# Patient Record
Sex: MALE | Race: ASIAN | NOT HISPANIC OR LATINO | Employment: OTHER
[De-identification: names, ages, dates, MRNs, and addresses within clinical notes are randomized per-mention and may not be internally consistent; named-entity substitution may affect disease eponyms.]

---

## 2020-01-24 ENCOUNTER — RECORDS - HEALTHEAST (OUTPATIENT)
Dept: ADMINISTRATIVE | Facility: OTHER | Age: 70
End: 2020-01-24

## 2020-01-24 ENCOUNTER — AMBULATORY - HEALTHEAST (OUTPATIENT)
Dept: ADMINISTRATIVE | Facility: CLINIC | Age: 70
End: 2020-01-24

## 2020-01-24 ENCOUNTER — OFFICE VISIT - HEALTHEAST (OUTPATIENT)
Dept: FAMILY MEDICINE | Facility: CLINIC | Age: 70
End: 2020-01-24

## 2020-01-24 DIAGNOSIS — R22.1 MULTIPLE MASSES OF NECK: ICD-10-CM

## 2020-01-24 LAB
ANION GAP SERPL CALCULATED.3IONS-SCNC: 7 MMOL/L (ref 5–18)
BUN SERPL-MCNC: 22 MG/DL (ref 8–22)
CALCIUM SERPL-MCNC: 8.7 MG/DL (ref 8.5–10.5)
CHLORIDE BLD-SCNC: 109 MMOL/L (ref 98–107)
CO2 SERPL-SCNC: 26 MMOL/L (ref 22–31)
CREAT SERPL-MCNC: 1.16 MG/DL (ref 0.7–1.3)
GFR SERPL CREATININE-BSD FRML MDRD: >60 ML/MIN/1.73M2
GLUCOSE BLD-MCNC: 89 MG/DL (ref 70–125)
POTASSIUM BLD-SCNC: 4.1 MMOL/L (ref 3.5–5)
SODIUM SERPL-SCNC: 142 MMOL/L (ref 136–145)

## 2020-01-24 ASSESSMENT — MIFFLIN-ST. JEOR: SCORE: 1376.41

## 2020-01-29 ENCOUNTER — OFFICE VISIT - HEALTHEAST (OUTPATIENT)
Dept: OTOLARYNGOLOGY | Facility: CLINIC | Age: 70
End: 2020-01-29

## 2020-01-29 DIAGNOSIS — J39.2 NASOPHARYNGEAL MASS: ICD-10-CM

## 2020-01-29 DIAGNOSIS — R59.0 CERVICAL LYMPHADENOPATHY: ICD-10-CM

## 2020-02-03 ENCOUNTER — COMMUNICATION - HEALTHEAST (OUTPATIENT)
Dept: OTOLARYNGOLOGY | Facility: CLINIC | Age: 70
End: 2020-02-03

## 2020-02-05 ENCOUNTER — OFFICE VISIT - HEALTHEAST (OUTPATIENT)
Dept: OTOLARYNGOLOGY | Facility: CLINIC | Age: 70
End: 2020-02-05

## 2020-02-05 DIAGNOSIS — C11.9 SQUAMOUS CELL CARCINOMA OF NASOPHARYNX (H): ICD-10-CM

## 2020-02-07 ENCOUNTER — AMBULATORY - HEALTHEAST (OUTPATIENT)
Dept: ONCOLOGY | Facility: HOSPITAL | Age: 70
End: 2020-02-07

## 2020-02-07 ENCOUNTER — RECORDS - HEALTHEAST (OUTPATIENT)
Dept: RADIOLOGY | Facility: CLINIC | Age: 70
End: 2020-02-07

## 2020-02-07 ENCOUNTER — OFFICE VISIT - HEALTHEAST (OUTPATIENT)
Dept: RADIATION ONCOLOGY | Facility: HOSPITAL | Age: 70
End: 2020-02-07

## 2020-02-07 DIAGNOSIS — C11.3: ICD-10-CM

## 2020-02-07 DIAGNOSIS — C11.9 NASOPHARYNX CANCER (H): ICD-10-CM

## 2020-02-10 ENCOUNTER — HOSPITAL ENCOUNTER (OUTPATIENT)
Dept: RADIOLOGY | Facility: CLINIC | Age: 70
Discharge: HOME | End: 2020-02-10
Attending: RADIOLOGY

## 2020-02-10 ENCOUNTER — HOSPITAL ENCOUNTER (OUTPATIENT)
Dept: MRI IMAGING | Facility: CLINIC | Age: 70
Setting detail: RADIATION/ONCOLOGY SERIES
Discharge: STILL ADMITTED OR ADMINISTRATIVE | End: 2020-02-10
Attending: RADIOLOGY

## 2020-02-10 DIAGNOSIS — C11.9 NASOPHARYNX CANCER (H): ICD-10-CM

## 2020-02-12 ENCOUNTER — AMBULATORY - HEALTHEAST (OUTPATIENT)
Dept: RADIATION ONCOLOGY | Facility: CLINIC | Age: 70
End: 2020-02-12

## 2020-02-12 ENCOUNTER — COMMUNICATION - HEALTHEAST (OUTPATIENT)
Dept: RADIATION ONCOLOGY | Facility: CLINIC | Age: 70
End: 2020-02-12

## 2020-02-12 ENCOUNTER — HOSPITAL ENCOUNTER (OUTPATIENT)
Dept: PET IMAGING | Facility: HOSPITAL | Age: 70
Setting detail: RADIATION/ONCOLOGY SERIES
Discharge: STILL ADMITTED OR ADMINISTRATIVE | End: 2020-02-12
Attending: RADIOLOGY

## 2020-02-12 DIAGNOSIS — C11.9 NASOPHARYNX CANCER (H): ICD-10-CM

## 2020-02-12 DIAGNOSIS — C11.3: ICD-10-CM

## 2020-02-12 LAB — GLUCOSE BLDC GLUCOMTR-MCNC: 97 MG/DL (ref 70–139)

## 2020-02-13 ENCOUNTER — COMMUNICATION - HEALTHEAST (OUTPATIENT)
Dept: ONCOLOGY | Facility: HOSPITAL | Age: 70
End: 2020-02-13

## 2020-02-14 ENCOUNTER — AMBULATORY - HEALTHEAST (OUTPATIENT)
Dept: RADIATION ONCOLOGY | Facility: HOSPITAL | Age: 70
End: 2020-02-14

## 2020-02-14 ENCOUNTER — OFFICE VISIT - HEALTHEAST (OUTPATIENT)
Dept: RADIATION ONCOLOGY | Facility: HOSPITAL | Age: 70
End: 2020-02-14

## 2020-02-14 DIAGNOSIS — C11.9 NASOPHARYNX CANCER (H): ICD-10-CM

## 2020-02-14 DIAGNOSIS — C11.3: ICD-10-CM

## 2020-02-14 LAB
CREAT SERPL-MCNC: 0.9 MG/DL (ref 0.7–1.3)
GFR SERPL CREATININE-BSD FRML MDRD: >60 ML/MIN/1.73M2

## 2020-02-18 ENCOUNTER — OFFICE VISIT - HEALTHEAST (OUTPATIENT)
Dept: ONCOLOGY | Facility: HOSPITAL | Age: 70
End: 2020-02-18

## 2020-02-18 DIAGNOSIS — C11.9 NASOPHARYNGEAL CARCINOMA (H): ICD-10-CM

## 2020-02-18 DIAGNOSIS — C11.9 SQUAMOUS CELL CARCINOMA OF NASOPHARYNX (H): ICD-10-CM

## 2020-02-20 ENCOUNTER — COMMUNICATION - HEALTHEAST (OUTPATIENT)
Dept: ADMINISTRATIVE | Facility: HOSPITAL | Age: 70
End: 2020-02-20

## 2020-02-25 ENCOUNTER — AMBULATORY - HEALTHEAST (OUTPATIENT)
Dept: PULMONOLOGY | Facility: OTHER | Age: 70
End: 2020-02-25

## 2020-02-25 ENCOUNTER — OFFICE VISIT - HEALTHEAST (OUTPATIENT)
Dept: PULMONOLOGY | Facility: OTHER | Age: 70
End: 2020-02-25

## 2020-02-25 DIAGNOSIS — C11.9 NASOPHARYNGEAL CARCINOMA (H): ICD-10-CM

## 2020-02-25 DIAGNOSIS — R59.0 HILAR LYMPHADENOPATHY: ICD-10-CM

## 2020-02-25 ASSESSMENT — MIFFLIN-ST. JEOR: SCORE: 1342.79

## 2020-02-26 ENCOUNTER — AMBULATORY - HEALTHEAST (OUTPATIENT)
Dept: RADIATION ONCOLOGY | Facility: HOSPITAL | Age: 70
End: 2020-02-26

## 2020-02-27 ENCOUNTER — AMBULATORY - HEALTHEAST (OUTPATIENT)
Dept: INFUSION THERAPY | Facility: HOSPITAL | Age: 70
End: 2020-02-27

## 2020-02-27 ENCOUNTER — HOSPITAL ENCOUNTER (OUTPATIENT)
Dept: RESPIRATORY THERAPY | Facility: CLINIC | Age: 70
Discharge: HOME | End: 2020-02-27
Attending: INTERNAL MEDICINE | Admitting: INTERNAL MEDICINE

## 2020-02-27 ENCOUNTER — COMMUNICATION - HEALTHEAST (OUTPATIENT)
Dept: ONCOLOGY | Facility: HOSPITAL | Age: 70
End: 2020-02-27

## 2020-02-27 DIAGNOSIS — C11.9 NASOPHARYNGEAL CARCINOMA (H): ICD-10-CM

## 2020-02-27 DIAGNOSIS — C11.9 SQUAMOUS CELL CARCINOMA OF NASOPHARYNX (H): ICD-10-CM

## 2020-02-27 ASSESSMENT — MIFFLIN-ST. JEOR: SCORE: 1410.83

## 2020-02-28 LAB
CAP COMMENT: NORMAL
LAB AP CHARGES (HE HISTORICAL CONVERSION): NORMAL
LAB AP INITIAL CYTO EVAL (HE HISTORICAL CONVERSION): NORMAL
LAB MED GENERAL PATH INTERP (HE HISTORICAL CONVERSION): NORMAL
PATH REPORT.COMMENTS IMP SPEC: NORMAL
PATH REPORT.FINAL DX SPEC: NORMAL
PATH REPORT.MICROSCOPIC SPEC OTHER STN: NORMAL
PATH REPORT.RELEVANT HX SPEC: NORMAL
SPECIMEN DESCRIPTION: NORMAL

## 2020-03-02 ENCOUNTER — AMBULATORY - HEALTHEAST (OUTPATIENT)
Dept: RADIATION ONCOLOGY | Facility: HOSPITAL | Age: 70
End: 2020-03-02

## 2020-03-02 ENCOUNTER — HOSPITAL ENCOUNTER (OUTPATIENT)
Dept: INTERVENTIONAL RADIOLOGY/VASCULAR | Facility: HOSPITAL | Age: 70
Setting detail: RADIATION/ONCOLOGY SERIES
Discharge: STILL ADMITTED OR ADMINISTRATIVE | End: 2020-03-02
Attending: RADIOLOGY

## 2020-03-02 ENCOUNTER — OFFICE VISIT - HEALTHEAST (OUTPATIENT)
Dept: RADIATION ONCOLOGY | Facility: HOSPITAL | Age: 70
End: 2020-03-02

## 2020-03-02 DIAGNOSIS — C11.9 NASOPHARYNX CANCER (H): ICD-10-CM

## 2020-03-02 DIAGNOSIS — C11.3: ICD-10-CM

## 2020-03-02 DIAGNOSIS — C11.9 SQUAMOUS CELL CARCINOMA OF NASOPHARYNX (H): ICD-10-CM

## 2020-03-02 DIAGNOSIS — C11.9 NASOPHARYNGEAL CARCINOMA (H): ICD-10-CM

## 2020-03-03 ENCOUNTER — AMBULATORY - HEALTHEAST (OUTPATIENT)
Dept: RADIATION ONCOLOGY | Facility: HOSPITAL | Age: 70
End: 2020-03-03

## 2020-03-03 ENCOUNTER — INFUSION - HEALTHEAST (OUTPATIENT)
Dept: INFUSION THERAPY | Facility: HOSPITAL | Age: 70
End: 2020-03-03

## 2020-03-03 ENCOUNTER — OFFICE VISIT - HEALTHEAST (OUTPATIENT)
Dept: ONCOLOGY | Facility: HOSPITAL | Age: 70
End: 2020-03-03

## 2020-03-03 DIAGNOSIS — C11.9 SQUAMOUS CELL CARCINOMA OF NASOPHARYNX (H): ICD-10-CM

## 2020-03-03 LAB
ALBUMIN SERPL-MCNC: 3.2 G/DL (ref 3.5–5)
ALP SERPL-CCNC: 76 U/L (ref 45–120)
ALT SERPL W P-5'-P-CCNC: 26 U/L (ref 0–45)
ANION GAP SERPL CALCULATED.3IONS-SCNC: 10 MMOL/L (ref 5–18)
AST SERPL W P-5'-P-CCNC: 25 U/L (ref 0–40)
BASOPHILS # BLD AUTO: 0 THOU/UL (ref 0–0.2)
BASOPHILS NFR BLD AUTO: 1 % (ref 0–2)
BILIRUB SERPL-MCNC: 0.7 MG/DL (ref 0–1)
BUN SERPL-MCNC: 19 MG/DL (ref 8–22)
CALCIUM SERPL-MCNC: 8.7 MG/DL (ref 8.5–10.5)
CHLORIDE BLD-SCNC: 106 MMOL/L (ref 98–107)
CO2 SERPL-SCNC: 26 MMOL/L (ref 22–31)
CREAT SERPL-MCNC: 1.09 MG/DL (ref 0.7–1.3)
EOSINOPHIL # BLD AUTO: 0.1 THOU/UL (ref 0–0.4)
EOSINOPHIL NFR BLD AUTO: 2 % (ref 0–6)
ERYTHROCYTE [DISTWIDTH] IN BLOOD BY AUTOMATED COUNT: 13.9 % (ref 11–14.5)
GFR SERPL CREATININE-BSD FRML MDRD: >60 ML/MIN/1.73M2
GLUCOSE BLD-MCNC: 187 MG/DL (ref 70–125)
HCT VFR BLD AUTO: 35.3 % (ref 40–54)
HGB BLD-MCNC: 11 G/DL (ref 14–18)
LYMPHOCYTES # BLD AUTO: 1.2 THOU/UL (ref 0.8–4.4)
LYMPHOCYTES NFR BLD AUTO: 20 % (ref 20–40)
MAGNESIUM SERPL-MCNC: 2.1 MG/DL (ref 1.8–2.6)
MCH RBC QN AUTO: 25.5 PG (ref 27–34)
MCHC RBC AUTO-ENTMCNC: 31.2 G/DL (ref 32–36)
MCV RBC AUTO: 82 FL (ref 80–100)
MONOCYTES # BLD AUTO: 0.4 THOU/UL (ref 0–0.9)
MONOCYTES NFR BLD AUTO: 7 % (ref 2–10)
NEUTROPHILS # BLD AUTO: 4.1 THOU/UL (ref 2–7.7)
NEUTROPHILS NFR BLD AUTO: 70 % (ref 50–70)
PLATELET # BLD AUTO: 195 THOU/UL (ref 140–440)
PMV BLD AUTO: 11.2 FL (ref 8.5–12.5)
POTASSIUM BLD-SCNC: 3.2 MMOL/L (ref 3.5–5)
PROT SERPL-MCNC: 6.6 G/DL (ref 6–8)
RBC # BLD AUTO: 4.31 MILL/UL (ref 4.4–6.2)
SODIUM SERPL-SCNC: 142 MMOL/L (ref 136–145)
WBC: 5.8 THOU/UL (ref 4–11)

## 2020-03-04 ENCOUNTER — AMBULATORY - HEALTHEAST (OUTPATIENT)
Dept: RADIATION ONCOLOGY | Facility: HOSPITAL | Age: 70
End: 2020-03-04

## 2020-03-04 ENCOUNTER — COMMUNICATION - HEALTHEAST (OUTPATIENT)
Dept: PULMONOLOGY | Facility: OTHER | Age: 70
End: 2020-03-04

## 2020-03-05 ENCOUNTER — AMBULATORY - HEALTHEAST (OUTPATIENT)
Dept: RADIATION ONCOLOGY | Facility: HOSPITAL | Age: 70
End: 2020-03-05

## 2020-03-05 ENCOUNTER — AMBULATORY - HEALTHEAST (OUTPATIENT)
Dept: ONCOLOGY | Facility: HOSPITAL | Age: 70
End: 2020-03-05

## 2020-03-05 DIAGNOSIS — C11.9 NASOPHARYNGEAL CARCINOMA (H): ICD-10-CM

## 2020-03-05 RX ORDER — LIDOCAINE/PRILOCAINE 2.5 %-2.5%
CREAM (GRAM) TOPICAL
Qty: 30 G | Refills: 1 | Status: SHIPPED | OUTPATIENT
Start: 2020-03-05 | End: 2021-09-16

## 2020-03-06 ENCOUNTER — AMBULATORY - HEALTHEAST (OUTPATIENT)
Dept: RADIATION ONCOLOGY | Facility: HOSPITAL | Age: 70
End: 2020-03-06

## 2020-03-09 ENCOUNTER — AMBULATORY - HEALTHEAST (OUTPATIENT)
Dept: ONCOLOGY | Facility: HOSPITAL | Age: 70
End: 2020-03-09

## 2020-03-09 ENCOUNTER — OFFICE VISIT - HEALTHEAST (OUTPATIENT)
Dept: RADIATION ONCOLOGY | Facility: HOSPITAL | Age: 70
End: 2020-03-09

## 2020-03-09 ENCOUNTER — AMBULATORY - HEALTHEAST (OUTPATIENT)
Dept: INFUSION THERAPY | Facility: HOSPITAL | Age: 70
End: 2020-03-09

## 2020-03-09 ENCOUNTER — AMBULATORY - HEALTHEAST (OUTPATIENT)
Dept: RADIATION ONCOLOGY | Facility: HOSPITAL | Age: 70
End: 2020-03-09

## 2020-03-09 DIAGNOSIS — C11.9 SQUAMOUS CELL CARCINOMA OF NASOPHARYNX (H): ICD-10-CM

## 2020-03-09 DIAGNOSIS — R50.9 FEVER: ICD-10-CM

## 2020-03-09 DIAGNOSIS — C11.9 NASOPHARYNGEAL CARCINOMA (H): ICD-10-CM

## 2020-03-09 LAB
ALBUMIN SERPL-MCNC: 3.2 G/DL (ref 3.5–5)
ALP SERPL-CCNC: 71 U/L (ref 45–120)
ALT SERPL W P-5'-P-CCNC: 33 U/L (ref 0–45)
ANION GAP SERPL CALCULATED.3IONS-SCNC: 7 MMOL/L (ref 5–18)
AST SERPL W P-5'-P-CCNC: 30 U/L (ref 0–40)
BASOPHILS # BLD AUTO: 0 THOU/UL (ref 0–0.2)
BASOPHILS NFR BLD AUTO: 0 % (ref 0–2)
BILIRUB SERPL-MCNC: 1.4 MG/DL (ref 0–1)
BUN SERPL-MCNC: 16 MG/DL (ref 8–22)
CALCIUM SERPL-MCNC: 8.5 MG/DL (ref 8.5–10.5)
CHLORIDE BLD-SCNC: 101 MMOL/L (ref 98–107)
CO2 SERPL-SCNC: 26 MMOL/L (ref 22–31)
CREAT SERPL-MCNC: 0.9 MG/DL (ref 0.7–1.3)
EOSINOPHIL # BLD AUTO: 0 THOU/UL (ref 0–0.4)
EOSINOPHIL NFR BLD AUTO: 0 % (ref 0–6)
ERYTHROCYTE [DISTWIDTH] IN BLOOD BY AUTOMATED COUNT: 13.6 % (ref 11–14.5)
GFR SERPL CREATININE-BSD FRML MDRD: >60 ML/MIN/1.73M2
GLUCOSE BLD-MCNC: 105 MG/DL (ref 70–125)
HCT VFR BLD AUTO: 32.8 % (ref 40–54)
HGB BLD-MCNC: 10.7 G/DL (ref 14–18)
LYMPHOCYTES # BLD AUTO: 1 THOU/UL (ref 0.8–4.4)
LYMPHOCYTES NFR BLD AUTO: 6 % (ref 20–40)
MAGNESIUM SERPL-MCNC: 2 MG/DL (ref 1.8–2.6)
MCH RBC QN AUTO: 26.2 PG (ref 27–34)
MCHC RBC AUTO-ENTMCNC: 32.6 G/DL (ref 32–36)
MCV RBC AUTO: 80 FL (ref 80–100)
MONOCYTES # BLD AUTO: 1.4 THOU/UL (ref 0–0.9)
MONOCYTES NFR BLD AUTO: 8 % (ref 2–10)
NEUTROPHILS # BLD AUTO: 14.8 THOU/UL (ref 2–7.7)
NEUTROPHILS NFR BLD AUTO: 85 % (ref 50–70)
PLATELET # BLD AUTO: 162 THOU/UL (ref 140–440)
PMV BLD AUTO: 11.4 FL (ref 8.5–12.5)
POTASSIUM BLD-SCNC: 3.8 MMOL/L (ref 3.5–5)
PROT SERPL-MCNC: 6.7 G/DL (ref 6–8)
RBC # BLD AUTO: 4.09 MILL/UL (ref 4.4–6.2)
SODIUM SERPL-SCNC: 134 MMOL/L (ref 136–145)
WBC: 17.3 THOU/UL (ref 4–11)

## 2020-03-10 ENCOUNTER — INFUSION - HEALTHEAST (OUTPATIENT)
Dept: INFUSION THERAPY | Facility: HOSPITAL | Age: 70
End: 2020-03-10

## 2020-03-10 ENCOUNTER — OFFICE VISIT - HEALTHEAST (OUTPATIENT)
Dept: ONCOLOGY | Facility: HOSPITAL | Age: 70
End: 2020-03-10

## 2020-03-10 ENCOUNTER — AMBULATORY - HEALTHEAST (OUTPATIENT)
Dept: RADIATION ONCOLOGY | Facility: HOSPITAL | Age: 70
End: 2020-03-10

## 2020-03-10 DIAGNOSIS — C11.9 SQUAMOUS CELL CARCINOMA OF NASOPHARYNX (H): ICD-10-CM

## 2020-03-10 DIAGNOSIS — R50.9 FEVER, UNSPECIFIED FEVER CAUSE: ICD-10-CM

## 2020-03-10 ASSESSMENT — MIFFLIN-ST. JEOR: SCORE: 1342

## 2020-03-11 ENCOUNTER — AMBULATORY - HEALTHEAST (OUTPATIENT)
Dept: RADIATION ONCOLOGY | Facility: HOSPITAL | Age: 70
End: 2020-03-11

## 2020-03-12 ENCOUNTER — INFUSION - HEALTHEAST (OUTPATIENT)
Dept: INFUSION THERAPY | Facility: HOSPITAL | Age: 70
End: 2020-03-12

## 2020-03-12 ENCOUNTER — AMBULATORY - HEALTHEAST (OUTPATIENT)
Dept: RADIATION ONCOLOGY | Facility: HOSPITAL | Age: 70
End: 2020-03-12

## 2020-03-12 DIAGNOSIS — C11.9 SQUAMOUS CELL CARCINOMA OF NASOPHARYNX (H): ICD-10-CM

## 2020-03-12 LAB
ALBUMIN SERPL-MCNC: 2.9 G/DL (ref 3.5–5)
ALP SERPL-CCNC: 74 U/L (ref 45–120)
ALT SERPL W P-5'-P-CCNC: 48 U/L (ref 0–45)
ANION GAP SERPL CALCULATED.3IONS-SCNC: 5 MMOL/L (ref 5–18)
AST SERPL W P-5'-P-CCNC: 31 U/L (ref 0–40)
BASOPHILS # BLD AUTO: 0 THOU/UL (ref 0–0.2)
BASOPHILS NFR BLD AUTO: 0 % (ref 0–2)
BILIRUB SERPL-MCNC: 0.8 MG/DL (ref 0–1)
BUN SERPL-MCNC: 18 MG/DL (ref 8–22)
CALCIUM SERPL-MCNC: 8.1 MG/DL (ref 8.5–10.5)
CHLORIDE BLD-SCNC: 106 MMOL/L (ref 98–107)
CO2 SERPL-SCNC: 27 MMOL/L (ref 22–31)
CREAT SERPL-MCNC: 0.8 MG/DL (ref 0.7–1.3)
EOSINOPHIL # BLD AUTO: 0.1 THOU/UL (ref 0–0.4)
EOSINOPHIL NFR BLD AUTO: 1 % (ref 0–6)
ERYTHROCYTE [DISTWIDTH] IN BLOOD BY AUTOMATED COUNT: 13.6 % (ref 11–14.5)
GFR SERPL CREATININE-BSD FRML MDRD: >60 ML/MIN/1.73M2
GLUCOSE BLD-MCNC: 95 MG/DL (ref 70–125)
HCT VFR BLD AUTO: 28.6 % (ref 40–54)
HGB BLD-MCNC: 9.2 G/DL (ref 14–18)
LYMPHOCYTES # BLD AUTO: 0.6 THOU/UL (ref 0.8–4.4)
LYMPHOCYTES NFR BLD AUTO: 6 % (ref 20–40)
MAGNESIUM SERPL-MCNC: 1.9 MG/DL (ref 1.8–2.6)
MCH RBC QN AUTO: 26.1 PG (ref 27–34)
MCHC RBC AUTO-ENTMCNC: 32.2 G/DL (ref 32–36)
MCV RBC AUTO: 81 FL (ref 80–100)
MONOCYTES # BLD AUTO: 0.8 THOU/UL (ref 0–0.9)
MONOCYTES NFR BLD AUTO: 9 % (ref 2–10)
NEUTROPHILS # BLD AUTO: 7.2 THOU/UL (ref 2–7.7)
NEUTROPHILS NFR BLD AUTO: 83 % (ref 50–70)
PLATELET # BLD AUTO: 146 THOU/UL (ref 140–440)
PMV BLD AUTO: 10.7 FL (ref 8.5–12.5)
POTASSIUM BLD-SCNC: 3.6 MMOL/L (ref 3.5–5)
PROT SERPL-MCNC: 6.2 G/DL (ref 6–8)
RBC # BLD AUTO: 3.52 MILL/UL (ref 4.4–6.2)
SODIUM SERPL-SCNC: 138 MMOL/L (ref 136–145)
WBC: 8.7 THOU/UL (ref 4–11)

## 2020-03-13 ENCOUNTER — AMBULATORY - HEALTHEAST (OUTPATIENT)
Dept: RADIATION ONCOLOGY | Facility: HOSPITAL | Age: 70
End: 2020-03-13

## 2020-03-14 LAB
AEROBIC BLOOD CULTURE BOTTLE: NO GROWTH
AEROBIC BLOOD CULTURE BOTTLE: NO GROWTH
ANAEROBIC BLOOD CULTURE BOTTLE: NO GROWTH
ANAEROBIC BLOOD CULTURE BOTTLE: NO GROWTH

## 2020-03-16 ENCOUNTER — OFFICE VISIT - HEALTHEAST (OUTPATIENT)
Dept: RADIATION ONCOLOGY | Facility: HOSPITAL | Age: 70
End: 2020-03-16

## 2020-03-16 ENCOUNTER — AMBULATORY - HEALTHEAST (OUTPATIENT)
Dept: RADIATION ONCOLOGY | Facility: HOSPITAL | Age: 70
End: 2020-03-16

## 2020-03-16 DIAGNOSIS — C11.9 SQUAMOUS CELL CARCINOMA OF NASOPHARYNX (H): ICD-10-CM

## 2020-03-17 ENCOUNTER — AMBULATORY - HEALTHEAST (OUTPATIENT)
Dept: RADIATION ONCOLOGY | Facility: HOSPITAL | Age: 70
End: 2020-03-17

## 2020-03-17 ENCOUNTER — OFFICE VISIT - HEALTHEAST (OUTPATIENT)
Dept: RADIATION ONCOLOGY | Facility: HOSPITAL | Age: 70
End: 2020-03-17

## 2020-03-17 DIAGNOSIS — Z51.5 ENCOUNTER FOR PALLIATIVE CARE: ICD-10-CM

## 2020-03-17 DIAGNOSIS — C11.9 NASOPHARYNX CANCER (H): ICD-10-CM

## 2020-03-17 DIAGNOSIS — G89.3 CANCER RELATED PAIN: ICD-10-CM

## 2020-03-18 ENCOUNTER — INFUSION - HEALTHEAST (OUTPATIENT)
Dept: INFUSION THERAPY | Facility: HOSPITAL | Age: 70
End: 2020-03-18

## 2020-03-18 ENCOUNTER — AMBULATORY - HEALTHEAST (OUTPATIENT)
Dept: RADIATION ONCOLOGY | Facility: HOSPITAL | Age: 70
End: 2020-03-18

## 2020-03-18 DIAGNOSIS — C11.9 SQUAMOUS CELL CARCINOMA OF NASOPHARYNX (H): ICD-10-CM

## 2020-03-18 LAB
ALBUMIN SERPL-MCNC: 3 G/DL (ref 3.5–5)
ALP SERPL-CCNC: 69 U/L (ref 45–120)
ALT SERPL W P-5'-P-CCNC: 34 U/L (ref 0–45)
ANION GAP SERPL CALCULATED.3IONS-SCNC: 7 MMOL/L (ref 5–18)
AST SERPL W P-5'-P-CCNC: 26 U/L (ref 0–40)
BASOPHILS # BLD AUTO: 0 THOU/UL (ref 0–0.2)
BASOPHILS NFR BLD AUTO: 0 % (ref 0–2)
BILIRUB SERPL-MCNC: 0.9 MG/DL (ref 0–1)
BUN SERPL-MCNC: 15 MG/DL (ref 8–22)
CALCIUM SERPL-MCNC: 8.2 MG/DL (ref 8.5–10.5)
CHLORIDE BLD-SCNC: 102 MMOL/L (ref 98–107)
CO2 SERPL-SCNC: 27 MMOL/L (ref 22–31)
CREAT SERPL-MCNC: 0.92 MG/DL (ref 0.7–1.3)
EOSINOPHIL # BLD AUTO: 0.2 THOU/UL (ref 0–0.4)
EOSINOPHIL NFR BLD AUTO: 2 % (ref 0–6)
ERYTHROCYTE [DISTWIDTH] IN BLOOD BY AUTOMATED COUNT: 13.6 % (ref 11–14.5)
GFR SERPL CREATININE-BSD FRML MDRD: >60 ML/MIN/1.73M2
GLUCOSE BLD-MCNC: 97 MG/DL (ref 70–125)
HCT VFR BLD AUTO: 29.9 % (ref 40–54)
HGB BLD-MCNC: 9.3 G/DL (ref 14–18)
LYMPHOCYTES # BLD AUTO: 0.6 THOU/UL (ref 0.8–4.4)
LYMPHOCYTES NFR BLD AUTO: 9 % (ref 20–40)
MAGNESIUM SERPL-MCNC: 2 MG/DL (ref 1.8–2.6)
MCH RBC QN AUTO: 25.5 PG (ref 27–34)
MCHC RBC AUTO-ENTMCNC: 31.1 G/DL (ref 32–36)
MCV RBC AUTO: 82 FL (ref 80–100)
MONOCYTES # BLD AUTO: 0.7 THOU/UL (ref 0–0.9)
MONOCYTES NFR BLD AUTO: 10 % (ref 2–10)
NEUTROPHILS # BLD AUTO: 5.6 THOU/UL (ref 2–7.7)
NEUTROPHILS NFR BLD AUTO: 79 % (ref 50–70)
PLATELET # BLD AUTO: 166 THOU/UL (ref 140–440)
PMV BLD AUTO: 10.3 FL (ref 8.5–12.5)
POTASSIUM BLD-SCNC: 3.6 MMOL/L (ref 3.5–5)
PROT SERPL-MCNC: 6.2 G/DL (ref 6–8)
RBC # BLD AUTO: 3.64 MILL/UL (ref 4.4–6.2)
SODIUM SERPL-SCNC: 136 MMOL/L (ref 136–145)
WBC: 7.2 THOU/UL (ref 4–11)

## 2020-03-19 ENCOUNTER — AMBULATORY - HEALTHEAST (OUTPATIENT)
Dept: RADIATION ONCOLOGY | Facility: HOSPITAL | Age: 70
End: 2020-03-19

## 2020-03-20 ENCOUNTER — AMBULATORY - HEALTHEAST (OUTPATIENT)
Dept: RADIATION ONCOLOGY | Facility: HOSPITAL | Age: 70
End: 2020-03-20

## 2020-03-23 ENCOUNTER — OFFICE VISIT - HEALTHEAST (OUTPATIENT)
Dept: RADIATION ONCOLOGY | Facility: HOSPITAL | Age: 70
End: 2020-03-23

## 2020-03-23 ENCOUNTER — AMBULATORY - HEALTHEAST (OUTPATIENT)
Dept: RADIATION ONCOLOGY | Facility: HOSPITAL | Age: 70
End: 2020-03-23

## 2020-03-23 DIAGNOSIS — C11.9 NASOPHARYNGEAL CARCINOMA (H): ICD-10-CM

## 2020-03-24 ENCOUNTER — INFUSION - HEALTHEAST (OUTPATIENT)
Dept: INFUSION THERAPY | Facility: HOSPITAL | Age: 70
End: 2020-03-24

## 2020-03-24 ENCOUNTER — OFFICE VISIT - HEALTHEAST (OUTPATIENT)
Dept: ONCOLOGY | Facility: HOSPITAL | Age: 70
End: 2020-03-24

## 2020-03-24 ENCOUNTER — AMBULATORY - HEALTHEAST (OUTPATIENT)
Dept: RADIATION ONCOLOGY | Facility: HOSPITAL | Age: 70
End: 2020-03-24

## 2020-03-24 ENCOUNTER — COMMUNICATION - HEALTHEAST (OUTPATIENT)
Dept: NUTRITION | Facility: HOSPITAL | Age: 70
End: 2020-03-24

## 2020-03-24 ENCOUNTER — AMBULATORY - HEALTHEAST (OUTPATIENT)
Dept: INFUSION THERAPY | Facility: HOSPITAL | Age: 70
End: 2020-03-24

## 2020-03-24 DIAGNOSIS — C11.9 SQUAMOUS CELL CARCINOMA OF NASOPHARYNX (H): ICD-10-CM

## 2020-03-24 DIAGNOSIS — C11.9 NASOPHARYNGEAL CARCINOMA (H): ICD-10-CM

## 2020-03-24 LAB
ALBUMIN SERPL-MCNC: 3.1 G/DL (ref 3.5–5)
ALP SERPL-CCNC: 67 U/L (ref 45–120)
ALT SERPL W P-5'-P-CCNC: 28 U/L (ref 0–45)
ANION GAP SERPL CALCULATED.3IONS-SCNC: 10 MMOL/L (ref 5–18)
AST SERPL W P-5'-P-CCNC: 21 U/L (ref 0–40)
BASOPHILS # BLD AUTO: 0 THOU/UL (ref 0–0.2)
BASOPHILS NFR BLD AUTO: 0 % (ref 0–2)
BILIRUB SERPL-MCNC: 0.8 MG/DL (ref 0–1)
BUN SERPL-MCNC: 19 MG/DL (ref 8–22)
CALCIUM SERPL-MCNC: 8.7 MG/DL (ref 8.5–10.5)
CHLORIDE BLD-SCNC: 102 MMOL/L (ref 98–107)
CO2 SERPL-SCNC: 26 MMOL/L (ref 22–31)
CREAT SERPL-MCNC: 1.25 MG/DL (ref 0.7–1.3)
EOSINOPHIL # BLD AUTO: 0.2 THOU/UL (ref 0–0.4)
EOSINOPHIL NFR BLD AUTO: 2 % (ref 0–6)
ERYTHROCYTE [DISTWIDTH] IN BLOOD BY AUTOMATED COUNT: 14.2 % (ref 11–14.5)
GFR SERPL CREATININE-BSD FRML MDRD: 58 ML/MIN/1.73M2
GLUCOSE BLD-MCNC: 148 MG/DL (ref 70–125)
HCT VFR BLD AUTO: 30.3 % (ref 40–54)
HGB BLD-MCNC: 9.6 G/DL (ref 14–18)
LYMPHOCYTES # BLD AUTO: 0.6 THOU/UL (ref 0.8–4.4)
LYMPHOCYTES NFR BLD AUTO: 9 % (ref 20–40)
MAGNESIUM SERPL-MCNC: 1.9 MG/DL (ref 1.8–2.6)
MCH RBC QN AUTO: 25.8 PG (ref 27–34)
MCHC RBC AUTO-ENTMCNC: 31.7 G/DL (ref 32–36)
MCV RBC AUTO: 82 FL (ref 80–100)
MONOCYTES # BLD AUTO: 0.6 THOU/UL (ref 0–0.9)
MONOCYTES NFR BLD AUTO: 8 % (ref 2–10)
NEUTROPHILS # BLD AUTO: 5.6 THOU/UL (ref 2–7.7)
NEUTROPHILS NFR BLD AUTO: 80 % (ref 50–70)
PLATELET # BLD AUTO: 170 THOU/UL (ref 140–440)
PMV BLD AUTO: 10.9 FL (ref 8.5–12.5)
POTASSIUM BLD-SCNC: 3.4 MMOL/L (ref 3.5–5)
PROT SERPL-MCNC: 6.5 G/DL (ref 6–8)
RBC # BLD AUTO: 3.72 MILL/UL (ref 4.4–6.2)
SODIUM SERPL-SCNC: 138 MMOL/L (ref 136–145)
WBC: 7 THOU/UL (ref 4–11)

## 2020-03-25 ENCOUNTER — AMBULATORY - HEALTHEAST (OUTPATIENT)
Dept: RADIATION ONCOLOGY | Facility: HOSPITAL | Age: 70
End: 2020-03-25

## 2020-03-26 ENCOUNTER — AMBULATORY - HEALTHEAST (OUTPATIENT)
Dept: RADIATION ONCOLOGY | Facility: HOSPITAL | Age: 70
End: 2020-03-26

## 2020-03-26 DIAGNOSIS — C11.9 SQUAMOUS CELL CARCINOMA OF NASOPHARYNX (H): ICD-10-CM

## 2020-03-27 ENCOUNTER — AMBULATORY - HEALTHEAST (OUTPATIENT)
Dept: RADIATION ONCOLOGY | Facility: HOSPITAL | Age: 70
End: 2020-03-27

## 2020-03-30 ENCOUNTER — OFFICE VISIT - HEALTHEAST (OUTPATIENT)
Dept: RADIATION ONCOLOGY | Facility: HOSPITAL | Age: 70
End: 2020-03-30

## 2020-03-30 ENCOUNTER — AMBULATORY - HEALTHEAST (OUTPATIENT)
Dept: RADIATION ONCOLOGY | Facility: HOSPITAL | Age: 70
End: 2020-03-30

## 2020-03-30 DIAGNOSIS — B49 FUNGAL INFECTION: ICD-10-CM

## 2020-03-30 DIAGNOSIS — C11.9 SQUAMOUS CELL CARCINOMA OF NASOPHARYNX (H): ICD-10-CM

## 2020-03-31 ENCOUNTER — INFUSION - HEALTHEAST (OUTPATIENT)
Dept: INFUSION THERAPY | Facility: HOSPITAL | Age: 70
End: 2020-03-31

## 2020-03-31 ENCOUNTER — AMBULATORY - HEALTHEAST (OUTPATIENT)
Dept: RADIATION ONCOLOGY | Facility: HOSPITAL | Age: 70
End: 2020-03-31

## 2020-03-31 DIAGNOSIS — C11.9 SQUAMOUS CELL CARCINOMA OF NASOPHARYNX (H): ICD-10-CM

## 2020-03-31 LAB
ALBUMIN SERPL-MCNC: 3 G/DL (ref 3.5–5)
ALP SERPL-CCNC: 74 U/L (ref 45–120)
ALT SERPL W P-5'-P-CCNC: 41 U/L (ref 0–45)
ANION GAP SERPL CALCULATED.3IONS-SCNC: 7 MMOL/L (ref 5–18)
AST SERPL W P-5'-P-CCNC: 33 U/L (ref 0–40)
BASOPHILS # BLD AUTO: 0 THOU/UL (ref 0–0.2)
BASOPHILS NFR BLD AUTO: 0 % (ref 0–2)
BILIRUB SERPL-MCNC: 0.3 MG/DL (ref 0–1)
BUN SERPL-MCNC: 25 MG/DL (ref 8–22)
CALCIUM SERPL-MCNC: 8.8 MG/DL (ref 8.5–10.5)
CHLORIDE BLD-SCNC: 103 MMOL/L (ref 98–107)
CO2 SERPL-SCNC: 25 MMOL/L (ref 22–31)
CREAT SERPL-MCNC: 1.18 MG/DL (ref 0.7–1.3)
EOSINOPHIL # BLD AUTO: 0 THOU/UL (ref 0–0.4)
EOSINOPHIL NFR BLD AUTO: 0 % (ref 0–6)
ERYTHROCYTE [DISTWIDTH] IN BLOOD BY AUTOMATED COUNT: 14.6 % (ref 11–14.5)
GFR SERPL CREATININE-BSD FRML MDRD: >60 ML/MIN/1.73M2
GLUCOSE BLD-MCNC: 146 MG/DL (ref 70–125)
HCT VFR BLD AUTO: 27.5 % (ref 40–54)
HGB BLD-MCNC: 8.6 G/DL (ref 14–18)
LYMPHOCYTES # BLD AUTO: 0.2 THOU/UL (ref 0.8–4.4)
LYMPHOCYTES NFR BLD AUTO: 5 % (ref 20–40)
MAGNESIUM SERPL-MCNC: 2.3 MG/DL (ref 1.8–2.6)
MCH RBC QN AUTO: 25.7 PG (ref 27–34)
MCHC RBC AUTO-ENTMCNC: 31.3 G/DL (ref 32–36)
MCV RBC AUTO: 82 FL (ref 80–100)
MONOCYTES # BLD AUTO: 0.1 THOU/UL (ref 0–0.9)
MONOCYTES NFR BLD AUTO: 3 % (ref 2–10)
NEUTROPHILS # BLD AUTO: 3.1 THOU/UL (ref 2–7.7)
NEUTROPHILS NFR BLD AUTO: 93 % (ref 50–70)
PLATELET # BLD AUTO: 88 THOU/UL (ref 140–440)
PMV BLD AUTO: 11 FL (ref 8.5–12.5)
POTASSIUM BLD-SCNC: 4.6 MMOL/L (ref 3.5–5)
PROT SERPL-MCNC: 6.8 G/DL (ref 6–8)
RBC # BLD AUTO: 3.34 MILL/UL (ref 4.4–6.2)
SODIUM SERPL-SCNC: 135 MMOL/L (ref 136–145)
WBC: 3.4 THOU/UL (ref 4–11)

## 2020-04-01 ENCOUNTER — AMBULATORY - HEALTHEAST (OUTPATIENT)
Dept: RADIATION ONCOLOGY | Facility: HOSPITAL | Age: 70
End: 2020-04-01

## 2020-04-01 ENCOUNTER — COMMUNICATION - HEALTHEAST (OUTPATIENT)
Dept: ONCOLOGY | Facility: HOSPITAL | Age: 70
End: 2020-04-01

## 2020-04-02 ENCOUNTER — AMBULATORY - HEALTHEAST (OUTPATIENT)
Dept: RADIATION ONCOLOGY | Facility: HOSPITAL | Age: 70
End: 2020-04-02

## 2020-04-03 ENCOUNTER — AMBULATORY - HEALTHEAST (OUTPATIENT)
Dept: RADIATION ONCOLOGY | Facility: HOSPITAL | Age: 70
End: 2020-04-03

## 2020-04-06 ENCOUNTER — AMBULATORY - HEALTHEAST (OUTPATIENT)
Dept: RADIATION ONCOLOGY | Facility: HOSPITAL | Age: 70
End: 2020-04-06

## 2020-04-06 ENCOUNTER — OFFICE VISIT - HEALTHEAST (OUTPATIENT)
Dept: RADIATION ONCOLOGY | Facility: HOSPITAL | Age: 70
End: 2020-04-06

## 2020-04-06 ENCOUNTER — OFFICE VISIT - HEALTHEAST (OUTPATIENT)
Dept: ONCOLOGY | Facility: HOSPITAL | Age: 70
End: 2020-04-06

## 2020-04-06 ENCOUNTER — AMBULATORY - HEALTHEAST (OUTPATIENT)
Dept: ONCOLOGY | Facility: HOSPITAL | Age: 70
End: 2020-04-06

## 2020-04-06 DIAGNOSIS — C11.9 SQUAMOUS CELL CARCINOMA OF NASOPHARYNX (H): ICD-10-CM

## 2020-04-06 DIAGNOSIS — C11.9 NASOPHARYNX CANCER (H): ICD-10-CM

## 2020-04-07 ENCOUNTER — AMBULATORY - HEALTHEAST (OUTPATIENT)
Dept: RADIATION ONCOLOGY | Facility: HOSPITAL | Age: 70
End: 2020-04-07

## 2020-04-07 ENCOUNTER — INFUSION - HEALTHEAST (OUTPATIENT)
Dept: INFUSION THERAPY | Facility: HOSPITAL | Age: 70
End: 2020-04-07

## 2020-04-07 ENCOUNTER — COMMUNICATION - HEALTHEAST (OUTPATIENT)
Dept: ONCOLOGY | Facility: HOSPITAL | Age: 70
End: 2020-04-07

## 2020-04-07 ENCOUNTER — OFFICE VISIT - HEALTHEAST (OUTPATIENT)
Dept: ONCOLOGY | Facility: HOSPITAL | Age: 70
End: 2020-04-07

## 2020-04-07 ENCOUNTER — AMBULATORY - HEALTHEAST (OUTPATIENT)
Dept: INFUSION THERAPY | Facility: HOSPITAL | Age: 70
End: 2020-04-07

## 2020-04-07 DIAGNOSIS — C11.9 SQUAMOUS CELL CARCINOMA OF NASOPHARYNX (H): ICD-10-CM

## 2020-04-07 DIAGNOSIS — R79.89 ELEVATED SERUM CREATININE: ICD-10-CM

## 2020-04-07 DIAGNOSIS — D61.810 ANTINEOPLASTIC CHEMOTHERAPY INDUCED PANCYTOPENIA (H): ICD-10-CM

## 2020-04-07 DIAGNOSIS — C11.9 NASOPHARYNGEAL CARCINOMA (H): ICD-10-CM

## 2020-04-07 DIAGNOSIS — T45.1X5A ANTINEOPLASTIC CHEMOTHERAPY INDUCED PANCYTOPENIA (H): ICD-10-CM

## 2020-04-07 LAB
ALBUMIN SERPL-MCNC: 2.8 G/DL (ref 3.5–5)
ALP SERPL-CCNC: 82 U/L (ref 45–120)
ALT SERPL W P-5'-P-CCNC: 47 U/L (ref 0–45)
ANION GAP SERPL CALCULATED.3IONS-SCNC: 8 MMOL/L (ref 5–18)
AST SERPL W P-5'-P-CCNC: 19 U/L (ref 0–40)
BASOPHILS # BLD AUTO: 0 THOU/UL (ref 0–0.2)
BASOPHILS NFR BLD AUTO: 0 % (ref 0–2)
BILIRUB SERPL-MCNC: 0.8 MG/DL (ref 0–1)
BUN SERPL-MCNC: 39 MG/DL (ref 8–22)
CALCIUM SERPL-MCNC: 8.1 MG/DL (ref 8.5–10.5)
CHLORIDE BLD-SCNC: 98 MMOL/L (ref 98–107)
CO2 SERPL-SCNC: 25 MMOL/L (ref 22–31)
CREAT SERPL-MCNC: 1.43 MG/DL (ref 0.7–1.3)
EOSINOPHIL # BLD AUTO: 0 THOU/UL (ref 0–0.4)
EOSINOPHIL NFR BLD AUTO: 0 % (ref 0–6)
ERYTHROCYTE [DISTWIDTH] IN BLOOD BY AUTOMATED COUNT: 16.6 % (ref 11–14.5)
GFR SERPL CREATININE-BSD FRML MDRD: 49 ML/MIN/1.73M2
GLUCOSE BLD-MCNC: 197 MG/DL (ref 70–125)
HCT VFR BLD AUTO: 27.1 % (ref 40–54)
HGB BLD-MCNC: 8.8 G/DL (ref 14–18)
LYMPHOCYTES # BLD AUTO: 0.1 THOU/UL (ref 0.8–4.4)
LYMPHOCYTES NFR BLD AUTO: 3 % (ref 20–40)
MAGNESIUM SERPL-MCNC: 2 MG/DL (ref 1.8–2.6)
MCH RBC QN AUTO: 26 PG (ref 27–34)
MCHC RBC AUTO-ENTMCNC: 32.5 G/DL (ref 32–36)
MCV RBC AUTO: 80 FL (ref 80–100)
MONOCYTES # BLD AUTO: 0.1 THOU/UL (ref 0–0.9)
MONOCYTES NFR BLD AUTO: 2 % (ref 2–10)
NEUTROPHILS # BLD AUTO: 4.3 THOU/UL (ref 2–7.7)
NEUTROPHILS NFR BLD AUTO: 95 % (ref 50–70)
PLATELET # BLD AUTO: 32 THOU/UL (ref 140–440)
PMV BLD AUTO: ABNORMAL FL
POTASSIUM BLD-SCNC: 4.4 MMOL/L (ref 3.5–5)
PROT SERPL-MCNC: 5.8 G/DL (ref 6–8)
RBC # BLD AUTO: 3.38 MILL/UL (ref 4.4–6.2)
SODIUM SERPL-SCNC: 131 MMOL/L (ref 136–145)
WBC: 4.5 THOU/UL (ref 4–11)

## 2020-04-07 ASSESSMENT — MIFFLIN-ST. JEOR: SCORE: 1291.65

## 2020-04-08 ENCOUNTER — AMBULATORY - HEALTHEAST (OUTPATIENT)
Dept: RADIATION ONCOLOGY | Facility: HOSPITAL | Age: 70
End: 2020-04-08

## 2020-04-09 ENCOUNTER — AMBULATORY - HEALTHEAST (OUTPATIENT)
Dept: RADIATION ONCOLOGY | Facility: HOSPITAL | Age: 70
End: 2020-04-09

## 2020-04-10 ENCOUNTER — AMBULATORY - HEALTHEAST (OUTPATIENT)
Dept: RADIATION ONCOLOGY | Facility: HOSPITAL | Age: 70
End: 2020-04-10

## 2020-04-13 ENCOUNTER — COMMUNICATION - HEALTHEAST (OUTPATIENT)
Dept: ONCOLOGY | Facility: HOSPITAL | Age: 70
End: 2020-04-13

## 2020-04-13 ENCOUNTER — COMMUNICATION - HEALTHEAST (OUTPATIENT)
Dept: RADIATION ONCOLOGY | Facility: HOSPITAL | Age: 70
End: 2020-04-13

## 2020-04-13 ENCOUNTER — OFFICE VISIT - HEALTHEAST (OUTPATIENT)
Dept: RADIATION ONCOLOGY | Facility: HOSPITAL | Age: 70
End: 2020-04-13

## 2020-04-13 ENCOUNTER — AMBULATORY - HEALTHEAST (OUTPATIENT)
Dept: RADIATION ONCOLOGY | Facility: HOSPITAL | Age: 70
End: 2020-04-13

## 2020-04-13 DIAGNOSIS — C11.9 SQUAMOUS CELL CARCINOMA OF NASOPHARYNX (H): ICD-10-CM

## 2020-04-14 ENCOUNTER — INFUSION - HEALTHEAST (OUTPATIENT)
Dept: INFUSION THERAPY | Facility: HOSPITAL | Age: 70
End: 2020-04-14

## 2020-04-14 ENCOUNTER — AMBULATORY - HEALTHEAST (OUTPATIENT)
Dept: ONCOLOGY | Facility: HOSPITAL | Age: 70
End: 2020-04-14

## 2020-04-14 ENCOUNTER — AMBULATORY - HEALTHEAST (OUTPATIENT)
Dept: RADIATION ONCOLOGY | Facility: HOSPITAL | Age: 70
End: 2020-04-14

## 2020-04-14 DIAGNOSIS — D64.9 ANEMIA: ICD-10-CM

## 2020-04-14 DIAGNOSIS — C11.9 SQUAMOUS CELL CARCINOMA OF NASOPHARYNX (H): ICD-10-CM

## 2020-04-14 DIAGNOSIS — C11.9 NASOPHARYNGEAL CARCINOMA (H): ICD-10-CM

## 2020-04-14 DIAGNOSIS — R79.89 ELEVATED SERUM CREATININE: ICD-10-CM

## 2020-04-14 LAB
ABO/RH(D): NORMAL
ALBUMIN SERPL-MCNC: 2.4 G/DL (ref 3.5–5)
ALP SERPL-CCNC: 66 U/L (ref 45–120)
ALT SERPL W P-5'-P-CCNC: 34 U/L (ref 0–45)
ANION GAP SERPL CALCULATED.3IONS-SCNC: 10 MMOL/L (ref 5–18)
ANTIBODY SCREEN: NEGATIVE
AST SERPL W P-5'-P-CCNC: 29 U/L (ref 0–40)
BASO STIPL BLD QL SMEAR: ABNORMAL
BASOPHILS # BLD AUTO: 0 THOU/UL (ref 0–0.2)
BASOPHILS NFR BLD AUTO: 0 % (ref 0–2)
BILIRUB SERPL-MCNC: 1.3 MG/DL (ref 0–1)
BUN SERPL-MCNC: 22 MG/DL (ref 8–22)
CALCIUM SERPL-MCNC: 7.9 MG/DL (ref 8.5–10.5)
CHLORIDE BLD-SCNC: 100 MMOL/L (ref 98–107)
CO2 SERPL-SCNC: 23 MMOL/L (ref 22–31)
CREAT SERPL-MCNC: 1.42 MG/DL (ref 0.7–1.3)
EOSINOPHIL # BLD AUTO: 0 THOU/UL (ref 0–0.4)
EOSINOPHIL NFR BLD AUTO: 0 % (ref 0–6)
ERYTHROCYTE [DISTWIDTH] IN BLOOD BY AUTOMATED COUNT: 17.8 % (ref 11–14.5)
GFR SERPL CREATININE-BSD FRML MDRD: 50 ML/MIN/1.73M2
GLUCOSE BLD-MCNC: 141 MG/DL (ref 70–125)
HCT VFR BLD AUTO: 20.8 % (ref 40–54)
HGB BLD-MCNC: 6.7 G/DL (ref 14–18)
LYMPHOCYTES # BLD AUTO: 0.3 THOU/UL (ref 0.8–4.4)
LYMPHOCYTES NFR BLD AUTO: 20 % (ref 20–40)
MAGNESIUM SERPL-MCNC: 2 MG/DL (ref 1.8–2.6)
MCH RBC QN AUTO: 27.2 PG (ref 27–34)
MCHC RBC AUTO-ENTMCNC: 32.2 G/DL (ref 32–36)
MCV RBC AUTO: 85 FL (ref 80–100)
MONOCYTES # BLD AUTO: 0.2 THOU/UL (ref 0–0.9)
MONOCYTES NFR BLD AUTO: 14 % (ref 2–10)
NEUTROPHILS # BLD AUTO: 0.9 THOU/UL (ref 2–7.7)
NEUTROPHILS NFR BLD AUTO: 65 % (ref 50–70)
OVALOCYTES: ABNORMAL
PLAT MORPH BLD: ABNORMAL
PLATELET # BLD AUTO: 63 THOU/UL (ref 140–440)
PMV BLD AUTO: 12.1 FL (ref 8.5–12.5)
POTASSIUM BLD-SCNC: 3.5 MMOL/L (ref 3.5–5)
PROT SERPL-MCNC: 5.7 G/DL (ref 6–8)
RBC # BLD AUTO: 2.46 MILL/UL (ref 4.4–6.2)
SODIUM SERPL-SCNC: 133 MMOL/L (ref 136–145)
TEAR DROP: ABNORMAL
TOXIC GRANULATION: ABNORMAL
WBC: 1.4 THOU/UL (ref 4–11)

## 2020-04-15 ENCOUNTER — AMBULATORY - HEALTHEAST (OUTPATIENT)
Dept: RADIATION ONCOLOGY | Facility: HOSPITAL | Age: 70
End: 2020-04-15

## 2020-04-15 ENCOUNTER — AMBULATORY - HEALTHEAST (OUTPATIENT)
Dept: ONCOLOGY | Facility: HOSPITAL | Age: 70
End: 2020-04-15

## 2020-04-15 DIAGNOSIS — C11.9 NASOPHARYNGEAL CARCINOMA (H): ICD-10-CM

## 2020-04-15 LAB
BLD PROD TYP BPU: NORMAL
BLOOD TYPE: 7300
CODING SYSTEM: NORMAL
COMPONENT (HISTORICAL CONVERSION): NORMAL
CROSSMATCH: NORMAL
ISSUE DATE AND TIME: NORMAL
STATUS (HISTORICAL CONVERSION): NORMAL
UNIT ABO/RH (HISTORICAL CONVERSION): NORMAL
UNIT NUMBER: NORMAL

## 2020-04-16 ENCOUNTER — AMBULATORY - HEALTHEAST (OUTPATIENT)
Dept: RADIATION ONCOLOGY | Facility: HOSPITAL | Age: 70
End: 2020-04-16

## 2020-04-17 ENCOUNTER — INFUSION - HEALTHEAST (OUTPATIENT)
Dept: INFUSION THERAPY | Facility: HOSPITAL | Age: 70
End: 2020-04-17

## 2020-04-17 ENCOUNTER — AMBULATORY - HEALTHEAST (OUTPATIENT)
Dept: RADIATION ONCOLOGY | Facility: HOSPITAL | Age: 70
End: 2020-04-17

## 2020-04-17 DIAGNOSIS — C11.9 SQUAMOUS CELL CARCINOMA OF NASOPHARYNX (H): ICD-10-CM

## 2020-04-17 DIAGNOSIS — R79.89 ELEVATED SERUM CREATININE: ICD-10-CM

## 2020-04-19 ENCOUNTER — COMMUNICATION - HEALTHEAST (OUTPATIENT)
Dept: RADIATION ONCOLOGY | Facility: HOSPITAL | Age: 70
End: 2020-04-19

## 2020-04-20 ENCOUNTER — INFUSION - HEALTHEAST (OUTPATIENT)
Dept: INFUSION THERAPY | Facility: HOSPITAL | Age: 70
End: 2020-04-20

## 2020-04-20 ENCOUNTER — OFFICE VISIT - HEALTHEAST (OUTPATIENT)
Dept: RADIATION ONCOLOGY | Facility: HOSPITAL | Age: 70
End: 2020-04-20

## 2020-04-20 DIAGNOSIS — R79.89 ELEVATED SERUM CREATININE: ICD-10-CM

## 2020-04-20 DIAGNOSIS — C11.9 NASOPHARYNGEAL CARCINOMA (H): ICD-10-CM

## 2020-04-20 DIAGNOSIS — C11.9 SQUAMOUS CELL CARCINOMA OF NASOPHARYNX (H): ICD-10-CM

## 2020-04-20 DIAGNOSIS — E86.0 DEHYDRATION: ICD-10-CM

## 2020-04-20 LAB
ALBUMIN SERPL-MCNC: 2.4 G/DL (ref 3.5–5)
ALP SERPL-CCNC: 65 U/L (ref 45–120)
ALT SERPL W P-5'-P-CCNC: 25 U/L (ref 0–45)
ANION GAP SERPL CALCULATED.3IONS-SCNC: 12 MMOL/L (ref 5–18)
AST SERPL W P-5'-P-CCNC: 29 U/L (ref 0–40)
BASOPHILS # BLD AUTO: 0 THOU/UL (ref 0–0.2)
BASOPHILS NFR BLD AUTO: 0 % (ref 0–2)
BILIRUB SERPL-MCNC: 0.8 MG/DL (ref 0–1)
BUN SERPL-MCNC: 25 MG/DL (ref 8–22)
CALCIUM SERPL-MCNC: 8.6 MG/DL (ref 8.5–10.5)
CHLORIDE BLD-SCNC: 103 MMOL/L (ref 98–107)
CO2 SERPL-SCNC: 24 MMOL/L (ref 22–31)
CREAT SERPL-MCNC: 1.14 MG/DL (ref 0.7–1.3)
EOSINOPHIL COUNT (ABSOLUTE): 0 THOU/UL (ref 0–0.4)
EOSINOPHIL NFR BLD AUTO: 1 % (ref 0–6)
ERYTHROCYTE [DISTWIDTH] IN BLOOD BY AUTOMATED COUNT: 18.1 % (ref 11–14.5)
GFR SERPL CREATININE-BSD FRML MDRD: >60 ML/MIN/1.73M2
GLUCOSE BLD-MCNC: 110 MG/DL (ref 70–125)
HCT VFR BLD AUTO: 25.3 % (ref 40–54)
HGB BLD-MCNC: 8 G/DL (ref 14–18)
LYMPHOCYTES # BLD AUTO: 1.1 THOU/UL (ref 0.8–4.4)
LYMPHOCYTES NFR BLD AUTO: 23 % (ref 20–40)
MAGNESIUM SERPL-MCNC: 1.9 MG/DL (ref 1.8–2.6)
MCH RBC QN AUTO: 27 PG (ref 27–34)
MCHC RBC AUTO-ENTMCNC: 31.6 G/DL (ref 32–36)
MCV RBC AUTO: 86 FL (ref 80–100)
METAMYELOCYTES (ABSOLUTE): 0.2 THOU/UL
METAMYELOCYTES NFR BLD MANUAL: 4 %
MONOCYTES # BLD AUTO: 0.4 THOU/UL (ref 0–0.9)
MONOCYTES NFR BLD AUTO: 8 % (ref 2–10)
MYELOCYTES (ABSOLUTE): 0 THOU/UL
MYELOCYTES NFR BLD MANUAL: 1 %
OVALOCYTES: ABNORMAL
PLAT MORPH BLD: NORMAL
PLATELET # BLD AUTO: 162 THOU/UL (ref 140–440)
PMV BLD AUTO: 11.8 FL (ref 8.5–12.5)
POLYCHROMASIA BLD QL SMEAR: ABNORMAL
POTASSIUM BLD-SCNC: 3.5 MMOL/L (ref 3.5–5)
PROT SERPL-MCNC: 6.3 G/DL (ref 6–8)
RBC # BLD AUTO: 2.96 MILL/UL (ref 4.4–6.2)
SODIUM SERPL-SCNC: 139 MMOL/L (ref 136–145)
TOTAL NEUTROPHILS-ABS(DIFF): 3.2 THOU/UL (ref 2–7.7)
TOTAL NEUTROPHILS-REL(DIFF): 65 % (ref 50–70)
WBC: 4.9 THOU/UL (ref 4–11)

## 2020-04-21 ENCOUNTER — OFFICE VISIT - HEALTHEAST (OUTPATIENT)
Dept: ONCOLOGY | Facility: HOSPITAL | Age: 70
End: 2020-04-21

## 2020-04-21 ENCOUNTER — OFFICE VISIT - HEALTHEAST (OUTPATIENT)
Dept: RADIATION ONCOLOGY | Facility: HOSPITAL | Age: 70
End: 2020-04-21

## 2020-04-21 ENCOUNTER — INFUSION - HEALTHEAST (OUTPATIENT)
Dept: INFUSION THERAPY | Facility: HOSPITAL | Age: 70
End: 2020-04-21

## 2020-04-21 ENCOUNTER — COMMUNICATION - HEALTHEAST (OUTPATIENT)
Dept: ONCOLOGY | Facility: HOSPITAL | Age: 70
End: 2020-04-21

## 2020-04-21 DIAGNOSIS — T45.1X5A ANTINEOPLASTIC CHEMOTHERAPY INDUCED PANCYTOPENIA (H): ICD-10-CM

## 2020-04-21 DIAGNOSIS — R63.4 WEIGHT LOSS: ICD-10-CM

## 2020-04-21 DIAGNOSIS — C11.9 SQUAMOUS CELL CARCINOMA OF NASOPHARYNX (H): ICD-10-CM

## 2020-04-21 DIAGNOSIS — G89.3 CANCER RELATED PAIN: ICD-10-CM

## 2020-04-21 DIAGNOSIS — R13.10 ODYNOPHAGIA: ICD-10-CM

## 2020-04-21 DIAGNOSIS — E86.0 DEHYDRATION: ICD-10-CM

## 2020-04-21 DIAGNOSIS — D61.810 ANTINEOPLASTIC CHEMOTHERAPY INDUCED PANCYTOPENIA (H): ICD-10-CM

## 2020-04-21 DIAGNOSIS — C11.9 NASOPHARYNGEAL CARCINOMA (H): ICD-10-CM

## 2020-04-21 DIAGNOSIS — Z51.5 ENCOUNTER FOR PALLIATIVE CARE: ICD-10-CM

## 2020-04-21 DIAGNOSIS — R79.89 ELEVATED SERUM CREATININE: ICD-10-CM

## 2020-04-21 DIAGNOSIS — R53.0 NEOPLASTIC MALIGNANT RELATED FATIGUE: ICD-10-CM

## 2020-04-21 ASSESSMENT — MIFFLIN-ST. JEOR: SCORE: 1270.78

## 2020-04-22 ENCOUNTER — COMMUNICATION - HEALTHEAST (OUTPATIENT)
Dept: CARE COORDINATION | Facility: HOSPITAL | Age: 70
End: 2020-04-22

## 2020-04-24 ENCOUNTER — INFUSION - HEALTHEAST (OUTPATIENT)
Dept: INFUSION THERAPY | Facility: HOSPITAL | Age: 70
End: 2020-04-24

## 2020-04-24 DIAGNOSIS — R79.89 ELEVATED SERUM CREATININE: ICD-10-CM

## 2020-04-24 DIAGNOSIS — C11.9 SQUAMOUS CELL CARCINOMA OF NASOPHARYNX (H): ICD-10-CM

## 2020-04-28 ENCOUNTER — HOSPITAL (OUTPATIENT)
Dept: PHARMACY | Facility: CLINIC | Age: 70
End: 2020-04-28

## 2020-04-28 ENCOUNTER — INFUSION - HEALTHEAST (OUTPATIENT)
Dept: INFUSION THERAPY | Facility: HOSPITAL | Age: 70
End: 2020-04-28

## 2020-04-28 ENCOUNTER — HOME INFUSION (PRE-WILLOW HOME INFUSION) (OUTPATIENT)
Dept: PHARMACY | Facility: CLINIC | Age: 70
End: 2020-04-28

## 2020-04-29 ENCOUNTER — COMMUNICATION - HEALTHEAST (OUTPATIENT)
Dept: ONCOLOGY | Facility: HOSPITAL | Age: 70
End: 2020-04-29

## 2020-04-29 ENCOUNTER — OFFICE VISIT - HEALTHEAST (OUTPATIENT)
Dept: FAMILY MEDICINE | Facility: CLINIC | Age: 70
End: 2020-04-29

## 2020-04-29 DIAGNOSIS — Z93.1 STATUS POST INSERTION OF PERCUTANEOUS ENDOSCOPIC GASTROSTOMY (PEG) TUBE (H): ICD-10-CM

## 2020-04-29 DIAGNOSIS — C11.9 NASOPHARYNGEAL CARCINOMA (H): ICD-10-CM

## 2020-04-29 NOTE — PROGRESS NOTES
This is a recent snapshot of the patient's [facility] medical record.  For current drug dose and complete information and questions, call [phone] or In Basket pool, fv home infusion ([id number])  CSN Number:  [id number]

## 2020-04-30 ENCOUNTER — COMMUNICATION - HEALTHEAST (OUTPATIENT)
Dept: ONCOLOGY | Facility: HOSPITAL | Age: 70
End: 2020-04-30

## 2020-04-30 ENCOUNTER — OFFICE VISIT - HEALTHEAST (OUTPATIENT)
Dept: RADIATION ONCOLOGY | Facility: HOSPITAL | Age: 70
End: 2020-04-30

## 2020-04-30 DIAGNOSIS — C11.9 SQUAMOUS CELL CARCINOMA OF NASOPHARYNX (H): ICD-10-CM

## 2020-05-01 ENCOUNTER — INFUSION - HEALTHEAST (OUTPATIENT)
Dept: INFUSION THERAPY | Facility: HOSPITAL | Age: 70
End: 2020-05-01

## 2020-05-01 DIAGNOSIS — E86.0 DEHYDRATION: ICD-10-CM

## 2020-05-01 DIAGNOSIS — R79.89 ELEVATED SERUM CREATININE: ICD-10-CM

## 2020-05-01 DIAGNOSIS — C11.9 SQUAMOUS CELL CARCINOMA OF NASOPHARYNX (H): ICD-10-CM

## 2020-05-04 ENCOUNTER — COMMUNICATION - HEALTHEAST (OUTPATIENT)
Dept: ADMINISTRATIVE | Facility: HOSPITAL | Age: 70
End: 2020-05-04

## 2020-05-05 ENCOUNTER — INFUSION - HEALTHEAST (OUTPATIENT)
Dept: INFUSION THERAPY | Facility: HOSPITAL | Age: 70
End: 2020-05-05

## 2020-05-05 ENCOUNTER — AMBULATORY - HEALTHEAST (OUTPATIENT)
Dept: INFUSION THERAPY | Facility: HOSPITAL | Age: 70
End: 2020-05-05

## 2020-05-05 ENCOUNTER — OFFICE VISIT - HEALTHEAST (OUTPATIENT)
Dept: ONCOLOGY | Facility: HOSPITAL | Age: 70
End: 2020-05-05

## 2020-05-05 DIAGNOSIS — C11.9 NASOPHARYNGEAL CARCINOMA (H): ICD-10-CM

## 2020-05-05 DIAGNOSIS — R79.89 ELEVATED SERUM CREATININE: ICD-10-CM

## 2020-05-05 DIAGNOSIS — C11.9 NASOPHARYNX CANCER (H): ICD-10-CM

## 2020-05-05 DIAGNOSIS — C11.9 SQUAMOUS CELL CARCINOMA OF NASOPHARYNX (H): ICD-10-CM

## 2020-05-05 LAB
ALBUMIN SERPL-MCNC: 2.5 G/DL (ref 3.5–5)
ALP SERPL-CCNC: 112 U/L (ref 45–120)
ALT SERPL W P-5'-P-CCNC: 35 U/L (ref 0–45)
ANION GAP SERPL CALCULATED.3IONS-SCNC: 7 MMOL/L (ref 5–18)
AST SERPL W P-5'-P-CCNC: 48 U/L (ref 0–40)
BASOPHILS # BLD AUTO: 0.1 THOU/UL (ref 0–0.2)
BASOPHILS NFR BLD AUTO: 1 % (ref 0–2)
BILIRUB SERPL-MCNC: 0.3 MG/DL (ref 0–1)
BUN SERPL-MCNC: 26 MG/DL (ref 8–22)
CALCIUM SERPL-MCNC: 8.8 MG/DL (ref 8.5–10.5)
CHLORIDE BLD-SCNC: 98 MMOL/L (ref 98–107)
CO2 SERPL-SCNC: 31 MMOL/L (ref 22–31)
CREAT SERPL-MCNC: 0.88 MG/DL (ref 0.7–1.3)
EOSINOPHIL # BLD AUTO: 0.1 THOU/UL (ref 0–0.4)
EOSINOPHIL NFR BLD AUTO: 1 % (ref 0–6)
ERYTHROCYTE [DISTWIDTH] IN BLOOD BY AUTOMATED COUNT: 18 % (ref 11–14.5)
GFR SERPL CREATININE-BSD FRML MDRD: >60 ML/MIN/1.73M2
GLUCOSE BLD-MCNC: 143 MG/DL (ref 70–125)
HCT VFR BLD AUTO: 29.4 % (ref 40–54)
HGB BLD-MCNC: 9.1 G/DL (ref 14–18)
LYMPHOCYTES # BLD AUTO: 2.9 THOU/UL (ref 0.8–4.4)
LYMPHOCYTES NFR BLD AUTO: 32 % (ref 20–40)
MAGNESIUM SERPL-MCNC: 2 MG/DL (ref 1.8–2.6)
MCH RBC QN AUTO: 27.7 PG (ref 27–34)
MCHC RBC AUTO-ENTMCNC: 31 G/DL (ref 32–36)
MCV RBC AUTO: 89 FL (ref 80–100)
MONOCYTES # BLD AUTO: 0.9 THOU/UL (ref 0–0.9)
MONOCYTES NFR BLD AUTO: 10 % (ref 2–10)
NEUTROPHILS # BLD AUTO: 4.9 THOU/UL (ref 2–7.7)
NEUTROPHILS NFR BLD AUTO: 55 % (ref 50–70)
PLATELET # BLD AUTO: 172 THOU/UL (ref 140–440)
PMV BLD AUTO: 10.3 FL (ref 8.5–12.5)
POTASSIUM BLD-SCNC: 3.9 MMOL/L (ref 3.5–5)
PROT SERPL-MCNC: 6.3 G/DL (ref 6–8)
RBC # BLD AUTO: 3.29 MILL/UL (ref 4.4–6.2)
SODIUM SERPL-SCNC: 136 MMOL/L (ref 136–145)
WBC: 8.9 THOU/UL (ref 4–11)

## 2020-05-07 ENCOUNTER — OFFICE VISIT - HEALTHEAST (OUTPATIENT)
Dept: RADIATION ONCOLOGY | Facility: HOSPITAL | Age: 70
End: 2020-05-07

## 2020-05-07 ENCOUNTER — INFUSION - HEALTHEAST (OUTPATIENT)
Dept: INFUSION THERAPY | Facility: HOSPITAL | Age: 70
End: 2020-05-07

## 2020-05-07 ENCOUNTER — HOME INFUSION (PRE-WILLOW HOME INFUSION) (OUTPATIENT)
Dept: PHARMACY | Facility: CLINIC | Age: 70
End: 2020-05-07

## 2020-05-07 DIAGNOSIS — C11.9 SQUAMOUS CELL CARCINOMA OF NASOPHARYNX (H): ICD-10-CM

## 2020-05-07 DIAGNOSIS — R79.89 ELEVATED SERUM CREATININE: ICD-10-CM

## 2020-05-08 ENCOUNTER — COMMUNICATION - HEALTHEAST (OUTPATIENT)
Dept: ONCOLOGY | Facility: HOSPITAL | Age: 70
End: 2020-05-08

## 2020-05-08 ENCOUNTER — OFFICE VISIT - HEALTHEAST (OUTPATIENT)
Dept: RADIATION ONCOLOGY | Facility: HOSPITAL | Age: 70
End: 2020-05-08

## 2020-05-08 ENCOUNTER — RECORDS - HEALTHEAST (OUTPATIENT)
Dept: ADMINISTRATIVE | Facility: OTHER | Age: 70
End: 2020-05-08

## 2020-05-08 DIAGNOSIS — C11.9 NASOPHARYNGEAL CARCINOMA (H): ICD-10-CM

## 2020-05-08 NOTE — PROGRESS NOTES
This is a recent snapshot of the patient's [facility] medical record.  For current drug dose and complete information and questions, call [phone] or In Basket pool, fv home infusion ([id number])  CSN Number:  [id number]

## 2020-05-11 ENCOUNTER — AMBULATORY - HEALTHEAST (OUTPATIENT)
Dept: RADIATION ONCOLOGY | Facility: HOSPITAL | Age: 70
End: 2020-05-11

## 2020-05-12 ENCOUNTER — INFUSION - HEALTHEAST (OUTPATIENT)
Dept: INFUSION THERAPY | Facility: HOSPITAL | Age: 70
End: 2020-05-12

## 2020-05-12 DIAGNOSIS — C11.9 SQUAMOUS CELL CARCINOMA OF NASOPHARYNX (H): ICD-10-CM

## 2020-05-12 DIAGNOSIS — Z95.828 PORT-A-CATH IN PLACE: ICD-10-CM

## 2020-05-12 DIAGNOSIS — R79.89 ELEVATED SERUM CREATININE: ICD-10-CM

## 2020-05-14 ENCOUNTER — COMMUNICATION - HEALTHEAST (OUTPATIENT)
Dept: ONCOLOGY | Facility: HOSPITAL | Age: 70
End: 2020-05-14

## 2020-05-14 ENCOUNTER — AMBULATORY - HEALTHEAST (OUTPATIENT)
Dept: ONCOLOGY | Facility: HOSPITAL | Age: 70
End: 2020-05-14

## 2020-05-14 DIAGNOSIS — R22.0 SWELLING OF LEFT SIDE OF FACE: ICD-10-CM

## 2020-05-14 DIAGNOSIS — K11.7 XEROSTOMIA: ICD-10-CM

## 2020-05-14 DIAGNOSIS — H92.12 DRAINAGE FROM LEFT EAR: ICD-10-CM

## 2020-05-15 ENCOUNTER — COMMUNICATION - HEALTHEAST (OUTPATIENT)
Dept: ONCOLOGY | Facility: HOSPITAL | Age: 70
End: 2020-05-15

## 2020-05-18 ENCOUNTER — HOME CARE/HOSPICE - HEALTHEAST (OUTPATIENT)
Dept: HOME HEALTH SERVICES | Facility: HOME HEALTH | Age: 70
End: 2020-05-18

## 2020-05-18 ENCOUNTER — RECORDS - HEALTHEAST (OUTPATIENT)
Dept: ADMINISTRATIVE | Facility: OTHER | Age: 70
End: 2020-05-18

## 2020-05-18 ENCOUNTER — HOME INFUSION (PRE-WILLOW HOME INFUSION) (OUTPATIENT)
Dept: PHARMACY | Facility: CLINIC | Age: 70
End: 2020-05-18

## 2020-05-18 ENCOUNTER — HOSPITAL ENCOUNTER (OUTPATIENT)
Dept: CT IMAGING | Facility: HOSPITAL | Age: 70
Setting detail: RADIATION/ONCOLOGY SERIES
Discharge: STILL ADMITTED OR ADMINISTRATIVE | End: 2020-05-18
Attending: INTERNAL MEDICINE

## 2020-05-18 DIAGNOSIS — C11.9 NASOPHARYNGEAL CARCINOMA (H): ICD-10-CM

## 2020-05-18 DIAGNOSIS — R22.0 SWELLING OF LEFT SIDE OF FACE: ICD-10-CM

## 2020-05-18 DIAGNOSIS — H92.12 DRAINAGE FROM LEFT EAR: ICD-10-CM

## 2020-05-19 ENCOUNTER — AMBULATORY - HEALTHEAST (OUTPATIENT)
Dept: INFUSION THERAPY | Facility: HOSPITAL | Age: 70
End: 2020-05-19

## 2020-05-19 ENCOUNTER — AMBULATORY - HEALTHEAST (OUTPATIENT)
Dept: ONCOLOGY | Facility: HOSPITAL | Age: 70
End: 2020-05-19

## 2020-05-19 ENCOUNTER — AMBULATORY - HEALTHEAST (OUTPATIENT)
Dept: CARE COORDINATION | Facility: CLINIC | Age: 70
End: 2020-05-19

## 2020-05-19 DIAGNOSIS — D61.810 ANTINEOPLASTIC CHEMOTHERAPY INDUCED PANCYTOPENIA (H): ICD-10-CM

## 2020-05-19 DIAGNOSIS — K11.20 PAROTITIS: ICD-10-CM

## 2020-05-19 DIAGNOSIS — T45.1X5A ANTINEOPLASTIC CHEMOTHERAPY INDUCED PANCYTOPENIA (H): ICD-10-CM

## 2020-05-19 NOTE — PROGRESS NOTES
This is a recent snapshot of the patient's [facility] medical record.  For current drug dose and complete information and questions, call [phone] or In Basket pool, fv home infusion ([id number])  CSN Number:  [id number]

## 2020-05-20 ENCOUNTER — OFFICE VISIT - HEALTHEAST (OUTPATIENT)
Dept: OTOLARYNGOLOGY | Facility: CLINIC | Age: 70
End: 2020-05-20

## 2020-05-20 DIAGNOSIS — H66.92 CHRONIC OTITIS MEDIA WITH PERFORATED TYMPANIC MEMBRANE, LEFT: ICD-10-CM

## 2020-05-20 DIAGNOSIS — C11.9 NASOPHARYNGEAL CARCINOMA (H): ICD-10-CM

## 2020-05-20 DIAGNOSIS — H72.92 CHRONIC OTITIS MEDIA WITH PERFORATED TYMPANIC MEMBRANE, LEFT: ICD-10-CM

## 2020-05-20 DIAGNOSIS — H66.12 CHRONIC TUBOTYMPANIC SUPPURATIVE OTITIS MEDIA OF LEFT EAR: ICD-10-CM

## 2020-05-21 ENCOUNTER — COMMUNICATION - HEALTHEAST (OUTPATIENT)
Dept: ONCOLOGY | Facility: HOSPITAL | Age: 70
End: 2020-05-21

## 2020-05-21 ENCOUNTER — COMMUNICATION - HEALTHEAST (OUTPATIENT)
Dept: NURSING | Facility: CLINIC | Age: 70
End: 2020-05-21

## 2020-05-21 ENCOUNTER — OFFICE VISIT - HEALTHEAST (OUTPATIENT)
Dept: RADIATION ONCOLOGY | Facility: HOSPITAL | Age: 70
End: 2020-05-21

## 2020-05-21 ENCOUNTER — RECORDS - HEALTHEAST (OUTPATIENT)
Dept: ADMINISTRATIVE | Facility: OTHER | Age: 70
End: 2020-05-21

## 2020-05-21 ENCOUNTER — OFFICE VISIT - HEALTHEAST (OUTPATIENT)
Dept: ONCOLOGY | Facility: HOSPITAL | Age: 70
End: 2020-05-21

## 2020-05-21 DIAGNOSIS — C11.9 SQUAMOUS CELL CARCINOMA OF NASOPHARYNX (H): ICD-10-CM

## 2020-05-22 ENCOUNTER — COMMUNICATION - HEALTHEAST (OUTPATIENT)
Dept: ONCOLOGY | Facility: HOSPITAL | Age: 70
End: 2020-05-22

## 2020-05-29 ENCOUNTER — COMMUNICATION - HEALTHEAST (OUTPATIENT)
Dept: ONCOLOGY | Facility: HOSPITAL | Age: 70
End: 2020-05-29

## 2020-06-01 ENCOUNTER — INFUSION - HEALTHEAST (OUTPATIENT)
Dept: INFUSION THERAPY | Facility: HOSPITAL | Age: 70
End: 2020-06-01

## 2020-06-01 ENCOUNTER — OFFICE VISIT - HEALTHEAST (OUTPATIENT)
Dept: ONCOLOGY | Facility: HOSPITAL | Age: 70
End: 2020-06-01

## 2020-06-01 ENCOUNTER — AMBULATORY - HEALTHEAST (OUTPATIENT)
Dept: INFUSION THERAPY | Facility: HOSPITAL | Age: 70
End: 2020-06-01

## 2020-06-01 ENCOUNTER — AMBULATORY - HEALTHEAST (OUTPATIENT)
Dept: ONCOLOGY | Facility: HOSPITAL | Age: 70
End: 2020-06-01

## 2020-06-01 DIAGNOSIS — C11.9 SQUAMOUS CELL CARCINOMA OF NASOPHARYNX (H): ICD-10-CM

## 2020-06-01 LAB
ALBUMIN SERPL-MCNC: 3 G/DL (ref 3.5–5)
ALP SERPL-CCNC: 102 U/L (ref 45–120)
ALT SERPL W P-5'-P-CCNC: 27 U/L (ref 0–45)
ANION GAP SERPL CALCULATED.3IONS-SCNC: 4 MMOL/L (ref 5–18)
AST SERPL W P-5'-P-CCNC: 39 U/L (ref 0–40)
BASOPHILS # BLD AUTO: 0 THOU/UL (ref 0–0.2)
BASOPHILS NFR BLD AUTO: 1 % (ref 0–2)
BILIRUB SERPL-MCNC: 0.6 MG/DL (ref 0–1)
BUN SERPL-MCNC: 34 MG/DL (ref 8–22)
CALCIUM SERPL-MCNC: 9.6 MG/DL (ref 8.5–10.5)
CHLORIDE BLD-SCNC: 101 MMOL/L (ref 98–107)
CO2 SERPL-SCNC: 32 MMOL/L (ref 22–31)
CREAT SERPL-MCNC: 0.97 MG/DL (ref 0.7–1.3)
EOSINOPHIL # BLD AUTO: 0.2 THOU/UL (ref 0–0.4)
EOSINOPHIL NFR BLD AUTO: 4 % (ref 0–6)
ERYTHROCYTE [DISTWIDTH] IN BLOOD BY AUTOMATED COUNT: 16.6 % (ref 11–14.5)
GFR SERPL CREATININE-BSD FRML MDRD: >60 ML/MIN/1.73M2
GLUCOSE BLD-MCNC: 157 MG/DL (ref 70–125)
HCT VFR BLD AUTO: 30.4 % (ref 40–54)
HGB BLD-MCNC: 9.5 G/DL (ref 14–18)
LYMPHOCYTES # BLD AUTO: 1.7 THOU/UL (ref 0.8–4.4)
LYMPHOCYTES NFR BLD AUTO: 34 % (ref 20–40)
MAGNESIUM SERPL-MCNC: 2.2 MG/DL (ref 1.8–2.6)
MCH RBC QN AUTO: 28 PG (ref 27–34)
MCHC RBC AUTO-ENTMCNC: 31.3 G/DL (ref 32–36)
MCV RBC AUTO: 90 FL (ref 80–100)
MONOCYTES # BLD AUTO: 0.5 THOU/UL (ref 0–0.9)
MONOCYTES NFR BLD AUTO: 11 % (ref 2–10)
NEUTROPHILS # BLD AUTO: 2.5 THOU/UL (ref 2–7.7)
NEUTROPHILS NFR BLD AUTO: 51 % (ref 50–70)
PLATELET # BLD AUTO: 111 THOU/UL (ref 140–440)
PMV BLD AUTO: 10.8 FL (ref 8.5–12.5)
POTASSIUM BLD-SCNC: 3.9 MMOL/L (ref 3.5–5)
PROT SERPL-MCNC: 7.3 G/DL (ref 6–8)
RBC # BLD AUTO: 3.39 MILL/UL (ref 4.4–6.2)
SODIUM SERPL-SCNC: 137 MMOL/L (ref 136–145)
WBC: 5 THOU/UL (ref 4–11)

## 2020-06-01 ASSESSMENT — MIFFLIN-ST. JEOR: SCORE: 1339.61

## 2020-06-05 ENCOUNTER — HOSPITAL ENCOUNTER (OUTPATIENT)
Dept: INTERVENTIONAL RADIOLOGY/VASCULAR | Facility: HOSPITAL | Age: 70
Discharge: HOME | End: 2020-06-05

## 2020-06-05 ENCOUNTER — COMMUNICATION - HEALTHEAST (OUTPATIENT)
Dept: ONCOLOGY | Facility: HOSPITAL | Age: 70
End: 2020-06-05

## 2020-06-05 ENCOUNTER — INFUSION - HEALTHEAST (OUTPATIENT)
Dept: INFUSION THERAPY | Facility: HOSPITAL | Age: 70
End: 2020-06-05

## 2020-06-05 DIAGNOSIS — C11.9 SQUAMOUS CELL CARCINOMA OF NASOPHARYNX (H): ICD-10-CM

## 2020-06-08 ENCOUNTER — COMMUNICATION - HEALTHEAST (OUTPATIENT)
Dept: ONCOLOGY | Facility: HOSPITAL | Age: 70
End: 2020-06-08

## 2020-06-10 ENCOUNTER — AMBULATORY - HEALTHEAST (OUTPATIENT)
Dept: INFUSION THERAPY | Facility: HOSPITAL | Age: 70
End: 2020-06-10

## 2020-06-10 ENCOUNTER — OFFICE VISIT - HEALTHEAST (OUTPATIENT)
Dept: ONCOLOGY | Facility: HOSPITAL | Age: 70
End: 2020-06-10

## 2020-06-10 DIAGNOSIS — C11.9 SQUAMOUS CELL CARCINOMA OF NASOPHARYNX (H): ICD-10-CM

## 2020-06-10 DIAGNOSIS — C11.9 NASOPHARYNGEAL CARCINOMA (H): ICD-10-CM

## 2020-06-10 LAB
ALBUMIN SERPL-MCNC: 3.2 G/DL (ref 3.5–5)
ALP SERPL-CCNC: 74 U/L (ref 45–120)
ALT SERPL W P-5'-P-CCNC: 36 U/L (ref 0–45)
ANION GAP SERPL CALCULATED.3IONS-SCNC: 8 MMOL/L (ref 5–18)
AST SERPL W P-5'-P-CCNC: 36 U/L (ref 0–40)
BASOPHILS # BLD AUTO: 0 THOU/UL (ref 0–0.2)
BASOPHILS NFR BLD AUTO: 0 % (ref 0–2)
BILIRUB SERPL-MCNC: 0.4 MG/DL (ref 0–1)
BUN SERPL-MCNC: 30 MG/DL (ref 8–22)
CALCIUM SERPL-MCNC: 8.4 MG/DL (ref 8.5–10.5)
CHLORIDE BLD-SCNC: 103 MMOL/L (ref 98–107)
CO2 SERPL-SCNC: 25 MMOL/L (ref 22–31)
CREAT SERPL-MCNC: 0.98 MG/DL (ref 0.7–1.3)
EOSINOPHIL # BLD AUTO: 0.2 THOU/UL (ref 0–0.4)
EOSINOPHIL NFR BLD AUTO: 3 % (ref 0–6)
ERYTHROCYTE [DISTWIDTH] IN BLOOD BY AUTOMATED COUNT: 16.4 % (ref 11–14.5)
GFR SERPL CREATININE-BSD FRML MDRD: >60 ML/MIN/1.73M2
GLUCOSE BLD-MCNC: 131 MG/DL (ref 70–125)
HCT VFR BLD AUTO: 27.6 % (ref 40–54)
HGB BLD-MCNC: 8.7 G/DL (ref 14–18)
LYMPHOCYTES # BLD AUTO: 0.9 THOU/UL (ref 0.8–4.4)
LYMPHOCYTES NFR BLD AUTO: 20 % (ref 20–40)
MAGNESIUM SERPL-MCNC: 1.9 MG/DL (ref 1.8–2.6)
MCH RBC QN AUTO: 28.5 PG (ref 27–34)
MCHC RBC AUTO-ENTMCNC: 31.5 G/DL (ref 32–36)
MCV RBC AUTO: 91 FL (ref 80–100)
MONOCYTES # BLD AUTO: 0.3 THOU/UL (ref 0–0.9)
MONOCYTES NFR BLD AUTO: 8 % (ref 2–10)
NEUTROPHILS # BLD AUTO: 3.1 THOU/UL (ref 2–7.7)
NEUTROPHILS NFR BLD AUTO: 69 % (ref 50–70)
PLATELET # BLD AUTO: 86 THOU/UL (ref 140–440)
PMV BLD AUTO: 11.7 FL (ref 8.5–12.5)
POTASSIUM BLD-SCNC: 4.2 MMOL/L (ref 3.5–5)
PROT SERPL-MCNC: 6.3 G/DL (ref 6–8)
RBC # BLD AUTO: 3.05 MILL/UL (ref 4.4–6.2)
SODIUM SERPL-SCNC: 136 MMOL/L (ref 136–145)
WBC: 4.5 THOU/UL (ref 4–11)

## 2020-06-12 ENCOUNTER — COMMUNICATION - HEALTHEAST (OUTPATIENT)
Dept: ONCOLOGY | Facility: HOSPITAL | Age: 70
End: 2020-06-12

## 2020-06-17 ENCOUNTER — AMBULATORY - HEALTHEAST (OUTPATIENT)
Dept: ONCOLOGY | Facility: HOSPITAL | Age: 70
End: 2020-06-17

## 2020-06-17 ENCOUNTER — COMMUNICATION - HEALTHEAST (OUTPATIENT)
Dept: ONCOLOGY | Facility: HOSPITAL | Age: 70
End: 2020-06-17

## 2020-06-17 ENCOUNTER — AMBULATORY - HEALTHEAST (OUTPATIENT)
Dept: INFUSION THERAPY | Facility: HOSPITAL | Age: 70
End: 2020-06-17

## 2020-06-17 DIAGNOSIS — D64.9 ANEMIA: ICD-10-CM

## 2020-06-17 LAB
BASOPHILS # BLD AUTO: 0 THOU/UL (ref 0–0.2)
BASOPHILS NFR BLD AUTO: 0 % (ref 0–2)
EOSINOPHIL # BLD AUTO: 0.1 THOU/UL (ref 0–0.4)
EOSINOPHIL NFR BLD AUTO: 3 % (ref 0–6)
ERYTHROCYTE [DISTWIDTH] IN BLOOD BY AUTOMATED COUNT: 16 % (ref 11–14.5)
HCT VFR BLD AUTO: 28.4 % (ref 40–54)
HGB BLD-MCNC: 8.9 G/DL (ref 14–18)
LYMPHOCYTES # BLD AUTO: 0.6 THOU/UL (ref 0.8–4.4)
LYMPHOCYTES NFR BLD AUTO: 29 % (ref 20–40)
MCH RBC QN AUTO: 28.5 PG (ref 27–34)
MCHC RBC AUTO-ENTMCNC: 31.3 G/DL (ref 32–36)
MCV RBC AUTO: 91 FL (ref 80–100)
MONOCYTES # BLD AUTO: 0.1 THOU/UL (ref 0–0.9)
MONOCYTES NFR BLD AUTO: 6 % (ref 2–10)
NEUTROPHILS # BLD AUTO: 1.3 THOU/UL (ref 2–7.7)
NEUTROPHILS NFR BLD AUTO: 62 % (ref 50–70)
PLATELET # BLD AUTO: 67 THOU/UL (ref 140–440)
PMV BLD AUTO: 10.8 FL (ref 8.5–12.5)
RBC # BLD AUTO: 3.12 MILL/UL (ref 4.4–6.2)
WBC: 2.1 THOU/UL (ref 4–11)

## 2020-06-19 ENCOUNTER — HOME INFUSION (PRE-WILLOW HOME INFUSION) (OUTPATIENT)
Dept: PHARMACY | Facility: CLINIC | Age: 70
End: 2020-06-19

## 2020-06-22 NOTE — PROGRESS NOTES
This is a recent snapshot of the patient's [facility] medical record.  For current drug dose and complete information and questions, call [phone] or In Basket pool, fv home infusion ([id number])  CSN Number:  [id number]

## 2020-06-25 ENCOUNTER — OFFICE VISIT - HEALTHEAST (OUTPATIENT)
Dept: RADIATION ONCOLOGY | Facility: HOSPITAL | Age: 70
End: 2020-06-25

## 2020-06-25 DIAGNOSIS — C11.9 SQUAMOUS CELL CARCINOMA OF NASOPHARYNX (H): ICD-10-CM

## 2020-06-30 ENCOUNTER — AMBULATORY - HEALTHEAST (OUTPATIENT)
Dept: INFUSION THERAPY | Facility: HOSPITAL | Age: 70
End: 2020-06-30

## 2020-06-30 ENCOUNTER — INFUSION - HEALTHEAST (OUTPATIENT)
Dept: INFUSION THERAPY | Facility: HOSPITAL | Age: 70
End: 2020-06-30

## 2020-06-30 ENCOUNTER — OFFICE VISIT - HEALTHEAST (OUTPATIENT)
Dept: ONCOLOGY | Facility: HOSPITAL | Age: 70
End: 2020-06-30

## 2020-06-30 ENCOUNTER — COMMUNICATION - HEALTHEAST (OUTPATIENT)
Dept: ONCOLOGY | Facility: HOSPITAL | Age: 70
End: 2020-06-30

## 2020-06-30 DIAGNOSIS — D61.810 ANTINEOPLASTIC CHEMOTHERAPY INDUCED PANCYTOPENIA (H): ICD-10-CM

## 2020-06-30 DIAGNOSIS — R63.0 ANOREXIA: ICD-10-CM

## 2020-06-30 DIAGNOSIS — R53.83 FATIGUE, UNSPECIFIED TYPE: ICD-10-CM

## 2020-06-30 DIAGNOSIS — C11.9 SQUAMOUS CELL CARCINOMA OF NASOPHARYNX (H): ICD-10-CM

## 2020-06-30 DIAGNOSIS — R79.89 ELEVATED LFTS: ICD-10-CM

## 2020-06-30 DIAGNOSIS — T45.1X5A ANTINEOPLASTIC CHEMOTHERAPY INDUCED PANCYTOPENIA (H): ICD-10-CM

## 2020-06-30 LAB
ALBUMIN SERPL-MCNC: 3.2 G/DL (ref 3.5–5)
ALP SERPL-CCNC: 135 U/L (ref 45–120)
ALT SERPL W P-5'-P-CCNC: 411 U/L (ref 0–45)
ANION GAP SERPL CALCULATED.3IONS-SCNC: 6 MMOL/L (ref 5–18)
AST SERPL W P-5'-P-CCNC: 418 U/L (ref 0–40)
BASOPHILS # BLD AUTO: 0 THOU/UL (ref 0–0.2)
BASOPHILS NFR BLD AUTO: 1 % (ref 0–2)
BILIRUB SERPL-MCNC: 0.5 MG/DL (ref 0–1)
BUN SERPL-MCNC: 30 MG/DL (ref 8–22)
CALCIUM SERPL-MCNC: 8.8 MG/DL (ref 8.5–10.5)
CHLORIDE BLD-SCNC: 106 MMOL/L (ref 98–107)
CO2 SERPL-SCNC: 26 MMOL/L (ref 22–31)
CREAT SERPL-MCNC: 0.86 MG/DL (ref 0.7–1.3)
EOSINOPHIL # BLD AUTO: 0.1 THOU/UL (ref 0–0.4)
EOSINOPHIL NFR BLD AUTO: 3 % (ref 0–6)
ERYTHROCYTE [DISTWIDTH] IN BLOOD BY AUTOMATED COUNT: 15.9 % (ref 11–14.5)
GFR SERPL CREATININE-BSD FRML MDRD: >60 ML/MIN/1.73M2
GLUCOSE BLD-MCNC: 133 MG/DL (ref 70–125)
HCT VFR BLD AUTO: 30.1 % (ref 40–54)
HGB BLD-MCNC: 9.4 G/DL (ref 14–18)
LYMPHOCYTES # BLD AUTO: 0.7 THOU/UL (ref 0.8–4.4)
LYMPHOCYTES NFR BLD AUTO: 36 % (ref 20–40)
MAGNESIUM SERPL-MCNC: 1.9 MG/DL (ref 1.8–2.6)
MCH RBC QN AUTO: 28.1 PG (ref 27–34)
MCHC RBC AUTO-ENTMCNC: 31.2 G/DL (ref 32–36)
MCV RBC AUTO: 90 FL (ref 80–100)
MONOCYTES # BLD AUTO: 0.4 THOU/UL (ref 0–0.9)
MONOCYTES NFR BLD AUTO: 19 % (ref 2–10)
NEUTROPHILS # BLD AUTO: 0.8 THOU/UL (ref 2–7.7)
NEUTROPHILS NFR BLD AUTO: 42 % (ref 50–70)
PLATELET # BLD AUTO: 84 THOU/UL (ref 140–440)
PMV BLD AUTO: 12.8 FL (ref 8.5–12.5)
POTASSIUM BLD-SCNC: 3.9 MMOL/L (ref 3.5–5)
PROT SERPL-MCNC: 6.3 G/DL (ref 6–8)
RBC # BLD AUTO: 3.34 MILL/UL (ref 4.4–6.2)
SODIUM SERPL-SCNC: 138 MMOL/L (ref 136–145)
TSH SERPL DL<=0.005 MIU/L-ACNC: 25.18 UIU/ML (ref 0.3–5)
WBC: 1.8 THOU/UL (ref 4–11)

## 2020-07-02 ENCOUNTER — COMMUNICATION - HEALTHEAST (OUTPATIENT)
Dept: ONCOLOGY | Facility: HOSPITAL | Age: 70
End: 2020-07-02

## 2020-07-02 ENCOUNTER — AMBULATORY - HEALTHEAST (OUTPATIENT)
Dept: INFUSION THERAPY | Facility: HOSPITAL | Age: 70
End: 2020-07-02

## 2020-07-02 ENCOUNTER — HOSPITAL ENCOUNTER (OUTPATIENT)
Dept: ULTRASOUND IMAGING | Facility: HOSPITAL | Age: 70
Setting detail: RADIATION/ONCOLOGY SERIES
Discharge: STILL ADMITTED OR ADMINISTRATIVE | End: 2020-07-02

## 2020-07-02 DIAGNOSIS — R79.89 ELEVATED LFTS: ICD-10-CM

## 2020-07-02 DIAGNOSIS — B19.10 HEPATITIS B INFECTION: ICD-10-CM

## 2020-07-02 DIAGNOSIS — R63.0 ANOREXIA: ICD-10-CM

## 2020-07-02 DIAGNOSIS — R53.83 FATIGUE, UNSPECIFIED TYPE: ICD-10-CM

## 2020-07-02 LAB
ALBUMIN SERPL-MCNC: 3.2 G/DL (ref 3.5–5)
ALP SERPL-CCNC: 127 U/L (ref 45–120)
ALT SERPL W P-5'-P-CCNC: 429 U/L (ref 0–45)
AST SERPL W P-5'-P-CCNC: 450 U/L (ref 0–40)
BILIRUB DIRECT SERPL-MCNC: 0.3 MG/DL
BILIRUB SERPL-MCNC: 0.7 MG/DL (ref 0–1)
HAV IGM SERPL QL IA: NEGATIVE
HBV CORE IGM SERPL QL IA: NEGATIVE
HBV SURFACE AG SERPL QL IA: ABNORMAL
HCV AB SERPL QL IA: NEGATIVE
PROT SERPL-MCNC: 6.3 G/DL (ref 6–8)

## 2020-07-03 ENCOUNTER — COMMUNICATION - HEALTHEAST (OUTPATIENT)
Dept: ADMINISTRATIVE | Facility: CLINIC | Age: 70
End: 2020-07-03

## 2020-07-03 ENCOUNTER — COMMUNICATION - HEALTHEAST (OUTPATIENT)
Dept: ONCOLOGY | Facility: HOSPITAL | Age: 70
End: 2020-07-03

## 2020-07-06 ENCOUNTER — COMMUNICATION - HEALTHEAST (OUTPATIENT)
Dept: ONCOLOGY | Facility: HOSPITAL | Age: 70
End: 2020-07-06

## 2020-07-09 ENCOUNTER — COMMUNICATION - HEALTHEAST (OUTPATIENT)
Dept: SCHEDULING | Facility: CLINIC | Age: 70
End: 2020-07-09

## 2020-07-10 ENCOUNTER — COMMUNICATION - HEALTHEAST (OUTPATIENT)
Dept: ONCOLOGY | Facility: HOSPITAL | Age: 70
End: 2020-07-10

## 2020-07-13 ENCOUNTER — COMMUNICATION - HEALTHEAST (OUTPATIENT)
Dept: ONCOLOGY | Facility: HOSPITAL | Age: 70
End: 2020-07-13

## 2020-07-13 ENCOUNTER — OFFICE VISIT - HEALTHEAST (OUTPATIENT)
Dept: INFECTIOUS DISEASES | Facility: CLINIC | Age: 70
End: 2020-07-13

## 2020-07-13 ENCOUNTER — COMMUNICATION - HEALTHEAST (OUTPATIENT)
Dept: INFECTIOUS DISEASES | Facility: CLINIC | Age: 70
End: 2020-07-13

## 2020-07-13 DIAGNOSIS — B18.1 CHRONIC VIRAL HEPATITIS B WITHOUT DELTA AGENT AND WITHOUT COMA (H): ICD-10-CM

## 2020-07-14 ENCOUNTER — AMBULATORY - HEALTHEAST (OUTPATIENT)
Dept: INFUSION THERAPY | Facility: HOSPITAL | Age: 70
End: 2020-07-14

## 2020-07-14 ENCOUNTER — OFFICE VISIT - HEALTHEAST (OUTPATIENT)
Dept: ONCOLOGY | Facility: HOSPITAL | Age: 70
End: 2020-07-14

## 2020-07-14 ENCOUNTER — AMBULATORY - HEALTHEAST (OUTPATIENT)
Dept: ONCOLOGY | Facility: HOSPITAL | Age: 70
End: 2020-07-14

## 2020-07-14 DIAGNOSIS — C11.9 NASOPHARYNGEAL CARCINOMA (H): ICD-10-CM

## 2020-07-14 DIAGNOSIS — B18.1 CHRONIC VIRAL HEPATITIS B WITHOUT DELTA AGENT AND WITHOUT COMA (H): ICD-10-CM

## 2020-07-14 DIAGNOSIS — R79.89 ELEVATED LFTS: ICD-10-CM

## 2020-07-14 DIAGNOSIS — C11.9 SQUAMOUS CELL CARCINOMA OF NASOPHARYNX (H): ICD-10-CM

## 2020-07-14 DIAGNOSIS — D69.6 THROMBOCYTOPENIA (H): ICD-10-CM

## 2020-07-14 LAB
ALBUMIN SERPL-MCNC: 2.8 G/DL (ref 3.5–5)
ALP SERPL-CCNC: 199 U/L (ref 45–120)
ALT SERPL W P-5'-P-CCNC: 1197 U/L (ref 0–45)
ANION GAP SERPL CALCULATED.3IONS-SCNC: 6 MMOL/L (ref 5–18)
AST SERPL W P-5'-P-CCNC: 1403 U/L (ref 0–40)
BASOPHILS # BLD AUTO: 0 THOU/UL (ref 0–0.2)
BASOPHILS NFR BLD AUTO: 0 % (ref 0–2)
BILIRUB SERPL-MCNC: 1.4 MG/DL (ref 0–1)
BUN SERPL-MCNC: 25 MG/DL (ref 8–22)
CALCIUM SERPL-MCNC: 8.5 MG/DL (ref 8.5–10.5)
CHLORIDE BLD-SCNC: 106 MMOL/L (ref 98–107)
CO2 SERPL-SCNC: 25 MMOL/L (ref 22–31)
CREAT SERPL-MCNC: 0.79 MG/DL (ref 0.7–1.3)
EOSINOPHIL # BLD AUTO: 0.1 THOU/UL (ref 0–0.4)
EOSINOPHIL NFR BLD AUTO: 3 % (ref 0–6)
ERYTHROCYTE [DISTWIDTH] IN BLOOD BY AUTOMATED COUNT: 16.6 % (ref 11–14.5)
GFR SERPL CREATININE-BSD FRML MDRD: >60 ML/MIN/1.73M2
GLUCOSE BLD-MCNC: 90 MG/DL (ref 70–125)
HCT VFR BLD AUTO: 29.4 % (ref 40–54)
HGB BLD-MCNC: 9.3 G/DL (ref 14–18)
HIV 1+2 AB+HIV1 P24 AG SERPL QL IA: NEGATIVE
LYMPHOCYTES # BLD AUTO: 1.1 THOU/UL (ref 0.8–4.4)
LYMPHOCYTES NFR BLD AUTO: 27 % (ref 20–40)
MCH RBC QN AUTO: 28.3 PG (ref 27–34)
MCHC RBC AUTO-ENTMCNC: 31.6 G/DL (ref 32–36)
MCV RBC AUTO: 89 FL (ref 80–100)
MONOCYTES # BLD AUTO: 0.7 THOU/UL (ref 0–0.9)
MONOCYTES NFR BLD AUTO: 18 % (ref 2–10)
NEUTROPHILS # BLD AUTO: 2 THOU/UL (ref 2–7.7)
NEUTROPHILS NFR BLD AUTO: 51 % (ref 50–70)
PLATELET # BLD AUTO: 82 THOU/UL (ref 140–440)
PMV BLD AUTO: ABNORMAL FL
POTASSIUM BLD-SCNC: 4 MMOL/L (ref 3.5–5)
PROT SERPL-MCNC: 6.2 G/DL (ref 6–8)
RBC # BLD AUTO: 3.29 MILL/UL (ref 4.4–6.2)
SODIUM SERPL-SCNC: 137 MMOL/L (ref 136–145)
WBC: 3.9 THOU/UL (ref 4–11)

## 2020-07-14 ASSESSMENT — MIFFLIN-ST. JEOR: SCORE: 1360.48

## 2020-07-15 ENCOUNTER — COMMUNICATION - HEALTHEAST (OUTPATIENT)
Dept: INFECTIOUS DISEASES | Facility: CLINIC | Age: 70
End: 2020-07-15

## 2020-07-15 DIAGNOSIS — C11.9 SQUAMOUS CELL CARCINOMA OF NASOPHARYNX (H): ICD-10-CM

## 2020-07-16 ENCOUNTER — AMBULATORY - HEALTHEAST (OUTPATIENT)
Dept: INFUSION THERAPY | Facility: HOSPITAL | Age: 70
End: 2020-07-16

## 2020-07-16 ENCOUNTER — COMMUNICATION - HEALTHEAST (OUTPATIENT)
Dept: ADMINISTRATIVE | Facility: CLINIC | Age: 70
End: 2020-07-16

## 2020-07-16 ENCOUNTER — COMMUNICATION - HEALTHEAST (OUTPATIENT)
Dept: ONCOLOGY | Facility: CLINIC | Age: 70
End: 2020-07-16

## 2020-07-16 DIAGNOSIS — R79.89 ELEVATED LFTS: ICD-10-CM

## 2020-07-16 DIAGNOSIS — B18.1 CHRONIC VIRAL HEPATITIS B WITHOUT DELTA AGENT AND WITHOUT COMA (H): ICD-10-CM

## 2020-07-16 LAB
ALBUMIN SERPL-MCNC: 2.7 G/DL (ref 3.5–5)
ALP SERPL-CCNC: 210 U/L (ref 45–120)
ALT SERPL W P-5'-P-CCNC: 1406 U/L (ref 0–45)
AST SERPL W P-5'-P-CCNC: 1578 U/L (ref 0–40)
BILIRUB DIRECT SERPL-MCNC: 1 MG/DL
BILIRUB SERPL-MCNC: 1.9 MG/DL (ref 0–1)
PROT SERPL-MCNC: 5.9 G/DL (ref 6–8)

## 2020-07-16 RX ORDER — LAMIVUDINE 100 MG/1
100 TABLET, FILM COATED ORAL DAILY
Qty: 90 TABLET | Refills: 3 | Status: SHIPPED | OUTPATIENT
Start: 2020-07-16 | End: 2021-08-03

## 2020-07-17 ENCOUNTER — COMMUNICATION - HEALTHEAST (OUTPATIENT)
Dept: ONCOLOGY | Facility: HOSPITAL | Age: 70
End: 2020-07-17

## 2020-07-17 LAB
HBV DNA SERPL NAA+PROBE-ACNC: ABNORMAL [IU]/ML
HBV DNA SERPL NAA+PROBE-LOG IU: 6.6 {LOG_IU}/ML
HBV E AB SERPL QL IA: POSITIVE
HDV AB SER QL IA: NEGATIVE

## 2020-07-20 ENCOUNTER — AMBULATORY - HEALTHEAST (OUTPATIENT)
Dept: INFUSION THERAPY | Facility: HOSPITAL | Age: 70
End: 2020-07-20

## 2020-07-20 ENCOUNTER — COMMUNICATION - HEALTHEAST (OUTPATIENT)
Dept: FAMILY MEDICINE | Facility: CLINIC | Age: 70
End: 2020-07-20

## 2020-07-20 DIAGNOSIS — C11.9 SQUAMOUS CELL CARCINOMA OF NASOPHARYNX (H): ICD-10-CM

## 2020-07-20 DIAGNOSIS — R79.89 ELEVATED LFTS: ICD-10-CM

## 2020-07-20 LAB
ALBUMIN SERPL-MCNC: 2.6 G/DL (ref 3.5–5)
ALP SERPL-CCNC: 207 U/L (ref 45–120)
ALT SERPL W P-5'-P-CCNC: 1163 U/L (ref 0–45)
AST SERPL W P-5'-P-CCNC: 1095 U/L (ref 0–40)
BASOPHILS # BLD AUTO: 0 THOU/UL (ref 0–0.2)
BASOPHILS NFR BLD AUTO: 1 % (ref 0–2)
BILIRUB DIRECT SERPL-MCNC: 1.4 MG/DL
BILIRUB SERPL-MCNC: 2.6 MG/DL (ref 0–1)
EOSINOPHIL # BLD AUTO: 0.1 THOU/UL (ref 0–0.4)
EOSINOPHIL NFR BLD AUTO: 2 % (ref 0–6)
ERYTHROCYTE [DISTWIDTH] IN BLOOD BY AUTOMATED COUNT: 18.3 % (ref 11–14.5)
HCT VFR BLD AUTO: 29.1 % (ref 40–54)
HGB BLD-MCNC: 9.3 G/DL (ref 14–18)
LYMPHOCYTES # BLD AUTO: 0.7 THOU/UL (ref 0.8–4.4)
LYMPHOCYTES NFR BLD AUTO: 23 % (ref 20–40)
MCH RBC QN AUTO: 29 PG (ref 27–34)
MCHC RBC AUTO-ENTMCNC: 32 G/DL (ref 32–36)
MCV RBC AUTO: 91 FL (ref 80–100)
MONOCYTES # BLD AUTO: 0.6 THOU/UL (ref 0–0.9)
MONOCYTES NFR BLD AUTO: 19 % (ref 2–10)
NEUTROPHILS # BLD AUTO: 1.8 THOU/UL (ref 2–7.7)
NEUTROPHILS NFR BLD AUTO: 56 % (ref 50–70)
PLATELET # BLD AUTO: 70 THOU/UL (ref 140–440)
PMV BLD AUTO: ABNORMAL FL
PROT SERPL-MCNC: 6.4 G/DL (ref 6–8)
RBC # BLD AUTO: 3.21 MILL/UL (ref 4.4–6.2)
WBC: 3.3 THOU/UL (ref 4–11)

## 2020-07-22 ENCOUNTER — OFFICE VISIT - HEALTHEAST (OUTPATIENT)
Dept: ONCOLOGY | Facility: HOSPITAL | Age: 70
End: 2020-07-22

## 2020-07-22 DIAGNOSIS — C11.9 NASOPHARYNGEAL CARCINOMA (H): ICD-10-CM

## 2020-07-24 ENCOUNTER — AMBULATORY - HEALTHEAST (OUTPATIENT)
Dept: ONCOLOGY | Facility: HOSPITAL | Age: 70
End: 2020-07-24

## 2020-07-24 ENCOUNTER — COMMUNICATION - HEALTHEAST (OUTPATIENT)
Dept: ONCOLOGY | Facility: HOSPITAL | Age: 70
End: 2020-07-24

## 2020-07-24 ENCOUNTER — INFUSION - HEALTHEAST (OUTPATIENT)
Dept: INFUSION THERAPY | Facility: HOSPITAL | Age: 70
End: 2020-07-24

## 2020-07-24 DIAGNOSIS — C11.9 NASOPHARYNGEAL CARCINOMA (H): ICD-10-CM

## 2020-07-24 LAB
ALBUMIN SERPL-MCNC: 2.5 G/DL (ref 3.5–5)
ALP SERPL-CCNC: 189 U/L (ref 45–120)
ALT SERPL W P-5'-P-CCNC: 605 U/L (ref 0–45)
ANION GAP SERPL CALCULATED.3IONS-SCNC: 6 MMOL/L (ref 5–18)
AST SERPL W P-5'-P-CCNC: 435 U/L (ref 0–40)
BILIRUB SERPL-MCNC: 3.2 MG/DL (ref 0–1)
BUN SERPL-MCNC: 26 MG/DL (ref 8–22)
CALCIUM SERPL-MCNC: 8.2 MG/DL (ref 8.5–10.5)
CHLORIDE BLD-SCNC: 106 MMOL/L (ref 98–107)
CO2 SERPL-SCNC: 24 MMOL/L (ref 22–31)
CREAT SERPL-MCNC: 0.81 MG/DL (ref 0.7–1.3)
GFR SERPL CREATININE-BSD FRML MDRD: >60 ML/MIN/1.73M2
GLUCOSE BLD-MCNC: 165 MG/DL (ref 70–125)
POTASSIUM BLD-SCNC: 4.2 MMOL/L (ref 3.5–5)
PROT SERPL-MCNC: 6.6 G/DL (ref 6–8)
SODIUM SERPL-SCNC: 136 MMOL/L (ref 136–145)

## 2020-07-28 ENCOUNTER — COMMUNICATION - HEALTHEAST (OUTPATIENT)
Dept: ONCOLOGY | Facility: HOSPITAL | Age: 70
End: 2020-07-28

## 2020-07-28 DIAGNOSIS — G89.3 CANCER RELATED PAIN: ICD-10-CM

## 2020-07-28 DIAGNOSIS — C11.9 SQUAMOUS CELL CARCINOMA OF NASOPHARYNX (H): ICD-10-CM

## 2020-07-31 ENCOUNTER — AMBULATORY - HEALTHEAST (OUTPATIENT)
Dept: INFUSION THERAPY | Facility: HOSPITAL | Age: 70
End: 2020-07-31

## 2020-07-31 ENCOUNTER — INFUSION - HEALTHEAST (OUTPATIENT)
Dept: INFUSION THERAPY | Facility: HOSPITAL | Age: 70
End: 2020-07-31

## 2020-07-31 ENCOUNTER — OFFICE VISIT - HEALTHEAST (OUTPATIENT)
Dept: ONCOLOGY | Facility: HOSPITAL | Age: 70
End: 2020-07-31

## 2020-07-31 DIAGNOSIS — C11.9 SQUAMOUS CELL CARCINOMA OF NASOPHARYNX (H): ICD-10-CM

## 2020-07-31 LAB
ALBUMIN SERPL-MCNC: 2.4 G/DL (ref 3.5–5)
ALP SERPL-CCNC: 194 U/L (ref 45–120)
ALT SERPL W P-5'-P-CCNC: 261 U/L (ref 0–45)
ANION GAP SERPL CALCULATED.3IONS-SCNC: 6 MMOL/L (ref 5–18)
AST SERPL W P-5'-P-CCNC: 203 U/L (ref 0–40)
BASOPHILS # BLD AUTO: 0 THOU/UL (ref 0–0.2)
BASOPHILS NFR BLD AUTO: 1 % (ref 0–2)
BILIRUB SERPL-MCNC: 3 MG/DL (ref 0–1)
BUN SERPL-MCNC: 27 MG/DL (ref 8–22)
CALCIUM SERPL-MCNC: 8.5 MG/DL (ref 8.5–10.5)
CHLORIDE BLD-SCNC: 105 MMOL/L (ref 98–107)
CO2 SERPL-SCNC: 23 MMOL/L (ref 22–31)
CREAT SERPL-MCNC: 0.84 MG/DL (ref 0.7–1.3)
EOSINOPHIL # BLD AUTO: 0.1 THOU/UL (ref 0–0.4)
EOSINOPHIL NFR BLD AUTO: 2 % (ref 0–6)
ERYTHROCYTE [DISTWIDTH] IN BLOOD BY AUTOMATED COUNT: 20.8 % (ref 11–14.5)
GFR SERPL CREATININE-BSD FRML MDRD: >60 ML/MIN/1.73M2
GLUCOSE BLD-MCNC: 155 MG/DL (ref 70–125)
HCT VFR BLD AUTO: 28.5 % (ref 40–54)
HGB BLD-MCNC: 9.1 G/DL (ref 14–18)
LYMPHOCYTES # BLD AUTO: 0.9 THOU/UL (ref 0.8–4.4)
LYMPHOCYTES NFR BLD AUTO: 23 % (ref 20–40)
MAGNESIUM SERPL-MCNC: 1.8 MG/DL (ref 1.8–2.6)
MCH RBC QN AUTO: 29.8 PG (ref 27–34)
MCHC RBC AUTO-ENTMCNC: 31.9 G/DL (ref 32–36)
MCV RBC AUTO: 93 FL (ref 80–100)
MONOCYTES # BLD AUTO: 0.4 THOU/UL (ref 0–0.9)
MONOCYTES NFR BLD AUTO: 10 % (ref 2–10)
NEUTROPHILS # BLD AUTO: 2.5 THOU/UL (ref 2–7.7)
NEUTROPHILS NFR BLD AUTO: 65 % (ref 50–70)
OVALOCYTES: ABNORMAL
PLAT MORPH BLD: ABNORMAL
PLATELET # BLD AUTO: 54 THOU/UL (ref 140–440)
PMV BLD AUTO: ABNORMAL FL
POLYCHROMASIA BLD QL SMEAR: ABNORMAL
POTASSIUM BLD-SCNC: 3.9 MMOL/L (ref 3.5–5)
PROT SERPL-MCNC: 6.8 G/DL (ref 6–8)
RBC # BLD AUTO: 3.05 MILL/UL (ref 4.4–6.2)
SODIUM SERPL-SCNC: 134 MMOL/L (ref 136–145)
TEAR DROP: ABNORMAL
WBC: 3.8 THOU/UL (ref 4–11)

## 2020-07-31 ASSESSMENT — MIFFLIN-ST. JEOR: SCORE: 1365.01

## 2020-08-03 ENCOUNTER — COMMUNICATION - HEALTHEAST (OUTPATIENT)
Dept: ONCOLOGY | Facility: HOSPITAL | Age: 70
End: 2020-08-03

## 2020-08-10 ENCOUNTER — OFFICE VISIT - HEALTHEAST (OUTPATIENT)
Dept: INFECTIOUS DISEASES | Facility: CLINIC | Age: 70
End: 2020-08-10

## 2020-08-10 DIAGNOSIS — B18.1 CHRONIC VIRAL HEPATITIS B WITHOUT DELTA AGENT AND WITHOUT COMA (H): ICD-10-CM

## 2020-08-11 ENCOUNTER — AMBULATORY - HEALTHEAST (OUTPATIENT)
Dept: LAB | Facility: CLINIC | Age: 70
End: 2020-08-11

## 2020-08-11 DIAGNOSIS — B18.1 CHRONIC VIRAL HEPATITIS B WITHOUT DELTA AGENT AND WITHOUT COMA (H): ICD-10-CM

## 2020-08-11 LAB
ALBUMIN SERPL-MCNC: 2.4 G/DL (ref 3.5–5)
ALP SERPL-CCNC: 192 U/L (ref 45–120)
ALT SERPL W P-5'-P-CCNC: 155 U/L (ref 0–45)
AST SERPL W P-5'-P-CCNC: 158 U/L (ref 0–40)
BILIRUB DIRECT SERPL-MCNC: 1.4 MG/DL
BILIRUB SERPL-MCNC: 2.7 MG/DL (ref 0–1)
PROT SERPL-MCNC: 7.1 G/DL (ref 6–8)

## 2020-08-12 ENCOUNTER — COMMUNICATION - HEALTHEAST (OUTPATIENT)
Dept: ONCOLOGY | Facility: HOSPITAL | Age: 70
End: 2020-08-12

## 2020-08-14 ENCOUNTER — HOSPITAL ENCOUNTER (OUTPATIENT)
Dept: CT IMAGING | Facility: HOSPITAL | Age: 70
Setting detail: RADIATION/ONCOLOGY SERIES
Discharge: STILL ADMITTED OR ADMINISTRATIVE | End: 2020-08-14
Attending: INTERNAL MEDICINE

## 2020-08-14 DIAGNOSIS — C11.9 NASOPHARYNGEAL CARCINOMA (H): ICD-10-CM

## 2020-08-14 LAB
HBV DNA SERPL NAA+PROBE-ACNC: 124 [IU]/ML
HBV DNA SERPL NAA+PROBE-LOG IU: 2.1 {LOG_IU}/ML

## 2020-08-18 ENCOUNTER — OFFICE VISIT - HEALTHEAST (OUTPATIENT)
Dept: ONCOLOGY | Facility: HOSPITAL | Age: 70
End: 2020-08-18

## 2020-08-18 ENCOUNTER — AMBULATORY - HEALTHEAST (OUTPATIENT)
Dept: INFUSION THERAPY | Facility: HOSPITAL | Age: 70
End: 2020-08-18

## 2020-08-18 DIAGNOSIS — C11.9 SQUAMOUS CELL CARCINOMA OF NASOPHARYNX (H): ICD-10-CM

## 2020-08-18 DIAGNOSIS — C13.1 MALIGNANT NEOPLASM OF ARYEPIGLOTTIC FOLD, HYPOPHARYNGEAL ASPECT (H): ICD-10-CM

## 2020-08-18 DIAGNOSIS — C11.9 NASOPHARYNGEAL CARCINOMA (H): ICD-10-CM

## 2020-08-18 LAB
ALBUMIN SERPL-MCNC: 2.4 G/DL (ref 3.5–5)
ALP SERPL-CCNC: 185 U/L (ref 45–120)
ALT SERPL W P-5'-P-CCNC: 145 U/L (ref 0–45)
ANION GAP SERPL CALCULATED.3IONS-SCNC: 2 MMOL/L (ref 5–18)
AST SERPL W P-5'-P-CCNC: 159 U/L (ref 0–40)
BASOPHILS # BLD AUTO: 0 THOU/UL (ref 0–0.2)
BASOPHILS NFR BLD AUTO: 0 % (ref 0–2)
BILIRUB SERPL-MCNC: 2.1 MG/DL (ref 0–1)
BUN SERPL-MCNC: 25 MG/DL (ref 8–22)
CALCIUM SERPL-MCNC: 8.4 MG/DL (ref 8.5–10.5)
CHLORIDE BLD-SCNC: 105 MMOL/L (ref 98–107)
CO2 SERPL-SCNC: 28 MMOL/L (ref 22–31)
CREAT SERPL-MCNC: 0.84 MG/DL (ref 0.7–1.3)
EOSINOPHIL # BLD AUTO: 0 THOU/UL (ref 0–0.4)
EOSINOPHIL NFR BLD AUTO: 1 % (ref 0–6)
ERYTHROCYTE [DISTWIDTH] IN BLOOD BY AUTOMATED COUNT: 19.1 % (ref 11–14.5)
GFR SERPL CREATININE-BSD FRML MDRD: >60 ML/MIN/1.73M2
GLUCOSE BLD-MCNC: 148 MG/DL (ref 70–125)
HCT VFR BLD AUTO: 28 % (ref 40–54)
HGB BLD-MCNC: 9 G/DL (ref 14–18)
LYMPHOCYTES # BLD AUTO: 0.7 THOU/UL (ref 0.8–4.4)
LYMPHOCYTES NFR BLD AUTO: 30 % (ref 20–40)
MCH RBC QN AUTO: 32.1 PG (ref 27–34)
MCHC RBC AUTO-ENTMCNC: 32.1 G/DL (ref 32–36)
MCV RBC AUTO: 100 FL (ref 80–100)
MONOCYTES # BLD AUTO: 0.2 THOU/UL (ref 0–0.9)
MONOCYTES NFR BLD AUTO: 9 % (ref 2–10)
NEUTROPHILS # BLD AUTO: 1.5 THOU/UL (ref 2–7.7)
NEUTROPHILS NFR BLD AUTO: 59 % (ref 50–70)
PLATELET # BLD AUTO: 49 THOU/UL (ref 140–440)
PMV BLD AUTO: ABNORMAL FL
POTASSIUM BLD-SCNC: 3.7 MMOL/L (ref 3.5–5)
PROT SERPL-MCNC: 7 G/DL (ref 6–8)
RBC # BLD AUTO: 2.8 MILL/UL (ref 4.4–6.2)
SODIUM SERPL-SCNC: 135 MMOL/L (ref 136–145)
WBC: 2.5 THOU/UL (ref 4–11)

## 2020-08-19 ENCOUNTER — COMMUNICATION - HEALTHEAST (OUTPATIENT)
Dept: ONCOLOGY | Facility: HOSPITAL | Age: 70
End: 2020-08-19

## 2020-08-27 ENCOUNTER — OFFICE VISIT - HEALTHEAST (OUTPATIENT)
Dept: RADIATION ONCOLOGY | Facility: HOSPITAL | Age: 70
End: 2020-08-27

## 2020-08-27 DIAGNOSIS — C11.9 SQUAMOUS CELL CARCINOMA OF NASOPHARYNX (H): ICD-10-CM

## 2020-08-31 ENCOUNTER — COMMUNICATION - HEALTHEAST (OUTPATIENT)
Dept: ONCOLOGY | Facility: HOSPITAL | Age: 70
End: 2020-08-31

## 2020-09-02 ENCOUNTER — HOSPITAL ENCOUNTER (OUTPATIENT)
Dept: PET IMAGING | Facility: HOSPITAL | Age: 70
Discharge: HOME | End: 2020-09-02
Attending: INTERNAL MEDICINE

## 2020-09-02 ENCOUNTER — HOSPITAL ENCOUNTER (OUTPATIENT)
Dept: PET IMAGING | Facility: HOSPITAL | Age: 70
Setting detail: RADIATION/ONCOLOGY SERIES
Discharge: STILL ADMITTED OR ADMINISTRATIVE | End: 2020-09-02
Attending: INTERNAL MEDICINE

## 2020-09-02 ENCOUNTER — COMMUNICATION - HEALTHEAST (OUTPATIENT)
Dept: ONCOLOGY | Facility: HOSPITAL | Age: 70
End: 2020-09-02

## 2020-09-02 DIAGNOSIS — C13.1 MALIGNANT NEOPLASM OF ARYEPIGLOTTIC FOLD, HYPOPHARYNGEAL ASPECT (H): ICD-10-CM

## 2020-09-02 DIAGNOSIS — C11.9 NASOPHARYNGEAL CARCINOMA (H): ICD-10-CM

## 2020-09-02 LAB — GLUCOSE BLDC GLUCOMTR-MCNC: 94 MG/DL (ref 70–139)

## 2020-09-04 ENCOUNTER — HOSPITAL ENCOUNTER (OUTPATIENT)
Dept: RADIOLOGY | Facility: HOSPITAL | Age: 70
Discharge: HOME | End: 2020-09-04
Attending: RADIOLOGY

## 2020-09-04 DIAGNOSIS — C11.9 SQUAMOUS CELL CARCINOMA OF NASOPHARYNX (H): ICD-10-CM

## 2020-09-08 ENCOUNTER — HOME INFUSION (PRE-WILLOW HOME INFUSION) (OUTPATIENT)
Dept: PHARMACY | Facility: CLINIC | Age: 70
End: 2020-09-08

## 2020-09-09 ENCOUNTER — OFFICE VISIT - HEALTHEAST (OUTPATIENT)
Dept: OTOLARYNGOLOGY | Facility: CLINIC | Age: 70
End: 2020-09-09

## 2020-09-09 DIAGNOSIS — H93.13 TINNITUS, BILATERAL: ICD-10-CM

## 2020-09-09 DIAGNOSIS — Z85.819: ICD-10-CM

## 2020-09-09 DIAGNOSIS — H72.92 PERFORATION OF TYMPANIC MEMBRANE, LEFT: ICD-10-CM

## 2020-09-10 NOTE — PROGRESS NOTES
This is a recent snapshot of the patient's [facility] medical record.  For current drug dose and complete information and questions, call [phone] or In Basket pool, fv home infusion ([id number])  CSN Number:  [id number]

## 2020-09-14 ENCOUNTER — AMBULATORY - HEALTHEAST (OUTPATIENT)
Dept: LAB | Facility: CLINIC | Age: 70
End: 2020-09-14

## 2020-09-14 DIAGNOSIS — B18.1 CHRONIC VIRAL HEPATITIS B WITHOUT DELTA AGENT AND WITHOUT COMA (H): ICD-10-CM

## 2020-09-14 LAB
ALBUMIN SERPL-MCNC: 2.4 G/DL (ref 3.5–5)
ALP SERPL-CCNC: 193 U/L (ref 45–120)
ALT SERPL W P-5'-P-CCNC: 111 U/L (ref 0–45)
AST SERPL W P-5'-P-CCNC: 117 U/L (ref 0–40)
BILIRUB DIRECT SERPL-MCNC: 0.7 MG/DL
BILIRUB SERPL-MCNC: 1.3 MG/DL (ref 0–1)
PROT SERPL-MCNC: 6.7 G/DL (ref 6–8)

## 2020-09-18 ENCOUNTER — COMMUNICATION - HEALTHEAST (OUTPATIENT)
Dept: ONCOLOGY | Facility: HOSPITAL | Age: 70
End: 2020-09-18

## 2020-09-19 LAB
HBV DNA SERPL NAA+PROBE-ACNC: 22 [IU]/ML
HBV DNA SERPL NAA+PROBE-LOG IU: 1.3 {LOG_IU}/ML

## 2020-09-21 ENCOUNTER — COMMUNICATION - HEALTHEAST (OUTPATIENT)
Dept: INFECTIOUS DISEASES | Facility: CLINIC | Age: 70
End: 2020-09-21

## 2020-09-21 ENCOUNTER — COMMUNICATION - HEALTHEAST (OUTPATIENT)
Dept: ONCOLOGY | Facility: HOSPITAL | Age: 70
End: 2020-09-21

## 2020-09-24 ENCOUNTER — OFFICE VISIT - HEALTHEAST (OUTPATIENT)
Dept: RADIATION ONCOLOGY | Facility: HOSPITAL | Age: 70
End: 2020-09-24

## 2020-09-24 DIAGNOSIS — C11.9 SQUAMOUS CELL CARCINOMA OF NASOPHARYNX (H): ICD-10-CM

## 2020-09-25 ENCOUNTER — COMMUNICATION - HEALTHEAST (OUTPATIENT)
Dept: ADMINISTRATIVE | Facility: CLINIC | Age: 70
End: 2020-09-25

## 2020-09-25 ENCOUNTER — OFFICE VISIT - HEALTHEAST (OUTPATIENT)
Dept: AUDIOLOGY | Facility: CLINIC | Age: 70
End: 2020-09-25

## 2020-09-25 ENCOUNTER — OFFICE VISIT - HEALTHEAST (OUTPATIENT)
Dept: OTOLARYNGOLOGY | Facility: CLINIC | Age: 70
End: 2020-09-25

## 2020-09-25 DIAGNOSIS — H72.92 PERFORATION OF EAR DRUM, LEFT: ICD-10-CM

## 2020-09-25 DIAGNOSIS — H90.6 MIXED HEARING LOSS, BILATERAL: ICD-10-CM

## 2020-09-28 ENCOUNTER — OFFICE VISIT - HEALTHEAST (OUTPATIENT)
Dept: SPEECH THERAPY | Facility: REHABILITATION | Age: 70
End: 2020-09-28

## 2020-09-28 DIAGNOSIS — R13.12 DYSPHAGIA, OROPHARYNGEAL: ICD-10-CM

## 2020-09-29 ENCOUNTER — COMMUNICATION - HEALTHEAST (OUTPATIENT)
Dept: ONCOLOGY | Facility: HOSPITAL | Age: 70
End: 2020-09-29

## 2020-10-06 ENCOUNTER — COMMUNICATION - HEALTHEAST (OUTPATIENT)
Dept: ONCOLOGY | Facility: HOSPITAL | Age: 70
End: 2020-10-06

## 2020-10-06 ENCOUNTER — COMMUNICATION - HEALTHEAST (OUTPATIENT)
Dept: INFECTIOUS DISEASES | Facility: CLINIC | Age: 70
End: 2020-10-06

## 2020-10-06 DIAGNOSIS — G89.3 CANCER RELATED PAIN: ICD-10-CM

## 2020-10-06 DIAGNOSIS — C11.9 SQUAMOUS CELL CARCINOMA OF NASOPHARYNX (H): ICD-10-CM

## 2020-10-12 ENCOUNTER — AMBULATORY - HEALTHEAST (OUTPATIENT)
Dept: LAB | Facility: CLINIC | Age: 70
End: 2020-10-12

## 2020-10-12 DIAGNOSIS — B18.1 CHRONIC VIRAL HEPATITIS B WITHOUT DELTA AGENT AND WITHOUT COMA (H): ICD-10-CM

## 2020-10-12 LAB
ALBUMIN SERPL-MCNC: 2.7 G/DL (ref 3.5–5)
ALP SERPL-CCNC: 181 U/L (ref 45–120)
ALT SERPL W P-5'-P-CCNC: 77 U/L (ref 0–45)
AST SERPL W P-5'-P-CCNC: 95 U/L (ref 0–40)
BILIRUB DIRECT SERPL-MCNC: 0.7 MG/DL
BILIRUB SERPL-MCNC: 1.7 MG/DL (ref 0–1)
PROT SERPL-MCNC: 6.9 G/DL (ref 6–8)

## 2020-10-17 LAB
HBV DNA SERPL NAA+PROBE-ACNC: <20 [IU]/ML
HBV DNA SERPL NAA+PROBE-LOG IU: <1.3 {LOG_IU}/ML

## 2020-10-22 ENCOUNTER — OFFICE VISIT - HEALTHEAST (OUTPATIENT)
Dept: AUDIOLOGY | Facility: CLINIC | Age: 70
End: 2020-10-22

## 2020-10-22 DIAGNOSIS — H90.6 MIXED HEARING LOSS, BILATERAL: ICD-10-CM

## 2020-10-26 ENCOUNTER — COMMUNICATION - HEALTHEAST (OUTPATIENT)
Dept: ONCOLOGY | Facility: HOSPITAL | Age: 70
End: 2020-10-26

## 2020-11-16 ENCOUNTER — AMBULATORY - HEALTHEAST (OUTPATIENT)
Dept: LAB | Facility: CLINIC | Age: 70
End: 2020-11-16

## 2020-11-16 ENCOUNTER — AMBULATORY - HEALTHEAST (OUTPATIENT)
Dept: ONCOLOGY | Facility: HOSPITAL | Age: 70
End: 2020-11-16

## 2020-11-16 DIAGNOSIS — B18.1 CHRONIC VIRAL HEPATITIS B WITHOUT DELTA AGENT AND WITHOUT COMA (H): ICD-10-CM

## 2020-11-16 DIAGNOSIS — C11.9 NASOPHARYNGEAL CARCINOMA (H): ICD-10-CM

## 2020-11-16 LAB
ALBUMIN SERPL-MCNC: 3 G/DL (ref 3.5–5)
ALP SERPL-CCNC: 186 U/L (ref 45–120)
ALT SERPL W P-5'-P-CCNC: 70 U/L (ref 0–45)
ANION GAP SERPL CALCULATED.3IONS-SCNC: 7 MMOL/L (ref 5–18)
AST SERPL W P-5'-P-CCNC: 82 U/L (ref 0–40)
BASOPHILS # BLD AUTO: 0 THOU/UL (ref 0–0.2)
BASOPHILS NFR BLD AUTO: 0 % (ref 0–2)
BILIRUB SERPL-MCNC: 1.4 MG/DL (ref 0–1)
BUN SERPL-MCNC: 26 MG/DL (ref 8–22)
CALCIUM SERPL-MCNC: 8.6 MG/DL (ref 8.5–10.5)
CHLORIDE BLD-SCNC: 105 MMOL/L (ref 98–107)
CO2 SERPL-SCNC: 28 MMOL/L (ref 22–31)
CREAT SERPL-MCNC: 0.99 MG/DL (ref 0.7–1.3)
EOSINOPHIL # BLD AUTO: 0.1 THOU/UL (ref 0–0.4)
EOSINOPHIL NFR BLD AUTO: 2 % (ref 0–6)
ERYTHROCYTE [DISTWIDTH] IN BLOOD BY AUTOMATED COUNT: 13.3 % (ref 11–14.5)
GFR SERPL CREATININE-BSD FRML MDRD: >60 ML/MIN/1.73M2
GLUCOSE BLD-MCNC: 146 MG/DL (ref 70–125)
HCT VFR BLD AUTO: 31 % (ref 40–54)
HGB BLD-MCNC: 10.7 G/DL (ref 14–18)
IMM GRANULOCYTES # BLD: 0 THOU/UL
IMM GRANULOCYTES NFR BLD: 0 %
LYMPHOCYTES # BLD AUTO: 0.8 THOU/UL (ref 0.8–4.4)
LYMPHOCYTES NFR BLD AUTO: 25 % (ref 20–40)
MCH RBC QN AUTO: 36.6 PG (ref 27–34)
MCHC RBC AUTO-ENTMCNC: 34.5 G/DL (ref 32–36)
MCV RBC AUTO: 106 FL (ref 80–100)
MONOCYTES # BLD AUTO: 0.3 THOU/UL (ref 0–0.9)
MONOCYTES NFR BLD AUTO: 9 % (ref 2–10)
NEUTROPHILS # BLD AUTO: 2 THOU/UL (ref 2–7.7)
NEUTROPHILS NFR BLD AUTO: 62 % (ref 50–70)
PLATELET # BLD AUTO: 44 THOU/UL (ref 140–440)
PMV BLD AUTO: 14.1 FL (ref 8.5–12.5)
POTASSIUM BLD-SCNC: 4.3 MMOL/L (ref 3.5–5)
PROT SERPL-MCNC: 7.1 G/DL (ref 6–8)
RBC # BLD AUTO: 2.92 MILL/UL (ref 4.4–6.2)
SODIUM SERPL-SCNC: 140 MMOL/L (ref 136–145)
WBC: 3.2 THOU/UL (ref 4–11)

## 2020-11-17 ENCOUNTER — AMBULATORY - HEALTHEAST (OUTPATIENT)
Dept: INFUSION THERAPY | Facility: HOSPITAL | Age: 70
End: 2020-11-17

## 2020-11-17 ENCOUNTER — COMMUNICATION - HEALTHEAST (OUTPATIENT)
Dept: ONCOLOGY | Facility: HOSPITAL | Age: 70
End: 2020-11-17

## 2020-11-17 DIAGNOSIS — C11.9 NASOPHARYNGEAL CARCINOMA (H): ICD-10-CM

## 2020-11-17 LAB
ALBUMIN SERPL-MCNC: 2.8 G/DL (ref 3.5–5)
ALP SERPL-CCNC: 182 U/L (ref 45–120)
ALT SERPL W P-5'-P-CCNC: 68 U/L (ref 0–45)
ANION GAP SERPL CALCULATED.3IONS-SCNC: 6 MMOL/L (ref 5–18)
AST SERPL W P-5'-P-CCNC: 82 U/L (ref 0–40)
BASOPHILS # BLD AUTO: 0 THOU/UL (ref 0–0.2)
BASOPHILS NFR BLD AUTO: 1 % (ref 0–2)
BILIRUB SERPL-MCNC: 0.9 MG/DL (ref 0–1)
BUN SERPL-MCNC: 24 MG/DL (ref 8–22)
CALCIUM SERPL-MCNC: 8.2 MG/DL (ref 8.5–10.5)
CHLORIDE BLD-SCNC: 105 MMOL/L (ref 98–107)
CO2 SERPL-SCNC: 27 MMOL/L (ref 22–31)
CREAT SERPL-MCNC: 0.88 MG/DL (ref 0.7–1.3)
EOSINOPHIL # BLD AUTO: 0.1 THOU/UL (ref 0–0.4)
EOSINOPHIL NFR BLD AUTO: 3 % (ref 0–6)
ERYTHROCYTE [DISTWIDTH] IN BLOOD BY AUTOMATED COUNT: 13.2 % (ref 11–14.5)
GFR SERPL CREATININE-BSD FRML MDRD: >60 ML/MIN/1.73M2
GLUCOSE BLD-MCNC: 94 MG/DL (ref 70–125)
HBV E AG SERPL QL IA: NEGATIVE
HCT VFR BLD AUTO: 31.7 % (ref 40–54)
HGB BLD-MCNC: 10 G/DL (ref 14–18)
IMM GRANULOCYTES # BLD: 0 THOU/UL
IMM GRANULOCYTES NFR BLD: 0 %
LYMPHOCYTES # BLD AUTO: 0.8 THOU/UL (ref 0.8–4.4)
LYMPHOCYTES NFR BLD AUTO: 24 % (ref 20–40)
MCH RBC QN AUTO: 32.2 PG (ref 27–34)
MCHC RBC AUTO-ENTMCNC: 31.5 G/DL (ref 32–36)
MCV RBC AUTO: 102 FL (ref 80–100)
MONOCYTES # BLD AUTO: 0.4 THOU/UL (ref 0–0.9)
MONOCYTES NFR BLD AUTO: 13 % (ref 2–10)
NEUTROPHILS # BLD AUTO: 1.9 THOU/UL (ref 2–7.7)
NEUTROPHILS NFR BLD AUTO: 60 % (ref 50–70)
PLATELET # BLD AUTO: 47 THOU/UL (ref 140–440)
PMV BLD AUTO: 10.4 FL (ref 8.5–12.5)
POTASSIUM BLD-SCNC: 4 MMOL/L (ref 3.5–5)
PROT SERPL-MCNC: 6.9 G/DL (ref 6–8)
RBC # BLD AUTO: 3.11 MILL/UL (ref 4.4–6.2)
SODIUM SERPL-SCNC: 138 MMOL/L (ref 136–145)
WBC: 3.2 THOU/UL (ref 4–11)

## 2020-11-18 ENCOUNTER — OFFICE VISIT - HEALTHEAST (OUTPATIENT)
Dept: ONCOLOGY | Facility: HOSPITAL | Age: 70
End: 2020-11-18

## 2020-11-18 DIAGNOSIS — K11.7 XEROSTOMIA DUE TO RADIOTHERAPY: ICD-10-CM

## 2020-11-18 DIAGNOSIS — D69.6 THROMBOCYTOPENIA (H): ICD-10-CM
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
## 2020-11-18 DIAGNOSIS — C11.9 NASOPHARYNGEAL CARCINOMA (H): ICD-10-CM

## 2020-11-19 ENCOUNTER — OFFICE VISIT - HEALTHEAST (OUTPATIENT)
Dept: RADIATION ONCOLOGY | Facility: HOSPITAL | Age: 70
End: 2020-11-19

## 2020-11-19 DIAGNOSIS — C11.9 SQUAMOUS CELL CARCINOMA OF NASOPHARYNX (H): ICD-10-CM

## 2020-11-23 ENCOUNTER — COMMUNICATION - HEALTHEAST (OUTPATIENT)
Dept: ONCOLOGY | Facility: HOSPITAL | Age: 70
End: 2020-11-23

## 2020-12-02 ENCOUNTER — OFFICE VISIT - HEALTHEAST (OUTPATIENT)
Dept: OTOLARYNGOLOGY | Facility: CLINIC | Age: 70
End: 2020-12-02

## 2020-12-02 DIAGNOSIS — C11.9 NASOPHARYNGEAL CARCINOMA (H): ICD-10-CM

## 2020-12-06 ENCOUNTER — HOSPITAL ENCOUNTER (OUTPATIENT)
Dept: CT IMAGING | Facility: HOSPITAL | Age: 70
Setting detail: RADIATION/ONCOLOGY SERIES
Discharge: STILL ADMITTED OR ADMINISTRATIVE | End: 2020-12-06
Attending: RADIOLOGY

## 2020-12-06 DIAGNOSIS — C11.9 SQUAMOUS CELL CARCINOMA OF NASOPHARYNX (H): ICD-10-CM

## 2020-12-08 ENCOUNTER — COMMUNICATION - HEALTHEAST (OUTPATIENT)
Dept: RADIATION ONCOLOGY | Facility: HOSPITAL | Age: 70
End: 2020-12-08

## 2020-12-08 ENCOUNTER — COMMUNICATION - HEALTHEAST (OUTPATIENT)
Dept: ONCOLOGY | Facility: HOSPITAL | Age: 70
End: 2020-12-08

## 2020-12-14 ENCOUNTER — COMMUNICATION - HEALTHEAST (OUTPATIENT)
Dept: RADIATION ONCOLOGY | Facility: HOSPITAL | Age: 70
End: 2020-12-14

## 2020-12-14 ENCOUNTER — COMMUNICATION - HEALTHEAST (OUTPATIENT)
Dept: ONCOLOGY | Facility: HOSPITAL | Age: 70
End: 2020-12-14

## 2020-12-14 ENCOUNTER — AMBULATORY - HEALTHEAST (OUTPATIENT)
Dept: ONCOLOGY | Facility: HOSPITAL | Age: 70
End: 2020-12-14

## 2020-12-14 ENCOUNTER — COMMUNICATION - HEALTHEAST (OUTPATIENT)
Dept: LAB | Facility: CLINIC | Age: 70
End: 2020-12-14

## 2020-12-14 ENCOUNTER — AMBULATORY - HEALTHEAST (OUTPATIENT)
Dept: LAB | Facility: CLINIC | Age: 70
End: 2020-12-14

## 2020-12-14 DIAGNOSIS — C11.9 SQUAMOUS CELL CARCINOMA OF NASOPHARYNX (H): ICD-10-CM

## 2020-12-14 DIAGNOSIS — G89.3 CANCER RELATED PAIN: ICD-10-CM

## 2020-12-14 DIAGNOSIS — C11.9 NASOPHARYNGEAL CARCINOMA (H): ICD-10-CM

## 2020-12-14 DIAGNOSIS — B18.1 CHRONIC VIRAL HEPATITIS B WITHOUT DELTA AGENT AND WITHOUT COMA (H): ICD-10-CM

## 2020-12-14 LAB
ALBUMIN SERPL-MCNC: 3.2 G/DL (ref 3.5–5)
ALP SERPL-CCNC: 183 U/L (ref 45–120)
ALT SERPL W P-5'-P-CCNC: 67 U/L (ref 0–45)
ANION GAP SERPL CALCULATED.3IONS-SCNC: 10 MMOL/L (ref 5–18)
AST SERPL W P-5'-P-CCNC: 76 U/L (ref 0–40)
BASOPHILS # BLD AUTO: 0 THOU/UL (ref 0–0.2)
BASOPHILS NFR BLD AUTO: 1 % (ref 0–2)
BILIRUB SERPL-MCNC: 1.1 MG/DL (ref 0–1)
BUN SERPL-MCNC: 31 MG/DL (ref 8–22)
CALCIUM SERPL-MCNC: 8.5 MG/DL (ref 8.5–10.5)
CHLORIDE BLD-SCNC: 105 MMOL/L (ref 98–107)
CO2 SERPL-SCNC: 23 MMOL/L (ref 22–31)
CREAT SERPL-MCNC: 0.91 MG/DL (ref 0.7–1.3)
EOSINOPHIL # BLD AUTO: 0.1 THOU/UL (ref 0–0.4)
EOSINOPHIL NFR BLD AUTO: 2 % (ref 0–6)
ERYTHROCYTE [DISTWIDTH] IN BLOOD BY AUTOMATED COUNT: 13.1 % (ref 11–14.5)
GFR SERPL CREATININE-BSD FRML MDRD: >60 ML/MIN/1.73M2
GLUCOSE BLD-MCNC: 91 MG/DL (ref 70–125)
HCT VFR BLD AUTO: 28.6 % (ref 40–54)
HGB BLD-MCNC: 10.5 G/DL (ref 14–18)
IMM GRANULOCYTES # BLD: 0 THOU/UL
IMM GRANULOCYTES NFR BLD: 0 %
LYMPHOCYTES # BLD AUTO: 0.8 THOU/UL (ref 0.8–4.4)
LYMPHOCYTES NFR BLD AUTO: 26 % (ref 20–40)
MCH RBC QN AUTO: 37.6 PG (ref 27–34)
MCHC RBC AUTO-ENTMCNC: 36.7 G/DL (ref 32–36)
MCV RBC AUTO: 103 FL (ref 80–100)
MONOCYTES # BLD AUTO: 0.3 THOU/UL (ref 0–0.9)
MONOCYTES NFR BLD AUTO: 11 % (ref 2–10)
NEUTROPHILS # BLD AUTO: 1.8 THOU/UL (ref 2–7.7)
NEUTROPHILS NFR BLD AUTO: 60 % (ref 50–70)
PLATELET # BLD AUTO: 41 THOU/UL (ref 140–440)
PMV BLD AUTO: 13.5 FL (ref 8.5–12.5)
POTASSIUM BLD-SCNC: 4.1 MMOL/L (ref 3.5–5)
PROT SERPL-MCNC: 6.9 G/DL (ref 6–8)
RBC # BLD AUTO: 2.79 MILL/UL (ref 4.4–6.2)
SODIUM SERPL-SCNC: 138 MMOL/L (ref 136–145)
WBC: 3 THOU/UL (ref 4–11)

## 2020-12-16 ENCOUNTER — COMMUNICATION - HEALTHEAST (OUTPATIENT)
Dept: ONCOLOGY | Facility: HOSPITAL | Age: 70
End: 2020-12-16

## 2020-12-18 LAB
HBV DNA SERPL NAA+PROBE-ACNC: <20 [IU]/ML
HBV DNA SERPL NAA+PROBE-LOG IU: <1.3 {LOG_IU}/ML

## 2021-01-11 ENCOUNTER — AMBULATORY - HEALTHEAST (OUTPATIENT)
Dept: LAB | Facility: CLINIC | Age: 71
End: 2021-01-11

## 2021-01-11 DIAGNOSIS — R79.89 ELEVATED LFTS: ICD-10-CM

## 2021-01-11 DIAGNOSIS — B18.1 CHRONIC VIRAL HEPATITIS B WITHOUT DELTA AGENT AND WITHOUT COMA (H): ICD-10-CM

## 2021-01-11 LAB
ALBUMIN SERPL-MCNC: 3.2 G/DL (ref 3.5–5)
ALP SERPL-CCNC: 136 U/L (ref 45–120)
ALT SERPL W P-5'-P-CCNC: 52 U/L (ref 0–45)
AST SERPL W P-5'-P-CCNC: 63 U/L (ref 0–40)
BILIRUB DIRECT SERPL-MCNC: 0.4 MG/DL
BILIRUB SERPL-MCNC: 1.1 MG/DL (ref 0–1)
PROT SERPL-MCNC: 6.6 G/DL (ref 6–8)

## 2021-01-15 ENCOUNTER — COMMUNICATION - HEALTHEAST (OUTPATIENT)
Dept: ONCOLOGY | Facility: HOSPITAL | Age: 71
End: 2021-01-15

## 2021-01-15 ENCOUNTER — AMBULATORY - HEALTHEAST (OUTPATIENT)
Dept: ONCOLOGY | Facility: CLINIC | Age: 71
End: 2021-01-15
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
## 2021-01-15 DIAGNOSIS — C11.9 NASOPHARYNGEAL CARCINOMA (H): ICD-10-CM

## 2021-01-15 DIAGNOSIS — K11.7 XEROSTOMIA DUE TO RADIOTHERAPY: ICD-10-CM

## 2021-01-26 ENCOUNTER — COMMUNICATION - HEALTHEAST (OUTPATIENT)
Dept: ONCOLOGY | Facility: HOSPITAL | Age: 71
End: 2021-01-26

## 2021-02-08 ENCOUNTER — AMBULATORY - HEALTHEAST (OUTPATIENT)
Dept: LAB | Facility: CLINIC | Age: 71
End: 2021-02-08

## 2021-02-08 DIAGNOSIS — B18.1 CHRONIC VIRAL HEPATITIS B WITHOUT DELTA AGENT AND WITHOUT COMA (H): ICD-10-CM

## 2021-02-08 LAB
ALBUMIN SERPL-MCNC: 3.5 G/DL (ref 3.5–5)
ALP SERPL-CCNC: 131 U/L (ref 45–120)
ALT SERPL W P-5'-P-CCNC: 97 U/L (ref 0–45)
AST SERPL W P-5'-P-CCNC: 70 U/L (ref 0–40)
BILIRUB DIRECT SERPL-MCNC: 0.3 MG/DL
BILIRUB SERPL-MCNC: 0.8 MG/DL (ref 0–1)
PROT SERPL-MCNC: 6.9 G/DL (ref 6–8)

## 2021-02-12 ENCOUNTER — COMMUNICATION - HEALTHEAST (OUTPATIENT)
Dept: ONCOLOGY | Facility: HOSPITAL | Age: 71
End: 2021-02-12

## 2021-02-15 ENCOUNTER — OFFICE VISIT - HEALTHEAST (OUTPATIENT)
Dept: INFECTIOUS DISEASES | Facility: CLINIC | Age: 71
End: 2021-02-15

## 2021-02-15 ENCOUNTER — RECORDS - HEALTHEAST (OUTPATIENT)
Dept: ADMINISTRATIVE | Facility: OTHER | Age: 71
End: 2021-02-15

## 2021-02-15 DIAGNOSIS — B18.1 HEPATITIS B, CHRONIC (H): ICD-10-CM

## 2021-02-15 DIAGNOSIS — B37.0 THRUSH: ICD-10-CM

## 2021-02-15 RX ORDER — NYSTATIN 100000/ML
SUSPENSION, ORAL (FINAL DOSE FORM) ORAL
Qty: 200 ML | Refills: 5 | Status: SHIPPED | OUTPATIENT
Start: 2021-02-15 | End: 2021-09-20

## 2021-02-17 ENCOUNTER — HOME INFUSION (PRE-WILLOW HOME INFUSION) (OUTPATIENT)
Dept: PHARMACY | Facility: CLINIC | Age: 71
End: 2021-02-17

## 2021-02-18 NOTE — PROGRESS NOTES
This is a recent snapshot of the patient's [facility] medical record.  For current drug dose and complete information and questions, call [phone] or In Basket pool, fv home infusion ([id number])  CSN Number:  [id number]

## 2021-02-19 ENCOUNTER — HOSPITAL ENCOUNTER (OUTPATIENT)
Dept: CT IMAGING | Facility: HOSPITAL | Age: 71
Setting detail: RADIATION/ONCOLOGY SERIES
Discharge: STILL ADMITTED OR ADMINISTRATIVE | End: 2021-02-19
Attending: RADIOLOGY

## 2021-02-19 DIAGNOSIS — C11.9 SQUAMOUS CELL CARCINOMA OF NASOPHARYNX (H): ICD-10-CM

## 2021-02-22 ENCOUNTER — COMMUNICATION - HEALTHEAST (OUTPATIENT)
Dept: ONCOLOGY | Facility: HOSPITAL | Age: 71
End: 2021-02-22

## 2021-03-01 ENCOUNTER — OFFICE VISIT - HEALTHEAST (OUTPATIENT)
Dept: RADIATION ONCOLOGY | Facility: HOSPITAL | Age: 71
End: 2021-03-01
Payer: MEDICARE

## 2021-03-01 DIAGNOSIS — R94.6 ABNORMAL RESULTS OF THYROID FUNCTION STUDIES: ICD-10-CM

## 2021-03-01 DIAGNOSIS — C11.9 SQUAMOUS CELL CARCINOMA OF NASOPHARYNX (H): ICD-10-CM

## 2021-03-01 RX ORDER — ONDANSETRON 8 MG/1
8 TABLET, FILM COATED ORAL EVERY 6 HOURS PRN
Qty: 30 TABLET | Refills: 1 | Status: SHIPPED | OUTPATIENT
Start: 2021-03-01 | End: 2021-09-16

## 2021-03-01 RX ORDER — PROCHLORPERAZINE MALEATE 10 MG
10 TABLET ORAL EVERY 6 HOURS PRN
Qty: 30 TABLET | Refills: 3 | Status: SHIPPED | OUTPATIENT
Start: 2021-03-01 | End: 2021-09-16

## 2021-03-01 RX ORDER — ACETAMINOPHEN 325 MG/1
650 TABLET ORAL EVERY 6 HOURS PRN
Status: SHIPPED | COMMUNITY
Start: 2021-03-01

## 2021-03-03 ENCOUNTER — COMMUNICATION - HEALTHEAST (OUTPATIENT)
Dept: ONCOLOGY | Facility: HOSPITAL | Age: 71
End: 2021-03-03

## 2021-03-03 ENCOUNTER — OFFICE VISIT - HEALTHEAST (OUTPATIENT)
Dept: OTOLARYNGOLOGY | Facility: CLINIC | Age: 71
End: 2021-03-03

## 2021-03-03 DIAGNOSIS — C11.9 NASOPHARYNGEAL CARCINOMA (H): ICD-10-CM

## 2021-03-09 ENCOUNTER — HOSPITAL ENCOUNTER (OUTPATIENT)
Dept: MRI IMAGING | Facility: HOSPITAL | Age: 71
Setting detail: RADIATION/ONCOLOGY SERIES
Discharge: STILL ADMITTED OR ADMINISTRATIVE | End: 2021-03-09
Attending: RADIOLOGY

## 2021-03-09 DIAGNOSIS — C11.9 SQUAMOUS CELL CARCINOMA OF NASOPHARYNX (H): ICD-10-CM

## 2021-03-10 ENCOUNTER — AMBULATORY - HEALTHEAST (OUTPATIENT)
Dept: RADIATION ONCOLOGY | Facility: HOSPITAL | Age: 71
End: 2021-03-10

## 2021-03-10 DIAGNOSIS — R94.6 ABNORMAL RESULTS OF THYROID FUNCTION STUDIES: ICD-10-CM

## 2021-03-10 DIAGNOSIS — C11.9 SQUAMOUS CELL CARCINOMA OF NASOPHARYNX (H): ICD-10-CM

## 2021-03-11 ENCOUNTER — OFFICE VISIT - HEALTHEAST (OUTPATIENT)
Dept: ONCOLOGY | Facility: HOSPITAL | Age: 71
End: 2021-03-11

## 2021-03-11 ENCOUNTER — AMBULATORY - HEALTHEAST (OUTPATIENT)
Dept: INFUSION THERAPY | Facility: HOSPITAL | Age: 71
End: 2021-03-11

## 2021-03-11 DIAGNOSIS — C11.9 SQUAMOUS CELL CARCINOMA OF NASOPHARYNX (H): ICD-10-CM

## 2021-03-11 DIAGNOSIS — C11.9 NASOPHARYNGEAL CARCINOMA (H): ICD-10-CM

## 2021-03-11 DIAGNOSIS — R94.6 ABNORMAL RESULTS OF THYROID FUNCTION STUDIES: ICD-10-CM

## 2021-03-11 LAB — TSH SERPL DL<=0.005 MIU/L-ACNC: 233.49 UIU/ML (ref 0.3–5)

## 2021-03-11 ASSESSMENT — MIFFLIN-ST. JEOR: SCORE: 1387.69

## 2021-03-12 ENCOUNTER — OFFICE VISIT - HEALTHEAST (OUTPATIENT)
Dept: RADIATION ONCOLOGY | Facility: HOSPITAL | Age: 71
End: 2021-03-12

## 2021-03-12 ENCOUNTER — COMMUNICATION - HEALTHEAST (OUTPATIENT)
Dept: RADIATION ONCOLOGY | Facility: HOSPITAL | Age: 71
End: 2021-03-12

## 2021-03-12 DIAGNOSIS — C11.9 NASOPHARYNX CANCER (H): ICD-10-CM

## 2021-03-15 ENCOUNTER — COMMUNICATION - HEALTHEAST (OUTPATIENT)
Dept: RADIATION ONCOLOGY | Facility: HOSPITAL | Age: 71
End: 2021-03-15

## 2021-03-17 ENCOUNTER — COMMUNICATION - HEALTHEAST (OUTPATIENT)
Dept: ONCOLOGY | Facility: HOSPITAL | Age: 71
End: 2021-03-17

## 2021-03-19 ENCOUNTER — AMBULATORY - HEALTHEAST (OUTPATIENT)
Dept: ONCOLOGY | Facility: HOSPITAL | Age: 71
End: 2021-03-19

## 2021-03-19 ENCOUNTER — AMBULATORY - HEALTHEAST (OUTPATIENT)
Dept: LAB | Facility: CLINIC | Age: 71
End: 2021-03-19

## 2021-03-19 ENCOUNTER — COMMUNICATION - HEALTHEAST (OUTPATIENT)
Dept: LAB | Facility: CLINIC | Age: 71
End: 2021-03-19

## 2021-03-19 ENCOUNTER — OFFICE VISIT - HEALTHEAST (OUTPATIENT)
Dept: FAMILY MEDICINE | Facility: CLINIC | Age: 71
End: 2021-03-19

## 2021-03-19 DIAGNOSIS — B18.1 CHRONIC VIRAL HEPATITIS B WITHOUT DELTA AGENT AND WITHOUT COMA (H): ICD-10-CM

## 2021-03-19 DIAGNOSIS — R79.89 ELEVATED TSH: ICD-10-CM

## 2021-03-19 DIAGNOSIS — E03.9 HYPOTHYROIDISM, UNSPECIFIED TYPE: ICD-10-CM

## 2021-03-19 DIAGNOSIS — C11.9 NASOPHARYNGEAL CARCINOMA (H): ICD-10-CM

## 2021-03-19 LAB
ALBUMIN SERPL-MCNC: 3.2 G/DL (ref 3.5–5)
ALP SERPL-CCNC: 117 U/L (ref 45–120)
ALT SERPL W P-5'-P-CCNC: 57 U/L (ref 0–45)
AST SERPL W P-5'-P-CCNC: 55 U/L (ref 0–40)
BASOPHILS # BLD AUTO: 0 THOU/UL (ref 0–0.2)
BASOPHILS NFR BLD AUTO: 0 % (ref 0–2)
BILIRUB DIRECT SERPL-MCNC: 0.2 MG/DL
BILIRUB SERPL-MCNC: 0.6 MG/DL (ref 0–1)
EOSINOPHIL # BLD AUTO: 0.1 THOU/UL (ref 0–0.4)
EOSINOPHIL NFR BLD AUTO: 1 % (ref 0–6)
ERYTHROCYTE [DISTWIDTH] IN BLOOD BY AUTOMATED COUNT: 14.9 % (ref 11–14.5)
HCT VFR BLD AUTO: 32.1 % (ref 40–54)
HGB BLD-MCNC: 10.7 G/DL (ref 14–18)
IMM GRANULOCYTES # BLD: 0 THOU/UL
IMM GRANULOCYTES NFR BLD: 1 %
LYMPHOCYTES # BLD AUTO: 0.6 THOU/UL (ref 0.8–4.4)
LYMPHOCYTES NFR BLD AUTO: 12 % (ref 20–40)
MCH RBC QN AUTO: 33.2 PG (ref 27–34)
MCHC RBC AUTO-ENTMCNC: 33.3 G/DL (ref 32–36)
MCV RBC AUTO: 100 FL (ref 80–100)
MONOCYTES # BLD AUTO: 0.6 THOU/UL (ref 0–0.9)
MONOCYTES NFR BLD AUTO: 13 % (ref 2–10)
NEUTROPHILS # BLD AUTO: 3.4 THOU/UL (ref 2–7.7)
NEUTROPHILS NFR BLD AUTO: 73 % (ref 50–70)
PLATELET # BLD AUTO: 45 THOU/UL (ref 140–440)
PMV BLD AUTO: 13.2 FL (ref 8.5–12.5)
PROT SERPL-MCNC: 6.2 G/DL (ref 6–8)
RBC # BLD AUTO: 3.22 MILL/UL (ref 4.4–6.2)
T3FREE SERPL-MCNC: 2.4 PG/ML (ref 1.9–3.9)
T4 FREE SERPL-MCNC: 0.4 NG/DL (ref 0.7–1.8)
WBC: 4.7 THOU/UL (ref 4–11)

## 2021-03-19 ASSESSMENT — MIFFLIN-ST. JEOR: SCORE: 1400.39

## 2021-03-22 RX ORDER — LEVOTHYROXINE SODIUM 50 UG/1
50 TABLET ORAL DAILY
Qty: 60 TABLET | Refills: 0 | Status: SHIPPED | OUTPATIENT
Start: 2021-03-22 | End: 2021-08-03

## 2021-03-25 ENCOUNTER — COMMUNICATION - HEALTHEAST (OUTPATIENT)
Dept: FAMILY MEDICINE | Facility: CLINIC | Age: 71
End: 2021-03-25

## 2021-03-26 ENCOUNTER — OFFICE VISIT - HEALTHEAST (OUTPATIENT)
Dept: OTOLARYNGOLOGY | Facility: CLINIC | Age: 71
End: 2021-03-26

## 2021-03-26 DIAGNOSIS — H90.6 MIXED HEARING LOSS, BILATERAL: ICD-10-CM

## 2021-03-26 DIAGNOSIS — H72.92 PERFORATION OF EAR DRUM, LEFT: ICD-10-CM

## 2021-05-07 ENCOUNTER — OFFICE VISIT - HEALTHEAST (OUTPATIENT)
Dept: AUDIOLOGY | Facility: CLINIC | Age: 71
End: 2021-05-07

## 2021-05-07 ENCOUNTER — OFFICE VISIT - HEALTHEAST (OUTPATIENT)
Dept: OTOLARYNGOLOGY | Facility: CLINIC | Age: 71
End: 2021-05-07

## 2021-05-07 DIAGNOSIS — H93.13 TINNITUS, BILATERAL: ICD-10-CM

## 2021-05-07 DIAGNOSIS — H72.92 PERFORATION OF EAR DRUM, LEFT: ICD-10-CM

## 2021-05-07 DIAGNOSIS — H90.6 MIXED HEARING LOSS, BILATERAL: ICD-10-CM

## 2021-05-07 DIAGNOSIS — H90.3 SENSORINEURAL HEARING LOSS (SNHL) OF BOTH EARS: ICD-10-CM

## 2021-05-13 ENCOUNTER — HOME INFUSION (PRE-WILLOW HOME INFUSION) (OUTPATIENT)
Dept: PHARMACY | Facility: CLINIC | Age: 71
End: 2021-05-13

## 2021-05-26 VITALS
OXYGEN SATURATION: 96 % | RESPIRATION RATE: 18 BRPM | HEART RATE: 91 BPM | SYSTOLIC BLOOD PRESSURE: 109 MMHG | DIASTOLIC BLOOD PRESSURE: 67 MMHG | TEMPERATURE: 97.8 F

## 2021-05-26 VITALS
SYSTOLIC BLOOD PRESSURE: 89 MMHG | HEART RATE: 93 BPM | DIASTOLIC BLOOD PRESSURE: 51 MMHG | OXYGEN SATURATION: 98 % | TEMPERATURE: 98.5 F

## 2021-05-26 VITALS
SYSTOLIC BLOOD PRESSURE: 105 MMHG | OXYGEN SATURATION: 96 % | TEMPERATURE: 97.9 F | DIASTOLIC BLOOD PRESSURE: 74 MMHG | HEART RATE: 89 BPM

## 2021-05-26 VITALS — TEMPERATURE: 98.1 F

## 2021-05-26 VITALS
OXYGEN SATURATION: 99 % | SYSTOLIC BLOOD PRESSURE: 122 MMHG | RESPIRATION RATE: 18 BRPM | TEMPERATURE: 99.4 F | DIASTOLIC BLOOD PRESSURE: 73 MMHG | HEART RATE: 83 BPM

## 2021-05-26 VITALS
TEMPERATURE: 98.2 F | HEART RATE: 78 BPM | DIASTOLIC BLOOD PRESSURE: 74 MMHG | SYSTOLIC BLOOD PRESSURE: 138 MMHG | RESPIRATION RATE: 16 BRPM | OXYGEN SATURATION: 97 %

## 2021-05-26 VITALS
TEMPERATURE: 98.8 F | DIASTOLIC BLOOD PRESSURE: 66 MMHG | HEART RATE: 79 BPM | OXYGEN SATURATION: 99 % | SYSTOLIC BLOOD PRESSURE: 128 MMHG

## 2021-05-27 VITALS
OXYGEN SATURATION: 98 % | OXYGEN SATURATION: 96 % | DIASTOLIC BLOOD PRESSURE: 98 MMHG | SYSTOLIC BLOOD PRESSURE: 184 MMHG | SYSTOLIC BLOOD PRESSURE: 129 MMHG | HEART RATE: 73 BPM | DIASTOLIC BLOOD PRESSURE: 73 MMHG | TEMPERATURE: 99.3 F | TEMPERATURE: 98 F | HEART RATE: 70 BPM

## 2021-05-27 VITALS
RESPIRATION RATE: 18 BRPM | TEMPERATURE: 97.9 F | HEART RATE: 68 BPM | DIASTOLIC BLOOD PRESSURE: 60 MMHG | SYSTOLIC BLOOD PRESSURE: 106 MMHG | OXYGEN SATURATION: 98 %

## 2021-05-27 VITALS
DIASTOLIC BLOOD PRESSURE: 68 MMHG | RESPIRATION RATE: 18 BRPM | HEART RATE: 88 BPM | SYSTOLIC BLOOD PRESSURE: 97 MMHG | OXYGEN SATURATION: 97 % | TEMPERATURE: 98.2 F

## 2021-05-27 VITALS
OXYGEN SATURATION: 98 % | DIASTOLIC BLOOD PRESSURE: 75 MMHG | TEMPERATURE: 99.6 F | SYSTOLIC BLOOD PRESSURE: 108 MMHG | HEART RATE: 100 BPM

## 2021-05-27 VITALS — DIASTOLIC BLOOD PRESSURE: 72 MMHG | SYSTOLIC BLOOD PRESSURE: 128 MMHG | HEART RATE: 77 BPM

## 2021-05-27 VITALS — DIASTOLIC BLOOD PRESSURE: 77 MMHG | HEART RATE: 81 BPM | SYSTOLIC BLOOD PRESSURE: 116 MMHG

## 2021-05-27 VITALS
TEMPERATURE: 98.7 F | DIASTOLIC BLOOD PRESSURE: 74 MMHG | SYSTOLIC BLOOD PRESSURE: 141 MMHG | HEART RATE: 76 BPM | OXYGEN SATURATION: 97 %

## 2021-05-27 VITALS
SYSTOLIC BLOOD PRESSURE: 124 MMHG | TEMPERATURE: 98.5 F | RESPIRATION RATE: 16 BRPM | OXYGEN SATURATION: 98 % | DIASTOLIC BLOOD PRESSURE: 68 MMHG | HEART RATE: 65 BPM

## 2021-05-27 VITALS
TEMPERATURE: 97.7 F | HEART RATE: 71 BPM | SYSTOLIC BLOOD PRESSURE: 116 MMHG | OXYGEN SATURATION: 99 % | DIASTOLIC BLOOD PRESSURE: 71 MMHG

## 2021-05-27 VITALS
SYSTOLIC BLOOD PRESSURE: 109 MMHG | OXYGEN SATURATION: 97 % | HEART RATE: 74 BPM | DIASTOLIC BLOOD PRESSURE: 73 MMHG | TEMPERATURE: 98 F

## 2021-05-27 VITALS — HEART RATE: 78 BPM | DIASTOLIC BLOOD PRESSURE: 73 MMHG | SYSTOLIC BLOOD PRESSURE: 136 MMHG

## 2021-05-27 NOTE — PROGRESS NOTES
This is a recent snapshot of the patient's [facility] medical record.  For current drug dose and complete information and questions, call [phone] or In Basket pool, fv home infusion ([id number])  CSN Number:  [id number]

## 2021-05-31 ENCOUNTER — RECORDS - HEALTHEAST (OUTPATIENT)
Dept: ADMINISTRATIVE | Facility: CLINIC | Age: 71
End: 2021-05-31

## 2021-06-04 VITALS
HEART RATE: 96 BPM | BODY MASS INDEX: 27.34 KG/M2 | SYSTOLIC BLOOD PRESSURE: 150 MMHG | TEMPERATURE: 98.4 F | OXYGEN SATURATION: 98 % | DIASTOLIC BLOOD PRESSURE: 76 MMHG | WEIGHT: 140 LBS

## 2021-06-04 VITALS
HEART RATE: 99 BPM | SYSTOLIC BLOOD PRESSURE: 103 MMHG | WEIGHT: 137.8 LBS | OXYGEN SATURATION: 99 % | TEMPERATURE: 99.1 F | DIASTOLIC BLOOD PRESSURE: 64 MMHG | BODY MASS INDEX: 24.61 KG/M2

## 2021-06-04 VITALS
BODY MASS INDEX: 25.66 KG/M2 | OXYGEN SATURATION: 100 % | DIASTOLIC BLOOD PRESSURE: 74 MMHG | TEMPERATURE: 97.6 F | WEIGHT: 143.7 LBS | SYSTOLIC BLOOD PRESSURE: 131 MMHG | HEART RATE: 80 BPM

## 2021-06-04 VITALS
HEART RATE: 70 BPM | BODY MASS INDEX: 25.6 KG/M2 | SYSTOLIC BLOOD PRESSURE: 127 MMHG | WEIGHT: 143.4 LBS | OXYGEN SATURATION: 99 % | TEMPERATURE: 97.8 F | DIASTOLIC BLOOD PRESSURE: 80 MMHG

## 2021-06-04 VITALS
TEMPERATURE: 98.3 F | HEART RATE: 101 BPM | WEIGHT: 133 LBS | OXYGEN SATURATION: 94 % | DIASTOLIC BLOOD PRESSURE: 68 MMHG | SYSTOLIC BLOOD PRESSURE: 94 MMHG | BODY MASS INDEX: 25.97 KG/M2

## 2021-06-04 VITALS
SYSTOLIC BLOOD PRESSURE: 154 MMHG | RESPIRATION RATE: 16 BRPM | WEIGHT: 158.44 LBS | OXYGEN SATURATION: 97 % | DIASTOLIC BLOOD PRESSURE: 89 MMHG | BODY MASS INDEX: 28.07 KG/M2 | TEMPERATURE: 97.8 F | HEIGHT: 63 IN | HEART RATE: 61 BPM

## 2021-06-04 VITALS
SYSTOLIC BLOOD PRESSURE: 112 MMHG | TEMPERATURE: 98.4 F | OXYGEN SATURATION: 98 % | DIASTOLIC BLOOD PRESSURE: 75 MMHG | HEART RATE: 76 BPM | WEIGHT: 145 LBS | BODY MASS INDEX: 23.4 KG/M2

## 2021-06-04 VITALS
OXYGEN SATURATION: 97 % | BODY MASS INDEX: 26.68 KG/M2 | SYSTOLIC BLOOD PRESSURE: 116 MMHG | WEIGHT: 136.6 LBS | TEMPERATURE: 97.5 F | HEART RATE: 88 BPM | DIASTOLIC BLOOD PRESSURE: 78 MMHG

## 2021-06-04 VITALS
OXYGEN SATURATION: 98 % | WEIGHT: 143.5 LBS | DIASTOLIC BLOOD PRESSURE: 73 MMHG | HEART RATE: 85 BPM | TEMPERATURE: 98.1 F | BODY MASS INDEX: 25.62 KG/M2 | SYSTOLIC BLOOD PRESSURE: 115 MMHG

## 2021-06-04 VITALS
HEART RATE: 91 BPM | BODY MASS INDEX: 27.03 KG/M2 | TEMPERATURE: 97.4 F | DIASTOLIC BLOOD PRESSURE: 80 MMHG | SYSTOLIC BLOOD PRESSURE: 124 MMHG | OXYGEN SATURATION: 98 % | WEIGHT: 138.4 LBS

## 2021-06-04 VITALS
DIASTOLIC BLOOD PRESSURE: 86 MMHG | BODY MASS INDEX: 25.11 KG/M2 | TEMPERATURE: 97.8 F | HEART RATE: 68 BPM | OXYGEN SATURATION: 96 % | WEIGHT: 155.6 LBS | SYSTOLIC BLOOD PRESSURE: 157 MMHG

## 2021-06-04 VITALS
BODY MASS INDEX: 27.16 KG/M2 | WEIGHT: 152.1 LBS | DIASTOLIC BLOOD PRESSURE: 92 MMHG | SYSTOLIC BLOOD PRESSURE: 154 MMHG | OXYGEN SATURATION: 98 % | TEMPERATURE: 102.1 F | HEART RATE: 87 BPM

## 2021-06-04 VITALS
TEMPERATURE: 98.3 F | OXYGEN SATURATION: 98 % | BODY MASS INDEX: 26.66 KG/M2 | DIASTOLIC BLOOD PRESSURE: 79 MMHG | HEART RATE: 78 BPM | WEIGHT: 149.3 LBS | SYSTOLIC BLOOD PRESSURE: 139 MMHG

## 2021-06-04 VITALS
WEIGHT: 151.2 LBS | HEART RATE: 73 BPM | BODY MASS INDEX: 26.79 KG/M2 | TEMPERATURE: 97.9 F | HEIGHT: 63 IN | OXYGEN SATURATION: 97 % | SYSTOLIC BLOOD PRESSURE: 111 MMHG | DIASTOLIC BLOOD PRESSURE: 68 MMHG

## 2021-06-04 VITALS
HEART RATE: 78 BPM | TEMPERATURE: 98.3 F | DIASTOLIC BLOOD PRESSURE: 80 MMHG | WEIGHT: 147 LBS | BODY MASS INDEX: 26.25 KG/M2 | SYSTOLIC BLOOD PRESSURE: 138 MMHG | OXYGEN SATURATION: 97 %

## 2021-06-04 VITALS
BODY MASS INDEX: 23.13 KG/M2 | SYSTOLIC BLOOD PRESSURE: 120 MMHG | TEMPERATURE: 97.9 F | HEIGHT: 66 IN | HEART RATE: 85 BPM | WEIGHT: 143.9 LBS | OXYGEN SATURATION: 98 % | DIASTOLIC BLOOD PRESSURE: 70 MMHG

## 2021-06-04 VITALS
OXYGEN SATURATION: 99 % | DIASTOLIC BLOOD PRESSURE: 76 MMHG | WEIGHT: 141.6 LBS | TEMPERATURE: 98.1 F | HEART RATE: 100 BPM | BODY MASS INDEX: 22.85 KG/M2 | SYSTOLIC BLOOD PRESSURE: 119 MMHG

## 2021-06-04 VITALS
WEIGHT: 137.1 LBS | DIASTOLIC BLOOD PRESSURE: 84 MMHG | BODY MASS INDEX: 22.13 KG/M2 | SYSTOLIC BLOOD PRESSURE: 119 MMHG | TEMPERATURE: 98 F | HEART RATE: 97 BPM | OXYGEN SATURATION: 99 %

## 2021-06-04 VITALS — WEIGHT: 155 LBS | BODY MASS INDEX: 27.59 KG/M2

## 2021-06-04 VITALS
WEIGHT: 139.3 LBS | BODY MASS INDEX: 22.39 KG/M2 | HEIGHT: 66 IN | OXYGEN SATURATION: 96 % | DIASTOLIC BLOOD PRESSURE: 77 MMHG | SYSTOLIC BLOOD PRESSURE: 113 MMHG | TEMPERATURE: 98 F | HEART RATE: 92 BPM

## 2021-06-04 VITALS — HEIGHT: 66 IN | BODY MASS INDEX: 24.91 KG/M2 | WEIGHT: 155 LBS

## 2021-06-04 VITALS
HEIGHT: 62 IN | SYSTOLIC BLOOD PRESSURE: 128 MMHG | BODY MASS INDEX: 28.34 KG/M2 | DIASTOLIC BLOOD PRESSURE: 78 MMHG | RESPIRATION RATE: 18 BRPM | OXYGEN SATURATION: 97 % | HEART RATE: 70 BPM | WEIGHT: 154 LBS

## 2021-06-04 VITALS
DIASTOLIC BLOOD PRESSURE: 82 MMHG | WEIGHT: 139 LBS | OXYGEN SATURATION: 99 % | BODY MASS INDEX: 22.44 KG/M2 | TEMPERATURE: 98.2 F | HEART RATE: 73 BPM | SYSTOLIC BLOOD PRESSURE: 131 MMHG

## 2021-06-04 VITALS
OXYGEN SATURATION: 98 % | TEMPERATURE: 98 F | BODY MASS INDEX: 22.87 KG/M2 | SYSTOLIC BLOOD PRESSURE: 114 MMHG | DIASTOLIC BLOOD PRESSURE: 69 MMHG | WEIGHT: 141.7 LBS | HEART RATE: 87 BPM

## 2021-06-04 VITALS
WEIGHT: 141.4 LBS | BODY MASS INDEX: 22.82 KG/M2 | OXYGEN SATURATION: 98 % | DIASTOLIC BLOOD PRESSURE: 74 MMHG | HEART RATE: 72 BPM | TEMPERATURE: 98.3 F | SYSTOLIC BLOOD PRESSURE: 111 MMHG

## 2021-06-04 VITALS
SYSTOLIC BLOOD PRESSURE: 119 MMHG | TEMPERATURE: 99.7 F | BODY MASS INDEX: 24.01 KG/M2 | OXYGEN SATURATION: 99 % | HEART RATE: 110 BPM | HEIGHT: 63 IN | DIASTOLIC BLOOD PRESSURE: 69 MMHG | WEIGHT: 135.5 LBS

## 2021-06-04 VITALS
HEIGHT: 63 IN | HEART RATE: 83 BPM | BODY MASS INDEX: 24.82 KG/M2 | SYSTOLIC BLOOD PRESSURE: 132 MMHG | TEMPERATURE: 98 F | WEIGHT: 140.1 LBS | OXYGEN SATURATION: 97 % | DIASTOLIC BLOOD PRESSURE: 79 MMHG

## 2021-06-04 VITALS — HEIGHT: 63 IN | BODY MASS INDEX: 27.78 KG/M2

## 2021-06-04 VITALS
WEIGHT: 144.9 LBS | OXYGEN SATURATION: 99 % | TEMPERATURE: 98.2 F | HEIGHT: 66 IN | BODY MASS INDEX: 23.29 KG/M2 | HEART RATE: 84 BPM | SYSTOLIC BLOOD PRESSURE: 140 MMHG | DIASTOLIC BLOOD PRESSURE: 85 MMHG

## 2021-06-05 VITALS
DIASTOLIC BLOOD PRESSURE: 80 MMHG | OXYGEN SATURATION: 99 % | HEART RATE: 61 BPM | BODY MASS INDEX: 24.54 KG/M2 | SYSTOLIC BLOOD PRESSURE: 118 MMHG | WEIGHT: 152.7 LBS | HEIGHT: 66 IN

## 2021-06-05 VITALS
BODY MASS INDEX: 23.89 KG/M2 | SYSTOLIC BLOOD PRESSURE: 118 MMHG | DIASTOLIC BLOOD PRESSURE: 66 MMHG | WEIGHT: 148 LBS | HEART RATE: 60 BPM | TEMPERATURE: 98.3 F

## 2021-06-05 VITALS
HEART RATE: 75 BPM | SYSTOLIC BLOOD PRESSURE: 104 MMHG | BODY MASS INDEX: 22.56 KG/M2 | WEIGHT: 139.8 LBS | DIASTOLIC BLOOD PRESSURE: 64 MMHG | OXYGEN SATURATION: 99 %

## 2021-06-05 VITALS
BODY MASS INDEX: 24.09 KG/M2 | HEIGHT: 66 IN | HEART RATE: 60 BPM | OXYGEN SATURATION: 98 % | DIASTOLIC BLOOD PRESSURE: 77 MMHG | WEIGHT: 149.9 LBS | SYSTOLIC BLOOD PRESSURE: 132 MMHG

## 2021-06-05 VITALS
HEART RATE: 66 BPM | OXYGEN SATURATION: 98 % | SYSTOLIC BLOOD PRESSURE: 131 MMHG | WEIGHT: 150.4 LBS | DIASTOLIC BLOOD PRESSURE: 82 MMHG | BODY MASS INDEX: 24.28 KG/M2

## 2021-06-05 VITALS — BODY MASS INDEX: 22.44 KG/M2 | WEIGHT: 139 LBS

## 2021-06-05 NOTE — TELEPHONE ENCOUNTER
Patients' daughter called stating Kristen has complained of an irritation in his throat since the nasopharyngeal biopsy by Dr. Guerrero on 1/29. He also complains of trouble with swallowing food. He has no issues breathing and is able to eat. It is just uncomfortable. Message sent to Dr. Guerrero who said the throat irritation is not worrisome, but patient should come in clinic on Wednesday. Appointment scheduled and  I also let them know the biopsy results are still pending.     Calista Vicente RN  [facility] ENT  [phone]

## 2021-06-05 NOTE — PROGRESS NOTES
I met with Kristen and his daughter, Rea in clinic today.  He is here for a radiation consult.  He has several appointments he needs to complete prior to starting treatment.  I confirmed the date and time of his medical oncology appointment with Dr. Frias on 2- at 12:45 pm arrival.  I gave them both my contact information and a sheet with some other contact information.  I encouraged either of them to call me with any questions or concerns.

## 2021-06-05 NOTE — CONSULTS
Consults   [facility] Radiation Oncology Consult Note     Patient: Kristen Chapman  MRN: [id number]  Date of Service: 02/07/2020          Billy Guerrero MD  [address] [address]       Dear Dr. Guerrero:    Thank you very much for referring this patient for consideration of radiotherapy. As you know Mr. Chapman is a 69 y.o. 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  Patient is referred to radiation oncology for evaluation and discussion of possible treatment options including radiation therapy.    HISTORY OF PRESENT ILLNESS:   Mr. Chapman is a 69 y.o. male who has been in his usual state of good health until recently.  Patient was found to have swelling right neck mass by his lerma a month ago for which he was a seeking further evaluation.  The patient did not notice any significant changes over the past few weeks and he is also asymptomatic.  Initial ENT examination showed suspicious abnormalities in the right nasopharynx worrisome for malignancy.  CT of the neck on 1/24/2020 revealed asymmetric soft tissue thickening in the posterior right nasopharynx as well as abnormal enlarged cervical lymph nodes measuring up to 3.8 cm bilaterally.  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  Patient is referred to radiation oncology for evaluation and consideration of possible chemoradiation therapy.    CHEMOTHERAPY HISTORY: Concurrent Chemotherapy: Yes    RADIATION THERAPY HISTORY: Prior Radiation: No    IMPLANTED CARDIAC DEVICE: none     No current outpatient medications on file.     No current facility-administered medications for this visit.      Past Medical History:   Diagnosis Date     Nasopharyngeal cancer (H)      History reviewed. No pertinent surgical history.  Patient has no known allergies.  Family History   Family history  unknown: Yes     Social History     Socioeconomic History     Marital status:      Spouse name: Not on file     Number of children: Not on file     Years of education: Not on file     Highest education level: Not on file   Occupational History     Not on file   Social Needs     Financial resource strain: Not on file     Food insecurity:     Worry: Not on file     Inability: Not on file     Transportation needs:     Medical: Not on file     Non-medical: Not on file   Tobacco Use     Smoking status: Never Smoker     Smokeless tobacco: Never Used   Substance and Sexual Activity     Alcohol use: Not Currently     Drug use: Not Currently     Sexual activity: Not on file   Lifestyle     Physical activity:     Days per week: Not on file     Minutes per session: Not on file     Stress: Not on file   Relationships     Social connections:     Talks on phone: Not on file     Gets together: Not on file     Attends Tenriism service: Not on file     Active member of club or organization: Not on file     Attends meetings of clubs or organizations: Not on file     Relationship status: Not on file     Intimate partner violence:     Fear of current or ex partner: Not on file     Emotionally abused: Not on file     Physically abused: Not on file     Forced sexual activity: Not on file   Other Topics Concern     Not on file   Social History Narrative     Not on file        Review of Systems:      General  General (WDL): All general elements are within defined limits  EENT  ENT (WDL): Exceptions to WDL  Hearing loss: Yes - Chronic (Greater than 3 months)  Tinnitus: Yes - Recent (Less than 3 months)  Respiratory   Respiratory (WDL): All respiratory elements are within defined limits  Cardiovascular  Cardiovascular (WDL): All cardiovascular elements are within defined limits  Endocrine  Endocrine (WDL): All endocrine elements are within defined limits  Gastrointestinal  Gastrointestinal (WDL): All gastrointestinal elements are  within defined limits  Musculoskeletal  Musculoskeletal (WDL): All musculoskeletal elements are within defined limits  Integumentary                  Neurological  Neurological (WDL): All neurological elements are within defined limits  Dominant Hand: Right  Psychological/Emotional   Psychological/Emotional (WDL): All psychological/emotional elements are within defined limits  Hematological/Lymphatic  Hematological/Lymphatic (WDL): All hematological/lymphatic elements are within defined limits  Dermatologic  Dermatologic (WDL): All dermatological elements are within defined limits  Genitourinary/Reproductive  Genitourinary/Reproductive (WDL): All genitourinary/reproductive elements are within defined limits  Reproductive     Pain              Currently in Pain: No/denies  Accompanied by       Imaging: Reviewed    Pathology: Reviewed    ECOG Peformance Status  ECOG Performance Status: 0  Distress Assessment Score: 3    Objective:     PHYSICAL EXAMINATION:    BP (!) 184/98   Pulse 70   Temp 98  F (36.7  C) (Oral)   SpO2 98%     Gen: Alert, in NAD  Eyes: PERRL, EOMI, sclera anicteric  HENT     Head: NC/AT     Ears: No external auricular lesions     Nose/sinus: No rhinorrhea or epistaxis     Oropharynx: MMM, no visible oral lesions  Neck: Supple, full ROM, there is large palpable mass in the right upper neck measuring approximately 3 x 5 cm consistent with lymph node metastasis.  Pulm: No wheezing, stridor or respiratory distress  CV: Well-perfused, no cyanosis, no pedal edema  Abdominal: BS+, soft, nontender, nondistended, no hepatomegaly  Back: No step-offs or pain to palpation along the thoracolumbar spine  Rectal: Deferred  : Deferred  Musculoskeletal: Normal muscle bulk and tone  Skin: Normal color and turgor  Neurologic: A/Ox3, CN II-XII intact, normal gait and station  Psychiatric: Appropriate mood and affect    Intent of Therapy: Curative  Side effects that may occur during or within weeks after Radiation  Therapy      Fatigue and general weakness    Loss of hair on the face and neck    Pain in the irradiated limb    Darkening, irritation, itchiness, redness, dryness,and peeling, scabbing and ulceration of the skin on the neck and face    Mouth and throat dryness, irritation and swallowing difficulties and infection    Thickening of the saliva    Change in or loss of taste sensation    Decrease in appetite    Hoarseness and change in voice    Side effects that may occur months or years after Radiation Therapy      Development of another tumor or cancer    Thickening, telangiectasias (development of spider like blood vessels in the skin) and ulceration of the skin of the face and neck    Fibrosis (scar tissue), decreased flexibility and swelling of the neck    Brain inflammation or necrosis that may cause various neurologic symptoms    Decrease in memory and thinking abilities    Poor healing after a trauma or surgery in the irradiated area    Facial numbness, pain and weakness    Nerve damage resulting in loss of strength and sensation    Tooth and jaw decay and poor healing after dental procedures    The risks, benefits and alternatives to radiation therapy were outlined with the patient. All questions were answered and a consent was signed.     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     Assessment & Plan:     I have personally reviewed her upcoming medical record today.  I have also reviewed her recent radiology study including CT scan. This is a 69-year-old gentleman with a recent diagnosis of locally advanced moderately differentiated squamous cell carcinoma of the nasopharynx.  The patient otherwise has been healthy without any ongoing major health issues.  The possible treatment options for the head and neck cancer have been discussed with the patient including surgery, chemotherapy, and radiation therapy.  The possible risks and the side  effects of radiation therapy have also been explained to the patient in detail and at a great length.  The NCCN guideline for treatment recommendation has also been reviewed with patient.  I agree the patient is a good candidate and indicated for combined chemoradiation therapy. I think the patient can be potentially benefited by the combined chemoradiation therapy for local control and possibly a better survival.  I have explained to the patient that combined chemoradiation therapy is a quite intensive protocol for the head and neck cancer.  The patient often will require extensive support during and after the planned therapy.  The patient may also require short-term hospital stay toward the end of her therapy.  The possibility of using Mugard as a radiation-protecting agent during the therapy has also been discussed with the patient.  The reading material is given to the patient for home study.  The pros and the cons of different treatment options have been discussed with the patient in detail and at a great length.  Questions are answered to patient's satisfaction.  After a long discussion, the patient elected to proceed with the radiation therapy as his treatment of choice being aware of potential risks and the side effects involved.      The radiation simulation, treatment planning, image guidance, and treatment delivery processes were reviewed. The acute effects of radiation were reviewed including fatigue, skin burn, soreness of the throat that may be severe enough to require narcotics for pain control, hair loss that may be permanent, loss of taste, dry mouth that may be permanent, dehydration or weight loss that may require intravenous fluids, hospitalization, or feeding tube, plugging of the ear due to fluid buildup in the middle ear that may require tympanostomy tube placement. Possible late effects of radiation were discussed including long-term difficulty swallowing, swelling or scarring of the neck  tissues that may cause stiffness of the neck or jaw, long-term dry mouth that may accelerate tooth decay, osteoradionecrosis if he had a dental extraction in a radiated site, low thyroid function that may require thyroid replacement therapy. Rare complications of radiation were reviewed including damage to spinal cord, damage to nerves in the neck that could result in pain, weakness, or numbness of the right arm, increased risk of stroke, damage to the brain, decreased hearing permanently due to fluid buildup or nerve damage, and inducing other cancers from radiation several years down the road. We discussed options of a pre radiation audiology evaluation, swallowing study, and physical medicine and rehabilitation consultation. He was instructed on some gentle stretching exercises. He has a full denture and will not require any dental evaluation.  Patient also scheduled see Dr. Frias, medical oncology in 2 weeks for evaluation and discussion of possible chemotherapy.  I will schedule patient to have MRI brain and PET CT scan to complete his a staging work-up.  Patient is scheduled to return to radiation oncology after staging work-up for follow-up and simulation.  I plan to give him radiation therapy at 200 cGy each fraction to a total of 7000 cGy.  I will also consider to give approximately 7752-0469 cGy to the uninvolved regional lymph nodes.  I will consider to use IMRT/IGRT technique to help us to better locate the target and to protect normal tissues.      Again, thank you very much for the referral and allowing me to participate in the care of this patient.  If you have any questions or concerns about this consultation, please do not hesitate to call.  I spent approximately 60 minutes today with the patient and 80% time was used for counseling.      Sincerely,          Laurita Mendoza MD, PhD  Department of Radiation Oncology   [facility]  Tel: [phone]  Page: [phone]    [facility]  [facility]  [address] [place] [address]     [facility]   [address]    [place] [address]    CC:  Patient Care Team:  Provider, No Primary Care as PCP - Laurita Sen MD as Physician (Radiation Oncology)  Alan Frias MD as Physician (Hematology and Oncology)  Alisha Tierney, RN as Oncology Nurse Navigator

## 2021-06-05 NOTE — PROGRESS NOTES
used throughout encounter. Pt here ambulatory with his daughter for consult with Dr. Mendoza. I reviewed, in brief, the difference between radiation and chemotherapy. I gave them a new patient RT folder and explained the routine for RT including consult, simulation CT, mask, daily treatments, weekly RO visits, and common s/e of skin irritation, fatigue, loss of appetite, and difficulty swallowing. I stressed the importance of good caloric and fluid intake. Pt verbalized his understanding of all educ.     Pt will need PET and dental checkup prior to returning to see Dr. Mendoza, scheduled for MO consult 2/18/2020.

## 2021-06-05 NOTE — TELEPHONE ENCOUNTER
Pts Care Provider: Dr. Guerrero    Caller: Rea (pt's daughter)    Phone Number: [phone]  OK to leave message: Yes    Reason for Call: Pt's daughter Rea is calling to receive the results of the biospy that was performed on 1/29/20 with Dr. Guerrero.   She would also like to discuss some concerns that he has been having over the weekend since the last visit.  Please call Rea to discuss. Okay to leave message

## 2021-06-05 NOTE — PROGRESS NOTES
HISTORY OF PRESENT ILLNESS  Asked to see by LOY Parsons for evaluation of neck mass. History via an . Daughter explains that the lerma noticed swelling of the right neck a few months ago. No pain. No problems with swallowing or change in voice. He feels discomfort in the right side of the neck. No fever, sweats or chills. CT scan shows right lymphadenopathy and irregularity in the nasopharynx.    REVIEW OF SYSTEMS  Review of Systems: a 10-system review was performed. Pertinent positives are noted in the HPI and on a separate scanned document in the chart.    PMH, PSH, FH and SH has documented in the EHR.      EXAM    CONSTITUTIONAL  General Appearance:  Normal, well developed, well nourished, no obvious distress  Ability to Communicate:  communicates appropriately.    HEAD AND FACE  Appearance and Symmetry:  Normal, no scalp or facial scarring or suspicious lesions.    EARS  Clinical speech reception threshold:  Normal, able to hear normal speech.  Auricle:  Normal, Auricles without scars, lesions, masses.  External auditory canal:  Normal, External auditory canal normal.  Tympanic membrane:  Normal, Tympanic membranes normal without swelling or erythema.    NOSE (speculum or scope)  Architecture:  Normal, Grossly normal external nasal architecture with no masses or lesions.  Mucosa:  Normal mucosa, No polyps or masses.  Septum:  Normal, Septum non-obstructing.  Turbinates:  Normal, No turbinate abnormalities    ORAL CAVITY AND OROPHARYNX  Lips:  Normal.  Dental and gingiva:  Normal, No obvious dental or gingival disease.  Mucosa:  Normal, Moist mucous membranes.  Tongue:  Normal, Tongue mobile with no mucosal abnormalities  Hard and soft palate:  Normal, Hard and soft palate without cleft or mucosal lesions.  Oral pharynx:  Normal, Posterior pharynx without lesions or remarkable asymmetry.  Saliva:  Normal, Clear saliva.  Masses:  Normal, No palpable masses or pathologically enlarged lymph  nodes.    LARYNGOSCOPY  Risks and benefit of Flexible laryngeal endoscopy were discussed with the patient and they wished to proceed.  The nose was sprayed with 4% lidocaine / 1% phenylephrine.  After allowing adequate time for anesthesia the procedure was started.  Patient tolerated the procedure well.  See exam note for findings.    LARYNX AND HYPOPHARYNX (scope)  Nasopharynx shows several areas that appear suspicious for malignancy in the right nasopharynx  Voice Quality:  Normal voice quality.  Supra glottis:  Normal, No edema or erythema.  Epiglottis:  Normal, No epiglottic mass or mucosal lesions.  Pyriform sinuses:  Normal, Pyriform sinuses clear.  Vocal cords appearance:  Normal, Vocal cord motion symmetric.  Vocal cord motion:  Normal, Vocal cord motion symmetric  Endolarynx:  Normal, No Endolaryngeal mass or mucosal lesions.    NECK  Masses/lymph nodes:  Normal, No worrisome neck masses or lymph nodes.  Salivary glands:  Normal, Parotid and submandibular glands.  Trachea and larynx position:  Normal, Trachea and larynx midline.  Thyroid:  Normal, No thyroid abnormality.  Tenderness:  Normal, No cervical tenderness.  Suppleness:  Normal, Neck supple    NEUROLOGICAL  Speech pattern:  Normal, Proasaic    RESPIRATORY  Symmetry and Respiratory effort:  Normal, Symmetric chest movement and expansion with no increased intercostal retractions or use of accessory muscles.     IMPRESSION  Likely nasopharyngeal malignancy with mets to the right neck.    RECOMMENDATION  I discuss biopsy of the nasopharynx with the patient and he wanted to proceed. A biopsy was obtained under endoscopic guidance and placed in formalin. The specimen was sent to path. I explained that I would be in touch with the results and referral to Radiation Oncology if positive for carcinoma.    Billy Guerrero MD

## 2021-06-05 NOTE — PROGRESS NOTES
HISTORY OF PRESENT ILLNESS  Patient comes in for recheck. After his biopsy he experienced discomfort in the throat with drainage. In addition the biopsy report has come back.     REVIEW OF SYSTEMS  Review of Systems: a 10-system review was performed. Pertinent positives are noted in the HPI and on a separate scanned document in the chart.    PMH, PSH, FH and SH has documented in the EHR.      EXAM    CONSTITUTIONAL  General Appearance:  Normal, well developed, well nourished, no obvious distress  Ability to Communicate:  communicates appropriately.    HEAD AND FACE  Appearance and Symmetry:  Normal, no scalp or facial scarring or suspicious lesions.    NOSE (speculum or scope)  Architecture:  Normal, Grossly normal external nasal architecture with no masses or lesions.  Mucosa:  Normal mucosa, No polyps or masses.  Septum:  Normal, Septum non-obstructing.  Turbinates:  Normal, No turbinate abnormalities    ORAL CAVITY AND OROPHARYNX  Lips:  Normal.  Dental and gingiva:  Normal, No obvious dental or gingival disease.  Mucosa:  Normal, Moist mucous membranes.  Tongue:  Normal, Tongue mobile with no mucosal abnormalities  Hard and soft palate:  Normal, Hard and soft palate without cleft or mucosal lesions.  Oral pharynx:  Normal, Posterior pharynx without lesions or remarkable asymmetry.  Saliva:  Normal, Clear saliva.  Masses:  Normal, No palpable masses or pathologically enlarged lymph nodes.    NECK  Masses/lymph nodes:  Adenopathy right upper neck.  Salivary glands:  Normal, Parotid and submandibular glands.  Trachea and larynx position:  Normal, Trachea and larynx midline.  Thyroid:  Normal, No thyroid abnormality.  Tenderness:  Normal, No cervical tenderness.  Suppleness:  Normal, Neck supple    NEUROLOGICAL  Speech pattern:  Normal, Proasaic    RESPIRATORY  Symmetry and Respiratory effort:  Normal, Symmetric chest movement and expansion with no increased intercostal retractions or use of accessory muscles.      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 We will set this up in the near future.     Billy Guerrero MD

## 2021-06-06 NOTE — PROGRESS NOTES
Pt here for RT, checked his temp (98.1) as if he still had a fever his chemo may not be given today. I told pt/family that temp was normal and to arrive for chemo later today as scheduled.

## 2021-06-06 NOTE — PROGRESS NOTES
[facility] Radiation Oncology Follow Up Note    Patient: Kristen Chapman  MRN: [id number]  Date of Service: 02/14/2020      Mr. Chapman is a 69 y.o. male who has been in his usual state of good health until recently.  Patient was found to have swelling right neck mass by his lerma a month ago for which he was a seeking further evaluation.  The patient did not notice any significant changes over the past few weeks and he is also asymptomatic.  Initial ENT examination showed suspicious abnormalities in the right nasopharynx worrisome for malignancy.  CT of the neck on 1/24/2020 revealed asymmetric soft tissue thickening in the posterior right nasopharynx as well as abnormal enlarged cervical lymph nodes measuring up to 3.8 cm bilaterally.  Biopsy was not obtained on 1/29/2020 with pathology confirmed moderately differentiated squamous cell carcinoma, P 16 is negative.  Patient was seen and evaluated initially on 2/7/2020.  The concurrent chemoradiation therapy was recommended.  He had staging MRI and a PET CT scan since then.  The patient is here for further evaluation and discussion.     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      Assessment & Plan:      I have personally reviewed her upcoming medical record today.  I have also reviewed her recent radiology study including MRI and PET/CT scan.  This reviewed a large nasopharyngeal tumor extending to the base of skull and oral and hypopharynx with multiple bilateral enlarged lymph nodes metastasis with the largest one measure 5.3 cm.  There is also evidence of bilateral hilar lymph nodes uptake worrisome for metastatic disease.  I have reviewed his MRI and PET CT scan today with the patient.  The patient indeed has a locally advanced disease with possible bilateral hilar lymph node metastasis.  I'll refer patient to pulmonology for evaluation and the endoscopy/biopsy to rule out possible hilar lymph  node metastasis.  Hopefully this turns out to be negative.  Regardless if the patient has the systemic metastasis, I think the patient still being can be benefited and indicated for local radiation therapy for local control and symptom relief.  I therefore will schedule patient to return to radiation oncology later on today for simulation.  Patient is also scheduled to see Dr. Frias, medical oncology next week for evaluation and discussion of possible chemotherapy.  This has been explained to the patient and his family in detail and at the great lengths.  They understand well and wished to proceed.  I plan to give him radiation therapy at 200 cGy each fraction to a total of 7000 cGy.  I will also consider to give approximately 3529-2730 cGy to the uninvolved regional lymph nodes.  I will consider to use IMRT/IGRT technique to help us to better locate the target and to protect normal tissues.        I spent approximately 45 minutes today with the patient and 80% time was used for counseling.        Laurita Mendoza MD, PhD  Department of Radiation Oncology   [facility]  Tel: [phone]  Page: [phone]    [facility]  [address] [place] [address]     [address]   [place] [address]    CC:  Patient Care Team:  Provider, No Primary Care as PCP - General  Laurita Mendoza MD as Physician (Radiation Oncology)  Alan Frias MD as Physician (Hematology and Oncology)  Alisha Tierney, RN as Oncology Nurse Navigator  Billy Guerrero MD as Physician (Otolaryngology)

## 2021-06-06 NOTE — PROCEDURES
Procedure:  Intubation    Patient evaluated in the Endoscopy suite.  The decision was made to intubate the patient for the indication noted below.     Indication:  EBUS    Permit:  Verbal consent was obtained and written consent was obtained.  Maneuver:  The patient's vital signs were reviewed.  Pre-oxygenation was performed.  After administration of 50mg of Benadryl,25mcg of Fentanyl,1mg of Midazoman a glidoscope was used.  There was adequate visualization of the cords and 8.0 size ETT was passed through the cords without difficulty.  There was appropriate color change noted on the end expiratory CO2, condensation was noted in the ETT, symmetrical bilateral chest rise and breath sounds were confirmed.  The tube was secured at lip.  There was no significant blood loss.  Bronchoscopy performed to confirm placement of ETT.     Complications:  Shea Santamaria  Pulmonary and Critical Care  [id number]

## 2021-06-06 NOTE — TELEPHONE ENCOUNTER
Called patient daughter Rea and let her know that Rx was sent in for Percocet and that they could pick it up later today.  Confirmed follow up appointments with daughter for Friday 2/14/20.

## 2021-06-06 NOTE — PROCEDURES
Procedures  SPECIAL MANAGEMENT:     The patient is going to have a combined chemo and radiation therapy for his head and neck cancer.  I have explained to the patient about potentially increased risk of radiation-induced normal tissue damage.  The patient is also going to use an IMRT/IGRT technique for his radiation therapy treatment.  This is a very complicated process and does require daily verification of his radiation therapy field setup.  I have explained to the patient about the IMRT/IGRT technique.  I have also explained to the patient about potential side effects related to the radiation therapy.  He understand it well and wish to proceed with therapy. We will monitor the patient closely during the therapy and obtain a weekly CBC or per chemotherapy.        Laurita Mendoza MD, PhD  Department of Radiation Oncology   [facility]  Tel: [phone]  Page: [phone]    [facility]  [address] [place] [address]     [place] [id number] [place] [address]

## 2021-06-06 NOTE — PROGRESS NOTES
"Outpatient Palliative Care Consult      Kristen Chapman,  1950, MRN [id number]        PCP: Provider, No Primary Care, None   Code status:  Full code       Extended Emergency Contact Information  Primary Emergency Contact: KIM CHAPMAN  Home Phone: [phone]  Relation: Child  Secondary Emergency Contact: NEERAJ CHAPMAN  Home Phone: [phone]  Relation: Child          Impression and Recommendations:  1. Pain related to nasopharyngeal cancer and radiation treatments  Comment: has received  radiation treatments and is also receiving chemotherapy (to have cycle 3 tomorrow); mild symptoms, mild pain and fatigue; \"thinks the treatment is working\"; does not like to take a lot of medications  Plan:  Will take Percocet as ordered \"if really needs it\"; no trouble swallowing  Instructed that there is stronger pain medication available if needed and that there are alternative liquids that may be more easily swallowed    2. Palliative Care Encounter - Please see discussion below.  Comment:  Stoic; minimal symptoms fatigue; minimal pain or nausea; states \"has good treatment team and thinks he will do fine\"  Advance Directive not discussed today; reports \"no worries; children are grown and can take care of themselves\"; 3 children live with patient and they are very helpful; patient remains very independent and strong; eating and drinking well and does not want prophylactic feeding tube; does not want more interventions than \"what I need\"; taking everything a day at a time; does not need resources at home at this time  Plan:  Will follow up in Palliative clinic in 1 month to assess symptoms       Chief Complaint Cancer related pain       HPI    Kristen Chapman is a 69 y.o. year old male presents to the Outpatient Palliative Care Clinic today for ongoing symptom management and goals of care discussion. VIKTOR Brown was present as part of the interdisciplinary team.  Patient is accompanied today by his son and " ".    Referred by Dr. Mendoza    Summary:    Mr Chapman is a 69 year old male with nasopharyngeal squamous cell carcinoma diagnosed in January 2020 who is being treated with concurrent chemo and radiation treatments.  He has bilateral cervical adenopathy and asymmetric soft tissue thickening posterior right nasopharynx.He had episode of fever which resolved spontaneously.  He has tolerated treatment fairly well.  Weight is down about 10 pounds.  He decided not to have prophylactic PEG placement.  He is working with dietician to meet caloric and hydration need.  Mr Chapman remains independent.  He has support from his family.  He has minimal symptoms at this time.  He denies worries and is taking one day at a time.  He will follow up in 1 month when he may have more symptoms.  He believes that \"he will be fine with the treatment and thinks it is already helping\".    Diagnosis/Stage:   1/29/20: Right nasopharynx squamous cell cancer  -neck adenopathy  -CT neck: bilateral cervical adenopathy and asymmetric soft tissue thickening posterior right nasopharynx  -ENT: nasopharyngeal scope showed several areas that appear suspicious for malignancy in right nasopharynx. Biopsy: squamous cell cancer  -PET: right nasopharynx mass extending to hypopharynx and bilateral cervical adenopathy. Bilateral hilar adenopathy indeterminate for mets vs granulomatous disease.  -2/27/20: Bronch/EBUS biopsy left intrathoracic/hilar node negative      Treatment:  3/2/20: Concurrent chemoradiation (7000 cGy/35 planned), with weekly cisplatin 40 mg/m2 x 6 cycles  -plan for 3 cycles of consolidative platinum and infusional 5FU (4 days)     Previous hospitalizations:  No recent hospitalizations    Palliative Care Assessment:  Psychosocial/Emotional/Spiritual:  Living situation:  In own home    Baseline functional status/Current Palliative Performance Score:  (0=death; 100=fully functional):    80%- 1. Full; 2. Normal activity with effort, some " "evidence of disease; 3. Full; 4. Normal or reduced; 5. 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    Serious Illness Conversation:  Patient's current understanding of illness:  Believes he has a good team and will get better with treatment  Patient's/family's hopes/wishes/goals:  recovery  Patient's/family's fears/worries about future with your health:  Patient states he does not worry; takes a day at a time  What gives you strength/how do you cope:  Supportive family; [facility]  How much does your family know about your wishes:  3 adult children very involved in cares and present for visits    Advance Care Planning:  Code Status:  Full code  Health Care Directive: not filed and not discussed today  Documented Healthcare Agent or surrogate: Nhia, child is primary contact  Decision making capacity:  Patient demonstrates decision-making capacity; he does demonstrate understanding of relevant information about condition, the available test and treatment options, use reason to make a decision about these, and communicate choice about these. (CATRINA 306, 4, p. 420-4).    Patient does not meet criteria for Hospice Medicare benefit. Diagnosis nasopharyngeal cancer; getting chemotherapy and radiation treatments       Medical History  Active Ambulatory (Non-Hospital) Problems    Diagnosis     Abnormal PET scan of mediastinum     Hilar lymphadenopathy     Nasopharyngeal carcinoma (H)     Squamous cell carcinoma of nasopharynx (H)     Past Medical History:   Diagnosis Date     Nasopharyngeal cancer (H)     Surgical History  He  has a past surgical history that includes IR Port Placement 5+ Years (3/2/2020).   Social History  Reviewed, and he  reports that he has never " smoked. He has never used smokeless tobacco. He reports previous alcohol use. He reports previous drug use.   Allergies  No Known Allergies Family History  Reviewed, and Family history is unknown by patient.   Psychosocial Needs  Social History     Social History Narrative     Not on file     Additional psychosocial needs reviewed per nursing assessment.     Medications & Allergies   Medications:     Current Outpatient Medications:      dexAMETHasone (DECADRON) 4 MG tablet, Take 8mg daily in the morning for 2 days, starting the day after chemotherapy.., Disp: 24 tablet, Rfl: 0     HYDROcodone-acetaminophen 5-325 mg per tablet, Take 1 tablet by mouth every 6 (six) hours as needed for pain., Disp: 30 tablet, Rfl: 0     lidocaine-prilocaine (EMLA) cream, Place over port 30 min. before being accessed., Disp: 30 g, Rfl: 1     ondansetron (ZOFRAN) 4 MG tablet, Take 1-2 tablets (4-8 mg total) by mouth every 6 (six) hours as needed for nausea., Disp: 30 tablet, Rfl: 1    Allergies/intolerances:    No Known Allergies       Review of Systems:  Review of Systems   Pain: mild, throat and neck; no trouble swallowing pills or food/drink  Dyspnea: denies  Fatigue: mild fatigue; no trouble sleeping at night  Nausea: minimal  Anorexia: eating and drinking well  Drowsiness: minimal  Constipation: no  Agitation: no  Anxiety: denies  Depression: denies    Dementia: 0   Delirium: 0  Coma: 0    Physical Exam:  /80   Pulse 78   Temp 98.3  F (36.8  C) (Oral)   Wt 147 lb (66.7 kg)   SpO2 97%   BMI 26.25 kg/m    General appearance: alert, appears stated age, cooperative and no distress  Head: Normocephalic, without obvious abnormality, atraumatic  Lungs: not labored; no cough  Heart: regular rate  Abdomen: denies nausea  Extremities: extremities normal, atraumatic, no cyanosis or edema  Neurologic: Grossly normal       Pertinent Labs  Lab Results: personally reviewed.   Lab Results   Component Value Date     03/12/2020    NA  134 (L) 03/09/2020     03/03/2020    K 3.6 03/12/2020    K 3.8 03/09/2020    K 3.2 (L) 03/03/2020    CO2 27 03/12/2020    CO2 26 03/09/2020    CO2 26 03/03/2020    BUN 18 03/12/2020    BUN 16 03/09/2020    BUN 19 03/03/2020    CREATININE 0.80 03/12/2020    CREATININE 0.90 03/09/2020    CREATININE 1.09 03/03/2020    CALCIUM 8.1 (L) 03/12/2020    CALCIUM 8.5 03/09/2020    CALCIUM 8.7 03/03/2020     Lab Results   Component Value Date    WBC 8.7 03/12/2020    WBC 17.3 (H) 03/09/2020    WBC 5.8 03/03/2020    HGB 9.2 (L) 03/12/2020    HGB 10.7 (L) 03/09/2020    HGB 11.0 (L) 03/03/2020    HCT 28.6 (L) 03/12/2020    HCT 32.8 (L) 03/09/2020    HCT 35.3 (L) 03/03/2020    MCV 81 03/12/2020    MCV 80 03/09/2020    MCV 82 03/03/2020     03/12/2020     03/09/2020     03/03/2020     AST   Date Value Ref Range Status   03/12/2020 31 0 - 40 U/L Final     ALT   Date Value Ref Range Status   03/12/2020 48 (H) 0 - 45 U/L Final     Alkaline Phosphatase   Date Value Ref Range Status   03/12/2020 74 45 - 120 U/L Final     Albumin   Date Value Ref Range Status   03/12/2020 2.9 (L) 3.5 - 5.0 g/dL Final      Pertinent Radiology  Radiology Results: Personally reviewed impression/s  EKG Results: not reviewed.    E/M time/ total time: 30 minutes    >50% of time during encounter was spent face to face with patient and son and  on counseling and/or care coordination as documented above.   Discussed current symptoms which are minimal.  Described resources that are available to him.   present.  Discussed trajectory of disease/treatment.  (12:45-1:15 PM)    Destiny Fernandez, APRN/CNP  Palliative Care Services  [phone]

## 2021-06-06 NOTE — TELEPHONE ENCOUNTER
I have rec'd FMLA paperwork from 2 of Kristen's sons, Nayla & Cristiano Chapman.  I have completed bith, Dr Mendoza has signed both & and both forms sent to HIM for scanning.    Cristiano Chapman sent copy to his wife, Micaela Wilson  Faxed [employer] to:  [phone] per his request.

## 2021-06-06 NOTE — PROGRESS NOTES
RADIATION ONCOLOGY WEEKLY TREATMENT VISIT NOTE      Assessment / Impression       1. Squamous cell carcinoma of nasopharynx (H)        Tolerating radiation therapy well.  All questions and concerns addressed.    Plan:     Continue radiation treatment as prescribed.    Subjective:      HPI: Kristen Chapman is a 69 y.o. male with    1. Squamous cell carcinoma of nasopharynx (H)         The following portions of the patient's history were reviewed and updated as appropriate: allergies, current medications, past family history, past medical history, past social history, past surgical history and problem list.    Assessment                  Body Site: Head and Neck Site: nasopharynx  Stereotactic Radiosurgery: No  Concurrent Therapy: Yes  Today's Dose: 2200  Total Dose for Head and Neck: 7000  Today's Fraction/Total Fraction Head and Neck:   Mucositis due to radiation: 0: None  Thrush: 0: Absent  Pharynx and Esphogaus: 0: No change over baseline  Voice Chances/Stridor/Larynx: 0: Normal  Ocular/Visual - other : 0: Normal  Middle ear/hearin: Normal  Tinnitus: 1: Yes, recent (within the last 3 months)(has had but now with RT worse)  Cough: 0: Absent                             Was an  used for assessment: Yes  Requested  for: Assessment   Provided: Yes  Type of  Provided: Member Savings Program   Name with ID of  Provided: Bonifacio              Sexuality Alteration                 Emotional Alteration Copin: Effective  Comfort Alteration KPS: 90% Can perform normal activity, minor signs of disease  Fatigue (ONS scale) : 3: Mild Fatigue  Pain Location: denies  Pain Intervention: 3: Opiods  Effectiveness of pain intervention: 4: Pain relieved 100%   Nutrition Alteration Anorexia: 0: None  Nausea: 0: None  Vomitin: None  Pharynx and Esphogaus: 0: No change over baseline  Skin Alteration Skin Sensation: 0: No problem  Skin Reaction: 0: None  AUA Assessment                                   Accompanied by       Objective:     Exam: Examination reviewed no significant changes.    Vitals:    03/16/20 0820   BP: 139/79   Pulse: 78   Temp: 98.3  F (36.8  C)   TempSrc: Oral   SpO2: 98%   Weight: 149 lb 4.8 oz (67.7 kg)       Wt Readings from Last 8 Encounters:   03/16/20 149 lb 4.8 oz (67.7 kg)   03/10/20 151 lb 3.2 oz (68.6 kg)   03/09/20 152 lb 1.6 oz (69 kg)   03/02/20 155 lb 9.6 oz (70.6 kg)   02/27/20 155 lb (70.3 kg)   02/25/20 154 lb (69.9 kg)   02/12/20 155 lb (70.3 kg)   01/24/20 158 lb 7 oz (71.9 kg)       General: Alert and oriented, in no acute distress  Yia has no Erythema.  Aria chart and setup information reviewed    Laurita Mendoza MD

## 2021-06-06 NOTE — PROGRESS NOTES
RADIATION ONCOLOGY WEEKLY TREATMENT VISIT NOTE      Assessment / Impression       1. Squamous cell carcinoma of nasopharynx (H)       Tolerating radiation therapy well.  All questions and concerns addressed.    Plan:     Continue radiation treatment as prescribed.    Discussed with the patient about appropriate nutritional support during the therapy.  He is recommended to have PEG tube placement ASAP.  The patient however wish to withheld the procedure until future date.    Subjective:      HPI: Kristen Chapman is a 69 y.o. male with    1. Squamous cell carcinoma of nasopharynx (H)         The following portions of the patient's history were reviewed and updated as appropriate: allergies, current medications, past family history, past medical history, past social history, past surgical history and problem list.    Assessment                  Body Site: Head and Neck Site: nasopharynx  Stereotactic Radiosurgery: No  Concurrent Therapy: Yes  Protocol: Cisplatin  Today's Dose: 1200  Total Dose for Head and Neck: 7000  Today's Fraction/Total Fraction Head and Neck:   Mucositis due to radiation: 0: None  Thrush: 0: Absent  Pharynx and Esphogaus: 0: No change over baseline  Voice Chances/Stridor/Larynx: 0: Normal  Ocular/Visual - other : 0: Normal  Middle ear/hearin: Normal  Tinnitus: 0: No  Cough: 0: Absent                             Was an  used for assessment: Yes  Requested  for: Admission   Provided: Yes  Type of  Provided: Agency (document Name below)  Name of Agency: [employer]              Sexuality Alteration                 Emotional Alteration Copin: Effective  Comfort Alteration KPS: 90% Can perform normal activity, minor signs of disease  Fatigue (ONS scale) : 3: Mild Fatigue  Pain Location: denies - previously tumor site(taking Vicodin BID regularly)  Pain Intensity. Rate degree of pain ranging from 0 (no pain) to 10 (severe pain) : 0  Pain  Intervention: 3: Opiods  Effectiveness of pain intervention: 4: Pain relieved 100%   Nutrition Alteration Anorexia: 0: None  Nausea: 0: None  Vomitin: None  Pharynx and Esphogaus: 0: No change over baseline  Skin Alteration Skin Sensation: 0: No problem  Skin Reaction: 0: None  AUA Assessment                                  Accompanied by       Objective:     Exam: Examination reviewed no significant changes.    Vitals:    20 0835 20 0906 20 0916   BP: (!) 154/92     Pulse: 87     Temp: (!) 102.2  F (39  C) 100.3  F (37.9  C) (!) 102.1  F (38.9  C)   TempSrc:  Oral    SpO2: 98%     Weight: 152 lb 1.6 oz (69 kg)         Wt Readings from Last 8 Encounters:   20 152 lb 1.6 oz (69 kg)   20 155 lb 9.6 oz (70.6 kg)   20 155 lb (70.3 kg)   20 154 lb (69.9 kg)   20 155 lb (70.3 kg)   20 158 lb 7 oz (71.9 kg)       General: Alert and oriented, in no acute distress  Yia has no Erythema.  Aria chart and setup information reviewed    Laurita Mendoza MD

## 2021-06-06 NOTE — PROCEDURES
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     INDICATION: Definitive radiation therapy with concurrent chemotherapy is recommended for patient for his head and neck cancer.     CONSENT: The possible risks and side effects of radiation therapy have been discussed with the patient in detail and at great length. Questions are answered to patient's satisfaction. Written consent was obtained.     SIMULATION: The patient is in a supine position with a headrest, face mask and bite block to help keep the same position during the daily radiation therapy. Tentative isocenter is set up in the center of the head and neck region. We will acquire CT information to help us better locate target and design radiation therapy field. We are also going to fuse her diagnostic PET/CT scan with our planning CT to help us better outline target.     BLOCKS: Custom blocks will be drawn to minimize radiation to normal tissues and to protect normal organs including, but not limited to, eyes, cranial nerves, ears, brain, brainstem, spinal cord, parotid gland, thyroid gland, lungs, esophagus, bone and soft tissue.     DOSAGE: I plan to give him radiation therapy at 200 cGy each fraction to a total of 7000 cGy to the gross tumor. I will also consider to give approximately 5500 to 6500 cGy to the uninvolved lymph node region. I will consider to use IMRT/IGRT technique to help us to better locate target and to protect normal tissues.    Laurita Mendoza MD, PhD  Department of Radiation Oncology   [facility]  Tel: [phone]  Page: [phone]    [facility]  [address] [place] [address]     [place] [id number]  [place] [address]

## 2021-06-06 NOTE — PROGRESS NOTES
"CLINICAL NUTRITION SERVICES - ASSESSMENT NOTE    Kristen Chapman 69 y.o. 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 Mendoza  Pt accompanied by: son and     NUTRITION HISTORY  Factors affecting nutrition intake include: none at this time  Current diet: regular  Current appetite/intake: good/ baseline  PEG Tube: No, patient has not yet decided about feeding tube  Chemotherapy: started 2/28, weekly cisplatin x 6 weeks followed by 3 cycles of platinum and 5-FU   Radiation: started 2/27, 200 cGy each fraction to a total of 7000 cGy         Diet Recall  Breakfast skips   Lunch Meat or fish with veggies and rice   Dinner Meat or fish with veggies and rice   Snacks Crackers, fruit   Beverages Milk, hot water, juice, occasional coffee, son bought Boost for patient also   Alcohol Not discussed today   Dining out Not discussed today     ANTHROPOMETRICS  Height:   Ht Readings from Last 1 Encounters:   02/27/20 5' 6\" (1.676 m)     Weight:   Wt Readings from Last 1 Encounters:   03/02/20 155 lb 9.6 oz (70.6 kg)     BMI: 25.11 kg/m2   Weight Status:  Overweight  IBW: 142 lbs  % IBW: 110%  Weight History: No recent significant weight loss noted. Patient reports weight has been stable. UBW per pt of 150-155 lbs.  Wt Readings from Last 10 Encounters:   03/02/20 155 lb 9.6 oz (70.6 kg)   02/27/20 155 lb (70.3 kg)   02/25/20 154 lb (69.9 kg)   02/12/20 155 lb (70.3 kg)   01/24/20 158 lb 7 oz (71.9 kg)       Dosing Weight: 71 kg      Medications/vitamins/minerals/herbals:   Reviewed    Labs:   Labs reviewed    Physical Findings:  None    ASSESSED NUTRITION NEEDS:  Estimated Energy Needs: 3472-0527 kcals (25-30 kcal/kg)  Justification: Maintenance  Estimated Protein Needs:  grams protein (1.2-1.5 g/kg)  Justification: Increased needs  Estimated Fluid Needs: 8123-3668 mL (30-35 mL/kg)   Justification: Maintenance    MALNUTRITION:  % Weight Loss:  None " noted  % Intake:  No decreased intake noted  Subcutaneous Fat Loss:  None observed  Muscle Loss:  None observed  Fluid Retention:  None observed    Malnutrition Diagnosis: Patient does not meet criteria for malnutrition at this time.    NUTRITION DIAGNOSIS:  Predicted inadequate nutrient intake (energy/ protein) related to potential for decreased appetite and difficulty eating with upcoming chemoradiation as evidenced by anticipated intake less than estimated needs.    INTERVENTIONS  Recommendations / Nutrition Prescription  1. Recommend continue regular diet + addition of oral nutrition supplements to meet 100% of assessed nutritional needs for weight maintenance.  2. If patient has feeding tube placed, recommend administer up to 6 cartons of [place] standard (would provide 1950 calories and 96 g protein and meet 100% of assessed calorie/ protein needs). Other formula options also available, pending pt's preferences.  Nutrition Education  Provided written & verbal education:   - Discussed ways to maximize calories and protein intake. Encouraged small, frequent meals.   - Reviewed common barriers to eating.  As treatment progresses, eating may become more difficult.  Discussed ways to cope with this.   - Reviewed PO intake/PEG tube transition upon declining PO intake.  Discussed formula and administration methods (gravity and syringe feedings).  Discussed that this would be able to provide full nutrition prn.   - Reviewed oral nutrition supplements (Ensure Enlive, Ensure Plus/Boost Plus, etc). Suggested ways to incorporate these supplements to avoid flavor fatigue. Encouraged pt to start consuming these as eating becomes more difficult.    Provided pt with corresponding education materials/handouts on:  High Calorie High Protein Nutrition Therapy, High Calorie High Protein Recipe booklet, Oncology: Nutrition tips for well-being, Head and Neck Cancer Nutrition Therapy     Pt verbalize understanding of materials  provided during consult.   Patient Understanding: Excellent  Expected Compliance: Excellent     Goals  1.  Aim for 5-6 small frequent meals  2.  Aim for 1775 kcal and 85 g protein/day  3. Weight maintenance through cancer treatment      Follow-Up Plans: Pt to follow up with RD in 2-3 weeks at the time of treatment.     MONITORING AND EVALUATION:  -Food intake  -Fluid/beverage intake  -Liquid meal replacement or supplement  -Weight trends      Milka Sullivan, MS, RD, LD

## 2021-06-06 NOTE — PROGRESS NOTES
Port lab blood cultures drawn and labs for treatment tomorrow. Port flushed and deaccessed. Pt aware of appointment tomorrow.

## 2021-06-06 NOTE — PROCEDURES
[facility]    Procedure: Left chest port placement  Date/Time: 3/2/2020 11:25 AM  Performed by: Aneudy Easley MD  Authorized by: Aneudy Easley MD       Universal Protocol    Site marked: NA    Prior images obtained and reviewed: Yes    Required items: required blood products, implants, devices, and special equipment available    Patient identity confirmed: verbally with patient, arm band and provided demographic data    Reevaluation: Patient was reevaluated immediately before administering moderate or deep sedation or anesthesia    Confirmation checklist: patient's identity using two indicators, relevant allergies, procedure was appropriate and matched the consent or emergent situation and correct equipment/implants were available    Time out: Immediately prior to procedure a time out was called to verify the correct patient, procedure, equipment, support staff and site/side marked as required    Universal Protocol: Joint Commission Universal Protocol was followed    Preparation: Patient was prepped and draped in the usual sterile fashion    ESBL (mL): 5    Anesthesia    Local anesthesia used?: Yes    Anesthesia: local infiltration    Local anesthetic: lidocaine 1% without epinephrine and lidocaine 1% with epinephrine    Anesthetic total (mL): 15    Sedation    Patient sedation: Yes    Sedation type: moderate (conscious) sedation    Sedation: fentanyl, midazolam and see MAR for details    Vital signs: Vital signs monitored during sedation  Specimens: none  Complications: None  Condition: Stable  Plan: Please refer to separate IR procedure note for findings, details, and plan.    Post-procedure    Description of procedure: Please refer to separate IR procedure note for findings, details, and plan.    Patient tolerance: Patient tolerated the procedure well with no immediate complications   Length of time physician present for 1:1 monitoring during sedation: 30    Comments: Please refer  to separate IR procedure note for findings, details, and plan.

## 2021-06-06 NOTE — PROGRESS NOTES
The patient attended chemotherapy class today.  The patient was educated on:  -[facility] Educational Classes and Support Groups  -Chemotherapy: what it is and how it works  -Described a typical day at the treatment center and what the patient can expect  -Discussed port access and functions of the port   -Chemotherapy side effects and appropriate management of these side effects. SE discussed included and not limited to: Fatigue, nausea and vomiting, constipation, diarrhea, mucositis, alopecia, peripheral neuropathy, pain, anemia, thrombocytopenia, and neutropenia.    -Reasons to call the physician, including, fever>100.5, shaking chills, unusual bleeding or bruising, shortness of breath, vomiting>12hours, nausea >24 hours, unable to eat/drink >24 hours, painful urination, blood in urine or stool, soreness at the IV site, and painful mouth sores.  The  triage phone number was given to the patient and the patient was encouraged to call with any questions.  The patient was given a tour of the infusion center.  All questions were addressed to the best of my abilities and the patient was encouraged to call with any questions.  Time Spent:45 minutes    Mary Lou Hernandez, LindaD

## 2021-06-06 NOTE — PROGRESS NOTES
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 He had bilateral hilar lymphadenopathy which was noted to be FDG-avid on recent PET/CT raising concern for metastatic cancer; thus he was referred to pulmonary clinic to discuss bronchoscopy with EBUS for tissue sampling.    Recommendations:  We will proceed with EBUS/TBNA of hilar lymph nodes at the request of Dr. Frias. We will try to get him scheduled on Thursday, if it doesn't conflict with radiation treatments. I will defer to 's Altagracia and Reji whether the radiation treatment can be rescheduled or pushed back.   He can follow up with me as needed, and follow up with Dr. Frias to discuss the biopsy results.    All questions answered with a licensed [employer] . His daughter was also present and I answered all her questions.      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   He was referred by Dr. Frias. He will be starting systemic chemotherapy with weekly cisplatin followed by 5-FU. He will also be starting radiation treatments with Dr. Mendoza on Thursday, the day of the proposed procedure.  He denies any cough, fevers, chills, shortness of breath, hemoptysis or chest pain. Generally feels fine without any specific complaints.     ROS:  A 12-system review was obtained and was negative with the exception of the symptoms endorsed in the history of present illness.    PMH:  Past Medical History:   Diagnosis Date     Nasopharyngeal cancer (H)        PSH:  No past surgical history on  file.    Allergies:  No Known Allergies    Family HX:  Family History   Family history unknown: Yes       Social Hx:  Social History     Socioeconomic History     Marital status:      Spouse name: Not on file     Number of children: Not on file     Years of education: Not on file     Highest education level: Not on file   Occupational History     Not on file   Social Needs     Financial resource strain: Not on file     Food insecurity:     Worry: Not on file     Inability: Not on file     Transportation needs:     Medical: Not on file     Non-medical: Not on file   Tobacco Use     Smoking status: Never Smoker     Smokeless tobacco: Never Used   Substance and Sexual Activity     Alcohol use: Not Currently     Drug use: Not Currently     Sexual activity: Not Currently   Lifestyle     Physical activity:     Days per week: Not on file     Minutes per session: Not on file     Stress: Not on file   Relationships     Social connections:     Talks on phone: Not on file     Gets together: Not on file     Attends Muslim service: Not on file     Active member of club or organization: Not on file     Attends meetings of clubs or organizations: Not on file     Relationship status: Not on file     Intimate partner violence:     Fear of current or ex partner: Not on file     Emotionally abused: Not on file     Physically abused: Not on file     Forced sexual activity: Not on file   Other Topics Concern     Not on file   Social History Narrative     Not on file       Current Meds:  Current Outpatient Medications   Medication Sig Dispense Refill     dexAMETHasone (DECADRON) 4 MG tablet Take 8mg daily in the morning for 2 days, starting the day after chemotherapy.. 24 tablet 0     HYDROcodone-acetaminophen 5-325 mg per tablet Take 1 tablet by mouth every 6 (six) hours as needed for pain. 30 tablet 0     ondansetron (ZOFRAN) 4 MG tablet Take 1-2 tablets (4-8 mg total) by mouth every 6 (six) hours as needed for nausea. 30  tablet 1     No current facility-administered medications for this visit.        Physical Exam:  There were no vitals taken for this visit.  Gen: awake, alert, oriented, no distress  HEENT: nasal turbinates are unremarkable, no oropharyngeal lesions, no cervical or supraclavicular lymphadenopathy  CV: RRR, no M/G/R  Resp: CTAB, no focal crackles or wheezes  Abd: soft, nontender, no palpable organomegaly  Skin: no apparent rashes  Ext: no cyanosis, clubbing or edema  Neuro: alert, nonfocal    Labs:  reviewed    Imaging studies:  PET/CT  IMPRESSION:   1.  Findings consistent with nasopharyngeal carcinoma and bilateral cervical lymph node metastases  2.  FDG avid bilateral hilar lymph nodes are likely distant metastases, but technically indeterminate, as granulomatous inflammation frequently has this appearance at PET CT. Consider endobronchial ultrasound-guided biopsy for reliable staging    PFT's   n/a

## 2021-06-06 NOTE — PROGRESS NOTES
Exam: CT sim     Date: 2/14/2020  There were no vitals filed for this visit.    Please ask your patient the following questions before you give any injection of a contrast media. If someone other than the patient is responding to these questions, please indicate the respondent's name and relationship to the patient.    Respondent Name: Kristen                 Relationship to patient:self    Name: Kristen Balderas any allergies: No Known Allergies    Does the patient have renal disease?     No  Does the patient have diabetes?   No  If patient has diabetes, is metformin or metformin combination drug currently prescribed (i.e. Actoplus Met, Avandmet, Fortamet, Glucophage, Glucovance, Glumetza, Junamet, Prandimet, Metaglip, or Riomet)?       No  Is the patient pregnant? No  Is the patient on dialysis? No  Does the patient have cancer? Yes  Does the patient have a history of cancer? No  When was the patient diagnosed? 1/2020  Treatment: none so far  Date of last chemo treatment: NA    LAB RESULTS       CREATININE       Lab Results   Component Value Date    CREATININE 0.90 02/14/2020           (Normal Range: Male: 0.6-1.5 mg/dL  Female: 0.5-1.3mg/dL)   (A Creatinine result greater than 6.0 mg/dL is a Critical value-the ordering provider must be   notified)        LASTLAB(EGFR,GFRNONAA,GFRAA)@ ml/min/1.73M2   (Normal Range >60)         CT Only  If the eGFR is less than 30 the radiologist must be informed  If labs are abnormal, was the physician informed?  No, labs normal   Staff s Initials SH      This procedure involves an intravenous contrast media injection.  IV started in the L Antecubital. Good blood return. Blood drawn and sent to lab for creat.   Tolerated contrast well, IC DC'd with catheter intact and pt told to drink plenty of water today.        Karen De Los Santos

## 2021-06-06 NOTE — PROCEDURES
Procedures  Clinical Treatment Planning Note    The complex radiotherapy planning will be done for the patient for his nasopharyngeal cancer.  The patient had a planning CT earlier for planning purpose.  The treatment aids were used for planning, including head rest, face mask and bite block to help keep the same position during the daily radiation therapy.  The therapy planning is necessary to reduce radiotherapy dose to the normal critical organs which is not possible with simple treatment.  In addition, dose to the target and the critical structures requires three dimensional analysis of the isodose distributions.  This planning will be done to reduce dose to the spinal cord, eyes, cranial nerves, ears, brain, esophagus, lung, thyroid gland, parotid glands, bone, and soft tissue.     I will contour the clinical tumor volume  CTV , with expanded volume of planning treatment volume  PTV  on the treatment planning system.  The critical structures will be outlined, including  spinal cord, brain, eyes, esophagus, parotid glands, lungs, mandible, and soft tissue.     Treatment  planning will be done on the computer treatment planning system.  The multiple fields will be used to achieve optimal coverage of the target volume.  Dose distribution to the above critical structures will be reviewed.  Isodose distribution along with the X, Y, Z plan will also be reviewed.  Custom blocking will be used to shield normal structures.  The beam s eye views will be reviewed and the digital reconstructed image will be reviewed on the planning software.  The IMRT/IGRT technique will be used to deliver optimal dose to the tumor and to protect normal tissue.  The patient will receive 200 cGy each fraction to a total of 7000 cGy in 35treatments using 6-MV photons.  The 1st and 2nd regional LN will also receive 6500 and 5500 cGy.      Laurita Mendoza MD, PhD  Department of Radiation Oncology   [facility]  Tel: [phone]  Page:  [phone]    [facility]  [address]  [place] [address]     [place] [id number] [facility] [place] [address]

## 2021-06-06 NOTE — SEDATION DOCUMENTATION
Patient tolerated procedure well. Alert and oriented. VSS. Maintaining O2 on RA. Denying pain. Transferred to ONI via wheelchair. Bedside report given to JACQUES Lara. Notified RN of parameters set in place per Dr. Santamaria for SBP and HR.

## 2021-06-06 NOTE — SEDATION DOCUMENTATION
Patient intubated with 8 Romanian, uncuffed ET tube. CPAP circuit FiO2 100% with 5 sonomers of PEEP.

## 2021-06-06 NOTE — PATIENT INSTRUCTIONS - HE
Patient seen in clinic today, with [facility]  present, went over EBUS education. Informed patient that his procedure is scheduled for 2/27 at 8:00am at [facility].  Instructed to arrive at 6:30am.  An IV will be placed and IV sedation will be given.  Biopsies may be done.  Instructed to have nothing to eat or drink for 6 hours before procedure.  Medication instructions: Hold all medications the morning of the procedure  Stop Aspirin date:  Patient knows not to take  Stop Ibuprofen, NSAIDS, coxibs (ie:celebrex):  Patient knows not to take  Stop blood thinner date: n/a  Patient understands that they will need a ride home after the procedure and someone to stay with them at home after the procedure.  Patient had no further questions and is agreeable to procedure.  Phone number given for questions.

## 2021-06-06 NOTE — PROGRESS NOTES
Pt ambulatory to radiation clinic with son for initial palliative care consult, interpretor present. Further recommendations and orders per palliative care team.

## 2021-06-06 NOTE — PROGRESS NOTES
Patient added on to the peripheral lab schedule and the port draw lab scheduled today as he got his first dose of chemotherapy last Tuesday, 3/3 and is having fevers.  He was sent up here from the radiation oncology clinic.  Per Yoanna Andre CNP, WBC is slightly elevated so he is not neutropenic.  He is asymptomatic now but has been told that if he develops any symptoms of a fever that he needs to call our clinic or seek care in the emergency room.  He and his daughter verbalized understanding.  Patient will be back tomorrow for lab, nurse practitioner and possible treatments.    Isabella Raymond RN

## 2021-06-06 NOTE — PROGRESS NOTES
RADIATION ONCOLOGY WEEKLY TREATMENT VISIT NOTE      Assessment / Impression       1. 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  All questions and concerns addressed.    Plan:     Continue radiation treatment as prescribed.    Reviewed with the patient about the recent hilar lymph node evaluation and biopsy today.  There is no evidence of malignancy.  We will get a CT scan in approximately 2 months to reevaluate the status of hilar lymph nodes.    I have also discussed with patient and his family about current radiation therapy.  Patient did have significant nasopharyngeal cancer which is close to spinal cord, brainstem, and the cranial nerves.  There is a potential increased risk of radiation-induced brain and nerve damage given the closeness of his cancer to the above structure.  Patient understands well and wished to proceed.    Subjective:      HPI: Kristen Chapman is a 69 y.o. male with    1. 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    Assessment                  Body Site: Head and Neck Site: nasopharynx  Stereotactic Radiosurgery: No  Concurrent Therapy: Yes  Today's Dose: 200  Total Dose for Head and Neck: 7000  Today's Fraction/Total Fraction Head and Neck:   Mucositis due to radiation: 0: None  Thrush: 0: Absent  Pharynx and Esphogaus: 1: Tolerates regular diet  Voice Chances/Stridor/Larynx: 0: Normal  Ocular/Visual - other : 0: Normal  Middle ear/hearin: Normal  Tinnitus: 0: No  Cough: 0: Absent                             Was an  used for assessment: Yes  Requested  for: Assessment   Provided: Yes  Type of  Provided: Agency (document Name  below)  Name of Agency: [place]  Name with ID of  Provided: Rosmery              Sexuality Alteration                 Emotional Alteration Copin: Effective  Comfort Alteration KPS: 90% Can perform normal activity, minor signs of disease  Fatigue (ONS scale) : 0: No Fatigue  Pain Location: denies   Nutrition Alteration Anorexia: 0: None  Nausea: 0: None  Vomitin: None  Pharynx and Esphogaus: 1: Tolerates regular diet  Skin Alteration Skin Sensation: 0: No problem  Skin Reaction: 0: None  AUA Assessment                                  Accompanied by       Objective:     Exam: Examination reviewed no significant changes.    Vitals:    20 0844   BP: 157/86   Pulse: 68   Temp: 97.8  F (36.6  C)   TempSrc: Oral   SpO2: 96%   Weight: 155 lb 9.6 oz (70.6 kg)       Wt Readings from Last 8 Encounters:   20 155 lb 9.6 oz (70.6 kg)   20 155 lb (70.3 kg)   20 154 lb (69.9 kg)   20 155 lb (70.3 kg)   20 158 lb 7 oz (71.9 kg)       General: Alert and oriented, in no acute distress  Yia has no Erythema.  Aria chart and setup information reviewed    Laurita Mendoza MD

## 2021-06-06 NOTE — PROGRESS NOTES
"Kristen Chapman, 69 y.o., male arrived ambulatory to clinic with son and  at 1105 for Cycle #1 Day #8 of his chemotherapy regimen after being delayed a few days due to a fever of 102. Port was accessed using aseptic technique without difficulties with excellent blood return. Labs drawn and reviewed. Per Joanna Ventura CNP treatment note/consideration, okay to proceed with treatment today if patient has not had fevers and if blood cultures are negative. Per patient, he has not had fevers and temperature today 98.7. Blood cultures remain \"in process\"; however, RN called Microbiology at [phone] and they stated \"cultures remain negative at 72 hours; however, it will not be labeled final until day 5 (3/14/2020)\". Per Isabella Triage RN, as long as they are currently negative Joanna Ventura CNP is okay proceeding with treatment. Administered premedications, pre-fluids, chemotherapy, and post-fluids per MD order. He tolerated infusions well, no s/s of infusion reaction. Port was flushed with NS and Heparin then de-accessed using 2x2 and papertape. Kristen Chapman verbalized understanding of plan of care and return to clinic. Discharged to [facility] at 1515 alert and ambulatory with son.  "

## 2021-06-06 NOTE — TELEPHONE ENCOUNTER
Received call from nurse navigators that patient family is calling asking for pain medication.  Patient is in radiology this morning for PET/CT scan and would like a Rx for pain medication that they could  on their way home.  Patient seen in radiation for consult last Friday and has not yet started any treatment.  Patient is scheduled to RTC on Friday for follow up after scan with Dr. Mendoza and then will see Dr. Frias in medical oncology 2/18/20.  Request sent to Dr. Mendoza for direction.

## 2021-06-06 NOTE — PRE-PROCEDURE
Procedure Name: left chest port placement   Date/Time: 3/2/2020 10:00 AM  Written consent obtained?: Yes  Risks and benefits: Risks, benefits and alternatives were discussed  Consent given by: patient  Expected level of sedation: moderate  ASA Class: Class 3- Severe systemic disease, definite functional limitations  Mallampati: Grade 2- soft palate, base of uvula, tonsillar pillars, and portion of posterior pharyngeal wall visible  Patient states understanding of procedure being performed: Yes  Patient's understanding of procedure matches consent: Yes  Procedure consent matches procedure scheduled: Yes  Appropriately NPO: yes  Lungs: lungs clear with good breath sounds bilaterally  Heart: normal heart sounds and rate  History & Physical reviewed: Full history and physical done prior to deep sedation  Statement of review: I have reviewed the lab findings, diagnostic data, medications, and the plan for sedation

## 2021-06-06 NOTE — PROGRESS NOTES
Kristen Chapman, 69 y.o., male arrived ambulatory to clinic with  at 0805 for Cycle #1 Day #15 of his chemotherapy regimen. Port was accessed using aseptic technique without difficulties with excellent blood return. Labs drawn and reviewed. Administered premedications, pre-fluids, chemotherapy, and post-fluids per MD order. He tolerated infusions well, no s/s of infusion reaction. Port was flushed with NS and Heparin then de-accessed using 2x2 and papertape. Kristen Chapman verbalized understanding of plan of care and return to clinic. Discharged to [facility] at 1225 alert and ambulatory.

## 2021-06-06 NOTE — TELEPHONE ENCOUNTER
I tried to meet with Kristen and his daughter, Rea after chemo class but it ended early.  I called Rea to let her know I have completed her [facility] paperwork.  She asked that I fax it and I did.  I am leaving a copy in my mailbox for her for when she is here on Monday with her father.

## 2021-06-06 NOTE — PROCEDURES
Procedure: EBUS/bronchoscopy  Indication:Nasopharyngeal cancer, Bihilar PET positive LAD  Providers: Sherry Santamaria M.D.,   Time Out:  Performed    Medications:  27 ml 1% Lidocaine  10 4% Lidocaine  4 Versed  150 Fentanyl  50Benadryl    General Anesthesia: See anesthesia flowsheet for details    The patient's medical record has been reviewed.  The indication for the procedure was reviewed and is nasopharyngeal cancer with PET positive mediastinal LAD.  The necessary history and physical examination was performed.  The risks, benefits and alternatives of the procedure were discussed with the the patient in detail and he had the opportunity to ask questions.  All questions were answered and verbal and written informed consent was obtained.  The proposed procedure and the patient's identification were verified prior to the procedure by the physician and the nurse and respiratory therapist.      The patient was assessed for the adequacy for the procedure and to receive medications.   Mental Status:  Alert and Oriented *3  Airway examination: I (soft palate, uvula, fauces, and tonsillar pillars visible)  Pulmonary:  appears well, vitals normal, no respiratory distress, acyanotic, normal RR, chest clear, no wheezing, crepitations, rhonchi, normal symmetric air entry  CV:  CVS exam BP noted to be moderately elevated today in office, S1, S2 normal, no gallop, no murmur, chest clear, no JVD, no HSM, no edema  ASA Grade: ASA 2 - Patient with mild systemic disease with no functional limitations    After clinical evaluation and reviewing the indication, risks, alternatives and benefits of the procedure the patient was deemed to be in satisfactory condition to undergo the procedure.      Immediately before administration of medications the patient was re-assessed for adequacy to receive sedatives including the heart rate, respiratory rate, mental status, oxygen saturation, blood pressure and adequacy of pulmonary ventilation.  "These same parameters were continuously monitored throughout the procedure.     Maneuvers / Procedure:     The bronchoscope was inserted through the \"Mouth via ETT\", 8.0 cuffless.    The flexible bronchoscope (Olympus) was introduced through the ET tube and a complete airway examination was performed from the distal trachea to the subsegmental level in each lobe of both lungs. There was no endobronchial lesion, there were Minimal secretions, and the mucosa was Normal appearing.      Then, the EBUS scope inserted through the ETT.  The following stations were examined with pertinent findings noted below.     4R:   No LAD  4L:  No LAD  7:  No LAD  10R:  3 mm lymph node, not sampled  10L:  3mm lymph node  11R:  No LAD  11L:  No LAD    Samples were obtained from     Station 10L with 5 passes, 5 samples obtained with EMANUEL Present, a combination of suction and no suction was used to obtain the samples.    The samples were sent for Cytology.    The EBUS guided bx was deemed adequate once the adequate lymph node sampling/preliminary diagnosis was achieve or adequate sampling attempts were made.     Complications: None    Estimated Blood Loss: 5 ml    The patient tolerated the procedure well without undue discomfort, hypotension or arrhythmia, or hypoxia.    The bronchoscope(s) used were immediately inspected following the procedure.  The disposable equipment was visually inspected and deemed to be intact immediately post procedure.      The procedure was performed in Endoscopy.    The case is discussed with the patient after the procedure.     Recommendations:   --> Follow-up with oncology after results in.      Sherry Santamaria  Pulmonary and Critical Care  [id number]        "

## 2021-06-06 NOTE — PROGRESS NOTES
Pt Planning Simulation done today, tentative Start Date is set for 2/27 930am AT [facility] pending chemo apt on 2/18, will coordinate schedule accordingly.

## 2021-06-06 NOTE — PROGRESS NOTES
[facility] Hematology and Oncology Progress Note    Patient: Kristen Chapman  MRN: [id number]  Date of Service: 03/10/2020        Reason for Visit    1. Nasopharyngeal squamous cell carcinoma; concurrent chemoRT  2. Fever    ECOG Performance   ECOG Performance Status: 1    Distress Assessment  Distress Assessment Score: No distress    Pain  0      Assessment/Plan  1. Nasopharyngeal cancer  In his second week of chemoradiation, due for cycle 2 weekly cisplatin. Tolerating this generally well, outside of slight increase chronic left tinnitus. Labwork satisfactory.    Due to recent isolated high-grade fever yesterday, will defer next cycle a few days to monitor. If no recurrent fevers and blood cultures negative, will treat this Thursday.     Will see every other week on treatment, or more if needed.  2.   Fever  Fever 102 at radiation yesterday, 3/9. He had noted none prior to that and has had none since, when monitoring at home. No focal infectious symptoms and otherwise looks good today. CBC showed leukopenia which could indicate bacterial infection or effect of steroids the 3 days around chemo. No neutropenia.     Blood cultures were drawn yesterday and pending. Will give him another 24-48 hrs of monitoring and await blood cultures before treating with chemo this week. He will monitor his temperature at home and call back with a fever >100.5 so I can start an antibiotic. Otherwise, will have triage RN check in with him tomorrow PM and if no fevers and BC negative, will proceed with chemo on Thursday.    3.  Nutrition  Kristen has opted against prophylactic PEG placement. Working with dietician to meet caloric/hydration needs orally, so far doing well.  ______________________________________________________________________________    History of Present Illness/ Interval History    Mr. Kristen Chapman  is a 69 y.o. presenting ahead of cycle 2 weekly cisplatin/concurrent radiation.     Yesterday, he had a documented fever 102 at  radiation. He had not noted any fevers prior and feels well. Somewhat chilled on treatment, no rigors. No arthralgias. No respiratory, skin, GI,  symptoms. Drinking plenty of fluids, eating soft foods and supplementing with Ensure.    No neuropathy. No significant nausea after his chemo last week.   No mucositis. Increase baseline left tinnitus, no ear pain.    Review of Systems  Constitutional  Constitutional (WDL): Exceptions to WDL  Chills: Concerns(yesterday felt cold)  Neurosensory  Neurosensory (WDL): All neurosensory elements are within defined limits  Eye   Eye Disorder (WDL): All eye disorder elements are within defined limits  Ear  Ear Disorder (WDL): Exceptions to WDL  Tinnitus: Concerns(left ear since starting treatment)  Cardiovascular  Cardiovascular (WDL): All cardiovascular elements are within defined limits  Pulmonary  Respiratory (WDL): Within Defined Limits  Gastrointestinal  Gastrointestinal (WDL): Exceptions to WDL  Dry Mouth: Concerns(feels very thirsty)  Genitourinary  Genitourinary (WDL): All genitourinary elements are within defined limits  Lymphatic  Lymph (WDL): All lymph disorder elements are within defined limits  Musculoskeletal and Connective Tissue  Musculoskeletal and Connetive Tissue Disorders (WDL): All Musculoskeletal and Connetive Tissue Disorder elements are within defined limits  Integumentary  Integumentary (WDL): All integumentary elements are within defined limits  Patient Coping  Patient Coping: Accepting;Open/discussion  Accompanied by  Accompanied by: Family Member      Oncology History/Treatment  Diagnosis/Stage:   1/29/20: Right nasopharynx squamous cell cancer  -neck adenopathy  -CT neck: bilateral cervical adenopathy and asymmetric soft tissue thickening posterior right nasopharynx  -ENT: nasopharyngeal scope showed several areas that appear suspicious for malignancy in right nasopharynx. Biopsy: squamous cell cancer  -PET: right nasopharynx mass extending to  "hypopharynx and bilateral cervical adenopathy. Bilateral hilar adenopathy indeterminate for mets vs granulomatous disease.  -2/27/20: Bronch/EBUS biopsy left intrathoracic/hilar node negative     Treatment:  3/2/20: Concurrent chemoradiation (7000 cGy/35 planned), with weekly cisplatin 40 mg/m2 x 6 cycles  -plan for 3 cycles of consolidative platinum and infusional 5FU (4 days)      Past History  Past Medical History:   Diagnosis Date     Nasopharyngeal cancer (H)          Past Surgical History:   Procedure Laterality Date     IR PORT PLACEMENT >5 YEARS  3/2/2020       Physical Exam    Recent Vitals 3/10/2020   Height 5' 2.75\"   Weight 151 lbs 3 oz   BSA (m2) 1.74 m2   /68   Pulse 73   Temp 97.9   Temp src 1   SpO2 97   Some recent data might be hidden       GENERAL: Alert and oriented to time place and person. Seated comfortably. In no distress. Here with son and interpretor.  HEAD: Atraumatic and normocephalic. No alopecia.  EYES: YOSHI, EOMI. No erythema. No icterus.  EARS: Tympanic membranes WNL.  ORAL CAVITY: Moist. No mucosal lesion or tonsillar enlargement.  NECK: supple. No thyroid enlargement.  LYMPH NODES: Palpable bilateral cervical adenopathy, R>L.  CHEST: clear to auscultation bilaterally. Resonant to percussion throughout bilaterally. Symmetrical breath movements bilaterally.  CVS: S1 and S2 are heard. Regular rate and rhythm. No murmur or gallop or rub heard.  ABDOMEN: Soft. Not tender. Not distended. No palpable hepatomegaly or splenomegaly. No other mass palpable. Bowel sounds present.  EXTREMITIES: Warm. No peripheral edema.  SKIN: no rash, or bruising or purpura.   NEURO: No gross deficit noted. Non-antalgic gait.      Lab Results    Recent Results (from the past 168 hour(s))   HM1 (CBC with Diff)   Result Value Ref Range    WBC 17.3 (H) 4.0 - 11.0 thou/uL    RBC 4.09 (L) 4.40 - 6.20 mill/uL    Hemoglobin 10.7 (L) 14.0 - 18.0 g/dL    Hematocrit 32.8 (L) 40.0 - 54.0 %    MCV 80 80 - 100 fL    " MCH 26.2 (L) 27.0 - 34.0 pg    MCHC 32.6 32.0 - 36.0 g/dL    RDW 13.6 11.0 - 14.5 %    Platelets 162 140 - 440 thou/uL    MPV 11.4 8.5 - 12.5 fL    Neutrophils % 85 (H) 50 - 70 %    Lymphocytes % 6 (L) 20 - 40 %    Monocytes % 8 2 - 10 %    Eosinophils % 0 0 - 6 %    Basophils % 0 0 - 2 %    Neutrophils Absolute 14.8 (H) 2.0 - 7.7 thou/uL    Lymphocytes Absolute 1.0 0.8 - 4.4 thou/uL    Monocytes Absolute 1.4 (H) 0.0 - 0.9 thou/uL    Eosinophils Absolute 0.0 0.0 - 0.4 thou/uL    Basophils Absolute 0.0 0.0 - 0.2 thou/uL   Comprehensive metabolic panel   Result Value Ref Range    Sodium 134 (L) 136 - 145 mmol/L    Potassium 3.8 3.5 - 5.0 mmol/L    Chloride 101 98 - 107 mmol/L    CO2 26 22 - 31 mmol/L    Anion Gap, Calculation 7 5 - 18 mmol/L    Glucose 105 70 - 125 mg/dL    BUN 16 8 - 22 mg/dL    Creatinine 0.90 0.70 - 1.30 mg/dL    GFR MDRD Af Amer >60 >60 mL/min/1.73m2    GFR MDRD Non Af Amer >60 >60 mL/min/1.73m2    Bilirubin, Total 1.4 (H) 0.0 - 1.0 mg/dL    Calcium 8.5 8.5 - 10.5 mg/dL    Protein, Total 6.7 6.0 - 8.0 g/dL    Albumin 3.2 (L) 3.5 - 5.0 g/dL    Alkaline Phosphatase 71 45 - 120 U/L    AST 30 0 - 40 U/L    ALT 33 0 - 45 U/L   Magnesium   Result Value Ref Range    Magnesium 2.0 1.8 - 2.6 mg/dL       Imaging    Xr Skull 1 - 3 Vws    Result Date: 2/10/2020  EXAM: XR SKULL 1 - 3 VWS LOCATION: [facility] DATE/TIME: 2/10/2020 4:19 PM INDICATION: Indication Not Listed - See Reason for Exam COMPARISON: Soft tissue neck CT 01/24/2020     No radiodense foreign bodies within the bilateral orbits or soft tissues of the face. Extensive mucosal opacification of the right maxillary sinus.    Mr Soft Tissue Neck With Without Contrast    Result Date: 2/11/2020  EXAM: MR NECK SOFT TISSUE ONLY W WO CONTRAST LOCATION: [facility] DATE/TIME: 2/10/2020 5:04 PM INDICATION: Indication not listed - see reason for exam. Nasopharynx cancer, staging, ? base of skull invasion. Moderately differentiated  squamous cell carcinoma of nasopharynx, clinical stage T1/2 N2Mx, P 16-. ? COMPARISON: None. CONTRAST: Gadavist 7 mL TECHNIQUE: Multiplanar multisequence neck MRI performed without and with IV contrast. FINDINGS: VISUALIZED INTRACRANIAL/ORBITS/SINUSES: No abnormality of the visualized intracranial compartment or orbits. Subtotal chronic mucosal opacification of the right maxillary sinus. Moderate mucosal opacification of the right-sided ethmoid air cells. No abnormal signal within the mastoid air cells. SUPRAHYOID NECK: Approximately 1.8 x 2.2 x 2.9 cm enhancing mass in the right nasopharynx. There is associated small abnormal enhancement extending to the prevertebral soft tissues and into the right carotid space. This abnormal enhancement does extend into the right oropharynx and hypopharynx. There is some effacement and small amount of enhancement extending into the right parapharyngeal fat (axial T1-weighted image 16). There is no definite enhancement associated with the pterygoid musculature. The abnormal soft tissue enhancement does extend into the posterior to the right mandibular ramus as well as along the course of the right auriculotemporal nerve. In addition there is suggestion of abnormal enhancement at the level of the right foramen ovale. No definite intracranial enhancement, however. INFRAHYOID NECK: There is abnormal enhancement and soft tissue swelling extending to the right supraglottic soft tissues. Asymmetric soft tissue thickening and enhancement involving the right vocal cord. SALIVARY GLANDS: No discrete mass lesions involving the bilateral parotid glands and the bilateral submandibular glands. LYMPH NODES: Pathologic appearing centrally necrotic 3.9 x 5.3 cm right level IIa lymph node. There is an adjacent 2.2 x 3.6 cm right level IIb node. Both of these lymph nodes have irregular margins and the associated enhancement does appear to infiltrate the right sternocleidomastoid muscle. Slightly  abnormal appearing and enhancing 1.4 x 2.8 cm left level IIa node. VESSELS: The abnormal soft tissue enhancement does appear to abut the right internal carotid artery at least 180 degrees at the level of the hyoid bone. However, the flow voids of the internal carotid arteries appear patent. THYROID: Normal. BONES: Mild degenerative changes of the cervical spine with small broad-based disc bulge abutting the ventral cord at C5-C6. No abnormal marrow edema in the cervical spine.     1.  1.8 x 2.2 x 2.9 cm enhancing mass in the right nasopharynx. There is associated small abnormal enhancement extending to the prevertebral soft tissues and into the right carotid space. This abnormal enhancement does extend into the right oropharynx and hypopharynx. There is some effacement and small amount of enhancement extending into the right parapharyngeal fat. There is no definite enhancement associated with the pterygoid musculature. 2.  The abnormal soft tissue enhancement does extend into the posterior to the right mandibular ramus as well as along the course of the right auriculotemporal nerve. In addition there is suggestion of abnormal enhancement at the level of the right foramen ovale. No definite intracranial enhancement however. 3.  There is abnormal enhancement and soft tissue swelling extending to the right supraglottic soft tissues. Asymmetric soft tissue thickening and enhancement involving the right vocal cord. It is uncertain whether this enhancement involving the right laryngeal structures is related to mass or edema. Please correlate with direct visualization as well as exclude vocal cord paralysis. 4.  Pathologically appearing centrally necrotic 3.9 x 5.3 cm right level IIa lymph node. There is an adjacent 2.2 x 3.6 cm right level IIb node. Both of these lymph nodes have irregular margins and the associated enhancement does appear to infiltrate the  right sternocleidomastoid muscle. 5.  Borderline abnormal  appearing and enhancing 1.4 x 2.8 cm left level IIa node.    Ir Port Placement 5+ Years    Result Date: 3/2/2020  [place] [facility] LOCATION: [facility] DATE: 3/2/2020 PROCEDURE: IMPLANTABLE VENOUS CHEST PORT PLACEMENT INTERVENTIONAL RADIOLOGIST: Aneudy Easley MD. INDICATION: Nasopharyngeal carcinoma. Port placement requested for long-term venous access. CONSENT: The risks, benefits and alternatives of implantable venous chest port placement were discussed with the patient through an  in detail. All questions were answered. Informed consent was given to proceed with the procedure. MODERATE SEDATION: Versed 2 mg IV; Fentanyl 100 mcg IV.  Under physician supervision, Versed and fentanyl were administered for moderate sedation. Pulse oximetry, heart rate and blood pressure were continuously monitored by an independent trained observer. The physician spent 35 minutes of face-to-face sedation time with the patient. CONTRAST: None. ANTIBIOTICS: Ancef 2 g IV. ADDITIONAL MEDICATIONS: None. FLUOROSCOPIC TIME: 2.0 minutes. RADIATION DOSE: Air Kerma: 22 mGy. COMPLICATIONS: No immediate complications. STERILE BARRIER TECHNIQUE: Maximum sterile barrier technique was used. Cutaneous antisepsis was performed at the operative site with application of 2% chlorhexidine and large sterile drape. Prior to the procedure, the  and assistant performed hand hygiene and wore hat, mask, sterile gown, and sterile gloves during the entire procedure. PROCEDURE:  The Central Venous Catheter Insertion checklist was reviewed prior to placement and followed throughout the procedure.  Using real-time ultrasound guidance the right internal jugular vein was accessed. Access was secured with a microwire and transitional  sheath. After measuring the intravascular portion of the microwire, it was exchanged for a 0.035 inch guidewire which was placed in the IVC. A subcutaneous pocket was created and irrigated with sterile  normal saline. The port was placed within the subcutaneous pocket and secured using 2-0 Prolene retention sutures. The catheter was tunneled from the neck to the pocket. A peel-away sheath was placed over the guidewire and the catheter was advanced through the sheath. Sheath was peeled away. Subcutaneous catheter redundancy was removed by gentle manual manipulation. The port was connected and aspirated well. It was locked with heparin. The port pocket incision was closed with layered absorbable suture and surgical glue. The dermatotomy site was closed with surgical glue. FINDINGS: Ultrasound demonstrates an anechoic and compressible jugular vein. A permanent image was stored. At the completion of the study the port tip lies near the cavoatrial junction.     1.  Successful implantable venous chest port placement as detailed above. This port is power injectable. 2.  The implantable venous chest port was placed under fluoroscopic guidance and is ready for use immediately. ____________________________________________________________________ CPT codes for physician reference only: 11546 [id number]     Nm Pet Ct Skull To Mid Thigh    Result Date: 2/12/2020  EXAM: NM PET CT SKULL TO MID THIGH LOCATION: [facility] DATE/TIME: 2/12/2020 11:49 AM INDICATION: Initial treatment strategy for staging malignant neoplasm of anterior wall of nasopharynx COMPARISON: MRI from 02/10/2020 and CT from 01/24/2020 are reviewed. TECHNIQUE: Serum glucose level 97 mg/dL. One hour post intravenous administration of 8.9 mCi F-18 FDG, PET imaging was performed from the skull base to the mid thighs utilizing attenuation correction with concurrent axial CT and PET/CT image fusion. Dose  reduction techniques were used. FINDINGS: 3.9 x 1.6 cm markedly FDG avid (SUVmax 9.7) posterior nasopharyngeal soft tissue mass. FDG avid cervical lymphadenopathy at right levels II, III, IV, and V and left levels II and III. Largest is at  right level II, measuring 4.2 x 3.4 x 4.4 cm (SUVmax 11.0). Fairly uniform moderate to marked FDG activity in normal-sized, noncalcified bilateral hilar lymph nodes. A representative node at left interlobar station 11L measures 1.2 x 0.9 cm (SUVmax 5.2). Near complete opacification of the right maxillary sinus with high attenuation inspissated secretions or fungal material and associated inflammatory FDG activity. Physiologic calcifications in the basal ganglia. Calcified granuloma left lower lobe. Dilated ascending aorta measures 4.7 cm in AP diameter. Mild calcified atherosclerosis. Moderate degenerative change throughout the spine.     1.  Findings consistent with nasopharyngeal carcinoma and bilateral cervical lymph node metastases 2.  FDG avid bilateral hilar lymph nodes are likely distant metastases, but technically indeterminate, as granulomatous inflammation frequently has this appearance at PET CT. Consider endobronchial ultrasound-guided biopsy for reliable staging.      Billing  Total face to face time 20 minutes, with 15 minutes of that dedicated to counseling, education or coordination of care.     Signed by: Joanna Ventura

## 2021-06-06 NOTE — PROGRESS NOTES
Bronchoscope/EBUS procedure note       Pre procedure VS    HR 65  RR 18  EtCO2 34  SpO2 97% on R.A    Med given     Medications:    27 ml 1% Lidocaine  10 4% Lidocaine    Sample done     EBUS assisted Biopsy    Bronchoscope/EBUS procedure was done successfully without any complication. Biopsy was taken, passes made X5.   Patient came in to procedure room on RA and left the procedure room on R.A. Patient has no sign of SHORTNESS OF BREATH or respiratory distress post procedure.     Post-procedure VS    SpO2 96% on 2L NC  HR 67  RR 16  EtCO2 32      James Freedman, Isidoro Freedman, JENELLET

## 2021-06-06 NOTE — PATIENT INSTRUCTIONS - HE
Patient Education     Cisplatin Solution for injection  What is this medicine?  CISPLATIN (SIS dennis tin) is a chemotherapy drug. It targets fast dividing cells, like cancer cells, and causes these cells to die. This medicine is used to treat many types of cancer like bladder, ovarian, and testicular cancers.  This medicine may be used for other purposes; ask your health care provider or pharmacist if you have questions.  What should I tell my health care provider before I take this medicine?  They need to know if you have any of these conditions:    blood disorders    hearing problems    kidney disease    recent or ongoing radiation therapy    an unusual or allergic reaction to cisplatin, carboplatin, other chemotherapy, other medicines, foods, dyes, or preservatives    pregnant or trying to get pregnant    breast-feeding  How should I use this medicine?  This drug is given as an infusion into a vein. It is administered in a hospital or clinic by a specially trained health care professional.  Talk to your pediatrician regarding the use of this medicine in children. Special care may be needed.  Overdosage: If you think you have taken too much of this medicine contact a poison control center or emergency room at once.  NOTE: This medicine is only for you. Do not share this medicine with others.  What if I miss a dose?  It is important not to miss a dose. Call your doctor or health care professional if you are unable to keep an appointment.  What may interact with this medicine?    dofetilide    foscarnet    medicines for seizures    medicines to increase blood counts like filgrastim, pegfilgrastim, sargramostim    probenecid    pyridoxine used with altretamine    rituximab    some antibiotics like amikacin, gentamicin, neomycin, polymyxin B, streptomycin, tobramycin    sulfinpyrazone    vaccines    zalcitabine  Talk to your doctor or health care professional before taking any of these  medicines:    acetaminophen    aspirin    ibuprofen    ketoprofen    naproxen  This list may not describe all possible interactions. Give your health care provider a list of all the medicines, herbs, non-prescription drugs, or dietary supplements you use. Also tell them if you smoke, drink alcohol, or use illegal drugs. Some items may interact with your medicine.  What should I watch for while using this medicine?  Your condition will be monitored carefully while you are receiving this medicine. You will need important blood work done while you are taking this medicine.  This drug may make you feel generally unwell. This is not uncommon, as chemotherapy can affect healthy cells as well as cancer cells. Report any side effects. Continue your course of treatment even though you feel ill unless your doctor tells you to stop.  In some cases, you may be given additional medicines to help with side effects. Follow all directions for their use.  Call your doctor or health care professional for advice if you get a fever, chills or sore throat, or other symptoms of a cold or flu. Do not treat yourself. This drug decreases your body's ability to fight infections. Try to avoid being around people who are sick.  This medicine may increase your risk to bruise or bleed. Call your doctor or health care professional if you notice any unusual bleeding.  Be careful brushing and flossing your teeth or using a toothpick because you may get an infection or bleed more easily. If you have any dental work done, tell your dentist you are receiving this medicine.  Avoid taking products that contain aspirin, acetaminophen, ibuprofen, naproxen, or ketoprofen unless instructed by your doctor. These medicines may hide a fever.  Do not become pregnant while taking this medicine. Women should inform their doctor if they wish to become pregnant or think they might be pregnant. There is a potential for serious side effects to an unborn child. Talk  to your health care professional or pharmacist for more information. Do not breast-feed an infant while taking this medicine.  Drink fluids as directed while you are taking this medicine. This will help protect your kidneys.  Call your doctor or health care professional if you get diarrhea. Do not treat yourself.  What side effects may I notice from receiving this medicine?  Side effects that you should report to your doctor or health care professional as soon as possible:    allergic reactions like skin rash, itching or hives, swelling of the face, lips, or tongue    signs of infection - fever or chills, cough, sore throat, pain or difficulty passing urine    signs of decreased platelets or bleeding - bruising, pinpoint red spots on the skin, black, tarry stools, nosebleeds    signs of decreased red blood cells - unusually weak or tired, fainting spells, lightheadedness    breathing problems    changes in hearing    gout pain    low blood counts - This drug may decrease the number of white blood cells, red blood cells and platelets. You may be at increased risk for infections and bleeding.    nausea and vomiting    pain, swelling, redness or irritation at the injection site    pain, tingling, numbness in the hands or feet    problems with balance, movement    trouble passing urine or change in the amount of urine  Side effects that usually do not require medical attention (report to your doctor or health care professional if they continue or are bothersome):    changes in vision    loss of appetite    metallic taste in the mouth or changes in taste  This list may not describe all possible side effects. Call your doctor for medical advice about side effects. You may report side effects to FDA at 3-434-HEG-4052.  Where should I keep my medicine?  This drug is given in a hospital or clinic and will not be stored at home.  NOTE:This sheet is a summary. It may not cover all possible information. If you have questions  about this medicine, talk to your doctor, pharmacist, or health care provider. Copyright  2015 Gold Standard

## 2021-06-06 NOTE — SEDATION DOCUMENTATION
BP elevated. Patient asymptomatic. Denying pain. HR oskar. Provider notified, metoprolol 2.5mg ordered and administration.

## 2021-06-06 NOTE — CONSULTS
[facility] Hematology and Oncology Consult Note    Patient: Kristen Chapman  MRN: [id number]  Date of Service: 02/18/2020      Reason for Visit:    1.  Nasopharyngeal carcinoma    Assessment/Plan:    1.  Nasopharyngeal carcinoma: Biopsy from the right nasopharynx shows a moderately differentiated squamous cell carcinoma.  Imaging was reviewed.  He has evidence of bilateral cervical node metastases.  Tumor also seems to extend down to the hypopharynx.  Patient is generally asymptomatic at this point.  I reviewed with him the standard treatment of concurrent chemotherapy and radiation.  I would recommend weekly cisplatin at 40 mg/m  x 6 weeks in combination with radiation.  The schedule was reviewed with the patient and his son..  After that he will get 3 cycles of platinum and 5-FU.  The 5-FU will be infusional over a 4-day period.    We discussed some of the more common side effects of concurrent therapy including, but not limited to, fatigue, nausea, taste disturbance, cytopenias, and neuropathy.  We also reviewed the need for a Port-A-Cath which she is going to get.  We also discussed tube feed.  Is quite reluctant to get one at this point.  I spent some time reviewing the rationale for and likelihood of feeding tube placement at some point during the course of his treatment.  He understands but would prefer to try to delay for as long as possible.  I told him I think that would be okay.  Prescriptions for Zofran and dexamethasone were sent to his pharmacy.    We also spent some time reviewing his PET scan images.  He has bilateral hilar adenopathy.  Could be inflammatory or metastatic.  I recommended an endobronchial ultrasound with biopsy.  Reasoning for this was explained and he does agree.  Consultation to pulmonary was placed.  I do not think that the initial results of these biopsies would affect our initial plan for concurrent therapy.    I would like to start his chemotherapy on Friday, February 28.  I think he  starting radiation on the 27th.    The patient and his son had an opportunity to have their questions answered.    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  He initially presented in late January with a complaint of lumps on the right neck.  He had a CT scan of the neck and was referred to ENT.  The neck CT done on January 24 showed bilateral abnormal appearing lymphadenopathy in the neck.  There was also an asymmetric soft thickening in the posterior right nasopharynx.  He was seen by ENT on January 29.  When he was scoped the nasopharynx showed several areas that appear suspicious for malignancy in the right nasopharynx.  A biopsy was performed at that time.  Pathology showed squamous cell carcinoma.  He was subsequently referred to radiation oncology whom he saw on February 7.  Concurrent chemoradiation was discussed.  He is now here today to discuss the chemotherapy portion of the treatment.  Overall he is feeling okay.  He denies pain.  No coughing or hematemesis.  No epistaxis.    Past History:    Past Medical History:   Diagnosis Date     Nasopharyngeal cancer (H)     Family History   Family history unknown: Yes      [unfilled] Social History     Socioeconomic  History     Marital status:      Spouse name: Not on file     Number of children: Not on file     Years of education: Not on file     Highest education level: Not on file   Occupational History     Not on file   Social Needs     Financial resource strain: Not on file     Food insecurity:     Worry: Not on file     Inability: Not on file     Transportation needs:     Medical: Not on file     Non-medical: Not on file   Tobacco Use     Smoking status: Never Smoker     Smokeless tobacco: Never Used   Substance and Sexual Activity     Alcohol use: Not Currently     Drug use: Not Currently     Sexual activity: Not Currently   Lifestyle     Physical activity:     Days per week: Not on file     Minutes per session: Not on file     Stress: Not on file   Relationships     Social connections:     Talks on phone: Not on file     Gets together: Not on file     Attends Jew service: Not on file     Active member of club or organization: Not on file     Attends meetings of clubs or organizations: Not on file     Relationship status: Not on file     Intimate partner violence:     Fear of current or ex partner: Not on file     Emotionally abused: Not on file     Physically abused: Not on file     Forced sexual activity: Not on file   Other Topics Concern     Not on file   Social History Narrative     Not on file        Allergies:    No Known Allergies    Review of Systems:    General  General (WDL): All general elements are within defined limits  ENT  Tinnitus: Yes - Chronic (Greater than 3 months)  Dentures: Yes - Chronic (Greater than 3 months)  Respiratory  Respiratory (WDL): All respiratory elements are within defined limits  Cardiovascular  Cardiovascular (WDL): All cardiovascular elements are within defined limits  Endocrine  Endocrine (WDL): All endocrine elements are within defined limits  Gastrointestinal  Gastrointestinal (WDL): All gastrointestinal elements are within defined  limits  Musculoskeletal  Musculoskeletal (WDL): All musculoskeletal elements are within defined limits  Neurological  Neurological (WDL): All neurological elements are within defined limits  Psychological/Emotional  Psychological/Emotional (WDL): All psychological/emotional elements are within defined limits  Hematological/Lymphatic  Hematological/Lymphatic (WDL): All hematological/lymphatic elements are within defined limits  Dermatological  Dermatologic (WDL): All dermatological elements are within defined limits  Genitourinary/Reproductive  Genitourinary/Reproductive (WDL): All genitourinary/reproductive elements are within defined limits  Reproductive (Females only)     Pain  Currently in Pain: No/denies    Physical Exam:    Recent Vitals 2/12/2020   Height -   Weight 155 lbs   BSA (m2) 1.77 m2   BP -   Pulse -   Temp -   Temp src -   SpO2 -     General: patient appears stated age of 69 y.o.. Nontoxic and in no distress.   HEENT: Head: atraumatic, normocephalic. Sclerae anicteric.  Chest:  Normal respiratory effort  Cardiac:  No edema.   Abdomen: abdomen is non-distended  Extremities: normal tone and muscle bulk.  Skin: no lesions or rash. Warm and dry.   CNS: alert and oriented. Grossly non-focal.   Psychiatric: normal mood and affect.     Lab Results:    Recent Results (from the past 168 hour(s))   POCT Glucose   Result Value Ref Range    Glucose 97 70 - 139 mg/dL   Creatinine   Result Value Ref Range    Creatinine 0.90 0.70 - 1.30 mg/dL    GFR MDRD Af Amer >60 >60 mL/min/1.73m2    GFR MDRD Non Af Amer >60 >60 mL/min/1.73m2     Imaging Results:    PET CT scan images were personally reviewed.  Right nasopharynx cancer noted with bilateral cervical node involvement.    Ct Soft Tissue Neck With Contrast    Result Date: 1/24/2020  EXAM DATE:         01/24/2020 EXAM: CT NECK, SOFT TISSUE WITH CONTRAST LOCATION: [facility] DATE/TIME: 1/24/2020 10:00 AM INDICATION: Neck mass, multiple (Age ] 17y)  Mutliple RIGHT neck fixed, painless, nonmobile lymph nodes for 1 month with no known infectious illness. COMPARISON: None. CONTRAST: 75 mL [id number] was administered from a single use vial with 25 mL discarded.  SPR I-STAT Cr= 1.2 eGFR=  60. TECHNIQUE: Routine with IV contrast. Multiplanar reformats. Dose reduction techniques were used. FINDINGS: VISUALIZED INTRACRANIAL/ORBITS/SINUSES: No abnormality of the visualized intracranial compartment or orbits. Moderate opacification of the right maxillary sinus with surrounding hyperostosis. This probably relates to chronic sinusitis. SUPRAHYOID NECK: There is abnormal soft tissue thickening involving the right posterior nasopharynx (image 28, series 2). Normal parapharyngeal and  spaces. Normal oral cavity and floor of mouth structures. INFRAHYOID NECK: Normal supraglottic larynx, vocal cords, and hypopharynx. SALIVARY GLANDS: Normal parotid and submandibular glands. LYMPH NODES: There is a 1.5 x 2.2 cm right level 2A lymph node (image 54, series 2). There is a 3.8 x 2.8 cm presumed right level 2A lymph node conglomerate. There is a 0.9 x 0.9 cm right level 3 lymph node (image 55, series 2). There is a borderline enlarged right level 5 lymph node measuring 1.1 x 1.0 cm (image 61, series 2). There is a mildly enlarged left level 2B lymph node measuring 1.3 x 1.2 cm (image 47, series 2). VESSELS: Vascular structures of the neck are patent. THYROID: Subcentimeter nodules in the right thyroid lobe. OTHER: No destructive osseous lesion. The included lung apices are clear. IMPRESSION: 1.  There is a 3.8 x 2.8 cm mass in the right side of the neck favored to represent an abnormal right level 2A lymph node conglomerate. There is also an enlarged right level 2A lymph node measuring 1.5 x 2.2 cm and a borderline enlarged right level 3 and right level 5 lymph node. 4. There is asymmetric soft tissue thickening of the posterior right nasopharynx. 5. Overall, the above  findings are concerning for malignancy, possibly with the primary malignancy in the posterior right nasopharynx and metastatic lymph nodes. 6. Additionally, there is a mildly enlarged left level 2B lymph node which could either be reactive or represent an additional site of malignancy. 6. The lymph nodes could be further evaluated with ultrasound and would be amenable to ultrasound-guided FNA/biopsy. Findings discussed with Dr. Parsons on 1/24/2020 at 1108 am     Xr Skull 1 - 3 Vws    Result Date: 2/10/2020  EXAM: XR SKULL 1 - 3 VWS LOCATION: [facility] DATE/TIME: 2/10/2020 4:19 PM INDICATION: Indication Not Listed - See Reason for Exam COMPARISON: Soft tissue neck CT 01/24/2020     No radiodense foreign bodies within the bilateral orbits or soft tissues of the face. Extensive mucosal opacification of the right maxillary sinus.    Mr Soft Tissue Neck With Without Contrast    Result Date: 2/11/2020  EXAM: MR NECK SOFT TISSUE ONLY W WO CONTRAST LOCATION: [facility] DATE/TIME: 2/10/2020 5:04 PM INDICATION: Indication not listed - see reason for exam. Nasopharynx cancer, staging, ? base of skull invasion. Moderately differentiated squamous cell carcinoma of nasopharynx, clinical stage T1/2 N2Mx, P 16-. ? COMPARISON: None. CONTRAST: Gadavist 7 mL TECHNIQUE: Multiplanar multisequence neck MRI performed without and with IV contrast. FINDINGS: VISUALIZED INTRACRANIAL/ORBITS/SINUSES: No abnormality of the visualized intracranial compartment or orbits. Subtotal chronic mucosal opacification of the right maxillary sinus. Moderate mucosal opacification of the right-sided ethmoid air cells. No abnormal signal within the mastoid air cells. SUPRAHYOID NECK: Approximately 1.8 x 2.2 x 2.9 cm enhancing mass in the right nasopharynx. There is associated small abnormal enhancement extending to the prevertebral soft tissues and into the right carotid space. This abnormal enhancement does extend into the right  oropharynx and hypopharynx. There is some effacement and small amount of enhancement extending into the right parapharyngeal fat (axial T1-weighted image 16). There is no definite enhancement associated with the pterygoid musculature. The abnormal soft tissue enhancement does extend into the posterior to the right mandibular ramus as well as along the course of the right auriculotemporal nerve. In addition there is suggestion of abnormal enhancement at the level of the right foramen ovale. No definite intracranial enhancement, however. INFRAHYOID NECK: There is abnormal enhancement and soft tissue swelling extending to the right supraglottic soft tissues. Asymmetric soft tissue thickening and enhancement involving the right vocal cord. SALIVARY GLANDS: No discrete mass lesions involving the bilateral parotid glands and the bilateral submandibular glands. LYMPH NODES: Pathologic appearing centrally necrotic 3.9 x 5.3 cm right level IIa lymph node. There is an adjacent 2.2 x 3.6 cm right level IIb node. Both of these lymph nodes have irregular margins and the associated enhancement does appear to infiltrate the right sternocleidomastoid muscle. Slightly abnormal appearing and enhancing 1.4 x 2.8 cm left level IIa node. VESSELS: The abnormal soft tissue enhancement does appear to abut the right internal carotid artery at least 180 degrees at the level of the hyoid bone. However, the flow voids of the internal carotid arteries appear patent. THYROID: Normal. BONES: Mild degenerative changes of the cervical spine with small broad-based disc bulge abutting the ventral cord at C5-C6. No abnormal marrow edema in the cervical spine.     1.  1.8 x 2.2 x 2.9 cm enhancing mass in the right nasopharynx. There is associated small abnormal enhancement extending to the prevertebral soft tissues and into the right carotid space. This abnormal enhancement does extend into the right oropharynx and hypopharynx. There is some effacement  and small amount of enhancement extending into the right parapharyngeal fat. There is no definite enhancement associated with the pterygoid musculature. 2.  The abnormal soft tissue enhancement does extend into the posterior to the right mandibular ramus as well as along the course of the right auriculotemporal nerve. In addition there is suggestion of abnormal enhancement at the level of the right foramen ovale. No definite intracranial enhancement however. 3.  There is abnormal enhancement and soft tissue swelling extending to the right supraglottic soft tissues. Asymmetric soft tissue thickening and enhancement involving the right vocal cord. It is uncertain whether this enhancement involving the right laryngeal structures is related to mass or edema. Please correlate with direct visualization as well as exclude vocal cord paralysis. 4.  Pathologically appearing centrally necrotic 3.9 x 5.3 cm right level IIa lymph node. There is an adjacent 2.2 x 3.6 cm right level IIb node. Both of these lymph nodes have irregular margins and the associated enhancement does appear to infiltrate the  right sternocleidomastoid muscle. 5.  Borderline abnormal appearing and enhancing 1.4 x 2.8 cm left level IIa node.    Ct External Imaging Neck    Result Date: 2/7/2020  Please see PACS Hyperlink for images and scanned result text.    Nm Pet Ct Skull To Mid Thigh    Result Date: 2/12/2020  EXAM: NM PET CT SKULL TO MID THIGH LOCATION: [facility] DATE/TIME: 2/12/2020 11:49 AM INDICATION: Initial treatment strategy for staging malignant neoplasm of anterior wall of nasopharynx COMPARISON: MRI from 02/10/2020 and CT from 01/24/2020 are reviewed. TECHNIQUE: Serum glucose level 97 mg/dL. One hour post intravenous administration of 8.9 mCi F-18 FDG, PET imaging was performed from the skull base to the mid thighs utilizing attenuation correction with concurrent axial CT and PET/CT image fusion. Dose  reduction techniques were used.  FINDINGS: 3.9 x 1.6 cm markedly FDG avid (SUVmax 9.7) posterior nasopharyngeal soft tissue mass. FDG avid cervical lymphadenopathy at right levels II, III, IV, and V and left levels II and III. Largest is at right level II, measuring 4.2 x 3.4 x 4.4 cm (SUVmax 11.0). Fairly uniform moderate to marked FDG activity in normal-sized, noncalcified bilateral hilar lymph nodes. A representative node at left interlobar station 11L measures 1.2 x 0.9 cm (SUVmax 5.2). Near complete opacification of the right maxillary sinus with high attenuation inspissated secretions or fungal material and associated inflammatory FDG activity. Physiologic calcifications in the basal ganglia. Calcified granuloma left lower lobe. Dilated ascending aorta measures 4.7 cm in AP diameter. Mild calcified atherosclerosis. Moderate degenerative change throughout the spine.     1.  Findings consistent with nasopharyngeal carcinoma and bilateral cervical lymph node metastases 2.  FDG avid bilateral hilar lymph nodes are likely distant metastases, but technically indeterminate, as granulomatous inflammation frequently has this appearance at PET CT. Consider endobronchial ultrasound-guided biopsy for reliable staging.    Pathology:    Final Diagnosis   NASOPHARYNX, BIOPSY:     -  MODERATELY DIFFERENTIATED SQUAMOUS CELL CARCINOMA     -  SEE MICROSCOPIC DESCRIPTION     MCSS   Electronically signed by Randi Charles MD on 2/4/2020 at 0818   Comment  Dr. Talita Garcia has reviewed the case and concurs with the final diagnosis.    Microscopic Description     Sections demonstrate a malignant eosinophilic cell proliferation with prominent nucleol. Keratinization is not significantly present. Scattered mitotic figures are seen. Immunohistochemical stains (p63, p16, CD3, CD20) are performed with appropriate positive controls. The malignant cells are positive for p63 and negative for CD3, CD20. p16 is essentially negative in the invasive carcinoma and positive  in the overlying dysplastic mucosa with patchy foci showing a block type staining pattern.       Signed by: Alan Frias MD

## 2021-06-06 NOTE — TELEPHONE ENCOUNTER
Called to let Kristen know ( his daughter) that his biopsy results were negative for cancer.  Follow up with Dr. Frias and Dr. Mendoza (from cancer [facility]) as scheduled. No other questions at this time.

## 2021-06-06 NOTE — PRE-PROCEDURE
Procedure Name: EBUS  Date/Time: 2/27/2020 8:31 AM    Verbal consent obtained?: Yes  Written consent obtained?: Yes  Risks and benefits: Risks, benefits and alternatives were discussed  Discharge plan: Appropriate discharge home plan in place for patients who are going home after procedure   Consent given by: patient  Expected level of sedation: moderate  ASA Class: Class 1- healthy patient  Mallampati: Grade 1- soft palate, uvula, tonsillar pillars, and posterior pharyngeal wall visible  Patient states understanding of procedure being performed: Yes  Patient's understanding of procedure matches consent: Yes  Procedure consent matches procedure scheduled: Yes  Appropriately NPO: yes  Lungs: lungs clear with good breath sounds bilaterally  Heart: normal heart sounds and rate  History & Physical reviewed: History and physical reviewed and no updates needed  Statement of review: I have reviewed the lab findings, diagnostic data, medications, and the plan for sedation    Sherry Santamaria  Pulmonary and Critical Care  [id number]

## 2021-06-06 NOTE — PROGRESS NOTES
Pt arrived to infusion, port accessed with great blood return. Labs reviewed. Pt tolerated Cisplatin infusion well. Port flushed with heparin, de-accessed, band-aid applied. Pt ambulated out of infusion clinic independently.

## 2021-06-07 NOTE — PROGRESS NOTES
Pt here for treatment after radiation therapy. Port accessed easily and no problems with infusion. Pt up to bathroom independently. Upon completion port flushed and deaccessed. Pt d/c ambulatory to lobby to meet his son. Pt aware of treatment plan.

## 2021-06-07 NOTE — PROGRESS NOTES
Patient here ambulatory with wheeled walker for follow up s/p H&N cancer.  Patient recently discharge from the hospital.  Now has feeding tube and using pain medication.   on phone for visit.  Seen by Dr. Mendoza.  Plan RTC for follow up as directed by physician.

## 2021-06-07 NOTE — TELEPHONE ENCOUNTER
Patient daughter Rea called this morning and questioning why her dad was not given chemotherapy today as he was confused. Per LAINE Ventura note that his platelets were to low and also creatine level was low; likely due to dehydration. I advised to make sure he increases his oral hydration levels. Will recheck labs next week as planned. I also let her know we had an  video at the time of the visit, so everything was explained in detail to the patient as well. She was thankful for the clarification.    Mary Lou Laguna, CMA

## 2021-06-07 NOTE — PROGRESS NOTES
"Kristen Chapman is a 70 y.o. male who is being evaluated via a billable video visit.      The patient has been notified of following:     \"This video visit will be conducted via a call between you and your physician/provider. We have found that certain health care needs can be provided without the need for an in-person physical exam.  This service lets us provide the care you need with a video conversation.  If a prescription is necessary we can send it directly to your pharmacy.  If lab work is needed we can place an order for that and you can then stop by our lab to have the test done at a later time.    Video visits are billed at different rates depending on your insurance coverage. Please reach out to your insurance provider with any questions.    If during the course of the call the physician/provider feels a video visit is not appropriate, you will not be charged for this service.\"    Patient has given verbal consent to a Video visit? Yes    Patient would like to receive their AVS by AVS Preference: Mail a copy.    Patient would like the video invitation sent by: Text to cell phone: [phone]    Will anyone else be joining your video visit? 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  He received a feeding tube and all going well.  The family is having no additional questions or concerns patient denies having much pain.      1. Nasopharyngeal carcinoma (H)  Management per oncology    2. Status post insertion of percutaneous endoscopic gastrostomy (PEG) tube (H)  Doing well, patient and family having no additional questions or concerns      Video-Visit Details    Type of service:  Video Visit    Video End Time (time video stopped): 3:13 PM  Originating Location (pt. Location): Home    Distant Location (provider " location):  [facility]     Mode of Communication:  Video Conference via [facility]      Mike Cook MD

## 2021-06-07 NOTE — PROGRESS NOTES
Kristen arrived A&Ox4 ambulatory with his walker and stable, confirms he is here for IV hydration. Pt is not eating/drinking much, has G tube- unsure how much he is using it or how much it is helping him. He appears very tired/fatigued.  Port accessed with ease, good blood return , line easily flushed NS20mL  Kristen was infused with 1L NS over 2hours; VS slightly improved  Port flushed NS20mL, instilled with heparin 6mL 1:100 and gripper needle dc'd, site covered with bandaid. Pt stated he felt somewhat better after hydration.  1518 writer escorted Kristen to lot D where his son was waiting to bring him home, pt was ambulatory with his walker.    Writer added Kristen to infusion schedule next week Tuesday and thursday for possible IV fluids

## 2021-06-07 NOTE — PROGRESS NOTES
Received patient from 2nd floor Cancer Care at approximately 10:15, and was seated in chair 2. Reviewed plan of care. Accessed port a cath per sterile technique and obtained an excellent blood return. Infused 1 liter of NS over 2 hours. At completion the port a cath was flushed, heparinized, and deaccessed - covered the site with folded 2x2s and tape. VSS, afebrile. A copy of the appointment schedule was given to the patient, and explained to his son. At 12:53 this writer took the patient via w/c to awaiting vehicle, and verbalized understanding of returning on Friday (for IV fluids). Patient was instructed to call with any concerns, prior to the next appointment.    Ana Brink RN

## 2021-06-07 NOTE — TELEPHONE ENCOUNTER
Sent the following e-mail to sonNayla:  Felicitas again!    After his visit with Dr. Mendoza this morning, another appointment was added for next week:    Thursday, May 7th, 2020 arrival of 8 am  8 am:  check in on 1st floor!  Follow up with Dr. Mendoza.    Thanks,  Alisha

## 2021-06-07 NOTE — PROGRESS NOTES
[facility] Hematology and Oncology Progress Note    Patient: Kristen Chapman  MRN: [id number]  Date of Service: 04/06/2020        Reason for Visit    Chief Complaint   Patient presents with     HE Cancer     Squamous cell carcinoma of nasopharynx       Assessment and Plan  Cancer Staging  Nasopharyngeal carcinoma (H)  Staging form: Pharynx - Nasopharynx, AJCC 8th Edition  - Clinical stage from 2/18/2020: Stage SAMANTHA (cT4, cN2, cM0) - Signed by Alan Frias MD on 2/18/2020    1. Nasopharyngeal cancer, squamous cell:   Patient is in his 6th week of chemoradiation with weekly cisplatin, he has received 5 cycles to date. He completes his radiation today.    He is subjectively doing well but he is having cumulative thrombocytopenia (platelets 32K) and mild elevation in creatinine this week. We will omit his last cycle of chemo this week.    Will follow labs weekly and conduct a phone visit in two weeks to assess symptoms.     Will have him return in 4 weeks with restaging CT and visit back with  Dr. Frias. He is considering 3 cycles of consolidative cisplatin and 5FU (over 4 days) if he appears recovered from his chemoradiation.    2. Pancytopenias:   Chemo induced and usually cumulative.   Overall asymptomatic except fatigue. Reviewed platelets 32K and to call for reassessment with bleeding symptoms.   Will expect recovery over next 1-2 wks without further chemotherapy. Will check CBC weekly for at least next two weeks.    3. Elevated creatinine  Likley dehydration + cisplatin effect. Hold further chemotherapy. IVF today and next week. Encouraged increased oral hydration. Follow weekly.    4. Weight loss  12 lb weight loss since start of treatment. He is eating well and supplementing with 5 Boost/day. Nausea well-controlled. No painful swallowing. He will keep working on increased calories during his recovery period from  chemoradiation.      ______________________________________________________________________________    History of Present Illness    DIAGNOSIS: Nasopharyngeal carcinoma: Biopsy from the right nasopharynx shows a moderately differentiated squamous cell carcinoma.  Imaging was reviewed.  He has evidence of bilateral cervical node metastases.  Tumor also seems to extend down to the hypopharynx.     TREATMENT: has started concurrent chemo and radiation with weekly cisplatin x 5 cycles (last cycle omitted for trombocytopenia and elevated creatinine). Completes radiation on 4/7/2020    INTERIM HISTORY:   Nadiaa and interpretor on iPad present ahead of cycle 6 treatment planned today. He completes his radiation today as well.    He states that he is doing quite well and really has minimal complaints. Minimal nausea well controlled with antiemetics. Intermittent tinnitus on treatment. No neuropathy. No fevers. No pain. Dried blood when blowing his nose, no other bleeding. Eating a soft diet and supplementing with 5 boost/day; drinks about 50 oz fluid/day. Bowels regular.         Review of Systems  Constitutional  Constitutional (WDL): Exceptions to WDL  Fatigue: Concerns(mild)  Neurosensory  Neurosensory (WDL): All neurosensory elements are within defined limits  Eye   Eye Disorder (WDL): All eye disorder elements are within defined limits  Ear  Ear Disorder (WDL): Exceptions to WDL  Tinnitus: Concerns(right ear)  Cardiovascular  Cardiovascular (WDL): All cardiovascular elements are within defined limits  Pulmonary  Respiratory (WDL): Within Defined Limits  Gastrointestinal  Gastrointestinal (WDL): Exceptions to WDL  Anorexia: Concerns(diminished)  Nausea: Concerns(controlled with medication)  Dry Mouth: Concerns  Genitourinary  Genitourinary (WDL): All genitourinary elements are within defined limits  Lymphatic  Lymph (WDL): All lymph disorder elements are within defined limits  Musculoskeletal and Connective  Tissue  Musculoskeletal and Connetive Tissue Disorders (WDL): All Musculoskeletal and Connetive Tissue Disorder elements are within defined limits  Integumentary  Integumentary (WDL): All integumentary elements are within defined limits  Patient Coping  Patient Coping: Accepting;Open/discussion  Accompanied by  Accompanied by: Alone         Past History  Past Medical History:   Diagnosis Date     Nasopharyngeal cancer (H)        PHYSICAL EXAM:  There were no vitals taken for this visit.    GENERAL: no acute distress. Cooperative in conversation. Here alone due to Covid19 visitor restrictions. We do have  on video call  RESP: Regular respiratory rate. No expiratory wheezes  MUSCULOSKELETAL: No lower extremity swelling.   NEURO: non focal. Alert and oriented x3.   PSYCH: within normal limits. No depression or anxiety.  SKIN: dry intact on visible skin. Neck is not red       Lab Results    Hemoglobin 8.8. Platelets 32. Creatine 1.43. Sodium 131.    Imaging    No results found.   present and was involved in entire conversation      Signed by: Joanna Ventura CNP

## 2021-06-07 NOTE — PROGRESS NOTES
Received patient from the 2nd floor infusion Unit - seated in chair 2 of 1st floor OP Infusion. Explained nursing care, as patient rested quietly - answers questions with a soft whisper. Excellent blood return from the port a cath. Today's hgb is 6.7. Transfused with one unit of RBCs. At completion the IV line was flushed with NS. The port a cath was flushed, heparinized, and deaccessed - covered the site with folded 2x2 and paper tape. The post transfusion AVS and a copy of today's labs was given and explained to the patient; instructed to give to his family - writer spoke to pt's daughter (Rea) on the phone, and was given an update. At 14:10 this writer took the patient via w/c to awaiting vehicle, and patient plans to return tomorrow for radiation.    Ana Brink RN

## 2021-06-07 NOTE — PROGRESS NOTES
RADIATION ONCOLOGY WEEKLY TREATMENT VISIT NOTE      Assessment / Impression       1. Squamous cell carcinoma of nasopharynx (H)       Tolerating radiation therapy well.  All questions and concerns addressed.    Plan:     Continue radiation treatment as prescribed.    Discussed with the patient about appropriate nutritional support.       Reviewed CBCT today.  There is a good response.    His pain is under reasonable control and patient wished not to have more pain medication at this time.    Subjective:      HPI: Kristen Chapman is a 70 y.o. male with    1. Squamous cell carcinoma of nasopharynx (H)         The following portions of the patient's history were reviewed and updated as appropriate: allergies, current medications, past family history, past medical history, past social history, past surgical history and problem list.    Assessment                  Body Site: Head and Neck Site: nasopharynx  Stereotactic Radiosurgery: No  Concurrent Therapy: Yes  Today's Dose: 6200  Total Dose for Head and Neck: 7000  Today's Fraction/Total Fraction Head and Neck:   Mucositis due to radiation: 0: None  Thrush: 0: Absent  Pharynx and Esphogaus: 2: Tolerates soft diet  Voice Chances/Stridor/Larynx: 2: Persistent hoarseness, but able to vocalize, may have mild to moderate edema  Ocular/Visual - other : 0: Normal  Middle ear/hearin: Normal  Tinnitus: 1: Yes, recent (within the last 3 months)  Cough: 0: Absent                                            Sexuality Alteration                 Emotional Alteration Copin: Effective  Comfort Alteration KPS: 80% Can perform normal activity with effort, some signs of disease  Fatigue (ONS scale) : 5: Moderate Fatigue  Pain Location: sore throat  Pain Intensity. Rate degree of pain ranging from 0 (no pain) to 10 (severe pain) : 4  Pain Description: Ache - Muscular type ache  Pain Intervention: 3: Opiods  Effectiveness of pain intervention: 3: Pain relieved 75%    Nutrition Alteration Anorexia: 1: Loss of appetite  Nausea: 1: Able to eat  Vomitin: None  Pharynx and Esphogaus: 2: Tolerates soft diet  Skin Alteration Skin Sensation: 0: No problem  Skin Reaction: 0: None  AUA Assessment                                  Accompanied by       Objective:     Exam: moderate, severe Erythema with no signs of oral fungal infection.    Vitals:    20 0855   BP: 103/64   Pulse: 99   Temp: 99.1  F (37.3  C)   TempSrc: Oral   SpO2: 99%   Weight: 137 lb 12.8 oz (62.5 kg)       Wt Readings from Last 8 Encounters:   20 137 lb 12.8 oz (62.5 kg)   20 140 lb 1.6 oz (63.5 kg)   20 143 lb 11.2 oz (65.2 kg)   20 143 lb 6.4 oz (65 kg)   20 143 lb 8 oz (65.1 kg)   20 147 lb (66.7 kg)   20 149 lb 4.8 oz (67.7 kg)   03/10/20 151 lb 3.2 oz (68.6 kg)       General: Alert and oriented, in no acute distress  Kristen has moderate, severe Erythema.  Aria chart and setup information reviewed    Laurita Mendoza MD

## 2021-06-07 NOTE — PROGRESS NOTES
[facility] Hematology and Oncology Progress Note    Patient: Kristen Chapman  MRN: [id number]  Date of Service: 03/24/2020        Reason for Visit    Chief Complaint   Patient presents with     HE Cancer     Squamous cell carcinoma of nasopharynx        Assessment and Plan  Cancer Staging  Nasopharyngeal carcinoma (H)  Staging form: Pharynx - Nasopharynx, AJCC 8th Edition  - Clinical stage from 2/18/2020: Stage SAMANTHA (cT4, cN2, cM0) - Signed by Alan Frias MD on 2/18/2020    1. Nasopharyngeal cancer, squamous cell: pt has started concurrent chemo and radiation with weekly cisplatin. Today is week 4. He is doing well. We will continue full dose for now. Return weekly and see Dr. Frias in 2 weeks. The plan will be to do 7 weeks of this and then he will likely get more cisplatin and 5FU.     2. Anemia: chemo induced and usually cumulative. Overall asymptomatic except fatigue. Continue to monitor.     3. Mild hypokalemia: likely from treatment/dietary. Educated on foods high in potassium. Continue to monitor frequently. No need for supplement at this time.       ECOG Performance   ECOG Performance Status: 0     Distress Assessment  Distress Assessment Score: No distress    Pain  Currently in Pain: No/denies      Problem List    No diagnosis found.   ______________________________________________________________________________    History of Present Illness    DIAGNOSIS: Nasopharyngeal carcinoma: Biopsy from the right nasopharynx shows a moderately differentiated squamous cell carcinoma.  Imaging was reviewed.  He has evidence of bilateral cervical node metastases.  Tumor also seems to extend down to the hypopharynx.     TREATMENT: has started concurrent chemo and radiation with weekly cisplatin. Today is week 4.     INTERIM HISTORY: Patient is here today to continue on treatment.  He states that he is doing quite well and really has minimal complaints.    Pain Status  Currently in Pain: No/denies    Review of  Systems    Oncology Nurse Assessment/CMA Intake: Constitutional  Constitutional (WDL): All constitutional elements are within defined limits  Neurosensory  Neurosensory (WDL): All neurosensory elements are within defined limits  Eye   Eye Disorder (WDL): All eye disorder elements are within defined limits  Ear  Ear Disorder (WDL): Exceptions to WDL  Tinnitus: Concerns(bilateral)  Cardiovascular  Cardiovascular (WDL): All cardiovascular elements are within defined limits  Pulmonary  Respiratory (WDL): Within Defined Limits  Gastrointestinal  Gastrointestinal (WDL): All gastrointestinal elements are within defined limits  Genitourinary  Genitourinary (WDL): All genitourinary elements are within defined limits  Lymphatic  Lymph (WDL): All lymph disorder elements are within defined limits  Musculoskeletal and Connective Tissue  Musculoskeletal and Connetive Tissue Disorders (WDL): All Musculoskeletal and Connetive Tissue Disorder elements are within defined limits  Integumentary  Integumentary (WDL): All integumentary elements are within defined limits  Patient Coping  Patient Coping: Accepting;Open/discussion  Accompanied by  Accompanied by: Alone  Oral Chemo Adherence         Past History  Past Medical History:   Diagnosis Date     Nasopharyngeal cancer (H)        PHYSICAL EXAM:  There were no vitals taken for this visit.  GENERAL: no acute distress. Cooperative in conversation. Here alone due to Covid19 visitor restrictions. We do have  on video call  RESP: Regular respiratory rate. No expiratory wheezes  MUSCULOSKELETAL: No lower extremity swelling.   NEURO: non focal. Alert and oriented x3.   PSYCH: within normal limits. No depression or anxiety.  SKIN: dry intact on visible skin. Neck is not red         Lab Results    Recent Results (from the past 168 hour(s))   Magnesium   Result Value Ref Range    Magnesium 2.0 1.8 - 2.6 mg/dL   Comprehensive Metabolic Panel   Result Value Ref Range    Sodium 136 136  - 145 mmol/L    Potassium 3.6 3.5 - 5.0 mmol/L    Chloride 102 98 - 107 mmol/L    CO2 27 22 - 31 mmol/L    Anion Gap, Calculation 7 5 - 18 mmol/L    Glucose 97 70 - 125 mg/dL    BUN 15 8 - 22 mg/dL    Creatinine 0.92 0.70 - 1.30 mg/dL    GFR MDRD Af Amer >60 >60 mL/min/1.73m2    GFR MDRD Non Af Amer >60 >60 mL/min/1.73m2    Bilirubin, Total 0.9 0.0 - 1.0 mg/dL    Calcium 8.2 (L) 8.5 - 10.5 mg/dL    Protein, Total 6.2 6.0 - 8.0 g/dL    Albumin 3.0 (L) 3.5 - 5.0 g/dL    Alkaline Phosphatase 69 45 - 120 U/L    AST 26 0 - 40 U/L    ALT 34 0 - 45 U/L   HM1 (CBC with Diff)   Result Value Ref Range    WBC 7.2 4.0 - 11.0 thou/uL    RBC 3.64 (L) 4.40 - 6.20 mill/uL    Hemoglobin 9.3 (L) 14.0 - 18.0 g/dL    Hematocrit 29.9 (L) 40.0 - 54.0 %    MCV 82 80 - 100 fL    MCH 25.5 (L) 27.0 - 34.0 pg    MCHC 31.1 (L) 32.0 - 36.0 g/dL    RDW 13.6 11.0 - 14.5 %    Platelets 166 140 - 440 thou/uL    MPV 10.3 8.5 - 12.5 fL    Neutrophils % 79 (H) 50 - 70 %    Lymphocytes % 9 (L) 20 - 40 %    Monocytes % 10 2 - 10 %    Eosinophils % 2 0 - 6 %    Basophils % 0 0 - 2 %    Neutrophils Absolute 5.6 2.0 - 7.7 thou/uL    Lymphocytes Absolute 0.6 (L) 0.8 - 4.4 thou/uL    Monocytes Absolute 0.7 0.0 - 0.9 thou/uL    Eosinophils Absolute 0.2 0.0 - 0.4 thou/uL    Basophils Absolute 0.0 0.0 - 0.2 thou/uL   Magnesium   Result Value Ref Range    Magnesium 1.9 1.8 - 2.6 mg/dL   Comprehensive Metabolic Panel   Result Value Ref Range    Sodium 138 136 - 145 mmol/L    Potassium 3.4 (L) 3.5 - 5.0 mmol/L    Chloride 102 98 - 107 mmol/L    CO2 26 22 - 31 mmol/L    Anion Gap, Calculation 10 5 - 18 mmol/L    Glucose 148 (H) 70 - 125 mg/dL    BUN 19 8 - 22 mg/dL    Creatinine 1.25 0.70 - 1.30 mg/dL    GFR MDRD Af Amer >60 >60 mL/min/1.73m2    GFR MDRD Non Af Amer 58 (L) >60 mL/min/1.73m2    Bilirubin, Total 0.8 0.0 - 1.0 mg/dL    Calcium 8.7 8.5 - 10.5 mg/dL    Protein, Total 6.5 6.0 - 8.0 g/dL    Albumin 3.1 (L) 3.5 - 5.0 g/dL    Alkaline Phosphatase 67 45 -  120 U/L    AST 21 0 - 40 U/L    ALT 28 0 - 45 U/L   HM1 (CBC with Diff)   Result Value Ref Range    WBC 7.0 4.0 - 11.0 thou/uL    RBC 3.72 (L) 4.40 - 6.20 mill/uL    Hemoglobin 9.6 (L) 14.0 - 18.0 g/dL    Hematocrit 30.3 (L) 40.0 - 54.0 %    MCV 82 80 - 100 fL    MCH 25.8 (L) 27.0 - 34.0 pg    MCHC 31.7 (L) 32.0 - 36.0 g/dL    RDW 14.2 11.0 - 14.5 %    Platelets 170 140 - 440 thou/uL    MPV 10.9 8.5 - 12.5 fL    Neutrophils % 80 (H) 50 - 70 %    Lymphocytes % 9 (L) 20 - 40 %    Monocytes % 8 2 - 10 %    Eosinophils % 2 0 - 6 %    Basophils % 0 0 - 2 %    Neutrophils Absolute 5.6 2.0 - 7.7 thou/uL    Lymphocytes Absolute 0.6 (L) 0.8 - 4.4 thou/uL    Monocytes Absolute 0.6 0.0 - 0.9 thou/uL    Eosinophils Absolute 0.2 0.0 - 0.4 thou/uL    Basophils Absolute 0.0 0.0 - 0.2 thou/uL       Imaging    Ir Port Placement 5+ Years    Result Date: 3/2/2020  [place] RADIOLOGY LOCATION: [facility] DATE: 3/2/2020 PROCEDURE: IMPLANTABLE VENOUS CHEST PORT PLACEMENT INTERVENTIONAL RADIOLOGIST: Aneudy Easley MD. INDICATION: Nasopharyngeal carcinoma. Port placement requested for long-term venous access. CONSENT: The risks, benefits and alternatives of implantable venous chest port placement were discussed with the patient through an  in detail. All questions were answered. Informed consent was given to proceed with the procedure. MODERATE SEDATION: Versed 2 mg IV; Fentanyl 100 mcg IV.  Under physician supervision, Versed and fentanyl were administered for moderate sedation. Pulse oximetry, heart rate and blood pressure were continuously monitored by an independent trained observer. The physician spent 35 minutes of face-to-face sedation time with the patient. CONTRAST: None. ANTIBIOTICS: Ancef 2 g IV. ADDITIONAL MEDICATIONS: None. FLUOROSCOPIC TIME: 2.0 minutes. RADIATION DOSE: Air Kerma: 22 mGy. COMPLICATIONS: No immediate complications. STERILE BARRIER TECHNIQUE: Maximum sterile barrier technique was used.  Cutaneous antisepsis was performed at the operative site with application of 2% chlorhexidine and large sterile drape. Prior to the procedure, the  and assistant performed hand hygiene and wore hat, mask, sterile gown, and sterile gloves during the entire procedure. PROCEDURE:  The Central Venous Catheter Insertion checklist was reviewed prior to placement and followed throughout the procedure.  Using real-time ultrasound guidance the right internal jugular vein was accessed. Access was secured with a microwire and transitional  sheath. After measuring the intravascular portion of the microwire, it was exchanged for a 0.035 inch guidewire which was placed in the IVC. A subcutaneous pocket was created and irrigated with sterile normal saline. The port was placed within the subcutaneous pocket and secured using 2-0 Prolene retention sutures. The catheter was tunneled from the neck to the pocket. A peel-away sheath was placed over the guidewire and the catheter was advanced through the sheath. Sheath was peeled away. Subcutaneous catheter redundancy was removed by gentle manual manipulation. The port was connected and aspirated well. It was locked with heparin. The port pocket incision was closed with layered absorbable suture and surgical glue. The dermatotomy site was closed with surgical glue. FINDINGS: Ultrasound demonstrates an anechoic and compressible jugular vein. A permanent image was stored. At the completion of the study the port tip lies near the cavoatrial junction.     1.  Successful implantable venous chest port placement as detailed above. This port is power injectable. 2.  The implantable venous chest port was placed under fluoroscopic guidance and is ready for use immediately. ____________________________________________________________________ CPT codes for physician reference only: 37563 [id number]      present and was involved in entire conversation      Signed by:  Yoanna Andre, CNP

## 2021-06-07 NOTE — PROGRESS NOTES
Pt arrived to infusion clinic. Port accessed with great blood return. Pt tolerated 1L NS infusion well. Port flushed with Heparin, de-accessed, band-aid applied. Pt ambulated out of infusion clinic independently.

## 2021-06-07 NOTE — PROCEDURES
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     INDICATION AND SIMULATION: Upon evaluation of his CBCT earlier.  There is significant reduction of his primary head neck cancer and initial treatment planning showed significant changes of coverage.  Therefore the decision is to re-simulate the patient today and a set of radiation therapy according to the current status of cancer.  Tentative isocenter is set up according to the previous setup information. We will acquire CT information to help us better locate the target and design the radiation therapy field. We will outline the cancer and design the radiation therapy field according to the current status of the cancer. Hopefully, this will significantly reduce radiation induced normal tissue damage and continue to have a good coverage of targets.       Laurita Mendoza MD, PhD  Department of Radiation Oncology   [facility]  Tel: [phone]  Page: [phone]    [facility]  [address] [place] [address]     [facility]  [address] [place] [address]

## 2021-06-07 NOTE — PROGRESS NOTES
Patient arrived from 2nd floor Cancer Care, and was seated in chair 1. Infused 1 liter of NS. VSS. Napped in the recliner. At completion the port a cath was flushed, heparinized, and deaccessed. Scheduled to return on Thursday May 7th - patient has the appointment schedule. At 12:03 this writer took the patient via w/c to awaiting vehicle.    Ana Brink RN

## 2021-06-07 NOTE — TELEPHONE ENCOUNTER
Sent e-mail with update to son:  Hello!    Just a little update:    As you probably know Dad is requiring some extra fluids as he is a bit dehydrated, and we are doing this with  IV fluids  Also, concern about his weight loss.  We know though he continues to work hard.  4-5 boosts/day plus his other soft foods/fluids is great!    Upcoming Schedule:    Friday, April 24th, 2020 arrival of 11:30 am  Check in on 2nd floor  11:30 to 1:30 pm or so for bag of IV fluids.      Tuesday, April 28th, 2020 arrival of 11:30 am  Check in on 2nd floor  11:30 to 1:30 pm or so for bag of IV fluids.    Thursday, April 30th, 2020 arrival of 8:30 am  Check in on 1st floor  Follow up with Dr. Laurita Mendoza, Radiation Oncologist    Friday, May 1st,  2020 arrival of  1 pm  Check in on 2nd floor  1-3 pm or so for bag of IV fluids.      I hope to let you know if there are any changes!    Alisha

## 2021-06-07 NOTE — PROGRESS NOTES
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       TYPE OF RADIATION THERAPY ADMINISTERED:  Concurrent chemoradiation therapy for his head neck cancer with weekly cisplatin and had radiation therapy in our clinic with a total dose of 7000 cGy in 35 treatments given from 3/2/2020-4/17/2020.      INTERVAL SINCE COMPLETION OF RADIATION THERAPY: 2 weeks.      SUBJECTIVE:  Mr. Chapman is a 70 y.o. male who has been in his usual state of good health until recently.  Patient was found to have swelling right neck mass by his lerma a month ago for which he was a seeking further evaluation.  The patient did not notice any significant changes over the past few weeks and he is also asymptomatic.  Initial ENT examination showed suspicious abnormalities in the right nasopharynx worrisome for malignancy.  CT of the neck on 1/24/2020 revealed asymmetric soft tissue thickening in the posterior right nasopharynx as well as abnormal enlarged cervical lymph nodes measuring up to 3.8 cm bilaterally.  Biopsy was not obtained on 1/29/2020 with pathology confirmed moderately differentiated squamous cell carcinoma, P 16 is negative. He received concurrent chemoradiation therapy for his head neck cancer and had radiation therapy in our clinic with a total dose of 7000 cGy in 35 treatments given from 3/2/2020-4/17/2020.  He tolerated radiation therapy reasonably well with expected acute side effect.  The patient insisted not to have PEG tube placement during the therapy.  He however is able to maintain his weight reasonably stable during the treatment.     The patient experienced worsening pain of sore throat and dysphasia since completion of the radiation therapy.    He is not able to get adequate calorie and fluid intake and was admitted to hospital early  this week for supportive care and pain management.  A PEG tube was also placed during the hospital stay.  His condition has been stable and patient was discharged 2 days ago.  The patient felt much better and improved since hospital stay.  The patient is here for routine office follow-up.    Medications were reviewed and are up to date on EPIC.    The following portions of the patient's history were reviewed and updated as appropriate: allergies, current medications, past family history, past medical history, past social history, past surgical history and problem list.    Review of Systems:      General  Constitutional (WDL): Exceptions to WDL  Fatigue: Fatigue not relieved by rest - Limiting instrumental ADL  EENT  Eye Disorder (WDL): All eye disorder elements are within defined limits  Ear Disorder (WDL): All ear disorder elements are within defined limits  Respiratory   Respiratory (WDL): Within Defined Limits  Cardiovascular  Cardiovascular (WDL): All cardiovascular elements are within defined limits  Endocrine     Gastrointestinal  Gastrointestinal (WDL): Exceptions to WDL  Anorexia: Oral intake altered without significant weight loss or malnutrition, oral nutritional supplements indicated  Dysphagia: Symptomatic, able to eat regular diet  Esophagitis: Symptomatic, altered eating/swallowing, oral supplements indicated  Musculoskeletal  Musculoskeletal and Connetive Tissue Disorders (WDL): Exceptions to WDL  Muscle Weakness : Symptomatic, perceived by patient but not evident on physical exam  Myalgia: Mild pain  Integumentary               Integumentary (WDL): All integumentary elements are within defined limits  Neurological  Neurosensory (WDL): Exceptions to WDL  Ataxia: Moderate symptoms, limiting instrumental ADL(using wheeled walker)  Psychological/Emotional   Patient Coping: Accepting;Open/discussion  Hematological/Lymphatic  Lymph (WDL): All lymph disorder elements are within defined  limits  Dermatologic     Genitourinary/Reproductive  Genitourinary (WDL): All genitourinary elements are within defined limits  Reproductive     Pain              Currently in Pain: Yes  Pain Score (Initial OR Reassessment): 8  Pain Frequency: Constant/continuous  Location: throat  Pain Intervention(s): Home medication  Response to Interventions: some  Accompanied by  Accompanied by: Alone( on phone)    Objective:     PHYSICAL EXAMINATION:    BP 94/68 (Patient Site: Left Arm, Patient Position: Standing, Cuff Size: Adult Regular)   Pulse (!) 101   Temp 98.3  F (36.8  C) (Oral)   Wt 133 lb (60.3 kg)   SpO2 94%   BMI 25.97 kg/m      Gen: Alert, in NAD  Eyes: PERRL, EOMI, sclera anicteric  HENT     Head: NC/AT     Ears: No external auricular lesions     Nose/sinus: No rhinorrhea or epistaxis     Oropharynx: MMM, no visible oral lesions, no evidence of fungal infection.  Neck: Supple, full ROM, no LAD, skin within the radiation therapy field shows post therapy changes.  Pulm: No wheezing, stridor or respiratory distress  CV: Well-perfused, no cyanosis, no pedal edema  Abdominal: BS+, soft, nontender, nondistended, no hepatomegaly  Back: No step-offs or pain to palpation along the thoracolumbar spine  Rectal: Deferred  : Deferred  Musculoskeletal: Normal muscle bulk and tone  Skin: Normal color and turgor  Neurologic: A/Ox3, CN II-XII intact, normal gait and station  Psychiatric: Appropriate mood and affect      Impression     The patient is a 70-year-old gentleman with a diagnosis of nasopharyngeal cancer status post concurrent chemoradiation therapy 2 weeks ago.    Assessment & Plan:     1.  I have discussed with the patient about appropriate nutritional support.  He had PEG tube placement and the recommend patient to maintain his weight stable over the next few weeks.  Patient is also informed to contact our office to get IV fluid if he does not tolerate PEG tube feeding.  Recommend patient to take  pain medication regularly for better pain control.  2.  Return to radiation oncology in 1 week for another office follow-up.  3.  Continue follow-up with Dr. Frias, medical oncology as planned.      Face to face time  15 minutes with > 75% spent on consultation, education and coordination of care.    Laurita Mendoza MD, PhD  Department of Radiation Oncology   [facility]  Tel: [phone]  Page: [phone]    [address] [address]     [address]   [place] [address]    CC:  Patient Care Team:  Mike Cook MD as PCP - General (Family Medicine)  Laurita Mendoza MD as Physician (Radiation Oncology)  Alan Frias MD as Physician (Hematology and Oncology)  Alisha Tierney, RN as Oncology Nurse Navigator  Billy Guerrero MD as Physician (Otolaryngology)

## 2021-06-07 NOTE — PROGRESS NOTES
RADIATION ONCOLOGY WEEKLY TREATMENT VISIT NOTE      Assessment / Impression       1. Nasopharyngeal carcinoma (H)        Tolerating radiation therapy well.  All questions and concerns addressed.    Plan:     Continue radiation treatment as prescribed.    Discussed with the patient about appropriate nutritional support.    Reviewed CBCT today.  There is a good response.    Subjective:      HPI: Kristen Chapman is a 69 y.o. male with    1. Nasopharyngeal carcinoma (H)         The following portions of the patient's history were reviewed and updated as appropriate: allergies, current medications, past family history, past medical history, past social history, past surgical history and problem list.    Assessment                  Body Site: Head and Neck Site: nasopharynx  Stereotactic Radiosurgery: No  Concurrent Therapy: Yes  Today's Dose: 3200  Total Dose for Head and Neck: 7000  Today's Fraction/Total Fraction Head and Neck: 16/35  Mucositis due to radiation: 1: Erythema of the mucosa  Thrush: 0: Absent  Pharynx and Esphogaus: 2: Tolerates soft diet  Voice Chances/Stridor/Larynx: 0: Normal  Ocular/Visual - other : 0: Normal  Middle ear/hearin: Normal  Tinnitus: 1: Yes, recent (within the last 3 months)  Cough: 0: Absent                                            Sexuality Alteration                 Emotional Alteration Copin: Effective  Comfort Alteration KPS: 90% Can perform normal activity, minor signs of disease  Fatigue (ONS scale) : 3: Mild Fatigue  Pain Location: denies  Pain Intervention: 3: Opiods  Effectiveness of pain intervention: 4: Pain relieved 100%   Nutrition Alteration Anorexia: 1: Loss of appetite  Nausea: 0: None  Vomitin: None  Pharynx and Esphogaus: 2: Tolerates soft diet  Skin Alteration Skin Sensation: 0: No problem  Skin Reaction: 0: None  AUA Assessment                                  Accompanied by       Objective:     Exam:     Vitals:    20 0857   BP: 115/73    Pulse: 85   Temp: 98.1  F (36.7  C)   TempSrc: Oral   SpO2: 98%   Weight: 143 lb 8 oz (65.1 kg)       Wt Readings from Last 8 Encounters:   03/23/20 143 lb 8 oz (65.1 kg)   03/17/20 147 lb (66.7 kg)   03/16/20 149 lb 4.8 oz (67.7 kg)   03/10/20 151 lb 3.2 oz (68.6 kg)   03/09/20 152 lb 1.6 oz (69 kg)   03/02/20 155 lb 9.6 oz (70.6 kg)   02/27/20 155 lb (70.3 kg)   02/25/20 154 lb (69.9 kg)       General: Alert and oriented, in no acute distress  Yia has mild Erythema.  Aria chart and setup information reviewed    Laurita Mendoza MD

## 2021-06-07 NOTE — PROGRESS NOTES
Radiation Treatment Summary    Patient: Kristen Chapman   MRN: [id number]  : 1950  Care Provider: Laurita Mendoza    Date of Service: 2020        Billy Guerrero MD  [address] [address]             Dear Dr. Guerrero:     Your patient Mr. Kristen Chapman complete his radiation therapy 2020. As you know Mr. Chapman is a 69 y.o. 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  He received concurrent chemoradiation therapy for his head neck cancer and had radiation therapy in our clinic with a total dose of 7000 cGy in 35 treatments given from 3/2/2020-2020.  He tolerated radiation therapy reasonably well with expected acute side effect.  The patient insisted not to have PEG tube placement during the therapy.  He however is able to maintain his weight reasonably stable during the treatment.  He is scheduled to return to radiation oncology in 1 week for routine postradiation therapy office follow-up.  I will continue to monitor him closely over the next few weeks until he is fully recovered from his current treatment.    Again, thank you very much for the referral and allowing me 20 spent in the care of this patient.  If you have any questions about this patient, please do not hesitate to call.    Sincerely,      Laurita Mendoza MD, PhD  Department of Radiation Oncology   [facility]  Tel: [phone]  Page: [phone]    [facility]  [address] [place] [address]     [address]  [place] [address]    CC:  Patient Care Team:  Provider, No Primary Care as PCP - General  Laurita Mendoza MD as Physician (Radiation Oncology)  Alan Frias MD as Physician (Hematology and Oncology)  Alisha Tierney RN as Oncology Nurse Navigator  Billy Guerrero MD as Physician (Otolaryngology)

## 2021-06-07 NOTE — PROGRESS NOTES
RADIATION ONCOLOGY WEEKLY TREATMENT VISIT NOTE      Assessment / Impression       1. Squamous cell carcinoma of nasopharynx (H)  fluconazole (DIFLUCAN) 100 MG tablet   2. Fungal infection  fluconazole (DIFLUCAN) 100 MG tablet      Tolerating radiation therapy well.  All questions and concerns addressed.    Plan:     Continue radiation treatment as prescribed.    Discussed with the patient about appropriate nutritional support.     Reviewed CBCT today.  There is a good response.    Prescribed Diflucan for his ongoing fungal infection.  Patient still wished to wait for PEG tube placement.    Subjective:      HPI: Kristen Chapman is a 69 y.o. male with    1. Squamous cell carcinoma of nasopharynx (H)  fluconazole (DIFLUCAN) 100 MG tablet   2. Fungal infection  fluconazole (DIFLUCAN) 100 MG tablet       The following portions of the patient's history were reviewed and updated as appropriate: allergies, current medications, past family history, past medical history, past social history, past surgical history and problem list.    Assessment                  Body Site: Head and Neck Site: nasopharynx  Stereotactic Radiosurgery: No  Concurrent Therapy: Yes  Today's Dose: 4200  Total Dose for Head and Neck: 7000  Today's Fraction/Total Fraction Head and Neck: 21/35  Mucositis due to radiation: 1: Erythema of the mucosa  Thrush: 1: Present  Pharynx and Esphogaus: 1: Tolerates regular diet  Voice Chances/Stridor/Larynx: 1: Mild or intermittent hoarseness  Ocular/Visual - other : 0: Normal  Middle ear/hearin: Normal  Tinnitus: 1: Yes, recent (within the last 3 months)  Cough: 0: Absent                             Was an  used for assessment: Yes  Requested  for: Assessment   Provided: Yes  Type of  Provided: Video              Sexuality Alteration                 Emotional Alteration Copin: Effective  Comfort Alteration KPS: 90% Can perform normal activity, minor signs  of disease  Fatigue (ONS scale) : 5: Moderate Fatigue  Pain Location: sore throat  Pain Intensity. Rate degree of pain ranging from 0 (no pain) to 10 (severe pain) : 5  Pain Description: Ache - Muscular type ache  Pain Intervention: 3: Opiods  Effectiveness of pain intervention: 4: Pain relieved 100%   Nutrition Alteration Anorexia: 1: Loss of appetite  Nausea: 0: None  Vomitin: None  Pharynx and Esphogaus: 1: Tolerates regular diet  Skin Alteration Skin Sensation: 0: No problem  Skin Reaction: 0: None  AUA Assessment                                  Accompanied by       Objective:     Exam: moderate Erythema.  There are moderate white patches in the oral mucosa consistent with fungal infection.    Vitals:    20 0857   BP: 127/80   Pulse: 70   Temp: 97.8  F (36.6  C)   TempSrc: Oral   SpO2: 99%   Weight: 143 lb 6.4 oz (65 kg)       Wt Readings from Last 8 Encounters:   20 143 lb 6.4 oz (65 kg)   20 143 lb 8 oz (65.1 kg)   20 147 lb (66.7 kg)   20 149 lb 4.8 oz (67.7 kg)   03/10/20 151 lb 3.2 oz (68.6 kg)   20 152 lb 1.6 oz (69 kg)   20 155 lb 9.6 oz (70.6 kg)   20 155 lb (70.3 kg)       General: Alert and oriented, in no acute distress  Kristen has moderate Erythema.  Aria chart and setup information reviewed    Laurita Mendoza MD

## 2021-06-07 NOTE — PROGRESS NOTES
Pt here for their final radiation treatment. DC instructions given verbally and in writing, pt verbalized their understanding. Encouraged pt to make 1 week f/u apt on their way out today.

## 2021-06-07 NOTE — PATIENT INSTRUCTIONS - HE
To Kristen's family -- he did very well today. He had radiation; went to the 2nd floor Infusion and had labs and a liter of NS; then he came to 1st floor Infusion and was given a unit of blood. Today's labs are attached. He rested quite comfortably in the recliner.     [facility] -- [phone]    Please call with ANY questions or concerns.

## 2021-06-07 NOTE — PROGRESS NOTES
Pt here for txt after exam with NP. Lab results approve for txt. Txt reviewed and administered as ordered. PT tolerated txt without any problems. txt completed and port flushed/deaccessed with 2x2 to site. Follow up reviewed and pt dc'd steady gait

## 2021-06-07 NOTE — TELEPHONE ENCOUNTER
I rec'd an e-mail from son, Nayla asking about his dad's schedule and also below is my response:    Hello!    Yes, I saw that and am glad he has the feeding tube in.  I know he didn t want it and I get that but with more nutrition, it will help with the whole healing process, energy level, etc.!    So here is what I have for the rest of this week and next:    TOMORROW, Thursday, May 30th:  arrival of 8:30 am  8:30 am: Check in on 1st floor!  See the Radiation Nurse and Dr. Laurita Mendoza, Radiation Oncologist    Friday, May 1st:  arrival of 1 pm  1 pm:  check in on 2nd floor!  IV fluids, about a 90 minute appointment    Tuesday, May 5th arrival of 8 am   8 am:  check in on 2nd floor!  mayco, RN & Dr. Alan Frias, Medical Oncologist      This is it so far.  There may be more added and I will try and watch for any changes and send  you an updated schedule.  I never mind you reaching out and asking witherJ!    Have a nice evening and hope Dad continues to feel better!    Take CareAlisha    From: Nayla Chapman <[email or web address]>   Sent: Wednesday, April 29, 2020 3:55 PM  To: Alisha Tierney <[email or web address]>  Subject: Re: Dad's schedule    Felicitas Brito,    My father was released from the hospital yesterday and is doing much better than he was. i took him in as he was not feeling well.    They have since put in a feeding tube and gave us some food formula. In my opinion he is doing better than last week.    That being said, i was not able to take him to his appointment yesterday.     If you could, can I get an updated schedule for my father?    Thank you,      Nayla Chapman

## 2021-06-07 NOTE — PROGRESS NOTES
[facility] Hematology and Oncology Progress Note    Patient: Kristen Chapman  MRN: [id number]  Date of Service: 04/06/2020        Reason for Visit    Chief Complaint   Patient presents with     HE Cancer     Squamous cell carcinoma of nasopharynx       Assessment and Plan  Cancer Staging  Nasopharyngeal carcinoma (H)  Staging form: Pharynx - Nasopharynx, AJCC 8th Edition  - Clinical stage from 2/18/2020: Stage SAMANTHA (cT4, cN2, cM0) - Signed by Alan Frias MD on 2/18/2020    1. Nasopharyngeal cancer, squamous cell:   Patient completed radiation last Friday, 4/17 and received 5 total cycles of concurrent weekly cisplatin, through 3/31; the remainder of concurrent chemo was omitted for pancytopenias and renal insufficiency.     Will follow him closely over next few weeks as he recovers from treatment. He will return to see Dr. Frias next week.    Restaging CT four weeks post RT is scheduled for 5/18.  Dr. Frias is considering 3 cycles of consolidative cisplatin and 5FU (over 4 days) if he appears recovered from his chemoradiation.    I called daughter, Rea, on speaker phone to discuss overall plan as well.  2. Pancytopenias:   Likely chemo-induced, but quite remarkable cytopenias the last two weeks. His platelets nadired at 32K, but recovered. Last week, he was quite anemic at 6.7 g/dL and received a unit of RBCs with hgb 8.0 g/dL this week. His WBC and ANC are also recovering this week.     Monitor and recheck labwork next week.    3. Elevated creatinine  Likley dehydration + cisplatin effect. Has been receiving IVF a couple times/week and creatinine improved to his baseline 1.14. Will continue with IVF today and Friday this week and twice next week.    4. Weight loss  5. Odynophagia  20 lb weight loss since start of treatment. He is having more painful swallowing post-radiation with continued weight loss. Dr. Mendoza recommended PEG placement, but patient refused and plans to work on increasing his oral calorie intake.  He is working towards a goal of 4-5 Boost supplementation in addition to his regular diet/day.     I prescribed him Magic Mouthwash to swallow before meals. He will continue with Vicodin.    ______________________________________________________________________________    History of Present Illness    DIAGNOSIS: Nasopharyngeal carcinoma: Biopsy from the right nasopharynx shows a moderately differentiated squamous cell carcinoma.  Imaging was reviewed.  He has evidence of bilateral cervical node metastases.  Tumor also seems to extend down to the hypopharynx.     TREATMENT: has started concurrent chemo and radiation with weekly cisplatin x 5 cycles 3/31/20 (last two cycles omitted for trombocytopenia and elevated creatinine). Completed radiation on 4/17/2020    INTERIM HISTORY:   Kristen and interpretor on iPad for reassessment following chemoradiation. Also, called his daughter Rea on speaker phone. He has his last cycle of chemo just over two weeks ago and finished radiation last Friday.    Kristen has had more fatigue and odynophagia in the last week, since completing radiation. He takes Vicodin for the throat pain. Also noting hoarseness since early April. No nausea.  Intermittent tinnitus. No neuropathy. No fevers. No pain. Eating a soft diet and supplementing with Boost/day; drinks about 40-50 oz fluid/day. Bowels regular.    Review of Systems  Constitutional  Constitutional (WDL): Exceptions to WDL  Fatigue: Concerns(not relieved with rest)  Weight Loss: Concerns  Neurosensory  Neurosensory (WDL): Assessment not pertinent to visit  Eye   Eye Disorder (WDL): Assessment not pertinent to visit  Ear  Ear Disorder (WDL): Assessment not pertinent to visit  Cardiovascular  Cardiovascular (WDL): All cardiovascular elements are within defined limits  Pulmonary  Respiratory (WDL): Within Defined Limits  Gastrointestinal  Gastrointestinal (WDL): Exceptions to WDL  Anorexia: Concerns  Dehydration: Concerns  Dysphagia:  "Concerns  Esophagitis: Concerns  Genitourinary  Genitourinary (WDL): All genitourinary elements are within defined limits  Lymphatic  Lymph (WDL): Assessment not pertinent to visit  Musculoskeletal and Connective Tissue  Musculoskeletal and Connetive Tissue Disorders (WDL): Exceptions to WDL  Muscle Weakness : Concerns  Integumentary  Integumentary (WDL): All integumentary elements are within defined limits  Patient Coping  Patient Coping: Accepting;Open/discussion  Accompanied by  Accompanied by: Alone      Past History  Past Medical History:   Diagnosis Date     Nasopharyngeal cancer (H)        PHYSICAL EXAM:  /69   Pulse (!) 110   Temp 99.7  F (37.6  C) (Oral)   Ht 5' 2.75\" (1.594 m)   Wt 135 lb 8 oz (61.5 kg)   SpO2 99%   BMI 24.19 kg/m      GENERAL: no acute distress. Cooperative in conversation. Here alone due to Covid19 visitor restrictions.  on iPad. Called daughter, Rea, on speaker phone.  ENT: Thick secretions in mouth. No oral lesions. Erythematous pharynx.  LYMPH: No palpable cervical adenopathy.  RESP: Regular respiratory rate. No expiratory wheezes  MUSCULOSKELETAL: No lower extremity swelling.   NEURO: non focal. Alert and oriented x3.   PSYCH: within normal limits. No depression or anxiety.  SKIN: dry intact on visible skin. Neck is not red.     Lab Results    Hemoglobin 8.0. Platelets 162. WBC 4.9, ANC 2.8. Creatine 1.14. Lytes WNL.    Imaging    No results found.   present and was involved in entire conversation      Signed by: Joanna Ventura CNP    "

## 2021-06-07 NOTE — PROGRESS NOTES
Pt arrived ambulatory to clinic for labs, IVFs, and possible chemotherapy treatment.  Port was accessed using aseptic technique without difficulties with excellent blood return.  Administered IVFs while waiting for labs to result.  Yoanna reviewed labs, no chemotherapy today, but pt will get 1 unit of PRBCs.  T&S was drawn after fluids were done.  Pt declined wheelchair ride to infusion center.  Port was flushed with NS then capped for use downstairs.  Pt will need to follow up in clinic with provider as well as continue with IVFs.  Pt left infusion center ambulatory to go to 1st floor infusion for blood products.

## 2021-06-07 NOTE — PROGRESS NOTES
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       TYPE OF RADIATION THERAPY ADMINISTERED:  Concurrent chemoradiation therapy for his head neck cancer with weekly cisplatin and had radiation therapy in our clinic with a total dose of 7000 cGy in 35 treatments given from 3/2/2020-4/17/2020.      INTERVAL SINCE COMPLETION OF RADIATION THERAPY: 1 week.      SUBJECTIVE:  Mr. Chapman is a 70 y.o. male who has been in his usual state of good health until recently.  Patient was found to have swelling right neck mass by his lerma a month ago for which he was a seeking further evaluation.  The patient did not notice any significant changes over the past few weeks and he is also asymptomatic.  Initial ENT examination showed suspicious abnormalities in the right nasopharynx worrisome for malignancy.  CT of the neck on 1/24/2020 revealed asymmetric soft tissue thickening in the posterior right nasopharynx as well as abnormal enlarged cervical lymph nodes measuring up to 3.8 cm bilaterally.  Biopsy was not obtained on 1/29/2020 with pathology confirmed moderately differentiated squamous cell carcinoma, P 16 is negative. He received concurrent chemoradiation therapy for his head neck cancer and had radiation therapy in our clinic with a total dose of 7000 cGy in 35 treatments given from 3/2/2020-4/17/2020.  He tolerated radiation therapy reasonably well with expected acute side effect.  The patient insisted not to have PEG tube placement during the therapy.  He however is able to maintain his weight reasonably stable during the treatment.    The patient experienced worsening pain of sore throat and dysphasia since completion of the radiation therapy.  He only has moderate calorie and fluid intake over the weekend.  The patient is here for routine  office follow-up.    Medications were reviewed and are up to date on EPIC.    The following portions of the patient's history were reviewed and updated as appropriate: allergies, current medications, past family history, past medical history, past social history, past surgical history and problem list.    Review of Systems:      General  Constitutional (WDL): Exceptions to WDL  Fatigue: Fatigue not relieved by rest - Limiting instrumental ADL  EENT  Eye Disorder (WDL): Assessment not pertinent to visit  Ear Disorder (WDL): Assessment not pertinent to visit  Respiratory   Respiratory (WDL): Within Defined Limits  Cardiovascular  Cardiovascular (WDL): All cardiovascular elements are within defined limits  Endocrine     Gastrointestinal  Gastrointestinal (WDL): Exceptions to WDL  Anorexia: Oral intake altered without significant weight loss or malnutrition, oral nutritional supplements indicated  Dehydration: IV fluids indicated <24 hrs  Dysphagia: Symptomatic, able to eat regular diet  Esophagitis: Symptomatic, altered eating/swallowing, oral supplements indicated  Musculoskeletal  Musculoskeletal and Connetive Tissue Disorders (WDL): Exceptions to WDL  Muscle Weakness : Symptomatic, perceived by patient but not evident on physical exam  Integumentary               Integumentary (WDL): All integumentary elements are within defined limits  Neurological  Neurosensory (WDL): All neurosensory elements are within defined limits  Psychological/Emotional   Patient Coping: Accepting;Open/discussion  Hematological/Lymphatic  Lymph (WDL): All lymph disorder elements are within defined limits  Dermatologic     Genitourinary/Reproductive  Genitourinary (WDL): All genitourinary elements are within defined limits  Reproductive     Pain              Currently in Pain: Yes  Pain Score (Initial OR Reassessment): 3  Pain Frequency: Constant/continuous  Location: throat  Pain Intervention(s): Home medication  Response to Interventions:  pain controlled on medications  Accompanied by  Accompanied by: Family Member    Objective:     PHYSICAL EXAMINATION:    /75   Pulse 100   Temp 99.6  F (37.6  C) (Oral)   SpO2 98%     Gen: Alert, in NAD  Eyes: PERRL, EOMI, sclera anicteric  HENT     Head: NC/AT     Ears: No external auricular lesions     Nose/sinus: No rhinorrhea or epistaxis     Oropharynx: MMM, no visible oral lesions, no evidence of fungal infection.  Neck: Supple, full ROM, no LAD, skin within the radiation therapy field shows post therapy changes.  Pulm: No wheezing, stridor or respiratory distress  CV: Well-perfused, no cyanosis, no pedal edema  Abdominal: BS+, soft, nontender, nondistended, no hepatomegaly  Back: No step-offs or pain to palpation along the thoracolumbar spine  Rectal: Deferred  : Deferred  Musculoskeletal: Normal muscle bulk and tone  Skin: Normal color and turgor  Neurologic: A/Ox3, CN II-XII intact, normal gait and station  Psychiatric: Appropriate mood and affect      Impression     The patient is a 70-year-old gentleman with a diagnosis of nasopharyngeal cancer status post concurrent chemoradiation therapy 1 week ago.    Assessment & Plan:     1.  I had a long discussion with the patient and his family regarding appropriate nutritional support.  The patient is insistent not to have PEG tube placement at this time and promised to increase his daily calorie and the fluid intake to maintain his weight stable.  We therefore will arrange to give patient IV fluid today.  Patient is informed to contact radiation oncology or medical oncology to get daily IV fluid if he does not have adequate intake.  2.  Return to radiation oncology in 1 week for another office follow-up.  I will continue monitor patient closely over the next few weeks until he is fully recovered from his current treatment.  3.  Continue follow-up with Dr. Frias, medical oncology as planned.      Face to face time  25 minutes with > 75% spent on  consultation, education and coordination of care.      Laurita Mendoza MD, PhD  Department of Radiation Oncology   [facility]  Tel: [phone]  Page: [phone]    [facility]  [address] [place] [address]     [address]   [place] [address]    CC:  Patient Care Team:  Provider, No Primary Care as PCP - General  Laurita Mendoza MD as Physician (Radiation Oncology)  Alan Frias MD as Physician (Hematology and Oncology)  Alisha Tierney RN as Oncology Nurse Navigator  Billy Guerrero MD as Physician (Otolaryngology)

## 2021-06-07 NOTE — PROGRESS NOTES
Social Work Follow-Up Encounter Visit  Oncology Clinic    Data/Intervention: 20  Patient Name:  Kristen ELIZABETH/Age:  1950 (70 y.o.)    Intervention: Kristen is a 70 year old gentleman diagnosed with Nasopharyngeal carcinoma . Kristen currently lives with his family and is well supported by his children. JENNY contacted Kristen's daughter, Rea, to discuss the process of Kristen obtaining PCA services within his home. JENNY guided Rea to the [facility] Website and assisted her in in getting to the emaze Assessment link, where she will need to complete an application. JENNY also guided Rea to the Medical Assistance Link where Rea will need to complete application and submit. All applications can be completed online. JENNY discussed that this process may take a bit longer due to COVID-19 and most staff likely working remotely. Rea understood and will update JENNY if any questions or concerns arise. JENNY also discussed how Rea was holding up through her father's cancer journey. Rea reports she is doing ok but states it is really hard to see him like this. JENNY provided Rea a supportive space to process these feelings. JENNY provided [facility] SW contact information. Rea will reach out if any additional needs arise.     Collaborated With:    -Nurse Navigator, Alisha Tierney    Resources Provided:  -[facility] emaze/Medical Assistance info    Plan:  -Provided patient/family with writer's contact information and availability.   -JENNY will continue to follow for ongoing psychosocial needs/support  -JENNY will continue to collaborate with multidisciplinary care team    NOHELIA Montenegro, MARGIE  Phone: [phone]  Pager: [phone]

## 2021-06-07 NOTE — TELEPHONE ENCOUNTER
Son, Nayla called this morning.  He and his siblings are just wondering what their father's schedule;e going forward is going to look like.  His father, Kristen has his last radiation treatment this Friday.  I looked and have a couple questions that I want to get clarified.  I told Nayla I will connect with him hopefully later today with a schedule.  I also have his e-mail so I can send it that way also.  He appreciated the time.    I also asked about his Dad's nutritional status.  He said his dad told him he is drinking about 3 cartons/bottles of Boost/day, 4 water bottles and some rice with water.  I looked and told Nayla Dad has lost about 20#'s since January and about 10 #'s in the past month so he should try to increase to 4 Boost/.day.  He said he would pass that along to his dad.    Awaiting response from Dr. Frias & scheduling.

## 2021-06-07 NOTE — PROGRESS NOTES
PT here ambulatory for iv hydration. Pt saw NP and txt held. Reviewed hydration with pt. One liter ns infused over one hour then port flushed/deaccessed with 2x2 to site. Reviewed follow up and pt dc'd steady gait

## 2021-06-07 NOTE — PROGRESS NOTES
RADIATION ONCOLOGY WEEKLY TREATMENT VISIT NOTE      Assessment / Impression       1. Squamous cell carcinoma of nasopharynx (H)  ondansetron (ZOFRAN) 4 MG tablet   2. Nasopharynx cancer (H)  HYDROcodone-acetaminophen 5-325 mg per tablet      Tolerating radiation therapy well.  All questions and concerns addressed.    Plan:     Continue radiation treatment as prescribed.     Discussed with the patient about appropriate nutritional support.  His fungal infection has been completely resolved.     Reviewed CBCT today.  There is a good response.    Subjective:      HPI: Kristen Chapman is a 70 y.o. male with    1. Squamous cell carcinoma of nasopharynx (H)  ondansetron (ZOFRAN) 4 MG tablet   2. Nasopharynx cancer (H)  HYDROcodone-acetaminophen 5-325 mg per tablet       The following portions of the patient's history were reviewed and updated as appropriate: allergies, current medications, past family history, past medical history, past social history, past surgical history and problem list.    Assessment                  Body Site: Head and Neck Site: nasopharynx  Stereotactic Radiosurgery: No  Concurrent Therapy: Yes  Today's Dose: 5200  Total Dose for Head and Neck: 7000  Today's Fraction/Total Fraction Head and Neck: 26/35  Mucositis due to radiation: 0: None  Thrush: 0: Absent  Pharynx and Esphogaus: 1: Tolerates regular diet  Voice Chances/Stridor/Larynx: 1: Mild or intermittent hoarseness  Ocular/Visual - other : 0: Normal  Middle ear/hearin: Normal  Tinnitus: 1: Yes, recent (within the last 3 months)  Cough: 0: Absent                             Was an  used for assessment: Yes  Requested  for: Assessment   Provided: Yes  Type of  Provided: Telephone              Sexuality Alteration                 Emotional Alteration Copin: Effective  Comfort Alteration KPS: 90% Can perform normal activity, minor signs of disease  Fatigue (ONS scale) : 3: Mild  Fatigue  Pain Location: sore throat  Pain Intensity. Rate degree of pain ranging from 0 (no pain) to 10 (severe pain) : 2  Pain Description: Ache - Muscular type ache  Pain Intervention: 3: Opiods  Effectiveness of pain intervention: 3: Pain relieved 75%   Nutrition Alteration Anorexia: 1: Loss of appetite  Nausea: 1: Able to eat  Vomitin: None  Pharynx and Esphogaus: 1: Tolerates regular diet  Skin Alteration Skin Sensation: 0: No problem  Skin Reaction: 0: None  AUA Assessment                                  Accompanied by       Objective:     Exam: moderate Erythema.  No evidence of oral fungal infection.    Vitals:    20 0857   BP: 131/74   Pulse: 80   Temp: 97.6  F (36.4  C)   TempSrc: Oral   SpO2: 100%   Weight: 143 lb 11.2 oz (65.2 kg)       Wt Readings from Last 8 Encounters:   20 143 lb 11.2 oz (65.2 kg)   20 143 lb 6.4 oz (65 kg)   20 143 lb 8 oz (65.1 kg)   20 147 lb (66.7 kg)   20 149 lb 4.8 oz (67.7 kg)   03/10/20 151 lb 3.2 oz (68.6 kg)   20 152 lb 1.6 oz (69 kg)   20 155 lb 9.6 oz (70.6 kg)       General: Alert and oriented, in no acute distress  Yia has moderate Erythema.  Aria chart and setup information reviewed    Laurita Mendoza MD

## 2021-06-07 NOTE — PROGRESS NOTES
Exam: CT simulation     Date: 3/26/2020  There were no vitals filed for this visit.    Please ask your patient the following questions before you give any injection of a contrast media. If someone other than the patient is responding to these questions, please indicate the respondent's name and relationship to the patient.    Respondent Name: Kristen                 Relationship to patient:self    Name: Kristen Balderas any allergies: No Known Allergies    Does the patient have renal disease?     No  Does the patient have diabetes?   Yes  If patient has diabetes, is metformin or metformin combination drug currently prescribed (i.e. Actoplus Met, Avandmet, Fortamet, Glucophage, Glucovance, Glumetza, Junamet, Prandimet, Metaglip, or Riomet)?       No  Is the patient pregnant? No  Is the patient on dialysis? No  Does the patient have cancer? Yes  Does the patient have a history of cancer? No  When was the patient diagnosed? 1/29/2020  Treatment: chemo and radiation  Date of last chemo treatment: 3/24/2020    LAB RESULTS       CREATININE       Lab Results   Component Value Date    CREATININE 1.25 03/24/2020           (Normal Range: Male: 0.6-1.5 mg/dL  Female: 0.5-1.3mg/dL)   (A Creatinine result greater than 6.0 mg/dL is a Critical value-the ordering provider must be   notified)        LASTLAB(EGFR,GFRNONAA,GFRAA)@ ml/min/1.73M2   (Normal Range >60)         CT Only  If the eGFR is less than 30 the radiologist must be informed  If labs are abnormal, was the physician informed?  Yes, discussed case with Dr. Mendoza prior to pt's arrival. He had labs two days ago and GFR was abnormal and he also got chemo that day. Dr. Mendoza would like to try scan without contrast first, may do labs and contrast after (or tomorrow) if Dr. Mendoza can't see what he needs to with non-contrast CT simulation. Staff s Initials CATHLEEN De Los Santos

## 2021-06-07 NOTE — TELEPHONE ENCOUNTER
Pt's daughter called at this time with a question, she said her dad thought he heard that he may have to stay in the hospital for a few days near the end of his tx course. I told her there is nothing concrete about this in the chart, but yes, at times, patients do spend a few days in the hospital if they aren't doing well with eating/drinking or maintaining weight and VS. However, Kristen seems to be doing okay so I told her it was unlikely. She also questioned the f/u plan and I told her likely we would see him back in 1-2 weeks post-tx to make sure he's doing okay and that we would give him DC instructions in writing on his last day so she could see that as well. She was appreciative of the info.

## 2021-06-07 NOTE — PROGRESS NOTES
"Kristen Chapman is a 70 y.o. male who is being evaluated via a billable telephone visit.      The patient has been notified of following:     \"This telephone visit will be conducted via a call between you and your physician/provider. We have found that certain health care needs can be provided without the need for a physical exam.  This service lets us provide the care you need with a short phone conversation.  If a prescription is necessary we can send it directly to your pharmacy.  If lab work is needed we can place an order for that and you can then stop by our lab to have the test done at a later time.    Telephone visits are billed at different rates depending on your insurance coverage. During this emergency period, for some insurers they may be billed the same as an in-person visit.  Please reach out to your insurance provider with any questions.    If during the course of the call the physician/provider feels a telephone visit is not appropriate, you will not be charged for this service.\"    Patient has given verbal consent to a Telephone visit? Yes    Phone call duration: 20 minutes (2:50-3:10 PM)  This visit was with patient's daughter/care giver    Destiny Fernandez, [facility]    Palliative Care Progress Note      Consultation - Palliative Care  Kristen Chapman,  1950, MRN [id number]      PCP: Provider, No Primary Care, None   Code status:  Full code       Extended Emergency Contact Information  Primary Emergency Contact: KIM CHAPMAN  Home Phone: [phone]  Relation: Child  Secondary Emergency Contact: NEERAJ CHAPMAN  Home Phone: [phone]  Relation: Child          Impression and Recommendations:  1.  Pain related to nasopharyngeal cancer and radiation treatments  Comment:  Patient has throat pain; has been using Vicodin once or twice per day; is still able to swallow it; just got script for magic mouthwash so hoping it will help with discomfort; patient has not been able to eat or drink enough; only about " 3 cans of feeding per day; has been resistant to feeding tube but may consider if it is temporary and can not get feeding up to 5 cans per day; has been getting IV hydration at clinic twice per week  Plan:  Encouraged to increase the Vicodin to 4 times per day, before feedings; if increased Vicodin plus magic mouthwash does not provide enough comfort can switch to liquid morphine which would need to be measured in syringe but could also be given via feeding tube     2. Palliative Care Encounter - Please see discussion below.  Comment:  conversation today is with daughter; patient unable to attend; patient has completed radiation treatments and 5 chemotherapies; daughter reports patient is very fatigued and she thinks he has given up; multiple family members involved in his care giving; children rotate shifts; they are all trying to be very positive with him; daughter is very sad and tearful; states they are all taking one day at a time; he is getting IV hydration twice a week but having trouble getting enough feeding in; goal is to try to increase BOOST to 4-5 cans per day over the next week and if not able to do this will consider feeding tube; daughter states the patient thought he might need the feeding tube forever and this was re explained to him that it could be temporary; wife is also present but has her own health issues; interested in increased support, possibly in form of PCA help and medical assistance  Plan:  Will contact navigator georgie to follow up with daughter/family  Will try to discuss Advance Directive at future appointment         HPI    Kristen Chapman is a 70 y.o. year old male with nasopharyngeal cancer.  He was not able to participate in this telephone visit because of his fatigue.  This phone visit was held with his care giver daughter and [facility] and  was present but daughter did not require .  This was a visit with his daughter.    Last seen in the  "Palliative Care Clinic on 3/17/2020.    Summary:  Mr Chapman is a 69 year old male with nasopharyngeal squamous cell carcinoma diagnosed in January 2020 who is being treated with concurrent chemo and radiation treatments.  He has bilateral cervical adenopathy and asymmetric soft tissue thickening posterior right nasopharynx.He had episode of fever which resolved spontaneously.  He has tolerated treatment fairly well.  Weight is down about 10 pounds.  He decided not to have prophylactic PEG placement.  He is working with dietician to meet caloric and hydration need.  Mr Chapman remains independent.  He has support from his family.  He has minimal symptoms at this time.  He denies worries and is taking one day at a time.  He will follow up in 1 month when he may have more symptoms.  He believes that \"he will be fine with the treatment and thinks it is already helping\".    Update 4/21/2020:  Daughter reports that patient is very fatigued/exhausted and as a result is not available for visit today.  Daughter feels her dad has \"given up\".  He has completed radiation treatments and 5 cycles of chemotherapy.  He has been getting IV hydration twice a week.  He is having trouble getting enough nutrition.  He has resisted feeding tube but may be open to feeding tube if it is temporary and if he is unable to get in 5 cans of feeding per day over the next week.  He has been having more throat pain.  He takes Vicodin once or twice a day and has started magic mouthwash.  We talked about changing his pain medication to oral morphine/liquid if increased Vicodin does not help.  Family is interested in more help at home in the form of PCA and we will ask navigator to work with the family on this.  The family's approach is to be encouraging and to take one day at a time.     Diagnosis/Stage:   1/29/20: Right nasopharynx squamous cell cancer  -neck adenopathy  -CT neck: bilateral cervical adenopathy and asymmetric soft tissue thickening " posterior right nasopharynx  -ENT: nasopharyngeal scope showed several areas that appear suspicious for malignancy in right nasopharynx. Biopsy: squamous cell cancer  -PET: right nasopharynx mass extending to hypopharynx and bilateral cervical adenopathy. Bilateral hilar adenopathy indeterminate for mets vs granulomatous disease.  -2/27/20: Bronch/EBUS biopsy left intrathoracic/hilar node negative      Treatment:  3/2/20: Concurrent chemoradiation (7000 cGy/35 planned), with weekly cisplatin 40 mg/m2 x 6 cycles  -plan for 3 cycles of consolidative platinum and infusional 5FU (4 days)         Review of Systems:  Pain: Pain in throat and extreme fatigue are the patient's major symptoms.  He is not taking as much Vicodin as he could.  He sleeps a lot and is discouraged.  He is having trouble getting in enough nutrition.  He has been instructed to use BOOST as his primary nutrition/5 cans per day.  He may be open to feeding tube if unable to increase nutrition on his own.  He comes in to clinic twice a week for IV hydration.    Palliative Performance Score: (0=death; 100 = fully functioning)    50%- 1. Mainly sit/lie; 2. Unable to do any work, extensive disease; 3. Considerable assistance required; 4. Normal or reduced; 5. Full or confusion  Physical Exam:  Unable to do physical exam as this was telephone visit       Pertinent Labs  Lab Results: personally reviewed.   Lab Results   Component Value Date     04/20/2020     (L) 04/14/2020     (L) 04/07/2020    K 3.5 04/20/2020    K 3.5 04/14/2020    K 4.4 04/07/2020    CO2 24 04/20/2020    CO2 23 04/14/2020    CO2 25 04/07/2020    BUN 25 (H) 04/20/2020    BUN 22 04/14/2020    BUN 39 (H) 04/07/2020    CREATININE 1.14 04/20/2020    CREATININE 1.42 (H) 04/14/2020    CREATININE 1.43 (H) 04/07/2020    CALCIUM 8.6 04/20/2020    CALCIUM 7.9 (L) 04/14/2020    CALCIUM 8.1 (L) 04/07/2020     Lab Results   Component Value Date    WBC 4.9 04/20/2020    WBC 1.4 (LL)  04/14/2020    WBC 4.5 04/07/2020    HGB 8.0 (L) 04/20/2020    HGB 6.7 (LL) 04/14/2020    HGB 8.8 (L) 04/07/2020    HCT 25.3 (L) 04/20/2020    HCT 20.8 (L) 04/14/2020    HCT 27.1 (L) 04/07/2020    MCV 86 04/20/2020    MCV 85 04/14/2020    MCV 80 04/07/2020     04/20/2020    PLT 63 (L) 04/14/2020    PLT 32 (LL) 04/07/2020     AST   Date Value Ref Range Status   04/20/2020 29 0 - 40 U/L Final     ALT   Date Value Ref Range Status   04/20/2020 25 0 - 45 U/L Final     Alkaline Phosphatase   Date Value Ref Range Status   04/20/2020 65 45 - 120 U/L Final     Albumin   Date Value Ref Range Status   04/20/2020 2.4 (L) 3.5 - 5.0 g/dL Final      Pertinent Radiology  Radiology Results: Personally reviewed impression/s  EKG Results: not reviewed.      Destiny Fernandez, APRN/CNP  [facility]  [phone]    E/M time/ telephone total time: 20 minutes with patient's daughter (2:50-3:10 PM)

## 2021-06-07 NOTE — TELEPHONE ENCOUNTER
Daughter Rea called and was concerned that her dad had not had anything to eat or drink since Friday, however this evening had got some fluids diwn but barely any food.     Discussed feeding tube which the daughter and patient want to hear Dr Mendoza discuss  ASAP with a .      Aware that IV infusion replaces fluids not calories which are necessary for repair.     Daughter and patient agree with plan

## 2021-06-07 NOTE — PROGRESS NOTES
Kristen arrived A&Ox4 ambulatory and stable, confirms he is here for IV hydration. Pt completed his radiation today and is not eating/drinking much. He appears very tired/fatigued.  kristen has an appt with Joanna DUFF CNP tomorrow, with her permission the labs for that appt will be drawn today  Port accessed with ease, position blood return (pt had to be reclined-not fully supine) brisk blood return, labs drawn, line easily flushed NS20mL  kristen was infused with 1L NS over 2hours; VS slightly improved; lab results noted.  Port flushed NS20mL, instilled with heparin 6mL 1:100 and gripper needle dc'd, site covered with bandaid. Pt stated he felt somewhat better after hydration.  1400 writer escorted Kristen via w/c to lot D where his daughter was waiting to bring him home.

## 2021-06-07 NOTE — TELEPHONE ENCOUNTER
Spoke with patient's daughter over the phone this morning to check in how Kristen has been eating and drinking.    Kristen continues with chemoradiation for nasopharyngeal cancer. He opted to not get a feeding tube. Per his daughter, he is eating and drinking normal foods but in smaller amounts that his baseline. He is drinking 1-2 regular Boost/ day. Daughter states they have had a difficult time finding Boost at the store. His weight is down about 10 lbs.    Advised his daughter to have Kristen increase his supplement intake to 3/ day and/ or mix the supplement with ice cream. Advised to switch to Boost Plus versus regular Boost. Also spoke with katena rep and will have a case of katena standard shipped to patient so patient has a better supply at home.    RD will continue to monitor patient and check in throughout his treatment.    Milka Sullivan, MS, RD, LD

## 2021-06-08 NOTE — PROGRESS NOTES
Pt here by himself for f/u with Dr. Mendoza. VSS, weight improving. He's eating soft foods and using feeding tube. Pain is under control per pt. Getting IV fluids today as well.

## 2021-06-08 NOTE — PRE-PROCEDURE
Pre-Procedure Unconfirmed COVID Test     Kristen Chapman  Never had a COVID test completed.  Not flagged for needing a test per our COVID center.  COVID Screening  Due to the inability to confirm the patient's COVID status (positive or negative), the patient was screened for COVID symptoms     Patient reports the following:  Fever? No   Cough? No   Shortness of breath? No   Skin rash? No    If the patient is positive for new symptoms or worsening symptoms, and the procedure is deemed necessary by the ordering provider, notify your manager/supervisor     Patient Information  Patient informed of the no visitor policy  Patient instructed to continue to self-quarantine prior to procedure  Patient informed to contact the ordering provider if the following symptoms develop prior to procedure:   Fever  Cough  Shortness of Breath  Sore throat   Runny or stuffy nose  Muscle or body aches  Headaches  Fatigue  Vomiting or diarrhea   Rash    Bruno  Sybil

## 2021-06-08 NOTE — PROGRESS NOTES
Clinic Care Coordination Contact  Community Health Worker Initial Outreach    Patient accepts CC: No, no concerns or needs at this time.

## 2021-06-08 NOTE — PROGRESS NOTES
Clinic Care Coordination Contact  [facility]/Voicemail      Clinical Data: Care Coordinator Outreach  Outreach attempted x 2.  Left message on patient's voicemail with call back information and requested return call.  Plan: Care Coordinator will try to reach patient again in 1-2 business days.

## 2021-06-08 NOTE — PROGRESS NOTES
Patient arrived ambulatory with use of a walker - seated in chair 2. Assisted to a position of comfort, with legs elevated and head reclined slightly. No coughing or fever. Accessed port a cath per sterile technique and obtained an excellent blood return. Infused 1 liter of NS over 1 hour 33 minutes. The port a cath was flushed, heparinized, and deaccessed. VSS. Taken to the [facility] lobby, where the patient will call his son. Patient verbalized understanding to return on Tuesday for IV fluids and will call with any questions or concerns.    Ana Brink RN

## 2021-06-08 NOTE — PROGRESS NOTES
"Arrived ambulatory with use of a walker at 09:14, seated in chair 2. Accessed port a cath per sterile technique and obtained an excellent blood return. Patient's mouth is dry - \"I can drink warm liquids; the pain - it is hard to decide what number to give it.\" In addition, patient does have a feeding tube. Left facial cheek / ear area are more swollen than on last visit - \"it wakes me at night time.\" Discussed plan of care with Flores HANNA in OP Radiation, who will notify Dr. Mendoza. Infused 1 liter of NS. At completion this writer took the patient via w/c to awaiting vehicle at 11:30 - patient is scheduled to return on May 18th for IV fluids and a CT scan. Instructed pt's son to call with any questions or concerns prior to the next appointment.     Ana Brink RN  "

## 2021-06-08 NOTE — PROGRESS NOTES
[facility] Hematology and Oncology Progress Note    Patient: Kristen Chapman  MRN: [id number]  Date of Service: 06/01/2020        Reason for Visit    Chief Complaint   Patient presents with     HE Cancer     Nasopharyngeal carcinoma        Assessment and Plan  Cancer Staging  Nasopharyngeal carcinoma (H)  Staging form: Pharynx - Nasopharynx, AJCC 8th Edition  - Clinical stage from 2/18/2020: Stage SAMANTHA (cT4, cN2, cM0) - Signed by Alan Frias MD on 2/18/2020    1. Nasopharyngeal cancer, squamous cell:   Patient completed his concurrent chemo and radiation, with weekly cisplatin (some doses omitted for cytopenias and mild renal insufficiency).      He had a good clinical response per CT imaging a few weeks ago. He was still recovering on antibiotics for recent sinusitis/parotitis at that time, and is back for reassessment. He is feeling fully recovered and looks well.    We will start consolidative cisplatin + 4-day infusion 5FU every 28 days. Cycle 1 will be with 25% dose-reduction of both drugs. We will follow closely given prior tolerance to weekly chemo.    He will return on day 10 for labs and toxicity eval. Will recheck labs during week 3. Will have return in 4 weeks ahead of cycle 2.    We can titrate future cycle doses up/down pending tolerance.  Plan will be a total of 3 cycles, pending tolerance.    He picked up his dexamethasone and we reviewed it's use after cisplatin days 2-4. I sent Rx for Compazine and Zofran.    Education on the chemotherapy side effects reviewed.   Consent signed.    The plan will be to do a PET scan in mid July per Dr. Frias.       2.  Recent hospitalization for oral infection, sinusitis, parotitis: Completed Levaquin last week. Was due to complete Clindamycin last week too, but was having nausea with each dose so held since Friday (he has about 2-3 days left). He will try pre-medicating with Compazine 30 min before each dose to see if he can complete full course.  Feels recovered. No  fevers.    3.  Anemia:   This is mainly chemo related.  It slowly improving.  We will have to keep a close eye on this and has standing blood transfusion plan for hemoglobin < 7.0.    4.  PEG  Will refer to the G tube RNs to assess.      ECOG Performance   ECOG Performance Status: 1     Distress Assessment  Distress Assessment Score: No distress    Pain  Currently in Pain: No/denies      _____________________________________________________________________________    History of Present Illness    DIAGNOSIS: Nasopharyngeal carcinoma: Biopsy from the right nasopharynx shows a moderately differentiated squamous cell carcinoma.  Imaging was reviewed.  He has evidence of bilateral cervical node metastases.  Tumor also seems to extend down to the hypopharynx.     TREATMENT:  -3/3/20 - 4/17/20: concurrent chemo and radiation with weekly cisplatin.  His fifth and final dose was given March 31, 2020.  We then had to hold the rest of his treatment due to thrombocytopenia and renal insufficiency.    -6/1/2020: Initiated consolidative Cis + 5FU (cycle 1 dose-reduced 25% both drugs)      INTERIM HISTORY:     Kristen is here with his daughter, who also serves as interpretor. He is one month post sinusitis/parotitis. He completed Levaquin last week and has a few days of Clindamycin left. He stopped taking this Friday due to nausea he experienced after each dose.  He feels he is recovering well. No fevers. Left parotid swelling/cellulitis resolved.    Moderately fatigued.  He is using his feeding tube and getting about 6 cans a day. Drinking fluids and taking some calories in by mouth. He has some concerns with his PEG tube sutures. Has been functioning well.     No peripheral neuropathy. No hearing loss.    Review of Systems  Constitutional  Fatigue: Concerns(stable)  Neurosensory  Neurosensory (WDL): Exceptions to WDL  Ataxia: Concerns(using walker)  Eye   Eye Disorder (WDL): All eye disorder elements are within defined  limits  Ear  Ear Disorder (WDL): All ear disorder elements are within defined limits  Cardiovascular  Cardiovascular (WDL): Exceptions to WDL  Edema: Concerns(parotitis 05/28 on clindamycin)  Pulmonary  Respiratory (WDL): Within Defined Limits  Gastrointestinal  Gastrointestinal (WDL): Exceptions to WDL(G-tube feedings going well)  Nausea: Concerns(with clindamycin antibiotics)  Diarrhea: Concerns  Dry Mouth: Concerns  Genitourinary  Genitourinary (WDL): All genitourinary elements are within defined limits  Lymphatic  Lymph (WDL): All lymph disorder elements are within defined limits  Musculoskeletal and Connective Tissue  Musculoskeletal and Connetive Tissue Disorders (WDL): All Musculoskeletal and Connetive Tissue Disorder elements are within defined limits  Integumentary  Integumentary (WDL): All integumentary elements are within defined limits  Patient Coping  Patient Coping: Accepting;Open/discussion  Accompanied by  Accompanied by: Family Member(daughter Rea)  Oral Chemo Adherence           Past History  Past Medical History:   Diagnosis Date     Nasopharyngeal cancer (H)        PHYSICAL EXAM:  General: Alert and oriented, well-appearing. Here with daughter serving as interpretor.  HEENT: No facial edema. No stomatitis.  Lymph: No palpable cervical adenopathy.  Heart: Regular rate and rhythm  Lungs: Clear bilaterally  Abdomen: Soft, non-distended. PEG site intact, the sutured separate bumper is loose.    Lab Results    Lab Standing Order on 06/01/2020   Component Date Value Ref Range Status     Magnesium 06/01/2020 2.2  1.8 - 2.6 mg/dL Final     Sodium 06/01/2020 137  136 - 145 mmol/L Final     Potassium 06/01/2020 3.9  3.5 - 5.0 mmol/L Final     Chloride 06/01/2020 101  98 - 107 mmol/L Final     CO2 06/01/2020 32* 22 - 31 mmol/L Final     Anion Gap, Calculation 06/01/2020 4* 5 - 18 mmol/L Final     Glucose 06/01/2020 157* 70 - 125 mg/dL Final     BUN 06/01/2020 34* 8 - 22 mg/dL Final     Creatinine  06/01/2020 0.97  0.70 - 1.30 mg/dL Final     GFR MDRD Af Amer 06/01/2020 >60  >60 mL/min/1.73m2 Final     GFR MDRD Non Af Amer 06/01/2020 >60  >60 mL/min/1.73m2 Final     Bilirubin, Total 06/01/2020 0.6  0.0 - 1.0 mg/dL Final     Calcium 06/01/2020 9.6  8.5 - 10.5 mg/dL Final     Protein, Total 06/01/2020 7.3  6.0 - 8.0 g/dL Final     Albumin 06/01/2020 3.0* 3.5 - 5.0 g/dL Final     Alkaline Phosphatase 06/01/2020 102  45 - 120 U/L Final     AST 06/01/2020 39  0 - 40 U/L Final     ALT 06/01/2020 27  0 - 45 U/L Final     WBC 06/01/2020 5.0  4.0 - 11.0 thou/uL Final     RBC 06/01/2020 3.39* 4.40 - 6.20 mill/uL Final     Hemoglobin 06/01/2020 9.5* 14.0 - 18.0 g/dL Final     Hematocrit 06/01/2020 30.4* 40.0 - 54.0 % Final     MCV 06/01/2020 90  80 - 100 fL Final     MCH 06/01/2020 28.0  27.0 - 34.0 pg Final     MCHC 06/01/2020 31.3* 32.0 - 36.0 g/dL Final     RDW 06/01/2020 16.6* 11.0 - 14.5 % Final     Platelets 06/01/2020 111* 140 - 440 thou/uL Final    Comment: Slide reviewed, platelet estimate agrees with instrument results.                              This value was suppressed on a previous preliminary result.     MPV 06/01/2020 10.8  8.5 - 12.5 fL Final     Neutrophils % 06/01/2020 51  50 - 70 % Final     Lymphocytes % 06/01/2020 34  20 - 40 % Final     Monocytes % 06/01/2020 11* 2 - 10 % Final     Eosinophils % 06/01/2020 4  0 - 6 % Final     Basophils % 06/01/2020 1  0 - 2 % Final     Neutrophils Absolute 06/01/2020 2.5  2.0 - 7.7 thou/uL Final     Lymphocytes Absolute 06/01/2020 1.7  0.8 - 4.4 thou/uL Final     Monocytes Absolute 06/01/2020 0.5  0.0 - 0.9 thou/uL Final     Eosinophils Absolute 06/01/2020 0.2  0.0 - 0.4 thou/uL Final     Basophils Absolute 06/01/2020 0.0  0.0 - 0.2 thou/uL Final   ]      Imaging    Ct Soft Tissue Neck With Contrast    Result Date: 5/14/2020  EXAM: CT SOFT TISSUE NECK W CONTRAST LOCATION: [facility] DATE/TIME: 5/14/2020 6:28 PM INDICATION: Left ear pain. History of head  and neck cancer. COMPARISON: CT soft tissue neck 01/24/2020. CONTRAST: Iohexol (Omni) 100 mL TECHNIQUE: Routine with IV contrast. Multiplanar reformats. Dose reduction techniques were used. FINDINGS: VISUALIZED INTRACRANIAL/ORBITS/SINUSES: No abnormality of the visualized intracranial compartment or orbits. Right maxillary sinus completely opacified. Right anterior ethmoid air cells patchy mucosal thickening. Left anterior, mid, and posterior ethmoid air cells severely opacified. Left maxillary sinus mild to moderate mucosal thickening. Left sphenoid sinus completely opacified. Left mastoid air cells and middle ear cavity partially opacified may be congestive or infectious inflammatory. SUPRAHYOID NECK/INFRAHYOID NECK: Previously seen right nasopharyngeal mass decreased in size compared to prior examination. Epiglottis normal in caliber. No prevertebral soft tissue swelling. Asymmetric positioning of the vocal cords. Please correlate for vocal cord paralysis. SALIVARY GLANDS: Left parotid gland is enlarged and heterogeneous in attenuation concerning for parotitis. Left submandibular gland demonstrates sialadenitis. Overlying left facial cellulitis. No parotid or submandibular duct stone identified. No drainable soft tissue abscess identified. LYMPH NODES: Multiple small and prominent lymph nodes within the neck. Please defer to PET/CT 02/12/2020. Right level 2A prominent lymph node likely demonstrates internal necrosis. No pathologically enlarged lymph nodes based on size criteria. VESSELS: Vascular structures of the neck are patent. Ascending aorta fusiform aneurysm measuring 4.4 cm. Scattered vascular calcifications. THYROID: Right thyroid lobe small nodule measuring 7 mm. No specific follow-up based upon ACR White paper criteria. OTHER: Lung apices essentially clear. Degenerative changes noted within the spine. Left sphenoid bone small sclerotic lesion redemonstrated. Multiple scattered small osseous lucent  lesions. Left chest port. Left facial subcutaneous soft tissues demonstrate a dystrophic calcification.     1.  Left parotitis. Left submandibular sialadenitis. Overlying left facial cellulitis. No parotid or submandibular duct stone identified. 2.  No drainable abscess. 3.  History of head and neck cancer. Please defer to prior MRI soft tissue neck 02/10/2020 and PET/CT 02/12/2020. Sequelae of head and neck cancer changes described above.     Ct Chest With Contrast    Result Date: 5/18/2020  EXAM: CT CHEST W CONTRAST LOCATION: [facility] DATE/TIME: 5/18/2020 1:16 PM INDICATION: Indication Not Listed - See Reason for Exam Follow-up mediastinal adenopathy. Abnormal PET scan 02/12/2020. Nasopharyngeal cancer. COMPARISON: PET scan 02/12/2020 TECHNIQUE: CT chest with IV contrast. Multiplanar reformats were obtained. Dose reduction techniques were used. CONTRAST: Iohexol (Omni) 90 mL FINDINGS: LUNGS AND PLEURA: Lungs are clear. No pleural effusion. MEDIASTINUM/AXILLAE: No mediastinal or hilar lymphadenopathy. The previous mildly prominent lymph nodes in the hilum and mediastinum seen on PET scan have decreased in size. Largest left hilar 11 L node measures 9 x 4 mm decrease in size. Small fusiform ascending thoracic aortic aneurysm measuring 4.4 cm. UPPER ABDOMEN: Gastrostomy tube in good position. MUSCULOSKELETAL: Unremarkable.     1.  Resolution of the previous mildly enlarged hilar and mediastinal nodes since 02/12/2020. 2.  Incidental fusiform 4.4 cm ascending thoracic aortic aneurysm unchanged since 02/12/2020 but better seen..    Ct Sinus With Contrast    Result Date: 5/19/2020  EXAM: CT SINUS W CONTRAST LOCATION: [facility] DATE/TIME: 5/18/2020 1:36 PM INDICATION: Swollen left face. Purulent material draining from the left ear. COMPARISON: Soft tissue neck CT 05/14/2020. CONTRAST: Iohexol (Omni) 90 mL. TECHNIQUE: Routine with IV contrast. Multiplanar reformats. Dose reduction techniques were  used. FINDINGS: Enlargement and hyperenhancement of the left parotid gland again noted consistent with left parotitis. FRONTAL SINUSES: Normal. ETHMOID SINUSES: Near complete opacification of the ethmoid air cells on the left by fluid/mucosal thickening. SPHENOID SINUSES: Near complete opacification of the left locule of the sphenoid sinus by fluid and mucosal thickening. MAXILLARY SINUSES: Near complete opacification of the right maxillary sinus by fluid and mucosal thickening. Minimal peripheral mucosal thickening in the left maxillary sinus. The floors of the maxillary sinuses are intact. NASAL CAVITY/SKULL BASE: Intact nasal septum is deviated to the right. Unremarkable nasal turbinates. The cribriform plate is intact and symmetric. The anterior clinoid processes are not pneumatized. PARANASAL SINUS DRAINAGE PATHWAYS: The paranasal sinus drainage pathways are patent. NON-SINUS STRUCTURES: No abnormality of the visualized orbits or intracranial compartment. There is scattered opacification of the mastoid air cells and middle ear cavity on the left by fluid/inflammatory debris as well as partial opacification of the medial and of the left external auditory canal by fluid/inflammatory debris.     1.  Findings suggesting acute sinusitis of the ethmoid air cells on the left, left locule of the sphenoid sinus and right maxillary sinus. 2.  Rightward nasal septal deviation. 3.  Mastoid/middle ear cavity effusion versus otomastoiditis on the left with inflammatory changes extending into the left external auditory canal. 4.  No fluid collections or abscesses anywhere in the visualized portions of the face or upper neck. 5.  Findings consistent with left parotitis again noted.    Daughter served as interpretor here for entire conversation    Time:30 min , 20 of which was in counseling/coordination of care      Signed by: Joanna Ventura, LOY

## 2021-06-08 NOTE — TELEPHONE ENCOUNTER
Call placed to patient daughter to advise that Dr minaya sent in a script for NeutraSal for his dry mouth.  Pt daughter informs writer that the patient is currently admitted for infection.  If they still danielito the NeutraSal after discharge, they will pick it up then.

## 2021-06-08 NOTE — PROGRESS NOTES
"Kristen Chapman is a 70 y.o. male who is being evaluated via a billable telephone visit.      The patient has been notified of following:     \"This telephone visit will be conducted via a call between you and your physician/provider. We have found that certain health care needs can be provided without the need for a physical exam.  This service lets us provide the care you need with a short phone conversation.  If a prescription is necessary we can send it directly to your pharmacy.  If lab work is needed we can place an order for that and you can then stop by our lab to have the test done at a later time.    Telephone visits are billed at different rates depending on your insurance coverage. During this emergency period, for some insurers they may be billed the same as an in-person visit.  Please reach out to your insurance provider with any questions.    If during the course of the call the physician/provider feels a telephone visit is not appropriate, you will not be charged for this service.\"    Patient has given verbal consent to a Telephone visit? Yes    What phone number would you like to be contacted at? [phone]            Phone call duration:  10 minutes    Tayler Rogers RN      "

## 2021-06-08 NOTE — PROGRESS NOTES
Pt here for D1C1 cisplatin, 5fu continuous infuson after seeing NP. No problem with infusion as directed. CADD pump reviewed and pt aware to return Friday at 1 for pump d/c. Also having g-tube checked Friday at 1230. Pt instructed to call with any questions or concerns. Pt d/c ambulatory to lobby to meet his daughter.

## 2021-06-08 NOTE — PROGRESS NOTES
Pts port accessed and labs obtained. Port flushed and deaccessed. Pt is aware of his radiology appt on 6/25.

## 2021-06-08 NOTE — PROGRESS NOTES
Pt ambulatory to radiation for left ear pain and swelling around ear. Used interpretor via video conferencing. Further recommendations and orders per provider.

## 2021-06-08 NOTE — TELEPHONE ENCOUNTER
Email sent to sonNayla:    Next week:    Monday, June 1st, 2020 arrival of 8:45 am  8:45 am:  check in on 2nd floor!  Labs drawn  9am:  Meet with ESTRELLITA Marshall  9:30 till about 2:30 pm:  chemo      Friday, June 5th arrival time TBD  Check in on 2nd floor!  ~After Dad s treatment on Monday the nurses will let him know what time to come on Friday.  The pump runs for a specified time.  Once the pump is applied, the nurses will calculate and tell Dad when to come.    Let me know if you have any questions!    Stay safe!  Alisha

## 2021-06-08 NOTE — PROGRESS NOTES
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       TYPE OF RADIATION THERAPY ADMINISTERED:  Concurrent chemoradiation therapy for his head neck cancer with weekly cisplatin and had radiation therapy in our clinic with a total dose of 7000 cGy in 35 treatments given from 3/2/2020-4/17/2020.      INTERVAL SINCE COMPLETION OF RADIATION THERAPY: 3 weeks.      SUBJECTIVE:  Mr. Chapman is a 70 y.o. male who has been in his usual state of good health until recently.  Patient was found to have swelling right neck mass by his lrema a month ago for which he was a seeking further evaluation.  The patient did not notice any significant changes over the past few weeks and he is also asymptomatic.  Initial ENT examination showed suspicious abnormalities in the right nasopharynx worrisome for malignancy.  CT of the neck on 1/24/2020 revealed asymmetric soft tissue thickening in the posterior right nasopharynx as well as abnormal enlarged cervical lymph nodes measuring up to 3.8 cm bilaterally.  Biopsy was not obtained on 1/29/2020 with pathology confirmed moderately differentiated squamous cell carcinoma, P 16 is negative. He received concurrent chemoradiation therapy for his head neck cancer and had radiation therapy in our clinic with a total dose of 7000 cGy in 35 treatments given from 3/2/2020-4/17/2020.  He tolerated radiation therapy reasonably well with expected acute side effect.  The patient insisted not to have PEG tube placement during the therapy.  He however is able to maintain his weight reasonably stable during the treatment.     The patient experienced worsening pain of sore throat and dysphasia since completion of the radiation therapy.    He is not able to get adequate calorie and fluid intake and was admitted to hospital one week  after treatment for supportive care and pain management.  A PEG tube was also placed during the hospital stay. The patient felt much better and improved since hospital stay.  His pain is stable and is able to maintain his weight over the past 1 week.      The patient had a follow-up yesterday.  He noticed left parotid gland swelling overnight with mild tenderness.  Patient is here for evaluation.    Medications were reviewed and are up to date on EPIC.    The following portions of the patient's history were reviewed and updated as appropriate: allergies, current medications, past family history, past medical history, past social history, past surgical history and problem list.    Review of Systems:      General  Constitutional (WDL): Exceptions to WDL  Fatigue: Fatigue relieved by rest  EENT  Eye Disorder (WDL): All eye disorder elements are within defined limits  Ear Disorder (WDL): Exceptions to WDL  Ear Pain: Moderate pain, limiting instrumental ADL(left ear)  Respiratory   Respiratory (WDL): Within Defined Limits  Cardiovascular  Cardiovascular (WDL): All cardiovascular elements are within defined limits  Endocrine     Gastrointestinal  Gastrointestinal (WDL): Exceptions to WDL  Anorexia: Loss of appetite without alteration in eating habits  Dysphagia: Symptomatic, able to eat regular diet  Mucositis Oral: Moderate pain: not interfering with oral intake: modified diet  Esophagitis: Asymptomatic, clinical or diagnostic observations only, intervention not indicated  Musculoskeletal  Musculoskeletal and Connetive Tissue Disorders (WDL): Exceptions to WDL  Muscle Weakness : Symptomatic, perceived by patient but not evident on physical exam  Integumentary               Integumentary (WDL): All integumentary elements are within defined limits  Neurological  Neurosensory (WDL): Exceptions to WDL  Ataxia: Asymptomatic, clinical or diagnostic observations only, intervention not indicated(using wheeled  walker)  Psychological/Emotional   Patient Coping: Open/discussion  Hematological/Lymphatic  Lymph (WDL): All lymph disorder elements are within defined limits  Dermatologic     Genitourinary/Reproductive  Genitourinary (WDL): All genitourinary elements are within defined limits  Reproductive     Pain              Currently in Pain: Yes  Pain Score (Initial OR Reassessment): 8  Pain Frequency: Constant/continuous  Location: left ear  Pain Intervention(s): Medication (See MAR)  Response to Interventions: very little  Accompanied by  Accompanied by: Alone    Objective:      PHYSICAL EXAMINATION:    /76   Pulse 96   Temp 98.4  F (36.9  C) (Oral)   Wt 140 lb (63.5 kg)   SpO2 98%   BMI 27.34 kg/m      Gen: Alert, in NAD  Eyes: PERRL, EOMI, sclera anicteric  HENT     Head: NC/AT     Ears: No external auricular lesions     Nose/sinus: No rhinorrhea or epistaxis     Oropharynx: MMM, no visible oral lesions  Neck: Supple, full ROM, no LAD.  The left parotid gland mild to moderate enlarged with mild tenderness.  There is no redness.  The oral mucosa also appeared to be mild mucositis changes consistent with post radiation therapy.  There is no evidence of fungal infection.  Pulm: No wheezing, stridor or respiratory distress  CV: Well-perfused, no cyanosis, no pedal edema  Abdominal: BS+, soft, nontender, nondistended, no hepatomegaly  Back: No step-offs or pain to palpation along the thoracolumbar spine  Rectal: Deferred  : Deferred  Musculoskeletal: Normal muscle bulk and tone  Skin: Normal color and turgor  Neurologic: A/Ox3, CN II-XII intact, normal gait and station  Psychiatric: Appropriate mood and affect      Impression     The patient has acute onset of parotitis with no clear evidence of bacterial infection.    Assessment & Plan:     1.  Recommend patient frequent mouth rinse to keep mouth clean and induce the saliva production.  2.  Refill oxycodone for ongoing discomfort.  3.  If the patient has fever,  redness, any signs of bacterial infection or progression, the patient is recommended to see Dr. Guerrero, ENT for further evaluation and management recommendation.      Face to face time  15 minutes with > 75% spent on consultation, education and coordination of care.    Laurita Mendoza MD, PhD  Department of Radiation Oncology   [facility]  Tel: [phone]  Page: [phone]    [facility]  [address] [place], [place] [id number] [place] [address]   [place] [address]    CC:  Patient Care Team:  Mike Cook MD as PCP - General (Family Medicine)  Laurita Mendoza MD as Physician (Radiation Oncology)  Alan Frias MD as Physician (Hematology and Oncology)  Alisha Tierney, RN as Oncology Nurse Navigator  Billy Guerrero MD as Physician (Otolaryngology)

## 2021-06-08 NOTE — TELEPHONE ENCOUNTER
E-mail from Nhia:    Marcin Brito,     This is Rea Chapman. My father is Kristen Chapman, a cancer patient with [facility].   We misplaced the phone number to order his ISO formula for his feeding tube. Is this something that you can search and provide to us to order for my father as his supply is getting low.     Also, with his new chemo treatment, I would like to extend my FMLA in order to care for him, please let me know who I should send that too.     Thank you,    Rea     My response:    Felicitas Lucia!    I checked dad s chart and this looks like who he is getting his formula from:  [facility] at [phone]    I think since he is still getting chemo, I would go through his medical oncology tea for the FMLA paperwork.  Fax to:  [phone]  Attn:  Jeri [employer] Isabella  OR  Email to me and I can forward onto them.  OR  Dad can bring to clinic and give to nurse    Let me know if you need anything elseJ!    Take Care!  Alisha MATOS

## 2021-06-08 NOTE — TELEPHONE ENCOUNTER
Patient's daughter calls in today with some concerns on symptoms that her father is having.  She reports that they were seen in the clinic last week and the doctor commented on his swollen face.  He was told that if he develops any fevers or other issues that he needs to give us a call.  She is calling stating that he does not have a fever but as of yesterday he has yellow pus/drainage coming out of his left ear.  I discussed this with Dr. Frias who states he saw Dr. Mendoza's note which says he needs to see Dr. Billy Guerrero and ENT if other symptoms arise.  Urgent referral was placed for ENT and message sent to the care team.  By the time I called the daughter back she states that she Yared talked with Dr. Billy Guerrero's office and he is comfortable waiting until after the patient has his scans to see him.  Patient is currently scheduled for scans on Monday 5/18.  I did tell the daughter that if this continues to get worse and he develops a fever or other symptoms such as pain that they need to go to the ER.  She verbalized understanding.    Isabella Raymond RN

## 2021-06-08 NOTE — TELEPHONE ENCOUNTER
I sent sonNayla the following e-mail:    Hello Mitteran!    You may already know this?    Looks like NO appointments in cancer care next week!    Radiation Oncology, Dr. Laurita Mendoza would like to see Dad back in 4 weeks:  Scheduled for Thursday, 6- arrival of 8:30 am (1st floor)    Medical Oncology would like to see him in 2 weeks.  This has not yet been scheduled.  I will check next Thursday and send out a new e-mail!    Let me know if you have any questions.    Alisha Tierney, RN, BSN  Nurse Navigator

## 2021-06-08 NOTE — PROGRESS NOTES
[facility] Hematology and Oncology Progress Note    Patient: Kristen Chapman  MRN: [id number]  Date of Service: 06/10/2020      Assessment and Plan:    Cancer Staging  Nasopharyngeal carcinoma (H)  Staging form: Pharynx - Nasopharynx, AJCC 8th Edition  - Clinical stage from 2/18/2020: Stage SAMANTHA (cT4, cN2, cM0) - Signed by Alan Frias MD on 2/18/2020    1.  Nasopharyngeal carcinoma: He has now started the second part of his chemotherapy treatment consisting of cisplatin and infusional 5-FU.  Cycle 1 was started on June 1.  He is here for midcycle check.  He has had some fatigue with decreased appetite and taste disturbance after his first cycle.  Kids are helping him around the house with almost everything.  He is getting out of bed.  We will plan on cycle 2 on June 29.  We will continue with the 25% dose reduction.  Could further dose reduce if he is feeling poorly at that time.  Recent imaging and direct nasal endoscopy show an excellent response to treatment so far.  We will hold off on a PET scan until he is completed all of his chemotherapy.    2.  F/E/N: He recently had a G-tube placed.  Continues to get most of his nutrition through the tube.  He is eating some liquids and solids.      3.  Anemia: Has not required a transfusion in some time now.  We will continue to monitor and transfuse as needed.  It is noted that he had a near microcytic anemia prior to starting treatment.  If he does not have good recovery of his hemoglobin we will treat him with some iron.  We would also check for hemoglobinopathy in that case as well.    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  Diagnosed January 2020.  Imaging suggested bilateral abnormal lymphadenopathy in the neck.  Also asymmetric soft tissue thickening in the posterior right  nasopharynx.  On imaging, tumor also appeared to extend down to the hypopharynx.    Treatment:    Initially treated with concurrent chemotherapy and radiation.  He had weekly cisplatin starting March 3, 2020.  Fifth and final dose given March 31, 2020.  Subsequent chemotherapy was held due to prolonged thrombocytopenia and renal insufficiency.  Radiation dose was 7000 cGy in 35 fractions given from March 2 through April 17, 2020.    Started cisplatin with infusional 5-FU on June 1, 2020.    Interim History:    Kristen presents today for a follow-up visit.  He is here for midcycle check after his first round of chemotherapy after concurrent treatment.  Since that treatment he has had more fatigue, worsening appetite, and taste disturbance.  No nausea or vomiting.  No constipation or diarrhea.  He denies any numbness or tingling in the hands or feet.  Still using his tube feed for the majority of his intake.    Review of Systems:    Constitutional  Constitutional (WDL): Exceptions to WDL  Fatigue: Fatigue relieved by rest  Neurosensory  Neurosensory (WDL): All neurosensory elements are within defined limits  Cardiovascular  Cardiovascular (WDL): All cardiovascular elements are within defined limits  Pulmonary  Respiratory (WDL): Within Defined Limits  Gastrointestinal  Gastrointestinal (WDL): Exceptions to WDL  Anorexia: Loss of appetite without alteration in eating habits  Dysgeusia: Altered taste but no change in diet  Genitourinary  Genitourinary (WDL): All genitourinary elements are within defined limits  Integumentary  Integumentary (WDL): All integumentary elements are within defined limits  Patient Coping  Patient Coping: Accepting  Accompanied by  Accompanied by: Alone    Past History:    Past Medical History:   Diagnosis Date     Nasopharyngeal cancer (H)      Physical Exam:    Recent Vitals 6/10/2020   Height -   Weight 139 lbs   BSA (m2) 1.71 m2   /82   Pulse 73   Temp 98.2   Temp src 1   SpO2 99   Some  recent data might be hidden     General: patient appears stated age of 70 y.o.. Nontoxic and in no distress.   HEENT: Head: atraumatic, normocephalic. Sclerae anicteric.  Chest:  Normal respiratory effort  Cardiac:  No edema.   Abdomen: abdomen is soft, non-distended  Extremities: normal tone and muscle bulk.  Skin: no lesions or rash. Warm and dry.   CNS: alert and oriented. Grossly non-focal.   Psychiatric: normal mood and affect.     Lab Results:    Recent Results (from the past 240 hour(s))   Magnesium    Collection Time: 06/01/20  8:26 AM   Result Value Ref Range    Magnesium 2.2 1.8 - 2.6 mg/dL   Comprehensive Metabolic Panel    Collection Time: 06/01/20  8:26 AM   Result Value Ref Range    Sodium 137 136 - 145 mmol/L    Potassium 3.9 3.5 - 5.0 mmol/L    Chloride 101 98 - 107 mmol/L    CO2 32 (H) 22 - 31 mmol/L    Anion Gap, Calculation 4 (L) 5 - 18 mmol/L    Glucose 157 (H) 70 - 125 mg/dL    BUN 34 (H) 8 - 22 mg/dL    Creatinine 0.97 0.70 - 1.30 mg/dL    GFR MDRD Af Amer >60 >60 mL/min/1.73m2    GFR MDRD Non Af Amer >60 >60 mL/min/1.73m2    Bilirubin, Total 0.6 0.0 - 1.0 mg/dL    Calcium 9.6 8.5 - 10.5 mg/dL    Protein, Total 7.3 6.0 - 8.0 g/dL    Albumin 3.0 (L) 3.5 - 5.0 g/dL    Alkaline Phosphatase 102 45 - 120 U/L    AST 39 0 - 40 U/L    ALT 27 0 - 45 U/L   HM1 (CBC with Diff)    Collection Time: 06/01/20  8:26 AM   Result Value Ref Range    WBC 5.0 4.0 - 11.0 thou/uL    RBC 3.39 (L) 4.40 - 6.20 mill/uL    Hemoglobin 9.5 (L) 14.0 - 18.0 g/dL    Hematocrit 30.4 (L) 40.0 - 54.0 %    MCV 90 80 - 100 fL    MCH 28.0 27.0 - 34.0 pg    MCHC 31.3 (L) 32.0 - 36.0 g/dL    RDW 16.6 (H) 11.0 - 14.5 %    Platelets 111 (L) 140 - 440 thou/uL    MPV 10.8 8.5 - 12.5 fL    Neutrophils % 51 50 - 70 %    Lymphocytes % 34 20 - 40 %    Monocytes % 11 (H) 2 - 10 %    Eosinophils % 4 0 - 6 %    Basophils % 1 0 - 2 %    Neutrophils Absolute 2.5 2.0 - 7.7 thou/uL    Lymphocytes Absolute 1.7 0.8 - 4.4 thou/uL    Monocytes Absolute  0.5 0.0 - 0.9 thou/uL    Eosinophils Absolute 0.2 0.0 - 0.4 thou/uL    Basophils Absolute 0.0 0.0 - 0.2 thou/uL   Magnesium    Collection Time: 06/10/20  1:12 PM   Result Value Ref Range    Magnesium 1.9 1.8 - 2.6 mg/dL   Comprehensive Metabolic Panel    Collection Time: 06/10/20  1:12 PM   Result Value Ref Range    Sodium 136 136 - 145 mmol/L    Potassium 4.2 3.5 - 5.0 mmol/L    Chloride 103 98 - 107 mmol/L    CO2 25 22 - 31 mmol/L    Anion Gap, Calculation 8 5 - 18 mmol/L    Glucose 131 (H) 70 - 125 mg/dL    BUN 30 (H) 8 - 22 mg/dL    Creatinine 0.98 0.70 - 1.30 mg/dL    GFR MDRD Af Amer >60 >60 mL/min/1.73m2    GFR MDRD Non Af Amer >60 >60 mL/min/1.73m2    Bilirubin, Total 0.4 0.0 - 1.0 mg/dL    Calcium 8.4 (L) 8.5 - 10.5 mg/dL    Protein, Total 6.3 6.0 - 8.0 g/dL    Albumin 3.2 (L) 3.5 - 5.0 g/dL    Alkaline Phosphatase 74 45 - 120 U/L    AST 36 0 - 40 U/L    ALT 36 0 - 45 U/L   HM1 (CBC with Diff)    Collection Time: 06/10/20  1:12 PM   Result Value Ref Range    WBC 4.5 4.0 - 11.0 thou/uL    RBC 3.05 (L) 4.40 - 6.20 mill/uL    Hemoglobin 8.7 (L) 14.0 - 18.0 g/dL    Hematocrit 27.6 (L) 40.0 - 54.0 %    MCV 91 80 - 100 fL    MCH 28.5 27.0 - 34.0 pg    MCHC 31.5 (L) 32.0 - 36.0 g/dL    RDW 16.4 (H) 11.0 - 14.5 %    Platelets 86 (L) 140 - 440 thou/uL    MPV 11.7 8.5 - 12.5 fL    Neutrophils % 69 50 - 70 %    Lymphocytes % 20 20 - 40 %    Monocytes % 8 2 - 10 %    Eosinophils % 3 0 - 6 %    Basophils % 0 0 - 2 %    Neutrophils Absolute 3.1 2.0 - 7.7 thou/uL    Lymphocytes Absolute 0.9 0.8 - 4.4 thou/uL    Monocytes Absolute 0.3 0.0 - 0.9 thou/uL    Eosinophils Absolute 0.2 0.0 - 0.4 thou/uL    Basophils Absolute 0.0 0.0 - 0.2 thou/uL     Imaging:    Ct Soft Tissue Neck With Contrast    Result Date: 5/14/2020  EXAM: CT SOFT TISSUE NECK W CONTRAST LOCATION: [facility] DATE/TIME: 5/14/2020 6:28 PM INDICATION: Left ear pain. History of head and neck cancer. COMPARISON: CT soft tissue neck 01/24/2020. CONTRAST:  Iohexol (Omni) 100 mL TECHNIQUE: Routine with IV contrast. Multiplanar reformats. Dose reduction techniques were used. FINDINGS: VISUALIZED INTRACRANIAL/ORBITS/SINUSES: No abnormality of the visualized intracranial compartment or orbits. Right maxillary sinus completely opacified. Right anterior ethmoid air cells patchy mucosal thickening. Left anterior, mid, and posterior ethmoid air cells severely opacified. Left maxillary sinus mild to moderate mucosal thickening. Left sphenoid sinus completely opacified. Left mastoid air cells and middle ear cavity partially opacified may be congestive or infectious inflammatory. SUPRAHYOID NECK/INFRAHYOID NECK: Previously seen right nasopharyngeal mass decreased in size compared to prior examination. Epiglottis normal in caliber. No prevertebral soft tissue swelling. Asymmetric positioning of the vocal cords. Please correlate for vocal cord paralysis. SALIVARY GLANDS: Left parotid gland is enlarged and heterogeneous in attenuation concerning for parotitis. Left submandibular gland demonstrates sialadenitis. Overlying left facial cellulitis. No parotid or submandibular duct stone identified. No drainable soft tissue abscess identified. LYMPH NODES: Multiple small and prominent lymph nodes within the neck. Please defer to PET/CT 02/12/2020. Right level 2A prominent lymph node likely demonstrates internal necrosis. No pathologically enlarged lymph nodes based on size criteria. VESSELS: Vascular structures of the neck are patent. Ascending aorta fusiform aneurysm measuring 4.4 cm. Scattered vascular calcifications. THYROID: Right thyroid lobe small nodule measuring 7 mm. No specific follow-up based upon ACR White paper criteria. OTHER: Lung apices essentially clear. Degenerative changes noted within the spine. Left sphenoid bone small sclerotic lesion redemonstrated. Multiple scattered small osseous lucent lesions. Left chest port. Left facial subcutaneous soft tissues demonstrate  a dystrophic calcification.     1.  Left parotitis. Left submandibular sialadenitis. Overlying left facial cellulitis. No parotid or submandibular duct stone identified. 2.  No drainable abscess. 3.  History of head and neck cancer. Please defer to prior MRI soft tissue neck 02/10/2020 and PET/CT 02/12/2020. Sequelae of head and neck cancer changes described above.     Ct Chest With Contrast    Result Date: 5/18/2020  EXAM: CT CHEST W CONTRAST LOCATION: [facility] DATE/TIME: 5/18/2020 1:16 PM INDICATION: Indication Not Listed - See Reason for Exam Follow-up mediastinal adenopathy. Abnormal PET scan 02/12/2020. Nasopharyngeal cancer. COMPARISON: PET scan 02/12/2020 TECHNIQUE: CT chest with IV contrast. Multiplanar reformats were obtained. Dose reduction techniques were used. CONTRAST: Iohexol (Omni) 90 mL FINDINGS: LUNGS AND PLEURA: Lungs are clear. No pleural effusion. MEDIASTINUM/AXILLAE: No mediastinal or hilar lymphadenopathy. The previous mildly prominent lymph nodes in the hilum and mediastinum seen on PET scan have decreased in size. Largest left hilar 11 L node measures 9 x 4 mm decrease in size. Small fusiform ascending thoracic aortic aneurysm measuring 4.4 cm. UPPER ABDOMEN: Gastrostomy tube in good position. MUSCULOSKELETAL: Unremarkable.     1.  Resolution of the previous mildly enlarged hilar and mediastinal nodes since 02/12/2020. 2.  Incidental fusiform 4.4 cm ascending thoracic aortic aneurysm unchanged since 02/12/2020 but better seen..    Ct Sinus With Contrast    Result Date: 5/19/2020  EXAM: CT SINUS W CONTRAST LOCATION: [facility] DATE/TIME: 5/18/2020 1:36 PM INDICATION: Swollen left face. Purulent material draining from the left ear. COMPARISON: Soft tissue neck CT 05/14/2020. CONTRAST: Iohexol (Omni) 90 mL. TECHNIQUE: Routine with IV contrast. Multiplanar reformats. Dose reduction techniques were used. FINDINGS: Enlargement and hyperenhancement of the left parotid gland again  noted consistent with left parotitis. FRONTAL SINUSES: Normal. ETHMOID SINUSES: Near complete opacification of the ethmoid air cells on the left by fluid/mucosal thickening. SPHENOID SINUSES: Near complete opacification of the left locule of the sphenoid sinus by fluid and mucosal thickening. MAXILLARY SINUSES: Near complete opacification of the right maxillary sinus by fluid and mucosal thickening. Minimal peripheral mucosal thickening in the left maxillary sinus. The floors of the maxillary sinuses are intact. NASAL CAVITY/SKULL BASE: Intact nasal septum is deviated to the right. Unremarkable nasal turbinates. The cribriform plate is intact and symmetric. The anterior clinoid processes are not pneumatized. PARANASAL SINUS DRAINAGE PATHWAYS: The paranasal sinus drainage pathways are patent. NON-SINUS STRUCTURES: No abnormality of the visualized orbits or intracranial compartment. There is scattered opacification of the mastoid air cells and middle ear cavity on the left by fluid/inflammatory debris as well as partial opacification of the medial and of the left external auditory canal by fluid/inflammatory debris.     1.  Findings suggesting acute sinusitis of the ethmoid air cells on the left, left locule of the sphenoid sinus and right maxillary sinus. 2.  Rightward nasal septal deviation. 3.  Mastoid/middle ear cavity effusion versus otomastoiditis on the left with inflammatory changes extending into the left external auditory canal. 4.  No fluid collections or abscesses anywhere in the visualized portions of the face or upper neck. 5.  Findings consistent with left parotitis again noted.    Ir T-fastener Removal    Result Date: 6/5/2020  Please see procedure note for findings on this exam.      Signed by: Alan Frias MD

## 2021-06-08 NOTE — PROGRESS NOTES
HISTORY OF PRESENT ILLNESS  Patient comes in for recheck after recent hospitalization for acute left parotitis. He is doing fine. He also reports drainage from the left ear. Lastly he completed radiation recently and has not had his nasopharynx examined endoscopically.    REVIEW OF SYSTEMS  Review of Systems: a 10-system review was performed. Pertinent positives are noted in the HPI and on a separate scanned document in the chart.    PMH, PSH, FH and SH has documented in the EHR.      EXAM    CONSTITUTIONAL  General Appearance:  Normal, well developed, well nourished, no obvious distress  Ability to Communicate:  communicates appropriately.    HEAD AND FACE  Appearance and Symmetry:  Normal, no scalp or facial scarring or suspicious lesions.  Paranasal sinuses tenderness:  Normal, Paranasal sinuses non tender    EARS  Clinical speech reception threshold:  Normal, able to hear normal speech.  Auricle:  Normal, Auricles without scars, lesions, masses.  External auditory canal:  Normal, External auditory canal normal.  Tympanic membrane:  Microscopic exam reveals skin debris left ear which was debrided using microdissection technique. Once the debris was removed I encountered purulence and perforation.      NASAL ENDOSCOPY  The risks and benefits were reviewed with the patient.  The nose was sprayed with lidocaine and phenylephrine.  The following areas were examined:  floor, roof, middle and inferior turbinates, middle and inferior meatus, sphenoethmoidal recess, nasopharynx and septum.  The nasal scope passed without difficulty with the findings noted in the exam.    NOSE (speculum or scope)  Architecture:  Normal, Grossly normal external nasal architecture with no masses or lesions.  Mucosa:  Normal mucosa, No polyps or masses.  Septum:  Normal, Septum non-obstructing.  Turbinates:  Normal, No turbinate abnormalities    NASOPHARYNX  Excellent response. No evidence of persistent disease.    ORAL CAVITY AND  OROPHARYNX  Lips:  Normal.  Dental and gingiva:  Normal, No obvious dental or gingival disease.  Mucosa:  Normal, Moist mucous membranes.  Tongue:  Normal, Tongue mobile with no mucosal abnormalities  Hard and soft palate:  Normal, Hard and soft palate without cleft or mucosal lesions.  Oral pharynx:  Normal, Posterior pharynx without lesions or remarkable asymmetry.  Saliva:  Normal, Clear saliva.  Masses:  Normal, No palpable masses or pathologically enlarged lymph nodes.    NECK  Masses/lymph nodes:  Excellent response with no obvious adenopathy.  Salivary glands:  Normal, Parotid and submandibular glands.  Trachea and larynx position:  Normal, Trachea and larynx midline.  Thyroid:  Normal, No thyroid abnormality.  Tenderness:  Normal, No cervical tenderness.  Suppleness:  Normal, Neck supple    NEUROLOGICAL  Speech pattern:  Normal, Proasaic    RESPIRATORY  Symmetry and Respiratory effort:  Normal, Symmetric chest movement and expansion with no increased intercostal retractions or use of accessory muscles.     IMPRESSION  1. He has completed radiation. Nasopharynx clear.  2. Left parotid negative  3. Chronic otitis with perforation left.     RECOMMENDATION  1. Follow up in 3 months.  2. Ciprodex.    Billy Guerrero MD

## 2021-06-08 NOTE — TELEPHONE ENCOUNTER
I sent the following e-mail to Nayla doyle:  Felicitas Winslow!    I am sending out Dad s schedule (as of today) for the next 2 weeks as I will be out on [facility] next week.    Week of June 22nd, 2020:    Thursday, June 25th, 2020 arrival of 8:30 am   Check in on 1st floor  Follow up with Dr. Laurita Mendoza, Radiation Oncologist      Week of June 29th, 2020:    Tuesday, June 30th, 2020 arrival of 8:15 am  Check in on 2nd floor  8:15 am:  labs  8:30 am:  nurse, bria Ventura NP  9:15 am:  5 hour chemo    Thursday, July 2nd, 2020 arrival time TBD  Check in on 2nd floor  Pump disconnected--Dad will be given an arrival time when they hook the pump up on Tuesday.     Let me know if you have any questions!  Alisha

## 2021-06-08 NOTE — PROGRESS NOTES
[facility] Hematology and Oncology Progress Note    Patient: Kristen Chapman  MRN: [id number]  Date of Service: 05/05/2020      Assessment and Plan:    Cancer Staging  Nasopharyngeal carcinoma (H)  Staging form: Pharynx - Nasopharynx, AJCC 8th Edition  - Clinical stage from 2/18/2020: Stage SAMANTHA (cT4, cN2, cM0) - Signed by Alan Frias MD on 2/18/2020    1.  Nasopharyngeal carcinoma: He has now completed concurrent chemotherapy and radiation.  He received weekly cisplatin with radiation.  I reviewed with him that he appears to have had a good clinical response to treatment.  We will get some imaging before we see him on the 21st.  Will be a CT scan.  We can get a PET scan in mid July.  I reviewed with him that he still has 1 more part of treatment left which would consist of chemotherapy alone.  Planning on 3 cycles of cisplatin and 5-FU.  Cisplatin is given on day 1 and 5-FU via continuous infusion days 1 through 4.  His kidney function has returned to normal so I think he would be able to tolerate the cisplatin.  It is noted that carboplatin can also be used.  I will plan on seeing him back in May 19.  At that point we will finalize his treatment plan.  This will give him some more time to recover.  I wrote out the treatment plan for him to take home and review with his family.  Questions were answered.  An  was present during the visit.    2.  F/E/N: He recently had a G-tube placed.  Seems to be tolerating his tube feeding well.  But anticipate him continuing to need and use this for about 3 months.    3.  Cancer related pain: Really not having much pain at this point.  He has Roxicodone to take as needed.    4.  Anemia: He has had significant chemotherapy-induced anemia requiring transfusion.  We will continue to monitor and transfuse as needed.  It is noted that he had a near microcytic anemia prior to starting treatment.  If he does not have good recovery of his hemoglobin we will treat him with some  iron.  We would also check for hemoglobinopathy in that case as well.    ECOG Performance   ECOG Performance Status: 0    Distress Assessment  Distress Assessment Score: Unable to rate    Pain  Currently in Pain: Yes  Pain Score (Initial OR Reassessment): 2    Diagnosis:      Treatment:    Initially treated with concurrent chemotherapy and radiation.  He had weekly cisplatin starting March 3, 2020.  Fifth and final dose given March 31, 2020.  Subsequent chemotherapy was held due to prolonged thrombocytopenia and renal insufficiency.  Radiation dose was 7000 cGy in 35 fractions given from March 2 through April 17, 2020.    Interim History:    Kristen presents today for a follow-up visit.  He was admitted to the hospital on April 25 for failure to thrive and dehydration.  NG tube was placed.  He was discharged on April 28.  He finished radiation about 2-1/2 weeks ago.  Continues to recover from this.  Today he states his pain is well controlled.  Notes that his strength is getting better.  He is having some difficulty with dry mouth.    Review of Systems:    Constitutional  Constitutional (WDL): Exceptions to WDL  Fatigue: Fatigue relieved by rest  Neurosensory  Neurosensory (WDL): All neurosensory elements are within defined limits  Cardiovascular  Cardiovascular (WDL): All cardiovascular elements are within defined limits  Pulmonary  Respiratory (WDL): Within Defined Limits  Gastrointestinal  Gastrointestinal (WDL): All gastrointestinal elements are within defined limits  Genitourinary  Genitourinary (WDL): All genitourinary elements are within defined limits  Integumentary  Integumentary (WDL): All integumentary elements are within defined limits  Patient Coping  Patient Coping: Accepting  Accompanied by  Accompanied by: Alone    Past History:    Past Medical History:   Diagnosis Date     Nasopharyngeal cancer (H)      Physical Exam:    Recent Vitals 5/14/2020   Height -   Weight -   BSA (m2) -   /82   Pulse 71    Temp -   Temp src -   SpO2 100   Some recent data might be hidden     General: patient appears stated age of 70 y.o.. Nontoxic and in no distress.   HEENT: Head: atraumatic, normocephalic. Sclerae anicteric.  Chest:  Normal respiratory effort  Cardiac:  No edema.   Abdomen: abdomen is non-distended  Extremities: normal tone and muscle bulk.  Skin: no lesions or rash. Warm and dry.   CNS: alert and oriented x3. Grossly non-focal.   Psychiatric: normal mood and affect.     Lab Results:    Results for JEAN-CLAUDE HO (MRN [id number]) as of 5/14/2020 17:28   Ref. Range 5/5/2020 08:05   Sodium Latest Ref Range: 136 - 145 mmol/L 136   Potassium Latest Ref Range: 3.5 - 5.0 mmol/L 3.9   Chloride Latest Ref Range: 98 - 107 mmol/L 98   CO2 Latest Ref Range: 22 - 31 mmol/L 31   Anion Gap, Calculation Latest Ref Range: 5 - 18 mmol/L 7   BUN Latest Ref Range: 8 - 22 mg/dL 26 (H)   Creatinine Latest Ref Range: 0.70 - 1.30 mg/dL 0.88   GFR MDRD Af Amer Latest Ref Range: >60 mL/min/1.73m2 >60   GFR MDRD Non Af Amer Latest Ref Range: >60 mL/min/1.73m2 >60   Calcium Latest Ref Range: 8.5 - 10.5 mg/dL 8.8   AST Latest Ref Range: 0 - 40 U/L 48 (H)   ALT Latest Ref Range: 0 - 45 U/L 35   ALBUMIN Latest Ref Range: 3.5 - 5.0 g/dL 2.5 (L)   Protein, Total Latest Ref Range: 6.0 - 8.0 g/dL 6.3   Alkaline Phosphatase Latest Ref Range: 45 - 120 U/L 112   Bilirubin, Total Latest Ref Range: 0.0 - 1.0 mg/dL 0.3   Magnesium Latest Ref Range: 1.8 - 2.6 mg/dL 2.0   Glucose Latest Ref Range: 70 - 125 mg/dL 143 (H)   WBC Latest Ref Range: 4.0 - 11.0 thou/uL 8.9   RBC Latest Ref Range: 4.40 - 6.20 mill/uL 3.29 (L)   Hemoglobin Latest Ref Range: 14.0 - 18.0 g/dL 9.1 (L)   Hematocrit Latest Ref Range: 40.0 - 54.0 % 29.4 (L)   MCV Latest Ref Range: 80 - 100 fL 89   MCH Latest Ref Range: 27.0 - 34.0 pg 27.7   MCHC Latest Ref Range: 32.0 - 36.0 g/dL 31.0 (L)   RDW Latest Ref Range: 11.0 - 14.5 % 18.0 (H)   Platelets Latest Ref Range: 140 - 440 thou/uL 172   MPV  Latest Ref Range: 8.5 - 12.5 fL 10.3   Neutrophils % Latest Ref Range: 50 - 70 % 55   Lymphocytes % Latest Ref Range: 20 - 40 % 32   Monocytes % Latest Ref Range: 2 - 10 % 10   Eosinophils % Latest Ref Range: 0 - 6 % 1   Basophils % Latest Ref Range: 0 - 2 % 1   Neutrophils Absolute Latest Ref Range: 2.0 - 7.7 thou/uL 4.9   Lymphocytes Absolute Latest Ref Range: 0.8 - 4.4 thou/uL 2.9   Monocytes Absolute Latest Ref Range: 0.0 - 0.9 thou/uL 0.9   Eosinophils Absolute Latest Ref Range: 0.0 - 0.4 thou/uL 0.1   Basophils Absolute Latest Ref Range: 0.0 - 0.2 thou/uL 0.1     Imaging:    Xr Chest 1 View Portable    Result Date: 4/25/2020  EXAM: XR CHEST 1 VIEW PORTABLE LOCATION: [facility] DATE/TIME: 4/25/2020 4:47 PM INDICATION: Chest Pain COMPARISON: None.     Left-sided port with tip over atrial caval junction. Mildly enlarged heart. Tortuous atherosclerotic aorta. No pneumothorax or pleural effusion. No focal consolidation. No acute osseous abnormality.    Ir Gastrostomy Tube Insertion    Result Date: 4/27/2020  [facility] LOCATION: [facility] DATE: 4/27/2020 PROCEDURE: PERCUTANEOUS IMAGE GUIDED GASTROSTOMY TUBE PLACEMENT INTERVENTIONAL RADIOLOGIST: Zack Dominguez MD. INDICATION: Nasopharyngeal carcinoma with dysphagia in need of feeding tube for nutritional support. CONSENT: The risks, benefits and alternatives of gastrostomy tube placement were discussed with the patient  in detail. All questions were answered. Informed consent was given to proceed with the procedure. MODERATE SEDATION: Versed 2 mg IV; Fentanyl 100 mcg IV.  Under physician supervision, Versed and fentanyl were administered for moderate sedation. Pulse oximetry, heart rate and blood pressure were continuously monitored by an independent trained observer. The physician spent 30 minutes of face-to-face sedation time with the patient. CONTRAST: 15 cc of Omni 350 ANTIBIOTICS: 2 g of Ancef IV ADDITIONAL MEDICATIONS: None.  FLUOROSCOPIC TIME: 3.1 minutes. RADIATION DOSE: Air Kerma: 47 mGy. COMPLICATIONS: No immediate complications. STERILE BARRIER TECHNIQUE: Maximum sterile barrier technique was used. Cutaneous antisepsis was performed at the operative site with application of 2% chlorhexidine and large sterile drape. Prior to the procedure, the  and assistant performed hand hygiene and wore hat, mask, sterile gown, and sterile gloves during the entire procedure. PROCEDURE: A nasogastric tube was placed into the stomach, confirmed by fluoroscopy. The upper abdomen was prepped and draped in the usual sterile fashion followed by local anesthesia with 1% Xylocaine. The stomach was insufflated with air. Using fluoroscopic guidance for needle placement, two T-fasteners were deployed into the gastric lumen for gastropexy. An 18 gauge needle was advanced into the stomach. Under fluoroscopy, an Amplatz Superstiff wire was advanced into the gastric lumen followed by placement of the dilator peel-away sheath. An 18 Cameroonian gastrostomy tube was then placed into the gastric lumen. The balloon was inflated with 10 mL of saline. The peel-away sheath was removed. Contrast was injected through the G-tube, and a digital spot image was  obtained. FINDINGS: Successful placement of new 18 Cameroonian percutaneous gastrostomy tube.     Successful placement of new 18 Cameroonian percutaneous gastrostomy tube. CPT code for physician reference only: 38888      Signed by: Alan Frias MD

## 2021-06-08 NOTE — TELEPHONE ENCOUNTER
ANGELICA paperwork completed.  I have asked the schedulers to fax using the fax cover sheet provided.  They will also send a copy to HIM.  I have sent the copy to Cj as well in an e-mail.

## 2021-06-08 NOTE — PROGRESS NOTES
Clinic Care Coordination Contact  [facility]/Voicemail      Clinical Data: Care Coordinator Outreach  Outreach attempted x 1.  mentioned the number we called, was the daughter and provided us the mobile number.   asked if we can call patient another time due to lunch.   Plan: Care Coordinator will try to reach patient again in 1-2 business days.      Comments   [facility] referral. Patient admitted with primary svitlana parotis. History of cancer. LIves with 2 sons, one is PCA. Outreach for any resources for home or community or understanding of medications and treatment instructions.

## 2021-06-08 NOTE — PROGRESS NOTES
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       TYPE OF RADIATION THERAPY ADMINISTERED:  Concurrent chemoradiation therapy for his head neck cancer with weekly cisplatin and had radiation therapy in our clinic with a total dose of 7000 cGy in 35 treatments given from 3/2/2020-4/17/2020.      INTERVAL SINCE COMPLETION OF RADIATION THERAPY: 5 weeks.      SUBJECTIVE:  Mr. Chapman is a 70 y.o. male who has been in his usual state of good health until recently.  Patient was found to have swelling right neck mass by his lerma a month ago for which he was a seeking further evaluation.  The patient did not notice any significant changes over the past few weeks and he is also asymptomatic.  Initial ENT examination showed suspicious abnormalities in the right nasopharynx worrisome for malignancy.  CT of the neck on 1/24/2020 revealed asymmetric soft tissue thickening in the posterior right nasopharynx as well as abnormal enlarged cervical lymph nodes measuring up to 3.8 cm bilaterally.  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 He received concurrent chemoradiation therapy for his head neck cancer and had radiation therapy in our clinic with a total dose of 7000 cGy in 35 treatments given from 3/2/2020-4/17/2020.  He also received 3 cycles of cisplatin and 5-FU during the course of radiation therapy. He tolerated radiation therapy reasonably well with expected acute side effect.  The patient insisted not to have PEG tube placement during the therapy.  He however is able to maintain his weight reasonably stable during the treatment.     The patient experienced worsening pain of sore throat and dysphasia since completion of the radiation therapy.    He is not  able to get adequate calorie and fluid intake and was admitted to hospital one week after treatment for supportive care and pain management.  A PEG tube was also placed during the hospital stay. The patient felt much better and improved since hospital stay.  The patient also experienced acute onset of left parotitis 4 weeks post cancer therapy and was admitted to the hospital for ongoing treatment including antibiotics.  His condition has been significant improved since then.  The patient was then discharged to the hospital.  He is currently able to eat with liquid and soft without any significant difficulty.  He denies any significant pain at the time of evaluation.  He is here for routine post therapy office follow-up.    Patient has been followed with Dr. Guerrero, ENT and last examination yesterday showed no evidence of cancer recurrence.     Medications were reviewed and are up to date on EPIC.    The following portions of the patient's history were reviewed and updated as appropriate: allergies, current medications, past family history, past medical history, past social history, past surgical history and problem list.    Review of Systems:      General  Constitutional (WDL): Exceptions to WDL  Fatigue: Fatigue relieved by rest  EENT  Eye Disorder (WDL): All eye disorder elements are within defined limits  Ear Disorder (WDL): Exceptions to WDL  Ear Pain: Moderate pain, limiting instrumental ADL  Tinnitus: Mild symptoms, intervention not indicated  Respiratory   Respiratory (WDL): Within Defined Limits  Cardiovascular  Cardiovascular (WDL): All cardiovascular elements are within defined limits  Endocrine     Gastrointestinal  Gastrointestinal (WDL): Exceptions to WDL  Anorexia: Loss of appetite without alteration in eating habits(still using feeding tube, 6 cans/day)  Musculoskeletal  Musculoskeletal and Connetive Tissue Disorders (WDL): Exceptions to WDL  Muscle Weakness : Symptomatic, perceived by patient but  not evident on physical exam  Integumentary               Integumentary (WDL): All integumentary elements are within defined limits  Neurological  Neurosensory (WDL): Exceptions to WDL  Ataxia: Asymptomatic, clinical or diagnostic observations only, intervention not indicated  Psychological/Emotional   Patient Coping: Accepting;Open/discussion  Hematological/Lymphatic  Lymph (WDL): All lymph disorder elements are within defined limits  Dermatologic     Genitourinary/Reproductive  Genitourinary (WDL): All genitourinary elements are within defined limits  Reproductive     Pain              Currently in Pain: Yes  Pain Score (Initial OR Reassessment): 3  Pain Frequency: Intermittent  Location: left ear  Pain Intervention(s): Home medication  Response to Interventions: taking one oxycodone/day, helping. New ear drops per Dr. Guerrero yesterday  Accompanied by  Accompanied by: Alone    Objective:      PHYSICAL EXAMINATION:    /84   Pulse 97   Temp 98  F (36.7  C) (Oral)   Wt 137 lb 1.6 oz (62.2 kg)   SpO2 99%   BMI 22.13 kg/m      Gen: Alert, in NAD  Eyes: PERRL, EOMI, sclera anicteric  HENT     Head: NC/AT     Ears: No external auricular lesions     Nose/sinus: No rhinorrhea or epistaxis     Oropharynx: MMM, no visible oral lesions, no evidence of fungal infection.  Neck: Supple, full ROM.  The left parotid gland swelling has nearly complete gone.  There is no localized tenderness.  There is no palpable lymphadenopathy bilaterally in the head neck region.  Pulm: No wheezing, stridor or respiratory distress  CV: Well-perfused, no cyanosis, no pedal edema  Abdominal: BS+, soft, nontender, nondistended, no hepatomegaly  Back: No step-offs or pain to palpation along the thoracolumbar spine  Rectal: Deferred  : Deferred  Musculoskeletal: Normal muscle bulk and tone  Skin: Normal color and turgor  Neurologic: A/Ox3, CN II-XII intact, normal gait and station  Psychiatric: Appropriate mood and affect     Impression      The patient is a 70-year-old gentleman with a diagnosis of nasopharyngeal cancer status post concurrent chemoradiation therapy 5 weeks ago.  The patient has been recovering well since treatment with no clinical evidence of recurrence.    Assessment & Plan:     1.  I have again discussed with the patient about appropriate nutritional support.  Patient is also informed to contact our office to get IV fluid if he does not tolerate PEG tube feeding.  The patient only has minimal pain and discomfort at the time of evaluation.  He is taking pain medication as needed.    2.  He had a restaging CT chest 5/18/2020 which showed resolution of previously mildly enlarged hilar and mediastinal nodes with no evidence of metastatic disease.   3.  Return to radiation oncology in 4 weeks for another office follow-up.  4.  Continue follow-up with Dr. Frias, medical oncology as planned.     Face to face time  25 minutes with > 75% spent on consultation, education and coordination of care.        Laurita Mendoza MD, PhD  Department of Radiation Oncology   [facility]  Tel: [phone]  Page: [phone]    [facility]  [address] [place] [address]     [address] [place] [address]    CC:  Patient Care Team:  Mike Cook MD as PCP - General (Family Medicine)  Laurita Mendoza MD as Physician (Radiation Oncology)  Alan Frias MD as Physician (Hematology and Oncology)  Alisha Tierney RN as Oncology Nurse Navigator  Billy Guerrero MD as Physician (Otolaryngology)

## 2021-06-08 NOTE — TELEPHONE ENCOUNTER
"Patient daughter called to clarify instructions and reason for dexamethasone.  Discussed details which daughter verbalized understanding.  Pt daughter also mentioned that her brother who is caring for the patient feeding tube talked about a \"button that popped out \" of the patients skin near the feeding tube.  Pt daughter was unable to clarify what was meant by this as she has not seen the area.  Advised to speak to brother and verify if the feeding tube is still working and that formula is not leaking out or area showing any signs of infection, if none of those are occurring and there is no open area, the area can be observed until the patient is back in clinic and can be further evaluated on June 1st.  Pt daughter verbalized understanding and will obtain more information and call again if necessary.    "

## 2021-06-08 NOTE — PROGRESS NOTES
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       TYPE OF RADIATION THERAPY ADMINISTERED:  Concurrent chemoradiation therapy for his head neck cancer with weekly cisplatin and had radiation therapy in our clinic with a total dose of 7000 cGy in 35 treatments given from 3/2/2020-4/17/2020.      INTERVAL SINCE COMPLETION OF RADIATION THERAPY: 3 weeks.      SUBJECTIVE:  Mr. Chapman is a 70 y.o. male who has been in his usual state of good health until recently.  Patient was found to have swelling right neck mass by his lerma a month ago for which he was a seeking further evaluation.  The patient did not notice any significant changes over the past few weeks and he is also asymptomatic.  Initial ENT examination showed suspicious abnormalities in the right nasopharynx worrisome for malignancy.  CT of the neck on 1/24/2020 revealed asymmetric soft tissue thickening in the posterior right nasopharynx as well as abnormal enlarged cervical lymph nodes measuring up to 3.8 cm bilaterally.  Biopsy was not obtained on 1/29/2020 with pathology confirmed moderately differentiated squamous cell carcinoma, P 16 is negative. He received concurrent chemoradiation therapy for his head neck cancer and had radiation therapy in our clinic with a total dose of 7000 cGy in 35 treatments given from 3/2/2020-4/17/2020.  He tolerated radiation therapy reasonably well with expected acute side effect.  The patient insisted not to have PEG tube placement during the therapy.  He however is able to maintain his weight reasonably stable during the treatment.     The patient experienced worsening pain of sore throat and dysphasia since completion of the radiation therapy.    He is not able to get adequate calorie and fluid intake and was admitted to hospital one week  after treatment for supportive care and pain management.  A PEG tube was also placed during the hospital stay. The patient felt much better and improved since hospital stay.  His pain is stable and is able to maintain his weight over the past 1 week.  The patient denies any ongoing issues at the time of evaluation. The patient is here for routine office follow-up.     Medications were reviewed and are up to date on EPIC.    The following portions of the patient's history were reviewed and updated as appropriate: allergies, current medications, past family history, past medical history, past social history, past surgical history and problem list.    Review of Systems:      General  Constitutional (WDL): Exceptions to WDL  Fatigue: Fatigue relieved by rest  EENT  Eye Disorder (WDL): All eye disorder elements are within defined limits  Ear Disorder (WDL): All ear disorder elements are within defined limits  Respiratory   Respiratory (WDL): Within Defined Limits  Cardiovascular  Cardiovascular (WDL): All cardiovascular elements are within defined limits  Endocrine     Gastrointestinal  Gastrointestinal (WDL): Exceptions to WDL  Anorexia: Loss of appetite without alteration in eating habits(using feeding tube and eating soft foods)  Dehydration: Increased oral fluids indicated, dry mucous membranes, diminished skin turgor(dry mouth all the time, getting IVF today)  Dysphagia: Symptomatic, able to eat regular diet  Mucositis Oral: Moderate pain: not interfering with oral intake: modified diet  Esophagitis: Asymptomatic, clinical or diagnostic observations only, intervention not indicated  Musculoskeletal  Musculoskeletal and Connetive Tissue Disorders (WDL): Exceptions to WDL  Muscle Weakness : Symptomatic, perceived by patient but not evident on physical exam  Integumentary               Integumentary (WDL): All integumentary elements are within defined limits  Neurological  Neurosensory (WDL): Exceptions to WDL  Ataxia:  Asymptomatic, clinical or diagnostic observations only, intervention not indicated(using wheeled walker, more steady than last I saw him)  Psychological/Emotional   Patient Coping: Accepting;Open/discussion  Hematological/Lymphatic  Lymph (WDL): All lymph disorder elements are within defined limits  Dermatologic     Genitourinary/Reproductive  Genitourinary (WDL): All genitourinary elements are within defined limits  Reproductive     Pain              Currently in Pain: Yes  Pain Score (Initial OR Reassessment): 2  Pain Frequency: Intermittent  Location: throat, neck  Pain Intervention(s): Medication (See MAR)  Response to Interventions: tolerable  Accompanied by  Accompanied by: Alone    Objective:     PHYSICAL EXAMINATION:    /80   Pulse 91   Temp 97.4  F (36.3  C) (Oral)   Wt 138 lb 6.4 oz (62.8 kg)   SpO2 98%   BMI 27.03 kg/m      Gen: Alert, in NAD  Eyes: PERRL, EOMI, sclera anicteric  HENT     Head: NC/AT     Ears: No external auricular lesions     Nose/sinus: No rhinorrhea or epistaxis     Oropharynx: MMM, no visible oral lesions, no evidence of fungal infection.  Neck: Supple, full ROM, no LAD, skin within the radiation therapy field shows post therapy changes.  Pulm: No wheezing, stridor or respiratory distress  CV: Well-perfused, no cyanosis, no pedal edema  Abdominal: BS+, soft, nontender, nondistended, no hepatomegaly  Back: No step-offs or pain to palpation along the thoracolumbar spine  Rectal: Deferred  : Deferred  Musculoskeletal: Normal muscle bulk and tone  Skin: Normal color and turgor  Neurologic: A/Ox3, CN II-XII intact, normal gait and station  Psychiatric: Appropriate mood and affect        Impression     The patient is a 70-year-old gentleman with a diagnosis of nasopharyngeal cancer status post concurrent chemoradiation therapy 3 weeks ago.  The patient has been stable since last visit.    Assessment & Plan:     1.  I have again discussed with the patient about appropriate  nutritional support.  Patient is also informed to contact our office to get IV fluid if he does not tolerate PEG tube feeding.  Recommend patient to take pain medication regularly for better pain control.  He is scheduled to have CT chest on 5/18/2020 for restaging.  2.  Return to radiation oncology in 2 weeks for another office follow-up.  3.  Continue follow-up with Dr. Frias, medical oncology as planned.      Face to face time  15 minutes with > 100% spent on consultation, education and coordination of care.    Laurita Mendoza MD, PhD  Department of Radiation Oncology   [facility]  Tel: [phone]  Page: [phone]    [facility]  [address] [place] [address]     [address]   [place] [address]    CC:  Patient Care Team:  Mike Cook MD as PCP - General (Family Medicine)  Laurita Mendoza MD as Physician (Radiation Oncology)  Alan Frias MD as Physician (Hematology and Oncology)  Alisha Tierney RN as Oncology Nurse Navigator  Billy Guerrero MD as Physician (Otolaryngology)

## 2021-06-08 NOTE — TELEPHONE ENCOUNTER
Sent an e-mail per son, Nayla's request:    Hello!    As of now it looks like just one day for appointments:    Wednesday, June 10th, 2020 arrival of 1 pm  Check in on 1st floor!  1pm:  labs  1:15pm:  RN then Dr. Frias  2pm:  infusion for about 1 hour.    Hope everyone is wellJ    Take Care,  Alisha

## 2021-06-08 NOTE — PROGRESS NOTES
Kristen came to chemo infusion this afternoon upon completion of his home infusion of 5FU.  VSS.  Pt assessed with use of video  and is feeling well. Port had good blood return.  Port was flushed with ns, heparinized then de-accessed and site covered.  Kristen d/c to home ambulatory using his walker.  He is aware of his future appointment.

## 2021-06-08 NOTE — PROGRESS NOTES
[facility] Hematology and Oncology Progress Note    Patient: Kristen Chapman  MRN: [id number]  Date of Service: 05/21/2020        Reason for Visit    Chief Complaint   Patient presents with     HE Cancer     Nasopharyngeal carcinoma        Assessment and Plan  Cancer Staging  Nasopharyngeal carcinoma (H)  Staging form: Pharynx - Nasopharynx, AJCC 8th Edition  - Clinical stage from 2/18/2020: Stage SAMANTHA (cT4, cN2, cM0) - Signed by Alan Frias MD on 2/18/2020    1. Nasopharyngeal cancer, squamous cell: Patient has completed his concurrent chemo and radiation.  He received weekly cisplatin.  He had a good clinical response per imaging.  He had a CT scan done last week.  The plan will be to do a PET scan in mid July per Dr. Frias.  I did tell patient that he does need to start his second portion of the treatment plan which is cisplatin and then a 4-day pump of 5-FU.  This will be done for 3 cycles given every 28 days.  We will have to monitor him closely because he did really have a rough time at the end of his radiation and chemo with anemia, pancytopenia and fatigue.  I told patient he we will like to start this in about 2 weeks so he will get scheduled appropriately.  I did send over his dexamethasone and told him to bring that with him when he comes for his chemo.    2.  Recent hospitalization for oral infection, sinusitis, parotitis: Patient was seen by ENT yesterday and showing some improvement.  He is on antibiotics for 10 days which is why also wanted delay the chemotherapy until he is done with those.  He states that clinically he is feeling much better.  No fevers.    3.  Anemia: This is mainly chemo related.  It slowly improving.  We will have to keep a close eye on this and give blood if he is below 7.      ECOG Performance   ECOG Performance Status: 1     Distress Assessment  Distress Assessment Score: No distress    Pain  Currently in Pain: Yes  Pain Score (Initial OR Reassessment): 4  Location: left  ear      Problem List    1. Squamous cell carcinoma of nasopharynx (H)  dexAMETHasone (DECADRON) 4 MG tablet    Infusion Appointment      ______________________________________________________________________________    History of Present Illness    DIAGNOSIS: Nasopharyngeal carcinoma: Biopsy from the right nasopharynx shows a moderately differentiated squamous cell carcinoma.  Imaging was reviewed.  He has evidence of bilateral cervical node metastases.  Tumor also seems to extend down to the hypopharynx.     TREATMENT: has started concurrent chemo and radiation with weekly cisplatin.  His fifth and final dose was given March 31, 2020.  We then had to hold the rest of his treatment due to thrombocytopenia and renal insufficiency.  His last day of radiation was April 17.  His first day was March 2    INTERIM HISTORY: Patient was due for a follow-up visit today.  We are conducting this via telephone due to COVID.  Patient states that he was discharged from the hospital about 2 days ago.  He says he is actually feeling better.  He is slowly getting stronger.  He did see his ENT doctor yesterday and he said that he is doing okay.  He is on oral antibiotics for 10 days.  He does continue to feel weak.  He is using his feeding tube and getting about 6 cans a day.    Pain Status  Currently in Pain: Yes    Review of Systems    Constitutional  Constitutional (WDL): Exceptions to WDL  Fatigue: Fatigue relieved by rest  Neurosensory  Neurosensory (WDL): Exceptions to WDL  Ataxia: Asymptomatic, clinical or diagnostic observations only, intervention not indicated(walker)  Eye   Eye Disorder (WDL): All eye disorder elements are within defined limits  Ear  Ear Disorder (WDL): Exceptions to WDL  Ear Pain: Moderate pain, limiting instrumental ADL(drainage, using drops)  Tinnitus: Mild symptoms, intervention not indicated  Cardiovascular  Cardiovascular (WDL): All cardiovascular elements are within defined  limits  Pulmonary  Respiratory (WDL): Within Defined Limits  Gastrointestinal  Gastrointestinal (WDL): Exceptions to WDL  Anorexia: Loss of appetite without alteration in eating habits(feeding tube, 6 cans food daily/ with food)  Dry Mouth: Symptomatic (e.g., dry or thick saliva) without significant dietary alteration, unstimulated saliva flow >0.2 ml/min  Genitourinary  Genitourinary (WDL): All genitourinary elements are within defined limits  Lymphatic  Lymph (WDL): All lymph disorder elements are within defined limits  Musculoskeletal and Connective Tissue  Musculoskeletal and Connetive Tissue Disorders (WDL): Exceptions to WDL  Muscle Weakness : Symptomatic, perceived by patient but not evident on physical exam(legs, arms)  Integumentary  Integumentary (WDL): All integumentary elements are within defined limits  Patient Coping  Patient Coping: Accepting  Distress Assessment  Distress Assessment Score: No distress  Accompanied by     Oral Chemo Adherence         Past History  Past Medical History:   Diagnosis Date     Nasopharyngeal cancer (H)        PHYSICAL EXAM:  deferred      Lab Results    No visits with results within 3 Day(s) from this visit.   Latest known visit with results is:   Admission on 05/14/2020, Discharged on 05/18/2020   Component Date Value Ref Range Status     WBC 05/14/2020 8.6  4.0 - 11.0 thou/uL Final     RBC 05/14/2020 2.51* 4.40 - 6.20 mill/uL Final     Hemoglobin 05/14/2020 6.8* 14.0 - 18.0 g/dL Final     Hematocrit 05/14/2020 22.0* 40.0 - 54.0 % Final     MCV 05/14/2020 88  80 - 100 fL Final     MCH 05/14/2020 27.1  27.0 - 34.0 pg Final     MCHC 05/14/2020 30.9* 32.0 - 36.0 g/dL Final     RDW 05/14/2020 16.8* 11.0 - 14.5 % Final     Platelets 05/14/2020 170  140 - 440 thou/uL Final     MPV 05/14/2020 12.1  8.5 - 12.5 fL Final     Sodium 05/14/2020 132* 136 - 145 mmol/L Final     Potassium 05/14/2020 5.2* 3.5 - 5.0 mmol/L Final     Chloride 05/14/2020 98  98 - 107 mmol/L Final     CO2  05/14/2020 26  22 - 31 mmol/L Final     Anion Gap, Calculation 05/14/2020 8  5 - 18 mmol/L Final     Glucose 05/14/2020 86  70 - 125 mg/dL Final     Calcium 05/14/2020 8.0* 8.5 - 10.5 mg/dL Final     BUN 05/14/2020 26* 8 - 22 mg/dL Final     Creatinine 05/14/2020 0.78  0.70 - 1.30 mg/dL Final     GFR MDRD Af Amer 05/14/2020 >60  >60 mL/min/1.73m2 Final     GFR MDRD Non Af Amer 05/14/2020 >60  >60 mL/min/1.73m2 Final     ABORh 05/14/2020 B POS   Final     Antibody Screen 05/14/2020 Negative  Negative Final     Crossmatch 05/16/2020 Compatible   Final     Unit Type 05/16/2020 B Pos   Final     Unit Number 05/16/2020 [id number]   Final     Status 05/16/2020 Transfused   Final     Component 05/16/2020 Red Blood Cells   Final     PRODUCT CODE 05/16/2020 [id number]   Final     Issue Date and Time 05/16/2020 [id number]   Final     Blood Type 05/16/2020 7300   Final     CODING SYSTEM 05/16/2020 [id number]   Final     CRP 05/14/2020 10.6* 0.0 - 0.8 mg/dL Final     Sodium 05/15/2020 137  136 - 145 mmol/L Final     Potassium 05/15/2020 4.2  3.5 - 5.0 mmol/L Final     Chloride 05/15/2020 102  98 - 107 mmol/L Final     CO2 05/15/2020 28  22 - 31 mmol/L Final     Anion Gap, Calculation 05/15/2020 7  5 - 18 mmol/L Final     Glucose 05/15/2020 92  70 - 125 mg/dL Final     Calcium 05/15/2020 8.4* 8.5 - 10.5 mg/dL Final     BUN 05/15/2020 21  8 - 22 mg/dL Final     Creatinine 05/15/2020 0.82  0.70 - 1.30 mg/dL Final     GFR MDRD Af Amer 05/15/2020 >60  >60 mL/min/1.73m2 Final     GFR MDRD Non Af Amer 05/15/2020 >60  >60 mL/min/1.73m2 Final     WBC 05/15/2020 7.8  4.0 - 11.0 thou/uL Final     RBC 05/15/2020 2.87* 4.40 - 6.20 mill/uL Final     Hemoglobin 05/15/2020 8.0* 14.0 - 18.0 g/dL Final     Hematocrit 05/15/2020 25.0* 40.0 - 54.0 % Final     MCV 05/15/2020 87  80 - 100 fL Final     MCH 05/15/2020 27.9  27.0 - 34.0 pg Final     MCHC 05/15/2020 32.0  32.0 - 36.0 g/dL Final     RDW 05/15/2020 16.8* 11.0 - 14.5 % Final      Platelets 05/15/2020 167  140 - 440 thou/uL Final     MPV 05/15/2020 11.1  8.5 - 12.5 fL Final     Anaerobic Blood Culture Bottle 05/15/2020 No Growth  No Growth, No organisms seen, bottle returned to instrument, Specimen not received, No Growth at 24 hours, No Growth at 48 hours, No Growth at 72 hours, No Growth at 96 hours, No Growth at 120 hours Final     Aerobic Blood Culture Bottle 05/15/2020 No Growth  No Growth, No organisms seen, bottle returned to instrument, Specimen not received, No Growth at 24 hours, No Growth at 120 hours, No Growth at 48 hours, No Growth at 72 hours, No Growth at 96 hours Final     Anaerobic Blood Culture Bottle 05/15/2020 No Growth  No Growth, No organisms seen, bottle returned to instrument, Specimen not received, No Growth at 24 hours, No Growth at 48 hours, No Growth at 72 hours, No Growth at 96 hours, No Growth at 120 hours Final     Aerobic Blood Culture Bottle 05/15/2020 No Growth  No Growth, No organisms seen, bottle returned to instrument, Specimen not received, No Growth at 24 hours, No Growth at 120 hours, No Growth at 48 hours, No Growth at 72 hours, No Growth at 96 hours Final     Sodium 05/16/2020 139  136 - 145 mmol/L Final     Potassium 05/16/2020 3.8  3.5 - 5.0 mmol/L Final     Chloride 05/16/2020 103  98 - 107 mmol/L Final     CO2 05/16/2020 27  22 - 31 mmol/L Final     Anion Gap, Calculation 05/16/2020 9  5 - 18 mmol/L Final     Glucose 05/16/2020 98  70 - 125 mg/dL Final     Calcium 05/16/2020 7.8* 8.5 - 10.5 mg/dL Final     BUN 05/16/2020 19  8 - 22 mg/dL Final     Creatinine 05/16/2020 0.79  0.70 - 1.30 mg/dL Final     GFR MDRD Af Amer 05/16/2020 >60  >60 mL/min/1.73m2 Final     GFR MDRD Non Af Amer 05/16/2020 >60  >60 mL/min/1.73m2 Final     WBC 05/16/2020 6.2  4.0 - 11.0 thou/uL Final     RBC 05/16/2020 2.96* 4.40 - 6.20 mill/uL Final     Hemoglobin 05/16/2020 8.1* 14.0 - 18.0 g/dL Final     Hematocrit 05/16/2020 25.9* 40.0 - 54.0 % Final     MCV 05/16/2020 88   80 - 100 fL Final     MCH 05/16/2020 27.4  27.0 - 34.0 pg Final     MCHC 05/16/2020 31.3* 32.0 - 36.0 g/dL Final     RDW 05/16/2020 17.0* 11.0 - 14.5 % Final     Platelets 05/16/2020 167  140 - 440 thou/uL Final     MPV 05/16/2020 11.1  8.5 - 12.5 fL Final     Magnesium 05/16/2020 2.1  1.8 - 2.6 mg/dL Final     Phosphorus 05/16/2020 4.0  2.5 - 4.5 mg/dL Final     Sodium 05/16/2020 136  136 - 145 mmol/L Final     Potassium 05/16/2020 3.8  3.5 - 5.0 mmol/L Final     Chloride 05/16/2020 102  98 - 107 mmol/L Final     CO2 05/16/2020 26  22 - 31 mmol/L Final     Anion Gap, Calculation 05/16/2020 8  5 - 18 mmol/L Final     Glucose 05/16/2020 130* 70 - 125 mg/dL Final     Calcium 05/16/2020 7.9* 8.5 - 10.5 mg/dL Final     BUN 05/16/2020 18  8 - 22 mg/dL Final     Creatinine 05/16/2020 0.85  0.70 - 1.30 mg/dL Final     GFR MDRD Af Amer 05/16/2020 >60  >60 mL/min/1.73m2 Final     GFR MDRD Non Af Amer 05/16/2020 >60  >60 mL/min/1.73m2 Final     Vancomycin 05/16/2020 30.1* <=25.0 ug/mL Final     WBC 05/17/2020 5.7  4.0 - 11.0 thou/uL Final     RBC 05/17/2020 2.91* 4.40 - 6.20 mill/uL Final     Hemoglobin 05/17/2020 7.9* 14.0 - 18.0 g/dL Final     Hematocrit 05/17/2020 25.2* 40.0 - 54.0 % Final     MCV 05/17/2020 87  80 - 100 fL Final     MCH 05/17/2020 27.1  27.0 - 34.0 pg Final     MCHC 05/17/2020 31.3* 32.0 - 36.0 g/dL Final     RDW 05/17/2020 17.0* 11.0 - 14.5 % Final     Platelets 05/17/2020 159  140 - 440 thou/uL Final     MPV 05/17/2020 11.2  8.5 - 12.5 fL Final     Sodium 05/17/2020 136  136 - 145 mmol/L Final     Potassium 05/17/2020 4.1  3.5 - 5.0 mmol/L Final     Chloride 05/17/2020 102  98 - 107 mmol/L Final     CO2 05/17/2020 25  22 - 31 mmol/L Final     Anion Gap, Calculation 05/17/2020 9  5 - 18 mmol/L Final     Glucose 05/17/2020 89  70 - 125 mg/dL Final     Calcium 05/17/2020 7.7* 8.5 - 10.5 mg/dL Final     BUN 05/17/2020 19  8 - 22 mg/dL Final     Creatinine 05/17/2020 0.81  0.70 - 1.30 mg/dL Final     GFR  MDRD Af Amer 05/17/2020 >60  >60 mL/min/1.73m2 Final     GFR MDRD Non Af Amer 05/17/2020 >60  >60 mL/min/1.73m2 Final     Albumin 05/17/2020 2.1* 3.5 - 5.0 g/dL Final     Sodium 05/18/2020 136  136 - 145 mmol/L Final     Potassium 05/18/2020 4.0  3.5 - 5.0 mmol/L Final     Chloride 05/18/2020 102  98 - 107 mmol/L Final     CO2 05/18/2020 29  22 - 31 mmol/L Final     Anion Gap, Calculation 05/18/2020 5  5 - 18 mmol/L Final     Glucose 05/18/2020 85  70 - 125 mg/dL Final     Calcium 05/18/2020 8.4* 8.5 - 10.5 mg/dL Final     BUN 05/18/2020 18  8 - 22 mg/dL Final     Creatinine 05/18/2020 0.93  0.70 - 1.30 mg/dL Final     GFR MDRD Af Amer 05/18/2020 >60  >60 mL/min/1.73m2 Final     GFR MDRD Non Af Amer 05/18/2020 >60  >60 mL/min/1.73m2 Final     WBC 05/18/2020 8.9  4.0 - 11.0 thou/uL Final     RBC 05/18/2020 3.38* 4.40 - 6.20 mill/uL Final     Hemoglobin 05/18/2020 9.1* 14.0 - 18.0 g/dL Final     Hematocrit 05/18/2020 29.5* 40.0 - 54.0 % Final     MCV 05/18/2020 87  80 - 100 fL Final     MCH 05/18/2020 26.9* 27.0 - 34.0 pg Final     MCHC 05/18/2020 30.8* 32.0 - 36.0 g/dL Final     RDW 05/18/2020 16.9* 11.0 - 14.5 % Final     Platelets 05/18/2020 194  140 - 440 thou/uL Final     MPV 05/18/2020 11.0  8.5 - 12.5 fL Final     Magnesium 05/18/2020 2.2  1.8 - 2.6 mg/dL Final   ]      Imaging    Xr Chest 1 View Portable    Result Date: 4/25/2020  EXAM: XR CHEST 1 VIEW PORTABLE LOCATION: [facility] DATE/TIME: 4/25/2020 4:47 PM INDICATION: Chest Pain COMPARISON: None.     Left-sided port with tip over atrial caval junction. Mildly enlarged heart. Tortuous atherosclerotic aorta. No pneumothorax or pleural effusion. No focal consolidation. No acute osseous abnormality.    Ct Soft Tissue Neck With Contrast    Result Date: 5/14/2020  EXAM: CT SOFT TISSUE NECK W CONTRAST LOCATION: [facility] DATE/TIME: 5/14/2020 6:28 PM INDICATION: Left ear pain. History of head and neck cancer. COMPARISON: CT soft tissue neck  01/24/2020. CONTRAST: Iohexol (Omni) 100 mL TECHNIQUE: Routine with IV contrast. Multiplanar reformats. Dose reduction techniques were used. FINDINGS: VISUALIZED INTRACRANIAL/ORBITS/SINUSES: No abnormality of the visualized intracranial compartment or orbits. Right maxillary sinus completely opacified. Right anterior ethmoid air cells patchy mucosal thickening. Left anterior, mid, and posterior ethmoid air cells severely opacified. Left maxillary sinus mild to moderate mucosal thickening. Left sphenoid sinus completely opacified. Left mastoid air cells and middle ear cavity partially opacified may be congestive or infectious inflammatory. SUPRAHYOID NECK/INFRAHYOID NECK: Previously seen right nasopharyngeal mass decreased in size compared to prior examination. Epiglottis normal in caliber. No prevertebral soft tissue swelling. Asymmetric positioning of the vocal cords. Please correlate for vocal cord paralysis. SALIVARY GLANDS: Left parotid gland is enlarged and heterogeneous in attenuation concerning for parotitis. Left submandibular gland demonstrates sialadenitis. Overlying left facial cellulitis. No parotid or submandibular duct stone identified. No drainable soft tissue abscess identified. LYMPH NODES: Multiple small and prominent lymph nodes within the neck. Please defer to PET/CT 02/12/2020. Right level 2A prominent lymph node likely demonstrates internal necrosis. No pathologically enlarged lymph nodes based on size criteria. VESSELS: Vascular structures of the neck are patent. Ascending aorta fusiform aneurysm measuring 4.4 cm. Scattered vascular calcifications. THYROID: Right thyroid lobe small nodule measuring 7 mm. No specific follow-up based upon ACR White paper criteria. OTHER: Lung apices essentially clear. Degenerative changes noted within the spine. Left sphenoid bone small sclerotic lesion redemonstrated. Multiple scattered small osseous lucent lesions. Left chest port. Left facial subcutaneous  soft tissues demonstrate a dystrophic calcification.     1.  Left parotitis. Left submandibular sialadenitis. Overlying left facial cellulitis. No parotid or submandibular duct stone identified. 2.  No drainable abscess. 3.  History of head and neck cancer. Please defer to prior MRI soft tissue neck 02/10/2020 and PET/CT 02/12/2020. Sequelae of head and neck cancer changes described above.     Ct Chest With Contrast    Result Date: 5/18/2020  EXAM: CT CHEST W CONTRAST LOCATION: [facility] DATE/TIME: 5/18/2020 1:16 PM INDICATION: Indication Not Listed - See Reason for Exam Follow-up mediastinal adenopathy. Abnormal PET scan 02/12/2020. Nasopharyngeal cancer. COMPARISON: PET scan 02/12/2020 TECHNIQUE: CT chest with IV contrast. Multiplanar reformats were obtained. Dose reduction techniques were used. CONTRAST: Iohexol (Omni) 90 mL FINDINGS: LUNGS AND PLEURA: Lungs are clear. No pleural effusion. MEDIASTINUM/AXILLAE: No mediastinal or hilar lymphadenopathy. The previous mildly prominent lymph nodes in the hilum and mediastinum seen on PET scan have decreased in size. Largest left hilar 11 L node measures 9 x 4 mm decrease in size. Small fusiform ascending thoracic aortic aneurysm measuring 4.4 cm. UPPER ABDOMEN: Gastrostomy tube in good position. MUSCULOSKELETAL: Unremarkable.     1.  Resolution of the previous mildly enlarged hilar and mediastinal nodes since 02/12/2020. 2.  Incidental fusiform 4.4 cm ascending thoracic aortic aneurysm unchanged since 02/12/2020 but better seen..    Ir Gastrostomy Tube Insertion    Result Date: 4/27/2020  [facility] LOCATION: [facility] DATE: 4/27/2020 PROCEDURE: PERCUTANEOUS IMAGE GUIDED GASTROSTOMY TUBE PLACEMENT INTERVENTIONAL RADIOLOGIST: Zack Dominguez MD. INDICATION: Nasopharyngeal carcinoma with dysphagia in need of feeding tube for nutritional support. CONSENT: The risks, benefits and alternatives of gastrostomy tube placement were discussed with the  patient  in detail. All questions were answered. Informed consent was given to proceed with the procedure. MODERATE SEDATION: Versed 2 mg IV; Fentanyl 100 mcg IV.  Under physician supervision, Versed and fentanyl were administered for moderate sedation. Pulse oximetry, heart rate and blood pressure were continuously monitored by an independent trained observer. The physician spent 30 minutes of face-to-face sedation time with the patient. CONTRAST: 15 cc of Omni 350 ANTIBIOTICS: 2 g of Ancef IV ADDITIONAL MEDICATIONS: None. FLUOROSCOPIC TIME: 3.1 minutes. RADIATION DOSE: Air Kerma: 47 mGy. COMPLICATIONS: No immediate complications. STERILE BARRIER TECHNIQUE: Maximum sterile barrier technique was used. Cutaneous antisepsis was performed at the operative site with application of 2% chlorhexidine and large sterile drape. Prior to the procedure, the  and assistant performed hand hygiene and wore hat, mask, sterile gown, and sterile gloves during the entire procedure. PROCEDURE: A nasogastric tube was placed into the stomach, confirmed by fluoroscopy. The upper abdomen was prepped and draped in the usual sterile fashion followed by local anesthesia with 1% Xylocaine. The stomach was insufflated with air. Using fluoroscopic guidance for needle placement, two T-fasteners were deployed into the gastric lumen for gastropexy. An 18 gauge needle was advanced into the stomach. Under fluoroscopy, an Amplatz Superstiff wire was advanced into the gastric lumen followed by placement of the dilator peel-away sheath. An 18 Setswana gastrostomy tube was then placed into the gastric lumen. The balloon was inflated with 10 mL of saline. The peel-away sheath was removed. Contrast was injected through the G-tube, and a digital spot image was  obtained. FINDINGS: Successful placement of new 18 Setswana percutaneous gastrostomy tube.     Successful placement of new 18 Setswana percutaneous gastrostomy tube. CPT code for physician  reference only: 37391    Ct Sinus With Contrast    Result Date: 5/19/2020  EXAM: CT SINUS W CONTRAST LOCATION: [facility] DATE/TIME: 5/18/2020 1:36 PM INDICATION: Swollen left face. Purulent material draining from the left ear. COMPARISON: Soft tissue neck CT 05/14/2020. CONTRAST: Iohexol (Omni) 90 mL. TECHNIQUE: Routine with IV contrast. Multiplanar reformats. Dose reduction techniques were used. FINDINGS: Enlargement and hyperenhancement of the left parotid gland again noted consistent with left parotitis. FRONTAL SINUSES: Normal. ETHMOID SINUSES: Near complete opacification of the ethmoid air cells on the left by fluid/mucosal thickening. SPHENOID SINUSES: Near complete opacification of the left locule of the sphenoid sinus by fluid and mucosal thickening. MAXILLARY SINUSES: Near complete opacification of the right maxillary sinus by fluid and mucosal thickening. Minimal peripheral mucosal thickening in the left maxillary sinus. The floors of the maxillary sinuses are intact. NASAL CAVITY/SKULL BASE: Intact nasal septum is deviated to the right. Unremarkable nasal turbinates. The cribriform plate is intact and symmetric. The anterior clinoid processes are not pneumatized. PARANASAL SINUS DRAINAGE PATHWAYS: The paranasal sinus drainage pathways are patent. NON-SINUS STRUCTURES: No abnormality of the visualized orbits or intracranial compartment. There is scattered opacification of the mastoid air cells and middle ear cavity on the left by fluid/inflammatory debris as well as partial opacification of the medial and of the left external auditory canal by fluid/inflammatory debris.     1.  Findings suggesting acute sinusitis of the ethmoid air cells on the left, left locule of the sphenoid sinus and right maxillary sinus. 2.  Rightward nasal septal deviation. 3.  Mastoid/middle ear cavity effusion versus otomastoiditis on the left with inflammatory changes extending into the left external auditory canal. 4.   No fluid collections or abscesses anywhere in the visualized portions of the face or upper neck. 5.  Findings consistent with left parotitis again noted.     present and was involved in entire conversation  Total time spent with patient was 11 minutes.  All of that was in counseling and care coordination.      Signed by: Yoanna Andre CNP

## 2021-06-08 NOTE — TELEPHONE ENCOUNTER
Per Dr. Frias, patient cannot wait until next week to be evaluated for pus/drainage coming out of his ear.  He recommends he go to the ER.  I called back and talked with daughter who reports that someone will bring him in today.  She was concerned about an  and I let her know that we have a service where we have an  service on an iPad and we can get any language that is needed.  She verbalized understanding.    Isabella Raymond RN

## 2021-06-09 NOTE — TELEPHONE ENCOUNTER
Hello Mitteran!    I am out on furlough again so here is what I have for Dad for the next 2 weeks:    Friday, July 31st, 2020: arrival time 8 am.  Check in on 2nd floor  8 am: labs  8:15 am:  Meet with Nurse, then Dr. Frias  9 am:  5-hour chemo    FOLLOWING WEEK:    Tuesday, 8-4-2020:  arrival time  TBD:     Pump disconnected--Dad will be given an arrival time when they hook the pump up on Friday.    I am here till 6:30, then back on 8-4-2020!  Have a nice weekendJ!    Alisha Tierney, RN, BSN  Nurse Navigator

## 2021-06-09 NOTE — TELEPHONE ENCOUNTER
called a spoke with daughter  Rea, to ask if  someone from infectious disease called. Rea said someone called  on 7/2/20  they informed her they will be call her sometime this week to schedule, the week of 7/6/20

## 2021-06-09 NOTE — TELEPHONE ENCOUNTER
Patient's daughter was called with the results of the US which were normal per Dr. Lawrence.  The liver function labs are still elevated.  Per Dr. Lawrence, a referral was made to infectious disease for her Hep B reactivation. A message was sent to scheduling.

## 2021-06-09 NOTE — PROGRESS NOTES
Pt came to clinic this morning for cycle 2 chemotherapy with cisplatin and 5FU.  Labs done earlier this week were noted and reviewed with Dr Esqueda (as Dr Frias not in clinic today and unavailable).  Order obtained for CMP to be rechecked.  Port was accessed with good blood return and lab drawn.  Results noted and reviewed with Dr Esqueda.  Pharmacy consulted to see if pt is safe to receive treatment with these lab results. In the mean time Dr Frias responded and spoke to Dr Esqueda.  Treatment held for today.  Pt to return in 1 week.  Port was flushed, heparinized then de-accessed and site covered. Pt d/c from clinic ambulatory.

## 2021-06-09 NOTE — PROGRESS NOTES
Pt ambulatory with wheeled walker to radiation clinic for follow up. Use of video interpretor for visit with pt used by staff and provider. Pt continues with chemo, doing well, c/o pain to left shoulder area. Further recommendations and orders per provider.

## 2021-06-09 NOTE — TELEPHONE ENCOUNTER
Returned call to Donta- she was away from her desk but they will have am essage for her.    Only document in patients chart from [facility] is scanned in on 5/11/2020    Is this what she was referring to? Do we need to do something with it?

## 2021-06-09 NOTE — TELEPHONE ENCOUNTER
Who is calling:  Donta with [facility]  Reason for Call:  Please call Donta to confirm receipt of fax - Pt's Home Health Certification and Plan of Care has been faxed to clinic 4 times (on 5/4/20, 6/12/20 7/10/20, and today 7/20/20)  Date of last appointment with primary care: 4/29/2020  Okay to leave a detailed message: Yes

## 2021-06-09 NOTE — TELEPHONE ENCOUNTER
AST, ALT and bili continue rising.   (AST 1578, ALT 1406, Alk Phos 210, T bili 1.9, direct bili 1.0).     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 It appears ID resent the Rx to a different pharmacy today (first pharmacy did not have available). He recommends deferring any immunosuppressive chemo until his LFTs are returning to near normal and his viral load is improving.    Mary Lou Laguna CMA, contacted patient's daughter to review this and ensure he gets started on the new Rx promptly. They are picking it up at the pharmacy today to start.  If he has any new symptoms (fever, confusion/mental status change, abd pain, nausea, yellowing of the skin) he needs to call or go into ED.     He is returning next week for lab follow-up and visit with Dr. Frias. He has appt for his next cycle of chemo on hold to follow, but I suspect this will still need to be deferred.

## 2021-06-09 NOTE — PROGRESS NOTES
Pt is here for labs, visit with Joanna, and treatment. Pt states via interpretor that she is doing ok but is tired. Pt walked  In with walker. Pt also is having up to 4 diarrhea stools per day. Explained via interpretor that he can take Immodium if  He has diarrhea stools more than 3per day. Note  Written for daughter  At home as she takes care of pts meds at home. Treatment today was held due to labs being off. Pt had additional TSH and Hepatic labs sent today. Pt was scheduled for labs and an ultrasound on 7/2/ this was explained to pt,schedule was given and note written to daughter with instructions for this. Pt left ambulatory.

## 2021-06-09 NOTE — PROGRESS NOTES
Pt arrived to infusion clinic. Port accessed with great blood return. Labs drawn. Port flushed with Saline and Heparin, de-accessed, band-aid applied. Pt ambulated out of infusion clinic independently.

## 2021-06-09 NOTE — TELEPHONE ENCOUNTER
E-mail sent to sonNayla:    Hello Mitteran!    Looks like Dad s schedule changed this week!  So, here is what I see for next week:    Wednesday, July 8th, 2020:  arrival 9 am  ~check-in on 2nd floor!  9 am:  labs  9:15 am:  Nurse followed by Joanna Ventura NP  9:45 am:  5-hour chemo      Friday, July 10th, 2020 arrival time TBD  Check in on 2nd floor  Pump disconnected--Dad will be given an arrival time when they hook the pump up on Wednesday.    Hope you and your family have a nice weekend!  Stay coolJ!  Alisha

## 2021-06-09 NOTE — TELEPHONE ENCOUNTER
Language line  ID #[id number]  Rn received call from Josue RN stating patient calling.  When going to transfer patient was not on the line.     RN obtained  and called patient.  Pt states he did not call and states no one called on his behalf. Denied needing any help or questions.    RN apologized and ended call.    Aditi Kay, RN   [facility] RN Triage    Additional Information    [1] Follow-up call to recent contact AND [2] information only call, no triage required    Negative: Requesting regular office appointment    Negative: [1] Caller requesting NON-URGENT health information AND [2] PCP's office is the best resource    Negative: RN needs further essential information from caller in order to complete triage    Negative: [1] Caller is not with the adult (patient) AND [2] reporting urgent symptoms    Negative: Lab result questions    Negative: Medication questions    Negative: Caller can't be reached by phone    Negative: Caller has already spoken to PCP or another triager    Negative: [1] Caller is not with the adult (patient) AND [2] probable NON-URGENT symptoms    Negative: Question about upcoming scheduled test, no triage required and triager able to answer question    Negative: General information question, no triage required and triager able to answer question    Negative: Health Information question, no triage required and triager able to answer question    Protocols used: INFORMATION ONLY CALL-A-

## 2021-06-09 NOTE — TELEPHONE ENCOUNTER
Dr Grier    In regards of: lamiVUDine (EPIVIR) 100 MG tablet     [facility] does not carry this medication.     please send RX to: [id number] - [place], [place] - [address] [place] [facility] [place] [address]    please call @ [phone] to confirm RX was sent

## 2021-06-09 NOTE — PROGRESS NOTES
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       TYPE OF RADIATION THERAPY ADMINISTERED:  Concurrent chemoradiation therapy for his head neck cancer with weekly cisplatin and had radiation therapy in our clinic with a total dose of 7000 cGy in 35 treatments given from 3/2/2020-4/17/2020.      INTERVAL SINCE COMPLETION OF RADIATION THERAPY: 2 months.      SUBJECTIVE:  Mr. Chapman is a 70 y.o. male who has been in his usual state of good health until recently.  Patient was found to have swelling right neck mass by his lerma a month ago for which he was a seeking further evaluation.  The patient did not notice any significant changes over the past few weeks and he is also asymptomatic.  Initial ENT examination showed suspicious abnormalities in the right nasopharynx worrisome for malignancy.  CT of the neck on 1/24/2020 revealed asymmetric soft tissue thickening in the posterior right nasopharynx as well as abnormal enlarged cervical lymph nodes measuring up to 3.8 cm bilaterally.  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  The patient also had a staging work-up including PET CT scan and MRI brain which showed locally advanced disease with lymph node metastasis.  The PET CT scan also showed abnormal increased activity in the hilar region suspicious for metastasis.  Patient was then referred to pulmonology for evaluation and possible biopsy.  Patient then underwent bronchoscopy and EBUS on 2/27/2020 with biopsy showed negative for pregnancy. He received concurrent chemoradiation therapy for his head neck cancer and had radiation therapy in our clinic with a total dose of 7000 cGy in 35 treatments given from 3/2/2020-4/17/2020.  He also received 3 cycles  of cisplatin and 5-FU during the course of radiation therapy. He tolerated radiation therapy reasonably well with expected acute side effect.  The patient insisted not to have PEG tube placement during the therapy.  He however is able to maintain his weight reasonably stable during the treatment.     The patient experienced worsening pain of sore throat and dysphasia since completion of the radiation therapy.    He is not able to get adequate calorie and fluid intake and was admitted to hospital one week after treatment for supportive care and pain management.  A PEG tube was also placed during the hospital stay. The patient felt much better and improved since hospital stay.  The patient also experienced acute onset of left parotitis 4 weeks post cancer therapy and was admitted to the hospital for ongoing treatment including antibiotics.  His condition has been significant improved since then. He is currently able to eat with liquid and soft without any significant difficulty.  He denies any significant pain at the time of evaluation.  He is to have significant dry mouth at the time of evaluation.  He noticed 3 weeks history of left shoulder pain possibly due to rotator cuff. He is here for routine post therapy office follow-up.    He is also receiving adjuvant chemotherapy under supervision of Dr. Frias.     Patient has been followed with Dr. Guerrero, ENT and last examination yesterday showed no evidence of cancer recurrence.    Medications were reviewed and are up to date on EPIC.    The following portions of the patient's history were reviewed and updated as appropriate: allergies, current medications, past family history, past medical history, past social history, past surgical history and problem list.    Review of Systems:      General  Constitutional (WDL): Exceptions to WDL  Fatigue: Fatigue relieved by rest  EENT  Eye Disorder (WDL): All eye disorder elements are within defined limits  Ear Disorder (WDL):  Exceptions to WDL  Tinnitus: Mild symptoms, intervention not indicated  Respiratory   Respiratory (WDL): Within Defined Limits  Cardiovascular  Cardiovascular (WDL): All cardiovascular elements are within defined limits  Endocrine     Gastrointestinal  Gastrointestinal (WDL): Exceptions to WDL  Anorexia: Loss of appetite without alteration in eating habits  Dysphagia: Severely altered eating/swallowing, tube feeding or TPN or hospitalization indicated(has g-tube, not passing swallow test)  Musculoskeletal  Musculoskeletal and Connetive Tissue Disorders (WDL): All Musculoskeletal and Connetive Tissue Disorder elements are within defined limits  Integumentary               Integumentary (WDL): All integumentary elements are within defined limits  Neurological  Neurosensory (WDL): Exceptions to WDL  Ataxia: Asymptomatic, clinical or diagnostic observations only, intervention not indicated(using wheeled walker)  Psychological/Emotional   Patient Coping: Open/discussion  Hematological/Lymphatic  Lymph (WDL): All lymph disorder elements are within defined limits  Dermatologic     Genitourinary/Reproductive  Genitourinary (WDL): All genitourinary elements are within defined limits  Reproductive     Pain              Currently in Pain: Yes  Pain Score (Initial OR Reassessment): 7  Pain Frequency: Constant/continuous  Location: left shoulder  Pain Intervention(s): Medication (See MAR)  Response to Interventions: helps  Accompanied by  Accompanied by: Alone    Objective:     PHYSICAL EXAMINATION:    /76   Pulse 100   Temp 98.1  F (36.7  C) (Oral)   Wt 141 lb 9.6 oz (64.2 kg)   SpO2 99%   BMI 22.85 kg/m      Gen: Alert, in NAD  Eyes: PERRL, EOMI, sclera anicteric  HENT     Head: NC/AT     Ears: No external auricular lesions     Nose/sinus: No rhinorrhea or epistaxis     Oropharynx: MMM, no visible oral lesions, no evidence of fungal infection.  Neck: Supple, full ROM.  The left parotid gland swelling has complete  gone.    Pulm: No wheezing, stridor or respiratory distress  CV: Well-perfused, no cyanosis, no pedal edema  Abdominal: BS+, soft, nontender, nondistended, no hepatomegaly  Back: No step-offs or pain to palpation along the thoracolumbar spine  Rectal: Deferred  : Deferred  Musculoskeletal: Normal muscle bulk and tone, there is a mild to moderate tenderness on the left rotator cuff.  The pain is localized and not radiate.  The patient is not able to raise his left arm over 40 degrees.  Skin: Normal color and turgor  Neurologic: A/Ox3, CN II-XII intact, normal gait and station  Psychiatric: Appropriate mood and affect     Impression     The patient is a 70-year-old gentleman with a diagnosis of nasopharyngeal cancer status post concurrent chemoradiation therapy 9 weeks ago.  The patient has been recovering well since treatment with no clinical evidence of recurrence.    Assessment & Plan:     1.  I have again discussed with the patient about appropriate nutritional support.  Patient is also informed to contact our office to get IV fluid if he does not tolerate PEG tube feeding.  The patient only has minimal pain and discomfort at the time of evaluation.  He is taking pain medication as needed.    2.  He had a restaging CT chest 5/18/2020 which showed resolution of previously mildly enlarged hilar and mediastinal nodes with no evidence of metastatic disease.  We will check PET CT scan in the near future.  3.  Return to radiation oncology in 2-months for another office follow-up.  4.  Ffollow-up with Dr. Frias, medical oncology and Dr. Billy Guerrero as planned.  5.  Continue neck and jaw exercise.  6.  Continue close dental follow-up.     Face to face time  25 minutes with > 75% spent on consultation, education and coordination of care.      Laurita Mendoza MD, PhD  Department of Radiation Oncology   [facility]  Tel: [phone]  Page: [phone]    [address] [address]      [facility]   [address] [place] [address]    CC:  Patient Care Team:  Mike Cook MD as PCP - General (Family Medicine)  Laruita Mendoza MD as Physician (Radiation Oncology)  Alan Frias MD as Physician (Hematology and Oncology)  Alisha Tierney, RN as Oncology Nurse Navigator  Billy Guerrero MD as Physician (Otolaryngology)

## 2021-06-09 NOTE — PROGRESS NOTES
[facility] Hematology and Oncology Progress Note    Patient: Kristen Chapman  MRN: [id number]  Date of Service: 06/30/2020        Reason for Visit    Chief Complaint   Patient presents with     HE Cancer       Assessment and Plan  Cancer Staging  Nasopharyngeal carcinoma (H)  Staging form: Pharynx - Nasopharynx, AJCC 8th Edition  - Clinical stage from 2/18/2020: Stage SAMANTHA (cT4, cN2, cM0) - Signed by Alan Frias MD on 2/18/2020    1. Nasopharyngeal cancer, squamous cell:   Kristen is due for cycle 2 consolidative cisplatin + 4-day infusion 5FU every 28 days.   He has tolerated this fairly well, with increased fatigue and lesser appetite being his primary side effects.   Labwork demonstrates neutropenia (ANC 0.8) and mild thrombocytopenia (84). LFTs are also acutely elevated, possibly cisplatin-induced but indeterminate. Asymptomatic.    Will need to delay cycle 2 today and reassess readiness next week. If labwork improved, will need to consider further dose-reduction with cycle 2 (additional 10-15%, or total of 35-40%). Will want to continue following closely mid-cycle with each cycle.    Tentative plan is to complete 3 cycles, pending tolerance and reimage with PET scan after completion of the 3 cycles.    2.  Chemotherapy-induced neutropenia and thrombocytopenia  Asymptomatic. Neutropenic precautions reviewed. Delay chemo to next week. Will need further dose-reduction.    3.  Anemia:   Improved/stable today at 9.4. Since pre-treatment noted as near microcytic anemia and may need to consider treating with iron if he does not have a good recovery. May also need to check for hemoglobinopathy in that case as well.    Monitor closely on chemo. Standing blood transfusion plan for hemoglobin < 7.0.    4.  Elevated LFTs  Acute rise in AST (418) + ALT (411) today. Alk Phos minimally elevated (135) and bili normal. Asymptomatic, clinically stable. Takes hydrocodone/acetaminophen 1 tab/day on average. No new medications or OTC  supplements. No alcohol use. No history of liver disease.    This could be a side effect of the cisplatin.   Will check thyroid function, hepatitis panel and RUQ US to exclude other acute etiologies.     Recheck LFTs again later in the week to ensure stable/declining. I am away and spoke with the triage RN who will watch results. If LFTs worsening, will review with provider to ensure further evaluation is not required before the weekend.     Will, otherwise, see back next week to reassess.     5. Nutrition/hydration  Primarily through PEG. Weight stable.     6. Diarrhea  Start imodium as needed. Stay hydrated.      ECOG Performance   ECOG Performance Status: 1     Distress Assessment  Distress Assessment Score: No distress    Pain  Currently in Pain: Yes  Pain Score (Initial OR Reassessment): 7      _____________________________________________________________________________    History of Present Illness    DIAGNOSIS: Nasopharyngeal carcinoma: Biopsy from the right nasopharynx shows a moderately differentiated squamous cell carcinoma.  Imaging was reviewed.  He has evidence of bilateral cervical node metastases.  Tumor also seems to extend down to the hypopharynx.     TREATMENT:  -3/3/20 - 4/17/20: concurrent chemo and radiation with weekly cisplatin.  His fifth and final dose was given March 31, 2020.  We then had to hold the rest of his treatment due to thrombocytopenia and renal insufficiency.    -6/1/2020: Initiated consolidative Cis + 5FU (cycle 1 dose-reduced 25% both drugs)      INTERIM HISTORY:     Kristen is here for consideration of cycle 2 cisplatin + 5FU (consolidiation therapy post-RT).  Here alone. Interpretor on the speaker phone.    Has moderate fatigue on chemo. Lower appetite/taste, but maintaining stable weight with use of tube feedings for majority of intake. No neuropathy. 3 weeks of diarrhea - 3 stools/day on average. No abd pain. No fevers. No bleeding. No nausea. No stomatitis. No hearing  changes.      Review of Systems  Constitutional  Constitutional (WDL): Exceptions to WDL  Fatigue: Fatigue relieved by rest  Neurosensory  Neurosensory (WDL): Exceptions to WDL  Ataxia: Asymptomatic, clinical or diagnostic observations only, intervention not indicated  Eye   Eye Disorder (WDL): Assessment not pertinent to visit  Ear  Ear Disorder (WDL): Exceptions to WDL  Cardiovascular  Cardiovascular (WDL): All cardiovascular elements are within defined limits  Pulmonary  Respiratory (WDL): Within Defined Limits  Gastrointestinal  Gastrointestinal (WDL): Exceptions to WDL  Anorexia: Loss of appetite without alteration in eating habits  Diarrhea: Increase of <4 stools per day over baseline, mild increase in ostomy output compared to baseline(3-4 times/day)  Dysgeusia: Altered taste but no change in diet  Genitourinary  Genitourinary (WDL): All genitourinary elements are within defined limits  Lymphatic  Lymph (WDL): All lymph disorder elements are within defined limits  Musculoskeletal and Connective Tissue  Musculoskeletal and Connetive Tissue Disorders (WDL): Exceptions to WDL  Muscle Weakness : Symptomatic, perceived by patient but not evident on physical exam  Integumentary  Integumentary (WDL): All integumentary elements are within defined limits  Patient Coping  Patient Coping: Accepting  Distress Assessment  Distress Assessment Score: No distress  Accompanied by  Accompanied by: Alone      Past History  Past Medical History:   Diagnosis Date     Nasopharyngeal cancer (H)        PHYSICAL EXAM:  General: Alert and oriented, well-appearing. Interpretor on speaker phone. Masked.  HEENT: No facial edema. No stomatitis. No icterus.  Lymph: No palpable cervical adenopathy.  Heart: Regular rate and rhythm  Lungs: Clear bilaterally  Abdomen: Soft, non-distended. No hepatomegaly. No pain. PEG site intact, the sutured separate bumper is loose.  Skin: No jaundice.    Lab Results    Lab Standing Order on 06/30/2020    Component Date Value Ref Range Status     Magnesium 06/30/2020 1.9  1.8 - 2.6 mg/dL Final     Sodium 06/30/2020 138  136 - 145 mmol/L Final     Potassium 06/30/2020 3.9  3.5 - 5.0 mmol/L Final     Chloride 06/30/2020 106  98 - 107 mmol/L Final     CO2 06/30/2020 26  22 - 31 mmol/L Final     Anion Gap, Calculation 06/30/2020 6  5 - 18 mmol/L Final     Glucose 06/30/2020 133* 70 - 125 mg/dL Final     BUN 06/30/2020 30* 8 - 22 mg/dL Final     Creatinine 06/30/2020 0.86  0.70 - 1.30 mg/dL Final     GFR MDRD Af Amer 06/30/2020 >60  >60 mL/min/1.73m2 Final     GFR MDRD Non Af Amer 06/30/2020 >60  >60 mL/min/1.73m2 Final     Bilirubin, Total 06/30/2020 0.5  0.0 - 1.0 mg/dL Final     Calcium 06/30/2020 8.8  8.5 - 10.5 mg/dL Final     Protein, Total 06/30/2020 6.3  6.0 - 8.0 g/dL Final     Albumin 06/30/2020 3.2* 3.5 - 5.0 g/dL Final     Alkaline Phosphatase 06/30/2020 135* 45 - 120 U/L Final     AST 06/30/2020 418* 0 - 40 U/L Final     ALT 06/30/2020 411* 0 - 45 U/L Final     WBC 06/30/2020 1.8* 4.0 - 11.0 thou/uL Final     RBC 06/30/2020 3.34* 4.40 - 6.20 mill/uL Final     Hemoglobin 06/30/2020 9.4* 14.0 - 18.0 g/dL Final     Hematocrit 06/30/2020 30.1* 40.0 - 54.0 % Final     MCV 06/30/2020 90  80 - 100 fL Final     MCH 06/30/2020 28.1  27.0 - 34.0 pg Final     MCHC 06/30/2020 31.2* 32.0 - 36.0 g/dL Final     RDW 06/30/2020 15.9* 11.0 - 14.5 % Final     Platelets 06/30/2020 84* 140 - 440 thou/uL Final    Comment: Slide reviewed, platelet estimate agrees with instrument results                              This value was suppressed on a previous preliminary result.     MPV 06/30/2020 12.8* 8.5 - 12.5 fL Final    This value was suppressed on a previous preliminary result.     Neutrophils % 06/30/2020 42* 50 - 70 % Final     Lymphocytes % 06/30/2020 36  20 - 40 % Final     Monocytes % 06/30/2020 19* 2 - 10 % Final     Eosinophils % 06/30/2020 3  0 - 6 % Final     Basophils % 06/30/2020 1  0 - 2 % Final     Neutrophils  Absolute 06/30/2020 0.8* 2.0 - 7.7 thou/uL Final     Lymphocytes Absolute 06/30/2020 0.7* 0.8 - 4.4 thou/uL Final     Monocytes Absolute 06/30/2020 0.4  0.0 - 0.9 thou/uL Final     Eosinophils Absolute 06/30/2020 0.1  0.0 - 0.4 thou/uL Final     Basophils Absolute 06/30/2020 0.0  0.0 - 0.2 thou/uL Final     TSH 06/30/2020 25.18* 0.30 - 5.00 uIU/mL Final   ]      Imaging    Ir T-fastener Removal    Result Date: 6/5/2020  Please see procedure note for findings on this exam.    Time: 30 min , 20 of which was in counseling/coordination of care      Signed by: Joanna Ventura, CNP

## 2021-06-09 NOTE — PROGRESS NOTES
"Kristen Chapman is a 70 y.o. male who is being evaluated via a billable video visit.      The patient has been notified of following:     \"This video visit will be conducted via a call between you and your physician/provider. We have found that certain health care needs can be provided without the need for an in-person physical exam.  This service lets us provide the care you need with a video conversation.  If a prescription is necessary we can send it directly to your pharmacy.  If lab work is needed we can place an order for that and you can then stop by our lab to have the test done at a later time.    Video visits are billed at different rates depending on your insurance coverage. Please reach out to your insurance provider with any questions.    If during the course of the call the physician/provider feels a video visit is not appropriate, you will not be charged for this service.\"    Patient has given verbal consent to a Video visit? Yes  How would you like to obtain your AVS? AVS Preference: Mail a copy.  Patient would like the video invitation sent by: Send to e-mail at: [email or web address]  Will anyone else be joining your video visit? Yes, will Lucia        Video Start Time: 8:45 am    Additional provider notes: Patient is referred for infectious disease consultation regarding hepatitis B.    Multiple attempts were made to get a professional , but they are not available.  Patient's daughter speaks fluent English and provided interpretive services.      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  He has been through several rounds of antineoplastic chemotherapy as well as radiation therapy.    He most recently was begun on cisplatin and 5-FU.  After this regimen started, patient was noted to have elevated transaminases with ALT and AST going up to times the upper limit of normal.    Hepatitis serologies were obtained and patient was " found to have hepatitis B surface antigen positive.    Patient never before been diagnosed with hepatitis B.  He never been treated for hepatitis B.    This time, he denies any specific complaints other than some fatigue and some abdominal pain.  He also complains of some vomiting and diarrhea.  He denies any cough, shortness of breath.    GENERAL: Healthy, alert and no distress  EYES: Eyes grossly normal to inspection. No discharge or erythema, or obvious scleral/conjunctival abnormalities.  RESP: No audible wheeze, cough, or visible cyanosis.  No visible retractions or increased work of breathing.    SKIN: Visible skin clear. No significant rash, abnormal pigmentation or lesions.  NEURO: Cranial nerves grossly intact. Mentation and speech appropriate for age.  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  Previous LFTs were normal, so he did not have chronic active hepatitis B but more likely was hepatitis B carrier.    Recommendations: Certainly the patient would benefit from being on an EGD and whether or not his elevation of LFTs at this time is due to hepatitis B or his chemotherapy.    We will prescribe lamivudine 100 mg a day and check hepatitis B viral load, hepatitis B E antigen and E antibody and hepatitis delta antibody as well as HIV screen.    We will see the patient again via video visit in a month.  At that time he should also have repeat hepatitis B viral load.  If his hepatitis B viral load is quite low, his elevated LFTs may not be related to hepatitis B.          Video-Visit Details    Type of service:  Video Visit    Video End Time (time video stopped): 9:06 AM  Originating Location (pt. Location): Home    [facility]     Platform used for Video Visit: Chris Grier MD

## 2021-06-09 NOTE — TELEPHONE ENCOUNTER
Sent son, Nayla, this e-mail:    Hello [place]!    Let me know if you have any questions!    Monday, 7-:  1:30 pm arrival  Check-in on 2nd floor  1:30 pm:  labs ONLY      Wednesday, 7-: arrival time 12:45 pm.  Check in on 2nd floor  12:45 pm:  meet  in-person  with Dr. Frias        TENTATIVE:  depends on Dad s liver function tests!    Friday, July 24th, 2020: arrival time 9:30 am.  Check in on 2nd floor  9:30 am:  5-hour chemo    FOLLOWING WEEK:    Tuesday, 7-:  arrival time  TBD:     Pump disconnected--Dad will be given an arrival time when they hook the pump up on Friday.        Stay COOL this weekendJ!  Alisha

## 2021-06-09 NOTE — TELEPHONE ENCOUNTER
I rec'd an e-mail from [facility] regarding labs ordered by Dr. Grier today after his visual visit with Kristen regarding his Hepatitis B findings.  I called and spoke with JACQUES Cerda in chemo who made a note on Kristen that he has labs that need to be drawn on Wednesday with his chemo labs.  I do see the orders in the chart as well.    [facility] also had a questions regarding dad's appointments.  Wondering if his chemo was pushed back a week due to his LFT's>  I told her from what I could see yes this is the reason and also so Dad could have an ID consult.    She appreciated all of the information.

## 2021-06-09 NOTE — TELEPHONE ENCOUNTER
Patient's daughter Rea calls in today after his appointment with Joanna Ventura CNP.  Patient's chemo was held today due to low white blood cell counts.  Per Joanna, patient's liver functions are elevated.  She would like him to come back for additional blood work and an abdominal ultrasound on Thursday 7/2.  Joanna is out of the office on Thursday so this will need to be followed up on with another provider.  Rea is aware that we have her number down to call with this update.  She does speak good English and will be with her father.  She verbalized understanding and was very appreciative of the call back and explanation of the plan.    Isabella Raymond RN

## 2021-06-09 NOTE — TELEPHONE ENCOUNTER
Patient's daughter would like a call back to discuss the meds Dr. Grier prescribed.  She is concerned because when she went to the pharmacy to pick it up they told her it was for nausea.

## 2021-06-09 NOTE — TELEPHONE ENCOUNTER
Happy Friday!    So, here is what is on Dad s schedule as of right nowJ    Always changes!    Monday, 7-:  Looks like a video-visit with Infectious Disease, Dr. Grier  ~Looks like 9am, thinking the nurse or someone may call ahead about 8:45 am.  ~They have access to Dad s chart.      Wednesday, July 15th, 2020:  arrival 9 am  Check-in on 2nd floor  9 am:  labs  9:15 am:  Nurse followed by Joanna Ventura NP    Friday, July 10th, 2020: arrival time 9 am.  Check in on 2nd floor  9 am:  5-hour chemo    FOLLOWING WEEK:    Tuesday, 7-:  arrival time  TBD:     Pump disconnected--Dad will be given an arrival time when they hook the pump up on Friday.    Have a nice weekend!     Alisha

## 2021-06-09 NOTE — TELEPHONE ENCOUNTER
Please advise on when and how to schedule.    Order Summary [[id number]]   Procedure: Ambulatory referral to Infectious Disease Status: Needs Scheduling   Requested appt date:   Authorizing: Kathrin Lawrence MD in  MEDICAL ONCOLOGY   Referral: [id number] (Pending Review)       Diagnosis: Hepatitis B infection [B19.10]

## 2021-06-09 NOTE — TELEPHONE ENCOUNTER
I called and spoke with Rea daughter of patient to see if patient has started on his Epivir per internal medicine request. Per LAINE Ventura CNP patient LFT's are still continuing to rise and needs to get started on this as soon as possible. After speaking with Nhia she had mentioned that she has been in contact with Dr. Harrington office and they had accidentally sent in zofran rather than Epivir and that was all she had heard from their office. I let her know from the looks of the notes that the correct medication was now sent in earlier this morning. LAINE Ventura wanted Rea to be aware that if patient develops any fever, jaundance, abdominal pain, or nausea to either call our clinic or go into ER as we cannot start chemotherapy until his LFT's are decreased. Rea will let her family know and to watch for any symptoms prior to starting this Epivir. She thanked us for staying in touch and on top of things.    Mary Lou Laguna, [facility]

## 2021-06-09 NOTE — TELEPHONE ENCOUNTER
Hep B typically should be seen by GI, per the notes I have, however Dr Grier and Dr Wyman will see. Please schedule with one of these providers fist available video or have referring refer to GI.

## 2021-06-09 NOTE — PROGRESS NOTES
[facility]ology [facility] Progress Note    Patient: Kristen Chapman  MRN: [id number]  Date of Service: 07/14/2020        Reason for Visit    1.  Nasopharynx squamous cell cancer, on chemo  2.  Elevated LFTs, probable reactivation chronic hep B    Assessment and Plan  Cancer Staging  Nasopharyngeal carcinoma (H)  Staging form: Pharynx - Nasopharynx, AJCC 8th Edition  - Clinical stage from 2/18/2020: Stage SAMANTHA (cT4, cN2, cM0) - Signed by Alan Frias MD on 2/18/2020    1. Nasopharyngeal cancer, squamous cell:   Cycle 2 consolidative cisplatin + 4-day infusion 5FU has been delayed for work-up of elevated LFTs/questionable reactivated chronic Hep B. ID has ordered a hepatitis B viral load (penidng) and he will be starting  Lamivudine. He has not started this yet, he's waiting for the pharmacy to get it. ID will follow-up with him in one month.     Outside of the above, he had moderate delayed neutropenia (ANC 0.8) and mild thrombocytopenia (80K) with cycle 1 but, otherwise, subjectively tolerated cycle 1 generally well. His neutropenia is resolved, but platelets are still mildly low (82K) today,    His LFTs are continuing to rise today, so we will need to defer cycle 2 this week. We will follow closely and treat when this is more stable. With cycle 2, Dr. Frias would continue with 25% dose-reduction once LFTs are stabilized. Reassess in clinic next week.    Tentative plan is to complete 3 cycles, pending tolerance and reimage with PET scan after completion of the 3 cycles.    2.  Chemotherapy-induced neutropenia and thrombocytopenia  Asymptomatic. Cycle 2 has been delayed. Will recheck CBC next week.    3.  Anemia:   Improved/stable today at 9.3. Since pre-treatment noted as near microcytic anemia and may need to consider treating with iron if he does not have a good recovery. May also need to check for hemoglobinopathy in that case as well.    Monitor closely on chemo. Standing blood transfusion plan for  hemoglobin < 7.0.    4.  Elevated LFTs  5.  Possible chronic Hep B reactivation:  Acute rise in AST (400s) + ALT (400s) after cycle 1, two weeks ago. Today, these are further elevated (1400s) and bilirubin mildly elevated (1.4). He appears well and is asymptomatic.    Takes hydrocodone/acetaminophen 1 tab/day on average and no additional acetaminophen. No new medications or OTC supplements. No alcohol use. No history of liver disease. RUQ US negative. Thyroid function normal. Acute hepatitis panel revealed Hep B antigen. ID consult reviewed; they are obtaining a viral hepatitis B DNA load to try to verify and prescribed him lamivudine which he plans to get started on later in the week. They will follow up with him in one month.    Possibility of chemo-induced LFT elevation. Neither cisplatin or 5FU requires dose-reduction, but we will defer another cycle until these are improving.    We will trend the LFTs closely, repeat this Thursday and next Monday. Will review with one of the Hepatologists at [facility] for further direction. If patient starts to feel poorly, has new/increased abd pain, nausea/vomiting or yellowing of the skin he should go to ED in meantime.     6. Nutrition/hydration  Primarily through PEG. Sips by mouth. Weight stable/improved..     7. Diarrhea  3 loose BMs/day, ongoing. Imodium usually constipates him, so he's not using. Stay hydrated.      ECOG Performance   ECOG Performance Status: 1     Distress Assessment  Distress Assessment Score: No distress    Pain  Currently in Pain: No/denies      _____________________________________________________________________________    History of Present Illness    DIAGNOSIS: Nasopharyngeal carcinoma: Biopsy from the right nasopharynx shows a moderately differentiated squamous cell carcinoma.  Imaging was reviewed.  He has evidence of bilateral cervical node metastases.  Tumor also seems to extend down to the hypopharynx.     TREATMENT:  -3/3/20 - 4/17/20:  concurrent chemo and radiation with weekly cisplatin.  His fifth and final dose was given March 31, 2020.  We then had to hold the rest of his treatment due to thrombocytopenia and renal insufficiency.    -6/1/2020: Initiated consolidative Cis + 5FU (cycle 1 dose-reduced 25% both drugs)  -Cycle 1 delayed for neutropenia, mild thrombocytopenia and elevated transaminases felt related to hepatitis B reactivation    INTERIM HISTORY:     Kristen is here for reassessment and consideration of cycle 2 cisplatin + 5FU (consolidiation therapy post-RT).  Here alone. Interpretor on the speaker phone.    When here a few weeks ago, he had moderate neutropenia and acute transaminase elevation but was feeling generally well. US RUQ negative, thryoid function normal. Acute hepatitis panel was notable for positive Hep B antigen concerning for reactivation. He had a consult with ID yesterday and will be starting lamivudine once he receives from pharmacy this week.    Has moderate fatigue on chemo. Lower appetite/taste, but maintaining stable weight with use of tube feedings for majority of intake. No neuropathy.6 weeks of diarrhea - 3 stools/day on average. Tried imodium, but got more constipated so not using this routinely. No abd pain. No fevers. No bleeding. No nausea. No stomatitis. No hearing changes.    Review of Systems  Constitutional  Constitutional (WDL): Exceptions to WDL  Fatigue: Concerns(Not sleeping well at all.)  Neurosensory  Neurosensory (WDL): Exceptions to WDL  Ataxia: Concerns(using walker on examination)  Eye   Eye Disorder (WDL): All eye disorder elements are within defined limits  Ear  Ear Disorder (WDL): Exceptions to WDL  Tinnitus: Concerns(left side worse)  Cardiovascular  Cardiovascular (WDL): All cardiovascular elements are within defined limits  Pulmonary  Respiratory (WDL): Within Defined Limits  Gastrointestinal  Gastrointestinal (WDL): Exceptions to WDL  Anorexia: Concerns(continued diminished appetite due  to altered taste of food)  Dysgeusia: Concerns  Diarrhea: Concerns(3x a day)  Dry Mouth: Concerns(very)  Genitourinary  Genitourinary (WDL): All genitourinary elements are within defined limits  Lymphatic  Lymph (WDL): All lymph disorder elements are within defined limits  Musculoskeletal and Connective Tissue  Musculoskeletal and Connetive Tissue Disorders (WDL): All Musculoskeletal and Connetive Tissue Disorder elements are within defined limits  Integumentary  Integumentary (WDL): All integumentary elements are within defined limits  Patient Coping  Patient Coping: Accepting;Open/discussion  Accompanied by  Accompanied by: Alone       PHYSICAL EXAM:  General: Alert and oriented, well-appearing. Interpretor on speaker phone. Masked.  HEENT: No facial edema. No stomatitis. No icterus.  Lymph: No palpable cervical adenopathy.  Heart: Regular rate and rhythm  Lungs: Clear bilaterally  Abdomen: Soft, non-distended. No hepatomegaly. No pain. PEG site intact, the sutured separate bumper is loose.  Skin: No jaundice.    Lab Results    Lab Standing Order on 07/14/2020   Component Date Value Ref Range Status     Sodium 07/14/2020 137  136 - 145 mmol/L Final     Potassium 07/14/2020 4.0  3.5 - 5.0 mmol/L Final     Chloride 07/14/2020 106  98 - 107 mmol/L Final     CO2 07/14/2020 25  22 - 31 mmol/L Final     Anion Gap, Calculation 07/14/2020 6  5 - 18 mmol/L Final     Glucose 07/14/2020 90  70 - 125 mg/dL Final     BUN 07/14/2020 25* 8 - 22 mg/dL Final     Creatinine 07/14/2020 0.79  0.70 - 1.30 mg/dL Final     GFR MDRD Af Amer 07/14/2020 >60  >60 mL/min/1.73m2 Final     GFR MDRD Non Af Amer 07/14/2020 >60  >60 mL/min/1.73m2 Final     Bilirubin, Total 07/14/2020 1.4* 0.0 - 1.0 mg/dL Final     Calcium 07/14/2020 8.5  8.5 - 10.5 mg/dL Final     Protein, Total 07/14/2020 6.2  6.0 - 8.0 g/dL Final     Albumin 07/14/2020 2.8* 3.5 - 5.0 g/dL Final     Alkaline Phosphatase 07/14/2020 199* 45 - 120 U/L Final     AST 07/14/2020  1,403* 0 - 40 U/L Final     ALT 07/14/2020 1,197* 0 - 45 U/L Final     WBC 07/14/2020 3.9* 4.0 - 11.0 thou/uL Final     RBC 07/14/2020 3.29* 4.40 - 6.20 mill/uL Final     Hemoglobin 07/14/2020 9.3* 14.0 - 18.0 g/dL Final     Hematocrit 07/14/2020 29.4* 40.0 - 54.0 % Final     MCV 07/14/2020 89  80 - 100 fL Final     MCH 07/14/2020 28.3  27.0 - 34.0 pg Final     MCHC 07/14/2020 31.6* 32.0 - 36.0 g/dL Final     RDW 07/14/2020 16.6* 11.0 - 14.5 % Final     Platelets 07/14/2020 82* 140 - 440 thou/uL Final     Neutrophils % 07/14/2020 51  50 - 70 % Final     Lymphocytes % 07/14/2020 27  20 - 40 % Final     Monocytes % 07/14/2020 18* 2 - 10 % Final     Eosinophils % 07/14/2020 3  0 - 6 % Final     Basophils % 07/14/2020 0  0 - 2 % Final     Neutrophils Absolute 07/14/2020 2.0  2.0 - 7.7 thou/uL Final     Lymphocytes Absolute 07/14/2020 1.1  0.8 - 4.4 thou/uL Final     Monocytes Absolute 07/14/2020 0.7  0.0 - 0.9 thou/uL Final     Eosinophils Absolute 07/14/2020 0.1  0.0 - 0.4 thou/uL Final     Basophils Absolute 07/14/2020 0.0  0.0 - 0.2 thou/uL Final       Imaging    Us Abdomen Limited    Result Date: 7/2/2020  EXAM: US ABDOMEN LIMITED LOCATION: [facility] DATE/TIME: 7/2/2020 11:44 AM INDICATION: Acute LFT elevation, nasopharyngeal carcinoma COMPARISON: Chest CT 05/18/2020 TECHNIQUE: Limited abdominal ultrasound. FINDINGS: GALLBLADDER: Normal. No gallstones, wall thickening, or pericholecystic fluid. Negative sonographic Foy's sign. BILE DUCTS: No biliary dilatation. The common duct measures 5.3 mm. LIVER: Normal parenchyma with smooth contour. No focal mass. RIGHT KIDNEY: No hydronephrosis. PANCREAS: The visualized portions are normal. No ascites.     1.  Normal limited abdominal ultrasound.    Time: 45 min , 35 of which was in counseling/coordination of care      Signed by: Joanna Ventura CNP

## 2021-06-10 ENCOUNTER — OFFICE VISIT - HEALTHEAST (OUTPATIENT)
Dept: AUDIOLOGY | Facility: CLINIC | Age: 71
End: 2021-06-10

## 2021-06-10 ENCOUNTER — RECORDS - HEALTHEAST (OUTPATIENT)
Dept: ADMINISTRATIVE | Facility: OTHER | Age: 71
End: 2021-06-10

## 2021-06-10 DIAGNOSIS — H90.6 MIXED HEARING LOSS, BILATERAL: ICD-10-CM

## 2021-06-10 NOTE — PROGRESS NOTES
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       TYPE OF RADIATION THERAPY ADMINISTERED:  Concurrent chemoradiation therapy for his head neck cancer with weekly cisplatin and had radiation therapy in our clinic with a total dose of 7000 cGy in 35 treatments given from 3/2/2020-4/17/2020.      INTERVAL SINCE COMPLETION OF RADIATION THERAPY: 4 months.      SUBJECTIVE:  Mr. Chapman is a 70 y.o. male who has been in his usual state of good health until recently.  Patient was found to have swelling right neck mass by his lerma a month ago for which he was a seeking further evaluation.  The patient did not notice any significant changes over the past few weeks and he is also asymptomatic.  Initial ENT examination showed suspicious abnormalities in the right nasopharynx worrisome for malignancy.  CT of the neck on 1/24/2020 revealed asymmetric soft tissue thickening in the posterior right nasopharynx as well as abnormal enlarged cervical lymph nodes measuring up to 3.8 cm bilaterally.  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  The patient also had a staging work-up including PET CT scan and MRI brain which showed locally advanced disease with lymph node metastasis.  The PET CT scan also showed abnormal increased activity in the hilar region suspicious for metastasis.  Patient was then referred to pulmonology for evaluation and possible biopsy.  Patient then underwent bronchoscopy and EBUS on 2/27/2020 with biopsy showed negative for pregnancy. He received concurrent chemoradiation therapy for his head neck cancer and had radiation therapy in our clinic with a total dose of 7000 cGy in 35 treatments given from 3/2/2020-4/17/2020.  He also received 3 cycles  of cisplatin and 5-FU during the course of radiation therapy. He tolerated radiation therapy reasonably well with expected acute side effect.  The patient insisted not to have PEG tube placement during the therapy.  He however is able to maintain his weight reasonably stable during the treatment.     The patient experienced worsening pain of sore throat and dysphasia since completion of the radiation therapy.    He is not able to get adequate calorie and fluid intake and was admitted to hospital one week after treatment for supportive care and pain management.  A PEG tube was also placed during the hospital stay. The patient felt much better and improved since hospital stay.  The patient also experienced acute onset of left parotitis 4 weeks post cancer therapy and was admitted to the hospital for ongoing treatment including antibiotics.  His condition has been significant improved since then. He is currently able to eat with liquid and soft without any significant difficulty.  He denies any significant pain at the time of evaluation.  He still has a significant dry mouth at the time of evaluation.  He is here for routine post therapy office follow-up.     He is also receiving adjuvant chemotherapy under supervision of Dr. Frias.     Patient has been followed with Dr. Guerrero, ENT and last examination yesterday showed no evidence of cancer recurrence.    Medications were reviewed and are up to date on EPIC.    The following portions of the patient's history were reviewed and updated as appropriate: allergies, current medications, past family history, past medical history, past social history, past surgical history and problem list.    Review of Systems:      General  Constitutional (WDL): Exceptions to WDL  Fatigue: Fatigue relieved by rest  EENT  Eye Disorder (WDL): All eye disorder elements are within defined limits  Ear Disorder (WDL): All ear disorder elements are within defined limits  Respiratory   Respiratory  (WDL): Within Defined Limits  Cardiovascular  Cardiovascular (WDL): All cardiovascular elements are within defined limits  Endocrine     Gastrointestinal  Gastrointestinal (WDL): Exceptions to WDL  Anorexia: Loss of appetite without alteration in eating habits  Dysgeusia: Altered taste but no change in diet  Dysphagia: Symptomatic and altered eating/swallowing(using GT 4 cans/day, some PO but very dry mouth)  Musculoskeletal  Musculoskeletal and Connetive Tissue Disorders (WDL): Exceptions to WDL  Muscle Weakness : Symptomatic, perceived by patient but not evident on physical exam  Myalgia: Mild pain  Integumentary               Integumentary (WDL): All integumentary elements are within defined limits  Neurological  Neurosensory (WDL): Exceptions to WDL  Ataxia: Asymptomatic, clinical or diagnostic observations only, intervention not indicated(using wheeled walker)  Psychological/Emotional   Patient Coping: Accepting;Open/discussion  Hematological/Lymphatic  Lymph (WDL): All lymph disorder elements are within defined limits  Dermatologic     Genitourinary/Reproductive  Genitourinary (WDL): All genitourinary elements are within defined limits  Reproductive     Pain              Currently in Pain: Yes  Pain Score (Initial OR Reassessment): 2  Pain Frequency: Intermittent  Location: body aches, sometimes throat/mouth  Pain Intervention(s): Home medication  Response to Interventions: takes Vicodin occasionally, pain slowly improving over time per pt.  Accompanied by  Accompanied by: Family Member    Objective:     PHYSICAL EXAMINATION:    There were no vitals taken for this visit.    Gen: Alert, in NAD  Eyes: PERRL, EOMI, sclera anicteric  HENT     Head: NC/AT     Ears: No external auricular lesions     Nose/sinus: No rhinorrhea or epistaxis     Oropharynx: MMM, no visible oral lesions  Neck: Supple, full ROM, no LAD  Pulm: No wheezing, stridor or respiratory distress  CV: Well-perfused, no cyanosis, no pedal  edema  Abdominal: BS+, soft, nontender, nondistended, no hepatomegaly  Back: No step-offs or pain to palpation along the thoracolumbar spine  Rectal: Deferred  : Deferred  Musculoskeletal: Normal muscle bulk and tone  Skin: Normal color and turgor  Neurologic: A/Ox3, CN II-XII intact, normal gait and station  Psychiatric: Appropriate mood and affect     Imaging: Imaging results 6 weeks:Ct Soft Tissue Neck With Contrast    Result Date: 8/14/2020  EXAM: CT SOFT TISSUE NECK W CONTRAST LOCATION: [facility] DATE/TIME: 8/14/2020 9:24 AM INDICATION: Neck mass, multiple (Age > 15y) nasopharynx cancer f/u COMPARISON: Soft tissue neck CT 03/14/2020 and MRI soft tissue neck 02/10/2020. Soft tissue neck CT 01/24/2020 CONTRAST: Iohexol (Omni) 100 mL TECHNIQUE: Routine with IV contrast. Multiplanar reformats. Dose reduction techniques were used. FINDINGS: Interval decrease in size of mass/soft tissue thickening within the right nasopharynx. Interval decrease in size of conglomerate lymph nodes within the right neck. Residual rounded right level II lymph node measuring 8 x 8 mm on axial image #38, series 2. Interval resolution of left-sided level II lymph nodes. No evidence of new cervical lymphadenopathy by size or morphologic criteria. The overall constellation of findings are consistent with a favorable treatment response. Interval resolution of left paraspinous and submandibular sialoadenitis. Diffuse infiltration of the subcutaneous fat within the neck bilaterally as well as edema and = craniotomy and within the nasopharynx, oropharynx, and hypopharynx, which is consistent with posttreatment related change. Posttreatment related changes within the parotid and submandibular glands. Indeterminate right-sided thyroid nodules. The partially imaged intracranial contents are unremarkable. The partially imaged globes are unremarkable. Right maxillary sinus disease with slightly improved aeration compared to prior.  Interval resolution of the callosal disease within the left maxillary sinus. Improved mucosal disease within the ethmoid sinuses. Resolution of mucosal disease within the sphenoid sinuses. The mastoid air cells are unremarkable. No suspicious osseous lesions. Normal contrast opacification of the vessels within the neck. The partially imaged lung apices are unremarkable. Left chest wall Aqidoz-n-Dxlo.     1.  Interval decrease in size of mass/soft tissue thickening within the right nasopharynx. 2.  Interval decrease in size of conglomerate lymph nodes within the right neck. 3.  Residual rounded right level II lymph node measuring 8 x 8 mm on axial image #38, series 2. Interval resolution of left-sided level II lymph nodes. 4.  No evidence of new cervical lymphadenopathy by size or morphologic criteria. 5.  The overall constellation of findings are consistent with a favorable treatment response. 6.  Interval resolution of previously demonstrated left-sided acute peritonitis and submandibular sialadenitis. 7.  Indeterminate right thyroid nodules. 8.  Posttreatment related changes are demonstrated within the neck.    Ct Chest With Contrast    Result Date: 8/14/2020  EXAM: CT CHEST W CONTRAST LOCATION: [facility] DATE/TIME: 8/14/2020 9:29 AM INDICATION: Nasopharyngeal cancer follow-up. COMPARISON: Chest CT, 05/18/2020. TECHNIQUE: CT chest with IV contrast. Multiplanar reformats were obtained. Dose reduction techniques were used. CONTRAST: Iohexol (Omni) 100 mL FINDINGS: LUNGS AND PLEURA: There is a 6 mm spiculated pulmonary nodule involving the right upper lobe (series 3, image 38). Previously, this nodular focus was less dense and measured approximately 4 mm in size. Minimal amount of subpleural groundglass opacity involves the left lung apex (series 12, image 3). This is indeterminate, though may reflect an area of inflammation. The lungs and pleural spaces are otherwise clear. MEDIASTINUM/AXILLAE: Heart size  is normal. There is an ascending thoracic aortic aneurysm measuring 4.4 cm in caliber. This is unchanged. 9 mm low dense lesion involves the right lobe of the thyroid on image 3 of series 5. This compares with 5 mm previously. UPPER ABDOMEN: Cirrhotic configuration of the liver. There is a percutaneous gastrostomy tube in place, in appropriate position. A 1.5 cm low dense lesion is seen within the posterior aspect of the spleen, nonspecific. MUSCULOSKELETAL: No suspicious osseous lesions.     1.  6 mm spiculated right upper lobe pulmonary nodule. This finding is suspicious for malignancy and may represent a primary bronchogenic carcinoma. Metastatic lesion is a consideration, though this is thought less likely. A follow-up CT scan in 6 months  is recommended. 2.  Stable 4.4 cm ascending thoracic aortic aneurysm. 3.  Hepatic cirrhosis. 4.  9 mm low dense lesion within the right lobe of the thyroid. This is more prominent in size on current study, though is not hypermetabolic on comparison PET scan. REFERENCE: Guidelines for Management of Incidental Pulmonary Nodules Detected on CT Images: From the Fleischner Society 2017. Guidelines apply to incidental nodules in patients who are 35 years or older. Guidelines do not apply to lung cancer screening, patients with immunosuppression, or patients with known primary cancer. SINGLE NODULE Nodule size 6-8 mm Low-risk patients: Follow-up CT at 6-12 months, then consider CT at 18-24 months. High-risk patients: Follow-up CT at 6-12 months, then at 18-24 months if no change.        Impression     The patient is a 70-year-old gentleman with a diagnosis of nasopharyngeal cancer status post concurrent chemoradiation therapy 4 months ago.  The patient has been recovering well since treatment with no clinical evidence of recurrence.    Assessment & Plan:     1.  I have again discussed with the patient about appropriate nutritional support. The patient only has minimal pain and  discomfort at the time of evaluation.  He is taking pain medication as needed.  I will see refer patient to have XR swallowing test again to see if patient can be safely advance his oral intake.  2.  He had a restaging CT chest 8/14/2020 which showed resolution of previously mildly enlarged hilar and mediastinal nodes in previous PET CT scan with no evidence of metastatic disease. The right upper lobe small mass has been increased slightly to 6 mm and more dense worrisome for malignancy.  We will check PET CT scan in the near future.  3.  Return to radiation oncology in 1-month for another office follow-up.  4.  Ffollow-up with Dr. Frias, medical oncology and Dr. Billy Guerrero as planned (Patient has not seen Dr. Guerrero since chemoradiation therapy.  We will help patient to set up time to see Dr. Guerrero, ENT for follow-up).  5.  Continue neck and jaw exercise.  6.  Continue close dental follow-up.     Face to face time  45 minutes with > 75% spent on consultation, education and coordination of care.      Laurita Mendoza MD, PhD  Department of Radiation Oncology   [facility]  Tel: [phone]  Page: [phone]    [facility]  [address] [place] [address]     [address] [place] [address]    CC:  Patient Care Team:  Mike Cook MD as PCP - General (Family Medicine)  Laurita Mendoza MD as Physician (Radiation Oncology)  Alan Frias MD as Physician (Hematology and Oncology)  Alisha Tierney RN as Oncology Nurse Navigator  Billy Guerrero MD as Physician (Otolaryngology)

## 2021-06-10 NOTE — TELEPHONE ENCOUNTER
Hello!    Sorry trying to get caught up this am!    Chemo:  Dr. Frias does NOT plan on any more treatments at this time rather  observation.   He will continue to monitor Dad and make decisions down the road.    Schedule:  Here is the  Updated schedule .    Friday, August 14th, 2020: arrival time 8:30 am.  Check in on Ground Level--Radiology department  8:30 am:  check in  8:40 am:  CT chest  CT CHEST W PREP    Arrive 15 minutes prior to your appointment.    Nothing to eat or drink TWO hours prior to exam.    However, please drink plenty of clear fluids the night before and prior to your exam time.    Medications may be taken with a small amount of water.    Have the patient bring a list of current medications, including over the counter medications.    9 am:  CT facial neck--SAME prep as above!    Tuesday, August 18th, 2020 arrival of 8:30 am.  Check in on 2nd floor!  8:30 am:  labs  8:45 am:  Nurse and Dr Frias      FUTURE:  Thursday, August 27th, 2020 arrival of 8:15 am  Check in on 1st Floor!  8:15:  meet with Radiation Nurse  8:30 am: / Laurita Mendoza, Radiation Oncologist      FMLA:  So generally the collab nurses (Dr. Frias nurse, Jeri) does these.  I know with furloughs, vacations, etc. things have been kind of crazy.  So for now, just send to my e-mail (you can let the other family members know too)  I can probably get them done sooner and have someone I can ask to get Dr. Frias signature, if needed.  I think it is still appropriate to extend the FMLA s as Dad is still recovering and dealing with Hep C, I believe.    Oh, and I noticed on Dad s schedule he has a  video conference appointment  next Monday at 9: 20 am with Dr. Grier from infectious disease.  Usually they ask to be ready 20 minutes prior as the nurse or other staff call to get patient  checked in ,    Let me know if you have any other questions or I missed anythingJ!    Alisha  From: Nayla Chapman [email or web address]>   Sent: Friday,  July 31, 2020 9:48 AM  To: Alisha Tierney [email or web address]>  Subject: Re: Dad's 2 week schedule    Good morning Alisha. I hope you had a great weekend. Thank you for the schedule.    I just took my father in this morning for his chemo session. It was a no go as his liver was still not good for it.    I am wondering if it is necessary to continue the chemo sessions. If possible would we be able to juat stop chemo session all together and continue monitoring his health?    When he left they gave him a schedule but it seems it was not updated and still showed the appointment for Tuesday for 8/4. Would we be able to get an updated schedule?    Another concern I have is my siblings and I, our ability to continue FMLA with work. Would we be able to extend it if need be?    Have a great day amd thank you for all your help.

## 2021-06-10 NOTE — PROGRESS NOTES
[facility] Hematology and Oncology Progress Note    Patient: Kristen Chapman  MRN: [id number]  Date of Service: 08/18/2020      Assessment and Plan:    Cancer Staging  Nasopharyngeal carcinoma (H)  Staging form: Pharynx - Nasopharynx, AJCC 8th Edition  - Clinical stage from 2/18/2020: Stage SAMANTHA (cT4, cN2, cM0) - Signed by Alan Frias MD on 2/18/2020    1.  Nasopharyngeal carcinoma: We reviewed his posttreatment imaging.  He has had a significant response on a CT scan.  Enough to consider him in remission.  No further adenopathy in the neck.  We will proceed with observation at this point.  I will have him return to clinic in 3 months with a PET scan.  He is asymptomatic with no symptoms of persistent or progressive disease.    2.  F/E/N: Still has a G-tube in place.  This will need to be removed soon.    3.  Anemia: He continues to have anemia but he is no longer microcytic.  Continue to follow.  He may have a hemoglobinopathy.  Can reevaluate on his follow-up in 3 months.  At this point the anemia may be complicated by his lamivudine and hepatitis B infection.  His RDW is elevated which suggests a good reticulocytosis.    4.  Thrombocytopenia: He has had a worsening of his platelet counts.  This could be due from his antiviral and his acute hepatitis.  We will continue to follow.    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  Diagnosed January 2020.  Imaging suggested bilateral abnormal lymphadenopathy in the neck.  Also asymmetric soft tissue thickening in the posterior right nasopharynx.  On imaging, tumor also appeared to extend down to the hypopharynx.    Treatment:    Initially treated with concurrent chemotherapy and radiation.  He had weekly cisplatin starting March 3, 2020.  Fifth and final dose given March 31, 2020.   Subsequent chemotherapy was held due to prolonged thrombocytopenia and renal insufficiency.  Radiation dose was 7000 cGy in 35 fractions given from March 2 through April 17, 2020.    Started cisplatin with infusional 5-FU on June 1, 2020.  Cycle 1 given at a 25% dose reduction.  He still had significant thrombocytopenia and neutropenia on day 28.  Cycle 2 was held.  At the same time his liver function tests elevated secondary to hepatitis B reactivation.    Interim History:    Kristen presents today for a follow-up visit.  He is here to review the results of posttreatment CAT scans.  He is feeling okay.  Energy and appetite are slowly improving.  Still not leaving the house much.  No acute complaints.  No vomiting or diarrhea.  No pain.    Review of Systems:    Constitutional  Constitutional (WDL): Exceptions to WDL  Fatigue: Fatigue relieved by rest  Neurosensory  Neurosensory (WDL): Exceptions to WDL  Ataxia: Asymptomatic, clinical or diagnostic observations only, intervention not indicated(uses walker)  Cardiovascular  Cardiovascular (WDL): All cardiovascular elements are within defined limits  Pulmonary  Respiratory (WDL): Within Defined Limits  Gastrointestinal  Gastrointestinal (WDL): Exceptions to WDL  Anorexia: Loss of appetite without alteration in eating habits  Dysgeusia: Altered taste but no change in diet  Dry Mouth: Symptomatic (e.g., dry or thick saliva) without significant dietary alteration, unstimulated saliva flow >0.2 ml/min  Genitourinary  Genitourinary (WDL): All genitourinary elements are within defined limits  Integumentary  Integumentary (WDL): All integumentary elements are within defined limits  Patient Coping  Patient Coping: Accepting  Accompanied by  Accompanied by: Alone    Past History:    Past Medical History:   Diagnosis Date     Nasopharyngeal cancer (H)      Physical Exam:    Recent Vitals 8/18/2020   Height -   Weight 141 lbs 6 oz   BSA (m2) 1.73 m2   /74   Pulse 72   Temp 98.3    Temp src 1   SpO2 98   Some recent data might be hidden     General: patient appears stated age of 70 y.o.. Nontoxic and in no distress.   HEENT: Head: atraumatic, normocephalic. Sclerae anicteric.  Chest:  Normal respiratory effort  Cardiac:  No edema.   Abdomen: abdomen is soft, non-distended  Extremities: normal tone and muscle bulk.  Skin: no lesions or rash. Warm and dry.   CNS: alert and oriented. Grossly non-focal.   Psychiatric: normal mood and affect.     Lab Results:    Recent Results (from the past 240 hour(s))   Hepatitis B DNA Quantitative Real-Time PCR(HBQNT)    Collection Time: 08/11/20  8:07 AM   Result Value Ref Range    Hep B Virus DNA Quant IU/mL 124 (!) HBVND [IU]/mL    Hep B Virus DNA Quant Log IU/mL 2.1 (H) <1.3 [Log_IU]/mL   Hepatic function panel    Collection Time: 08/11/20  8:07 AM   Result Value Ref Range    Bilirubin, Total 2.7 (H) 0.0 - 1.0 mg/dL    Bilirubin, Direct 1.4 (H) <=0.5 mg/dL    Protein, Total 7.1 6.0 - 8.0 g/dL    Albumin 2.4 (L) 3.5 - 5.0 g/dL    Alkaline Phosphatase 192 (H) 45 - 120 U/L     (H) 0 - 40 U/L     (H) 0 - 45 U/L   Comprehensive Metabolic Panel    Collection Time: 08/18/20  8:26 AM   Result Value Ref Range    Sodium 135 (L) 136 - 145 mmol/L    Potassium 3.7 3.5 - 5.0 mmol/L    Chloride 105 98 - 107 mmol/L    CO2 28 22 - 31 mmol/L    Anion Gap, Calculation 2 (L) 5 - 18 mmol/L    Glucose 148 (H) 70 - 125 mg/dL    BUN 25 (H) 8 - 22 mg/dL    Creatinine 0.84 0.70 - 1.30 mg/dL    GFR MDRD Af Amer >60 >60 mL/min/1.73m2    GFR MDRD Non Af Amer >60 >60 mL/min/1.73m2    Bilirubin, Total 2.1 (H) 0.0 - 1.0 mg/dL    Calcium 8.4 (L) 8.5 - 10.5 mg/dL    Protein, Total 7.0 6.0 - 8.0 g/dL    Albumin 2.4 (L) 3.5 - 5.0 g/dL    Alkaline Phosphatase 185 (H) 45 - 120 U/L     (H) 0 - 40 U/L     (H) 0 - 45 U/L   HM1 (CBC with Diff)    Collection Time: 08/18/20  8:26 AM   Result Value Ref Range    WBC 2.5 (L) 4.0 - 11.0 thou/uL    RBC 2.80 (L) 4.40 - 6.20  mill/uL    Hemoglobin 9.0 (L) 14.0 - 18.0 g/dL    Hematocrit 28.0 (L) 40.0 - 54.0 %     80 - 100 fL    MCH 32.1 27.0 - 34.0 pg    MCHC 32.1 32.0 - 36.0 g/dL    RDW 19.1 (H) 11.0 - 14.5 %    Platelets 49 (LL) 140 - 440 thou/uL    MPV      Neutrophils % 59 50 - 70 %    Lymphocytes % 30 20 - 40 %    Monocytes % 9 2 - 10 %    Eosinophils % 1 0 - 6 %    Basophils % 0 0 - 2 %    Neutrophils Absolute 1.5 (L) 2.0 - 7.7 thou/uL    Lymphocytes Absolute 0.7 (L) 0.8 - 4.4 thou/uL    Monocytes Absolute 0.2 0.0 - 0.9 thou/uL    Eosinophils Absolute 0.0 0.0 - 0.4 thou/uL    Basophils Absolute 0.0 0.0 - 0.2 thou/uL     Imaging:    Ct Soft Tissue Neck With Contrast    Result Date: 8/14/2020  EXAM: CT SOFT TISSUE NECK W CONTRAST LOCATION: [facility] DATE/TIME: 8/14/2020 9:24 AM INDICATION: Neck mass, multiple (Age > 15y) nasopharynx cancer f/u COMPARISON: Soft tissue neck CT 03/14/2020 and MRI soft tissue neck 02/10/2020. Soft tissue neck CT 01/24/2020 CONTRAST: Iohexol (Omni) 100 mL TECHNIQUE: Routine with IV contrast. Multiplanar reformats. Dose reduction techniques were used. FINDINGS: Interval decrease in size of mass/soft tissue thickening within the right nasopharynx. Interval decrease in size of conglomerate lymph nodes within the right neck. Residual rounded right level II lymph node measuring 8 x 8 mm on axial image #38, series 2. Interval resolution of left-sided level II lymph nodes. No evidence of new cervical lymphadenopathy by size or morphologic criteria. The overall constellation of findings are consistent with a favorable treatment response. Interval resolution of left paraspinous and submandibular sialoadenitis. Diffuse infiltration of the subcutaneous fat within the neck bilaterally as well as edema and = craniotomy and within the nasopharynx, oropharynx, and hypopharynx, which is consistent with posttreatment related change. Posttreatment related changes within the parotid and submandibular glands.  Indeterminate right-sided thyroid nodules. The partially imaged intracranial contents are unremarkable. The partially imaged globes are unremarkable. Right maxillary sinus disease with slightly improved aeration compared to prior. Interval resolution of the callosal disease within the left maxillary sinus. Improved mucosal disease within the ethmoid sinuses. Resolution of mucosal disease within the sphenoid sinuses. The mastoid air cells are unremarkable. No suspicious osseous lesions. Normal contrast opacification of the vessels within the neck. The partially imaged lung apices are unremarkable. Left chest wall [id number].     1.  Interval decrease in size of mass/soft tissue thickening within the right nasopharynx. 2.  Interval decrease in size of conglomerate lymph nodes within the right neck. 3.  Residual rounded right level II lymph node measuring 8 x 8 mm on axial image #38, series 2. Interval resolution of left-sided level II lymph nodes. 4.  No evidence of new cervical lymphadenopathy by size or morphologic criteria. 5.  The overall constellation of findings are consistent with a favorable treatment response. 6.  Interval resolution of previously demonstrated left-sided acute peritonitis and submandibular sialadenitis. 7.  Indeterminate right thyroid nodules. 8.  Posttreatment related changes are demonstrated within the neck.    Ct Chest With Contrast    Result Date: 8/14/2020  EXAM: CT CHEST W CONTRAST LOCATION: [facility] DATE/TIME: 8/14/2020 9:29 AM INDICATION: Nasopharyngeal cancer follow-up. COMPARISON: Chest CT, 05/18/2020. TECHNIQUE: CT chest with IV contrast. Multiplanar reformats were obtained. Dose reduction techniques were used. CONTRAST: Iohexol (Omni) 100 mL FINDINGS: LUNGS AND PLEURA: There is a 6 mm spiculated pulmonary nodule involving the right upper lobe (series 3, image 38). Previously, this nodular focus was less dense and measured approximately 4 mm in size. Minimal amount of  subpleural groundglass opacity involves the left lung apex (series 12, image 3). This is indeterminate, though may reflect an area of inflammation. The lungs and pleural spaces are otherwise clear. MEDIASTINUM/AXILLAE: Heart size is normal. There is an ascending thoracic aortic aneurysm measuring 4.4 cm in caliber. This is unchanged. 9 mm low dense lesion involves the right lobe of the thyroid on image 3 of series 5. This compares with 5 mm previously. UPPER ABDOMEN: Cirrhotic configuration of the liver. There is a percutaneous gastrostomy tube in place, in appropriate position. A 1.5 cm low dense lesion is seen within the posterior aspect of the spleen, nonspecific. MUSCULOSKELETAL: No suspicious osseous lesions.     1.  6 mm spiculated right upper lobe pulmonary nodule. This finding is suspicious for malignancy and may represent a primary bronchogenic carcinoma. Metastatic lesion is a consideration, though this is thought less likely. A follow-up CT scan in 6 months  is recommended. 2.  Stable 4.4 cm ascending thoracic aortic aneurysm. 3.  Hepatic cirrhosis. 4.  9 mm low dense lesion within the right lobe of the thyroid. This is more prominent in size on current study, though is not hypermetabolic on comparison PET scan. REFERENCE: Guidelines for Management of Incidental Pulmonary Nodules Detected on CT Images: From the Fleischner Society 2017. Guidelines apply to incidental nodules in patients who are 35 years or older. Guidelines do not apply to lung cancer screening, patients with immunosuppression, or patients with known primary cancer. SINGLE NODULE Nodule size 6-8 mm Low-risk patients: Follow-up CT at 6-12 months, then consider CT at 18-24 months. High-risk patients: Follow-up CT at 6-12 months, then at 18-24 months if no change.       Signed by: Alan Frias MD

## 2021-06-10 NOTE — PROGRESS NOTES
[facility] Hematology and Oncology Progress Note    Patient: Kristen Chapman  MRN: [id number]  Date of Service: 07/31/2020      Assessment and Plan:    Cancer Staging  Nasopharyngeal carcinoma (H)  Staging form: Pharynx - Nasopharynx, AJCC 8th Edition  - Clinical stage from 2/18/2020: Stage SAMANTHA (cT4, cN2, cM0) - Signed by Alan Frias MD on 2/18/2020    1.  Nasopharyngeal carcinoma: He had 1 cycle of chemotherapy after concurrent therapy.  That was about 2 months ago.  We are not going to give him any more treatment as he would require further dose reduction due to his previous prolonged neutropenia and thrombocytopenia, which would bring him to about a 50% dose level, and he remains too thrombocytopenic.  Continues to have poor appetite and decreased performance status.  We will see him back in a few weeks with imaging and proceed with observation.  Plan was explained to the patient.  He was given a schedule.    2.  F/E/N: Still has a G-tube in place.  This will need to be removed soon.    3.  Anemia: He continues to have anemia but he is no longer microcytic.  Continue to follow.  He may have a hemoglobinopathy.    4.  Thrombocytopenia: He has had a worsening of his platelet counts.  This could be due from his antiviral and his acute hepatitis.  We will continue to follow.    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  Diagnosed January 2020.  Imaging suggested bilateral abnormal lymphadenopathy in the neck.  Also asymmetric soft tissue thickening in the posterior right nasopharynx.  On imaging, tumor also appeared to extend down to the hypopharynx.    Treatment:    Initially treated with concurrent chemotherapy and radiation.  He had weekly cisplatin starting March 3, 2020.  Fifth and final dose given March 31,  2020.  Subsequent chemotherapy was held due to prolonged thrombocytopenia and renal insufficiency.  Radiation dose was 7000 cGy in 35 fractions given from March 2 through April 17, 2020.    Started cisplatin with infusional 5-FU on June 1, 2020.  Cycle 1 given at a 25% dose reduction.  He still had significant thrombocytopenia and neutropenia on day 28.  Cycle 2 was held.  At the same time his liver function tests elevated secondary to hepatitis B reactivation.    Interim History:    Kristen presents today for a follow-up visit.  He was here a few weeks ago.  He is scheduled to get treatment today.  He still has some mid anterior abdominal discomfort.  Spends most of his time at home.  Continues to have a poor appetite.  No nausea or vomiting.    Review of Systems:    Constitutional  Constitutional (WDL): Exceptions to WDL  Fatigue: Concerns(not relieved with rest)  Neurosensory  Neurosensory (WDL): Exceptions to WDL  Ataxia: Concerns(using walker)  Eye   Eye Disorder (WDL): All eye disorder elements are within defined limits  Ear  Ear Disorder (WDL): All ear disorder elements are within defined limits  Cardiovascular  Cardiovascular (WDL): All cardiovascular elements are within defined limits  Pulmonary  Respiratory (WDL): Within Defined Limits  Gastrointestinal  Gastrointestinal (WDL): Exceptions to WDL  Anorexia: Concerns  Dysgeusia: Concerns  Dry Mouth: Concerns  Genitourinary  Genitourinary (WDL): All genitourinary elements are within defined limits  Lymphatic  Lymph (WDL): All lymph disorder elements are within defined limits  Musculoskeletal and Connective Tissue  Musculoskeletal and Connetive Tissue Disorders (WDL): All Musculoskeletal and Connetive Tissue Disorder elements are within defined limits  Integumentary  Integumentary (WDL): All integumentary elements are within defined limits  Patient Coping  Patient Coping: Accepting;Open/discussion  Accompanied by  Accompanied by: Alone  Oral Chemo Adherence    "      Constitutional     Neurosensory     Cardiovascular     Pulmonary     Gastrointestinal     Genitourinary     Integumentary     Patient Coping  Patient Coping: Accepting;Open/discussion  Accompanied by  Accompanied by: Alone    Past History:    Past Medical History:   Diagnosis Date     Nasopharyngeal cancer (H)      Physical Exam:    Recent Vitals 7/31/2020   Height 5' 6\"   Weight 144 lbs 14 oz   BSA (m2) 1.75 m2   /85   Pulse 84   Temp 98.2   Temp src 1   SpO2 99   Some recent data might be hidden     General: patient appears stated age of 70 y.o.. Nontoxic and in no distress.   HEENT: Head: atraumatic, normocephalic. Sclerae anicteric.  Chest:  Normal respiratory effort  Cardiac:  No edema.   Abdomen: abdomen is non-distended  Extremities: normal tone and muscle bulk.  Skin: no lesions or rash. Warm and dry.   CNS: alert and oriented. Grossly non-focal.   Psychiatric: normal mood and affect.     Lab Results:    Recent Results (from the past 240 hour(s))   Comprehensive Metabolic Panel    Collection Time: 07/24/20  9:22 AM   Result Value Ref Range    Sodium 136 136 - 145 mmol/L    Potassium 4.2 3.5 - 5.0 mmol/L    Chloride 106 98 - 107 mmol/L    CO2 24 22 - 31 mmol/L    Anion Gap, Calculation 6 5 - 18 mmol/L    Glucose 165 (H) 70 - 125 mg/dL    BUN 26 (H) 8 - 22 mg/dL    Creatinine 0.81 0.70 - 1.30 mg/dL    GFR MDRD Af Amer >60 >60 mL/min/1.73m2    GFR MDRD Non Af Amer >60 >60 mL/min/1.73m2    Bilirubin, Total 3.2 (H) 0.0 - 1.0 mg/dL    Calcium 8.2 (L) 8.5 - 10.5 mg/dL    Protein, Total 6.6 6.0 - 8.0 g/dL    Albumin 2.5 (L) 3.5 - 5.0 g/dL    Alkaline Phosphatase 189 (H) 45 - 120 U/L     (H) 0 - 40 U/L     (H) 0 - 45 U/L   Magnesium    Collection Time: 07/31/20  7:59 AM   Result Value Ref Range    Magnesium 1.8 1.8 - 2.6 mg/dL   Comprehensive Metabolic Panel    Collection Time: 07/31/20  7:59 AM   Result Value Ref Range    Sodium 134 (L) 136 - 145 mmol/L    Potassium 3.9 3.5 - 5.0 mmol/L    " Chloride 105 98 - 107 mmol/L    CO2 23 22 - 31 mmol/L    Anion Gap, Calculation 6 5 - 18 mmol/L    Glucose 155 (H) 70 - 125 mg/dL    BUN 27 (H) 8 - 22 mg/dL    Creatinine 0.84 0.70 - 1.30 mg/dL    GFR MDRD Af Amer >60 >60 mL/min/1.73m2    GFR MDRD Non Af Amer >60 >60 mL/min/1.73m2    Bilirubin, Total 3.0 (H) 0.0 - 1.0 mg/dL    Calcium 8.5 8.5 - 10.5 mg/dL    Protein, Total 6.8 6.0 - 8.0 g/dL    Albumin 2.4 (L) 3.5 - 5.0 g/dL    Alkaline Phosphatase 194 (H) 45 - 120 U/L     (H) 0 - 40 U/L     (H) 0 - 45 U/L   HM1 (CBC with Diff)    Collection Time: 07/31/20  7:59 AM   Result Value Ref Range    WBC 3.8 (L) 4.0 - 11.0 thou/uL    RBC 3.05 (L) 4.40 - 6.20 mill/uL    Hemoglobin 9.1 (L) 14.0 - 18.0 g/dL    Hematocrit 28.5 (L) 40.0 - 54.0 %    MCV 93 80 - 100 fL    MCH 29.8 27.0 - 34.0 pg    MCHC 31.9 (L) 32.0 - 36.0 g/dL    RDW 20.8 (H) 11.0 - 14.5 %    Platelets 54 (L) 140 - 440 thou/uL    MPV       Imaging:    Us Abdomen Limited    Result Date: 7/2/2020  EXAM: US ABDOMEN LIMITED LOCATION: [facility] DATE/TIME: 7/2/2020 11:44 AM INDICATION: Acute LFT elevation, nasopharyngeal carcinoma COMPARISON: Chest CT 05/18/2020 TECHNIQUE: Limited abdominal ultrasound. FINDINGS: GALLBLADDER: Normal. No gallstones, wall thickening, or pericholecystic fluid. Negative sonographic Foy's sign. BILE DUCTS: No biliary dilatation. The common duct measures 5.3 mm. LIVER: Normal parenchyma with smooth contour. No focal mass. RIGHT KIDNEY: No hydronephrosis. PANCREAS: The visualized portions are normal. No ascites.     1.  Normal limited abdominal ultrasound.      Signed by: Alan Frias MD

## 2021-06-10 NOTE — PROGRESS NOTES
[facility] Hematology and Oncology Progress Note    Patient: Kristen Chapman  MRN: [id number]  Date of Service: 07/22/2020      Assessment and Plan:    Cancer Staging  Nasopharyngeal carcinoma (H)  Staging form: Pharynx - Nasopharynx, AJCC 8th Edition  - Clinical stage from 2/18/2020: Stage SAMANTHA (cT4, cN2, cM0) - Signed by Alan Frias MD on 2/18/2020    1.  Nasopharyngeal carcinoma: His liver function tests remain significantly elevated secondary to his hepatitis B reactivation.  As such, would like to continue to hold his chemotherapy.  I told him that it is unlikely that we will restart at this point as he is approaching two months since his most recent treatment.  Additionally we will have to do a further dose reduction.  Not sure benefits outweigh risks.  We will have him come back in about a month with imaging.  CT and then a PET scan 3 or 4 months after that.    2.  F/E/N: G-tube continues to be in.  Ultimately plan to remove this when he is taking most of his food orally.    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  Diagnosed January 2020.  Imaging suggested bilateral abnormal lymphadenopathy in the neck.  Also asymmetric soft tissue thickening in the posterior right nasopharynx.  On imaging, tumor also appeared to extend down to the hypopharynx.    Treatment:    Initially treated with concurrent chemotherapy and radiation.  He had weekly cisplatin starting March 3, 2020.  Fifth and final dose given March 31, 2020.  Subsequent chemotherapy was held due to prolonged thrombocytopenia and renal insufficiency.  Radiation dose was 7000 cGy in 35 fractions given from March 2 through April 17, 2020.    Started cisplatin with infusional 5-FU on June 1, 2020.    Interim History:    Kristen presents today for a follow-up  "visit.  Recently has had reactivation of hepatitis B.  Continues to have some midline abdominal pain.  No fevers or chills.  No visual changes, nasal congestion or facial pain.    Review of Systems:    Constitutional  Constitutional (WDL): Exceptions to WDL  Fatigue: Fatigue relieved by rest  Neurosensory  Neurosensory (WDL): Exceptions to WDL  Ataxia: Asymptomatic, clinical or diagnostic observations only, intervention not indicated(uses walker)  Cardiovascular  Cardiovascular (WDL): All cardiovascular elements are within defined limits  Pulmonary  Respiratory (WDL): Within Defined Limits  Gastrointestinal  Gastrointestinal (WDL): Exceptions to WDL  Anorexia: Loss of appetite without alteration in eating habits  Genitourinary  Genitourinary (WDL): All genitourinary elements are within defined limits  Integumentary  Integumentary (WDL): All integumentary elements are within defined limits  Patient Coping  Patient Coping: Accepting  Accompanied by  Accompanied by: Alone    Past History:    Past Medical History:   Diagnosis Date     Nasopharyngeal cancer (H)      Physical Exam:    Recent Vitals 7/31/2020   Height 5' 6\"   Weight 144 lbs 14 oz   BSA (m2) 1.75 m2   /85   Pulse 84   Temp 98.2   Temp src 1   SpO2 99   Some recent data might be hidden     General: patient appears stated age of 70 y.o.. Nontoxic and in no distress.   HEENT: Head: atraumatic, normocephalic. Sclerae anicteric.  Chest:  Normal respiratory effort  Cardiac:  No edema.   Abdomen: abdomen is non-distended  Extremities: normal tone and muscle bulk.  Skin: no lesions or rash. Warm and dry.   CNS: alert and oriented. Grossly non-focal.   Psychiatric: normal mood and affect.     Lab Results:    Recent Results (from the past 240 hour(s))   Comprehensive Metabolic Panel    Collection Time: 07/24/20  9:22 AM   Result Value Ref Range    Sodium 136 136 - 145 mmol/L    Potassium 4.2 3.5 - 5.0 mmol/L    Chloride 106 98 - 107 mmol/L    CO2 24 22 - 31 " mmol/L    Anion Gap, Calculation 6 5 - 18 mmol/L    Glucose 165 (H) 70 - 125 mg/dL    BUN 26 (H) 8 - 22 mg/dL    Creatinine 0.81 0.70 - 1.30 mg/dL    GFR MDRD Af Amer >60 >60 mL/min/1.73m2    GFR MDRD Non Af Amer >60 >60 mL/min/1.73m2    Bilirubin, Total 3.2 (H) 0.0 - 1.0 mg/dL    Calcium 8.2 (L) 8.5 - 10.5 mg/dL    Protein, Total 6.6 6.0 - 8.0 g/dL    Albumin 2.5 (L) 3.5 - 5.0 g/dL    Alkaline Phosphatase 189 (H) 45 - 120 U/L     (H) 0 - 40 U/L     (H) 0 - 45 U/L   Magnesium    Collection Time: 07/31/20  7:59 AM   Result Value Ref Range    Magnesium 1.8 1.8 - 2.6 mg/dL   Comprehensive Metabolic Panel    Collection Time: 07/31/20  7:59 AM   Result Value Ref Range    Sodium 134 (L) 136 - 145 mmol/L    Potassium 3.9 3.5 - 5.0 mmol/L    Chloride 105 98 - 107 mmol/L    CO2 23 22 - 31 mmol/L    Anion Gap, Calculation 6 5 - 18 mmol/L    Glucose 155 (H) 70 - 125 mg/dL    BUN 27 (H) 8 - 22 mg/dL    Creatinine 0.84 0.70 - 1.30 mg/dL    GFR MDRD Af Amer >60 >60 mL/min/1.73m2    GFR MDRD Non Af Amer >60 >60 mL/min/1.73m2    Bilirubin, Total 3.0 (H) 0.0 - 1.0 mg/dL    Calcium 8.5 8.5 - 10.5 mg/dL    Protein, Total 6.8 6.0 - 8.0 g/dL    Albumin 2.4 (L) 3.5 - 5.0 g/dL    Alkaline Phosphatase 194 (H) 45 - 120 U/L     (H) 0 - 40 U/L     (H) 0 - 45 U/L   HM1 (CBC with Diff)    Collection Time: 07/31/20  7:59 AM   Result Value Ref Range    WBC 3.8 (L) 4.0 - 11.0 thou/uL    RBC 3.05 (L) 4.40 - 6.20 mill/uL    Hemoglobin 9.1 (L) 14.0 - 18.0 g/dL    Hematocrit 28.5 (L) 40.0 - 54.0 %    MCV 93 80 - 100 fL    MCH 29.8 27.0 - 34.0 pg    MCHC 31.9 (L) 32.0 - 36.0 g/dL    RDW 20.8 (H) 11.0 - 14.5 %    Platelets 54 (L) 140 - 440 thou/uL    MPV      Neutrophils % 65 50 - 70 %    Lymphocytes % 23 20 - 40 %    Monocytes % 10 2 - 10 %    Eosinophils % 2 0 - 6 %    Basophils % 1 0 - 2 %    Neutrophils Absolute 2.5 2.0 - 7.7 thou/uL    Lymphocytes Absolute 0.9 0.8 - 4.4 thou/uL    Monocytes Absolute 0.4 0.0 - 0.9 thou/uL     Eosinophils Absolute 0.1 0.0 - 0.4 thou/uL    Basophils Absolute 0.0 0.0 - 0.2 thou/uL   Manual Differential    Collection Time: 07/31/20  7:59 AM   Result Value Ref Range    Platelet Estimate Decreased (!) Normal    Ovalocytes 1+ (!) Negative    Polychromasia 1+ (!) Negative    Tear Drop Cells 1+ (!) Negative     Imaging:    No results found.      Signed by: Alan Frias MD

## 2021-06-10 NOTE — TELEPHONE ENCOUNTER
[facility] paperwork for son, Cj rec'lourdes, completed, signed by Dr. Frias and e-mailed back to sister, Rea.

## 2021-06-10 NOTE — PROGRESS NOTES
Pt here with his son, declined an . Feeling okay, swallowing some but needs very liquidy foods d/t dry mouth. Still using MMW and saliva substitute, and occasional Vicodin. Using GT 4 cartons/day plus water.

## 2021-06-10 NOTE — TELEPHONE ENCOUNTER
[facility]:    Morning!    Sounds like Dad s appointment went well yesterdayJ  Just thought I d send his schedule as of this am.  I am including his appointments for labs/Dr. Grier s (infectious disease) schedules.  I imagine it will change from time-to-time.  I will see what Dr. Mendoza s recommendations are next week!  I am on furugh Friday, 8-21, Thursday & Friday, 8-27 & 28th fyi  Let me know if you need anything!  Alisha        Thursday, 8-:  arrival of 8:15 am  8:15am:  check in 1st Floor Radiation Therapy  8:30 am:  follow up with Dr. Mendoza      Monday, 9-:  check in at 8:50 (maybe 8:45?)  8:50 am:  lab draw for Dr. Grier  [facility]   [address] [place] [address], [address]      Monday, 10-:  check in at 8:50 (maybe 8:45?)  8:50 am:  lab draw for Dr. Grier  [facility] [address]      Monday, 11-:  check in at 8:50 (maybe 8:45?)  8:50 am:  lab draw for Dr. Grier  [facility]   [address] [place] [address], [address]    NEXT  9:30 am:  check in at [facility] Radiology department for PET CT scan  Prep:  Nothing to eat or drink (with the exception of water) FOUR hours prior to the exam.  Avoid vigorous exercise for TWENTY FOUR hours prior to the study.        Wednesday, 11-:  arrival of 9:30 am  Check in on 2nd floor Cancer Care  9:30 am:  labs  9:45 am:  bria HANNA Dr.      Monday, 12-:  check in at 8:50 (maybe 8:45?)  8:50 am:  lab draw for Dr. Grier  [facility]   [address] [place] [address], [address]      Monday, 1-  check in at 8:50 (maybe 8:45?)  8:50 am:  lab draw for Dr. [facility]   [address], [address], [place], [place]      Monday, 2-8-2021:  check in at 8:50 (maybe  8:45?)  8:50 am:  lab draw for Dr. Grier  [facility]   [address] [place] [address], [address], [place], [place]      Monday, 2-:  check in at 8:45am  9 am:  Follow up with Dr. Grier  [facility]   [address] [place] [address], [address], [place], [place]    Alisha Tierney, RN, BSN  Nurse Navigator

## 2021-06-10 NOTE — PROGRESS NOTES
"Kristen Chapman is a 70 y.o. male who is being evaluated via a billable video visit.      The patient has been notified of following:     \"This video visit will be conducted via a call between you and your physician/provider. We have found that certain health care needs can be provided without the need for an in-person physical exam.  This service lets us provide the care you need with a video conversation.  If a prescription is necessary we can send it directly to your pharmacy.  If lab work is needed we can place an order for that and you can then stop by our lab to have the test done at a later time.    Video visits are billed at different rates depending on your insurance coverage. Please reach out to your insurance provider with any questions.    If during the course of the call the physician/provider feels a video visit is not appropriate, you will not be charged for this service.\"    Patient has given verbal consent to a Video visit? Yes  How would you like to obtain your AVS? AVS Preference: MyChart.  If dropped by the video visit, the video invitation should be sent to: Text to cell phone: [phone]  Will anyone else be joining your video visit? Yes dtr Rea        Video Start Time: 9:20 am    Additional provider notes: Follow-up visit for reactivation hepatitis B.  Patient has been started on lamivudine 100 mg a day.  He is tolerating it well.  Follow-up LFTs are dramatically improved.  Note that his hepatitis B viral load was over 4 million prior to starting treatment.    Says he is feeling generally better.    Dr. Frias has put his chemotherapy on hold.      GENERAL: Healthy, alert and no distress  EYES: Eyes grossly normal to inspection. No discharge or erythema, or obvious scleral/conjunctival abnormalities.  RESP: No audible wheeze, cough, or visible cyanosis.  No visible retractions or increased work of breathing.    NEURO: Cranial nerves grossly intact. Mentation and speech appropriate for " age.  PSYCH: Mentation appears normal, affect normal/bright, judgement and insight intact, normal speech and appearance well-groomed      Video-Visit Details    Type of service:  Video Visit    Video End Time (time video stopped): 9:30  Originating Location (pt. Location): Home    Distant Location (provider location):   Drive INFECTIOUS DISEASE     Platform used for Video Visit: Jordy Grier MD

## 2021-06-11 NOTE — TELEPHONE ENCOUNTER
E-mail sent to daughterRea and sonNayla:    Angeloarianna,    I thought I d just send Dad s schedule for the next week or so.  I am out the remainder of the week on furlough.  Let me know if you have any questions!  Alisha    Thursday, 9-:  arrival of 8:30 am  8:30am:  check in 1st Floor Radiation Therapy  8:45 am:  follow up with Dr. Mendoza       Friday, 9-:  arrival of 8 am  8 am:  check in [facility] (I believe for a hearing test)  ~appointment with Jennifer Soto Audiologist    8:40 am:  Meet with Dr. Marcial Cheng to discuss ENT s findings:  IMPRESSION  1. Left tympanic membrane perforation  2. Bilateral tinnitus likely related to hearing loss.           Department directions for [facility] AUDIOLOGY:  We are located in the [facility] at [address] [place] [address], [address], [place], [place].  Our building is located across the street from [facility] and offers free parking in our on-site lot.  Please take the stairs or elevator to the second floor of the [facility] and check in at the  located in the Specialty Clinic, [address].         Monday, 9-:  arrival of 8:45 am  8:45 am:  check in for speech therapy     Directions for [facility]:  Our address is [facility] in the [facility] in [place].  Directly across the street from [facility] (South side of [facility]), there is a cancer center; we are directly behind them in their back parking lot.  If coming from Highway , head east on [address] to [address].  Turn right on [address], take your first left into the [facility].  Follow the road around to the left and we are in the center of the complex in [address].    If coming from [facility], turn west on [address] to [address].  Turn left on [address], go down approximately 1 block and turn left into the [facility]  [facility].  Follow the road around to the left and we are in the center of the complex in [address].    Landmarks: we are behind [place] [facility]               Alisha Tierney, RN, BSN  Nurse Navigator

## 2021-06-11 NOTE — PROGRESS NOTES
HISTORY OF PRESENT ILLNESS  History via daughter who acts as his . Patient has complained of noises in both ears. He describes it as like a chirping or insect. He has never had a hearing test. No history of noise exposure. No family history of hearing loss. He has had surgery on the left ear but doesn't remember what was done. Has a history of ear infections.     REVIEW OF SYSTEMS  Review of Systems: a 10-system review was performed. Pertinent positives are noted in the HPI and on a separate scanned document in the chart.    PMH, PSH, FH and SH has documented in the EHR.      EXAM    CONSTITUTIONAL  General Appearance:  Normal, well developed, well nourished, no obvious distress  Ability to Communicate:  communicates appropriately with daughter    HEAD AND FACE  Appearance and Symmetry:  Normal, no scalp or facial scarring or suspicious lesions.      EARS  Clinical speech reception threshold:  Normal, able to hear normal speech.  Auricle:  Normal, Auricles without scars, lesions, masses.  External auditory canal:  Normal, External auditory canal normal.  Tympanic membrane:  Left tympanic membrane perforation.    NOSE (speculum or scope)  Architecture:  Normal, Grossly normal external nasal architecture with no masses or lesions.  Mucosa:  Normal mucosa, No polyps or masses.  Septum:  Normal, Septum non-obstructing.  Turbinates:  Normal, No turbinate abnormalities    ORAL CAVITY AND OROPHARYNX  Lips:  Normal.  Dental and gingiva:  Normal, No obvious dental or gingival disease.  Mucosa:  Normal, Moist mucous membranes.  Tongue:  Normal, Tongue mobile with no mucosal abnormalities  Hard and soft palate:  Normal, Hard and soft palate without cleft or mucosal lesions.  Oral pharynx:  Normal, Posterior pharynx without lesions or remarkable asymmetry.  Saliva:  Normal, Clear saliva.  Masses:  Normal, No palpable masses or pathologically enlarged lymph nodes.    NECK  Masses/lymph nodes:  Normal, No worrisome  neck masses or lymph nodes.  Salivary glands:  Normal, Parotid and submandibular glands.  Trachea and larynx position:  Normal, Trachea and larynx midline.  Thyroid:  Normal, No thyroid abnormality.  Tenderness:  Normal, No cervical tenderness.  Suppleness:  Normal, Neck supple    NEUROLOGICAL  Speech pattern:  Normal, Proasaic    RESPIRATORY  Symmetry and Respiratory effort:  Normal, Symmetric chest movement and expansion with no increased intercostal retractions or use of accessory muscles.     IMPRESSION  1. Left tympanic membrane perforation  2. Bilateral tinnitus likely related to hearing loss.     RECOMMENDATION  Hearing test. Will have him follow up with Dr. Cheng for opinion regarding perforation. I'll see him back in 3 months for recheck of his cancer.     Billy Guerrero MD

## 2021-06-11 NOTE — PROGRESS NOTES
[facility] Speech Therapy  Certification Request    September 28, 2020        Patient: Kristen Chapman   MR Number: [id number]   YOB: 1950   Date of Visit: 9/28/2020       Dear Dr. Mendoza,     Thank you for this referral.   We are seeing Kristen Chapman for Speech Language Therapy.    Medicare and/or Medicaid requires physician review and approval of the treatment plan. Please review the plan of care and verify that you agree with the therapy plan of care by co-signing this note.     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 tolerate safest, least restrictive diet without clinical signs and symptoms of aspriation by: : 12 weeks     Short Term Goals  1. Patient will consume snack portion of regular solids/thin liquids with independent use of safe swallowing strategies (i.e., small single sips/bites, alternate liquids/solids) and no s/s aspiration.  2. Patient will complete x3 oropharyngeal swallowing exercises independently to reduce onset of radiation fibrosis.  3. Patient will complete x3 oral motor exercises independently to improve lingual protrusion, lateralization, and jaw opening.  4. Patient will verbalize understanding of education provided regarding s/s aspiration and strategies to cope with dry mouth, taste changes, and saliva changes post chemo/radiation.      If you have any questions or concerns, please don't hesitate to call.    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 Dysphagia, oropharyngeal  R13.12        Assessment:      Patient presents with concerns of dysphagia post chemo/radiation therapy for head/neck cancer.  Per clinical evaluation, patient presents with no s/s aspiration with all textures trialed and mild oral dysphagia  VFSS dated 9/4/20 noted mild oral and mild-moderate pharyngeal dysphagia.  Patient is appropriate for direct 1:1 speech therapy to monitor diet tolerance, further train on safe swallowing strategies, and introduce further oropharyngeal swallowing exercises reduce late onset of radiation fibrosis and improve lingual ROM and jaw opening.  Long Term Goals  Pt. tolerate safest, least restrictive diet without clinical signs and symptoms of aspriation by: : 12 weeks     Short Term Goals  1. Patient will consume snack portion of regular solids/thin liquids with independent use of safe swallowing strategies (i.e., small single sips/bites, alternate liquids/solids) and no s/s aspiration.  2. Patient will complete x3 oropharyngeal swallowing exercises independently to reduce onset of radiation fibrosis.  3. Patient will complete x3 oral motor exercises independently to improve lingual protrusion, lateralization, and jaw opening.  4. Patient will verbalize understanding of education provided regarding s/s aspiration and strategies to cope with dry mouth, taste changes, and saliva changes post chemo/radiation.  Patient goal:  To resume an oral diet.  Patient's expectations/goals are realistic.    Rehab potential is good based on prior level of function, evaluation results, recent progress, motivation and cooperation and time post onset.     Plan / Patient Instructions:        Plan of Care:   Authorization / Certification Start Date: 09/28/20  Authorization / Certification End  "Date: 12/28/20  Authorization / Certification Number of Visits: 13  Times per Week: 1  Number of Weeks: 12  Number of Visits: 13      Plan:  Speech therapy 1 times a week for 12 weeks.    Diet/Swallow Plan: Regular and thin liquids    Strategies: Alternate 1-2 bites, then 1-2 sips, Take small 1/2 - 1 tsp. bites and sips, Sit fully upright for all meals and intake (including meals), eat slowly, avoid talking while eating/drinking    Education:   Patient, Family participated in education regarding evaluation results, treatment plan/rationale, home program and expected functional outcome  Patient, Family demonstrate understanding    Referrals: NA       Subjective:      Social information:   Living Situation:single family home   Occupation:retired    History of Present Illness:    Kristen is a 70 y.o. male who presents to therapy today with complaints of dysphagia s/p squamous cell carcinoma of the nasopharynx. 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  Patient received concurrent chemoradiation weekly from 3/2/20-4/17/20.  During this time, patient refused PEG tube and continued with oral nutrition; however, did eventually have PEG tube placed after completion of therapy due to sore throat and dysphagia.    Patient reports that he is \"doing fine\" but is most bothered by his mouth/throat being dry.  He noted he currently recieves most of his nutrition via tube feeding, but will have small amounts of liquids or foods such as cucumbers, beans, vegetables with rice, or chicken.  Per patient, his intake is also limited by taste changes post therapy where \"everything tasks like dirt\".    Patient had an outpatient video swallow study completed on 9/4/20 with mild oral and mild-moderate pharyngeal dysphagia identified.  He was recommended to continue regular/thin " "liquids with safe swallowing strategies due to mild aspiration risk with thin liquids.  VFSS notable for piecemeal deglutition, nasopharyngeal reflux, transient penetration with thin liquids that spontaneously cleared, posterior-inferior epiglottic inversion, moderate pharyngeal stasis in the valleculae, and reduced/weak hyolaryngeal elevation/excursion.    Patient presents as cooperative and engaged during the session.  Daughter present during evaluation, and interpretation provided over telephone via language line.    Patient reports no pain     Objective:        Examination:    Patient was given puree, thin and regular solid.    Oral Phase  Dentition: Patient wears upper and lower dentures.    Oral hygiene: Adequate.  Patient reported xerostomia; however, denied any mouth sores.    Oral motor function was mildly-moderately impaired.  Patient demonstrated reduced lingual protrusion and lateralization.  Tongue protruded to the level of dentition and was unable to be lateralized to the inside of either buccal cavity.  Patient demonstrated reduced oral jaw opening, ~2.5 of his fingers width.    Bolus prep and oral control was mildly impaired. Mastication was slow but functional and the patient used mostly rotary chewing.  Mastication appeared reduced d/t oral motor function as well as patient report of \"food taste likes dirt\".    Anterior-Posterior transit was mildly impaired, slow and effortful.    Oral stasis did not occur with all textures trialed.    Pharyngeal Phase:    Puree: Patient trialed 2 bites and presented with no s/s of aspiration. Initiation of swallow appeared timely. Hyolaryngeal movement appeared intact .    Thin:Patient trialed 4 ounces by cup and water bottle and presented with no s/s of aspiration. Initiation of swallow appeared timely. Hyolaryngeal movement appeared intact .    Regular solid: Patient trialed 1 bites of cracker and presented with no s/s of aspiration. Initiation of swallow " appeared timely. Hyolaryngeal movement appeared intact         Further food trials declined due to taste changes post treatment.    Discussed recent video swallow study and reviewed results/recommendations.    Dysphagia Intervention:   -Education provided regarding structure/function of typical swallow, s/s aspiration and concern of related illness, and dysphagia as it relates to current medical status post head and neck cancer.  Discussed swallow safety and efficiency as they relate to current swallow function.  -Reviewed results of VFSS from 9/4/20 to aid in education of recommended safe swallowing strategies (I.e., small single sips/bites, alternate liquids/solids every 1-2 bites).  -Education provided regarding radiation fibrosis and how this can impact swallowing during and post treatment.  -Provided education regarding saliva production and dry mouth post radiation therapy.  Provided written education and strategies to try; however, advised patient to check with his doctor prior to introducing novel artificial saliva options.  -Patient/daughter asked appropriate questions and SLP answered as able/appropriate.  -Patient verbalized understanding of information provided.    Interventions Today  Interventions MINUTES   Speech - Language    Cognition    Dysphagia Evaluation 30   Assistive technology    Voice    Dysphagia Intervention 30       Total 60          Jacey Acuña MS, CCC-SLP  9/28/2020

## 2021-06-11 NOTE — PROGRESS NOTES
CHIEF COMPLAINT:   Chief Complaint   Patient presents with     Hearing Loss     BL hearing loss gradually over time. No ear pain.         HISTORY OF PRESENT ILLNESS    Kristen was seen in follow up for left eardrum perforation.  He has difficulty understand others in general.  Reports buzzing in his left ear, not bothersome or preventing him from sleep.   No dizziness or balance issues.  No ear pain or discharge.     Chief Complaint   Patient presents with     Hearing Loss     BL hearing loss gradually over time. No ear pain.            REVIEW OF SYSTEMS    Review of Systems: a 10-system review is reviewed at this encounter.  See scanned document.     Oxycodone     PHYSICAL EXAM:        HEAD: Normal appearance and symmetry:  No cutaneous lesions.      EARS:   Right TM: intact      Left TM:  Posterior/superior perforation (15%), clean, dry; TM is sclerotic    Ibanez:  256hz is midline           NOSE:    Dorsum:   straight       ORAL CAVITY/OROPHARYNX:    Lips:  Normal.     NECK:  Trachea:  midline       NEURO:   Alert and Oriented    GAIT AND STATION:  normal     RESPIRATORY:   Symmetry and Respiratory effort         IMPRESSION:    Encounter Diagnosis   Name Primary?     Mixed hearing loss, bilateral Yes          RECOMMENDATIONS:    We discussed options for the patient.  His son is serving as an  as he is mom language.  Certainly the eardrum is amenable for repair.  However there is a very mild conductive component to the hearing and his left ear shows a mixed type hearing loss with excellent word discrimination.  Given the sclerotic nature of the tympanic membrane and the chronic nature of the perforation and the minimal conductive component I would recommend hearing aid evaluation.  I will be happy to see the patient in 6 months to see how he was doing and recheck the status of the perforation.  All questions were answered he is agreeable to this plan of care.

## 2021-06-11 NOTE — PROGRESS NOTES
Pt here with his son for f/u with Dr. Mendoza, still using feeding tube 4 cans/day plus water. He is swallowing soft foods, but very small portions, taste is maybe at 50%. Minimal pain, takes occasional Vicodin.

## 2021-06-11 NOTE — PROGRESS NOTES
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       TYPE OF RADIATION THERAPY ADMINISTERED:  Concurrent chemoradiation therapy for his head neck cancer with weekly cisplatin and had radiation therapy in our clinic with a total dose of 7000 cGy in 35 treatments given from 3/2/2020-4/17/2020.      INTERVAL SINCE COMPLETION OF RADIATION THERAPY: 5 months.      SUBJECTIVE:  Mr. Chapman is a 70 y.o. male who has been in his usual state of good health until recently.  Patient was found to have swelling right neck mass by his lerma a month ago for which he was a seeking further evaluation.  The patient did not notice any significant changes over the past few weeks and he is also asymptomatic.  Initial ENT examination showed suspicious abnormalities in the right nasopharynx worrisome for malignancy.  CT of the neck on 1/24/2020 revealed asymmetric soft tissue thickening in the posterior right nasopharynx as well as abnormal enlarged cervical lymph nodes measuring up to 3.8 cm bilaterally.  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  The patient also had a staging work-up including PET CT scan and MRI brain which showed locally advanced disease with lymph node metastasis.  The PET CT scan also showed abnormal increased activity in the hilar region suspicious for metastasis.  Patient was then referred to pulmonology for evaluation and possible biopsy.  Patient then underwent bronchoscopy and EBUS on 2/27/2020 with biopsy showed negative for pregnancy. He received concurrent chemoradiation therapy for his head neck cancer and had radiation therapy in our clinic with a total dose of 7000 cGy in 35 treatments given from 3/2/2020-4/17/2020.  He also received 3 cycles  of cisplatin and 5-FU during the course of radiation therapy. He tolerated radiation therapy reasonably well with expected acute side effect.  The patient insisted not to have PEG tube placement during the therapy.  He however is able to maintain his weight reasonably stable during the treatment.     The patient experienced worsening pain of sore throat and dysphasia since completion of the radiation therapy.  He is not able to get adequate calorie and fluid intake and was admitted to hospital one week after treatment for supportive care and pain management.  A PEG tube was also placed during the hospital stay. The patient felt much better and improved since hospital stay.  The patient also experienced acute onset of left parotitis 4 weeks post cancer therapy and was admitted to the hospital for ongoing treatment including antibiotics.  His condition has been significant improved since then. He is currently able to eat with liquid and soft without any significant difficulty.  He denies any significant pain at the time of evaluation.  He still has a significant dry mouth at the time of evaluation.  He is here for routine post therapy office follow-up.     He is also receiving adjuvant chemotherapy under supervision of Dr. Frias.     Patient has been followed with Dr. Guerrero, ENT and last examination on 9/9/2020 showed no evidence of cancer recurrence.    Medications were reviewed and are up to date on EPIC.    The following portions of the patient's history were reviewed and updated as appropriate: allergies, current medications, past family history, past medical history, past social history, past surgical history and problem list.    Review of Systems:      General  Constitutional (WDL): Exceptions to WDL  Fatigue: Fatigue relieved by rest  EENT  Eye Disorder (WDL): All eye disorder elements are within defined limits  Ear Disorder (WDL): All ear disorder elements are within defined limits  Respiratory   Respiratory  (WDL): Within Defined Limits  Cardiovascular  Cardiovascular (WDL): All cardiovascular elements are within defined limits  Endocrine     Gastrointestinal  Gastrointestinal (WDL): Exceptions to WDL  Anorexia: Loss of appetite without alteration in eating habits  Dysgeusia: Altered taste but no change in diet  Dysphagia: Symptomatic and altered eating/swallowing(using GT 4 cans/day plus water, swallowing small portions)  Musculoskeletal  Musculoskeletal and Connetive Tissue Disorders (WDL): Exceptions to WDL  Muscle Weakness : Symptomatic, perceived by patient but not evident on physical exam  Integumentary               Integumentary (WDL): All integumentary elements are within defined limits  Neurological  Neurosensory (WDL): Exceptions to WDL  Ataxia: Asymptomatic, clinical or diagnostic observations only, intervention not indicated(using wheeled walker)  Psychological/Emotional   Patient Coping: Accepting;Open/discussion  Hematological/Lymphatic  Lymph (WDL): All lymph disorder elements are within defined limits  Dermatologic     Genitourinary/Reproductive  Genitourinary (WDL): All genitourinary elements are within defined limits  Reproductive     Pain              Currently in Pain: No/denies  Response to Interventions: takes Vicodin occasionally still, but pain better overall.  Accompanied by  Accompanied by: Family Member    Objective:     PHYSICAL EXAMINATION:    /64   Pulse 75   Wt 139 lb 12.8 oz (63.4 kg)   SpO2 99%   BMI 22.56 kg/m      Gen: Alert, in NAD  Eyes: PERRL, EOMI, sclera anicteric  HENT     Head: NC/AT     Ears: No external auricular lesions     Nose/sinus: No rhinorrhea or epistaxis     Oropharynx: MMM, no visible oral lesions  Neck: Supple, full ROM, no LAD  Pulm: No wheezing, stridor or respiratory distress  CV: Well-perfused, no cyanosis, no pedal edema  Abdominal: BS+, soft, nontender, nondistended, no hepatomegaly  Back: No step-offs or pain to palpation along the thoracolumbar  spine  Rectal: Deferred  : Deferred  Musculoskeletal: Normal muscle bulk and tone  Skin: Normal color and turgor  Neurologic: A/Ox3, CN II-XII intact, normal gait and station  Psychiatric: Appropriate mood and affect     Imaging: Imaging results 30 days: Xr Swallow Study W Speech    Result Date: 9/4/2020  EXAM: XR SWALLOW STUDY W SPEECH OR OT LOCATION: [facility] DATE/TIME: 9/4/2020 10:31 AM INDICATION: Difficulty swallowing. COMPARISON: None. TECHNIQUE: Routine. FINDINGS: FLUOROSCOPIC TIME: 1.9 minutes NUMBER OF IMAGES: 9 image series Swallow study with Speech Pathology using multiple barium thicknesses. There was no laryngeal penetration or aspiration with nectar consistency barium. With thin barium there was transient mild to moderate penetration with spoon, cup and straw sips. No aspiration. With pudding consistency, there was moderate stasis at the base of the tongue and within the vallecula. Gagging was also noted. No laryngeal penetration or aspiration. Prominent cricopharyngeal impression.     1.  Transient mild to moderate amount of penetration with thin barium. No aspiration. 2.   Pronounced stasis with pudding consistency substance. 3.  Incidental prominent cricopharyngeal impression.     Nm Pet Ct Skull To Mid Thigh    Result Date: 9/2/2020  EXAM: NM PET CT SKULL TO MID THIGH LOCATION: [facility] DATE/TIME: 9/2/2020 2:42 PM INDICATION: Head and neck cancer, restaging. Malignant neoplasm of overlapping sites of nasopharynx. Malignant neoplasm of aryepiglottic fold, hypopharyngeal aspect. EBUS hilar biopsy negative. Interval radiation therapy and chemotherapy. Subsequent treatment strategy. COMPARISON: PET/CT 02/12/2020. CT neck chest 08/14/2020 18 05/18/2020 reviewed. TECHNIQUE: Serum glucose level 94 mg/dL. One hour post intravenous administration of 8.6 mCi F-18 FDG, PET imaging was performed from the vertex to the mid thighs utilizing attenuation correction with concurrent axial CT  and PET/CT image fusion. Dose reduction techniques were used. FINDINGS: Since 02/12/2020, prior FDG avid mass in the posterior right nasopharynx has resolved as has prior scattered FDG avid cervical adenopathy suggesting a complete favorable treatment response. No new cervical adenopathy. Irregularly-shaped poorly marginated confluent groundglass opacity has developed in the left upper lobe apex associated with moderate heterogeneous FDG uptake, likely inflammatory. Few scattered prominent middle mediastinal and bilateral hilar nodes persist although demonstrate decrease in degree of now moderate FDG activity, typical of improving granulomatous inflammation. Tiny irregular pulmonary nodule in the posterolateral right upper lobe, which demonstrate no FDG activity although is just below the size threshold for reliable PET characterization and continued chest CT imaging surveillance is recommended. Moderate linear uptake has developed in the esophagus suggesting esophagitis. Near complete opacification of the right maxillary sinus although to a lesser degree than 02/12/2020 associated with moderate FDG uptake suggesting underlying improved sinusitis. Mild senescent intracranial changes. Physiologic benign basal ganglia calcifications. Moderate mucosal thickening of a few ethmoid air cells. Left IJ Port-A-Cath with tip near the SVC/RA junction. Aneurysmal dilatation ascending thoracic aorta up to 4.7 cm, unchanged. Minimal curvilinear scarring or atelectasis in the lung  bases. Percutaneous gastrostomy tube with low-level inflammatory uptake along its tract. Moderate scattered degenerative changes in the spine.     1. No good evidence of FDG avid malignancy. Complete favorable treatment response of prior right posterior nasopharyngeal malignancy and cervical capri metastases. 2. New small area of confluent opacification left upper lobe apex, likely inflammatory. 3. Decrease in activity of mildly prominent mediastinal  and bilateral hilar nodes, most likely improving granulomatous inflammation. 4. Irregular subcentimeter indeterminate pulmonary nodule in the right upper lobe demonstrates no FDG activity although is just below the threshold for reliable PET characterization. Chest CT imaging surveillance recommended.       Impression     The patient is a 70-year-old gentleman with a diagnosis of nasopharyngeal cancer status post concurrent chemoradiation therapy 5 months ago.  The patient has been recovering well since treatment with no clinical evidence of recurrence.     Assessment & Plan:     1.  I have again discussed with the patient about appropriate nutritional support. The patient only has minimal pain and discomfort at the time of evaluation.  He is taking pain medication as needed.  The patient had a swallowing test on 9/4/2020 and recommended oral diet with regular texture and thin liquids.  2.  He had a restaging CT chest 8/14/2020 which showed resolution of previously mildly enlarged hilar and mediastinal nodes in previous PET CT scan with no evidence of metastatic disease. The right upper lobe small mass has been increased slightly to 6 mm and more dense worrisome for malignancy.    PET CT scan on 9/2/2020 showed complete response of primary tumor. New small area of confluent opacification left upper lobe apex, likely inflammatory. Irregular subcentimeter indeterminate pulmonary nodule in the right upper lobe demonstrates no FDG activity although is just below the threshold for reliable PET characterization. Chest CT imaging surveillance recommended.  I would recommend patient to have repeat CT chest in 2 months.  3.  Return to radiation oncology in 2-month for another office follow-up and possibly repeat CT chest.  4.  Follow-up with Dr. Frias, medical oncology and Dr. Billy Guerrero as planned.   5.  Continue neck and jaw exercise.  6.  Continue close dental follow-up.      Face to face time  25 minutes with > 75%  spent on consultation, education and coordination of care.    Laurita Mendoza MD, PhD  Department of Radiation Oncology   [facility]  Tel: [phone]  Page: [phone]    [facility]  [address] [place] [address]     [address]   [place] [address]    CC:  Patient Care Team:  Mike Cook MD as PCP - General (Family Medicine)  Laurita Mendoza MD as Physician (Radiation Oncology)  Alan Frias MD as Physician (Hematology and Oncology)  Alisha Tierney, RN as Oncology Nurse Navigator  Billy Guerrero MD as Physician (Otolaryngology)

## 2021-06-11 NOTE — TELEPHONE ENCOUNTER
[facility] paperwork completed, signed by Dr. Alan Frias and sent via e-mail to son Nayla Chapman.

## 2021-06-11 NOTE — PROGRESS NOTES
Speech Language/Pathology  Videofluoroscopic Swallow Study     Problem:  Patient Active Problem List   Diagnosis     Nasopharyngeal carcinoma (H)     Squamous cell carcinoma of nasopharynx (H)     Hilar lymphadenopathy     Abnormal PET scan of mediastinum     Elevated serum creatinine     Antineoplastic chemotherapy induced pancytopenia (H)     Anemia     Weight loss     Dehydration     Hypokalemia     Dysphagia     Hypomagnesemia     Parotitis     Severe protein-calorie malnutrition (H)     Elevated LFTs       Onset Date: 8/27/2020 (order date)  Reason for Evaluation: Assess swallow physiology & function following treatment for 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 Concurrent chemoradiation (x35 treatments from 3/2/20 to 4/17/20).  Current Diet: Primarily TF via PEG tube. Patient reports that he has been taking only small amounts of rice and water at home.  Baseline Diet: Regular textures and thin liquids (prior to treatment for head & neck cancer)    Assessment:    Patient demonstrate mild oral dysphagia and mild-moderate pharyngeal dysphagia.    Patient is at mild aspiration risk with thin liquids due to lack of epiglottic inversion.    Rehab potential is good based on prior level of function, evaluation results, and patient motivation & cooperation.    Recommendations:    Plan: Recommend oral diet of Regular textures and thin liquids. Patient may wish to avoid hard, dry, tough, and excessively chewy foods and, instead, choose/prepare soft, moist foods that are easier to chew and swallow.    Strategies: Patient to sit fully upright for all intake, eat slowly, and take one small bite or sip at a time. Patient to alternate each bite of food with 1-2 sips of liquid to aide in clearance of pharyngeal stasis. To minimize aspiration risk, patient to avoid talking while eating and drinking.    Referrals: Recommend outpatient Speech Therapy for dysphagia     Reviewed  history of swallow problem with patient and verbally explained roles of SLP and radiologist. Verbally explained process of VFSS prior to administration of barium. Verbally explained results and recommendations to patient. SLP answered questions and stated that patient's referring provider, Dr. Mendoza, will be notified of results and have access to this report in the EMR. Patient verbalized understanding.    Subjective:    Patient presents as pleasant and cooperative during this evaluation. He arrived to appointment unaccompanied.  A professional Payoff  was present via Language Line. Patient also speaks and understands English quite well.    Patient was given nectar-thick, thin, and pureed consistencies of barium.    Objective:    Dentition/Oral hygiene: Patient is edentulous. He wears upper and lower dentures. The lower plate is loose-fitting. Oral hygiene was good, but patient has severe xerostomia.    Oral motor function was not impaired.      Bolus prep and oral control were mildly impaired. Piecemeal deglutition was observed with all consistencies. Patient swallowed 2-3 times per single bolus. He had several gagging episodes with the second bolus of puree. Mastication was not assessed as patient was not given a solid during this evaluation.    Anterior-Posterior transit was mildly impaired and effortful with puree. This appeared partially related to the sticky nature of the barium.    Premature spill beyond the base of the tongue into the valleculae did not occur with any consistency.    Tongue base retraction was mildly to moderately impaired.    Towards end of study, nasopharyngeal reflux was observed with puree and thin liquid. This was very shallow and appeared related to the presence of residual puree in the valleculae.     Mild oral stasis occurred along the tongue base with puree. This cleared with a liquid wash.    Aspiration did not occur with any consistency.     Transient laryngeal  penetration occurred consistently with thin liquid. This was shallow to moderately deep, but cleared spontaneously. Penetration appeared related to mildly delayed epiglottic movement and absence of true epiglottic inversion.    Swallow response was timely with all consistencies.     The epiglottis has an ill-defined and thickened appearance. Epiglottic movement was slow and in a posterior-inferior pattern consistently across texture trials.    Moderate pharyngeal stasis was observed in the valleculae after presentations of puree. The majority of this cleared with a thin liquid wash. There was also a trace amount of stasis in the valleculae and pyriform sinuses following boluses of thin and nectar-thick liquid.    Pharyngeal constriction was not impaired.    Hyolaryngeal elevation was weak and reduced. Hyolaryngeal excursion was weak and reduced.    Cricopharyngeal function was not impaired. Patient has a prominent cricopharyngeus. A very small amount of stasis was noted just superior to this after each swallow. At times, this almost gives the appearance of a tiny diverticulum in this area. Cervical esophageal function was not impaired.    30 dysphagia minutes    Mayra ERIN Stout, CCC-SLP

## 2021-06-11 NOTE — TELEPHONE ENCOUNTER
I have faxed the attached document to Roge Mercedes!  Alisha    From: Cj Chapman [email or web address]>   Sent: Tuesday, September 01, 2020 12:07 PM  To: Alisha Tierney [email or web address]>  Subject: FW: [EXTERNAL] Fwd: FW: Family Medical Leave    Good afternoon Alisha,  My sister forward me a copy of my FMLA along with adjustments that were made. I do see on my dad s schedule that he has appointments out until the end of the year. On my FMLA my end date is still 10/15/2020. Could you please have that updated and faxed to Roge Mercedes at [phone]. I attached the most recent FMLA. Please advise if there are any questions/concerns.   Thank you,  Cj Chapman   I

## 2021-06-12 NOTE — PROGRESS NOTES
AUDIOLOGY REPORT    SUBJECTIVE: Kristen Chapman, 70 y.o.  year old male, was seen on 10/22/20 for a hearing aid evaluation. He was accompanied by his son. His hearing was assessed on 9/25/2020 and results revealed normal sloping to profound mixed hearing loss right and mild rising to normal sloping to severe largely conductive hearing loss left. Medical clearance was provided by Dr. Cheng.     OBJECTIVE: Kristen has hearing aid benefits through [employer]. His family elected to cancel today's consultation to check with [employer] first.    ASSESSMENT:  Provided with hearing aid benefits, pricing information, and audiogram. Dr. Cheng's team was contacted to mail the patient medical clearance letter.    PLAN: Kristen will return should he decide to obtain hearing aids through our clinic.  Please contact our clinic at [phone] with questions or concerns.    Louisa Portillo, [facility]  Clinical Audiologist  [place] #[id number]

## 2021-06-12 NOTE — TELEPHONE ENCOUNTER
Rea- daughter called.    Patient will be out of the lamiVUDine (EPIVIR) 100 MG tablet and would like to know if he should continue. If yes, please authorize more refills.    Rea @ [phone]

## 2021-06-12 NOTE — TELEPHONE ENCOUNTER
I called and informed that the pt should have refills at the pharmacy. The pt daughter will call the pharmacy to process a refill.

## 2021-06-12 NOTE — TELEPHONE ENCOUNTER
Only 2 appointments remaining this month:    Monday, 10-:  check in at 8:50 (maybe 8:45?)  8:50 am:  lab draw for Dr. Grier  [facility]   [address], [address], [place], [place]      Thursday, 10-:  arrival of 10 am  10 am:  check in [facility] (I believe for a hearing test)  ~appointment with Jennifer Soto, Audiologist          Department directions for [facility] AUDIOLOGY:  We are located in the [facility] at [address], [address], [place], [place].  Our building is located across the street from [facility] and offers free parking in our on-site lot.  Please take the stairs or elevator to the second floor of the [facility] and check in at the  located in the Specialty Clinic, [address].    Have a great week & hope you can enjoy the beautiful Fall weatherJ!     Alisha Tierney, RN, BSN  Nurse Navigator

## 2021-06-13 NOTE — TELEPHONE ENCOUNTER
Called patient today and informed the patient about his new CT chest result 12/6/2020.  The CT chest showed no evidence of cancer recurrence.  The patient will be followed as planned.

## 2021-06-13 NOTE — TELEPHONE ENCOUNTER
I sent the following email to [facility] at this time:    I know you asked Alisha Tierney about Kristen s CT results. Dr. Mendoza is on vacation this week but I will have him review the scan as soon as he returns.     The CT scan report looks good to me, nothing new showing up. Unless Dr. Mendoza says otherwise, we will plan for another CT scan February 19th 2021 at [facility] at 8:40 in the morning, and then a follow up visit with Dr. Mendoza at [facility] on Thursday February 25th at 8am.     Thanks,      Karen De Los Santos RN, [place], OCN  Radiation Oncology Nurse  [address] [address]  [email or web address]   [email or web address]   Office: [phone]      ----- Message from Alisha Tierney RN sent at 12/8/2020  2:32 PM CST -----  Regarding: CT results from 12-6  Kristen had his CT ordered by Dr. Mendoza done on Sunday, 12-6.  I know Dr Mendoza is on PTO this week.  Any chance someone can call the son with the results?  They look good.  Please advise.  Kristen does not have f/u until February.    Thanks, Alisha

## 2021-06-13 NOTE — PROGRESS NOTES
Discharge Summary  Patient Name: Kristen Chapman  Date: 12/5/2020  Referral Diagnosis: Squamous Cell Carcinoma of the Nasopharynx C11.9  Referring provider: Laurita Mendoza MD  Visit Diagnosis:   1. Dysphagia, oropharyngeal         Goal status: Unable to assess as patient did not return.    Patient was seen for 1 visit. The patient discontinued therapy, did not return.    The patient will need a new referral to resume.    Thank you for your referral.  Brianna Grove  12/5/2020  8:08 PM

## 2021-06-13 NOTE — TELEPHONE ENCOUNTER
E-mail sent to Reynaldo children, Rea & Mitteran:  Hello!     Dad s DECEMBER schedule:    Wednesday, 12-2-2020:  check in at 8:30 am  8:30 am:  check in for ENT follow up appointment with Dr. Billy Guerrero  Directions for [facility] ENT:  We are located in the [facility] at [address], [address], [place], [place].  Our building is located across the street from [facility] and offers free parking in our on-site lot.  Please take the stairs or elevator to the second floor of the [facility] and check in at the  located in the Specialty Clinic, [address].       Sunday, 12-6-2020:  arrival of 12:15 pm  12:15 pm check in at [facility] Radiology department   CT CHEST W PREP  How do I prepare for my exam? (Food and drink instructions)  **You will have contrast for this exam.**  No Food and Drink Restrictions     The day before your exam, drink extra fluids--at least six 8-ounce glasses (unless your doctor tells you to restrict your fluids).     How do I prepare for my exam? (Other instructions)  Patients over 70 or patients with diabetes or kidney problems: If you haven t had a blood test (creatinine test) within the last 30 days, the Cardiologist/Radiologist may require you to get this test prior to your exam.     What should I wear: Please wear loose clothing, such as a sweat suit or jogging clothes.  Avoid snaps, zippers and other metal. We may ask you to undress and put on a hospital gown.     How long does the exam take: Most scans take less than 20 minutes.     What should I bring: Please bring any scans or X-rays taken at other hospitals, if similar tests were done. Also bring a list of your medicines, including vitamins, minerals and over-the-counter drugs. It is safest to leave personal items at home.     Do I need a :  No  is needed.     What do I need to tell my doctor?  Be sure to tell your doctor:  * If you have any allergies.  *  If there s any chance you are pregnant.  * If you are breastfeeding.  * If you have diabetes as your medication may need to be adjusted for this exam.     What should I do after the exam: No restrictions, You may resume normal activities.     What is this test: A CT (computed tomography) scan is a series of pictures that allows us to look inside your body. The scanner creates images of the body in cross sections, much like slices of bread. This helps us see any problems more clearly. You may receive contrast (X-ray dye) before or during your scan. Contrast is given through an IV (small needle in your arm).             Monday, 12-:  check in at 8:50 (maybe 8:45?)  8:50 am:  lab draw for Dr. Grier  [facility]   [facility], [address], [place], [place]       I hope you and your families are doing well12  Have a blessed Thanksgiving!   Let me know if you have any questions??    Alisha REESE  I now have my office phone connected at home:  [phone]        Alisha Tierney, RN, BSN

## 2021-06-13 NOTE — TELEPHONE ENCOUNTER
Email from sonNayla:  Sent: Tuesday, December 8, 2020 2:25 PM  To: Alisha Tierney <[email or web address]>  Subject: Re: Dad's December schedule    Felicitas Brito,    I am wondering who I would reachout to, to get the results of my fathers appointment on Sunday.    please let me know and thank you,    Nayla Chapman    My response:  Marcin Winslow,  I know Dr. Mendoza is out this week and not sure if he is checking his messages.  I sent an in-basket message (through Dad s chart) to both he and the radiation nurses asking if someone can call you with the results.    If you do not hear anything, feel free to let me know or you can call:  Radiation Oncology:  o Triage:  [phone]   - choose option #4 for radiation oncology & then #2 for triage  - Call triage for radiation-related symptoms & medication needs                           Hope this helps!  Alisha    From: Nayla Chapman [email or web address]>

## 2021-06-13 NOTE — PROGRESS NOTES
HISTORY OF PRESENT ILLNESS  Patient comes for recheck. 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    History via a telephone .     Patient reports no significant change in symptoms since last visit in September.     REVIEW OF SYSTEMS  Review of Systems: a 10-system review was performed. Pertinent positives are noted in the HPI and on a separate scanned document in the chart.    PMH, PSH, FH and SH has documented in the EHR.      EXAM    CONSTITUTIONAL  General Appearance:  Normal, well developed, well nourished, no obvious distress  Ability to Communicate:  communicates appropriately.    HEAD AND FACE  Appearance and Symmetry:  Normal, no scalp or facial scarring or suspicious lesions.     EYE  Normal external eye, conjunctiva, lids, cornea.     EARS  Clinical speech reception threshold:  Normal, able to hear normal speech.  Auricle:  Normal, Auricles without scars, lesions, masses.    NASAL ENDOSCOPY  The risks and benefits were reviewed with the patient. The following areas were examined:  floor, roof, middle and inferior turbinates, middle and inferior meatus, sphenoethmoidal recess, nasopharynx and septum.  The nasal scope passed without difficulty with the findings noted in the exam. Patient tolerated the procedure well.    NOSE (speculum or scope)  Architecture:  Normal, Grossly normal external nasal architecture with no masses or lesions.  Mucosa:  Normal mucosa, No polyps or masses.  Septum:  Normal, Septum non-obstructing.  Turbinates:  Normal, No turbinate abnormalities    ORAL CAVITY AND OROPHARYNX  Lips:  Normal.  Dental and gingiva:  Normal, No obvious dental or gingival disease.  Mucosa:  Normal, Moist mucous membranes.  Tongue:  Normal, Tongue mobile with no mucosal abnormalities  Hard and soft palate:  Normal, Hard and soft palate without cleft or mucosal lesions.  Oral pharynx:  Normal,  Posterior pharynx without lesions or remarkable asymmetry.  Saliva:  Normal, Clear saliva.  Masses:  Normal, No palpable masses or pathologically enlarged lymph nodes.    NECK  Masses/lymph nodes:  Normal, No worrisome neck masses or lymph nodes.  Salivary glands:  Normal, Parotid and submandibular glands.  Trachea and larynx position:  Normal, Trachea and larynx midline.  Thyroid:  Normal, No thyroid abnormality.  Tenderness:  Normal, No cervical tenderness.  Suppleness:  Normal, Neck supple    CARDIOVASCULAR  Regular rate and rhythm.  No cyanosis, clubbing or edema.    PULSES  Carotid pulses normal    NEUROLOGICAL  Speech pattern:  Normal, Proasaic    RESPIRATORY  Symmetry and Respiratory effort:  Normal, Symmetric chest movement and expansion with no increased intercostal retractions or use of accessory muscles.     IMPRESSION  No evidence of disease. He does have dryness and crusting of the mucosa bilateral nose and nasopharynx.     RECOMMENDATION  1. Follow up in 3 months for recheck of nasopharynx carcinoma.  2. I discussed the use of nasal saline and nasal irrigation using online images to help moisten and clear the nose. He expressed understanding. His daughter also expressed understanding as she uses nasal saline herself.    Billy Guerrero MD

## 2021-06-13 NOTE — PROGRESS NOTES
"Kristen Chapman is a 70 y.o. male who is being evaluated via a billable video visit.      The patient has been notified of following:     \"This video visit will be conducted via a call between you and your physician/provider. We have found that certain health care needs can be provided without the need for an in-person physical exam.  This service lets us provide the care you need with a video conversation.  If a prescription is necessary we can send it directly to your pharmacy.  If lab work is needed we can place an order for that and you can then stop by our lab to have the test done at a later time.    Video visits are billed at different rates depending on your insurance coverage. Please reach out to your insurance provider with any questions.    If during the course of the call the physician/provider feels a video visit is not appropriate, you will not be charged for this service.\"    Patient has given verbal consent to a Video visit? Yes  How would you like to obtain your AVS? AVS Preference: MyChart.  If dropped by the video visit, the video invitation should be sent to: Text to cell phone: [phone]  Will anyone else be joining your video visit? Yes: daughterSteve. How would they like to receive their invitation? Text to cell phone: [phone]            Shannon Christian CMA  "

## 2021-06-13 NOTE — PROGRESS NOTES
"Kristen Chapman is a 70 y.o. male who is being evaluated via a billable telephone visit.      The patient has been notified of following:     \"This telephone visit will be conducted via a call between you and your physician/provider. We have found that certain health care needs can be provided without the need for a physical exam.  This service lets us provide the care you need with a short phone conversation.  If a prescription is necessary we can send it directly to your pharmacy.  If lab work is needed we can place an order for that and you can then stop by our lab to have the test done at a later time.    Telephone visits are billed at different rates depending on your insurance coverage. During this emergency period, for some insurers they may be billed the same as an in-person visit.  Please reach out to your insurance provider with any questions.    If during the course of the call the physician/provider feels a telephone visit is not appropriate, you will not be charged for this service.\"    Patient has given verbal consent to a Telephone visit? Yes    What phone number would you like to be contacted at? Son [phone]    Patient would like to receive their AVS by AVS Preference: Mail a copy.    Additional provider notes:    Phone call duration: 10 minutes    Son called and with pt, son is interpreting. Further recommendations and orders per provider.    Flores Mercedes RN    "

## 2021-06-13 NOTE — PROGRESS NOTES
[facility] Hematology and Oncology Progress Note    Patient: Kristen Chapman  MRN: [id number]  Date of Service: 11/18/2020      Reason for visit:    Nasopharyngeal carcinoma    Video Visit    Assessment and Plan:    Cancer Staging  Nasopharyngeal carcinoma (H)  Staging form: Pharynx - Nasopharynx, AJCC 8th Edition  - Clinical stage from 2/18/2020: Stage SAMANTHA (cT4, cN2, cM0) - Signed by Alan Frias MD on 2/18/2020    1.  Nasopharyngeal carcinoma:   Post-treatment CT and PET 2 months ago showed CR, considered in remission.   Clinically, he is stable. No concerns for recurrent disease.    ENT assessment in September showed no evident recurrence.    He has follow-up with Dr. Mendoza in Rad Onc tomorrow and continues ENT follow-up every 3 months. Dr. Mendoza is planning CT imaging about every 3 months.    2.  F/E/N:  3.  Xerostomia:   Swallow study and assessment safe for oral diet. He had been working with Speech Therapy on this, but I'm not sure what follow-up he has. He's still taking minimal in by mouth, and remains dependent on his tube feedings for most of his calorie/fluids. He reports his biggest barrier is xerostomia. No significant dysphagia/odynophagia.    Increase water/fluid intake and sips by mouth routinely . Biotene products recommended.    Recommend working towards more calories and fluids by mouth and gradually cut back on his feeding tube use over the next1-2 months. Discussed supplementing with Ensure/Boost by mouth and cutting out a feeding/day.    I will ask the Speech pathologist to follow-up as well.    4.  Lung nodule  On PET in Sept, there was an indeterminate non-avid RUL nodule. Follow with his CT surveillance.    5.  Anemia:   He continues to have anemia but he is no longer microcytic. Hemoglobin slowly improving.  Continue to follow.  He may have a hemoglobinopathy. At this point the anemia may be complicated by his lamivudine and hepatitis B infection.  RDW is elevated which suggests a good  reticulocytosis.    6.  Thrombocytopenia:  He has had a worsening of his platelet counts during chemo, which persists but is stable in the 40K range.  This could be due from his antiviral and his acute hepatitis.  We will continue to follow at 3 month return. If he has any new bleeding symptoms, recheck sooner.    5.  Hepatitis B  Followed by ID, on antiviral.    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 Diagnosed January 2020.  Imaging suggested bilateral abnormal lymphadenopathy in the neck.  Also asymmetric soft tissue thickening in the posterior right nasopharynx.  On imaging, tumor also appeared to extend down to the hypopharynx.    Treatment:    Initially treated with concurrent chemotherapy and radiation.  He had weekly cisplatin starting March 3, 2020.  Fifth and final dose given March 31, 2020.  Subsequent chemotherapy was held due to prolonged thrombocytopenia and renal insufficiency.  Radiation dose was 7000 cGy in 35 fractions given from March 2 through April 17, 2020.    Started cisplatin with infusional 5-FU on June 1, 2020.  Cycle 1 given at a 25% dose reduction.  He still had significant thrombocytopenia and neutropenia on day 28.  Cycle 2 was held.  At the same time his liver function tests elevated secondary to hepatitis B reactivation.    Interim History:    Kristen's 3 month follow-up visit is conducted via video today.  Daughter, Brandy, was also on visit and served as interpretor per her and patient request.    He is now 5 months post-completion of his treatment.   He is feeling okay. No acute complaints.  Energy and appetite are slowly improving. Dry mouth and throat makes it harder to swallow. Eating/drinking by mouth minimally, still supplementing with feedings four times a day. Stable weight.   No bleeding. No fevers. No nodes/masses. No pain.    Review of Systems:  Constitutional  Constitutional (WDL): All constitutional elements are within defined limits  Neurosensory  Neurosensory (WDL): Exceptions to WDL  Ataxia: Concerns(uses walker, feels steadier)  Eye   Eye Disorder (WDL): All eye disorder elements are within defined limits  Ear  Ear Disorder (WDL): Exceptions to WDL  Tinnitus: Concerns(bilateral)  Cardiovascular  Cardiovascular (WDL): All cardiovascular elements are within defined limits  Pulmonary  Respiratory (WDL): Within Defined Limits  Gastrointestinal  Gastrointestinal (WDL): Exceptions to WDL  Anorexia: Concerns  Genitourinary  Genitourinary (WDL): All genitourinary elements are within defined limits  Lymphatic  Lymph (WDL): All lymph disorder elements are within defined limits  Musculoskeletal and Connective Tissue  Musculoskeletal and Connetive Tissue Disorders (WDL): Exceptions to WDL  Arthralgia: Concerns(shoulders with raising up)  Integumentary  Integumentary (WDL): All integumentary elements are within defined limits  Patient Coping  Patient Coping: Accepting;Open/discussion  Accompanied by  Accompanied by: Family Member(daughter Steve)      Past History:    Past Medical History:   Diagnosis Date     Nasopharyngeal cancer (H)      Physical Exam:    Recent Vitals 9/24/2020   Height -   Weight 139 lbs 13 oz   BSA (m2) 1.72 m2   /64   Pulse 75   Temp -   Temp src -   SpO2 99   Some recent data might be hidden     General: patient appears stated age of 70 y.o.. Nontoxic and in no distress. Daughter, Brandy, also present and served as interpretor.  HEENT: Head: atraumatic, normocephalic. Sclerae anicteric.  Chest:  Normal respiratory effort  CNS: alert and oriented. Grossly non-focal.   Psychiatric: normal mood and affect.     Lab Results:    Recent Results (from the past 240 hour(s))   Comprehensive Metabolic Panel    Collection Time: 11/16/20  9:01 AM   Result Value Ref Range     Sodium 140 136 - 145 mmol/L    Potassium 4.3 3.5 - 5.0 mmol/L    Chloride 105 98 - 107 mmol/L    CO2 28 22 - 31 mmol/L    Anion Gap, Calculation 7 5 - 18 mmol/L    Glucose 146 (H) 70 - 125 mg/dL    BUN 26 (H) 8 - 22 mg/dL    Creatinine 0.99 0.70 - 1.30 mg/dL    GFR MDRD Af Amer >60 >60 mL/min/1.73m2    GFR MDRD Non Af Amer >60 >60 mL/min/1.73m2    Bilirubin, Total 1.4 (H) 0.0 - 1.0 mg/dL    Calcium 8.6 8.5 - 10.5 mg/dL    Protein, Total 7.1 6.0 - 8.0 g/dL    Albumin 3.0 (L) 3.5 - 5.0 g/dL    Alkaline Phosphatase 186 (H) 45 - 120 U/L    AST 82 (H) 0 - 40 U/L    ALT 70 (H) 0 - 45 U/L   HM1 (CBC with Diff)    Collection Time: 11/16/20  9:01 AM   Result Value Ref Range    WBC 3.2 (L) 4.0 - 11.0 thou/uL    RBC 2.92 (L) 4.40 - 6.20 mill/uL    Hemoglobin 10.7 (L) 14.0 - 18.0 g/dL    Hematocrit 31.0 (L) 40.0 - 54.0 %     (H) 80 - 100 fL    MCH 36.6 (H) 27.0 - 34.0 pg    MCHC 34.5 32.0 - 36.0 g/dL    RDW 13.3 11.0 - 14.5 %    Platelets 44 (LL) 140 - 440 thou/uL    MPV 14.1 (H) 8.5 - 12.5 fL    Neutrophils % 62 50 - 70 %    Lymphocytes % 25 20 - 40 %    Monocytes % 9 2 - 10 %    Eosinophils % 2 0 - 6 %    Basophils % 0 0 - 2 %    Immature Granulocyte % 0 <=0 %    Neutrophils Absolute 2.0 2.0 - 7.7 thou/uL    Lymphocytes Absolute 0.8 0.8 - 4.4 thou/uL    Monocytes Absolute 0.3 0.0 - 0.9 thou/uL    Eosinophils Absolute 0.1 0.0 - 0.4 thou/uL    Basophils Absolute 0.0 0.0 - 0.2 thou/uL    Immature Granulocyte Absolute 0.0 <=0.0 thou/uL   Comprehensive Metabolic Panel    Collection Time: 11/17/20  1:57 PM   Result Value Ref Range    Sodium 138 136 - 145 mmol/L    Potassium 4.0 3.5 - 5.0 mmol/L    Chloride 105 98 - 107 mmol/L    CO2 27 22 - 31 mmol/L    Anion Gap, Calculation 6 5 - 18 mmol/L    Glucose 94 70 - 125 mg/dL    BUN 24 (H) 8 - 22 mg/dL    Creatinine 0.88 0.70 - 1.30 mg/dL    GFR MDRD Af Amer >60 >60 mL/min/1.73m2    GFR MDRD Non Af Amer >60 >60 mL/min/1.73m2    Bilirubin, Total 0.9 0.0 - 1.0 mg/dL    Calcium 8.2  (L) 8.5 - 10.5 mg/dL    Protein, Total 6.9 6.0 - 8.0 g/dL    Albumin 2.8 (L) 3.5 - 5.0 g/dL    Alkaline Phosphatase 182 (H) 45 - 120 U/L    AST 82 (H) 0 - 40 U/L    ALT 68 (H) 0 - 45 U/L   HM1 (CBC with Diff)    Collection Time: 11/17/20  1:57 PM   Result Value Ref Range    WBC 3.2 (L) 4.0 - 11.0 thou/uL    RBC 3.11 (L) 4.40 - 6.20 mill/uL    Hemoglobin 10.0 (L) 14.0 - 18.0 g/dL    Hematocrit 31.7 (L) 40.0 - 54.0 %     (H) 80 - 100 fL    MCH 32.2 27.0 - 34.0 pg    MCHC 31.5 (L) 32.0 - 36.0 g/dL    RDW 13.2 11.0 - 14.5 %    Platelets 47 (LL) 140 - 440 thou/uL    MPV 10.4 8.5 - 12.5 fL    Neutrophils % 60 50 - 70 %    Lymphocytes % 24 20 - 40 %    Monocytes % 13 (H) 2 - 10 %    Eosinophils % 3 0 - 6 %    Basophils % 1 0 - 2 %    Immature Granulocyte % 0 <=0 %    Neutrophils Absolute 1.9 (L) 2.0 - 7.7 thou/uL    Lymphocytes Absolute 0.8 0.8 - 4.4 thou/uL    Monocytes Absolute 0.4 0.0 - 0.9 thou/uL    Eosinophils Absolute 0.1 0.0 - 0.4 thou/uL    Basophils Absolute 0.0 0.0 - 0.2 thou/uL    Immature Granulocyte Absolute 0.0 <=0.0 thou/uL     Imaging:    No results found.     Video start time: 0824, end time 0844; total time: 20 min    Signed by: Joanna Ventura, LOY

## 2021-06-13 NOTE — TELEPHONE ENCOUNTER
Happy [id number]    It looks like dad doesn t see anyone in cancer care until February.  I only see one appointment in January in our system.    Monday, 1-:  check in at 8:50 (maybe 8:45?)  8:50 am:  lab draw for Dr. Grier  [facility]   [facility], [address], [place], [place]      Hope you are all well and can enjoy each other over the holidays!  Take Care  Alisha Tierney, RN, BSN

## 2021-06-13 NOTE — PROGRESS NOTES
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       TYPE OF RADIATION THERAPY ADMINISTERED:  Concurrent chemoradiation therapy for his head neck cancer with weekly cisplatin and had radiation therapy in our clinic with a total dose of 7000 cGy in 35 treatments given from 3/2/2020-4/17/2020.      INTERVAL SINCE COMPLETION OF RADIATION THERAPY: 7 months.      SUBJECTIVE:  Mr. Chapman is a 70 y.o. male who has been in his usual state of good health until recently.  Patient was found to have swelling right neck mass by his lerma a month ago for which he was a seeking further evaluation.  The patient did not notice any significant changes over the past few weeks and he is also asymptomatic.  Initial ENT examination showed suspicious abnormalities in the right nasopharynx worrisome for malignancy.  CT of the neck on 1/24/2020 revealed asymmetric soft tissue thickening in the posterior right nasopharynx as well as abnormal enlarged cervical lymph nodes measuring up to 3.8 cm bilaterally.  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  The patient also had a staging work-up including PET CT scan and MRI brain which showed locally advanced disease with lymph node metastasis.  The PET CT scan also showed abnormal increased activity in the hilar region suspicious for metastasis.  Patient was then referred to pulmonology for evaluation and possible biopsy.  Patient then underwent bronchoscopy and EBUS on 2/27/2020 with biopsy showed negative for malignancy. He received concurrent chemoradiation therapy for his head neck cancer and had radiation therapy in our clinic with a total dose of 7000 cGy in 35 treatments given from 3/2/2020-4/17/2020.  He also received 3  cycles of cisplatin and 5-FU during the course of radiation therapy. He tolerated radiation therapy reasonably well with expected acute side effect.  The patient insisted not to have PEG tube placement during the therapy.  He however is able to maintain his weight reasonably stable during the treatment.     The patient experienced worsening pain of sore throat and dysphasia since completion of the radiation therapy.  He is not able to get adequate calorie and fluid intake and was admitted to hospital one week after treatment for supportive care and pain management.  A PEG tube was also placed during the hospital stay. The patient felt much better and improved since hospital stay.  The patient also experienced acute onset of left parotitis 4 weeks post cancer therapy and was admitted to the hospital for ongoing treatment including antibiotics.  His condition has been significant improved since then. He is currently able to eat with liquid and soft without any significant difficulty.  He denies any significant pain at the time of evaluation.  He still has a significant dry mouth at the time of evaluation.  He is here for routine post therapy follow-up office follow-up.     He is also receiving adjuvant chemotherapy under supervision of Dr. Frias.     Patient has been followed with Dr. Guerrero, ENT and last examination on 9/9/2020 showed no evidence of cancer recurrence.  He is scheduled to see Dr. Guerrero again on 12/5/2020.    Medications were reviewed and are up to date on EPIC.    The following portions of the patient's history were reviewed and updated as appropriate: allergies, current medications, past family history, past medical history, past social history, past surgical history and problem list.    Review of Systems:      General  Constitutional (WDL): All constitutional elements are within defined limits  EENT  Eye Disorder (WDL): All eye disorder elements are within defined limits  Ear Disorder (WDL):  Exceptions to WDL  Tinnitus: Mild symptoms, intervention not indicated  Respiratory   Respiratory (WDL): Within Defined Limits  Cardiovascular  Cardiovascular (WDL): All cardiovascular elements are within defined limits  Endocrine     Gastrointestinal  Gastrointestinal (WDL): Exceptions to WDL  Anorexia: Loss of appetite without alteration in eating habits  Dysgeusia: Altered taste but no change in diet  Dysphagia: Symptomatic and altered eating/swallowing  Musculoskeletal  Musculoskeletal and Connetive Tissue Disorders (WDL): Exceptions to WDL  Arthralgia: Mild pain(james shoulders)  Integumentary               Integumentary (WDL): All integumentary elements are within defined limits  Neurological  Neurosensory (WDL): Exceptions to WDL  Ataxia: Asymptomatic, clinical or diagnostic observations only, intervention not indicated(using wheeled walker)  Psychological/Emotional   Patient Coping: Open/discussion  Hematological/Lymphatic  Lymph (WDL): Assessment not pertinent to visit  Dermatologic     Genitourinary/Reproductive  Genitourinary (WDL): All genitourinary elements are within defined limits  Reproductive     Pain              Currently in Pain: No/denies  Accompanied by  Accompanied by: Family Member     Impression     The patient is a 70-year-old gentleman with a diagnosis of nasopharyngeal cancer status post concurrent chemoradiation therapy 5 months ago.  The patient has been recovering well since treatment with no clinical evidence of recurrence.     Assessment & Plan:     1.  I have again discussed with the patient about appropriate nutritional support. The patient only has minimal pain and discomfort at the time of evaluation.  He is taking pain medication as needed.  The patient had a swallowing test on 9/4/2020 and recommended oral diet with regular texture and thin liquids.  Patient is encouraged to increase his oral intake as tolerated.  2.  He had a restaging CT chest 8/14/2020 which showed resolution  of previously mildly enlarged hilar and mediastinal nodes in previous PET CT scan with no evidence of metastatic disease. The right upper lobe small mass has been increased slightly to 6 mm and more dense worrisome for malignancy.    PET CT scan on 9/2/2020 showed complete response of primary tumor. New small area of confluent opacification left upper lobe apex, likely inflammatory. Irregular subcentimeter indeterminate pulmonary nodule in the right upper lobe demonstrates no FDG activity although is just below the threshold for reliable PET characterization. Chest CT imaging surveillance recommended.  We will get a repeat CT chest now.  With the CT scan is stable will have repeat scan again in 3 months.  3.  Return to radiation oncology in 3-month for another office follow-up and possibly repeat CT chest.  4.  Follow-up with Dr. Frias, medical oncology and Dr. Billy uGerrero as planned.   5.  Continue neck and jaw exercise.  6.  Continue close dental follow-up.         Face to face time  25 minutes with > 75% spent on consultation, education and coordination of care.        Laurita Mendoza MD, PhD  Department of Radiation Oncology   [facility]  Tel: [phone]  Page: [phone]    [facility]  [address] [place] [address]     [place] [id number] [facility]   [place] [address]    CC:  Patient Care Team:  Mike Cook MD as PCP - General (Family Medicine)  Laurita Mendoza MD as Physician (Radiation Oncology)  Alan Frias MD as Physician (Hematology and Oncology)  Alisha Tierney, RN as Oncology Nurse Navigator  Billy Guerrero MD as Physician (Otolaryngology)  Billy Guerrero MD as Assigned Surgical Provider  Destiny Fernandez CNP as Assigned Pulmonology Provider  Laurita Mendoza MD as Assigned Cancer Care Provider

## 2021-06-14 NOTE — TELEPHONE ENCOUNTER
E-mail to children, Nhia & Mitteran:  Hello!  I hope everyone is well12!    Here is what I see on Dad s schedule for next month:    Monday, 2-8-2021:  check in at 8:50 (maybe 8:45?)  8:50 am:  lab draw for Dr. Grier  [facility]   [address] [place] [address], [address], [place], [place]    Monday, 2-:  check in at 8:45am  9 am:  Follow up with Dr. Grier  [facility]   [address] [place] [address], [address], [place], [place]    Friday, 2-:  check in at 8:30 am please!  8:30 am: check in at [facility] Radiology department          CT CHEST PELVIS NO PREP.          Thursday, 2-:  arrival of 8 am please!  8 am:  check in 1st Floor Radiation Therapy  8:15 am:  follow up with Dr. Mendoza      Let me know if you have any questions!  Take Care12    Alisha Tierney, RN, BSN

## 2021-06-14 NOTE — TELEPHONE ENCOUNTER
"Call placed to patient  (daughter) per Joanna Ventura:     \"please check back in with pt since my last visit with him to see his progress on oral intake. Our goal would be to get him moving towards 100% oral nutrition/hydration and off feeding tube now that he's completed therapy and passed his swallow study.     If he's still having trouble with that, I put in orders to return to Speech Therapy and see a Dietician to assist in moving to that goal. Please let patient and schedulers know of this order if needed\"     LM for pt daughter to call our office and provide and update and find out if they would like the services suggested above.    "

## 2021-06-14 NOTE — TELEPHONE ENCOUNTER
Patient's daughter calls in and leaves message on nurse triage line.  It was hard to understand whether or not they were looking for updates or if they plan to give us an update regarding speech therapy referral.  When I called her back, she did not answer so I left a brief message letting her know I was returning her call.  Will await a return call.    Isabella Raymond RN

## 2021-06-15 NOTE — PROGRESS NOTES
[facility] Hematology and Oncology Progress Note    Patient: Kristen Chapman  MRN: [id number]  Date of Service: 03/11/2021        Reason for Visit    Chief Complaint   Patient presents with     HE Cancer     Nasopharyngeal carcinoma       Assessment and Plan  Cancer Staging  Nasopharyngeal carcinoma (H)  Staging form: Pharynx - Nasopharynx, AJCC 8th Edition  - Clinical stage from 2/18/2020: Stage SAMANTHA (cT4, cN2, cM0) - Signed by Alan Frias MD on 2/18/2020    1. Nasopharyngeal cancer, squamous cell, stage SAMANTHA: completed concurrent chemo and radiation and then consolidation chemo. Last dose 6/1/20. Doing well since then. MRI shows no recurrence disease. He will continue to follow with us. Will return in 4 months with MRI. If that looks fine, we can switch to every 6 month follow up. He will let us know if he wants port out.       ECOG Performance   ECOG Performance Status: 0     Distress Assessment  Distress Assessment Score: No distress    Pain  Currently in Pain: No/denies      Problem List    1. Nasopharyngeal carcinoma (H)  MR Brain With Without Contrast    HM1(CBC and Differential)    Comprehensive Metabolic Panel      ______________________________________________________________________________    History of Present Illness    DIAGNOSIS: Nasopharyngeal carcinoma: Biopsy from the right nasopharynx shows a moderately differentiated squamous cell carcinoma.  Imaging was reviewed.  He has evidence of bilateral cervical node metastases.  Tumor also seems to extend down to the hypopharynx.     TREATMENT: completed concurrent chemo and radiation with weekly cisplatin.  His fifth and final dose was given March 31, 2020.  We then had to hold the rest of his treatment due to thrombocytopenia and renal insufficiency.  His last day of radiation was April 17, 2020.  Then had one dose of cisplatin, 5FU on 6/1/20.        INTERIM HISTORY: Patient is here today for follow-up.  He had a recent MRI done.  He has been following up  "with Dr. Mendoza and ENT quite regularly.  He states he is feeling good.  No swallowing issues.  No signs of infection.  No fevers.  No pain in his head or neck.    Pain Status  Currently in Pain: No/denies    Review of Systems    Constitutional  Constitutional (WDL): All constitutional elements are within defined limits  Neurosensory  Neurosensory (WDL): Exceptions to WDL  Ataxia: Concerns(feels steady with walker)  Eye   Eye Disorder (WDL): Exceptions to WDL  Blurred Vision: Concerns  Ear  Ear Disorder (WDL): Exceptions to WDL  Tinnitus: Concerns(bilateral)  Cardiovascular  Cardiovascular (WDL): All cardiovascular elements are within defined limits  Pulmonary  Respiratory (WDL): Within Defined Limits  Gastrointestinal  Gastrointestinal (WDL): Exceptions to WDL  Dysphagia: Concerns  Dry Mouth: Concerns  Genitourinary  Genitourinary (WDL): All genitourinary elements are within defined limits  Lymphatic  Lymph (WDL): All lymph disorder elements are within defined limits  Musculoskeletal and Connective Tissue  Musculoskeletal and Connetive Tissue Disorders (WDL): All Musculoskeletal and Connetive Tissue Disorder elements are within defined limits  Integumentary  Integumentary (WDL): All integumentary elements are within defined limits  Patient Coping  Patient Coping: Accepting;Open/discussion  Accompanied by  Accompanied by: Family Member  Oral Chemo Adherence           Past History  Past Medical History:   Diagnosis Date     Nasopharyngeal cancer (H)        PHYSICAL EXAM  /77   Pulse 60   Ht 5' 6\" (1.676 m)   Wt 149 lb 14.4 oz (68 kg)   SpO2 98%   BMI 24.19 kg/m      GENERAL: no acute distress. Cooperative in conversation. Son here with him. Masks on. Using walker  RESP: Regular respiratory rate. No expiratory wheezes   MUSCULOSKELETAL: no bilateral leg swelling  NEURO: non focal. Alert and oriented x3.   PSYCH: within normal limits. No depression or anxiety.  SKIN: exposed skin is dry intact.         Lab " Results    No visits with results within 3 Day(s) from this visit.   Latest known visit with results is:   Lab on 02/08/2021   Component Date Value Ref Range Status     Bilirubin, Total 02/08/2021 0.8  0.0 - 1.0 mg/dL Final     Bilirubin, Direct 02/08/2021 0.3  <=0.5 mg/dL Final     Protein, Total 02/08/2021 6.9  6.0 - 8.0 g/dL Final     Albumin 02/08/2021 3.5  3.5 - 5.0 g/dL Final     Alkaline Phosphatase 02/08/2021 131* 45 - 120 U/L Final     AST 02/08/2021 70* 0 - 40 U/L Final     ALT 02/08/2021 97* 0 - 45 U/L Final   ]      Imaging    Ct Chest Without Contrast    Result Date: 2/19/2021  EXAM: CT CHEST WO CONTRAST LOCATION: [facility] DATE/TIME: 2/19/2021 8:34 AM INDICATION: Squamous cell carcinoma of the nasopharynx COMPARISON: CT of the chest with contrast 12/06/2020 TECHNIQUE: CT chest without IV contrast. Multiplanar reformats were obtained. Dose reduction techniques were used. CONTRAST: None. FINDINGS: LUNGS AND PLEURA: Stable 4 to 5 mm nodule adjacent to a peripheral pulmonary vein in the posterolateral right upper lobe (series 4, image 80) with adjacent linear opacity and retraction of the pleura indicative of parenchymal scarring. Additional 3 mm nodule in the right apex (series 4, image 44) is stable. Unchanged limited scarring in the medial basal lower lobes. Small calcified nodule in the posterior medial left lower lobe, definitively benign. No new nodules or airspace opacities. MEDIASTINUM: Cardiac chambers are normal in size. No pericardial effusion. Fusiform dilation of the mid ascending aorta which measures up to 4.6 cm diameter. Conventional arch anatomy. Tortuous descending thoracic aorta. Left jugular approach chest port catheter terminates at the SVC right atrial junction. No enlarged mediastinal or hilar lymph nodes. Thyroid gland is small and heterogeneous attenuation. CORONARY ARTERY CALCIFICATION: None. UPPER ABDOMEN: Small volume liver with diffuse nodular border  consistent with cirrhosis. Mild perihepatic ascites and pericholecystic edema. No calcified gallstones.. No splenomegaly. Appropriately positioned percutaneous gastrostomy. Moderate inflammatory stranding is present in the upper abdomen with epicenter around the pancreas suggesting acute pancreatitis. MUSCULOSKELETAL: Small thoracic spine degenerative osteophytes are present. Benign bone island in the left anterior ninth rib. Old fracture deformity of the left transverse process of L2. There are no new lytic or sclerotic bone lesions.     1.  No findings to suggest metastatic disease to the chest. 2.  Fusiform dilation of the mid ascending aorta which measures 4.6 cm diameter. 3.  Cirrhosis, mild upper abdominal ascites and gallbladder wall edema. 4.  Inflammatory stranding centered around the pancreas consistent with pancreatitis.     Mr Brain With Without Contrast    Result Date: 3/10/2021  EXAM: MR BRAIN W WO CONTRAST LOCATION: [facility] DATE/TIME: 3/9/2021 1:52 PM INDICATION: Nasopharyngeal cancer. COMPARISON: MRI soft tissue neck 2/10/2020. PET/CT 9/2/2020. CONTRAST: 7 mL Gadavist. TECHNIQUE: Multiplanar multisequence head MRI without and with intravenous contrast including dedicated imaging of the skull base and facial nerves. FINDINGS: There is no evidence of asymmetric enhancing soft tissue thickening or mass within the nasopharynx. No abnormal enhancement involving the osseous structures of the skull base. No cervical lymphadenopathy given limited evaluation of the soft tissues of the neck. Circumferential mucosal thickening and small air-fluid level in the right maxillary sinus. The right frontal sinus is completely opacified and there is opacification of several right anterior ethmoid air cells. CRANIAL NERVES: Dedicated imaging of the cranial nerves was performed. No cerebellopontine angle or prepontine mass. No pathologic contrast enhancement along the course of either facial  nerve. No vascular impingement in the region of the facial nerve root exit zones. INTRACRANIAL CONTENTS: No acute or subacute infarct. No mass, acute hemorrhage, or extra-axial fluid collections. Scattered nonspecific T2/FLAIR hyperintensities within the cerebral white matter most consistent with minimal chronic microvascular ischemic  change. Mild generalized cerebral atrophy. No hydrocephalus. Mild cerebellar atrophy. No pathologic intracranial contrast enhancement. OTHER: Accounting for technique no additional abnormalities identified.     1.  No imaging findings to suggest residual or recurrent nasopharyngeal malignancy. 2.  No pathologic intracranial contrast enhancement suggesting intracranial metastatic disease. 3.  Paranasal sinus disease involving the right maxillary sinus, anterior right ethmoid air cells, and right frontal sinus.    .      Signed by: Yoanna Andre, CNP

## 2021-06-15 NOTE — TELEPHONE ENCOUNTER
I had sent a [facility] msg on Friday, Dr. Mendoza had also called one of Kristen's children and left a voicemail. I called Rea at this time and had to leave a VM but encouraged.    She called me back while I was writing this note. I gave her the name and number of PCP listed and she'll call right away to make an appointment.

## 2021-06-15 NOTE — TELEPHONE ENCOUNTER
Hello,  Looks like Cody has a few appointments.  I assume you are aware of the one today at the ENT office?  Here is the rest of the month as it is scheduled today:    Hello,    Dad s MARCH schedule:     Thursday, March 9th, 2021 arrival of 12:15 pm please!  12-15 pm:  check in at [facility] Radiology department.  Patient Instructions: MR HEAD ORBITS W CONTRAST  Arrive FIFTEEN minutes prior to your appointment.     If you are bringing your own sedation you must arrive THIRTY minutes prior to the start of your appointment and you MUST HAVE A  to bring you home.     Please remove any jewelry or accessories prior to arriving for your MRI. You will also be asked to change into scrubs prior to the start of the exam.          Thursday March 11th, 2021:  check in at 11 am  11 am: check in 2nd Floor Cancer Care for labs  11:15:  follow up with ESTRELLITA Lenz      Friday, March 12th, 2021:  arrival of 2:15 pm  2:15 pm:  check in 1st Floor Radiation Therapy  2:30 pm:  follow up with Dr. Mendoza      Friday, March 26th, 2021 arrival of 9:45 am   9:45 am:  check in  10 am:  Dr Marcial Cheng    Dept directions for [facility] ENT:  We are located in the [facility] at [address] [place] [address], [address], [place], [place].  Our building is located across the street from [facility] and offers free parking in our on-site lot.  Please take the stairs or elevator to the second floor of the [facility] and check in at the  located in the Specialty Clinic, [address].      Let me know if you have any questions??  Alisha Tierney, RN, BSN

## 2021-06-15 NOTE — PROGRESS NOTES
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       TYPE OF RADIATION THERAPY ADMINISTERED:  Concurrent chemoradiation therapy for his head neck cancer with weekly cisplatin and had radiation therapy in our clinic with a total dose of 7000 cGy in 35 treatments given from 3/2/2020-4/17/2020.      INTERVAL SINCE COMPLETION OF RADIATION THERAPY: 11 months.      SUBJECTIVE:  Mr. Chapman is a 70 y.o. male who has been in his usual state of good health until recently.  Patient was found to have swelling right neck mass by his lerma a month ago for which he was a seeking further evaluation.  The patient did not notice any significant changes over the past few weeks and he is also asymptomatic.  Initial ENT examination showed suspicious abnormalities in the right nasopharynx worrisome for malignancy.  CT of the neck on 1/24/2020 revealed asymmetric soft tissue thickening in the posterior right nasopharynx as well as abnormal enlarged cervical lymph nodes measuring up to 3.8 cm bilaterally.  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  The patient also had a staging work-up including PET CT scan and MRI brain which showed locally advanced disease with lymph node metastasis.  The PET CT scan also showed abnormal increased activity in the hilar region suspicious for metastasis.  Patient was then referred to pulmonology for evaluation and possible biopsy.  Patient then underwent bronchoscopy and EBUS on 2/27/2020 with biopsy showed negative for malignancy. He received concurrent chemoradiation therapy for his head neck cancer and had radiation therapy in our clinic with a total dose of 7000 cGy in 35 treatments given from 3/2/2020-4/17/2020.  He also received 3  cycles of cisplatin and 5-FU during the course of radiation therapy. He tolerated radiation therapy reasonably well with expected acute side effect.  The patient insisted not to have PEG tube placement during the therapy.  He however is able to maintain his weight reasonably stable during the treatment.     The patient experienced worsening pain of sore throat and dysphasia since completion of the radiation therapy.  He is not able to get adequate calorie and fluid intake and was admitted to hospital one week after treatment for supportive care and pain management.  A PEG tube was also placed during the hospital stay. The patient felt much better and improved since hospital stay.  The patient also experienced acute onset of left parotitis 4 weeks post cancer therapy and was admitted to the hospital for ongoing treatment including antibiotics.  His condition has been significant improved since then.     He is also receiving adjuvant chemotherapy under supervision of Dr. Frias.     Patient has been followed with Dr. Guerrero, ENT and last examination on 12/2/2020 showed no evidence of cancer recurrence.  He is scheduled to see Dr. Guerrero again next week.    He is currently able to eat without any significant difficulty.  He denies any significant pain at the time of evaluation.  He still has a significant dry mouth at the time of evaluation.  He noticed dizziness and nausea for few weeks without any significant vision changes and headache.  He is here for routine post therapy follow-up office follow-up.     Medications were reviewed and are up to date on EPIC.    The following portions of the patient's history were reviewed and updated as appropriate: allergies, current medications, past family history, past medical history, past social history, past surgical history and problem list.    Review of Systems:      General  Constitutional (WDL): Exceptions to WDL  Fatigue: Fatigue relieved by rest  Weight Gain: 5 - <10%  from baseline(10 pounds in 5 months)  EENT  Eye Disorder (WDL): All eye disorder elements are within defined limits  Ear Disorder (WDL): All ear disorder elements are within defined limits  Respiratory   Respiratory (WDL): Within Defined Limits  Cardiovascular  Cardiovascular (WDL): All cardiovascular elements are within defined limits  Endocrine     Gastrointestinal  Gastrointestinal (WDL): Exceptions to WDL  Nausea: Loss of appetite without alteration in eating habits  Vomiting: None  Dysgeusia: Altered taste but no change in diet  Musculoskeletal  Musculoskeletal and Connetive Tissue Disorders (WDL): Exceptions to WDL  Arthralgia: Mild pain  Integumentary               Integumentary (WDL): All integumentary elements are within defined limits  Neurological  Neurosensory (WDL): Exceptions to WDL  Ataxia: Asymptomatic, clinical or diagnostic observations only, intervention not indicated(uses wheeled walker)  Dizziness: Mild unsteadiness or sensation of movement  Psychological/Emotional   Patient Coping: Accepting;Open/discussion  Hematological/Lymphatic  Lymph (WDL): All lymph disorder elements are within defined limits  Dermatologic     Genitourinary/Reproductive  Genitourinary (WDL): All genitourinary elements are within defined limits  Reproductive     Pain              Currently in Pain: Yes  Pain Frequency: Intermittent  Location: body aches, nasopharyngeal area/headaches  Pain Intervention(s): Home medication(on Tylenol now, not opioids anymore)  Accompanied by  Accompanied by: Family Member    Objective:     PHYSICAL EXAMINATION:    /82   Pulse 66   Wt 150 lb 6.4 oz (68.2 kg)   SpO2 98%   BMI 24.28 kg/m      Gen: Alert, in NAD  Eyes: PERRL, EOMI, sclera anicteric  HENT     Head: NC/AT     Ears: No external auricular lesions     Nose/sinus: No rhinorrhea or epistaxis     Oropharynx: MMM, no visible oral lesions  Neck: Supple, full ROM, no LAD  Pulm: No wheezing, stridor or respiratory distress  CV:  Well-perfused, no cyanosis, no pedal edema  Abdominal: BS+, soft, nontender, nondistended, no hepatomegaly  Back: No step-offs or pain to palpation along the thoracolumbar spine  Rectal: Deferred  : Deferred  Musculoskeletal: Normal muscle bulk and tone  Skin: Normal color and turgor  Neurologic: A/Ox3, CN II-XII intact, normal gait and station  Psychiatric: Appropriate mood and affect     Imaging: Imaging results 6 weeks:Ct Chest Without Contrast    Result Date: 2/19/2021  EXAM: CT CHEST WO CONTRAST LOCATION: [facility] DATE/TIME: 2/19/2021 8:34 AM INDICATION: Squamous cell carcinoma of the nasopharynx COMPARISON: CT of the chest with contrast 12/06/2020 TECHNIQUE: CT chest without IV contrast. Multiplanar reformats were obtained. Dose reduction techniques were used. CONTRAST: None. FINDINGS: LUNGS AND PLEURA: Stable 4 to 5 mm nodule adjacent to a peripheral pulmonary vein in the posterolateral right upper lobe (series 4, image 80) with adjacent linear opacity and retraction of the pleura indicative of parenchymal scarring. Additional 3 mm nodule in the right apex (series 4, image 44) is stable. Unchanged limited scarring in the medial basal lower lobes. Small calcified nodule in the posterior medial left lower lobe, definitively benign. No new nodules or airspace opacities. MEDIASTINUM: Cardiac chambers are normal in size. No pericardial effusion. Fusiform dilation of the mid ascending aorta which measures up to 4.6 cm diameter. Conventional arch anatomy. Tortuous descending thoracic aorta. Left jugular approach chest port catheter terminates at the SVC right atrial junction. No enlarged mediastinal or hilar lymph nodes. Thyroid gland is small and heterogeneous attenuation. CORONARY ARTERY CALCIFICATION: None. UPPER ABDOMEN: Small volume liver with diffuse nodular border consistent with cirrhosis. Mild perihepatic ascites and pericholecystic edema. No calcified gallstones.. No  splenomegaly. Appropriately positioned percutaneous gastrostomy. Moderate inflammatory stranding is present in the upper abdomen with epicenter around the pancreas suggesting acute pancreatitis. MUSCULOSKELETAL: Small thoracic spine degenerative osteophytes are present. Benign bone island in the left anterior ninth rib. Old fracture deformity of the left transverse process of L2. There are no new lytic or sclerotic bone lesions.     1.  No findings to suggest metastatic disease to the chest. 2.  Fusiform dilation of the mid ascending aorta which measures 4.6 cm diameter. 3.  Cirrhosis, mild upper abdominal ascites and gallbladder wall edema. 4.  Inflammatory stranding centered around the pancreas consistent with pancreatitis.        Impression     The patient is a 70-year-old gentleman with a diagnosis of nasopharyngeal cancer status post concurrent chemoradiation therapy 11 months ago.  The patient has been recovering well since treatment with no clinical evidence of recurrence.     Assessment & Plan:     1.  I have again discussed with the patient about appropriate nutritional support. The patient only has minimal pain and discomfort at the time of evaluation.  He is still lack of taste and having dry mouth at the time of evaluation.  2.  He had a restaging CT chest 2/19/2021 which showed resolution of previously mildly enlarged hilar and mediastinal nodes in previous PET CT scan with no evidence of metastatic disease. PET CT scan on 9/2/2020 showed complete response of primary tumor. New small area of confluent opacification left upper lobe apex, likely inflammatory. Irregular subcentimeter indeterminate pulmonary nodule in the right upper lobe demonstrates no FDG activity although is just below the threshold for reliable PET characterization. The right upper lobe small mass from chest CT has been stable and possibly due to benign process.  3.  In the last CT neck was in 8/2020.  We will schedule patient to have  restaging MRI brain in the next few weeks and return to radiation oncology 1 week after to discuss the result.   4.  Follow-up with Dr. Frias, medical oncology and Dr. Billy Guerrero as planned.   5.  Continue neck and jaw exercise.  6.  Continue close dental follow-up.     Face to face time  25 minutes with > 75% spent on consultation, education and coordination of care.        Laurita Mendoza MD, PhD  Department of Radiation Oncology   [facility]  Tel: [phone]  Page: [phone]    [facility]  [address] [place] [address]     [place] [id number] [facility] [address]    CC:  Patient Care Team:  Mike Cook MD as PCP - General (Family Medicine)  Laurita Mendoza MD as Physician (Radiation Oncology)  Alan Frias MD as Physician (Hematology and Oncology)  Alisha Tierney RN as Oncology Nurse Navigator  Billy Guerrero MD as Physician (Otolaryngology)  Billy Guerrero MD as Assigned Surgical Provider  Laurita Mendoza MD as Assigned Cancer Care Provider  Fidencio Robles MD as Assigned Pulmonology Provider  Eduardo Grier MD as Assigned Infectious Disease Provider

## 2021-06-15 NOTE — PROGRESS NOTES
Pt here with his daughter (declined ), for routine f/u with Dr. Mendoza. See flowsheet for full assmt. Pt happy to report he's no longer taking opioids, but Tylenol occasionally.

## 2021-06-15 NOTE — PROGRESS NOTES
Assessment:      Impression: Chronic hepatitis B infection, responding to ranitidine.    Oral thrush.    Nasopharyngeal carcinoma.  Felt to be in remission, but patient complains of bleeding.       Plan:     No orders of the defined types were placed in this encounter.       Continue ranitidine indefinitely.  Nystatin ordered for thrush and possible esophagitis.  Follow-up in 6 months.     Subjective:      This is an follow-up infectious Disease visit for Kristen Chapman, who is a 70 y.o.  referred for evaluation of hepatitis B.     Is a 70-year-old gentleman of seen only in video visits over the last year for chronic hepatitis B.  He had been on treatment for nasopharyngeal carcinoma when he was noted to have elevated LFTs and a very high hepatitis B viral load.    Patient was started on lamivudine 100 mg a day and he is doing quite well.  He denies any complaints other than some bleeding in his nose and upon further questioning some pain and dryness in his mouth and sticking when he swallows.    He has been taking Magic mouthwash which does not really seem to help his symptoms significantly.    He denies abdominal pain.      The following portions of the patient's history were reviewed and updated as appropriate: allergies, current medications, past family history, past medical history, past social history, past surgical history and problem list.      Review of Systems  Performed and all negative except as mentioned above.      Objective:      /66   Pulse 60   Temp 98.3  F (36.8  C)   Wt 148 lb (67.1 kg)   BMI 23.89 kg/m    General:   alert, appears stated age and cooperative   Oropharynx:  He is wearing upper dentures.  He has white plaques noted on his posterior pharynx adjacent to the dentures as well as on his tongue.    Eyes:   Extraocular muscles intact, no icterus.    Ears:   Deferred   Neck:  no adenopathy and supple, symmetrical, trachea midline   Thyroid:   Deferred   Lung:  clear to auscultation  bilaterally   Heart:   regular rate and rhythm, S1, S2 normal, no murmur, click, rub or gallop   Abdomen:  soft, non-tender; bowel sounds normal; no masses,  no organomegaly   Extremities:  extremities normal, atraumatic, no cyanosis or edema   Skin:  warm and dry, no hyperpigmentation, vitiligo, or suspicious lesions   CVA:   absent   Genitourinary:  defer exam   Neurological:   Grossly normal   Psychiatric:   normal mood, behavior, speech, dress, and thought processes         Eduardo Grier MD

## 2021-06-15 NOTE — PROGRESS NOTES
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       TYPE OF RADIATION THERAPY ADMINISTERED:  Concurrent chemoradiation therapy for his head neck cancer with weekly cisplatin and had radiation therapy in our clinic with a total dose of 7000 cGy in 35 treatments given from 3/2/2020-4/17/2020.      INTERVAL SINCE COMPLETION OF RADIATION THERAPY: 11 months.      SUBJECTIVE:  Mr. Chapman is a 70 y.o. male who has been in his usual state of good health until recently.  Patient was found to have swelling right neck mass by his lerma a month ago for which he was a seeking further evaluation.  The patient did not notice any significant changes over the past few weeks and he is also asymptomatic.  Initial ENT examination showed suspicious abnormalities in the right nasopharynx worrisome for malignancy.  CT of the neck on 1/24/2020 revealed asymmetric soft tissue thickening in the posterior right nasopharynx as well as abnormal enlarged cervical lymph nodes measuring up to 3.8 cm bilaterally.  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  The patient also had a staging work-up including PET CT scan and MRI brain which showed locally advanced disease with lymph node metastasis.  The PET CT scan also showed abnormal increased activity in the hilar region suspicious for metastasis.  Patient was then referred to pulmonology for evaluation and possible biopsy.  Patient then underwent bronchoscopy and EBUS on 2/27/2020 with biopsy showed negative for malignancy. He received concurrent chemoradiation therapy for his head neck cancer and had radiation therapy in our clinic with a total dose of 7000 cGy in 35 treatments given from 3/2/2020-4/17/2020.  He also received 3  cycles of cisplatin and 5-FU during the course of radiation therapy. He tolerated radiation therapy reasonably well with expected acute side effect.  The patient insisted not to have PEG tube placement during the therapy.  He however is able to maintain his weight reasonably stable during the treatment.     The patient experienced worsening pain of sore throat and dysphasia since completion of the radiation therapy.  He is not able to get adequate calorie and fluid intake and was admitted to hospital one week after treatment for supportive care and pain management.  A PEG tube was also placed during the hospital stay. The patient felt much better and improved since hospital stay.  The patient also experienced acute onset of left parotitis 4 weeks post cancer therapy and was admitted to the hospital for ongoing treatment including antibiotics.  His condition has been significant improved since then.      He is also receiving adjuvant chemotherapy under supervision of Dr. Frias.     Patient has been followed with Dr. Guerrero, ENT and last examination on 3/3/2021 showed no evidence of cancer recurrence.      He is currently able to eat without any significant difficulty.  He denies any significant pain at the time of evaluation.  He still has a significant dry mouth at the time of evaluation.  He is here for routine post therapy follow-up office follow-up.    Medications were reviewed and are up to date on EPIC.    The following portions of the patient's history were reviewed and updated as appropriate: allergies, current medications, past family history, past medical history, past social history, past surgical history and problem list.    Review of Systems:      General  Constitutional (WDL): Exceptions to WDL  Fatigue: Fatigue relieved by rest  EENT  Eye Disorder (WDL): All eye disorder elements are within defined limits  Ear Disorder (WDL): All ear disorder elements are within defined limits  Respiratory    Respiratory (WDL): Within Defined Limits  Cardiovascular  Cardiovascular (WDL): All cardiovascular elements are within defined limits  Endocrine     Gastrointestinal  Gastrointestinal (WDL): Exceptions to WDL  Dysgeusia: Altered taste but no change in diet  Musculoskeletal  Musculoskeletal and Connetive Tissue Disorders (WDL): All Musculoskeletal and Connetive Tissue Disorder elements are within defined limits  Integumentary               Integumentary (WDL): All integumentary elements are within defined limits  Neurological  Neurosensory (WDL): Exceptions to WDL  Ataxia: Asymptomatic, clinical or diagnostic observations only, intervention not indicated(uses wheeled walker)  Psychological/Emotional   Patient Coping: Accepting;Open/discussion  Hematological/Lymphatic  Lymph (WDL): All lymph disorder elements are within defined limits  Dermatologic     Genitourinary/Reproductive  Genitourinary (WDL): All genitourinary elements are within defined limits  Reproductive     Pain              Currently in Pain: No/denies  Accompanied by  Accompanied by: Alone    Objective:     PHYSICAL EXAMINATION:    There were no vitals taken for this visit.    Gen: Alert, in NAD  Eyes: PERRL, EOMI, sclera anicteric  HENT     Head: NC/AT     Ears: No external auricular lesions     Nose/sinus: No rhinorrhea or epistaxis     Oropharynx: MMM, no visible oral lesions  Neck: Supple, full ROM, no LAD  Pulm: No wheezing, stridor or respiratory distress  CV: Well-perfused, no cyanosis, no pedal edema  Abdominal: BS+, soft, nontender, nondistended, no hepatomegaly  Back: No step-offs or pain to palpation along the thoracolumbar spine  Rectal: Deferred  : Deferred  Musculoskeletal: Normal muscle bulk and tone  Skin: Normal color and turgor  Neurologic: A/Ox3, CN II-XII intact, normal gait and station  Psychiatric: Appropriate mood and affect     Imaging: Imaging results 30 days: Ct Chest Without Contrast    Result Date: 2/19/2021  EXAM: CT CHEST  WO CONTRAST LOCATION: [facility] DATE/TIME: 2/19/2021 8:34 AM INDICATION: Squamous cell carcinoma of the nasopharynx COMPARISON: CT of the chest with contrast 12/06/2020 TECHNIQUE: CT chest without IV contrast. Multiplanar reformats were obtained. Dose reduction techniques were used. CONTRAST: None. FINDINGS: LUNGS AND PLEURA: Stable 4 to 5 mm nodule adjacent to a peripheral pulmonary vein in the posterolateral right upper lobe (series 4, image 80) with adjacent linear opacity and retraction of the pleura indicative of parenchymal scarring. Additional 3 mm nodule in the right apex (series 4, image 44) is stable. Unchanged limited scarring in the medial basal lower lobes. Small calcified nodule in the posterior medial left lower lobe, definitively benign. No new nodules or airspace opacities. MEDIASTINUM: Cardiac chambers are normal in size. No pericardial effusion. Fusiform dilation of the mid ascending aorta which measures up to 4.6 cm diameter. Conventional arch anatomy. Tortuous descending thoracic aorta. Left jugular approach chest port catheter terminates at the SVC right atrial junction. No enlarged mediastinal or hilar lymph nodes. Thyroid gland is small and heterogeneous attenuation. CORONARY ARTERY CALCIFICATION: None. UPPER ABDOMEN: Small volume liver with diffuse nodular border consistent with cirrhosis. Mild perihepatic ascites and pericholecystic edema. No calcified gallstones.. No splenomegaly. Appropriately positioned percutaneous gastrostomy. Moderate inflammatory stranding is present in the upper abdomen with epicenter around the pancreas suggesting acute pancreatitis. MUSCULOSKELETAL: Small thoracic spine degenerative osteophytes are present. Benign bone island in the left anterior ninth rib. Old fracture deformity of the left transverse process of L2. There are no new lytic or sclerotic bone lesions.     1.  No findings to suggest metastatic disease to the chest. 2.   Fusiform dilation of the mid ascending aorta which measures 4.6 cm diameter. 3.  Cirrhosis, mild upper abdominal ascites and gallbladder wall edema. 4.  Inflammatory stranding centered around the pancreas consistent with pancreatitis.     Mr Brain With Without Contrast    Result Date: 3/10/2021  EXAM: MR BRAIN W WO CONTRAST LOCATION: [facility] DATE/TIME: 3/9/2021 1:52 PM INDICATION: Nasopharyngeal cancer. COMPARISON: MRI soft tissue neck 2/10/2020. PET/CT 9/2/2020. CONTRAST: 7 mL Gadavist. TECHNIQUE: Multiplanar multisequence head MRI without and with intravenous contrast including dedicated imaging of the skull base and facial nerves. FINDINGS: There is no evidence of asymmetric enhancing soft tissue thickening or mass within the nasopharynx. No abnormal enhancement involving the osseous structures of the skull base. No cervical lymphadenopathy given limited evaluation of the soft tissues of the neck. Circumferential mucosal thickening and small air-fluid level in the right maxillary sinus. The right frontal sinus is completely opacified and there is opacification of several right anterior ethmoid air cells. CRANIAL NERVES: Dedicated imaging of the cranial nerves was performed. No cerebellopontine angle or prepontine mass. No pathologic contrast enhancement along the course of either facial nerve. No vascular impingement in the region of the facial nerve root exit zones. INTRACRANIAL CONTENTS: No acute or subacute infarct. No mass, acute hemorrhage, or extra-axial fluid collections. Scattered nonspecific T2/FLAIR hyperintensities within the cerebral white matter most consistent with minimal chronic microvascular ischemic  change. Mild generalized cerebral atrophy. No hydrocephalus. Mild cerebellar atrophy. No pathologic intracranial contrast enhancement. OTHER: Accounting for technique no additional abnormalities identified.     1.  No imaging findings to suggest residual or recurrent  nasopharyngeal malignancy. 2.  No pathologic intracranial contrast enhancement suggesting intracranial metastatic disease. 3.  Paranasal sinus disease involving the right maxillary sinus, anterior right ethmoid air cells, and right frontal sinus.       Impression     The patient is a 70-year-old gentleman with a diagnosis of nasopharyngeal cancer status post concurrent chemoradiation therapy 11 months ago.  The patient has been recovering well since treatment with no clinical evidence of recurrence.      Assessment & Plan:     1.  I have again discussed with the patient about appropriate nutritional support. The patient only has minimal pain and discomfort at the time of evaluation.  He is still lack of taste and having dry mouth at the time of evaluation.  2.  He had a restaging CT chest 2/19/2021 which showed resolution of previously mildly enlarged hilar and mediastinal nodes in previous PET CT scan with no evidence of metastatic disease. PET CT scan on 9/2/2020 showed complete response of primary tumor. New small area of confluent opacification left upper lobe apex, likely inflammatory. Irregular subcentimeter indeterminate pulmonary nodule in the right upper lobe demonstrates no FDG activity although is just below the threshold for reliable PET characterization. The right upper lobe small mass from chest CT has been stable and possibly due to benign process.  The MRI on 3/9/2021 showed no evidence of recurrence.  3.  Return to radiation oncology in 6 months for office follow-up.  4.  Continue follow-up with Dr. Frias, medical oncology and Dr. Billy Guerrero as planned.   5.  Continue neck and jaw exercise.  6.  His TSH is high at 233 on 3/11/2021.  Recommend patient to contact his primary physician for treatment recommendation.     Face to face time  20 minutes with > 75% spent on consultation, education and coordination of care.      Laurita Mendoza MD, PhD  Department of Radiation Oncology   [facility]  Care  Tel: [phone]  Page: [phone]    [facility]  [address]  [place] [address]     [facility]   [address] [place] [address]    CC:  Patient Care Team:  Mike Cook MD as PCP - General (Family Medicine)  Laurita Mendoza MD as Physician (Radiation Oncology)  Alan Frias MD as Physician (Hematology and Oncology)  Alisha Tierney RN as Oncology Nurse Navigator  Billy Guerrero MD as Physician (Otolaryngology)  Billy Guerrero MD as Assigned Surgical Provider  Laurita Mendoza MD as Assigned Cancer Care Provider  Fidencio Robles MD as Assigned Pulmonology Provider  Eduardo Grier MD as Assigned Infectious Disease Provider

## 2021-06-16 PROBLEM — R79.89 ELEVATED SERUM CREATININE: Status: ACTIVE | Noted: 2020-04-07

## 2021-06-16 PROBLEM — D69.6 THROMBOCYTOPENIA (H): Status: ACTIVE | Noted: 2020-11-18

## 2021-06-16 PROBLEM — R13.10 DYSPHAGIA: Status: ACTIVE | Noted: 2020-04-25

## 2021-06-16 PROBLEM — E86.0 DEHYDRATION: Status: ACTIVE | Noted: 2020-04-25

## 2021-06-16 PROBLEM — B18.1 HEPATITIS B, CHRONIC (H): Status: ACTIVE | Noted: 2021-02-15
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
## 2021-06-16 PROBLEM — D61.810 ANTINEOPLASTIC CHEMOTHERAPY INDUCED PANCYTOPENIA (H): Status: ACTIVE | Noted: 2020-04-07

## 2021-06-16 PROBLEM — E87.6 HYPOKALEMIA: Status: ACTIVE | Noted: 2020-04-25

## 2021-06-16 PROBLEM — C11.9: Status: ACTIVE | Noted: 2020-02-18

## 2021-06-16 PROBLEM — E43 SEVERE PROTEIN-CALORIE MALNUTRITION (H): Status: ACTIVE | Noted: 2020-05-18

## 2021-06-16 PROBLEM — C11.9 NASOPHARYNGEAL CARCINOMA (H): Status: ACTIVE | Noted: 2020-02-18

## 2021-06-16 PROBLEM — T45.1X5A ANTINEOPLASTIC CHEMOTHERAPY INDUCED PANCYTOPENIA (H): Status: ACTIVE | Noted: 2020-04-07

## 2021-06-16 PROBLEM — K11.20 PAROTITIS: Status: ACTIVE | Noted: 2020-05-14

## 2021-06-16 PROBLEM — K11.7 XEROSTOMIA DUE TO RADIOTHERAPY: Status: ACTIVE | Noted: 2020-11-18

## 2021-06-16 PROBLEM — R63.4 WEIGHT LOSS: Status: ACTIVE | Noted: 2020-04-21

## 2021-06-16 PROBLEM — R79.89 ELEVATED LFTS: Status: ACTIVE | Noted: 2020-06-30

## 2021-06-16 PROBLEM — R59.0 HILAR LYMPHADENOPATHY: Status: ACTIVE | Noted: 2020-02-25

## 2021-06-16 PROBLEM — E83.42 HYPOMAGNESEMIA: Status: ACTIVE | Noted: 2020-04-25

## 2021-06-16 NOTE — TELEPHONE ENCOUNTER
[facility] paperwork completed, signed by Dr. Frias and faxed to:  [phone]  Attn:  Roge Mercedes  Copy sent to son, Cj Chapman for his records.

## 2021-06-16 NOTE — PROGRESS NOTES
"    Assessment & Plan     Elevated TSH  Hypothyroidism, unspecified type  Check   - T4, Free  - T3 (Triiodothyronine), Free  Low T4  We'll go ahead and start him of levothyroxine 50 mcg with a recheck level in 4 to 6 weeks    Review of the result(s) of each unique test - as noted above   30 minutes spent on the date of the encounter doing chart review, patient visit and documentation        Return in about 1 week (around 3/26/2021) for Will follow up with the results.    Mike Cook MD  [facility]   Kristen Chapman is 70 y.o. male presenting today with his son for the follow-up of abnormal thyroid lab results, with the advisement to follow-up in primary care for further evaluation and management.  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   He otherwise has no other concerns        Objective    /80   Pulse 61   Ht 5' 6\" (1.676 m)   Wt 152 lb 11.2 oz (69.3 kg)   SpO2 99%   BMI 24.65 kg/m    Body mass index is 24.65 kg/m .  Physical Exam  General Appearance:    Alert, well hydrated, no distress   Neck:    thyroid:  no enlargement/tenderness/nodules;       Lungs:     clear to auscultation, no wheezes, rales or rhonchi, symmetric air entry     Heart:    Regular rate and rhythm, S1 and S2 normal, no murmur, rub   or gallop, no edema    Skin:   no rashes or lesions                  "

## 2021-06-16 NOTE — TELEPHONE ENCOUNTER
Patient has critical platelet count. Critical repeated and confirmed at [facility]. Preliminary result called to [facility] oncology Isabella Raymond @ [phone]

## 2021-06-16 NOTE — PROGRESS NOTES
Critical lab result called at 1132 to VIKI Andre CNP for platelet count of 55.  This is stable for the patient and this note will be sent to VIKI Andre CNP.    Isabella Raymond RN

## 2021-06-16 NOTE — TELEPHONE ENCOUNTER
Left  for pt via interp line. Advised to call clinic for lab results. Please read results below and assist to schedule apt for follow up in person   Letter sent via [employer]

## 2021-06-16 NOTE — TELEPHONE ENCOUNTER
----- Message from Mike Cook MD sent at 3/22/2021  9:00 AM CDT -----  Please call patient :   Low thyroid.   We'll go ahead and start him of levothyroxine 50 mcg with a recheck level in 4 to 6 weeks

## 2021-06-16 NOTE — PROGRESS NOTES
REASON FOR VISIT: Recheck      HISTORY OF PRESENT ILLNESS    Kristen was seen in follow up for recheck of left TM peforation.  No pain or drainage.  No vertigo or dizziness.  No audiogram today         REVIEW OF SYSTEMS    Review of Systems: a 10-system review is reviewed at this encounter.  See scanned document.     Oxycodone     PHYSICAL EXAM:        HEAD: Normal appearance and symmetry:  No cutaneous lesions.      EARS:   Auricles normal    EAR MICRO EXAM    Examination of the right ear under the binocular microscope shows some dried blood in the external auditory canal the tympanic membrane is intact on the left-hand side does have significant mount of crusting over the tympanic membrane is debrided with alligator forceps.  There is a perforation in the posterior superior aspect of the tympanic membrane with this with rough edges with keratin with rough edges that are debrided using a right angle pick and #3 suction today.  Middle ear cleft is clear     NOSE:    Dorsum:   straight       ORAL CAVITY/OROPHARYNX:    Lips:  Normal.     NECK:  Trachea:  midline       NEURO:   Alert and Oriented    GAIT AND STATION:  normal     RESPIRATORY:   Symmetry and Respiratory effort    PSYCH:   normal mood and affect    SKIN:  warm and dry         IMPRESSION:    Encounter Diagnoses   Name Primary?     Perforation of ear drum, left Yes     Mixed hearing loss, bilateral           RECOMMENDATIONS:    Chronic left-sided perforation with significant mount of crusting and debris status post debridement on the bike binocular microscope.  Patient be placed on Ciprodex otic drops for 1 week.  Return in 1 month with a audiogram.  Keep the ear dry.    No orders of the defined types were placed in this encounter.     No medications were ordered this encounter

## 2021-06-17 NOTE — TELEPHONE ENCOUNTER
Telephone Encounter by Alisha Tierney RN at 6/17/2020  1:36 PM     Author: Alisha Tierney RN Service: -- Author Type: Registered Nurse    Filed: 6/17/2020  1:37 PM Encounter Date: 6/17/2020 Status: Signed    : Alisha Tierney RN (Registered Nurse)       I sent the following e-mail to Rea owens:        Felicitas Lucia!  I just wanted you to know I applied on-line for another milan.  The [employer] is also sponsored by the [employer].  It is not income-based.  It is a set $200 for everyone.  The funds are used the same as the [employer].    You should hear from them (letter and payment form)  in the next 7-10 days.  Let me know if you have any questions!    Take Care,  Alisha

## 2021-06-17 NOTE — PROGRESS NOTES
REASON FOR VISIT: Recheck      HISTORY OF PRESENT ILLNESS    Kristen was seen in follow up for audiogram review and recheck of left ear.     Patient was able to use the drops as directed as directed.  He is denies any pain or drainage.  He does have bilateral tinnitus which she describes as a chirping or crickets type sound.  He is open to the idea of hearing aids.  No dizziness or balance issues    Encounter Diagnoses   Name Primary?     Perforation of ear drum, left Yes     Mixed hearing loss, bilateral              RECOMMENDATIONS:     Chronic left-sided perforation with significant mount of crusting and debris status post debridement on the bike binocular microscope.  Patient be placed on Ciprodex otic drops for 1 week.  Return in 1 month with a audiogram.  Keep the ear dry.       REVIEW OF SYSTEMS    Review of Systems: a 10-system review is reviewed at this encounter.  See scanned document.     Oxycodone     PHYSICAL EXAM:        HEAD: Normal appearance and symmetry:  No cutaneous lesions.      EARS:   Auricles normal    Examination of the right ear shows an intact tympanic membrane with some patchy myringosclerosis    Examination left ear shows a 20% perforation that is just posterior to the manubrium.  Is clean and dry middle ear mucosa is normal     NOSE:    Dorsum:   straight       ORAL CAVITY/OROPHARYNX:    Lips:  Normal.     NECK:  Trachea:  midline       NEURO:   Alert and Oriented    GAIT AND STATION:  normal     RESPIRATORY:   Symmetry and Respiratory effort    PSYCH:   normal mood and affect    SKIN:  warm and dry     Audiometry today shows a slight shows a bilateral sensory hearing loss downsloping in both ears with slight conductive component in the left ear    IMPRESSION:    Encounter Diagnosis   Name Primary?     Perforation of ear drum, left Yes          RECOMMENDATIONS:      Resolution of left-sided otorrhea on Ciprodex otic drops.  Recommend water precautions.  I would recommend a hearing aid  consultation.  He is medically cleared for hearing aids.  I like to see him back in 6 months for recheck of the perforation  All questions were answered he is agreeable this plan of care

## 2021-06-17 NOTE — TELEPHONE ENCOUNTER
Telephone Encounter by Alisha Tierney RN at 4/1/2020 12:07 PM     Author: Alisha Tierney RN Service: -- Author Type: Registered Nurse    Filed: 4/1/2020 12:13 PM Encounter Date: 4/1/2020 Status: Signed    : Alisha Tierney RN (Registered Nurse)       I called daughterRea to check in and see how her father is doing.  I explained that I am now working remotely but can still be reached by phone and/or e-mail.  I offered the [employer] milan.  I have applied on-line for [employer]:       FINANCIAL ASSISTANCE  [employer] assists adults in active cancer treatment who live (or receive treatment) in the [facility]. We provide emergency financial assistance for non-medical expenses, so you can focus on your treatment, self-care and healing.  Applicants will be notified of their milan status within 7-10 business days of [employer] receiving the application.  If you have any questions about our program, or would like a paper application mailed to you, please contact our office at [email or web address] or [phone].    FREQUENTLY ASKED QUESTIONS  Emergency Financial Assistance Program  Who is eligible to receive financial assistance from [employer]?    Must be over 18 years of age.    Must have a cancer diagnosis.    Must be in active treatment (chemotherapy, radiation, bone marrow transplant, hospice, palliative care or surgery with a recovery time in excess of four weeks.) Hormone therapy is not considered active treatment.    Must be living in or treated in the [facility] ([facility], [facility], [facility] or [facility]).    Must meet the financial guidelines set by [employer].    Patient may only receive a general financial assistance milan once in their lifetime.  How can a patient use the financial assistance he/she receives?    Rent or mortgage payment    Home phone or cell phone bill    Gas,  electric, or water bill    Garbage bill    [employer] gift cards    [employer] gift cards    Gasoline purchases using [employer] cards  What cannot be paid for using the financial assistance a patient receives?    Medical expenses    Dental expenses    Childcare fees    Credit card payments    Cable bills    Internet bills    Car payments/insurance    Insurance fees  How does someone apply for assistance?   Your Nurse Navigator can complete the on-line application for you.   Can a patient apply again after the initial financial assistance is received?  Patients are able to receive one general milan from [employer].  I have completed the on-line application and given the patient written information about the program.  I have encouraged them to call me if they have not heard anything in the next 7 days.  They were very appreciative of learning about this program.       Rea appreciated the assistance.  I encouraged her to call if they have any questions or concerns.

## 2021-06-17 NOTE — PATIENT INSTRUCTIONS - HE
Patient Instructions by Prudence Parsons CNP at 1/24/2020  8:40 AM     Author: Prudence Parsons CNP Service: -- Author Type: Nurse Practitioner    Filed: 1/24/2020  9:42 AM Encounter Date: 1/24/2020 Status: Signed    : Prudence Parsons CNP (Nurse Practitioner)         Patient Education     Mouth and Throat Tumors  Finding out you have a tumor is scary. You may wonder what effect it will have on your life. As you and your doctors decide on your treatment, some of your concerns will be resolved. And moving forward, your healthcare team can help you learn ways to help yourself.  What is a tumor?  A tumor is a mass of abnormal cells. It is either benign (slow growing, not cancerous) or malignant (fast growing, cancerous). Some tumors, especially cancerous ones, can be life-threatening. But most tumors can be treated.  Risk factors for a cancerous tumor  You are more likely to get a tumor of the mouth or throat if you:    Smoke cigarettes, pipes, or cigars    Use chewing tobacco or snuff    Drink alcohol    Take poor care of your teeth    Are exposed to certain industrial chemicals    Had a mouth or throat tumor in the past    Have a human papillomavirus (HPV) infection  Signs and symptoms of a tumor in the mouth  If you have a mouth tumor, you or your doctor may have noticed one or more of the following:    White or red patches on tissues or gums    Pain that doesnt go away    A sore that doesnt heal in a week or two    Bleeding that doesnt stop after a few days    A swelling or lump that doesnt go away    Problems with your teeth, dentures, or chewing  Signs and symptoms of a tumor in the throat  If you have a throat tumor, you or your doctor may have noticed one or more of the following:    Hoarseness that doesnt go away    Trouble swallowing    A lump in your neck    Pain that doesnt go away    Aching, pain, or pressure in your ear    Persistent coughing with or without bloody sputum  Date Last Reviewed:  11/1/2016 2000-2019 [employer]. [address] [place] [address], [place] [address]. All rights reserved. This information is not intended as a substitute for professional medical care. Always follow your healthcare professional's instructions.

## 2021-06-17 NOTE — TELEPHONE ENCOUNTER
Telephone Encounter by Stefani Braden at 7/13/2020 10:33 AM     Author: Stefani Braden Service: -- Author Type: Financial Resource Guide    Filed: 7/13/2020 10:47 AM Encounter Date: 7/13/2020 Status: Signed    : Stefani Braden (Financial Resource Guide)       Medication: (epivir)  Lamivudine 100mg  Qty: 30 tabs for 30 days  Insurance: [employer]  Test Claim: $47.49  Pharmacy: [facility] in [place]  Additional Information: patient does not speak english and is also battling cancer, so to keep things simple we are sticking to the same pharmacy he has already been utilizing.

## 2021-06-18 NOTE — PATIENT INSTRUCTIONS - HE
Patient Instructions by Karen De Los Santos RN at 2/7/2020  8:15 AM     Author: Karen De Los Santos RN Service: -- Author Type: Registered Nurse    Filed: 2/7/2020  8:56 AM Encounter Date: 2/7/2020 Status: Signed    : Karen De Los Santos RN (Registered Nurse)       Patient Education     Radiation Therapy Treatment  Radiation therapy uses high-energy X-rays to kill cancer cells. Radiation therapy can help you in your fight against cancer. It begins with a session to discuss treatment with your healthcare provider. If you and your provider decide on radiation, you will return for a treatment planning visit called a simulation. Then you will start treatment.  The simulation is a planning session that helps your healthcare provider target your cancer. He or she will design a radiation plan to protect your healthy tissues from radiation. Your radiation therapy team uses a special machine called a simulator to map out your treatment. The simulator is usually an X-ray machine (fluoroscopy), CT scanner, MRI scanner, or PET-CT scanner machine. Laser lights act as guides to help position your body accurately. During this visit:    The team figures out the best position for your body. They make notes in your chart so youll be placed the same way each time.    They may use special devices to keep your body correctly positioned and still during treatment. These may include molds, masks, rests, and blocks.    The team makes ink marks on your skin. These will help you get in the same position for each treatment. Tiny permanent tattoos may also be used. These tattoos may be removed later with laser treatments.    Markers such as metal balls or wires may be put on or in your body. Sometime these are taped to the skin to help with the imaging process. These work with the X-rays to position your body. The markers are removed when the visit is over.  After the team has the imaging and data, the information is sent into the computer  planning system. Your doctor and the team of physicists and dosimetrists design a treatment field. The field will best target your cancer and how it might spread. It will also help limit radiation to nearby normal tissues.  Your treatments  When the simulation and plan are completed, you will begin your daily treatments. Treatment is usually once daily, Monday through Friday, for 5 to 7 weeks. It takes less than 30 minutes. Sometimes you may need radiation twice a day, with about 6 hours between treatments.  You may need to change into a hospital gown. The radiation therapist puts you in the correct position on the treatment table, then leaves the room. Sometimes you may need more imaging before each treatment. The machine may take digital X-rays or a CT scan to help make sure you are lined up correctly. During treatment, lie as still as you can and breathe normally. You will hear noises coming from the machine. You can talk to the radiation therapist, who watches you from the control room on a TV monitor. After treatment, the therapist will help you off the table. You can then get dressed and go back to your normal activities.  After treatment  After your radiation treatments are done, you will have follow-up appointments. These are to make sure the cancer is under control. Tell your healthcare team about any side effects from the treatment. The team will help you manage them.  Date Last Reviewed: 6/1/2018 2000-2019 [employer]. [address] [place] [address], [place] [address]. All rights reserved. This information is not intended as a substitute for professional medical care. Always follow your healthcare professional's instructions.

## 2021-06-18 NOTE — PATIENT INSTRUCTIONS - HE
Patient Instructions by Marcial Cheng MD at 5/7/2021 10:20 AM     Author: Marcial Cheng MD Service: -- Author Type: Physician    Filed: 5/7/2021 10:16 AM Encounter Date: 5/7/2021 Status: Signed    : Marcial Cheng MD (Physician)       Water precautions for left ear  Hearing aid consultation  Return visit 6 months for recheck    Patient Education     Styles of Hearing Aids  The most common hearing aid styles and features are described below. Keep in mind that some styles and features cost more than others. If you find the best hearing aids for you, though, the benefits may outweigh the cost.             In-the-ear (ITE)    For mild to moderate hearing loss.     Electrical parts are contained in a plastic case that fits into the outer ear and extends into the ear canal.    Hearing aid fits inside the ear, but can be seen from outside the ear.    Sound is sent into the ear by a  in the ear canal.   In-the-canal (ITC)    For mild to moderately severe hearing loss.    Electrical parts are contained in a plastic case that fits into the ear opening and extends into the ear canal.    Hearing aid fits inside the ear; harder to see than ITE models.    Sound is sent into the ear by a  in the ear lisa  Open ear    For mild to severe hearing loss. This type is better for high-frequency hearing loss.     Electrical parts are contained in a plastic case hidden behind the ear; speaker pieces fit deeply into the ear canal.    Hearing aid is very hard to see; clear pieces blend in with skin color.    Sound is sent into the ear through a plastic tube or wire to a  in the ear canal.  Completely-in-canal (CIC)    For mild to moderately severe hearing loss.    Electrical parts are contained in a plastic case that fits inside the ear canal.    Hearing aid can hardly be seen.    Sound is sent into the ear by a  in the ear canal.  Behind-the-ear (BTE)    For mild to profound hearing  loss.    Electrical parts are contained in a plastic case hidden behind the ear.    Casing can be seen behind the ear; different colors and sizes are available.    Sound is sent into the ear through a plastic tube in the ear canal.  Optional features  Extra features can tailor hearing aids to your needs. A few common ones are listed below.    Telecoils let you switch from the hearing aids microphone to a special setting that works better with telephones and in certain auditoriums, theaters, and museums that have telecoil-compatible sound systems.    Direct audio input feeds sound from a sound system, computer, radio, or microphone directly into your hearing aids.    Directional microphones  head-on sounds and reduce background noise.    Customized listening programs store programs to adapt to changes in noise level. For example, you can have one program for your home and another for crowds.  Noncustom hearing aids  The hearing aids shown here are customized based on the shape of your ear and the nature of your hearing loss. Noncustom (ready to wear) hearing aids may also be an option. These are often purchased through mail order or over the counter. Noncustom hearing aids may cost less than custom hearing aids. But they may not work as well because they have not been programmed for your specific hearing needs. And since theyre not made to fit the shape of your ear, noncustom hearing aids may not be as comfortable. Your hearing professional can tell you more.   Date Last Reviewed: 7/1/2016 2000-2019 [employer]. [address] [place] [address], [place] [address]. All rights reserved. This information is not intended as a substitute for professional medical care. Always follow your healthcare professional's instructions.

## 2021-06-18 NOTE — PATIENT INSTRUCTIONS - HE
Patient Instructions by Joanna Ventura CNP at 6/1/2020  9:00 AM     Author: Joanna Ventura CNP Service: -- Author Type: Nurse Practitioner    Filed: 6/1/2020  9:29 AM Encounter Date: 6/1/2020 Status: Signed    : Joanna Ventura CNP (Nurse Practitioner)       Patient Education     Cisplatin Solution for injection  What is this medicine?  CISPLATIN (SIS dennis tin) is a chemotherapy drug. It targets fast dividing cells, like cancer cells, and causes these cells to die. This medicine is used to treat many types of cancer like bladder, ovarian, and testicular cancers.  This medicine may be used for other purposes; ask your health care provider or pharmacist if you have questions.  What should I tell my health care provider before I take this medicine?  They need to know if you have any of these conditions:    blood disorders    hearing problems    kidney disease    recent or ongoing radiation therapy    an unusual or allergic reaction to cisplatin, carboplatin, other chemotherapy, other medicines, foods, dyes, or preservatives    pregnant or trying to get pregnant    breast-feeding  How should I use this medicine?  This drug is given as an infusion into a vein. It is administered in a hospital or clinic by a specially trained health care professional.  Talk to your pediatrician regarding the use of this medicine in children. Special care may be needed.  Overdosage: If you think you have taken too much of this medicine contact a poison control center or emergency room at once.  NOTE: This medicine is only for you. Do not share this medicine with others.  What if I miss a dose?  It is important not to miss a dose. Call your doctor or health care professional if you are unable to keep an appointment.  What may interact with this medicine?    dofetilide    foscarnet    medicines for seizures    medicines to increase blood counts like filgrastim, pegfilgrastim,  sargramostim    probenecid    pyridoxine used with altretamine    rituximab    some antibiotics like amikacin, gentamicin, neomycin, polymyxin B, streptomycin, tobramycin    sulfinpyrazone    vaccines    zalcitabine  Talk to your doctor or health care professional before taking any of these medicines:    acetaminophen    aspirin    ibuprofen    ketoprofen    naproxen  This list may not describe all possible interactions. Give your health care provider a list of all the medicines, herbs, non-prescription drugs, or dietary supplements you use. Also tell them if you smoke, drink alcohol, or use illegal drugs. Some items may interact with your medicine.  What should I watch for while using this medicine?  Your condition will be monitored carefully while you are receiving this medicine. You will need important blood work done while you are taking this medicine.  This drug may make you feel generally unwell. This is not uncommon, as chemotherapy can affect healthy cells as well as cancer cells. Report any side effects. Continue your course of treatment even though you feel ill unless your doctor tells you to stop.  In some cases, you may be given additional medicines to help with side effects. Follow all directions for their use.  Call your doctor or health care professional for advice if you get a fever, chills or sore throat, or other symptoms of a cold or flu. Do not treat yourself. This drug decreases your body's ability to fight infections. Try to avoid being around people who are sick.  This medicine may increase your risk to bruise or bleed. Call your doctor or health care professional if you notice any unusual bleeding.  Be careful brushing and flossing your teeth or using a toothpick because you may get an infection or bleed more easily. If you have any dental work done, tell your dentist you are receiving this medicine.  Avoid taking products that contain aspirin, acetaminophen, ibuprofen, naproxen, or  ketoprofen unless instructed by your doctor. These medicines may hide a fever.  Do not become pregnant while taking this medicine. Women should inform their doctor if they wish to become pregnant or think they might be pregnant. There is a potential for serious side effects to an unborn child. Talk to your health care professional or pharmacist for more information. Do not breast-feed an infant while taking this medicine.  Drink fluids as directed while you are taking this medicine. This will help protect your kidneys.  Call your doctor or health care professional if you get diarrhea. Do not treat yourself.  What side effects may I notice from receiving this medicine?  Side effects that you should report to your doctor or health care professional as soon as possible:    allergic reactions like skin rash, itching or hives, swelling of the face, lips, or tongue    signs of infection - fever or chills, cough, sore throat, pain or difficulty passing urine    signs of decreased platelets or bleeding - bruising, pinpoint red spots on the skin, black, tarry stools, nosebleeds    signs of decreased red blood cells - unusually weak or tired, fainting spells, lightheadedness    breathing problems    changes in hearing    gout pain    low blood counts - This drug may decrease the number of white blood cells, red blood cells and platelets. You may be at increased risk for infections and bleeding.    nausea and vomiting    pain, swelling, redness or irritation at the injection site    pain, tingling, numbness in the hands or feet    problems with balance, movement    trouble passing urine or change in the amount of urine  Side effects that usually do not require medical attention (report to your doctor or health care professional if they continue or are bothersome):    changes in vision    loss of appetite    metallic taste in the mouth or changes in taste  This list may not describe all possible side effects. Call your doctor  for medical advice about side effects. You may report side effects to FDA at 7-122-FDA-7556.  Where should I keep my medicine?  This drug is given in a hospital or clinic and will not be stored at home.  NOTE:This sheet is a summary. It may not cover all possible information. If you have questions about this medicine, talk to your doctor, pharmacist, or health care provider. Copyright  2015 [employer]         Patient Education     Fluorouracil, 5-FU injection  Brand Name: Adrucil  What is this medicine?  FLUOROURACIL, 5-FU (flure oh YOOR a kevin) is a chemotherapy drug. It slows the growth of cancer cells. This medicine is used to treat many types of cancer like breast cancer, colon or rectal cancer, pancreatic cancer, and stomach cancer.  How should I use this medicine?  This drug is given as an infusion or injection into a vein. It is administered in a hospital or clinic by a specially trained health care professional.  Talk to your pediatrician regarding the use of this medicine in children. Special care may be needed.  What side effects may I notice from receiving this medicine?  Side effects that you should report to your doctor or health care professional as soon as possible:    allergic reactions like skin rash, itching or hives, swelling of the face, lips, or tongue    low blood counts - this medicine may decrease the number of white blood cells, red blood cells and platelets. You may be at increased risk for infections and bleeding.    signs of infection - fever or chills, cough, sore throat, pain or difficulty passing urine    signs of decreased platelets or bleeding - bruising, pinpoint red spots on the skin, black, tarry stools, blood in the urine    signs of decreased red blood cells - unusually weak or tired, fainting spells, lightheadedness    breathing problems    changes in vision    chest pain    mouth sores    nausea and vomiting    pain, swelling, redness at site where injected    pain,  tingling, numbness in the hands or feet    redness, swelling, or sores on hands or feet    stomach pain    unusual bleeding  Side effects that usually do not require medical attention (report to your doctor or health care professional if they continue or are bothersome):    changes in finger or toe nails    diarrhea    dry or itchy skin    hair loss    headache    loss of appetite    sensitivity of eyes to the light    stomach upset    unusually teary eyes  What may interact with this medicine?    allopurinol    cimetidine    dapsone    digoxin    hydroxyurea    leucovorin    levamisole    medicines for seizures like ethotoin, fosphenytoin, phenytoin    medicines to increase blood counts like filgrastim, pegfilgrastim, sargramostim    medicines that treat or prevent blood clots like warfarin, enoxaparin, and dalteparin    methotrexate    metronidazole    pyrimethamine    some other chemotherapy drugs like busulfan, cisplatin, estramustine, vinblastine    trimethoprim    trimetrexate    vaccines  Talk to your doctor or health care professional before taking any of these medicines:    acetaminophen    aspirin    ibuprofen    ketoprofen    naproxen  What if I miss a dose?  It is important not to miss your dose. Call your doctor or health care professional if you are unable to keep an appointment.  Where should I keep my medicine?  This drug is given in a hospital or clinic and will not be stored at home.  What should I tell my health care provider before I take this medicine?  They need to know if you have any of these conditions:    blood disorders    dihydropyrimidine dehydrogenase (DPD) deficiency    infection (especially a virus infection such as chickenpox, cold sores, or herpes)    kidney disease    liver disease    malnourished, poor nutrition    recent or ongoing radiation therapy    an unusual or allergic reaction to fluorouracil, other chemotherapy, other medicines, foods, dyes, or preservatives    pregnant  or trying to get pregnant    breast-feeding  What should I watch for while using this medicine?  Visit your doctor for checks on your progress. This drug may make you feel generally unwell. This is not uncommon, as chemotherapy can affect healthy cells as well as cancer cells. Report any side effects. Continue your course of treatment even though you feel ill unless your doctor tells you to stop.  In some cases, you may be given additional medicines to help with side effects. Follow all directions for their use.  Call your doctor or health care professional for advice if you get a fever, chills or sore throat, or other symptoms of a cold or flu. Do not treat yourself. This drug decreases your body's ability to fight infections. Try to avoid being around people who are sick.  This medicine may increase your risk to bruise or bleed. Call your doctor or health care professional if you notice any unusual bleeding.  Be careful brushing and flossing your teeth or using a toothpick because you may get an infection or bleed more easily. If you have any dental work done, tell your dentist you are receiving this medicine.  Avoid taking products that contain aspirin, acetaminophen, ibuprofen, naproxen, or ketoprofen unless instructed by your doctor. These medicines may hide a fever.  Do not become pregnant while taking this medicine. Women should inform their doctor if they wish to become pregnant or think they might be pregnant. There is a potential for serious side effects to an unborn child. Talk to your health care professional or pharmacist for more information. Do not breast-feed an infant while taking this medicine.  Men should inform their doctor if they wish to father a child. This medicine may lower sperm counts.  Do not treat diarrhea with over the counter products. Contact your doctor if you have diarrhea that lasts more than 2 days or if it is severe and watery.  This medicine can make you more sensitive to the  sun. Keep out of the sun. If you cannot avoid being in the sun, wear protective clothing and use sunscreen. Do not use sun lamps or tanning beds/booths.  NOTE:This sheet is a summary. It may not cover all possible information. If you have questions about this medicine, talk to your doctor, pharmacist, or health care provider. Copyright  2018 [employer]

## 2021-06-18 NOTE — PATIENT INSTRUCTIONS - HE
Patient Instructions by Marcial Cheng MD at 3/26/2021 10:00 AM     Author: Marcial Cheng MD Service: -- Author Type: Physician    Filed: 3/26/2021 10:42 AM Encounter Date: 3/26/2021 Status: Signed    : Marcial Cheng MD (Physician)       Hearing aid consultation  Ear drops as directed for 1 week  Return visit 3 months with hearing test    Patient Education     Styles of Hearing Aids  The most common hearing aid styles and features are described below. Keep in mind that some styles and features cost more than others. If you find the best hearing aids for you, though, the benefits may outweigh the cost.             In-the-ear (ITE)    For mild to moderate hearing loss.     Electrical parts are contained in a plastic case that fits into the outer ear and extends into the ear canal.    Hearing aid fits inside the ear, but can be seen from outside the ear.    Sound is sent into the ear by a  in the ear canal.   In-the-canal (ITC)    For mild to moderately severe hearing loss.    Electrical parts are contained in a plastic case that fits into the ear opening and extends into the ear canal.    Hearing aid fits inside the ear; harder to see than ITE models.    Sound is sent into the ear by a  in the ear lisa  Open ear    For mild to severe hearing loss. This type is better for high-frequency hearing loss.     Electrical parts are contained in a plastic case hidden behind the ear; speaker pieces fit deeply into the ear canal.    Hearing aid is very hard to see; clear pieces blend in with skin color.    Sound is sent into the ear through a plastic tube or wire to a  in the ear canal.  Completely-in-canal (CIC)    For mild to moderately severe hearing loss.    Electrical parts are contained in a plastic case that fits inside the ear canal.    Hearing aid can hardly be seen.    Sound is sent into the ear by a  in the ear canal.  Behind-the-ear (BTE)    For mild to profound hearing  loss.    Electrical parts are contained in a plastic case hidden behind the ear.    Casing can be seen behind the ear; different colors and sizes are available.    Sound is sent into the ear through a plastic tube in the ear canal.  Optional features  Extra features can tailor hearing aids to your needs. A few common ones are listed below.    Telecoils let you switch from the hearing aids microphone to a special setting that works better with telephones and in certain auditoriums, theaters, and museums that have telecoil-compatible sound systems.    Direct audio input feeds sound from a sound system, computer, radio, or microphone directly into your hearing aids.    Directional microphones  head-on sounds and reduce background noise.    Customized listening programs store programs to adapt to changes in noise level. For example, you can have one program for your home and another for crowds.  Noncustom hearing aids  The hearing aids shown here are customized based on the shape of your ear and the nature of your hearing loss. Noncustom (ready to wear) hearing aids may also be an option. These are often purchased through mail order or over the counter. Noncustom hearing aids may cost less than custom hearing aids. But they may not work as well because they have not been programmed for your specific hearing needs. And since theyre not made to fit the shape of your ear, noncustom hearing aids may not be as comfortable. Your hearing professional can tell you more.   Date Last Reviewed: 7/1/2016 2000-2019 [employer]. [address] [place] [address], [place] [address]. All rights reserved. This information is not intended as a substitute for professional medical care. Always follow your healthcare professional's instructions.

## 2021-06-20 NOTE — LETTER
Letter by Jennifer Soto AuD at      Author: Jennifer Soto AuD Service: -- Author Type: --    Filed:  Encounter Date: 9/25/2020 Status: (Other)       [facility]- Audiology Benefit Letter    JEAN-CLAUDE HO  1950  [address] [place] [address]    Insurance Company: Payor: [facility] / Plan: [facility] MEDICARE / Product Type: MEDICARE ADVANTAGE /      [place] Product: No    ID # :  [id number]    Group#:  N/A    Estimate of Benefits  Consult Visit Benefits:  ARE MEDICARE ADVANTAGE 10/2020 RENEA     HAE, HAF, HAC Benefits:    NOT COVERED, NEED TO GO THROUGH [employer] [phone]    Batteries/Accessories Coverage:   NOT COVERED, NEED TO GO THROUGH [employer] [phone]    Representative name: SOLO Ibarra reference:   Date of contact: 9/25/2020    Insurance verified today by ODESSA ANN    Additional Information:   [email or web address]    The information provided is an estimate of benefits. This does not guarantee coverage; the insurance company will make the final determination of coverage to include patient responsibility of payment by the patient.   [facility] is not responsible for the decisions made by the insurance company regarding coverage.  Any changes to coverage (new plan or new policy) or procedures may void the contents provided in this letter.     Term Definitions  Patient responsibility: Can include but not limited to: co-pays, co-insurance, deductibles, out-of-pocket and non-covered and/or policy exclusions.

## 2021-06-20 NOTE — LETTER
Letter by Fidencio Robles MD at      Author: Fidencio Robles MD Service: -- Author Type: --    Filed:  Encounter Date: 2/25/2020 Status: (Other)         Laurita Mendoza MD  [address] [facility] [address]                                  February 25, 2020    Patient: Kristen Chapman   MR Number: [id number]   YOB: 1950   Date of Visit: 2/25/2020     Dear Dr. Reji MD:    Thank you for referring Kristen Chapman to me for evaluation. Below are the relevant portions of my assessment and plan of care.    If you have questions, please do not hesitate to call me. I look forward to following Kristen along with you.    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 He had bilateral hilar lymphadenopathy which was noted to be FDG-avid on recent PET/CT thus he was referred to pulmonary clinic to discuss bronchoscopy with EBUS for tissue sampling.    Recommendations:  We will proceed with EBUS/TBNA of hilar lymph nodes at the request of Dr. Frias. We will try to get him scheduled on Thursday, if it doesn't conflict with radiation treatments. I will defer to Dr.'s Frias and Reji whether the radiation treatment can be rescheduled or pushed back.   He can follow up with me as needed, and follow up with Dr. Frias to discuss the biopsy results.    All questions answered with a licensed [employer] . His daughter was also present and I answered all her questions.      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   He was referred by Dr. Frias. He will be starting systemic chemotherapy with weekly cisplatin followed by 5-FU. He will also be starting radiation treatments with Dr. Mendoza on Thursday, the day of the proposed procedure.  He denies any cough, fevers, chills, shortness of breath, hemoptysis or chest pain. Generally feels fine without any specific complaints.     ROS:  A 12-system review was obtained and was negative with the exception of the symptoms endorsed in the history of present illness.    PMH:  Past Medical History:   Diagnosis Date   ? Nasopharyngeal cancer (H)        PSH:  No past surgical history on file.    Allergies:  No Known Allergies    Family HX:  Family History   Family history unknown: Yes       Social Hx:  Social History     Socioeconomic History   ? Marital status:      Spouse name: Not on file   ? Number of children: Not on file   ? Years of education: Not on file   ? Highest education level: Not on file   Occupational History   ? Not on file   Social Needs   ? Financial resource strain: Not on file   ? Food insecurity:     Worry: Not on file     Inability: Not on file   ? Transportation needs:     Medical: Not on file     Non-medical: Not on file   Tobacco Use   ? Smoking status: Never Smoker   ? Smokeless tobacco: Never Used   Substance and Sexual Activity   ? Alcohol use: Not Currently   ? Drug use: Not Currently   ? Sexual activity: Not Currently   Lifestyle   ? Physical activity:     Days per week: Not on file     Minutes per session: Not on file   ? Stress: Not on file   Relationships   ? Social connections:     Talks on phone: Not on file     Gets together: Not on file     Attends Zoroastrianism service: Not on file     Active member of club or organization: Not on file     Attends meetings of clubs or organizations: Not on file     Relationship status: Not on file   ? Intimate partner violence:      Fear of current or ex partner: Not on file     Emotionally abused: Not on file     Physically abused: Not on file     Forced sexual activity: Not on file   Other Topics Concern   ? Not on file   Social History Narrative   ? Not on file       Current Meds:  Current Outpatient Medications   Medication Sig Dispense Refill   ? dexAMETHasone (DECADRON) 4 MG tablet Take 8mg daily in the morning for 2 days, starting the day after chemotherapy.. 24 tablet 0   ? HYDROcodone-acetaminophen 5-325 mg per tablet Take 1 tablet by mouth every 6 (six) hours as needed for pain. 30 tablet 0   ? ondansetron (ZOFRAN) 4 MG tablet Take 1-2 tablets (4-8 mg total) by mouth every 6 (six) hours as needed for nausea. 30 tablet 1     No current facility-administered medications for this visit.        Physical Exam:  There were no vitals taken for this visit.  Gen: awake, alert, oriented, no distress  HEENT: nasal turbinates are unremarkable, no oropharyngeal lesions, no cervical or supraclavicular lymphadenopathy  CV: RRR, no M/G/R  Resp: CTAB, no focal crackles or wheezes  Abd: soft, nontender, no palpable organomegaly  Skin: no apparent rashes  Ext: no cyanosis, clubbing or edema  Neuro: alert, nonfocal    Labs:  reviewed    Imaging studies:  PET/CT  IMPRESSION:   1.  Findings consistent with nasopharyngeal carcinoma and bilateral cervical lymph node metastases  2.  FDG avid bilateral hilar lymph nodes are likely distant metastases, but technically indeterminate, as granulomatous inflammation frequently has this appearance at PET CT. Consider endobronchial ultrasound-guided biopsy for reliable staging    PFT's   n/a

## 2021-06-21 NOTE — LETTER
Letter by Karen De Los Santos RN at      Author: Karen De Los Santos RN Service: -- Author Type: --    Filed:  Encounter Date: 3/12/2021 Status: (Other)         Kristen Chapman  [address] [place] [address]             March 12, 2021         Dear Mr. Chapman,    Below are the results from your recent visit:    The results of your TSH (thyroid stimulating hormone) came back high today. Dr. Mendoza would like you to follow up with your Primary Care Doctor as soon as possible as they may want to put you on a daily pill to adjust that level.     Sincerely,        Electronically signed by Karen De Los Santos RN

## 2021-06-21 NOTE — LETTER
Letter by Alan Frias MD at      Author: Alan Frias MD Service: -- Author Type: --    Filed:  Encounter Date: 2/22/2021 Status: (Other)       February 22, 2021            Kristen Ari  [address] [place] [address]        Dear Kristen,    This letter is a reminder that you are due for a follow up appointment with  .  Please call [phone] and we can help schedule an appointment.     Thank you .          [facility] Care Team

## 2021-06-21 NOTE — LETTER
Letter by Mike Cook MD at      Author: Mike Cook MD Service: -- Author Type: --    Filed:  Encounter Date: 3/25/2021 Status: (Other)         Kristen Chapamn  [address] [place] [address]             March 25, 2021         Dear Mr. Chapman,    Below are the results from your recent visit:  Low thyroid.   We'll go ahead and start him of levothyroxine 50 mcg with a recheck level in 4 to 6 weeks, please schedule a follow up appointment in 4-6 weeks.     Resulted Orders   T4, Free   Result Value Ref Range    Free T4 0.4 (L) 0.7 - 1.8 ng/dL   T3 (Triiodothyronine), Free   Result Value Ref Range    T3, Free 2.4 1.9 - 3.9 pg/mL         Please call with questions or contact us using [employer].    Sincerely,        Electronically signed by Mike Cook MD

## 2021-06-25 ENCOUNTER — RECORDS - HEALTHEAST (OUTPATIENT)
Dept: ADMINISTRATIVE | Facility: OTHER | Age: 71
End: 2021-06-25

## 2021-06-26 NOTE — PROGRESS NOTES
AUDIOLOGY REPORT    SUBJECTIVE: Kristen Chapman, 71 y.o.  year old male, was seen on 06/09/21 for a hearing aid evaluation. He was accompanied by son. His hearing was assessed on 5/7/21 and results revealed a mild sensorineural hearing loss to normal hearing with conductive overlay sloping to profound primarily conductive hearing loss in the right ear and a mild sensorineural hearing loss to normal hearing with a conductive overlay sloping to severe primarily conductive hearing loss in the left ear. 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 Medical clearance was provided by Dr. Cheng.     OBJECTIVE: Reviewed patient's audiogram and the relationship between hearing loss and communication. Kristen is a bilateral hearing aid candidate. Discussed with patient that he has insurance benefits for hearing aids through [employer]. At this time, the clinic is not a [employer] provider. Informed patient that he is more than welcome to pursue hearing aid services at our clinic, but would be as a self pay option. Kristen would like to look into his benefits with [employer] prior to proceeding with hearing aid services.  Therefore the hearing aid evaluation was not completed today.  Kristen was provided with a copy of his audiogram and the office visit note from Dr. Cheng indicating he was medically cleared to wear hearing aids. He was also given contact information for [employer].       ASSESSMENT: Hearing aid evaluation was not completed today as patient would like to look into benefits with [employer] prior to proceeding. Today's appointment is at no cost as no billable services were rendered.       PLAN: Kristen will return for a hearing aid evaluation if he would like to continue with hearing aid services at our clinic as a self pay patient. Please contact our clinic at [phone] with questions or concerns.    Louisa Ramos, [facility]  Clinical Audiologist  [place]  #[id number]

## 2021-06-28 NOTE — PROGRESS NOTES
"Progress Notes by Prudence Parsons CNP at 1/24/2020  8:40 AM     Author: Prudence Parsons CNP Service: -- Author Type: Nurse Practitioner    Filed: 1/24/2020 11:09 AM Encounter Date: 1/24/2020 Status: Signed    : Prudence Parsons CNP (Nurse Practitioner)       Chief Complaint   Patient presents with   ? Mass     [id number], on R side of neck, denies any pain but is bothersome       HPI:  Kristen Chapman is a 69 y.o. male who presents today complaining of sudden onset about 1 month of right sided lumps, that have been painless, swollen and persistently present. Denies any smoking history. Denies any cancer hisotry. Denies any throat surgeries. Denies any unintended weight loss, fever or chills. Reports no changes in appetite or increased fatigue.     History obtained from the patient.    Problem List:  There are no relevant problems documented for this patient.      No past medical history on file.    Social History     Tobacco Use   ? Smoking status: Never Smoker   ? Smokeless tobacco: Never Used   Substance Use Topics   ? Alcohol use: Not on file       Review of Systems   Constitutional: Negative for activity change, appetite change, chills, diaphoresis, fatigue, fever and unexpected weight change.   HENT: Negative for congestion, ear pain, sore throat, trouble swallowing and voice change.    Respiratory: Negative.    Genitourinary: Negative.    Neurological: Negative for weakness and headaches.   All other systems reviewed and are negative.      Vitals:    01/24/20 0853 01/24/20 0923   BP: 163/90 154/89   Patient Site: Right Arm    Patient Position: Sitting    Cuff Size: Adult Regular    Pulse: 61    Resp: 16    Temp: 97.8  F (36.6  C)    TempSrc: Oral    SpO2: 97%    Weight: 158 lb 7 oz (71.9 kg)    Height: 5' 2.85\" (1.596 m)        Physical Exam  Constitutional:       General: He is not in acute distress.     Appearance: Normal appearance. He is not ill-appearing, toxic-appearing or diaphoretic.   HENT:      " Head: Normocephalic.      Right Ear: Tympanic membrane, ear canal and external ear normal. There is no impacted cerumen.      Left Ear: Tympanic membrane, ear canal and external ear normal. There is no impacted cerumen.      Nose: No congestion or rhinorrhea.      Mouth/Throat:      Mouth: Mucous membranes are moist.      Pharynx: No oropharyngeal exudate or posterior oropharyngeal erythema.   Neck:      Musculoskeletal: Neck supple.      Comments: RIGHT neck noted with three fixed, non-mobile, non-tender along anterior cervical chain. With sizes range from 1-3cm.   Cardiovascular:      Rate and Rhythm: Normal rate and regular rhythm.      Pulses: Normal pulses.      Heart sounds: Normal heart sounds. No murmur. No friction rub. No gallop.    Pulmonary:      Effort: Pulmonary effort is normal. No respiratory distress.      Breath sounds: Normal breath sounds. No stridor. No wheezing, rhonchi or rales.   Chest:      Chest wall: No tenderness.   Lymphadenopathy:      Cervical: Cervical adenopathy present.   Skin:     General: Skin is warm.      Capillary Refill: Capillary refill takes less than 2 seconds.      Coloration: Skin is not pale.      Findings: No erythema or rash.   Neurological:      General: No focal deficit present.      Mental Status: He is alert and oriented to person, place, and time. Mental status is at baseline.   Psychiatric:         Mood and Affect: Mood normal.         Behavior: Behavior normal.         No notes on file    Labs:  Recent Results (from the past 72 hour(s))   Basic Metabolic Panel   Result Value Ref Range    Sodium 142 136 - 145 mmol/L    Potassium 4.1 3.5 - 5.0 mmol/L    Chloride 109 (H) 98 - 107 mmol/L    CO2 26 22 - 31 mmol/L    Anion Gap, Calculation 7 5 - 18 mmol/L    Glucose 89 70 - 125 mg/dL    Calcium 8.7 8.5 - 10.5 mg/dL    BUN 22 8 - 22 mg/dL    Creatinine 1.16 0.70 - 1.30 mg/dL    GFR MDRD Af Amer >60 >60 mL/min/1.73m2    GFR MDRD Non Af Amer >60 >60 mL/min/1.73m2        Radiology: CT NECK Soft tissue ordered for ENT evaluation   No results found.    Clinical Decision Making: At the end of the encounter, discussed with patient and daughter concerning symptoms given then clinical presentation. Reached out ENT team and they will evaluate early next week following further imaging. Advised would obtain BMP and CT NECK soft tissue today and will follow up as scheduled. Patient and daughter understood and agreed to plan.     SPARKLE Torres, CNP     1. Multiple masses of neck  Basic Metabolic Panel    CT Soft Tissue Neck With Contrast         Patient Instructions     Patient Education     Mouth and Throat Tumors  Finding out you have a tumor is scary. You may wonder what effect it will have on your life. As you and your doctors decide on your treatment, some of your concerns will be resolved. And moving forward, your healthcare team can help you learn ways to help yourself.  What is a tumor?  A tumor is a mass of abnormal cells. It is either benign (slow growing, not cancerous) or malignant (fast growing, cancerous). Some tumors, especially cancerous ones, can be life-threatening. But most tumors can be treated.  Risk factors for a cancerous tumor  You are more likely to get a tumor of the mouth or throat if you:    Smoke cigarettes, pipes, or cigars    Use chewing tobacco or snuff    Drink alcohol    Take poor care of your teeth    Are exposed to certain industrial chemicals    Had a mouth or throat tumor in the past    Have a human papillomavirus (HPV) infection  Signs and symptoms of a tumor in the mouth  If you have a mouth tumor, you or your doctor may have noticed one or more of the following:    White or red patches on tissues or gums    Pain that doesnt go away    A sore that doesnt heal in a week or two    Bleeding that doesnt stop after a few days    A swelling or lump that doesnt go away    Problems with your teeth, dentures, or chewing  Signs and symptoms of a tumor  in the throat  If you have a throat tumor, you or your doctor may have noticed one or more of the following:    Hoarseness that doesnt go away    Trouble swallowing    A lump in your neck    Pain that doesnt go away    Aching, pain, or pressure in your ear    Persistent coughing with or without bloody sputum  Date Last Reviewed: 11/1/2016 2000-2019 [email or web address]. [address] [place] [address]. All rights reserved. This information is not intended as a substitute for professional medical care. Always follow your healthcare professional's instructions.

## 2021-06-29 NOTE — PROGRESS NOTES
Progress Notes by Karen De Los Santos RN at 5/11/2020  8:36 AM     Author: Karen De Los Santos RN Service: -- Author Type: Registered Nurse    Filed: 5/11/2020  8:38 AM Encounter Date: 5/11/2020 Status: Signed    : Karen De Los Santos RN (Registered Nurse)     Summary: EMAIL COMMUNICATION WITH PT'S DAUGHTER      Mracin Corona,    I spoke with Dr. Mendoza this morning about extending your FMLA. He doesnt feel comfortable doing that at this time. It already is set to go through June 15th and he thinks your dad will be doing much better at that time (still over a month away). We can re-address the FMLA as that time approaches if your dad is still doing poorly.     Thanks,    Karen De Los Santos RN, MAN, OCN  Radiation Oncology Nurse  [address] [address]  [email or web address]   [email or web address]   Office: [phone]            From: Alisha Tierney <[email or web address]>   Sent: Friday, May 08, 2020 3:02 PM  To: Sarika Carl <[email or web address]>; Karen De Los Santos <[email or web address]>; Flores Mercedes <[email or web address]>  Subject: FW: Family Medical Leave    Another message:    Brenden baires I forgot to add if Dr. Mendoza is updating on the previous document, please add initial and date to where new information is written.    Thank you,    Cj      From: Alisha Tierney   Sent: Friday, May 08, 2020 2:51 PM  To: Sarika Carl <[email or web address]>; Karen De Los Santos <[email or web address]>; Flores Mercedes <[email or web address]>  Subject: FW: Family Medical Leave    Hello!    I am hoping one of you can assist?  I think one of you may have done Samtaina before.  I did dtr, Nhia and son, Nayla!    I just dont have access to [employer] for signature and a fax machine.    I appreciate the help!    Can you e0mail Cj when you have it completed?    ThanksAlisha  Ps:  I am off next week on furlough.    From: Cj Chapman [email or web address]>   Sent: Friday, May  08, 2020 2:38 PM  To: Alisha Tierney <[email or web address]>  Subject: Family Medical Leave    Good afternoon Alisha,    My name is Cj Chapman and I am the daughter of Kristen Chapman. Previously, you assisted in submitting documents to my workplace in order to process my family medical leave act (FMLA). The FMLA was approved but only until June 15th. Given that we had a few set back with chemo for my father and there are still additional upcoming appointments, I will need to have the FMLA extended further than June 15th. I spoke to my assigned FMLA specialist who explained that the provider could either fill in a new document or update information on the previously submitted document (both are attached) and fax them in order to extend my FMLA. The fax number is provided in the document, please send attention to Roge Mercedes.    Please advise if you have any questions. I would like a response to ensure this e-mail was received.    Thank you,    Cj Chapman   I  [employer]  [address] [place] [address] [address]  [place] [address]  Phone: [phone]  E-mail: [email or web address]

## 2021-06-29 NOTE — PROGRESS NOTES
"Progress Notes by Jennifer Soto AuD at 9/25/2020  8:00 AM     Author: Jennifer Soto AuD Service: -- Author Type: Audiologist    Filed: 9/25/2020 10:34 AM Encounter Date: 9/25/2020 Status: Signed    : Jennifer Soto AuD (Audiologist)       Audiology Report    Summary: Audiology visit completed. Please see audiogram below or in \"media\" tab for case history and results.     Plan:  The patient is returned to ENT for follow up. Return for retesting with ENT recommendation.     Louisa Portillo, [facility]  Clinical Audiologist  [place] #[id number]           "

## 2021-06-30 NOTE — PROGRESS NOTES
"Progress Notes by Marla Garcia AuD at 5/7/2021  9:20 AM     Author: Marla Garcia AuD Service: -- Author Type: Audiologist    Filed: 5/7/2021 10:07 AM Encounter Date: 5/7/2021 Status: Signed    : Marla Garcia AuD (Audiologist)       Audiology Report    Summary: Audiology visit completed. Please see audiogram below or in \"media\" tab for case history and results.     Plan:  The patient is returned to ENT for follow up. Return for retesting with ENT recommendation.     Louisa Ramos, [facility]  Clinical Audiologist  [place] #[id number]           "

## 2021-07-02 NOTE — H&P
H&P by Calista Jeffrey NP at 3/2/2020 11:00 AM     Author: Calista Jeffrey NP Service: Interventional Radiology Author Type: Nurse Practitioner    Filed: 3/2/2020 10:00 AM Date of Service: 3/2/2020 11:00 AM Status: Signed    : Calista Jeffrey NP (Nurse Practitioner)         Interventional Radiology - History and Physical  3/2/2020    Procedure Requested: Port placement   Requesting Provider: Dr. Mendoza     HPI: Kristen Chapman is a 69 y.o. 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  This is to start tomorrow.      Patient seen with an  as well as his daughter at the bedside.        NPO Status: Midnight   Anticoagulation/Antiplatelets/Bleeding tendencies: None   Antibiotics: Ancef dose ordered pre procedure     Review of Systems: A comprehensive 10-point review of systems was performed. All systems were reviewed and negative with exception to those reported in the HPI.    PMH:  Past Medical History:   Diagnosis Date   ? Nasopharyngeal cancer (H)        PSH:  History reviewed. No pertinent surgical history.    ALLERGIES  Patient has no known allergies.    MEDICATIONS:  Current Outpatient Medications on File Prior to Encounter   Medication Sig Dispense Refill   ? HYDROcodone-acetaminophen 5-325 mg per tablet Take 1 tablet by mouth every 6 (six) hours as needed for pain. 30 tablet 0   ? dexAMETHasone (DECADRON) 4 MG tablet Take 8mg daily in the morning for 2 days, starting the day after chemotherapy.. 24 tablet 0   ? ondansetron (ZOFRAN) 4 MG tablet Take 1-2 tablets (4-8 mg total) by mouth every 6 (six) hours as needed for nausea. 30 tablet 1     No current facility-administered medications on file prior to encounter.        EXAM:  BP (!) 179/93   Pulse 64   Temp 98  F (36.7  C) (Oral)   Resp 16   SpO2 98%   General: Stable.  In no acute distress  Neuro: A&O x3.  Moves all extremities equally.  Neck:  Visible and palpable moderate sized right neck mass.  Resp: Lungs CTA bilaterally.  Cardio: S1S2 and reg, without murmur, clicks or rubs.  Skin:  Upper chest clean and clear.    MSK:  No gross motor weakness.  Sensation intact.        Pre-Sedation Assessment:  Mallampati Airway Classification: Class 2: upper half of tonsil fossa visible  Previous reaction to anesthesia/sedation: no  Sedation plan based on assessment: Moderate  Sleep Apnea: no  Dentures: no  COPD: no  ASA Classification: ASA 3 - Patient with moderate systemic disease with functional limitations  Comments: no hx of asthma       ASSESSMENT:  69 y.o. 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        PLAN:    Left chest port placement with sedation.      The procedure, risks and moderate sedation were discussed with patient, all questions answered and patient agrees to proceed with the procedure.  Written consent obtained.      Calista Jeffrey [facility]  Interventional Radiology  [phone]

## 2021-07-03 ENCOUNTER — HEALTH MAINTENANCE LETTER (OUTPATIENT)
Age: 71
End: 2021-07-03

## 2021-07-03 NOTE — ADDENDUM NOTE
Addendum Note by Eduardo Grier MD at 7/13/2020  9:00 AM     Author: Eduardo Grier MD Service: -- Author Type: Physician    Filed: 7/13/2020 10:01 AM Encounter Date: 7/13/2020 Status: Signed    : Eduardo Grier MD (Physician)    Addended by: EDUARDO GRIER on: 7/13/2020 10:01 AM        Modules accepted: Orders

## 2021-07-03 NOTE — ADDENDUM NOTE
Addendum Note by Eduardo Grier MD at 7/13/2020  9:00 AM     Author: Eduardo Grier MD Service: -- Author Type: Physician    Filed: 7/13/2020 10:15 AM Encounter Date: 7/13/2020 Status: Signed    : Eduardo Grier MD (Physician)    Addended by: EDUARDO GRIER on: 7/13/2020 10:15 AM        Modules accepted: Orders

## 2021-07-03 NOTE — ADDENDUM NOTE
Addendum Note by Isabella Torres RN at 4/21/2020  9:30 AM     Author: Isabella Torres RN Service: -- Author Type: Registered Nurse    Filed: 4/21/2020 11:30 AM Encounter Date: 4/21/2020 Status: Signed    : Isabella Torres RN (Registered Nurse)    Addended by: ISABELLA TORRES on: 4/21/2020 11:30 AM        Modules accepted: Orders

## 2021-07-03 NOTE — ADDENDUM NOTE
Addendum Note by Alka Gonzalez, RN at 6/30/2020  8:15 AM     Author: Alka Gonzalez RN Service: -- Author Type: Registered Nurse    Filed: 6/30/2020  9:51 AM Encounter Date: 6/30/2020 Status: Signed    : Alka Gonzalez RN (Registered Nurse)    Addended by: ALKA GONZALEZ on: 6/30/2020 09:51 AM        Modules accepted: Orders, SmartSet

## 2021-07-03 NOTE — ADDENDUM NOTE
Addendum Note by Eduardo Grier MD at 9/14/2020  8:50 AM     Author: Eduardo Grier MD Service: -- Author Type: Physician    Filed: 9/21/2020  8:22 AM Encounter Date: 9/14/2020 Status: Signed    : Eduardo Grier MD (Physician)    Addended by: EDUARDO GRIER on: 9/21/2020 08:22 AM        Modules accepted: Orders

## 2021-07-03 NOTE — ADDENDUM NOTE
Addendum Note by Inga Fitzpatrick CNP at 4/21/2020  9:30 AM     Author: Inga Fitzpatrick CNP Service: -- Author Type: Nurse Practitioner    Filed: 4/21/2020 11:09 AM Encounter Date: 4/21/2020 Status: Signed    : Inga Fitzpatrick CNP (Nurse Practitioner)    Addended by: INGA FITZPATRICK on: 4/21/2020 11:09 AM        Modules accepted: Orders

## 2021-07-30 ENCOUNTER — HOSPITAL ENCOUNTER (OUTPATIENT)
Dept: MRI IMAGING | Facility: HOSPITAL | Age: 71
Discharge: HOME | End: 2021-07-30
Attending: NURSE PRACTITIONER | Admitting: NURSE PRACTITIONER
Payer: COMMERCIAL

## 2021-07-30 DIAGNOSIS — C11.9 NASOPHARYNGEAL CARCINOMA (H): ICD-10-CM

## 2021-07-30 PROCEDURE — A9585 GADOBUTROL INJECTION: HCPCS | Performed by: NURSE PRACTITIONER

## 2021-07-30 PROCEDURE — 255N000002 HC RX 255 OP 636: Performed by: NURSE PRACTITIONER

## 2021-07-30 PROCEDURE — 70553 MRI BRAIN STEM W/O & W/DYE: CPT

## 2021-07-30 RX ORDER — GADOBUTROL 604.72 MG/ML
7 INJECTION INTRAVENOUS ONCE
Status: COMPLETED | OUTPATIENT
Start: 2021-07-30 | End: 2021-07-30

## 2021-07-30 RX ADMIN — GADOBUTROL 7 ML: 604.72 INJECTION INTRAVENOUS at 09:12

## 2021-08-03 ENCOUNTER — MEDICAL CORRESPONDENCE (OUTPATIENT)
Dept: HEALTH INFORMATION MANAGEMENT | Facility: CLINIC | Age: 71
End: 2021-08-03

## 2021-08-03 ENCOUNTER — ONCOLOGY VISIT (OUTPATIENT)
Dept: ONCOLOGY | Facility: HOSPITAL | Age: 71
End: 2021-08-03
Attending: INTERNAL MEDICINE
Payer: COMMERCIAL

## 2021-08-03 ENCOUNTER — LAB (OUTPATIENT)
Dept: INFUSION THERAPY | Facility: HOSPITAL | Age: 71
End: 2021-08-03
Attending: INTERNAL MEDICINE
Payer: COMMERCIAL

## 2021-08-03 VITALS
RESPIRATION RATE: 16 BRPM | HEART RATE: 60 BPM | SYSTOLIC BLOOD PRESSURE: 142 MMHG | WEIGHT: 149 LBS | TEMPERATURE: 98 F | HEIGHT: 63 IN | BODY MASS INDEX: 26.4 KG/M2 | DIASTOLIC BLOOD PRESSURE: 78 MMHG | OXYGEN SATURATION: 98 %

## 2021-08-03 DIAGNOSIS — Z11.59 ENCOUNTER FOR SCREENING FOR OTHER VIRAL DISEASES: ICD-10-CM

## 2021-08-03 DIAGNOSIS — C11.9 NASOPHARYNGEAL CARCINOMA (H): Primary | ICD-10-CM

## 2021-08-03 DIAGNOSIS — C11.9 NASOPHARYNGEAL CARCINOMA (H): ICD-10-CM

## 2021-08-03 DIAGNOSIS — E03.9 ACQUIRED HYPOTHYROIDISM: ICD-10-CM

## 2021-08-03 LAB
ALBUMIN SERPL-MCNC: 3.8 G/DL (ref 3.5–5)
ALP SERPL-CCNC: 95 U/L (ref 45–120)
ALT SERPL W P-5'-P-CCNC: 98 U/L (ref 0–45)
ANION GAP SERPL CALCULATED.3IONS-SCNC: 5 MMOL/L (ref 5–18)
AST SERPL W P-5'-P-CCNC: 93 U/L (ref 0–40)
BASOPHILS # BLD AUTO: 0 10E3/UL (ref 0–0.2)
BASOPHILS NFR BLD AUTO: 1 %
BILIRUB SERPL-MCNC: 1.2 MG/DL (ref 0–1)
BUN SERPL-MCNC: 24 MG/DL (ref 8–22)
CALCIUM SERPL-MCNC: 9.1 MG/DL (ref 8.5–10.5)
CHLORIDE BLD-SCNC: 106 MMOL/L (ref 98–107)
CO2 SERPL-SCNC: 29 MMOL/L (ref 22–31)
CREAT SERPL-MCNC: 1.39 MG/DL (ref 0.7–1.3)
EOSINOPHIL # BLD AUTO: 0.1 10E3/UL (ref 0–0.7)
EOSINOPHIL NFR BLD AUTO: 4 %
ERYTHROCYTE [DISTWIDTH] IN BLOOD BY AUTOMATED COUNT: 15.4 % (ref 10–15)
GFR SERPL CREATININE-BSD FRML MDRD: 51 ML/MIN/1.73M2
GLUCOSE BLD-MCNC: 122 MG/DL (ref 70–125)
HCT VFR BLD AUTO: 32.8 % (ref 40–53)
HGB BLD-MCNC: 10.1 G/DL (ref 13.3–17.7)
IMM GRANULOCYTES # BLD: 0 10E3/UL
IMM GRANULOCYTES NFR BLD: 0 %
LYMPHOCYTES # BLD AUTO: 0.6 10E3/UL (ref 0.8–5.3)
LYMPHOCYTES NFR BLD AUTO: 20 %
MCH RBC QN AUTO: 26.9 PG (ref 26.5–33)
MCHC RBC AUTO-ENTMCNC: 30.8 G/DL (ref 31.5–36.5)
MCV RBC AUTO: 87 FL (ref 78–100)
MONOCYTES # BLD AUTO: 0.3 10E3/UL (ref 0–1.3)
MONOCYTES NFR BLD AUTO: 9 %
NEUTROPHILS # BLD AUTO: 2 10E3/UL (ref 1.6–8.3)
NEUTROPHILS NFR BLD AUTO: 66 %
NRBC # BLD AUTO: 0 10E3/UL
NRBC BLD AUTO-RTO: 0 /100
PLATELET # BLD AUTO: 46 10E3/UL (ref 150–450)
POTASSIUM BLD-SCNC: 4.4 MMOL/L (ref 3.5–5)
PROT SERPL-MCNC: 7.3 G/DL (ref 6–8)
RBC # BLD AUTO: 3.76 10E6/UL (ref 4.4–5.9)
SODIUM SERPL-SCNC: 140 MMOL/L (ref 136–145)
WBC # BLD AUTO: 3.1 10E3/UL (ref 4–11)

## 2021-08-03 PROCEDURE — G0463 HOSPITAL OUTPT CLINIC VISIT: HCPCS

## 2021-08-03 PROCEDURE — 82040 ASSAY OF SERUM ALBUMIN: CPT

## 2021-08-03 PROCEDURE — 36415 COLL VENOUS BLD VENIPUNCTURE: CPT

## 2021-08-03 PROCEDURE — 99215 OFFICE O/P EST HI 40 MIN: CPT | Performed by: INTERNAL MEDICINE

## 2021-08-03 PROCEDURE — 85025 COMPLETE CBC W/AUTO DIFF WBC: CPT

## 2021-08-03 RX ORDER — LEVOTHYROXINE SODIUM 88 UG/1
88 TABLET ORAL DAILY
Qty: 60 TABLET | Refills: 3 | Status: SHIPPED | OUTPATIENT
Start: 2021-08-03 | End: 2021-10-22

## 2021-08-03 ASSESSMENT — MIFFLIN-ST. JEOR: SCORE: 1335.99

## 2021-08-03 ASSESSMENT — PAIN SCALES - GENERAL: PAINLEVEL: NO PAIN (0)

## 2021-08-03 NOTE — PROGRESS NOTES
DATE:  8/3/2021   TIME OF RECEIPT FROM LAB:  0903  LAB TEST:  Platelet count  LAB VALUE:  46  RESULTS GIVEN WITH READ-BACK TO (PROVIDER):     NADEEM FRIAS  VIDEO,   TIME LAB VALUE REPORTED TO PROVIDER:   Patient having a visit today with Dr Frias to go over these results.    Isabella Raymond RN

## 2021-08-03 NOTE — LETTER
"    8/3/2021         RE: Kristen Chapman  [address] [place] [address]        Dear Colleague,    Thank you for referring your patient, Kristen Chapman, to the [facility]. Please see a copy of my visit note below.    DATE:  8/3/2021   TIME OF RECEIPT FROM LAB:  0903  LAB TEST:  Platelet count  LAB VALUE:  46  RESULTS GIVEN WITH READ-BACK TO (PROVIDER):     NADEEM FRIAS  VIDEO,   TIME LAB VALUE REPORTED TO PROVIDER:   Patient having a visit today with Dr Frias to go over these results.    Isabella Raymond RN       Oncology Rooming Note    August 3, 2021 9:21 AM   Kristen Chapman is a 71 year old male who presents for:    Chief Complaint   Patient presents with     Oncology Clinic Visit     Nasopharyngeal carcinoma     Initial Vitals: BP (!) 142/78 (BP Location: Left arm, Patient Position: Sitting)   Pulse 60   Temp 98  F (36.7  C) (Oral)   Resp 16   Ht 1.6 m (5' 3\")   Wt 67.6 kg (149 lb)   SpO2 98%   BMI 26.39 kg/m   Estimated body mass index is 26.39 kg/m  as calculated from the following:    Height as of this encounter: 1.6 m (5' 3\").    Weight as of this encounter: 67.6 kg (149 lb). Body surface area is 1.73 meters squared.  No Pain (0) Comment: Data Unavailable   No LMP for male patient.  Allergies reviewed: Yes  Medications reviewed: Yes    Medications: Medication refills not needed today.  Pharmacy name entered into [employer]:    [facility] [address] [id number] - [facility] [address] [facility] [address]    Clinical concerns: can he have his feeding tube removed.  It was used this morning.      Tayler Rogers RN                [facility] Hematology and Oncology Progress Note    Patient: Kristen Chapman  MRN: [id number]  Date of Service: Aug 3, 2021        Assessment and Plan:    Cancer Staging  Nasopharyngeal carcinoma (H)  Staging form: Pharynx - Nasopharynx, AJCC 8th Edition  - Clinical stage from 2/18/2020: Stage SAMANTHA " (cT4, cN2, cM0) - Signed by Alan Frias MD on 2/18/2020     1.  Nasopharyngeal carcinoma: He recently had an MRI.  I personally reviewed the images.  There is some opacification of the right ethmoid air cells which is concerning for recurrent disease.  Nothing focally in the nasopharynx is noted.  I reviewed the images with the patient and his son.  Were going to refer him back to Dr. Cheng for a biopsy.  I will see the patient back in a week or so after to discuss steps going forward.  Their questions were answered.      2.  F/E/N: We are going to remove his G-tube as he is not using it much anymore.       3.  Anemia: He is profoundly hypothyroid as he stopped taking his medications.  This could be contributing to his anemia at this point.  Would like to reevaluate his anemia in the near future including checking for hemoglobinopathy and other nutritional deficiencies.     4.  Thrombocytopenia: His platelet count was normal in May 2020 prior to starting cisplatin/5-FU.  It has never fully recovered after that dose of chemotherapy.  It has, however, been stable for at least a year.  No complications.  If he needs to start chemo in the future we may need to consider a bone marrow biopsy to look for myelodysplasia and/or trial of steroids.     5.  Hypothyroidism: His labs were checked in March.  TSH was markedly elevated.  The patient is not taking Synthroid as he ran out and was not able to get a refill.  We will restart him on this medication now.    Total time spent on the visit today including document review, time with patient, and documentation was 50 minutes.    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  Diagnosed January 2020. Imaging suggested bilateral abnormal lymphadenopathy in the neck.  Also asymmetric soft tissue thickening in the posterior right nasopharynx.  On imaging, tumor also appeared to extend down to the  hypopharynx.    Treatment:    Initially treated with concurrent chemotherapy and radiation.  He had weekly cisplatin starting March 3, 2020.  Fifth and final dose given March 31, 2020.  Subsequent chemotherapy was held due to prolonged thrombocytopenia and renal insufficiency.  Radiation dose was 7000 cGy in 35 fractions given from March 2 through April 17, 2020.     Started cisplatin with infusional 5-FU on June 1, 2020.  Cycle 1 given at a 25% dose reduction.  He still had significant thrombocytopenia and neutropenia on day 28.  Cycle 2 was held.  At the same time his liver function tests elevated secondary to hepatitis B.     Interim History:    Kristen returns today for a follow-up visit.  He was last in our clinic about 3 months ago.  In the interim he has been doing okay.  He states that he gets intermittent nosebleeds bilaterally.  No headaches or vision changes.  He is not having any pain.    Review of Systems:    As above in the history.     Review of Systems otherwise Negative for:  General: chills, fever or night sweats  Psychological: anxiety or depression  Ophthalmic: blurry vision, double vision or loss of vision, vision change  ENT: epistaxis, oral lesions, hearing changes  Hematological and Lymphatic: bruising, jaundice, swollen lymph nodes  Endocrine: hot flashes, unexpected weight changes  Respiratory: cough, hemoptysis, orthopnea or shortness of breath/AYALA  Cardiovascular: chest pain, edema, palpitations or PND  Gastrointestinal: abdominal pain, blood in stools, change in bowel habits, constipation, diarrhea or nausea/vomiting  Genito-Urinary: change in urinary stream, incontinence, frequency/urgency  Musculoskeletal: joint pain, stiffness, swelling, muscle pain  Neurological: dizziness, headaches, numbness/tingling  Dermatological: lumps and rash    Past History:    Past Medical History:   Diagnosis Date     Nasopharyngeal cancer (H)      Physical Exam:    BP (!) 142/78 (BP Location: Left arm,  "Patient Position: Sitting)   Pulse 60   Temp 98  F (36.7  C) (Oral)   Resp 16   Ht 1.6 m (5' 3\")   Wt 67.6 kg (149 lb)   SpO2 98%   BMI 26.39 kg/m      General: patient appears stated age of 71 year old. Nontoxic and in no distress.   HEENT: Head: atraumatic, normocephalic. Sclerae anicteric.  Chest:  Normal respiratory effort  Cardiac:  No edema.   Abdomen: abdomen is non-distended  Extremities: normal tone and muscle bulk.  Skin: no lesions or rash on visible skin. Warm and dry.   CNS: alert and oriented. Grossly non-focal.   Psychiatric: normal mood and affect.     Lab Results:    Recent Results (from the past 168 hour(s))   Comprehensive metabolic panel   Result Value Ref Range    Sodium 140 136 - 145 mmol/L    Potassium 4.4 3.5 - 5.0 mmol/L    Chloride 106 98 - 107 mmol/L    Carbon Dioxide (CO2) 29 22 - 31 mmol/L    Anion Gap 5 5 - 18 mmol/L    Urea Nitrogen 24 (H) 8 - 22 mg/dL    Creatinine 1.39 (H) 0.70 - 1.30 mg/dL    Calcium 9.1 8.5 - 10.5 mg/dL    Glucose 122 70 - 125 mg/dL    Alkaline Phosphatase 95 45 - 120 U/L    AST 93 (H) 0 - 40 U/L    ALT 98 (H) 0 - 45 U/L    Protein Total 7.3 6.0 - 8.0 g/dL    Albumin 3.8 3.5 - 5.0 g/dL    Bilirubin Total 1.2 (H) 0.0 - 1.0 mg/dL    GFR Estimate 51 (L) >60 mL/min/1.73m2   CBC with platelets and differential   Result Value Ref Range    WBC Count 3.1 (L) 4.0 - 11.0 10e3/uL    RBC Count 3.76 (L) 4.40 - 5.90 10e6/uL    Hemoglobin 10.1 (L) 13.3 - 17.7 g/dL    Hematocrit 32.8 (L) 40.0 - 53.0 %    MCV 87 78 - 100 fL    MCH 26.9 26.5 - 33.0 pg    MCHC 30.8 (L) 31.5 - 36.5 g/dL    RDW 15.4 (H) 10.0 - 15.0 %    Platelet Count 46 (LL) 150 - 450 10e3/uL    % Neutrophils 66 %    % Lymphocytes 20 %    % Monocytes 9 %    % Eosinophils 4 %    % Basophils 1 %    % Immature Granulocytes 0 %    NRBCs per 100 WBC 0 <1 /100    Absolute Neutrophils 2.0 1.6 - 8.3 10e3/uL    Absolute Lymphocytes 0.6 (L) 0.8 - 5.3 10e3/uL    Absolute Monocytes 0.3 0.0 - 1.3 10e3/uL    Absolute " Eosinophils 0.1 0.0 - 0.7 10e3/uL    Absolute Basophils 0.0 0.0 - 0.2 10e3/uL    Absolute Immature Granulocytes 0.0 <=0.0 10e3/uL    Absolute NRBCs 0.0 10e3/uL     Imaging:    MR Brain w/o & w Contrast    Addendum Date: 7/30/2021    Addendum/ impression: Dr. Sánchez discussed the results with Dr. Frias on 7/30/2021 2:59 PM by telephone.    Result Date: 7/30/2021  EXAM: MR BRAIN W/O and W CONTRAST LOCATION: [facility] DATE/TIME: 7/30/2021 8:06 AM INDICATION: nasopharyngeal cancer COMPARISON: MRI brain March 19, 2021, sinus CT May 18, 2020, PET/CT September 2, 2020 CONTRAST: 7ml Gadavist TECHNIQUE: Routine multiplanar multisequence head MRI without and with intravenous contrast. FINDINGS: INTRACRANIAL CONTENTS: No abnormal restricted diffusion to suggest acute infarct. Few scattered foci of signal abnormality within the cerebral hemispheric white matter which are nonspecific, though most commonly ascribed to chronic small vessel ischemic disease. The ventricles and sulci are are prominent consistent with mild brain parenchymal volume loss. No evidence of acute intracranial hemorrhage, mass effect, or extra-axial collection. No cerebellar tonsillar ectopia. No evidence of abnormal enhancement or mass within the nasopharynx. No abnormality involving the osseous structures of the skull base. No cervical lymphadenopathy within the partially imaged upper neck. SELLA: No abnormality accounting for technique. OSSEOUS STRUCTURES/SOFT TISSUES: The visualized skull base and calvarium are unremarkable. Expected signal voids within the distal vertebral, basilar, and bilateral internal carotid arteries. ORBITS: The globes are unremarkable. SINUSES/MASTOIDS: Increased opacification/mass within the right anterior ethmoid air cells which measures 2.4 x 1.6 x 2 cm with new invasion of the right medial orbital rim and mild lateral displacement of the right medial rectus muscle. Complete opacification of the  right maxillary sinus and circumferential mucosal thickening within the right maxillary sinus. Direct visualization is suggested. The mastoid air cells are unremarkable. The visualized skull base and calvarium are unremarkable.     IMPRESSION: 1.  No evidence of abnormal enhancement or mass within the nasopharynx. No abnormality involving the osseous structures of the skull base. No cervical lymphadenopathy within the partially imaged upper neck. 2.  Increased opacification/mass within the right anterior ethmoid air cells which measures 2.4 x 1.6 x 2 cm with new invasion of the right medial orbital rim and mild lateral displacement of the right medial rectus muscle. Complete opacification of the right maxillary sinus and circumferential mucosal thickening within the right maxillary sinus. Direct visualization is suggested. 3.  No evidence of acute intracranial hemorrhage, mass effect, or infarction. 4.  Mild nonspecific white matter changes.      Signed by: Alan Frias MD        Again, thank you for allowing me to participate in the care of your patient.        Sincerely,        Alan Frias MD

## 2021-08-03 NOTE — PROGRESS NOTES
"Oncology Rooming Note    August 3, 2021 9:21 AM   Kristen Chapman is a 71 year old male who presents for:    Chief Complaint   Patient presents with     Oncology Clinic Visit     Nasopharyngeal carcinoma     Initial Vitals: BP (!) 142/78 (BP Location: Left arm, Patient Position: Sitting)   Pulse 60   Temp 98  F (36.7  C) (Oral)   Resp 16   Ht 1.6 m (5' 3\")   Wt 67.6 kg (149 lb)   SpO2 98%   BMI 26.39 kg/m   Estimated body mass index is 26.39 kg/m  as calculated from the following:    Height as of this encounter: 1.6 m (5' 3\").    Weight as of this encounter: 67.6 kg (149 lb). Body surface area is 1.73 meters squared.  No Pain (0) Comment: Data Unavailable   No LMP for male patient.  Allergies reviewed: Yes  Medications reviewed: Yes    Medications: Medication refills not needed today.  Pharmacy name entered into [employer]:    [facility] - [facility] [address] [id number] - [place], [place] - [address] AT [facility] [address]    Clinical concerns: can he have his feeding tube removed.  It was used this morning.      Tayler Rogers RN              "

## 2021-08-03 NOTE — PROGRESS NOTES
[facility] Hematology and Oncology Progress Note    Patient: Kristen Chapman  MRN: [id number]  Date of Service: Aug 3, 2021        Assessment and Plan:    Cancer Staging  Nasopharyngeal carcinoma (H)  Staging form: Pharynx - Nasopharynx, AJCC 8th Edition  - Clinical stage from 2/18/2020: Stage SAMANTHA (cT4, cN2, cM0) - Signed by Alan Frias MD on 2/18/2020     1.  Nasopharyngeal carcinoma: He recently had an MRI.  I personally reviewed the images.  There is some opacification of the right ethmoid air cells which is concerning for recurrent disease.  Nothing focally in the nasopharynx is noted.  I reviewed the images with the patient and his son.  Were going to refer him back to Dr. Cheng for a biopsy.  I will see the patient back in a week or so after to discuss steps going forward.  Their questions were answered.      2.  F/E/N: We are going to remove his G-tube as he is not using it much anymore.       3.  Anemia: He is profoundly hypothyroid as he stopped taking his medications.  This could be contributing to his anemia at this point.  Would like to reevaluate his anemia in the near future including checking for hemoglobinopathy and other nutritional deficiencies.     4.  Thrombocytopenia: His platelet count was normal in May 2020 prior to starting cisplatin/5-FU.  It has never fully recovered after that dose of chemotherapy.  It has, however, been stable for at least a year.  No complications.  If he needs to start chemo in the future we may need to consider a bone marrow biopsy to look for myelodysplasia and/or trial of steroids.     5.  Hypothyroidism: His labs were checked in March.  TSH was markedly elevated.  The patient is not taking Synthroid as he ran out and was not able to get a refill.  We will restart him on this medication now.    Total time spent on the visit today including document review, time with patient, and documentation was 50 minutes.    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  Diagnosed January 2020. Imaging suggested bilateral abnormal lymphadenopathy in the neck.  Also asymmetric soft tissue thickening in the posterior right nasopharynx.  On imaging, tumor also appeared to extend down to the hypopharynx.    Treatment:    Initially treated with concurrent chemotherapy and radiation.  He had weekly cisplatin starting March 3, 2020.  Fifth and final dose given March 31, 2020.  Subsequent chemotherapy was held due to prolonged thrombocytopenia and renal insufficiency.  Radiation dose was 7000 cGy in 35 fractions given from March 2 through April 17, 2020.     Started cisplatin with infusional 5-FU on June 1, 2020.  Cycle 1 given at a 25% dose reduction.  He still had significant thrombocytopenia and neutropenia on day 28.  Cycle 2 was held.  At the same time his liver function tests elevated secondary to hepatitis B.     Interim History:    Kristen returns today for a follow-up visit.  He was last in our clinic about 3 months ago.  In the interim he has been doing okay.  He states that he gets intermittent nosebleeds bilaterally.  No headaches or vision changes.  He is not having any pain.    Review of Systems:    As above in the history.     Review of Systems otherwise Negative for:  General: chills, fever or night sweats  Psychological: anxiety or depression  Ophthalmic: blurry vision, double vision or loss of vision, vision change  ENT: epistaxis, oral lesions, hearing changes  Hematological and Lymphatic: bruising, jaundice, swollen lymph nodes  Endocrine: hot flashes, unexpected weight changes  Respiratory: cough, hemoptysis, orthopnea or shortness of breath/AYALA  Cardiovascular: chest pain, edema, palpitations or PND  Gastrointestinal: abdominal pain, blood in stools, change in bowel habits, constipation, diarrhea or nausea/vomiting  Genito-Urinary: change in urinary stream,  "incontinence, frequency/urgency  Musculoskeletal: joint pain, stiffness, swelling, muscle pain  Neurological: dizziness, headaches, numbness/tingling  Dermatological: lumps and rash    Past History:    Past Medical History:   Diagnosis Date     Nasopharyngeal cancer (H)      Physical Exam:    BP (!) 142/78 (BP Location: Left arm, Patient Position: Sitting)   Pulse 60   Temp 98  F (36.7  C) (Oral)   Resp 16   Ht 1.6 m (5' 3\")   Wt 67.6 kg (149 lb)   SpO2 98%   BMI 26.39 kg/m      General: patient appears stated age of 71 year old. Nontoxic and in no distress.   HEENT: Head: atraumatic, normocephalic. Sclerae anicteric.  Chest:  Normal respiratory effort  Cardiac:  No edema.   Abdomen: abdomen is non-distended  Extremities: normal tone and muscle bulk.  Skin: no lesions or rash on visible skin. Warm and dry.   CNS: alert and oriented. Grossly non-focal.   Psychiatric: normal mood and affect.     Lab Results:    Recent Results (from the past 168 hour(s))   Comprehensive metabolic panel   Result Value Ref Range    Sodium 140 136 - 145 mmol/L    Potassium 4.4 3.5 - 5.0 mmol/L    Chloride 106 98 - 107 mmol/L    Carbon Dioxide (CO2) 29 22 - 31 mmol/L    Anion Gap 5 5 - 18 mmol/L    Urea Nitrogen 24 (H) 8 - 22 mg/dL    Creatinine 1.39 (H) 0.70 - 1.30 mg/dL    Calcium 9.1 8.5 - 10.5 mg/dL    Glucose 122 70 - 125 mg/dL    Alkaline Phosphatase 95 45 - 120 U/L    AST 93 (H) 0 - 40 U/L    ALT 98 (H) 0 - 45 U/L    Protein Total 7.3 6.0 - 8.0 g/dL    Albumin 3.8 3.5 - 5.0 g/dL    Bilirubin Total 1.2 (H) 0.0 - 1.0 mg/dL    GFR Estimate 51 (L) >60 mL/min/1.73m2   CBC with platelets and differential   Result Value Ref Range    WBC Count 3.1 (L) 4.0 - 11.0 10e3/uL    RBC Count 3.76 (L) 4.40 - 5.90 10e6/uL    Hemoglobin 10.1 (L) 13.3 - 17.7 g/dL    Hematocrit 32.8 (L) 40.0 - 53.0 %    MCV 87 78 - 100 fL    MCH 26.9 26.5 - 33.0 pg    MCHC 30.8 (L) 31.5 - 36.5 g/dL    RDW 15.4 (H) 10.0 - 15.0 %    Platelet Count 46 (LL) 150 - 450 " 10e3/uL    % Neutrophils 66 %    % Lymphocytes 20 %    % Monocytes 9 %    % Eosinophils 4 %    % Basophils 1 %    % Immature Granulocytes 0 %    NRBCs per 100 WBC 0 <1 /100    Absolute Neutrophils 2.0 1.6 - 8.3 10e3/uL    Absolute Lymphocytes 0.6 (L) 0.8 - 5.3 10e3/uL    Absolute Monocytes 0.3 0.0 - 1.3 10e3/uL    Absolute Eosinophils 0.1 0.0 - 0.7 10e3/uL    Absolute Basophils 0.0 0.0 - 0.2 10e3/uL    Absolute Immature Granulocytes 0.0 <=0.0 10e3/uL    Absolute NRBCs 0.0 10e3/uL     Imaging:    MR Brain w/o & w Contrast    Addendum Date: 7/30/2021    Addendum/ impression: Dr. Sánchez discussed the results with Dr. Frias on 7/30/2021 2:59 PM by telephone.    Result Date: 7/30/2021  EXAM: MR BRAIN W/O and W CONTRAST LOCATION: [facility] DATE/TIME: 7/30/2021 8:06 AM INDICATION: nasopharyngeal cancer COMPARISON: MRI brain March 19, 2021, sinus CT May 18, 2020, PET/CT September 2, 2020 CONTRAST: 7ml Gadavist TECHNIQUE: Routine multiplanar multisequence head MRI without and with intravenous contrast. FINDINGS: INTRACRANIAL CONTENTS: No abnormal restricted diffusion to suggest acute infarct. Few scattered foci of signal abnormality within the cerebral hemispheric white matter which are nonspecific, though most commonly ascribed to chronic small vessel ischemic disease. The ventricles and sulci are are prominent consistent with mild brain parenchymal volume loss. No evidence of acute intracranial hemorrhage, mass effect, or extra-axial collection. No cerebellar tonsillar ectopia. No evidence of abnormal enhancement or mass within the nasopharynx. No abnormality involving the osseous structures of the skull base. No cervical lymphadenopathy within the partially imaged upper neck. SELLA: No abnormality accounting for technique. OSSEOUS STRUCTURES/SOFT TISSUES: The visualized skull base and calvarium are unremarkable. Expected signal voids within the distal vertebral, basilar, and bilateral internal  carotid arteries. ORBITS: The globes are unremarkable. SINUSES/MASTOIDS: Increased opacification/mass within the right anterior ethmoid air cells which measures 2.4 x 1.6 x 2 cm with new invasion of the right medial orbital rim and mild lateral displacement of the right medial rectus muscle. Complete opacification of the right maxillary sinus and circumferential mucosal thickening within the right maxillary sinus. Direct visualization is suggested. The mastoid air cells are unremarkable. The visualized skull base and calvarium are unremarkable.     IMPRESSION: 1.  No evidence of abnormal enhancement or mass within the nasopharynx. No abnormality involving the osseous structures of the skull base. No cervical lymphadenopathy within the partially imaged upper neck. 2.  Increased opacification/mass within the right anterior ethmoid air cells which measures 2.4 x 1.6 x 2 cm with new invasion of the right medial orbital rim and mild lateral displacement of the right medial rectus muscle. Complete opacification of the right maxillary sinus and circumferential mucosal thickening within the right maxillary sinus. Direct visualization is suggested. 3.  No evidence of acute intracranial hemorrhage, mass effect, or infarction. 4.  Mild nonspecific white matter changes.      Signed by: Alan Frias MD

## 2021-08-04 DIAGNOSIS — J34.89 LESION OR MASS OF PARANASAL SINUSES: Primary | ICD-10-CM

## 2021-08-04 DIAGNOSIS — C11.9 NASOPHARYNGEAL CARCINOMA (H): Primary | ICD-10-CM

## 2021-08-06 ENCOUNTER — LAB (OUTPATIENT)
Dept: FAMILY MEDICINE | Facility: CLINIC | Age: 71
End: 2021-08-06
Attending: INTERNAL MEDICINE
Payer: COMMERCIAL

## 2021-08-06 ENCOUNTER — TELEPHONE (OUTPATIENT)
Dept: OTOLARYNGOLOGY | Facility: CLINIC | Age: 71
End: 2021-08-06

## 2021-08-06 DIAGNOSIS — Z11.59 ENCOUNTER FOR SCREENING FOR OTHER VIRAL DISEASES: ICD-10-CM

## 2021-08-06 LAB — SARS-COV-2 RNA RESP QL NAA+PROBE: NEGATIVE

## 2021-08-06 PROCEDURE — U0003 INFECTIOUS AGENT DETECTION BY NUCLEIC ACID (DNA OR RNA); SEVERE ACUTE RESPIRATORY SYNDROME CORONAVIRUS 2 (SARS-COV-2) (CORONAVIRUS DISEASE [COVID-19]), AMPLIFIED PROBE TECHNIQUE, MAKING USE OF HIGH THROUGHPUT TECHNOLOGIES AS DESCRIBED BY CMS-2020-01-R: HCPCS

## 2021-08-06 PROCEDURE — U0005 INFEC AGEN DETEC AMPLI PROBE: HCPCS

## 2021-08-06 NOTE — TELEPHONE ENCOUNTER
FUTURE VISIT INFORMATION      FUTURE VISIT INFORMATION:    Date: 2021    Time: 8:30AM    [place] [facility]  [place] INFORMATION:    Referring provider:  Marcial Cheng MD    Referring providers clinic:  [facility]     Reason for visit/diagnosis  Lesion or mass of paranasal sinuses [J34.89] referred by Marcial Cheng MD in [facility]    RECORDS REQUESTED FROM:       Clinic name Comments Records Status Imaging Status    [facility] 2021 note from Marcial Cheng MD Psychiatric    Imaging 2021 MR Brain   2020 CT Sinus Epic [facility] [address] [place] [address] 2020 NASOPHARYNX, BIOPSY ([id number])    *trackin   Report in Epic    Path req 21, 21 - received 21 1PM sent a fax to [facility] lab for path send out - Amay   2021 10AM called [facility], left a message to check status on path req from . Sent a 2nd fax for path - Amay   2021 11AM path received, sent for consult - Amay

## 2021-08-06 NOTE — TELEPHONE ENCOUNTER
----- Message from Marcial Cheng MD sent at 8/4/2021 12:04 PM CDT -----  Calista,     Can you make sure this patient is seen at the [facility] for his ethmoid sinus growth.   Referral placed today.   ----- Message -----  From: Alan Frias MD  Sent: 8/4/2021   9:18 AM CDT  To: Marcial Cheng MD    [facility].  I saw this patient in clinic today.  He has an MRI.  He has an enlarging mass in the ethmoid sinus.  I am referring him back to you for a biopsy.  Even though you saw him a few months ago the clinic requested a new referral.  Just wanted to give you the heads up.  Thanks.    Darrian

## 2021-08-06 NOTE — TELEPHONE ENCOUNTER
I called the [facility] to see if they have received the referral for Kristen. I explained that he needs to be seen at the [facility] for a nasopharyngeal carcinoma per Dr. Cheng. She received the referral and will be calling the patient today to get him scheduled.    Calista Vicente RN  [facility]  [phone]

## 2021-08-10 ENCOUNTER — HOSPITAL ENCOUNTER (OUTPATIENT)
Dept: INTERVENTIONAL RADIOLOGY/VASCULAR | Facility: HOSPITAL | Age: 71
End: 2021-08-10
Attending: INTERNAL MEDICINE
Payer: COMMERCIAL

## 2021-08-10 VITALS
DIASTOLIC BLOOD PRESSURE: 79 MMHG | TEMPERATURE: 97.9 F | HEART RATE: 58 BPM | RESPIRATION RATE: 16 BRPM | OXYGEN SATURATION: 97 % | SYSTOLIC BLOOD PRESSURE: 135 MMHG

## 2021-08-10 DIAGNOSIS — C11.9 NASOPHARYNGEAL CARCINOMA (H): ICD-10-CM

## 2021-08-10 NOTE — IP AVS SNAPSHOT
[facility]  [address] [place] [id number]  Phone: [phone]  Fax: [phone]                                    After Visit Summary   8/10/2021    Kristen Chapman    MRN: [id number]           After Visit Summary Signature Page    I have received my discharge instructions, and my questions have been answered. I have discussed any challenges I see with this plan with the nurse or doctor.    ..........................................................................................................................................  Patient/Patient Representative Signature      ..........................................................................................................................................  Patient Representative Print Name and Relationship to Patient    ..................................................               ................................................  Date                                   Time    ..........................................................................................................................................  Reviewed by Signature/Title    ...................................................              ..............................................  Date                                               Time          22EPIC Rev 08/18

## 2021-08-10 NOTE — PROVIDER NOTIFICATION
Tube removed without complications. Site covered with gauze and tape. Patient and son verbalized discharge instructions. Ambulatory at discharge.

## 2021-08-10 NOTE — DISCHARGE INSTRUCTIONS
Gastrostomy (G) / Gastrojejunostomy (GJ) Tube Removal Discharge Instructions:  Please follow the below instructions following the removal of your G/GJ tube    Care Instructions after tube removal:  - OK to shower after removal of your G/GJ tube.  - Avoid taking a tub bath, going in a Jacuzzi and/or pool for at least 3 days.  - Keep previous tube exit site covered with gauze and tape dressing if you have drainage from site. Change dressings as needed to keep site dry and clean.  - Expect drainage will stop in approximately 3 days time.  - Do not eat or drink anything for 2 hours following the removal of your tube.    Please call [facility] ([phone]) if:  - Persistent drainage lasting longer than three days.  - Fevers (greater than 101 F (38.3C)), chills, severe pain at site, or redness at exit site.

## 2021-08-11 ENCOUNTER — TELEPHONE (OUTPATIENT)
Dept: INTERVENTIONAL RADIOLOGY/VASCULAR | Facility: HOSPITAL | Age: 71
End: 2021-08-11

## 2021-08-23 ENCOUNTER — LAB (OUTPATIENT)
Dept: LAB | Facility: CLINIC | Age: 71
End: 2021-08-23

## 2021-08-23 ENCOUNTER — OFFICE VISIT (OUTPATIENT)
Dept: INFECTIOUS DISEASES | Facility: CLINIC | Age: 71
End: 2021-08-23
Payer: COMMERCIAL

## 2021-08-23 VITALS — TEMPERATURE: 98.7 F | SYSTOLIC BLOOD PRESSURE: 104 MMHG | HEART RATE: 64 BPM | DIASTOLIC BLOOD PRESSURE: 70 MMHG

## 2021-08-23 DIAGNOSIS — B18.1 HEPATITIS B, CHRONIC (H): Primary | ICD-10-CM

## 2021-08-23 DIAGNOSIS — B18.1 HEPATITIS B, CHRONIC (H): ICD-10-CM

## 2021-08-23 DIAGNOSIS — B18.1 CHRONIC VIRAL HEPATITIS B WITHOUT DELTA AGENT AND WITHOUT COMA (H): ICD-10-CM

## 2021-08-23 LAB
ALBUMIN SERPL-MCNC: 3.5 G/DL (ref 3.5–5)
ALP SERPL-CCNC: 97 U/L (ref 45–120)
ALT SERPL W P-5'-P-CCNC: 176 U/L (ref 0–45)
AST SERPL W P-5'-P-CCNC: 175 U/L (ref 0–40)
BILIRUB DIRECT SERPL-MCNC: 0.3 MG/DL
BILIRUB SERPL-MCNC: 0.8 MG/DL (ref 0–1)
PROT SERPL-MCNC: 6.6 G/DL (ref 6–8)

## 2021-08-23 PROCEDURE — 36415 COLL VENOUS BLD VENIPUNCTURE: CPT

## 2021-08-23 PROCEDURE — 80076 HEPATIC FUNCTION PANEL: CPT

## 2021-08-23 PROCEDURE — 87517 HEPATITIS B DNA QUANT: CPT

## 2021-08-23 PROCEDURE — 99213 OFFICE O/P EST LOW 20 MIN: CPT

## 2021-08-23 RX ORDER — CIPROFLOXACIN AND DEXAMETHASONE 3; 1 MG/ML; MG/ML
SUSPENSION/ DROPS AURICULAR (OTIC)
COMMUNITY
Start: 2021-03-26 | End: 2021-09-16

## 2021-08-23 NOTE — PROGRESS NOTES
Assessment:      Impression: Chronic hepatitis B infection, responding to lamivudine.  However, for unclear reasons, this was discontinued a few months ago.     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  Follow-up bx planned.      Plan:     Check hepatitis B levels today.  So LFTs.    If hep B levels are elevated, will need to restart on lamivudine.    Follow-up in 3 months    Subjective:      This is an follow-up infectious Disease visit for Kristen Chapman, who is a 70 y.o.  referred for evaluation of hepatitis B.     Is a 70-year-old gentleman of seen only in video visits over the last year for chronic hepatitis B.  He had been on treatment for nasopharyngeal carcinoma when he was noted to have elevated LFTs and a very high hepatitis B viral load.    Patient was started on lamivudine 100 mg a day and he is doing quite well.  He denies any complaints other than some bleeding in his nose and upon further questioning some pain and dryness in his mouth and sticking when he swallows.    He has been taking Magic mouthwash which does not really seem to help his symptoms significantly.    He denies abdominal pain.      The following portions of the patient's history were reviewed and updated as appropriate: allergies, current medications, past family history, past medical history, past social history, past surgical history and problem list.      Follow-up visit on August 23, 2021:    For reasons that are unclear, the lamivudine was discontinued several months ago.  He is generally feeling well though complains of some nasopharyngeal bleeding.    His thrush is resolved.    Review of Systems  Performed and all negative except as mentioned above.      Objective:     /70   Pulse 64   Temp 98.7  F (37.1  C)      General:   alert, appears stated age and cooperative   Oropharynx:  He is wearing upper dentures.  Some erythematous  plaques in his posterior pharynx, but no white plaques.    Eyes:   Extraocular muscles intact, no icterus.    Ears:   Deferred   Neck:  no adenopathy and supple, symmetrical, trachea midline   Thyroid:   Deferred   Lung:  clear to auscultation bilaterally   Heart:   regular rate and rhythm, S1, S2 normal, no murmur, click, rub or gallop   Abdomen:  soft, non-tender; bowel sounds normal; no masses,  no organomegaly   Extremities:  extremities normal, atraumatic, no cyanosis or edema   Skin:  warm and dry, no hyperpigmentation, vitiligo, or suspicious lesions   CVA:   absent   Genitourinary:  defer exam   Neurological:   Grossly normal   Psychiatric:   normal mood, behavior, speech, dress, and thought processes         Eduardo Grier MD

## 2021-08-24 LAB
HBV DNA SERPL NAA+PROBE-ACNC: ABNORMAL IU/ML
HBV DNA SERPL NAA+PROBE-LOG IU: 7.2 {LOG_IU}/ML

## 2021-08-25 RX ORDER — LAMIVUDINE 100 MG/1
100 TABLET, FILM COATED ORAL DAILY
Qty: 90 TABLET | Refills: 3 | Status: SHIPPED | OUTPATIENT
Start: 2021-08-25 | End: 2022-04-04

## 2021-08-26 ENCOUNTER — APPOINTMENT (OUTPATIENT)
Dept: INTERPRETER SERVICES | Facility: CLINIC | Age: 71
End: 2021-08-26
Payer: COMMERCIAL

## 2021-08-26 NOTE — PROGRESS NOTES
[facility]                   New Patient Visit      Encounter date: August 27, 2021    Referring Provider: Referred Self, MD  No address on file    Chief Complaint: Nasopharyngeal carcinoma    History of Present Illness:  This visit was facilitated by a virtual juan josé .  Kristen Chapman 71 year old male with a history of nasopharyngeal carcinoma who presents with his son for consultation. The patient was diagnosed with nasopharyngeal carcinoma last year. He has been following with Dr. Frias of oncology. He also has been following Dr. Cheng at [facility] in [place].    Today, he reports right sided eye watering and rhinorrhea from the right nare.    Review of systems: A 14-point review of systems has been conducted and was negative for any notable symptoms, except as dictated in the history of present illness.     Past Medical History:   Diagnosis Date     Nasopharyngeal cancer (H)       Past Surgical History:   Procedure Laterality Date     IR CHEST PORT PLACEMENT > 5 YRS OF AGE  3/2/2020     IR CHEST PORT PLACEMENT > 5 YRS OF AGE  3/2/2020     IR GASTROSTOMY TUBE INSERTION  4/27/2020     IR GASTROSTOMY TUBE PERCUTANEOUS PLCMNT  4/27/2020     IR PORT PLACEMENT >5 YEARS  3/2/2020     IR PORT PLACEMENT >5 YEARS  3/2/2020     IR T-FASTENER REMOVAL  6/5/2020     IR T-FASTENER REMOVAL  6/5/2020      No family history on file.     Social History     Socioeconomic History     Marital status:      Spouse name: None     Number of children: None     Years of education: None     Highest education level: None   Occupational History     None   Tobacco Use     Smoking status: Never Smoker     Smokeless tobacco: Never Used   Substance and Sexual Activity     Alcohol use: Not Currently     Drug use: Not Currently     Sexual activity: Not Currently   Other Topics Concern     None   Social History Narrative     None     Social Determinants of Health     Financial Resource  "Strain:      Difficulty of Paying Living Expenses:    Food Insecurity:      Worried About Running Out of Food in the Last Year:      Ran Out of Food in the Last Year:    Transportation Needs:      Lack of Transportation (Medical):      Lack of Transportation (Non-Medical):    Physical Activity:      Days of Exercise per Week:      Minutes of Exercise per Session:    Stress:      Feeling of Stress :    Social Connections:      Frequency of Communication with Friends and Family:      Frequency of Social Gatherings with Friends and Family:      Attends Church Services:      Active Member of Clubs or Organizations:      Attends Club or Organization Meetings:      Marital Status:    Intimate Partner Violence:      Fear of Current or Ex-Partner:      Emotionally Abused:      Physically Abused:      Sexually Abused:       Physical Exam:  Vital signs: Ht 1.6 m (5' 3\")   Wt 67.6 kg (149 lb)   BMI 26.39 kg/m     General Appearance: No acute distress, appropriate demeanor, conversant  Eyes: moist conjunctivae; EOMI; pupils symmetric; visual acuity grossly intact; no proptosis  Head: normocephalic; overall symmetric appearance without deformity  Face: overall symmetric without deformity; HB I/VI  Ears: Normal appearance of external ear; external meatus normal in appearance;  Post surgical changes in left TM. No signs of infection. Right EAC clear. TM is slightly dry, but no evidence of effusion behind the TM. No evidence of infection.   Nose: No external deformity; septum deviated to right causing greater than 70% obstruction; Swelling in anterior portion of sinuses; Right nasopharynx has post radiation changes.  Oral Cavity/oropharynx: Dentures in place.Normal appearance of mucosa; tongue midline; Mucositis of the soft palate.   Neck: Post radiation changes in the neck, but no palpable lymphadenopathy; thyroid without palpable nodules  Lungs: symmetric chest rise; no wheezing  CV: Good distal perfusion; normal heart " rate  Extremities: No deformity  Neurologic Exam: Cranial nerves II-XII are grossly intact; no focal deficit    Procedure Note  Procedure performed: Rigid nasal endoscopy  Indication: To evaluate for sinonasal pathology not visualized on routine anterior rhinoscopy  Anesthesia: 4% topical lidocaine with 0.05% oxymetazoline  Description of procedure: A 30 degree, 3 mm rigid endoscope was inserted into bilateral nasal cavities and the nasal valve, nasal cavity, middle meatus, sphenoethmoid recess, and nasopharynx were thoroughly evaluated for evidence of obstruction, edema, purulence, polyps and/or mass/lesion.     Dryden-William Endoscopic Scoring System  Endoscopic observation Right Left   Polyps in middle meatus (0 = absent, 1 = restricted to middle meatus, 2 = Beyond middle meatus) 0 0   Discharge (0 = absent, 1 = thin and clear, 2 = thick, purulent) 0 0   Edema (0 = absent, 1 = mild-moderate, 2 = moderate-severe) 0 0   Crusting (0 = absent, 1 = mild-moderate, 2 = moderate-severe) 0 0   Scarring (0= absent, 1 = mild-moderate, 2 = moderate-severe) 1 0   Total 1 0      Findings  RT: Post radiation changes of MM. Not able to visualize tumor  Limited evaluation of the SER.  LT: MM and SER clear.    The patient tolerated the procedure well without complication.     Laboratory Review:  The patient tested negative for COVID-19 earlier this month (08/06/2021).    Imaging Review:  MR Brain w/o & w Contrast:  (07/30/2021)  IMPRESSION:   1.  No evidence of abnormal enhancement or mass within the nasopharynx. No abnormality involving the osseous structures of the skull base. No cervical lymphadenopathy within the partially imaged upper neck.   2.  Increased opacification/mass within the right anterior ethmoid air cells which measures 2.4 x 1.6 x 2 cm with new invasion of the right medial orbital rim and mild lateral displacement of the right medial rectus muscle. Complete opacification of the   right maxillary sinus and  circumferential mucosal thickening within the right maxillary sinus. Direct visualization is suggested.   3.  No evidence of acute intracranial hemorrhage, mass effect, or infarction.   4.  Mild nonspecific white matter changes.    Pathology Review:  Surgical Pathology Report  (01/29/2020)  Sections demonstrate a malignant eosinophilic cell proliferation with prominent nucleol. Keratinization is not significantly present. Scattered mitotic figures are seen. Immunohistochemical stains (p63, p16, CD3, CD20) are performed with appropriate positive controls. The malignant cells are positive for p63 and negative for CD3, CD20. p16 is essentially negative in the invasive carcinoma and positive in the overlying dysplastic mucosa with patchy foci showing a block type staining pattern.    Assessment/Medical Decision Making:  Nasopharyngeal carcinoma s/p definitive CRT    New ethmoid mass concerning for malignancy - I cannot visualize endoscopically readily today and thus I do not think it is reasonable to sample in clinic    Plan:  The patient did not have an area available for me to biopsy in clinic, and we discussed surgical intervention to obtain biopsy. I explained to Kristen and his son that I am worried about malignancy. We briefly discussed potential treatment avenues but that treatment would depend on results of biopsy.     Given history of thrombocytopenia, will plan for platelet transfusion pre-op.    Ky Centeno MD    [facility]  [facility] for [facility] Pituitary Surgery  [facility]  Department of Otolaryngology - Head & Neck Surgery

## 2021-08-27 ENCOUNTER — PRE VISIT (OUTPATIENT)
Dept: OTOLARYNGOLOGY | Facility: CLINIC | Age: 71
End: 2021-08-27

## 2021-08-27 ENCOUNTER — OFFICE VISIT (OUTPATIENT)
Dept: OTOLARYNGOLOGY | Facility: CLINIC | Age: 71
End: 2021-08-27
Attending: OTOLARYNGOLOGY
Payer: COMMERCIAL

## 2021-08-27 VITALS — HEIGHT: 63 IN | WEIGHT: 149 LBS | BODY MASS INDEX: 26.4 KG/M2

## 2021-08-27 DIAGNOSIS — E43 SEVERE PROTEIN-CALORIE MALNUTRITION (H): ICD-10-CM

## 2021-08-27 DIAGNOSIS — D69.6 THROMBOCYTOPENIA (H): ICD-10-CM

## 2021-08-27 DIAGNOSIS — T45.1X5A ANTINEOPLASTIC CHEMOTHERAPY INDUCED PANCYTOPENIA (H): ICD-10-CM

## 2021-08-27 DIAGNOSIS — D61.810 ANTINEOPLASTIC CHEMOTHERAPY INDUCED PANCYTOPENIA (H): ICD-10-CM

## 2021-08-27 DIAGNOSIS — J34.89 LESION OR MASS OF PARANASAL SINUSES: ICD-10-CM

## 2021-08-27 DIAGNOSIS — Z93.1 STATUS POST INSERTION OF PERCUTANEOUS ENDOSCOPIC GASTROSTOMY (PEG) TUBE (H): Primary | ICD-10-CM

## 2021-08-27 PROCEDURE — 31231 NASAL ENDOSCOPY DX: CPT | Performed by: OTOLARYNGOLOGY

## 2021-08-27 PROCEDURE — 99215 OFFICE O/P EST HI 40 MIN: CPT | Mod: 25 | Performed by: OTOLARYNGOLOGY

## 2021-08-27 ASSESSMENT — MIFFLIN-ST. JEOR: SCORE: 1335.99

## 2021-08-27 ASSESSMENT — PAIN SCALES - GENERAL: PAINLEVEL: NO PAIN (0)

## 2021-08-27 NOTE — PATIENT INSTRUCTIONS
1. You were seen in the clinic today by Dr. Centeno.    2.   The following has been recommended for you:   -Proceed with surgery under general anesthesia so that we may obtain a biopsy.    3.   Plan to return the clinic after surgery.    If you have any questions or concerns after your appointment, please call the clinic.    -Clinic phone [phone]. Press option #1 for scheduling related needs. Press option #3 for nurse advice.     Erin Love, LINO  [phone]    Bev Maxwell, RNCAMRON  [phone]    [facility]  Department of Otolaryngology      Surgery Teaching    1. Someone from our scheduling department will call you within the next few days to get you scheduled with your provider for surgery. If no one has called you in one week, please notify us.    2. You must have a physical exam (called  history and physical ) within 30 days of surgery. You may complete this with your primary care provider.   A. If your provider is outside of the [facility] please have them complete the preoperative forms provided to you in the surgery packet you will be mailed and be sure to have your provider fax them to the appropriate location prior to surgery. For surgery at the [facility] the fax number is:[phone]. For surgery at the [place] the fax number is [phone].  B. In some cases we may have you see our Preoperative Assessment Center. If we have expressed this to you, our  will set up your appointment with them when they call to set up your surgery.    3. Complete a COVID test 4 days prior to surgery. You will need to have this done regardless of whether you have had the COVID vaccine. If you have the test performed at a clinic outside of the network, you will need to have the test results faxed to us.    4. For same-day surgery, you must arrange for an adult to take you home from the Center. An adult must stay with you for the first 24 hours after surgery. You cannot drive for 24 hours.     5. Ask your  doctor what medicines are safe before surgery. For over the counter medications and supplements it is advised that you do NOT TAKE MOTRIN, IBUPROFEN, ASPIRIN, ALEVE, GARLIC SUPPLEMENTS or FISH OIL x 7 days prior to surgery (to prevent excess bleeding and bruising at time of surgery). If your provider advises you to take any medication the morning of surgery you should take this with a sip of water.    6. A few days prior to surgery a nurse will call you to review your health history and instructions for before and after surgery. They will give you your final arrival time based upon your scheduled arrival time for surgery.    7. Call the surgical team if there's any change in your health prior to surgery. Things you should call for include but are not limited to signs of a cold or the flu (sore throat, runny nose, cough, rash, fever). Other things to notify them for is for any open wounds (cuts, scrapes, scratches) near to the surgery site.    8. If you drink alcohol, stop drinking alcohol at least 24 hours before surgery.    9. If you smoke, stop or at least cut down on smoking 24 hours before surgery.    10.Take a bath or shower the night before and the morning of surgery (as told by your surgeon). Use an antiseptic soap. If your doctor does not give you special soap, buy Hibiclens or Dhara-Stat at the drug store or ask the pharmacist to suggest a brand. You will wash with this from the neck down, washing your hair and face as you would normally.   A. When you are done with your shower please be sure to use clean towels to dry with, have clean linens on your bed, and put on clean clothes each time.   B. DO NOT put on lotion, powder, perfume, deodorant or make-up after bathing.    11. You can eat a normal meal the night before surgery. Do not eat any solid foods or drink any milk products for 8 hours before surgery.     12. You may drink clear liquids until 2 hours before surgery. Clear liquids include water,  Gatorade, apple juice and liquids you can see through.    13. No eating or drinking 2 hours prior to surgery until after surgery. Your post op team will review any diet limitations you might have and when you can start eating and drinking again after surgery.

## 2021-08-27 NOTE — LETTER
8/27/2021       RE: Kristen Chapman  [address] [place] [address]     Dear Colleague,    Thank you for referring your patient, Kristen Chapman, to the [facility] EAR NOSE AND TH[facility]INIC [place] at [facility]. Please see a copy of my visit note below.                  [facility]                     New Patient Visit      Encounter date: August 27, 2021    Referring Provider: Referred Self, MD  No address on file    Chief Complaint: Nasopharyngeal carcinoma    History of Present Illness:  This visit was facilitated by a virtual Vysr .  Kristen Chapman 71 year old male with a history of nasopharyngeal carcinoma who presents with his son for consultation. The patient was diagnosed with nasopharyngeal carcinoma last year. He has been following with Dr. Frias of oncology. He also has been following Dr. Cheng at [facility] in [place].    Today, he reports right sided eye watering and rhinorrhea from the right nare.    Review of systems: A 14-point review of systems has been conducted and was negative for any notable symptoms, except as dictated in the history of present illness.     Past Medical History:   Diagnosis Date     Nasopharyngeal cancer (H)       Past Surgical History:   Procedure Laterality Date     IR CHEST PORT PLACEMENT > 5 YRS OF AGE  3/2/2020     IR CHEST PORT PLACEMENT > 5 YRS OF AGE  3/2/2020     IR GASTROSTOMY TUBE INSERTION  4/27/2020     IR GASTROSTOMY TUBE PERCUTANEOUS PLCMNT  4/27/2020     IR PORT PLACEMENT >5 YEARS  3/2/2020     IR PORT PLACEMENT >5 YEARS  3/2/2020     IR T-FASTENER REMOVAL  6/5/2020     IR T-FASTENER REMOVAL  6/5/2020      No family history on file.     Social History     Socioeconomic History     Marital status:      Spouse name: None     Number of children: None     Years of education: None     Highest education level: None   Occupational History     None   Tobacco Use     Smoking  "status: Never Smoker     Smokeless tobacco: Never Used   Substance and Sexual Activity     Alcohol use: Not Currently     Drug use: Not Currently     Sexual activity: Not Currently   Other Topics Concern     None   Social History Narrative     None     Social Determinants of Health     Financial Resource Strain:      Difficulty of Paying Living Expenses:    Food Insecurity:      Worried About Running Out of Food in the Last Year:      Ran Out of Food in the Last Year:    Transportation Needs:      Lack of Transportation (Medical):      Lack of Transportation (Non-Medical):    Physical Activity:      Days of Exercise per Week:      Minutes of Exercise per Session:    Stress:      Feeling of Stress :    Social Connections:      Frequency of Communication with Friends and Family:      Frequency of Social Gatherings with Friends and Family:      Attends [employer] Services:      Active Member of Clubs or Organizations:      Attends Club or Organization Meetings:      Marital Status:    Intimate Partner Violence:      Fear of Current or Ex-Partner:      Emotionally Abused:      Physically Abused:      Sexually Abused:       Physical Exam:  Vital signs: Ht 1.6 m (5' 3\")   Wt 67.6 kg (149 lb)   BMI 26.39 kg/m     General Appearance: No acute distress, appropriate demeanor, conversant  Eyes: moist conjunctivae; EOMI; pupils symmetric; visual acuity grossly intact; no proptosis  Head: normocephalic; overall symmetric appearance without deformity  Face: overall symmetric without deformity; HB I/VI  Ears: Normal appearance of external ear; external meatus normal in appearance;  Post surgical changes in left TM. No signs of infection. Right EAC clear. TM is slightly dry, but no evidence of effusion behind the TM. No evidence of infection.   Nose: No external deformity; septum deviated to right causing greater than 70% obstruction; Swelling in anterior portion of sinuses; Right nasopharynx has post radiation changes.  Oral " Cavity/oropharynx: Dentures in place.Normal appearance of mucosa; tongue midline; Mucositis of the soft palate.   Neck: Post radiation changes in the neck, but no palpable lymphadenopathy; thyroid without palpable nodules  Lungs: symmetric chest rise; no wheezing  CV: Good distal perfusion; normal heart rate  Extremities: No deformity  Neurologic Exam: Cranial nerves II-XII are grossly intact; no focal deficit    Procedure Note  Procedure performed: Rigid nasal endoscopy  Indication: To evaluate for sinonasal pathology not visualized on routine anterior rhinoscopy  Anesthesia: 4% topical lidocaine with 0.05% oxymetazoline  Description of procedure: A 30 degree, 3 mm rigid endoscope was inserted into bilateral nasal cavities and the nasal valve, nasal cavity, middle meatus, sphenoethmoid recess, and nasopharynx were thoroughly evaluated for evidence of obstruction, edema, purulence, polyps and/or mass/lesion.     [place] Endoscopic Scoring System  Endoscopic observation Right Left   Polyps in middle meatus (0 = absent, 1 = restricted to middle meatus, 2 = Beyond middle meatus) 0 0   Discharge (0 = absent, 1 = thin and clear, 2 = thick, purulent) 0 0   Edema (0 = absent, 1 = mild-moderate, 2 = moderate-severe) 0 0   Crusting (0 = absent, 1 = mild-moderate, 2 = moderate-severe) 0 0   Scarring (0= absent, 1 = mild-moderate, 2 = moderate-severe) 1 0   Total 1 0      Findings  RT: Post radiation changes of MM. Not able to visualize tumor  Limited evaluation of the SER.  LT: MM and SER clear.    The patient tolerated the procedure well without complication.     Laboratory Review:  The patient tested negative for COVID-19 earlier this month (08/06/2021).    Imaging Review:  MR Brain w/o & w Contrast:  (07/30/2021)  IMPRESSION:   1.  No evidence of abnormal enhancement or mass within the nasopharynx. No abnormality involving the osseous structures of the skull base. No cervical lymphadenopathy within the partially  imaged upper neck.   2.  Increased opacification/mass within the right anterior ethmoid air cells which measures 2.4 x 1.6 x 2 cm with new invasion of the right medial orbital rim and mild lateral displacement of the right medial rectus muscle. Complete opacification of the   right maxillary sinus and circumferential mucosal thickening within the right maxillary sinus. Direct visualization is suggested.   3.  No evidence of acute intracranial hemorrhage, mass effect, or infarction.   4.  Mild nonspecific white matter changes.    Pathology Review:  Surgical Pathology Report  (01/29/2020)  Sections demonstrate a malignant eosinophilic cell proliferation with prominent nucleol. Keratinization is not significantly present. Scattered mitotic figures are seen. Immunohistochemical stains (p63, p16, CD3, CD20) are performed with appropriate positive controls. The malignant cells are positive for p63 and negative for CD3, CD20. p16 is essentially negative in the invasive carcinoma and positive in the overlying dysplastic mucosa with patchy foci showing a block type staining pattern.    Assessment/Medical Decision Making:  Nasopharyngeal carcinoma s/p definitive CRT    New ethmoid mass concerning for malignancy - I cannot visualize endoscopically readily today and thus I do not think it is reasonable to sample in clinic    Plan:  The patient did not have an area available for me to biopsy in clinic, and we discussed surgical intervention to obtain biopsy. I explained to Kristen and his son that I am worried about malignancy. We briefly discussed potential treatment avenues but that treatment would depend on results of biopsy.     Given history of thrombocytopenia, will plan for platelet transfusion pre-op.    Ky Centeno MD    [facility]  [facility] for Sk[facility] Base and Pituitary Surgery  [facility]  Department of Otolaryngology - Head & Neck Surgery

## 2021-08-30 ENCOUNTER — LAB (OUTPATIENT)
Dept: LAB | Facility: CLINIC | Age: 71
End: 2021-08-30
Payer: COMMERCIAL

## 2021-08-30 ENCOUNTER — TELEPHONE (OUTPATIENT)
Dept: OTOLARYNGOLOGY | Facility: CLINIC | Age: 71
End: 2021-08-30

## 2021-08-30 DIAGNOSIS — C11.9 NASOPHARYNGEAL CARCINOMA (H): Primary | ICD-10-CM

## 2021-08-30 PROCEDURE — 88321 CONSLTJ&REPRT SLD PREP ELSWR: CPT | Performed by: PATHOLOGY

## 2021-08-30 PROCEDURE — 88365 INSITU HYBRIDIZATION (FISH): CPT | Mod: TC

## 2021-08-30 PROCEDURE — 88365 INSITU HYBRIDIZATION (FISH): CPT | Mod: 26 | Performed by: PATHOLOGY

## 2021-08-30 NOTE — TELEPHONE ENCOUNTER
Left message with [facility]  regarding scheduling surgery/procedure with Dr. Centeno. Writer left call back number on the patients voicemail.    Chasity Oneal on 8/30/2021 at 3:18 PM   P: [phone]

## 2021-08-31 ENCOUNTER — PATIENT OUTREACH (OUTPATIENT)
Dept: OTOLARYNGOLOGY | Facility: CLINIC | Age: 71
End: 2021-08-31

## 2021-08-31 ENCOUNTER — TELEPHONE (OUTPATIENT)
Dept: INFECTIOUS DISEASES | Facility: CLINIC | Age: 71
End: 2021-08-31

## 2021-08-31 DIAGNOSIS — Z11.59 ENCOUNTER FOR SCREENING FOR OTHER VIRAL DISEASES: ICD-10-CM

## 2021-08-31 PROBLEM — J34.89 LESION OR MASS OF PARANASAL SINUSES: Status: ACTIVE | Noted: 2021-08-31

## 2021-08-31 NOTE — TELEPHONE ENCOUNTER
Called and left voicemail to call back.     Calling to relay msg below to pt and schedule follow up appt.

## 2021-08-31 NOTE — TELEPHONE ENCOUNTER
Called patient to schedule surgery with Dr. Centeno  on the line, but talked to daughter. No  needed. Discussed with daughter to schedule nasal endoscopy with Dr. Centeno. She was unaware that this was needed. Discussed that this is a procedure under general anesthesia, in the surgery center, and patient will need a pre-op with our PAC team and a covid-19 test within 4 days. Discussed that Dr. Centeno would like to do this on Thursday. Daughter declined.   Daughter agreeable to next available of Sept 7th, 2021 at the [facility]. Discussed that patient needs platelet transfusion before surgery. This will need to be done down in infusion prior to surgery. Likely in the AM. But will depend on the schedule for this. Explained that patient needs to arrival 1.5 hrs early for surgery/procedure. 30 minute procedure. 1-2 hrs in the recovery room. Daughter and patient should expect to take the entire day off.   Daughter had questions regarding pathology results from earlier this year. Explained that writer will have someone call back to discuss pathology and plan for procedure. She was okay with this plan.   No further questions. Covid-19 test scheduled for 9/3 at [facility]. Appointment confirmed.    Chasity Oneal on 8/31/2021 at 12:29 PM

## 2021-08-31 NOTE — TELEPHONE ENCOUNTER
----- Message from Su Cornejo RN sent at 8/31/2021  4:04 PM CDT -----  Can you call this pt please? Its for Hepatitis B. Pt will need a f/up appt too, in person or virtual  ----- Message -----  From: Eduardo Grier MD  Sent: 8/25/2021   9:29 AM CDT  To: [facility] Infectious Disease Support Pool    Hep b viral load has increased.    He should restart lamivudine 100 mg daily. This will be lifelong if it works.    Follow-up with me in a month.

## 2021-08-31 NOTE — TELEPHONE ENCOUNTER
FUTURE VISIT INFORMATION      SURGERY INFORMATION:    Date: 21    Location: [facility] or    Surgeon:  Ky Centeno MD    Anesthesia Type:  General    Procedure: nasal endoscopy with biopsy    Consult: OV     RECORDS REQUESTED FROM:       Primary Care Provider: Issa Cook MD- Karyna    Most recent EKG+ Tracin20

## 2021-09-01 ENCOUNTER — MEDICAL CORRESPONDENCE (OUTPATIENT)
Dept: HEALTH INFORMATION MANAGEMENT | Facility: CLINIC | Age: 71
End: 2021-09-01

## 2021-09-01 NOTE — TELEPHONE ENCOUNTER
Called and spoke with patient's daughter Rea. Consent to communicate under media.     MD msg below relayed to her. Daughter understood. No further question. 1 month follow up appointment scheduled.

## 2021-09-01 NOTE — PROGRESS NOTES
Writer called patient's daughter, LVM with direct number to call back for clarification answers regarding upcoming procedure.     Bev Maxwell RN on 9/1/2021 at 10:38 AM

## 2021-09-02 ENCOUNTER — VIRTUAL VISIT (OUTPATIENT)
Dept: SURGERY | Facility: CLINIC | Age: 71
End: 2021-09-02
Payer: COMMERCIAL

## 2021-09-02 ENCOUNTER — PRE VISIT (OUTPATIENT)
Dept: SURGERY | Facility: CLINIC | Age: 71
End: 2021-09-02

## 2021-09-02 ENCOUNTER — ANESTHESIA EVENT (OUTPATIENT)
Dept: SURGERY | Facility: AMBULATORY SURGERY CENTER | Age: 71
End: 2021-09-02
Payer: COMMERCIAL

## 2021-09-02 DIAGNOSIS — C11.9 NASOPHARYNGEAL CANCER (H): ICD-10-CM

## 2021-09-02 DIAGNOSIS — Z01.818 PREOP EXAMINATION: Primary | ICD-10-CM

## 2021-09-02 PROCEDURE — 99204 OFFICE O/P NEW MOD 45 MIN: CPT | Mod: 95 | Performed by: CLINICAL NURSE SPECIALIST

## 2021-09-02 RX ORDER — HEPARIN SODIUM (PORCINE) LOCK FLUSH IV SOLN 100 UNIT/ML 100 UNIT/ML
5 SOLUTION INTRAVENOUS
Status: CANCELLED | OUTPATIENT
Start: 2021-09-07

## 2021-09-02 RX ORDER — HEPARIN SODIUM,PORCINE 10 UNIT/ML
5 VIAL (ML) INTRAVENOUS
Status: CANCELLED | OUTPATIENT
Start: 2021-09-07

## 2021-09-02 ASSESSMENT — PAIN SCALES - GENERAL: PAINLEVEL: NO PAIN (0)

## 2021-09-02 ASSESSMENT — LIFESTYLE VARIABLES: TOBACCO_USE: 0

## 2021-09-02 NOTE — ANESTHESIA PREPROCEDURE EVALUATION
Anesthesia Pre-Procedure Evaluation    Patient: Kristen Chapman   MRN: [id number] : 1950        Preoperative Diagnosis: Lesion or mass of paranasal sinuses [J34.89]   Procedure : Procedure(s):  nasal endoscopy with biopsy     Past Medical History:   Diagnosis Date     Anemia      Dysphagia      Hepatitis B chronic      Hypothyroidism      Nasopharyngeal cancer (H)      Severe protein-calorie malnutrition (H)      Thrombocytopenia (H)       Past Surgical History:   Procedure Laterality Date     IR CHEST PORT PLACEMENT > 5 YRS OF AGE  3/2/2020     IR CHEST PORT PLACEMENT > 5 YRS OF AGE  3/2/2020     IR GASTROSTOMY TUBE INSERTION  2020     IR GASTROSTOMY TUBE PERCUTANEOUS PLCMNT  2020     IR PORT PLACEMENT >5 YEARS  3/2/2020     IR PORT PLACEMENT >5 YEARS  3/2/2020     IR T-FASTENER REMOVAL  2020     IR T-FASTENER REMOVAL  2020      Allergies   Allergen Reactions     Oxycodone Itching      Social History     Tobacco Use     Smoking status: Never Smoker     Smokeless tobacco: Never Used   Substance Use Topics     Alcohol use: Not Currently      Wt Readings from Last 1 Encounters:   21 67.6 kg (149 lb)        Anesthesia Evaluation   Pt has had prior anesthetic. Type: MAC and General.    No history of anesthetic complications       ROS/MED HX  ENT/Pulmonary:    (-) tobacco use   Neurologic:  - neg neurologic ROS     Cardiovascular:     (+) -----Previous cardiac testing   Echo: Date: Results:    Stress Test: Date: Results:    ECG Reviewed: Date:  Results:  NSR  Cath: Date: Results:   (-) taking anticoagulants/antiplatelets   METS/Exercise Tolerance: 1 - Eating, dressing    Hematologic: Comments: Thrombocytopenia    (+) anemia,     Musculoskeletal:  - neg musculoskeletal ROS     GI/Hepatic:     (+) hepatitis type B,     Renal/Genitourinary:  - neg Renal ROS     Endo:     (+) thyroid problem, hypothyroidism,     Psychiatric/Substance Use:  - neg psychiatric ROS     Infectious Disease:  - neg  infectious disease ROS     Malignancy:   (+) Malignancy, History of Other.Other CA Poss recurrence Active status post Chemo and Radiation.    Other:  - neg other ROS          Physical Exam    Airway   unable to assess          Respiratory Devices and Support         Dental       (+) upper dentures and lower dentures      Cardiovascular    unable to assess         Pulmonary    Unable to assess           Other findings: Virtual visit    OUTSIDE LABS:  CBC:   Lab Results   Component Value Date    WBC 3.1 (L) 08/03/2021    WBC 4.7 03/19/2021    HGB 10.1 (L) 08/03/2021    HGB 10.7 (L) 03/19/2021    HCT 32.8 (L) 08/03/2021    HCT 32.1 (L) 03/19/2021    PLT 46 (LL) 08/03/2021    PLT 45 (LL) 03/19/2021     BMP:   Lab Results   Component Value Date     08/03/2021     12/14/2020    POTASSIUM 4.4 08/03/2021    POTASSIUM 4.1 12/14/2020    CHLORIDE 106 08/03/2021    CHLORIDE 105 12/14/2020    CO2 29 08/03/2021    CO2 23 12/14/2020    BUN 24 (H) 08/03/2021    BUN 31 (H) 12/14/2020    CR 1.39 (H) 08/03/2021    CR 0.91 12/14/2020     08/03/2021    GLC 91 12/14/2020     COAGS:   Lab Results   Component Value Date    INR 1.21 (H) 04/26/2020     POC: No results found for: BGM, HCG, HCGS  HEPATIC:   Lab Results   Component Value Date    ALBUMIN 3.5 08/23/2021    PROTTOTAL 6.6 08/23/2021     (H) 08/23/2021     (H) 08/23/2021    ALKPHOS 97 08/23/2021    BILITOTAL 0.8 08/23/2021     OTHER:   Lab Results   Component Value Date    GUANAKITO 9.1 08/03/2021    PHOS 4.0 05/16/2020    MAG 1.8 07/31/2020    .49 (H) 03/11/2021    CRP 10.6 (H) 05/14/2020       Anesthesia Plan    ASA Status:  3   NPO Status:  NPO Appropriate    Anesthesia Type: General.     - Airway: ETT   Induction: Intravenous.   Maintenance: Balanced.   Techniques and Equipment:     - Airway: Oral LENARD         Consents    Anesthesia Plan(s) and associated risks, benefits, and realistic alternatives discussed. Questions answered and  patient/representative(s) expressed understanding.     - Discussed with:  Patient,       - Extended Intubation/Ventilatory Support Discussed: No.      - Patient is DNR/DNI Status: No    Use of blood products discussed: No .     Postoperative Care    Pain management: IV analgesics, Multi-modal analgesia.   PONV prophylaxis: Ondansetron (or other 5HT-3), Dexamethasone or Solumedrol     Comments:              PAC Discussion and Assessment    ASA Classification: 3  Case is suitable for: ASC and [facility]  Anesthetic techniques and relevant risks discussed: GA  Invasive monitoring and risk discussed: No    Possibility and Risk of blood transfusion discussed: Yes            PAC Resident/NP Anesthesia Assessment: Kristen Chapman is a 71 year old male who presents for pre-operative H & P in preparation for nasal endoscopy with biopsy with Dr. Centeno on 9/7/21 at [facility].  RCRI: 0.4% risk of serious cardiac event  VTE:1.8-3%  MIHAELA: 2/8=Low risk  PONV: 2    --Nasopharyngeal carcinoma s/p chemoradiation, now with imaging findings concerning for recurrence. Above procedure now planned.   --No history of problems with anesthesia.  --No cardiac history, symptoms or meds. EKG above. Able to walk 2-3 blocks without difficulty.   --Nonsmoker. Denies pulmonary symptoms. Low risk for MIHAELA.  --Some swallowing difficulties. Recent weight loss ~8 pounds.  --Hypothyroidism Synthroid at HS.  --Hepatitis B followed by ID.  Lamivudine at HS but patient needs to  refill.   --Pancytopenia since therapies. Last Hgb 10.1, and platelet range in the 40s. Dr. Centeno is requesting platelet infusion prior to procedure. Therapy plan provided for 1 unit platelets in infusion center morning before surgery. Discussed with infusion center. ASC team is calling patient for updated arrival time and instructions.   --Past history of blood transfusion.  --CRI Last Cr 1.39.  --Left chest port.  --Will require Hmong  .    Arrival time, NPO, shower and medication instructions provided by nursing staff today.     Discussed patient with multiple team members today. Will proceed.  Reviewed and Signed by PAC Mid-Level Provider/Resident  Mid-Level Provider/Resident: SPARKLE Whitlock, CNS  Date: 9/2/21  Time: 8:03am      Reviewed and Signed by PAC Anesthesiologist  Anesthesiologist: Michael  Date: 9/2/21                     SPARKLE Bernard CNS       Wilber Jones MD  Staff Anesthesiologist  [phone]

## 2021-09-02 NOTE — H&P (VIEW-ONLY)
Pre-Operative H & P     CC:  Preoperative exam to assess for increased cardiopulmonary risk while undergoing surgery and anesthesia.    Date of Encounter: 9/2/2021  Primary Care Physician:  Issa Cook     Reason for visit:   Encounter Diagnoses   Name Primary?     Preop examination Yes     Nasopharyngeal cancer (H)        KIAN Chapman is a 71 year old male who presents for pre-operative H & P in preparation for nasal endoscopy with biopsy with Dr. Centeno on 9/7/21 at [facility]. History is obtained from the patient, daughter and chart review.    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 He was treated with concurrent chemotherapy and radiation. Subsequent chemotherapy was held due to prolonged thrombocytopenia and renal insufficiency. On recent surveillance imaging opacification of the right ethmoid air cells was seen and is concerning for recurrent disease. He was then referred to Dr. Centeno in consideration for biopsy attempt. The patient was reporting right sided eye watering and rhinorrhea from the right nare. Dr. Centeno was unable to perform a biopsy in the clinic setting, and counseled patient for above procedures.     Patient history is otherwise significant for anemia, thrombocytopenia, hypothyroidism and Hepatitis B. For his Hep B he is followed by ID and is being treated with lamivudine.    Today patient denies fever, cough, shortness of breath, chest pain, irregular HR, or ankle edema. He is having some difficulty swallowing and has lost some weight. He feels like food is getting stuck and he has to wash it down with water.     Hx of abnormal bleeding or anti-platelet use: Patient has anemia and thrombocytopenia after therapies. History of blood transfusion.       Prior to Admission Medications  Current Outpatient Medications   Medication Sig Dispense Refill     acetaminophen (TYLENOL) 325 MG tablet Take 650 mg by mouth  every 6 hours as needed        lamiVUDine (EPIVIR) 100 MG tablet Take 1 tablet (100 mg) by mouth daily (Patient taking differently: Take 100 mg by mouth every evening ) 90 tablet 3     levothyroxine (SYNTHROID/LEVOTHROID) 88 MCG tablet Take 1 tablet (88 mcg) by mouth daily (Patient taking differently: Take 88 mcg by mouth every evening ) 60 tablet 3     ondansetron (ZOFRAN) 8 MG tablet [ONDANSETRON (ZOFRAN) 8 MG TABLET] Take 1 tablet (8 mg total) by mouth every 6 (six) hours as needed for nausea. (Patient taking differently: Take 8 mg by mouth every 6 hours as needed for nausea ) 30 tablet 1     prochlorperazine (COMPAZINE) 10 MG tablet [PROCHLORPERAZINE (COMPAZINE) 10 MG TABLET] Take 1 tablet (10 mg total) by mouth every 6 (six) hours as needed for nausea. (Patient taking differently: Take 10 mg by mouth every 6 hours as needed for nausea ) 30 tablet 3     alum/mag hydrox-simethicone-diphenhydramine-lidocaine (MAGIC MOUTHWASH) suspension [ALUM/MAG HYDROX-SIMETHICONE-DIPHENHYDRAMINE-LIDOCAINE (MAGIC MOUTHWASH) SUSPENSION] Swish and spit 10 mL 4 (four) times a day as needed. Mix equal parts Maalox, diphenhydramine, viscous lidocaine. 240 mL 1     ciprofloxacin-dexamethasone (CIPRODEX) 0.3-0.1 % otic suspension  (Patient not taking: Reported on 9/2/2021)       lidocaine-prilocaine (EMLA) cream [LIDOCAINE-PRILOCAINE (EMLA) CREAM] Place over port 30 min. before being accessed. (Patient not taking: Reported on 9/2/2021) 30 g 1     nystatin (NYSTATIN) 100,000 unit/mL suspension [NYSTATIN (NYSTATIN) 100,000 UNIT/ML SUSPENSION] 5 mL swish and swallow 4 times. (Patient not taking: Reported on 9/2/2021) 200 mL 5       Family History  Family History   Family history unknown: Yes       Past Medical History:   Diagnosis Date     Anemia      Dysphagia      Hepatitis B chronic      Hypothyroidism      Nasopharyngeal cancer (H)      Severe protein-calorie malnutrition (H)      Thrombocytopenia (H)       Past Surgical History:    Procedure Laterality Date     IR CHEST PORT PLACEMENT > 5 YRS OF AGE  3/2/2020     IR CHEST PORT PLACEMENT > 5 YRS OF AGE  3/2/2020     IR GASTROSTOMY TUBE INSERTION  4/27/2020     IR GASTROSTOMY TUBE PERCUTANEOUS PLCMNT  4/27/2020     IR PORT PLACEMENT >5 YEARS  3/2/2020     IR PORT PLACEMENT >5 YEARS  3/2/2020     IR T-FASTENER REMOVAL  6/5/2020     IR T-FASTENER REMOVAL  6/5/2020      Allergies   Allergen Reactions     Oxycodone Itching      Social History     Tobacco Use     Smoking status: Never Smoker     Smokeless tobacco: Never Used   Substance Use Topics     Alcohol use: Not Currently      Wt Readings from Last 1 Encounters:   08/27/21 67.6 kg (149 lb)          ROS/MED HISTORY  The complete review of systems is negative other than noted in the HPI or here.    ENT/Pulmonary:    (-) tobacco use   Neurologic:  - neg neurologic ROS     Cardiovascular:     (+) -----Previous cardiac testing   Echo: Date: Results:    Stress Test: Date: Results:    ECG Reviewed: Date: 2020 Results:  NSR  Cath: Date: Results:   (-) taking anticoagulants/antiplatelets   METS/Exercise Tolerance: 1 - Eating, dressing    Hematologic: Comments: Thrombocytopenia    (+) anemia,     Musculoskeletal:  - neg musculoskeletal ROS     GI/Hepatic:     (+) hepatitis type B,     Renal/Genitourinary:  - neg Renal ROS     Endo:     (+) thyroid problem, hypothyroidism,     Psychiatric/Substance Use:  - neg psychiatric ROS     Infectious Disease:  - neg infectious disease ROS     Malignancy:   (+) Malignancy, History of Other.Other CA Poss recurrence Active status post Chemo and Radiation.    Other:  - neg other ROS        LABS: Personally reviewed  CBC:   Lab Results   Component Value Date    WBC 3.1 (L) 08/03/2021    WBC 4.7 03/19/2021    HGB 10.1 (L) 08/03/2021    HGB 10.7 (L) 03/19/2021    HCT 32.8 (L) 08/03/2021    HCT 32.1 (L) 03/19/2021    PLT 46 (LL) 08/03/2021    PLT 45 (LL) 03/19/2021     BMP:   Lab Results   Component Value Date      08/03/2021     12/14/2020    POTASSIUM 4.4 08/03/2021    POTASSIUM 4.1 12/14/2020    CHLORIDE 106 08/03/2021    CHLORIDE 105 12/14/2020    CO2 29 08/03/2021    CO2 23 12/14/2020    BUN 24 (H) 08/03/2021    BUN 31 (H) 12/14/2020    CR 1.39 (H) 08/03/2021    CR 0.91 12/14/2020     08/03/2021    GLC 91 12/14/2020     COAGS:   Lab Results   Component Value Date    INR 1.21 (H) 04/26/2020     POC: No results found for: BGM, HCG, HCGS  HEPATIC:   Lab Results   Component Value Date    ALBUMIN 3.5 08/23/2021    PROTTOTAL 6.6 08/23/2021     (H) 08/23/2021     (H) 08/23/2021    ALKPHOS 97 08/23/2021    BILITOTAL 0.8 08/23/2021     OTHER:   Lab Results   Component Value Date    GUANAKITO 9.1 08/03/2021    PHOS 4.0 05/16/2020    MAG 1.8 07/31/2020    .49 (H) 03/11/2021    CRP 10.6 (H) 05/14/2020       Virtual visit -  No vitals were obtained Weight today 141 down from 149#       Physical Exam  Constitutional: Awake, alert, no apparent distress, and appears stated age. Daughter accompanies and is interpreting.   HENT: Normocephalic  Respiratory: non labored breathing   Neurologic: Awake, alert, oriented to name, place and time.   Neuropsychiatric: Calm, cooperative. Normal affect.     Please refer to the physical examination documented by the anesthesiologist in the anesthesia record on the day of surgery    PRIOR LABS/DIAGNOSTIC STUDIES:   All labs and imaging personally reviewed       PET 9/2/20  IMPRESSION:  1. No good evidence of FDG avid malignancy. Complete favorable treatment response of prior right posterior nasopharyngeal malignancy and cervical capri metastases.  2. New small area of confluent opacification left upper lobe apex, likely inflammatory.  3. Decrease in activity of mildly prominent mediastinal and bilateral hilar nodes, most likely improving granulomatous inflammation.  4. Irregular subcentimeter indeterminate pulmonary nodule in the right upper lobe demonstrates no FDG activity  although is just below the threshold for reliable PET characterization. Chest CT imaging surveillance recommended    21 MR Brain  IMPRESSION:  1. Â No evidence of abnormal enhancement or mass within the nasopharynx. No abnormality involving the osseous structures of the skull base. No cervical lymphadenopathy within the partially imaged upper neck.  2. Â Increased opacification/mass within the right anterior ethmoid air cells which measures 2.4 x 1.6 x 2 cm with new invasion of the right medial orbital rim and mild lateral displacement of the right medial rectus muscle. Complete opacification of the   right maxillary sinus and circumferential mucosal thickening within the right maxillary sinus. Direct visualization is suggested.   3. Â No evidence of acute intracranial hemorrhage, mass effect, or infarction.  4. Â Mild nonspecific white matter changes.    EK Normal sinus rhythm    The patient's records and results personally reviewed by this provider.     Outside records reviewed from: care everywhere    ASSESSMENT and PLAN  Kristen Chapman is a 71 year old male who presents for pre-operative H & P in preparation for nasal endoscopy with biopsy with Dr. Centeno on 21 at [facility] [place] [facility].  RCRI: 0.4% risk of serious cardiac event  VTE:1.8-3%  MIHAELA: 2/8=Low risk  PONV: 2    --Nasopharyngeal carcinoma s/p chemoradiation, now with imaging findings concerning for recurrence. Above procedure now planned.   --No history of problems with anesthesia.  --No cardiac history, symptoms or meds. EKG above. Able to walk 2-3 blocks without difficulty.   --Nonsmoker. Denies pulmonary symptoms. Low risk for MIHAELA.  --Some swallowing difficulties. Recent weight loss ~8 pounds.  --Hypothyroidism Synthroid at .  --Hepatitis B followed by ID.  Lamivudine at  but patient needs to  refill.   --Pancytopenia since therapies. Last Hgb 10.1, and platelet range in the 40s. Dr. Centeno is requesting platelet  infusion prior to procedure. Therapy plan provided for 1 unit platelets in infusion center morning before surgery. Discussed with infusion center. ASC team is calling patient for updated arrival time and instructions.   --Past history of blood transfusion.  --CRI Last Cr 1.39.  --Left chest port.  --Will require [place] .    Arrival time, NPO, shower and medication instructions provided by nursing staff today.       Video-Visit Details    Type of service:  Video Visit    Patient verbally consented to video service today: YES      Video Start Time: 8:03am  Video End Time (time video stopped): 8:14am    Originating Location (pt. Location): Home     Distant Location (provider location):  [facility] PREOPERATIVE ASSESSMENT CENTER     Mode of Communication:  Video Conference via Doximity-patient preference    SPARKLE Bernard CNS  Preoperative Assessment Center  [facility]  Clinic and Surgery Center  Phone: [phone]  Fax: [phone]

## 2021-09-02 NOTE — PROGRESS NOTES
Kristen is a 71 year old who is being evaluated via a billable video visit.      How would you like to obtain your AVS? MyChart  If the video visit is dropped, the invitation should be resent by: Text to cell phone: [phone]      HPI         Review of Systems         Physical Exam

## 2021-09-02 NOTE — H&P
Pre-Operative H & P     CC:  Preoperative exam to assess for increased cardiopulmonary risk while undergoing surgery and anesthesia.    Date of Encounter: 9/2/2021  Primary Care Physician:  Issa oCok     Reason for visit:   Encounter Diagnoses   Name Primary?     Preop examination Yes     Nasopharyngeal cancer (H)        KIAN Chapman is a 71 year old male who presents for pre-operative H & P in preparation for nasal endoscopy with biopsy with Dr. Centeno on 9/7/21 at [facility]. History is obtained from the patient, daughter and chart review.    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 He was treated with concurrent chemotherapy and radiation. Subsequent chemotherapy was held due to prolonged thrombocytopenia and renal insufficiency. On recent surveillance imaging opacification of the right ethmoid air cells was seen and is concerning for recurrent disease. He was then referred to Dr. Centeno in consideration for biopsy attempt. The patient was reporting right sided eye watering and rhinorrhea from the right nare. Dr. Centeno was unable to perform a biopsy in the clinic setting, and counseled patient for above procedures.     Patient history is otherwise significant for anemia, thrombocytopenia, hypothyroidism and Hepatitis B. For his Hep B he is followed by ID and is being treated with lamivudine.    Today patient denies fever, cough, shortness of breath, chest pain, irregular HR, or ankle edema. He is having some difficulty swallowing and has lost some weight. He feels like food is getting stuck and he has to wash it down with water.     Hx of abnormal bleeding or anti-platelet use: Patient has anemia and thrombocytopenia after therapies. History of blood transfusion.       Prior to Admission Medications  Current Outpatient Medications   Medication Sig Dispense Refill     acetaminophen (TYLENOL) 325 MG tablet Take 650 mg by mouth  every 6 hours as needed        lamiVUDine (EPIVIR) 100 MG tablet Take 1 tablet (100 mg) by mouth daily (Patient taking differently: Take 100 mg by mouth every evening ) 90 tablet 3     levothyroxine (SYNTHROID/LEVOTHROID) 88 MCG tablet Take 1 tablet (88 mcg) by mouth daily (Patient taking differently: Take 88 mcg by mouth every evening ) 60 tablet 3     ondansetron (ZOFRAN) 8 MG tablet [ONDANSETRON (ZOFRAN) 8 MG TABLET] Take 1 tablet (8 mg total) by mouth every 6 (six) hours as needed for nausea. (Patient taking differently: Take 8 mg by mouth every 6 hours as needed for nausea ) 30 tablet 1     prochlorperazine (COMPAZINE) 10 MG tablet [PROCHLORPERAZINE (COMPAZINE) 10 MG TABLET] Take 1 tablet (10 mg total) by mouth every 6 (six) hours as needed for nausea. (Patient taking differently: Take 10 mg by mouth every 6 hours as needed for nausea ) 30 tablet 3     alum/mag hydrox-simethicone-diphenhydramine-lidocaine (MAGIC MOUTHWASH) suspension [ALUM/MAG HYDROX-SIMETHICONE-DIPHENHYDRAMINE-LIDOCAINE (MAGIC MOUTHWASH) SUSPENSION] Swish and spit 10 mL 4 (four) times a day as needed. Mix equal parts Maalox, diphenhydramine, viscous lidocaine. 240 mL 1     ciprofloxacin-dexamethasone (CIPRODEX) 0.3-0.1 % otic suspension  (Patient not taking: Reported on 9/2/2021)       lidocaine-prilocaine (EMLA) cream [LIDOCAINE-PRILOCAINE (EMLA) CREAM] Place over port 30 min. before being accessed. (Patient not taking: Reported on 9/2/2021) 30 g 1     nystatin (NYSTATIN) 100,000 unit/mL suspension [NYSTATIN (NYSTATIN) 100,000 UNIT/ML SUSPENSION] 5 mL swish and swallow 4 times. (Patient not taking: Reported on 9/2/2021) 200 mL 5       Family History  Family History   Family history unknown: Yes       Past Medical History:   Diagnosis Date     Anemia      Dysphagia      Hepatitis B chronic      Hypothyroidism      Nasopharyngeal cancer (H)      Severe protein-calorie malnutrition (H)      Thrombocytopenia (H)       Past Surgical History:    Procedure Laterality Date     IR CHEST PORT PLACEMENT > 5 YRS OF AGE  3/2/2020     IR CHEST PORT PLACEMENT > 5 YRS OF AGE  3/2/2020     IR GASTROSTOMY TUBE INSERTION  4/27/2020     IR GASTROSTOMY TUBE PERCUTANEOUS PLCMNT  4/27/2020     IR PORT PLACEMENT >5 YEARS  3/2/2020     IR PORT PLACEMENT >5 YEARS  3/2/2020     IR T-FASTENER REMOVAL  6/5/2020     IR T-FASTENER REMOVAL  6/5/2020      Allergies   Allergen Reactions     Oxycodone Itching      Social History     Tobacco Use     Smoking status: Never Smoker     Smokeless tobacco: Never Used   Substance Use Topics     Alcohol use: Not Currently      Wt Readings from Last 1 Encounters:   08/27/21 67.6 kg (149 lb)          ROS/MED HISTORY  The complete review of systems is negative other than noted in the HPI or here.    ENT/Pulmonary:    (-) tobacco use   Neurologic:  - neg neurologic ROS     Cardiovascular:     (+) -----Previous cardiac testing   Echo: Date: Results:    Stress Test: Date: Results:    ECG Reviewed: Date: 2020 Results:  NSR  Cath: Date: Results:   (-) taking anticoagulants/antiplatelets   METS/Exercise Tolerance: 1 - Eating, dressing    Hematologic: Comments: Thrombocytopenia    (+) anemia,     Musculoskeletal:  - neg musculoskeletal ROS     GI/Hepatic:     (+) hepatitis type B,     Renal/Genitourinary:  - neg Renal ROS     Endo:     (+) thyroid problem, hypothyroidism,     Psychiatric/Substance Use:  - neg psychiatric ROS     Infectious Disease:  - neg infectious disease ROS     Malignancy:   (+) Malignancy, History of Other.Other CA Poss recurrence Active status post Chemo and Radiation.    Other:  - neg other ROS        LABS: Personally reviewed  CBC:   Lab Results   Component Value Date    WBC 3.1 (L) 08/03/2021    WBC 4.7 03/19/2021    HGB 10.1 (L) 08/03/2021    HGB 10.7 (L) 03/19/2021    HCT 32.8 (L) 08/03/2021    HCT 32.1 (L) 03/19/2021    PLT 46 (LL) 08/03/2021    PLT 45 (LL) 03/19/2021     BMP:   Lab Results   Component Value Date      08/03/2021     12/14/2020    POTASSIUM 4.4 08/03/2021    POTASSIUM 4.1 12/14/2020    CHLORIDE 106 08/03/2021    CHLORIDE 105 12/14/2020    CO2 29 08/03/2021    CO2 23 12/14/2020    BUN 24 (H) 08/03/2021    BUN 31 (H) 12/14/2020    CR 1.39 (H) 08/03/2021    CR 0.91 12/14/2020     08/03/2021    GLC 91 12/14/2020     COAGS:   Lab Results   Component Value Date    INR 1.21 (H) 04/26/2020     POC: No results found for: BGM, HCG, HCGS  HEPATIC:   Lab Results   Component Value Date    ALBUMIN 3.5 08/23/2021    PROTTOTAL 6.6 08/23/2021     (H) 08/23/2021     (H) 08/23/2021    ALKPHOS 97 08/23/2021    BILITOTAL 0.8 08/23/2021     OTHER:   Lab Results   Component Value Date    GUANAKITO 9.1 08/03/2021    PHOS 4.0 05/16/2020    MAG 1.8 07/31/2020    .49 (H) 03/11/2021    CRP 10.6 (H) 05/14/2020       Virtual visit -  No vitals were obtained Weight today 141 down from 149#       Physical Exam  Constitutional: Awake, alert, no apparent distress, and appears stated age. Daughter accompanies and is interpreting.   HENT: Normocephalic  Respiratory: non labored breathing   Neurologic: Awake, alert, oriented to name, place and time.   Neuropsychiatric: Calm, cooperative. Normal affect.     Please refer to the physical examination documented by the anesthesiologist in the anesthesia record on the day of surgery    PRIOR LABS/DIAGNOSTIC STUDIES:   All labs and imaging personally reviewed       PET 9/2/20  IMPRESSION:  1. No good evidence of FDG avid malignancy. Complete favorable treatment response of prior right posterior nasopharyngeal malignancy and cervical capri metastases.  2. New small area of confluent opacification left upper lobe apex, likely inflammatory.  3. Decrease in activity of mildly prominent mediastinal and bilateral hilar nodes, most likely improving granulomatous inflammation.  4. Irregular subcentimeter indeterminate pulmonary nodule in the right upper lobe demonstrates no FDG activity  although is just below the threshold for reliable PET characterization. Chest CT imaging surveillance recommended    21 MR Brain  IMPRESSION:  1. Â No evidence of abnormal enhancement or mass within the nasopharynx. No abnormality involving the osseous structures of the skull base. No cervical lymphadenopathy within the partially imaged upper neck.  2. Â Increased opacification/mass within the right anterior ethmoid air cells which measures 2.4 x 1.6 x 2 cm with new invasion of the right medial orbital rim and mild lateral displacement of the right medial rectus muscle. Complete opacification of the   right maxillary sinus and circumferential mucosal thickening within the right maxillary sinus. Direct visualization is suggested.   3. Â No evidence of acute intracranial hemorrhage, mass effect, or infarction.  4. Â Mild nonspecific white matter changes.    EK Normal sinus rhythm    The patient's records and results personally reviewed by this provider.     Outside records reviewed from: care everywhere    ASSESSMENT and PLAN  Kristen Chapman is a 71 year old male who presents for pre-operative H & P in preparation for nasal endoscopy with biopsy with Dr. Centeno on 21 at [facility] [place].  RCRI: 0.4% risk of serious cardiac event  VTE:1.8-3%  MIHAELA: 2/8=Low risk  PONV: 2    --Nasopharyngeal carcinoma s/p chemoradiation, now with imaging findings concerning for recurrence. Above procedure now planned.   --No history of problems with anesthesia.  --No cardiac history, symptoms or meds. EKG above. Able to walk 2-3 blocks without difficulty.   --Nonsmoker. Denies pulmonary symptoms. Low risk for MIHAELA.  --Some swallowing difficulties. Recent weight loss ~8 pounds.  --Hypothyroidism Synthroid at .  --Hepatitis B followed by ID.  Lamivudine at  but patient needs to  refill.   --Pancytopenia since therapies. Last Hgb 10.1, and platelet range in the 40s. Dr. Centeno is requesting platelet  infusion prior to procedure. Therapy plan provided for 1 unit platelets in infusion center morning before surgery. Discussed with infusion center. ASC team is calling patient for updated arrival time and instructions.   --Past history of blood transfusion.  --CRI Last Cr 1.39.  --Left chest port.  --Will require [facility] .    Arrival time, NPO, shower and medication instructions provided by nursing staff today.       Video-Visit Details    Type of service:  Video Visit    Patient verbally consented to video service today: YES      Video Start Time: 8:03am  Video End Time (time video stopped): 8:14am    Originating Location (pt. Location): Home     Distant Location (provider location):  [facility] PREOPERATIVE ASSESSMENT CENTER     Mode of Communication:  Video Conference via Doximity-patient preference    SPARKLE Bernard CNS  Preoperative Assessment Center  [facility]  Clinic and Surgery Center  Phone: [phone]  Fax: [phone]

## 2021-09-02 NOTE — PROGRESS NOTES
Writer called daughter and explained the plan and reasoning for the biopsy procedure with Dr. Centeno. Writer explained that the patient will be getting platelets in our infusion center prior to his biopsy. Patient is now scheduled for a 0600 arrival time for platelets and will go upstairs once transfusion is complete.     Daughter verbalized understanding. She stated she was given all the pre surgical information and prep instructions.     Bev Maxwell RN on 9/2/2021 at 3:21 PM

## 2021-09-02 NOTE — PATIENT INSTRUCTIONS
Preparing for Your Surgery      Name:  Kristen Chapman   MRN:  [id number]   :  1950   Today's Date:  2021       Arriving for surgery:  Surgery date:  21  Arrival time:  06:45 am    Restrictions due to COVID 19:       One visitor is allowed in the Pre Op area. When you go into surgery, one visitor is allowed to wait in the Surgery Waiting Room       (provided there is enough space to social distance).   After surgery- Two visitors are allowed at a time if you have a private room and one visitor is allowed for those in a semi-private room.   Every 4 days the visitor(s) can rotate. During the 4 day period, the visitor(s) must be consistent. No visitors under the age of 18 years old.   Visiting Hours: 8 am - 8:30 pm   No ill visitors.   All visitors must wear face mask.    [facility] parking is available for anyone with mobility limitations or disabilities.  ([facility]  24 hours/ 7 days a week; [facility]  7 am- 3:30 pm, Mon- Fri)    Please come to:     [facility] [address] [address]  -  Proceed to the 5th floor to check into the Ambulatory Surgery Center.              >> There will be patient concierges on the 1st and 5th floor, for assistance or an escort, if you would like.              >> Please call [phone] with any questions.    What can I eat or drink?  -  You may eat and drink normally for up to 8 hours before your surgery.   -  You may have clear liquids until 4 hours before surgery.   Examples of clear liquids:  Water  Clear broth  Juices (apple, white grape, white cranberry  and cider) without pulp  Noncarbonated, powder based beverages  (lemonade and Karthik-Aid)  Sodas (Sprite, 7-Up, ginger ale and seltzer)  Coffee or tea (without milk or cream)  Gatorade    -  No Alcohol for at least 24 hours before surgery     Which medicines can I take?    Hold Aspirin for 7 days before surgery.   Hold Multivitamins for 7 days before  surgery.  Hold Supplements for 7 days before surgery.  Hold Ibuprofen (Advil, Motrin) for 1 day before surgery--unless otherwise directed by surgeon.  Hold Naproxen (Aleve) for 4 days before surgery.  -  PLEASE TAKE these medications the day of surgery:  Tylenol if needed; take morning medications.    How do I prepare myself?  - Please take 2 showers before surgery using Scrubcare or Hibiclens soap.    Use this soap only from the neck to your toes.     Leave the soap on your skin for one minute--then rinse thoroughly.      You may use your own shampoo and conditioner; no other hair products.   - Please remove all jewelry and body piercings.  - No lotions, deodorants or fragrance.  - No makeup or fingernail polish.   - Bring your ID and insurance card.    -If you have a Deep Brain Stimulator, Spinal Cord Stimulator or any neuro stimulator device---you must bring the remote control to the hospital     - All patients are required to have a Covid-19 test within 4 days of surgery/procedure.      -Patients will be contacted by the [facility] scheduling team within 1 week of surgery to make an appointment.      - Patients may call the Scheduling team at [phone] if they have not been scheduled within 4 days of  surgery.      ALL PATIENTS GOING HOME THE SAME DAY OF SURGERY ARE REQUIRED TO HAVE A RESPONSIBLE ADULT TO DRIVE AND BE IN ATTENDANCE WITH THEM FOR 24 HOURS FOLLOWING SURGERY.      Questions or Concerns:    - For any questions regarding the day of surgery or your hospital stay, please contact the Pre Admission Nursing Office at [phone].       - If you have health changes between today and your surgery please call your surgeon.       For questions after surgery please call your surgeons office.

## 2021-09-03 ENCOUNTER — LAB (OUTPATIENT)
Dept: LAB | Facility: CLINIC | Age: 71
End: 2021-09-03
Attending: OTOLARYNGOLOGY
Payer: COMMERCIAL

## 2021-09-03 DIAGNOSIS — Z11.59 ENCOUNTER FOR SCREENING FOR OTHER VIRAL DISEASES: ICD-10-CM

## 2021-09-03 LAB
PATH REPORT.ADDENDUM SPEC: NORMAL
PATH REPORT.COMMENTS IMP SPEC: NORMAL
PATH REPORT.FINAL DX SPEC: NORMAL
PATH REPORT.GROSS SPEC: NORMAL
PATH REPORT.MICROSCOPIC SPEC OTHER STN: NORMAL
PATH REPORT.RELEVANT HX SPEC: NORMAL
PATH REPORT.RELEVANT HX SPEC: NORMAL
PATH REPORT.SITE OF ORIGIN SPEC: NORMAL

## 2021-09-03 PROCEDURE — U0003 INFECTIOUS AGENT DETECTION BY NUCLEIC ACID (DNA OR RNA); SEVERE ACUTE RESPIRATORY SYNDROME CORONAVIRUS 2 (SARS-COV-2) (CORONAVIRUS DISEASE [COVID-19]), AMPLIFIED PROBE TECHNIQUE, MAKING USE OF HIGH THROUGHPUT TECHNOLOGIES AS DESCRIBED BY CMS-2020-01-R: HCPCS

## 2021-09-03 PROCEDURE — U0005 INFEC AGEN DETEC AMPLI PROBE: HCPCS

## 2021-09-04 LAB — SARS-COV-2 RNA RESP QL NAA+PROBE: NEGATIVE

## 2021-09-05 PROBLEM — Z93.1 STATUS POST INSERTION OF PERCUTANEOUS ENDOSCOPIC GASTROSTOMY (PEG) TUBE (H): Status: ACTIVE | Noted: 2021-09-05

## 2021-09-06 LAB
BLD PROD TYP BPU: NORMAL
BLOOD COMPONENT TYPE: NORMAL
CODING SYSTEM: NORMAL
ISSUE DATE AND TIME: NORMAL
UNIT ABO/RH: NORMAL
UNIT NUMBER: NORMAL
UNIT STATUS: NORMAL
UNIT TYPE ISBT: 8400

## 2021-09-06 RX ORDER — HEPARIN SODIUM (PORCINE) LOCK FLUSH IV SOLN 100 UNIT/ML 100 UNIT/ML
5 SOLUTION INTRAVENOUS
Status: CANCELLED | OUTPATIENT
Start: 2021-09-06

## 2021-09-06 RX ORDER — HEPARIN SODIUM,PORCINE 10 UNIT/ML
5 VIAL (ML) INTRAVENOUS
Status: CANCELLED | OUTPATIENT
Start: 2021-09-06

## 2021-09-07 ENCOUNTER — INFUSION THERAPY VISIT (OUTPATIENT)
Dept: INFUSION THERAPY | Facility: CLINIC | Age: 71
End: 2021-09-07
Attending: CLINICAL NURSE SPECIALIST
Payer: COMMERCIAL

## 2021-09-07 ENCOUNTER — HOSPITAL ENCOUNTER (OUTPATIENT)
Facility: AMBULATORY SURGERY CENTER | Age: 71
End: 2021-09-07
Attending: OTOLARYNGOLOGY
Payer: COMMERCIAL

## 2021-09-07 ENCOUNTER — ANESTHESIA (OUTPATIENT)
Dept: SURGERY | Facility: AMBULATORY SURGERY CENTER | Age: 71
End: 2021-09-07
Payer: COMMERCIAL

## 2021-09-07 VITALS
RESPIRATION RATE: 16 BRPM | DIASTOLIC BLOOD PRESSURE: 76 MMHG | TEMPERATURE: 97.4 F | SYSTOLIC BLOOD PRESSURE: 149 MMHG | OXYGEN SATURATION: 98 % | HEART RATE: 49 BPM

## 2021-09-07 VITALS
WEIGHT: 147 LBS | RESPIRATION RATE: 16 BRPM | DIASTOLIC BLOOD PRESSURE: 79 MMHG | BODY MASS INDEX: 26.05 KG/M2 | HEIGHT: 63 IN | TEMPERATURE: 97.6 F | SYSTOLIC BLOOD PRESSURE: 141 MMHG | HEART RATE: 56 BPM | OXYGEN SATURATION: 96 %

## 2021-09-07 DIAGNOSIS — J34.89 LESION OR MASS OF PARANASAL SINUSES: ICD-10-CM

## 2021-09-07 DIAGNOSIS — D69.6 THROMBOCYTOPENIA (H): Primary | ICD-10-CM

## 2021-09-07 LAB
ANION GAP SERPL CALCULATED.3IONS-SCNC: 5 MMOL/L (ref 3–14)
BUN SERPL-MCNC: 27 MG/DL (ref 7–30)
CALCIUM SERPL-MCNC: 8.3 MG/DL (ref 8.5–10.1)
CHLORIDE BLD-SCNC: 110 MMOL/L (ref 94–109)
CO2 SERPL-SCNC: 26 MMOL/L (ref 20–32)
CREAT SERPL-MCNC: 1.36 MG/DL (ref 0.66–1.25)
ERYTHROCYTE [DISTWIDTH] IN BLOOD BY AUTOMATED COUNT: 16.7 % (ref 10–15)
GFR SERPL CREATININE-BSD FRML MDRD: 53 ML/MIN/1.73M2
GLUCOSE BLD-MCNC: 92 MG/DL (ref 70–99)
HCT VFR BLD AUTO: 27.5 % (ref 40–53)
HGB BLD-MCNC: 8.7 G/DL (ref 13.3–17.7)
LAB AP CHARGES (HE HISTORICAL CONVERSION): ABNORMAL
MCH RBC QN AUTO: 27.3 PG (ref 26.5–33)
MCHC RBC AUTO-ENTMCNC: 31.6 G/DL (ref 31.5–36.5)
MCV RBC AUTO: 86 FL (ref 78–100)
PATH REPORT.ADDENDUM SPEC: ABNORMAL
PATH REPORT.COMMENTS IMP SPEC: ABNORMAL
PATH REPORT.COMMENTS IMP SPEC: ABNORMAL
PATH REPORT.FINAL DX SPEC: ABNORMAL
PATH REPORT.GROSS SPEC: ABNORMAL
PATH REPORT.MICROSCOPIC SPEC OTHER STN: ABNORMAL
PATH REPORT.RELEVANT HX SPEC: ABNORMAL
PLATELET # BLD AUTO: 54 10E3/UL (ref 150–450)
POTASSIUM BLD-SCNC: 4.2 MMOL/L (ref 3.4–5.3)
RBC # BLD AUTO: 3.19 10E6/UL (ref 4.4–5.9)
RESULT FLAG (HE HISTORICAL CONVERSION): ABNORMAL
SODIUM SERPL-SCNC: 141 MMOL/L (ref 133–144)
WBC # BLD AUTO: 3.3 10E3/UL (ref 4–11)

## 2021-09-07 PROCEDURE — 88331 PATH CONSLTJ SURG 1 BLK 1SPC: CPT | Mod: 26 | Performed by: PATHOLOGY

## 2021-09-07 PROCEDURE — 88331 PATH CONSLTJ SURG 1 BLK 1SPC: CPT | Mod: TC | Performed by: OTOLARYNGOLOGY

## 2021-09-07 PROCEDURE — P9037 PLATE PHERES LEUKOREDU IRRAD: HCPCS | Performed by: CLINICAL NURSE SPECIALIST

## 2021-09-07 PROCEDURE — 36430 TRANSFUSION BLD/BLD COMPNT: CPT

## 2021-09-07 PROCEDURE — 31237 NSL/SINS NDSC SURG BX POLYPC: CPT | Mod: RT

## 2021-09-07 PROCEDURE — 88342 IMHCHEM/IMCYTCHM 1ST ANTB: CPT | Mod: TC | Performed by: OTOLARYNGOLOGY

## 2021-09-07 PROCEDURE — 88305 TISSUE EXAM BY PATHOLOGIST: CPT | Mod: 26 | Performed by: PATHOLOGY

## 2021-09-07 PROCEDURE — 88342 IMHCHEM/IMCYTCHM 1ST ANTB: CPT | Mod: 26 | Performed by: PATHOLOGY

## 2021-09-07 PROCEDURE — 88365 INSITU HYBRIDIZATION (FISH): CPT | Mod: 26 | Performed by: PATHOLOGY

## 2021-09-07 PROCEDURE — 80048 BASIC METABOLIC PNL TOTAL CA: CPT

## 2021-09-07 PROCEDURE — 85027 COMPLETE CBC AUTOMATED: CPT

## 2021-09-07 PROCEDURE — 36591 DRAW BLOOD OFF VENOUS DEVICE: CPT

## 2021-09-07 RX ORDER — SODIUM CHLORIDE, SODIUM LACTATE, POTASSIUM CHLORIDE, CALCIUM CHLORIDE 600; 310; 30; 20 MG/100ML; MG/100ML; MG/100ML; MG/100ML
INJECTION, SOLUTION INTRAVENOUS CONTINUOUS PRN
Status: DISCONTINUED | OUTPATIENT
Start: 2021-09-07 | End: 2021-09-07

## 2021-09-07 RX ORDER — PROPOFOL 10 MG/ML
INJECTION, EMULSION INTRAVENOUS PRN
Status: DISCONTINUED | OUTPATIENT
Start: 2021-09-07 | End: 2021-09-07

## 2021-09-07 RX ORDER — LIDOCAINE 40 MG/G
CREAM TOPICAL
Status: DISCONTINUED | OUTPATIENT
Start: 2021-09-07 | End: 2021-09-08 | Stop reason: HOSPADM

## 2021-09-07 RX ORDER — EPINEPHRINE 1 MG/ML
INJECTION, SOLUTION INTRAMUSCULAR; SUBCUTANEOUS PRN
Status: DISCONTINUED | OUTPATIENT
Start: 2021-09-07 | End: 2021-09-07 | Stop reason: HOSPADM

## 2021-09-07 RX ORDER — SODIUM CHLORIDE, SODIUM LACTATE, POTASSIUM CHLORIDE, CALCIUM CHLORIDE 600; 310; 30; 20 MG/100ML; MG/100ML; MG/100ML; MG/100ML
INJECTION, SOLUTION INTRAVENOUS CONTINUOUS
Status: DISCONTINUED | OUTPATIENT
Start: 2021-09-07 | End: 2021-09-08 | Stop reason: HOSPADM

## 2021-09-07 RX ORDER — GLYCOPYRROLATE 0.2 MG/ML
INJECTION, SOLUTION INTRAMUSCULAR; INTRAVENOUS PRN
Status: DISCONTINUED | OUTPATIENT
Start: 2021-09-07 | End: 2021-09-07

## 2021-09-07 RX ORDER — OXYMETAZOLINE HYDROCHLORIDE 0.05 G/100ML
SPRAY NASAL PRN
Status: DISCONTINUED | OUTPATIENT
Start: 2021-09-07 | End: 2021-09-07 | Stop reason: HOSPADM

## 2021-09-07 RX ORDER — DEXAMETHASONE SODIUM PHOSPHATE 4 MG/ML
INJECTION, SOLUTION INTRA-ARTICULAR; INTRALESIONAL; INTRAMUSCULAR; INTRAVENOUS; SOFT TISSUE PRN
Status: DISCONTINUED | OUTPATIENT
Start: 2021-09-07 | End: 2021-09-07

## 2021-09-07 RX ORDER — CEFAZOLIN SODIUM 2 G/50ML
2 SOLUTION INTRAVENOUS ONCE
Status: DISCONTINUED | OUTPATIENT
Start: 2021-09-07 | End: 2021-09-08 | Stop reason: HOSPADM

## 2021-09-07 RX ORDER — OXYCODONE HYDROCHLORIDE 5 MG/1
5 TABLET ORAL EVERY 6 HOURS PRN
Qty: 12 TABLET | Refills: 0 | Status: SHIPPED | OUTPATIENT
Start: 2021-09-07 | End: 2021-09-10

## 2021-09-07 RX ORDER — LIDOCAINE HYDROCHLORIDE AND EPINEPHRINE 10; 10 MG/ML; UG/ML
INJECTION, SOLUTION INFILTRATION; PERINEURAL PRN
Status: DISCONTINUED | OUTPATIENT
Start: 2021-09-07 | End: 2021-09-07 | Stop reason: HOSPADM

## 2021-09-07 RX ORDER — OXYCODONE HYDROCHLORIDE 5 MG/1
5 TABLET ORAL
Status: CANCELLED | OUTPATIENT
Start: 2021-09-07

## 2021-09-07 RX ORDER — FENTANYL CITRATE 50 UG/ML
50 INJECTION, SOLUTION INTRAMUSCULAR; INTRAVENOUS EVERY 5 MIN PRN
Status: DISCONTINUED | OUTPATIENT
Start: 2021-09-07 | End: 2021-09-08 | Stop reason: HOSPADM

## 2021-09-07 RX ORDER — PROPOFOL 10 MG/ML
INJECTION, EMULSION INTRAVENOUS CONTINUOUS PRN
Status: DISCONTINUED | OUTPATIENT
Start: 2021-09-07 | End: 2021-09-07

## 2021-09-07 RX ORDER — ACETAMINOPHEN 325 MG/1
975 TABLET ORAL ONCE
Status: COMPLETED | OUTPATIENT
Start: 2021-09-07 | End: 2021-09-07

## 2021-09-07 RX ORDER — ONDANSETRON 2 MG/ML
INJECTION INTRAMUSCULAR; INTRAVENOUS PRN
Status: DISCONTINUED | OUTPATIENT
Start: 2021-09-07 | End: 2021-09-07

## 2021-09-07 RX ORDER — ONDANSETRON 4 MG/1
4 TABLET, ORALLY DISINTEGRATING ORAL EVERY 30 MIN PRN
Status: DISCONTINUED | OUTPATIENT
Start: 2021-09-07 | End: 2021-09-08 | Stop reason: HOSPADM

## 2021-09-07 RX ORDER — LIDOCAINE HYDROCHLORIDE 20 MG/ML
INJECTION, SOLUTION INFILTRATION; PERINEURAL PRN
Status: DISCONTINUED | OUTPATIENT
Start: 2021-09-07 | End: 2021-09-07

## 2021-09-07 RX ORDER — MEPERIDINE HYDROCHLORIDE 25 MG/ML
12.5 INJECTION INTRAMUSCULAR; INTRAVENOUS; SUBCUTANEOUS
Status: DISCONTINUED | OUTPATIENT
Start: 2021-09-07 | End: 2021-09-08 | Stop reason: HOSPADM

## 2021-09-07 RX ORDER — ONDANSETRON 4 MG/1
4 TABLET, ORALLY DISINTEGRATING ORAL EVERY 6 HOURS PRN
Qty: 12 TABLET | Refills: 0 | Status: SHIPPED | OUTPATIENT
Start: 2021-09-07 | End: 2021-09-16

## 2021-09-07 RX ORDER — FENTANYL CITRATE 50 UG/ML
INJECTION, SOLUTION INTRAMUSCULAR; INTRAVENOUS PRN
Status: DISCONTINUED | OUTPATIENT
Start: 2021-09-07 | End: 2021-09-07

## 2021-09-07 RX ORDER — ONDANSETRON 2 MG/ML
4 INJECTION INTRAMUSCULAR; INTRAVENOUS EVERY 30 MIN PRN
Status: DISCONTINUED | OUTPATIENT
Start: 2021-09-07 | End: 2021-09-08 | Stop reason: HOSPADM

## 2021-09-07 RX ORDER — ACETAMINOPHEN 325 MG/1
650 TABLET ORAL
Status: CANCELLED | OUTPATIENT
Start: 2021-09-07

## 2021-09-07 RX ORDER — OXYCODONE HYDROCHLORIDE 5 MG/1
5 TABLET ORAL EVERY 4 HOURS PRN
Status: DISCONTINUED | OUTPATIENT
Start: 2021-09-07 | End: 2021-09-08 | Stop reason: HOSPADM

## 2021-09-07 RX ORDER — EPHEDRINE SULFATE 50 MG/ML
INJECTION, SOLUTION INTRAMUSCULAR; INTRAVENOUS; SUBCUTANEOUS PRN
Status: DISCONTINUED | OUTPATIENT
Start: 2021-09-07 | End: 2021-09-07

## 2021-09-07 RX ADMIN — Medication 35 MG: at 08:59

## 2021-09-07 RX ADMIN — PROPOFOL 100 MCG/KG/MIN: 10 INJECTION, EMULSION INTRAVENOUS at 10:07

## 2021-09-07 RX ADMIN — DEXAMETHASONE SODIUM PHOSPHATE 4 MG: 4 INJECTION, SOLUTION INTRA-ARTICULAR; INTRALESIONAL; INTRAMUSCULAR; INTRAVENOUS; SOFT TISSUE at 09:08

## 2021-09-07 RX ADMIN — FENTANYL CITRATE 50 MCG: 50 INJECTION, SOLUTION INTRAMUSCULAR; INTRAVENOUS at 09:22

## 2021-09-07 RX ADMIN — LIDOCAINE HYDROCHLORIDE 60 MG: 20 INJECTION, SOLUTION INFILTRATION; PERINEURAL at 08:59

## 2021-09-07 RX ADMIN — ONDANSETRON 4 MG: 2 INJECTION INTRAMUSCULAR; INTRAVENOUS at 10:54

## 2021-09-07 RX ADMIN — ONDANSETRON 4 MG: 2 INJECTION INTRAMUSCULAR; INTRAVENOUS at 09:45

## 2021-09-07 RX ADMIN — FENTANYL CITRATE 25 MCG: 50 INJECTION, SOLUTION INTRAMUSCULAR; INTRAVENOUS at 10:55

## 2021-09-07 RX ADMIN — GLYCOPYRROLATE 0.2 MG: 0.2 INJECTION, SOLUTION INTRAMUSCULAR; INTRAVENOUS at 09:12

## 2021-09-07 RX ADMIN — OXYCODONE HYDROCHLORIDE 5 MG: 5 TABLET ORAL at 10:48

## 2021-09-07 RX ADMIN — FENTANYL CITRATE 50 MCG: 50 INJECTION, SOLUTION INTRAMUSCULAR; INTRAVENOUS at 08:59

## 2021-09-07 RX ADMIN — ACETAMINOPHEN 975 MG: 325 TABLET ORAL at 06:34

## 2021-09-07 RX ADMIN — EPHEDRINE SULFATE 10 MG: 50 INJECTION, SOLUTION INTRAMUSCULAR; INTRAVENOUS; SUBCUTANEOUS at 09:14

## 2021-09-07 RX ADMIN — PROPOFOL 120 MG: 10 INJECTION, EMULSION INTRAVENOUS at 08:59

## 2021-09-07 RX ADMIN — PROPOFOL 150 MCG/KG/MIN: 10 INJECTION, EMULSION INTRAVENOUS at 09:26

## 2021-09-07 RX ADMIN — FENTANYL CITRATE 25 MCG: 50 INJECTION, SOLUTION INTRAMUSCULAR; INTRAVENOUS at 10:49

## 2021-09-07 RX ADMIN — SODIUM CHLORIDE, SODIUM LACTATE, POTASSIUM CHLORIDE, CALCIUM CHLORIDE: 600; 310; 30; 20 INJECTION, SOLUTION INTRAVENOUS at 08:42

## 2021-09-07 RX ADMIN — PROPOFOL 200 MCG/KG/MIN: 10 INJECTION, EMULSION INTRAVENOUS at 08:59

## 2021-09-07 ASSESSMENT — MIFFLIN-ST. JEOR: SCORE: 1326.92

## 2021-09-07 NOTE — LETTER
"    2021         RE: Kristen Chapman  [address] [place] [address]        Dear Colleague,    Thank you for referring your patient, Kristen Chapman, to the [facility]. Please see a copy of my visit note below.    Chief Complaint   Patient presents with     1 unit platelet transfusion     Blood Product Transfusion Nursing Note:    Kristen Chapman presents today to [facility] for a 1 unit platelet transfusion.  During today's [facility] appointment orders from SPARKLE Whitlock CNS were completed.    Progress note:  ID verified by name and .  Assessment completed.  Vitals were stable throughout time in [facility].    verbal and printed education given to patient/representative regarding transfusion and possible side effects.  Patient/representative verbalized understanding.     present during visit today: Yes, Language: [employer]ge[employer] services used.     All pertinent labs reviewed prior to infusion: YES. Labs drawn prior. Plt 54. Per provider, give platelets regardless of level.    -Pt arrived from preop with PIV. Saline locked for procedure.     Date of consent or authorization: 21 with provider present: SPARKLE Whitlock    Patient tolerated the procedure well.    Transfusion given over approximately 50 minutes.    Discharge Plan:  Patient discharged in stable condition via wheelchair to surgery. Son present.  AVS given to patient.   Discharge instructions were reviewed with patient Yes  Patient/representative verbalized understanding of discharge instructions and all questions answered Yes.      Vital signs:    Temp src: Oral BP: (!) 152/79 Pulse: 78   Resp: 18 SpO2: 98 % O2 Device: None (Room air)        Estimated body mass index is 26.04 kg/m  as calculated from the following:    Height as of an earlier encounter on 21: 1.6 m (5' 3\").    Weight as of an earlier encounter on 21: 66.7 kg (147 lb).                Again, thank you for allowing me to participate in the care " of your patient.        Sincerely,        VIDEO,

## 2021-09-07 NOTE — BRIEF OP NOTE
[facility]    Brief Operative Note    Pre-operative diagnosis: Lesion or mass of paranasal sinuses [J34.89]  Post-operative diagnosis Same as pre-operative diagnosis    Procedure: Procedure(s):  Nasal Endoscopy with Biopsy  Surgeon: Surgeon(s) and Role:     * Ky Centeno MD - Primary  Anesthesia: General   Estimated blood loss: 20mL  Drains: None  Specimens:   ID Type Source Tests Collected by Time Destination   1 : Right ethmoid mass Tissue Sinus, Ethmoid, Right SURGICAL PATHOLOGY EXAM Ky Centeno MD 9/7/2021  9:35 AM    2 : Additional Right Ethmoid Mass Tissue Sinus, Ethmoid, Right SURGICAL PATHOLOGY EXAM Ky Centeno MD 9/7/2021 10:11 AM      Findings:   tumor in the nasal cavity at the middle meatus. Medial free edge, pedicled laterally. Intraoperative frozen sections positive for malignancy..  Complications: None.  Implants: * No implants in log *    Paola Moore MD   Otolaryngology-Head & Neck Surgery PGY1  Please contact ENT with questions by dialing * [phone] and entering job code [phone] when prompted.

## 2021-09-07 NOTE — PROGRESS NOTES
"Chief Complaint   Patient presents with     1 unit platelet transfusion     Blood Product Transfusion Nursing Note:    Kristen Chapman presents today to [facility] for a 1 unit platelet transfusion.  During today's [facility] appointment orders from SPARKLE Whitlock CNS were completed.    Progress note:  ID verified by name and .  Assessment completed.  Vitals were stable throughout time in [facility].    verbal and printed education given to patient/representative regarding transfusion and possible side effects.  Patient/representative verbalized understanding.     present during visit today: Yes, Language: ong services used.     All pertinent labs reviewed prior to infusion: YES. Labs drawn prior. Plt 54. Per provider, give platelets regardless of level.    -Pt arrived from preop with PIV. Saline locked for procedure.     Date of consent or authorization: 21 with provider present: SPARKLE Whitlock    Patient tolerated the procedure well.    Transfusion given over approximately 50 minutes.    Discharge Plan:  Patient discharged in stable condition via wheelchair to surgery. Son present.  AVS given to patient.   Discharge instructions were reviewed with patient Yes  Patient/representative verbalized understanding of discharge instructions and all questions answered Yes.      Vital signs:    Temp src: Oral BP: (!) 152/79 Pulse: 78   Resp: 18 SpO2: 98 % O2 Device: None (Room air)        Estimated body mass index is 26.04 kg/m  as calculated from the following:    Height as of an earlier encounter on 21: 1.6 m (5' 3\").    Weight as of an earlier encounter on 21: 66.7 kg (147 lb).            "

## 2021-09-07 NOTE — PATIENT INSTRUCTIONS
Dear Kristen Chapman    Thank you for choosing [facility] ([facility]) for your platelet transfusion.  The following information is a summary of our appointment as well as important reminders.      Patient Education     After Your Platelet Transfusion  Discharge Instructions  After you leave  After a blood transfusion (received red blood cells, platelets, plasma, cryo or granulocytes), you will need to watch for signs of a reaction for the next 48 hours.  Call your clinic or 911 (or go to the Emergency room) if you have any signs of a reaction:    Shaking or chills    Fever (temperature above 100.0 F)    Headache    Nausea    Hives    Itching    Swelling of the face or feeling flushed    Ongoing dry cough (nothing is coughed up)    Trouble breathing or wheezing  Some signs of a reaction won't show up for a few [id number].  These may include:    Fatigue    Dizziness    Pink or red urine    For informational purposes only. Not to replace the advice of your health care provider.   Copyright   2015 [employer]. All rights reserved. Matchbox 486352 - Rev 07/16.             We look forward in seeing you on your next appointment here at [facility] ([facility]).  Please don t hesitate to call us at [phone] to reschedule any of your appointments or to speak with one of the [facility] registered nurses.  It was a pleasure taking care of you today.    Sincerely,    [facility]  [facility]  [address] [place] [address]  [id number]  Phone:  [phone]

## 2021-09-07 NOTE — DISCHARGE INSTRUCTIONS
"[facility]  Home Care Following Anesthesia  For 24 hours after surgery:  1. Get plenty of rest.  A responsible adult must stay with you for at least 24 hours after you leave the surgery center.  2. Do not drive or use heavy equipment.  If you have weakness or tingling, don't drive or use heavy equipment until this feeling goes away.   3. Do not drink alcohol.   4. Avoid strenuous or risky activities.  Ask for help when climbing stairs.  5. You may feel lightheaded.  IF so, sit for a few minutes before standing.  Have someone help you get up.   6. If you have nausea (feel sick to your stomach): Drink only clear liquids such as apple juice, ginger ale, broth or 7-Up.  Rest may also help.  Be sure to drink enough fluids.  Move to a regular diet as you feel able.   7. You may have a slight fever.  Call the doctor if your fever is over 100 F (37.7 C) (taken under the tongue) or lasts longer than 24 hours.  8. You may have a dry mouth, a sore throat, muscle aches or trouble sleeping. These should go away after 24 hours.  9. Do not make important or legal decisions.   10. It is recommended to avoid smoking.        Today you received a Marcaine or bupivacaine block to numb the nerves near your surgery site.  This is a block using local anesthetic or \"numbing\" medication injected around the nerves to anesthetize or \"numb\" the area supplied by those nerves.  This block is injected into the muscle layer near your surgical site.  The medication may numb the location where you had surgery for 6-18 hours, but may last up to 24 hours.  If your surgical site is an arm or leg you should be careful with your affected limb, since it is possible to injure your limb without being aware of it due to the numbing.  Until full feeling returns, you should guard against bumping or hitting your limb, and avoid extreme hot or cold temperatures on the skin.  As the block wears off, the feeling will return as " a tingling or prickly sensation near your surgical site.  You will experience more discomfort from your incision as the feeling returns.  You may want to take a pain pill (a narcotic or Tylenol if this was prescribed by your surgeon) when you start to experience mild pain before the pain beccomes more severe.  If your pain medications do not control your pain you should notifiy your surgeon.    Tips for taking pain medications  To get the best pain relief possible, remember these points:    Take pain medications as directed, before pain becomes severe.    Pain medication can upset your stomach: taking it with food may help.    Constipation is a common side effect of pain medication. Drink plenty of  fluids.    Eat foods high in fiber. Take a stool softener if recommended by your doctor or pharmacist.    Do not drink alcohol, drive or operate machinery while taking pain medications.    Ask about other ways to control pain, such as with heat, ice or relaxation.    Tylenol/Acetaminophen Consumption  To help encourage the safe use of acetaminophen, the makers of TYLENOL  have lowered the maximum daily dose for single-ingredient Extra Strength TYLENOL  (acetaminophen) products sold in the [place] from 8 pills per day (4,000 mg) to 6 pills per day (3,000 mg). The dosing interval has also changed from 2 pills every 4-6 hours to 2 pills every 6 hours.    If you feel your pain relief is insufficient, you may take Tylenol/Acetaminophen in addition to your narcotic pain medication.     Be careful not to exceed 3,000 mg of Tylenol/Acetaminophen in a 24 hour period from all sources.    If you are taking extra strength Tylenol/acetaminophen (500 mg), the maximum dose is 6 tablets in 24 hours.    If you are taking regular strength acetaminophen (325 mg), the maximum dose is 9 tablets in 24 hours.    Call a doctor for any of the followin. Signs of infection (fever, growing tenderness at the surgery site, a large amount of  drainage or bleeding, severe pain, foul-smelling drainage, redness, swelling).  2. It has been over 8 to 10 hours since surgery and you are still not able to urinate (pass water).  3. Headache for over 24 hours.  4. Signs of Covid-19 infection (temperature over 100 degrees, shortness of breath, cough, loss of taste/smell, generalized body aches, persistent headache, chills, sore throat, nausea/vomiting/diarrhea)  Your doctor is:       Dr. Ky Centeno, ENT Otolaryngology: [phone]               Or dial [phone] and ask for the resident on call for:  ENT Otolaryngology  For emergency care, call the:  [facility]:  [phone] (TTY for hearing impaired: [phone])

## 2021-09-07 NOTE — ANESTHESIA POSTPROCEDURE EVALUATION
Patient: Kristen Chapman    Procedure(s):  Nasal Endoscopy with Biopsy    Diagnosis:Lesion or mass of paranasal sinuses [J34.89]  Diagnosis Additional Information: No value filed.    Anesthesia Type:  General    Note:  Disposition: Outpatient   Postop Pain Control: Uneventful            Sign Out: Well controlled pain   PONV: No   Neuro/Psych: Uneventful            Sign Out: Acceptable/Baseline neuro status   Airway/Respiratory: Uneventful            Sign Out: Acceptable/Baseline resp. status   CV/Hemodynamics: Uneventful            Sign Out: Acceptable CV status; No obvious hypovolemia; No obvious fluid overload   Other NRE: NONE   DID A NON-ROUTINE EVENT OCCUR? No           Last vitals:  Vitals Value Taken Time   /69 09/07/21 1100   Temp 36.4  C (97.6  F) 09/07/21 1100   Pulse 61 09/07/21 1100   Resp 16 09/07/21 1100   SpO2 95 % 09/07/21 1101   Vitals shown include unvalidated device data.    Electronically Signed By: Wilber Jones MD  September 7, 2021  11:49 AM

## 2021-09-07 NOTE — BRIEF OP NOTE
[facility] Brief Operative Note    Pre-operative diagnosis: Lesion or mass of paranasal sinuses [J34.89]   Post-operative diagnosis Same     Procedure: Procedure(s):  Nasal Endoscopy with Biopsy   Surgeon(s): Surgeon(s) and Role:     * Ky Centeno MD - Primary   Estimated blood loss: 20 mL    Specimens: ID Type Source Tests Collected by Time Destination   1 : Right ethmoid mass Tissue Sinus, Ethmoid, Right SURGICAL PATHOLOGY EXAM Ky Centeno MD 9/7/2021  9:35 AM    2 : Additional Right Ethmoid Mass Tissue Sinus, Ethmoid, Right SURGICAL PATHOLOGY EXAM Ky Centeno MD 9/7/2021 10:11 AM       Findings: Right ant ethmoid lesion, frozen compatible with malignancy  No immediate complications  Family updated    Ky Centeno MD    [facility]  [facility]  [facility]  Department of Otolaryngology - Head & Neck Surgery

## 2021-09-08 ENCOUNTER — TELEPHONE (OUTPATIENT)
Dept: OTOLARYNGOLOGY | Facility: CLINIC | Age: 71
End: 2021-09-08

## 2021-09-08 DIAGNOSIS — C11.9 SQUAMOUS CELL CARCINOMA OF NASOPHARYNX (H): Primary | ICD-10-CM

## 2021-09-08 DIAGNOSIS — R94.02 ABNORMAL BRAIN SCAN: ICD-10-CM

## 2021-09-08 NOTE — OP NOTE
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  This was unable to be biopsied in clinic because of the scar tissue in the area as well as the patient's deviated septum. Of note, the patient also required a transfusion of 1 unit of platelets prior to the procedure, because of his underlying thrombocytopenia.    Preoperative diagnosis: Sinonasal mass    Postoperative diagnosis: Same    Anesthesia: General    Implant devices: None    Specimens:Specimens:       ID Type Source Tests Collected by Time Destination   1 : Right ethmoid mass Tissue Sinus, Ethmoid, Right SURGICAL PATHOLOGY EXAM Ky Centeno MD 9/7/2021  9:35 AM     2 : Additional Right Ethmoid Mass Tissue Sinus, Ethmoid, Right SURGICAL PATHOLOGY EXAM Ky Centeno MD 9/7/2021 10:11 AM           Complications: None    Blood loss: 20 mL    Surgeon's narrative:    Findings: There is a mass at the right middle meatus with a free edge on the medial side, pedicled laterally.  Intraoperative frozen pathology was positive for cancer, pathologically appearing similar to his previously diagnosed nasopharyngeal carcinoma.  Permanent pathology tissue was also sent.    Description of procedure:  The patient was brought back to the operating room by the anesthesiology staff.  They were laid supine on the operating room table and induced under general anesthesia with endotracheal tube secured to the left.  A timeout procedure was performed and all members of the operating room staff are in agreement of the site of surgery the patient identification and surgery to be  performed.    The patient was prepped and draped in the usual fashion for endoscopic sinus surgery. A 0 degree endoscope was used to visualize the nasal cavity while injecting the middle turbinate and lateral nasal sidewall with 1% lidocaine with 1:100,000 epinephrine. Afrin soaked pledgets were used to decongest the right nasal passage.    The vertical portion of the uncinate process was removed using the pediatric backbiter. This allowed for better visualization of the lateral portion of the tumor. A ball-tipped seeker was used to find the free edge of the uncinate process and palpate the lateral pedicled edge of the tumor. A straight Blakesley was used to obtain tissue samples of the medial portion of the tumor, taking care to avoid the lateral portion of the tumor which is in close proximity to the right orbit. Tissue samples were sent as frozen and permanent specimens.    Adrenaline soaked pledgets were inserted into the nasal cavity in the area of the tumor, until hemostasis was achieved. The anterior portion of the tumor was dressed with fibrillar. Photodocumentation was obtained at the beginning and end of the procedure.     This marked the end of the procedure. The patient tolerated the procedure well, there were no complications, and the patient was extubated and taken to recovery following commands and breathing spontaneously.    Dr. Centeno was scrubbed in and participating during all portions of the case.    Paola Moore MD   Otolaryngology-Head & Neck Surgery PGY1  Please contact ENT with questions by dialing * [phone] and entering job code [phone] when prompted.

## 2021-09-08 NOTE — TELEPHONE ENCOUNTER
----- Message from Ky Centeno MD sent at 9/7/2021 11:06 AM CDT -----  Regarding: can we order PET/CT for this patient  Marcin Pablo,     Can we order PET/CT for this patient for staging.   I did his biopsy this morning, and as expected, this is cancer, so he needs a new staging scan.     Can we try and get this done ASAP?    The PET/CT would be similar to others more recently ordered.     Thanks and let me know if you have any questions.     Arnold

## 2021-09-09 LAB
PATH REPORT.COMMENTS IMP SPEC: NORMAL
PATH REPORT.FINAL DX SPEC: NORMAL
PATH REPORT.GROSS SPEC: NORMAL
PATH REPORT.INTRAOP OBS SPEC DOC: NORMAL
PATH REPORT.MICROSCOPIC SPEC OTHER STN: NORMAL
PATH REPORT.RELEVANT HX SPEC: NORMAL
PHOTO IMAGE: NORMAL

## 2021-09-10 ENCOUNTER — TUMOR CONFERENCE (OUTPATIENT)
Dept: ONCOLOGY | Facility: CLINIC | Age: 71
End: 2021-09-10
Payer: COMMERCIAL

## 2021-09-10 NOTE — TUMOR CONFERENCE
Head & Neck Tumor Conference Note     Status: New  Staff: Dr. 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  This was unable to be biopsied in clinic because of the scar tissue in the area as well as the patient's deviated septum. Of note, the patient also required a transfusion of 1 unit of platelets prior to the procedure, because of his underlying thrombocytopenia.    Pertinent PMH:   Past Medical History:   Diagnosis Date     Anemia      Dysphagia      Hepatitis B chronic      Hypothyroidism      Nasopharyngeal cancer (H)      Severe protein-calorie malnutrition (H)      Thrombocytopenia (H)         Smoking Hx:   Social History     Tobacco Use     Smoking status: Never Smoker     Smokeless tobacco: Never Used   Substance Use Topics     Alcohol use: Not Currently     Drug use: Not Currently       Imaging:   MR Brain 7/30/21:   IMPRESSION:  1.  No evidence of abnormal enhancement or mass within the nasopharynx. No abnormality involving the osseous structures of the skull base. No cervical lymphadenopathy within the partially imaged upper neck.  2.  Increased opacification/mass within the right anterior ethmoid air cells which measures 2.4 x 1.6 x 2 cm with new invasion of the right medial orbital rim and mild lateral displacement of the right medial rectus muscle. Complete opacification of the   right maxillary sinus and circumferential mucosal thickening within the right maxillary sinus. Direct visualization is suggested.   3.  No evidence of acute intracranial hemorrhage, mass effect, or  infarction.  4.  Mild nonspecific white matter changes.    MR Brain 3/9/21:      IMPRESSION:  1.  No imaging findings to suggest residual or recurrent nasopharyngeal malignancy.  2.  No pathologic intracranial contrast enhancement suggesting intracranial metastatic disease.  3.  Paranasal sinus disease involving the right maxillary sinus, anterior right ethmoid air cells, and right frontal sinus.    Pathology:   9/7/21:   Right ethmoid, mass, biopsy:  -EBV-mediated nasopharyngeal carcinoma    Tumor Board Recommendation:   Discussion: This is a 72 yo male with a history of nasopharyngeal carcinoma, previously treated with chemoradiation completed June 2020. His chemotherapy was interrupted due to his history of Hep B, as well as renal dysfunction. Most recent surveillance MRI concerning for recurrence, with a lobulated mass extending from the right anterior ethmoid into the orbital cavity. On MRI obtained in March 2021, this area in the ethmoid sinus was read as sinusitis, but on review in hindsight is consistent with his present mass.     He will likely ultimately need a surgical resection, but possible medical and radiation options were discussed. This could be complicated by the fact he has already had chemoradiation, and had difficulty tolerating previous chemo. Proton therapy was discussed. It does not appear he has induction chemotherapy in the past.     Plan:   - Possible referral for proton therapy, possible radiosensitization chemo  - Planning PET next week for further workup    Marla Beck MD PGY-3  Otolaryngology - Head and Neck Surgery    Documentation / Disclaimer Cancer Tumor Board Note  Cancer tumor board recommendations do not override what is determined to be reasonable care and treatment, which is dependent on the circumstances of a patient's case; the patient's medical, social, and personal concerns; and the clinical judgment of the oncologist [physician].

## 2021-09-10 NOTE — PROGRESS NOTES
[facility]  Return Visit  Encounter date:   September 10, 2021    Chief Complaint:   Nasopharyngeal Carcinoma    ID:    Interval History:   This visit was facilitated by a virtual*** juan josé .  Kristen Chapman is a 71 year old male who presents for follow up  S/p nasal endoscopy with biopsy (09/07/2021). The patient has been following with Dr. Frias of oncology and Dr. Cheng at [facility] in [place]. At his consultation visit 3 weeks ago (08/37/2021), he reported that his right eye cormier a lot and rhinorrhea from the right nare. Since his surgery 3 days ago (09/07/2021), he has not voiced any concerns or complaints.    Today, he reports ***    Sino-Nasal Outcome Test (SNOT - 22)  DNC***    [place] [facility]  (09/07/2021)  Pre-operative diagnosis:     Lesion or mass of paranasal sinuses [J34.89]   Post-operative diagnosis:   Same      Procedure: Nasal Endoscopy with Biopsy     Surgeon(s): Ky Centeno MD - Primary     Estimated blood loss:   20 mL         Specimens: ID Type Source Tests Collected by Time Destination   1 : Right ethmoid mass Tissue Sinus, Ethmoid, Right SURGICAL PATHOLOGY EXAM Ky Centeno MD 9/7/2021  9:35 AM     2 : Additional Right Ethmoid Mass Tissue Sinus, Ethmoid, Right SURGICAL PATHOLOGY EXAM Ky Centeno MD 9/7/2021 10:11 AM        Findings: Right ant ethmoid lesion, frozen compatible with malignancy  No immediate complications  Family updated     Review of systems: A 14-point review of systems has been conducted and is negative for any notable symptoms, except as dictated in the history of present illness.     Physical Exam:  Vital signs: There were no vitals taken for this visit.   General Appearance: No acute distress, appropriate demeanor, conversant  Eyes: moist conjunctivae; EOMI; pupils symmetric; visual acuity grossly intact; no proptosis  Head: normocephalic; overall symmetric appearance without deformity  Face: overall  symmetric without deformity; ***HB I/VI  Ears: Normal appearance of external ear; external meatus normal in appearance (***); TMs intact without perforation bilaterally (***);   Nose: No external deformity (***); septum deviated to (***right/left) causing greater than 70% obstruction; inferior turbinates (***) without significant hypertrophy  Oral Cavity/oropharynx: Normal appearance of mucosa; tongue midline; no mass or lesions; tonsils (***); oropharynx without obvious mucosal abnormality  Neck: no palpable lymphadenopathy; thyroid without palpable nodules  Lungs: symmetric chest rise; no wheezing  CV: Good distal perfusion; normal hear rate  Extremities: No deformity  Neurologic Exam: Cranial nerves (***) II-XII are grossly intact; no focal deficit      Procedure Note  Procedure performed: Rigid nasal endoscopy  Indication: To evaluate for sinonasal pathology not visualized on routine anterior rhinoscopy  Anesthesia: 4% topical lidocaine with 0.05 % oxymetazoline  Description of procedure: A 30 degree, 3 mm rigid endoscope was inserted into bilateral nasal cavities and the nasal valves, nasal cavity, middle meatus, sphenoethmoid recess, nasopharynx were evaluated for evidence of obstruction, edema, purulence, polyps and/or mass/lesion.     [place] Endoscopic Scoring System  Endoscopic observation Right Left   Polyps in middle meatus (0 = absent, 1 = restricted to middle meatus, 2 = Beyond middle meatus) *** ***   Discharge (0 = absent, 1 = thin and clear, 2 = thick, purulent) *** ***   Edema (0 = absent, 1 = mild-moderate, 2 = moderate-severe) *** ***   Crusting (0 = absent, 1 = mild-moderate, 2 = moderate-severe) *** ***   Scarring (0= absent, 1 = mild-moderate, 2 = moderate-severe) *** ***   Total *** ***     Findings  RT: ***  LT: ***    The patient tolerated the procedure well without complication.     Laboratory Review:  ***    Imaging Review:  ***    Pathology Review:  ***    Assessment/Medical  Decision Making:  ***      Plan:  ***    Ky Centeno MD    [facility]  Rhinology, Endoscopic Skull Base Surgery  [facility]  Department of Otolaryngology - Head & Neck Surgery    Scribe Disclosure:Scribe Disclosure:  Erin HELMS, am serving as a scribe to document services personally performed by Ky Centeno MD at this visit, based upon the provider's statements to me. All documentation has been reviewed by the aforementioned provider prior to being entered into the official medical record. ***    Erin HELMS, a scribe, prepared the chart for today's encounter.      Additional portions of the patient's history have been reviewed below.   ~~~~~~~~~~~~~~~~~~~~~~~~~~~~~~~~~~~~~~~~~~~~~~~~~~~~~~~~~~~~~~~~~~~~~~~~~~~~~~~~~~~~~~~~~~~~~~~~~~~~~~~~~~~~~~~~~~~~~~~~~~~~~~~~~~~~~~~    Past Medical History:   Diagnosis Date     Anemia      Dysphagia      Hepatitis B chronic      Hypothyroidism      Nasopharyngeal cancer (H)      Severe protein-calorie malnutrition (H)      Thrombocytopenia (H)         Past Surgical History:   Procedure Laterality Date     ENDOSCOPIC ENDONASAL SURGERY Bilateral 9/7/2021    Procedure: Nasal Endoscopy with Biopsy;  Surgeon: Ky Centeno MD;  Location: [facility] OR     IR CHEST PORT PLACEMENT > 5 YRS OF AGE  3/2/2020     IR CHEST PORT PLACEMENT > 5 YRS OF AGE  3/2/2020     IR GASTROSTOMY TUBE INSERTION  4/27/2020     IR GASTROSTOMY TUBE PERCUTANEOUS PLCMNT  4/27/2020     IR PORT PLACEMENT >5 YEARS  3/2/2020     IR PORT PLACEMENT >5 YEARS  3/2/2020     IR T-FASTENER REMOVAL  6/5/2020     IR T-FASTENER REMOVAL  6/5/2020        Family History   Family history unknown: Yes        Social History     Socioeconomic History     Marital status:      Spouse name: Not on file     Number of children: Not on file     Years of education: Not on file     Highest education level: Not on file   Occupational History     Not on file   Tobacco  Use     Smoking status: Never Smoker     Smokeless tobacco: Never Used   Substance and Sexual Activity     Alcohol use: Not Currently     Drug use: Not Currently     Sexual activity: Not Currently   Other Topics Concern     Not on file   Social History Narrative     Not on file     Social Determinants of Health     Financial Resource Strain:      Difficulty of Paying Living Expenses:    Food Insecurity:      Worried About Running Out of Food in the Last Year:      Ran Out of Food in the Last Year:    Transportation Needs:      Lack of Transportation (Medical):      Lack of Transportation (Non-Medical):    Physical Activity:      Days of Exercise per Week:      Minutes of Exercise per Session:    Stress:      Feeling of Stress :    Social Connections:      Frequency of Communication with Friends and Family:      Frequency of Social Gatherings with Friends and Family:      Attends [employer] Services:      Active Member of Clubs or Organizations:      Attends Club or Organization Meetings:      Marital Status:    Intimate Partner Violence:      Fear of Current or Ex-Partner:      Emotionally Abused:      Physically Abused:      Sexually Abused:

## 2021-09-14 ENCOUNTER — TELEPHONE (OUTPATIENT)
Dept: OTOLARYNGOLOGY | Facility: CLINIC | Age: 71
End: 2021-09-14

## 2021-09-14 NOTE — TELEPHONE ENCOUNTER
Called patient to reschedule appointment with provider from tomorrow 09/15/21 at 8:15 am to Friday 09/17/21 at either 8 am or 8:15 am. Dr. Centeno is requesting patient to have PET scan before appointment.    Message left at home number through [facility]  Elizabeth with  ID # [id number].    Message included the following information: reschedule of appointment with provider from Wednesday to Friday, reason for reschedule as completion of PET scan Thursday prior to appointment, appointment options for Friday of 800 am or 815 am as well as call back number to clinic of [phone] to acknowledge receipt of message.    Will tentatively reschedule patient for 815 am Friday 09/17/21.    GEREMIAS ELLISON LPN on 9/14/2021 at 10:25 AM

## 2021-09-15 ENCOUNTER — ONCOLOGY VISIT (OUTPATIENT)
Dept: ONCOLOGY | Facility: HOSPITAL | Age: 71
End: 2021-09-15
Attending: INTERNAL MEDICINE
Payer: COMMERCIAL

## 2021-09-15 VITALS
SYSTOLIC BLOOD PRESSURE: 154 MMHG | WEIGHT: 150 LBS | HEART RATE: 53 BPM | DIASTOLIC BLOOD PRESSURE: 69 MMHG | BODY MASS INDEX: 26.57 KG/M2 | TEMPERATURE: 97.8 F | RESPIRATION RATE: 17 BRPM | OXYGEN SATURATION: 99 %

## 2021-09-15 DIAGNOSIS — C11.9 NASOPHARYNGEAL CARCINOMA (H): Primary | ICD-10-CM

## 2021-09-15 PROCEDURE — G0463 HOSPITAL OUTPT CLINIC VISIT: HCPCS

## 2021-09-15 PROCEDURE — 99215 OFFICE O/P EST HI 40 MIN: CPT | Performed by: INTERNAL MEDICINE

## 2021-09-15 NOTE — LETTER
"    9/15/2021         RE: Kristen Chapman  [address] [place] [address]        Dear Colleague,    Thank you for referring your patient, Kristen Chapman, to the [facility]. Please see a copy of my visit note below.    Oncology Rooming Note    September 15, 2021 2:10 PM   Kristen Chapman is a 71 year old male who presents for:    Chief Complaint   Patient presents with     Oncology Clinic Visit     Squamous cell carcinoma of nasopharynx (H)     Initial Vitals: BP (!) 154/69   Pulse 53   Temp 97.8  F (36.6  C)   Resp 17   Wt 68 kg (150 lb)   SpO2 99%   BMI 26.57 kg/m   Estimated body mass index is 26.57 kg/m  as calculated from the following:    Height as of 9/7/21: 1.6 m (5' 3\").    Weight as of this encounter: 68 kg (150 lb). Body surface area is 1.74 meters squared.  Data Unavailable Comment: Data Unavailable   No LMP for male patient.  Allergies reviewed: Yes  Medications reviewed: Yes    Medications: Medication refills not needed today.  Pharmacy name entered into [employer]:    [facility] - [place], [place] - [address] [id number] - [facility] [address] [facility] [address]    Clinical concerns: Keven Cortes              [facility] Hematology and Oncology Progress Note    Patient: Kristen Chapman  MRN: [id number]  Date of Service: Sep 15, 2021        Assessment and Plan:    Cancer Staging  Nasopharyngeal carcinoma (H)  Staging form: Pharynx - Nasopharynx, AJCC 8th Edition  - Clinical stage from 2/18/2020: Stage SAMANTHA (cT4, cN2, cM0) - Signed by Alan Frias MD on 2/18/2020     1.  Nasopharyngeal carcinoma: He now has biopsy-proven recurrence of the right anterior ethmoid air cells.  I reviewed the pathology report with the patient and his daughter.  There is evidence of effusion on imaging into the right medial orbit rim.  He has minimal symptoms.  He is getting an MRI this week and will meet with ENT at the .  He is seeing radiation " oncology on the 20th.  After those visits we will make a definitive treatment plan going forward.  I spent some time with the patient and his daughter today discussing that we will likely consider concurrent chemotherapy and radiation followed by resection.  Kristen said he is concerned about more treatment, specifically toxicity. I told him it would be unlikely that he would need a G-tube which is something he was worried about.  Questions were answered.  We will be in touch with him after his meeting with radiation oncology to make a definitive plan.     2.  Anemia: He is profoundly hypothyroid as he stopped taking his medications.  This could be contributing to his anemia at this point.  Would like to reevaluate his anemia in the near future including checking for hemoglobinopathy and other nutritional deficiencies.     3.  Thrombocytopenia/pancytopenia: His platelet count was normal in May 2020 prior to starting cisplatin/5-FU.  It has never fully recovered after that dose of chemotherapy.  It has, however, been stable for at least a year.  It may be reasonable to consider a bone marrow prior to treatment to make sure there is no other causes of the cytopenias.   Neutrophil count is adequate.  We could consider trying Retacrit for anemia of chronic kidney disease    4.  Hypothyroidism: He has not had his indices checked since March.  We will redraw at his next visit.  He continues on Synthroid.    I spent 40 minutes in the care of this patient today, which included time necessary for preparation for the visit, face to face time with the patient, communication of recommendations to the care team, and documentation time.    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  Diagnosed January 2020. Imaging suggested bilateral abnormal lymphadenopathy in the neck.  Also asymmetric soft tissue thickening in the posterior right nasopharynx.  On imaging,  tumor also appeared to extend down to the hypopharynx.    Treatment:    Initially treated with concurrent chemotherapy and radiation.  He had weekly cisplatin starting March 3, 2020.  Fifth and final dose given March 31, 2020.  Subsequent chemotherapy was held due to prolonged thrombocytopenia and renal insufficiency.  Radiation dose was 7000 cGy in 35 fractions given from March 2 through April 17, 2020.     Started cisplatin with infusional 5-FU on June 1, 2020.  Cycle 1 given at a 25% dose reduction.  He still had significant thrombocytopenia and neutropenia on day 28.  Cycle 2 was held.  At the same time his liver function tests elevated secondary to hepatitis B.     Interim History:    Kristen returns today for a follow-up visit.  He recently had a biopsy of soft tissue in the ethmoid air cells with [facility].  He is here to discuss results and discuss treatment planning.  Overall feeling okay.  He has minimal headaches.  He does have occasional nosebleeds.  No vision changes.  No other acute complaints today.    Review of Systems:    As above in the history.     Review of Systems otherwise Negative for:  General: chills, fever or night sweats  Psychological: anxiety or depression  Ophthalmic: blurry vision, double vision or loss of vision, vision change  ENT: oral lesions, hearing changes  Hematological and Lymphatic: bruising, jaundice, swollen lymph nodes  Endocrine: hot flashes, unexpected weight changes  Respiratory: cough, hemoptysis, orthopnea or shortness of breath/AYALA  Cardiovascular: chest pain, edema, palpitations or PND  Gastrointestinal: abdominal pain, blood in stools, change in bowel habits, constipation, diarrhea or nausea/vomiting  Genito-Urinary: change in urinary stream, incontinence, frequency/urgency  Musculoskeletal: joint pain, stiffness, swelling, muscle pain  Neurological: dizziness, headaches, numbness/tingling  Dermatological: lumps and rash    Past History:    Past Medical History:    Diagnosis Date     Anemia      Dysphagia      Hepatitis B chronic      Hypothyroidism      Nasopharyngeal cancer (H)      Severe protein-calorie malnutrition (H)      Thrombocytopenia (H)      Physical Exam:    BP (!) 154/69   Pulse 53   Temp 97.8  F (36.6  C)   Resp 17   Wt 68 kg (150 lb)   SpO2 99%   BMI 26.57 kg/m      General: patient appears stated age of 71 year old. Nontoxic and in no distress.   HEENT: Head: atraumatic, normocephalic. Sclerae anicteric.  Chest:  Normal respiratory effort  Cardiac:  No edema.   Abdomen: abdomen is non-distended  Extremities: normal tone and muscle bulk.  Skin: no lesions or rash on visible skin. Warm and dry.   CNS: alert and oriented. Grossly non-focal.   Psychiatric: normal mood and affect.     Lab Results:    No results found for this or any previous visit (from the past 168 hour(s)).     Imaging:    No results found.      Signed by: Alan Frias MD        Again, thank you for allowing me to participate in the care of your patient.        Sincerely,        Alan Frias MD

## 2021-09-15 NOTE — PROGRESS NOTES
"Oncology Rooming Note    September 15, 2021 2:10 PM   Kristen Chapman is a 71 year old male who presents for:    Chief Complaint   Patient presents with     Oncology Clinic Visit     Squamous cell carcinoma of nasopharynx (H)     Initial Vitals: BP (!) 154/69   Pulse 53   Temp 97.8  F (36.6  C)   Resp 17   Wt 68 kg (150 lb)   SpO2 99%   BMI 26.57 kg/m   Estimated body mass index is 26.57 kg/m  as calculated from the following:    Height as of 9/7/21: 1.6 m (5' 3\").    Weight as of this encounter: 68 kg (150 lb). Body surface area is 1.74 meters squared.  Data Unavailable Comment: Data Unavailable   No LMP for male patient.  Allergies reviewed: Yes  Medications reviewed: Yes    Medications: Medication refills not needed today.  Pharmacy name entered into [employer]:    [facility] - [place], [place] - [address] [id number] - [place], [place] - [address] AT [facility] [address]    Clinical concerns: Keven Cortes            "

## 2021-09-16 ENCOUNTER — HOSPITAL ENCOUNTER (OUTPATIENT)
Dept: PET IMAGING | Facility: CLINIC | Age: 71
Setting detail: NUCLEAR MEDICINE
Discharge: HOME | End: 2021-09-16
Attending: OTOLARYNGOLOGY | Admitting: OTOLARYNGOLOGY
Payer: COMMERCIAL

## 2021-09-16 ENCOUNTER — HOSPITAL ENCOUNTER (OUTPATIENT)
Dept: PET IMAGING | Facility: CLINIC | Age: 71
Discharge: HOME | End: 2021-09-16
Attending: OTOLARYNGOLOGY | Admitting: OTOLARYNGOLOGY
Payer: COMMERCIAL

## 2021-09-16 DIAGNOSIS — C11.9 SQUAMOUS CELL CARCINOMA OF NASOPHARYNX (H): ICD-10-CM

## 2021-09-16 DIAGNOSIS — R94.02 ABNORMAL BRAIN SCAN: ICD-10-CM

## 2021-09-16 PROCEDURE — 70491 CT SOFT TISSUE NECK W/DYE: CPT | Mod: 26 | Performed by: RADIOLOGY

## 2021-09-16 PROCEDURE — A9552 F18 FDG: HCPCS | Performed by: OTOLARYNGOLOGY

## 2021-09-16 PROCEDURE — 343N000001 HC RX 343: Performed by: OTOLARYNGOLOGY

## 2021-09-16 PROCEDURE — 78816 PET IMAGE W/CT FULL BODY: CPT | Mod: PS

## 2021-09-16 PROCEDURE — 71260 CT THORAX DX C+: CPT | Mod: 26 | Performed by: STUDENT IN AN ORGANIZED HEALTH CARE EDUCATION/TRAINING PROGRAM

## 2021-09-16 PROCEDURE — 250N000011 HC RX IP 250 OP 636: Performed by: OTOLARYNGOLOGY

## 2021-09-16 PROCEDURE — 78816 PET IMAGE W/CT FULL BODY: CPT | Mod: 26 | Performed by: STUDENT IN AN ORGANIZED HEALTH CARE EDUCATION/TRAINING PROGRAM

## 2021-09-16 PROCEDURE — 74177 CT ABD & PELVIS W/CONTRAST: CPT | Mod: 26 | Performed by: STUDENT IN AN ORGANIZED HEALTH CARE EDUCATION/TRAINING PROGRAM

## 2021-09-16 PROCEDURE — 70491 CT SOFT TISSUE NECK W/DYE: CPT

## 2021-09-16 RX ORDER — IOPAMIDOL 755 MG/ML
10-135 INJECTION, SOLUTION INTRAVASCULAR ONCE
Status: COMPLETED | OUTPATIENT
Start: 2021-09-16 | End: 2021-09-16

## 2021-09-16 RX ADMIN — FLUDEOXYGLUCOSE F-18 10.12 MCI.: 500 INJECTION, SOLUTION INTRAVENOUS at 07:54

## 2021-09-16 RX ADMIN — IOPAMIDOL 92 ML: 755 INJECTION, SOLUTION INTRAVENOUS at 07:55

## 2021-09-16 NOTE — PROGRESS NOTES
[facility] Hematology and Oncology Progress Note    Patient: Kristen Chapman  MRN: [id number]  Date of Service: Sep 15, 2021        Assessment and Plan:    Cancer Staging  Nasopharyngeal carcinoma (H)  Staging form: Pharynx - Nasopharynx, AJCC 8th Edition  - Clinical stage from 2/18/2020: Stage SAMANTHA (cT4, cN2, cM0) - Signed by Alan Frias MD on 2/18/2020     1.  Nasopharyngeal carcinoma: He now has biopsy-proven recurrence of the right anterior ethmoid air cells.  I reviewed the pathology report with the patient and his daughter.  There is evidence of effusion on imaging into the right medial orbit rim.  He has minimal symptoms.  He is getting an MRI this week and will meet with ENT at the .  He is seeing radiation oncology on the 20th.  After those visits we will make a definitive treatment plan going forward.  I spent some time with the patient and his daughter today discussing that we will likely consider concurrent chemotherapy and radiation followed by resection.  Kristen said he is concerned about more treatment, specifically toxicity. I told him it would be unlikely that he would need a G-tube which is something he was worried about.  Questions were answered.  We will be in touch with him after his meeting with radiation oncology to make a definitive plan.     2.  Anemia: He is profoundly hypothyroid as he stopped taking his medications.  This could be contributing to his anemia at this point.  Would like to reevaluate his anemia in the near future including checking for hemoglobinopathy and other nutritional deficiencies.     3.  Thrombocytopenia/pancytopenia: His platelet count was normal in May 2020 prior to starting cisplatin/5-FU.  It has never fully recovered after that dose of chemotherapy.  It has, however, been stable for at least a year.  It may be reasonable to consider a bone marrow prior to treatment to make sure there is no other causes of the cytopenias.   Neutrophil count is adequate.  We  could consider trying Retacrit for anemia of chronic kidney disease    4.  Hypothyroidism: He has not had his indices checked since March.  We will redraw at his next visit.  He continues on Synthroid.    I spent 40 minutes in the care of this patient today, which included time necessary for preparation for the visit, face to face time with the patient, communication of recommendations to the care team, and documentation time.    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  Diagnosed January 2020. Imaging suggested bilateral abnormal lymphadenopathy in the neck.  Also asymmetric soft tissue thickening in the posterior right nasopharynx.  On imaging, tumor also appeared to extend down to the hypopharynx.    Treatment:    Initially treated with concurrent chemotherapy and radiation.  He had weekly cisplatin starting March 3, 2020.  Fifth and final dose given March 31, 2020.  Subsequent chemotherapy was held due to prolonged thrombocytopenia and renal insufficiency.  Radiation dose was 7000 cGy in 35 fractions given from March 2 through April 17, 2020.     Started cisplatin with infusional 5-FU on June 1, 2020.  Cycle 1 given at a 25% dose reduction.  He still had significant thrombocytopenia and neutropenia on day 28.  Cycle 2 was held.  At the same time his liver function tests elevated secondary to hepatitis B.     Interim History:    Kristen returns today for a follow-up visit.  He recently had a biopsy of soft tissue in the ethmoid air cells with [facility].  He is here to discuss results and discuss treatment planning.  Overall feeling okay.  He has minimal headaches.  He does have occasional nosebleeds.  No vision changes.  No other acute complaints today.    Review of Systems:    As above in the history.     Review of Systems otherwise Negative for:  General: chills, fever or night sweats  Psychological: anxiety or depression  Ophthalmic:  blurry vision, double vision or loss of vision, vision change  ENT: oral lesions, hearing changes  Hematological and Lymphatic: bruising, jaundice, swollen lymph nodes  Endocrine: hot flashes, unexpected weight changes  Respiratory: cough, hemoptysis, orthopnea or shortness of breath/AYALA  Cardiovascular: chest pain, edema, palpitations or PND  Gastrointestinal: abdominal pain, blood in stools, change in bowel habits, constipation, diarrhea or nausea/vomiting  Genito-Urinary: change in urinary stream, incontinence, frequency/urgency  Musculoskeletal: joint pain, stiffness, swelling, muscle pain  Neurological: dizziness, headaches, numbness/tingling  Dermatological: lumps and rash    Past History:    Past Medical History:   Diagnosis Date     Anemia      Dysphagia      Hepatitis B chronic      Hypothyroidism      Nasopharyngeal cancer (H)      Severe protein-calorie malnutrition (H)      Thrombocytopenia (H)      Physical Exam:    BP (!) 154/69   Pulse 53   Temp 97.8  F (36.6  C)   Resp 17   Wt 68 kg (150 lb)   SpO2 99%   BMI 26.57 kg/m      General: patient appears stated age of 71 year old. Nontoxic and in no distress.   HEENT: Head: atraumatic, normocephalic. Sclerae anicteric.  Chest:  Normal respiratory effort  Cardiac:  No edema.   Abdomen: abdomen is non-distended  Extremities: normal tone and muscle bulk.  Skin: no lesions or rash on visible skin. Warm and dry.   CNS: alert and oriented. Grossly non-focal.   Psychiatric: normal mood and affect.     Lab Results:    No results found for this or any previous visit (from the past 168 hour(s)).     Imaging:    No results found.      Signed by: Alan Frias MD

## 2021-09-16 NOTE — PATIENT INSTRUCTIONS
1. You were seen in the clinic today by Dr. Centeno.    2.   The following has been recommended for you:   -Nasal saline spray twice daily. This prescription has been sent to [facility] in [place] per your preferred pharmacy on file.    3.   Dr. Centeno will review things with the oncologists on your case and then we will follow up with you when the recommendations from that conversation are made.    If you have any questions or concerns after your appointment, please call the clinic.    -Clinic phone [phone]. Press option #1 for scheduling related needs. Press option #3 for nurse advice.     Erin Love, ALEKSANDRN  [phone]    Bev Maxwell, RNCC  [phone]    [facility]  Department of Otolaryngology

## 2021-09-17 ENCOUNTER — OFFICE VISIT (OUTPATIENT)
Dept: OTOLARYNGOLOGY | Facility: CLINIC | Age: 71
End: 2021-09-17
Payer: COMMERCIAL

## 2021-09-17 VITALS
BODY MASS INDEX: 26.05 KG/M2 | HEART RATE: 50 BPM | HEIGHT: 63 IN | OXYGEN SATURATION: 99 % | WEIGHT: 147 LBS | TEMPERATURE: 96.8 F

## 2021-09-17 DIAGNOSIS — N18.31 STAGE 3A CHRONIC KIDNEY DISEASE (H): ICD-10-CM

## 2021-09-17 DIAGNOSIS — R04.0 FREQUENT EPISTAXIS: ICD-10-CM

## 2021-09-17 DIAGNOSIS — J34.89 LESION OR MASS OF PARANASAL SINUSES: ICD-10-CM

## 2021-09-17 DIAGNOSIS — D69.6 THROMBOCYTOPENIA (H): ICD-10-CM

## 2021-09-17 DIAGNOSIS — C11.9 SQUAMOUS CELL CARCINOMA OF NASOPHARYNX (H): Primary | ICD-10-CM

## 2021-09-17 PROBLEM — N18.30 CHRONIC KIDNEY DISEASE, STAGE 3 (H): Status: ACTIVE | Noted: 2021-09-17

## 2021-09-17 PROCEDURE — 31237 NSL/SINS NDSC SURG BX POLYPC: CPT | Mod: 50 | Performed by: OTOLARYNGOLOGY

## 2021-09-17 PROCEDURE — 99214 OFFICE O/P EST MOD 30 MIN: CPT | Mod: 25 | Performed by: OTOLARYNGOLOGY

## 2021-09-17 RX ORDER — ECHINACEA PURPUREA EXTRACT 125 MG
2 TABLET ORAL 2 TIMES DAILY
Qty: 30 ML | Refills: 11 | Status: SHIPPED | OUTPATIENT
Start: 2021-09-17 | End: 2023-12-07

## 2021-09-17 ASSESSMENT — PAIN SCALES - GENERAL: PAINLEVEL: NO PAIN (0)

## 2021-09-17 ASSESSMENT — MIFFLIN-ST. JEOR: SCORE: 1326.92

## 2021-09-17 NOTE — PROGRESS NOTES
[facility]  Return Visit  Encounter date:   September 17, 2021    Chief Complaint:   Follow-up     ID:   Nasopharyngeal ca s/p definitive CXRT  Now with new sinonasal EBV positive nasopharyngeal type cancer    Interval History:   History was provided by the patient with the use of a [facility] [facility] .   Kristen Chapman is a 71 year old male with a history of nasopharyngeal carcinoma who presents for follow up. The patient was last seen in clinic on 08/27/2021 . He had been following with Dr. Frias of medical oncology and Dr. Laurita Mendoza of radiation oncology. He endorsed right sided eye watering and rhinorrhea from the right nare. We discussed biopsy of a new ethmoid mass which I performed on 09/07/2021.    Today, the patient endorses a moderate amount of bloody drainage from his right nare constantly. He also endorses nasal dryness and states he is constantly blowing out crusts.     Of note, the patient has an appointment with his radiation oncologist Dr. Mendoza on 09/20/2021.    [place] [facility]  Date of service: 9/7/2021     Surgeon: Ky Centeno MD     Resident Surgeon: Paola Moore MD     Title of operation: Nasal endoscopy with biopsy     Indications: Kristen Chapman is a 71-year-old male with history of nasopharyngeal carcinoma status post definitive chemoradiation who presented with right-sided eye watering and rhinorrhea from the right nare with MRI imaging concerning for a mass within the right anterior ethmoid air cells.  This was unable to be biopsied in clinic because of the scar tissue in the area as well as the patient's deviated septum. Of note, the patient also required a transfusion of 1 unit of platelets prior to the procedure, because of his underlying thrombocytopenia.     Preoperative diagnosis: Sinonasal mass     Postoperative diagnosis: Same    Review of systems: A 14-point review of systems has been conducted and is negative for any notable symptoms, except as dictated in  "the history of present illness.     Physical Exam:  Vital signs: Pulse 50   Temp 96.8  F (36  C) (Temporal)   Ht 1.6 m (5' 3\")   Wt 66.7 kg (147 lb)   SpO2 99%   BMI 26.04 kg/m     General Appearance: No acute distress, appropriate demeanor, conversant  Eyes: moist conjunctivae; EOMI; pupils symmetric; visual acuity grossly intact; no proptosis  Head: normocephalic; overall symmetric appearance without deformity  Face: overall symmetric without deformity; HB I/VI  Nose: No external deformity; see endoscopy note  Lungs: symmetric chest rise; no wheezing  CV: Good distal perfusion; normal hear rate  Extremities: No deformity  Neurologic Exam: Cranial nerves II-XII are grossly intact; no focal deficit      Procedure Note  Procedure performed: Rigid nasal endoscopy with bilateral debridement  Indication: To evaluate for sinonasal pathology not visualized on routine anterior rhinoscopy  Anesthesia: 4% topical lidocaine with 0.05 % oxymetazoline  Description of procedure: A 30 degree, 3 mm rigid endoscope was inserted into bilateral nasal cavities and the nasal valves, nasal cavity, middle meatus, sphenoethmoid recess, nasopharynx were evaluated for evidence of obstruction, edema, purulence, polyps and/or mass/lesion.     I used straight suction to clear the left nasopharynx and right anterior ethmoid of crusting      Findings  RT: Right anterior ethmoid tumor with some crusting that is debrided.  LT: Crusting is debrided from left nasopharynx with no evidence of tumor in the nasopharynx.    The patient tolerated the procedure well without complication.     Laboratory Review:  N/A    Imaging Review:  PET CT Fusion 09/16/2021  1. Hypermetabolic mass situated about the right ethmoid sinus with  extension into the right orbital extraconal fat, compatible with  biopsy-proven nasopharyngeal carcinoma deposit.  2. Focal FDG uptake and associated mucosal segmental enhancement in  the left posterolateral nasopharynx is " worrisome for residual or  recurrent tumor. Direct visualization and biopsy is recommended.  3. Hypermetabolic right level Ib and 2A lymphadenopathy suggestive of  metastases nodes.   4. Please refer to the whole body PET CT performed as a separate  report, for the findings of the remainder of the body.  JERRICA PEREZ MD       PET/CT whole body 9/16/2021  IMPRESSION:   In this patient with a history of nasopharyngeal squamous cell  carcinoma, status post chemoradiation:     1. New hypermetabolic mediastinal lymphadenopathy, most suspicious for  metastatic disease. Reactive nodes are on the differential diagnosis  given their small size but are felt less likely given findings in the  neck.     2. Unchanged indeterminant 6 mm nodule in the posterior right upper  lobe.     3. Cirrhotic liver morphology with sequelae of portal hypertension  including splenomegaly and small amount of abdominopelvic ascites.     4. Stable dilation of the ascending aorta measuring 4.5 cm in  diameter.     5. Please see separate dictation for the high resolution PET-CT of the  head and neck performed at the same time for discussion of findings.      I have personally reviewed the examination and initial interpretation  and I agree with the findings.     ALEXY WALDRON MD     Pathology Review:  09/07/2021  Right ethmoid, mass, biopsy:  -EBV-mediated nasopharyngeal carcinoma    Assessment/Medical Decision Making/Plan:  Sinonasal nasopharynx-type carcinoma  S/p definitive CXRT for treatment of NP ca, EBV positive  CKD  Thrombocytopenia      We discussed his PET CT and biopsy results. We discussed that his biopsy shows nasopharyngeal-type EBV positive carcinoma, but now located in ethmoid sius, which is fairly atypical.  I talked with the patient and his daughter about how his PET scan also demonstrated activity in the left nasopharynx, neck, and chest.      We discussed treatment options such as chemotherapy and radiation with proton  therapy. I talked with them about associated risks such as failure to treat the cancer, bone marrow suppression, lowering platelets, kidney injury, etc. I did discuss with him and his daughter that I would like to hear Dr. Frias opinion on additional chemotherapy. We discussed surgery as a last resort as it would be a long surgery and may require removal of his eye. We also discussed that surgery will not be an option if the cancer has spread to his chest as it would no longer be curative. I noted that I have started discussion of his case with his oncology team as well as [facility], but that we have not yet created a treatment plan.     I recommended he start using a saline spray for his nasal dryness. I will follow-up with his oncologist Dr. Frias as well as Dr. Mendoza of radiology. I will follow-up with them to discuss next steps hopefully by early next week.    Ky Centeno MD    [facility]  Rhinology, Endoscopic Skull Base Surgery  [facility]  Department of Otolaryngology - Head & Neck Surgery    Scribe Disclosure:  I, Tessa Zacarias, am serving as a scribe to document services personally performed by Ky Centeno MD at this visit, based upon the provider's statements to me. All documentation has been reviewed by the aforementioned provider prior to being entered into the official medical record.    Additional portions of the patient's history have been reviewed below.   ~~~~~~~~~~~~~~~~~~~~~~~~~~~~~~~~~~~~~~~~~~~~~~~~~~~~~~~~~~~~~~~~~~~~~~~~~~~~~~~~~~~~~~~~~~~~~~~~~~~~~~~~~~~~~~~~~~~~~~~~~~~~~~~~~~~~~~~    Past Medical History:   Diagnosis Date     Anemia      Dysphagia      Hepatitis B chronic      Hypothyroidism      Nasopharyngeal cancer (H)      Severe protein-calorie malnutrition (H)      Thrombocytopenia (H)         Past Surgical History:   Procedure Laterality Date     ENDOSCOPIC ENDONASAL SURGERY Bilateral  9/7/2021    Procedure: Nasal Endoscopy with Biopsy;  Surgeon: Ky Centeno MD;  Location: [facility] OR     IR CHEST PORT PLACEMENT > 5 YRS OF AGE  3/2/2020     IR CHEST PORT PLACEMENT > 5 YRS OF AGE  3/2/2020     IR GASTROSTOMY TUBE INSERTION  4/27/2020     IR GASTROSTOMY TUBE PERCUTANEOUS PLCMNT  4/27/2020     IR PORT PLACEMENT >5 YEARS  3/2/2020     IR PORT PLACEMENT >5 YEARS  3/2/2020     IR T-FASTENER REMOVAL  6/5/2020     IR T-FASTENER REMOVAL  6/5/2020        Family History   Family history unknown: Yes        Social History     Socioeconomic History     Marital status:      Spouse name: Not on file     Number of children: Not on file     Years of education: Not on file     Highest education level: Not on file   Occupational History     Not on file   Tobacco Use     Smoking status: Never Smoker     Smokeless tobacco: Never Used   Substance and Sexual Activity     Alcohol use: Not Currently     Drug use: Not Currently     Sexual activity: Not Currently   Other Topics Concern     Not on file   Social History Narrative     Not on file     Social Determinants of Health     Financial Resource Strain:      Difficulty of Paying Living Expenses:    Food Insecurity:      Worried About Running Out of Food in the Last Year:      Ran Out of Food in the Last Year:    Transportation Needs:      Lack of Transportation (Medical):      Lack of Transportation (Non-Medical):    Physical Activity:      Days of Exercise per Week:      Minutes of Exercise per Session:    Stress:      Feeling of Stress :    Social Connections:      Frequency of Communication with Friends and Family:      Frequency of Social Gatherings with Friends and Family:      Attends [employer] Services:      Active Member of Clubs or Organizations:      Attends Club or Organization Meetings:      Marital Status:    Intimate Partner Violence:      Fear of Current or Ex-Partner:      Emotionally Abused:      Physically Abused:      Sexually Abused:

## 2021-09-17 NOTE — LETTER
9/17/2021       RE: Kristen Chapman  [address] [place] [address]     Dear Colleague,    Thank you for referring your patient, Kristen Chapman, to the [facility] [place] [facility]. Please see a copy of my visit note below.      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   Kristen Chapman is a 71 year old male with a history of nasopharyngeal carcinoma who presents for follow up. The patient was last seen in clinic on 08/27/2021 . He had been following with Dr. Frias of medical oncology and Dr. Laurita Mendoza of radiation oncology. He endorsed right sided eye watering and rhinorrhea from the right nare. We discussed biopsy of a new ethmoid mass which I performed on 09/07/2021.    Today, the patient endorses a moderate amount of bloody drainage from his right nare constantly. He also endorses nasal dryness and states he is constantly blowing out crusts.     Of note, the patient has an appointment with his radiation oncologist Dr. Mendoza on 09/20/2021.    [place] [facility]  Date of service: 9/7/2021     Surgeon: Ky Centeno MD     Resident Surgeon: Paola Moore MD     Title of operation: Nasal endoscopy with biopsy     Indications: Kristen Chapman is a 71-year-old male with history of nasopharyngeal carcinoma status post definitive chemoradiation who presented with right-sided eye watering and rhinorrhea from the right nare with MRI imaging concerning for a mass within the right anterior ethmoid air cells.  This was unable to be biopsied in clinic because of the scar tissue in the area as well as the patient's deviated septum. Of note, the patient also required a  "transfusion of 1 unit of platelets prior to the procedure, because of his underlying thrombocytopenia.     Preoperative diagnosis: Sinonasal mass     Postoperative diagnosis: Same    Review of systems: A 14-point review of systems has been conducted and is negative for any notable symptoms, except as dictated in the history of present illness.     Physical Exam:  Vital signs: Pulse 50   Temp 96.8  F (36  C) (Temporal)   Ht 1.6 m (5' 3\")   Wt 66.7 kg (147 lb)   SpO2 99%   BMI 26.04 kg/m     General Appearance: No acute distress, appropriate demeanor, conversant  Eyes: moist conjunctivae; EOMI; pupils symmetric; visual acuity grossly intact; no proptosis  Head: normocephalic; overall symmetric appearance without deformity  Face: overall symmetric without deformity; HB I/VI  Nose: No external deformity; see endoscopy note  Lungs: symmetric chest rise; no wheezing  CV: Good distal perfusion; normal hear rate  Extremities: No deformity  Neurologic Exam: Cranial nerves II-XII are grossly intact; no focal deficit      Procedure Note  Procedure performed: Rigid nasal endoscopy with bilateral debridement  Indication: To evaluate for sinonasal pathology not visualized on routine anterior rhinoscopy  Anesthesia: 4% topical lidocaine with 0.05 % oxymetazoline  Description of procedure: A 30 degree, 3 mm rigid endoscope was inserted into bilateral nasal cavities and the nasal valves, nasal cavity, middle meatus, sphenoethmoid recess, nasopharynx were evaluated for evidence of obstruction, edema, purulence, polyps and/or mass/lesion.     I used straight suction to clear the left nasopharynx and right anterior ethmoid of crusting      Findings  RT: Right anterior ethmoid tumor with some crusting that is debrided.  LT: Crusting is debrided from left nasopharynx with no evidence of tumor in the nasopharynx.    The patient tolerated the procedure well without complication.     Laboratory Review:  N/A    Imaging Review:  PET CT " Fusion 09/16/2021  1. Hypermetabolic mass situated about the right ethmoid sinus with  extension into the right orbital extraconal fat, compatible with  biopsy-proven nasopharyngeal carcinoma deposit.  2. Focal FDG uptake and associated mucosal segmental enhancement in  the left posterolateral nasopharynx is worrisome for residual or  recurrent tumor. Direct visualization and biopsy is recommended.  3. Hypermetabolic right level Ib and 2A lymphadenopathy suggestive of  metastases nodes.   4. Please refer to the whole body PET CT performed as a separate  report, for the findings of the remainder of the body.  JERRICA PEREZ MD       PET/CT whole body 9/16/2021  IMPRESSION:   In this patient with a history of nasopharyngeal squamous cell  carcinoma, status post chemoradiation:     1. New hypermetabolic mediastinal lymphadenopathy, most suspicious for  metastatic disease. Reactive nodes are on the differential diagnosis  given their small size but are felt less likely given findings in the  neck.     2. Unchanged indeterminant 6 mm nodule in the posterior right upper  lobe.     3. Cirrhotic liver morphology with sequelae of portal hypertension  including splenomegaly and small amount of abdominopelvic ascites.     4. Stable dilation of the ascending aorta measuring 4.5 cm in  diameter.     5. Please see separate dictation for the high resolution PET-CT of the  head and neck performed at the same time for discussion of findings.      I have personally reviewed the examination and initial interpretation  and I agree with the findings.     ALEXY WALDRON MD     Pathology Review:  09/07/2021  Right ethmoid, mass, biopsy:  -EBV-mediated nasopharyngeal carcinoma    Assessment/Medical Decision Making/Plan:  Sinonasal nasopharynx-type carcinoma  S/p definitive CXRT for treatment of NP ca, EBV positive  CKD  Thrombocytopenia      We discussed his PET CT and biopsy results. We discussed that his biopsy shows nasopharyngeal-type  EBV positive carcinoma, but now located in ethmoid sius, which is fairly atypical.  I talked with the patient and his daughter about how his PET scan also demonstrated activity in the left nasopharynx, neck, and chest.      We discussed treatment options such as chemotherapy and radiation with proton therapy. I talked with them about associated risks such as failure to treat the cancer, bone marrow suppression, lowering platelets, kidney injury, etc. I did discuss with him and his daughter that I would like to hear Dr. Frias opinion on additional chemotherapy. We discussed surgery as a last resort as it would be a long surgery and may require removal of his eye. We also discussed that surgery will not be an option if the cancer has spread to his chest as it would no longer be curative. I noted that I have started discussion of his case with his oncology team as well as with [facility], but that we have not yet created a treatment plan.     I recommended he start using a saline spray for his nasal dryness. I will follow-up with his oncologist Dr. Frias as well as Dr. Mendoza of radiology. I will follow-up with them to discuss next steps hopefully by early next week.    Ky Centeno MD    [facility]  Rhinology, Endoscopic Skull Base Surgery  [facility]  Department of Otolaryngology - Head & Neck Surgery    Scribe Disclosure:  I, Tessa Zacarias, am serving as a scribe to document services personally performed by Ky Centeno MD at this visit, based upon the provider's statements to me. All documentation has been reviewed by the aforementioned provider prior to being entered into the official medical record.    Additional portions of the patient's history have been reviewed below.   ~~~~~~~~~~~~~~~~~~~~~~~~~~~~~~~~~~~~~~~~~~~~~~~~~~~~~~~~~~~~~~~~~~~~~~~~~~~~~~~~~~~~~~~~~~~~~~~~~~~~~~~~~~~~~~~~~~~~~~~~~~~~~~~~~~~~~~~    Past Medical  History:   Diagnosis Date     Anemia      Dysphagia      Hepatitis B chronic      Hypothyroidism      Nasopharyngeal cancer (H)      Severe protein-calorie malnutrition (H)      Thrombocytopenia (H)         Past Surgical History:   Procedure Laterality Date     ENDOSCOPIC ENDONASAL SURGERY Bilateral 9/7/2021    Procedure: Nasal Endoscopy with Biopsy;  Surgeon: Ky Centeno MD;  Location: [facility] OR     IR CHEST PORT PLACEMENT > 5 YRS OF AGE  3/2/2020     IR CHEST PORT PLACEMENT > 5 YRS OF AGE  3/2/2020     IR GASTROSTOMY TUBE INSERTION  4/27/2020     IR GASTROSTOMY TUBE PERCUTANEOUS PLCMNT  4/27/2020     IR PORT PLACEMENT >5 YEARS  3/2/2020     IR PORT PLACEMENT >5 YEARS  3/2/2020     IR T-FASTENER REMOVAL  6/5/2020     IR T-FASTENER REMOVAL  6/5/2020        Family History   Family history unknown: Yes        Social History     Socioeconomic History     Marital status:      Spouse name: Not on file     Number of children: Not on file     Years of education: Not on file     Highest education level: Not on file   Occupational History     Not on file   Tobacco Use     Smoking status: Never Smoker     Smokeless tobacco: Never Used   Substance and Sexual Activity     Alcohol use: Not Currently     Drug use: Not Currently     Sexual activity: Not Currently   Other Topics Concern     Not on file   Social History Narrative     Not on file     Social Determinants of Health     Financial Resource Strain:      Difficulty of Paying Living Expenses:    Food Insecurity:      Worried About Running Out of Food in the Last Year:      Ran Out of Food in the Last Year:    Transportation Needs:      Lack of Transportation (Medical):      Lack of Transportation (Non-Medical):    Physical Activity:      Days of Exercise per Week:      Minutes of Exercise per Session:    Stress:      Feeling of Stress :    Social Connections:      Frequency of Communication with Friends and Family:      Frequency of Social Gatherings with Friends  and Family:      Attends Taoism Services:      Active Member of Clubs or Organizations:      Attends Club or Organization Meetings:      Marital Status:    Intimate Partner Violence:      Fear of Current or Ex-Partner:      Emotionally Abused:      Physically Abused:      Sexually Abused:             Again, thank you for allowing me to participate in the care of your patient.      Sincerely,    Ky Centeno MD

## 2021-09-20 ENCOUNTER — OFFICE VISIT (OUTPATIENT)
Dept: RADIATION ONCOLOGY | Facility: HOSPITAL | Age: 71
End: 2021-09-20
Attending: RADIOLOGY
Payer: COMMERCIAL

## 2021-09-20 DIAGNOSIS — C11.9 SQUAMOUS CELL CARCINOMA OF NASOPHARYNX (H): Primary | ICD-10-CM

## 2021-09-20 PROCEDURE — G0463 HOSPITAL OUTPT CLINIC VISIT: HCPCS

## 2021-09-20 PROCEDURE — 99215 OFFICE O/P EST HI 40 MIN: CPT | Performed by: RADIOLOGY

## 2021-09-20 RX ORDER — ONDANSETRON 4 MG/1
TABLET, FILM COATED ORAL EVERY 8 HOURS PRN
COMMUNITY
End: 2022-10-13

## 2021-09-20 NOTE — LETTER
9/20/2021         RE: Kristen Chapman  [address] [place] [address]        Dear Colleague,    Thank you for referring your patient, Kristen Chapman, to the [facility]IATION [facility] [place]. Please see a copy of my visit note below.    Pt here with one family member for f/u with Dr. Mendoza, saw ENT Friday for possible recurrence. Denies pain, states some dysphasia. No other concerns.     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       TYPE OF RADIATION THERAPY ADMINISTERED:  Concurrent chemoradiation therapy for his head neck cancer with weekly cisplatin and had radiation therapy in our clinic with a total dose of 7000 cGy in 35 treatments given from 3/2/2020-4/17/2020.      INTERVAL SINCE COMPLETION OF RADIATION THERAPY: 1 year and 5 months.      SUBJECTIVE:  Mr. Chapman is a 71 y.o. male who has been in his usual state of good health until recently.  Patient was found to have swelling right neck mass by his lerma a month ago for which he was a seeking further evaluation.  The patient did not notice any significant changes over the past few weeks and he is also asymptomatic.  Initial ENT examination showed suspicious abnormalities in the right nasopharynx worrisome for malignancy.  CT of the neck on 1/24/2020 revealed asymmetric soft tissue thickening in the posterior right nasopharynx as well as abnormal enlarged cervical lymph nodes measuring up to 3.8 cm bilaterally.  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  The patient also had a staging work-up including PET CT scan and MRI brain which showed locally advanced disease with lymph node metastasis.  The PET CT scan also showed abnormal increased activity in the hilar region  suspicious for metastasis.  Patient was then referred to pulmonology for evaluation and possible biopsy.  Patient then underwent bronchoscopy and EBUS on 2/27/2020 with biopsy showed negative for malignancy. He received concurrent chemoradiation therapy for his head neck cancer and had radiation therapy in our clinic with a total dose of 7000 cGy in 35 treatments given from 3/2/2020-4/17/2020.  He also received 3 cycles of cisplatin and 5-FU during the course of radiation therapy. He tolerated radiation therapy reasonably well with expected acute side effect.  The patient insisted not to have PEG tube placement during the therapy.  He however is able to maintain his weight reasonably stable during the treatment.     The patient experienced worsening pain of sore throat and dysphasia since completion of the radiation therapy.  He is not able to get adequate calorie and fluid intake and was admitted to hospital one week after treatment for supportive care and pain management.  A PEG tube was also placed during the hospital stay. The patient felt much better and improved since hospital stay.  The patient also experienced acute onset of left parotitis 4 weeks post cancer therapy and was admitted to the hospital for ongoing treatment including antibiotics.  His condition has been significant improved since then.      He is also receiving adjuvant chemotherapy under supervision of Dr. Frias.     Patient has been followed with Dr. Guerrero, ENT and last examination on 3/3/2021 showed no evidence of cancer recurrence.      He is currently able to eat without any significant difficulty.  He denies any significant pain at the time of evaluation.  He still has a significant dry mouth at the time of evaluation.      He presented with right-sided eye watering and rhinorrhea from the right nare with MRI imaging on 7/30/2021 concerning for a mass within the right anterior ethmoid air cells.  Patient had a endoscopy and biopsy of  right ethmoid sinus on 9/7/2021 with pathology confirmed EBV mediated nasopharyngeal carcinoma. The patient had restaging PET CT scan on 9/16/2021 which showed hypermetabolic mass situated about the right ethmoid sinus with extension into the right orbital extraconal fat, compatible with biopsy-proven nasopharyngeal carcinoma deposit.  There are also new hypermetabolic mediastinal lymphadenopathy, most suspicious for metastatic disease. Reactive nodes are on the differential diagnosis.  His case has been discussed at tumor conference on 9/17/2021 at [facility] and the consensus recommendation is to consider possible chemoradiation therapy.  The patient is here today for evaluation and discussion.    Medications were reviewed and are up to date on EPIC.    The following portions of the patient's history were reviewed and updated as appropriate: allergies, current medications, past family history, past medical history, past social history, past surgical history and problem list.    Review of Systems:      General  Constitutional  Constitutional (WDL): All constitutional elements are within defined limits  EENT  Eye Disorders  Eye Disorder (WDL): All eye disorder elements are within defined limits  Ear Disorders  Ear Disorder (WDL): Exceptions to WDL (hard of hearing)  Respiratory  Respiratory  Respiratory (WDL): All respiratory elements are within defined limits  Cardiovascular  Cardiovascular  Cardiovascular (WDL): All cardiovascular elements are within defined limits  Gastrointestinal  Gastrointestinal  Gastrointestinal (WDL): Exceptions to WDL  Nausea: Loss of appetite without alteration in eating habits  Dysphagia: Symptomatic, able to eat regular diet  Musculoskeletal  Musculoskeletal and Connective Tissue Disorders  Musculoskeletal & Connective (WDL): All musculoskeletal & connective elements are within defined limits  Integumentary  Integumentary  Integumentary (WDL): All integumentary elements are  within defined limits  Neurological  Neurosensory  Neurosensory (WDL): All neurosensory elements are within defined limits  Genitourinary/Reproductive  Genitourinary  Genitourinary (WDL): All genitourinary elements are within defined limits  Lymphatic  Lymph System Disorders  Lymph (WDL): All lymph elements are within defined limits  Pain  Pain Score: No Pain (0)  AUA Assessment                                                              Accompanied by  Accompanied By: family    Objective:     PHYSICAL EXAMINATION:    There were no vitals taken for this visit.    Gen: Alert, in NAD  Eyes: PERRL, EOMI, sclera anicteric  HENT     Head: NC/AT     Ears: No external auricular lesions     Nose/sinus: No rhinorrhea or epistaxis     Oropharynx: MMM, no visible oral lesions  Neck: Supple, full ROM, no LAD  Pulm: No wheezing, stridor or respiratory distress  CV: Well-perfused, no cyanosis, no pedal edema  Abdominal: BS+, soft, nontender, nondistended, no hepatomegaly  Back: No step-offs or pain to palpation along the thoracolumbar spine  Rectal: Deferred  : Deferred  Musculoskeletal: Normal muscle bulk and tone  Skin: Normal color and turgor  Neurologic: A/Ox3, CN II-XII intact, normal gait and station  Psychiatric: Appropriate mood and affect     Imaging: Reviewed     Impression     Patient is a 71-year-old gentleman with a diagnosis of nasopharyngeal cancer status post concurrent chemoradiation therapy 1 year and 5 months ago.  The most recent restaging work-up indicated right ethmoid sinus biopsy-proven recurrence with extension into the right orbital extraconal fat.  There is also concern of possible mediastinal lymph node recurrence from recent PET CT scan.    Assessment & Plan:     I have personally reviewed his upcoming medical record today.  I have also reviewed his most recent radiology study including PET CT scan and compared to the previous scan and the radiation therapy record.  There is a radiographic evidence  and biopsy-proven local/regional recurrence in the right ethmoid sinus region with no evidence of lymph node metastasis.  The PET CT scan also showed increased SUV activity involving the mediastinal lymph nodes concerning for recurrence.  The possible treatment options including surgery, systemic therapy, and radiation therapy has been discussed with the patient in detail and at the great lengths.  The possible risks and side effects of radiation therapy has also been explained to the patient.  Patient is informed a potential increased risk of radiation-induced normal tissue damage given the prior history of radiation therapy to the same area.  He is not interested in the surgical resection given the potential side effect/deformity involved.  Patient also wished not to travel any other location for radiation therapy i.e. proton therapy due to the family reason.  I will discuss and review his case at next general tumor conference to get a consensus recommendation.  I think it is reasonable to consider stereotactic radiosurgery as a salvage treatment for his right ethmoid sinus recurrence.  We may also consider to have mediastinal lymph node biopsy to document thoracic status.    Patient is to be rescheduled to return to radiation oncology 2 weeks later for another discussion and treatment recommendation.      Face to face time  40 minutes with > 80% spent on consultation, education and coordination of care.      Laurita Mendoza MD, PhD  Department of Radiation Oncology   [facility]  Tel: [phone]  Page: [phone]    [facility]  [address] [place] [address]     [address] [place] [address]    CC:  Patient Care Team:  Issa Cook MD as PCP - General  Alan Frias MD as MD (Hematology & Oncology)  Laurita Mendoza MD as MD (Hematology & Oncology)  Issa Cook MD as Assigned PCP  Jeri Sorto, RN as Specialty Care Coordinator (Hematology &  Oncology)  Marcial Cheng MD as Assigned Surgical Provider  Laurita Mendoza MD as Assigned Cancer Care Provider  Eduardo Grier MD as Assigned Infectious Disease Provider        Again, thank you for allowing me to participate in the care of your patient.        Sincerely,        Laurita Mendoza MD

## 2021-09-20 NOTE — PROGRESS NOTES
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       TYPE OF RADIATION THERAPY ADMINISTERED:  Concurrent chemoradiation therapy for his head neck cancer with weekly cisplatin and had radiation therapy in our clinic with a total dose of 7000 cGy in 35 treatments given from 3/2/2020-4/17/2020.      INTERVAL SINCE COMPLETION OF RADIATION THERAPY: 1 year and 5 months.      SUBJECTIVE:  Mr. Chapman is a 71 y.o. male who has been in his usual state of good health until recently.  Patient was found to have swelling right neck mass by his lerma a month ago for which he was a seeking further evaluation.  The patient did not notice any significant changes over the past few weeks and he is also asymptomatic.  Initial ENT examination showed suspicious abnormalities in the right nasopharynx worrisome for malignancy.  CT of the neck on 1/24/2020 revealed asymmetric soft tissue thickening in the posterior right nasopharynx as well as abnormal enlarged cervical lymph nodes measuring up to 3.8 cm bilaterally.  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  The patient also had a staging work-up including PET CT scan and MRI brain which showed locally advanced disease with lymph node metastasis.  The PET CT scan also showed abnormal increased activity in the hilar region suspicious for metastasis.  Patient was then referred to pulmonology for evaluation and possible biopsy.  Patient then underwent bronchoscopy and EBUS on 2/27/2020 with biopsy showed negative for malignancy. He received concurrent chemoradiation therapy for his head neck cancer and had radiation therapy in our clinic with a total dose of 7000 cGy in 35 treatments given from 3/2/2020-4/17/2020.  He  also received 3 cycles of cisplatin and 5-FU during the course of radiation therapy. He tolerated radiation therapy reasonably well with expected acute side effect.  The patient insisted not to have PEG tube placement during the therapy.  He however is able to maintain his weight reasonably stable during the treatment.     The patient experienced worsening pain of sore throat and dysphasia since completion of the radiation therapy.  He is not able to get adequate calorie and fluid intake and was admitted to hospital one week after treatment for supportive care and pain management.  A PEG tube was also placed during the hospital stay. The patient felt much better and improved since hospital stay.  The patient also experienced acute onset of left parotitis 4 weeks post cancer therapy and was admitted to the hospital for ongoing treatment including antibiotics.  His condition has been significant improved since then.      He is also receiving adjuvant chemotherapy under supervision of Dr. Frias.     Patient has been followed with Dr. Guerrero, ENT and last examination on 3/3/2021 showed no evidence of cancer recurrence.      He is currently able to eat without any significant difficulty.  He denies any significant pain at the time of evaluation.  He still has a significant dry mouth at the time of evaluation.      He presented with right-sided eye watering and rhinorrhea from the right nare with MRI imaging on 7/30/2021 concerning for a mass within the right anterior ethmoid air cells.  Patient had a endoscopy and biopsy of right ethmoid sinus on 9/7/2021 with pathology confirmed EBV mediated nasopharyngeal carcinoma. The patient had restaging PET CT scan on 9/16/2021 which showed hypermetabolic mass situated about the right ethmoid sinus with extension into the right orbital extraconal fat, compatible with biopsy-proven nasopharyngeal carcinoma deposit. There are hypermetabolic right level Ib and 2A lymphadenopathy  suggestive of metastases nodes.   There are also new hypermetabolic mediastinal lymphadenopathy, most suspicious for metastatic disease. Reactive nodes are on the differential diagnosis.  His case has been discussed at tumor conference on 9/17/2021 at [facility] and the consensus recommendation is to consider possible chemoradiation therapy.  The patient is here today for evaluation and discussion.    Medications were reviewed and are up to date on EPIC.    The following portions of the patient's history were reviewed and updated as appropriate: allergies, current medications, past family history, past medical history, past social history, past surgical history and problem list.    Review of Systems:      General  Constitutional  Constitutional (WDL): All constitutional elements are within defined limits  EENT  Eye Disorders  Eye Disorder (WDL): All eye disorder elements are within defined limits  Ear Disorders  Ear Disorder (WDL): Exceptions to WDL (hard of hearing)  Respiratory  Respiratory  Respiratory (WDL): All respiratory elements are within defined limits  Cardiovascular  Cardiovascular  Cardiovascular (WDL): All cardiovascular elements are within defined limits  Gastrointestinal  Gastrointestinal  Gastrointestinal (WDL): Exceptions to WDL  Nausea: Loss of appetite without alteration in eating habits  Dysphagia: Symptomatic, able to eat regular diet  Musculoskeletal  Musculoskeletal and Connective Tissue Disorders  Musculoskeletal & Connective (WDL): All musculoskeletal & connective elements are within defined limits  Integumentary  Integumentary  Integumentary (WDL): All integumentary elements are within defined limits  Neurological  Neurosensory  Neurosensory (WDL): All neurosensory elements are within defined limits  Genitourinary/Reproductive  Genitourinary  Genitourinary (WDL): All genitourinary elements are within defined limits  Lymphatic  Lymph System Disorders  Lymph (WDL): All lymph  elements are within defined limits  Pain  Pain Score: No Pain (0)  AUA Assessment                                                              Accompanied by  Accompanied By: family    Objective:     PHYSICAL EXAMINATION:    There were no vitals taken for this visit.    Gen: Alert, in NAD  Eyes: PERRL, EOMI, sclera anicteric  HENT     Head: NC/AT     Ears: No external auricular lesions     Nose/sinus: No rhinorrhea or epistaxis     Oropharynx: MMM, no visible oral lesions  Neck: Supple, full ROM, no LAD  Pulm: No wheezing, stridor or respiratory distress  CV: Well-perfused, no cyanosis, no pedal edema  Abdominal: BS+, soft, nontender, nondistended, no hepatomegaly  Back: No step-offs or pain to palpation along the thoracolumbar spine  Rectal: Deferred  : Deferred  Musculoskeletal: Normal muscle bulk and tone  Skin: Normal color and turgor  Neurologic: A/Ox3, CN II-XII intact, normal gait and station  Psychiatric: Appropriate mood and affect     Imaging: Reviewed     Impression     Patient is a 71-year-old gentleman with a diagnosis of nasopharyngeal cancer status post concurrent chemoradiation therapy 1 year and 5 months ago.  The most recent restaging work-up indicated right ethmoid sinus biopsy-proven recurrence with extension into the right orbital extraconal fat with possible right level 1B and 2A lymph node metastasis.  There is also concern of possible mediastinal lymph node recurrence from recent PET CT scan.    Assessment & Plan:     I have personally reviewed his upcoming medical record today.  I have also reviewed his most recent radiology study including PET CT scan and compared to the previous scan and the radiation therapy record.  There is a radiographic evidence and biopsy-proven local/regional recurrence in the right ethmoid sinus region with evidence of lymph node metastasis.  The PET CT scan also showed increased SUV activity involving the mediastinal lymph nodes concerning for recurrence.  The  possible treatment options including surgery, systemic therapy, and radiation therapy has been discussed with the patient in detail and at the great lengths.  The possible risks and side effects of radiation therapy has also been explained to the patient.  Patient is informed a potential increased risk of radiation-induced normal tissue damage given the prior history of radiation therapy to the same area.  He is not interested in the surgical resection given the potential side effect/deformity involved.  Patient also wished not to travel any other location for radiation therapy i.e. proton therapy due to the family reason.  I will discuss and review his case at next general tumor conference to get a consensus recommendation.  I think it is reasonable to consider stereotactic radiosurgery as a salvage treatment for his right ethmoid sinus and right neck lymph nodes recurrence.  We may also consider to have mediastinal lymph node and right neck lymph node biopsy to document recurrent status.    Patient is to be rescheduled to return to radiation oncology 2 weeks later for another discussion and treatment recommendation.      Face to face time  40 minutes with > 80% spent on consultation, education and coordination of care.      Laurita Mendoza MD, PhD  Department of Radiation Oncology   [facility]  Tel: [phone]  Page: [phone]    [facility]  [address] [place] [address]     [address] [place] [address]    CC:  Patient Care Team:  Issa Cook MD as PCP - General  Alan Frias MD as MD (Hematology & Oncology)  Laurita Mendoza MD as MD (Hematology & Oncology)  Issa Cook MD as Assigned PCP  Jeri Sorto RN as Specialty Care Coordinator (Hematology & Oncology)  Marcial Cheng MD as Assigned Surgical Provider  Laurita Mendoza MD as Assigned Cancer Care Provider  Eduardo Grier MD as Assigned Infectious Disease Provider

## 2021-09-20 NOTE — PROGRESS NOTES
Pt here with one family member for f/u with Dr. Mendoza, saw ENT Friday for possible recurrence. Denies pain, states some dysphasia. No other concerns.

## 2021-09-23 DIAGNOSIS — C11.9 NASOPHARYNGEAL CARCINOMA (H): Primary | ICD-10-CM

## 2021-09-28 ENCOUNTER — TELEPHONE (OUTPATIENT)
Dept: ONCOLOGY | Facility: HOSPITAL | Age: 71
End: 2021-09-28

## 2021-09-28 NOTE — TELEPHONE ENCOUNTER
"LM for patient daughter on her cell number to call us back regarding Dr Frias request: \"see if the patient would be willing to have one of the lymph nodes in the right neck biopsy.  These were positive on PET scan.  It would just be a quick needle under local anesthesia\".      "

## 2021-09-29 ENCOUNTER — TELEPHONE (OUTPATIENT)
Dept: RADIATION ONCOLOGY | Facility: HOSPITAL | Age: 71
End: 2021-09-29

## 2021-09-29 ENCOUNTER — TELEPHONE (OUTPATIENT)
Dept: ONCOLOGY | Facility: HOSPITAL | Age: 71
End: 2021-09-29

## 2021-09-29 NOTE — TELEPHONE ENCOUNTER
"2nd message left for pt daughter to call our office regarding biopsy of lymph node right side of neck.  Dr Frias note: \"see if the patient would be willing to have one of the lymph nodes in the right neck biopsy.  These were positive on PET scan.  It would just be a quick needle under local anesthesia.\"  "

## 2021-10-01 ENCOUNTER — TELEPHONE (OUTPATIENT)
Dept: ONCOLOGY | Facility: HOSPITAL | Age: 71
End: 2021-10-01

## 2021-10-01 NOTE — TELEPHONE ENCOUNTER
Call placed to patient daughter to discuss lymph node biopsy recommended for this patient by Dr Frias.  Per [facility], they would like to schedule this.  Message to Dr Frias to place orders.

## 2021-10-04 ENCOUNTER — OFFICE VISIT (OUTPATIENT)
Dept: INFECTIOUS DISEASES | Facility: CLINIC | Age: 71
End: 2021-10-04
Payer: COMMERCIAL

## 2021-10-04 ENCOUNTER — LAB (OUTPATIENT)
Dept: LAB | Facility: CLINIC | Age: 71
End: 2021-10-04
Payer: COMMERCIAL

## 2021-10-04 VITALS
SYSTOLIC BLOOD PRESSURE: 134 MMHG | BODY MASS INDEX: 25.51 KG/M2 | TEMPERATURE: 98.7 F | DIASTOLIC BLOOD PRESSURE: 72 MMHG | WEIGHT: 144 LBS | HEART RATE: 60 BPM

## 2021-10-04 DIAGNOSIS — B18.1 HEPATITIS B, CHRONIC (H): Primary | ICD-10-CM

## 2021-10-04 DIAGNOSIS — D64.9 ANEMIA, UNSPECIFIED TYPE: ICD-10-CM

## 2021-10-04 DIAGNOSIS — D61.818 ACQUIRED PANCYTOPENIA (H): ICD-10-CM

## 2021-10-04 DIAGNOSIS — B18.1 HEPATITIS B, CHRONIC (H): ICD-10-CM

## 2021-10-04 LAB
ALBUMIN SERPL-MCNC: 3.5 G/DL (ref 3.5–5)
ALP SERPL-CCNC: 125 U/L (ref 45–120)
ALT SERPL W P-5'-P-CCNC: 221 U/L (ref 0–45)
AST SERPL W P-5'-P-CCNC: 272 U/L (ref 0–40)
BILIRUB DIRECT SERPL-MCNC: 0.3 MG/DL
BILIRUB SERPL-MCNC: 0.8 MG/DL (ref 0–1)
CREAT SERPL-MCNC: 1.11 MG/DL (ref 0.7–1.3)
GFR SERPL CREATININE-BSD FRML MDRD: 67 ML/MIN/1.73M2
PROT SERPL-MCNC: 6.8 G/DL (ref 6–8)

## 2021-10-04 PROCEDURE — 80076 HEPATIC FUNCTION PANEL: CPT

## 2021-10-04 PROCEDURE — 99213 OFFICE O/P EST LOW 20 MIN: CPT

## 2021-10-04 PROCEDURE — 87517 HEPATITIS B DNA QUANT: CPT

## 2021-10-04 PROCEDURE — 36415 COLL VENOUS BLD VENIPUNCTURE: CPT

## 2021-10-04 PROCEDURE — 82565 ASSAY OF CREATININE: CPT

## 2021-10-04 PROCEDURE — 84443 ASSAY THYROID STIM HORMONE: CPT

## 2021-10-04 PROCEDURE — 84439 ASSAY OF FREE THYROXINE: CPT

## 2021-10-04 NOTE — PROGRESS NOTES
Assessment:      Impression: Chronic hepatitis B infection, responding to lamivudine.  However, for unclear reasons, this had been continued and then patient had rebound of hepatitis B viral load and LFTs.    Recurrence of nasopharyngeal carcinoma.    Nasopharyngeal carcinoma.  Felt to be in remission, but patient complains of bleeding.  Follow-up bx planned.      Plan:     Check hepatitis B levels today.  Also LFTs.    Continue lamivudine.    If hepatitis B levels are dropping, may need to consider alternative treatment    Follow-up in 3 months    Subjective:      This is an follow-up infectious Disease visit for Kristen Chapman, who is a 70 y.o.  referred for evaluation of hepatitis B.     Is a 70-year-old gentleman of seen only in video visits over the last year for chronic hepatitis B.  He had been on treatment for nasopharyngeal carcinoma when he was noted to have elevated LFTs and a very high hepatitis B viral load.    Patient was started on lamivudine 100 mg a day and he is doing quite well.  He denies any complaints other than some bleeding in his nose and upon further questioning some pain and dryness in his mouth and sticking when he swallows.    He has been taking Magic mouthwash which does not really seem to help his symptoms significantly.    He denies abdominal pain.      The following portions of the patient's history were reviewed and updated as appropriate: allergies, current medications, past family history, past medical history, past social history, past surgical history and problem list.      Follow-up visit on August 23, 2021:    For reasons that are unclear, the lamivudine was discontinued several months ago.  He is generally feeling well though complains of some nasopharyngeal bleeding.    His thrush is resolved.    Follow-up visit on October 4, 2021:    Patient is back on the meeting.  He denies any specific complaints.  Unfortunately, since her last visit, he did receive a diagnosis that his  nasopharyngeal carcinoma has returned.  Chemotherapy and radiation therapy is in the works.    He denies any abdominal pain.    Review of Systems  Performed and all negative except as mentioned above.      Objective:     /72   Pulse 60   Temp 98.7  F (37.1  C)   Wt 65.3 kg (144 lb)   BMI 25.51 kg/m       General:   alert, appears stated age and cooperative   Oropharynx:  Wearing a mask    Eyes:   Extraocular muscles intact, no icterus.    Ears:   Deferred   Neck:  no adenopathy and supple, symmetrical, trachea midline   Thyroid:   Deferred   Lung:  clear to auscultation bilaterally   Heart:   regular rate and rhythm, S1, S2 normal, no murmur, click, rub or gallop   Abdomen:  soft, non-tender; bowel sounds normal; no masses,  no organomegaly   Extremities:  extremities normal, atraumatic, no cyanosis or edema   Skin:  warm and dry, no hyperpigmentation, vitiligo, or suspicious lesions   CVA:   absent   Genitourinary:  defer exam   Neurological:   Grossly normal   Psychiatric:   normal mood, behavior, speech, dress, and thought processes       Results for JEAN-CLAUDE HO (MRN [id number]) as of 10/4/2021 10:19   Ref. Range 8/23/2021 10:27   Hepatitis B DNA IU/mL, Instrument Latest Ref Range: <1 IU/mL 17,758,112 (H)   Results for JEAN-CLAUDE HO (MRN [id number]) as of 10/4/2021 10:19   Ref. Range 8/3/2021 08:29 8/23/2021 10:27 9/7/2021 07:15   Creatinine Latest Ref Range: 0.66 - 1.25 mg/dL 1.39 (H)  1.36 (H)   GFR Estimate Latest Ref Range: >60 mL/min/1.73m2 51 (L)  53 (L)   Calcium Latest Ref Range: 8.5 - 10.1 mg/dL 9.1  8.3 (L)   Anion Gap Latest Ref Range: 3 - 14 mmol/L 5  5   Albumin Latest Ref Range: 3.5 - 5.0 g/dL 3.8 3.5    Protein Total Latest Ref Range: 6.0 - 8.0 g/dL 7.3 6.6    Bilirubin Total Latest Ref Range: 0.0 - 1.0 mg/dL 1.2 (H) 0.8    Alkaline Phosphatase Latest Ref Range: 45 - 120 U/L 95 97    ALT Latest Ref Range: 0 - 45 U/L 98 (H) 176 (H)    AST Latest Ref Range: 0 - 40 U/L 93 (H) 175 (H)     Bilirubin Direct Latest Ref Range: <=0.5 mg/dL  0.3      Eduardo Grier MD

## 2021-10-05 DIAGNOSIS — C11.9 NASOPHARYNGEAL CARCINOMA (H): Primary | ICD-10-CM

## 2021-10-05 LAB
HBV DNA SERPL NAA+PROBE-ACNC: ABNORMAL IU/ML
HBV DNA SERPL NAA+PROBE-LOG IU: 4.6 {LOG_IU}/ML

## 2021-10-08 ENCOUNTER — ONCOLOGY VISIT (OUTPATIENT)
Dept: ONCOLOGY | Facility: HOSPITAL | Age: 71
End: 2021-10-08
Attending: INTERNAL MEDICINE
Payer: COMMERCIAL

## 2021-10-08 VITALS
BODY MASS INDEX: 25.69 KG/M2 | RESPIRATION RATE: 16 BRPM | WEIGHT: 145 LBS | HEART RATE: 66 BPM | SYSTOLIC BLOOD PRESSURE: 115 MMHG | TEMPERATURE: 98.4 F | OXYGEN SATURATION: 95 % | DIASTOLIC BLOOD PRESSURE: 71 MMHG

## 2021-10-08 DIAGNOSIS — T45.1X5A ANTINEOPLASTIC CHEMOTHERAPY INDUCED PANCYTOPENIA (H): ICD-10-CM

## 2021-10-08 DIAGNOSIS — C11.9 SQUAMOUS CELL CARCINOMA OF NASOPHARYNX (H): Primary | ICD-10-CM

## 2021-10-08 DIAGNOSIS — D64.9 ANEMIA, UNSPECIFIED TYPE: ICD-10-CM

## 2021-10-08 DIAGNOSIS — D61.810 ANTINEOPLASTIC CHEMOTHERAPY INDUCED PANCYTOPENIA (H): ICD-10-CM

## 2021-10-08 DIAGNOSIS — R63.4 WEIGHT LOSS: ICD-10-CM

## 2021-10-08 DIAGNOSIS — D61.818 ACQUIRED PANCYTOPENIA (H): ICD-10-CM

## 2021-10-08 DIAGNOSIS — C11.9 NASOPHARYNGEAL CARCINOMA (H): ICD-10-CM

## 2021-10-08 LAB
T4 FREE SERPL-MCNC: 0.76 NG/DL (ref 0.7–1.8)
TSH SERPL DL<=0.005 MIU/L-ACNC: 76.98 UIU/ML (ref 0.3–5)

## 2021-10-08 PROCEDURE — G0463 HOSPITAL OUTPT CLINIC VISIT: HCPCS

## 2021-10-08 PROCEDURE — 99214 OFFICE O/P EST MOD 30 MIN: CPT | Performed by: INTERNAL MEDICINE

## 2021-10-08 RX ORDER — HEPARIN SODIUM (PORCINE) LOCK FLUSH IV SOLN 100 UNIT/ML 100 UNIT/ML
5 SOLUTION INTRAVENOUS
Status: CANCELLED | OUTPATIENT
Start: 2021-10-13

## 2021-10-08 RX ORDER — LORAZEPAM 2 MG/ML
0.5 INJECTION INTRAMUSCULAR EVERY 4 HOURS PRN
Status: CANCELLED | OUTPATIENT
Start: 2021-10-20

## 2021-10-08 RX ORDER — ALBUTEROL SULFATE 0.83 MG/ML
2.5 SOLUTION RESPIRATORY (INHALATION)
Status: CANCELLED | OUTPATIENT
Start: 2021-11-03

## 2021-10-08 RX ORDER — DIPHENHYDRAMINE HYDROCHLORIDE 50 MG/ML
50 INJECTION INTRAMUSCULAR; INTRAVENOUS
Status: CANCELLED
Start: 2021-11-03

## 2021-10-08 RX ORDER — HEPARIN SODIUM (PORCINE) LOCK FLUSH IV SOLN 100 UNIT/ML 100 UNIT/ML
5 SOLUTION INTRAVENOUS
Status: CANCELLED | OUTPATIENT
Start: 2021-11-03

## 2021-10-08 RX ORDER — EPINEPHRINE 1 MG/ML
0.3 INJECTION, SOLUTION INTRAMUSCULAR; SUBCUTANEOUS EVERY 5 MIN PRN
Status: CANCELLED | OUTPATIENT
Start: 2021-10-20

## 2021-10-08 RX ORDER — LORAZEPAM 2 MG/ML
0.5 INJECTION INTRAMUSCULAR EVERY 4 HOURS PRN
Status: CANCELLED | OUTPATIENT
Start: 2021-11-03

## 2021-10-08 RX ORDER — METHYLPREDNISOLONE SODIUM SUCCINATE 125 MG/2ML
125 INJECTION, POWDER, LYOPHILIZED, FOR SOLUTION INTRAMUSCULAR; INTRAVENOUS
Status: CANCELLED
Start: 2021-11-03

## 2021-10-08 RX ORDER — ALBUTEROL SULFATE 0.83 MG/ML
2.5 SOLUTION RESPIRATORY (INHALATION)
Status: CANCELLED | OUTPATIENT
Start: 2021-10-13

## 2021-10-08 RX ORDER — HEPARIN SODIUM (PORCINE) LOCK FLUSH IV SOLN 100 UNIT/ML 100 UNIT/ML
5 SOLUTION INTRAVENOUS
Status: CANCELLED | OUTPATIENT
Start: 2021-11-10

## 2021-10-08 RX ORDER — METHYLPREDNISOLONE SODIUM SUCCINATE 125 MG/2ML
125 INJECTION, POWDER, LYOPHILIZED, FOR SOLUTION INTRAMUSCULAR; INTRAVENOUS
Status: CANCELLED
Start: 2021-10-13

## 2021-10-08 RX ORDER — NALOXONE HYDROCHLORIDE 0.4 MG/ML
0.2 INJECTION, SOLUTION INTRAMUSCULAR; INTRAVENOUS; SUBCUTANEOUS
Status: CANCELLED | OUTPATIENT
Start: 2021-11-10

## 2021-10-08 RX ORDER — MEPERIDINE HYDROCHLORIDE 25 MG/ML
25 INJECTION INTRAMUSCULAR; INTRAVENOUS; SUBCUTANEOUS EVERY 30 MIN PRN
Status: CANCELLED | OUTPATIENT
Start: 2021-10-20

## 2021-10-08 RX ORDER — LORAZEPAM 2 MG/ML
0.5 INJECTION INTRAMUSCULAR EVERY 4 HOURS PRN
Status: CANCELLED | OUTPATIENT
Start: 2021-11-10

## 2021-10-08 RX ORDER — DIPHENHYDRAMINE HYDROCHLORIDE 50 MG/ML
50 INJECTION INTRAMUSCULAR; INTRAVENOUS
Status: CANCELLED
Start: 2021-10-13

## 2021-10-08 RX ORDER — DIPHENHYDRAMINE HYDROCHLORIDE 50 MG/ML
50 INJECTION INTRAMUSCULAR; INTRAVENOUS
Status: CANCELLED
Start: 2021-10-20

## 2021-10-08 RX ORDER — ALBUTEROL SULFATE 0.83 MG/ML
2.5 SOLUTION RESPIRATORY (INHALATION)
Status: CANCELLED | OUTPATIENT
Start: 2021-10-20

## 2021-10-08 RX ORDER — ALBUTEROL SULFATE 0.83 MG/ML
2.5 SOLUTION RESPIRATORY (INHALATION)
Status: CANCELLED | OUTPATIENT
Start: 2021-11-10

## 2021-10-08 RX ORDER — ALBUTEROL SULFATE 90 UG/1
1-2 AEROSOL, METERED RESPIRATORY (INHALATION)
Status: CANCELLED
Start: 2021-10-13

## 2021-10-08 RX ORDER — LORAZEPAM 2 MG/ML
0.5 INJECTION INTRAMUSCULAR EVERY 4 HOURS PRN
Status: CANCELLED | OUTPATIENT
Start: 2021-10-13

## 2021-10-08 RX ORDER — MEPERIDINE HYDROCHLORIDE 25 MG/ML
25 INJECTION INTRAMUSCULAR; INTRAVENOUS; SUBCUTANEOUS EVERY 30 MIN PRN
Status: CANCELLED | OUTPATIENT
Start: 2021-11-10

## 2021-10-08 RX ORDER — EPINEPHRINE 1 MG/ML
0.3 INJECTION, SOLUTION INTRAMUSCULAR; SUBCUTANEOUS EVERY 5 MIN PRN
Status: CANCELLED | OUTPATIENT
Start: 2021-11-10

## 2021-10-08 RX ORDER — EPINEPHRINE 1 MG/ML
0.3 INJECTION, SOLUTION INTRAMUSCULAR; SUBCUTANEOUS EVERY 5 MIN PRN
Status: CANCELLED | OUTPATIENT
Start: 2021-10-13

## 2021-10-08 RX ORDER — LORAZEPAM 1 MG/1
1 TABLET ORAL ONCE
Status: CANCELLED | OUTPATIENT
Start: 2021-10-08 | End: 2021-10-08

## 2021-10-08 RX ORDER — NALOXONE HYDROCHLORIDE 0.4 MG/ML
0.2 INJECTION, SOLUTION INTRAMUSCULAR; INTRAVENOUS; SUBCUTANEOUS
Status: CANCELLED | OUTPATIENT
Start: 2021-10-13

## 2021-10-08 RX ORDER — HEPARIN SODIUM,PORCINE 10 UNIT/ML
5 VIAL (ML) INTRAVENOUS
Status: CANCELLED | OUTPATIENT
Start: 2021-11-10

## 2021-10-08 RX ORDER — ALBUTEROL SULFATE 90 UG/1
1-2 AEROSOL, METERED RESPIRATORY (INHALATION)
Status: CANCELLED
Start: 2021-10-20

## 2021-10-08 RX ORDER — ALBUTEROL SULFATE 90 UG/1
1-2 AEROSOL, METERED RESPIRATORY (INHALATION)
Status: CANCELLED
Start: 2021-11-03

## 2021-10-08 RX ORDER — NALOXONE HYDROCHLORIDE 0.4 MG/ML
0.2 INJECTION, SOLUTION INTRAMUSCULAR; INTRAVENOUS; SUBCUTANEOUS
Status: CANCELLED | OUTPATIENT
Start: 2021-11-03

## 2021-10-08 RX ORDER — HEPARIN SODIUM,PORCINE 10 UNIT/ML
5 VIAL (ML) INTRAVENOUS
Status: CANCELLED | OUTPATIENT
Start: 2021-10-20

## 2021-10-08 RX ORDER — OXYCODONE AND ACETAMINOPHEN 5; 325 MG/1; MG/1
1 TABLET ORAL ONCE
Status: CANCELLED | OUTPATIENT
Start: 2021-10-08 | End: 2021-10-08

## 2021-10-08 RX ORDER — LIDOCAINE HYDROCHLORIDE 10 MG/ML
10 INJECTION, SOLUTION EPIDURAL; INFILTRATION; INTRACAUDAL; PERINEURAL ONCE
Status: CANCELLED | OUTPATIENT
Start: 2021-10-08 | End: 2021-10-08

## 2021-10-08 RX ORDER — NALOXONE HYDROCHLORIDE 0.4 MG/ML
0.2 INJECTION, SOLUTION INTRAMUSCULAR; INTRAVENOUS; SUBCUTANEOUS
Status: CANCELLED | OUTPATIENT
Start: 2021-10-20

## 2021-10-08 RX ORDER — MEPERIDINE HYDROCHLORIDE 25 MG/ML
25 INJECTION INTRAMUSCULAR; INTRAVENOUS; SUBCUTANEOUS EVERY 30 MIN PRN
Status: CANCELLED | OUTPATIENT
Start: 2021-11-03

## 2021-10-08 RX ORDER — EPINEPHRINE 1 MG/ML
0.3 INJECTION, SOLUTION INTRAMUSCULAR; SUBCUTANEOUS EVERY 5 MIN PRN
Status: CANCELLED | OUTPATIENT
Start: 2021-11-03

## 2021-10-08 RX ORDER — ALBUTEROL SULFATE 90 UG/1
1-2 AEROSOL, METERED RESPIRATORY (INHALATION)
Status: CANCELLED
Start: 2021-11-10

## 2021-10-08 RX ORDER — METHYLPREDNISOLONE SODIUM SUCCINATE 125 MG/2ML
125 INJECTION, POWDER, LYOPHILIZED, FOR SOLUTION INTRAMUSCULAR; INTRAVENOUS
Status: CANCELLED
Start: 2021-11-10

## 2021-10-08 RX ORDER — METHYLPREDNISOLONE SODIUM SUCCINATE 125 MG/2ML
125 INJECTION, POWDER, LYOPHILIZED, FOR SOLUTION INTRAMUSCULAR; INTRAVENOUS
Status: CANCELLED
Start: 2021-10-20

## 2021-10-08 RX ORDER — DIPHENHYDRAMINE HYDROCHLORIDE 50 MG/ML
50 INJECTION INTRAMUSCULAR; INTRAVENOUS
Status: CANCELLED
Start: 2021-11-10

## 2021-10-08 RX ORDER — MEPERIDINE HYDROCHLORIDE 25 MG/ML
25 INJECTION INTRAMUSCULAR; INTRAVENOUS; SUBCUTANEOUS EVERY 30 MIN PRN
Status: CANCELLED | OUTPATIENT
Start: 2021-10-13

## 2021-10-08 RX ORDER — HEPARIN SODIUM (PORCINE) LOCK FLUSH IV SOLN 100 UNIT/ML 100 UNIT/ML
5 SOLUTION INTRAVENOUS
Status: CANCELLED | OUTPATIENT
Start: 2021-10-20

## 2021-10-08 RX ORDER — HEPARIN SODIUM,PORCINE 10 UNIT/ML
5 VIAL (ML) INTRAVENOUS
Status: CANCELLED | OUTPATIENT
Start: 2021-11-03

## 2021-10-08 RX ORDER — HEPARIN SODIUM,PORCINE 10 UNIT/ML
5 VIAL (ML) INTRAVENOUS
Status: CANCELLED | OUTPATIENT
Start: 2021-10-13

## 2021-10-08 ASSESSMENT — PAIN SCALES - GENERAL: PAINLEVEL: NO PAIN (0)

## 2021-10-08 NOTE — PROGRESS NOTES
[facility] Hematology and Oncology Progress Note    Patient: Kristen Chapman  MRN: [id number]  Date of Service: Oct 8, 2021        Assessment and Plan:    Cancer Staging  Nasopharyngeal carcinoma (H)  Staging form: Pharynx - Nasopharynx, AJCC 8th Edition  - Clinical stage from 2/18/2020: Stage SAMANTHA (cT4, cN2, cM0) - Signed by Alan Frias MD on 2/18/2020     1.  Nasopharyngeal carcinoma: His case was recently discussed at tumor conference.  He was set up to have a lymph node biopsy in October 19.  We will see if we can move that up.  Are going to start him on chemotherapy next week to get some treatment on board and potentially downsize his tumor.  I am going to use carboplatin and gemcitabine, 2 weeks on 1 week off.  Treatment schedule was reviewed with the patient and his daughter.  We reviewed some of the common side effects of this regimen which include, but are not limited to, cytopenias and need for transfusion, fatigue, decreased appetite, diarrhea and nausea.  He agreed to proceed with treatment.  We will give 3 cycles and then reimage and likely moved to radiation.  If he is having a good response and we could consider 6 cycles prior to radiation.  He has decided that he does not want any surgical procedures.  Radiosurgery would be given to the ethmoid tumor with curative intent.  If he has cervical capri metastases we will have to discuss the role of lymph node dissection with ENT as he has previously been radiated.     2.  Anemia: Recheck TSH now.  We will check B12, folate, copper, and reticulocytes when he starts chemotherapy.     3.  Thrombocytopenia/pancytopenia: Going to proceed with a bone marrow biopsy to make sure he does not have myelodysplasia or some other intrinsic marrow disorder.  He does have hepatitis B which certainly could be contributing to his cytopenias.      We will also likely start Retacrit for anemia of chronic kidney disease.    4.  Hypothyroidism: TSH added onto labs drawn a  few days ago.    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  Diagnosed January 2020. Imaging suggested bilateral abnormal lymphadenopathy in the neck.  Also asymmetric soft tissue thickening in the posterior right nasopharynx.  On imaging, tumor also appeared to extend down to the hypopharynx.    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    Treatment:    Initially treated with concurrent chemotherapy and radiation.  He had weekly cisplatin starting March 3, 2020.  Fifth and final dose given March 31, 2020.  Subsequent chemotherapy was held due to prolonged thrombocytopenia and renal insufficiency.  Radiation dose was 7000 cGy in 35 fractions given from March 2 through April 17, 2020.     Started cisplatin with infusional 5-FU on June 1, 2020.  Cycle 1 given at a 25% dose reduction.  He still had significant thrombocytopenia and neutropenia on day 28.  Cycle 2 was held.  At the same time his liver function tests elevated secondary to hepatitis B.     Interim History:    Kristen returns today for a follow-up visit.  Overall he is doing okay.  No complaints.  Denies headaches, eye pain, or vision changes.  No nausea or vomiting.    Review of Systems:    As above in the history.     Review of Systems otherwise Negative for:  General: chills, fever or night sweats  Psychological: anxiety or depression  Ophthalmic: blurry vision, double vision or loss of vision, vision change  ENT: oral lesions, hearing changes  Hematological and Lymphatic: bruising, jaundice, swollen lymph nodes  Endocrine: hot flashes, unexpected weight changes  Respiratory: cough, hemoptysis, orthopnea or shortness of breath/AYALA  Cardiovascular: chest pain, edema, palpitations or PND  Gastrointestinal: abdominal pain, blood in stools, change in bowel habits, constipation, diarrhea or nausea/vomiting  Genito-Urinary: change in urinary stream,  incontinence, frequency/urgency  Musculoskeletal: joint pain, stiffness, swelling, muscle pain  Neurological: dizziness, headaches, numbness/tingling  Dermatological: lumps and rash    Past History:    Past Medical History:   Diagnosis Date     Anemia      Dysphagia      Hepatitis B chronic      Hypothyroidism      Nasopharyngeal cancer (H)      Severe protein-calorie malnutrition (H)      Thrombocytopenia (H)      Physical Exam:    /71   Pulse 66   Temp 98.4  F (36.9  C)   Resp 16   Wt 65.8 kg (145 lb)   SpO2 95%   BMI 25.69 kg/m      General: patient appears stated age of 71 year old. Nontoxic and in no distress.   HEENT: Head: atraumatic, normocephalic. Sclerae anicteric.  Chest:  Normal respiratory effort  Cardiac:  No edema.   Abdomen: abdomen is non-distended  Extremities: normal tone and muscle bulk.  Skin: no lesions or rash on visible skin. Warm and dry.   CNS: alert and oriented. Grossly non-focal.   Psychiatric: normal mood and affect.     Lab Results:    Recent Results (from the past 168 hour(s))   Hep B Virus DNA Quant Real Time PCR   Result Value Ref Range    Hepatitis B DNA IU/mL, Instrument 41,205 (H) <1 IU/mL    Hepatitis B log 4.6    Hepatic panel (Albumin, ALT, AST, Bili, Alk Phos, TP)   Result Value Ref Range    Bilirubin Total 0.8 0.0 - 1.0 mg/dL    Bilirubin Direct 0.3 <=0.5 mg/dL    Protein Total 6.8 6.0 - 8.0 g/dL    Albumin 3.5 3.5 - 5.0 g/dL    Alkaline Phosphatase 125 (H) 45 - 120 U/L     (H) 0 - 40 U/L     (H) 0 - 45 U/L   Creatinine   Result Value Ref Range    Creatinine 1.11 0.70 - 1.30 mg/dL    GFR Estimate 67 >60 mL/min/1.73m2      Imaging:    PET Oncology Whole Body    Result Date: 9/16/2021  Combined Report of:    PET and CT on  9/16/2021 9:30 AM : 1. PET of the neck, chest, abdomen, and pelvis. 2. PET CT Fusion for Attenuation Correction and Anatomical Localization:  3. Diagnostic CT scan of the chest, abdomen, and pelvis with intravenous contrast for  interpretation. 3. CT of the chest, abdomen and pelvis obtained for diagnostic interpretation. 4. 3D MIP and PET-CT fused images were processed on an independent workstation and archived to PACS and reviewed by a radiologist. Technique: 1. PET: The patient received 10.12 mCi of F-18-FDG; the serum glucose was 91 prior to administration, body weight was 68 kg. Images were evaluated in the axial, sagittal, and coronal planes as well as the rotational whole body MIP. Images were acquired from the Vertex to the Feet. UPTAKE WAS MEASURED AT 64 MINUTES. BACKGROUND:  Liver SUV max= 3.87,   Aorta Blood SUV Max: 3.62. 2. CT: Volumetric acquisition for clinical interpretation of the chest, abdomen, and pelvis acquired at 3 mm sections  after the uneventful administration of intravenous contrast. The chest, abdomen, and pelvis were evaluated at 5 mm sections in bone, soft tissue, and lung windows.  The patient received 92 cc of Isovue 370 intravenously for the examination.  High resolution images of the neck were obtained with multiple oblique projection reformats. 3. 3D MIP and PET-CT fused images were processed on an independent workstation and archived to PACS and reviewed by a radiologist. INDICATION: Squamous cell carcinoma of nasopharynx (H); Abnormal brain scan ADDITIONAL INFORMATION OBTAINED FROM EMR: 70 yo male with a history of nasopharyngeal carcinoma, previously treated with chemoradiation completed June 2020. His chemotherapy was interrupted due to his history of?Hep B, as well as?renal dysfunction. Most recent surveillance MRI concerning for recurrence, with a lobulated mass extending from the right anterior ethmoid into the orbital cavity. On MRI obtained in March 2021, this area in the ethmoid sinus was read as sinusitis, but on review in hindsight is consistent with his present mass. COMPARISON: PET/CT 9/2/2020. CT chest 2/19/2021. FINDINGS: HEAD/NECK: Please see separate dictation for the high resolution  PET-CT of the head and neck performed at the same time for discussion of findings. CHEST: New hypermetabolic mediastinal lymphadenopathy, for example a 10 mm short axis left hilar lymph node with max SUV of 5.5 (series 5, image 185), and a 11 mm short axis right hilar lymph node with max SUV of 6.97 (series 5, image 190). Cardiac size is enlarged. No pericardial effusion. Stable ectasia of the ascending aorta measuring 4.5 cm in diameter. Normal caliber of the main pulmonary artery. Left chest wall port catheter tip terminates in the high right atrium. Mild diffuse FDG uptake throughout the esophagus with max SUV of 5.99 without associated CT correlate. The airway is patent. No pleural effusion or pneumothorax. No airspace consolidation. Stable 6 mm nonavid solid pulmonary nodule in the posterior right upper lobe (series 5, image 179). No new suspicious pulmonary nodules or masses. ABDOMEN AND PELVIS: No suspicious FDG uptake in the abdomen or pelvis. Cirrhotic liver morphology. Distended gallbladder without cholelithiasis or pericholecystic inflammatory changes. No intrahepatic or extrahepatic biliary dilatation. Nonavid 17 mm hypodensity in the inferior margin of the spleen. Spleen is enlarged measuring 17 cm in length. The pancreas, adrenal glands, and kidneys are within normal limits. No obstructing stones or hydronephrosis. Urinary bladder is decompressed. No abnormally dilated loops of small or large bowel. Mild nonspecific stranding centered about the duodenum and upper retroperitoneum with mild thickening of the right anterior pararenal fascia. There is small amount of free fluid surrounding the liver, spleen, and in the bilateral pericolic gutters. Small amount of free fluid in the pelvis. No free air. No portal venous gas or pneumatosis. Contrast filling defect in the superior mesenteric vein extending into the origin of the main portal vein, likely admixture of enhanced and nonenhanced blood pool. Normal  caliber of the abdominal aorta. No suspicious abdominal or pelvic lymphadenopathy. LOWER EXTREMITIES: No abnormal masses or hypermetabolic lesions. BONES: There are no suspicious lytic or blastic osseous lesions.  There is no abnormal FDG uptake in the skeleton. Mild degenerative changes of the spine.     IMPRESSION: In this patient with a history of nasopharyngeal squamous cell carcinoma, status post chemoradiation: 1. New hypermetabolic mediastinal lymphadenopathy, most suspicious for metastatic disease. Reactive nodes are on the differential diagnosis given their small size but are felt less likely given findings in the neck. 2. Unchanged indeterminant 6 mm nodule in the posterior right upper lobe. 3. Cirrhotic liver morphology with sequelae of portal hypertension including splenomegaly and small amount of abdominopelvic ascites. 4. Stable dilation of the ascending aorta measuring 4.5 cm in diameter. 5. Please see separate dictation for the high resolution PET-CT of the head and neck performed at the same time for discussion of findings. I have personally reviewed the examination and initial interpretation and I agree with the findings. ALEXY WALDRON MD   SYSTEM ID:  [id number]    CT Soft Tissue Neck w Contrast    Result Date: 9/16/2021  PET CT fusion examination 9/16/2021 9:31 AM 1. Neck CT with contrast 2. PET study of the neck 3. PET CT fusion study of the neck History:  History of nasopharyngeal squamous cell carcinoma. Additional history from the EMR: Patient underwent biopsy of the right ethmoid sinus mass on 9/7/2021 which demonstrated Jackson-Barr virus mediated nasopharyngeal carcinoma. Comparison: PET/CT 9/2/2020. CT neck 8/14/2020. Technique: Please refer to the accompanying whole body PET-CT for report of the dose and whole body PET-CT findings. Regarding the neck, axial images were obtained after nonionic intravenous contrast administration, with sagittal and coronal reconstructions performed. Neck  CT images were reviewed in bone, soft tissue, and lung windows, with review of the fused PET-CT images as well in multiple planes. Findings: Heterogeneous soft tissue mass centered about the right ethmoid air cells measuring 2.8 x 2.0 cm max SUV of 14.36. There is extension of the mass into the right orbit extraconal fat. No evidence of extension into the intraconal fat. There is mild mass effect on the adjacent right medial rectus muscle destructive changes of the ethmoid air cells and medial right orbital wall. No evidence of intracranial extension. Mucosal thickening of the right maxillary sinus with mild FDG uptake of 4.98. Left maxillary sinus is clear. The left ethmoid air cells, sphenoid locules, and left frontal sinuses are clear. Hypermetabolic 1.5 cm right submandibular lymph node with max SUV of 10.05 (series 5, image 123). Hypermetabolic 8 mm right level 2 lymph node with max SUV of 7.80 (series 5, image 116). No other hypermetabolic lymph nodes in the head and neck. Small focus of FDG uptake in the left posterolateral nasopharynx with associated thin mucosal enhancement. Max SUV is 8.1. This is suspicious for residual or recurrent tumor. Direct visualization is recommended. The tongue base appears normal. Mild mucosal thickening of the left oropharynx minimally increased FDG uptake with maximum SUV of 4.23. This is probably inflammatory. Heterogeneous micronodular thyroid gland with diffusely mildly increased FDG uptake, with max SUV of 5.09. Limited evaluation of the cervical vertebral column demonstrates no evident spinal canal stenosis. The visualized paranasal sinuses and mastoid air cells are clear. The major vascular structures in the neck are patent. Chronic narrowing of the left jugular vein throughout the neck. The visualized lung apices appear clear.     Impression: 1. Hypermetabolic mass situated about the right ethmoid sinus with extension into the right orbital extraconal fat, compatible  with biopsy-proven nasopharyngeal carcinoma deposit. 2. Focal FDG uptake and associated mucosal segmental enhancement in the left posterolateral nasopharynx is worrisome for residual or recurrent tumor. Direct visualization and biopsy is recommended. 3. Hypermetabolic right level Ib and 2A lymphadenopathy suggestive of metastases nodes. 4. Please refer to the whole body PET CT performed as a separate report, for the findings of the remainder of the body. I have personally reviewed the examination and initial interpretation and I agree with the findings. JERRICA PEREZ MD   SYSTEM ID:  [id number]        Signed by: Alan Frias MD

## 2021-10-08 NOTE — PROGRESS NOTES
"Oncology Rooming Note    October 8, 2021 9:03 AM   Kristen Chapman is a 71 year old male who presents for:    Chief Complaint   Patient presents with     Oncology Clinic Visit     Nasopharyngeal carcinoma (H)     Initial Vitals: /71   Pulse 66   Temp 98.4  F (36.9  C)   Resp 16   Wt 65.8 kg (145 lb)   SpO2 95%   BMI 25.69 kg/m   Estimated body mass index is 25.69 kg/m  as calculated from the following:    Height as of 9/17/21: 1.6 m (5' 3\").    Weight as of this encounter: 65.8 kg (145 lb). Body surface area is 1.71 meters squared.  No Pain (0) Comment: Data Unavailable   No LMP for male patient.  Allergies reviewed: Yes  Medications reviewed: Yes    Medications: Medication refills not needed today.  Pharmacy name entered into [employer]:    [facility] - [place], [place] - [address] [id number] - [place], [place] - [address] AT [facility] [address]    Clinical concerns: None       Maricarmen Cortes            "

## 2021-10-08 NOTE — PATIENT INSTRUCTIONS
Patient Education     Patient Education     Gemcitabine Hydrochloride Solution for injection  What is this medicine?  GEMCITABINE (connor SIT a been) is a chemotherapy drug. This medicine is used to treat many types of cancer like breast cancer, lung cancer, pancreatic cancer, and ovarian cancer.  This medicine may be used for other purposes; ask your health care provider or pharmacist if you have questions.  What should I tell my health care provider before I take this medicine?  They need to know if you have any of these conditions:    blood disorders    infection    kidney disease    liver disease    recent or ongoing radiation therapy    an unusual or allergic reaction to gemcitabine, other chemotherapy, other medicines, foods, dyes, or preservatives    pregnant or trying to get pregnant    breast-feeding  How should I use this medicine?  This drug is given as an infusion into a vein. It is administered in a hospital or clinic by a specially trained health care professional.  Talk to your pediatrician regarding the use of this medicine in children. Special care may be needed.  Overdosage: If you think you have taken too much of this medicine contact a poison control center or emergency room at once.  NOTE: This medicine is only for you. Do not share this medicine with others.  What if I miss a dose?  It is important not to miss your dose. Call your doctor or health care professional if you are unable to keep an appointment.  What may interact with this medicine?    medicines to increase blood counts like filgrastim, pegfilgrastim, sargramostim    some other chemotherapy drugs like cisplatin    vaccines  Talk to your doctor or health care professional before taking any of these medicines:    acetaminophen    aspirin    ibuprofen    ketoprofen    naproxen  This list may not describe all possible interactions. Give your health care provider a list of all the medicines, herbs, non-prescription drugs, or dietary  supplements you use. Also tell them if you smoke, drink alcohol, or use illegal drugs. Some items may interact with your medicine.  What should I watch for while using this medicine?  Visit your doctor for checks on your progress. This drug may make you feel generally unwell. This is not uncommon, as chemotherapy can affect healthy cells as well as cancer cells. Report any side effects. Continue your course of treatment even though you feel ill unless your doctor tells you to stop.  In some cases, you may be given additional medicines to help with side effects. Follow all directions for their use.  Call your doctor or health care professional for advice if you get a fever, chills or sore throat, or other symptoms of a cold or flu. Do not treat yourself. This drug decreases your body's ability to fight infections. Try to avoid being around people who are sick.  This medicine may increase your risk to bruise or bleed. Call your doctor or health care professional if you notice any unusual bleeding.  Be careful brushing and flossing your teeth or using a toothpick because you may get an infection or bleed more easily. If you have any dental work done, tell your dentist you are receiving this medicine.  Avoid taking products that contain aspirin, acetaminophen, ibuprofen, naproxen, or ketoprofen unless instructed by your doctor. These medicines may hide a fever.  Women should inform their doctor if they wish to become pregnant or think they might be pregnant. There is a potential for serious side effects to an unborn child. Talk to your health care professional or pharmacist for more information. Do not breast-feed an infant while taking this medicine.  What side effects may I notice from receiving this medicine?  Side effects that you should report to your doctor or health care professional as soon as possible:    allergic reactions like skin rash, itching or hives, swelling of the face, lips, or tongue    low blood  counts - this medicine may decrease the number of white blood cells, red blood cells and platelets. You may be at increased risk for infections and bleeding.    signs of infection - fever or chills, cough, sore throat, pain or difficulty passing urine    signs of decreased platelets or bleeding - bruising, pinpoint red spots on the skin, black, tarry stools, blood in the urine    signs of decreased red blood cells - unusually weak or tired, fainting spells, lightheadedness    breathing problems    chest pain    mouth sores    nausea and vomiting    pain, swelling, redness at site where injected    pain, tingling, numbness in the hands or feet    stomach pain    swelling of ankles, feet, hands    unusual bleeding  Side effects that usually do not require medical attention (report to your doctor or health care professional if they continue or are bothersome):    constipation    diarrhea    hair loss    loss of appetite    stomach upset  This list may not describe all possible side effects. Call your doctor for medical advice about side effects. You may report side effects to FDA at 4-733-FDA-0384.  Where should I keep my medicine?  This drug is given in a hospital or clinic and will not be stored at home.  NOTE:This sheet is a summary. It may not cover all possible information. If you have questions about this medicine, talk to your doctor, pharmacist, or health care provider. Copyright  2016 [employer]             Carboplatin Solution for injection  What is this medicine?  CARBOPLATIN (RUDDY kofi dennis tin) is a chemotherapy drug. It targets fast dividing cells, like cancer cells, and causes these cells to die. This medicine is used to treat ovarian cancer and many other cancers.  This medicine may be used for other purposes; ask your health care provider or pharmacist if you have questions.  What should I tell my health care provider before I take this medicine?  They need to know if you have any of these  conditions:    blood disorders    hearing problems    kidney disease    recent or ongoing radiation therapy    an unusual or allergic reaction to carboplatin, cisplatin, other chemotherapy, other medicines, foods, dyes, or preservatives    pregnant or trying to get pregnant    breast-feeding  How should I use this medicine?  This drug is usually given as an infusion into a vein. It is administered in a hospital or clinic by a specially trained health care professional.  Talk to your pediatrician regarding the use of this medicine in children. Special care may be needed.  Overdosage: If you think you have taken too much of this medicine contact a poison control center or emergency room at once.  NOTE: This medicine is only for you. Do not share this medicine with others.  What if I miss a dose?  It is important not to miss a dose. Call your doctor or health care professional if you are unable to keep an appointment.  What may interact with this medicine?    medicines for seizures    medicines to increase blood counts like filgrastim, pegfilgrastim, sargramostim    some antibiotics like amikacin, gentamicin, neomycin, streptomycin, tobramycin    vaccines  Talk to your doctor or health care professional before taking any of these medicines:    acetaminophen    aspirin    ibuprofen    ketoprofen    naproxen  This list may not describe all possible interactions. Give your health care provider a list of all the medicines, herbs, non-prescription drugs, or dietary supplements you use. Also tell them if you smoke, drink alcohol, or use illegal drugs. Some items may interact with your medicine.  What should I watch for while using this medicine?  Your condition will be monitored carefully while you are receiving this medicine. You will need important blood work done while you are taking this medicine.  This drug may make you feel generally unwell. This is not uncommon, as chemotherapy can affect healthy cells as well as  cancer cells. Report any side effects. Continue your course of treatment even though you feel ill unless your doctor tells you to stop.  In some cases, you may be given additional medicines to help with side effects. Follow all directions for their use.  Call your doctor or health care professional for advice if you get a fever, chills or sore throat, or other symptoms of a cold or flu. Do not treat yourself. This drug decreases your body's ability to fight infections. Try to avoid being around people who are sick.  This medicine may increase your risk to bruise or bleed. Call your doctor or health care professional if you notice any unusual bleeding.  Be careful brushing and flossing your teeth or using a toothpick because you may get an infection or bleed more easily. If you have any dental work done, tell your dentist you are receiving this medicine.  Avoid taking products that contain aspirin, acetaminophen, ibuprofen, naproxen, or ketoprofen unless instructed by your doctor. These medicines may hide a fever.  Do not become pregnant while taking this medicine. Women should inform their doctor if they wish to become pregnant or think they might be pregnant. There is a potential for serious side effects to an unborn child. Talk to your health care professional or pharmacist for more information. Do not breast-feed an infant while taking this medicine.  What side effects may I notice from receiving this medicine?  Side effects that you should report to your doctor or health care professional as soon as possible:    allergic reactions like skin rash, itching or hives, swelling of the face, lips, or tongue    signs of infection - fever or chills, cough, sore throat, pain or difficulty passing urine    signs of decreased platelets or bleeding - bruising, pinpoint red spots on the skin, black, tarry stools, nosebleeds    signs of decreased red blood cells - unusually weak or tired, fainting spells,  lightheadedness    breathing problems    changes in hearing    changes in vision    chest pain    high blood pressure    low blood counts - This drug may decrease the number of white blood cells, red blood cells and platelets. You may be at increased risk for infections and bleeding.    nausea and vomiting    pain, swelling, redness or irritation at the injection site    pain, tingling, numbness in the hands or feet    problems with balance, talking, walking    trouble passing urine or change in the amount of urine  Side effects that usually do not require medical attention (report to your doctor or health care professional if they continue or are bothersome):    hair loss    loss of appetite    metallic taste in the mouth or changes in taste  This list may not describe all possible side effects. Call your doctor for medical advice about side effects. You may report side effects to FDA at 3-234-FDA-6207.  Where should I keep my medicine?  This drug is given in a hospital or clinic and will not be stored at home.  NOTE:This sheet is a summary. It may not cover all possible information. If you have questions about this medicine, talk to your doctor, pharmacist, or health care provider. Copyright  2016 [employer]           Place patient instructions, either created by your organization or obtained from a 3rd party, here.

## 2021-10-08 NOTE — LETTER
"    10/8/2021         RE: Kristen Chapman  [address] [place] [address]        Dear Colleague,    Thank you for referring your patient, Kristen Chapman, to the [facility]. Please see a copy of my visit note below.    Oncology Rooming Note    October 8, 2021 9:03 AM   Kristen Chapman is a 71 year old male who presents for:    Chief Complaint   Patient presents with     Oncology Clinic Visit     Nasopharyngeal carcinoma (H)     Initial Vitals: /71   Pulse 66   Temp 98.4  F (36.9  C)   Resp 16   Wt 65.8 kg (145 lb)   SpO2 95%   BMI 25.69 kg/m   Estimated body mass index is 25.69 kg/m  as calculated from the following:    Height as of 9/17/21: 1.6 m (5' 3\").    Weight as of this encounter: 65.8 kg (145 lb). Body surface area is 1.71 meters squared.  No Pain (0) Comment: Data Unavailable   No LMP for male patient.  Allergies reviewed: Yes  Medications reviewed: Yes    Medications: Medication refills not needed today.  Pharmacy name entered into [employer]:    [facility] [address] [id number] - [facility] [address] AT [facility] [address]    Clinical concerns: None       Maricarmen Cortes              [facility] Hematology and Oncology Progress Note    Patient: Kristen Chapman  MRN: [id number]  Date of Service: Oct 8, 2021        Assessment and Plan:    Cancer Staging  Nasopharyngeal carcinoma (H)  Staging form: Pharynx - Nasopharynx, AJCC 8th Edition  - Clinical stage from 2/18/2020: Stage SAMANTHA (cT4, cN2, cM0) - Signed by Alan Frias MD on 2/18/2020     1.  Nasopharyngeal carcinoma: His case was recently discussed at tumor conference.  He was set up to have a lymph node biopsy in October 19.  We will see if we can move that up.  Are going to start him on chemotherapy next week to get some treatment on board and potentially downsize his tumor.  I am going to use carboplatin and gemcitabine, 2 weeks on 1 week off.  Treatment schedule was " reviewed with the patient and his daughter.  We reviewed some of the common side effects of this regimen which include, but are not limited to, cytopenias and need for transfusion, fatigue, decreased appetite, diarrhea and nausea.  He agreed to proceed with treatment.  We will give 3 cycles and then reimage and likely moved to radiation.  If he is having a good response and we could consider 6 cycles prior to radiation.  He has decided that he does not want any surgical procedures.  Radiosurgery would be given to the ethmoid tumor with curative intent.  If he has cervical capri metastases we will have to discuss the role of lymph node dissection with ENT as he has previously been radiated.     2.  Anemia: Recheck TSH now.  We will check B12, folate, copper, and reticulocytes when he starts chemotherapy.     3.  Thrombocytopenia/pancytopenia: Going to proceed with a bone marrow biopsy to make sure he does not have myelodysplasia or some other intrinsic marrow disorder.  He does have hepatitis B which certainly could be contributing to his cytopenias.      We will also likely start Retacrit for anemia of chronic kidney disease.    4.  Hypothyroidism: TSH added onto labs drawn a few days ago.    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  Diagnosed January 2020. Imaging suggested bilateral abnormal lymphadenopathy in the neck.  Also asymmetric soft tissue thickening in the posterior right nasopharynx.  On imaging, tumor also appeared to extend down to the hypopharynx.    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    Treatment:    Initially treated with concurrent chemotherapy and radiation.  He had weekly cisplatin starting March 3, 2020.  Fifth and final dose given March 31, 2020.  Subsequent chemotherapy was held due to prolonged thrombocytopenia and renal insufficiency.  Radiation dose was 7000 cGy in 35  fractions given from March 2 through April 17, 2020.     Started cisplatin with infusional 5-FU on June 1, 2020.  Cycle 1 given at a 25% dose reduction.  He still had significant thrombocytopenia and neutropenia on day 28.  Cycle 2 was held.  At the same time his liver function tests elevated secondary to hepatitis B.     Interim History:    Kristen returns today for a follow-up visit.  Overall he is doing okay.  No complaints.  Denies headaches, eye pain, or vision changes.  No nausea or vomiting.    Review of Systems:    As above in the history.     Review of Systems otherwise Negative for:  General: chills, fever or night sweats  Psychological: anxiety or depression  Ophthalmic: blurry vision, double vision or loss of vision, vision change  ENT: oral lesions, hearing changes  Hematological and Lymphatic: bruising, jaundice, swollen lymph nodes  Endocrine: hot flashes, unexpected weight changes  Respiratory: cough, hemoptysis, orthopnea or shortness of breath/AYALA  Cardiovascular: chest pain, edema, palpitations or PND  Gastrointestinal: abdominal pain, blood in stools, change in bowel habits, constipation, diarrhea or nausea/vomiting  Genito-Urinary: change in urinary stream, incontinence, frequency/urgency  Musculoskeletal: joint pain, stiffness, swelling, muscle pain  Neurological: dizziness, headaches, numbness/tingling  Dermatological: lumps and rash    Past History:    Past Medical History:   Diagnosis Date     Anemia      Dysphagia      Hepatitis B chronic      Hypothyroidism      Nasopharyngeal cancer (H)      Severe protein-calorie malnutrition (H)      Thrombocytopenia (H)      Physical Exam:    /71   Pulse 66   Temp 98.4  F (36.9  C)   Resp 16   Wt 65.8 kg (145 lb)   SpO2 95%   BMI 25.69 kg/m      General: patient appears stated age of 71 year old. Nontoxic and in no distress.   HEENT: Head: atraumatic, normocephalic. Sclerae anicteric.  Chest:  Normal respiratory effort  Cardiac:  No edema.    Abdomen: abdomen is non-distended  Extremities: normal tone and muscle bulk.  Skin: no lesions or rash on visible skin. Warm and dry.   CNS: alert and oriented. Grossly non-focal.   Psychiatric: normal mood and affect.     Lab Results:    Recent Results (from the past 168 hour(s))   Hep B Virus DNA Quant Real Time PCR   Result Value Ref Range    Hepatitis B DNA IU/mL, Instrument 41,205 (H) <1 IU/mL    Hepatitis B log 4.6    Hepatic panel (Albumin, ALT, AST, Bili, Alk Phos, TP)   Result Value Ref Range    Bilirubin Total 0.8 0.0 - 1.0 mg/dL    Bilirubin Direct 0.3 <=0.5 mg/dL    Protein Total 6.8 6.0 - 8.0 g/dL    Albumin 3.5 3.5 - 5.0 g/dL    Alkaline Phosphatase 125 (H) 45 - 120 U/L     (H) 0 - 40 U/L     (H) 0 - 45 U/L   Creatinine   Result Value Ref Range    Creatinine 1.11 0.70 - 1.30 mg/dL    GFR Estimate 67 >60 mL/min/1.73m2      Imaging:    PET Oncology Whole Body    Result Date: 9/16/2021  Combined Report of:    PET and CT on  9/16/2021 9:30 AM : 1. PET of the neck, chest, abdomen, and pelvis. 2. PET CT Fusion for Attenuation Correction and Anatomical Localization:  3. Diagnostic CT scan of the chest, abdomen, and pelvis with intravenous contrast for interpretation. 3. CT of the chest, abdomen and pelvis obtained for diagnostic interpretation. 4. 3D MIP and PET-CT fused images were processed on an independent workstation and archived to PACS and reviewed by a radiologist. Technique: 1. PET: The patient received 10.12 mCi of F-18-FDG; the serum glucose was 91 prior to administration, body weight was 68 kg. Images were evaluated in the axial, sagittal, and coronal planes as well as the rotational whole body MIP. Images were acquired from the Vertex to the Feet. UPTAKE WAS MEASURED AT 64 MINUTES. BACKGROUND:  Liver SUV max= 3.87,   Aorta Blood SUV Max: 3.62. 2. CT: Volumetric acquisition for clinical interpretation of the chest, abdomen, and pelvis acquired at 3 mm sections  after the uneventful  administration of intravenous contrast. The chest, abdomen, and pelvis were evaluated at 5 mm sections in bone, soft tissue, and lung windows.  The patient received 92 cc of Isovue 370 intravenously for the examination.  High resolution images of the neck were obtained with multiple oblique projection reformats. 3. 3D MIP and PET-CT fused images were processed on an independent workstation and archived to PACS and reviewed by a radiologist. INDICATION: Squamous cell carcinoma of nasopharynx (H); Abnormal brain scan ADDITIONAL INFORMATION OBTAINED FROM EMR: 70 yo male with a history of nasopharyngeal carcinoma, previously treated with chemoradiation completed June 2020. His chemotherapy was interrupted due to his history of?Hep B, as well as?renal dysfunction. Most recent surveillance MRI concerning for recurrence, with a lobulated mass extending from the right anterior ethmoid into the orbital cavity. On MRI obtained in March 2021, this area in the ethmoid sinus was read as sinusitis, but on review in hindsight is consistent with his present mass. COMPARISON: PET/CT 9/2/2020. CT chest 2/19/2021. FINDINGS: HEAD/NECK: Please see separate dictation for the high resolution PET-CT of the head and neck performed at the same time for discussion of findings. CHEST: New hypermetabolic mediastinal lymphadenopathy, for example a 10 mm short axis left hilar lymph node with max SUV of 5.5 (series 5, image 185), and a 11 mm short axis right hilar lymph node with max SUV of 6.97 (series 5, image 190). Cardiac size is enlarged. No pericardial effusion. Stable ectasia of the ascending aorta measuring 4.5 cm in diameter. Normal caliber of the main pulmonary artery. Left chest wall port catheter tip terminates in the high right atrium. Mild diffuse FDG uptake throughout the esophagus with max SUV of 5.99 without associated CT correlate. The airway is patent. No pleural effusion or pneumothorax. No airspace consolidation. Stable 6 mm  nonavid solid pulmonary nodule in the posterior right upper lobe (series 5, image 179). No new suspicious pulmonary nodules or masses. ABDOMEN AND PELVIS: No suspicious FDG uptake in the abdomen or pelvis. Cirrhotic liver morphology. Distended gallbladder without cholelithiasis or pericholecystic inflammatory changes. No intrahepatic or extrahepatic biliary dilatation. Nonavid 17 mm hypodensity in the inferior margin of the spleen. Spleen is enlarged measuring 17 cm in length. The pancreas, adrenal glands, and kidneys are within normal limits. No obstructing stones or hydronephrosis. Urinary bladder is decompressed. No abnormally dilated loops of small or large bowel. Mild nonspecific stranding centered about the duodenum and upper retroperitoneum with mild thickening of the right anterior pararenal fascia. There is small amount of free fluid surrounding the liver, spleen, and in the bilateral pericolic gutters. Small amount of free fluid in the pelvis. No free air. No portal venous gas or pneumatosis. Contrast filling defect in the superior mesenteric vein extending into the origin of the main portal vein, likely admixture of enhanced and nonenhanced blood pool. Normal caliber of the abdominal aorta. No suspicious abdominal or pelvic lymphadenopathy. LOWER EXTREMITIES: No abnormal masses or hypermetabolic lesions. BONES: There are no suspicious lytic or blastic osseous lesions.  There is no abnormal FDG uptake in the skeleton. Mild degenerative changes of the spine.     IMPRESSION: In this patient with a history of nasopharyngeal squamous cell carcinoma, status post chemoradiation: 1. New hypermetabolic mediastinal lymphadenopathy, most suspicious for metastatic disease. Reactive nodes are on the differential diagnosis given their small size but are felt less likely given findings in the neck. 2. Unchanged indeterminant 6 mm nodule in the posterior right upper lobe. 3. Cirrhotic liver morphology with sequelae of  portal hypertension including splenomegaly and small amount of abdominopelvic ascites. 4. Stable dilation of the ascending aorta measuring 4.5 cm in diameter. 5. Please see separate dictation for the high resolution PET-CT of the head and neck performed at the same time for discussion of findings. I have personally reviewed the examination and initial interpretation and I agree with the findings. ALEXY WALDRON MD   SYSTEM ID:  [id number]    CT Soft Tissue Neck w Contrast    Result Date: 9/16/2021  PET CT fusion examination 9/16/2021 9:31 AM 1. Neck CT with contrast 2. PET study of the neck 3. PET CT fusion study of the neck History:  History of nasopharyngeal squamous cell carcinoma. Additional history from the EMR: Patient underwent biopsy of the right ethmoid sinus mass on 9/7/2021 which demonstrated Jackson-Barr virus mediated nasopharyngeal carcinoma. Comparison: PET/CT 9/2/2020. CT neck 8/14/2020. Technique: Please refer to the accompanying whole body PET-CT for report of the dose and whole body PET-CT findings. Regarding the neck, axial images were obtained after nonionic intravenous contrast administration, with sagittal and coronal reconstructions performed. Neck CT images were reviewed in bone, soft tissue, and lung windows, with review of the fused PET-CT images as well in multiple planes. Findings: Heterogeneous soft tissue mass centered about the right ethmoid air cells measuring 2.8 x 2.0 cm max SUV of 14.36. There is extension of the mass into the right orbit extraconal fat. No evidence of extension into the intraconal fat. There is mild mass effect on the adjacent right medial rectus muscle destructive changes of the ethmoid air cells and medial right orbital wall. No evidence of intracranial extension. Mucosal thickening of the right maxillary sinus with mild FDG uptake of 4.98. Left maxillary sinus is clear. The left ethmoid air cells, sphenoid locules, and left frontal sinuses are clear.  Hypermetabolic 1.5 cm right submandibular lymph node with max SUV of 10.05 (series 5, image 123). Hypermetabolic 8 mm right level 2 lymph node with max SUV of 7.80 (series 5, image 116). No other hypermetabolic lymph nodes in the head and neck. Small focus of FDG uptake in the left posterolateral nasopharynx with associated thin mucosal enhancement. Max SUV is 8.1. This is suspicious for residual or recurrent tumor. Direct visualization is recommended. The tongue base appears normal. Mild mucosal thickening of the left oropharynx minimally increased FDG uptake with maximum SUV of 4.23. This is probably inflammatory. Heterogeneous micronodular thyroid gland with diffusely mildly increased FDG uptake, with max SUV of 5.09. Limited evaluation of the cervical vertebral column demonstrates no evident spinal canal stenosis. The visualized paranasal sinuses and mastoid air cells are clear. The major vascular structures in the neck are patent. Chronic narrowing of the left jugular vein throughout the neck. The visualized lung apices appear clear.     Impression: 1. Hypermetabolic mass situated about the right ethmoid sinus with extension into the right orbital extraconal fat, compatible with biopsy-proven nasopharyngeal carcinoma deposit. 2. Focal FDG uptake and associated mucosal segmental enhancement in the left posterolateral nasopharynx is worrisome for residual or recurrent tumor. Direct visualization and biopsy is recommended. 3. Hypermetabolic right level Ib and 2A lymphadenopathy suggestive of metastases nodes. 4. Please refer to the whole body PET CT performed as a separate report, for the findings of the remainder of the body. I have personally reviewed the examination and initial interpretation and I agree with the findings. JERRICA PEREZ MD   SYSTEM ID:  [id number]        Signed by: Alan Frias MD        Again, thank you for allowing me to participate in the care of your patient.        Sincerely,        Alan  MD Altagracia

## 2021-10-13 ENCOUNTER — LAB (OUTPATIENT)
Dept: INFUSION THERAPY | Facility: HOSPITAL | Age: 71
End: 2021-10-13
Attending: INTERNAL MEDICINE
Payer: COMMERCIAL

## 2021-10-13 ENCOUNTER — PROCEDURE ONLY VISIT (OUTPATIENT)
Dept: ONCOLOGY | Facility: HOSPITAL | Age: 71
End: 2021-10-13
Attending: NURSE PRACTITIONER
Payer: COMMERCIAL

## 2021-10-13 VITALS
DIASTOLIC BLOOD PRESSURE: 82 MMHG | RESPIRATION RATE: 12 BRPM | HEART RATE: 55 BPM | BODY MASS INDEX: 25.69 KG/M2 | SYSTOLIC BLOOD PRESSURE: 132 MMHG | OXYGEN SATURATION: 97 % | WEIGHT: 145 LBS | TEMPERATURE: 97.8 F

## 2021-10-13 DIAGNOSIS — C11.9 SQUAMOUS CELL CARCINOMA OF NASOPHARYNX (H): Primary | ICD-10-CM

## 2021-10-13 DIAGNOSIS — D61.818 ACQUIRED PANCYTOPENIA (H): ICD-10-CM

## 2021-10-13 LAB
BASOPHILS # BLD AUTO: 0 10E3/UL (ref 0–0.2)
BASOPHILS NFR BLD AUTO: 1 %
CREAT SERPL-MCNC: 1.08 MG/DL (ref 0.7–1.3)
EOSINOPHIL # BLD AUTO: 0 10E3/UL (ref 0–0.7)
EOSINOPHIL NFR BLD AUTO: 1 %
ERYTHROCYTE [DISTWIDTH] IN BLOOD BY AUTOMATED COUNT: 15.9 % (ref 10–15)
FOLATE SERPL-MCNC: 16.3 NG/ML
GFR SERPL CREATININE-BSD FRML MDRD: 70 ML/MIN/1.73M2
HCT VFR BLD AUTO: 28.9 % (ref 40–53)
HGB BLD-MCNC: 8.9 G/DL (ref 13.3–17.7)
IMM GRANULOCYTES # BLD: 0 10E3/UL
IMM GRANULOCYTES NFR BLD: 1 %
INTERPRETATION: NORMAL
LAB DIRECTOR COMMENTS: NORMAL
LAB DIRECTOR DISCLAIMER: NORMAL
LAB DIRECTOR INTERPRETATION: NORMAL
LAB DIRECTOR METHODOLOGY: NORMAL
LAB DIRECTOR RESULTS: NORMAL
LYMPHOCYTES # BLD AUTO: 0.5 10E3/UL (ref 0.8–5.3)
LYMPHOCYTES NFR BLD AUTO: 24 %
MCH RBC QN AUTO: 26.6 PG (ref 26.5–33)
MCHC RBC AUTO-ENTMCNC: 30.8 G/DL (ref 31.5–36.5)
MCV RBC AUTO: 86 FL (ref 78–100)
MONOCYTES # BLD AUTO: 0.2 10E3/UL (ref 0–1.3)
MONOCYTES NFR BLD AUTO: 12 %
NEUTROPHILS # BLD AUTO: 1.3 10E3/UL (ref 1.6–8.3)
NEUTROPHILS NFR BLD AUTO: 61 %
NRBC # BLD AUTO: 0 10E3/UL
NRBC BLD AUTO-RTO: 0 /100
PLATELET # BLD AUTO: 35 10E3/UL (ref 150–450)
RBC # BLD AUTO: 3.35 10E6/UL (ref 4.4–5.9)
RETICS # AUTO: 0.07 10E6/UL (ref 0.01–0.11)
RETICS/RBC NFR AUTO: 2.1 % (ref 0.8–2.7)
SIGNIFICANT RESULTS: NORMAL
SPECIMEN DESCRIPTION: NORMAL
SPECIMEN DESCRIPTION: NORMAL
TEST DETAILS, MDL: NORMAL
VIT B12 SERPL-MCNC: 1266 PG/ML (ref 213–816)
WBC # BLD AUTO: 2.1 10E3/UL (ref 4–11)

## 2021-10-13 PROCEDURE — 88275 CYTOGENETICS 100-300: CPT | Performed by: NURSE PRACTITIONER

## 2021-10-13 PROCEDURE — 88280 CHROMOSOME KARYOTYPE STUDY: CPT | Performed by: NURSE PRACTITIONER

## 2021-10-13 PROCEDURE — 88311 DECALCIFY TISSUE: CPT | Mod: TC | Performed by: NURSE PRACTITIONER

## 2021-10-13 PROCEDURE — G0452 MOLECULAR PATHOLOGY INTERPR: HCPCS | Mod: 26 | Performed by: STUDENT IN AN ORGANIZED HEALTH CARE EDUCATION/TRAINING PROGRAM

## 2021-10-13 PROCEDURE — G0452 MOLECULAR PATHOLOGY INTERPR: HCPCS | Mod: 26 | Performed by: PATHOLOGY

## 2021-10-13 PROCEDURE — 88185 FLOWCYTOMETRY/TC ADD-ON: CPT | Performed by: NURSE PRACTITIONER

## 2021-10-13 PROCEDURE — 38222 DX BONE MARROW BX & ASPIR: CPT | Performed by: NURSE PRACTITIONER

## 2021-10-13 PROCEDURE — 88189 FLOWCYTOMETRY/READ 16 & >: CPT | Performed by: PATHOLOGY

## 2021-10-13 PROCEDURE — 88271 CYTOGENETICS DNA PROBE: CPT | Mod: XU | Performed by: NURSE PRACTITIONER

## 2021-10-13 PROCEDURE — 250N000013 HC RX MED GY IP 250 OP 250 PS 637: Performed by: INTERNAL MEDICINE

## 2021-10-13 PROCEDURE — G0463 HOSPITAL OUTPT CLINIC VISIT: HCPCS | Mod: 25

## 2021-10-13 PROCEDURE — 36415 COLL VENOUS BLD VENIPUNCTURE: CPT | Performed by: NURSE PRACTITIONER

## 2021-10-13 PROCEDURE — 82746 ASSAY OF FOLIC ACID SERUM: CPT | Performed by: NURSE PRACTITIONER

## 2021-10-13 PROCEDURE — 85045 AUTOMATED RETICULOCYTE COUNT: CPT | Performed by: NURSE PRACTITIONER

## 2021-10-13 PROCEDURE — 88342 IMHCHEM/IMCYTCHM 1ST ANTB: CPT | Mod: TC | Performed by: NURSE PRACTITIONER

## 2021-10-13 PROCEDURE — 85025 COMPLETE CBC W/AUTO DIFF WBC: CPT | Performed by: INTERNAL MEDICINE

## 2021-10-13 PROCEDURE — 250N000011 HC RX IP 250 OP 636: Performed by: INTERNAL MEDICINE

## 2021-10-13 PROCEDURE — 82565 ASSAY OF CREATININE: CPT | Performed by: INTERNAL MEDICINE

## 2021-10-13 PROCEDURE — 88237 TISSUE CULTURE BONE MARROW: CPT | Performed by: NURSE PRACTITIONER

## 2021-10-13 PROCEDURE — 88184 FLOWCYTOMETRY/ TC 1 MARKER: CPT | Performed by: PATHOLOGY

## 2021-10-13 PROCEDURE — 82607 VITAMIN B-12: CPT | Performed by: NURSE PRACTITIONER

## 2021-10-13 PROCEDURE — 81450 HL NEO GSAP 5-50DNA/DNA&RNA: CPT | Performed by: NURSE PRACTITIONER

## 2021-10-13 PROCEDURE — 88291 CYTO/MOLECULAR REPORT: CPT | Performed by: MEDICAL GENETICS

## 2021-10-13 RX ORDER — HEPARIN SODIUM (PORCINE) LOCK FLUSH IV SOLN 100 UNIT/ML 100 UNIT/ML
5 SOLUTION INTRAVENOUS
Status: DISCONTINUED | OUTPATIENT
Start: 2021-10-13 | End: 2021-10-13 | Stop reason: HOSPADM

## 2021-10-13 RX ORDER — LIDOCAINE HYDROCHLORIDE 10 MG/ML
10 INJECTION, SOLUTION EPIDURAL; INFILTRATION; INTRACAUDAL; PERINEURAL ONCE
Status: DISCONTINUED | OUTPATIENT
Start: 2021-10-13 | End: 2024-02-25

## 2021-10-13 RX ORDER — HEPARIN SODIUM (PORCINE) LOCK FLUSH IV SOLN 100 UNIT/ML 100 UNIT/ML
5 SOLUTION INTRAVENOUS
Status: CANCELLED | OUTPATIENT
Start: 2021-10-13

## 2021-10-13 RX ORDER — OXYCODONE AND ACETAMINOPHEN 5; 325 MG/1; MG/1
1 TABLET ORAL ONCE
Status: COMPLETED | OUTPATIENT
Start: 2021-10-13 | End: 2021-10-13

## 2021-10-13 RX ORDER — LORAZEPAM 1 MG/1
1 TABLET ORAL ONCE
Status: COMPLETED | OUTPATIENT
Start: 2021-10-13 | End: 2021-10-13

## 2021-10-13 RX ADMIN — HEPARIN 5 ML: 100 SYRINGE at 10:34

## 2021-10-13 RX ADMIN — OXYCODONE HYDROCHLORIDE AND ACETAMINOPHEN 1 TABLET: 5; 325 TABLET ORAL at 09:25

## 2021-10-13 RX ADMIN — LORAZEPAM 1 MG: 1 TABLET ORAL at 09:25

## 2021-10-13 ASSESSMENT — PAIN SCALES - GENERAL: PAINLEVEL: NO PAIN (0)

## 2021-10-13 NOTE — PROGRESS NOTES
DATE:  10/13/2021   TIME OF RECEIPT FROM LAB:  1125  LAB TEST:  Platelet   LAB VALUE:  35  RESULTS GIVEN WITH READ-BACK TO (PROVIDER):  NA  TIME LAB VALUE REPORTED TO PROVIDER:   Patient was in for a bone marrow biopsy to determine why he has such low platelets. He will see provider in the near future to go over results.    Isabella Raymond RN

## 2021-10-13 NOTE — PROGRESS NOTES
PROCEDURE: Bone marrow Biopsy and Aspiration    INDICATIONS FOR PROCEDURE: hx of nasopharyngeal cancer and treatment. Now with pancytopenia. eval for MDS    DESCRIPTION OF PROCEDURE: Pt was given written information about the procedure.  They provided their written informed consent.  I then went on to premedicate the patient with lorazepam and oxycodone.  The patient got into the prone position and I sterilely prepped and draped their right posterior iliac crest.  I used 1% local lidocaine for anesthesia.  I then used an 11-gauge Jamshidi needle.  I was able to get about 10 mL of particulate aspirate.  I then went on to get a core of trabecular bone that was about 10 mm in size.  Patient tolerated the procedure with minimal pain and bleeding.  The specimens were sent for routine histology, flow cytometry, and cytogenetics, and NGS.  I placed a sterile pressure dressing with instructions for it to remain clean dry and intact for 24 hours.  Patient will return to the clinic for follow-up of these results.

## 2021-10-14 ENCOUNTER — LAB (OUTPATIENT)
Dept: INFUSION THERAPY | Facility: HOSPITAL | Age: 71
End: 2021-10-14
Attending: INTERNAL MEDICINE
Payer: COMMERCIAL

## 2021-10-14 LAB
PATH REPORT.COMMENTS IMP SPEC: NORMAL
PATH REPORT.FINAL DX SPEC: NORMAL
PATH REPORT.MICROSCOPIC SPEC OTHER STN: NORMAL
PATH REPORT.RELEVANT HX SPEC: NORMAL

## 2021-10-15 ENCOUNTER — APPOINTMENT (OUTPATIENT)
Dept: INTERPRETER SERVICES | Facility: CLINIC | Age: 71
End: 2021-10-15
Payer: COMMERCIAL

## 2021-10-15 PROCEDURE — 85060 BLOOD SMEAR INTERPRETATION: CPT | Performed by: PATHOLOGY

## 2021-10-15 PROCEDURE — 85097 BONE MARROW INTERPRETATION: CPT | Performed by: PATHOLOGY

## 2021-10-15 PROCEDURE — 88360 TUMOR IMMUNOHISTOCHEM/MANUAL: CPT | Mod: 26 | Performed by: PATHOLOGY

## 2021-10-15 PROCEDURE — 88313 SPECIAL STAINS GROUP 2: CPT | Mod: 26 | Performed by: PATHOLOGY

## 2021-10-15 PROCEDURE — 88365 INSITU HYBRIDIZATION (FISH): CPT | Mod: 26 | Performed by: PATHOLOGY

## 2021-10-15 PROCEDURE — 88364 INSITU HYBRIDIZATION (FISH): CPT | Mod: 26 | Performed by: PATHOLOGY

## 2021-10-15 PROCEDURE — 88341 IMHCHEM/IMCYTCHM EA ADD ANTB: CPT | Mod: 26 | Performed by: PATHOLOGY

## 2021-10-15 PROCEDURE — 88305 TISSUE EXAM BY PATHOLOGIST: CPT | Mod: 26 | Performed by: PATHOLOGY

## 2021-10-15 PROCEDURE — 88342 IMHCHEM/IMCYTCHM 1ST ANTB: CPT | Mod: 26 | Performed by: PATHOLOGY

## 2021-10-16 ENCOUNTER — LAB (OUTPATIENT)
Dept: FAMILY MEDICINE | Facility: CLINIC | Age: 71
End: 2021-10-16
Attending: INTERNAL MEDICINE
Payer: COMMERCIAL

## 2021-10-16 DIAGNOSIS — C11.9 NASOPHARYNGEAL CARCINOMA (H): ICD-10-CM

## 2021-10-16 PROCEDURE — U0003 INFECTIOUS AGENT DETECTION BY NUCLEIC ACID (DNA OR RNA); SEVERE ACUTE RESPIRATORY SYNDROME CORONAVIRUS 2 (SARS-COV-2) (CORONAVIRUS DISEASE [COVID-19]), AMPLIFIED PROBE TECHNIQUE, MAKING USE OF HIGH THROUGHPUT TECHNOLOGIES AS DESCRIBED BY CMS-2020-01-R: HCPCS

## 2021-10-16 PROCEDURE — U0005 INFEC AGEN DETEC AMPLI PROBE: HCPCS

## 2021-10-17 LAB — SARS-COV-2 RNA RESP QL NAA+PROBE: NEGATIVE

## 2021-10-18 ENCOUNTER — TELEPHONE (OUTPATIENT)
Dept: RADIATION ONCOLOGY | Facility: HOSPITAL | Age: 71
End: 2021-10-18

## 2021-10-18 NOTE — TELEPHONE ENCOUNTER
[facility] [phone] to confirm that the appointment change from 10/22 to 10/21 will be ok.

## 2021-10-19 ENCOUNTER — HOSPITAL ENCOUNTER (OUTPATIENT)
Dept: ULTRASOUND IMAGING | Facility: HOSPITAL | Age: 71
Discharge: HOME | End: 2021-10-19
Attending: INTERNAL MEDICINE | Admitting: RADIOLOGY
Payer: COMMERCIAL

## 2021-10-19 DIAGNOSIS — C11.9 NASOPHARYNGEAL CARCINOMA (H): ICD-10-CM

## 2021-10-19 PROCEDURE — 88305 TISSUE EXAM BY PATHOLOGIST: CPT | Mod: TC | Performed by: INTERNAL MEDICINE

## 2021-10-19 PROCEDURE — 88342 IMHCHEM/IMCYTCHM 1ST ANTB: CPT | Mod: TC | Performed by: INTERNAL MEDICINE

## 2021-10-19 PROCEDURE — 38505 NEEDLE BIOPSY LYMPH NODES: CPT

## 2021-10-21 ENCOUNTER — OFFICE VISIT (OUTPATIENT)
Dept: RADIATION ONCOLOGY | Facility: HOSPITAL | Age: 71
End: 2021-10-21
Attending: RADIOLOGY
Payer: COMMERCIAL

## 2021-10-21 VITALS — BODY MASS INDEX: 26.22 KG/M2 | WEIGHT: 148 LBS

## 2021-10-21 DIAGNOSIS — C11.9 SQUAMOUS CELL CARCINOMA OF NASOPHARYNX (H): Primary | ICD-10-CM

## 2021-10-21 LAB
PATH REPORT.COMMENTS IMP SPEC: ABNORMAL
PATH REPORT.COMMENTS IMP SPEC: YES
PATH REPORT.COMMENTS IMP SPEC: YES
PATH REPORT.FINAL DX SPEC: ABNORMAL
PATH REPORT.GROSS SPEC: ABNORMAL
PATH REPORT.MICROSCOPIC SPEC OTHER STN: ABNORMAL
PATH REPORT.RELEVANT HX SPEC: ABNORMAL

## 2021-10-21 PROCEDURE — 99215 OFFICE O/P EST HI 40 MIN: CPT | Mod: 25 | Performed by: RADIOLOGY

## 2021-10-21 PROCEDURE — 77263 THER RADIOLOGY TX PLNG CPLX: CPT | Performed by: RADIOLOGY

## 2021-10-21 PROCEDURE — 77334 RADIATION TREATMENT AID(S): CPT | Performed by: RADIOLOGY

## 2021-10-21 PROCEDURE — 77334 RADIATION TREATMENT AID(S): CPT | Mod: 26 | Performed by: RADIOLOGY

## 2021-10-21 PROCEDURE — 255N000002 HC RX 255 OP 636: Performed by: RADIOLOGY

## 2021-10-21 PROCEDURE — 77470 SPECIAL RADIATION TREATMENT: CPT | Mod: 26 | Performed by: RADIOLOGY

## 2021-10-21 PROCEDURE — 88305 TISSUE EXAM BY PATHOLOGIST: CPT | Mod: 26 | Performed by: PATHOLOGY

## 2021-10-21 PROCEDURE — 77470 SPECIAL RADIATION TREATMENT: CPT | Performed by: RADIOLOGY

## 2021-10-21 PROCEDURE — G0463 HOSPITAL OUTPT CLINIC VISIT: HCPCS | Mod: 25

## 2021-10-21 PROCEDURE — 88333 PATH CONSLTJ SURG CYTO XM 1: CPT | Mod: 26 | Performed by: PATHOLOGY

## 2021-10-21 PROCEDURE — 88342 IMHCHEM/IMCYTCHM 1ST ANTB: CPT | Mod: 26 | Performed by: PATHOLOGY

## 2021-10-21 PROCEDURE — 250N000011 HC RX IP 250 OP 636: Performed by: RADIOLOGY

## 2021-10-21 PROCEDURE — 88341 IMHCHEM/IMCYTCHM EA ADD ANTB: CPT | Mod: 26 | Performed by: PATHOLOGY

## 2021-10-21 RX ORDER — HEPARIN SODIUM (PORCINE) LOCK FLUSH IV SOLN 100 UNIT/ML 100 UNIT/ML
5 SOLUTION INTRAVENOUS ONCE
Status: COMPLETED | OUTPATIENT
Start: 2021-10-21 | End: 2021-10-21

## 2021-10-21 RX ADMIN — IOHEXOL 96 ML: 300 INJECTION, SOLUTION INTRAVENOUS at 16:10

## 2021-10-21 RX ADMIN — HEPARIN 5 ML: 100 SYRINGE at 16:11

## 2021-10-21 NOTE — PROGRESS NOTES
Pt here with one family member for f/u with Dr. Mendoza and simulation for radiation planning.  used throughout encounter. He states he's fatigued, anorexic, and has lost some smell and taste, able to eat soft/liquid foods. Low energy level, but denies pain.

## 2021-10-21 NOTE — PROGRESS NOTES
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       TYPE OF RADIATION THERAPY ADMINISTERED:  Concurrent chemoradiation therapy for his head neck cancer with weekly cisplatin and had radiation therapy in our clinic with a total dose of 7000 cGy in 35 treatments given from 3/2/2020-4/17/2020.      INTERVAL SINCE COMPLETION OF RADIATION THERAPY: 1 year and 6 months.      SUBJECTIVE:  Mr. Chapman is a 71 y.o. male who has been in his usual state of good health until recently.  Patient was found to have swelling right neck mass by his lerma a month ago for which he was a seeking further evaluation.  The patient did not notice any significant changes over the past few weeks and he is also asymptomatic.  Initial ENT examination showed suspicious abnormalities in the right nasopharynx worrisome for malignancy.  CT of the neck on 1/24/2020 revealed asymmetric soft tissue thickening in the posterior right nasopharynx as well as abnormal enlarged cervical lymph nodes measuring up to 3.8 cm bilaterally.  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  The patient also had a staging work-up including PET CT scan and MRI brain which showed locally advanced disease with lymph node metastasis.  The PET CT scan also showed abnormal increased activity in the hilar region suspicious for metastasis.  Patient was then referred to pulmonology for evaluation and possible biopsy.  Patient then underwent bronchoscopy and EBUS on 2/27/2020 with biopsy showed negative for malignancy. He received concurrent chemoradiation therapy for his head neck cancer and had radiation therapy in our clinic with a total dose of 7000 cGy in 35 treatments given from 3/2/2020-4/17/2020.  He  also received 3 cycles of cisplatin and 5-FU during the course of radiation therapy. He tolerated radiation therapy reasonably well with expected acute side effect.  The patient insisted not to have PEG tube placement during the therapy.  He however is able to maintain his weight reasonably stable during the treatment.     The patient experienced worsening pain of sore throat and dysphasia since completion of the radiation therapy.  He is not able to get adequate calorie and fluid intake and was admitted to hospital one week after treatment for supportive care and pain management.  A PEG tube was also placed during the hospital stay. The patient felt much better and improved since hospital stay.  The patient also experienced acute onset of left parotitis 4 weeks post cancer therapy and was admitted to the hospital for ongoing treatment including antibiotics.  His condition has been significant improved since then.      He is also receiving adjuvant chemotherapy under supervision of Dr. Frias.     Patient has been followed with Dr. Guerrero, ENT and last examination on 3/3/2021 showed no evidence of cancer recurrence.      He is currently able to eat without any significant difficulty.  He denies any significant pain at the time of evaluation.  He still has a significant dry mouth at the time of evaluation.       He presented with right-sided eye watering and rhinorrhea from the right nare with MRI imaging on 7/30/2021 concerning for a mass within the right anterior ethmoid air cells.  Patient had a endoscopy and biopsy of right ethmoid sinus on 9/7/2021 with pathology confirmed EBV mediated nasopharyngeal carcinoma. The patient had restaging PET CT scan on 9/16/2021 which showed hypermetabolic mass situated about the right ethmoid sinus with extension into the right orbital extraconal fat, compatible with biopsy-proven nasopharyngeal carcinoma deposit. There are hypermetabolic right level Ib and 2A lymphadenopathy  suggestive of metastases nodes.   There are also new hypermetabolic mediastinal lymphadenopathy, most suspicious for metastatic disease. Reactive nodes are on the differential diagnosis.  His case has been discussed at tumor conference on 9/17/2021 at [facility] and the consensus recommendation is to consider possible chemoradiation therapy.  The patient also had ultrasound-guided needle biopsy for his cervical lymph node 10/19/2021 and pathology is pending at the time of evaluation.  He saw Dr. Frias, medical oncology earlier and patient was determined not a good candidate for chemotherapy given his current medical condition.  The patient is here for evaluation and discussion of possible radiation therapy.     Medications were reviewed and are up to date on EPIC.    The following portions of the patient's history were reviewed and updated as appropriate: allergies, current medications, past family history, past medical history, past social history, past surgical history and problem list.    Review of Systems:      General  Constitutional  Constitutional (WDL): Exceptions to WDL  Fatigue: Fatigue relieved by rest  EENT  Eye Disorders  Eye Disorder (WDL): All eye disorder elements are within defined limits  Ear Disorders  Ear Disorder (WDL): Exceptions to WDL (hard of hearing)  Respiratory  Respiratory  Respiratory (WDL): All respiratory elements are within defined limits  Cardiovascular  Cardiovascular  Cardiovascular (WDL): All cardiovascular elements are within defined limits  Gastrointestinal  Gastrointestinal  Gastrointestinal (WDL): Exceptions to WDL (loss of some taste and smell)  Anorexia: Loss of appetite without alteration in eating habits  Dysphagia: Symptomatic, able to eat regular diet  Musculoskeletal  Musculoskeletal and Connective Tissue Disorders  Musculoskeletal & Connective (WDL): All musculoskeletal & connective elements are within defined  limits  Integumentary  Integumentary  Integumentary (WDL): All integumentary elements are within defined limits  Neurological  Neurosensory  Neurosensory (WDL): All neurosensory elements are within defined limits  Genitourinary/Reproductive  Genitourinary  Genitourinary (WDL): All genitourinary elements are within defined limits  Lymphatic  Lymph System Disorders  Lymph (WDL): All lymph elements are within defined limits  Pain  Pain Score: No Pain (0)  AUA Assessment                                                              Accompanied by  Accompanied By: family    Objective:     PHYSICAL EXAMINATION:    Wt 67.1 kg (148 lb)   BMI 26.22 kg/m      Gen: Alert, in NAD  Eyes: PERRL, EOMI, sclera anicteric  HENT     Head: NC/AT     Ears: No external auricular lesions     Nose/sinus: No rhinorrhea or epistaxis     Oropharynx: MMM, no visible oral lesions  Neck: Supple, full ROM, no LAD  Pulm: No wheezing, stridor or respiratory distress  CV: Well-perfused, no cyanosis, no pedal edema  Back: No step-offs or pain to palpation along the thoracolumbar spine  Rectal: Deferred  : Deferred  Musculoskeletal: Normal muscle bulk and tone  Skin: Normal color and turgor  Neurologic: A/Ox3, CN II-XII intact, normal gait and station  Psychiatric: Appropriate mood and affect     Imaging: Reviewed     Impression     Patient is a 71-year-old gentleman with a diagnosis of nasopharyngeal cancer status post concurrent chemoradiation therapy 1 year and 6 months ago.  The most recent restaging work-up indicated right ethmoid sinus biopsy-proven recurrence with extension into the right orbital extraconal fat with possible right level 1B and 2A lymph node metastasis.  There is also concern of possible mediastinal lymph node recurrence from recent PET CT scan.    Assessment & Plan:     I have personally reviewed his upcoming medical record today.  I have also reviewed his most recent radiology study including PET CT scan and compared to the  previous scan and the radiation therapy record.  There is a radiographic evidence and biopsy-proven local/regional recurrence in the right ethmoid sinus region with evidence of lymph node metastasis. The PET CT scan also showed increased SUV activity involving the mediastinal lymph nodes concerning for recurrence. The patient also had ultrasound-guided needle biopsy for his cervical lymph node 10/19/2021 and pathology is pending at the time of evaluation.  He saw Dr. Frias, medical oncology earlier and patient was determined not a good candidate for chemotherapy given his current medical condition.      The possible treatment options including surgery, systemic therapy, and radiation therapy has been discussed with the patient in detail and at the great lengths.  The possible risks and side effects of radiation therapy has also been explained to the patient.  Patient is informed a potential increased risk of radiation-induced normal tissue damage given the prior history of radiation therapy to the same area.  He is not interested in the surgical resection given the potential side effect/deformity involved.  Patient also wished not to travel any other location for radiation therapy i.e. proton therapy due to the family reason.  The patient wished to consider stereotactic radiosurgery for his local recurrence.  Therefore radiation therapy is offered to the patient and he is willing to proceed being aware of potential risks and side effect involved.  He is scheduled to return to radiation oncology later on today for simulation.  I plan to give him radiation therapy to a total dose of 6119-0151 Gy in 5 treatments using SBRT targeted to the right ethmoid sinus recurrence with margin.    We will await for the cervical lymph node pathology report.  Patient is recommended to consider neck dissection if pathology showed recurrence.  This has also been discussed with the patient.  The patient is considering possible neck  dissection at this time.  We will discuss this further in the near future.    We will continue to monitor mediastinal lymph node with repeat CT chest in 2 months.      Face to face time  40 minutes with > 80% spent on consultation, education and coordination of care.    Laurita Mendoza MD, PhD  Department of Radiation Oncology   [facility]  Tel: [phone]  Page: [phone]    [facility]  [address] [place] [address]     [facility]   [address] [address]    CC:  Patient Care Team:  Issa Cook MD as PCP - General  Alan Frias MD as MD (Hematology & Oncology)  Laurita Mendoza MD as MD (Hematology & Oncology)  Issa Cook MD as Assigned PCP  Jeri Sorto, RN as Specialty Care Coordinator (Hematology & Oncology)  Marcial Cheng MD as Assigned Surgical Provider  Laurita Mendoza MD as Assigned Cancer Care Provider  Eduardo Grier MD as Assigned Infectious Disease Provider  Ky Centeno MD as MD (Otolaryngology)

## 2021-10-21 NOTE — PROCEDURES
Brain SRS/SBRT Special Treatment Procedure Note  Date of Service: 10/21/2021     Diagnosis: Nasopharyngeal cancer status post concurrent chemoradiation therapy 1 year and 6 months ago.  The most recent restaging work-up indicated right ethmoid sinus biopsy-proven recurrence.     Medical Indication: To escalate the radiotherapy dose to a curative dose (4588-6563 cGy) using stereotactic body radiotherapy technique and to spare surrounding eyes, cranial nerves, brain and brainstem, spinal cord, oral and nasal mucosa, bone and soft tissue from excessive toxicity. SRS/SBRT planning was completed today to prepare for the treatment. SBRT planning is chosen to avoid the critical structures, which cannot be done adequately with conventional planning due to the dose constraints of the normal surrounding critical organs. In addition, the treatment will be high dose per fraction with complete course to be done in 1 session each tumor. The patient previously had CT scan acquisition for planning and special treatment aids used include. The patient was simulated in a supine position. After the contouring of the GTV, a clinical tumor volume (CTV), expanded volume of planned treatment volume (PTV) was carried out on the treatment planning system. Critical structures outlined included eyes, cranial nerves, brain and brainstem, spinal cord, oral and nasal mucosa. Extra efforts during the planning process included contouring of critical structures, GTV, CTV, PTV and evaluating the DVH and coverage of the PTV.     The patient is going to have SRS/SBRT technique for her brain mets. I have explained to the patient this is a very complicated process which will require an extensive amount of time for planning, delivering and monitoring the patient. The patient understands well and wished to proceed.       Laurita Mendoza MD, PhD  Department of Radiation Oncology   [facility]  Tel: [phone]  Page: [phone]      [facility]  [address] [place] [address]     [facility]  [address] [place] [address]

## 2021-10-21 NOTE — LETTER
10/21/2021         RE: Kristen Chapman  [address] [place] [address]        Dear Colleague,    Thank you for referring your patient, Kristen Chapman, to the [facility] RADIATION ONCOLOGY [place]. Please see a copy of my visit note below.    Pt here with one family member for f/u with Dr. Mendoza and simulation for radiation planning.  used throughout encounter. He states he's fatigued, anorexic, and has lost some smell and taste, able to eat soft/liquid foods. Low energy level, but denies pain.     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       TYPE OF RADIATION THERAPY ADMINISTERED:  Concurrent chemoradiation therapy for his head neck cancer with weekly cisplatin and had radiation therapy in our clinic with a total dose of 7000 cGy in 35 treatments given from 3/2/2020-4/17/2020.      INTERVAL SINCE COMPLETION OF RADIATION THERAPY: 1 year and 6 months.      SUBJECTIVE:  Mr. Chapman is a 71 y.o. male who has been in his usual state of good health until recently.  Patient was found to have swelling right neck mass by his lerma a month ago for which he was a seeking further evaluation.  The patient did not notice any significant changes over the past few weeks and he is also asymptomatic.  Initial ENT examination showed suspicious abnormalities in the right nasopharynx worrisome for malignancy.  CT of the neck on 1/24/2020 revealed asymmetric soft tissue thickening in the posterior right nasopharynx as well as abnormal enlarged cervical lymph nodes measuring up to 3.8 cm bilaterally.  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  The patient also had a staging work-up including PET CT scan and MRI brain which showed locally  advanced disease with lymph node metastasis.  The PET CT scan also showed abnormal increased activity in the hilar region suspicious for metastasis.  Patient was then referred to pulmonology for evaluation and possible biopsy.  Patient then underwent bronchoscopy and EBUS on 2/27/2020 with biopsy showed negative for malignancy. He received concurrent chemoradiation therapy for his head neck cancer and had radiation therapy in our clinic with a total dose of 7000 cGy in 35 treatments given from 3/2/2020-4/17/2020.  He also received 3 cycles of cisplatin and 5-FU during the course of radiation therapy. He tolerated radiation therapy reasonably well with expected acute side effect.  The patient insisted not to have PEG tube placement during the therapy.  He however is able to maintain his weight reasonably stable during the treatment.     The patient experienced worsening pain of sore throat and dysphasia since completion of the radiation therapy.  He is not able to get adequate calorie and fluid intake and was admitted to hospital one week after treatment for supportive care and pain management.  A PEG tube was also placed during the hospital stay. The patient felt much better and improved since hospital stay.  The patient also experienced acute onset of left parotitis 4 weeks post cancer therapy and was admitted to the hospital for ongoing treatment including antibiotics.  His condition has been significant improved since then.      He is also receiving adjuvant chemotherapy under supervision of Dr. Frias.     Patient has been followed with Dr. Guerrero, ENT and last examination on 3/3/2021 showed no evidence of cancer recurrence.      He is currently able to eat without any significant difficulty.  He denies any significant pain at the time of evaluation.  He still has a significant dry mouth at the time of evaluation.       He presented with right-sided eye watering and rhinorrhea from the right nare with MRI  imaging on 7/30/2021 concerning for a mass within the right anterior ethmoid air cells.  Patient had a endoscopy and biopsy of right ethmoid sinus on 9/7/2021 with pathology confirmed EBV mediated nasopharyngeal carcinoma. The patient had restaging PET CT scan on 9/16/2021 which showed hypermetabolic mass situated about the right ethmoid sinus with extension into the right orbital extraconal fat, compatible with biopsy-proven nasopharyngeal carcinoma deposit. There are hypermetabolic right level Ib and 2A lymphadenopathy suggestive of metastases nodes.   There are also new hypermetabolic mediastinal lymphadenopathy, most suspicious for metastatic disease. Reactive nodes are on the differential diagnosis.  His case has been discussed at tumor conference on 9/17/2021 at [facility] and the consensus recommendation is to consider possible chemoradiation therapy.  The patient also had ultrasound-guided needle biopsy for his cervical lymph node 10/19/2021 and pathology is pending at the time of evaluation.  He saw Dr. Frias, medical oncology earlier and patient was determined not a good candidate for chemotherapy given his current medical condition.  The patient is here for evaluation and discussion of possible radiation therapy.     Medications were reviewed and are up to date on EPIC.    The following portions of the patient's history were reviewed and updated as appropriate: allergies, current medications, past family history, past medical history, past social history, past surgical history and problem list.    Review of Systems:      General  Constitutional  Constitutional (WDL): Exceptions to WDL  Fatigue: Fatigue relieved by rest  EENT  Eye Disorders  Eye Disorder (WDL): All eye disorder elements are within defined limits  Ear Disorders  Ear Disorder (WDL): Exceptions to WDL (hard of hearing)  Respiratory  Respiratory  Respiratory (WDL): All respiratory elements are within defined  limits  Cardiovascular  Cardiovascular  Cardiovascular (WDL): All cardiovascular elements are within defined limits  Gastrointestinal  Gastrointestinal  Gastrointestinal (WDL): Exceptions to WDL (loss of some taste and smell)  Anorexia: Loss of appetite without alteration in eating habits  Dysphagia: Symptomatic, able to eat regular diet  Musculoskeletal  Musculoskeletal and Connective Tissue Disorders  Musculoskeletal & Connective (WDL): All musculoskeletal & connective elements are within defined limits  Integumentary  Integumentary  Integumentary (WDL): All integumentary elements are within defined limits  Neurological  Neurosensory  Neurosensory (WDL): All neurosensory elements are within defined limits  Genitourinary/Reproductive  Genitourinary  Genitourinary (WDL): All genitourinary elements are within defined limits  Lymphatic  Lymph System Disorders  Lymph (WDL): All lymph elements are within defined limits  Pain  Pain Score: No Pain (0)  AUA Assessment                                                              Accompanied by  Accompanied By: family    Objective:     PHYSICAL EXAMINATION:    Wt 67.1 kg (148 lb)   BMI 26.22 kg/m      Gen: Alert, in NAD  Eyes: PERRL, EOMI, sclera anicteric  HENT     Head: NC/AT     Ears: No external auricular lesions     Nose/sinus: No rhinorrhea or epistaxis     Oropharynx: MMM, no visible oral lesions  Neck: Supple, full ROM, no LAD  Pulm: No wheezing, stridor or respiratory distress  CV: Well-perfused, no cyanosis, no pedal edema  Back: No step-offs or pain to palpation along the thoracolumbar spine  Rectal: Deferred  : Deferred  Musculoskeletal: Normal muscle bulk and tone  Skin: Normal color and turgor  Neurologic: A/Ox3, CN II-XII intact, normal gait and station  Psychiatric: Appropriate mood and affect     Imaging: Reviewed     Impression     Patient is a 71-year-old gentleman with a diagnosis of nasopharyngeal cancer status post concurrent chemoradiation therapy 1  year and 6 months ago.  The most recent restaging work-up indicated right ethmoid sinus biopsy-proven recurrence with extension into the right orbital extraconal fat with possible right level 1B and 2A lymph node metastasis.  There is also concern of possible mediastinal lymph node recurrence from recent PET CT scan.    Assessment & Plan:     I have personally reviewed his upcoming medical record today.  I have also reviewed his most recent radiology study including PET CT scan and compared to the previous scan and the radiation therapy record.  There is a radiographic evidence and biopsy-proven local/regional recurrence in the right ethmoid sinus region with evidence of lymph node metastasis. The PET CT scan also showed increased SUV activity involving the mediastinal lymph nodes concerning for recurrence. The patient also had ultrasound-guided needle biopsy for his cervical lymph node 10/19/2021 and pathology is pending at the time of evaluation.  He saw Dr. Frias, medical oncology earlier and patient was determined not a good candidate for chemotherapy given his current medical condition.      The possible treatment options including surgery, systemic therapy, and radiation therapy has been discussed with the patient in detail and at the great lengths.  The possible risks and side effects of radiation therapy has also been explained to the patient.  Patient is informed a potential increased risk of radiation-induced normal tissue damage given the prior history of radiation therapy to the same area.  He is not interested in the surgical resection given the potential side effect/deformity involved.  Patient also wished not to travel any other location for radiation therapy i.e. proton therapy due to the family reason.  The patient wished to consider stereotactic radiosurgery for his local recurrence.  Therefore radiation therapy is offered to the patient and he is willing to proceed being aware of potential risks  and side effect involved.  He is scheduled to return to radiation oncology later on today for simulation.  I plan to give him radiation therapy to a total dose of 6115-6905 Gy in 5 treatments using SBRT targeted to the right ethmoid sinus recurrence with margin.    We will await for the cervical lymph node pathology report.  Patient is recommended to consider neck dissection if pathology showed recurrence.  This has also been discussed with the patient.  The patient is considering possible neck dissection at this time.  We will discuss this further in the near future.    We will continue to monitor mediastinal lymph node with repeat CT chest in 2 months.      Face to face time  40 minutes with > 80% spent on consultation, education and coordination of care.    Laurita Mendoza MD, PhD  Department of Radiation Oncology   [facility]  Tel: [phone]  Page: [phone]    [facility]  [address] [place] [address]     [id number] [facility] [place] [address]    CC:  Patient Care Team:  Issa Cook MD as PCP - General  Alan Frias MD as MD (Hematology & Oncology)  Laurita Mendoza MD as MD (Hematology & Oncology)  Issa Cook MD as Assigned PCP  Jeri Sorto, RN as Specialty Care Coordinator (Hematology & Oncology)  Marcial Cheng MD as Assigned Surgical Provider  Laurita Mendoza MD as Assigned Cancer Care Provider  Eduardo Grier MD as Assigned Infectious Disease Provider  Ky Centeno MD as MD (Otolaryngology)        Again, thank you for allowing me to participate in the care of your patient.        Sincerely,        Laurita Mendoza MD

## 2021-10-21 NOTE — PROCEDURES
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  The most recent restaging work-up indicated right ethmoid sinus biopsy-proven recurrence.      INDICATION:  After discussion with patient about various treatment options, the patient elected to proceed with definitive radiation therapy using SBRT technique for his recurrence.  The patient also declined surgery and is not a candidate for chemotherapy due to his current medical condition.  The patient  is here for simulation.        CONSENT:  The possible risks and side effects of radiation therapy have been discussed with the patient in detail and at great length. Patient is informed a potential increased risk of radiation-induced normal tissue damage given the prior history of radiation therapy to the same area.  Questions were answered to patient's satisfaction.  Written consent was obtained.       SIMULATION:   The patient is in the supine position with a head rest and face mask to help keep the same position during daily radiation therapy.  Tentative isocenter is set in the center of the lower head region.  We will acquire CT information to help us better locate target and design the radiation therapy field.  We are also going to fuse his diagnostic CT scan with our planning CT to help us to better outline the target.       BLOCKS:  Custom blocks will be drawn to minimize radiation to normal tissues and to protect normal organs including, but not to, spinal cord, brainstem, eyes, cranial nerves, brain, bone and soft tissue.       DOSAGE:  I plan to give him radiation therapy to a total dose of 5658-5673 cGy in 5 treatments. I will consider to use SBRT/IGRT technique to help us to better locate target and to protect normal tissue.        Laurita Mendoza MD, PhD  Department of Radiation Oncology   [facility] Radiation Oncology  Tel: [phone]  Page: [phone]    [facility]  [facility]  [address] [place] [address]     [facility]  [address] [place] [address]

## 2021-10-21 NOTE — PROGRESS NOTES
Exam: CT simulation     Date: 10/21/2021  There were no vitals filed for this visit.    Please ask your patient the following questions before you give any injection of a contrast media. If someone other than the patient is responding to these questions, please indicate the respondent's name and relationship to the patient.    Respondent Name: Kristen                 Relationship to patient:self    Name: Kristen Chapman        List any allergies:   Allergies   Allergen Reactions     Oxycodone Itching       Does the patient have renal disease?     Yes, but creat/GFR okay, has gotten contrast in the past.   Does the patient have diabetes?   No  If patient has diabetes, is metformin or metformin combination drug currently prescribed (i.e. Actoplus Met, Avandmet, Fortamet, Glucophage, Glucovance, Glumetza, Junamet, Prandimet, Metaglip, or Riomet)?       No  Is the patient pregnant? No  Is the patient on dialysis? No  Does the patient have cancer? Yes  Does the patient have a history of cancer? No  When was the patient diagnosed? 2/2020  Treatment: Radiation, chemo  Date of last chemo treatment: 6/2021    LAB RESULTS       CREATININE:  Creatinine   Date Value Ref Range Status   10/13/2021 1.08 0.70 - 1.30 mg/dL Final              (Normal Range: Male: 0.6-1.5 mg/dL  Female: 0.5-1.3mg/dL)   (A Creatinine result greater than 6.0 mg/dL is a Critical value-the ordering provider must be   notified)        GFR Estimate   Date Value Ref Range Status   10/13/2021 70 >60 mL/min/1.73m2 Final     Comment:     As of July 11, 2021, eGFR is calculated by the CKD-EPI creatinine equation, without race adjustment. eGFR can be influenced by muscle mass, exercise, and diet. The reported eGFR is an estimation only and is only applicable if the renal function is stable.   12/14/2020 >60 >60 mL/min/1.73m2 Final     GFR Estimate If Black   Date Value Ref Range Status   12/14/2020 >60 >60 mL/min/1.73m2 Final       (Normal Range >60)         CT Only  If  "the eGFR is less than 30 the radiologist must be informed  If labs are abnormal, was the physician informed?  No, but Dr. Mendoza aware of labs     Staff s Initials SH      This procedure involves an intravenous contrast media injection.    Port accessed using aseptic technique and [facility] policy, 20g 3/4\" needle. Good blood return, flushes easily.     Tolerated scan and contrast fine, port flushed with 20cc NS and 5cc heparin lock, de-accessed with needle intact, Band-Aid applied.      Karen De Los Santos RN                "

## 2021-10-21 NOTE — PROCEDURES
Brain SRS/SBRT: Clinical Treatment Planning Note     10/21/2021     The complex radiotherapy planning will be completed for his local recurrence of nasopharyngeal cancer. The patient had a planning CT earlier today for planning. The treatment aids were used for planning, including head rest and face mask to help keep the same position during the daily radiation therapy. The therapy planning is necessary to reduce radiotherapy dose to the normal critical organs which are not possible with simple treatment. In addition, dose to the target and the critical structures requires three-dimensional analysis of the isodose distribution. The planning will be done to reduce dose to the eyes, cranial nerves, brain and brainstem, spinal cord, oral and nasal mucosa, bone and soft tissue.     I will contour the clinical tumor volume  CTV , with expanded volume of planning treatment volume  PTV  on the treatment planning system. The critical structures will be outlined, including spinal cord, brainstem, eyes, cranial nerves, brain, bone and soft tissue.     Treatment planning will be done on the computer treatment planning system. The SRS/SBRT technique will be used to achieve optimal coverage of the target volume. Dose distribution to the above critical structures will be reviewed. Isodose distribution along with the X, Y, Z plan will also be reviewed. The beam s eye views will be reviewed and the digital reconstructed image will be reviewed on the planning software. The patient will receive 8046-1234 cGy in 5 treatments targeted to the recurrence tumor with margin using 6 MV photons by SBRT.      Laurita Mendoza MD, PhD  Department of Radiation Oncology   [facility] Radiation Oncology  Tel: [phone]  Page: [phone]    [facility]  [address] [place] [address]     [address] [place] [address]

## 2021-10-22 ENCOUNTER — TELEPHONE (OUTPATIENT)
Dept: ONCOLOGY | Facility: HOSPITAL | Age: 71
End: 2021-10-22

## 2021-10-22 ENCOUNTER — TELEPHONE (OUTPATIENT)
Dept: RADIATION ONCOLOGY | Facility: HOSPITAL | Age: 71
End: 2021-10-22

## 2021-10-22 DIAGNOSIS — E03.9 ACQUIRED HYPOTHYROIDISM: ICD-10-CM

## 2021-10-22 RX ORDER — LEVOTHYROXINE SODIUM 112 UG/1
112 TABLET ORAL DAILY
Qty: 60 TABLET | Refills: 3 | Status: SHIPPED | OUTPATIENT
Start: 2021-10-22 | End: 2021-12-16

## 2021-10-22 NOTE — TELEPHONE ENCOUNTER
Call placed to patient daughter Rea.  LM per Dr Frias that he increased the dose of levothyroxine the patient should be taking.  Advised prescription for 112mcg has been sent to their pharmacy and the patient soul start the 112mcg and stop the 88mcg.  Asked that Daughter Rea call back to confirm she received these instructions.

## 2021-10-22 NOTE — TELEPHONE ENCOUNTER
Patient was called today and informed biopsy report form cervical lymph nodes which showed positive for cancer recurrence.  We will proceed with SBRT to the local recurrence followed by neck dissection.

## 2021-10-23 ENCOUNTER — HEALTH MAINTENANCE LETTER (OUTPATIENT)
Age: 71
End: 2021-10-23

## 2021-11-03 ENCOUNTER — APPOINTMENT (OUTPATIENT)
Dept: RADIATION ONCOLOGY | Facility: HOSPITAL | Age: 71
End: 2021-11-03
Attending: RADIOLOGY
Payer: COMMERCIAL

## 2021-11-03 DIAGNOSIS — C11.9 NASOPHARYNGEAL CARCINOMA (H): Primary | ICD-10-CM

## 2021-11-03 PROCEDURE — 77301 RADIOTHERAPY DOSE PLAN IMRT: CPT | Performed by: RADIOLOGY

## 2021-11-03 PROCEDURE — 77300 RADIATION THERAPY DOSE PLAN: CPT | Performed by: RADIOLOGY

## 2021-11-03 PROCEDURE — 77338 DESIGN MLC DEVICE FOR IMRT: CPT | Mod: 26 | Performed by: RADIOLOGY

## 2021-11-03 PROCEDURE — 77300 RADIATION THERAPY DOSE PLAN: CPT | Mod: 26 | Performed by: RADIOLOGY

## 2021-11-03 PROCEDURE — 77338 DESIGN MLC DEVICE FOR IMRT: CPT | Performed by: RADIOLOGY

## 2021-11-03 PROCEDURE — 77301 RADIOTHERAPY DOSE PLAN IMRT: CPT | Mod: 26 | Performed by: RADIOLOGY

## 2021-11-05 ENCOUNTER — LAB (OUTPATIENT)
Dept: LAB | Facility: CLINIC | Age: 71
End: 2021-11-05
Attending: RADIOLOGY

## 2021-11-05 ENCOUNTER — APPOINTMENT (OUTPATIENT)
Dept: RADIATION ONCOLOGY | Facility: HOSPITAL | Age: 71
End: 2021-11-05
Attending: RADIOLOGY
Payer: COMMERCIAL

## 2021-11-05 ENCOUNTER — LAB (OUTPATIENT)
Dept: INFUSION THERAPY | Facility: HOSPITAL | Age: 71
End: 2021-11-05
Attending: INTERNAL MEDICINE
Payer: COMMERCIAL

## 2021-11-05 ENCOUNTER — TELEPHONE (OUTPATIENT)
Dept: ONCOLOGY | Facility: HOSPITAL | Age: 71
End: 2021-11-05

## 2021-11-05 ENCOUNTER — ONCOLOGY VISIT (OUTPATIENT)
Dept: ONCOLOGY | Facility: HOSPITAL | Age: 71
End: 2021-11-05
Attending: INTERNAL MEDICINE
Payer: COMMERCIAL

## 2021-11-05 VITALS
BODY MASS INDEX: 26.05 KG/M2 | DIASTOLIC BLOOD PRESSURE: 72 MMHG | HEIGHT: 63 IN | HEART RATE: 59 BPM | SYSTOLIC BLOOD PRESSURE: 149 MMHG | OXYGEN SATURATION: 98 % | RESPIRATION RATE: 20 BRPM | TEMPERATURE: 98.4 F | WEIGHT: 147 LBS

## 2021-11-05 DIAGNOSIS — T45.1X5A ANTINEOPLASTIC CHEMOTHERAPY INDUCED PANCYTOPENIA (H): Primary | ICD-10-CM

## 2021-11-05 DIAGNOSIS — C11.9 SQUAMOUS CELL CARCINOMA OF NASOPHARYNX (H): ICD-10-CM

## 2021-11-05 DIAGNOSIS — D61.810 ANTINEOPLASTIC CHEMOTHERAPY INDUCED PANCYTOPENIA (H): Primary | ICD-10-CM

## 2021-11-05 DIAGNOSIS — C11.9 NASOPHARYNGEAL CARCINOMA (H): Primary | ICD-10-CM

## 2021-11-05 LAB
ALBUMIN SERPL-MCNC: 3.4 G/DL (ref 3.5–5)
ALP SERPL-CCNC: 113 U/L (ref 45–120)
ALT SERPL W P-5'-P-CCNC: 108 U/L (ref 0–45)
ANION GAP SERPL CALCULATED.3IONS-SCNC: 3 MMOL/L (ref 5–18)
AST SERPL W P-5'-P-CCNC: 121 U/L (ref 0–40)
BASOPHILS # BLD AUTO: 0 10E3/UL (ref 0–0.2)
BASOPHILS NFR BLD AUTO: 0 %
BILIRUB SERPL-MCNC: 0.9 MG/DL (ref 0–1)
BUN SERPL-MCNC: 30 MG/DL (ref 8–22)
CALCIUM SERPL-MCNC: 9 MG/DL (ref 8.5–10.5)
CHLORIDE BLD-SCNC: 110 MMOL/L (ref 98–107)
CO2 SERPL-SCNC: 26 MMOL/L (ref 22–31)
CREAT SERPL-MCNC: 1.08 MG/DL (ref 0.7–1.3)
EOSINOPHIL # BLD AUTO: 0.1 10E3/UL (ref 0–0.7)
EOSINOPHIL NFR BLD AUTO: 4 %
ERYTHROCYTE [DISTWIDTH] IN BLOOD BY AUTOMATED COUNT: 17 % (ref 10–15)
GFR SERPL CREATININE-BSD FRML MDRD: 70 ML/MIN/1.73M2
GLUCOSE BLD-MCNC: 144 MG/DL (ref 70–125)
HCT VFR BLD AUTO: 30.3 % (ref 40–53)
HGB BLD-MCNC: 9.3 G/DL (ref 13.3–17.7)
IMM GRANULOCYTES # BLD: 0 10E3/UL
IMM GRANULOCYTES NFR BLD: 0 %
LYMPHOCYTES # BLD AUTO: 0.6 10E3/UL (ref 0.8–5.3)
LYMPHOCYTES NFR BLD AUTO: 21 %
MCH RBC QN AUTO: 26.6 PG (ref 26.5–33)
MCHC RBC AUTO-ENTMCNC: 30.7 G/DL (ref 31.5–36.5)
MCV RBC AUTO: 87 FL (ref 78–100)
MONOCYTES # BLD AUTO: 0.3 10E3/UL (ref 0–1.3)
MONOCYTES NFR BLD AUTO: 9 %
NEUTROPHILS # BLD AUTO: 1.8 10E3/UL (ref 1.6–8.3)
NEUTROPHILS NFR BLD AUTO: 66 %
NRBC # BLD AUTO: 0 10E3/UL
NRBC BLD AUTO-RTO: 0 /100
PLATELET # BLD AUTO: 58 10E3/UL (ref 150–450)
POTASSIUM BLD-SCNC: 3.7 MMOL/L (ref 3.5–5)
PROT SERPL-MCNC: 7.3 G/DL (ref 6–8)
RBC # BLD AUTO: 3.49 10E6/UL (ref 4.4–5.9)
SARS-COV-2 RNA RESP QL NAA+PROBE: NEGATIVE
SODIUM SERPL-SCNC: 139 MMOL/L (ref 136–145)
WBC # BLD AUTO: 2.8 10E3/UL (ref 4–11)

## 2021-11-05 PROCEDURE — 36591 DRAW BLOOD OFF VENOUS DEVICE: CPT

## 2021-11-05 PROCEDURE — 80053 COMPREHEN METABOLIC PANEL: CPT

## 2021-11-05 PROCEDURE — 85025 COMPLETE CBC W/AUTO DIFF WBC: CPT

## 2021-11-05 PROCEDURE — G0463 HOSPITAL OUTPT CLINIC VISIT: HCPCS | Mod: 25

## 2021-11-05 PROCEDURE — U0003 INFECTIOUS AGENT DETECTION BY NUCLEIC ACID (DNA OR RNA); SEVERE ACUTE RESPIRATORY SYNDROME CORONAVIRUS 2 (SARS-COV-2) (CORONAVIRUS DISEASE [COVID-19]), AMPLIFIED PROBE TECHNIQUE, MAKING USE OF HIGH THROUGHPUT TECHNOLOGIES AS DESCRIBED BY CMS-2020-01-R: HCPCS

## 2021-11-05 PROCEDURE — 77370 RADIATION PHYSICS CONSULT: CPT | Performed by: RADIOLOGY

## 2021-11-05 PROCEDURE — 99214 OFFICE O/P EST MOD 30 MIN: CPT | Performed by: INTERNAL MEDICINE

## 2021-11-05 PROCEDURE — 250N000011 HC RX IP 250 OP 636: Performed by: INTERNAL MEDICINE

## 2021-11-05 PROCEDURE — U0005 INFEC AGEN DETEC AMPLI PROBE: HCPCS

## 2021-11-05 RX ORDER — ONDANSETRON 4 MG/1
TABLET, FILM COATED ORAL EVERY 8 HOURS PRN
Status: CANCELLED | OUTPATIENT
Start: 2021-11-05

## 2021-11-05 RX ORDER — HEPARIN SODIUM (PORCINE) LOCK FLUSH IV SOLN 100 UNIT/ML 100 UNIT/ML
5 SOLUTION INTRAVENOUS
Status: DISPENSED | OUTPATIENT
Start: 2021-11-05

## 2021-11-05 RX ORDER — HEPARIN SODIUM (PORCINE) LOCK FLUSH IV SOLN 100 UNIT/ML 100 UNIT/ML
5 SOLUTION INTRAVENOUS
Status: CANCELLED | OUTPATIENT
Start: 2021-11-05

## 2021-11-05 RX ADMIN — HEPARIN 5 ML: 100 SYRINGE at 10:55

## 2021-11-05 ASSESSMENT — PAIN SCALES - GENERAL: PAINLEVEL: NO PAIN (0)

## 2021-11-05 ASSESSMENT — MIFFLIN-ST. JEOR: SCORE: 1326.79

## 2021-11-05 NOTE — PROGRESS NOTES
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    Weight as of this encounter: 66.7 kg (147 lb). Body surface area is 1.72 meters squared.  No Pain (0) Comment: Data Unavailable   No LMP for male patient.  Allergies reviewed: Yes  Medications reviewed: Yes    Medications: Medication refills not needed today.  Pharmacy name entered into H?REL: [facility] - [place], [place] - [address] [place] [address]    Clinical concerns: 4 week return Squamous cell carcinoma of nasopharynx, review BMBX result. Requesting refill of Zofran today. Appetite is good, denies diarrhea or constipation, fever or chills. No concerns yet today.       Abby Duncan, [facility]              "

## 2021-11-05 NOTE — TELEPHONE ENCOUNTER
Call returned to patient daughter regarding her fathers appointment today.  Patient did not restart chemo today as his blood counts were too low.  Per Dr Frias this patients counts will likely never support chemotherapy.  Patient will proceed with radiosurgery to his frontal lesion over the next two weeks.  In the meantime Dr Frias plans to consult with ENT regarding lymph node dissection. After all of that he will consider Immunotherapy.  All of this information explained to patient daughter Rea who verbalized understanding.

## 2021-11-05 NOTE — LETTER
"    11/5/2021         RE: Kristen Chapman  [address] [place] [address]        Dear Colleague,    Thank you for referring your patient, Kristen Chapman, to the [facility] CANCER [facility]. Please see a copy of my visit note below.    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    Weight as of this encounter: 66.7 kg (147 lb). Body surface area is 1.72 meters squared.  No Pain (0) Comment: Data Unavailable   No LMP for male patient.  Allergies reviewed: Yes  Medications reviewed: Yes    Medications: Medication refills not needed today.  Pharmacy name entered into [employer]: [facility] - [address]    Clinical concerns: 4 week return Squamous cell carcinoma of nasopharynx, review BMBX result. Requesting refill of Zofran today. Appetite is good, denies diarrhea or constipation, fever or chills. No concerns yet today.       Abby Duncan [facility] Hematology and Oncology Progress Note    Patient: Kristen Chapman  MRN: [id number]  Date of Service: Nov 5, 2021        Assessment and Plan:    Cancer Staging  Nasopharyngeal carcinoma (H)  Staging form: Pharynx - Nasopharynx, AJCC 8th Edition  - Clinical stage from 2/18/2020: Stage SAMANTHA (cT4, cN2, cM0) - Signed by Alan Frias MD on 2/18/2020     1.  Nasopharyngeal carcinoma:  He is soon to start stereotactic radiosurgery to recurrence inn the ethmoid " sinus on the right. Seems to be tolerating this well.  He is here today to review the results of a right-sided cervical node biopsy. This showed recurrent NP carcinoma.  I reviewed with the patient and his daughter options going forward.  After radiation is complete we could consider surgery to remove the notes.  We can also consider systemic options including cetuximab or immunotherapy. We would like to try to do local therapy as he is limited disease and no evidence of disease outside of the head and neck at this point.  We will try to review with ENT.  We can see him back in clinic after he finishes radiation to make a plan going forward.  Questions were answered.      We talked about his hilar adenopathy on the PET scan as well.  This has been chronic.  Previously biopsied negative.  Thinks unlikely to represent malignancy as the bilateral hilar distribution would be unusual for metastatic cancer and the SUV max is also low.    Prior to radiation were going to try some chemotherapy but his counts were too low.    2.  Pancytopenia: He recently had a bone marrow is here to review the results. The  bone marrow does not show any evidence of myelodysplasia.  There was some mild dysplastic changes noted but this could be related to recent treatment. A reactive process was favored.  The flow cytometry in the marrow was negative as well as the cytogenetics.  NGS profile was negative. His imaging shows a cirrhotic appearance of the liver as well as splenomegaly.  Both of these can cause pancytopenia and the most likely explanation for his low counts.  B12, folate, thyroid were all normal.  He also has hepatitis B which would be contributing to the cytopenias.  PCR was positive in October 2021.    3.  Hypothyroidism: TSH recently stable.    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  Diagnosed January 2020. Imaging suggested bilateral abnormal  lymphadenopathy in the neck.  Also asymmetric soft tissue thickening in the posterior right nasopharynx.  On imaging, tumor also appeared to extend down to the hypopharynx.    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    Treatment:    Initially treated with concurrent chemotherapy and radiation.  He had weekly cisplatin starting March 3, 2020.  Fifth and final dose given March 31, 2020.  Subsequent chemotherapy was held due to prolonged thrombocytopenia and renal insufficiency.  Radiation dose was 7000 cGy in 35 fractions given from March 2 through April 17, 2020.     Started cisplatin with infusional 5-FU on June 1, 2020.  Cycle 1 given at a 25% dose reduction.  He still had significant thrombocytopenia and neutropenia on day 28.  Cycle 2 was held.  At the same time his liver function tests elevated secondary to hepatitis B.     Interim History:    Kristen returns today for a follow-up visit.  He seems to be tolerating radiation well.  Denies any vision change or headache.  No acute complaints today.    Review of Systems:    As above in the history.     Review of Systems otherwise Negative for:  General: chills, fever or night sweats  Psychological: anxiety or depression  Ophthalmic: blurry vision, double vision or loss of vision, vision change  ENT: oral lesions, hearing changes  Hematological and Lymphatic: bruising, jaundice, swollen lymph nodes  Endocrine: hot flashes, unexpected weight changes  Respiratory: cough, hemoptysis, orthopnea or shortness of breath/AYALA  Cardiovascular: chest pain, edema, palpitations or PND  Gastrointestinal: abdominal pain, blood in stools, change in bowel habits, constipation, diarrhea or nausea/vomiting  Genito-Urinary: change in urinary stream, incontinence, frequency/urgency  Musculoskeletal: joint pain, stiffness, swelling, muscle pain  Neurological: dizziness, headaches, numbness/tingling  Dermatological: lumps and rash    Past History:    Past  "Medical History:   Diagnosis Date     Anemia      Dysphagia      Hepatitis B chronic      Hypothyroidism      Nasopharyngeal cancer (H)      Severe protein-calorie malnutrition (H)      Thrombocytopenia (H)      Physical Exam:    BP (!) 149/72 (BP Location: Right arm, Patient Position: Sitting, Cuff Size: Adult Regular)   Pulse 59   Temp 98.4  F (36.9  C) (Oral)   Resp 20   Ht 1.6 m (5' 2.99\")   Wt 66.7 kg (147 lb)   SpO2 98%   BMI 26.05 kg/m      General: patient appears stated age of 71 year old. Nontoxic and in no distress.   HEENT: Head: atraumatic, normocephalic. Sclerae anicteric.  Chest:  Normal respiratory effort  Cardiac:  No edema.   Abdomen: abdomen is non-distended  Extremities: normal tone and muscle bulk.  Skin: no lesions or rash on visible skin. Warm and dry.   CNS: alert and oriented. Grossly non-focal.   Psychiatric: normal mood and affect.     Lab Results:    No results found for this or any previous visit (from the past 168 hour(s)).     Imaging:    No results found.      Signed by: Alan Frias MD        Again, thank you for allowing me to participate in the care of your patient.        Sincerely,        Alan Frias MD    "

## 2021-11-08 ENCOUNTER — APPOINTMENT (OUTPATIENT)
Dept: RADIATION ONCOLOGY | Facility: HOSPITAL | Age: 71
End: 2021-11-08
Attending: RADIOLOGY
Payer: COMMERCIAL

## 2021-11-08 DIAGNOSIS — C11.9 NASOPHARYNGEAL CARCINOMA (H): Primary | ICD-10-CM

## 2021-11-08 PROCEDURE — 77280 THER RAD SIMULAJ FIELD SMPL: CPT | Mod: 26 | Performed by: RADIOLOGY

## 2021-11-08 PROCEDURE — 77373 STRTCTC BDY RAD THER TX DLVR: CPT | Performed by: RADIOLOGY

## 2021-11-08 PROCEDURE — 77370 RADIATION PHYSICS CONSULT: CPT | Performed by: RADIOLOGY

## 2021-11-09 RX ORDER — ONDANSETRON 8 MG/1
TABLET, FILM COATED ORAL
Qty: 30 TABLET | Refills: 1 | Status: SHIPPED | OUTPATIENT
Start: 2021-11-09 | End: 2022-10-10

## 2021-11-10 ENCOUNTER — APPOINTMENT (OUTPATIENT)
Dept: RADIATION ONCOLOGY | Facility: HOSPITAL | Age: 71
End: 2021-11-10
Attending: RADIOLOGY
Payer: COMMERCIAL

## 2021-11-10 PROCEDURE — 77373 STRTCTC BDY RAD THER TX DLVR: CPT | Performed by: RADIOLOGY

## 2021-11-12 ENCOUNTER — APPOINTMENT (OUTPATIENT)
Dept: RADIATION ONCOLOGY | Facility: HOSPITAL | Age: 71
End: 2021-11-12
Attending: RADIOLOGY
Payer: COMMERCIAL

## 2021-11-12 PROCEDURE — 77373 STRTCTC BDY RAD THER TX DLVR: CPT | Performed by: RADIOLOGY

## 2021-11-15 ENCOUNTER — OFFICE VISIT (OUTPATIENT)
Dept: RADIATION ONCOLOGY | Facility: HOSPITAL | Age: 71
End: 2021-11-15
Attending: RADIOLOGY
Payer: COMMERCIAL

## 2021-11-15 VITALS
HEART RATE: 57 BPM | BODY MASS INDEX: 26.05 KG/M2 | WEIGHT: 147 LBS | OXYGEN SATURATION: 98 % | RESPIRATION RATE: 16 BRPM | DIASTOLIC BLOOD PRESSURE: 77 MMHG | SYSTOLIC BLOOD PRESSURE: 139 MMHG

## 2021-11-15 DIAGNOSIS — C11.9 NASOPHARYNX CANCER (H): Primary | ICD-10-CM

## 2021-11-15 PROCEDURE — 77373 STRTCTC BDY RAD THER TX DLVR: CPT | Performed by: STUDENT IN AN ORGANIZED HEALTH CARE EDUCATION/TRAINING PROGRAM

## 2021-11-15 RX ORDER — DIPHENHYDRAMINE HCL 25 MG
1 CAPSULE ORAL DAILY PRN
Qty: 30 ML | Refills: 3 | Status: SHIPPED | OUTPATIENT
Start: 2021-11-15 | End: 2023-12-07

## 2021-11-15 NOTE — PROGRESS NOTES
RADIATION ONCOLOGY WEEKLY TREATMENT VISIT NOTE      Assessment / Impression     Nasopharynx cancer (H) [C11.9]    Cancer Staging  No matching staging information was found for the patient.     Tolerating radiation therapy well.  All questions and concerns addressed.    Increase in eye watering due to irritation and dryness    Plan:     Continue radiation treatment as prescribed.  Radiation:   Site: nasopharyngeal  Stereotactic Radiosurgery: Yes  Stereotactic Radiosurgery date: 11/15/21  Concurrent Therapy: No  Today's Dose: 2800  Total Dose for Head and Neck: 3500  Today's Fraction/Total Fraction Head and Neck: 4/5    Artificial tears for use during the day and  Ophthalmic ointment for use at night prescribed    Reviewed skin care should he develop skin irritation following completion of radiation.    Subjective:      HPI: Kristen Chapman is a 71 year old male with  Nasopharynx cancer (H) [C11.9]    He is tolerating reirradiation well.  He has no significant pain.  He was having some epistaxis but this has resolved.  He notes eye watering.  No significant eye pain or pruritus.  No skin irritation.    He does not have ophthalmic drops or ointment.    The following portions of the patient's history were reviewed and updated as appropriate: allergies, current medications, past family history, past medical history, past social history, past surgical history and problem list.    Assessment                  Body Site:  Head and Neck Site: nasopharyngeal  Stereotactic Radiosurgery: Yes  Stereotactic Radiosurgery date: 11/15/21  Concurrent Therapy: No  Today's Dose: 2800  Total Dose for Head and Neck: 3500  Today's Fraction/Total Fraction Head and Neck: 4/5  Mucositis due to radiation: 0: None  Thrush: 0: Absent  Pharynx and Esphogaus: 1: Tolerates regular diet  Voice Chances/Stridor/Larynx: 1: Mild or intermittent hoarseness  Ocular/Visual - other : 0: Normal  Middle ear/hearin: Normal  Tinnitus: 2: Yes, chronic (greater  than 3 months)  Cough: 0: Absent                                     Sexuality Alteration                    Emotional Alteration    Copin: Effective  Comfort Alteration   KPS: 90% Can perform normal activity, minor signs of disease  Fatigue (ONS scale): 4: Moderate Fatigue  Pain Location: denies   Nutrition Alteration   Anorexia: 0: None  Nausea: 0: None  Vomitin: None  Weight: 66.7 kg (147 lb)  Pharynx and Esphogaus: 1: Tolerates regular diet  Skin Alteration   Skin Sensation: 0: No problem  Skin Reaction: 0: None  AUA Assessment                                           Accompanied by       Objective:     Exam:     Vitals:    11/15/21 0937   BP: 139/77   Pulse: 57   Resp: 16   SpO2: 98%   Weight: 66.7 kg (147 lb)       Wt Readings from Last 8 Encounters:   11/15/21 66.7 kg (147 lb)   21 66.7 kg (147 lb)   10/21/21 67.1 kg (148 lb)   10/13/21 65.8 kg (145 lb)   10/08/21 65.8 kg (145 lb)   10/04/21 65.3 kg (144 lb)   21 66.7 kg (147 lb)   09/15/21 68 kg (150 lb)       General: Alert and oriented, in no acute distress  Yia has no Erythema.  Right eye watering without significant conjunctival injection.    Treatment Summary to Date    Aria chart and setup information reviewed    Yumiko Cristobal MD

## 2021-11-15 NOTE — LETTER
11/15/2021         RE: Kristen Chapman  [address] [place] [address]        Dear Colleague,    Thank you for referring your patient, Kristen Chapman, to the [facility] RADIATION ONCOLOGY [place]. Please see a copy of my visit note below.      RADIATION ONCOLOGY WEEKLY TREATMENT VISIT NOTE      Assessment / Impression     Nasopharynx cancer (H) [C11.9]    Cancer Staging  No matching staging information was found for the patient.     Tolerating radiation therapy well.  All questions and concerns addressed.    Increase in eye watering due to irritation and dryness    Plan:     Continue radiation treatment as prescribed.  Radiation:   Site: nasopharyngeal  Stereotactic Radiosurgery: Yes  Stereotactic Radiosurgery date: 11/15/21  Concurrent Therapy: No  Today's Dose: 2800  Total Dose for Head and Neck: 3500  Today's Fraction/Total Fraction Head and Neck: 4/5    Artificial tears for use during the day and  Ophthalmic ointment for use at night prescribed    Reviewed skin care should he develop skin irritation following completion of radiation.    Subjective:      HPI: Kristen Chapman is a 71 year old male with  Nasopharynx cancer (H) [C11.9]    He is tolerating reirradiation well.  He has no significant pain.  He was having some epistaxis but this has resolved.  He notes eye watering.  No significant eye pain or pruritus.  No skin irritation.    He does not have ophthalmic drops or ointment.    The following portions of the patient's history were reviewed and updated as appropriate: allergies, current medications, past family history, past medical history, past social history, past surgical history and problem list.    Assessment                  Body Site:  Head and Neck Site: nasopharyngeal  Stereotactic Radiosurgery: Yes  Stereotactic Radiosurgery date: 11/15/21  Concurrent Therapy: No  Today's Dose: 2800  Total Dose for Head and Neck: 3500  Today's Fraction/Total Fraction Head and Neck: 4/5  Mucositis due to  radiation: 0: None  Thrush: 0: Absent  Pharynx and Esphogaus: 1: Tolerates regular diet  Voice Chances/Stridor/Larynx: 1: Mild or intermittent hoarseness  Ocular/Visual - other : 0: Normal  Middle ear/hearin: Normal  Tinnitus: 2: Yes, chronic (greater than 3 months)  Cough: 0: Absent                                     Sexuality Alteration                    Emotional Alteration    Copin: Effective  Comfort Alteration   KPS: 90% Can perform normal activity, minor signs of disease  Fatigue (ONS scale): 4: Moderate Fatigue  Pain Location: denies   Nutrition Alteration   Anorexia: 0: None  Nausea: 0: None  Vomitin: None  Weight: 66.7 kg (147 lb)  Pharynx and Esphogaus: 1: Tolerates regular diet  Skin Alteration   Skin Sensation: 0: No problem  Skin Reaction: 0: None  AUA Assessment                                           Accompanied by       Objective:     Exam:     Vitals:    11/15/21 0937   BP: 139/77   Pulse: 57   Resp: 16   SpO2: 98%   Weight: 66.7 kg (147 lb)       Wt Readings from Last 8 Encounters:   11/15/21 66.7 kg (147 lb)   21 66.7 kg (147 lb)   10/21/21 67.1 kg (148 lb)   10/13/21 65.8 kg (145 lb)   10/08/21 65.8 kg (145 lb)   10/04/21 65.3 kg (144 lb)   21 66.7 kg (147 lb)   09/15/21 68 kg (150 lb)       General: Alert and oriented, in no acute distress  Yia has no Erythema.  Right eye watering without significant conjunctival injection.    Treatment Summary to Date    Aria chart and setup information reviewed    Yumiko Cristobal MD      Again, thank you for allowing me to participate in the care of your patient.        Sincerely,        Yumiko Cristobal MD

## 2021-11-17 ENCOUNTER — APPOINTMENT (OUTPATIENT)
Dept: RADIATION ONCOLOGY | Facility: HOSPITAL | Age: 71
End: 2021-11-17
Attending: RADIOLOGY
Payer: COMMERCIAL

## 2021-11-17 PROCEDURE — 77435 SBRT MANAGEMENT: CPT | Performed by: RADIOLOGY

## 2021-11-17 PROCEDURE — 77336 RADIATION PHYSICS CONSULT: CPT | Performed by: RADIOLOGY

## 2021-11-17 PROCEDURE — 77373 STRTCTC BDY RAD THER TX DLVR: CPT | Performed by: RADIOLOGY

## 2021-11-17 NOTE — PROGRESS NOTES
Pt here for their final radiation treatment. DC instructions given verbally and in writing, pt verbalized their understanding. Not sure what Dr. Mendoza wants for follow up so will wait for orders. IC put pt on recall list.

## 2021-11-18 LAB
CULTURE HARVEST COMPLETE DATE: NORMAL
CULTURE HARVEST COMPLETE DATE: NORMAL

## 2021-11-18 NOTE — PROGRESS NOTES
SBRT/SRS Treatment Summary    Patient: Kristen Chapman   MRN: [id number]  : 1950  Care Provider: Laurita Mendoza MD    Date of Service: 2021        Alan Frias MD   Medical oncology  [facility]    Dear Dr. Frias:    Your patient Mr. Kristen Chapman completed stereotactic radiosurgery for his local regional recurrence of head neck cancer 2021.  As you know, the patient is a 71-year-old gentleman with a diagnosis of nasopharyngeal cancer status post concurrent chemoradiation therapy 1 year and 7 months ago.  The most recent restaging work-up indicated right ethmoid sinus biopsy-proven recurrence with extension into the right orbital extraconal fat with possible right level 1B and 2A lymph node metastasis.  There is also concern of possible mediastinal lymph node recurrence from recent PET CT scan.  The patient declined options of surgery given the potential side effect/deformity involved.  He therefore received stereotactic radiosurgery targeted to the recurrence lesion involving right sigmoidal sinus with a total dose of 3500 cGy in 5 treatments given from 2021-2021.  He tolerated radiation therapy very well with minimal side effect.  The patient is scheduled return to radiation oncology in 2 weeks for routine post therapy office follow-up.  We will discuss additional treatment options to the right cervical lymph node recurrence.    Again, thank you very much for the referral and allowing me to participate in the care of this patient.  If you have any questions or concerns about this patient, please do not hesitate to call.          Sincerely,      Laurita Mendoza MD, PhD  Department of Radiation Oncology   [facility] Radiation Oncology  Tel: [phone]  Page: [phone]    [facility]  [address]  [place] [address]     [facility]  [address] [place] [address]      CC:  Patient Care Team:  Issa Cook MD as PCP -  General  Alan Frias MD as MD (Hematology & Oncology)  Laurita Mendoza MD as MD (Hematology & Oncology)  Issa Cook MD as Assigned PCP  Marcial Cheng MD as Assigned Surgical Provider  Laurita Mendoza MD as Assigned Cancer Care Provider  Eduardo Grier MD as Assigned Infectious Disease Provider  Ky Centeno MD as MD (Otolaryngology)  Brianna Fuller RN as Specialty Care Coordinator (Hematology & Oncology)

## 2021-11-19 LAB
ADDITIONAL COMMENTS: NORMAL
INTERPRETATION: NORMAL
ISCN: NORMAL
METHODS: NORMAL

## 2021-11-30 ENCOUNTER — TELEPHONE (OUTPATIENT)
Dept: RADIATION ONCOLOGY | Facility: HOSPITAL | Age: 71
End: 2021-11-30
Payer: COMMERCIAL

## 2021-11-30 NOTE — TELEPHONE ENCOUNTER
Called Nhia to see how her dad is doing post-radiation. She said he's doing okay, not voicing any concerns to her. She will ask him more specifically how he's doing and she'll call us back if he has questions/concerns.

## 2021-12-03 NOTE — PROGRESS NOTES
[facility] Hematology and Oncology Progress Note    Patient: Kristen Chapman  MRN: [id number]  Date of Service: Nov 5, 2021        Assessment and Plan:    Cancer Staging  Nasopharyngeal carcinoma (H)  Staging form: Pharynx - Nasopharynx, AJCC 8th Edition  - Clinical stage from 2/18/2020: Stage SAMANTHA (cT4, cN2, cM0) - Signed by Alan rFias MD on 2/18/2020     1.  Nasopharyngeal carcinoma:  He is soon to start stereotactic radiosurgery to recurrence inn the ethmoid sinus on the right. Seems to be tolerating this well.  He is here today to review the results of a right-sided cervical node biopsy. This showed recurrent NP carcinoma.  I reviewed with the patient and his daughter options going forward.  After radiation is complete we could consider surgery to remove the notes.  We can also consider systemic options including cetuximab or immunotherapy. We would like to try to do local therapy as he is limited disease and no evidence of disease outside of the head and neck at this point.  We will try to review with ENT.  We can see him back in clinic after he finishes radiation to make a plan going forward.  Questions were answered.      We talked about his hilar adenopathy on the PET scan as well.  This has been chronic.  Previously biopsied negative.  Thinks unlikely to represent malignancy as the bilateral hilar distribution would be unusual for metastatic cancer and the SUV max is also low.    Prior to radiation were going to try some chemotherapy but his counts were too low.    2.  Pancytopenia: He recently had a bone marrow is here to review the results. The  bone marrow does not show any evidence of myelodysplasia.  There was some mild dysplastic changes noted but this could be related to recent treatment. A reactive process was favored.  The flow cytometry in the marrow was negative as well as the cytogenetics.  NGS profile was negative. His imaging shows a cirrhotic appearance of the liver as well as  splenomegaly.  Both of these can cause pancytopenia and the most likely explanation for his low counts.  B12, folate, thyroid were all normal.  He also has hepatitis B which would be contributing to the cytopenias.  PCR was positive in October 2021.    3.  Hypothyroidism: TSH recently stable.    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  Diagnosed January 2020. Imaging suggested bilateral abnormal lymphadenopathy in the neck.  Also asymmetric soft tissue thickening in the posterior right nasopharynx.  On imaging, tumor also appeared to extend down to the hypopharynx.    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    Treatment:    Initially treated with concurrent chemotherapy and radiation.  He had weekly cisplatin starting March 3, 2020.  Fifth and final dose given March 31, 2020.  Subsequent chemotherapy was held due to prolonged thrombocytopenia and renal insufficiency.  Radiation dose was 7000 cGy in 35 fractions given from March 2 through April 17, 2020.     Started cisplatin with infusional 5-FU on June 1, 2020.  Cycle 1 given at a 25% dose reduction.  He still had significant thrombocytopenia and neutropenia on day 28.  Cycle 2 was held.  At the same time his liver function tests elevated secondary to hepatitis B.     Interim History:    Kristen returns today for a follow-up visit.  He seems to be tolerating radiation well.  Denies any vision change or headache.  No acute complaints today.    Review of Systems:    As above in the history.     Review of Systems otherwise Negative for:  General: chills, fever or night sweats  Psychological: anxiety or depression  Ophthalmic: blurry vision, double vision or loss of vision, vision change  ENT: oral lesions, hearing changes  Hematological and Lymphatic: bruising, jaundice, swollen lymph nodes  Endocrine: hot flashes, unexpected weight changes  Respiratory: cough,  "hemoptysis, orthopnea or shortness of breath/AYALA  Cardiovascular: chest pain, edema, palpitations or PND  Gastrointestinal: abdominal pain, blood in stools, change in bowel habits, constipation, diarrhea or nausea/vomiting  Genito-Urinary: change in urinary stream, incontinence, frequency/urgency  Musculoskeletal: joint pain, stiffness, swelling, muscle pain  Neurological: dizziness, headaches, numbness/tingling  Dermatological: lumps and rash    Past History:    Past Medical History:   Diagnosis Date     Anemia      Dysphagia      Hepatitis B chronic      Hypothyroidism      Nasopharyngeal cancer (H)      Severe protein-calorie malnutrition (H)      Thrombocytopenia (H)      Physical Exam:    BP (!) 149/72 (BP Location: Right arm, Patient Position: Sitting, Cuff Size: Adult Regular)   Pulse 59   Temp 98.4  F (36.9  C) (Oral)   Resp 20   Ht 1.6 m (5' 2.99\")   Wt 66.7 kg (147 lb)   SpO2 98%   BMI 26.05 kg/m      General: patient appears stated age of 71 year old. Nontoxic and in no distress.   HEENT: Head: atraumatic, normocephalic. Sclerae anicteric.  Chest:  Normal respiratory effort  Cardiac:  No edema.   Abdomen: abdomen is non-distended  Extremities: normal tone and muscle bulk.  Skin: no lesions or rash on visible skin. Warm and dry.   CNS: alert and oriented. Grossly non-focal.   Psychiatric: normal mood and affect.     Lab Results:    No results found for this or any previous visit (from the past 168 hour(s)).     Imaging:    No results found.      Signed by: Alan Frias MD    "

## 2021-12-09 ENCOUNTER — LAB (OUTPATIENT)
Dept: INFUSION THERAPY | Facility: HOSPITAL | Age: 71
End: 2021-12-09
Attending: INTERNAL MEDICINE
Payer: COMMERCIAL

## 2021-12-09 ENCOUNTER — OFFICE VISIT (OUTPATIENT)
Dept: RADIATION ONCOLOGY | Facility: HOSPITAL | Age: 71
End: 2021-12-09
Attending: RADIOLOGY
Payer: COMMERCIAL

## 2021-12-09 VITALS
BODY MASS INDEX: 25.66 KG/M2 | HEART RATE: 60 BPM | OXYGEN SATURATION: 98 % | DIASTOLIC BLOOD PRESSURE: 78 MMHG | RESPIRATION RATE: 16 BRPM | SYSTOLIC BLOOD PRESSURE: 133 MMHG | WEIGHT: 144.8 LBS

## 2021-12-09 DIAGNOSIS — C11.9 SQUAMOUS CELL CARCINOMA OF NASOPHARYNX (H): Primary | ICD-10-CM

## 2021-12-09 DIAGNOSIS — C11.9 SQUAMOUS CELL CARCINOMA OF NASOPHARYNX (H): ICD-10-CM

## 2021-12-09 LAB
CREAT SERPL-MCNC: 1.3 MG/DL (ref 0.7–1.3)
GFR SERPL CREATININE-BSD FRML MDRD: 56 ML/MIN/1.73M2
TSH SERPL DL<=0.005 MIU/L-ACNC: 339.98 UIU/ML (ref 0.3–5)

## 2021-12-09 PROCEDURE — 36415 COLL VENOUS BLD VENIPUNCTURE: CPT

## 2021-12-09 PROCEDURE — 84443 ASSAY THYROID STIM HORMONE: CPT | Performed by: RADIOLOGY

## 2021-12-09 PROCEDURE — G0463 HOSPITAL OUTPT CLINIC VISIT: HCPCS

## 2021-12-09 PROCEDURE — 82565 ASSAY OF CREATININE: CPT

## 2021-12-09 PROCEDURE — 99214 OFFICE O/P EST MOD 30 MIN: CPT | Performed by: RADIOLOGY

## 2021-12-09 RX ORDER — POLYVINYL ALCOHOL 14 MG/ML
1-2 SOLUTION/ DROPS OPHTHALMIC PRN
COMMUNITY
Start: 2021-11-15 | End: 2023-11-06

## 2021-12-09 ASSESSMENT — PAIN SCALES - GENERAL: PAINLEVEL: NO PAIN (0)

## 2021-12-09 NOTE — LETTER
12/9/2021         RE: Kristen Chapman  [address] [place] [address]        Dear Colleague,    Thank you for referring your patient, Kristen Chapman, to the [facility] RADIATION ONCOLOGY [place]. Please see a copy of my visit note below.    Pt here with his son for f/u with Dr. Mendoza, no new concerns.     [facility] Radiation Oncology Follow Up     Patient: Kristen Chapman  MRN: [id number]  Date of Service: 12/09/2021       DISEASE TREATED:  Nasopharyngeal cancer status post concurrent chemoradiation therapy in 4/2020.  The most recent restaging work-up indicated right ethmoid sinus biopsy-proven recurrence.      TYPE OF RADIATION THERAPY ADMINISTERED:   1. Concurrent chemoradiation therapy for his head neck cancer with weekly cisplatin and had radiation therapy in our clinic with a total dose of 7000 cGy in 35 treatments given from 3/2/2020-4/17/2020.     2. Stereotactic radiosurgery targeted to the recurrence lesion involving right ethmoid sinus with a total dose of 3500 cGy in 5 treatments given from 11/8/2021-11/17/2021.      INTERVAL SINCE COMPLETION OF RADIATION THERAPY:   1. 1 year and 8 months for primary nasal pharyngeal cancer.    2.  1 months for right ethmoid sinus recurrence      SUBJECTIVE:  Mr. Chapman is a 71 year old male who has been in his usual state of good health until recently.  Patient was found to have swelling right neck mass by his lerma a month ago for which he was a seeking further evaluation.  The patient did not notice any significant changes over the past few weeks and he is also asymptomatic.  Initial ENT examination showed suspicious abnormalities in the right nasopharynx worrisome for malignancy.  CT of the neck on 1/24/2020 revealed asymmetric soft tissue thickening in the posterior right nasopharynx as well as abnormal enlarged cervical lymph nodes measuring up to 3.8 cm bilaterally.  Biopsy was obtained on 1/29/2020 with pathology confirmed moderately differentiated  squamous cell carcinoma, P 16 is negative.  The patient also had a staging work-up including PET CT scan and MRI brain which showed locally advanced disease with lymph node metastasis.  The PET CT scan also showed abnormal increased activity in the hilar region suspicious for metastasis.  Patient was then referred to pulmonology for evaluation and possible biopsy.  Patient then underwent bronchoscopy and EBUS on 2/27/2020 with biopsy showed negative for malignancy. He received concurrent chemoradiation therapy for his head neck cancer and had radiation therapy in our clinic with a total dose of 7000 cGy in 35 treatments given from 3/2/2020-4/17/2020.  He also received 3 cycles of cisplatin and 5-FU during the course of radiation therapy. He tolerated radiation therapy reasonably well with expected acute side effect.  The patient insisted not to have PEG tube placement during the therapy.  He however is able to maintain his weight reasonably stable during the treatment.     The patient experienced worsening pain of sore throat and dysphasia since completion of the radiation therapy.  He is not able to get adequate calorie and fluid intake and was admitted to hospital one week after treatment for supportive care and pain management.  A PEG tube was also placed during the hospital stay. The patient felt much better and improved since hospital stay.  The patient also experienced acute onset of left parotitis 4 weeks post cancer therapy and was admitted to the hospital for ongoing treatment including antibiotics.  His condition has been significant improved since then.      He is also receiving adjuvant chemotherapy under supervision of Dr. Frias.       He presented with right-sided eye watering and rhinorrhea from the right nare with MRI imaging on 7/30/2021 concerning for a mass within the right anterior ethmoid air cells.  Patient had a endoscopy and biopsy of right ethmoid sinus on 9/7/2021 with pathology confirmed  EBV mediated nasopharyngeal carcinoma. The patient had restaging PET CT scan on 9/16/2021 which showed hypermetabolic mass situated about the right ethmoid sinus with extension into the right orbital extraconal fat, compatible with biopsy-proven nasopharyngeal carcinoma deposit. There are hypermetabolic right level Ib and 2A lymphadenopathy suggestive of metastases nodes.   There are also new hypermetabolic mediastinal lymphadenopathy, most suspicious for metastatic disease. Reactive nodes are on the differential diagnosis.  His case has been discussed at tumor conference on 9/17/2021 at [facility] and the consensus recommendation is to consider possible chemoradiation therapy.  The patient also had ultrasound-guided needle biopsy for his cervical lymph node 10/19/2021 and pathology confirmed lymph node metastasis. He saw Dr. Frias, medical oncology and patient was determined not a good candidate for chemotherapy given his current medical condition.      I have discussed with the patient extensively about treatment options including surgery, systemic therapy, and salvage radiation therapy. He is not interested in the surgical resection given the potential side effect/deformity involved.  Patient also wished not to travel any other location for radiation therapy i.e. proton therapy due to the family reason.  The patient wished to consider stereotactic radiosurgery for his local recurrence. He therefore received stereotactic radiosurgery targeted to the recurrence lesion involving right sigmoidal sinus with a total dose of 3500 cGy in 5 treatments given from 11/8/2021-11/17/2021.  He tolerated radiation therapy very well with minimal side effect.      The patient has been recovering well since completion of the radiation therapy.  He denies any pain discomfort at the time of evaluation.  He is currently able to eat and swallowing without any significant difficulty.  He denies any vision changes or  headache at time evaluation.  He is here for routine post therapy office follow-up.    Medications were reviewed and are up to date on EPIC.    The following portions of the patient's history were reviewed and updated as appropriate: allergies, current medications, past family history, past medical history, past social history, past surgical history and problem list.    Review of Systems:      General  Constitutional  Constitutional (WDL): Exceptions to WDL  Fatigue: Fatigue relieved by rest  Weight Loss: 5 to less than 10% from baseline OR intervention not indicated  EENT  Eye Disorders  Eye Disorder (WDL): All eye disorder elements are within defined limits  Ear Disorders  Ear Disorder (WDL): Exceptions to WDL (hard of hearing)  Respiratory  Respiratory  Respiratory (WDL): All respiratory elements are within defined limits  Cardiovascular  Cardiovascular  Cardiovascular (WDL): All cardiovascular elements are within defined limits  Gastrointestinal  Gastrointestinal  Gastrointestinal (WDL): Exceptions to WDL (loss of some taste and smell)  Anorexia: Loss of appetite without alteration in eating habits  Dysgeusia: Altered taste with change in diet (e.g., oral supplements) OR noxious or unpleasant taste OR loss of taste  Dysphagia: Symptomatic, able to eat regular diet (tolerates soft foods)  Musculoskeletal  Musculoskeletal and Connective Tissue Disorders  Musculoskeletal & Connective (WDL): All musculoskeletal & connective elements are within defined limits  Integumentary  Integumentary  Integumentary (WDL): All integumentary elements are within defined limits  Neurological  Neurosensory  Neurosensory (WDL): All neurosensory elements are within defined limits  Genitourinary/Reproductive  Genitourinary  Genitourinary (WDL): Assessment not pertinent to visit  Lymphatic  Lymph System Disorders  Lymph (WDL): All lymph elements are within defined limits  Pain  Pain Score: No Pain (0)  AUA Assessment                                                               Accompanied by  Accompanied By: family    Objective:      PHYSICAL EXAMINATION:    /78   Pulse 60   Resp 16   Wt 65.7 kg (144 lb 12.8 oz)   SpO2 98%   BMI 25.66 kg/m      Gen: Alert, in NAD  Eyes: PERRL, EOMI, sclera anicteric  HENT     Head: NC/AT     Ears: No external auricular lesions     Nose/sinus: No rhinorrhea or epistaxis     Oropharynx: MMM, no visible oral lesions  Neck: Supple, full ROM, no LAD  Pulm: No wheezing, stridor or respiratory distress  CV: Well-perfused, no cyanosis, no pedal edema  Back: No step-offs or pain to palpation along the thoracolumbar spine  Rectal: Deferred  : Deferred  Musculoskeletal: Normal muscle bulk and tone  Skin: Normal color and turgor  Neurologic: A/Ox3, CN II-XII intact, normal gait and station  Psychiatric: Appropriate mood and affect      Impression     Patient is a 71-year-old gentleman with a diagnosis of nasopharyngeal cancer status post concurrent chemoradiation therapy 1 year and 8 months ago.  The most recent restaging work-up indicated right ethmoid sinus biopsy-proven recurrence with extension into the right orbital extraconal fat with possible right level 1B and 2A lymph node metastasis.  Patient received salvage SBRT for his right ethmoid sinus recurrence 1 month ago.  The patient has been stable since that therapy.      Assessment & Plan:     1.  The patient has been recovering well since salvage radiation therapy.  The possible treatment options for cervical lymph node recurrence has been again discussed with the patient including surgery, systemic therapy, and salvage radiation therapy.  Patient is adamant against surgical procedure at this time.  Therefore I will schedule patient to have restaging CT neck and chest.  Patient is scheduled to follow-up with radiation oncology in 2 weeks after restaging scan.    2.  He is scheduled to see Dr. Frias, med oncology in 2 weeks for evaluation and consideration  of possible systemic therapy i.e. immunotherapy.    3.  We will discussed with patient again in the future for possible surgical resection or salvage radiation therapy for his neck recurrence after systemic therapy.      Face to face time  30 minutes with > 80% spent on consultation, education and coordination of care.        Laurita Mendoza MD, PhD  Department of Radiation Oncology   [facility]  Tel: [phone]  Page: [phone]    [facility]  [address] [place] [address]     [facility]   [address]   [place] [address]    CC:  Patient Care Team:  Issa Cook MD as PCP - General  Alan Frias MD as MD (Hematology & Oncology)  Laurita Mendoza MD as MD (Hematology & Oncology)  Issa Cook MD as Assigned PCP  Marcial Cheng MD as Assigned Surgical Provider  Laurita Mendoza MD as Assigned Cancer Care Provider  Eduardo Grier MD as Assigned Infectious Disease Provider  Ky Centeno MD as MD (Otolaryngology)  Brianna Fuller RN as Specialty Care Coordinator (Hematology & Oncology)        Again, thank you for allowing me to participate in the care of your patient.        Sincerely,        Laurita Mendoza MD

## 2021-12-09 NOTE — PROGRESS NOTES
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  The most recent restaging work-up indicated right ethmoid sinus biopsy-proven recurrence.      TYPE OF RADIATION THERAPY ADMINISTERED:   1. Concurrent chemoradiation therapy for his head neck cancer with weekly cisplatin and had radiation therapy in our clinic with a total dose of 7000 cGy in 35 treatments given from 3/2/2020-4/17/2020.     2. Stereotactic radiosurgery targeted to the recurrence lesion involving right ethmoid sinus with a total dose of 3500 cGy in 5 treatments given from 11/8/2021-11/17/2021.      INTERVAL SINCE COMPLETION OF RADIATION THERAPY:   1. 1 year and 8 months for primary nasal pharyngeal cancer.    2.  1 months for right ethmoid sinus recurrence      SUBJECTIVE:  Mr. Chapman is a 71 year old male who has been in his usual state of good health until recently.  Patient was found to have swelling right neck mass by his lerma a month ago for which he was a seeking further evaluation.  The patient did not notice any significant changes over the past few weeks and he is also asymptomatic.  Initial ENT examination showed suspicious abnormalities in the right nasopharynx worrisome for malignancy.  CT of the neck on 1/24/2020 revealed asymmetric soft tissue thickening in the posterior right nasopharynx as well as abnormal enlarged cervical lymph nodes measuring up to 3.8 cm bilaterally.  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  The patient also had a staging work-up including PET CT scan and MRI brain which showed locally advanced disease with lymph node metastasis.  The PET CT scan also showed abnormal increased activity in the hilar region suspicious for metastasis.  Patient was then referred to  pulmonology for evaluation and possible biopsy.  Patient then underwent bronchoscopy and EBUS on 2/27/2020 with biopsy showed negative for malignancy. He received concurrent chemoradiation therapy for his head neck cancer and had radiation therapy in our clinic with a total dose of 7000 cGy in 35 treatments given from 3/2/2020-4/17/2020.  He also received 3 cycles of cisplatin and 5-FU during the course of radiation therapy. He tolerated radiation therapy reasonably well with expected acute side effect.  The patient insisted not to have PEG tube placement during the therapy.  He however is able to maintain his weight reasonably stable during the treatment.     The patient experienced worsening pain of sore throat and dysphasia since completion of the radiation therapy.  He is not able to get adequate calorie and fluid intake and was admitted to hospital one week after treatment for supportive care and pain management.  A PEG tube was also placed during the hospital stay. The patient felt much better and improved since hospital stay.  The patient also experienced acute onset of left parotitis 4 weeks post cancer therapy and was admitted to the hospital for ongoing treatment including antibiotics.  His condition has been significant improved since then.      He is also receiving adjuvant chemotherapy under supervision of Dr. Frias.       He presented with right-sided eye watering and rhinorrhea from the right nare with MRI imaging on 7/30/2021 concerning for a mass within the right anterior ethmoid air cells.  Patient had a endoscopy and biopsy of right ethmoid sinus on 9/7/2021 with pathology confirmed EBV mediated nasopharyngeal carcinoma. The patient had restaging PET CT scan on 9/16/2021 which showed hypermetabolic mass situated about the right ethmoid sinus with extension into the right orbital extraconal fat, compatible with biopsy-proven nasopharyngeal carcinoma deposit. There are hypermetabolic right level Ib  and 2A lymphadenopathy suggestive of metastases nodes.   There are also new hypermetabolic mediastinal lymphadenopathy, most suspicious for metastatic disease. Reactive nodes are on the differential diagnosis.  His case has been discussed at tumor conference on 9/17/2021 at [facility] and the consensus recommendation is to consider possible chemoradiation therapy.  The patient also had ultrasound-guided needle biopsy for his cervical lymph node 10/19/2021 and pathology confirmed lymph node metastasis. He saw Dr. Frias, medical oncology and patient was determined not a good candidate for chemotherapy given his current medical condition.      I have discussed with the patient extensively about treatment options including surgery, systemic therapy, and salvage radiation therapy. He is not interested in the surgical resection given the potential side effect/deformity involved.  Patient also wished not to travel any other location for radiation therapy i.e. proton therapy due to the family reason.  The patient wished to consider stereotactic radiosurgery for his local recurrence. He therefore received stereotactic radiosurgery targeted to the recurrence lesion involving right sigmoidal sinus with a total dose of 3500 cGy in 5 treatments given from 11/8/2021-11/17/2021.  He tolerated radiation therapy very well with minimal side effect.      The patient has been recovering well since completion of the radiation therapy.  He denies any pain discomfort at the time of evaluation.  He is currently able to eat and swallowing without any significant difficulty.  He denies any vision changes or headache at time evaluation.  He is here for routine post therapy office follow-up.    Medications were reviewed and are up to date on EPIC.    The following portions of the patient's history were reviewed and updated as appropriate: allergies, current medications, past family history, past medical history, past social  history, past surgical history and problem list.    Review of Systems:      General  Constitutional  Constitutional (WDL): Exceptions to WDL  Fatigue: Fatigue relieved by rest  Weight Loss: 5 to less than 10% from baseline OR intervention not indicated  EENT  Eye Disorders  Eye Disorder (WDL): All eye disorder elements are within defined limits  Ear Disorders  Ear Disorder (WDL): Exceptions to WDL (hard of hearing)  Respiratory  Respiratory  Respiratory (WDL): All respiratory elements are within defined limits  Cardiovascular  Cardiovascular  Cardiovascular (WDL): All cardiovascular elements are within defined limits  Gastrointestinal  Gastrointestinal  Gastrointestinal (WDL): Exceptions to WDL (loss of some taste and smell)  Anorexia: Loss of appetite without alteration in eating habits  Dysgeusia: Altered taste with change in diet (e.g., oral supplements) OR noxious or unpleasant taste OR loss of taste  Dysphagia: Symptomatic, able to eat regular diet (tolerates soft foods)  Musculoskeletal  Musculoskeletal and Connective Tissue Disorders  Musculoskeletal & Connective (WDL): All musculoskeletal & connective elements are within defined limits  Integumentary  Integumentary  Integumentary (WDL): All integumentary elements are within defined limits  Neurological  Neurosensory  Neurosensory (WDL): All neurosensory elements are within defined limits  Genitourinary/Reproductive  Genitourinary  Genitourinary (WDL): Assessment not pertinent to visit  Lymphatic  Lymph System Disorders  Lymph (WDL): All lymph elements are within defined limits  Pain  Pain Score: No Pain (0)  AUA Assessment                                                              Accompanied by  Accompanied By: family    Objective:      PHYSICAL EXAMINATION:    /78   Pulse 60   Resp 16   Wt 65.7 kg (144 lb 12.8 oz)   SpO2 98%   BMI 25.66 kg/m      Gen: Alert, in NAD  Eyes: PERRL, EOMI, sclera anicteric  HENT     Head: NC/AT     Ears: No  external auricular lesions     Nose/sinus: No rhinorrhea or epistaxis     Oropharynx: MMM, no visible oral lesions  Neck: Supple, full ROM, no LAD  Pulm: No wheezing, stridor or respiratory distress  CV: Well-perfused, no cyanosis, no pedal edema  Back: No step-offs or pain to palpation along the thoracolumbar spine  Rectal: Deferred  : Deferred  Musculoskeletal: Normal muscle bulk and tone  Skin: Normal color and turgor  Neurologic: A/Ox3, CN II-XII intact, normal gait and station  Psychiatric: Appropriate mood and affect      Impression     Patient is a 71-year-old gentleman with a diagnosis of nasopharyngeal cancer status post concurrent chemoradiation therapy 1 year and 8 months ago.  The most recent restaging work-up indicated right ethmoid sinus biopsy-proven recurrence with extension into the right orbital extraconal fat with possible right level 1B and 2A lymph node metastasis.  Patient received salvage SBRT for his right ethmoid sinus recurrence 1 month ago.  The patient has been stable since that therapy.      Assessment & Plan:     1.  The patient has been recovering well since salvage radiation therapy.  The possible treatment options for cervical lymph node recurrence has been again discussed with the patient including surgery, systemic therapy, and salvage radiation therapy.  Patient is adamant against surgical procedure at this time.  Therefore I will schedule patient to have restaging CT neck and chest.  Patient is scheduled to follow-up with radiation oncology in 2 weeks after restaging scan.    2.  He is scheduled to see Dr. Frias, med oncology in 2 weeks for evaluation and consideration of possible systemic therapy i.e. immunotherapy.    3.  We will discussed with patient again in the future for possible surgical resection or salvage radiation therapy for his neck recurrence after systemic therapy.      Face to face time  30 minutes with > 80% spent on consultation, education and coordination  of care.        Laurita Mendoza MD, PhD  Department of Radiation Oncology   [facility]  Tel: [phone]  Page: [phone]    [facility]  [address] [place] [address]     [facility]   [address] [place] [address]    CC:  Patient Care Team:  Issa Cook MD as PCP - General  Alan Frias MD as MD (Hematology & Oncology)  Laurita Mendoza MD as MD (Hematology & Oncology)  Issa Cook MD as Assigned PCP  Marcial Cheng MD as Assigned Surgical Provider  Laurita Mendoza MD as Assigned Cancer Care Provider  Eduardo Grier MD as Assigned Infectious Disease Provider  Ky Centeno MD as MD (Otolaryngology)  Brianna Fuller, RN as Specialty Care Coordinator (Hematology & Oncology)

## 2021-12-10 ENCOUNTER — TELEPHONE (OUTPATIENT)
Dept: RADIATION ONCOLOGY | Facility: HOSPITAL | Age: 71
End: 2021-12-10
Payer: COMMERCIAL

## 2021-12-10 NOTE — TELEPHONE ENCOUNTER
Called the patient today regarding his most recent TSH which has been significantly elevated.  The patient is recommended to contact his primary physician for treatment recommendation.

## 2021-12-13 ENCOUNTER — HOSPITAL ENCOUNTER (OUTPATIENT)
Dept: CT IMAGING | Facility: HOSPITAL | Age: 71
End: 2021-12-13
Attending: RADIOLOGY
Payer: COMMERCIAL

## 2021-12-13 DIAGNOSIS — C11.9 SQUAMOUS CELL CARCINOMA OF NASOPHARYNX (H): ICD-10-CM

## 2021-12-13 PROCEDURE — 250N000011 HC RX IP 250 OP 636: Performed by: RADIOLOGY

## 2021-12-13 PROCEDURE — 71260 CT THORAX DX C+: CPT

## 2021-12-13 PROCEDURE — 70491 CT SOFT TISSUE NECK W/DYE: CPT

## 2021-12-13 RX ORDER — IOPAMIDOL 755 MG/ML
75 INJECTION, SOLUTION INTRAVASCULAR ONCE
Status: COMPLETED | OUTPATIENT
Start: 2021-12-13 | End: 2021-12-13

## 2021-12-13 RX ADMIN — IOPAMIDOL 75 ML: 755 INJECTION, SOLUTION INTRAVENOUS at 20:22

## 2021-12-15 ENCOUNTER — ONCOLOGY VISIT (OUTPATIENT)
Dept: ONCOLOGY | Facility: HOSPITAL | Age: 71
End: 2021-12-15
Attending: INTERNAL MEDICINE
Payer: COMMERCIAL

## 2021-12-15 VITALS
SYSTOLIC BLOOD PRESSURE: 124 MMHG | RESPIRATION RATE: 16 BRPM | DIASTOLIC BLOOD PRESSURE: 78 MMHG | HEART RATE: 60 BPM | OXYGEN SATURATION: 98 % | HEIGHT: 63 IN | WEIGHT: 147 LBS | TEMPERATURE: 99.2 F | BODY MASS INDEX: 26.05 KG/M2

## 2021-12-15 DIAGNOSIS — E03.9 ACQUIRED HYPOTHYROIDISM: ICD-10-CM

## 2021-12-15 DIAGNOSIS — C11.9 SQUAMOUS CELL CARCINOMA OF NASOPHARYNX (H): Primary | ICD-10-CM

## 2021-12-15 LAB — CULTURE HARVEST COMPLETE DATE: NORMAL

## 2021-12-15 PROCEDURE — G0463 HOSPITAL OUTPT CLINIC VISIT: HCPCS

## 2021-12-15 PROCEDURE — 99215 OFFICE O/P EST HI 40 MIN: CPT | Performed by: INTERNAL MEDICINE

## 2021-12-15 RX ORDER — HEPARIN SODIUM,PORCINE 10 UNIT/ML
5 VIAL (ML) INTRAVENOUS
Status: CANCELLED | OUTPATIENT
Start: 2021-12-23

## 2021-12-15 RX ORDER — DIPHENHYDRAMINE HYDROCHLORIDE 50 MG/ML
50 INJECTION INTRAMUSCULAR; INTRAVENOUS
Status: CANCELLED
Start: 2021-12-23

## 2021-12-15 RX ORDER — LORAZEPAM 2 MG/ML
0.5 INJECTION INTRAMUSCULAR EVERY 4 HOURS PRN
Status: CANCELLED | OUTPATIENT
Start: 2021-12-23

## 2021-12-15 RX ORDER — HEPARIN SODIUM (PORCINE) LOCK FLUSH IV SOLN 100 UNIT/ML 100 UNIT/ML
5 SOLUTION INTRAVENOUS
Status: CANCELLED | OUTPATIENT
Start: 2021-12-23

## 2021-12-15 RX ORDER — ALBUTEROL SULFATE 0.83 MG/ML
2.5 SOLUTION RESPIRATORY (INHALATION)
Status: CANCELLED | OUTPATIENT
Start: 2021-12-23

## 2021-12-15 RX ORDER — METHYLPREDNISOLONE SODIUM SUCCINATE 125 MG/2ML
125 INJECTION, POWDER, LYOPHILIZED, FOR SOLUTION INTRAMUSCULAR; INTRAVENOUS
Status: CANCELLED
Start: 2021-12-23

## 2021-12-15 RX ORDER — EPINEPHRINE 1 MG/ML
0.3 INJECTION, SOLUTION INTRAMUSCULAR; SUBCUTANEOUS EVERY 5 MIN PRN
Status: CANCELLED | OUTPATIENT
Start: 2021-12-23

## 2021-12-15 RX ORDER — ALBUTEROL SULFATE 90 UG/1
1-2 AEROSOL, METERED RESPIRATORY (INHALATION)
Status: CANCELLED
Start: 2021-12-23

## 2021-12-15 RX ORDER — NALOXONE HYDROCHLORIDE 0.4 MG/ML
0.2 INJECTION, SOLUTION INTRAMUSCULAR; INTRAVENOUS; SUBCUTANEOUS
Status: CANCELLED | OUTPATIENT
Start: 2021-12-23

## 2021-12-15 RX ORDER — MEPERIDINE HYDROCHLORIDE 25 MG/ML
25 INJECTION INTRAMUSCULAR; INTRAVENOUS; SUBCUTANEOUS EVERY 30 MIN PRN
Status: CANCELLED | OUTPATIENT
Start: 2021-12-23

## 2021-12-15 ASSESSMENT — PAIN SCALES - GENERAL: PAINLEVEL: NO PAIN (0)

## 2021-12-15 ASSESSMENT — MIFFLIN-ST. JEOR: SCORE: 1326.79

## 2021-12-15 NOTE — PATIENT INSTRUCTIONS
Patient Education     Pembrolizumab Solution for injection  What is this medicine?  PEMBROLIZUMAB (pem broe dorian ue mab) is used to treat certain types of melanoma, a skin cancer, and non-small cell lung cancer. It targets specific cancer cells and stops the cancer cells from growing.  This medicine may be used for other purposes; ask your health care provider or pharmacist if you have questions.  What should I tell my health care provider before I take this medicine?  They need to know if you have any of these conditions:    diabetes    immune system problems    inflammatory bowel disease    liver disease    lung or breathing disease    lupus    an unusual or allergic reaction to pembrolizumab, other medicines, foods, dyes, or preservatives    pregnant or trying to get pregnant    breast-feeding  How should I use this medicine?  This medicine is for infusion into a vein. It is given by a health care professional in a hospital or clinic setting.  A special MedGuide will be given to you before each treatment. Be sure to read this information carefully each time.  Talk to your pediatrician regarding the use of this medicine in children. Special care may be needed.  Overdosage: If you think you've taken too much of this medicine contact a poison control center or emergency room at once.  NOTE: This medicine is only for you. Do not share this medicine with others.  What if I miss a dose?  It is important not to miss your dose. Call your doctor or health care professional if you are unable to keep an appointment.  What may interact with this medicine?  Interactions have not been studied.  Give your health care provider a list of all the medicines, herbs, non-prescription drugs, or dietary supplements you use. Also tell them if you smoke, drink alcohol, or use illegal drugs. Some items may interact with your medicine.  What should I watch for while using this medicine?  Your condition will be monitored carefully while you  are receiving this medicine.  You may need blood work done while you are taking this medicine.  Do not become pregnant while taking this medicine or for 4 months after stopping it. Women should inform their doctor if they wish to become pregnant or think they might be pregnant. There is a potential for serious side effects to an unborn child. Talk to your health care professional or pharmacist for more information. Do not breast-feed an infant while taking this medicine or for 4 months after the last dose.  What side effects may I notice from receiving this medicine?  Side effects that you should report to your doctor or health care professional as soon as possible:    allergic reactions like skin rash, itching or hives, swelling of the face, lips, or tongue    bloody or black, tarry stools    breathing problems    change in the amount of urine    changes in vision    chest pain    chills    dark urine    dizziness or feeling faint or lightheaded    fast or irregular heartbeat    fever    flushing    hair loss    muscle pain    muscle weakness    persistent headache    signs and symptoms of high blood sugar such as dizziness; dry mouth; dry skin; fruity breath; nausea; stomach pain; increased hunger or thirst; increased urination    signs and symptoms of liver injury like dark urine, light-colored stools, loss of appetite, nausea, right upper belly pain, yellowing of the eyes or skin    stomach pain    weight loss  Side effects that usually do not require medical attention (Report these to your doctor or health care professional if they continue or are bothersome.):constipation    cough    diarrhea    joint pain    tiredness  This list may not describe all possible side effects. Call your doctor for medical advice about side effects. You may report side effects to FDA at 9-499-CQJ-9827.  Where should I keep my medicine?  This drug is given in a hospital or clinic and will not be stored at home.  NOTE: This sheet is  a summary. It may not cover all possible information. If you have questions about this medicine, talk to your doctor, pharmacist, or health care provider.  NOTE:This sheet is a summary. It may not cover all possible information. If you have questions about this medicine, talk to your doctor, pharmacist, or health care provider. Copyright  2016 Gold Standard

## 2021-12-15 NOTE — LETTER
12/15/2021         RE: Kristen Chapman  [address] [place] [address]        Dear Colleague,    Thank you for referring your patient, Kristen Chapman, to the [facility]CER [facility]. Please see a copy of my visit note below.    [facility] Hematology and Oncology Progress Note    Patient: Kristen Chapman  MRN: [id number]  Date of Service: Dec 15, 2021        Assessment and Plan:    Cancer Staging  Nasopharyngeal carcinoma (H)  Staging form: Pharynx - Nasopharynx, AJCC 8th Edition  - Clinical stage from 2/18/2020: Stage SAMANTHA (cT4, cN2, cM0) - Signed by Alan Frias MD on 2/18/2020     1.  Nasopharyngeal carcinoma: He is decided against neck dissection for the time being.  We are going to proceed with systemic therapy with pembrolizumab.  I reviewed the schedule dosing with him.  We also discussed some of the more common side effects with immunotherapy including, but not limited to fever, diarrhea, rash, fatigue, and myalgias.  He agrees to proceed with chemotherapy.  We will get started within the next week or so.  We will reimage after 4 doses of the PET scan.  Questions were answered.    2.  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    3.  Hypothyroidism: His TSH is markedly elevated.  The patient and his son confirm that he is taking his Synthroid.  As such, are going to increase the dose.  We will follow closely with the immunotherapy.    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  Diagnosed January 2020. Imaging suggested bilateral abnormal lymphadenopathy in the neck.  Also asymmetric soft tissue thickening in the posterior right nasopharynx.  On imaging, tumor also appeared to extend down to the hypopharynx.    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    Treatment:    Initially treated with concurrent  chemotherapy and radiation.  He had weekly cisplatin starting March 3, 2020.  Fifth and final dose given March 31, 2020.  Subsequent chemotherapy was held due to prolonged thrombocytopenia and renal insufficiency.  Radiation dose was 7000 cGy in 35 fractions given from March 2 through April 17, 2020.     Started cisplatin with infusional 5-FU on June 1, 2020.  Cycle 1 given at a 25% dose reduction.  He still had significant thrombocytopenia and neutropenia on day 28.  Cycle 2 was held.  At the same time his liver function tests elevated secondary to hepatitis B.     Radiosurgery delivered to the right ethmoid tumor delivered November 8 through November 17, 2021.  Received 3500 cGy in 5 fractions.    Interim History:    Kristen returns today for a follow-up visit.  He completed radiosurgery about a month ago.  He saw Dr. Mendoza about a week ago.  Surgery for his cervical nodes was discussed at that time.  The patient prefers not to have any more procedures or surgeries.  Denies headaches or vision changes today.    Review of Systems:    As above in the history.     Review of Systems otherwise Negative for:  General: chills, fever or night sweats  Psychological: anxiety or depression  Ophthalmic: blurry vision, double vision or loss of vision, vision change  ENT: oral lesions, hearing changes  Hematological and Lymphatic: bruising, jaundice, swollen lymph nodes  Endocrine: hot flashes, unexpected weight changes  Respiratory: cough, hemoptysis, orthopnea or shortness of breath/AYALA  Cardiovascular: chest pain, edema, palpitations or PND  Gastrointestinal: abdominal pain, blood in stools, change in bowel habits, constipation, diarrhea or nausea/vomiting  Genito-Urinary: change in urinary stream, incontinence, frequency/urgency  Musculoskeletal: joint pain, stiffness, swelling, muscle pain  Neurological: dizziness, headaches, numbness/tingling  Dermatological: lumps and rash    Past History:    Past Medical History:   Diagnosis  Date     Anemia      Dysphagia      Hepatitis B chronic      Hypothyroidism      Nasopharyngeal cancer (H)      Severe protein-calorie malnutrition (H)      Thrombocytopenia (H)      Physical Exam:    There were no vitals taken for this visit.    General: patient appears stated age of 71 year old. Nontoxic and in no distress.   HEENT: Head: atraumatic, normocephalic. Sclerae anicteric.  Chest:  Normal respiratory effort  Cardiac:  No edema.   Abdomen: abdomen is non-distended  Extremities: normal tone and muscle bulk.  Skin: no lesions or rash on visible skin. Warm and dry.   CNS: alert and oriented. Grossly non-focal.   Psychiatric: normal mood and affect.     Lab Results:    Recent Results (from the past 168 hour(s))   TSH   Result Value Ref Range    .98 (H) 0.30 - 5.00 uIU/mL   Creatinine   Result Value Ref Range    Creatinine 1.30 0.70 - 1.30 mg/dL    GFR Estimate 56 (L) >60 mL/min/1.73m2        Imaging:    CT Chest w Contrast    Result Date: 12/14/2021  EXAM: CT CHEST W CONTRAST LOCATION: [facility] DATE/TIME: 12/13/2021 7:54 PM INDICATION: Non-small cell lung cancer, monitor COMPARISON: PET scan 06/19/2021 TECHNIQUE: CT chest with IV contrast. Multiplanar reformats were obtained. Dose reduction techniques were used. CONTRAST: isovue 370 75ml FINDINGS: LUNGS AND PLEURA: Stable 6 mm irregular nodule right upper lobe. No new lung nodules. MEDIASTINUM/AXILLAE: The previously noted hilar nodes and mediastinal nodes on PET scan appears slightly smaller. Largest left hilar node measures 8 mm on image 112. Stable 8 mm precarinal node. No chandler lymphadenopathy. Stable 4.5 cm ascending thoracic aortic aneurysm. CORONARY ARTERY CALCIFICATION: None. UPPER ABDOMEN: Cirrhosis. Some generalized mild soft tissue stranding in the mesentery. Stable hypodensity in the spleen most likely benign. MUSCULOSKELETAL: No concerning bone lesions.     IMPRESSION: 1.  Slight decrease in size of the hilar  lymph nodes since PET scan 09/16/2021. Stable mildly prominent mediastinal nodes still within normal size limits. 2.  Stable 6 mm nodule right upper lobe.    CT Soft Tissue Neck w Contrast    Result Date: 12/14/2021  EXAM: CT SOFT TISSUE NECK W CONTRAST LOCATION: [facility] DATE/TIME: 12/13/2021 7:51 PM INDICATION: Nasopharyngeal cancer, monitor nasopharyngeal cancer, recurrence suspected or known. COMPARISON: PET CT 9/16/2021, soft tissue neck CT 9/16/2021 and 1/24/2020. CONTRAST: 75 mL Isovue 370. TECHNIQUE: Routine CT soft tissue neck with IV contrast. Multiplanar reformats. Dose reduction techniques were used. FINDINGS: MUCOSAL SPACES/SOFT TISSUES: Interval significant decrease in the previously noted lobulated soft tissue mass involving the right ethmoid air cells and extending into the medial right orbit. There is associated loss of the medial right orbital wall and the medial wall of the right ethmoid air cells. Post radiation related changes. Redemonstration of mild asymmetric thickening along the left posterior lateral nasopharynx without discrete mass lesion. No suspicious lesions involving the oropharynx. Normal-appearing epiglottis. No suspicious lesions within the laryngeal structures. LYMPH NODES: 8.3 mm right level II-A node, previously measured 6.8 mm. 14.5 mm right level I-B node, previously measured 13.6 mm. SALIVARY GLANDS: Normal parotid and submandibular glands. THYROID: Multinodular thyroid gland. VESSELS: Vascular structures of the neck are patent. VISUALIZED INTRACRANIAL/ORBITS/SINUSES: No abnormality of the visualized intracranial compartment or orbits. Moderate mucosal thickening of the ethmoid air cells. Moderate chronic mucosal thickening of the right maxillary sinus. The mastoid air cells are  clear. OTHER: Moderate degenerative changes of the cervical spine. The included lung apices are clear.     IMPRESSION: 1.  Interval significant decrease in the previously  "noted lobulated soft tissue mass involving the right ethmoid air cells and extending into the medial right orbit compared to PET CT 9/16/2021. There is associated loss of the medial right orbital wall and the medial wall of the right ethmoid air cells. 2.  Post radiation related changes. 3.  Redemonstration of mild asymmetric thickening along the left posterior lateral nasopharynx without discrete mass lesion. Correlate with direct visualization. 4.  8.3 mm right level II-A node, previously measured 6.8 mm. 14.5 mm right level I-B node, previously measured 13.6 mm. Both of these nodes were hypermetabolic on the prior PET CT.         Signed by: Alan Frias MD      Oncology Rooming Note    December 15, 2021 10:11 AM   Kristen Chapman is a 71 year old male who presents for:    Chief Complaint   Patient presents with     Oncology Clinic Visit     Return Antineoplastic chemotherapy induced pancytopenia, Nasopharyngeal carcinoma ,      Initial Vitals: /78 (BP Location: Left arm, Patient Position: Sitting, Cuff Size: Adult Regular)   Pulse 60   Temp 99.2  F (37.3  C) (Oral)   Resp 16   Ht 1.6 m (5' 2.99\")   Wt 66.7 kg (147 lb)   SpO2 98%   BMI 26.05 kg/m   Estimated body mass index is 26.05 kg/m  as calculated from the following:    Height as of this encounter: 1.6 m (5' 2.99\").    Weight as of this encounter: 66.7 kg (147 lb). Body surface area is 1.72 meters squared.  No Pain (0) Comment: Data Unavailable   No LMP for male patient.  Allergies reviewed: Yes  Medications reviewed: Yes    Medications: Medication refills not needed today.  Pharmacy name entered into [employer]: [facility] - [place], [place] - [address] [place] [address]    Clinical concerns: Return Antineoplastic chemotherapy induced pancytopenia, Nasopharyngeal carcinoma.       Abby Duncan [facility]                  Again, thank you for allowing me to participate in the care of your patient.        Sincerely,        Alan Frias MD    "

## 2021-12-15 NOTE — PROGRESS NOTES
[facility] Hematology and Oncology Progress Note    Patient: Kristen Chapman  MRN: [id number]  Date of Service: Dec 15, 2021        Assessment and Plan:    Cancer Staging  Nasopharyngeal carcinoma (H)  Staging form: Pharynx - Nasopharynx, AJCC 8th Edition  - Clinical stage from 2/18/2020: Stage SAMANTHA (cT4, cN2, cM0) - Signed by Alan Frias MD on 2/18/2020     1.  Nasopharyngeal carcinoma: He is decided against neck dissection for the time being.  We are going to proceed with systemic therapy with pembrolizumab.  I reviewed the schedule dosing with him.  We also discussed some of the more common side effects with immunotherapy including, but not limited to fever, diarrhea, rash, fatigue, and myalgias.  He agrees to proceed with chemotherapy.  We will get started within the next week or so.  We will reimage after 4 doses of the PET scan.  Questions were answered.    2.  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    3.  Hypothyroidism: His TSH is markedly elevated.  The patient and his son confirm that he is taking his Synthroid.  As such, are going to increase the dose.  We will follow closely with the immunotherapy.    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  Diagnosed January 2020. Imaging suggested bilateral abnormal lymphadenopathy in the neck.  Also asymmetric soft tissue thickening in the posterior right nasopharynx.  On imaging, tumor also appeared to extend down to the hypopharynx.    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    Treatment:    Initially treated with concurrent chemotherapy and radiation.  He had weekly cisplatin starting March 3, 2020.  Fifth and final dose given March 31, 2020.  Subsequent chemotherapy was held due to prolonged thrombocytopenia and renal insufficiency.  Radiation dose was 7000 cGy in 35  fractions given from March 2 through April 17, 2020.     Started cisplatin with infusional 5-FU on June 1, 2020.  Cycle 1 given at a 25% dose reduction.  He still had significant thrombocytopenia and neutropenia on day 28.  Cycle 2 was held.  At the same time his liver function tests elevated secondary to hepatitis B.     Radiosurgery delivered to the right ethmoid tumor delivered November 8 through November 17, 2021.  Received 3500 cGy in 5 fractions.    Interim History:    Kristen returns today for a follow-up visit.  He completed radiosurgery about a month ago.  He saw Dr. Mendoza about a week ago.  Surgery for his cervical nodes was discussed at that time.  The patient prefers not to have any more procedures or surgeries.  Denies headaches or vision changes today.    Review of Systems:    As above in the history.     Review of Systems otherwise Negative for:  General: chills, fever or night sweats  Psychological: anxiety or depression  Ophthalmic: blurry vision, double vision or loss of vision, vision change  ENT: oral lesions, hearing changes  Hematological and Lymphatic: bruising, jaundice, swollen lymph nodes  Endocrine: hot flashes, unexpected weight changes  Respiratory: cough, hemoptysis, orthopnea or shortness of breath/AYALA  Cardiovascular: chest pain, edema, palpitations or PND  Gastrointestinal: abdominal pain, blood in stools, change in bowel habits, constipation, diarrhea or nausea/vomiting  Genito-Urinary: change in urinary stream, incontinence, frequency/urgency  Musculoskeletal: joint pain, stiffness, swelling, muscle pain  Neurological: dizziness, headaches, numbness/tingling  Dermatological: lumps and rash    Past History:    Past Medical History:   Diagnosis Date     Anemia      Dysphagia      Hepatitis B chronic      Hypothyroidism      Nasopharyngeal cancer (H)      Severe protein-calorie malnutrition (H)      Thrombocytopenia (H)      Physical Exam:    There were no vitals taken for this  visit.    General: patient appears stated age of 71 year old. Nontoxic and in no distress.   HEENT: Head: atraumatic, normocephalic. Sclerae anicteric.  Chest:  Normal respiratory effort  Cardiac:  No edema.   Abdomen: abdomen is non-distended  Extremities: normal tone and muscle bulk.  Skin: no lesions or rash on visible skin. Warm and dry.   CNS: alert and oriented. Grossly non-focal.   Psychiatric: normal mood and affect.     Lab Results:    Recent Results (from the past 168 hour(s))   TSH   Result Value Ref Range    .98 (H) 0.30 - 5.00 uIU/mL   Creatinine   Result Value Ref Range    Creatinine 1.30 0.70 - 1.30 mg/dL    GFR Estimate 56 (L) >60 mL/min/1.73m2        Imaging:    CT Chest w Contrast    Result Date: 12/14/2021  EXAM: CT CHEST W CONTRAST LOCATION: [facility] DATE/TIME: 12/13/2021 7:54 PM INDICATION: Non-small cell lung cancer, monitor COMPARISON: PET scan 06/19/2021 TECHNIQUE: CT chest with IV contrast. Multiplanar reformats were obtained. Dose reduction techniques were used. CONTRAST: isovue 370 75ml FINDINGS: LUNGS AND PLEURA: Stable 6 mm irregular nodule right upper lobe. No new lung nodules. MEDIASTINUM/AXILLAE: The previously noted hilar nodes and mediastinal nodes on PET scan appears slightly smaller. Largest left hilar node measures 8 mm on image 112. Stable 8 mm precarinal node. No chandler lymphadenopathy. Stable 4.5 cm ascending thoracic aortic aneurysm. CORONARY ARTERY CALCIFICATION: None. UPPER ABDOMEN: Cirrhosis. Some generalized mild soft tissue stranding in the mesentery. Stable hypodensity in the spleen most likely benign. MUSCULOSKELETAL: No concerning bone lesions.     IMPRESSION: 1.  Slight decrease in size of the hilar lymph nodes since PET scan 09/16/2021. Stable mildly prominent mediastinal nodes still within normal size limits. 2.  Stable 6 mm nodule right upper lobe.    CT Soft Tissue Neck w Contrast    Result Date: 12/14/2021  EXAM: CT SOFT TISSUE  NECK W CONTRAST LOCATION: [facility] DATE/TIME: 12/13/2021 7:51 PM INDICATION: Nasopharyngeal cancer, monitor nasopharyngeal cancer, recurrence suspected or known. COMPARISON: PET CT 9/16/2021, soft tissue neck CT 9/16/2021 and 1/24/2020. CONTRAST: 75 mL Isovue 370. TECHNIQUE: Routine CT soft tissue neck with IV contrast. Multiplanar reformats. Dose reduction techniques were used. FINDINGS: MUCOSAL SPACES/SOFT TISSUES: Interval significant decrease in the previously noted lobulated soft tissue mass involving the right ethmoid air cells and extending into the medial right orbit. There is associated loss of the medial right orbital wall and the medial wall of the right ethmoid air cells. Post radiation related changes. Redemonstration of mild asymmetric thickening along the left posterior lateral nasopharynx without discrete mass lesion. No suspicious lesions involving the oropharynx. Normal-appearing epiglottis. No suspicious lesions within the laryngeal structures. LYMPH NODES: 8.3 mm right level II-A node, previously measured 6.8 mm. 14.5 mm right level I-B node, previously measured 13.6 mm. SALIVARY GLANDS: Normal parotid and submandibular glands. THYROID: Multinodular thyroid gland. VESSELS: Vascular structures of the neck are patent. VISUALIZED INTRACRANIAL/ORBITS/SINUSES: No abnormality of the visualized intracranial compartment or orbits. Moderate mucosal thickening of the ethmoid air cells. Moderate chronic mucosal thickening of the right maxillary sinus. The mastoid air cells are  clear. OTHER: Moderate degenerative changes of the cervical spine. The included lung apices are clear.     IMPRESSION: 1.  Interval significant decrease in the previously noted lobulated soft tissue mass involving the right ethmoid air cells and extending into the medial right orbit compared to PET CT 9/16/2021. There is associated loss of the medial right orbital wall and the medial wall of the right  ethmoid air cells. 2.  Post radiation related changes. 3.  Redemonstration of mild asymmetric thickening along the left posterior lateral nasopharynx without discrete mass lesion. Correlate with direct visualization. 4.  8.3 mm right level II-A node, previously measured 6.8 mm. 14.5 mm right level I-B node, previously measured 13.6 mm. Both of these nodes were hypermetabolic on the prior PET CT.         Signed by: Alan Frias MD

## 2021-12-15 NOTE — PROGRESS NOTES
"Oncology Rooming Note    December 15, 2021 10:11 AM   Kristen Chapman is a 71 year old male who presents for:    Chief Complaint   Patient presents with     Oncology Clinic Visit     Return Antineoplastic chemotherapy induced pancytopenia, Nasopharyngeal carcinoma ,      Initial Vitals: /78 (BP Location: Left arm, Patient Position: Sitting, Cuff Size: Adult Regular)   Pulse 60   Temp 99.2  F (37.3  C) (Oral)   Resp 16   Ht 1.6 m (5' 2.99\")   Wt 66.7 kg (147 lb)   SpO2 98%   BMI 26.05 kg/m   Estimated body mass index is 26.05 kg/m  as calculated from the following:    Height as of this encounter: 1.6 m (5' 2.99\").    Weight as of this encounter: 66.7 kg (147 lb). Body surface area is 1.72 meters squared.  No Pain (0) Comment: Data Unavailable   No LMP for male patient.  Allergies reviewed: Yes  Medications reviewed: Yes    Medications: Medication refills not needed today.  Pharmacy name entered into [employer]: [facility] - [place], [place] - [address] [place] [address]    Clinical concerns: Return Antineoplastic chemotherapy induced pancytopenia, Nasopharyngeal carcinoma.       Abby Duncan, [facility]              "

## 2021-12-16 RX ORDER — DIPHENHYDRAMINE HYDROCHLORIDE 50 MG/ML
50 INJECTION INTRAMUSCULAR; INTRAVENOUS
Status: CANCELLED
Start: 2022-01-13

## 2021-12-16 RX ORDER — ALBUTEROL SULFATE 0.83 MG/ML
2.5 SOLUTION RESPIRATORY (INHALATION)
Status: CANCELLED | OUTPATIENT
Start: 2022-01-13

## 2021-12-16 RX ORDER — MEPERIDINE HYDROCHLORIDE 25 MG/ML
25 INJECTION INTRAMUSCULAR; INTRAVENOUS; SUBCUTANEOUS EVERY 30 MIN PRN
Status: CANCELLED | OUTPATIENT
Start: 2022-01-13

## 2021-12-16 RX ORDER — HEPARIN SODIUM,PORCINE 10 UNIT/ML
5 VIAL (ML) INTRAVENOUS
Status: CANCELLED | OUTPATIENT
Start: 2022-01-13

## 2021-12-16 RX ORDER — LEVOTHYROXINE SODIUM 137 UG/1
137 TABLET ORAL DAILY
Qty: 30 TABLET | Refills: 3 | Status: SHIPPED | OUTPATIENT
Start: 2021-12-16 | End: 2022-07-01

## 2021-12-16 RX ORDER — EPINEPHRINE 1 MG/ML
0.3 INJECTION, SOLUTION INTRAMUSCULAR; SUBCUTANEOUS EVERY 5 MIN PRN
Status: CANCELLED | OUTPATIENT
Start: 2022-01-13

## 2021-12-16 RX ORDER — METHYLPREDNISOLONE SODIUM SUCCINATE 125 MG/2ML
125 INJECTION, POWDER, LYOPHILIZED, FOR SOLUTION INTRAMUSCULAR; INTRAVENOUS
Status: CANCELLED
Start: 2022-01-13

## 2021-12-16 RX ORDER — NALOXONE HYDROCHLORIDE 0.4 MG/ML
0.2 INJECTION, SOLUTION INTRAMUSCULAR; INTRAVENOUS; SUBCUTANEOUS
Status: CANCELLED | OUTPATIENT
Start: 2022-01-13

## 2021-12-16 RX ORDER — LORAZEPAM 2 MG/ML
0.5 INJECTION INTRAMUSCULAR EVERY 4 HOURS PRN
Status: CANCELLED | OUTPATIENT
Start: 2022-01-13

## 2021-12-16 RX ORDER — HEPARIN SODIUM (PORCINE) LOCK FLUSH IV SOLN 100 UNIT/ML 100 UNIT/ML
5 SOLUTION INTRAVENOUS
Status: CANCELLED | OUTPATIENT
Start: 2022-01-13

## 2021-12-16 RX ORDER — ALBUTEROL SULFATE 90 UG/1
1-2 AEROSOL, METERED RESPIRATORY (INHALATION)
Status: CANCELLED
Start: 2022-01-13

## 2021-12-17 ENCOUNTER — TELEPHONE (OUTPATIENT)
Dept: ONCOLOGY | Facility: HOSPITAL | Age: 71
End: 2021-12-17
Payer: COMMERCIAL

## 2021-12-17 NOTE — TELEPHONE ENCOUNTER
RN Cancer Care Coordinator called patient's daughter at request of Dr. Alan Frias to let them know that he had send the thyroid prescription to the pharmacy. Rea wasn't sure if her brother knew this yet, but will check in with him to get this for the patient.

## 2021-12-23 ENCOUNTER — OFFICE VISIT (OUTPATIENT)
Dept: RADIATION ONCOLOGY | Facility: HOSPITAL | Age: 71
End: 2021-12-23
Attending: RADIOLOGY
Payer: COMMERCIAL

## 2021-12-23 ENCOUNTER — INFUSION THERAPY VISIT (OUTPATIENT)
Dept: INFUSION THERAPY | Facility: HOSPITAL | Age: 71
End: 2021-12-23
Attending: INTERNAL MEDICINE
Payer: COMMERCIAL

## 2021-12-23 DIAGNOSIS — C11.9 SQUAMOUS CELL CARCINOMA OF NASOPHARYNX (H): Primary | ICD-10-CM

## 2021-12-23 LAB
ALBUMIN SERPL-MCNC: 3.4 G/DL (ref 3.5–5)
ALP SERPL-CCNC: 73 U/L (ref 45–120)
ALT SERPL W P-5'-P-CCNC: 63 U/L (ref 0–45)
ANION GAP SERPL CALCULATED.3IONS-SCNC: 9 MMOL/L (ref 5–18)
AST SERPL W P-5'-P-CCNC: 74 U/L (ref 0–40)
BILIRUB SERPL-MCNC: 0.7 MG/DL (ref 0–1)
BUN SERPL-MCNC: 27 MG/DL (ref 8–22)
CALCIUM SERPL-MCNC: 8.3 MG/DL (ref 8.5–10.5)
CHLORIDE BLD-SCNC: 108 MMOL/L (ref 98–107)
CO2 SERPL-SCNC: 23 MMOL/L (ref 22–31)
CREAT SERPL-MCNC: 1.25 MG/DL (ref 0.7–1.3)
GFR SERPL CREATININE-BSD FRML MDRD: 62 ML/MIN/1.73M2
GLUCOSE BLD-MCNC: 133 MG/DL (ref 70–125)
POTASSIUM BLD-SCNC: 3.7 MMOL/L (ref 3.5–5)
PROT SERPL-MCNC: 6.9 G/DL (ref 6–8)
SODIUM SERPL-SCNC: 140 MMOL/L (ref 136–145)
T4 FREE SERPL-MCNC: 0.78 NG/DL (ref 0.7–1.8)
TSH SERPL DL<=0.005 MIU/L-ACNC: 85.95 UIU/ML (ref 0.3–5)

## 2021-12-23 PROCEDURE — 258N000003 HC RX IP 258 OP 636: Performed by: INTERNAL MEDICINE

## 2021-12-23 PROCEDURE — 96413 CHEMO IV INFUSION 1 HR: CPT

## 2021-12-23 PROCEDURE — 84439 ASSAY OF FREE THYROXINE: CPT | Performed by: INTERNAL MEDICINE

## 2021-12-23 PROCEDURE — 250N000011 HC RX IP 250 OP 636: Performed by: INTERNAL MEDICINE

## 2021-12-23 PROCEDURE — 84443 ASSAY THYROID STIM HORMONE: CPT | Performed by: INTERNAL MEDICINE

## 2021-12-23 PROCEDURE — 82040 ASSAY OF SERUM ALBUMIN: CPT | Performed by: INTERNAL MEDICINE

## 2021-12-23 PROCEDURE — G0463 HOSPITAL OUTPT CLINIC VISIT: HCPCS | Mod: 25

## 2021-12-23 PROCEDURE — 99214 OFFICE O/P EST MOD 30 MIN: CPT | Performed by: RADIOLOGY

## 2021-12-23 RX ORDER — HEPARIN SODIUM,PORCINE 10 UNIT/ML
5 VIAL (ML) INTRAVENOUS
Status: DISCONTINUED | OUTPATIENT
Start: 2021-12-23 | End: 2021-12-23 | Stop reason: HOSPADM

## 2021-12-23 RX ORDER — HEPARIN SODIUM (PORCINE) LOCK FLUSH IV SOLN 100 UNIT/ML 100 UNIT/ML
5 SOLUTION INTRAVENOUS
Status: DISCONTINUED | OUTPATIENT
Start: 2021-12-23 | End: 2021-12-23 | Stop reason: HOSPADM

## 2021-12-23 RX ADMIN — HEPARIN 5 ML: 100 SYRINGE at 14:39

## 2021-12-23 RX ADMIN — SODIUM CHLORIDE 200 MG: 9 INJECTION, SOLUTION INTRAVENOUS at 14:09

## 2021-12-23 NOTE — PROGRESS NOTES
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  The most recent restaging work-up indicated right ethmoid sinus biopsy-proven recurrence.      TYPE OF RADIATION THERAPY ADMINISTERED:   1. Concurrent chemoradiation therapy for his head neck cancer with weekly cisplatin and had radiation therapy in our clinic with a total dose of 7000 cGy in 35 treatments given from 3/2/2020-4/17/2020.      2. Stereotactic radiosurgery targeted to the recurrence lesion involving right ethmoid sinus with a total dose of 3500 cGy in 5 treatments given from 11/8/2021-11/17/2021.      INTERVAL SINCE COMPLETION OF RADIATION THERAPY:   1. 1 year and 8 months for primary nasal pharyngeal cancer.     2.  1 months for right ethmoid sinus recurrence      SUBJECTIVE:  Mr. Chapman is a 71 year old male who has been in his usual state of good health until recently.  Patient was found to have swelling right neck mass by his lerma a month ago for which he was a seeking further evaluation.  The patient did not notice any significant changes over the past few weeks and he is also asymptomatic.  Initial ENT examination showed suspicious abnormalities in the right nasopharynx worrisome for malignancy.  CT of the neck on 1/24/2020 revealed asymmetric soft tissue thickening in the posterior right nasopharynx as well as abnormal enlarged cervical lymph nodes measuring up to 3.8 cm bilaterally.  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  The patient also had a staging work-up including PET CT scan and MRI brain which showed locally advanced disease with lymph node metastasis.  The PET CT scan also showed abnormal increased activity in the hilar region suspicious for metastasis.  Patient was then referred  to pulmonology for evaluation and possible biopsy.  Patient then underwent bronchoscopy and EBUS on 2/27/2020 with biopsy showed negative for malignancy. He received concurrent chemoradiation therapy for his head neck cancer and had radiation therapy in our clinic with a total dose of 7000 cGy in 35 treatments given from 3/2/2020-4/17/2020.  He also received 3 cycles of cisplatin and 5-FU during the course of radiation therapy. He tolerated radiation therapy reasonably well with expected acute side effect.  The patient insisted not to have PEG tube placement during the therapy.  He however is able to maintain his weight reasonably stable during the treatment.     The patient experienced worsening pain of sore throat and dysphasia since completion of the radiation therapy.  He is not able to get adequate calorie and fluid intake and was admitted to hospital one week after treatment for supportive care and pain management.  A PEG tube was also placed during the hospital stay. The patient felt much better and improved since hospital stay.  The patient also experienced acute onset of left parotitis 4 weeks post cancer therapy and was admitted to the hospital for ongoing treatment including antibiotics.  His condition has been significant improved since then.      He is also receiving adjuvant chemotherapy under supervision of Dr. Frias.       He presented with right-sided eye watering and rhinorrhea from the right nare with MRI imaging on 7/30/2021 concerning for a mass within the right anterior ethmoid air cells.  Patient had a endoscopy and biopsy of right ethmoid sinus on 9/7/2021 with pathology confirmed EBV mediated nasopharyngeal carcinoma. The patient had restaging PET CT scan on 9/16/2021 which showed hypermetabolic mass situated about the right ethmoid sinus with extension into the right orbital extraconal fat, compatible with biopsy-proven nasopharyngeal carcinoma deposit. There are hypermetabolic right level  Ib and 2A lymphadenopathy suggestive of metastases nodes.   There are also new hypermetabolic mediastinal lymphadenopathy, most suspicious for metastatic disease. Reactive nodes are on the differential diagnosis.  His case has been discussed at tumor conference on 9/17/2021 at [facility] and the consensus recommendation is to consider possible chemoradiation therapy.  The patient also had ultrasound-guided needle biopsy for his cervical lymph node 10/19/2021 and pathology confirmed lymph node metastasis. He saw Dr. Frias, medical oncology and patient was determined not a good candidate for chemotherapy given his current medical condition.       I have discussed with the patient extensively about treatment options including surgery, systemic therapy, and salvage radiation therapy. He is not interested in the surgical resection given the potential side effect/deformity involved.  Patient also wished not to travel any other location for radiation therapy i.e. proton therapy due to the family reason.  The patient wished to consider stereotactic radiosurgery for his local recurrence. He therefore received stereotactic radiosurgery targeted to the recurrence lesion involving right sigmoidal sinus with a total dose of 3500 cGy in 5 treatments given from 11/8/2021-11/17/2021.  He tolerated radiation therapy very well with minimal side effect.       The patient has been recovering well since completion of the radiation therapy.  He denies any pain discomfort at the time of evaluation.  He is currently able to eat and swallowing without any significant difficulty.  He denies any vision changes or headache at time evaluation.  The patient had restaging CT neck and chest on 12/13/2021. He is here for routine post therapy office follow-up.    Medications were reviewed and are up to date on EPIC.    The following portions of the patient's history were reviewed and updated as appropriate: allergies, current  medications, past family history, past medical history, past social history, past surgical history and problem list.    Review of Systems:      General  Constitutional  Constitutional (WDL): Exceptions to WDL  Fatigue: Fatigue relieved by rest  EENT  Eye Disorders  Eye Disorder (WDL): All eye disorder elements are within defined limits  Ear Disorders  Ear Disorder (WDL): Exceptions to WDL (hard of hearing)  Respiratory  Respiratory  Respiratory (WDL): All respiratory elements are within defined limits  Cardiovascular  Cardiovascular  Cardiovascular (WDL): All cardiovascular elements are within defined limits  Gastrointestinal  Gastrointestinal  Gastrointestinal (WDL): Exceptions to WDL (loss of some taste and smell)  Anorexia: Loss of appetite without alteration in eating habits  Dysgeusia: Altered taste with change in diet (e.g., oral supplements) OR noxious or unpleasant taste OR loss of taste  Dysphagia: Symptomatic, able to eat regular diet (tolerates soft foods)  Musculoskeletal  Musculoskeletal and Connective Tissue Disorders  Musculoskeletal & Connective (WDL): All musculoskeletal & connective elements are within defined limits  Integumentary  Integumentary  Integumentary (WDL): All integumentary elements are within defined limits  Neurological  Neurosensory  Neurosensory (WDL): All neurosensory elements are within defined limits  Genitourinary/Reproductive  Genitourinary  Genitourinary (WDL): Assessment not pertinent to visit  Lymphatic  Lymph System Disorders  Lymph (WDL): All lymph elements are within defined limits  Pain     AUA Assessment                                                              Accompanied by  Accompanied By: family    Objective:      PHYSICAL EXAMINATION:    There were no vitals taken for this visit.    Gen: Alert, in NAD  Eyes: PERRL, EOMI, sclera anicteric  Pulm: No wheezing, stridor or respiratory distress  CV: Well-perfused, no cyanosis, no pedal edema  Back: No step-offs or  pain to palpation along the thoracolumbar spine  Rectal: Deferred  : Deferred  Musculoskeletal: Normal muscle bulk and tone  Skin: Normal color and turgor  Neurologic: A/Ox3, CN II-XII intact, normal gait and station  Psychiatric: Appropriate mood and affect     Imaging: Imaging results 30 days: CT Chest w Contrast    Result Date: 12/14/2021  EXAM: CT CHEST W CONTRAST LOCATION: [facility] DATE/TIME: 12/13/2021 7:54 PM INDICATION: Non-small cell lung cancer, monitor COMPARISON: PET scan 06/19/2021 TECHNIQUE: CT chest with IV contrast. Multiplanar reformats were obtained. Dose reduction techniques were used. CONTRAST: isovue 370 75ml FINDINGS: LUNGS AND PLEURA: Stable 6 mm irregular nodule right upper lobe. No new lung nodules. MEDIASTINUM/AXILLAE: The previously noted hilar nodes and mediastinal nodes on PET scan appears slightly smaller. Largest left hilar node measures 8 mm on image 112. Stable 8 mm precarinal node. No chandler lymphadenopathy. Stable 4.5 cm ascending thoracic aortic aneurysm. CORONARY ARTERY CALCIFICATION: None. UPPER ABDOMEN: Cirrhosis. Some generalized mild soft tissue stranding in the mesentery. Stable hypodensity in the spleen most likely benign. MUSCULOSKELETAL: No concerning bone lesions.     IMPRESSION: 1.  Slight decrease in size of the hilar lymph nodes since PET scan 09/16/2021. Stable mildly prominent mediastinal nodes still within normal size limits. 2.  Stable 6 mm nodule right upper lobe.    CT Soft Tissue Neck w Contrast    Result Date: 12/14/2021  EXAM: CT SOFT TISSUE NECK W CONTRAST LOCATION: [facility] DATE/TIME: 12/13/2021 7:51 PM INDICATION: Nasopharyngeal cancer, monitor nasopharyngeal cancer, recurrence suspected or known. COMPARISON: PET CT 9/16/2021, soft tissue neck CT 9/16/2021 and 1/24/2020. CONTRAST: 75 mL Isovue 370. TECHNIQUE: Routine CT soft tissue neck with IV contrast. Multiplanar reformats. Dose reduction techniques  were used. FINDINGS: MUCOSAL SPACES/SOFT TISSUES: Interval significant decrease in the previously noted lobulated soft tissue mass involving the right ethmoid air cells and extending into the medial right orbit. There is associated loss of the medial right orbital wall and the medial wall of the right ethmoid air cells. Post radiation related changes. Redemonstration of mild asymmetric thickening along the left posterior lateral nasopharynx without discrete mass lesion. No suspicious lesions involving the oropharynx. Normal-appearing epiglottis. No suspicious lesions within the laryngeal structures. LYMPH NODES: 8.3 mm right level II-A node, previously measured 6.8 mm. 14.5 mm right level I-B node, previously measured 13.6 mm. SALIVARY GLANDS: Normal parotid and submandibular glands. THYROID: Multinodular thyroid gland. VESSELS: Vascular structures of the neck are patent. VISUALIZED INTRACRANIAL/ORBITS/SINUSES: No abnormality of the visualized intracranial compartment or orbits. Moderate mucosal thickening of the ethmoid air cells. Moderate chronic mucosal thickening of the right maxillary sinus. The mastoid air cells are  clear. OTHER: Moderate degenerative changes of the cervical spine. The included lung apices are clear.     IMPRESSION: 1.  Interval significant decrease in the previously noted lobulated soft tissue mass involving the right ethmoid air cells and extending into the medial right orbit compared to PET CT 9/16/2021. There is associated loss of the medial right orbital wall and the medial wall of the right ethmoid air cells. 2.  Post radiation related changes. 3.  Redemonstration of mild asymmetric thickening along the left posterior lateral nasopharynx without discrete mass lesion. Correlate with direct visualization. 4.  8.3 mm right level II-A node, previously measured 6.8 mm. 14.5 mm right level I-B node, previously measured 13.6 mm. Both of these nodes were hypermetabolic on the prior PET CT.         Impression     Patient is a 71-year-old gentleman with a diagnosis of nasopharyngeal cancer status post concurrent chemoradiation therapy 1 year and 8 months ago.  The most recent restaging work-up indicated right ethmoid sinus biopsy-proven recurrence with extension into the right orbital extraconal fat with possible right level 1B and 2A lymph node metastasis.  Patient received salvage SBRT for his right ethmoid sinus recurrence 1 month ago.    Restaging CT scan showed good response.     Assessment & Plan:     1.  The patient has been recovering well since salvage radiation therapy.  I have personally reviewed his restaging CT neck and chest scan today and compared to the previous PET CT scan.  There is a good response to the right ethmoid sinus recurrence area.  The area of concern in the mediastinum and hilar region has also been stable possibly due to benign process.  The right cervical lymph node continue to increase in size consistent with progression of disease. The possible treatment options for cervical lymph node recurrence has been again discussed with the patient including surgery, systemic therapy, and salvage radiation therapy.  Patient is adamant against surgical procedure at this time.  He is scheduled to start systemic therapy later on today with Dr. Frias, medicl oncology.      2.  He is scheduled to see Dr. Frias, med oncology later on today for evaluation and consideration of possible systemic therapy i.e. immunotherapy.     3.  We will discussed with patient again in the future for possible surgical resection or salvage radiation therapy for his neck recurrence after systemic therapy.  Patient is informed to contact radiation oncology later on to set up time for follow-up visit after completion of systemic therapy.        Face to face time  30 minutes with > 80% spent on consultation, education and coordination of care.        Laurita Mendoza MD, PhD  Department of Radiation Oncology    [facility]  Tel: [phone]  Page: [phone]    [facility]  [address]  [place] [address]     [facility]   [address] [place] [address]    CC:  Patient Care Team:  Issa Cook MD as PCP - General  Alan Frias MD as MD (Hematology & Oncology)  Laurita Mendoza MD as MD (Hematology & Oncology)  Issa Cook MD as Assigned PCP  Marcial Cheng MD as Assigned Surgical Provider  Laurita Mendoza MD as Assigned Cancer Care Provider  Eduardo Grier MD as Assigned Infectious Disease Provider  Ky Centeno MD as MD (Otolaryngology)  Brianna Fuller, RN as Specialty Care Coordinator (Hematology & Oncology)

## 2021-12-23 NOTE — PROGRESS NOTES
PT here ambulatory for keytruda infusion.Port accessed and labs drawn. Lab results approved for txt. Keytruda infused and pt tolerated without any problems. txt completed/port flushed and deaccessed with 2x2 to site. Follow up reviewed and pt dc'd steady gait

## 2021-12-23 NOTE — LETTER
12/23/2021         RE: Kristen Chapman  [address] [place] [address]        Dear Colleague,    Thank you for referring your patient, Kristen Chapman, to the [facility] RADIATION ONCOLOGY [place]. Please see a copy of my visit note below.    Pt here with his son for f/u with Dr. Mendoza, will go upstairs for systemic therapy after visiting with Dr. Mendoza. No new concerns today.     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  The most recent restaging work-up indicated right ethmoid sinus biopsy-proven recurrence.      TYPE OF RADIATION THERAPY ADMINISTERED:   1. Concurrent chemoradiation therapy for his head neck cancer with weekly cisplatin and had radiation therapy in our clinic with a total dose of 7000 cGy in 35 treatments given from 3/2/2020-4/17/2020.      2. Stereotactic radiosurgery targeted to the recurrence lesion involving right ethmoid sinus with a total dose of 3500 cGy in 5 treatments given from 11/8/2021-11/17/2021.      INTERVAL SINCE COMPLETION OF RADIATION THERAPY:   1. 1 year and 8 months for primary nasal pharyngeal cancer.     2.  1 months for right ethmoid sinus recurrence      SUBJECTIVE:  Mr. Chapman is a 71 year old male who has been in his usual state of good health until recently.  Patient was found to have swelling right neck mass by his lerma a month ago for which he was a seeking further evaluation.  The patient did not notice any significant changes over the past few weeks and he is also asymptomatic.  Initial ENT examination showed suspicious abnormalities in the right nasopharynx worrisome for malignancy.  CT of the neck on 1/24/2020 revealed asymmetric soft tissue thickening in the posterior right nasopharynx as well as abnormal enlarged cervical lymph nodes measuring up to 3.8 cm bilaterally.  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  The patient also had a staging work-up including PET CT scan and MRI brain which showed locally advanced disease with lymph node metastasis.  The PET CT scan also showed abnormal increased activity in the hilar region suspicious for metastasis.  Patient was then referred to pulmonology for evaluation and possible biopsy.  Patient then underwent bronchoscopy and EBUS on 2/27/2020 with biopsy showed negative for malignancy. He received concurrent chemoradiation therapy for his head neck cancer and had radiation therapy in our clinic with a total dose of 7000 cGy in 35 treatments given from 3/2/2020-4/17/2020.  He also received 3 cycles of cisplatin and 5-FU during the course of radiation therapy. He tolerated radiation therapy reasonably well with expected acute side effect.  The patient insisted not to have PEG tube placement during the therapy.  He however is able to maintain his weight reasonably stable during the treatment.     The patient experienced worsening pain of sore throat and dysphasia since completion of the radiation therapy.  He is not able to get adequate calorie and fluid intake and was admitted to hospital one week after treatment for supportive care and pain management.  A PEG tube was also placed during the hospital stay. The patient felt much better and improved since hospital stay.  The patient also experienced acute onset of left parotitis 4 weeks post cancer therapy and was admitted to the hospital for ongoing treatment including antibiotics.  His condition has been significant improved since then.      He is also receiving adjuvant chemotherapy under supervision of Dr. Frias.       He presented with right-sided eye watering and rhinorrhea from the right nare with MRI imaging on 7/30/2021 concerning for a mass within the right anterior ethmoid air cells.  Patient had a  endoscopy and biopsy of right ethmoid sinus on 9/7/2021 with pathology confirmed EBV mediated nasopharyngeal carcinoma. The patient had restaging PET CT scan on 9/16/2021 which showed hypermetabolic mass situated about the right ethmoid sinus with extension into the right orbital extraconal fat, compatible with biopsy-proven nasopharyngeal carcinoma deposit. There are hypermetabolic right level Ib and 2A lymphadenopathy suggestive of metastases nodes.   There are also new hypermetabolic mediastinal lymphadenopathy, most suspicious for metastatic disease. Reactive nodes are on the differential diagnosis.  His case has been discussed at tumor conference on 9/17/2021 at [facility] and the consensus recommendation is to consider possible chemoradiation therapy.  The patient also had ultrasound-guided needle biopsy for his cervical lymph node 10/19/2021 and pathology confirmed lymph node metastasis. He saw Dr. Frias, medical oncology and patient was determined not a good candidate for chemotherapy given his current medical condition.       I have discussed with the patient extensively about treatment options including surgery, systemic therapy, and salvage radiation therapy. He is not interested in the surgical resection given the potential side effect/deformity involved.  Patient also wished not to travel any other location for radiation therapy i.e. proton therapy due to the family reason.  The patient wished to consider stereotactic radiosurgery for his local recurrence. He therefore received stereotactic radiosurgery targeted to the recurrence lesion involving right sigmoidal sinus with a total dose of 3500 cGy in 5 treatments given from 11/8/2021-11/17/2021.  He tolerated radiation therapy very well with minimal side effect.       The patient has been recovering well since completion of the radiation therapy.  He denies any pain discomfort at the time of evaluation.  He is currently able to eat and  swallowing without any significant difficulty.  He denies any vision changes or headache at time evaluation.  The patient had restaging CT neck and chest on 12/13/2021. He is here for routine post therapy office follow-up.    Medications were reviewed and are up to date on EPIC.    The following portions of the patient's history were reviewed and updated as appropriate: allergies, current medications, past family history, past medical history, past social history, past surgical history and problem list.    Review of Systems:      General  Constitutional  Constitutional (WDL): Exceptions to WDL  Fatigue: Fatigue relieved by rest  EENT  Eye Disorders  Eye Disorder (WDL): All eye disorder elements are within defined limits  Ear Disorders  Ear Disorder (WDL): Exceptions to WDL (hard of hearing)  Respiratory  Respiratory  Respiratory (WDL): All respiratory elements are within defined limits  Cardiovascular  Cardiovascular  Cardiovascular (WDL): All cardiovascular elements are within defined limits  Gastrointestinal  Gastrointestinal  Gastrointestinal (WDL): Exceptions to WDL (loss of some taste and smell)  Anorexia: Loss of appetite without alteration in eating habits  Dysgeusia: Altered taste with change in diet (e.g., oral supplements) OR noxious or unpleasant taste OR loss of taste  Dysphagia: Symptomatic, able to eat regular diet (tolerates soft foods)  Musculoskeletal  Musculoskeletal and Connective Tissue Disorders  Musculoskeletal & Connective (WDL): All musculoskeletal & connective elements are within defined limits  Integumentary  Integumentary  Integumentary (WDL): All integumentary elements are within defined limits  Neurological  Neurosensory  Neurosensory (WDL): All neurosensory elements are within defined limits  Genitourinary/Reproductive  Genitourinary  Genitourinary (WDL): Assessment not pertinent to visit  Lymphatic  Lymph System Disorders  Lymph (WDL): All lymph elements are within defined  limits  Pain     AUA Assessment                                                              Accompanied by  Accompanied By: family    Objective:      PHYSICAL EXAMINATION:    There were no vitals taken for this visit.    Gen: Alert, in NAD  Eyes: PERRL, EOMI, sclera anicteric  Pulm: No wheezing, stridor or respiratory distress  CV: Well-perfused, no cyanosis, no pedal edema  Back: No step-offs or pain to palpation along the thoracolumbar spine  Rectal: Deferred  : Deferred  Musculoskeletal: Normal muscle bulk and tone  Skin: Normal color and turgor  Neurologic: A/Ox3, CN II-XII intact, normal gait and station  Psychiatric: Appropriate mood and affect     Imaging: Imaging results 30 days: CT Chest w Contrast    Result Date: 12/14/2021  EXAM: CT CHEST W CONTRAST LOCATION: [facility] DATE/TIME: 12/13/2021 7:54 PM INDICATION: Non-small cell lung cancer, monitor COMPARISON: PET scan 06/19/2021 TECHNIQUE: CT chest with IV contrast. Multiplanar reformats were obtained. Dose reduction techniques were used. CONTRAST: isovue 370 75ml FINDINGS: LUNGS AND PLEURA: Stable 6 mm irregular nodule right upper lobe. No new lung nodules. MEDIASTINUM/AXILLAE: The previously noted hilar nodes and mediastinal nodes on PET scan appears slightly smaller. Largest left hilar node measures 8 mm on image 112. Stable 8 mm precarinal node. No chandler lymphadenopathy. Stable 4.5 cm ascending thoracic aortic aneurysm. CORONARY ARTERY CALCIFICATION: None. UPPER ABDOMEN: Cirrhosis. Some generalized mild soft tissue stranding in the mesentery. Stable hypodensity in the spleen most likely benign. MUSCULOSKELETAL: No concerning bone lesions.     IMPRESSION: 1.  Slight decrease in size of the hilar lymph nodes since PET scan 09/16/2021. Stable mildly prominent mediastinal nodes still within normal size limits. 2.  Stable 6 mm nodule right upper lobe.    CT Soft Tissue Neck w Contrast    Result Date: 12/14/2021  EXAM: CT SOFT  TISSUE NECK W CONTRAST LOCATION: [facility] DATE/TIME: 12/13/2021 7:51 PM INDICATION: Nasopharyngeal cancer, monitor nasopharyngeal cancer, recurrence suspected or known. COMPARISON: PET CT 9/16/2021, soft tissue neck CT 9/16/2021 and 1/24/2020. CONTRAST: 75 mL Isovue 370. TECHNIQUE: Routine CT soft tissue neck with IV contrast. Multiplanar reformats. Dose reduction techniques were used. FINDINGS: MUCOSAL SPACES/SOFT TISSUES: Interval significant decrease in the previously noted lobulated soft tissue mass involving the right ethmoid air cells and extending into the medial right orbit. There is associated loss of the medial right orbital wall and the medial wall of the right ethmoid air cells. Post radiation related changes. Redemonstration of mild asymmetric thickening along the left posterior lateral nasopharynx without discrete mass lesion. No suspicious lesions involving the oropharynx. Normal-appearing epiglottis. No suspicious lesions within the laryngeal structures. LYMPH NODES: 8.3 mm right level II-A node, previously measured 6.8 mm. 14.5 mm right level I-B node, previously measured 13.6 mm. SALIVARY GLANDS: Normal parotid and submandibular glands. THYROID: Multinodular thyroid gland. VESSELS: Vascular structures of the neck are patent. VISUALIZED INTRACRANIAL/ORBITS/SINUSES: No abnormality of the visualized intracranial compartment or orbits. Moderate mucosal thickening of the ethmoid air cells. Moderate chronic mucosal thickening of the right maxillary sinus. The mastoid air cells are  clear. OTHER: Moderate degenerative changes of the cervical spine. The included lung apices are clear.     IMPRESSION: 1.  Interval significant decrease in the previously noted lobulated soft tissue mass involving the right ethmoid air cells and extending into the medial right orbit compared to PET CT 9/16/2021. There is associated loss of the medial right orbital wall and the medial wall of the  right ethmoid air cells. 2.  Post radiation related changes. 3.  Redemonstration of mild asymmetric thickening along the left posterior lateral nasopharynx without discrete mass lesion. Correlate with direct visualization. 4.  8.3 mm right level II-A node, previously measured 6.8 mm. 14.5 mm right level I-B node, previously measured 13.6 mm. Both of these nodes were hypermetabolic on the prior PET CT.        Impression     Patient is a 71-year-old gentleman with a diagnosis of nasopharyngeal cancer status post concurrent chemoradiation therapy 1 year and 8 months ago.  The most recent restaging work-up indicated right ethmoid sinus biopsy-proven recurrence with extension into the right orbital extraconal fat with possible right level 1B and 2A lymph node metastasis.  Patient received salvage SBRT for his right ethmoid sinus recurrence 1 month ago.    Restaging CT scan showed good response.     Assessment & Plan:     1.  The patient has been recovering well since salvage radiation therapy.  I have personally reviewed his restaging CT neck and chest scan today and compared to the previous PET CT scan.  There is a good response to the right ethmoid sinus recurrence area.  The area of concern in the mediastinum and hilar region has also been stable possibly due to benign process.  The right cervical lymph node continue to increase in size consistent with progression of disease. The possible treatment options for cervical lymph node recurrence has been again discussed with the patient including surgery, systemic therapy, and salvage radiation therapy.  Patient is adamant against surgical procedure at this time.  He is scheduled to start systemic therapy later on today with Dr. Frias, medicl oncology.      2.  He is scheduled to see Dr. Frias, med oncology later on today for evaluation and consideration of possible systemic therapy i.e. immunotherapy.     3.  We will discussed with patient again in the future for  possible surgical resection or salvage radiation therapy for his neck recurrence after systemic therapy.  Patient is informed to contact radiation oncology later on to set up time for follow-up visit after completion of systemic therapy.        Face to face time  30 minutes with > 80% spent on consultation, education and coordination of care.        Laurita Mendoza MD, PhD  Department of Radiation Oncology   [facility]  Tel: [phone]  Page: [phone]    [facility]  [address] [place] [address]     [place] [id number] [facility]   [place] [address]    CC:  Patient Care Team:  Issa Cook MD as PCP - General  Alan Frias MD as MD (Hematology & Oncology)  Laurita Mendoza MD as MD (Hematology & Oncology)  Issa Cook MD as Assigned PCP  Marcial Cheng MD as Assigned Surgical Provider  Laurita Mendoza MD as Assigned Cancer Care Provider  Eduardo Grier MD as Assigned Infectious Disease Provider  Ky Centeno MD as MD (Otolaryngology)  Brianna Fuller RN as Specialty Care Coordinator (Hematology & Oncology)        Again, thank you for allowing me to participate in the care of your patient.        Sincerely,        Laurita Mendoza MD

## 2021-12-23 NOTE — PROGRESS NOTES
Pt here with his son for f/u with Dr. Mendoza, will go upstairs for systemic therapy after visiting with Dr. Mendoza. No new concerns today.

## 2021-12-29 ENCOUNTER — APPOINTMENT (OUTPATIENT)
Dept: INTERPRETER SERVICES | Facility: CLINIC | Age: 71
End: 2021-12-29
Payer: COMMERCIAL

## 2022-01-03 ENCOUNTER — LAB (OUTPATIENT)
Dept: LAB | Facility: CLINIC | Age: 72
End: 2022-01-03
Payer: COMMERCIAL

## 2022-01-03 DIAGNOSIS — B18.1 HEPATITIS B, CHRONIC (H): ICD-10-CM

## 2022-01-03 LAB
ALBUMIN SERPL-MCNC: 3.3 G/DL (ref 3.5–5)
ALP SERPL-CCNC: 90 U/L (ref 45–120)
ALT SERPL W P-5'-P-CCNC: 110 U/L (ref 0–45)
AST SERPL W P-5'-P-CCNC: 127 U/L (ref 0–40)
BILIRUB DIRECT SERPL-MCNC: 0.3 MG/DL
BILIRUB SERPL-MCNC: 0.6 MG/DL (ref 0–1)
PROT SERPL-MCNC: 6.9 G/DL (ref 6–8)

## 2022-01-03 PROCEDURE — 87517 HEPATITIS B DNA QUANT: CPT

## 2022-01-03 PROCEDURE — 36415 COLL VENOUS BLD VENIPUNCTURE: CPT

## 2022-01-03 PROCEDURE — 80076 HEPATIC FUNCTION PANEL: CPT

## 2022-01-04 LAB
HBV DNA SERPL NAA+PROBE-ACNC: ABNORMAL IU/ML
HBV DNA SERPL NAA+PROBE-LOG IU: 4.2 {LOG_IU}/ML

## 2022-01-10 ENCOUNTER — OFFICE VISIT (OUTPATIENT)
Dept: INFECTIOUS DISEASES | Facility: CLINIC | Age: 72
End: 2022-01-10
Payer: COMMERCIAL

## 2022-01-10 VITALS
BODY MASS INDEX: 26.05 KG/M2 | WEIGHT: 147 LBS | DIASTOLIC BLOOD PRESSURE: 88 MMHG | TEMPERATURE: 98.5 F | HEART RATE: 64 BPM | SYSTOLIC BLOOD PRESSURE: 130 MMHG

## 2022-01-10 DIAGNOSIS — B18.1 HEPATITIS B, CHRONIC (H): Primary | ICD-10-CM

## 2022-01-10 PROCEDURE — 99214 OFFICE O/P EST MOD 30 MIN: CPT

## 2022-01-10 NOTE — PROGRESS NOTES
Assessment:      Impression: Chronic hepatitis B infection, with compensated cirrhosis responding to lamivudine.  However, for unclear reasons, this had been discontinued in 2021 and then patient had rebound of hepatitis B viral load and LFTs.    Recurrence of nasopharyngeal carcinoma.  Declined recommended surgery, now taking pembrolizumab.  This may exacerbate his hepatitis.    Given his recurrent cancer, would not entertain escalating his hepatitis B treatment to interferon.           Plan:     Continue lamivudine daily.    Follow-up in 3 months    Subjective:      This is an follow-up infectious Disease visit for Kristen Chapman, who is a 70 y.o.  referred for evaluation of hepatitis B.     Is a 70-year-old gentleman of seen only in video visits over the last year for chronic hepatitis B.  He had been on treatment for nasopharyngeal carcinoma when he was noted to have elevated LFTs and a very high hepatitis B viral load.    Patient was started on lamivudine 100 mg a day and he is doing quite well.  He denies any complaints other than some bleeding in his nose and upon further questioning some pain and dryness in his mouth and sticking when he swallows.    He has been taking Magic mouthwash which does not really seem to help his symptoms significantly.    He denies abdominal pain.      The following portions of the patient's history were reviewed and updated as appropriate: allergies, current medications, past family history, past medical history, past social history, past surgical history and problem list.      Follow-up visit on August 23, 2021:    For reasons that are unclear, the lamivudine was discontinued several months ago.  He is generally feeling well though complains of some nasopharyngeal bleeding.    His thrush is resolved.    Follow-up visit on October 4, 2021:    Patient is back on the meeting.  He denies any specific complaints.  Unfortunately, since her last visit, he did receive a diagnosis that his  nasopharyngeal carcinoma has returned.  Chemotherapy and radiation therapy is in the works.    He denies any abdominal pain.    Follow-up visit on January 10, 2022:    Briefly, patient is doing well as far as symptoms go.  He did have recurrence of his nasopharyngeal cancer.  Surgery was recommended and declined.    Patient has been started on pembrolizumab by Dr. Frias.  This may exacerbate his hepatitis.    Patient did have his quarterly hepatitis B virus levels checked last week and they are a little bit lower.  His LFTs did take up, but suspect this may be related to the pembrolizumab.    He denies abdominal pain or discomfort.  He says he is eating well.  Denies vomiting or diarrhea.    Review of Systems  Performed and all negative except as mentioned above.      Objective:     /88   Pulse 64   Temp 98.5  F (36.9  C)   Wt 66.7 kg (147 lb)   BMI 26.05 kg/m       General:   alert, appears stated age and cooperative   Oropharynx:  Wearing a mask    Eyes:   Extraocular muscles intact, no icterus.    Ears:   Deferred   Neck:  no adenopathy and supple, symmetrical, trachea midline   Thyroid:   Deferred   Lung:  clear to auscultation bilaterally   Heart:   regular rate and rhythm, S1, S2 normal, no murmur, click, rub or gallop   Abdomen:  soft, non-tender; bowel sounds normal; no masses,  no organomegaly   Extremities:  extremities normal, atraumatic, no cyanosis or edema   Skin:  warm and dry, no hyperpigmentation, vitiligo, or suspicious lesions   CVA:   absent   Genitourinary:  defer exam   Neurological:   Grossly normal   Psychiatric:   normal mood, behavior, speech, dress, and thought processes     Results for JEAN-CLAUDE HO (MRN [id number]) as of 1/10/2022 08:42   Ref. Range 10/12/2020 08:37 12/14/2020 08:41 8/23/2021 10:27 10/4/2021 10:41 1/3/2022 15:18   HEP B Virus DNA Quant Log Unknown <1.3 <1.3 7.2 4.6 4.2   Results for JEAN-CLAUDE HO (MRN [id number]) as of 1/10/2022 08:42   Ref. Range 11/5/2021 09:36  12/9/2021 10:37 12/23/2021 13:05 1/3/2022 15:18   Sodium Latest Ref Range: 136 - 145 mmol/L 139  140    Potassium Latest Ref Range: 3.5 - 5.0 mmol/L 3.7  3.7    Chloride Latest Ref Range: 98 - 107 mmol/L 110 (H)  108 (H)    Carbon Dioxide Latest Ref Range: 22 - 31 mmol/L 26  23    Urea Nitrogen Latest Ref Range: 8 - 22 mg/dL 30 (H)  27 (H)    Creatinine Latest Ref Range: 0.70 - 1.30 mg/dL 1.08 1.30 1.25    GFR Estimate Latest Ref Range: >60 mL/min/1.73m2 70 56 (L) 62    Calcium Latest Ref Range: 8.5 - 10.5 mg/dL 9.0  8.3 (L)    Anion Gap Latest Ref Range: 5 - 18 mmol/L 3 (L)  9    Albumin Latest Ref Range: 3.5 - 5.0 g/dL 3.4 (L)  3.4 (L) 3.3 (L)   Protein Total Latest Ref Range: 6.0 - 8.0 g/dL 7.3  6.9 6.9   Bilirubin Total Latest Ref Range: 0.0 - 1.0 mg/dL 0.9  0.7 0.6   Alkaline Phosphatase Latest Ref Range: 45 - 120 U/L 113  73 90   ALT Latest Ref Range: 0 - 45 U/L 108 (H)  63 (H) 110 (H)   AST Latest Ref Range: 0 - 40 U/L 121 (H)  74 (H) 127 (H)   Bilirubin Direct Latest Ref Range: <=0.5 mg/dL    0.3   T4 Free Latest Ref Range: 0.70 - 1.80 ng/dL   0.78    TSH Latest Ref Range: 0.30 - 5.00 uIU/mL  339.98 (H) 85.95 (H)        Eduardo Grier MD

## 2022-01-13 ENCOUNTER — INFUSION THERAPY VISIT (OUTPATIENT)
Dept: INFUSION THERAPY | Facility: HOSPITAL | Age: 72
End: 2022-01-13
Attending: INTERNAL MEDICINE
Payer: COMMERCIAL

## 2022-01-13 ENCOUNTER — ONCOLOGY VISIT (OUTPATIENT)
Dept: ONCOLOGY | Facility: HOSPITAL | Age: 72
End: 2022-01-13
Attending: INTERNAL MEDICINE
Payer: COMMERCIAL

## 2022-01-13 VITALS
BODY MASS INDEX: 26.29 KG/M2 | TEMPERATURE: 99.2 F | WEIGHT: 148.4 LBS | HEART RATE: 66 BPM | OXYGEN SATURATION: 98 % | HEIGHT: 63 IN | RESPIRATION RATE: 18 BRPM | SYSTOLIC BLOOD PRESSURE: 131 MMHG | DIASTOLIC BLOOD PRESSURE: 80 MMHG

## 2022-01-13 DIAGNOSIS — C11.9 SQUAMOUS CELL CARCINOMA OF NASOPHARYNX (H): Primary | ICD-10-CM

## 2022-01-13 LAB
ALBUMIN SERPL-MCNC: 3.1 G/DL (ref 3.5–5)
ALP SERPL-CCNC: 91 U/L (ref 45–120)
ALT SERPL W P-5'-P-CCNC: 167 U/L (ref 0–45)
ANION GAP SERPL CALCULATED.3IONS-SCNC: 6 MMOL/L (ref 5–18)
AST SERPL W P-5'-P-CCNC: 171 U/L (ref 0–40)
BILIRUB SERPL-MCNC: 0.8 MG/DL (ref 0–1)
BUN SERPL-MCNC: 20 MG/DL (ref 8–22)
CALCIUM SERPL-MCNC: 8.3 MG/DL (ref 8.5–10.5)
CHLORIDE BLD-SCNC: 107 MMOL/L (ref 98–107)
CO2 SERPL-SCNC: 26 MMOL/L (ref 22–31)
CREAT SERPL-MCNC: 1.19 MG/DL (ref 0.7–1.3)
GFR SERPL CREATININE-BSD FRML MDRD: 66 ML/MIN/1.73M2
GLUCOSE BLD-MCNC: 154 MG/DL (ref 70–125)
POTASSIUM BLD-SCNC: 3.8 MMOL/L (ref 3.5–5)
PROT SERPL-MCNC: 6.5 G/DL (ref 6–8)
SODIUM SERPL-SCNC: 139 MMOL/L (ref 136–145)
TSH SERPL DL<=0.005 MIU/L-ACNC: 1.57 UIU/ML (ref 0.3–5)

## 2022-01-13 PROCEDURE — 99214 OFFICE O/P EST MOD 30 MIN: CPT | Performed by: NURSE PRACTITIONER

## 2022-01-13 PROCEDURE — 84443 ASSAY THYROID STIM HORMONE: CPT | Performed by: INTERNAL MEDICINE

## 2022-01-13 PROCEDURE — 250N000011 HC RX IP 250 OP 636: Performed by: INTERNAL MEDICINE

## 2022-01-13 PROCEDURE — 258N000003 HC RX IP 258 OP 636: Performed by: INTERNAL MEDICINE

## 2022-01-13 PROCEDURE — 96413 CHEMO IV INFUSION 1 HR: CPT

## 2022-01-13 PROCEDURE — G0463 HOSPITAL OUTPT CLINIC VISIT: HCPCS | Mod: 25

## 2022-01-13 PROCEDURE — 82040 ASSAY OF SERUM ALBUMIN: CPT | Performed by: INTERNAL MEDICINE

## 2022-01-13 PROCEDURE — 80053 COMPREHEN METABOLIC PANEL: CPT | Performed by: INTERNAL MEDICINE

## 2022-01-13 RX ORDER — DIPHENHYDRAMINE HYDROCHLORIDE 50 MG/ML
50 INJECTION INTRAMUSCULAR; INTRAVENOUS
Status: DISCONTINUED | OUTPATIENT
Start: 2022-01-13 | End: 2022-01-13 | Stop reason: HOSPADM

## 2022-01-13 RX ORDER — MEPERIDINE HYDROCHLORIDE 25 MG/ML
25 INJECTION INTRAMUSCULAR; INTRAVENOUS; SUBCUTANEOUS EVERY 30 MIN PRN
Status: DISCONTINUED | OUTPATIENT
Start: 2022-01-13 | End: 2022-01-13 | Stop reason: HOSPADM

## 2022-01-13 RX ORDER — METHYLPREDNISOLONE SODIUM SUCCINATE 125 MG/2ML
125 INJECTION, POWDER, LYOPHILIZED, FOR SOLUTION INTRAMUSCULAR; INTRAVENOUS
Status: DISCONTINUED | OUTPATIENT
Start: 2022-01-13 | End: 2022-01-13 | Stop reason: HOSPADM

## 2022-01-13 RX ORDER — EPINEPHRINE 1 MG/ML
0.3 INJECTION, SOLUTION INTRAMUSCULAR; SUBCUTANEOUS EVERY 5 MIN PRN
Status: DISCONTINUED | OUTPATIENT
Start: 2022-01-13 | End: 2022-01-13 | Stop reason: HOSPADM

## 2022-01-13 RX ORDER — ALBUTEROL SULFATE 0.83 MG/ML
2.5 SOLUTION RESPIRATORY (INHALATION)
Status: DISCONTINUED | OUTPATIENT
Start: 2022-01-13 | End: 2022-01-13 | Stop reason: HOSPADM

## 2022-01-13 RX ORDER — NALOXONE HYDROCHLORIDE 0.4 MG/ML
0.2 INJECTION, SOLUTION INTRAMUSCULAR; INTRAVENOUS; SUBCUTANEOUS
Status: DISCONTINUED | OUTPATIENT
Start: 2022-01-13 | End: 2022-01-13 | Stop reason: HOSPADM

## 2022-01-13 RX ORDER — HEPARIN SODIUM (PORCINE) LOCK FLUSH IV SOLN 100 UNIT/ML 100 UNIT/ML
5 SOLUTION INTRAVENOUS
Status: DISCONTINUED | OUTPATIENT
Start: 2022-01-13 | End: 2022-01-13 | Stop reason: HOSPADM

## 2022-01-13 RX ORDER — ALBUTEROL SULFATE 90 UG/1
1-2 AEROSOL, METERED RESPIRATORY (INHALATION)
Status: DISCONTINUED | OUTPATIENT
Start: 2022-01-13 | End: 2022-01-13 | Stop reason: HOSPADM

## 2022-01-13 RX ADMIN — SODIUM CHLORIDE 250 ML: 9 INJECTION, SOLUTION INTRAVENOUS at 11:19

## 2022-01-13 RX ADMIN — HEPARIN 5 ML: 100 SYRINGE at 11:56

## 2022-01-13 RX ADMIN — SODIUM CHLORIDE 200 MG: 9 INJECTION, SOLUTION INTRAVENOUS at 11:14

## 2022-01-13 ASSESSMENT — PAIN SCALES - GENERAL: PAINLEVEL: NO PAIN (0)

## 2022-01-13 ASSESSMENT — MIFFLIN-ST. JEOR: SCORE: 1333.14

## 2022-01-13 NOTE — PROGRESS NOTES
[facility] Hematology and Oncology Progress Note    Patient: Kristen Chapman  MRN: [id number]  Date of Service: Jan 13, 2022          Reason for Visit    Chief Complaint   Patient presents with     Oncology Clinic Visit     Squamous cell carcinoma of nasopharynx       Assessment and Plan    Cancer Staging  No matching staging information was found for the patient.    1.  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 He has had one cycle and that was fine. We will give him cycle 2 today. Return in 3 weeks for cycle 3.  We will reimage with a PET scan after 4 doses.    2.  Elevated liver function: These were elevated before he even started.  Likely from other medications.  They are more elevated after starting immunotherapy.  I Ivis go ahead and give him another dose today and if his liver function goes even higher we may have to think about holding.    3. history of elevated TSH: Patient was increased on his Synthroid recently.  His TSH is normal today.  We will recheck in 3 weeks.  Continue encourage him to take his medications as prescribed.      ECOG Performance    0 - Independent    Distress Screening (within last 30 days)    No data recorded     Pain  Pain Score: No Pain (0)    Problem List    Patient Active Problem List   Diagnosis     Nasopharyngeal carcinoma (H)     Squamous cell carcinoma of nasopharynx (H)     Hilar lymphadenopathy     Abnormal PET scan of mediastinum     Elevated serum creatinine     Antineoplastic chemotherapy induced pancytopenia (H)     Anemia     Weight loss     Dehydration     Hypokalemia     Dysphagia     Hypomagnesemia     Parotitis     Severe protein-calorie malnutrition (H)     Elevated LFTs     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  Diagnosed January 2020. Imaging suggested bilateral abnormal lymphadenopathy in the neck.  Also asymmetric soft tissue thickening in the posterior right nasopharynx.  On imaging, tumor also appeared to extend down to the hypopharynx.     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     Treatment:     Initially treated with concurrent chemotherapy and radiation.  He had weekly cisplatin starting March 3, 2020.  Fifth and final dose given March 31, 2020.  Subsequent chemotherapy was held due to prolonged thrombocytopenia and renal insufficiency.  Radiation dose was 7000 cGy in 35 fractions given from March 2 through April 17, 2020.     Started cisplatin with infusional 5-FU on June 1, 2020.  Cycle 1 given at a 25% dose reduction.  He still had significant thrombocytopenia and neutropenia on day 28.  Cycle 2 was held.  At the same time his liver function tests elevated secondary to hepatitis B.      Radiosurgery delivered to the right ethmoid tumor delivered November 8 through November 17, 2021.  Received 3500 cGy in 5 fractions.    -Has now started on Keytruda.      Interim History:     Kristen returns today for a follow-up visit. He states he started his new treatment and did fine. Denies any new bone or back pain. Denies any new infectious complaints. Appetite is stable. Feeling pretty good. States he is taking his medications.     Review of Systems    Pertinent items are noted in HPI.    Past History    Past Medical History:   Diagnosis Date     Anemia      Dysphagia      Hepatitis B chronic      Hypothyroidism      Nasopharyngeal cancer (H)      Severe protein-calorie malnutrition (H)      Thrombocytopenia (H)        PHYSICAL EXAM  /80 (BP Location: Left arm, Patient  "Position: Sitting, Cuff Size: Adult Regular)   Pulse 66   Temp 99.2  F (37.3  C) (Oral)   Resp 18   Ht 1.6 m (5' 2.99\")   Wt 67.3 kg (148 lb 6.4 oz)   SpO2 98%   BMI 26.29 kg/m      GENERAL: no acute distress. Cooperative in conversation. Here with son. Mask on  RESP: Regular respiratory rate. No expiratory wheezes   MUSCULOSKELETAL: no bilateral leg swelling  NEURO: non focal. Alert and oriented x3.   PSYCH: within normal limits. No depression or anxiety.  SKIN: exposed skin is dry intact.     Lab Results    Recent Results (from the past 168 hour(s))   Comprehensive metabolic panel   Result Value Ref Range    Sodium 139 136 - 145 mmol/L    Potassium 3.8 3.5 - 5.0 mmol/L    Chloride 107 98 - 107 mmol/L    Carbon Dioxide (CO2) 26 22 - 31 mmol/L    Anion Gap 6 5 - 18 mmol/L    Urea Nitrogen 20 8 - 22 mg/dL    Creatinine 1.19 0.70 - 1.30 mg/dL    Calcium 8.3 (L) 8.5 - 10.5 mg/dL    Glucose 154 (H) 70 - 125 mg/dL    Alkaline Phosphatase 91 45 - 120 U/L     (H) 0 - 40 U/L     (H) 0 - 45 U/L    Protein Total 6.5 6.0 - 8.0 g/dL    Albumin 3.1 (L) 3.5 - 5.0 g/dL    Bilirubin Total 0.8 0.0 - 1.0 mg/dL    GFR Estimate 66 >60 mL/min/1.73m2   TSH with free T4 reflex   Result Value Ref Range    TSH 1.57 0.30 - 5.00 uIU/mL       Imaging    No results found.      Signed by: SPARKLE Guzmán CNP  "

## 2022-01-13 NOTE — PROGRESS NOTES
Infusion Nursing Note:  Kristen Chapman presents today for C#2 D#1 of treatment regimen.    Patient seen by provider today: Yes: Yoanna Andre CNP.   present during visit today: Not Applicable.    Note: Pembrolizumab administered IV per provider order.      Intravenous Access:  Implanted Port.    Treatment Conditions:  Results reviewed, labs MET treatment parameters, ok to proceed with treatment.      Post Infusion Assessment:  Patient tolerated infusion without incident.       Discharge Plan:   Patient discharged in stable condition accompanied by: self.      GRANT ZULETA RN

## 2022-01-13 NOTE — LETTER
"    1/13/2022         RE: Kristen Chapman  [address] [place] [address]        Dear Colleague,    Thank you for referring your patient, Kristen Chapman, to the [facility] [place]. Please see a copy of my visit note below.    Oncology Rooming Note    January 13, 2022 9:48 AM   Kristen Chapman is a 71 year old male who presents for:    Chief Complaint   Patient presents with     Oncology Clinic Visit     Squamous cell carcinoma of nasopharynx     Initial Vitals: /80 (BP Location: Left arm, Patient Position: Sitting, Cuff Size: Adult Regular)   Pulse 66   Temp 99.2  F (37.3  C) (Oral)   Resp 18   Ht 1.6 m (5' 2.99\")   Wt 67.3 kg (148 lb 6.4 oz)   SpO2 98%   BMI 26.29 kg/m   Estimated body mass index is 26.29 kg/m  as calculated from the following:    Height as of this encounter: 1.6 m (5' 2.99\").    Weight as of this encounter: 67.3 kg (148 lb 6.4 oz). Body surface area is 1.73 meters squared.  No Pain (0) Comment: Data Unavailable   No LMP for male patient.  Allergies reviewed: Yes  Medications reviewed: Yes    Medications: Medication refills not needed today.  Pharmacy name entered into [employer]: [facility] - [address]    Clinical concerns Squamous cell carcinoma of nasopharynx.     Abby Duncan, [facility] Hematology and Oncology Progress Note    Patient: Kristen Chapman  MRN: [id number]  Date of Service: Jan 13, 2022          Reason for Visit    Chief Complaint   Patient presents with     Oncology Clinic Visit     Squamous cell carcinoma of nasopharynx       Assessment and Plan    Cancer Staging  No matching staging information was found for the patient.    1.  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 He has had one cycle and that was fine. We will give him cycle 2 today. Return in 3 weeks for cycle 3.  We will reimage with a PET scan after 4 doses.    2.  Elevated liver function: These were elevated before he even started.  Likely " from other medications.  They are more elevated after starting immunotherapy.  I Ivis go ahead and give him another dose today and if his liver function goes even higher we may have to think about holding.    3. history of elevated TSH: Patient was increased on his Synthroid recently.  His TSH is normal today.  We will recheck in 3 weeks.  Continue encourage him to take his medications as prescribed.      ECOG Performance    0 - Independent    Distress Screening (within last 30 days)    No data recorded     Pain  Pain Score: No Pain (0)    Problem List    Patient Active Problem List   Diagnosis     Nasopharyngeal carcinoma (H)     Squamous cell carcinoma of nasopharynx (H)     Hilar lymphadenopathy     Abnormal PET scan of mediastinum     Elevated serum creatinine     Antineoplastic chemotherapy induced pancytopenia (H)     Anemia     Weight loss     Dehydration     Hypokalemia     Dysphagia     Hypomagnesemia     Parotitis     Severe protein-calorie malnutrition (H)     Elevated LFTs     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  Diagnosed January 2020. Imaging suggested bilateral abnormal lymphadenopathy in the neck.  Also asymmetric soft tissue thickening in the posterior right nasopharynx.  On imaging, tumor also appeared to extend down to the hypopharynx.     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     Treatment:     Initially treated with concurrent chemotherapy and radiation.  He had weekly cisplatin starting March 3, 2020.  Fifth and final dose given March 31,  "2020.  Subsequent chemotherapy was held due to prolonged thrombocytopenia and renal insufficiency.  Radiation dose was 7000 cGy in 35 fractions given from March 2 through April 17, 2020.     Started cisplatin with infusional 5-FU on June 1, 2020.  Cycle 1 given at a 25% dose reduction.  He still had significant thrombocytopenia and neutropenia on day 28.  Cycle 2 was held.  At the same time his liver function tests elevated secondary to hepatitis B.      Radiosurgery delivered to the right ethmoid tumor delivered November 8 through November 17, 2021.  Received 3500 cGy in 5 fractions.    -Has now started on Keytruda.      Interim History:     Kristen returns today for a follow-up visit. He states he started his new treatment and did fine. Denies any new bone or back pain. Denies any new infectious complaints. Appetite is stable. Feeling pretty good. States he is taking his medications.     Review of Systems    Pertinent items are noted in HPI.    Past History    Past Medical History:   Diagnosis Date     Anemia      Dysphagia      Hepatitis B chronic      Hypothyroidism      Nasopharyngeal cancer (H)      Severe protein-calorie malnutrition (H)      Thrombocytopenia (H)        PHYSICAL EXAM  /80 (BP Location: Left arm, Patient Position: Sitting, Cuff Size: Adult Regular)   Pulse 66   Temp 99.2  F (37.3  C) (Oral)   Resp 18   Ht 1.6 m (5' 2.99\")   Wt 67.3 kg (148 lb 6.4 oz)   SpO2 98%   BMI 26.29 kg/m      GENERAL: no acute distress. Cooperative in conversation. Here with son. Mask on  RESP: Regular respiratory rate. No expiratory wheezes   MUSCULOSKELETAL: no bilateral leg swelling  NEURO: non focal. Alert and oriented x3.   PSYCH: within normal limits. No depression or anxiety.  SKIN: exposed skin is dry intact.     Lab Results    Recent Results (from the past 168 hour(s))   Comprehensive metabolic panel   Result Value Ref Range    Sodium 139 136 - 145 mmol/L    Potassium 3.8 3.5 - 5.0 mmol/L    Chloride " 107 98 - 107 mmol/L    Carbon Dioxide (CO2) 26 22 - 31 mmol/L    Anion Gap 6 5 - 18 mmol/L    Urea Nitrogen 20 8 - 22 mg/dL    Creatinine 1.19 0.70 - 1.30 mg/dL    Calcium 8.3 (L) 8.5 - 10.5 mg/dL    Glucose 154 (H) 70 - 125 mg/dL    Alkaline Phosphatase 91 45 - 120 U/L     (H) 0 - 40 U/L     (H) 0 - 45 U/L    Protein Total 6.5 6.0 - 8.0 g/dL    Albumin 3.1 (L) 3.5 - 5.0 g/dL    Bilirubin Total 0.8 0.0 - 1.0 mg/dL    GFR Estimate 66 >60 mL/min/1.73m2   TSH with free T4 reflex   Result Value Ref Range    TSH 1.57 0.30 - 5.00 uIU/mL       Imaging    No results found.      Signed by: SPARKLE Guzmán CNP      Again, thank you for allowing me to participate in the care of your patient.        Sincerely,        SPARKLE Guzmán CNP

## 2022-01-13 NOTE — PROGRESS NOTES
"Oncology Rooming Note    January 13, 2022 9:48 AM   Kristen Chapman is a 71 year old male who presents for:    Chief Complaint   Patient presents with     Oncology Clinic Visit     Squamous cell carcinoma of nasopharynx     Initial Vitals: /80 (BP Location: Left arm, Patient Position: Sitting, Cuff Size: Adult Regular)   Pulse 66   Temp 99.2  F (37.3  C) (Oral)   Resp 18   Ht 1.6 m (5' 2.99\")   Wt 67.3 kg (148 lb 6.4 oz)   SpO2 98%   BMI 26.29 kg/m   Estimated body mass index is 26.29 kg/m  as calculated from the following:    Height as of this encounter: 1.6 m (5' 2.99\").    Weight as of this encounter: 67.3 kg (148 lb 6.4 oz). Body surface area is 1.73 meters squared.  No Pain (0) Comment: Data Unavailable   No LMP for male patient.  Allergies reviewed: Yes  Medications reviewed: Yes    Medications: Medication refills not needed today.  Pharmacy name entered into [employer]: [facility] - [place], [place] - [address] [place] [address]    Clinical concerns Squamous cell carcinoma of nasopharynx.     Abby Duncan, [facility]              "

## 2022-02-03 ENCOUNTER — LAB (OUTPATIENT)
Dept: INFUSION THERAPY | Facility: HOSPITAL | Age: 72
End: 2022-02-03
Attending: INTERNAL MEDICINE
Payer: COMMERCIAL

## 2022-02-03 ENCOUNTER — ONCOLOGY VISIT (OUTPATIENT)
Dept: ONCOLOGY | Facility: HOSPITAL | Age: 72
End: 2022-02-03
Attending: NURSE PRACTITIONER
Payer: COMMERCIAL

## 2022-02-03 VITALS
OXYGEN SATURATION: 99 % | WEIGHT: 145.3 LBS | RESPIRATION RATE: 18 BRPM | SYSTOLIC BLOOD PRESSURE: 140 MMHG | HEART RATE: 64 BPM | BODY MASS INDEX: 25.75 KG/M2 | DIASTOLIC BLOOD PRESSURE: 71 MMHG | TEMPERATURE: 98.5 F

## 2022-02-03 DIAGNOSIS — C11.8 MALIGNANT NEOPLASM OF OVERLAPPING SITES OF NASOPHARYNX (H): ICD-10-CM

## 2022-02-03 DIAGNOSIS — C11.9 SQUAMOUS CELL CARCINOMA OF NASOPHARYNX (H): Primary | ICD-10-CM

## 2022-02-03 LAB
ALBUMIN SERPL-MCNC: 3.3 G/DL (ref 3.5–5)
ALP SERPL-CCNC: 106 U/L (ref 45–120)
ALT SERPL W P-5'-P-CCNC: 103 U/L (ref 0–45)
ANION GAP SERPL CALCULATED.3IONS-SCNC: 7 MMOL/L (ref 5–18)
AST SERPL W P-5'-P-CCNC: 91 U/L (ref 0–40)
BASOPHILS # BLD AUTO: 0 10E3/UL (ref 0–0.2)
BASOPHILS NFR BLD AUTO: 0 %
BILIRUB SERPL-MCNC: 0.8 MG/DL (ref 0–1)
BUN SERPL-MCNC: 23 MG/DL (ref 8–22)
CALCIUM SERPL-MCNC: 8.7 MG/DL (ref 8.5–10.5)
CHLORIDE BLD-SCNC: 107 MMOL/L (ref 98–107)
CO2 SERPL-SCNC: 25 MMOL/L (ref 22–31)
CREAT SERPL-MCNC: 1.24 MG/DL (ref 0.7–1.3)
EOSINOPHIL # BLD AUTO: 0.1 10E3/UL (ref 0–0.7)
EOSINOPHIL NFR BLD AUTO: 3 %
ERYTHROCYTE [DISTWIDTH] IN BLOOD BY AUTOMATED COUNT: 17.7 % (ref 10–15)
GFR SERPL CREATININE-BSD FRML MDRD: 63 ML/MIN/1.73M2
GLUCOSE BLD-MCNC: 142 MG/DL (ref 70–125)
HCT VFR BLD AUTO: 31.5 % (ref 40–53)
HGB BLD-MCNC: 9.6 G/DL (ref 13.3–17.7)
IMM GRANULOCYTES # BLD: 0 10E3/UL
IMM GRANULOCYTES NFR BLD: 0 %
LYMPHOCYTES # BLD AUTO: 0.7 10E3/UL (ref 0.8–5.3)
LYMPHOCYTES NFR BLD AUTO: 22 %
MCH RBC QN AUTO: 26.2 PG (ref 26.5–33)
MCHC RBC AUTO-ENTMCNC: 30.5 G/DL (ref 31.5–36.5)
MCV RBC AUTO: 86 FL (ref 78–100)
MONOCYTES # BLD AUTO: 0.4 10E3/UL (ref 0–1.3)
MONOCYTES NFR BLD AUTO: 12 %
NEUTROPHILS # BLD AUTO: 2 10E3/UL (ref 1.6–8.3)
NEUTROPHILS NFR BLD AUTO: 63 %
NRBC # BLD AUTO: 0 10E3/UL
NRBC BLD AUTO-RTO: 0 /100
PLATELET # BLD AUTO: 55 10E3/UL (ref 150–450)
POTASSIUM BLD-SCNC: 4 MMOL/L (ref 3.5–5)
PROT SERPL-MCNC: 7 G/DL (ref 6–8)
RBC # BLD AUTO: 3.67 10E6/UL (ref 4.4–5.9)
SODIUM SERPL-SCNC: 139 MMOL/L (ref 136–145)
T4 FREE SERPL-MCNC: 1.33 NG/DL (ref 0.7–1.8)
TSH SERPL DL<=0.005 MIU/L-ACNC: 0.25 UIU/ML (ref 0.3–5)
WBC # BLD AUTO: 3.2 10E3/UL (ref 4–11)

## 2022-02-03 PROCEDURE — G0463 HOSPITAL OUTPT CLINIC VISIT: HCPCS | Mod: 25

## 2022-02-03 PROCEDURE — 250N000011 HC RX IP 250 OP 636: Performed by: NURSE PRACTITIONER

## 2022-02-03 PROCEDURE — 85025 COMPLETE CBC W/AUTO DIFF WBC: CPT

## 2022-02-03 PROCEDURE — 258N000003 HC RX IP 258 OP 636: Performed by: NURSE PRACTITIONER

## 2022-02-03 PROCEDURE — 99214 OFFICE O/P EST MOD 30 MIN: CPT | Performed by: NURSE PRACTITIONER

## 2022-02-03 PROCEDURE — 80053 COMPREHEN METABOLIC PANEL: CPT | Performed by: NURSE PRACTITIONER

## 2022-02-03 PROCEDURE — 84439 ASSAY OF FREE THYROXINE: CPT | Performed by: NURSE PRACTITIONER

## 2022-02-03 PROCEDURE — 84443 ASSAY THYROID STIM HORMONE: CPT | Performed by: NURSE PRACTITIONER

## 2022-02-03 PROCEDURE — 96413 CHEMO IV INFUSION 1 HR: CPT

## 2022-02-03 PROCEDURE — 36591 DRAW BLOOD OFF VENOUS DEVICE: CPT

## 2022-02-03 RX ORDER — HEPARIN SODIUM (PORCINE) LOCK FLUSH IV SOLN 100 UNIT/ML 100 UNIT/ML
5 SOLUTION INTRAVENOUS
Status: DISCONTINUED | OUTPATIENT
Start: 2022-02-03 | End: 2022-02-03 | Stop reason: HOSPADM

## 2022-02-03 RX ORDER — ALBUTEROL SULFATE 90 UG/1
1-2 AEROSOL, METERED RESPIRATORY (INHALATION)
Status: CANCELLED
Start: 2022-02-03

## 2022-02-03 RX ORDER — NALOXONE HYDROCHLORIDE 0.4 MG/ML
0.2 INJECTION, SOLUTION INTRAMUSCULAR; INTRAVENOUS; SUBCUTANEOUS
Status: CANCELLED | OUTPATIENT
Start: 2022-02-03

## 2022-02-03 RX ORDER — METHYLPREDNISOLONE SODIUM SUCCINATE 125 MG/2ML
125 INJECTION, POWDER, LYOPHILIZED, FOR SOLUTION INTRAMUSCULAR; INTRAVENOUS
Status: CANCELLED
Start: 2022-02-03

## 2022-02-03 RX ORDER — LORAZEPAM 2 MG/ML
0.5 INJECTION INTRAMUSCULAR EVERY 4 HOURS PRN
Status: CANCELLED | OUTPATIENT
Start: 2022-02-03

## 2022-02-03 RX ORDER — DIPHENHYDRAMINE HYDROCHLORIDE 50 MG/ML
50 INJECTION INTRAMUSCULAR; INTRAVENOUS
Status: CANCELLED
Start: 2022-02-03

## 2022-02-03 RX ORDER — HEPARIN SODIUM (PORCINE) LOCK FLUSH IV SOLN 100 UNIT/ML 100 UNIT/ML
5 SOLUTION INTRAVENOUS
Status: CANCELLED | OUTPATIENT
Start: 2022-02-03

## 2022-02-03 RX ORDER — ALBUTEROL SULFATE 0.83 MG/ML
2.5 SOLUTION RESPIRATORY (INHALATION)
Status: CANCELLED | OUTPATIENT
Start: 2022-02-03

## 2022-02-03 RX ORDER — MEPERIDINE HYDROCHLORIDE 25 MG/ML
25 INJECTION INTRAMUSCULAR; INTRAVENOUS; SUBCUTANEOUS EVERY 30 MIN PRN
Status: CANCELLED | OUTPATIENT
Start: 2022-02-03

## 2022-02-03 RX ORDER — EPINEPHRINE 1 MG/ML
0.3 INJECTION, SOLUTION INTRAMUSCULAR; SUBCUTANEOUS EVERY 5 MIN PRN
Status: CANCELLED | OUTPATIENT
Start: 2022-02-03

## 2022-02-03 RX ORDER — HEPARIN SODIUM,PORCINE 10 UNIT/ML
5 VIAL (ML) INTRAVENOUS
Status: CANCELLED | OUTPATIENT
Start: 2022-02-03

## 2022-02-03 RX ADMIN — SODIUM CHLORIDE 200 MG: 9 INJECTION, SOLUTION INTRAVENOUS at 10:09

## 2022-02-03 RX ADMIN — HEPARIN 5 ML: 100 SYRINGE at 10:43

## 2022-02-03 ASSESSMENT — PAIN SCALES - GENERAL: PAINLEVEL: NO PAIN (0)

## 2022-02-03 NOTE — PROGRESS NOTES
[facility] Hematology and Oncology Progress Note    Patient: Kristen Chapman  MRN: [id number]  Date of Service: Feb 3, 2022          Reason for Visit    Chief Complaint   Patient presents with     Oncology Clinic Visit       Assessment and Plan    Cancer Staging  No matching staging information was found for the patient.    1.  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 He has had one cycle and that was fine. We will give him cycle 2 today. Return in 3 weeks for cycle 3.  We will reimage with a PET scan after 4 doses.    2.  Elevated liver function: These were elevated before he even started.  Likely from other medications.  They are more elevated after starting immunotherapy but today they have improved a little. Continue to monitor.     3. history of elevated TSH: Patient was increased on his Synthroid recently.  His TSH is normal today.  We will recheck in 3 weeks.  Continue encourage him to take his medications as prescribed.    4. Hepatitis B: seeing Dr. Grier. On oral epivir. Levels have significantly improved since August. Will continue to follow with ID.     ECOG Performance    0 - Independent    Distress Screening (within last 30 days)    No data recorded     Pain  Pain Score: No Pain (0)    Problem List    Patient Active Problem List   Diagnosis     Nasopharyngeal carcinoma (H)     Squamous cell carcinoma of nasopharynx (H)     Hilar lymphadenopathy     Abnormal PET scan of mediastinum     Elevated serum creatinine     Antineoplastic chemotherapy induced pancytopenia (H)     Anemia     Weight loss     Dehydration     Hypokalemia     Dysphagia     Hypomagnesemia     Parotitis     Severe protein-calorie malnutrition (H)     Elevated LFTs     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  Diagnosed January 2020. Imaging suggested bilateral abnormal lymphadenopathy in the neck.  Also asymmetric soft tissue thickening in the posterior right nasopharynx.  On imaging, tumor also appeared to extend down to the hypopharynx.     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     Treatment:     Initially treated with concurrent chemotherapy and radiation.  He had weekly cisplatin starting March 3, 2020.  Fifth and final dose given March 31, 2020.  Subsequent chemotherapy was held due to prolonged thrombocytopenia and renal insufficiency.  Radiation dose was 7000 cGy in 35 fractions given from March 2 through April 17, 2020.     Started cisplatin with infusional 5-FU on June 1, 2020.  Cycle 1 given at a 25% dose reduction.  He still had significant thrombocytopenia and neutropenia on day 28.  Cycle 2 was held.  At the same time his liver function tests elevated secondary to hepatitis B.      Radiosurgery delivered to the right ethmoid tumor delivered November 8 through November 17, 2021.  Received 3500 cGy in 5 fractions.    -Has now started on Keytruda. Today is cycle 3.      Interim History:     Kristen returns today for a follow-up visit. He is doing quite well. Has no complaints. Feels fine on it. No new pain. No weight loss. Appetite is good. No skin issues.     Review of Systems    Pertinent items are noted in HPI.    Past History    Past Medical History:   Diagnosis Date     Anemia      Dysphagia      Hepatitis B chronic      Hypothyroidism      Nasopharyngeal cancer (H)      Severe protein-calorie malnutrition (H)      Thrombocytopenia (H)        PHYSICAL EXAM  BP (!) 140/71   Pulse 64   Temp 98.5  F (36.9  C)   Resp 18   Wt 65.9 kg (145 lb 4.8 oz)   SpO2 99%   BMI  25.75 kg/m      GENERAL: no acute distress. Cooperative in conversation. Here with son. Mask on  RESP: Regular respiratory rate. No expiratory wheezes   MUSCULOSKELETAL: no bilateral leg swelling  NEURO: non focal. Alert and oriented x3.   PSYCH: within normal limits. No depression or anxiety.  SKIN: exposed skin is dry intact.     Lab Results    Recent Results (from the past 168 hour(s))   Comprehensive metabolic panel   Result Value Ref Range    Sodium 139 136 - 145 mmol/L    Potassium 4.0 3.5 - 5.0 mmol/L    Chloride 107 98 - 107 mmol/L    Carbon Dioxide (CO2) 25 22 - 31 mmol/L    Anion Gap 7 5 - 18 mmol/L    Urea Nitrogen 23 (H) 8 - 22 mg/dL    Creatinine 1.24 0.70 - 1.30 mg/dL    Calcium 8.7 8.5 - 10.5 mg/dL    Glucose 142 (H) 70 - 125 mg/dL    Alkaline Phosphatase 106 45 - 120 U/L    AST 91 (H) 0 - 40 U/L     (H) 0 - 45 U/L    Protein Total 7.0 6.0 - 8.0 g/dL    Albumin 3.3 (L) 3.5 - 5.0 g/dL    Bilirubin Total 0.8 0.0 - 1.0 mg/dL    GFR Estimate 63 >60 mL/min/1.73m2   CBC with platelets and differential   Result Value Ref Range    WBC Count 3.2 (L) 4.0 - 11.0 10e3/uL    RBC Count 3.67 (L) 4.40 - 5.90 10e6/uL    Hemoglobin 9.6 (L) 13.3 - 17.7 g/dL    Hematocrit 31.5 (L) 40.0 - 53.0 %    MCV 86 78 - 100 fL    MCH 26.2 (L) 26.5 - 33.0 pg    MCHC 30.5 (L) 31.5 - 36.5 g/dL    RDW 17.7 (H) 10.0 - 15.0 %    Platelet Count 55 (L) 150 - 450 10e3/uL    % Neutrophils 63 %    % Lymphocytes 22 %    % Monocytes 12 %    % Eosinophils 3 %    % Basophils 0 %    % Immature Granulocytes 0 %    NRBCs per 100 WBC 0 <1 /100    Absolute Neutrophils 2.0 1.6 - 8.3 10e3/uL    Absolute Lymphocytes 0.7 (L) 0.8 - 5.3 10e3/uL    Absolute Monocytes 0.4 0.0 - 1.3 10e3/uL    Absolute Eosinophils 0.1 0.0 - 0.7 10e3/uL    Absolute Basophils 0.0 0.0 - 0.2 10e3/uL    Absolute Immature Granulocytes 0.0 <=0.4 10e3/uL    Absolute NRBCs 0.0 10e3/uL       Imaging    No results found.      Signed by: SPARKLE Guzmán CNP

## 2022-02-03 NOTE — PROGRESS NOTES
Infusion Nursing Note:  Kristen Chapman presents today for cycle 3 treatment with Nivolumab.    Patient seen by provider today: Yes: Yoanna BLISS   present during visit today: Not Applicable.    Note: Kristen received treatment as ordered.      Intravenous Access:  Implanted Port.    Treatment Conditions:  Results reviewed, labs MET treatment parameters, ok to proceed with treatment.      Post Infusion Assessment:  Patient tolerated infusion without incident.  Blood return noted pre and post infusion.  Site patent and intact, free from redness, edema or discomfort.  No evidence of extravasations.  Access discontinued per protocol.       Discharge Plan:   Patient discharged in stable condition accompanied by: son.      Frances Killian RN

## 2022-02-03 NOTE — LETTER
2/3/2022         RE: Kristen Chapman  [address] [place] [address]        Dear Colleague,    Thank you for referring your patient, Kristen Chapman, to the [facility] [place]. Please see a copy of my visit note below.    [facility] Hematology and Oncology Progress Note    Patient: Kristen Chapman  MRN: [id number]  Date of Service: Feb 3, 2022          Reason for Visit    Chief Complaint   Patient presents with     Oncology Clinic Visit       Assessment and Plan    Cancer Staging  No matching staging information was found for the patient.    1.  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 He has had one cycle and that was fine. We will give him cycle 2 today. Return in 3 weeks for cycle 3.  We will reimage with a PET scan after 4 doses.    2.  Elevated liver function: These were elevated before he even started.  Likely from other medications.  They are more elevated after starting immunotherapy but today they have improved a little. Continue to monitor.     3. history of elevated TSH: Patient was increased on his Synthroid recently.  His TSH is normal today.  We will recheck in 3 weeks.  Continue encourage him to take his medications as prescribed.    4. Hepatitis B: seeing Dr. Grier. On oral epivir. Levels have significantly improved since August. Will continue to follow with ID.     ECOG Performance    0 - Independent    Distress Screening (within last 30 days)    No data recorded     Pain  Pain Score: No Pain (0)    Problem List    Patient Active Problem List   Diagnosis     Nasopharyngeal carcinoma (H)     Squamous cell carcinoma of nasopharynx (H)     Hilar lymphadenopathy     Abnormal PET scan of mediastinum     Elevated serum creatinine     Antineoplastic chemotherapy induced pancytopenia (H)     Anemia     Weight loss     Dehydration     Hypokalemia     Dysphagia     Hypomagnesemia     Parotitis     Severe protein-calorie malnutrition (H)     Elevated LFTs     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  Diagnosed January 2020. Imaging suggested bilateral abnormal lymphadenopathy in the neck.  Also asymmetric soft tissue thickening in the posterior right nasopharynx.  On imaging, tumor also appeared to extend down to the hypopharynx.     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     Treatment:     Initially treated with concurrent chemotherapy and radiation.  He had weekly cisplatin starting March 3, 2020.  Fifth and final dose given March 31, 2020.  Subsequent chemotherapy was held due to prolonged thrombocytopenia and renal insufficiency.  Radiation dose was 7000 cGy in 35 fractions given from March 2 through April 17, 2020.     Started cisplatin with infusional 5-FU on June 1, 2020.  Cycle 1 given at a 25% dose reduction.  He still had significant thrombocytopenia and neutropenia on day 28.  Cycle 2 was held.  At the same time his liver function tests elevated secondary to hepatitis B.      Radiosurgery delivered to the right ethmoid tumor delivered November 8 through November 17, 2021.  Received 3500 cGy in 5 fractions.    -Has now started on Keytruda. Today is cycle 3.      Interim History:     Kristen returns today for a follow-up visit. He is doing quite well. Has no complaints. Feels fine on it. No new pain. No weight loss. Appetite is good. No skin issues.     Review of Systems    Pertinent items are noted in HPI.    Past History    Past Medical History:   Diagnosis Date     Anemia      Dysphagia      Hepatitis B chronic      Hypothyroidism       Nasopharyngeal cancer (H)      Severe protein-calorie malnutrition (H)      Thrombocytopenia (H)        PHYSICAL EXAM  BP (!) 140/71   Pulse 64   Temp 98.5  F (36.9  C)   Resp 18   Wt 65.9 kg (145 lb 4.8 oz)   SpO2 99%   BMI 25.75 kg/m      GENERAL: no acute distress. Cooperative in conversation. Here with son. Mask on  RESP: Regular respiratory rate. No expiratory wheezes   MUSCULOSKELETAL: no bilateral leg swelling  NEURO: non focal. Alert and oriented x3.   PSYCH: within normal limits. No depression or anxiety.  SKIN: exposed skin is dry intact.     Lab Results    Recent Results (from the past 168 hour(s))   Comprehensive metabolic panel   Result Value Ref Range    Sodium 139 136 - 145 mmol/L    Potassium 4.0 3.5 - 5.0 mmol/L    Chloride 107 98 - 107 mmol/L    Carbon Dioxide (CO2) 25 22 - 31 mmol/L    Anion Gap 7 5 - 18 mmol/L    Urea Nitrogen 23 (H) 8 - 22 mg/dL    Creatinine 1.24 0.70 - 1.30 mg/dL    Calcium 8.7 8.5 - 10.5 mg/dL    Glucose 142 (H) 70 - 125 mg/dL    Alkaline Phosphatase 106 45 - 120 U/L    AST 91 (H) 0 - 40 U/L     (H) 0 - 45 U/L    Protein Total 7.0 6.0 - 8.0 g/dL    Albumin 3.3 (L) 3.5 - 5.0 g/dL    Bilirubin Total 0.8 0.0 - 1.0 mg/dL    GFR Estimate 63 >60 mL/min/1.73m2   CBC with platelets and differential   Result Value Ref Range    WBC Count 3.2 (L) 4.0 - 11.0 10e3/uL    RBC Count 3.67 (L) 4.40 - 5.90 10e6/uL    Hemoglobin 9.6 (L) 13.3 - 17.7 g/dL    Hematocrit 31.5 (L) 40.0 - 53.0 %    MCV 86 78 - 100 fL    MCH 26.2 (L) 26.5 - 33.0 pg    MCHC 30.5 (L) 31.5 - 36.5 g/dL    RDW 17.7 (H) 10.0 - 15.0 %    Platelet Count 55 (L) 150 - 450 10e3/uL    % Neutrophils 63 %    % Lymphocytes 22 %    % Monocytes 12 %    % Eosinophils 3 %    % Basophils 0 %    % Immature Granulocytes 0 %    NRBCs per 100 WBC 0 <1 /100    Absolute Neutrophils 2.0 1.6 - 8.3 10e3/uL    Absolute Lymphocytes 0.7 (L) 0.8 - 5.3 10e3/uL    Absolute Monocytes 0.4 0.0 - 1.3 10e3/uL    Absolute Eosinophils 0.1 0.0  "- 0.7 10e3/uL    Absolute Basophils 0.0 0.0 - 0.2 10e3/uL    Absolute Immature Granulocytes 0.0 <=0.4 10e3/uL    Absolute NRBCs 0.0 10e3/uL       Imaging    No results found.      Signed by: SPARKLE Guzmán CNP    Oncology Rooming Note    February 3, 2022 9:12 AM   Kristen Chapman is a 71 year old male who presents for:    Chief Complaint   Patient presents with     Oncology Clinic Visit     Initial Vitals: BP (!) 140/71   Pulse 64   Temp 98.5  F (36.9  C)   Resp 18   Wt 65.9 kg (145 lb 4.8 oz)   SpO2 99%   BMI 25.75 kg/m   Estimated body mass index is 25.75 kg/m  as calculated from the following:    Height as of 1/13/22: 1.6 m (5' 2.99\").    Weight as of this encounter: 65.9 kg (145 lb 4.8 oz). Body surface area is 1.71 meters squared.  No Pain (0) Comment: Data Unavailable   No LMP for male patient.  Allergies reviewed: Yes  Medications reviewed: Yes    Medications: Medication refills not needed today.  Pharmacy name entered into [employer]: [facility] - [place], [place] - [address] [place] [address]    Clinical concerns: None       Shannon Sanchez LPN                Again, thank you for allowing me to participate in the care of your patient.        Sincerely,        SPARKLE Guzmán CNP    "

## 2022-02-03 NOTE — PROGRESS NOTES
"Oncology Rooming Note    February 3, 2022 9:12 AM   Kristen Chapman is a 71 year old male who presents for:    Chief Complaint   Patient presents with     Oncology Clinic Visit     Initial Vitals: BP (!) 140/71   Pulse 64   Temp 98.5  F (36.9  C)   Resp 18   Wt 65.9 kg (145 lb 4.8 oz)   SpO2 99%   BMI 25.75 kg/m   Estimated body mass index is 25.75 kg/m  as calculated from the following:    Height as of 1/13/22: 1.6 m (5' 2.99\").    Weight as of this encounter: 65.9 kg (145 lb 4.8 oz). Body surface area is 1.71 meters squared.  No Pain (0) Comment: Data Unavailable   No LMP for male patient.  Allergies reviewed: Yes  Medications reviewed: Yes    Medications: Medication refills not needed today.  Pharmacy name entered into [employer]: [facility] - [place], [place] - [address] [place] [address]    Clinical concerns: None       Shannon Sanchez LPN            "

## 2022-02-24 ENCOUNTER — LAB (OUTPATIENT)
Dept: INFUSION THERAPY | Facility: HOSPITAL | Age: 72
End: 2022-02-24
Attending: INTERNAL MEDICINE
Payer: COMMERCIAL

## 2022-02-24 ENCOUNTER — ONCOLOGY VISIT (OUTPATIENT)
Dept: ONCOLOGY | Facility: HOSPITAL | Age: 72
End: 2022-02-24
Attending: INTERNAL MEDICINE
Payer: COMMERCIAL

## 2022-02-24 VITALS
OXYGEN SATURATION: 97 % | DIASTOLIC BLOOD PRESSURE: 76 MMHG | SYSTOLIC BLOOD PRESSURE: 132 MMHG | TEMPERATURE: 97.6 F | BODY MASS INDEX: 26.1 KG/M2 | WEIGHT: 147.3 LBS | RESPIRATION RATE: 16 BRPM | HEART RATE: 67 BPM

## 2022-02-24 DIAGNOSIS — C11.9 SQUAMOUS CELL CARCINOMA OF NASOPHARYNX (H): Primary | ICD-10-CM

## 2022-02-24 LAB
ALBUMIN SERPL-MCNC: 3.3 G/DL (ref 3.5–5)
ALP SERPL-CCNC: 104 U/L (ref 45–120)
ALT SERPL W P-5'-P-CCNC: 30 U/L (ref 0–45)
ANION GAP SERPL CALCULATED.3IONS-SCNC: 6 MMOL/L (ref 5–18)
AST SERPL W P-5'-P-CCNC: 31 U/L (ref 0–40)
BILIRUB SERPL-MCNC: 0.7 MG/DL (ref 0–1)
BUN SERPL-MCNC: 26 MG/DL (ref 8–22)
CALCIUM SERPL-MCNC: 8.5 MG/DL (ref 8.5–10.5)
CHLORIDE BLD-SCNC: 108 MMOL/L (ref 98–107)
CO2 SERPL-SCNC: 25 MMOL/L (ref 22–31)
CREAT SERPL-MCNC: 1.07 MG/DL (ref 0.7–1.3)
GFR SERPL CREATININE-BSD FRML MDRD: 75 ML/MIN/1.73M2
GLUCOSE BLD-MCNC: 131 MG/DL (ref 70–125)
POTASSIUM BLD-SCNC: 4.1 MMOL/L (ref 3.5–5)
PROT SERPL-MCNC: 6.7 G/DL (ref 6–8)
SODIUM SERPL-SCNC: 139 MMOL/L (ref 136–145)
T4 FREE SERPL-MCNC: 1.29 NG/DL (ref 0.7–1.8)
TSH SERPL DL<=0.005 MIU/L-ACNC: 0.06 UIU/ML (ref 0.3–5)

## 2022-02-24 PROCEDURE — 99214 OFFICE O/P EST MOD 30 MIN: CPT | Performed by: NURSE PRACTITIONER

## 2022-02-24 PROCEDURE — 258N000003 HC RX IP 258 OP 636: Performed by: NURSE PRACTITIONER

## 2022-02-24 PROCEDURE — 96413 CHEMO IV INFUSION 1 HR: CPT

## 2022-02-24 PROCEDURE — 84439 ASSAY OF FREE THYROXINE: CPT | Performed by: INTERNAL MEDICINE

## 2022-02-24 PROCEDURE — 84443 ASSAY THYROID STIM HORMONE: CPT | Performed by: INTERNAL MEDICINE

## 2022-02-24 PROCEDURE — 250N000011 HC RX IP 250 OP 636: Performed by: NURSE PRACTITIONER

## 2022-02-24 PROCEDURE — G0463 HOSPITAL OUTPT CLINIC VISIT: HCPCS | Mod: 25

## 2022-02-24 PROCEDURE — 80053 COMPREHEN METABOLIC PANEL: CPT | Performed by: INTERNAL MEDICINE

## 2022-02-24 RX ORDER — ALBUTEROL SULFATE 0.83 MG/ML
2.5 SOLUTION RESPIRATORY (INHALATION)
Status: CANCELLED | OUTPATIENT
Start: 2022-02-24

## 2022-02-24 RX ORDER — EPINEPHRINE 1 MG/ML
0.3 INJECTION, SOLUTION INTRAMUSCULAR; SUBCUTANEOUS EVERY 5 MIN PRN
Status: CANCELLED | OUTPATIENT
Start: 2022-03-17

## 2022-02-24 RX ORDER — MEPERIDINE HYDROCHLORIDE 25 MG/ML
25 INJECTION INTRAMUSCULAR; INTRAVENOUS; SUBCUTANEOUS EVERY 30 MIN PRN
Status: CANCELLED | OUTPATIENT
Start: 2022-02-24

## 2022-02-24 RX ORDER — MEPERIDINE HYDROCHLORIDE 25 MG/ML
25 INJECTION INTRAMUSCULAR; INTRAVENOUS; SUBCUTANEOUS EVERY 30 MIN PRN
Status: DISCONTINUED | OUTPATIENT
Start: 2022-02-24 | End: 2022-02-24 | Stop reason: HOSPADM

## 2022-02-24 RX ORDER — ALBUTEROL SULFATE 90 UG/1
1-2 AEROSOL, METERED RESPIRATORY (INHALATION)
Status: CANCELLED | OUTPATIENT
Start: 2022-02-24

## 2022-02-24 RX ORDER — MEPERIDINE HYDROCHLORIDE 25 MG/ML
25 INJECTION INTRAMUSCULAR; INTRAVENOUS; SUBCUTANEOUS EVERY 30 MIN PRN
Status: CANCELLED | OUTPATIENT
Start: 2022-03-17

## 2022-02-24 RX ORDER — METHYLPREDNISOLONE SODIUM SUCCINATE 125 MG/2ML
125 INJECTION, POWDER, LYOPHILIZED, FOR SOLUTION INTRAMUSCULAR; INTRAVENOUS
Status: CANCELLED
Start: 2022-03-17

## 2022-02-24 RX ORDER — EPINEPHRINE 1 MG/ML
0.3 INJECTION, SOLUTION INTRAMUSCULAR; SUBCUTANEOUS EVERY 5 MIN PRN
Status: DISCONTINUED | OUTPATIENT
Start: 2022-02-24 | End: 2022-02-24 | Stop reason: HOSPADM

## 2022-02-24 RX ORDER — DIPHENHYDRAMINE HYDROCHLORIDE 50 MG/ML
50 INJECTION INTRAMUSCULAR; INTRAVENOUS
Status: CANCELLED
Start: 2022-03-17

## 2022-02-24 RX ORDER — LORAZEPAM 2 MG/ML
0.5 INJECTION INTRAMUSCULAR EVERY 4 HOURS PRN
Status: CANCELLED | OUTPATIENT
Start: 2022-02-24

## 2022-02-24 RX ORDER — NALOXONE HYDROCHLORIDE 0.4 MG/ML
0.2 INJECTION, SOLUTION INTRAMUSCULAR; INTRAVENOUS; SUBCUTANEOUS
Status: DISCONTINUED | OUTPATIENT
Start: 2022-02-24 | End: 2022-02-24 | Stop reason: HOSPADM

## 2022-02-24 RX ORDER — DIPHENHYDRAMINE HYDROCHLORIDE 50 MG/ML
50 INJECTION INTRAMUSCULAR; INTRAVENOUS
Status: CANCELLED | OUTPATIENT
Start: 2022-02-24

## 2022-02-24 RX ORDER — HEPARIN SODIUM (PORCINE) LOCK FLUSH IV SOLN 100 UNIT/ML 100 UNIT/ML
5 SOLUTION INTRAVENOUS
Status: CANCELLED | OUTPATIENT
Start: 2022-02-24

## 2022-02-24 RX ORDER — ALBUTEROL SULFATE 90 UG/1
1-2 AEROSOL, METERED RESPIRATORY (INHALATION)
Status: DISCONTINUED | OUTPATIENT
Start: 2022-02-24 | End: 2022-02-24 | Stop reason: HOSPADM

## 2022-02-24 RX ORDER — DIPHENHYDRAMINE HYDROCHLORIDE 50 MG/ML
50 INJECTION INTRAMUSCULAR; INTRAVENOUS
Status: DISCONTINUED | OUTPATIENT
Start: 2022-02-24 | End: 2022-02-24 | Stop reason: HOSPADM

## 2022-02-24 RX ORDER — NALOXONE HYDROCHLORIDE 0.4 MG/ML
0.2 INJECTION, SOLUTION INTRAMUSCULAR; INTRAVENOUS; SUBCUTANEOUS
Status: CANCELLED | OUTPATIENT
Start: 2022-02-24

## 2022-02-24 RX ORDER — HEPARIN SODIUM,PORCINE 10 UNIT/ML
5 VIAL (ML) INTRAVENOUS
Status: CANCELLED | OUTPATIENT
Start: 2022-02-24

## 2022-02-24 RX ORDER — ALBUTEROL SULFATE 0.83 MG/ML
2.5 SOLUTION RESPIRATORY (INHALATION)
Status: CANCELLED | OUTPATIENT
Start: 2022-03-17

## 2022-02-24 RX ORDER — LORAZEPAM 2 MG/ML
0.5 INJECTION INTRAMUSCULAR EVERY 4 HOURS PRN
Status: CANCELLED | OUTPATIENT
Start: 2022-03-17

## 2022-02-24 RX ORDER — NALOXONE HYDROCHLORIDE 0.4 MG/ML
0.2 INJECTION, SOLUTION INTRAMUSCULAR; INTRAVENOUS; SUBCUTANEOUS
Status: CANCELLED | OUTPATIENT
Start: 2022-03-17

## 2022-02-24 RX ORDER — METHYLPREDNISOLONE SODIUM SUCCINATE 125 MG/2ML
125 INJECTION, POWDER, LYOPHILIZED, FOR SOLUTION INTRAMUSCULAR; INTRAVENOUS
Status: CANCELLED | OUTPATIENT
Start: 2022-02-24

## 2022-02-24 RX ORDER — HEPARIN SODIUM,PORCINE 10 UNIT/ML
5 VIAL (ML) INTRAVENOUS
Status: CANCELLED | OUTPATIENT
Start: 2022-03-17

## 2022-02-24 RX ORDER — EPINEPHRINE 1 MG/ML
0.3 INJECTION, SOLUTION INTRAMUSCULAR; SUBCUTANEOUS EVERY 5 MIN PRN
Status: CANCELLED | OUTPATIENT
Start: 2022-02-24

## 2022-02-24 RX ORDER — ALBUTEROL SULFATE 90 UG/1
1-2 AEROSOL, METERED RESPIRATORY (INHALATION)
Status: CANCELLED
Start: 2022-03-17

## 2022-02-24 RX ORDER — ALBUTEROL SULFATE 0.83 MG/ML
2.5 SOLUTION RESPIRATORY (INHALATION)
Status: DISCONTINUED | OUTPATIENT
Start: 2022-02-24 | End: 2022-02-24 | Stop reason: HOSPADM

## 2022-02-24 RX ORDER — HEPARIN SODIUM (PORCINE) LOCK FLUSH IV SOLN 100 UNIT/ML 100 UNIT/ML
5 SOLUTION INTRAVENOUS
Status: CANCELLED | OUTPATIENT
Start: 2022-03-17

## 2022-02-24 RX ORDER — METHYLPREDNISOLONE SODIUM SUCCINATE 125 MG/2ML
125 INJECTION, POWDER, LYOPHILIZED, FOR SOLUTION INTRAMUSCULAR; INTRAVENOUS
Status: DISCONTINUED | OUTPATIENT
Start: 2022-02-24 | End: 2022-02-24 | Stop reason: HOSPADM

## 2022-02-24 RX ORDER — HEPARIN SODIUM (PORCINE) LOCK FLUSH IV SOLN 100 UNIT/ML 100 UNIT/ML
5 SOLUTION INTRAVENOUS
Status: DISCONTINUED | OUTPATIENT
Start: 2022-02-24 | End: 2022-02-24 | Stop reason: HOSPADM

## 2022-02-24 RX ADMIN — SODIUM CHLORIDE 250 ML: 9 INJECTION, SOLUTION INTRAVENOUS at 10:46

## 2022-02-24 RX ADMIN — HEPARIN SODIUM (PORCINE) LOCK FLUSH IV SOLN 100 UNIT/ML 5 ML: 100 SOLUTION at 11:22

## 2022-02-24 RX ADMIN — SODIUM CHLORIDE 200 MG: 9 INJECTION, SOLUTION INTRAVENOUS at 10:46

## 2022-02-24 ASSESSMENT — PAIN SCALES - GENERAL: PAINLEVEL: NO PAIN (0)

## 2022-02-24 NOTE — PROGRESS NOTES
Infusion Nursing Note:  Kristen Chapman presents today for C#4 D#1 of treatment regimen.    Patient seen by provider today: Yes: Yoanna Andre CNP   present during visit today: Not Applicable.    Note: N/A.      Intravenous Access:  Labs drawn without difficulty.  Implanted Port.    Treatment Conditions:   Lab Results   Component Value Date     02/24/2022    POTASSIUM 4.1 02/24/2022    MAG 1.8 07/31/2020    CR 1.07 02/24/2022    GUANAKITO 8.5 02/24/2022    BILITOTAL 0.7 02/24/2022    ALBUMIN 3.3 (L) 02/24/2022    ALT 30 02/24/2022    AST 31 02/24/2022     Results reviewed, labs MET treatment parameters, ok to proceed with treatment.      Post Infusion Assessment:  Patient tolerated infusion without incident.  Blood return noted pre and post infusion.  Access discontinued per protocol.       Discharge Plan:   Patient discharged in stable condition accompanied by: self.  Departure Mode: Ambulatory.      GRANT ZULETA RN

## 2022-02-24 NOTE — LETTER
2/24/2022         RE: Kristen Chapman  [address] [place] [address]        Dear Colleague,    Thank you for referring your patient, Kristen Chapman, to the [facility] [place]. Please see a copy of my visit note below.    [facility] Hematology and Oncology Progress Note    Patient: Kristen Chapman  MRN: [id number]  Date of Service: Feb 24, 2022          Reason for Visit    Chief Complaint   Patient presents with     Oncology Clinic Visit       Assessment and Plan    Cancer Staging  No matching staging information was found for the patient.    1.  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 He has been tolerating this well.  He will get cycle 4 today at full dose.  He will return in 3 weeks for cycle 5 and get a PET scan.    2.  Elevated liver function: These were elevated before he even started.  Likely from other medications.  He initially went up a little bit with starting the immunotherapy but now have been coming down and are normal.  Continue to monitor.     3. history of elevated TSH: Patient was increased on his Synthroid recently.  His TSH is a little low today.  We will check a T4 level.  If that is abnormal we may decrease his Synthroid.    4. Hepatitis B: seeing Dr. Grier. On oral epivir. Levels have significantly improved since August. Will continue to follow with ID.     5. Nose bleeds: more bloody drainage, inflammation.  Likely combination of immunotherapy, dry winter air. Discussed that I would prefer him to just increase vitamin K in his diet.  He is requesting a vitamin K supplement.  I gave him information on the top 10 foods with vitamin K in it and encouraged him to eat more of that stuff. We also talked about getting a humidifier and using Vaseline in his nose, nasal spray    ECOG Performance    0 - Independent    Distress Screening (within last 30 days)    No data recorded     Pain  Pain Score: No Pain (0)    Problem List    Patient Active Problem List    Diagnosis     Nasopharyngeal carcinoma (H)     Squamous cell carcinoma of nasopharynx (H)     Hilar lymphadenopathy     Abnormal PET scan of mediastinum     Elevated serum creatinine     Antineoplastic chemotherapy induced pancytopenia (H)     Anemia     Weight loss     Dehydration     Hypokalemia     Dysphagia     Hypomagnesemia     Parotitis     Severe protein-calorie malnutrition (H)     Elevated LFTs     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  Diagnosed January 2020. Imaging suggested bilateral abnormal lymphadenopathy in the neck.  Also asymmetric soft tissue thickening in the posterior right nasopharynx.  On imaging, tumor also appeared to extend down to the hypopharynx.     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     Treatment:     Initially treated with concurrent chemotherapy and radiation.  He had weekly cisplatin starting March 3, 2020.  Fifth and final dose given March 31, 2020.  Subsequent chemotherapy was held due to prolonged thrombocytopenia and renal insufficiency.  Radiation dose was 7000 cGy in 35 fractions given from March 2 through April 17, 2020.     Started cisplatin with infusional 5-FU on June 1, 2020.  Cycle 1 given at a 25% dose reduction.  He still had significant thrombocytopenia and neutropenia on day 28.  Cycle 2 was held.  At the same time his liver function tests elevated secondary to hepatitis B.      Radiosurgery delivered to the right ethmoid tumor delivered November 8 through November 17, 2021.  Received 3500 cGy in 5  fractions.    -Has now started on Keytruda. Today is cycle 4     Interim History:     Kristen returns today for a follow-up visit. He is doing quite well.  His only complaint is that he feels that in the morning he has some bloody drainage from his nose.  He is used some Vaseline and nasal spray and it does not seem to make a huge difference.  He does not have actual bloody noses where it does not stop bleeding.  He is wondering if he could have some vitamin K because of this.    Review of Systems    Pertinent items are noted in HPI.    Past History    Past Medical History:   Diagnosis Date     Anemia      Dysphagia      Hepatitis B chronic      Hypothyroidism      Nasopharyngeal cancer (H)      Severe protein-calorie malnutrition (H)      Thrombocytopenia (H)        PHYSICAL EXAM  /76   Pulse 67   Temp 97.6  F (36.4  C)   Resp 16   Wt 66.8 kg (147 lb 4.8 oz)   SpO2 97%   BMI 26.10 kg/m      GENERAL: no acute distress. Cooperative in conversation. Here with son. Mask on  RESP: Regular respiratory rate. No expiratory wheezes   MUSCULOSKELETAL: no bilateral leg swelling  NEURO: non focal. Alert and oriented x3.   PSYCH: within normal limits. No depression or anxiety.  SKIN: exposed skin is dry intact.     Lab Results    Recent Results (from the past 168 hour(s))   Comprehensive metabolic panel   Result Value Ref Range    Sodium 139 136 - 145 mmol/L    Potassium 4.1 3.5 - 5.0 mmol/L    Chloride 108 (H) 98 - 107 mmol/L    Carbon Dioxide (CO2) 25 22 - 31 mmol/L    Anion Gap 6 5 - 18 mmol/L    Urea Nitrogen 26 (H) 8 - 22 mg/dL    Creatinine 1.07 0.70 - 1.30 mg/dL    Calcium 8.5 8.5 - 10.5 mg/dL    Glucose 131 (H) 70 - 125 mg/dL    Alkaline Phosphatase 104 45 - 120 U/L    AST 31 0 - 40 U/L    ALT 30 0 - 45 U/L    Protein Total 6.7 6.0 - 8.0 g/dL    Albumin 3.3 (L) 3.5 - 5.0 g/dL    Bilirubin Total 0.7 0.0 - 1.0 mg/dL    GFR Estimate 75 >60 mL/min/1.73m2   TSH with free T4 reflex   Result Value Ref Range    TSH 0.06  "(L) 0.30 - 5.00 uIU/mL       Imaging    No results found.      Signed by: SPARKLE Guzmán CNP    Oncology Rooming Note    February 24, 2022 9:21 AM   Kristen Chapman is a 71 year old male who presents for:    Chief Complaint   Patient presents with     Oncology Clinic Visit     Initial Vitals: /76   Pulse 67   Temp 97.6  F (36.4  C)   Resp 16   Wt 66.8 kg (147 lb 4.8 oz)   SpO2 97%   BMI 26.10 kg/m   Estimated body mass index is 26.1 kg/m  as calculated from the following:    Height as of 1/13/22: 1.6 m (5' 2.99\").    Weight as of this encounter: 66.8 kg (147 lb 4.8 oz). Body surface area is 1.72 meters squared.  No Pain (0) Comment: Data Unavailable   No LMP for male patient.  Allergies reviewed: Yes  Medications reviewed: Yes    Medications: Medication refills not needed today.  Pharmacy name entered into [employer]: [facility] - [place], [place] - [address] [place] [address]    Clinical concerns: None       Shannon Sanchez LPN                Again, thank you for allowing me to participate in the care of your patient.        Sincerely,        SPARKLE Guzmán CNP    "

## 2022-02-24 NOTE — PROGRESS NOTES
[facility] Hematology and Oncology Progress Note    Patient: Kristen Chapman  MRN: [id number]  Date of Service: Feb 24, 2022          Reason for Visit    Chief Complaint   Patient presents with     Oncology Clinic Visit       Assessment and Plan    Cancer Staging  No matching staging information was found for the patient.    1.  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 He has been tolerating this well.  He will get cycle 4 today at full dose.  He will return in 3 weeks for cycle 5 and get a PET scan.    2.  Elevated liver function: These were elevated before he even started.  Likely from other medications.  He initially went up a little bit with starting the immunotherapy but now have been coming down and are normal.  Continue to monitor.     3. history of elevated TSH: Patient was increased on his Synthroid recently.  His TSH is a little low today.  We will check a T4 level.  If that is abnormal we may decrease his Synthroid.    4. Hepatitis B: seeing Dr. Grier. On oral epivir. Levels have significantly improved since August. Will continue to follow with ID.     5. Nose bleeds: more bloody drainage, inflammation.  Likely combination of immunotherapy, dry winter air. Discussed that I would prefer him to just increase vitamin K in his diet.  He is requesting a vitamin K supplement.  I gave him information on the top 10 foods with vitamin K in it and encouraged him to eat more of that stuff. We also talked about getting a humidifier and using Vaseline in his nose, nasal spray    ECOG Performance    0 - Independent    Distress Screening (within last 30 days)    No data recorded     Pain  Pain Score: No Pain (0)    Problem List    Patient Active Problem List   Diagnosis     Nasopharyngeal carcinoma (H)     Squamous cell carcinoma of nasopharynx (H)     Hilar lymphadenopathy     Abnormal PET scan of mediastinum     Elevated serum creatinine     Antineoplastic chemotherapy induced pancytopenia (H)      Anemia     Weight loss     Dehydration     Hypokalemia     Dysphagia     Hypomagnesemia     Parotitis     Severe protein-calorie malnutrition (H)     Elevated LFTs     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  Diagnosed January 2020. Imaging suggested bilateral abnormal lymphadenopathy in the neck.  Also asymmetric soft tissue thickening in the posterior right nasopharynx.  On imaging, tumor also appeared to extend down to the hypopharynx.     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     Treatment:     Initially treated with concurrent chemotherapy and radiation.  He had weekly cisplatin starting March 3, 2020.  Fifth and final dose given March 31, 2020.  Subsequent chemotherapy was held due to prolonged thrombocytopenia and renal insufficiency.  Radiation dose was 7000 cGy in 35 fractions given from March 2 through April 17, 2020.     Started cisplatin with infusional 5-FU on June 1, 2020.  Cycle 1 given at a 25% dose reduction.  He still had significant thrombocytopenia and neutropenia on day 28.  Cycle 2 was held.  At the same time his liver function tests elevated secondary to hepatitis B.      Radiosurgery delivered to the right ethmoid tumor delivered November 8 through November 17, 2021.  Received 3500 cGy in 5 fractions.    -Has now started on Keytruda. Today is cycle 4     Interim History:     Kristen returns today for a follow-up visit. He is doing quite well.  His only complaint is that he feels that in the morning he has some bloody drainage from his nose.  He is  used some Vaseline and nasal spray and it does not seem to make a huge difference.  He does not have actual bloody noses where it does not stop bleeding.  He is wondering if he could have some vitamin K because of this.    Review of Systems    Pertinent items are noted in HPI.    Past History    Past Medical History:   Diagnosis Date     Anemia      Dysphagia      Hepatitis B chronic      Hypothyroidism      Nasopharyngeal cancer (H)      Severe protein-calorie malnutrition (H)      Thrombocytopenia (H)        PHYSICAL EXAM  /76   Pulse 67   Temp 97.6  F (36.4  C)   Resp 16   Wt 66.8 kg (147 lb 4.8 oz)   SpO2 97%   BMI 26.10 kg/m      GENERAL: no acute distress. Cooperative in conversation. Here with son. Mask on  RESP: Regular respiratory rate. No expiratory wheezes   MUSCULOSKELETAL: no bilateral leg swelling  NEURO: non focal. Alert and oriented x3.   PSYCH: within normal limits. No depression or anxiety.  SKIN: exposed skin is dry intact.     Lab Results    Recent Results (from the past 168 hour(s))   Comprehensive metabolic panel   Result Value Ref Range    Sodium 139 136 - 145 mmol/L    Potassium 4.1 3.5 - 5.0 mmol/L    Chloride 108 (H) 98 - 107 mmol/L    Carbon Dioxide (CO2) 25 22 - 31 mmol/L    Anion Gap 6 5 - 18 mmol/L    Urea Nitrogen 26 (H) 8 - 22 mg/dL    Creatinine 1.07 0.70 - 1.30 mg/dL    Calcium 8.5 8.5 - 10.5 mg/dL    Glucose 131 (H) 70 - 125 mg/dL    Alkaline Phosphatase 104 45 - 120 U/L    AST 31 0 - 40 U/L    ALT 30 0 - 45 U/L    Protein Total 6.7 6.0 - 8.0 g/dL    Albumin 3.3 (L) 3.5 - 5.0 g/dL    Bilirubin Total 0.7 0.0 - 1.0 mg/dL    GFR Estimate 75 >60 mL/min/1.73m2   TSH with free T4 reflex   Result Value Ref Range    TSH 0.06 (L) 0.30 - 5.00 uIU/mL       Imaging    No results found.      Signed by: SPARKLE Guzmán CNP

## 2022-02-24 NOTE — PROGRESS NOTES
"Oncology Rooming Note    February 24, 2022 9:21 AM   Kristen Chapman is a 71 year old male who presents for:    Chief Complaint   Patient presents with     Oncology Clinic Visit     Initial Vitals: /76   Pulse 67   Temp 97.6  F (36.4  C)   Resp 16   Wt 66.8 kg (147 lb 4.8 oz)   SpO2 97%   BMI 26.10 kg/m   Estimated body mass index is 26.1 kg/m  as calculated from the following:    Height as of 1/13/22: 1.6 m (5' 2.99\").    Weight as of this encounter: 66.8 kg (147 lb 4.8 oz). Body surface area is 1.72 meters squared.  No Pain (0) Comment: Data Unavailable   No LMP for male patient.  Allergies reviewed: Yes  Medications reviewed: Yes    Medications: Medication refills not needed today.  Pharmacy name entered into [employer]: [facility] - [place], [place] - [address] [facility]    Clinical concerns: None       Shannon Sanchez LPN            "

## 2022-03-17 ENCOUNTER — HOSPITAL ENCOUNTER (OUTPATIENT)
Dept: PET IMAGING | Facility: HOSPITAL | Age: 72
Discharge: HOME | End: 2022-03-17
Attending: NURSE PRACTITIONER | Admitting: NURSE PRACTITIONER
Payer: COMMERCIAL

## 2022-03-17 DIAGNOSIS — C11.9 SQUAMOUS CELL CARCINOMA OF NASOPHARYNX (H): ICD-10-CM

## 2022-03-17 DIAGNOSIS — C11.8 MALIGNANT NEOPLASM OF OVERLAPPING SITES OF NASOPHARYNX (H): ICD-10-CM

## 2022-03-17 LAB — GLUCOSE BLDC GLUCOMTR-MCNC: 104 MG/DL (ref 70–99)

## 2022-03-17 PROCEDURE — 78816 PET IMAGE W/CT FULL BODY: CPT | Mod: 26 | Performed by: RADIOLOGY

## 2022-03-17 PROCEDURE — 82962 GLUCOSE BLOOD TEST: CPT | Mod: GZ

## 2022-03-17 PROCEDURE — 343N000001 HC RX 343: Performed by: NURSE PRACTITIONER

## 2022-03-17 PROCEDURE — 78816 PET IMAGE W/CT FULL BODY: CPT | Mod: PS

## 2022-03-17 PROCEDURE — A9552 F18 FDG: HCPCS | Performed by: NURSE PRACTITIONER

## 2022-03-17 RX ADMIN — FLUDEOXYGLUCOSE F-18 10.03 MCI.: 500 INJECTION, SOLUTION INTRAVENOUS at 09:06

## 2022-03-18 ENCOUNTER — LAB (OUTPATIENT)
Dept: INFUSION THERAPY | Facility: HOSPITAL | Age: 72
End: 2022-03-18
Attending: INTERNAL MEDICINE
Payer: COMMERCIAL

## 2022-03-18 ENCOUNTER — ONCOLOGY VISIT (OUTPATIENT)
Dept: ONCOLOGY | Facility: HOSPITAL | Age: 72
End: 2022-03-18
Attending: INTERNAL MEDICINE
Payer: COMMERCIAL

## 2022-03-18 VITALS
DIASTOLIC BLOOD PRESSURE: 79 MMHG | WEIGHT: 145.8 LBS | SYSTOLIC BLOOD PRESSURE: 137 MMHG | HEART RATE: 66 BPM | OXYGEN SATURATION: 99 % | RESPIRATION RATE: 20 BRPM | HEIGHT: 63 IN | BODY MASS INDEX: 25.83 KG/M2 | TEMPERATURE: 98.6 F

## 2022-03-18 DIAGNOSIS — D69.6 THROMBOCYTOPENIA (H): ICD-10-CM

## 2022-03-18 DIAGNOSIS — C11.9 SQUAMOUS CELL CARCINOMA OF NASOPHARYNX (H): Primary | ICD-10-CM

## 2022-03-18 DIAGNOSIS — D61.818 ACQUIRED PANCYTOPENIA (H): ICD-10-CM

## 2022-03-18 DIAGNOSIS — C11.9 NASOPHARYNGEAL CARCINOMA (H): Primary | ICD-10-CM

## 2022-03-18 DIAGNOSIS — C11.9 SQUAMOUS CELL CARCINOMA OF NASOPHARYNX (H): ICD-10-CM

## 2022-03-18 LAB
ALBUMIN SERPL-MCNC: 3.4 G/DL (ref 3.5–5)
ALP SERPL-CCNC: 95 U/L (ref 45–120)
ALT SERPL W P-5'-P-CCNC: 24 U/L (ref 0–45)
ANION GAP SERPL CALCULATED.3IONS-SCNC: 8 MMOL/L (ref 5–18)
AST SERPL W P-5'-P-CCNC: 29 U/L (ref 0–40)
BASOPHILS # BLD AUTO: 0 10E3/UL (ref 0–0.2)
BASOPHILS NFR BLD AUTO: 1 %
BILIRUB SERPL-MCNC: 0.7 MG/DL (ref 0–1)
BUN SERPL-MCNC: 37 MG/DL (ref 8–22)
CALCIUM SERPL-MCNC: 8.7 MG/DL (ref 8.5–10.5)
CHLORIDE BLD-SCNC: 109 MMOL/L (ref 98–107)
CO2 SERPL-SCNC: 23 MMOL/L (ref 22–31)
CREAT SERPL-MCNC: 1.03 MG/DL (ref 0.7–1.3)
EOSINOPHIL # BLD AUTO: 0.1 10E3/UL (ref 0–0.7)
EOSINOPHIL NFR BLD AUTO: 3 %
ERYTHROCYTE [DISTWIDTH] IN BLOOD BY AUTOMATED COUNT: 15.6 % (ref 10–15)
GFR SERPL CREATININE-BSD FRML MDRD: 79 ML/MIN/1.73M2
GLUCOSE BLD-MCNC: 97 MG/DL (ref 70–125)
HCT VFR BLD AUTO: 31.5 % (ref 40–53)
HGB BLD-MCNC: 9.9 G/DL (ref 13.3–17.7)
IMM GRANULOCYTES # BLD: 0 10E3/UL
IMM GRANULOCYTES NFR BLD: 0 %
LYMPHOCYTES # BLD AUTO: 0.7 10E3/UL (ref 0.8–5.3)
LYMPHOCYTES NFR BLD AUTO: 17 %
MCH RBC QN AUTO: 27.2 PG (ref 26.5–33)
MCHC RBC AUTO-ENTMCNC: 31.4 G/DL (ref 31.5–36.5)
MCV RBC AUTO: 87 FL (ref 78–100)
MONOCYTES # BLD AUTO: 0.4 10E3/UL (ref 0–1.3)
MONOCYTES NFR BLD AUTO: 11 %
NEUTROPHILS # BLD AUTO: 2.6 10E3/UL (ref 1.6–8.3)
NEUTROPHILS NFR BLD AUTO: 68 %
NRBC # BLD AUTO: 0 10E3/UL
NRBC BLD AUTO-RTO: 0 /100
PLATELET # BLD AUTO: 54 10E3/UL (ref 150–450)
POTASSIUM BLD-SCNC: 3.8 MMOL/L (ref 3.5–5)
PROT SERPL-MCNC: 7.2 G/DL (ref 6–8)
RBC # BLD AUTO: 3.64 10E6/UL (ref 4.4–5.9)
SODIUM SERPL-SCNC: 140 MMOL/L (ref 136–145)
T4 FREE SERPL-MCNC: 0.93 NG/DL (ref 0.7–1.8)
TSH SERPL DL<=0.005 MIU/L-ACNC: 0.11 UIU/ML (ref 0.3–5)
WBC # BLD AUTO: 3.8 10E3/UL (ref 4–11)

## 2022-03-18 PROCEDURE — 36591 DRAW BLOOD OFF VENOUS DEVICE: CPT

## 2022-03-18 PROCEDURE — 99214 OFFICE O/P EST MOD 30 MIN: CPT | Performed by: INTERNAL MEDICINE

## 2022-03-18 PROCEDURE — 84443 ASSAY THYROID STIM HORMONE: CPT | Performed by: NURSE PRACTITIONER

## 2022-03-18 PROCEDURE — 250N000011 HC RX IP 250 OP 636: Performed by: NURSE PRACTITIONER

## 2022-03-18 PROCEDURE — 96413 CHEMO IV INFUSION 1 HR: CPT

## 2022-03-18 PROCEDURE — 84439 ASSAY OF FREE THYROXINE: CPT | Performed by: NURSE PRACTITIONER

## 2022-03-18 PROCEDURE — 80053 COMPREHEN METABOLIC PANEL: CPT | Performed by: NURSE PRACTITIONER

## 2022-03-18 PROCEDURE — G0463 HOSPITAL OUTPT CLINIC VISIT: HCPCS | Mod: 25

## 2022-03-18 PROCEDURE — 85004 AUTOMATED DIFF WBC COUNT: CPT

## 2022-03-18 PROCEDURE — 82040 ASSAY OF SERUM ALBUMIN: CPT | Performed by: NURSE PRACTITIONER

## 2022-03-18 PROCEDURE — 258N000003 HC RX IP 258 OP 636: Performed by: NURSE PRACTITIONER

## 2022-03-18 RX ORDER — LORAZEPAM 2 MG/ML
0.5 INJECTION INTRAMUSCULAR EVERY 4 HOURS PRN
Status: CANCELLED | OUTPATIENT
Start: 2022-04-07

## 2022-03-18 RX ORDER — MEPERIDINE HYDROCHLORIDE 25 MG/ML
25 INJECTION INTRAMUSCULAR; INTRAVENOUS; SUBCUTANEOUS EVERY 30 MIN PRN
Status: DISCONTINUED | OUTPATIENT
Start: 2022-03-18 | End: 2022-03-18 | Stop reason: HOSPADM

## 2022-03-18 RX ORDER — DIPHENHYDRAMINE HYDROCHLORIDE 50 MG/ML
50 INJECTION INTRAMUSCULAR; INTRAVENOUS
Status: CANCELLED
Start: 2022-04-28

## 2022-03-18 RX ORDER — METHYLPREDNISOLONE SODIUM SUCCINATE 125 MG/2ML
125 INJECTION, POWDER, LYOPHILIZED, FOR SOLUTION INTRAMUSCULAR; INTRAVENOUS
Status: CANCELLED
Start: 2022-04-07

## 2022-03-18 RX ORDER — METHYLPREDNISOLONE SODIUM SUCCINATE 125 MG/2ML
125 INJECTION, POWDER, LYOPHILIZED, FOR SOLUTION INTRAMUSCULAR; INTRAVENOUS
Status: CANCELLED
Start: 2022-04-28

## 2022-03-18 RX ORDER — DIPHENHYDRAMINE HYDROCHLORIDE 50 MG/ML
50 INJECTION INTRAMUSCULAR; INTRAVENOUS
Status: CANCELLED
Start: 2022-04-07

## 2022-03-18 RX ORDER — MEPERIDINE HYDROCHLORIDE 25 MG/ML
25 INJECTION INTRAMUSCULAR; INTRAVENOUS; SUBCUTANEOUS EVERY 30 MIN PRN
Status: CANCELLED | OUTPATIENT
Start: 2022-04-28

## 2022-03-18 RX ORDER — ALBUTEROL SULFATE 90 UG/1
1-2 AEROSOL, METERED RESPIRATORY (INHALATION)
Status: CANCELLED
Start: 2022-04-28

## 2022-03-18 RX ORDER — HEPARIN SODIUM,PORCINE 10 UNIT/ML
5 VIAL (ML) INTRAVENOUS
Status: CANCELLED | OUTPATIENT
Start: 2022-04-07

## 2022-03-18 RX ORDER — ALBUTEROL SULFATE 90 UG/1
1-2 AEROSOL, METERED RESPIRATORY (INHALATION)
Status: DISCONTINUED | OUTPATIENT
Start: 2022-03-18 | End: 2022-03-18 | Stop reason: HOSPADM

## 2022-03-18 RX ORDER — HEPARIN SODIUM,PORCINE 10 UNIT/ML
5 VIAL (ML) INTRAVENOUS
Status: CANCELLED | OUTPATIENT
Start: 2022-04-28

## 2022-03-18 RX ORDER — HEPARIN SODIUM (PORCINE) LOCK FLUSH IV SOLN 100 UNIT/ML 100 UNIT/ML
5 SOLUTION INTRAVENOUS
Status: CANCELLED | OUTPATIENT
Start: 2022-04-07

## 2022-03-18 RX ORDER — NALOXONE HYDROCHLORIDE 0.4 MG/ML
0.2 INJECTION, SOLUTION INTRAMUSCULAR; INTRAVENOUS; SUBCUTANEOUS
Status: CANCELLED | OUTPATIENT
Start: 2022-04-07

## 2022-03-18 RX ORDER — HEPARIN SODIUM (PORCINE) LOCK FLUSH IV SOLN 100 UNIT/ML 100 UNIT/ML
5 SOLUTION INTRAVENOUS
Status: CANCELLED | OUTPATIENT
Start: 2022-04-28

## 2022-03-18 RX ORDER — NALOXONE HYDROCHLORIDE 0.4 MG/ML
0.2 INJECTION, SOLUTION INTRAMUSCULAR; INTRAVENOUS; SUBCUTANEOUS
Status: CANCELLED | OUTPATIENT
Start: 2022-04-28

## 2022-03-18 RX ORDER — ALBUTEROL SULFATE 0.83 MG/ML
2.5 SOLUTION RESPIRATORY (INHALATION)
Status: CANCELLED | OUTPATIENT
Start: 2022-04-07

## 2022-03-18 RX ORDER — NALOXONE HYDROCHLORIDE 0.4 MG/ML
0.2 INJECTION, SOLUTION INTRAMUSCULAR; INTRAVENOUS; SUBCUTANEOUS
Status: DISCONTINUED | OUTPATIENT
Start: 2022-03-18 | End: 2022-03-18 | Stop reason: HOSPADM

## 2022-03-18 RX ORDER — EPINEPHRINE 1 MG/ML
0.3 INJECTION, SOLUTION INTRAMUSCULAR; SUBCUTANEOUS EVERY 5 MIN PRN
Status: CANCELLED | OUTPATIENT
Start: 2022-04-07

## 2022-03-18 RX ORDER — EPINEPHRINE 1 MG/ML
0.3 INJECTION, SOLUTION INTRAMUSCULAR; SUBCUTANEOUS EVERY 5 MIN PRN
Status: DISCONTINUED | OUTPATIENT
Start: 2022-03-18 | End: 2022-03-18 | Stop reason: HOSPADM

## 2022-03-18 RX ORDER — LORAZEPAM 2 MG/ML
0.5 INJECTION INTRAMUSCULAR EVERY 4 HOURS PRN
Status: CANCELLED | OUTPATIENT
Start: 2022-04-28

## 2022-03-18 RX ORDER — DIPHENHYDRAMINE HYDROCHLORIDE 50 MG/ML
50 INJECTION INTRAMUSCULAR; INTRAVENOUS
Status: DISCONTINUED | OUTPATIENT
Start: 2022-03-18 | End: 2022-03-18 | Stop reason: HOSPADM

## 2022-03-18 RX ORDER — ALBUTEROL SULFATE 0.83 MG/ML
2.5 SOLUTION RESPIRATORY (INHALATION)
Status: CANCELLED | OUTPATIENT
Start: 2022-04-28

## 2022-03-18 RX ORDER — HEPARIN SODIUM (PORCINE) LOCK FLUSH IV SOLN 100 UNIT/ML 100 UNIT/ML
5 SOLUTION INTRAVENOUS
Status: DISCONTINUED | OUTPATIENT
Start: 2022-03-18 | End: 2022-03-18 | Stop reason: HOSPADM

## 2022-03-18 RX ORDER — METHYLPREDNISOLONE SODIUM SUCCINATE 125 MG/2ML
125 INJECTION, POWDER, LYOPHILIZED, FOR SOLUTION INTRAMUSCULAR; INTRAVENOUS
Status: DISCONTINUED | OUTPATIENT
Start: 2022-03-18 | End: 2022-03-18 | Stop reason: HOSPADM

## 2022-03-18 RX ORDER — ALBUTEROL SULFATE 90 UG/1
1-2 AEROSOL, METERED RESPIRATORY (INHALATION)
Status: CANCELLED
Start: 2022-04-07

## 2022-03-18 RX ORDER — ALBUTEROL SULFATE 0.83 MG/ML
2.5 SOLUTION RESPIRATORY (INHALATION)
Status: DISCONTINUED | OUTPATIENT
Start: 2022-03-18 | End: 2022-03-18 | Stop reason: HOSPADM

## 2022-03-18 RX ORDER — EPINEPHRINE 1 MG/ML
0.3 INJECTION, SOLUTION INTRAMUSCULAR; SUBCUTANEOUS EVERY 5 MIN PRN
Status: CANCELLED | OUTPATIENT
Start: 2022-04-28

## 2022-03-18 RX ORDER — MEPERIDINE HYDROCHLORIDE 25 MG/ML
25 INJECTION INTRAMUSCULAR; INTRAVENOUS; SUBCUTANEOUS EVERY 30 MIN PRN
Status: CANCELLED | OUTPATIENT
Start: 2022-04-07

## 2022-03-18 RX ADMIN — SODIUM CHLORIDE 200 MG: 9 INJECTION, SOLUTION INTRAVENOUS at 10:41

## 2022-03-18 RX ADMIN — HEPARIN SODIUM (PORCINE) LOCK FLUSH IV SOLN 100 UNIT/ML 5 ML: 100 SOLUTION at 11:10

## 2022-03-18 ASSESSMENT — PAIN SCALES - GENERAL: PAINLEVEL: NO PAIN (0)

## 2022-03-18 NOTE — PROGRESS NOTES
"Oncology Rooming Note    March 18, 2022 9:30 AM   Kristen Chapman is a 71 year old male who presents for:    Chief Complaint   Patient presents with     Oncology Clinic Visit     3 week return Squamous cell carcinoma of nasopharynx, review labs , PET and Infusion     Initial Vitals: /79 (BP Location: Left arm, Patient Position: Sitting, Cuff Size: Adult Regular)   Pulse 66   Temp 98.6  F (37  C) (Oral)   Resp 20   Ht 1.594 m (5' 2.75\")   Wt 66.1 kg (145 lb 12.8 oz)   SpO2 99%   BMI 26.03 kg/m   Estimated body mass index is 26.03 kg/m  as calculated from the following:    Height as of this encounter: 1.594 m (5' 2.75\").    Weight as of this encounter: 66.1 kg (145 lb 12.8 oz). Body surface area is 1.71 meters squared.  No Pain (0) Comment: Data Unavailable   No LMP for male patient.  Allergies reviewed: Yes  Medications reviewed: Yes    Medications: Medication refills not needed today.  Pharmacy name entered into [employer]: [facility] - [place], [place] - [address] [place] [address]    Clinical concerns: 3 week return Squamous cell carcinoma of nasopharynx, review labs , PET and Infusion.       Abby Duncan [facility]              "

## 2022-03-18 NOTE — PROGRESS NOTES
Pt ambulates to infusion center for lab draw prior to MD visit.  IVAD accessed, labs drawn et sent.  Pt was seen by Dr. Frias today and orders were approved.  Treatment administered as ordered without difficulty.  IVAD flushed w/NS et heparin and needle deaccessed.  Pt left clinic stable to [facility].  Plan RTC as scheduled.

## 2022-03-18 NOTE — LETTER
"    3/18/2022         RE: Kristen Chapman  [address] [place] [address]        Dear Colleague,    Thank you for referring your patient, Kristen Chapman, to the [facility]. Please see a copy of my visit note below.    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    Weight as of this encounter: 66.1 kg (145 lb 12.8 oz). Body surface area is 1.71 meters squared.  No Pain (0) Comment: Data Unavailable   No LMP for male patient.  Allergies reviewed: Yes  Medications reviewed: Yes    Medications: Medication refills not needed today.  Pharmacy name entered into P[facility]: [facility] - [place], [place] - [address] [place] [address]    Clinical concerns: 3 week return Squamous cell carcinoma of nasopharynx, review labs , PET and Infusion.       Abby Duncan [facility] Progress Note    Patient: Kristen Chapman  MRN: [id number]  Date of Service: Mar 18, 2022        Assessment and Plan:    Cancer Staging  Nasopharyngeal carcinoma (H)  Staging form: Pharynx - Nasopharynx, AJCC 8th Edition  - Clinical stage from 2/18/2020: Stage SAMANTHA (cT4, cN2, cM0) - Signed by Alan Frias MD on 2/18/2020     1.  Nasopharyngeal carcinoma: He has been on single agent pembrolizumab for about 2-1/2 months.  He just had a PET/CT for repeat staging.  I personally reviewed the images and shared them with Kristen.  He " had a very good response.  Almost a complete response in the tumor and nodes.  Clinically he is tolerating his therapy quite well.  His CBC was reviewed today and it is stable.  We will continue Keytruda every 3 weeks.  We will see him in clinic every 6 weeks.  We can repeat imaging in about 4 months.    2.  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  He had a bone marrow biopsy in October 2021.  Next generation sequencing showed no abnormalities.    3.  Hypothyroidism: TSH is actually been trending too low.  We will see what it is today.  We may have to decrease his Synthroid dose.     4.  Epistaxis: Mostly involving the left nostril.  He is requesting vitamin K.  We will send him to ENT to see if there is something that can be cauterized.    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  Diagnosed January 2020. Imaging suggested bilateral abnormal lymphadenopathy in the neck.  Also asymmetric soft tissue thickening in the posterior right nasopharynx.  On imaging, tumor also appeared to extend down to the hypopharynx.    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    Treatment:    Initially treated with concurrent chemotherapy and radiation.  He had weekly cisplatin starting March 3, 2020.  Fifth and final dose given March 31, 2020.  Subsequent chemotherapy was held due to prolonged thrombocytopenia and renal insufficiency.  Radiation dose was 7000 cGy in 35 fractions given from March 2 through April 17, 2020.     Started cisplatin with infusional 5-FU on June 1, 2020.  Cycle 1 given at a 25% dose reduction.  He still had significant thrombocytopenia and neutropenia on day 28.  Cycle 2 was held.  At the same time his liver function tests elevated secondary to hepatitis B.     Radiosurgery delivered to the right ethmoid tumor delivered November  "8 through November 17, 2021.  Received 3500 cGy in 5 fractions.    Pembrolizumab started 12/23/21    Interim History:    Kristen returns today for a follow-up visit.  He is now on single agent pembrolizumab.  He is doing well.  Appetite is okay.  Denies any shortness of breath, rash, diarrhea, myalgias.  He would like to have a vitamin K shot for his nosebleeds.  These happen mostly on the left side.    Review of Systems:    As above in the history.     Review of Systems otherwise Negative for:  General: chills, fever or night sweats  Psychological: anxiety or depression  Ophthalmic: blurry vision, double vision or loss of vision, vision change  ENT: oral lesions, hearing changes  Hematological and Lymphatic: bruising, jaundice, swollen lymph nodes  Endocrine: hot flashes, unexpected weight changes  Respiratory: cough, hemoptysis, orthopnea or shortness of breath/AYALA  Cardiovascular: chest pain, edema, palpitations or PND  Gastrointestinal: abdominal pain, blood in stools, change in bowel habits, constipation, diarrhea or nausea/vomiting  Genito-Urinary: change in urinary stream, incontinence, frequency/urgency  Musculoskeletal: joint pain, stiffness, swelling, muscle pain  Neurological: dizziness, headaches, numbness/tingling  Dermatological: lumps and rash    Past History:    Past Medical History:   Diagnosis Date     Anemia      Dysphagia      Hepatitis B chronic      Hypothyroidism      Nasopharyngeal cancer (H)      Severe protein-calorie malnutrition (H)      Thrombocytopenia (H)      Physical Exam:    /79 (BP Location: Left arm, Patient Position: Sitting, Cuff Size: Adult Regular)   Pulse 66   Temp 98.6  F (37  C) (Oral)   Resp 20   Ht 1.594 m (5' 2.75\")   Wt 66.1 kg (145 lb 12.8 oz)   SpO2 99%   BMI 26.03 kg/m      General: patient appears stated age of 71 year old. Nontoxic and in no distress.   HEENT: Head: atraumatic, normocephalic. Sclerae anicteric.  Chest:  Normal respiratory effort  Cardiac:  " No edema. RRR, no murmur.   Abdomen: abdomen is non-distended  Extremities: normal tone and muscle bulk.  Skin: no lesions or rash on visible skin. Warm and dry.   CNS: alert and oriented. Grossly non-focal.   Psychiatric: normal mood and affect.     Lab Results:    Recent Results (from the past 168 hour(s))   Glucose by meter   Result Value Ref Range    GLUCOSE BY METER POCT 104 (H) 70 - 99 mg/dL   Comprehensive metabolic panel   Result Value Ref Range    Sodium 140 136 - 145 mmol/L    Potassium 3.8 3.5 - 5.0 mmol/L    Chloride 109 (H) 98 - 107 mmol/L    Carbon Dioxide (CO2) 23 22 - 31 mmol/L    Anion Gap 8 5 - 18 mmol/L    Urea Nitrogen 37 (H) 8 - 22 mg/dL    Creatinine 1.03 0.70 - 1.30 mg/dL    Calcium 8.7 8.5 - 10.5 mg/dL    Glucose 97 70 - 125 mg/dL    Alkaline Phosphatase 95 45 - 120 U/L    AST 29 0 - 40 U/L    ALT 24 0 - 45 U/L    Protein Total 7.2 6.0 - 8.0 g/dL    Albumin 3.4 (L) 3.5 - 5.0 g/dL    Bilirubin Total 0.7 0.0 - 1.0 mg/dL    GFR Estimate 79 >60 mL/min/1.73m2   CBC with platelets and differential   Result Value Ref Range    WBC Count 3.8 (L) 4.0 - 11.0 10e3/uL    RBC Count 3.64 (L) 4.40 - 5.90 10e6/uL    Hemoglobin 9.9 (L) 13.3 - 17.7 g/dL    Hematocrit 31.5 (L) 40.0 - 53.0 %    MCV 87 78 - 100 fL    MCH 27.2 26.5 - 33.0 pg    MCHC 31.4 (L) 31.5 - 36.5 g/dL    RDW 15.6 (H) 10.0 - 15.0 %    Platelet Count 54 (L) 150 - 450 10e3/uL    % Neutrophils 68 %    % Lymphocytes 17 %    % Monocytes 11 %    % Eosinophils 3 %    % Basophils 1 %    % Immature Granulocytes 0 %    NRBCs per 100 WBC 0 <1 /100    Absolute Neutrophils 2.6 1.6 - 8.3 10e3/uL    Absolute Lymphocytes 0.7 (L) 0.8 - 5.3 10e3/uL    Absolute Monocytes 0.4 0.0 - 1.3 10e3/uL    Absolute Eosinophils 0.1 0.0 - 0.7 10e3/uL    Absolute Basophils 0.0 0.0 - 0.2 10e3/uL    Absolute Immature Granulocytes 0.0 <=0.4 10e3/uL    Absolute NRBCs 0.0 10e3/uL        Imaging:    PET Oncology Whole Body    Result Date: 3/17/2022  Combined Report of:    PET and  CT on  3/17/2022 10:58 AM : 1. PET of the neck, chest, abdomen, and pelvis. 2. PET CT Fusion for Attenuation Correction and Anatomical Localization:  3. 3D MIP and PET-CT fused images were processed on an independent workstation and archived to PACS and reviewed by a radiologist. Technique: 1. PET: The patient received   10.03  mCi of F-18-FDG; the serum glucose was 104 prior to administration, body weight was 147 pounds. Images were evaluated in the axial, sagittal, and coronal planes as well as the rotational whole body MIP. Images were acquired from the Vertex to the Feet. UPTAKE WAS MEASURED AT 60 MINUTES. BACKGROUND:  Liver SUV max= 3.3,   Aorta Blood SUV Max: 2.6. 2. CT: CT only obtained for attenuation correction and not diagnostic purposes. INDICATION: Squamous cell carcinoma of nasopharynx (H); Malignant neoplasm of overlapping sites of nasopharynx (H) ADDITIONAL INFORMATION OBTAINED FROM EMR: History of nasopharyngeal squamous cell carcinoma treated with chemoradiation completed June 2020. Chemotherapy inturrupted due to underlying hepatitis B and renal function. Current treatment Keytruda. 9/7/2021 path of biopsy of the right ethmoid mass is consistent with EBV mediated nasopharyngeal carcinoma. COMPARISON: PET 9/16/2021 FINDINGS: HEAD/NECK: Significant decreased FDG uptake and size of soft tissue mass involving the right ethmoid sinus now with residual mild max SUV of 5.2, previously 16.3 (series 4, image 86). There is persistent associated loss of the medial orbital wall. Mucosal thickening involving the right maxillary sinus with mild associated FDG uptake with maximum SUV of 4.0). There is new focal uptake in the region of the left temporal bone possibly in the tympanic membrane or within the mastoid air cells and possibly representing uptake along the facial nerve. This could be further evaluated with an MRI. Redemonstration of focal FDG uptake in the left Rosenmuller fossa. The max SUV today's  examination is 6.4, on last examination is 8.1. Mild FDG uptake by the right Rosenmuller fossa. There is effacement of the Rosenmuller fossa bilaterally, a chronic finding. Decreased FDG uptake associated with a right level Ib lymph node measuring 0.6 mm with maximum SUV of 4.9, previously measured 1.2 with maximum SUV of 11.7. Previously seen right level 2 node is no longer FDG avid and is smaller, now subcentimeter. CHEST: Stable hypermetabolic right hilar adenopathy with maximum SUV of 7.9. Slightly decreased FDG uptake in the left hilar nodes with max SUV of 4.8, previously 6.0 (series 3, image 185). Cardiomegaly. Ectatic ascending aorta measuring 4.7 cm. Dilated main pulmonary artery measuring 3.1 cm. Stable 6 mm spiculated solid nodule within the right upper lobe (series 4, image 174), nodule is non-FDG avid, however it is below size threshold for PET. Gynecomastia. Small volume free fluid within the pelvis, decreased compared to prior. Prostatomegaly. ABDOMEN AND PELVIS: There is no suspicious FDG uptake in the abdomen or pelvis. Focal FDG uptake at the left ischial tuberosity region, associated with a vessel . Mild atrophy kidneys bilaterally. LOWER EXTREMITIES: No abnormal masses or hypermetabolic lesions. BONES: There are no suspicious lytic or blastic osseous lesions.  There is no abnormal FDG uptake in the skeleton.     IMPRESSION: In this patient with history of nasopharyngeal squamous cell carcinoma: 1. Significant decrease in FDG uptake associated with soft tissue mass involving the right ethmoid sinus, mild residual uptake in the right ethmoid air cells with corresponding nonspecific/ nonmass like soft tissue density is indeterminate. These could represent post treatment changes. Continued follow-up with MRI is recommended. 2. New focal FDG metabolism in the left temporal bone. Further evaluation with brain MRI with contrast is recommended as this might represent tumor along the facial nerve. 3.  Resolution of one of the right cervical lymph nodes and significant decrease in hypermetabolism of the second right cervical node suggestive of postoperative treatment response. 4. Hypermetabolic hilar adenopathy, stable on the right and slightly decreased on the left. 5. Ectatic 4.7 cm descending aorta. Dilated main pulmonary artery which can be seen with pulmonary hypertension. 6. Stable 6 mm spiculated solid nodule in the right upper lobe. Nodule is below size threshold for evaluation by PET. Close attention on follow-up. I have personally reviewed the examination and initial interpretation and I agree with the findings. JERRICA PEREZ MD   SYSTEM ID:  [id number]      Signed by: Alan Frias MD        Again, thank you for allowing me to participate in the care of your patient.        Sincerely,        Alan Frias MD

## 2022-03-18 NOTE — PROGRESS NOTES
[facility] Progress Note    Patient: Kristen Chapman  MRN: [id number]  Date of Service: Mar 18, 2022        Assessment and Plan:    Cancer Staging  Nasopharyngeal carcinoma (H)  Staging form: Pharynx - Nasopharynx, AJCC 8th Edition  - Clinical stage from 2/18/2020: Stage SAMANTHA (cT4, cN2, cM0) - Signed by Alan Frias MD on 2/18/2020     1.  Nasopharyngeal carcinoma: He has been on single agent pembrolizumab for about 2-1/2 months.  He just had a PET/CT for repeat staging.  I personally reviewed the images and shared them with Kristen.  He had a very good response.  Almost a complete response in the tumor and nodes.  Clinically he is tolerating his therapy quite well.  His CBC was reviewed today and it is stable.  We will continue Keytruda every 3 weeks.  We will see him in clinic every 6 weeks.  We can repeat imaging in about 4 months.    2.  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  He had a bone marrow biopsy in October 2021.  Next generation sequencing showed no abnormalities.    3.  Hypothyroidism: TSH is actually been trending too low.  We will see what it is today.  We may have to decrease his Synthroid dose.     4.  Epistaxis: Mostly involving the left nostril.  He is requesting vitamin K.  We will send him to ENT to see if there is something that can be cauterized.    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  Diagnosed January 2020. Imaging suggested bilateral abnormal lymphadenopathy in the neck.  Also asymmetric soft tissue thickening in the posterior right nasopharynx.  On imaging, tumor also appeared to extend down to the hypopharynx.    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    Treatment:    Initially treated with concurrent chemotherapy and radiation.  He had weekly cisplatin starting March 3,  2020.  Fifth and final dose given March 31, 2020.  Subsequent chemotherapy was held due to prolonged thrombocytopenia and renal insufficiency.  Radiation dose was 7000 cGy in 35 fractions given from March 2 through April 17, 2020.     Started cisplatin with infusional 5-FU on June 1, 2020.  Cycle 1 given at a 25% dose reduction.  He still had significant thrombocytopenia and neutropenia on day 28.  Cycle 2 was held.  At the same time his liver function tests elevated secondary to hepatitis B.     Radiosurgery delivered to the right ethmoid tumor delivered November 8 through November 17, 2021.  Received 3500 cGy in 5 fractions.    Pembrolizumab started 12/23/21    Interim History:    Kristen returns today for a follow-up visit.  He is now on single agent pembrolizumab.  He is doing well.  Appetite is okay.  Denies any shortness of breath, rash, diarrhea, myalgias.  He would like to have a vitamin K shot for his nosebleeds.  These happen mostly on the left side.    Review of Systems:    As above in the history.     Review of Systems otherwise Negative for:  General: chills, fever or night sweats  Psychological: anxiety or depression  Ophthalmic: blurry vision, double vision or loss of vision, vision change  ENT: oral lesions, hearing changes  Hematological and Lymphatic: bruising, jaundice, swollen lymph nodes  Endocrine: hot flashes, unexpected weight changes  Respiratory: cough, hemoptysis, orthopnea or shortness of breath/AYALA  Cardiovascular: chest pain, edema, palpitations or PND  Gastrointestinal: abdominal pain, blood in stools, change in bowel habits, constipation, diarrhea or nausea/vomiting  Genito-Urinary: change in urinary stream, incontinence, frequency/urgency  Musculoskeletal: joint pain, stiffness, swelling, muscle pain  Neurological: dizziness, headaches, numbness/tingling  Dermatological: lumps and rash    Past History:    Past Medical History:   Diagnosis Date     Anemia      Dysphagia      Hepatitis B  "chronic      Hypothyroidism      Nasopharyngeal cancer (H)      Severe protein-calorie malnutrition (H)      Thrombocytopenia (H)      Physical Exam:    /79 (BP Location: Left arm, Patient Position: Sitting, Cuff Size: Adult Regular)   Pulse 66   Temp 98.6  F (37  C) (Oral)   Resp 20   Ht 1.594 m (5' 2.75\")   Wt 66.1 kg (145 lb 12.8 oz)   SpO2 99%   BMI 26.03 kg/m      General: patient appears stated age of 71 year old. Nontoxic and in no distress.   HEENT: Head: atraumatic, normocephalic. Sclerae anicteric.  Chest:  Normal respiratory effort  Cardiac:  No edema. RRR, no murmur.   Abdomen: abdomen is non-distended  Extremities: normal tone and muscle bulk.  Skin: no lesions or rash on visible skin. Warm and dry.   CNS: alert and oriented. Grossly non-focal.   Psychiatric: normal mood and affect.     Lab Results:    Recent Results (from the past 168 hour(s))   Glucose by meter   Result Value Ref Range    GLUCOSE BY METER POCT 104 (H) 70 - 99 mg/dL   Comprehensive metabolic panel   Result Value Ref Range    Sodium 140 136 - 145 mmol/L    Potassium 3.8 3.5 - 5.0 mmol/L    Chloride 109 (H) 98 - 107 mmol/L    Carbon Dioxide (CO2) 23 22 - 31 mmol/L    Anion Gap 8 5 - 18 mmol/L    Urea Nitrogen 37 (H) 8 - 22 mg/dL    Creatinine 1.03 0.70 - 1.30 mg/dL    Calcium 8.7 8.5 - 10.5 mg/dL    Glucose 97 70 - 125 mg/dL    Alkaline Phosphatase 95 45 - 120 U/L    AST 29 0 - 40 U/L    ALT 24 0 - 45 U/L    Protein Total 7.2 6.0 - 8.0 g/dL    Albumin 3.4 (L) 3.5 - 5.0 g/dL    Bilirubin Total 0.7 0.0 - 1.0 mg/dL    GFR Estimate 79 >60 mL/min/1.73m2   CBC with platelets and differential   Result Value Ref Range    WBC Count 3.8 (L) 4.0 - 11.0 10e3/uL    RBC Count 3.64 (L) 4.40 - 5.90 10e6/uL    Hemoglobin 9.9 (L) 13.3 - 17.7 g/dL    Hematocrit 31.5 (L) 40.0 - 53.0 %    MCV 87 78 - 100 fL    MCH 27.2 26.5 - 33.0 pg    MCHC 31.4 (L) 31.5 - 36.5 g/dL    RDW 15.6 (H) 10.0 - 15.0 %    Platelet Count 54 (L) 150 - 450 10e3/uL    % " Neutrophils 68 %    % Lymphocytes 17 %    % Monocytes 11 %    % Eosinophils 3 %    % Basophils 1 %    % Immature Granulocytes 0 %    NRBCs per 100 WBC 0 <1 /100    Absolute Neutrophils 2.6 1.6 - 8.3 10e3/uL    Absolute Lymphocytes 0.7 (L) 0.8 - 5.3 10e3/uL    Absolute Monocytes 0.4 0.0 - 1.3 10e3/uL    Absolute Eosinophils 0.1 0.0 - 0.7 10e3/uL    Absolute Basophils 0.0 0.0 - 0.2 10e3/uL    Absolute Immature Granulocytes 0.0 <=0.4 10e3/uL    Absolute NRBCs 0.0 10e3/uL        Imaging:    PET Oncology Whole Body    Result Date: 3/17/2022  Combined Report of:    PET and CT on  3/17/2022 10:58 AM : 1. PET of the neck, chest, abdomen, and pelvis. 2. PET CT Fusion for Attenuation Correction and Anatomical Localization:  3. 3D MIP and PET-CT fused images were processed on an independent workstation and archived to PACS and reviewed by a radiologist. Technique: 1. PET: The patient received   10.03  mCi of F-18-FDG; the serum glucose was 104 prior to administration, body weight was 147 pounds. Images were evaluated in the axial, sagittal, and coronal planes as well as the rotational whole body MIP. Images were acquired from the Vertex to the Feet. UPTAKE WAS MEASURED AT 60 MINUTES. BACKGROUND:  Liver SUV max= 3.3,   Aorta Blood SUV Max: 2.6. 2. CT: CT only obtained for attenuation correction and not diagnostic purposes. INDICATION: Squamous cell carcinoma of nasopharynx (H); Malignant neoplasm of overlapping sites of nasopharynx (H) ADDITIONAL INFORMATION OBTAINED FROM EMR: History of nasopharyngeal squamous cell carcinoma treated with chemoradiation completed June 2020. Chemotherapy inturrupted due to underlying hepatitis B and renal function. Current treatment Keytruda. 9/7/2021 path of biopsy of the right ethmoid mass is consistent with EBV mediated nasopharyngeal carcinoma. COMPARISON: PET 9/16/2021 FINDINGS: HEAD/NECK: Significant decreased FDG uptake and size of soft tissue mass involving the right ethmoid sinus now  with residual mild max SUV of 5.2, previously 16.3 (series 4, image 86). There is persistent associated loss of the medial orbital wall. Mucosal thickening involving the right maxillary sinus with mild associated FDG uptake with maximum SUV of 4.0). There is new focal uptake in the region of the left temporal bone possibly in the tympanic membrane or within the mastoid air cells and possibly representing uptake along the facial nerve. This could be further evaluated with an MRI. Redemonstration of focal FDG uptake in the left Rosenmuller fossa. The max SUV today's examination is 6.4, on last examination is 8.1. Mild FDG uptake by the right Rosenmuller fossa. There is effacement of the Rosenmuller fossa bilaterally, a chronic finding. Decreased FDG uptake associated with a right level Ib lymph node measuring 0.6 mm with maximum SUV of 4.9, previously measured 1.2 with maximum SUV of 11.7. Previously seen right level 2 node is no longer FDG avid and is smaller, now subcentimeter. CHEST: Stable hypermetabolic right hilar adenopathy with maximum SUV of 7.9. Slightly decreased FDG uptake in the left hilar nodes with max SUV of 4.8, previously 6.0 (series 3, image 185). Cardiomegaly. Ectatic ascending aorta measuring 4.7 cm. Dilated main pulmonary artery measuring 3.1 cm. Stable 6 mm spiculated solid nodule within the right upper lobe (series 4, image 174), nodule is non-FDG avid, however it is below size threshold for PET. Gynecomastia. Small volume free fluid within the pelvis, decreased compared to prior. Prostatomegaly. ABDOMEN AND PELVIS: There is no suspicious FDG uptake in the abdomen or pelvis. Focal FDG uptake at the left ischial tuberosity region, associated with a vessel . Mild atrophy kidneys bilaterally. LOWER EXTREMITIES: No abnormal masses or hypermetabolic lesions. BONES: There are no suspicious lytic or blastic osseous lesions.  There is no abnormal FDG uptake in the skeleton.     IMPRESSION: In this  patient with history of nasopharyngeal squamous cell carcinoma: 1. Significant decrease in FDG uptake associated with soft tissue mass involving the right ethmoid sinus, mild residual uptake in the right ethmoid air cells with corresponding nonspecific/ nonmass like soft tissue density is indeterminate. These could represent post treatment changes. Continued follow-up with MRI is recommended. 2. New focal FDG metabolism in the left temporal bone. Further evaluation with brain MRI with contrast is recommended as this might represent tumor along the facial nerve. 3. Resolution of one of the right cervical lymph nodes and significant decrease in hypermetabolism of the second right cervical node suggestive of postoperative treatment response. 4. Hypermetabolic hilar adenopathy, stable on the right and slightly decreased on the left. 5. Ectatic 4.7 cm descending aorta. Dilated main pulmonary artery which can be seen with pulmonary hypertension. 6. Stable 6 mm spiculated solid nodule in the right upper lobe. Nodule is below size threshold for evaluation by PET. Close attention on follow-up. I have personally reviewed the examination and initial interpretation and I agree with the findings. JERRICA PEREZ MD   SYSTEM ID:  [id number]      Signed by: Alan Frias MD

## 2022-03-30 ENCOUNTER — LAB (OUTPATIENT)
Dept: LAB | Facility: CLINIC | Age: 72
End: 2022-03-30
Payer: COMMERCIAL

## 2022-03-30 DIAGNOSIS — B18.1 HEPATITIS B, CHRONIC (H): ICD-10-CM

## 2022-03-30 LAB
ALBUMIN SERPL-MCNC: 3.5 G/DL (ref 3.5–5)
ALP SERPL-CCNC: 102 U/L (ref 45–120)
ALT SERPL W P-5'-P-CCNC: 28 U/L (ref 0–45)
AST SERPL W P-5'-P-CCNC: 35 U/L (ref 0–40)
BILIRUB DIRECT SERPL-MCNC: 0.2 MG/DL
BILIRUB SERPL-MCNC: 0.6 MG/DL (ref 0–1)
PROT SERPL-MCNC: 7.2 G/DL (ref 6–8)

## 2022-03-30 PROCEDURE — 87517 HEPATITIS B DNA QUANT: CPT

## 2022-03-30 PROCEDURE — 36415 COLL VENOUS BLD VENIPUNCTURE: CPT

## 2022-03-30 PROCEDURE — 80076 HEPATIC FUNCTION PANEL: CPT

## 2022-03-31 ENCOUNTER — HOSPITAL ENCOUNTER (OUTPATIENT)
Dept: MRI IMAGING | Facility: HOSPITAL | Age: 72
Discharge: HOME | End: 2022-03-31
Attending: INTERNAL MEDICINE | Admitting: INTERNAL MEDICINE
Payer: COMMERCIAL

## 2022-03-31 DIAGNOSIS — C11.9 NASOPHARYNGEAL CARCINOMA (H): ICD-10-CM

## 2022-03-31 PROCEDURE — A9585 GADOBUTROL INJECTION: HCPCS | Performed by: INTERNAL MEDICINE

## 2022-03-31 PROCEDURE — 255N000002 HC RX 255 OP 636: Performed by: INTERNAL MEDICINE

## 2022-03-31 PROCEDURE — 70553 MRI BRAIN STEM W/O & W/DYE: CPT

## 2022-03-31 RX ORDER — GADOBUTROL 604.72 MG/ML
7 INJECTION INTRAVENOUS ONCE
Status: COMPLETED | OUTPATIENT
Start: 2022-03-31 | End: 2022-03-31

## 2022-03-31 RX ADMIN — GADOBUTROL 7 ML: 604.72 INJECTION INTRAVENOUS at 20:47

## 2022-04-01 LAB
HBV DNA SERPL NAA+PROBE-ACNC: <20 IU/ML
HBV DNA SERPL NAA+PROBE-LOG IU: <1.3 {LOG_IU}/ML

## 2022-04-04 ENCOUNTER — OFFICE VISIT (OUTPATIENT)
Dept: INFECTIOUS DISEASES | Facility: CLINIC | Age: 72
End: 2022-04-04
Payer: COMMERCIAL

## 2022-04-04 VITALS
WEIGHT: 143 LBS | BODY MASS INDEX: 25.53 KG/M2 | HEART RATE: 72 BPM | TEMPERATURE: 97.7 F | SYSTOLIC BLOOD PRESSURE: 138 MMHG | DIASTOLIC BLOOD PRESSURE: 72 MMHG

## 2022-04-04 DIAGNOSIS — B18.1 HEPATITIS B, CHRONIC (H): ICD-10-CM

## 2022-04-04 DIAGNOSIS — B18.1 CHRONIC VIRAL HEPATITIS B WITHOUT DELTA AGENT AND WITHOUT COMA (H): ICD-10-CM

## 2022-04-04 PROCEDURE — 99213 OFFICE O/P EST LOW 20 MIN: CPT

## 2022-04-04 RX ORDER — LAMIVUDINE 100 MG/1
100 TABLET, FILM COATED ORAL DAILY
Qty: 90 TABLET | Refills: 3 | Status: SHIPPED | OUTPATIENT
Start: 2022-04-04 | End: 2023-04-05

## 2022-04-04 NOTE — PROGRESS NOTES
Assessment:      Impression: Chronic hepatitis B infection, with compensated cirrhosis responding to lamivudine.  However, for unclear reasons, this had been discontinued in 2021 and then patient had rebound of hepatitis B viral load and LFTs.    LFTs are now back to normal and hepatitis B viral load is back to where it was prior to discontinuation of limited view Moises    Recurrence of nasopharyngeal carcinoma.  Declined recommended surgery, now taking pembrolizumab.  This may exacerbate his hepatitis.         Plan:     Continue lamivudine daily indefinitely    Follow-up in 6 months    Subjective:      This is an follow-up infectious Disease visit for Kristen Chapman, who is a 70 y.o.  referred for evaluation of hepatitis B.     Is a 70-year-old gentleman of seen only in video visits over the last year for chronic hepatitis B.  He had been on treatment for nasopharyngeal carcinoma when he was noted to have elevated LFTs and a very high hepatitis B viral load.    Patient was started on lamivudine 100 mg a day and he is doing quite well.  He denies any complaints other than some bleeding in his nose and upon further questioning some pain and dryness in his mouth and sticking when he swallows.    He has been taking Magic mouthwash which does not really seem to help his symptoms significantly.    He denies abdominal pain.      The following portions of the patient's history were reviewed and updated as appropriate: allergies, current medications, past family history, past medical history, past social history, past surgical history and problem list.      Follow-up visit on August 23, 2021:    For reasons that are unclear, the lamivudine was discontinued several months ago.  He is generally feeling well though complains of some nasopharyngeal bleeding.    His thrush is resolved.    Follow-up visit on October 4, 2021:    Patient is back on the meeting.  He denies any specific complaints.  Unfortunately, since her last visit, he  did receive a diagnosis that his nasopharyngeal carcinoma has returned.  Chemotherapy and radiation therapy is in the works.    He denies any abdominal pain.    Follow-up visit on January 10, 2022:    Briefly, patient is doing well as far as symptoms go.  He did have recurrence of his nasopharyngeal cancer.  Surgery was recommended and declined.    Patient has been started on pembrolizumab by Dr. Frias.  This may exacerbate his hepatitis.    Patient did have his quarterly hepatitis B virus levels checked last week and they are a little bit lower.  His LFTs did take up, but suspect this may be related to the pembrolizumab.    He denies abdominal pain or discomfort.  He says he is eating well.  Denies vomiting or diarrhea.    Follow-up visit on April 4, 2022:    Patient states he feels about the same.  He is waiting for results of a follow-up MRI regarding his nasopharyngeal cancer.    He did have blood test done last week which showed his LFTs are back to normal and his viral load is less than 20, which is where it was before is limited and had been discontinued.    Review of Systems  Performed and all negative except as mentioned above.      Objective:     /72   Pulse 72   Temp 97.7  F (36.5  C)   Wt 64.9 kg (143 lb)   BMI 25.53 kg/m       General:   alert, appears stated age and cooperative   Oropharynx:  Wearing a mask    Eyes:   Extraocular muscles intact, no icterus.    Ears:   Deferred   Neck:  no adenopathy and supple, symmetrical, trachea midline   Thyroid:   Deferred   Lung:  clear to auscultation bilaterally   Heart:   regular rate and rhythm, S1, S2 normal, no murmur, click, rub or gallop   Abdomen:  soft, non-tender; bowel sounds normal; no masses,  no organomegaly   Extremities:  extremities normal, atraumatic, no cyanosis or edema   Skin:  warm and dry, no hyperpigmentation, vitiligo, or suspicious lesions   CVA:   absent   Genitourinary:  defer exam   Neurological:   Grossly normal    Psychiatric:   normal mood, behavior, speech, dress, and thought processes     Liver Function Studies - Recent Labs   Lab Test 03/30/22  1457   PROTTOTAL 7.2   ALBUMIN 3.5   BILITOTAL 0.6   ALKPHOS 102   AST 35   ALT 28     Results for JEAN-CLAUDE HO (MRN [id number]) as of 4/4/2022 08:36   Ref. Range 1/3/2022 15:18 3/30/2022 14:57   HEP B Virus DNA Quant Latest Ref Range: Not Detected IU/mL  <20 (A)   HEP B Virus DNA Quant Log Unknown 4.2 <1.3   HEP B VIRUS DNA QUANT REAL TIME PCR Unknown Rpt (A) Rpt (A)       Eduardo Grier MD

## 2022-04-07 ENCOUNTER — INFUSION THERAPY VISIT (OUTPATIENT)
Dept: INFUSION THERAPY | Facility: HOSPITAL | Age: 72
End: 2022-04-07
Attending: INTERNAL MEDICINE
Payer: COMMERCIAL

## 2022-04-07 VITALS
HEART RATE: 62 BPM | TEMPERATURE: 98.6 F | OXYGEN SATURATION: 98 % | RESPIRATION RATE: 16 BRPM | SYSTOLIC BLOOD PRESSURE: 115 MMHG | DIASTOLIC BLOOD PRESSURE: 68 MMHG

## 2022-04-07 DIAGNOSIS — C11.9 SQUAMOUS CELL CARCINOMA OF NASOPHARYNX (H): Primary | ICD-10-CM

## 2022-04-07 LAB
ALBUMIN SERPL-MCNC: 3.4 G/DL (ref 3.5–5)
ALP SERPL-CCNC: 105 U/L (ref 45–120)
ALT SERPL W P-5'-P-CCNC: 29 U/L (ref 0–45)
ANION GAP SERPL CALCULATED.3IONS-SCNC: 9 MMOL/L (ref 5–18)
AST SERPL W P-5'-P-CCNC: 33 U/L (ref 0–40)
BASOPHILS # BLD AUTO: 0 10E3/UL (ref 0–0.2)
BASOPHILS NFR BLD AUTO: 0 %
BILIRUB SERPL-MCNC: 0.6 MG/DL (ref 0–1)
BUN SERPL-MCNC: 23 MG/DL (ref 8–28)
CALCIUM SERPL-MCNC: 8.8 MG/DL (ref 8.5–10.5)
CHLORIDE BLD-SCNC: 107 MMOL/L (ref 98–107)
CO2 SERPL-SCNC: 23 MMOL/L (ref 22–31)
CREAT SERPL-MCNC: 1.3 MG/DL (ref 0.7–1.3)
EOSINOPHIL # BLD AUTO: 0.1 10E3/UL (ref 0–0.7)
EOSINOPHIL NFR BLD AUTO: 3 %
ERYTHROCYTE [DISTWIDTH] IN BLOOD BY AUTOMATED COUNT: 14.8 % (ref 10–15)
GFR SERPL CREATININE-BSD FRML MDRD: 59 ML/MIN/1.73M2
GLUCOSE BLD-MCNC: 144 MG/DL (ref 70–125)
HCT VFR BLD AUTO: 32.7 % (ref 40–53)
HGB BLD-MCNC: 10.4 G/DL (ref 13.3–17.7)
IMM GRANULOCYTES # BLD: 0 10E3/UL
IMM GRANULOCYTES NFR BLD: 0 %
LYMPHOCYTES # BLD AUTO: 0.5 10E3/UL (ref 0.8–5.3)
LYMPHOCYTES NFR BLD AUTO: 18 %
MCH RBC QN AUTO: 27 PG (ref 26.5–33)
MCHC RBC AUTO-ENTMCNC: 31.8 G/DL (ref 31.5–36.5)
MCV RBC AUTO: 85 FL (ref 78–100)
MONOCYTES # BLD AUTO: 0.3 10E3/UL (ref 0–1.3)
MONOCYTES NFR BLD AUTO: 10 %
NEUTROPHILS # BLD AUTO: 2.1 10E3/UL (ref 1.6–8.3)
NEUTROPHILS NFR BLD AUTO: 69 %
NRBC # BLD AUTO: 0 10E3/UL
NRBC BLD AUTO-RTO: 0 /100
PLATELET # BLD AUTO: 57 10E3/UL (ref 150–450)
POTASSIUM BLD-SCNC: 3.7 MMOL/L (ref 3.5–5)
PROT SERPL-MCNC: 7.2 G/DL (ref 6–8)
RBC # BLD AUTO: 3.85 10E6/UL (ref 4.4–5.9)
SODIUM SERPL-SCNC: 139 MMOL/L (ref 136–145)
T4 FREE SERPL-MCNC: 0.79 NG/DL (ref 0.7–1.8)
TSH SERPL DL<=0.005 MIU/L-ACNC: 8.56 UIU/ML (ref 0.3–5)
WBC # BLD AUTO: 3 10E3/UL (ref 4–11)

## 2022-04-07 PROCEDURE — 96413 CHEMO IV INFUSION 1 HR: CPT

## 2022-04-07 PROCEDURE — 36591 DRAW BLOOD OFF VENOUS DEVICE: CPT

## 2022-04-07 PROCEDURE — 85025 COMPLETE CBC W/AUTO DIFF WBC: CPT

## 2022-04-07 PROCEDURE — 84443 ASSAY THYROID STIM HORMONE: CPT | Performed by: INTERNAL MEDICINE

## 2022-04-07 PROCEDURE — 80053 COMPREHEN METABOLIC PANEL: CPT | Performed by: INTERNAL MEDICINE

## 2022-04-07 PROCEDURE — 84439 ASSAY OF FREE THYROXINE: CPT | Performed by: INTERNAL MEDICINE

## 2022-04-07 PROCEDURE — 258N000003 HC RX IP 258 OP 636: Performed by: INTERNAL MEDICINE

## 2022-04-07 PROCEDURE — 250N000011 HC RX IP 250 OP 636: Performed by: INTERNAL MEDICINE

## 2022-04-07 RX ORDER — DIPHENHYDRAMINE HYDROCHLORIDE 50 MG/ML
50 INJECTION INTRAMUSCULAR; INTRAVENOUS
Status: DISCONTINUED | OUTPATIENT
Start: 2022-04-07 | End: 2022-04-07 | Stop reason: HOSPADM

## 2022-04-07 RX ORDER — METHYLPREDNISOLONE SODIUM SUCCINATE 125 MG/2ML
125 INJECTION, POWDER, LYOPHILIZED, FOR SOLUTION INTRAMUSCULAR; INTRAVENOUS
Status: DISCONTINUED | OUTPATIENT
Start: 2022-04-07 | End: 2022-04-07 | Stop reason: HOSPADM

## 2022-04-07 RX ORDER — NALOXONE HYDROCHLORIDE 0.4 MG/ML
0.2 INJECTION, SOLUTION INTRAMUSCULAR; INTRAVENOUS; SUBCUTANEOUS
Status: DISCONTINUED | OUTPATIENT
Start: 2022-04-07 | End: 2022-04-07 | Stop reason: HOSPADM

## 2022-04-07 RX ORDER — HEPARIN SODIUM (PORCINE) LOCK FLUSH IV SOLN 100 UNIT/ML 100 UNIT/ML
5 SOLUTION INTRAVENOUS
Status: DISCONTINUED | OUTPATIENT
Start: 2022-04-07 | End: 2022-04-07 | Stop reason: HOSPADM

## 2022-04-07 RX ORDER — MEPERIDINE HYDROCHLORIDE 25 MG/ML
25 INJECTION INTRAMUSCULAR; INTRAVENOUS; SUBCUTANEOUS EVERY 30 MIN PRN
Status: DISCONTINUED | OUTPATIENT
Start: 2022-04-07 | End: 2022-04-07 | Stop reason: HOSPADM

## 2022-04-07 RX ORDER — ALBUTEROL SULFATE 0.83 MG/ML
2.5 SOLUTION RESPIRATORY (INHALATION)
Status: DISCONTINUED | OUTPATIENT
Start: 2022-04-07 | End: 2022-04-07 | Stop reason: HOSPADM

## 2022-04-07 RX ORDER — EPINEPHRINE 1 MG/ML
0.3 INJECTION, SOLUTION INTRAMUSCULAR; SUBCUTANEOUS EVERY 5 MIN PRN
Status: DISCONTINUED | OUTPATIENT
Start: 2022-04-07 | End: 2022-04-07 | Stop reason: HOSPADM

## 2022-04-07 RX ORDER — ALBUTEROL SULFATE 90 UG/1
1-2 AEROSOL, METERED RESPIRATORY (INHALATION)
Status: DISCONTINUED | OUTPATIENT
Start: 2022-04-07 | End: 2022-04-07 | Stop reason: HOSPADM

## 2022-04-07 RX ADMIN — SODIUM CHLORIDE 200 MG: 9 INJECTION, SOLUTION INTRAVENOUS at 10:22

## 2022-04-07 RX ADMIN — HEPARIN 5 ML: 100 SYRINGE at 10:55

## 2022-04-07 ASSESSMENT — PAIN SCALES - GENERAL: PAINLEVEL: NO PAIN (0)

## 2022-04-07 NOTE — PROGRESS NOTES
Infusion Nursing Note:  Kristen Chapman presents today for cycle 6 day 1 treatment with Pemvrolizumab.    Patient seen by provider today: No   present during visit today: Patient refused  services and a waiver form has been signed.     Note: VSS.  Pt assessed and is feeling well today.  He received treatment as ordered.       Intravenous Access:  Implanted Port.    Treatment Conditions:  Results reviewed, labs MET treatment parameters, ok to proceed with treatment.      Post Infusion Assessment:  Patient tolerated infusion without incident.  Blood return noted pre and post infusion.  Site patent and intact, free from redness, edema or discomfort.  No evidence of extravasations.  Access discontinued per protocol.       Discharge Plan:   Patient discharged in stable condition accompanied by: son.      Frances Killian RN

## 2022-04-29 ENCOUNTER — ONCOLOGY VISIT (OUTPATIENT)
Dept: ONCOLOGY | Facility: HOSPITAL | Age: 72
End: 2022-04-29
Attending: INTERNAL MEDICINE
Payer: COMMERCIAL

## 2022-04-29 ENCOUNTER — LAB (OUTPATIENT)
Dept: INFUSION THERAPY | Facility: HOSPITAL | Age: 72
End: 2022-04-29
Attending: INTERNAL MEDICINE
Payer: COMMERCIAL

## 2022-04-29 VITALS
RESPIRATION RATE: 20 BRPM | SYSTOLIC BLOOD PRESSURE: 132 MMHG | DIASTOLIC BLOOD PRESSURE: 80 MMHG | OXYGEN SATURATION: 99 % | HEIGHT: 63 IN | TEMPERATURE: 98.4 F | BODY MASS INDEX: 26.54 KG/M2 | HEART RATE: 62 BPM | WEIGHT: 149.8 LBS

## 2022-04-29 DIAGNOSIS — C11.9 NASOPHARYNGEAL CARCINOMA (H): ICD-10-CM

## 2022-04-29 DIAGNOSIS — C11.9 SQUAMOUS CELL CARCINOMA OF NASOPHARYNX (H): Primary | ICD-10-CM

## 2022-04-29 DIAGNOSIS — D61.818 ACQUIRED PANCYTOPENIA (H): ICD-10-CM

## 2022-04-29 LAB
ALBUMIN SERPL-MCNC: 3.5 G/DL (ref 3.5–5)
ALP SERPL-CCNC: 82 U/L (ref 45–120)
ALT SERPL W P-5'-P-CCNC: 25 U/L (ref 0–45)
ANION GAP SERPL CALCULATED.3IONS-SCNC: 9 MMOL/L (ref 5–18)
AST SERPL W P-5'-P-CCNC: 28 U/L (ref 0–40)
BASOPHILS # BLD AUTO: 0 10E3/UL (ref 0–0.2)
BASOPHILS NFR BLD AUTO: 1 %
BILIRUB SERPL-MCNC: 0.7 MG/DL (ref 0–1)
BUN SERPL-MCNC: 21 MG/DL (ref 8–28)
CALCIUM SERPL-MCNC: 8.9 MG/DL (ref 8.5–10.5)
CHLORIDE BLD-SCNC: 105 MMOL/L (ref 98–107)
CO2 SERPL-SCNC: 26 MMOL/L (ref 22–31)
CREAT SERPL-MCNC: 1.2 MG/DL (ref 0.7–1.3)
EOSINOPHIL # BLD AUTO: 0.1 10E3/UL (ref 0–0.7)
EOSINOPHIL NFR BLD AUTO: 4 %
ERYTHROCYTE [DISTWIDTH] IN BLOOD BY AUTOMATED COUNT: 15.6 % (ref 10–15)
GFR SERPL CREATININE-BSD FRML MDRD: 65 ML/MIN/1.73M2
GLUCOSE BLD-MCNC: 171 MG/DL (ref 70–125)
HCT VFR BLD AUTO: 33.6 % (ref 40–53)
HGB BLD-MCNC: 10.4 G/DL (ref 13.3–17.7)
IMM GRANULOCYTES # BLD: 0 10E3/UL
IMM GRANULOCYTES NFR BLD: 0 %
LYMPHOCYTES # BLD AUTO: 0.6 10E3/UL (ref 0.8–5.3)
LYMPHOCYTES NFR BLD AUTO: 23 %
MCH RBC QN AUTO: 26.7 PG (ref 26.5–33)
MCHC RBC AUTO-ENTMCNC: 31 G/DL (ref 31.5–36.5)
MCV RBC AUTO: 86 FL (ref 78–100)
MONOCYTES # BLD AUTO: 0.3 10E3/UL (ref 0–1.3)
MONOCYTES NFR BLD AUTO: 9 %
NEUTROPHILS # BLD AUTO: 1.8 10E3/UL (ref 1.6–8.3)
NEUTROPHILS NFR BLD AUTO: 63 %
NRBC # BLD AUTO: 0 10E3/UL
NRBC BLD AUTO-RTO: 0 /100
PLATELET # BLD AUTO: 59 10E3/UL (ref 150–450)
POTASSIUM BLD-SCNC: 3.8 MMOL/L (ref 3.5–5)
PROT SERPL-MCNC: 7.2 G/DL (ref 6–8)
RBC # BLD AUTO: 3.9 10E6/UL (ref 4.4–5.9)
SODIUM SERPL-SCNC: 140 MMOL/L (ref 136–145)
T4 FREE SERPL-MCNC: 0.77 NG/DL (ref 0.7–1.8)
TSH SERPL DL<=0.005 MIU/L-ACNC: 10.41 UIU/ML (ref 0.3–5)
WBC # BLD AUTO: 2.8 10E3/UL (ref 4–11)

## 2022-04-29 PROCEDURE — 96413 CHEMO IV INFUSION 1 HR: CPT

## 2022-04-29 PROCEDURE — 99214 OFFICE O/P EST MOD 30 MIN: CPT | Performed by: INTERNAL MEDICINE

## 2022-04-29 PROCEDURE — 258N000003 HC RX IP 258 OP 636: Performed by: INTERNAL MEDICINE

## 2022-04-29 PROCEDURE — 80053 COMPREHEN METABOLIC PANEL: CPT | Performed by: INTERNAL MEDICINE

## 2022-04-29 PROCEDURE — G0463 HOSPITAL OUTPT CLINIC VISIT: HCPCS | Mod: 25

## 2022-04-29 PROCEDURE — 250N000011 HC RX IP 250 OP 636: Performed by: INTERNAL MEDICINE

## 2022-04-29 PROCEDURE — 84443 ASSAY THYROID STIM HORMONE: CPT | Performed by: INTERNAL MEDICINE

## 2022-04-29 PROCEDURE — 36591 DRAW BLOOD OFF VENOUS DEVICE: CPT

## 2022-04-29 PROCEDURE — 82040 ASSAY OF SERUM ALBUMIN: CPT | Performed by: INTERNAL MEDICINE

## 2022-04-29 PROCEDURE — 84439 ASSAY OF FREE THYROXINE: CPT | Performed by: INTERNAL MEDICINE

## 2022-04-29 PROCEDURE — 85004 AUTOMATED DIFF WBC COUNT: CPT

## 2022-04-29 RX ORDER — MEPERIDINE HYDROCHLORIDE 25 MG/ML
25 INJECTION INTRAMUSCULAR; INTRAVENOUS; SUBCUTANEOUS EVERY 30 MIN PRN
Status: CANCELLED | OUTPATIENT
Start: 2022-06-10

## 2022-04-29 RX ORDER — EPINEPHRINE 1 MG/ML
0.3 INJECTION, SOLUTION INTRAMUSCULAR; SUBCUTANEOUS EVERY 5 MIN PRN
Status: CANCELLED | OUTPATIENT
Start: 2022-07-01

## 2022-04-29 RX ORDER — NALOXONE HYDROCHLORIDE 0.4 MG/ML
0.2 INJECTION, SOLUTION INTRAMUSCULAR; INTRAVENOUS; SUBCUTANEOUS
Status: CANCELLED | OUTPATIENT
Start: 2022-05-20

## 2022-04-29 RX ORDER — EPINEPHRINE 1 MG/ML
0.3 INJECTION, SOLUTION INTRAMUSCULAR; SUBCUTANEOUS EVERY 5 MIN PRN
Status: CANCELLED | OUTPATIENT
Start: 2022-05-20

## 2022-04-29 RX ORDER — EPINEPHRINE 1 MG/ML
0.3 INJECTION, SOLUTION INTRAMUSCULAR; SUBCUTANEOUS EVERY 5 MIN PRN
Status: CANCELLED | OUTPATIENT
Start: 2022-06-10

## 2022-04-29 RX ORDER — METHYLPREDNISOLONE SODIUM SUCCINATE 125 MG/2ML
125 INJECTION, POWDER, LYOPHILIZED, FOR SOLUTION INTRAMUSCULAR; INTRAVENOUS
Status: CANCELLED
Start: 2022-07-01

## 2022-04-29 RX ORDER — ALBUTEROL SULFATE 0.83 MG/ML
2.5 SOLUTION RESPIRATORY (INHALATION)
Status: CANCELLED | OUTPATIENT
Start: 2022-06-10

## 2022-04-29 RX ORDER — HEPARIN SODIUM (PORCINE) LOCK FLUSH IV SOLN 100 UNIT/ML 100 UNIT/ML
5 SOLUTION INTRAVENOUS
Status: CANCELLED | OUTPATIENT
Start: 2022-07-01

## 2022-04-29 RX ORDER — DIPHENHYDRAMINE HYDROCHLORIDE 50 MG/ML
50 INJECTION INTRAMUSCULAR; INTRAVENOUS
Status: CANCELLED
Start: 2022-06-10

## 2022-04-29 RX ORDER — ALBUTEROL SULFATE 90 UG/1
1-2 AEROSOL, METERED RESPIRATORY (INHALATION)
Status: CANCELLED
Start: 2022-06-10

## 2022-04-29 RX ORDER — ALBUTEROL SULFATE 0.83 MG/ML
2.5 SOLUTION RESPIRATORY (INHALATION)
Status: CANCELLED | OUTPATIENT
Start: 2022-05-20

## 2022-04-29 RX ORDER — METHYLPREDNISOLONE SODIUM SUCCINATE 125 MG/2ML
125 INJECTION, POWDER, LYOPHILIZED, FOR SOLUTION INTRAMUSCULAR; INTRAVENOUS
Status: CANCELLED
Start: 2022-05-20

## 2022-04-29 RX ORDER — HEPARIN SODIUM (PORCINE) LOCK FLUSH IV SOLN 100 UNIT/ML 100 UNIT/ML
5 SOLUTION INTRAVENOUS
Status: DISCONTINUED | OUTPATIENT
Start: 2022-04-29 | End: 2022-04-29 | Stop reason: HOSPADM

## 2022-04-29 RX ORDER — NALOXONE HYDROCHLORIDE 0.4 MG/ML
0.2 INJECTION, SOLUTION INTRAMUSCULAR; INTRAVENOUS; SUBCUTANEOUS
Status: CANCELLED | OUTPATIENT
Start: 2022-07-01

## 2022-04-29 RX ORDER — HEPARIN SODIUM,PORCINE 10 UNIT/ML
5 VIAL (ML) INTRAVENOUS
Status: CANCELLED | OUTPATIENT
Start: 2022-06-10

## 2022-04-29 RX ORDER — LORAZEPAM 2 MG/ML
0.5 INJECTION INTRAMUSCULAR EVERY 4 HOURS PRN
Status: CANCELLED | OUTPATIENT
Start: 2022-06-10

## 2022-04-29 RX ORDER — DIPHENHYDRAMINE HYDROCHLORIDE 50 MG/ML
50 INJECTION INTRAMUSCULAR; INTRAVENOUS
Status: CANCELLED
Start: 2022-05-20

## 2022-04-29 RX ORDER — DIPHENHYDRAMINE HYDROCHLORIDE 50 MG/ML
50 INJECTION INTRAMUSCULAR; INTRAVENOUS
Status: CANCELLED
Start: 2022-07-01

## 2022-04-29 RX ORDER — HEPARIN SODIUM,PORCINE 10 UNIT/ML
5 VIAL (ML) INTRAVENOUS
Status: CANCELLED | OUTPATIENT
Start: 2022-05-20

## 2022-04-29 RX ORDER — METHYLPREDNISOLONE SODIUM SUCCINATE 125 MG/2ML
125 INJECTION, POWDER, LYOPHILIZED, FOR SOLUTION INTRAMUSCULAR; INTRAVENOUS
Status: CANCELLED
Start: 2022-06-10

## 2022-04-29 RX ORDER — MEPERIDINE HYDROCHLORIDE 25 MG/ML
25 INJECTION INTRAMUSCULAR; INTRAVENOUS; SUBCUTANEOUS EVERY 30 MIN PRN
Status: CANCELLED | OUTPATIENT
Start: 2022-05-20

## 2022-04-29 RX ORDER — HEPARIN SODIUM,PORCINE 10 UNIT/ML
5 VIAL (ML) INTRAVENOUS
Status: CANCELLED | OUTPATIENT
Start: 2022-07-01

## 2022-04-29 RX ORDER — HEPARIN SODIUM (PORCINE) LOCK FLUSH IV SOLN 100 UNIT/ML 100 UNIT/ML
5 SOLUTION INTRAVENOUS
Status: CANCELLED | OUTPATIENT
Start: 2022-06-10

## 2022-04-29 RX ORDER — ALBUTEROL SULFATE 90 UG/1
1-2 AEROSOL, METERED RESPIRATORY (INHALATION)
Status: CANCELLED
Start: 2022-07-01

## 2022-04-29 RX ORDER — MEPERIDINE HYDROCHLORIDE 25 MG/ML
25 INJECTION INTRAMUSCULAR; INTRAVENOUS; SUBCUTANEOUS EVERY 30 MIN PRN
Status: CANCELLED | OUTPATIENT
Start: 2022-07-01

## 2022-04-29 RX ORDER — ALBUTEROL SULFATE 90 UG/1
1-2 AEROSOL, METERED RESPIRATORY (INHALATION)
Status: CANCELLED
Start: 2022-05-20

## 2022-04-29 RX ORDER — LORAZEPAM 2 MG/ML
0.5 INJECTION INTRAMUSCULAR EVERY 4 HOURS PRN
Status: CANCELLED | OUTPATIENT
Start: 2022-05-20

## 2022-04-29 RX ORDER — NALOXONE HYDROCHLORIDE 0.4 MG/ML
0.2 INJECTION, SOLUTION INTRAMUSCULAR; INTRAVENOUS; SUBCUTANEOUS
Status: CANCELLED | OUTPATIENT
Start: 2022-06-10

## 2022-04-29 RX ORDER — ALBUTEROL SULFATE 0.83 MG/ML
2.5 SOLUTION RESPIRATORY (INHALATION)
Status: CANCELLED | OUTPATIENT
Start: 2022-07-01

## 2022-04-29 RX ORDER — LORAZEPAM 2 MG/ML
0.5 INJECTION INTRAMUSCULAR EVERY 4 HOURS PRN
Status: CANCELLED | OUTPATIENT
Start: 2022-07-01

## 2022-04-29 RX ORDER — HEPARIN SODIUM (PORCINE) LOCK FLUSH IV SOLN 100 UNIT/ML 100 UNIT/ML
5 SOLUTION INTRAVENOUS
Status: CANCELLED | OUTPATIENT
Start: 2022-05-20

## 2022-04-29 RX ADMIN — SODIUM CHLORIDE 200 MG: 9 INJECTION, SOLUTION INTRAVENOUS at 10:55

## 2022-04-29 RX ADMIN — HEPARIN 5 ML: 100 SYRINGE at 11:26

## 2022-04-29 ASSESSMENT — PAIN SCALES - GENERAL: PAINLEVEL: NO PAIN (0)

## 2022-04-29 NOTE — LETTER
"    4/29/2022         RE: Kristen Chapman  [address] [place] [address]        Dear Colleague,    Thank you for referring your patient, Kristen Chapman, to the [facility]. Please see a copy of my visit note below.    Oncology Rooming Note    April 29, 2022 9:33 AM   Kristen Chapman is a 72 year old male who presents for:    Chief Complaint   Patient presents with     Oncology Clinic Visit     3 week return Squamous cell carcinoma of nasopharynx, review labs & Infusion     Initial Vitals: /80 (BP Location: Left arm, Patient Position: Sitting, Cuff Size: Adult Regular)   Pulse 62   Temp 98.4  F (36.9  C) (Oral)   Resp 20   Ht 1.594 m (5' 2.75\")   Wt 67.9 kg (149 lb 12.8 oz)   SpO2 99%   BMI 26.75 kg/m   Estimated body mass index is 26.75 kg/m  as calculated from the following:    Height as of this encounter: 1.594 m (5' 2.75\").    Weight as of this encounter: 67.9 kg (149 lb 12.8 oz). Body surface area is 1.73 meters squared.  No Pain (0) Comment: Data Unavailable   No LMP for male patient.  Allergies reviewed: Yes  Medications reviewed: Yes    Medications: Medication refills not needed today.  Pharmacy name entered into "[employer]: [facility] - [place], [place] - [address] [place] [address]    Clinical concerns: 3 week return Squamous cell carcinoma of nasopharynx, review labs & Infusion.       Abby Duncan [facility] Hematology and Oncology Progress Note    Patient: Kristen Chapman  MRN: [id number]  Date of Service: Apr 29, 2022        Assessment and Plan:    Cancer Staging  Nasopharyngeal carcinoma (H)  Staging form: Pharynx - Nasopharynx, AJCC 8th Edition  - Clinical stage from 2/18/2020: Stage SAMANTHA (cT4, cN2, cM0) - Signed by Alan Frias MD on 2/18/2020     1.  Nasopharyngeal carcinoma: He continues on pembrolizumab.  Generally tolerating it well with no obvious side effects.  Imaging in March showed a good response.  We will follow-up going forward with CT scans.  " Next imaging will be in the end of June.    2.  Pancytopenia: His counts are generally stable.  They are not causing any problems.  We will continue to follow clinically.  Most likely secondary to cirrhosis, splenic sequestration, hypothyroid and active hepatitis infection.  He had a bone marrow biopsy in October 2021.  Next generation sequencing showed no abnormalities.    3.  Hypothyroidism: TSH has been a little high lately.  We will continue to follow.  If it continues to trend up on his next draw then we will increase his Synthroid.    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  Diagnosed January 2020. Imaging suggested bilateral abnormal lymphadenopathy in the neck.  Also asymmetric soft tissue thickening in the posterior right nasopharynx.  On imaging, tumor also appeared to extend down to the hypopharynx.    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    Treatment:    Initially treated with concurrent chemotherapy and radiation.  He had weekly cisplatin starting March 3, 2020.  Fifth and final dose given March 31, 2020.  Subsequent chemotherapy was held due to prolonged thrombocytopenia and renal insufficiency.  Radiation dose was 7000 cGy in 35 fractions given from March 2 through April 17, 2020.     Started cisplatin with infusional 5-FU on June 1, 2020.  Cycle 1 given at a 25% dose reduction.  He still had significant thrombocytopenia and neutropenia on day 28.  Cycle 2 was held.  At the same time his liver function tests elevated secondary to hepatitis B.     Recurrence:  Radiosurgery delivered to the right ethmoid tumor delivered November 8 through November 17, 2021.  Received 3500 cGy in 5 fractions.  Pembrolizumab started 12/23/21    Interim History:    Kristen returns today for a follow-up visit.  He is now on single agent pembrolizumab.  Tolerating it generally well.  No fevers or chills, diarrhea,  "shortness of breath or musculoskeletal symptoms.    Review of Systems:    As above in the history.     Review of Systems otherwise Negative for:  General: chills, fever or night sweats  Psychological: anxiety or depression  Ophthalmic: blurry vision, double vision or loss of vision, vision change  ENT: oral lesions, hearing changes  Hematological and Lymphatic: bruising, jaundice, swollen lymph nodes  Endocrine: hot flashes, unexpected weight changes  Respiratory: cough, hemoptysis, orthopnea  Cardiovascular: chest pain, edema, palpitations or PND  Gastrointestinal: abdominal pain, blood in stools, change in bowel habits, constipation, diarrhea or nausea/vomiting  Genito-Urinary: change in urinary stream, incontinence, frequency/urgency  Musculoskeletal: joint swelling, muscle pain  Neurological: dizziness, headaches, numbness/tingling  Dermatological: lumps and rash    Past History:    Past Medical History:   Diagnosis Date     Anemia      Dysphagia      Hepatitis B chronic      Hypothyroidism      Nasopharyngeal cancer (H)      Severe protein-calorie malnutrition (H)      Thrombocytopenia (H)      Physical Exam:    /80 (BP Location: Left arm, Patient Position: Sitting, Cuff Size: Adult Regular)   Pulse 62   Temp 98.4  F (36.9  C) (Oral)   Resp 20   Ht 1.594 m (5' 2.75\")   Wt 67.9 kg (149 lb 12.8 oz)   SpO2 99%   BMI 26.75 kg/m      General: patient appears stated age of 71 year old. Nontoxic and in no distress.   HEENT: Head: atraumatic, normocephalic. Sclerae anicteric.  Chest:  Normal respiratory effort  Cardiac:  No edema. rrr, no murmur.   Abdomen: abdomen is non-distended  Extremities: normal tone and muscle bulk.  Skin: no lesions or rash on visible skin. Warm and dry.   CNS: alert and oriented. Grossly non-focal.   Psychiatric: normal mood and affect.     Lab Results:    Recent Results (from the past 168 hour(s))   CBC with platelets and differential   Result Value Ref Range    WBC Count 2.8 " (L) 4.0 - 11.0 10e3/uL    RBC Count 3.90 (L) 4.40 - 5.90 10e6/uL    Hemoglobin 10.4 (L) 13.3 - 17.7 g/dL    Hematocrit 33.6 (L) 40.0 - 53.0 %    MCV 86 78 - 100 fL    MCH 26.7 26.5 - 33.0 pg    MCHC 31.0 (L) 31.5 - 36.5 g/dL    RDW 15.6 (H) 10.0 - 15.0 %    Platelet Count 59 (L) 150 - 450 10e3/uL    % Neutrophils 63 %    % Lymphocytes 23 %    % Monocytes 9 %    % Eosinophils 4 %    % Basophils 1 %    % Immature Granulocytes 0 %    NRBCs per 100 WBC 0 <1 /100    Absolute Neutrophils 1.8 1.6 - 8.3 10e3/uL    Absolute Lymphocytes 0.6 (L) 0.8 - 5.3 10e3/uL    Absolute Monocytes 0.3 0.0 - 1.3 10e3/uL    Absolute Eosinophils 0.1 0.0 - 0.7 10e3/uL    Absolute Basophils 0.0 0.0 - 0.2 10e3/uL    Absolute Immature Granulocytes 0.0 <=0.4 10e3/uL    Absolute NRBCs 0.0 10e3/uL   Comprehensive metabolic panel   Result Value Ref Range    Sodium 140 136 - 145 mmol/L    Potassium 3.8 3.5 - 5.0 mmol/L    Chloride 105 98 - 107 mmol/L    Carbon Dioxide (CO2) 26 22 - 31 mmol/L    Anion Gap 9 5 - 18 mmol/L    Urea Nitrogen 21 8 - 28 mg/dL    Creatinine 1.20 0.70 - 1.30 mg/dL    Calcium 8.9 8.5 - 10.5 mg/dL    Glucose 171 (H) 70 - 125 mg/dL    Alkaline Phosphatase 82 45 - 120 U/L    AST 28 0 - 40 U/L    ALT 25 0 - 45 U/L    Protein Total 7.2 6.0 - 8.0 g/dL    Albumin 3.5 3.5 - 5.0 g/dL    Bilirubin Total 0.7 0.0 - 1.0 mg/dL    GFR Estimate 65 >60 mL/min/1.73m2      Imaging:    MR Brain w/o & w Contrast    Result Date: 4/1/2022  EXAM: MR BRAIN W/O and W CONTRAST LOCATION: [facility] DATE/TIME: 3/31/2022 8:13 PM INDICATION:  Nasopharyngeal carcinoma (H) COMPARISON: MRI brain 07/30/2021, CT neck 12/13/2021. CONTRAST: 7ml gadavist TECHNIQUE: Routine multiplanar multisequence head MRI without and with intravenous contrast. FINDINGS: INTRACRANIAL CONTENTS: No evidence of metastatic disease. No acute or subacute infarct. No mass, acute hemorrhage, or extra-axial fluid collections. Normal brain parenchymal signal. Normal  ventricles and sulci. Normal position of the cerebellar tonsils. No pathologic contrast enhancement. SELLA: No abnormality accounting for technique. OSSEOUS STRUCTURES/SOFT TISSUES: Normal marrow signal. The major intracranial vascular flow voids are maintained. ORBITS: No abnormality accounting for technique. There is no orbital invasion on the current MRI. SINUSES/MASTOIDS: There continues to be extensive enhancement along the previously seen soft tissue mass in the right middle ethmoidal air cells. There is associated enhancement along the right maxillary sinus and in the nasopharynx secondary to prior radiation. There is mild enhancement in the right frontal sinus and right sphenoid sinus. There is a small effusion in the left mastoid air cells.     IMPRESSION: 1.  There is is significant hyper enhancement in the region of the known right ethmoidal mass likely related to prior radiation. Similar enhancement is seen in the remainder of the nasal passages and nasopharynx. Given such extensive post radiation enhancement, the ethmoidal mass could potentially be obscured. Correlation with PET scan is indicated. 2.  No intracranial metastases.      Signed by: Alan Frias MD        Again, thank you for allowing me to participate in the care of your patient.        Sincerely,        Alan Frias MD

## 2022-04-29 NOTE — PROGRESS NOTES
"Oncology Rooming Note    April 29, 2022 9:33 AM   Kristen Chapman is a 72 year old male who presents for:    Chief Complaint   Patient presents with     Oncology Clinic Visit     3 week return Squamous cell carcinoma of nasopharynx, review labs & Infusion     Initial Vitals: /80 (BP Location: Left arm, Patient Position: Sitting, Cuff Size: Adult Regular)   Pulse 62   Temp 98.4  F (36.9  C) (Oral)   Resp 20   Ht 1.594 m (5' 2.75\")   Wt 67.9 kg (149 lb 12.8 oz)   SpO2 99%   BMI 26.75 kg/m   Estimated body mass index is 26.75 kg/m  as calculated from the following:    Height as of this encounter: 1.594 m (5' 2.75\").    Weight as of this encounter: 67.9 kg (149 lb 12.8 oz). Body surface area is 1.73 meters squared.  No Pain (0) Comment: Data Unavailable   No LMP for male patient.  Allergies reviewed: Yes  Medications reviewed: Yes    Medications: Medication refills not needed today.  Pharmacy name entered into [employer]: [facility] - [place], [place] - [address] [place] [address]    Clinical concerns: 3 week return Squamous cell carcinoma of nasopharynx, review labs & Infusion.       Abby Duncan CMA              "

## 2022-04-29 NOTE — PROGRESS NOTES
[facility] Hematology and Oncology Progress Note    Patient: Kristen Chapman  MRN: [id number]  Date of Service: Apr 29, 2022        Assessment and Plan:    Cancer Staging  Nasopharyngeal carcinoma (H)  Staging form: Pharynx - Nasopharynx, AJCC 8th Edition  - Clinical stage from 2/18/2020: Stage SAMANTHA (cT4, cN2, cM0) - Signed by Alan Frias MD on 2/18/2020     1.  Nasopharyngeal carcinoma: He continues on pembrolizumab.  Generally tolerating it well with no obvious side effects.  Imaging in March showed a good response.  We will follow-up going forward with CT scans.  Next imaging will be in the end of June.    2.  Pancytopenia: His counts are generally stable.  They are not causing any problems.  We will continue to follow clinically.  Most likely secondary to cirrhosis, splenic sequestration, hypothyroid and active hepatitis infection.  He had a bone marrow biopsy in October 2021.  Next generation sequencing showed no abnormalities.    3.  Hypothyroidism: TSH has been a little high lately.  We will continue to follow.  If it continues to trend up on his next draw then we will increase his Synthroid.    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  Diagnosed January 2020. Imaging suggested bilateral abnormal lymphadenopathy in the neck.  Also asymmetric soft tissue thickening in the posterior right nasopharynx.  On imaging, tumor also appeared to extend down to the hypopharynx.    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    Treatment:    Initially treated with concurrent chemotherapy and radiation.  He had weekly cisplatin starting March 3, 2020.  Fifth and final dose given March 31, 2020.  Subsequent chemotherapy was held due to prolonged thrombocytopenia and renal insufficiency.  Radiation dose was 7000 cGy in 35 fractions given from March 2 through April 17, 2020.     Started cisplatin with infusional 5-FU  "on June 1, 2020.  Cycle 1 given at a 25% dose reduction.  He still had significant thrombocytopenia and neutropenia on day 28.  Cycle 2 was held.  At the same time his liver function tests elevated secondary to hepatitis B.     Recurrence:  Radiosurgery delivered to the right ethmoid tumor delivered November 8 through November 17, 2021.  Received 3500 cGy in 5 fractions.  Pembrolizumab started 12/23/21    Interim History:    Kristen returns today for a follow-up visit.  He is now on single agent pembrolizumab.  Tolerating it generally well.  No fevers or chills, diarrhea, shortness of breath or musculoskeletal symptoms.    Review of Systems:    As above in the history.     Review of Systems otherwise Negative for:  General: chills, fever or night sweats  Psychological: anxiety or depression  Ophthalmic: blurry vision, double vision or loss of vision, vision change  ENT: oral lesions, hearing changes  Hematological and Lymphatic: bruising, jaundice, swollen lymph nodes  Endocrine: hot flashes, unexpected weight changes  Respiratory: cough, hemoptysis, orthopnea  Cardiovascular: chest pain, edema, palpitations or PND  Gastrointestinal: abdominal pain, blood in stools, change in bowel habits, constipation, diarrhea or nausea/vomiting  Genito-Urinary: change in urinary stream, incontinence, frequency/urgency  Musculoskeletal: joint swelling, muscle pain  Neurological: dizziness, headaches, numbness/tingling  Dermatological: lumps and rash    Past History:    Past Medical History:   Diagnosis Date     Anemia      Dysphagia      Hepatitis B chronic      Hypothyroidism      Nasopharyngeal cancer (H)      Severe protein-calorie malnutrition (H)      Thrombocytopenia (H)      Physical Exam:    /80 (BP Location: Left arm, Patient Position: Sitting, Cuff Size: Adult Regular)   Pulse 62   Temp 98.4  F (36.9  C) (Oral)   Resp 20   Ht 1.594 m (5' 2.75\")   Wt 67.9 kg (149 lb 12.8 oz)   SpO2 99%   BMI 26.75 kg/m  "     General: patient appears stated age of 71 year old. Nontoxic and in no distress.   HEENT: Head: atraumatic, normocephalic. Sclerae anicteric.  Chest:  Normal respiratory effort  Cardiac:  No edema. rrr, no murmur.   Abdomen: abdomen is non-distended  Extremities: normal tone and muscle bulk.  Skin: no lesions or rash on visible skin. Warm and dry.   CNS: alert and oriented. Grossly non-focal.   Psychiatric: normal mood and affect.     Lab Results:    Recent Results (from the past 168 hour(s))   CBC with platelets and differential   Result Value Ref Range    WBC Count 2.8 (L) 4.0 - 11.0 10e3/uL    RBC Count 3.90 (L) 4.40 - 5.90 10e6/uL    Hemoglobin 10.4 (L) 13.3 - 17.7 g/dL    Hematocrit 33.6 (L) 40.0 - 53.0 %    MCV 86 78 - 100 fL    MCH 26.7 26.5 - 33.0 pg    MCHC 31.0 (L) 31.5 - 36.5 g/dL    RDW 15.6 (H) 10.0 - 15.0 %    Platelet Count 59 (L) 150 - 450 10e3/uL    % Neutrophils 63 %    % Lymphocytes 23 %    % Monocytes 9 %    % Eosinophils 4 %    % Basophils 1 %    % Immature Granulocytes 0 %    NRBCs per 100 WBC 0 <1 /100    Absolute Neutrophils 1.8 1.6 - 8.3 10e3/uL    Absolute Lymphocytes 0.6 (L) 0.8 - 5.3 10e3/uL    Absolute Monocytes 0.3 0.0 - 1.3 10e3/uL    Absolute Eosinophils 0.1 0.0 - 0.7 10e3/uL    Absolute Basophils 0.0 0.0 - 0.2 10e3/uL    Absolute Immature Granulocytes 0.0 <=0.4 10e3/uL    Absolute NRBCs 0.0 10e3/uL   Comprehensive metabolic panel   Result Value Ref Range    Sodium 140 136 - 145 mmol/L    Potassium 3.8 3.5 - 5.0 mmol/L    Chloride 105 98 - 107 mmol/L    Carbon Dioxide (CO2) 26 22 - 31 mmol/L    Anion Gap 9 5 - 18 mmol/L    Urea Nitrogen 21 8 - 28 mg/dL    Creatinine 1.20 0.70 - 1.30 mg/dL    Calcium 8.9 8.5 - 10.5 mg/dL    Glucose 171 (H) 70 - 125 mg/dL    Alkaline Phosphatase 82 45 - 120 U/L    AST 28 0 - 40 U/L    ALT 25 0 - 45 U/L    Protein Total 7.2 6.0 - 8.0 g/dL    Albumin 3.5 3.5 - 5.0 g/dL    Bilirubin Total 0.7 0.0 - 1.0 mg/dL    GFR Estimate 65 >60 mL/min/1.73m2       Imaging:    MR Brain w/o & w Contrast    Result Date: 4/1/2022  EXAM: MR BRAIN W/O and W CONTRAST LOCATION: [facility] DATE/TIME: 3/31/2022 8:13 PM INDICATION:  Nasopharyngeal carcinoma (H) COMPARISON: MRI brain 07/30/2021, CT neck 12/13/2021. CONTRAST: 7ml gadavist TECHNIQUE: Routine multiplanar multisequence head MRI without and with intravenous contrast. FINDINGS: INTRACRANIAL CONTENTS: No evidence of metastatic disease. No acute or subacute infarct. No mass, acute hemorrhage, or extra-axial fluid collections. Normal brain parenchymal signal. Normal ventricles and sulci. Normal position of the cerebellar tonsils. No pathologic contrast enhancement. SELLA: No abnormality accounting for technique. OSSEOUS STRUCTURES/SOFT TISSUES: Normal marrow signal. The major intracranial vascular flow voids are maintained. ORBITS: No abnormality accounting for technique. There is no orbital invasion on the current MRI. SINUSES/MASTOIDS: There continues to be extensive enhancement along the previously seen soft tissue mass in the right middle ethmoidal air cells. There is associated enhancement along the right maxillary sinus and in the nasopharynx secondary to prior radiation. There is mild enhancement in the right frontal sinus and right sphenoid sinus. There is a small effusion in the left mastoid air cells.     IMPRESSION: 1.  There is is significant hyper enhancement in the region of the known right ethmoidal mass likely related to prior radiation. Similar enhancement is seen in the remainder of the nasal passages and nasopharynx. Given such extensive post radiation enhancement, the ethmoidal mass could potentially be obscured. Correlation with PET scan is indicated. 2.  No intracranial metastases.      Signed by: Alan Frias MD

## 2022-04-29 NOTE — PROGRESS NOTES
PT here ambulatory for keytruda infusion. PT was seen by MD who approved txt. Lab results reviewed. Keytruda infused via port access. PT tolerated infusion. Txt completed and port flushed/deaccessed with 2x2 to site. Follow up reviewed and pt dc'd steady gait.

## 2022-05-02 ENCOUNTER — PATIENT OUTREACH (OUTPATIENT)
Dept: CARE COORDINATION | Facility: CLINIC | Age: 72
End: 2022-05-02

## 2022-05-02 NOTE — PROGRESS NOTES
"Oncology Distress Screening Follow-up  Clinical Social Work  [facility]    Identified Concern and Score From Distress Screenin. How concerned are you about your ability to eat?  7 Abnormal   appetite is low         2. How concerned are you about unintended weight loss or your current weight?  0         3. How concerned are you about feeling depressed or very sad?  7 Abnormal   depressed about not being able to eat and swallowing         4. How concerned are you about feeling anxious or very scared?  0         5. Do you struggle with the loss of meaning and terese in your life?  A great deal Abnormal          6. How concerned are you about work and home life issues that may be affected by your cancer?  6 Abnormal   Family has to help him more bc im sick         7. How concerned are you about knowing what resources are available to help you?  9 Abnormal   Worried about more medical bills. I have very low income.         8. Do you currently have what you would describe as Latter-day or spiritual struggles?             Not at all                   You can also ask to be contacted by one of our Oncology Supportive Care professionals.      9. If you want to be contacted by one of our professionals, I can send a message to them right now.  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 Kristen is followed by Dr. Frias at the [facility] in [place]. Kristen had a provider visit with Dr. Frias on  where he expressed concerns re: nutrition, feeling sad/depressed, struggling with a loss of meaning and terese, how his cancer will impact his work/home life, and resource support.     Intervention: JENNY reached out to Kristen via phone (Monika  present) to check in and address his concerns further. Kristen shares that he is doing \"okay\" today. He is feeling anxious and sad about " his medical bills that he has received and feels this is causing him a great amount of stress. JENNY discussed the [employer] [facility] [employer] Application. Kristen states that his son has already completed the [employer] vikram and is awaiting a decision. Jenny also discussed applying for the [employer] applications to help with providing some financial support to help alleviate some of the stress from the medical bills. Kristen is agreeable to have SW complete both the General milan and [employer] vikram via "[employer] Portal. Apps complete. Kristen denies a referral for a Psychologist Referral as he had requested in distress screen.     Education Provided: Oncology clinic SW role/contact info    Follow-up Required: SW will remain available for ongoing resource/support needs.       NOHELIA Montenegro, LGSW  [facility]  Adult Oncology Clinic  Phone: [phone]

## 2022-05-05 ENCOUNTER — TELEPHONE (OUTPATIENT)
Dept: ONCOLOGY | Facility: HOSPITAL | Age: 72
End: 2022-05-05
Payer: COMMERCIAL

## 2022-05-05 ENCOUNTER — APPOINTMENT (OUTPATIENT)
Dept: INTERPRETER SERVICES | Facility: CLINIC | Age: 72
End: 2022-05-05
Payer: COMMERCIAL

## 2022-05-05 NOTE — TELEPHONE ENCOUNTER
Oncology Distress Screen    Called Kristen in regards to his positive oncology nutrition distress screen:    1. How concerned are you about your ability to eat? :  7 (low appetite)  And  9. If you want to be contacted by one of our professionals, I can send a message to them right now. : ONCOLOGY DIETITIAN    Kristen reports he has no taste which makes eating difficult. He is able to eat soft foods okay, but tougher foods are difficult. He has a dry throat and drinks Boost and milk to help. He currently drinks 4 Boost original/ day.    Encouraged Kristen to continue make an effort to eat and drink well. Encouraged trying different seasonings to see if it helps with taste. Also contacted [employer] to see if insurance will pay for supplements.    Milka Sullivan, MS, RD, LD

## 2022-05-20 ENCOUNTER — INFUSION THERAPY VISIT (OUTPATIENT)
Dept: INFUSION THERAPY | Facility: HOSPITAL | Age: 72
End: 2022-05-20
Attending: INTERNAL MEDICINE
Payer: COMMERCIAL

## 2022-05-20 VITALS
HEART RATE: 64 BPM | RESPIRATION RATE: 16 BRPM | DIASTOLIC BLOOD PRESSURE: 67 MMHG | OXYGEN SATURATION: 98 % | SYSTOLIC BLOOD PRESSURE: 106 MMHG | TEMPERATURE: 98.2 F

## 2022-05-20 DIAGNOSIS — C11.9 SQUAMOUS CELL CARCINOMA OF NASOPHARYNX (H): Primary | ICD-10-CM

## 2022-05-20 LAB
ALBUMIN SERPL-MCNC: 3.5 G/DL (ref 3.5–5)
ALP SERPL-CCNC: 75 U/L (ref 45–120)
ALT SERPL W P-5'-P-CCNC: 27 U/L (ref 0–45)
ANION GAP SERPL CALCULATED.3IONS-SCNC: 8 MMOL/L (ref 5–18)
AST SERPL W P-5'-P-CCNC: 30 U/L (ref 0–40)
BASOPHILS # BLD AUTO: 0 10E3/UL (ref 0–0.2)
BASOPHILS NFR BLD AUTO: 1 %
BILIRUB SERPL-MCNC: 0.7 MG/DL (ref 0–1)
BUN SERPL-MCNC: 14 MG/DL (ref 8–28)
CALCIUM SERPL-MCNC: 8.7 MG/DL (ref 8.5–10.5)
CHLORIDE BLD-SCNC: 103 MMOL/L (ref 98–107)
CO2 SERPL-SCNC: 26 MMOL/L (ref 22–31)
CREAT SERPL-MCNC: 1.34 MG/DL (ref 0.7–1.3)
EOSINOPHIL # BLD AUTO: 0.1 10E3/UL (ref 0–0.7)
EOSINOPHIL NFR BLD AUTO: 4 %
ERYTHROCYTE [DISTWIDTH] IN BLOOD BY AUTOMATED COUNT: 15.6 % (ref 10–15)
GFR SERPL CREATININE-BSD FRML MDRD: 57 ML/MIN/1.73M2
GLUCOSE BLD-MCNC: 154 MG/DL (ref 70–125)
HCT VFR BLD AUTO: 32.6 % (ref 40–53)
HGB BLD-MCNC: 10.3 G/DL (ref 13.3–17.7)
IMM GRANULOCYTES # BLD: 0 10E3/UL
IMM GRANULOCYTES NFR BLD: 0 %
LYMPHOCYTES # BLD AUTO: 0.6 10E3/UL (ref 0.8–5.3)
LYMPHOCYTES NFR BLD AUTO: 17 %
MCH RBC QN AUTO: 26.8 PG (ref 26.5–33)
MCHC RBC AUTO-ENTMCNC: 31.6 G/DL (ref 31.5–36.5)
MCV RBC AUTO: 85 FL (ref 78–100)
MONOCYTES # BLD AUTO: 0.3 10E3/UL (ref 0–1.3)
MONOCYTES NFR BLD AUTO: 8 %
NEUTROPHILS # BLD AUTO: 2.6 10E3/UL (ref 1.6–8.3)
NEUTROPHILS NFR BLD AUTO: 70 %
NRBC # BLD AUTO: 0 10E3/UL
NRBC BLD AUTO-RTO: 0 /100
PLATELET # BLD AUTO: 74 10E3/UL (ref 150–450)
POTASSIUM BLD-SCNC: 3.8 MMOL/L (ref 3.5–5)
PROT SERPL-MCNC: 7.4 G/DL (ref 6–8)
RBC # BLD AUTO: 3.85 10E6/UL (ref 4.4–5.9)
SODIUM SERPL-SCNC: 137 MMOL/L (ref 136–145)
T4 FREE SERPL-MCNC: 0.79 NG/DL (ref 0.7–1.8)
TSH SERPL DL<=0.005 MIU/L-ACNC: 92.89 UIU/ML (ref 0.3–5)
WBC # BLD AUTO: 3.7 10E3/UL (ref 4–11)

## 2022-05-20 PROCEDURE — 84439 ASSAY OF FREE THYROXINE: CPT | Performed by: INTERNAL MEDICINE

## 2022-05-20 PROCEDURE — 96413 CHEMO IV INFUSION 1 HR: CPT

## 2022-05-20 PROCEDURE — 85025 COMPLETE CBC W/AUTO DIFF WBC: CPT

## 2022-05-20 PROCEDURE — 80053 COMPREHEN METABOLIC PANEL: CPT | Performed by: INTERNAL MEDICINE

## 2022-05-20 PROCEDURE — 258N000003 HC RX IP 258 OP 636: Performed by: INTERNAL MEDICINE

## 2022-05-20 PROCEDURE — 84443 ASSAY THYROID STIM HORMONE: CPT | Performed by: INTERNAL MEDICINE

## 2022-05-20 PROCEDURE — 36591 DRAW BLOOD OFF VENOUS DEVICE: CPT

## 2022-05-20 PROCEDURE — 250N000011 HC RX IP 250 OP 636: Performed by: INTERNAL MEDICINE

## 2022-05-20 RX ORDER — HEPARIN SODIUM (PORCINE) LOCK FLUSH IV SOLN 100 UNIT/ML 100 UNIT/ML
5 SOLUTION INTRAVENOUS
Status: DISCONTINUED | OUTPATIENT
Start: 2022-05-20 | End: 2022-05-20 | Stop reason: HOSPADM

## 2022-05-20 RX ADMIN — HEPARIN 5 ML: 100 SYRINGE at 11:07

## 2022-05-20 RX ADMIN — SODIUM CHLORIDE 200 MG: 9 INJECTION, SOLUTION INTRAVENOUS at 10:37

## 2022-05-20 ASSESSMENT — PAIN SCALES - GENERAL: PAINLEVEL: NO PAIN (0)

## 2022-05-20 NOTE — PROGRESS NOTES
Infusion Nursing Note:  Kristen Chapman presents today for C8 D1 Keytruda.    Patient seen by provider today: No   present during visit today: Pt declined  at this time and will let me know if he would like one.    Note: Pt arrives ambulatory to [facility]. Pt reports no gross changes from current baseline; pt reports ongoing challenges with swallowing while eating, with need to drink extra water during eating.    Intravenous Access:  Implanted Port.    Treatment Conditions:  Lab Results   Component Value Date     05/20/2022    POTASSIUM 3.8 05/20/2022    MAG 1.8 07/31/2020    CR 1.34 (H) 05/20/2022    GUANAKITO 8.7 05/20/2022    BILITOTAL 0.7 05/20/2022    ALBUMIN 3.5 05/20/2022    ALT 27 05/20/2022    AST 30 05/20/2022     Results reviewed, labs MET treatment parameters, ok to proceed with treatment.    Post Infusion Assessment:  Patient tolerated infusion without incident.    Site patent and intact, free from redness, edema or discomfort.  No evidence of extravasations.  Access discontinued per protocol.     Discharge Plan:   Patient discharged in stable condition accompanied by: son in [place].  Departure Mode: Ambulatory.      Radha Winston RN

## 2022-06-10 ENCOUNTER — INFUSION THERAPY VISIT (OUTPATIENT)
Dept: INFUSION THERAPY | Facility: HOSPITAL | Age: 72
End: 2022-06-10
Attending: INTERNAL MEDICINE
Payer: COMMERCIAL

## 2022-06-10 ENCOUNTER — ONCOLOGY VISIT (OUTPATIENT)
Dept: ONCOLOGY | Facility: HOSPITAL | Age: 72
End: 2022-06-10
Attending: INTERNAL MEDICINE
Payer: COMMERCIAL

## 2022-06-10 VITALS
OXYGEN SATURATION: 99 % | BODY MASS INDEX: 26.4 KG/M2 | SYSTOLIC BLOOD PRESSURE: 151 MMHG | WEIGHT: 149 LBS | HEART RATE: 57 BPM | DIASTOLIC BLOOD PRESSURE: 80 MMHG | TEMPERATURE: 97.9 F | HEIGHT: 63 IN

## 2022-06-10 VITALS
SYSTOLIC BLOOD PRESSURE: 142 MMHG | DIASTOLIC BLOOD PRESSURE: 75 MMHG | TEMPERATURE: 97.9 F | RESPIRATION RATE: 18 BRPM | HEART RATE: 59 BPM | OXYGEN SATURATION: 99 %

## 2022-06-10 DIAGNOSIS — C11.9 SQUAMOUS CELL CARCINOMA OF NASOPHARYNX (H): Primary | ICD-10-CM

## 2022-06-10 DIAGNOSIS — D69.6 THROMBOCYTOPENIA (H): ICD-10-CM

## 2022-06-10 DIAGNOSIS — R43.0 LOSS OF SMELL: ICD-10-CM

## 2022-06-10 DIAGNOSIS — C11.9 NASOPHARYNGEAL CARCINOMA (H): ICD-10-CM

## 2022-06-10 PROBLEM — E87.6 HYPOKALEMIA: Status: RESOLVED | Noted: 2020-04-25 | Resolved: 2022-06-10

## 2022-06-10 PROBLEM — D61.810 ANTINEOPLASTIC CHEMOTHERAPY INDUCED PANCYTOPENIA (H): Status: RESOLVED | Noted: 2020-04-07 | Resolved: 2022-06-10

## 2022-06-10 PROBLEM — E86.0 DEHYDRATION: Status: RESOLVED | Noted: 2020-04-25 | Resolved: 2022-06-10

## 2022-06-10 PROBLEM — R63.4 WEIGHT LOSS: Status: RESOLVED | Noted: 2020-04-21 | Resolved: 2022-06-10

## 2022-06-10 PROBLEM — K11.20 PAROTITIS: Status: RESOLVED | Noted: 2020-05-14 | Resolved: 2022-06-10

## 2022-06-10 PROBLEM — T45.1X5A ANTINEOPLASTIC CHEMOTHERAPY INDUCED PANCYTOPENIA (H): Status: RESOLVED | Noted: 2020-04-07 | Resolved: 2022-06-10

## 2022-06-10 PROBLEM — E43 SEVERE PROTEIN-CALORIE MALNUTRITION (H): Status: RESOLVED | Noted: 2020-05-18 | Resolved: 2022-06-10

## 2022-06-10 LAB
ALBUMIN SERPL-MCNC: 3.9 G/DL (ref 3.5–5)
ALP SERPL-CCNC: 78 U/L (ref 45–120)
ALT SERPL W P-5'-P-CCNC: 32 U/L (ref 0–45)
ANION GAP SERPL CALCULATED.3IONS-SCNC: 6 MMOL/L (ref 5–18)
AST SERPL W P-5'-P-CCNC: 28 U/L (ref 0–40)
BASOPHILS # BLD AUTO: 0 10E3/UL (ref 0–0.2)
BASOPHILS NFR BLD AUTO: 0 %
BILIRUB SERPL-MCNC: 0.6 MG/DL (ref 0–1)
BUN SERPL-MCNC: 27 MG/DL (ref 8–28)
CALCIUM SERPL-MCNC: 9.4 MG/DL (ref 8.5–10.5)
CHLORIDE BLD-SCNC: 104 MMOL/L (ref 98–107)
CO2 SERPL-SCNC: 26 MMOL/L (ref 22–31)
CREAT SERPL-MCNC: 1.22 MG/DL (ref 0.7–1.3)
EOSINOPHIL # BLD AUTO: 0 10E3/UL (ref 0–0.7)
EOSINOPHIL NFR BLD AUTO: 0 %
ERYTHROCYTE [DISTWIDTH] IN BLOOD BY AUTOMATED COUNT: 16.4 % (ref 10–15)
GFR SERPL CREATININE-BSD FRML MDRD: 64 ML/MIN/1.73M2
GLUCOSE BLD-MCNC: 156 MG/DL (ref 70–125)
HCT VFR BLD AUTO: 35.6 % (ref 40–53)
HGB BLD-MCNC: 11.3 G/DL (ref 13.3–17.7)
IMM GRANULOCYTES # BLD: 0 10E3/UL
IMM GRANULOCYTES NFR BLD: 1 %
LYMPHOCYTES # BLD AUTO: 0.6 10E3/UL (ref 0.8–5.3)
LYMPHOCYTES NFR BLD AUTO: 12 %
MCH RBC QN AUTO: 27.6 PG (ref 26.5–33)
MCHC RBC AUTO-ENTMCNC: 31.7 G/DL (ref 31.5–36.5)
MCV RBC AUTO: 87 FL (ref 78–100)
MONOCYTES # BLD AUTO: 0.4 10E3/UL (ref 0–1.3)
MONOCYTES NFR BLD AUTO: 7 %
NEUTROPHILS # BLD AUTO: 4.4 10E3/UL (ref 1.6–8.3)
NEUTROPHILS NFR BLD AUTO: 80 %
NRBC # BLD AUTO: 0 10E3/UL
NRBC BLD AUTO-RTO: 0 /100
PLATELET # BLD AUTO: 82 10E3/UL (ref 150–450)
POTASSIUM BLD-SCNC: 4 MMOL/L (ref 3.5–5)
PROT SERPL-MCNC: 7.3 G/DL (ref 6–8)
RBC # BLD AUTO: 4.09 10E6/UL (ref 4.4–5.9)
SODIUM SERPL-SCNC: 136 MMOL/L (ref 136–145)
T4 FREE SERPL-MCNC: 1.18 NG/DL (ref 0.7–1.8)
TSH SERPL DL<=0.005 MIU/L-ACNC: 9.31 UIU/ML (ref 0.3–5)
WBC # BLD AUTO: 5.4 10E3/UL (ref 4–11)

## 2022-06-10 PROCEDURE — 250N000011 HC RX IP 250 OP 636: Performed by: INTERNAL MEDICINE

## 2022-06-10 PROCEDURE — G0463 HOSPITAL OUTPT CLINIC VISIT: HCPCS | Mod: 25

## 2022-06-10 PROCEDURE — 96413 CHEMO IV INFUSION 1 HR: CPT

## 2022-06-10 PROCEDURE — G0463 HOSPITAL OUTPT CLINIC VISIT: HCPCS

## 2022-06-10 PROCEDURE — 258N000003 HC RX IP 258 OP 636: Performed by: INTERNAL MEDICINE

## 2022-06-10 PROCEDURE — 36591 DRAW BLOOD OFF VENOUS DEVICE: CPT

## 2022-06-10 PROCEDURE — 99214 OFFICE O/P EST MOD 30 MIN: CPT | Performed by: INTERNAL MEDICINE

## 2022-06-10 PROCEDURE — 84443 ASSAY THYROID STIM HORMONE: CPT | Performed by: INTERNAL MEDICINE

## 2022-06-10 PROCEDURE — 84439 ASSAY OF FREE THYROXINE: CPT | Performed by: INTERNAL MEDICINE

## 2022-06-10 PROCEDURE — 85025 COMPLETE CBC W/AUTO DIFF WBC: CPT

## 2022-06-10 PROCEDURE — 80053 COMPREHEN METABOLIC PANEL: CPT | Performed by: INTERNAL MEDICINE

## 2022-06-10 RX ORDER — HEPARIN SODIUM (PORCINE) LOCK FLUSH IV SOLN 100 UNIT/ML 100 UNIT/ML
5 SOLUTION INTRAVENOUS
Status: DISCONTINUED | OUTPATIENT
Start: 2022-06-10 | End: 2022-06-10 | Stop reason: HOSPADM

## 2022-06-10 RX ADMIN — Medication 5 ML: at 10:53

## 2022-06-10 RX ADMIN — SODIUM CHLORIDE 200 MG: 9 INJECTION, SOLUTION INTRAVENOUS at 10:25

## 2022-06-10 ASSESSMENT — PAIN SCALES - GENERAL: PAINLEVEL: NO PAIN (0)

## 2022-06-10 NOTE — PROGRESS NOTES
"Oncology Rooming Note    Sophia 10, 2022 11:07 AM   Kristen Chapman is a 72 year old male who presents for:    Chief Complaint   Patient presents with     Oncology Clinic Visit     Nasopharyngeal carcinoma,  Squamous cell carcinoma of nasopharynx     Initial Vitals: BP (!) 151/80   Pulse 57   Temp 97.9  F (36.6  C)   Ht 1.594 m (5' 2.75\")   Wt 67.6 kg (149 lb)   SpO2 99%   BMI 26.60 kg/m   Estimated body mass index is 26.6 kg/m  as calculated from the following:    Height as of this encounter: 1.594 m (5' 2.75\").    Weight as of this encounter: 67.6 kg (149 lb). Body surface area is 1.73 meters squared.  No Pain (0) Comment: Data Unavailable   No LMP for male patient.  Allergies reviewed: Yes  Medications reviewed: Yes    Medications: Medication refills not needed today.  Pharmacy name entered into [employer]: [facility] - [place], [place] - [address] [place] [address]    Clinical concerns: follow up after infusion per Altagracia Johnson MA            "

## 2022-06-10 NOTE — PROGRESS NOTES
[facility] Hematology and Oncology Progress Note    Patient: Kristen Chapman  MRN: [id number]  Date of Service: Colten 10, 2022        Assessment and Plan:    Cancer Staging  Nasopharyngeal carcinoma (H)  Staging form: Pharynx - Nasopharynx, AJCC 8th Edition  - Clinical stage from 2/18/2020: Stage SAMANTHA (cT4, cN2, cM0) - Signed by Alan Frias MD on 2/18/2020     1.  Nasopharyngeal carcinoma: He continues on pembrolizumab.  He is not having any immune mediated side effects.  There is no clinical evidence of recurrent disease.  No headaches.  Were going to reimage him in 3 weeks.  We will see him at that time to review results.  Would anticipate continuing on Keytruda.     2.  Pancytopenia: CBC was reviewed today.  His white count is 5.4, hemoglobin 11.3 and platelets 82,000.  His numbers are improving the further out he gets from chemotherapy.  We will continue to follow clinically.  He had a bone marrow biopsy in October 2021.  Next generation sequencing showed no abnormalities.    3.  Hypothyroidism: His TSH is markedly improved now compared to May 20.  I think he probably is not taking his medications at some point.  No changes will be made in his dose.  He is on 130 mcg daily.  Continue to check with his treatments.    4.  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  Explained to him that this could be from treatment over the location of the cancer itself.  Unfortunately this probably will not improve much if at all.  I told him there are no other medications or interventions which will improve this.    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  Diagnosed January 2020. Imaging suggested bilateral abnormal lymphadenopathy in the neck.  Also asymmetric soft tissue thickening in the posterior right nasopharynx.  On imaging, tumor also appeared to extend down to the  hypopharynx.    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    Treatment:    Initially treated with concurrent chemotherapy and radiation.  He had weekly cisplatin starting March 3, 2020.  Fifth and final dose given March 31, 2020.  Subsequent chemotherapy was held due to prolonged thrombocytopenia and renal insufficiency.  Radiation dose was 7000 cGy in 35 fractions given from March 2 through April 17, 2020.     Started cisplatin with infusional 5-FU on June 1, 2020.  Cycle 1 given at a 25% dose reduction.  He still had significant thrombocytopenia and neutropenia on day 28.  Cycle 2 was held.  At the same time his liver function tests elevated secondary to hepatitis B.     Recurrence:  Radiosurgery delivered to the right ethmoid tumor delivered November 8 through November 17, 2021.  Received 3500 cGy in 5 fractions.  Pembrolizumab started 12/23/21    Interim History:    Kristen returns today for a follow-up visit.  He is now on single agent pembrolizumab.  Continues to tolerate it well.  No fevers or chills or arthralgias.  No GI side effects.  He complains of loss of taste and smell.  This is quite disturbing for him.    Review of Systems:    As above in the history.     Review of Systems otherwise Negative for:  General: chills, fever or night sweats  Psychological: anxiety or depression  Ophthalmic: blurry vision, double vision or loss of vision, vision change  ENT: oral lesions, hearing changes  Hematological and Lymphatic: bruising, jaundice, swollen lymph nodes  Endocrine: hot flashes, unexpected weight changes  Respiratory: cough, hemoptysis, orthopnea  Cardiovascular: chest pain, edema, palpitations or PND  Gastrointestinal: abdominal pain, blood in stools, change in bowel habits, constipation, diarrhea or nausea/vomiting  Genito-Urinary: change in urinary stream, incontinence, frequency/urgency  Musculoskeletal: joint swelling, muscle pain  Neurological: dizziness, headaches,  "numbness/tingling  Dermatological: lumps and rash    Past History:    Past Medical History:   Diagnosis Date     Anemia      Dysphagia      Hepatitis B chronic      Hypothyroidism      Nasopharyngeal cancer (H)      Severe protein-calorie malnutrition (H)      Thrombocytopenia (H)      Physical Exam:    BP (!) 151/80   Pulse 57   Temp 97.9  F (36.6  C)   Ht 1.594 m (5' 2.75\")   Wt 67.6 kg (149 lb)   SpO2 99%   BMI 26.60 kg/m      General: patient appears stated age of 71 year old. Nontoxic and in no distress.   HEENT: Head: atraumatic, normocephalic. Sclerae anicteric.  Chest:  Normal respiratory effort  Cardiac:  No edema.  Abdomen: abdomen is non-distended  Extremities: normal tone and muscle bulk.  Skin: no lesions or rash on visible skin. Warm and dry.   CNS: alert and oriented. Grossly non-focal.   Psychiatric: normal mood and affect.     Lab Results:    Recent Results (from the past 168 hour(s))   Comprehensive metabolic panel   Result Value Ref Range    Sodium 136 136 - 145 mmol/L    Potassium 4.0 3.5 - 5.0 mmol/L    Chloride 104 98 - 107 mmol/L    Carbon Dioxide (CO2) 26 22 - 31 mmol/L    Anion Gap 6 5 - 18 mmol/L    Urea Nitrogen 27 8 - 28 mg/dL    Creatinine 1.22 0.70 - 1.30 mg/dL    Calcium 9.4 8.5 - 10.5 mg/dL    Glucose 156 (H) 70 - 125 mg/dL    Alkaline Phosphatase 78 45 - 120 U/L    AST 28 0 - 40 U/L    ALT 32 0 - 45 U/L    Protein Total 7.3 6.0 - 8.0 g/dL    Albumin 3.9 3.5 - 5.0 g/dL    Bilirubin Total 0.6 0.0 - 1.0 mg/dL    GFR Estimate 64 >60 mL/min/1.73m2   TSH with free T4 reflex   Result Value Ref Range    TSH 9.31 (H) 0.30 - 5.00 uIU/mL   CBC with platelets and differential   Result Value Ref Range    WBC Count 5.4 4.0 - 11.0 10e3/uL    RBC Count 4.09 (L) 4.40 - 5.90 10e6/uL    Hemoglobin 11.3 (L) 13.3 - 17.7 g/dL    Hematocrit 35.6 (L) 40.0 - 53.0 %    MCV 87 78 - 100 fL    MCH 27.6 26.5 - 33.0 pg    MCHC 31.7 31.5 - 36.5 g/dL    RDW 16.4 (H) 10.0 - 15.0 %    Platelet Count 82 (L) 150 - " 450 10e3/uL    % Neutrophils 80 %    % Lymphocytes 12 %    % Monocytes 7 %    % Eosinophils 0 %    % Basophils 0 %    % Immature Granulocytes 1 %    NRBCs per 100 WBC 0 <1 /100    Absolute Neutrophils 4.4 1.6 - 8.3 10e3/uL    Absolute Lymphocytes 0.6 (L) 0.8 - 5.3 10e3/uL    Absolute Monocytes 0.4 0.0 - 1.3 10e3/uL    Absolute Eosinophils 0.0 0.0 - 0.7 10e3/uL    Absolute Basophils 0.0 0.0 - 0.2 10e3/uL    Absolute Immature Granulocytes 0.0 <=0.4 10e3/uL    Absolute NRBCs 0.0 10e3/uL      Imaging:    No results found.      Signed by: Alan Frias MD

## 2022-06-10 NOTE — LETTER
"    6/10/2022         RE: Kristen Chapman  [address] [place] [address]        Dear Colleague,    Thank you for referring your patient, Kristen Chapman, to the [facility]. Please see a copy of my visit note below.    Oncology Rooming Note    Sophia 10, 2022 11:07 AM   Kristen Chapman is a 72 year old male who presents for:    Chief Complaint   Patient presents with     Oncology Clinic Visit     Nasopharyngeal carcinoma,  Squamous cell carcinoma of nasopharynx     Initial Vitals: BP (!) 151/80   Pulse 57   Temp 97.9  F (36.6  C)   Ht 1.594 m (5' 2.75\")   Wt 67.6 kg (149 lb)   SpO2 99%   BMI 26.60 kg/m   Estimated body mass index is 26.6 kg/m  as calculated from the following:    Height as of this encounter: 1.594 m (5' 2.75\").    Weight as of this encounter: 67.6 kg (149 lb). Body surface area is 1.73 meters squared.  No Pain (0) Comment: Data Unavailable   No LMP for male patient.  Allergies reviewed: Yes  Medications reviewed: Yes    Medications: Medication refills not needed today.  Pharmacy name entered into [employer]: [facility] - [place], [place] - [address] [place] [address]    Clinical concerns: follow up after infusion per Altagracia Johnosn MA              [facility] Hematology and Oncology Progress Note    Patient: Kristen Chapman  MRN: [id number]  Date of Service: Colten 10, 2022        Assessment and Plan:    Cancer Staging  Nasopharyngeal carcinoma (H)  Staging form: Pharynx - Nasopharynx, AJCC 8th Edition  - Clinical stage from 2/18/2020: Stage SAMANTHA (cT4, cN2, cM0) - Signed by Alan Frias MD on 2/18/2020     1.  Nasopharyngeal carcinoma: He continues on pembrolizumab.  He is not having any immune mediated side effects.  There is no clinical evidence of recurrent disease.  No headaches.  Were going to reimage him in 3 weeks.  We will see him at that time to review results.  Would anticipate continuing on Keytruda.     2.  Pancytopenia: CBC was reviewed today.  His white count is 5.4, " hemoglobin 11.3 and platelets 82,000.  His numbers are improving the further out he gets from chemotherapy.  We will continue to follow clinically.  He had a bone marrow biopsy in October 2021.  Next generation sequencing showed no abnormalities.    3.  Hypothyroidism: His TSH is markedly improved now compared to May 20.  I think he probably is not taking his medications at some point.  No changes will be made in his dose.  He is on 130 mcg daily.  Continue to check with his treatments.    4.  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  Explained to him that this could be from treatment over the location of the cancer itself.  Unfortunately this probably will not improve much if at all.  I told him there are no other medications or interventions which will improve this.    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  Diagnosed January 2020. Imaging suggested bilateral abnormal lymphadenopathy in the neck.  Also asymmetric soft tissue thickening in the posterior right nasopharynx.  On imaging, tumor also appeared to extend down to the hypopharynx.    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    Treatment:    Initially treated with concurrent chemotherapy and radiation.  He had weekly cisplatin starting March 3, 2020.  Fifth and final dose given March 31, 2020.  Subsequent chemotherapy was held due to prolonged thrombocytopenia and renal insufficiency.  Radiation dose was 7000 cGy in 35 fractions given from March 2 through April 17, 2020.     Started cisplatin with infusional 5-FU on June 1, 2020.  Cycle 1 given at a 25% dose reduction.  He still had significant thrombocytopenia and neutropenia on day 28.  Cycle 2 was held.  At the same time his liver function tests elevated secondary to hepatitis B.     Recurrence:  Radiosurgery delivered  "to the right ethmoid tumor delivered November 8 through November 17, 2021.  Received 3500 cGy in 5 fractions.  Pembrolizumab started 12/23/21    Interim History:    Kristen returns today for a follow-up visit.  He is now on single agent pembrolizumab.  Continues to tolerate it well.  No fevers or chills or arthralgias.  No GI side effects.  He complains of loss of taste and smell.  This is quite disturbing for him.    Review of Systems:    As above in the history.     Review of Systems otherwise Negative for:  General: chills, fever or night sweats  Psychological: anxiety or depression  Ophthalmic: blurry vision, double vision or loss of vision, vision change  ENT: oral lesions, hearing changes  Hematological and Lymphatic: bruising, jaundice, swollen lymph nodes  Endocrine: hot flashes, unexpected weight changes  Respiratory: cough, hemoptysis, orthopnea  Cardiovascular: chest pain, edema, palpitations or PND  Gastrointestinal: abdominal pain, blood in stools, change in bowel habits, constipation, diarrhea or nausea/vomiting  Genito-Urinary: change in urinary stream, incontinence, frequency/urgency  Musculoskeletal: joint swelling, muscle pain  Neurological: dizziness, headaches, numbness/tingling  Dermatological: lumps and rash    Past History:    Past Medical History:   Diagnosis Date     Anemia      Dysphagia      Hepatitis B chronic      Hypothyroidism      Nasopharyngeal cancer (H)      Severe protein-calorie malnutrition (H)      Thrombocytopenia (H)      Physical Exam:    BP (!) 151/80   Pulse 57   Temp 97.9  F (36.6  C)   Ht 1.594 m (5' 2.75\")   Wt 67.6 kg (149 lb)   SpO2 99%   BMI 26.60 kg/m      General: patient appears stated age of 71 year old. Nontoxic and in no distress.   HEENT: Head: atraumatic, normocephalic. Sclerae anicteric.  Chest:  Normal respiratory effort  Cardiac:  No edema.  Abdomen: abdomen is non-distended  Extremities: normal tone and muscle bulk.  Skin: no lesions or rash on visible " skin. Warm and dry.   CNS: alert and oriented. Grossly non-focal.   Psychiatric: normal mood and affect.     Lab Results:    Recent Results (from the past 168 hour(s))   Comprehensive metabolic panel   Result Value Ref Range    Sodium 136 136 - 145 mmol/L    Potassium 4.0 3.5 - 5.0 mmol/L    Chloride 104 98 - 107 mmol/L    Carbon Dioxide (CO2) 26 22 - 31 mmol/L    Anion Gap 6 5 - 18 mmol/L    Urea Nitrogen 27 8 - 28 mg/dL    Creatinine 1.22 0.70 - 1.30 mg/dL    Calcium 9.4 8.5 - 10.5 mg/dL    Glucose 156 (H) 70 - 125 mg/dL    Alkaline Phosphatase 78 45 - 120 U/L    AST 28 0 - 40 U/L    ALT 32 0 - 45 U/L    Protein Total 7.3 6.0 - 8.0 g/dL    Albumin 3.9 3.5 - 5.0 g/dL    Bilirubin Total 0.6 0.0 - 1.0 mg/dL    GFR Estimate 64 >60 mL/min/1.73m2   TSH with free T4 reflex   Result Value Ref Range    TSH 9.31 (H) 0.30 - 5.00 uIU/mL   CBC with platelets and differential   Result Value Ref Range    WBC Count 5.4 4.0 - 11.0 10e3/uL    RBC Count 4.09 (L) 4.40 - 5.90 10e6/uL    Hemoglobin 11.3 (L) 13.3 - 17.7 g/dL    Hematocrit 35.6 (L) 40.0 - 53.0 %    MCV 87 78 - 100 fL    MCH 27.6 26.5 - 33.0 pg    MCHC 31.7 31.5 - 36.5 g/dL    RDW 16.4 (H) 10.0 - 15.0 %    Platelet Count 82 (L) 150 - 450 10e3/uL    % Neutrophils 80 %    % Lymphocytes 12 %    % Monocytes 7 %    % Eosinophils 0 %    % Basophils 0 %    % Immature Granulocytes 1 %    NRBCs per 100 WBC 0 <1 /100    Absolute Neutrophils 4.4 1.6 - 8.3 10e3/uL    Absolute Lymphocytes 0.6 (L) 0.8 - 5.3 10e3/uL    Absolute Monocytes 0.4 0.0 - 1.3 10e3/uL    Absolute Eosinophils 0.0 0.0 - 0.7 10e3/uL    Absolute Basophils 0.0 0.0 - 0.2 10e3/uL    Absolute Immature Granulocytes 0.0 <=0.4 10e3/uL    Absolute NRBCs 0.0 10e3/uL      Imaging:    No results found.      Signed by: Alan Frias MD        Again, thank you for allowing me to participate in the care of your patient.        Sincerely,        Alan Frias MD

## 2022-06-10 NOTE — PROGRESS NOTES
PT here ambulatory for txt. PT states doing good and only issue is cannot taste food very well. Port accessed and labs drawn/approved for txt. keytruda infused and pt tolerated without any problems. txt completed/ port flushed/deaccessed with 2x2 to site. Follow up reviewed and pt dc'd steady gait.

## 2022-06-14 ENCOUNTER — APPOINTMENT (OUTPATIENT)
Dept: INTERPRETER SERVICES | Facility: CLINIC | Age: 72
End: 2022-06-14
Payer: COMMERCIAL

## 2022-06-27 ENCOUNTER — HOSPITAL ENCOUNTER (OUTPATIENT)
Dept: CT IMAGING | Facility: HOSPITAL | Age: 72
Discharge: HOME | End: 2022-06-27
Attending: INTERNAL MEDICINE
Payer: COMMERCIAL

## 2022-06-27 DIAGNOSIS — C11.9 NASOPHARYNGEAL CARCINOMA (H): ICD-10-CM

## 2022-06-27 DIAGNOSIS — C11.9 SQUAMOUS CELL CARCINOMA OF NASOPHARYNX (H): ICD-10-CM

## 2022-06-27 LAB
CREAT BLD-MCNC: 1 MG/DL (ref 0.7–1.3)
GFR SERPL CREATININE-BSD FRML MDRD: >60 ML/MIN/1.73M2

## 2022-06-27 PROCEDURE — 82565 ASSAY OF CREATININE: CPT

## 2022-06-27 PROCEDURE — 70491 CT SOFT TISSUE NECK W/DYE: CPT

## 2022-06-27 PROCEDURE — 250N000011 HC RX IP 250 OP 636: Performed by: INTERNAL MEDICINE

## 2022-06-27 PROCEDURE — 71260 CT THORAX DX C+: CPT

## 2022-06-27 RX ORDER — IOPAMIDOL 755 MG/ML
80 INJECTION, SOLUTION INTRAVASCULAR ONCE
Status: DISCONTINUED | OUTPATIENT
Start: 2022-06-27 | End: 2022-06-27

## 2022-06-27 RX ORDER — IOPAMIDOL 755 MG/ML
75 INJECTION, SOLUTION INTRAVASCULAR ONCE
Status: COMPLETED | OUTPATIENT
Start: 2022-06-27 | End: 2022-06-27

## 2022-06-27 RX ORDER — HEPARIN SODIUM (PORCINE) LOCK FLUSH IV SOLN 100 UNIT/ML 100 UNIT/ML
500 SOLUTION INTRAVENOUS ONCE
Status: COMPLETED | OUTPATIENT
Start: 2022-06-27 | End: 2022-06-27

## 2022-06-27 RX ADMIN — IOPAMIDOL 75 ML: 755 INJECTION, SOLUTION INTRAVENOUS at 08:42

## 2022-06-27 RX ADMIN — HEPARIN 500 UNITS: 100 SYRINGE at 08:59

## 2022-07-01 ENCOUNTER — INFUSION THERAPY VISIT (OUTPATIENT)
Dept: INFUSION THERAPY | Facility: HOSPITAL | Age: 72
End: 2022-07-01
Attending: INTERNAL MEDICINE
Payer: COMMERCIAL

## 2022-07-01 ENCOUNTER — ONCOLOGY VISIT (OUTPATIENT)
Dept: ONCOLOGY | Facility: HOSPITAL | Age: 72
End: 2022-07-01
Attending: INTERNAL MEDICINE
Payer: COMMERCIAL

## 2022-07-01 VITALS
HEART RATE: 61 BPM | TEMPERATURE: 98.7 F | BODY MASS INDEX: 25.21 KG/M2 | DIASTOLIC BLOOD PRESSURE: 75 MMHG | WEIGHT: 142.3 LBS | HEIGHT: 63 IN | SYSTOLIC BLOOD PRESSURE: 121 MMHG | OXYGEN SATURATION: 99 % | RESPIRATION RATE: 18 BRPM

## 2022-07-01 DIAGNOSIS — C11.9 SQUAMOUS CELL CARCINOMA OF NASOPHARYNX (H): Primary | ICD-10-CM

## 2022-07-01 DIAGNOSIS — B18.1 HEPATITIS B, CHRONIC (H): ICD-10-CM

## 2022-07-01 DIAGNOSIS — D61.818 ACQUIRED PANCYTOPENIA (H): ICD-10-CM

## 2022-07-01 DIAGNOSIS — E03.9 ACQUIRED HYPOTHYROIDISM: ICD-10-CM

## 2022-07-01 DIAGNOSIS — C11.9 SQUAMOUS CELL CARCINOMA OF NASOPHARYNX (H): ICD-10-CM

## 2022-07-01 DIAGNOSIS — C11.9 NASOPHARYNGEAL CARCINOMA (H): Primary | ICD-10-CM

## 2022-07-01 LAB
ALBUMIN SERPL-MCNC: 3.2 G/DL (ref 3.5–5)
ALP SERPL-CCNC: 71 U/L (ref 45–120)
ALT SERPL W P-5'-P-CCNC: 22 U/L (ref 0–45)
ANION GAP SERPL CALCULATED.3IONS-SCNC: 3 MMOL/L (ref 5–18)
AST SERPL W P-5'-P-CCNC: 28 U/L (ref 0–40)
BASOPHILS # BLD AUTO: 0 10E3/UL (ref 0–0.2)
BASOPHILS NFR BLD AUTO: 0 %
BILIRUB DIRECT SERPL-MCNC: 0.4 MG/DL
BILIRUB SERPL-MCNC: 0.9 MG/DL (ref 0–1)
BUN SERPL-MCNC: 20 MG/DL (ref 8–28)
CALCIUM SERPL-MCNC: 8.7 MG/DL (ref 8.5–10.5)
CHLORIDE BLD-SCNC: 106 MMOL/L (ref 98–107)
CO2 SERPL-SCNC: 28 MMOL/L (ref 22–31)
CREAT SERPL-MCNC: 1.02 MG/DL (ref 0.7–1.3)
EOSINOPHIL # BLD AUTO: 0.1 10E3/UL (ref 0–0.7)
EOSINOPHIL NFR BLD AUTO: 5 %
ERYTHROCYTE [DISTWIDTH] IN BLOOD BY AUTOMATED COUNT: 15.9 % (ref 10–15)
GFR SERPL CREATININE-BSD FRML MDRD: 79 ML/MIN/1.73M2
GLUCOSE BLD-MCNC: 116 MG/DL (ref 70–125)
HCT VFR BLD AUTO: 31.4 % (ref 40–53)
HGB BLD-MCNC: 10.2 G/DL (ref 13.3–17.7)
IMM GRANULOCYTES # BLD: 0 10E3/UL
IMM GRANULOCYTES NFR BLD: 1 %
LYMPHOCYTES # BLD AUTO: 0.6 10E3/UL (ref 0.8–5.3)
LYMPHOCYTES NFR BLD AUTO: 20 %
MCH RBC QN AUTO: 28.4 PG (ref 26.5–33)
MCHC RBC AUTO-ENTMCNC: 32.5 G/DL (ref 31.5–36.5)
MCV RBC AUTO: 88 FL (ref 78–100)
MONOCYTES # BLD AUTO: 0.4 10E3/UL (ref 0–1.3)
MONOCYTES NFR BLD AUTO: 14 %
NEUTROPHILS # BLD AUTO: 1.8 10E3/UL (ref 1.6–8.3)
NEUTROPHILS NFR BLD AUTO: 60 %
NRBC # BLD AUTO: 0 10E3/UL
NRBC BLD AUTO-RTO: 0 /100
PLATELET # BLD AUTO: 53 10E3/UL (ref 150–450)
POTASSIUM BLD-SCNC: 3.8 MMOL/L (ref 3.5–5)
PROT SERPL-MCNC: 6.4 G/DL (ref 6–8)
RBC # BLD AUTO: 3.59 10E6/UL (ref 4.4–5.9)
SODIUM SERPL-SCNC: 137 MMOL/L (ref 136–145)
T4 FREE SERPL-MCNC: 1.28 NG/DL (ref 0.9–1.7)
TSH SERPL DL<=0.005 MIU/L-ACNC: 7.25 UIU/ML (ref 0.3–5)
WBC # BLD AUTO: 2.9 10E3/UL (ref 4–11)

## 2022-07-01 PROCEDURE — 85025 COMPLETE CBC W/AUTO DIFF WBC: CPT

## 2022-07-01 PROCEDURE — G0463 HOSPITAL OUTPT CLINIC VISIT: HCPCS | Mod: 25

## 2022-07-01 PROCEDURE — 82248 BILIRUBIN DIRECT: CPT

## 2022-07-01 PROCEDURE — 87517 HEPATITIS B DNA QUANT: CPT

## 2022-07-01 PROCEDURE — 84439 ASSAY OF FREE THYROXINE: CPT | Performed by: INTERNAL MEDICINE

## 2022-07-01 PROCEDURE — 80053 COMPREHEN METABOLIC PANEL: CPT | Performed by: INTERNAL MEDICINE

## 2022-07-01 PROCEDURE — 99214 OFFICE O/P EST MOD 30 MIN: CPT | Performed by: INTERNAL MEDICINE

## 2022-07-01 PROCEDURE — 96413 CHEMO IV INFUSION 1 HR: CPT

## 2022-07-01 PROCEDURE — 36591 DRAW BLOOD OFF VENOUS DEVICE: CPT

## 2022-07-01 PROCEDURE — 84443 ASSAY THYROID STIM HORMONE: CPT | Performed by: INTERNAL MEDICINE

## 2022-07-01 PROCEDURE — 250N000011 HC RX IP 250 OP 636: Performed by: INTERNAL MEDICINE

## 2022-07-01 PROCEDURE — 258N000003 HC RX IP 258 OP 636: Performed by: INTERNAL MEDICINE

## 2022-07-01 RX ORDER — MEPERIDINE HYDROCHLORIDE 25 MG/ML
25 INJECTION INTRAMUSCULAR; INTRAVENOUS; SUBCUTANEOUS EVERY 30 MIN PRN
Status: CANCELLED | OUTPATIENT
Start: 2022-07-22

## 2022-07-01 RX ORDER — LEVOTHYROXINE SODIUM 137 UG/1
137 TABLET ORAL DAILY
Qty: 90 TABLET | Refills: 1 | Status: SHIPPED | OUTPATIENT
Start: 2022-07-01 | End: 2022-12-09

## 2022-07-01 RX ORDER — ALBUTEROL SULFATE 0.83 MG/ML
2.5 SOLUTION RESPIRATORY (INHALATION)
Status: CANCELLED | OUTPATIENT
Start: 2022-09-23

## 2022-07-01 RX ORDER — ALBUTEROL SULFATE 0.83 MG/ML
2.5 SOLUTION RESPIRATORY (INHALATION)
Status: CANCELLED | OUTPATIENT
Start: 2022-08-12

## 2022-07-01 RX ORDER — MEPERIDINE HYDROCHLORIDE 25 MG/ML
25 INJECTION INTRAMUSCULAR; INTRAVENOUS; SUBCUTANEOUS EVERY 30 MIN PRN
Status: CANCELLED | OUTPATIENT
Start: 2022-09-02

## 2022-07-01 RX ORDER — ALBUTEROL SULFATE 90 UG/1
1-2 AEROSOL, METERED RESPIRATORY (INHALATION)
Status: CANCELLED
Start: 2022-09-02

## 2022-07-01 RX ORDER — HEPARIN SODIUM,PORCINE 10 UNIT/ML
5 VIAL (ML) INTRAVENOUS
Status: CANCELLED | OUTPATIENT
Start: 2022-10-14

## 2022-07-01 RX ORDER — ALBUTEROL SULFATE 90 UG/1
1-2 AEROSOL, METERED RESPIRATORY (INHALATION)
Status: CANCELLED
Start: 2022-07-22

## 2022-07-01 RX ORDER — HEPARIN SODIUM,PORCINE 10 UNIT/ML
5 VIAL (ML) INTRAVENOUS
Status: CANCELLED | OUTPATIENT
Start: 2022-08-12

## 2022-07-01 RX ORDER — EPINEPHRINE 1 MG/ML
0.3 INJECTION, SOLUTION INTRAMUSCULAR; SUBCUTANEOUS EVERY 5 MIN PRN
Status: CANCELLED | OUTPATIENT
Start: 2022-10-14

## 2022-07-01 RX ORDER — METHYLPREDNISOLONE SODIUM SUCCINATE 125 MG/2ML
125 INJECTION, POWDER, LYOPHILIZED, FOR SOLUTION INTRAMUSCULAR; INTRAVENOUS
Status: CANCELLED
Start: 2022-09-23

## 2022-07-01 RX ORDER — HEPARIN SODIUM (PORCINE) LOCK FLUSH IV SOLN 100 UNIT/ML 100 UNIT/ML
5 SOLUTION INTRAVENOUS
Status: CANCELLED | OUTPATIENT
Start: 2022-07-22

## 2022-07-01 RX ORDER — HEPARIN SODIUM,PORCINE 10 UNIT/ML
5 VIAL (ML) INTRAVENOUS
Status: CANCELLED | OUTPATIENT
Start: 2022-07-22

## 2022-07-01 RX ORDER — DIPHENHYDRAMINE HYDROCHLORIDE 50 MG/ML
50 INJECTION INTRAMUSCULAR; INTRAVENOUS
Status: CANCELLED
Start: 2022-07-22

## 2022-07-01 RX ORDER — NALOXONE HYDROCHLORIDE 0.4 MG/ML
0.2 INJECTION, SOLUTION INTRAMUSCULAR; INTRAVENOUS; SUBCUTANEOUS
Status: CANCELLED | OUTPATIENT
Start: 2022-09-02

## 2022-07-01 RX ORDER — NALOXONE HYDROCHLORIDE 0.4 MG/ML
0.2 INJECTION, SOLUTION INTRAMUSCULAR; INTRAVENOUS; SUBCUTANEOUS
Status: CANCELLED | OUTPATIENT
Start: 2022-07-22

## 2022-07-01 RX ORDER — HEPARIN SODIUM (PORCINE) LOCK FLUSH IV SOLN 100 UNIT/ML 100 UNIT/ML
5 SOLUTION INTRAVENOUS
Status: CANCELLED | OUTPATIENT
Start: 2022-08-12

## 2022-07-01 RX ORDER — DIPHENHYDRAMINE HYDROCHLORIDE 50 MG/ML
50 INJECTION INTRAMUSCULAR; INTRAVENOUS
Status: CANCELLED
Start: 2022-08-12

## 2022-07-01 RX ORDER — EPINEPHRINE 1 MG/ML
0.3 INJECTION, SOLUTION INTRAMUSCULAR; SUBCUTANEOUS EVERY 5 MIN PRN
Status: CANCELLED | OUTPATIENT
Start: 2022-09-02

## 2022-07-01 RX ORDER — METHYLPREDNISOLONE SODIUM SUCCINATE 125 MG/2ML
125 INJECTION, POWDER, LYOPHILIZED, FOR SOLUTION INTRAMUSCULAR; INTRAVENOUS
Status: CANCELLED
Start: 2022-07-22

## 2022-07-01 RX ORDER — MEPERIDINE HYDROCHLORIDE 25 MG/ML
25 INJECTION INTRAMUSCULAR; INTRAVENOUS; SUBCUTANEOUS EVERY 30 MIN PRN
Status: CANCELLED | OUTPATIENT
Start: 2022-09-23

## 2022-07-01 RX ORDER — LORAZEPAM 2 MG/ML
0.5 INJECTION INTRAMUSCULAR EVERY 4 HOURS PRN
Status: CANCELLED | OUTPATIENT
Start: 2022-07-22

## 2022-07-01 RX ORDER — ALBUTEROL SULFATE 0.83 MG/ML
2.5 SOLUTION RESPIRATORY (INHALATION)
Status: CANCELLED | OUTPATIENT
Start: 2022-09-02

## 2022-07-01 RX ORDER — NALOXONE HYDROCHLORIDE 0.4 MG/ML
0.2 INJECTION, SOLUTION INTRAMUSCULAR; INTRAVENOUS; SUBCUTANEOUS
Status: CANCELLED | OUTPATIENT
Start: 2022-10-14

## 2022-07-01 RX ORDER — METHYLPREDNISOLONE SODIUM SUCCINATE 125 MG/2ML
125 INJECTION, POWDER, LYOPHILIZED, FOR SOLUTION INTRAMUSCULAR; INTRAVENOUS
Status: CANCELLED
Start: 2022-08-12

## 2022-07-01 RX ORDER — DIPHENHYDRAMINE HYDROCHLORIDE 50 MG/ML
50 INJECTION INTRAMUSCULAR; INTRAVENOUS
Status: CANCELLED
Start: 2022-10-14

## 2022-07-01 RX ORDER — MEPERIDINE HYDROCHLORIDE 25 MG/ML
25 INJECTION INTRAMUSCULAR; INTRAVENOUS; SUBCUTANEOUS EVERY 30 MIN PRN
Status: CANCELLED | OUTPATIENT
Start: 2022-10-14

## 2022-07-01 RX ORDER — LORAZEPAM 2 MG/ML
0.5 INJECTION INTRAMUSCULAR EVERY 4 HOURS PRN
Status: CANCELLED | OUTPATIENT
Start: 2022-10-14

## 2022-07-01 RX ORDER — EPINEPHRINE 1 MG/ML
0.3 INJECTION, SOLUTION INTRAMUSCULAR; SUBCUTANEOUS EVERY 5 MIN PRN
Status: CANCELLED | OUTPATIENT
Start: 2022-09-23

## 2022-07-01 RX ORDER — EPINEPHRINE 1 MG/ML
0.3 INJECTION, SOLUTION INTRAMUSCULAR; SUBCUTANEOUS EVERY 5 MIN PRN
Status: CANCELLED | OUTPATIENT
Start: 2022-07-22

## 2022-07-01 RX ORDER — LORAZEPAM 2 MG/ML
0.5 INJECTION INTRAMUSCULAR EVERY 4 HOURS PRN
Status: CANCELLED | OUTPATIENT
Start: 2022-08-12

## 2022-07-01 RX ORDER — LORAZEPAM 2 MG/ML
0.5 INJECTION INTRAMUSCULAR EVERY 4 HOURS PRN
Status: CANCELLED | OUTPATIENT
Start: 2022-09-02

## 2022-07-01 RX ORDER — LORAZEPAM 2 MG/ML
0.5 INJECTION INTRAMUSCULAR EVERY 4 HOURS PRN
Status: CANCELLED | OUTPATIENT
Start: 2022-09-23

## 2022-07-01 RX ORDER — EPINEPHRINE 1 MG/ML
0.3 INJECTION, SOLUTION INTRAMUSCULAR; SUBCUTANEOUS EVERY 5 MIN PRN
Status: CANCELLED | OUTPATIENT
Start: 2022-08-12

## 2022-07-01 RX ORDER — ALBUTEROL SULFATE 0.83 MG/ML
2.5 SOLUTION RESPIRATORY (INHALATION)
Status: CANCELLED | OUTPATIENT
Start: 2022-07-22

## 2022-07-01 RX ORDER — METHYLPREDNISOLONE SODIUM SUCCINATE 125 MG/2ML
125 INJECTION, POWDER, LYOPHILIZED, FOR SOLUTION INTRAMUSCULAR; INTRAVENOUS
Status: CANCELLED
Start: 2022-10-14

## 2022-07-01 RX ORDER — DIPHENHYDRAMINE HYDROCHLORIDE 50 MG/ML
50 INJECTION INTRAMUSCULAR; INTRAVENOUS
Status: CANCELLED
Start: 2022-09-23

## 2022-07-01 RX ORDER — ALBUTEROL SULFATE 0.83 MG/ML
2.5 SOLUTION RESPIRATORY (INHALATION)
Status: CANCELLED | OUTPATIENT
Start: 2022-10-14

## 2022-07-01 RX ORDER — HEPARIN SODIUM (PORCINE) LOCK FLUSH IV SOLN 100 UNIT/ML 100 UNIT/ML
5 SOLUTION INTRAVENOUS
Status: CANCELLED | OUTPATIENT
Start: 2022-10-14

## 2022-07-01 RX ORDER — ALBUTEROL SULFATE 90 UG/1
1-2 AEROSOL, METERED RESPIRATORY (INHALATION)
Status: CANCELLED
Start: 2022-10-14

## 2022-07-01 RX ORDER — METHYLPREDNISOLONE SODIUM SUCCINATE 125 MG/2ML
125 INJECTION, POWDER, LYOPHILIZED, FOR SOLUTION INTRAMUSCULAR; INTRAVENOUS
Status: CANCELLED
Start: 2022-09-02

## 2022-07-01 RX ORDER — HEPARIN SODIUM (PORCINE) LOCK FLUSH IV SOLN 100 UNIT/ML 100 UNIT/ML
5 SOLUTION INTRAVENOUS
Status: CANCELLED | OUTPATIENT
Start: 2022-09-02

## 2022-07-01 RX ORDER — HEPARIN SODIUM (PORCINE) LOCK FLUSH IV SOLN 100 UNIT/ML 100 UNIT/ML
5 SOLUTION INTRAVENOUS
Status: DISCONTINUED | OUTPATIENT
Start: 2022-07-01 | End: 2022-07-01 | Stop reason: HOSPADM

## 2022-07-01 RX ORDER — HEPARIN SODIUM,PORCINE 10 UNIT/ML
5 VIAL (ML) INTRAVENOUS
Status: CANCELLED | OUTPATIENT
Start: 2022-09-23

## 2022-07-01 RX ORDER — ALBUTEROL SULFATE 90 UG/1
1-2 AEROSOL, METERED RESPIRATORY (INHALATION)
Status: CANCELLED
Start: 2022-09-23

## 2022-07-01 RX ORDER — MEPERIDINE HYDROCHLORIDE 25 MG/ML
25 INJECTION INTRAMUSCULAR; INTRAVENOUS; SUBCUTANEOUS EVERY 30 MIN PRN
Status: CANCELLED | OUTPATIENT
Start: 2022-08-12

## 2022-07-01 RX ORDER — HEPARIN SODIUM (PORCINE) LOCK FLUSH IV SOLN 100 UNIT/ML 100 UNIT/ML
5 SOLUTION INTRAVENOUS
Status: CANCELLED | OUTPATIENT
Start: 2022-09-23

## 2022-07-01 RX ORDER — HEPARIN SODIUM,PORCINE 10 UNIT/ML
5 VIAL (ML) INTRAVENOUS
Status: CANCELLED | OUTPATIENT
Start: 2022-09-02

## 2022-07-01 RX ORDER — DIPHENHYDRAMINE HYDROCHLORIDE 50 MG/ML
50 INJECTION INTRAMUSCULAR; INTRAVENOUS
Status: CANCELLED
Start: 2022-09-02

## 2022-07-01 RX ORDER — NALOXONE HYDROCHLORIDE 0.4 MG/ML
0.2 INJECTION, SOLUTION INTRAMUSCULAR; INTRAVENOUS; SUBCUTANEOUS
Status: CANCELLED | OUTPATIENT
Start: 2022-09-23

## 2022-07-01 RX ORDER — ALBUTEROL SULFATE 90 UG/1
1-2 AEROSOL, METERED RESPIRATORY (INHALATION)
Status: CANCELLED
Start: 2022-08-12

## 2022-07-01 RX ORDER — NALOXONE HYDROCHLORIDE 0.4 MG/ML
0.2 INJECTION, SOLUTION INTRAMUSCULAR; INTRAVENOUS; SUBCUTANEOUS
Status: CANCELLED | OUTPATIENT
Start: 2022-08-12

## 2022-07-01 RX ADMIN — Medication 5 ML: at 12:06

## 2022-07-01 RX ADMIN — SODIUM CHLORIDE 200 MG: 9 INJECTION, SOLUTION INTRAVENOUS at 11:32

## 2022-07-01 NOTE — PROGRESS NOTES
Infusion Nursing Note:  Kristen Chapman presents today for C5 D1 Keytruda.      Patient seen by provider today: Yes:      present during visit today: No, pt reports understanding and answering questions appropriately.    Note: Pt arrives ambulatory to [facility] infusion after visit with Provider.    Intravenous Access:  Implanted Port.    Treatment Conditions:  Lab Results   Component Value Date    HGB 10.2 (L) 07/01/2022    WBC 2.9 (L) 07/01/2022    ANEUTAUTO 1.8 07/01/2022    PLT 53 (L) 07/01/2022      Lab Results   Component Value Date     07/01/2022    POTASSIUM 3.8 07/01/2022    MAG 1.8 07/31/2020    CR 1.02 07/01/2022    GUANAKITO 8.7 07/01/2022    BILITOTAL 0.9 07/01/2022    ALBUMIN 3.2 (L) 07/01/2022    ALT 22 07/01/2022    AST 28 07/01/2022     Results reviewed, labs MET treatment parameters, ok to proceed with treatment.    Post Infusion Assessment:  Patient tolerated infusion without incident.    Blood return noted pre and post infusion.  Site patent and intact, free from redness, edema or discomfort.  No evidence of extravasations.  Access discontinued per protocol.     Discharge Plan:   Patient discharged in stable condition accompanied by: self.  Departure Mode: Ambulatory.      Radha Winston RN

## 2022-07-01 NOTE — PROGRESS NOTES
"Oncology Rooming Note    July 1, 2022 10:02 AM   Kristen Chapman is a 72 year old male who presents for:    Chief Complaint   Patient presents with     Oncology Clinic Visit     3 week return Squamous cell carcinoma of nasopharynx, review labs, CT and infusion     Initial Vitals: /75 (BP Location: Right arm, Patient Position: Sitting, Cuff Size: Adult Regular)   Pulse 61   Temp 98.7  F (37.1  C) (Oral)   Resp 18   Ht 1.6 m (5' 3\")   Wt 64.5 kg (142 lb 4.8 oz)   SpO2 99%   BMI 25.21 kg/m   Estimated body mass index is 25.21 kg/m  as calculated from the following:    Height as of this encounter: 1.6 m (5' 3\").    Weight as of this encounter: 64.5 kg (142 lb 4.8 oz). Body surface area is 1.69 meters squared.  Data Unavailable Comment: Data Unavailable   No LMP for male patient.  Allergies reviewed: Yes  Medications reviewed: Yes    Medications: MEDICATION REFILLS NEEDED TODAY. Provider was notified.  Pharmacy name entered into [employer]: [facility] - [place], [place] - [address] [place] [address]    Clinical concerns: 3 week return Squamous cell carcinoma of nasopharynx, review labs, CT and infusion      Abby Duncan CMA              "

## 2022-07-01 NOTE — LETTER
"    7/1/2022         RE: Kristen Chapman  [address] [place] [address]        Dear Colleague,    Thank you for referring your patient, Kristen Chapman, to the [facility]. Please see a copy of my visit note below.    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    Weight as of this encounter: 64.5 kg (142 lb 4.8 oz). Body surface area is 1.69 meters squared.  Data Unavailable Comment: Data Unavailable   No LMP for male patient.  Allergies reviewed: Yes  Medications reviewed: Yes    Medications: MEDICATION REFILLS NEEDED TODAY. Provider was notified.  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  Nasopharyngeal carcinoma: Continues on pembrolizumab.  He just had some imaging.  I personally reviewed the CT scan of the sinuses as well as his chest.  Tumor in the right ethmoid air cells " look stable.  There does not appear to be any evidence of progression.  He has a small, 1 cm, soft solid nodule in the left upper lobe.  It does not have the typical appearance of metastasis.  Could be mucus or inflammatory from his Keytruda.  We are going to repeat imaging in 3 months.  If it is larger then we will get a PET scan and biopsy if needed.  He will continue on his Keytruda with no dose changes.    2.  Pancytopenia: CBC was reviewed today.  His counts are generally stable with a white count of 2.9, hemoglobin 10.2, and platelets 52,000.  Continue to monitor clinically.  If counts worsen in the future we will probably have to repeat a bone marrow.  He had a bone marrow biopsy in October 2021.  Next generation sequencing showed no abnormalities.  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    3.  Hypothyroidism: TSH today is at 7 while free T4 is 1.28.  For now we will continue with current dose of Synthroid.    4.  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 I have previously explained to him that this is most likely going to be a chronic condition as result of his previous treatment.    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  Diagnosed January 2020. Imaging suggested bilateral abnormal lymphadenopathy in the neck.  Also asymmetric soft tissue thickening in the posterior right nasopharynx.  On imaging, tumor also appeared to extend down to the hypopharynx.    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    Treatment:    Initially treated with concurrent chemotherapy and radiation.  He had weekly cisplatin starting March 3, 2020.  Fifth and final dose given March 31, 2020.  Subsequent chemotherapy was held due to prolonged thrombocytopenia and renal insufficiency.  Radiation  dose was 7000 cGy in 35 fractions given from March 2 through April 17, 2020.     Started cisplatin with infusional 5-FU on June 1, 2020.  Cycle 1 given at a 25% dose reduction.  He still had significant thrombocytopenia and neutropenia on day 28.  Cycle 2 was held.  At the same time his liver function tests elevated secondary to hepatitis B.     Recurrence:  Radiosurgery delivered to the right ethmoid tumor delivered November 8 through November 17, 2021.  Received 3500 cGy in 5 fractions.  Pembrolizumab started 12/23/21    Interim History:    Kristen returns today for a follow-up visit.  He is now on single agent pembrolizumab.  Continues to do well.  He has no complaints today.  Denies headaches.  No vision changes.  No cough or shortness of breath.    Review of Systems:    As above in the history.     Review of Systems otherwise Negative for:  General: chills, fever or night sweats  Psychological: anxiety or depression  Ophthalmic: blurry vision, double vision or loss of vision, vision change  ENT: oral lesions, hearing changes  Hematological and Lymphatic: bruising, jaundice, swollen lymph nodes  Endocrine: hot flashes, unexpected weight changes  Respiratory: cough, hemoptysis, orthopnea  Cardiovascular: chest pain, edema, palpitations or PND  Gastrointestinal: abdominal pain, blood in stools, change in bowel habits, constipation, diarrhea or nausea/vomiting  Genito-Urinary: change in urinary stream, incontinence, frequency/urgency  Musculoskeletal: joint swelling, muscle pain  Neurological: dizziness, headaches, numbness/tingling  Dermatological: lumps and rash    Past History:    Past Medical History:   Diagnosis Date     Anemia      Dysphagia      Hepatitis B chronic      Hypothyroidism      Nasopharyngeal cancer (H)      Severe protein-calorie malnutrition (H)      Thrombocytopenia (H)      Physical Exam:    /75 (BP Location: Right arm, Patient Position: Sitting, Cuff Size: Adult Regular)   Pulse 61    "Temp 98.7  F (37.1  C) (Oral)   Resp 18   Ht 1.6 m (5' 3\")   Wt 64.5 kg (142 lb 4.8 oz)   SpO2 99%   BMI 25.21 kg/m      General: patient appears stated age of 71 year old. Nontoxic and in no distress.   HEENT: Head: atraumatic, normocephalic. Sclerae anicteric.  Chest:  Normal respiratory effort  Cardiac:  No edema.  Abdomen: abdomen is non-distended  Extremities: normal tone and muscle bulk.  Skin: no lesions or rash on visible skin. Warm and dry.   CNS: alert and oriented. Grossly non-focal.   Psychiatric: normal mood and affect.     Lab Results:    Recent Results (from the past 168 hour(s))   Creatinine POCT   Result Value Ref Range    Creatinine POCT 1.0 0.7 - 1.3 mg/dL    GFR, ESTIMATED POCT >60 >60 mL/min/1.73m2   Comprehensive metabolic panel   Result Value Ref Range    Sodium 137 136 - 145 mmol/L    Potassium 3.8 3.5 - 5.0 mmol/L    Chloride 106 98 - 107 mmol/L    Carbon Dioxide (CO2) 28 22 - 31 mmol/L    Anion Gap 3 (L) 5 - 18 mmol/L    Urea Nitrogen 20 8 - 28 mg/dL    Creatinine 1.02 0.70 - 1.30 mg/dL    Calcium 8.7 8.5 - 10.5 mg/dL    Glucose 116 70 - 125 mg/dL    Alkaline Phosphatase 71 45 - 120 U/L    AST 28 0 - 40 U/L    ALT 22 0 - 45 U/L    Protein Total 6.4 6.0 - 8.0 g/dL    Albumin 3.2 (L) 3.5 - 5.0 g/dL    Bilirubin Total 0.9 0.0 - 1.0 mg/dL    GFR Estimate 79 >60 mL/min/1.73m2   TSH with free T4 reflex   Result Value Ref Range    TSH 7.25 (H) 0.30 - 5.00 uIU/mL   CBC with platelets and differential   Result Value Ref Range    WBC Count 2.9 (L) 4.0 - 11.0 10e3/uL    RBC Count 3.59 (L) 4.40 - 5.90 10e6/uL    Hemoglobin 10.2 (L) 13.3 - 17.7 g/dL    Hematocrit 31.4 (L) 40.0 - 53.0 %    MCV 88 78 - 100 fL    MCH 28.4 26.5 - 33.0 pg    MCHC 32.5 31.5 - 36.5 g/dL    RDW 15.9 (H) 10.0 - 15.0 %    Platelet Count 53 (L) 150 - 450 10e3/uL    % Neutrophils 60 %    % Lymphocytes 20 %    % Monocytes 14 %    % Eosinophils 5 %    % Basophils 0 %    % Immature Granulocytes 1 %    NRBCs per 100 WBC 0 <1 /100 "    Absolute Neutrophils 1.8 1.6 - 8.3 10e3/uL    Absolute Lymphocytes 0.6 (L) 0.8 - 5.3 10e3/uL    Absolute Monocytes 0.4 0.0 - 1.3 10e3/uL    Absolute Eosinophils 0.1 0.0 - 0.7 10e3/uL    Absolute Basophils 0.0 0.0 - 0.2 10e3/uL    Absolute Immature Granulocytes 0.0 <=0.4 10e3/uL    Absolute NRBCs 0.0 10e3/uL   Bilirubin direct   Result Value Ref Range    Bilirubin Direct 0.4 <=0.5 mg/dL   T4 free   Result Value Ref Range    Free T4 1.28 0.90 - 1.70 ng/dL      Imaging:    CT Chest w Contrast    Result Date: 6/28/2022  EXAM: CT CHEST W CONTRAST LOCATION: [facility] DATE/TIME: 6/27/2022 8:03 AM INDICATION: Squamous cell carcinoma of nasopharynx (H) COMPARISON: PET/CT dated 03/17/2022 chest CT dated 12/31/2021. TECHNIQUE: CT chest with IV contrast. Multiplanar reformats were obtained. Dose reduction techniques were used. CONTRAST: IsoVue 370 75 mL FINDINGS: LUNGS AND PLEURA: 6 mm mildly spiculated nodule in the right upper lobe on image 68 of series 5 is unchanged. There is a new subsolid nodule in the posterior left upper lobe on image 122 of series 5 measuring 1 cm in diameter. No pneumothorax or pleural effusion. No other nodules or masses. MEDIASTINUM/AXILLAE: No lymphadenopathy by size criteria. Normal-sized right paratracheal node on image 81 of series 5. No pericardial effusion. Stable ascending thoracic aortic aneurysm at 4.6 cm. Infusion port catheter from left internal jugular approach with tip in the superior vena cava at its junction with the right atrium. Tortuous descending thoracic aorta. Small hiatal hernia. No pericardial effusion. CORONARY ARTERY CALCIFICATION: None. UPPER ABDOMEN: Stable rounded hypodensity in the spleen. Trace ascites around the liver. MUSCULOSKELETAL: Mild degenerative change in the spine.     IMPRESSION: 1.  New 1 cm subsolid nodule in the left upper lobe. 2.  No change 6 mm right upper lobe nodule. 3.  Stable ascending aortic aneurysm. 4.  Stable  hypodense lesion in the spleen. 5.  No lymphadenopathy by size criteria.    CT Soft Tissue Neck w Contrast    Result Date: 6/28/2022  EXAM: CT SOFT TISSUE NECK W CONTRAST LOCATION: [facility] DATE/TIME: 6/27/2022 8:03 AM INDICATION: Nasopharyngeal carcinoma. COMPARISON: Head MRI 3/31/2022, PET/CT 3/17/2022 and soft tissue neck CT 12/13/2021. CONTRAST: Isovue 370 75mL. TECHNIQUE: Routine CT Soft Tissue Neck with IV contrast. Multiplanar reformats. Dose reduction techniques were used. FINDINGS: MUCOSAL SPACES/SOFT TISSUES: Stable mild asymmetric prominence of the right nasopharyngeal soft tissues consistent with treated tumor. Stable superimposed posttreatment changes in the nasopharynx, oropharynx and hypopharynx. Normal vocal cords and infraglottic trachea. Normal parapharyngeal space and subcutaneous soft tissues. Normal oral cavity,  spaces, and floor of mouth structures. LYMPH NODES: No pathologic lymph nodes by size or morphology criteria. SALIVARY GLANDS: Post treatment change in the submandibular glands. They are mildly atrophic. Normal parotid glands. THYROID: Normal. VESSELS: Vascular structures of the neck are patent. VISUALIZED INTRACRANIAL/ORBITS/SINUSES: No abnormality of the visualized intracranial compartment or orbits. Erosion of the right lamina papyracea. Associated 19.0 x 11.8 mm hypodense tissue partially opacifying the right ethmoid air cells. This is more pronounced than on 3/31/2022 and 12/31/2021. Given the lack of enhancement, post treatment change is favored. Continued close follow-up is indicated. OTHER: Increased opacification of the mastoid air cells. No definite bony lesion. The included lung apices are clear.     IMPRESSION: 1.  Progressed hypodense tissue in the right ethmoid air cells favored to represent posttreatment change. Continued close follow-up is indicated. 2.  Increased inflammation in the left mastoids. No definite bony lesion to correlate  with the increased activity noted on the recent PET scan. 3.  Unchanged mild asymmetric prominence of the right nasopharynx consistent with treated tumor. 4.  Stable posttreatment changes in the nasopharynx, oropharynx and hypopharynx.             Signed by: Alan Frias MD        Again, thank you for allowing me to participate in the care of your patient.        Sincerely,        Alan Frias MD

## 2022-07-02 NOTE — PROGRESS NOTES
[facility] Hematology and Oncology Progress Note    Patient: Kristen Chapman  MRN: [id number]  Date of Service: Jul 1, 2022        Assessment and Plan:    Cancer Staging  Nasopharyngeal carcinoma (H)  Staging form: Pharynx - Nasopharynx, AJCC 8th Edition  - Clinical stage from 2/18/2020: Stage SAMANTHA (cT4, cN2, cM0) - Signed by Alan Frias MD on 2/18/2020     1.  Nasopharyngeal carcinoma: Continues on pembrolizumab.  He just had some imaging.  I personally reviewed the CT scan of the sinuses as well as his chest.  Tumor in the right ethmoid air cells look stable.  There does not appear to be any evidence of progression.  He has a small, 1 cm, soft solid nodule in the left upper lobe.  It does not have the typical appearance of metastasis.  Could be mucus or inflammatory from his Keytruda.  We are going to repeat imaging in 3 months.  If it is larger then we will get a PET scan and biopsy if needed.  He will continue on his Keytruda with no dose changes.    2.  Pancytopenia: CBC was reviewed today.  His counts are generally stable with a white count of 2.9, hemoglobin 10.2, and platelets 52,000.  Continue to monitor clinically.  If counts worsen in the future we will probably have to repeat a bone marrow.  He had a bone marrow biopsy in October 2021.  Next generation sequencing showed no abnormalities.  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    3.  Hypothyroidism: TSH today is at 7 while free T4 is 1.28.  For now we will continue with current dose of Synthroid.    4.  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 I have previously explained to him that this is most likely going to be a chronic condition as result of his previous treatment.    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  Diagnosed January 2020.  Imaging suggested bilateral abnormal lymphadenopathy in the neck.  Also asymmetric soft tissue thickening in the posterior right nasopharynx.  On imaging, tumor also appeared to extend down to the hypopharynx.    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    Treatment:    Initially treated with concurrent chemotherapy and radiation.  He had weekly cisplatin starting March 3, 2020.  Fifth and final dose given March 31, 2020.  Subsequent chemotherapy was held due to prolonged thrombocytopenia and renal insufficiency.  Radiation dose was 7000 cGy in 35 fractions given from March 2 through April 17, 2020.     Started cisplatin with infusional 5-FU on June 1, 2020.  Cycle 1 given at a 25% dose reduction.  He still had significant thrombocytopenia and neutropenia on day 28.  Cycle 2 was held.  At the same time his liver function tests elevated secondary to hepatitis B.     Recurrence:  Radiosurgery delivered to the right ethmoid tumor delivered November 8 through November 17, 2021.  Received 3500 cGy in 5 fractions.  Pembrolizumab started 12/23/21    Interim History:    Kristen returns today for a follow-up visit.  He is now on single agent pembrolizumab.  Continues to do well.  He has no complaints today.  Denies headaches.  No vision changes.  No cough or shortness of breath.    Review of Systems:    As above in the history.     Review of Systems otherwise Negative for:  General: chills, fever or night sweats  Psychological: anxiety or depression  Ophthalmic: blurry vision, double vision or loss of vision, vision change  ENT: oral lesions, hearing changes  Hematological and Lymphatic: bruising, jaundice, swollen lymph nodes  Endocrine: hot flashes, unexpected weight changes  Respiratory: cough, hemoptysis, orthopnea  Cardiovascular: chest pain, edema, palpitations or PND  Gastrointestinal: abdominal pain, blood in stools, change in bowel habits, constipation, diarrhea or  "nausea/vomiting  Genito-Urinary: change in urinary stream, incontinence, frequency/urgency  Musculoskeletal: joint swelling, muscle pain  Neurological: dizziness, headaches, numbness/tingling  Dermatological: lumps and rash    Past History:    Past Medical History:   Diagnosis Date     Anemia      Dysphagia      Hepatitis B chronic      Hypothyroidism      Nasopharyngeal cancer (H)      Severe protein-calorie malnutrition (H)      Thrombocytopenia (H)      Physical Exam:    /75 (BP Location: Right arm, Patient Position: Sitting, Cuff Size: Adult Regular)   Pulse 61   Temp 98.7  F (37.1  C) (Oral)   Resp 18   Ht 1.6 m (5' 3\")   Wt 64.5 kg (142 lb 4.8 oz)   SpO2 99%   BMI 25.21 kg/m      General: patient appears stated age of 71 year old. Nontoxic and in no distress.   HEENT: Head: atraumatic, normocephalic. Sclerae anicteric.  Chest:  Normal respiratory effort  Cardiac:  No edema.  Abdomen: abdomen is non-distended  Extremities: normal tone and muscle bulk.  Skin: no lesions or rash on visible skin. Warm and dry.   CNS: alert and oriented. Grossly non-focal.   Psychiatric: normal mood and affect.     Lab Results:    Recent Results (from the past 168 hour(s))   Creatinine POCT   Result Value Ref Range    Creatinine POCT 1.0 0.7 - 1.3 mg/dL    GFR, ESTIMATED POCT >60 >60 mL/min/1.73m2   Comprehensive metabolic panel   Result Value Ref Range    Sodium 137 136 - 145 mmol/L    Potassium 3.8 3.5 - 5.0 mmol/L    Chloride 106 98 - 107 mmol/L    Carbon Dioxide (CO2) 28 22 - 31 mmol/L    Anion Gap 3 (L) 5 - 18 mmol/L    Urea Nitrogen 20 8 - 28 mg/dL    Creatinine 1.02 0.70 - 1.30 mg/dL    Calcium 8.7 8.5 - 10.5 mg/dL    Glucose 116 70 - 125 mg/dL    Alkaline Phosphatase 71 45 - 120 U/L    AST 28 0 - 40 U/L    ALT 22 0 - 45 U/L    Protein Total 6.4 6.0 - 8.0 g/dL    Albumin 3.2 (L) 3.5 - 5.0 g/dL    Bilirubin Total 0.9 0.0 - 1.0 mg/dL    GFR Estimate 79 >60 mL/min/1.73m2   TSH with free T4 reflex   Result Value Ref " Range    TSH 7.25 (H) 0.30 - 5.00 uIU/mL   CBC with platelets and differential   Result Value Ref Range    WBC Count 2.9 (L) 4.0 - 11.0 10e3/uL    RBC Count 3.59 (L) 4.40 - 5.90 10e6/uL    Hemoglobin 10.2 (L) 13.3 - 17.7 g/dL    Hematocrit 31.4 (L) 40.0 - 53.0 %    MCV 88 78 - 100 fL    MCH 28.4 26.5 - 33.0 pg    MCHC 32.5 31.5 - 36.5 g/dL    RDW 15.9 (H) 10.0 - 15.0 %    Platelet Count 53 (L) 150 - 450 10e3/uL    % Neutrophils 60 %    % Lymphocytes 20 %    % Monocytes 14 %    % Eosinophils 5 %    % Basophils 0 %    % Immature Granulocytes 1 %    NRBCs per 100 WBC 0 <1 /100    Absolute Neutrophils 1.8 1.6 - 8.3 10e3/uL    Absolute Lymphocytes 0.6 (L) 0.8 - 5.3 10e3/uL    Absolute Monocytes 0.4 0.0 - 1.3 10e3/uL    Absolute Eosinophils 0.1 0.0 - 0.7 10e3/uL    Absolute Basophils 0.0 0.0 - 0.2 10e3/uL    Absolute Immature Granulocytes 0.0 <=0.4 10e3/uL    Absolute NRBCs 0.0 10e3/uL   Bilirubin direct   Result Value Ref Range    Bilirubin Direct 0.4 <=0.5 mg/dL   T4 free   Result Value Ref Range    Free T4 1.28 0.90 - 1.70 ng/dL      Imaging:    CT Chest w Contrast    Result Date: 6/28/2022  EXAM: CT CHEST W CONTRAST LOCATION: [facility] DATE/TIME: 6/27/2022 8:03 AM INDICATION: Squamous cell carcinoma of nasopharynx (H) COMPARISON: PET/CT dated 03/17/2022 chest CT dated 12/31/2021. TECHNIQUE: CT chest with IV contrast. Multiplanar reformats were obtained. Dose reduction techniques were used. CONTRAST: IsoVue 370 75 mL FINDINGS: LUNGS AND PLEURA: 6 mm mildly spiculated nodule in the right upper lobe on image 68 of series 5 is unchanged. There is a new subsolid nodule in the posterior left upper lobe on image 122 of series 5 measuring 1 cm in diameter. No pneumothorax or pleural effusion. No other nodules or masses. MEDIASTINUM/AXILLAE: No lymphadenopathy by size criteria. Normal-sized right paratracheal node on image 81 of series 5. No pericardial effusion. Stable ascending thoracic aortic  aneurysm at 4.6 cm. Infusion port catheter from left internal jugular approach with tip in the superior vena cava at its junction with the right atrium. Tortuous descending thoracic aorta. Small hiatal hernia. No pericardial effusion. CORONARY ARTERY CALCIFICATION: None. UPPER ABDOMEN: Stable rounded hypodensity in the spleen. Trace ascites around the liver. MUSCULOSKELETAL: Mild degenerative change in the spine.     IMPRESSION: 1.  New 1 cm subsolid nodule in the left upper lobe. 2.  No change 6 mm right upper lobe nodule. 3.  Stable ascending aortic aneurysm. 4.  Stable hypodense lesion in the spleen. 5.  No lymphadenopathy by size criteria.    CT Soft Tissue Neck w Contrast    Result Date: 6/28/2022  EXAM: CT SOFT TISSUE NECK W CONTRAST LOCATION: [facility] DATE/TIME: 6/27/2022 8:03 AM INDICATION: Nasopharyngeal carcinoma. COMPARISON: Head MRI 3/31/2022, PET/CT 3/17/2022 and soft tissue neck CT 12/13/2021. CONTRAST: Isovue 370 75mL. TECHNIQUE: Routine CT Soft Tissue Neck with IV contrast. Multiplanar reformats. Dose reduction techniques were used. FINDINGS: MUCOSAL SPACES/SOFT TISSUES: Stable mild asymmetric prominence of the right nasopharyngeal soft tissues consistent with treated tumor. Stable superimposed posttreatment changes in the nasopharynx, oropharynx and hypopharynx. Normal vocal cords and infraglottic trachea. Normal parapharyngeal space and subcutaneous soft tissues. Normal oral cavity,  spaces, and floor of mouth structures. LYMPH NODES: No pathologic lymph nodes by size or morphology criteria. SALIVARY GLANDS: Post treatment change in the submandibular glands. They are mildly atrophic. Normal parotid glands. THYROID: Normal. VESSELS: Vascular structures of the neck are patent. VISUALIZED INTRACRANIAL/ORBITS/SINUSES: No abnormality of the visualized intracranial compartment or orbits. Erosion of the right lamina papyracea. Associated 19.0 x 11.8 mm hypodense tissue  partially opacifying the right ethmoid air cells. This is more pronounced than on 3/31/2022 and 12/31/2021. Given the lack of enhancement, post treatment change is favored. Continued close follow-up is indicated. OTHER: Increased opacification of the mastoid air cells. No definite bony lesion. The included lung apices are clear.     IMPRESSION: 1.  Progressed hypodense tissue in the right ethmoid air cells favored to represent posttreatment change. Continued close follow-up is indicated. 2.  Increased inflammation in the left mastoids. No definite bony lesion to correlate with the increased activity noted on the recent PET scan. 3.  Unchanged mild asymmetric prominence of the right nasopharynx consistent with treated tumor. 4.  Stable posttreatment changes in the nasopharynx, oropharynx and hypopharynx.             Signed by: Alan Frias MD

## 2022-07-05 ENCOUNTER — TELEPHONE (OUTPATIENT)
Dept: ONCOLOGY | Facility: CLINIC | Age: 72
End: 2022-07-05

## 2022-07-05 LAB
HBV DNA SERPL NAA+PROBE-ACNC: <20 IU/ML
HBV DNA SERPL NAA+PROBE-LOG IU: <1.3 {LOG_IU}/ML

## 2022-07-06 NOTE — TELEPHONE ENCOUNTER
Telephone Encounter by Alisha Tierney RN at 5/8/2020  2:17 PM     Author: Alisha Tierney RN Service: -- Author Type: Registered Nurse    Filed: 5/8/2020  2:17 PM Encounter Date: 5/8/2020 Status: Signed    : Alisha Tierney RN (Registered Nurse)       Per sonNayla, I have sent the following e-mail:    Hello [place]!    I am enclosing Dads schedule for the next 2 weeks as I am off next weel--furloughed.  Also, here is a list of who to call for what.  Not sure if I have shared this with you.      Week of 5-:    Tuesday, May 12th, 2020 arrival of 9 am  9 am:  check in on 1st floor!    IV fluids      Week of 5-:    Monday, May 18th, 2020 arrival of 3:45 pm  3:45 pm:  check in Radiology department, ground level of [facility]    4 pm:  CT chest    4:20 pm:  CT soft tissue neck    Preps for both:       o Nothing to eat or drink TWO hours prior to exam. (No tube feeding after 2 pm, until he gets home)  o However, please drink plenty of clear fluids the night before and prior to your exam.  o Medications may be taken with a small amount of water.      Tuesday, May 19th, 2020 arrival of 2 pm  2 pm check in on 2nd floor Cancer Care  o Labs followed by visit with Dr. Frias, medical oncologist    Wednesday, May 20th, 2020 arrival of 10:15 am  10:15 an check in for ENT visit.       Directions for [facility] ENT:  We are located in the [facility] and Specialty [place] at [address], [address], [place], [place].  Our building is located across the street from [facility] and offers free parking in our on-site lot.  Please take the stairs or elevator to the second floor of the [facility] and [facility] and check in at the  located in the Specialty Clinic, [address].      Thursday, May 21st, 2020 arrival of 8 am  8 am check in on 1st floor of Cancer Care  o Meet with nurse and Dr. Mendoza, Radiation Oncologist          Let me know if you have any  questions!  Have a nice weekend!    Alisha        verbal/jerking

## 2022-07-30 ENCOUNTER — HEALTH MAINTENANCE LETTER (OUTPATIENT)
Age: 72
End: 2022-07-30

## 2022-08-04 ENCOUNTER — PATIENT OUTREACH (OUTPATIENT)
Dept: CARE COORDINATION | Facility: CLINIC | Age: 72
End: 2022-08-04

## 2022-08-04 NOTE — PROGRESS NOTES
Social Work Note: Telephone Call  Oncology Clinic    Data/Intervention:22  Patient Name:  Kristen Chapman  /Age:  1950 (72 year old)    Reason for Call:  SW attempted to connect with Kristen Chapman today via phone (ong  present) to check in and inform him that he had been award $700 in [place] that will be expiring son. SW received no answer and a message was left encouraging him to reach out if interested in obtaining those funds.     Plan:  SW awaits a call back. SW will remain available for ongoing resource/support needs.     NOHELIA Montenegro, [facility]  Adult Oncology Clinic  Phone: [phone]

## 2022-10-03 ENCOUNTER — LAB (OUTPATIENT)
Dept: INFUSION THERAPY | Facility: HOSPITAL | Age: 72
End: 2022-10-03
Attending: INTERNAL MEDICINE
Payer: COMMERCIAL

## 2022-10-03 ENCOUNTER — HOSPITAL ENCOUNTER (OUTPATIENT)
Dept: CT IMAGING | Facility: HOSPITAL | Age: 72
Discharge: HOME | End: 2022-10-03
Attending: INTERNAL MEDICINE
Payer: COMMERCIAL

## 2022-10-03 ENCOUNTER — HOSPITAL ENCOUNTER (OUTPATIENT)
Dept: MRI IMAGING | Facility: HOSPITAL | Age: 72
Discharge: HOME | End: 2022-10-03
Attending: INTERNAL MEDICINE
Payer: COMMERCIAL

## 2022-10-03 DIAGNOSIS — C11.9 NASOPHARYNGEAL CARCINOMA (H): ICD-10-CM

## 2022-10-03 DIAGNOSIS — C11.9 SQUAMOUS CELL CARCINOMA OF NASOPHARYNX (H): Primary | ICD-10-CM

## 2022-10-03 LAB
BASOPHILS # BLD AUTO: 0 10E3/UL (ref 0–0.2)
BASOPHILS NFR BLD AUTO: 1 %
CREAT BLD-MCNC: 1.1 MG/DL (ref 0.7–1.3)
EOSINOPHIL # BLD AUTO: 0.1 10E3/UL (ref 0–0.7)
EOSINOPHIL NFR BLD AUTO: 2 %
ERYTHROCYTE [DISTWIDTH] IN BLOOD BY AUTOMATED COUNT: 17 % (ref 10–15)
GFR SERPL CREATININE-BSD FRML MDRD: >60 ML/MIN/1.73M2
HCT VFR BLD AUTO: 32.6 % (ref 40–53)
HGB BLD-MCNC: 10.2 G/DL (ref 13.3–17.7)
IMM GRANULOCYTES # BLD: 0 10E3/UL
IMM GRANULOCYTES NFR BLD: 0 %
LYMPHOCYTES # BLD AUTO: 0.6 10E3/UL (ref 0.8–5.3)
LYMPHOCYTES NFR BLD AUTO: 11 %
MCH RBC QN AUTO: 28.5 PG (ref 26.5–33)
MCHC RBC AUTO-ENTMCNC: 31.3 G/DL (ref 31.5–36.5)
MCV RBC AUTO: 91 FL (ref 78–100)
MONOCYTES # BLD AUTO: 0.5 10E3/UL (ref 0–1.3)
MONOCYTES NFR BLD AUTO: 9 %
NEUTROPHILS # BLD AUTO: 3.9 10E3/UL (ref 1.6–8.3)
NEUTROPHILS NFR BLD AUTO: 77 %
NRBC # BLD AUTO: 0 10E3/UL
NRBC BLD AUTO-RTO: 0 /100
PLATELET # BLD AUTO: 53 10E3/UL (ref 150–450)
RBC # BLD AUTO: 3.58 10E6/UL (ref 4.4–5.9)
WBC # BLD AUTO: 5.1 10E3/UL (ref 4–11)

## 2022-10-03 PROCEDURE — 36591 DRAW BLOOD OFF VENOUS DEVICE: CPT

## 2022-10-03 PROCEDURE — 255N000002 HC RX 255 OP 636: Performed by: INTERNAL MEDICINE

## 2022-10-03 PROCEDURE — 71260 CT THORAX DX C+: CPT

## 2022-10-03 PROCEDURE — 250N000011 HC RX IP 250 OP 636: Performed by: INTERNAL MEDICINE

## 2022-10-03 PROCEDURE — A9585 GADOBUTROL INJECTION: HCPCS | Performed by: INTERNAL MEDICINE

## 2022-10-03 PROCEDURE — 85025 COMPLETE CBC W/AUTO DIFF WBC: CPT

## 2022-10-03 PROCEDURE — 82565 ASSAY OF CREATININE: CPT

## 2022-10-03 PROCEDURE — 70553 MRI BRAIN STEM W/O & W/DYE: CPT

## 2022-10-03 RX ORDER — HEPARIN SODIUM (PORCINE) LOCK FLUSH IV SOLN 100 UNIT/ML 100 UNIT/ML
5 SOLUTION INTRAVENOUS
Status: DISCONTINUED | OUTPATIENT
Start: 2022-10-03 | End: 2022-10-03 | Stop reason: HOSPADM

## 2022-10-03 RX ORDER — HEPARIN SODIUM (PORCINE) LOCK FLUSH IV SOLN 100 UNIT/ML 100 UNIT/ML
5 SOLUTION INTRAVENOUS
Status: CANCELLED | OUTPATIENT
Start: 2022-10-03

## 2022-10-03 RX ORDER — GADOBUTROL 604.72 MG/ML
6 INJECTION INTRAVENOUS ONCE
Status: COMPLETED | OUTPATIENT
Start: 2022-10-03 | End: 2022-10-03

## 2022-10-03 RX ORDER — IOPAMIDOL 755 MG/ML
100 INJECTION, SOLUTION INTRAVASCULAR ONCE
Status: COMPLETED | OUTPATIENT
Start: 2022-10-03 | End: 2022-10-03

## 2022-10-03 RX ADMIN — IOPAMIDOL 100 ML: 755 INJECTION, SOLUTION INTRAVENOUS at 16:10

## 2022-10-03 RX ADMIN — GADOBUTROL 6 ML: 604.72 INJECTION INTRAVENOUS at 16:59

## 2022-10-03 NOTE — PROGRESS NOTES
Pt arrived for  cbcp draw. Pt also has a chest CT with contrast and MRI with and without contrast.  Left power port access in after talking with CT so they can use it for the contrast. They will flush and remove it after they are done.

## 2022-10-07 ENCOUNTER — LAB (OUTPATIENT)
Dept: INFUSION THERAPY | Facility: HOSPITAL | Age: 72
End: 2022-10-07
Attending: INTERNAL MEDICINE
Payer: COMMERCIAL

## 2022-10-07 ENCOUNTER — ONCOLOGY VISIT (OUTPATIENT)
Dept: ONCOLOGY | Facility: HOSPITAL | Age: 72
End: 2022-10-07
Attending: INTERNAL MEDICINE
Payer: COMMERCIAL

## 2022-10-07 VITALS
OXYGEN SATURATION: 98 % | RESPIRATION RATE: 20 BRPM | DIASTOLIC BLOOD PRESSURE: 75 MMHG | BODY MASS INDEX: 23.77 KG/M2 | HEART RATE: 63 BPM | TEMPERATURE: 97.5 F | WEIGHT: 134.2 LBS | SYSTOLIC BLOOD PRESSURE: 157 MMHG

## 2022-10-07 DIAGNOSIS — C11.9 NASOPHARYNGEAL CARCINOMA (H): Primary | ICD-10-CM

## 2022-10-07 DIAGNOSIS — C11.9 SQUAMOUS CELL CARCINOMA OF NASOPHARYNX (H): Primary | ICD-10-CM

## 2022-10-07 DIAGNOSIS — D61.818 ACQUIRED PANCYTOPENIA (H): ICD-10-CM

## 2022-10-07 LAB
BASOPHILS # BLD AUTO: 0 10E3/UL (ref 0–0.2)
BASOPHILS NFR BLD AUTO: 0 %
EOSINOPHIL # BLD AUTO: 0 10E3/UL (ref 0–0.7)
EOSINOPHIL NFR BLD AUTO: 0 %
ERYTHROCYTE [DISTWIDTH] IN BLOOD BY AUTOMATED COUNT: 16.7 % (ref 10–15)
HCT VFR BLD AUTO: 32.5 % (ref 40–53)
HGB BLD-MCNC: 10.3 G/DL (ref 13.3–17.7)
IMM GRANULOCYTES # BLD: 0 10E3/UL
IMM GRANULOCYTES NFR BLD: 0 %
LYMPHOCYTES # BLD AUTO: 0.4 10E3/UL (ref 0.8–5.3)
LYMPHOCYTES NFR BLD AUTO: 7 %
MCH RBC QN AUTO: 28.9 PG (ref 26.5–33)
MCHC RBC AUTO-ENTMCNC: 31.7 G/DL (ref 31.5–36.5)
MCV RBC AUTO: 91 FL (ref 78–100)
MONOCYTES # BLD AUTO: 0.2 10E3/UL (ref 0–1.3)
MONOCYTES NFR BLD AUTO: 4 %
NEUTROPHILS # BLD AUTO: 5 10E3/UL (ref 1.6–8.3)
NEUTROPHILS NFR BLD AUTO: 89 %
NRBC # BLD AUTO: 0 10E3/UL
NRBC BLD AUTO-RTO: 0 /100
PLATELET # BLD AUTO: 48 10E3/UL (ref 150–450)
RBC # BLD AUTO: 3.56 10E6/UL (ref 4.4–5.9)
WBC # BLD AUTO: 5.6 10E3/UL (ref 4–11)

## 2022-10-07 PROCEDURE — 99214 OFFICE O/P EST MOD 30 MIN: CPT | Performed by: INTERNAL MEDICINE

## 2022-10-07 PROCEDURE — 36591 DRAW BLOOD OFF VENOUS DEVICE: CPT

## 2022-10-07 PROCEDURE — 85025 COMPLETE CBC W/AUTO DIFF WBC: CPT

## 2022-10-07 PROCEDURE — G0463 HOSPITAL OUTPT CLINIC VISIT: HCPCS | Performed by: INTERNAL MEDICINE

## 2022-10-07 PROCEDURE — 250N000011 HC RX IP 250 OP 636: Performed by: INTERNAL MEDICINE

## 2022-10-07 RX ORDER — HEPARIN SODIUM (PORCINE) LOCK FLUSH IV SOLN 100 UNIT/ML 100 UNIT/ML
5 SOLUTION INTRAVENOUS
Status: CANCELLED | OUTPATIENT
Start: 2022-10-07

## 2022-10-07 RX ORDER — HEPARIN SODIUM (PORCINE) LOCK FLUSH IV SOLN 100 UNIT/ML 100 UNIT/ML
5 SOLUTION INTRAVENOUS
Status: DISCONTINUED | OUTPATIENT
Start: 2022-10-07 | End: 2022-10-07 | Stop reason: HOSPADM

## 2022-10-07 RX ADMIN — HEPARIN 5 ML: 100 SYRINGE at 11:01

## 2022-10-07 ASSESSMENT — PAIN SCALES - GENERAL: PAINLEVEL: NO PAIN (0)

## 2022-10-07 NOTE — Clinical Note
10/7/2022         RE: Kristen Chapman  [address] [place] [address]        Dear Colleague,    Thank you for referring your patient, Kristen Chapman, to the [facility] CANCER [facility]. Please see a copy of my visit note below.    [facility] Hematology and Oncology Progress Note    Patient: Kristen Chapman  MRN: [id number]  Date of Service: Oct 7, 2022        Assessment and Plan:    Cancer Staging  Nasopharyngeal carcinoma (H)  Staging form: Pharynx - Nasopharynx, AJCC 8th Edition  - Clinical stage from 2/18/2020: Stage SAMANTHA (cT4, cN2, cM0) - Signed by Alan Frias MD on 2/18/2020     1.  Nasopharyngeal carcinoma: He had an MRI of the brain performed on October 3. This shows no definite evidence of recurrent residual tumor.  Clinically he is doing well.  We will see him back in 4 months with repeat imaging.  He will let us know in the interim if he develops any new symptoms.    2.  Pancytopenia: CBC from today was reviewed and shows a platelet count of 48,000, hemoglobin 10.3 and white count 5.6.  We will recheck his counts in 2 months.  Neutrophil count today is normal at 5000.  He had a bone marrow biopsy in October 2021.  Next generation sequencing showed no abnormalities.  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    3.  Hypothyroidism: Continues on Synthroid.    4.  Hepatitis B infection: He is following in the outpatient infectious disease clinic.  DNA levels have been undetectable in the serum.    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  Diagnosed January 2020. Imaging suggested bilateral abnormal lymphadenopathy in the neck.  Also asymmetric soft tissue thickening in the posterior right nasopharynx.  On imaging, tumor also appeared to extend down to the hypopharynx.    Recurrent disease involving the ethmoid sinus diagnosed September  2021.    Treatment:    Initially treated with concurrent chemotherapy and radiation.  He had weekly cisplatin starting March 3, 2020.  Fifth and final dose given March 31, 2020.  Subsequent chemotherapy was held due to prolonged thrombocytopenia and renal insufficiency.  Radiation dose was 7000 cGy in 35 fractions given from March 2 through April 17, 2020.     Started cisplatin with infusional 5-FU on June 1, 2020.  Cycle 1 given at a 25% dose reduction.  He still had significant thrombocytopenia and neutropenia on day 28.  Cycle 2 was held.  At the same time his liver function tests elevated secondary to hepatitis B.     Recurrence:  Radiosurgery delivered to the right ethmoid tumor delivered November 8 through November 17, 2021.  Received 3500 cGy in 5 fractions.  Pembrolizumab started 12/23/21.  Held after his July 1 dose.    Interim History:    Kristen Chapman returns today for follow-up visit.  He was last seen in clinic about 3 months ago.  In the interim he has been doing okay.  No headaches.  Still has poor taste and smell.    Review of Systems:    As above in the history.     Review of Systems otherwise Negative for:  General: chills, fever or night sweats  Psychological: anxiety or depression  Ophthalmic: blurry vision, double vision or loss of vision, vision change  ENT: oral lesions, hearing changes  Hematological and Lymphatic: bruising, jaundice, swollen lymph nodes  Endocrine: hot flashes, unexpected weight changes  Respiratory: cough, hemoptysis, orthopnea  Cardiovascular: chest pain, edema, palpitations or PND  Gastrointestinal: abdominal pain, blood in stools, change in bowel habits, constipation, diarrhea or nausea/vomiting  Genito-Urinary: change in urinary stream, incontinence, frequency/urgency  Musculoskeletal: joint swelling, muscle pain  Neurological: dizziness, headaches, numbness/tingling  Dermatological: lumps and rash    Past History:    Past Medical History:   Diagnosis Date     Anemia       Dysphagia      Hepatitis B chronic      Hypothyroidism      Nasopharyngeal cancer (H)      Severe protein-calorie malnutrition (H)      Thrombocytopenia (H)      Physical Exam:    BP (!) 157/75   Pulse 63   Temp 97.5  F (36.4  C) (Oral)   Resp 20   Wt 60.9 kg (134 lb 3.2 oz)   SpO2 98%   BMI 23.77 kg/m      General: patient appears stated age of 71 year old. Nontoxic and in no distress.   HEENT: Head: atraumatic, normocephalic. Sclerae anicteric.  Chest:  Normal respiratory effort  Cardiac:  No edema.  Abdomen: abdomen is non-distended  Extremities: normal tone and muscle bulk.  Skin: no lesions or rash on visible skin. Warm and dry.   CNS: alert and oriented. Grossly non-focal.   Psychiatric: normal mood and affect.     Lab Results:    Recent Results (from the past 168 hour(s))   CBC with platelets and differential   Result Value Ref Range    WBC Count 5.1 4.0 - 11.0 10e3/uL    RBC Count 3.58 (L) 4.40 - 5.90 10e6/uL    Hemoglobin 10.2 (L) 13.3 - 17.7 g/dL    Hematocrit 32.6 (L) 40.0 - 53.0 %    MCV 91 78 - 100 fL    MCH 28.5 26.5 - 33.0 pg    MCHC 31.3 (L) 31.5 - 36.5 g/dL    RDW 17.0 (H) 10.0 - 15.0 %    Platelet Count 53 (L) 150 - 450 10e3/uL    % Neutrophils 77 %    % Lymphocytes 11 %    % Monocytes 9 %    % Eosinophils 2 %    % Basophils 1 %    % Immature Granulocytes 0 %    NRBCs per 100 WBC 0 <1 /100    Absolute Neutrophils 3.9 1.6 - 8.3 10e3/uL    Absolute Lymphocytes 0.6 (L) 0.8 - 5.3 10e3/uL    Absolute Monocytes 0.5 0.0 - 1.3 10e3/uL    Absolute Eosinophils 0.1 0.0 - 0.7 10e3/uL    Absolute Basophils 0.0 0.0 - 0.2 10e3/uL    Absolute Immature Granulocytes 0.0 <=0.4 10e3/uL    Absolute NRBCs 0.0 10e3/uL   Creatinine POCT   Result Value Ref Range    Creatinine POCT 1.1 0.7 - 1.3 mg/dL    GFR, ESTIMATED POCT >60 >60 mL/min/1.73m2   CBC with platelets and differential   Result Value Ref Range    WBC Count 5.6 4.0 - 11.0 10e3/uL    RBC Count 3.56 (L) 4.40 - 5.90 10e6/uL    Hemoglobin 10.3 (L) 13.3 - 17.7  g/dL    Hematocrit 32.5 (L) 40.0 - 53.0 %    MCV 91 78 - 100 fL    MCH 28.9 26.5 - 33.0 pg    MCHC 31.7 31.5 - 36.5 g/dL    RDW 16.7 (H) 10.0 - 15.0 %    Platelet Count 48 (LL) 150 - 450 10e3/uL    % Neutrophils 89 %    % Lymphocytes 7 %    % Monocytes 4 %    % Eosinophils 0 %    % Basophils 0 %    % Immature Granulocytes 0 %    NRBCs per 100 WBC 0 <1 /100    Absolute Neutrophils 5.0 1.6 - 8.3 10e3/uL    Absolute Lymphocytes 0.4 (L) 0.8 - 5.3 10e3/uL    Absolute Monocytes 0.2 0.0 - 1.3 10e3/uL    Absolute Eosinophils 0.0 0.0 - 0.7 10e3/uL    Absolute Basophils 0.0 0.0 - 0.2 10e3/uL    Absolute Immature Granulocytes 0.0 <=0.4 10e3/uL    Absolute NRBCs 0.0 10e3/uL      Imaging:    MR Brain w/o & w Contrast    Result Date: 10/4/2022  EXAM: MR BRAIN W/O and W CONTRAST LOCATION: [facility] DATE/TIME: 10/3/2022 5:22 PM INDICATION: Follow-up nasopharyngeal tumor with extension into the ethmoid sinus tumor COMPARISON: 06/27/2022. 03/31/2022. 07/30/2021.. CONTRAST: 6ML GADAVIST TECHNIQUE: Multiplanar multisequence face MRI performed without and with IV contrast. FINDINGS: VISUALIZED INTRACRANIAL/SKULL BASE: No abnormality of the visualized intracranial compartment. No finding to suggest perineural spread left. ORBITS: Normal. NASAL CAVITY AND SINUSES: Mild to moderate mucosal thickening throughout the maxillary sinuses. Complete opacified right frontal sinus. No restricted diffusion. No definite mass lesion. SUPRAHYOID NECK: No mass or restricted effusion in the nasopharynx. Included oropharynx is normal. Parapharyngeal and  spaces are normal. Oral cavity and floor of mouth so normal. SALIVARY GLANDS: Normal parotid and submandibular glands. LYMPH NODES: No pathologic lymph nodes by size or morphology criteria. BONES: Normal marrow signal.     IMPRESSION: 1.  No definite finding to suggest residual recurrent tumor. 2.  No lymphadenopathy within the field-of-view. 3.  Mild to moderate  "mucosal thickening of the paranasal sinuses.     CT Chest w Contrast    Result Date: 10/4/2022  EXAM: CT CHEST W CONTRAST LOCATION: [facility] DATE/TIME: 10/3/2022 4:09 PM INDICATION: f u left lung nodule COMPARISON: None. TECHNIQUE: CT chest with IV contrast. Multiplanar reformats were obtained. Dose reduction techniques were used. CONTRAST: 100ml isovue 370 FINDINGS: LUNGS AND PLEURA: Stable right upper lobe pulmonary nodule on axial 45. Previously seen density in the superior segment of the left lower lobe has resolved. No pulmonary mass, consolidation, or enlarging pulmonary nodule. No pleural effusion or pneumothorax. MEDIASTINUM/AXILLAE: Mildly dilated ascending aorta measuring 4.3 cm, stable. Pulmonary arteries normal in caliber. Left IJ Port-A-Cath terminates in the SVC at the cavoatrial junction, stable. No pericardial effusion. Cardiac chambers unremarkable. Small hiatal hernia with small esophageal varices, better seen on prior exam. CORONARY ARTERY CALCIFICATION: None. UPPER ABDOMEN: Trace ascites. Stable lesion in the spleen likely a hemangioma. MUSCULOSKELETAL: No suspicious bone lesion.     IMPRESSION: 1.  Resolved left lung density.        Signed by: Aaln Frias MD      Oncology Rooming Note    October 7, 2022 10:27 AM   Kristen Chapman is a 72 year old male who presents for:    Chief Complaint   Patient presents with     Oncology Clinic Visit     Initial Vitals: BP (!) 157/75   Pulse 63   Temp 97.5  F (36.4  C) (Oral)   Resp 20   Wt 60.9 kg (134 lb 3.2 oz)   SpO2 98%   BMI 23.77 kg/m   Estimated body mass index is 23.77 kg/m  as calculated from the following:    Height as of 7/1/22: 1.6 m (5' 3\").    Weight as of this encounter: 60.9 kg (134 lb 3.2 oz). Body surface area is 1.65 meters squared.  No Pain (0) Comment: Data Unavailable   No LMP for male patient.  Allergies reviewed: Yes  Medications reviewed: Yes    Medications: MEDICATION REFILLS NEEDED TODAY. {PROVIDER " NOTIFIED?:908387}  Pharmacy name entered into EPIC: [facility] - [place], [place] - [address]    Clinical concerns: Kristen reports feeling well. Only issue is a lack of taste. He is here with his son.   The assessment was performed using a [facility] [place]or [employer], #[id number]    Jodie Rosario RN                  Again, thank you for allowing me to participate in the care of your patient.        Sincerely,        Alan Frias MD

## 2022-10-07 NOTE — PROGRESS NOTES
"Oncology Rooming Note    October 7, 2022 10:27 AM   Kristen Chapman is a 72 year old male who presents for:    Chief Complaint   Patient presents with     Oncology Clinic Visit     Initial Vitals: BP (!) 157/75   Pulse 63   Temp 97.5  F (36.4  C) (Oral)   Resp 20   Wt 60.9 kg (134 lb 3.2 oz)   SpO2 98%   BMI 23.77 kg/m   Estimated body mass index is 23.77 kg/m  as calculated from the following:    Height as of 7/1/22: 1.6 m (5' 3\").    Weight as of this encounter: 60.9 kg (134 lb 3.2 oz). Body surface area is 1.65 meters squared.  No Pain (0) Comment: Data Unavailable   No LMP for male patient.  Allergies reviewed: Yes  Medications reviewed: Yes    Medications: MEDICATION REFILLS NEEDED TODAY. Provider was notified.  Pharmacy name entered into [employer]: [facility] - [place], [place] - [address] [place] [address]    Clinical concerns: Kristen reports feeling well. Only issue is a lack of taste. He is here with his son.   The assessment was performed using a [facility] interpretor [employer], #[id number]    Jodie Rosario RN              "

## 2022-10-07 NOTE — PROGRESS NOTES
[facility] Hematology and Oncology Progress Note    Patient: Kristen Chapman  MRN: [id number]  Date of Service: Oct 7, 2022        Assessment and Plan:    Cancer Staging  Nasopharyngeal carcinoma (H)  Staging form: Pharynx - Nasopharynx, AJCC 8th Edition  - Clinical stage from 2/18/2020: Stage SAMANTHA (cT4, cN2, cM0) - Signed by Alan Frias MD on 2/18/2020     1.  Nasopharyngeal carcinoma: He had an MRI of the brain performed on October 3. This shows no definite evidence of recurrent residual tumor.  Clinically he is doing well.  We will see him back in 4 months with repeat imaging.  He will let us know in the interim if he develops any new symptoms.    2.  Pancytopenia: CBC from today was reviewed and shows a platelet count of 48,000, hemoglobin 10.3 and white count 5.6.  We will recheck his counts in 2 months.  Neutrophil count today is normal at 5000.  He had a bone marrow biopsy in October 2021.  Next generation sequencing showed no abnormalities.  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    3.  Hypothyroidism: Continues on Synthroid.    4.  Hepatitis B infection: He is following in the outpatient infectious disease clinic.  DNA levels have been undetectable in the serum.    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  Diagnosed January 2020. Imaging suggested bilateral abnormal lymphadenopathy in the neck.  Also asymmetric soft tissue thickening in the posterior right nasopharynx.  On imaging, tumor also appeared to extend down to the hypopharynx.    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    Treatment:    Initially treated with concurrent chemotherapy and radiation.  He had weekly cisplatin starting March 3, 2020.  Fifth and final dose given March 31, 2020.  Subsequent chemotherapy was held due to prolonged thrombocytopenia and renal  insufficiency.  Radiation dose was 7000 cGy in 35 fractions given from March 2 through April 17, 2020.     Started cisplatin with infusional 5-FU on June 1, 2020.  Cycle 1 given at a 25% dose reduction.  He still had significant thrombocytopenia and neutropenia on day 28.  Cycle 2 was held.  At the same time his liver function tests elevated secondary to hepatitis B.     Recurrence:  Radiosurgery delivered to the right ethmoid tumor delivered November 8 through November 17, 2021.  Received 3500 cGy in 5 fractions.  Pembrolizumab started 12/23/21.  Held after his July 1 dose.    Interim History:    Kristen Chapman returns today for follow-up visit.  He was last seen in clinic about 3 months ago.  In the interim he has been doing okay.  No headaches.  Still has poor taste and smell.    Review of Systems:    As above in the history.     Review of Systems otherwise Negative for:  General: chills, fever or night sweats  Psychological: anxiety or depression  Ophthalmic: blurry vision, double vision or loss of vision, vision change  ENT: oral lesions, hearing changes  Hematological and Lymphatic: bruising, jaundice, swollen lymph nodes  Endocrine: hot flashes, unexpected weight changes  Respiratory: cough, hemoptysis, orthopnea  Cardiovascular: chest pain, edema, palpitations or PND  Gastrointestinal: abdominal pain, blood in stools, change in bowel habits, constipation, diarrhea or nausea/vomiting  Genito-Urinary: change in urinary stream, incontinence, frequency/urgency  Musculoskeletal: joint swelling, muscle pain  Neurological: dizziness, headaches, numbness/tingling  Dermatological: lumps and rash    Past History:    Past Medical History:   Diagnosis Date     Anemia      Dysphagia      Hepatitis B chronic      Hypothyroidism      Nasopharyngeal cancer (H)      Severe protein-calorie malnutrition (H)      Thrombocytopenia (H)      Physical Exam:    BP (!) 157/75   Pulse 63   Temp 97.5  F (36.4  C) (Oral)   Resp 20   Wt  60.9 kg (134 lb 3.2 oz)   SpO2 98%   BMI 23.77 kg/m      General: patient appears stated age of 71 year old. Nontoxic and in no distress.   HEENT: Head: atraumatic, normocephalic. Sclerae anicteric.  Chest:  Normal respiratory effort  Cardiac:  No edema.  Abdomen: abdomen is non-distended  Extremities: normal tone and muscle bulk.  Skin: no lesions or rash on visible skin. Warm and dry.   CNS: alert and oriented. Grossly non-focal.   Psychiatric: normal mood and affect.     Lab Results:    Recent Results (from the past 168 hour(s))   CBC with platelets and differential   Result Value Ref Range    WBC Count 5.1 4.0 - 11.0 10e3/uL    RBC Count 3.58 (L) 4.40 - 5.90 10e6/uL    Hemoglobin 10.2 (L) 13.3 - 17.7 g/dL    Hematocrit 32.6 (L) 40.0 - 53.0 %    MCV 91 78 - 100 fL    MCH 28.5 26.5 - 33.0 pg    MCHC 31.3 (L) 31.5 - 36.5 g/dL    RDW 17.0 (H) 10.0 - 15.0 %    Platelet Count 53 (L) 150 - 450 10e3/uL    % Neutrophils 77 %    % Lymphocytes 11 %    % Monocytes 9 %    % Eosinophils 2 %    % Basophils 1 %    % Immature Granulocytes 0 %    NRBCs per 100 WBC 0 <1 /100    Absolute Neutrophils 3.9 1.6 - 8.3 10e3/uL    Absolute Lymphocytes 0.6 (L) 0.8 - 5.3 10e3/uL    Absolute Monocytes 0.5 0.0 - 1.3 10e3/uL    Absolute Eosinophils 0.1 0.0 - 0.7 10e3/uL    Absolute Basophils 0.0 0.0 - 0.2 10e3/uL    Absolute Immature Granulocytes 0.0 <=0.4 10e3/uL    Absolute NRBCs 0.0 10e3/uL   Creatinine POCT   Result Value Ref Range    Creatinine POCT 1.1 0.7 - 1.3 mg/dL    GFR, ESTIMATED POCT >60 >60 mL/min/1.73m2   CBC with platelets and differential   Result Value Ref Range    WBC Count 5.6 4.0 - 11.0 10e3/uL    RBC Count 3.56 (L) 4.40 - 5.90 10e6/uL    Hemoglobin 10.3 (L) 13.3 - 17.7 g/dL    Hematocrit 32.5 (L) 40.0 - 53.0 %    MCV 91 78 - 100 fL    MCH 28.9 26.5 - 33.0 pg    MCHC 31.7 31.5 - 36.5 g/dL    RDW 16.7 (H) 10.0 - 15.0 %    Platelet Count 48 (LL) 150 - 450 10e3/uL    % Neutrophils 89 %    % Lymphocytes 7 %    % Monocytes 4  %    % Eosinophils 0 %    % Basophils 0 %    % Immature Granulocytes 0 %    NRBCs per 100 WBC 0 <1 /100    Absolute Neutrophils 5.0 1.6 - 8.3 10e3/uL    Absolute Lymphocytes 0.4 (L) 0.8 - 5.3 10e3/uL    Absolute Monocytes 0.2 0.0 - 1.3 10e3/uL    Absolute Eosinophils 0.0 0.0 - 0.7 10e3/uL    Absolute Basophils 0.0 0.0 - 0.2 10e3/uL    Absolute Immature Granulocytes 0.0 <=0.4 10e3/uL    Absolute NRBCs 0.0 10e3/uL      Imaging:    MR Brain w/o & w Contrast    Result Date: 10/4/2022  EXAM: MR BRAIN W/O and W CONTRAST LOCATION: [facility] DATE/TIME: 10/3/2022 5:22 PM INDICATION: Follow-up nasopharyngeal tumor with extension into the ethmoid sinus tumor COMPARISON: 06/27/2022. 03/31/2022. 07/30/2021.. CONTRAST: 6ML GADAVIST TECHNIQUE: Multiplanar multisequence face MRI performed without and with IV contrast. FINDINGS: VISUALIZED INTRACRANIAL/SKULL BASE: No abnormality of the visualized intracranial compartment. No finding to suggest perineural spread left. ORBITS: Normal. NASAL CAVITY AND SINUSES: Mild to moderate mucosal thickening throughout the maxillary sinuses. Complete opacified right frontal sinus. No restricted diffusion. No definite mass lesion. SUPRAHYOID NECK: No mass or restricted effusion in the nasopharynx. Included oropharynx is normal. Parapharyngeal and  spaces are normal. Oral cavity and floor of mouth so normal. SALIVARY GLANDS: Normal parotid and submandibular glands. LYMPH NODES: No pathologic lymph nodes by size or morphology criteria. BONES: Normal marrow signal.     IMPRESSION: 1.  No definite finding to suggest residual recurrent tumor. 2.  No lymphadenopathy within the field-of-view. 3.  Mild to moderate mucosal thickening of the paranasal sinuses.     CT Chest w Contrast    Result Date: 10/4/2022  EXAM: CT CHEST W CONTRAST LOCATION: [facility] DATE/TIME: 10/3/2022 4:09 PM INDICATION: f u left lung nodule COMPARISON: None. TECHNIQUE: CT  chest with IV contrast. Multiplanar reformats were obtained. Dose reduction techniques were used. CONTRAST: 100ml isovue 370 FINDINGS: LUNGS AND PLEURA: Stable right upper lobe pulmonary nodule on axial 45. Previously seen density in the superior segment of the left lower lobe has resolved. No pulmonary mass, consolidation, or enlarging pulmonary nodule. No pleural effusion or pneumothorax. MEDIASTINUM/AXILLAE: Mildly dilated ascending aorta measuring 4.3 cm, stable. Pulmonary arteries normal in caliber. Left IJ Port-A-Cath terminates in the SVC at the cavoatrial junction, stable. No pericardial effusion. Cardiac chambers unremarkable. Small hiatal hernia with small esophageal varices, better seen on prior exam. CORONARY ARTERY CALCIFICATION: None. UPPER ABDOMEN: Trace ascites. Stable lesion in the spleen likely a hemangioma. MUSCULOSKELETAL: No suspicious bone lesion.     IMPRESSION: 1.  Resolved left lung density.        Signed by: Alan Frias MD

## 2022-10-10 ENCOUNTER — LAB (OUTPATIENT)
Dept: LAB | Facility: CLINIC | Age: 72
End: 2022-10-10
Payer: COMMERCIAL

## 2022-10-10 ENCOUNTER — HEALTH MAINTENANCE LETTER (OUTPATIENT)
Age: 72
End: 2022-10-10

## 2022-10-10 ENCOUNTER — OFFICE VISIT (OUTPATIENT)
Dept: INFECTIOUS DISEASES | Facility: CLINIC | Age: 72
End: 2022-10-10
Payer: COMMERCIAL

## 2022-10-10 VITALS
TEMPERATURE: 97.9 F | SYSTOLIC BLOOD PRESSURE: 122 MMHG | HEART RATE: 64 BPM | DIASTOLIC BLOOD PRESSURE: 80 MMHG | WEIGHT: 133.6 LBS | OXYGEN SATURATION: 97 % | BODY MASS INDEX: 23.67 KG/M2

## 2022-10-10 DIAGNOSIS — C11.9 SQUAMOUS CELL CARCINOMA OF NASOPHARYNX (H): ICD-10-CM

## 2022-10-10 DIAGNOSIS — B18.1 HEPATITIS B, CHRONIC (H): Primary | ICD-10-CM

## 2022-10-10 DIAGNOSIS — B18.1 HEPATITIS B, CHRONIC (H): ICD-10-CM

## 2022-10-10 DIAGNOSIS — C11.9 NASOPHARYNGEAL CARCINOMA (H): ICD-10-CM

## 2022-10-10 LAB
ALBUMIN SERPL BCG-MCNC: 3.7 G/DL (ref 3.5–5.2)
ALP SERPL-CCNC: 82 U/L (ref 40–129)
ALT SERPL W P-5'-P-CCNC: 24 U/L (ref 10–50)
ANION GAP SERPL CALCULATED.3IONS-SCNC: 10 MMOL/L (ref 7–15)
AST SERPL W P-5'-P-CCNC: 28 U/L (ref 10–50)
BASOPHILS # BLD AUTO: 0 10E3/UL (ref 0–0.2)
BASOPHILS NFR BLD AUTO: 0 %
BILIRUB DIRECT SERPL-MCNC: <0.2 MG/DL (ref 0–0.3)
BILIRUB SERPL-MCNC: 0.6 MG/DL
BUN SERPL-MCNC: 22.5 MG/DL (ref 8–23)
CALCIUM SERPL-MCNC: 9.1 MG/DL (ref 8.8–10.2)
CHLORIDE SERPL-SCNC: 104 MMOL/L (ref 98–107)
CREAT SERPL-MCNC: 1.17 MG/DL (ref 0.67–1.17)
DEPRECATED HCO3 PLAS-SCNC: 26 MMOL/L (ref 22–29)
EOSINOPHIL # BLD AUTO: 0 10E3/UL (ref 0–0.7)
EOSINOPHIL NFR BLD AUTO: 1 %
ERYTHROCYTE [DISTWIDTH] IN BLOOD BY AUTOMATED COUNT: 16.4 % (ref 10–15)
GFR SERPL CREATININE-BSD FRML MDRD: 67 ML/MIN/1.73M2
GLUCOSE SERPL-MCNC: 125 MG/DL (ref 70–99)
HCT VFR BLD AUTO: 33.1 % (ref 40–53)
HGB BLD-MCNC: 10.6 G/DL (ref 13.3–17.7)
IMM GRANULOCYTES # BLD: 0 10E3/UL
IMM GRANULOCYTES NFR BLD: 0 %
LYMPHOCYTES # BLD AUTO: 0.5 10E3/UL (ref 0.8–5.3)
LYMPHOCYTES NFR BLD AUTO: 12 %
MCH RBC QN AUTO: 28.8 PG (ref 26.5–33)
MCHC RBC AUTO-ENTMCNC: 32 G/DL (ref 31.5–36.5)
MCV RBC AUTO: 90 FL (ref 78–100)
MONOCYTES # BLD AUTO: 0.3 10E3/UL (ref 0–1.3)
MONOCYTES NFR BLD AUTO: 7 %
NEUTROPHILS # BLD AUTO: 3 10E3/UL (ref 1.6–8.3)
NEUTROPHILS NFR BLD AUTO: 79 %
PLATELET # BLD AUTO: 58 10E3/UL (ref 150–450)
POTASSIUM SERPL-SCNC: 4.4 MMOL/L (ref 3.4–5.3)
PROT SERPL-MCNC: 6.7 G/DL (ref 6.4–8.3)
RBC # BLD AUTO: 3.68 10E6/UL (ref 4.4–5.9)
SODIUM SERPL-SCNC: 140 MMOL/L (ref 136–145)
WBC # BLD AUTO: 3.8 10E3/UL (ref 4–11)

## 2022-10-10 PROCEDURE — 80053 COMPREHEN METABOLIC PANEL: CPT

## 2022-10-10 PROCEDURE — 85025 COMPLETE CBC W/AUTO DIFF WBC: CPT

## 2022-10-10 PROCEDURE — 82248 BILIRUBIN DIRECT: CPT

## 2022-10-10 PROCEDURE — 36415 COLL VENOUS BLD VENIPUNCTURE: CPT

## 2022-10-10 PROCEDURE — 99213 OFFICE O/P EST LOW 20 MIN: CPT

## 2022-10-10 PROCEDURE — 87517 HEPATITIS B DNA QUANT: CPT

## 2022-10-10 NOTE — PROGRESS NOTES
Assessment:      Impression: Chronic hepatitis B infection, with compensated cirrhosis responding to lamivudine.  However, for unclear reasons, this had been discontinued in 2021 and then patient had rebound of hepatitis B viral load and LFTs.    LFTs are now back to normal and hepatitis B viral load is back to where it was prior to discontinuation of lamivudine    Recurrence of nasopharyngeal carcinoma.  Declined recommended surgery, now taking pembrolizumab.  This may exacerbate his hepatitis.  So far appears to be stable         Plan:     Continue lamivudine daily indefinitely    Orders Placed This Encounter   Procedures     Hep B Virus DNA Quant Real Time PCR     Standing Status:   Future     Standing Expiration Date:   10/10/2023     Hepatic panel (Albumin, ALT, AST, Bili, Alk Phos, TP)     Standing Status:   Future     Standing Expiration Date:   10/10/2023         Follow-up in 6 months    Subjective:      This is an follow-up infectious Disease visit for Kristen Chapman, who is a 70 y.o.  referred for evaluation of hepatitis B.     Is a 70-year-old gentleman of seen only in video visits over the last year for chronic hepatitis B.  He had been on treatment for nasopharyngeal carcinoma when he was noted to have elevated LFTs and a very high hepatitis B viral load.    Patient was started on lamivudine 100 mg a day and he is doing quite well.  He denies any complaints other than some bleeding in his nose and upon further questioning some pain and dryness in his mouth and sticking when he swallows.    He has been taking Magic mouthwash which does not really seem to help his symptoms significantly.    He denies abdominal pain.      The following portions of the patient's history were reviewed and updated as appropriate: allergies, current medications, past family history, past medical history, past social history, past surgical history and problem list.      Follow-up visit on August 23, 2021:    For reasons that are  unclear, the lamivudine was discontinued several months ago.  He is generally feeling well though complains of some nasopharyngeal bleeding.    His thrush is resolved.    Follow-up visit on October 4, 2021:    Patient is back on the meeting.  He denies any specific complaints.  Unfortunately, since her last visit, he did receive a diagnosis that his nasopharyngeal carcinoma has returned.  Chemotherapy and radiation therapy is in the works.    He denies any abdominal pain.    Follow-up visit on January 10, 2022:    Briefly, patient is doing well as far as symptoms go.  He did have recurrence of his nasopharyngeal cancer.  Surgery was recommended and declined.    Patient has been started on pembrolizumab by Dr. Frias.  This may exacerbate his hepatitis.    Patient did have his quarterly hepatitis B virus levels checked last week and they are a little bit lower.  His LFTs did take up, but suspect this may be related to the pembrolizumab.    He denies abdominal pain or discomfort.  He says he is eating well.  Denies vomiting or diarrhea.    Follow-up visit on April 4, 2022:    Patient states he feels about the same.  He is waiting for results of a follow-up MRI regarding his nasopharyngeal cancer.    He did have blood test done last week which showed his LFTs are back to normal and his viral load is less than 20, which is where it was before is limited and had been discontinued.    Follow-up visit on October 10, 2022:    Patient is generally doing well.  He saw Dr. Frias last week and his tumor appears to be stable on the pembrolizumab.  We will be getting another MRI scan in a few months.    He did have his LFTs and viral load checked in July and they were good.  We will check them again today.    Review of Systems  Performed and all negative except as mentioned above.      Objective:     /80   Pulse 64   Temp 97.9  F (36.6  C) (Oral)   Wt 60.6 kg (133 lb 9.6 oz)   SpO2 97%   BMI 23.67 kg/m       General:    alert, appears stated age and cooperative   Oropharynx:  Wearing a mask    Eyes:   Extraocular muscles intact, no icterus.    Ears:   Deferred   Neck:  no adenopathy and supple, symmetrical, trachea midline   Thyroid:   Deferred   Lung:  clear to auscultation bilaterally   Heart:   regular rate and rhythm, S1, S2 normal, no murmur, click, rub or gallop   Abdomen:  Nondistended   Extremities:  extremities normal, atraumatic, no cyanosis or edema   Skin:  warm and dry, no hyperpigmentation, vitiligo, or suspicious lesions   CVA:   absent   Genitourinary:  defer exam   Neurological:   Grossly normal   Psychiatric:   normal mood, behavior, speech, dress, and thought processes     Liver Function Studies - Recent Labs   Lab Test 07/01/22  0907   PROTTOTAL 6.4   ALBUMIN 3.2*   BILITOTAL 0.9   ALKPHOS 71   AST 28   ALT 22      Latest Reference Range & Units 01/03/22 15:18 03/30/22 14:57 07/01/22 09:07   HEP B Virus DNA Quant Not Detected IU/mL  <20 ! <20 !   HEP B Virus DNA Quant Log  4.2 <1.3 <1.3   HEP B VIRUS DNA QUANT REAL TIME PCR  Rpt ! Rpt ! Rpt !   !: Data is abnormal  Rpt: View report in Results Review for more information    Eduardo Grier MD

## 2022-10-11 LAB
HBV DNA SERPL NAA+PROBE-ACNC: <20 IU/ML
HBV DNA SERPL NAA+PROBE-LOG IU: <1.3 {LOG_IU}/ML

## 2022-10-13 ENCOUNTER — TELEPHONE (OUTPATIENT)
Dept: ONCOLOGY | Facility: HOSPITAL | Age: 72
End: 2022-10-13

## 2022-10-13 RX ORDER — ONDANSETRON 4 MG/1
4-8 TABLET, FILM COATED ORAL EVERY 6 HOURS PRN
Qty: 30 TABLET | Refills: 3 | Status: SHIPPED | OUTPATIENT
Start: 2022-10-13 | End: 2023-12-07

## 2022-10-13 NOTE — TELEPHONE ENCOUNTER
Oncology Distress Screen    Called Kristen in regards to his positive oncology nutrition distress screen:    9. If you want to be contacted by one of our professionals, I can send a message to them right now. : ONCOLOGY DIETITIAN    Kristen reports he does not have any concerns today and does not feel he needs to talk to the dietitian. He reports that he knows what he needs to do.      Milka Sullivan, MS, RD, LD

## 2022-12-08 DIAGNOSIS — C11.9 SQUAMOUS CELL CARCINOMA OF NASOPHARYNX (H): Primary | ICD-10-CM

## 2022-12-09 ENCOUNTER — LAB (OUTPATIENT)
Dept: INFUSION THERAPY | Facility: HOSPITAL | Age: 72
End: 2022-12-09
Attending: INTERNAL MEDICINE
Payer: COMMERCIAL

## 2022-12-09 DIAGNOSIS — E03.9 ACQUIRED HYPOTHYROIDISM: ICD-10-CM

## 2022-12-09 DIAGNOSIS — C11.9 SQUAMOUS CELL CARCINOMA OF NASOPHARYNX (H): Primary | ICD-10-CM

## 2022-12-09 LAB
ALBUMIN SERPL BCG-MCNC: 3.6 G/DL (ref 3.5–5.2)
ALP SERPL-CCNC: 90 U/L (ref 40–129)
ALT SERPL W P-5'-P-CCNC: 35 U/L (ref 10–50)
ANION GAP SERPL CALCULATED.3IONS-SCNC: 8 MMOL/L (ref 7–15)
AST SERPL W P-5'-P-CCNC: 43 U/L (ref 10–50)
BASOPHILS # BLD AUTO: 0 10E3/UL (ref 0–0.2)
BASOPHILS NFR BLD AUTO: 1 %
BILIRUB SERPL-MCNC: 0.6 MG/DL
BUN SERPL-MCNC: 15 MG/DL (ref 8–23)
CALCIUM SERPL-MCNC: 8.4 MG/DL (ref 8.8–10.2)
CHLORIDE SERPL-SCNC: 104 MMOL/L (ref 98–107)
CREAT SERPL-MCNC: 1.19 MG/DL (ref 0.67–1.17)
DEPRECATED HCO3 PLAS-SCNC: 26 MMOL/L (ref 22–29)
EOSINOPHIL # BLD AUTO: 0.1 10E3/UL (ref 0–0.7)
EOSINOPHIL NFR BLD AUTO: 4 %
ERYTHROCYTE [DISTWIDTH] IN BLOOD BY AUTOMATED COUNT: 13.7 % (ref 10–15)
GFR SERPL CREATININE-BSD FRML MDRD: 66 ML/MIN/1.73M2
GLUCOSE SERPL-MCNC: 116 MG/DL (ref 70–99)
HCT VFR BLD AUTO: 33 % (ref 40–53)
HGB BLD-MCNC: 10.6 G/DL (ref 13.3–17.7)
IMM GRANULOCYTES # BLD: 0 10E3/UL
IMM GRANULOCYTES NFR BLD: 0 %
LYMPHOCYTES # BLD AUTO: 0.6 10E3/UL (ref 0.8–5.3)
LYMPHOCYTES NFR BLD AUTO: 18 %
MCH RBC QN AUTO: 27.5 PG (ref 26.5–33)
MCHC RBC AUTO-ENTMCNC: 32.1 G/DL (ref 31.5–36.5)
MCV RBC AUTO: 86 FL (ref 78–100)
MONOCYTES # BLD AUTO: 0.3 10E3/UL (ref 0–1.3)
MONOCYTES NFR BLD AUTO: 9 %
NEUTROPHILS # BLD AUTO: 2.1 10E3/UL (ref 1.6–8.3)
NEUTROPHILS NFR BLD AUTO: 68 %
NRBC # BLD AUTO: 0 10E3/UL
NRBC BLD AUTO-RTO: 0 /100
PLATELET # BLD AUTO: 63 10E3/UL (ref 150–450)
POTASSIUM SERPL-SCNC: 3.8 MMOL/L (ref 3.4–5.3)
PROT SERPL-MCNC: 6.5 G/DL (ref 6.4–8.3)
RBC # BLD AUTO: 3.86 10E6/UL (ref 4.4–5.9)
SODIUM SERPL-SCNC: 138 MMOL/L (ref 136–145)
WBC # BLD AUTO: 3.1 10E3/UL (ref 4–11)

## 2022-12-09 PROCEDURE — 80053 COMPREHEN METABOLIC PANEL: CPT

## 2022-12-09 PROCEDURE — 250N000011 HC RX IP 250 OP 636: Performed by: INTERNAL MEDICINE

## 2022-12-09 PROCEDURE — 85004 AUTOMATED DIFF WBC COUNT: CPT

## 2022-12-09 PROCEDURE — 36591 DRAW BLOOD OFF VENOUS DEVICE: CPT

## 2022-12-09 RX ORDER — HEPARIN SODIUM (PORCINE) LOCK FLUSH IV SOLN 100 UNIT/ML 100 UNIT/ML
5 SOLUTION INTRAVENOUS
Status: DISCONTINUED | OUTPATIENT
Start: 2022-12-09 | End: 2022-12-09 | Stop reason: HOSPADM

## 2022-12-09 RX ORDER — LEVOTHYROXINE SODIUM 137 UG/1
137 TABLET ORAL DAILY
Qty: 90 TABLET | Refills: 1 | Status: SHIPPED | OUTPATIENT
Start: 2022-12-09 | End: 2023-08-23 | Stop reason: DRUGHIGH

## 2022-12-09 RX ADMIN — Medication 5 ML: at 08:48

## 2022-12-09 NOTE — PROGRESS NOTES
Patient arrives today via ambulatory for his lab draw.  Port accessed without difficulty.  Blood return noted, labs drawn, port flushed with saline and heparin.  Patient states he needs a refill of his levothyroxine, call placed to Dr Frias for authorization and script sent through to [facility] pharmacy.  Pt leaves in stable condition.

## 2022-12-20 ENCOUNTER — PATIENT OUTREACH (OUTPATIENT)
Dept: ONCOLOGY | Facility: HOSPITAL | Age: 72
End: 2022-12-20

## 2022-12-20 NOTE — PROGRESS NOTES
[facility]/Voicemail    Clinical Data: Care Coordinator Outreach    Outreach attempted x 1.  Left message on patient's voicemail with call back information and requested return call.    Plan: Care Coordinator will try to reach patient again in 3-5 business days.    Called patient to go over lab results.  CBC results showed improving platelet count.  It is now at 68,000.  It was 48,00 two months ago.   Hemoglobin is stable.   He has follow-up scheduled for February.    Also, Dr. Frias held his treatment in July but can't remember why.  I was going to ask Kristen if he remembers the reason for holding treatment.    Donn Akers, RNCC

## 2023-02-08 ENCOUNTER — HOSPITAL ENCOUNTER (OUTPATIENT)
Dept: MRI IMAGING | Facility: HOSPITAL | Age: 73
Discharge: HOME | End: 2023-02-08
Attending: INTERNAL MEDICINE | Admitting: INTERNAL MEDICINE
Payer: COMMERCIAL

## 2023-02-08 DIAGNOSIS — C11.9 NASOPHARYNGEAL CARCINOMA (H): ICD-10-CM

## 2023-02-08 PROCEDURE — 250N000011 HC RX IP 250 OP 636: Performed by: INTERNAL MEDICINE

## 2023-02-08 PROCEDURE — A9585 GADOBUTROL INJECTION: HCPCS | Performed by: INTERNAL MEDICINE

## 2023-02-08 PROCEDURE — 70553 MRI BRAIN STEM W/O & W/DYE: CPT

## 2023-02-08 PROCEDURE — 255N000002 HC RX 255 OP 636: Performed by: INTERNAL MEDICINE

## 2023-02-08 RX ORDER — HEPARIN SODIUM (PORCINE) LOCK FLUSH IV SOLN 100 UNIT/ML 100 UNIT/ML
500 SOLUTION INTRAVENOUS ONCE
Status: COMPLETED | OUTPATIENT
Start: 2023-02-08 | End: 2023-02-08

## 2023-02-08 RX ORDER — GADOBUTROL 604.72 MG/ML
6 INJECTION INTRAVENOUS ONCE
Status: COMPLETED | OUTPATIENT
Start: 2023-02-08 | End: 2023-02-08

## 2023-02-08 RX ADMIN — GADOBUTROL 6 ML: 604.72 INJECTION INTRAVENOUS at 10:34

## 2023-02-08 RX ADMIN — HEPARIN 500 UNITS: 100 SYRINGE at 11:42

## 2023-02-10 ENCOUNTER — ONCOLOGY VISIT (OUTPATIENT)
Dept: ONCOLOGY | Facility: HOSPITAL | Age: 73
End: 2023-02-10
Attending: INTERNAL MEDICINE
Payer: COMMERCIAL

## 2023-02-10 ENCOUNTER — LAB (OUTPATIENT)
Dept: INFUSION THERAPY | Facility: HOSPITAL | Age: 73
End: 2023-02-10
Attending: INTERNAL MEDICINE
Payer: COMMERCIAL

## 2023-02-10 VITALS
RESPIRATION RATE: 14 BRPM | BODY MASS INDEX: 24.33 KG/M2 | SYSTOLIC BLOOD PRESSURE: 140 MMHG | WEIGHT: 137.3 LBS | OXYGEN SATURATION: 98 % | HEART RATE: 56 BPM | HEIGHT: 63 IN | TEMPERATURE: 97.4 F | DIASTOLIC BLOOD PRESSURE: 71 MMHG

## 2023-02-10 DIAGNOSIS — C11.9 SQUAMOUS CELL CARCINOMA OF NASOPHARYNX (H): Primary | ICD-10-CM

## 2023-02-10 DIAGNOSIS — D61.9 ANEMIA DUE TO BONE MARROW FAILURE, UNSPECIFIED BONE MARROW FAILURE TYPE (H): ICD-10-CM

## 2023-02-10 DIAGNOSIS — D61.810 ANTINEOPLASTIC CHEMOTHERAPY INDUCED PANCYTOPENIA (H): ICD-10-CM

## 2023-02-10 DIAGNOSIS — T45.1X5A ANTINEOPLASTIC CHEMOTHERAPY INDUCED PANCYTOPENIA (H): ICD-10-CM

## 2023-02-10 DIAGNOSIS — R74.8 ABNORMAL LEVELS OF OTHER SERUM ENZYMES: ICD-10-CM

## 2023-02-10 LAB
ALBUMIN SERPL BCG-MCNC: 3.4 G/DL (ref 3.5–5.2)
ALP SERPL-CCNC: 103 U/L (ref 40–129)
ALT SERPL W P-5'-P-CCNC: 95 U/L (ref 10–50)
ANION GAP SERPL CALCULATED.3IONS-SCNC: 10 MMOL/L (ref 7–15)
AST SERPL W P-5'-P-CCNC: 85 U/L (ref 10–50)
BASOPHILS # BLD AUTO: 0 10E3/UL (ref 0–0.2)
BASOPHILS NFR BLD AUTO: 0 %
BILIRUB SERPL-MCNC: 0.9 MG/DL
BUN SERPL-MCNC: 18.6 MG/DL (ref 8–23)
CALCIUM SERPL-MCNC: 8.6 MG/DL (ref 8.8–10.2)
CHLORIDE SERPL-SCNC: 105 MMOL/L (ref 98–107)
CREAT SERPL-MCNC: 1.26 MG/DL (ref 0.67–1.17)
DEPRECATED HCO3 PLAS-SCNC: 27 MMOL/L (ref 22–29)
EOSINOPHIL # BLD AUTO: 0.1 10E3/UL (ref 0–0.7)
EOSINOPHIL NFR BLD AUTO: 1 %
ERYTHROCYTE [DISTWIDTH] IN BLOOD BY AUTOMATED COUNT: 15.9 % (ref 10–15)
GFR SERPL CREATININE-BSD FRML MDRD: 61 ML/MIN/1.73M2
GLUCOSE SERPL-MCNC: 138 MG/DL (ref 70–99)
HCT VFR BLD AUTO: 29.8 % (ref 40–53)
HGB BLD-MCNC: 9.4 G/DL (ref 13.3–17.7)
IMM GRANULOCYTES # BLD: 0 10E3/UL
IMM GRANULOCYTES NFR BLD: 0 %
LYMPHOCYTES # BLD AUTO: 0.5 10E3/UL (ref 0.8–5.3)
LYMPHOCYTES NFR BLD AUTO: 11 %
MCH RBC QN AUTO: 26.9 PG (ref 26.5–33)
MCHC RBC AUTO-ENTMCNC: 31.5 G/DL (ref 31.5–36.5)
MCV RBC AUTO: 85 FL (ref 78–100)
MONOCYTES # BLD AUTO: 0.3 10E3/UL (ref 0–1.3)
MONOCYTES NFR BLD AUTO: 6 %
NEUTROPHILS # BLD AUTO: 3.5 10E3/UL (ref 1.6–8.3)
NEUTROPHILS NFR BLD AUTO: 82 %
NRBC # BLD AUTO: 0 10E3/UL
NRBC BLD AUTO-RTO: 0 /100
PLATELET # BLD AUTO: 48 10E3/UL (ref 150–450)
POTASSIUM SERPL-SCNC: 3.9 MMOL/L (ref 3.4–5.3)
PROT SERPL-MCNC: 6.7 G/DL (ref 6.4–8.3)
RBC # BLD AUTO: 3.5 10E6/UL (ref 4.4–5.9)
SODIUM SERPL-SCNC: 142 MMOL/L (ref 136–145)
WBC # BLD AUTO: 4.3 10E3/UL (ref 4–11)

## 2023-02-10 PROCEDURE — 250N000011 HC RX IP 250 OP 636: Performed by: INTERNAL MEDICINE

## 2023-02-10 PROCEDURE — 99214 OFFICE O/P EST MOD 30 MIN: CPT | Performed by: INTERNAL MEDICINE

## 2023-02-10 PROCEDURE — 80053 COMPREHEN METABOLIC PANEL: CPT | Performed by: INTERNAL MEDICINE

## 2023-02-10 PROCEDURE — G0463 HOSPITAL OUTPT CLINIC VISIT: HCPCS | Performed by: INTERNAL MEDICINE

## 2023-02-10 PROCEDURE — 85025 COMPLETE CBC W/AUTO DIFF WBC: CPT | Performed by: INTERNAL MEDICINE

## 2023-02-10 RX ORDER — HEPARIN SODIUM (PORCINE) LOCK FLUSH IV SOLN 100 UNIT/ML 100 UNIT/ML
5 SOLUTION INTRAVENOUS
Status: DISCONTINUED | OUTPATIENT
Start: 2023-02-10 | End: 2023-02-10 | Stop reason: HOSPADM

## 2023-02-10 RX ADMIN — Medication 5 ML: at 08:53

## 2023-02-10 ASSESSMENT — PAIN SCALES - GENERAL: PAINLEVEL: NO PAIN (0)

## 2023-02-10 NOTE — LETTER
"    2/10/2023         RE: Kristen Chapman  [address] [place] [address]        Dear Colleague,    Thank you for referring your patient, Kristen Chapman, to the [facility]. Please see a copy of my visit note below.    Oncology Rooming Note    February 10, 2023 9:39 AM   Kristen Chapman is a 72 year old male who presents for:    Chief Complaint   Patient presents with     Oncology Clinic Visit     4 month return Nasopharyngeal carcinoma, review labs & MRI     Initial Vitals: BP (!) 140/71 (BP Location: Right arm, Patient Position: Sitting, Cuff Size: Adult Regular)   Pulse 56   Temp 97.4  F (36.3  C) (Tympanic)   Resp 14   Ht 1.6 m (5' 3\")   Wt 62.3 kg (137 lb 4.8 oz)   SpO2 98%   BMI 24.32 kg/m   Estimated body mass index is 24.32 kg/m  as calculated from the following:    Height as of this encounter: 1.6 m (5' 3\").    Weight as of this encounter: 62.3 kg (137 lb 4.8 oz). Body surface area is 1.66 meters squared.  No Pain (0) Comment: Data Unavailable   No LMP for male patient.  Allergies reviewed: Yes  Medications reviewed: Yes    Medications: Medication refills not needed today.  Pharmacy name entered into [place]: [facility] - [address]    Clinical concerns: 4 month return Nasopharyngeal carcinoma, review labs & MRI      PAIGE GALLEGO CMA              [facility] Progress Note    Patient: Kristen Chapman  MRN: [id number]  Date of Service: Feb 10, 2023        Assessment and Plan:    Cancer Staging  Nasopharyngeal carcinoma (H)  Staging form: Pharynx - Nasopharynx, AJCC 8th Edition  - Clinical stage from 2/18/2020: Stage SAMANTHA (cT4, cN2, cM0) - Signed by Alan Frias MD on 2/18/2020     1.  Nasopharyngeal carcinoma:  MRI of the brain on February 8.  I personally reviewed the images.  There is no evidence of recurrent disease.  No intracranial mass noted.  He continues to have chronic problems with decreased taste and smell.  His recurrent " disease was noted about a year and a half ago.  We will continue to image him every 4 months for now.    2.  Pancytopenia: CBC from today was reviewed and shows a white count of 4.3, hemoglobin 9.4, platelets 48,000.  He had a bone marrow biopsy in October 2021.  Next generation sequencing showed no abnormalities.  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  No splenomegaly or lymphadenopathy on imaging.  B12, folate, TSH have been evaluated for pancytopenia and were normal.  Reticulocyte count is not elevated.  We can check a copper at his next visit.    3.  Hypothyroidism: Continues on Synthroid.    4.  Hepatitis B infection: He is following in the outpatient infectious disease clinic.  DNA levels have been undetectable in the serum, most recently from October 10, 2022.    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  Diagnosed January 2020. Imaging suggested bilateral abnormal lymphadenopathy in the neck.  Also asymmetric soft tissue thickening in the posterior right nasopharynx.  On imaging, tumor also appeared to extend down to the hypopharynx.    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    Treatment:    Initially treated with concurrent chemotherapy and radiation.  He had weekly cisplatin starting March 3, 2020. Fifth and final dose given March 31, 2020.  Subsequent chemotherapy was held due to prolonged thrombocytopenia and renal insufficiency.  Radiation dose was 7000 cGy in 35 fractions given from March 2 through April 17, 2020.     Started cisplatin with infusional 5-FU on June 1, 2020.  Cycle 1 given at a 25% dose reduction.  He still had significant thrombocytopenia and neutropenia on day 28.  Cycle 2 was held.  At the same time his liver function tests elevated secondary to hepatitis B.     Recurrence:  Radiosurgery delivered to the right ethmoid  "tumor delivered November 8 through November 17, 2021.  Received 3500 cGy in 5 fractions.  Pembrolizumab started 12/23/21.  Held after his July 1, 2022 dose.    Interim History:    Kristen Chapman returns today for follow-up visit.      No pain  Reji still no no taste  No sinus, no pnd  Pembro stopped was gone.     Review of Systems:    As above in the history.     Review of Systems otherwise Negative for:  General: chills, fever or night sweats  Psychological: anxiety or depression  Ophthalmic: blurry vision, double vision or loss of vision, vision change  ENT: oral lesions, hearing changes  Hematological and Lymphatic: bruising, jaundice, swollen lymph nodes  Endocrine: hot flashes, unexpected weight changes  Respiratory: cough, hemoptysis, orthopnea  Cardiovascular: chest pain, edema, palpitations or PND  Gastrointestinal: abdominal pain, blood in stools, change in bowel habits, constipation, diarrhea or nausea/vomiting  Genito-Urinary: change in urinary stream, incontinence, frequency/urgency  Musculoskeletal: joint swelling, muscle pain  Neurological: dizziness, headaches, numbness/tingling  Dermatological: lumps and rash    Past History:    Past Medical History:   Diagnosis Date     Anemia      Dysphagia      Hepatitis B chronic      Hypothyroidism      Nasopharyngeal cancer (H)      Severe protein-calorie malnutrition (H)      Thrombocytopenia (H)      Physical Exam:    BP (!) 140/71 (BP Location: Right arm, Patient Position: Sitting, Cuff Size: Adult Regular)   Pulse 56   Temp 97.4  F (36.3  C) (Tympanic)   Resp 14   Ht 1.6 m (5' 3\")   Wt 62.3 kg (137 lb 4.8 oz)   SpO2 98%   BMI 24.32 kg/m      General: patient appears stated age of 71 year old. Nontoxic and in no distress.   HEENT: Head: atraumatic, normocephalic. Sclerae anicteric.  Chest:  Normal respiratory effort  Cardiac:  No edema.  Abdomen: abdomen is non-distended  Extremities: normal tone and muscle bulk.  Skin: no lesions or rash on visible skin. " Warm and dry.   CNS: alert and oriented. Grossly non-focal.   Psychiatric: normal mood and affect.     Lab Results:    Recent Results (from the past 168 hour(s))   Comprehensive metabolic panel   Result Value Ref Range    Sodium 142 136 - 145 mmol/L    Potassium 3.9 3.4 - 5.3 mmol/L    Chloride 105 98 - 107 mmol/L    Carbon Dioxide (CO2) 27 22 - 29 mmol/L    Anion Gap 10 7 - 15 mmol/L    Urea Nitrogen 18.6 8.0 - 23.0 mg/dL    Creatinine 1.26 (H) 0.67 - 1.17 mg/dL    Calcium 8.6 (L) 8.8 - 10.2 mg/dL    Glucose 138 (H) 70 - 99 mg/dL    Alkaline Phosphatase 103 40 - 129 U/L    AST 85 (H) 10 - 50 U/L    ALT 95 (H) 10 - 50 U/L    Protein Total 6.7 6.4 - 8.3 g/dL    Albumin 3.4 (L) 3.5 - 5.2 g/dL    Bilirubin Total 0.9 <=1.2 mg/dL    GFR Estimate 61 >60 mL/min/1.73m2   CBC with platelets and differential   Result Value Ref Range    WBC Count 4.3 4.0 - 11.0 10e3/uL    RBC Count 3.50 (L) 4.40 - 5.90 10e6/uL    Hemoglobin 9.4 (L) 13.3 - 17.7 g/dL    Hematocrit 29.8 (L) 40.0 - 53.0 %    MCV 85 78 - 100 fL    MCH 26.9 26.5 - 33.0 pg    MCHC 31.5 31.5 - 36.5 g/dL    RDW 15.9 (H) 10.0 - 15.0 %    Platelet Count 48 (LL) 150 - 450 10e3/uL    % Neutrophils 82 %    % Lymphocytes 11 %    % Monocytes 6 %    % Eosinophils 1 %    % Basophils 0 %    % Immature Granulocytes 0 %    NRBCs per 100 WBC 0 <1 /100    Absolute Neutrophils 3.5 1.6 - 8.3 10e3/uL    Absolute Lymphocytes 0.5 (L) 0.8 - 5.3 10e3/uL    Absolute Monocytes 0.3 0.0 - 1.3 10e3/uL    Absolute Eosinophils 0.1 0.0 - 0.7 10e3/uL    Absolute Basophils 0.0 0.0 - 0.2 10e3/uL    Absolute Immature Granulocytes 0.0 <=0.4 10e3/uL    Absolute NRBCs 0.0 10e3/uL      Imaging:    MR Brain w/o & w Contrast    Result Date: 2/8/2023  EXAM: MR BRAIN W/O and W CONTRAST LOCATION: [facility] DATE/TIME: 2/8/2023 10:51 AM INDICATION: ethmoid tumor follow up, nasopharynx COMPARISON: 10/03/2022 MRI brain. 03/31/2022 MRI brain CONTRAST: 6 ml gadavist TECHNIQUE: Routine  multiplanar multisequence head MRI without and with intravenous contrast. FINDINGS: INTRACRANIAL CONTENTS: No acute or subacute infarct. No mass, acute hemorrhage, or extra-axial fluid collections. Scattered nonspecific T2/FLAIR hyperintensities within the cerebral white matter most consistent with mild chronic microvascular ischemic change. Mild generalized cerebral atrophy. No hydrocephalus. Normal position of the cerebellar tonsils. No pathologic contrast enhancement. SELLA: No abnormality accounting for technique. Cavernous sinuses unremarkable. OSSEOUS STRUCTURES/SOFT TISSUES: Normal marrow signal. The major intracranial vascular flow voids are maintained. ORBITS: No abnormality accounting for technique. SINUSES/MASTOIDS: Extensive lobular membrane thickening throughout the maxillary and ethmoid sinuses. Medial maxillary antrostomies. Increasing opacity in the ethmoid air cells right side more so than left. No definite evidence for enhancing mass, destructive lesion or expansile lesion. Chronic obstruction of the right frontal recess is present with persistent opacification of the right frontal sinus. No sinus wall expansion, enhancing mass or bone destructive change. Sphenoid sinus membrane thickening of mild degree. Scattered fluid/membrane thickening in the left mastoid air cells. No apparent mass in the posterior nasopharynx or skull base.     IMPRESSION: 1.  No evidence for recurrent or residual ethmoid tumor. 2.  No evidence for intracranial mass. Negative for perineural tumor extension at the skull base. Cribriform plate and remainder of the anterior fossa bone structures are unremarkable. 3.  Extensive maxillary and ethmoid sinus lobular membrane thickening. No evidence for sinus expansion or definite bone destruction. No evidence for recurrent enhancing mass. 4.  Skull base neurovascular foramina unremarkable. 5.  Orbits unremarkable.        Signed by: Alan Frias MD        Again, thank you for  allowing me to participate in the care of your patient.        Sincerely,        Alan Frias MD

## 2023-02-10 NOTE — PROGRESS NOTES
"Oncology Rooming Note    February 10, 2023 9:39 AM   Kristen Chapman is a 72 year old male who presents for:    Chief Complaint   Patient presents with     Oncology Clinic Visit     4 month return Nasopharyngeal carcinoma, review labs & MRI     Initial Vitals: BP (!) 140/71 (BP Location: Right arm, Patient Position: Sitting, Cuff Size: Adult Regular)   Pulse 56   Temp 97.4  F (36.3  C) (Tympanic)   Resp 14   Ht 1.6 m (5' 3\")   Wt 62.3 kg (137 lb 4.8 oz)   SpO2 98%   BMI 24.32 kg/m   Estimated body mass index is 24.32 kg/m  as calculated from the following:    Height as of this encounter: 1.6 m (5' 3\").    Weight as of this encounter: 62.3 kg (137 lb 4.8 oz). Body surface area is 1.66 meters squared.  No Pain (0) Comment: Data Unavailable   No LMP for male patient.  Allergies reviewed: Yes  Medications reviewed: Yes    Medications: Medication refills not needed today.  Pharmacy name entered into [employer]: [facility] - [place], [place] - [address] [place] [address]    Clinical concerns: 4 month return Nasopharyngeal carcinoma, review labs & MRI      PAIGE GALLEGO CMA            "

## 2023-02-10 NOTE — PROGRESS NOTES
[facility] Hematology and Oncology Progress Note    Patient: Kristen Chapman  MRN: [id number]  Date of Service: Feb 10, 2023        Assessment and Plan:    Cancer Staging  Nasopharyngeal carcinoma (H)  Staging form: Pharynx - Nasopharynx, AJCC 8th Edition  - Clinical stage from 2/18/2020: Stage SAMANTHA (cT4, cN2, cM0) - Signed by Alan Frias MD on 2/18/2020     1.  Nasopharyngeal carcinoma:  MRI of the brain on February 8.  I personally reviewed the images.  There is no evidence of recurrent disease.  No intracranial mass noted.  He continues to have chronic problems with decreased taste and smell.  His recurrent disease was noted about a year and a half ago.  We will continue to image him every 4 months for now.    2.  Pancytopenia: CBC from today was reviewed and shows a white count of 4.3, hemoglobin 9.4, platelets 48,000.  He had a bone marrow biopsy in October 2021.  Next generation sequencing showed no abnormalities.  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  No splenomegaly or lymphadenopathy on imaging.  B12, folate, TSH have been evaluated for pancytopenia and were normal.  Reticulocyte count is not elevated.  We can check a copper at his next visit.  May need to repeat bone marrow biopsy.    3.  Hypothyroidism: Continues on Synthroid.    4.  Hepatitis B infection: He is following in the outpatient infectious disease clinic.  DNA levels have been undetectable in the serum, most recently from October 10, 2022.    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  Diagnosed January 2020. Imaging suggested bilateral abnormal lymphadenopathy in the neck.  Also asymmetric soft tissue thickening in the posterior right nasopharynx.  On imaging, tumor also appeared to extend down to the hypopharynx.    Recurrent disease involving the ethmoid sinus diagnosed September  2021.    Treatment:    Initially treated with concurrent chemotherapy and radiation.  He had weekly cisplatin starting March 3, 2020. Fifth and final dose given March 31, 2020.  Subsequent chemotherapy was held due to prolonged thrombocytopenia and renal insufficiency.  Radiation dose was 7000 cGy in 35 fractions given from March 2 through April 17, 2020.     Started cisplatin with infusional 5-FU on June 1, 2020.  Cycle 1 given at a 25% dose reduction.  He still had significant thrombocytopenia and neutropenia on day 28.  Cycle 2 was held.  At the same time his liver function tests elevated secondary to hepatitis B.     Recurrence:  Radiosurgery delivered to the right ethmoid tumor delivered November 8 through November 17, 2021.  Received 3500 cGy in 5 fractions.  Pembrolizumab started 12/23/21.  Held after his July 1, 2022 dose.    Interim History:    Kristen Chapman returns today for follow-up visit.      No pain  Reji still no no taste  No sinus, no pnd  Pembro stopped was gone.     Review of Systems:    As above in the history.     Review of Systems otherwise Negative for:  General: chills, fever or night sweats  Psychological: anxiety or depression  Ophthalmic: blurry vision, double vision or loss of vision, vision change  ENT: oral lesions, hearing changes  Hematological and Lymphatic: bruising, jaundice, swollen lymph nodes  Endocrine: hot flashes, unexpected weight changes  Respiratory: cough, hemoptysis, orthopnea  Cardiovascular: chest pain, edema, palpitations or PND  Gastrointestinal: abdominal pain, blood in stools, change in bowel habits, constipation, diarrhea or nausea/vomiting  Genito-Urinary: change in urinary stream, incontinence, frequency/urgency  Musculoskeletal: joint swelling, muscle pain  Neurological: dizziness, headaches, numbness/tingling  Dermatological: lumps and rash    Past History:    Past Medical History:   Diagnosis Date     Anemia      Dysphagia      Hepatitis B chronic       "Hypothyroidism      Nasopharyngeal cancer (H)      Severe protein-calorie malnutrition (H)      Thrombocytopenia (H)      Physical Exam:    BP (!) 140/71 (BP Location: Right arm, Patient Position: Sitting, Cuff Size: Adult Regular)   Pulse 56   Temp 97.4  F (36.3  C) (Tympanic)   Resp 14   Ht 1.6 m (5' 3\")   Wt 62.3 kg (137 lb 4.8 oz)   SpO2 98%   BMI 24.32 kg/m      General: patient appears stated age of 71 year old. Nontoxic and in no distress.   HEENT: Head: atraumatic, normocephalic. Sclerae anicteric.  Chest:  Normal respiratory effort  Cardiac:  No edema.  Abdomen: abdomen is non-distended  Extremities: normal tone and muscle bulk.  Skin: no lesions or rash on visible skin. Warm and dry.   CNS: alert and oriented. Grossly non-focal.   Psychiatric: normal mood and affect.     Lab Results:    Recent Results (from the past 168 hour(s))   Comprehensive metabolic panel   Result Value Ref Range    Sodium 142 136 - 145 mmol/L    Potassium 3.9 3.4 - 5.3 mmol/L    Chloride 105 98 - 107 mmol/L    Carbon Dioxide (CO2) 27 22 - 29 mmol/L    Anion Gap 10 7 - 15 mmol/L    Urea Nitrogen 18.6 8.0 - 23.0 mg/dL    Creatinine 1.26 (H) 0.67 - 1.17 mg/dL    Calcium 8.6 (L) 8.8 - 10.2 mg/dL    Glucose 138 (H) 70 - 99 mg/dL    Alkaline Phosphatase 103 40 - 129 U/L    AST 85 (H) 10 - 50 U/L    ALT 95 (H) 10 - 50 U/L    Protein Total 6.7 6.4 - 8.3 g/dL    Albumin 3.4 (L) 3.5 - 5.2 g/dL    Bilirubin Total 0.9 <=1.2 mg/dL    GFR Estimate 61 >60 mL/min/1.73m2   CBC with platelets and differential   Result Value Ref Range    WBC Count 4.3 4.0 - 11.0 10e3/uL    RBC Count 3.50 (L) 4.40 - 5.90 10e6/uL    Hemoglobin 9.4 (L) 13.3 - 17.7 g/dL    Hematocrit 29.8 (L) 40.0 - 53.0 %    MCV 85 78 - 100 fL    MCH 26.9 26.5 - 33.0 pg    MCHC 31.5 31.5 - 36.5 g/dL    RDW 15.9 (H) 10.0 - 15.0 %    Platelet Count 48 (LL) 150 - 450 10e3/uL    % Neutrophils 82 %    % Lymphocytes 11 %    % Monocytes 6 %    % Eosinophils 1 %    % Basophils 0 %    % " Immature Granulocytes 0 %    NRBCs per 100 WBC 0 <1 /100    Absolute Neutrophils 3.5 1.6 - 8.3 10e3/uL    Absolute Lymphocytes 0.5 (L) 0.8 - 5.3 10e3/uL    Absolute Monocytes 0.3 0.0 - 1.3 10e3/uL    Absolute Eosinophils 0.1 0.0 - 0.7 10e3/uL    Absolute Basophils 0.0 0.0 - 0.2 10e3/uL    Absolute Immature Granulocytes 0.0 <=0.4 10e3/uL    Absolute NRBCs 0.0 10e3/uL      Imaging:    MR Brain w/o & w Contrast    Result Date: 2/8/2023  EXAM: MR BRAIN W/O and W CONTRAST LOCATION: [facility] DATE/TIME: 2/8/2023 10:51 AM INDICATION: ethmoid tumor follow up, nasopharynx COMPARISON: 10/03/2022 MRI brain. 03/31/2022 MRI brain CONTRAST: 6 ml gadavist TECHNIQUE: Routine multiplanar multisequence head MRI without and with intravenous contrast. FINDINGS: INTRACRANIAL CONTENTS: No acute or subacute infarct. No mass, acute hemorrhage, or extra-axial fluid collections. Scattered nonspecific T2/FLAIR hyperintensities within the cerebral white matter most consistent with mild chronic microvascular ischemic change. Mild generalized cerebral atrophy. No hydrocephalus. Normal position of the cerebellar tonsils. No pathologic contrast enhancement. SELLA: No abnormality accounting for technique. Cavernous sinuses unremarkable. OSSEOUS STRUCTURES/SOFT TISSUES: Normal marrow signal. The major intracranial vascular flow voids are maintained. ORBITS: No abnormality accounting for technique. SINUSES/MASTOIDS: Extensive lobular membrane thickening throughout the maxillary and ethmoid sinuses. Medial maxillary antrostomies. Increasing opacity in the ethmoid air cells right side more so than left. No definite evidence for enhancing mass, destructive lesion or expansile lesion. Chronic obstruction of the right frontal recess is present with persistent opacification of the right frontal sinus. No sinus wall expansion, enhancing mass or bone destructive change. Sphenoid sinus membrane thickening of mild degree. Scattered  fluid/membrane thickening in the left mastoid air cells. No apparent mass in the posterior nasopharynx or skull base.     IMPRESSION: 1.  No evidence for recurrent or residual ethmoid tumor. 2.  No evidence for intracranial mass. Negative for perineural tumor extension at the skull base. Cribriform plate and remainder of the anterior fossa bone structures are unremarkable. 3.  Extensive maxillary and ethmoid sinus lobular membrane thickening. No evidence for sinus expansion or definite bone destruction. No evidence for recurrent enhancing mass. 4.  Skull base neurovascular foramina unremarkable. 5.  Orbits unremarkable.        Signed by: Alan Frias MD

## 2023-04-05 DIAGNOSIS — B18.1 CHRONIC VIRAL HEPATITIS B WITHOUT DELTA AGENT AND WITHOUT COMA (H): ICD-10-CM

## 2023-04-05 DIAGNOSIS — B18.1 HEPATITIS B, CHRONIC (H): ICD-10-CM

## 2023-04-05 RX ORDER — LAMIVUDINE 100 MG/1
100 TABLET, FILM COATED ORAL DAILY
Qty: 90 TABLET | Refills: 0 | Status: SHIPPED | OUTPATIENT
Start: 2023-04-05 | End: 2023-12-07

## 2023-04-10 ENCOUNTER — OFFICE VISIT (OUTPATIENT)
Dept: INFECTIOUS DISEASES | Facility: CLINIC | Age: 73
End: 2023-04-10
Payer: COMMERCIAL

## 2023-04-10 ENCOUNTER — LAB (OUTPATIENT)
Dept: LAB | Facility: CLINIC | Age: 73
End: 2023-04-10
Payer: COMMERCIAL

## 2023-04-10 VITALS
BODY MASS INDEX: 25.3 KG/M2 | DIASTOLIC BLOOD PRESSURE: 70 MMHG | SYSTOLIC BLOOD PRESSURE: 138 MMHG | WEIGHT: 142.8 LBS | OXYGEN SATURATION: 96 % | TEMPERATURE: 98.8 F | HEART RATE: 64 BPM

## 2023-04-10 DIAGNOSIS — B18.1 HEPATITIS B, CHRONIC (H): Primary | ICD-10-CM

## 2023-04-10 DIAGNOSIS — B18.1 HEPATITIS B, CHRONIC (H): ICD-10-CM

## 2023-04-10 LAB
ALBUMIN SERPL BCG-MCNC: 3.5 G/DL (ref 3.5–5.2)
ALP SERPL-CCNC: 91 U/L (ref 40–129)
ALT SERPL W P-5'-P-CCNC: 51 U/L (ref 10–50)
AST SERPL W P-5'-P-CCNC: 82 U/L (ref 10–50)
BILIRUB DIRECT SERPL-MCNC: 0.3 MG/DL (ref 0–0.3)
BILIRUB SERPL-MCNC: 1 MG/DL
CREAT SERPL-MCNC: 1.39 MG/DL (ref 0.67–1.17)
GFR SERPL CREATININE-BSD FRML MDRD: 54 ML/MIN/1.73M2
PROT SERPL-MCNC: 7.1 G/DL (ref 6.4–8.3)

## 2023-04-10 PROCEDURE — 80076 HEPATIC FUNCTION PANEL: CPT

## 2023-04-10 PROCEDURE — 36415 COLL VENOUS BLD VENIPUNCTURE: CPT

## 2023-04-10 PROCEDURE — 99214 OFFICE O/P EST MOD 30 MIN: CPT

## 2023-04-10 PROCEDURE — 87517 HEPATITIS B DNA QUANT: CPT

## 2023-04-10 PROCEDURE — 82565 ASSAY OF CREATININE: CPT

## 2023-04-10 NOTE — PROGRESS NOTES
Assessment:      Impression: Chronic hepatitis B infection, with compensated cirrhosis responding to lamivudine.  However, for unclear reasons, this had been discontinued in 2021 and then patient had rebound of hepatitis B viral load and LFTs.    LFTs are now back to normal and hepatitis B viral load is back to where it was prior to discontinuation of lamivudine    Recurrence of nasopharyngeal carcinoma.  Declined recommended surgery, now taking pembrolizumab.  This may exacerbate his hepatitis.  So far appears to be stable    Nasopharyngeal cancer felt to be stable at this time and currently not on chemotherapy.         Plan:     Continue lamivudine daily indefinitely    Orders Placed This Encounter   Procedures     Hepatic panel (Albumin, ALT, AST, Bili, Alk Phos, TP)     Standing Status:   Future     Standing Expiration Date:   4/10/2024     Creatinine     Standing Status:   Future     Standing Expiration Date:   4/10/2024     Hep B Virus DNA Quant Real Time PCR     Standing Status:   Future     Standing Expiration Date:   4/10/2024         Follow-up in 6 months    Subjective:      This is an follow-up infectious Disease visit for Kristen Chapman, who is a 70 y.o.  referred for evaluation of hepatitis B.     Is a 70-year-old gentleman of seen only in video visits over the last year for chronic hepatitis B.  He had been on treatment for nasopharyngeal carcinoma when he was noted to have elevated LFTs and a very high hepatitis B viral load.    Patient was started on lamivudine 100 mg a day and he is doing quite well.  He denies any complaints other than some bleeding in his nose and upon further questioning some pain and dryness in his mouth and sticking when he swallows.    He has been taking Magic mouthwash which does not really seem to help his symptoms significantly.    He denies abdominal pain.      The following portions of the patient's history were reviewed and updated as appropriate: allergies, current  medications, past family history, past medical history, past social history, past surgical history and problem list.      Follow-up visit on August 23, 2021:    For reasons that are unclear, the lamivudine was discontinued several months ago.  He is generally feeling well though complains of some nasopharyngeal bleeding.    His thrush is resolved.    Follow-up visit on October 4, 2021:    Patient is back on the meeting.  He denies any specific complaints.  Unfortunately, since her last visit, he did receive a diagnosis that his nasopharyngeal carcinoma has returned.  Chemotherapy and radiation therapy is in the works.    He denies any abdominal pain.    Follow-up visit on January 10, 2022:    Briefly, patient is doing well as far as symptoms go.  He did have recurrence of his nasopharyngeal cancer.  Surgery was recommended and declined.    Patient has been started on pembrolizumab by Dr. Frias.  This may exacerbate his hepatitis.    Patient did have his quarterly hepatitis B virus levels checked last week and they are a little bit lower.  His LFTs did take up, but suspect this may be related to the pembrolizumab.    He denies abdominal pain or discomfort.  He says he is eating well.  Denies vomiting or diarrhea.    Follow-up visit on April 4, 2022:    Patient states he feels about the same.  He is waiting for results of a follow-up MRI regarding his nasopharyngeal cancer.    He did have blood test done last week which showed his LFTs are back to normal and his viral load is less than 20, which is where it was before is limited and had been discontinued.    Follow-up visit on October 10, 2022:    Patient is generally doing well.  He saw Dr. Frias last week and his tumor appears to be stable on the pembrolizumab.  We will be getting another MRI scan in a few months.    He did have his LFTs and viral load checked in July and they were good.  We will check them again today.    Follow-up visit on April 10,  2023:    Patient is doing well.  He offers no complaints.  He says his cancer treatments are done.  He saw Dr. Frias a few months ago who felt that the cancer was in remission.    Has been taking his medication, reviewed eating, without any difficulties.    Review of Systems  Performed and all negative except as mentioned above.      Objective:     /70 (BP Location: Right arm, Patient Position: Sitting, Cuff Size: Adult Regular)   Pulse 64   Temp 98.8  F (37.1  C) (Oral)   Wt 64.8 kg (142 lb 12.8 oz)   SpO2 96%   BMI 25.30 kg/m       General:   alert, appears stated age and cooperative   Oropharynx:  Wearing a mask    Eyes:   Extraocular muscles intact, no icterus.    Ears:   Deferred   Neck:  no adenopathy and supple, symmetrical, trachea midline   Thyroid:   Deferred   Lung:  clear to auscultation bilaterally   Heart:   regular rate and rhythm, S1, S2 normal, no murmur, click, rub or gallop   Abdomen:  Nondistended   Extremities:  extremities normal, atraumatic, no cyanosis or edema   Skin:  warm and dry, no hyperpigmentation, vitiligo, or suspicious lesions   CVA:   absent   Genitourinary:  defer exam   Neurological:   Grossly normal   Psychiatric:   normal mood, behavior, speech, dress, and thought processes     Liver Function Studies - Recent Labs   Lab Test 02/10/23  0852   PROTTOTAL 6.7   ALBUMIN 3.4*   BILITOTAL 0.9   ALKPHOS 103   AST 85*   ALT 95*      Latest Reference Range & Units 11/05/21 09:00 01/03/22 15:18 03/30/22 14:57 07/01/22 09:07 10/10/22 09:02   SARS CoV2 PCR Negative  Negative       HEP B Virus DNA Quant Not Detected IU/mL   <20 ! <20 ! <20 !   HEP B Virus DNA Quant Log   4.2 <1.3 <1.3 <1.3   HEP B VIRUS DNA QUANT REAL TIME PCR   Rpt ! Rpt ! Rpt ! Rpt !   !: Data is abnormal  Rpt: View report in Results Review for more information    Eduardo Grier MD

## 2023-04-11 LAB
HBV DNA SERPL NAA+PROBE-ACNC: ABNORMAL IU/ML
HBV DNA SERPL NAA+PROBE-LOG IU: 7.7 {LOG_IU}/ML

## 2023-04-12 ENCOUNTER — TELEPHONE (OUTPATIENT)
Dept: INFECTIOUS DISEASES | Facility: CLINIC | Age: 73
End: 2023-04-12

## 2023-04-12 DIAGNOSIS — B18.1 CHRONIC VIRAL HEPATITIS B WITHOUT DELTA AGENT AND WITHOUT COMA (H): Primary | ICD-10-CM

## 2023-04-12 RX ORDER — ENTECAVIR 1 MG/1
1 TABLET, FILM COATED ORAL DAILY
Qty: 30 TABLET | Refills: 3 | Status: SHIPPED | OUTPATIENT
Start: 2023-04-12 | End: 2023-10-02

## 2023-04-12 NOTE — RESULT ENCOUNTER NOTE
FYI--patient has had breakthrough viremia.  May need to add entecavir if he has been adherent to the lamivudine.    Eduardo Grier MD

## 2023-04-12 NOTE — TELEPHONE ENCOUNTER
As family confirms patient taking lamivudine, will add entecavir.  Continue lamivudine for now.    Follow-up with me 2 to 4 weeks after starting entecavir.    Eduardo Grier MD

## 2023-04-12 NOTE — TELEPHONE ENCOUNTER
Called and spoke with Daughter Rosa (per consent okay to discuss).    message below relayed to Ngia. Per Ngia said patient has been taking medication as prescribed.     Ngia wonder should the 2 medication be taking at the same time or different?    Send medication to [facility] pharmacy in [place] [address] .

## 2023-04-12 NOTE — TELEPHONE ENCOUNTER
----- Message from Su Cornejo RN sent at 4/12/2023  9:55 AM CDT -----  Can you please call the pt to see if she has been taking her hepatitis B medication as prescribed? If not can you let her know that we will need to add a medication and confirm what pharmacy to send to? Thanks!  ----- Message -----  From: Eduardo Grier MD  Sent: 4/12/2023   9:16 AM CDT  To: [facility] Infectious Disease Support Pool    FYI--patient has had breakthrough viremia.  May need to add entecavir if he has been adherent to the lamivudine.    Eduardo Grier MD

## 2023-06-01 ENCOUNTER — TELEPHONE (OUTPATIENT)
Dept: ONCOLOGY | Facility: HOSPITAL | Age: 73
End: 2023-06-01

## 2023-06-05 ENCOUNTER — APPOINTMENT (OUTPATIENT)
Dept: INTERPRETER SERVICES | Facility: CLINIC | Age: 73
End: 2023-06-05

## 2023-06-07 ENCOUNTER — HOSPITAL ENCOUNTER (OUTPATIENT)
Dept: MRI IMAGING | Facility: HOSPITAL | Age: 73
Discharge: HOME | End: 2023-06-07
Attending: INTERNAL MEDICINE | Admitting: INTERNAL MEDICINE
Payer: COMMERCIAL

## 2023-06-07 DIAGNOSIS — C11.9 SQUAMOUS CELL CARCINOMA OF NASOPHARYNX (H): ICD-10-CM

## 2023-06-07 PROCEDURE — 255N000002 HC RX 255 OP 636: Performed by: INTERNAL MEDICINE

## 2023-06-07 PROCEDURE — A9585 GADOBUTROL INJECTION: HCPCS | Performed by: INTERNAL MEDICINE

## 2023-06-07 PROCEDURE — 70553 MRI BRAIN STEM W/O & W/DYE: CPT

## 2023-06-07 RX ORDER — GADOBUTROL 604.72 MG/ML
6 INJECTION INTRAVENOUS ONCE
Status: COMPLETED | OUTPATIENT
Start: 2023-06-07 | End: 2023-06-07

## 2023-06-07 RX ADMIN — GADOBUTROL 6 ML: 604.72 INJECTION INTRAVENOUS at 17:35

## 2023-06-09 ENCOUNTER — LAB (OUTPATIENT)
Dept: INFUSION THERAPY | Facility: HOSPITAL | Age: 73
End: 2023-06-09
Attending: INTERNAL MEDICINE
Payer: COMMERCIAL

## 2023-06-09 ENCOUNTER — ONCOLOGY VISIT (OUTPATIENT)
Dept: ONCOLOGY | Facility: HOSPITAL | Age: 73
End: 2023-06-09
Attending: INTERNAL MEDICINE
Payer: COMMERCIAL

## 2023-06-09 VITALS
BODY MASS INDEX: 25.16 KG/M2 | DIASTOLIC BLOOD PRESSURE: 73 MMHG | WEIGHT: 142 LBS | HEART RATE: 57 BPM | OXYGEN SATURATION: 98 % | RESPIRATION RATE: 24 BRPM | HEIGHT: 63 IN | SYSTOLIC BLOOD PRESSURE: 137 MMHG | TEMPERATURE: 98.4 F

## 2023-06-09 DIAGNOSIS — R74.8 ABNORMAL LEVELS OF OTHER SERUM ENZYMES: ICD-10-CM

## 2023-06-09 DIAGNOSIS — R79.1 ABNORMAL COAGULATION PROFILE: ICD-10-CM

## 2023-06-09 DIAGNOSIS — D64.9 ANEMIA, UNSPECIFIED TYPE: ICD-10-CM

## 2023-06-09 DIAGNOSIS — C11.9 SQUAMOUS CELL CARCINOMA OF NASOPHARYNX (H): Primary | ICD-10-CM

## 2023-06-09 DIAGNOSIS — D61.9 ANEMIA DUE TO BONE MARROW FAILURE, UNSPECIFIED BONE MARROW FAILURE TYPE (H): ICD-10-CM

## 2023-06-09 DIAGNOSIS — C11.9 NASOPHARYNGEAL CARCINOMA (H): Primary | ICD-10-CM

## 2023-06-09 DIAGNOSIS — C11.9 NASOPHARYNGEAL CARCINOMA (H): ICD-10-CM

## 2023-06-09 DIAGNOSIS — C11.9 SQUAMOUS CELL CARCINOMA OF NASOPHARYNX (H): ICD-10-CM

## 2023-06-09 LAB
ANION GAP SERPL CALCULATED.3IONS-SCNC: 7 MMOL/L (ref 7–15)
BASOPHILS # BLD AUTO: 0 10E3/UL (ref 0–0.2)
BASOPHILS NFR BLD AUTO: 0 %
BUN SERPL-MCNC: 25 MG/DL (ref 8–23)
CALCIUM SERPL-MCNC: 8.4 MG/DL (ref 8.8–10.2)
CHLORIDE SERPL-SCNC: 108 MMOL/L (ref 98–107)
CREAT SERPL-MCNC: 1.33 MG/DL (ref 0.67–1.17)
DEPRECATED HCO3 PLAS-SCNC: 24 MMOL/L (ref 22–29)
EOSINOPHIL # BLD AUTO: 0 10E3/UL (ref 0–0.7)
EOSINOPHIL NFR BLD AUTO: 2 %
ERYTHROCYTE [DISTWIDTH] IN BLOOD BY AUTOMATED COUNT: 18 % (ref 10–15)
FOLATE SERPL-MCNC: 29.7 NG/ML (ref 4.6–34.8)
GFR SERPL CREATININE-BSD FRML MDRD: 57 ML/MIN/1.73M2
GLUCOSE SERPL-MCNC: 90 MG/DL (ref 70–99)
HCT VFR BLD AUTO: 26.9 % (ref 40–53)
HGB BLD-MCNC: 8.1 G/DL (ref 13.3–17.7)
IMM GRANULOCYTES # BLD: 0 10E3/UL
IMM GRANULOCYTES NFR BLD: 1 %
INR PPP: 1.24 (ref 0.85–1.15)
LYMPHOCYTES # BLD AUTO: 0.5 10E3/UL (ref 0.8–5.3)
LYMPHOCYTES NFR BLD AUTO: 22 %
MCH RBC QN AUTO: 25.4 PG (ref 26.5–33)
MCHC RBC AUTO-ENTMCNC: 30.1 G/DL (ref 31.5–36.5)
MCV RBC AUTO: 84 FL (ref 78–100)
MONOCYTES # BLD AUTO: 0.2 10E3/UL (ref 0–1.3)
MONOCYTES NFR BLD AUTO: 9 %
NEUTROPHILS # BLD AUTO: 1.5 10E3/UL (ref 1.6–8.3)
NEUTROPHILS NFR BLD AUTO: 66 %
NRBC # BLD AUTO: 0 10E3/UL
NRBC BLD AUTO-RTO: 0 /100
PLATELET # BLD AUTO: 53 10E3/UL (ref 150–450)
POTASSIUM SERPL-SCNC: 4 MMOL/L (ref 3.4–5.3)
RBC # BLD AUTO: 3.19 10E6/UL (ref 4.4–5.9)
SODIUM SERPL-SCNC: 139 MMOL/L (ref 136–145)
T4 FREE SERPL-MCNC: 0.99 NG/DL (ref 0.9–1.7)
TSH SERPL DL<=0.005 MIU/L-ACNC: 137.4 UIU/ML (ref 0.3–4.2)
VIT B12 SERPL-MCNC: 1247 PG/ML (ref 232–1245)
WBC # BLD AUTO: 2.2 10E3/UL (ref 4–11)

## 2023-06-09 PROCEDURE — 82525 ASSAY OF COPPER: CPT

## 2023-06-09 PROCEDURE — 82746 ASSAY OF FOLIC ACID SERUM: CPT

## 2023-06-09 PROCEDURE — 85025 COMPLETE CBC W/AUTO DIFF WBC: CPT

## 2023-06-09 PROCEDURE — 82607 VITAMIN B-12: CPT

## 2023-06-09 PROCEDURE — 84443 ASSAY THYROID STIM HORMONE: CPT

## 2023-06-09 PROCEDURE — 84439 ASSAY OF FREE THYROXINE: CPT

## 2023-06-09 PROCEDURE — 250N000011 HC RX IP 250 OP 636: Performed by: INTERNAL MEDICINE

## 2023-06-09 PROCEDURE — 82310 ASSAY OF CALCIUM: CPT

## 2023-06-09 PROCEDURE — 36591 DRAW BLOOD OFF VENOUS DEVICE: CPT

## 2023-06-09 PROCEDURE — 96365 THER/PROPH/DIAG IV INF INIT: CPT

## 2023-06-09 PROCEDURE — 99215 OFFICE O/P EST HI 40 MIN: CPT | Performed by: INTERNAL MEDICINE

## 2023-06-09 PROCEDURE — 85610 PROTHROMBIN TIME: CPT

## 2023-06-09 PROCEDURE — 258N000003 HC RX IP 258 OP 636: Performed by: INTERNAL MEDICINE

## 2023-06-09 PROCEDURE — G0463 HOSPITAL OUTPT CLINIC VISIT: HCPCS | Performed by: INTERNAL MEDICINE

## 2023-06-09 RX ORDER — HEPARIN SODIUM (PORCINE) LOCK FLUSH IV SOLN 100 UNIT/ML 100 UNIT/ML
5 SOLUTION INTRAVENOUS
Status: DISCONTINUED | OUTPATIENT
Start: 2023-06-09 | End: 2023-06-09 | Stop reason: HOSPADM

## 2023-06-09 RX ADMIN — Medication 5 ML: at 11:54

## 2023-06-09 RX ADMIN — Medication 5 ML: at 09:03

## 2023-06-09 RX ADMIN — PHYTONADIONE 5 MG: 10 INJECTION, EMULSION INTRAMUSCULAR; INTRAVENOUS; SUBCUTANEOUS at 11:05

## 2023-06-09 ASSESSMENT — PAIN SCALES - GENERAL: PAINLEVEL: NO PAIN (0)

## 2023-06-09 NOTE — PROGRESS NOTES
"Oncology Rooming Note    June 9, 2023 9:27 AM   Kristen Chapman is a 73 year old male who presents for:    Chief Complaint   Patient presents with     Oncology Clinic Visit     4 month return Squamous cell carcinoma of nasopharynx, review labs & MRI     Initial Vitals: /73 (BP Location: Right arm, Patient Position: Sitting, Cuff Size: Adult Regular)   Pulse 57   Temp 98.4  F (36.9  C) (Oral)   Resp 24   Ht 1.6 m (5' 2.99\")   Wt 64.4 kg (142 lb)   SpO2 98%   BMI 25.16 kg/m   Estimated body mass index is 25.16 kg/m  as calculated from the following:    Height as of this encounter: 1.6 m (5' 2.99\").    Weight as of this encounter: 64.4 kg (142 lb). Body surface area is 1.69 meters squared.  No Pain (0) Comment: Data Unavailable   No LMP for male patient.  Allergies reviewed: Yes  Medications reviewed: Yes    Medications: Medication refills not needed today.  Pharmacy name entered into [employer]: [facility] - [place], [place] - [address] [place] [address]    Clinical concerns: 4 month return Squamous cell carcinoma of nasopharynx, review labs & MRI      Abby Duncan CMA            "

## 2023-06-09 NOTE — PROGRESS NOTES
[facility] Hematology and Oncology Progress Note    Patient: Kristen Chapman  MRN: [id number]  Date of Service: Jun 9, 2023        Assessment and Plan:    Cancer Staging  Nasopharyngeal carcinoma (H)  Staging form: Pharynx - Nasopharynx, AJCC 8th Edition  - Clinical stage from 2/18/2020: Stage SAMANTHA (cT4, cN2, cM0) - Signed by Alan Frias MD on 2/18/2020     1.  Nasopharyngeal carcinoma:   MRI was reviewed.  No evidence of recurrent disease.  We will continue imaging every 4 months.     2.  Epistaxis: He has nosebleed 2-3 times a week.  His INR is a little elevated. We will give him some vitamin K. IF this does not help we will send him abck to ENT for direct visualization.    3.  Pancytopenia:  CBC from today was reviewed and shows WBC 2.2, HGB 8.1 and plt 53. Generally stable Folate and B12 are stable. His tsh is very elevated at 137. This may explain his decreasing hemoglobin. ANC of 1.5 is also a little lower than normal. Will follow. If no improvement may need a bine marrow biopsy.    He had a bone marrow biopsy in October 2021.  Next generation sequencing showed no abnormalities.  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  No splenomegaly or lymphadenopathy on imaging.  B12, folate, TSH have been evaluated for pancytopenia and were normal.  Reticulocyte count is not elevated.  We can check a copper at his next visit.  May need to repeat bone marrow biopsy.    3.  Hypothyroidism: TSH is markedly elevated. If he is taking his synthroid will titrate up. IF not, will restart.    4.  Hepatitis B infection: He is following in the outpatient infectious disease clinic.  DNA levels have been undetectable in the serum, most recently from October 10, 2022.    5.  Right submandibular mass:  We will get CT imaging to start. May need a PET scan if suspicious findings.     Today's visit was centered around a cancer diagnosis in the setting of additional medical  comorbidities. The risk of additional morbidity or mortality with or without further treatment is high.     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  Diagnosed January 2020. Imaging suggested bilateral abnormal lymphadenopathy in the neck.  Also asymmetric soft tissue thickening in the posterior right nasopharynx.  On imaging, tumor also appeared to extend down to the hypopharynx.    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    Treatment:    Initially treated with concurrent chemotherapy and radiation.  He had weekly cisplatin starting March 3, 2020. Fifth and final dose given March 31, 2020.  Subsequent chemotherapy was held due to prolonged thrombocytopenia and renal insufficiency.  Radiation dose was 7000 cGy in 35 fractions given from March 2 through April 17, 2020.     Started cisplatin with infusional 5-FU on June 1, 2020.  Cycle 1 given at a 25% dose reduction.  He still had significant thrombocytopenia and neutropenia on day 28.  Cycle 2 was held.  At the same time his liver function tests elevated secondary to hepatitis B.     Recurrence:  Radiosurgery delivered to the right ethmoid tumor delivered November 8 through November 17, 2021.  Received 3500 cGy in 5 fractions.  Pembrolizumab started 12/23/21.  Held after his July 1, 2022 dose.    Interim History:    Kristen Chapman returns today for follow-up visit. He is still troubled by lack of taste. HE has noticed a lump under his right mandible.NO headaches.    Review of Systems:    As above in the history.     Review of Systems otherwise Negative for:  General: chills, fever or night sweats  Psychological: anxiety or depression  Ophthalmic: blurry vision, double vision or loss of vision, vision change  ENT: oral lesions, hearing changes  Hematological and Lymphatic: bruising, jaundice, swollen lymph nodes  Endocrine: hot flashes, unexpected weight  "changes  Respiratory: cough, hemoptysis, orthopnea  Cardiovascular: chest pain, edema, palpitations or PND  Gastrointestinal: abdominal pain, blood in stools, change in bowel habits, constipation, diarrhea or nausea/vomiting  Genito-Urinary: change in urinary stream, incontinence, frequency/urgency  Musculoskeletal: joint swelling, muscle pain  Neurological: dizziness, headaches, numbness/tingling  Dermatological: lumps and rash    Past History:    Past Medical History:   Diagnosis Date     Anemia      Dysphagia      Hepatitis B chronic      Hypothyroidism      Nasopharyngeal cancer (H)      Severe protein-calorie malnutrition (H)      Thrombocytopenia (H)      Physical Exam:    /73 (BP Location: Right arm, Patient Position: Sitting, Cuff Size: Adult Regular)   Pulse 57   Temp 98.4  F (36.9  C) (Oral)   Resp 24   Ht 1.6 m (5' 2.99\")   Wt 64.4 kg (142 lb)   SpO2 98%   BMI 25.16 kg/m      General: patient appears stated age of 71 year old. Nontoxic and in no distress.   HEENT: Head: atraumatic, normocephalic. Sclerae anicteric.  Chest:  Normal respiratory effort  Cardiac:  No edema.  Abdomen: abdomen is non-distended  Extremities: normal tone and muscle bulk.  Skin: no lesions or rash on visible skin. Warm and dry.   CNS: alert and oriented. Grossly non-focal.   Psychiatric: normal mood and affect.     Lab Results:    Recent Results (from the past 168 hour(s))   CBC with platelets and differential   Result Value Ref Range    WBC Count 2.2 (L) 4.0 - 11.0 10e3/uL    RBC Count 3.19 (L) 4.40 - 5.90 10e6/uL    Hemoglobin 8.1 (L) 13.3 - 17.7 g/dL    Hematocrit 26.9 (L) 40.0 - 53.0 %    MCV 84 78 - 100 fL    MCH 25.4 (L) 26.5 - 33.0 pg    MCHC 30.1 (L) 31.5 - 36.5 g/dL    RDW 18.0 (H) 10.0 - 15.0 %    Platelet Count 53 (L) 150 - 450 10e3/uL    % Neutrophils 66 %    % Lymphocytes 22 %    % Monocytes 9 %    % Eosinophils 2 %    % Basophils 0 %    % Immature Granulocytes 1 %    NRBCs per 100 WBC 0 <1 /100    " Absolute Neutrophils 1.5 (L) 1.6 - 8.3 10e3/uL    Absolute Lymphocytes 0.5 (L) 0.8 - 5.3 10e3/uL    Absolute Monocytes 0.2 0.0 - 1.3 10e3/uL    Absolute Eosinophils 0.0 0.0 - 0.7 10e3/uL    Absolute Basophils 0.0 0.0 - 0.2 10e3/uL    Absolute Immature Granulocytes 0.0 <=0.4 10e3/uL    Absolute NRBCs 0.0 10e3/uL      Imaging:    MR Brain w/o & w Contrast    Result Date: 6/8/2023  EXAM: MR BRAIN W/O and W CONTRAST LOCATION: [facility] DATE/TIME: 6/7/2023 5:36 PM CDT INDICATION: Follow up ethmoid tumor COMPARISON: 02/08/2023 CONTRAST: 6ml gadavist TECHNIQUE: Routine multiplanar multisequence head MRI without and with intravenous contrast. FINDINGS: KNOWN ETHMOID NEOPLASM: No definite recurrent mass. Moderate to marked membranous thickening through the maxillary and ethmoid sinuses, similar to prior. REMAINDER OF INTRACRANIAL CONTENTS: No acute or subacute infarct. No mass, acute hemorrhage, or extra-axial fluid collections. Scattered nonspecific T2/FLAIR hyperintensities within the cerebral white matter most consistent with mild chronic microvascular ischemic change. Mild generalized cerebral atrophy. No hydrocephalus. Normal position of the cerebellar tonsils. No pathologic contrast enhancement. SELLA: No abnormality accounting for technique. OSSEOUS STRUCTURES/SOFT TISSUES: Normal marrow signal. The major intracranial vascular flow voids are maintained. ORBITS: No abnormality accounting for technique. SINUSES/MASTOIDS: Marked stable mucosal thickening throughout maxillary and ethmoid sinuses, similar to prior. Opacity in the right ethmoidal air cells is more pronounced on the left. Persistent opacification of right frontal sinus with small mucocele without expansion. Scattered fluid/membrane thickening in the left mastoid air cells. No apparent mass in the posterior nasopharynx or skull base.     IMPRESSION: 1.  No evidence of recurrent mass. 2.  No evidence of metastatic disease to the  brain.         Signed by: Alan Frias MD

## 2023-06-09 NOTE — LETTER
"    6/9/2023         RE: Kristen Chapman  [address] [place] [address]        Dear Colleague,    Thank you for referring your patient, Kristen Chapman, to the [facility]. Please see a copy of my visit note below.    Oncology Rooming Note    June 9, 2023 9:27 AM   Kristen Chapman is a 73 year old male who presents for:    Chief Complaint   Patient presents with     Oncology Clinic Visit     4 month return Squamous cell carcinoma of nasopharynx, review labs & MRI     Initial Vitals: /73 (BP Location: Right arm, Patient Position: Sitting, Cuff Size: Adult Regular)   Pulse 57   Temp 98.4  F (36.9  C) (Oral)   Resp 24   Ht 1.6 m (5' 2.99\")   Wt 64.4 kg (142 lb)   SpO2 98%   BMI 25.16 kg/m   Estimated body mass index is 25.16 kg/m  as calculated from the following:    Height as of this encounter: 1.6 m (5' 2.99\").    Weight as of this encounter: 64.4 kg (142 lb). Body surface area is 1.69 meters squared.  No Pain (0) Comment: Data Unavailable   No LMP for male patient.  Allergies reviewed: Yes  Medications reviewed: Yes    Medications: Medication refills not needed today.  Pharmacy name entered into [employer]: [facility] - [place], [place] - [address] [place] [address]    Clinical concerns: 4 month return Squamous cell carcinoma of nasopharynx, review labs & MRI      [facility] Hematology and Oncology Progress Note    Patient: Kristen Chapman  MRN: [id number]  Date of Service: Jun 9, 2023        Assessment and Plan:    Cancer Staging  Nasopharyngeal carcinoma (H)  Staging form: Pharynx - Nasopharynx, AJCC 8th Edition  - Clinical stage from 2/18/2020: Stage SAMANTHA (cT4, cN2, cM0) - Signed by Alan Frias MD on 2/18/2020     1.  Nasopharyngeal carcinoma:   MRI was reviewed.  No evidence of recurrent disease.  We will continue imaging every 4 months.     2.  Epistaxis: He has nosebleed 2-3 times a week.  His INR is a little elevated. We will give him some vitamin K. IF " this does not help we will send him abck to ENT for direct visualization.    3.  Pancytopenia:  CBC from today was reviewed and shows WBC 2.2, HGB 8.1 and plt 53. Generally stable Folate and B12 are stable. His tsh is very elevated at 137. This may explain his decreasing hemoglobin. ANC of 1.5 is also a little lower than normal. Will follow. If no improvement may need a bine marrow biopsy.    He had a bone marrow biopsy in October 2021.  Next generation sequencing showed no abnormalities.  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  No splenomegaly or lymphadenopathy on imaging.  B12, folate, TSH have been evaluated for pancytopenia and were normal.  Reticulocyte count is not elevated.  We can check a copper at his next visit.  May need to repeat bone marrow biopsy.    3.  Hypothyroidism: TSH is markedly elevated. If he is taking his synthroid will titrate up. IF not, will restart.    4.  Hepatitis B infection: He is following in the outpatient infectious disease clinic.  DNA levels have been undetectable in the serum, most recently from October 10, 2022.    5.  Right submandibular mass:  We will get CT imaging to start. May need a PET scan if suspicious findings.     Today's visit was centered around a cancer diagnosis in the setting of additional medical comorbidities. The risk of additional morbidity or mortality with or without further treatment is high.     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  Diagnosed January 2020. Imaging suggested bilateral abnormal lymphadenopathy in the neck.  Also asymmetric soft tissue thickening in the posterior right nasopharynx.  On imaging, tumor also appeared to extend down to the hypopharynx.    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    Treatment:    Initially treated with concurrent chemotherapy and radiation.  He  had weekly cisplatin starting March 3, 2020. Fifth and final dose given March 31, 2020.  Subsequent chemotherapy was held due to prolonged thrombocytopenia and renal insufficiency.  Radiation dose was 7000 cGy in 35 fractions given from March 2 through April 17, 2020.     Started cisplatin with infusional 5-FU on June 1, 2020.  Cycle 1 given at a 25% dose reduction.  He still had significant thrombocytopenia and neutropenia on day 28.  Cycle 2 was held.  At the same time his liver function tests elevated secondary to hepatitis B.     Recurrence:  Radiosurgery delivered to the right ethmoid tumor delivered November 8 through November 17, 2021.  Received 3500 cGy in 5 fractions.  Pembrolizumab started 12/23/21.  Held after his July 1, 2022 dose.    Interim History:    Kristen Chapman returns today for follow-up visit. He is still troubled by lack of taste. HE has noticed a lump under his right mandible.NO headaches.    Review of Systems:    As above in the history.     Review of Systems otherwise Negative for:  General: chills, fever or night sweats  Psychological: anxiety or depression  Ophthalmic: blurry vision, double vision or loss of vision, vision change  ENT: oral lesions, hearing changes  Hematological and Lymphatic: bruising, jaundice, swollen lymph nodes  Endocrine: hot flashes, unexpected weight changes  Respiratory: cough, hemoptysis, orthopnea  Cardiovascular: chest pain, edema, palpitations or PND  Gastrointestinal: abdominal pain, blood in stools, change in bowel habits, constipation, diarrhea or nausea/vomiting  Genito-Urinary: change in urinary stream, incontinence, frequency/urgency  Musculoskeletal: joint swelling, muscle pain  Neurological: dizziness, headaches, numbness/tingling  Dermatological: lumps and rash    Past History:    Past Medical History:   Diagnosis Date     Anemia      Dysphagia      Hepatitis B chronic      Hypothyroidism      Nasopharyngeal cancer (H)      Severe protein-calorie  "malnutrition (H)      Thrombocytopenia (H)      Physical Exam:    /73 (BP Location: Right arm, Patient Position: Sitting, Cuff Size: Adult Regular)   Pulse 57   Temp 98.4  F (36.9  C) (Oral)   Resp 24   Ht 1.6 m (5' 2.99\")   Wt 64.4 kg (142 lb)   SpO2 98%   BMI 25.16 kg/m      General: patient appears stated age of 71 year old. Nontoxic and in no distress.   HEENT: Head: atraumatic, normocephalic. Sclerae anicteric.  Chest:  Normal respiratory effort  Cardiac:  No edema.  Abdomen: abdomen is non-distended  Extremities: normal tone and muscle bulk.  Skin: no lesions or rash on visible skin. Warm and dry.   CNS: alert and oriented. Grossly non-focal.   Psychiatric: normal mood and affect.     Lab Results:    Recent Results (from the past 168 hour(s))   CBC with platelets and differential   Result Value Ref Range    WBC Count 2.2 (L) 4.0 - 11.0 10e3/uL    RBC Count 3.19 (L) 4.40 - 5.90 10e6/uL    Hemoglobin 8.1 (L) 13.3 - 17.7 g/dL    Hematocrit 26.9 (L) 40.0 - 53.0 %    MCV 84 78 - 100 fL    MCH 25.4 (L) 26.5 - 33.0 pg    MCHC 30.1 (L) 31.5 - 36.5 g/dL    RDW 18.0 (H) 10.0 - 15.0 %    Platelet Count 53 (L) 150 - 450 10e3/uL    % Neutrophils 66 %    % Lymphocytes 22 %    % Monocytes 9 %    % Eosinophils 2 %    % Basophils 0 %    % Immature Granulocytes 1 %    NRBCs per 100 WBC 0 <1 /100    Absolute Neutrophils 1.5 (L) 1.6 - 8.3 10e3/uL    Absolute Lymphocytes 0.5 (L) 0.8 - 5.3 10e3/uL    Absolute Monocytes 0.2 0.0 - 1.3 10e3/uL    Absolute Eosinophils 0.0 0.0 - 0.7 10e3/uL    Absolute Basophils 0.0 0.0 - 0.2 10e3/uL    Absolute Immature Granulocytes 0.0 <=0.4 10e3/uL    Absolute NRBCs 0.0 10e3/uL      Imaging:    MR Brain w/o & w Contrast    Result Date: 6/8/2023  EXAM: MR BRAIN W/O and W CONTRAST LOCATION: [facility] DATE/TIME: 6/7/2023 5:36 PM CDT INDICATION: Follow up ethmoid tumor COMPARISON: 02/08/2023 CONTRAST: 6ml gadavist TECHNIQUE: Routine multiplanar multisequence head " MRI without and with intravenous contrast. FINDINGS: KNOWN ETHMOID NEOPLASM: No definite recurrent mass. Moderate to marked membranous thickening through the maxillary and ethmoid sinuses, similar to prior. REMAINDER OF INTRACRANIAL CONTENTS: No acute or subacute infarct. No mass, acute hemorrhage, or extra-axial fluid collections. Scattered nonspecific T2/FLAIR hyperintensities within the cerebral white matter most consistent with mild chronic microvascular ischemic change. Mild generalized cerebral atrophy. No hydrocephalus. Normal position of the cerebellar tonsils. No pathologic contrast enhancement. SELLA: No abnormality accounting for technique. OSSEOUS STRUCTURES/SOFT TISSUES: Normal marrow signal. The major intracranial vascular flow voids are maintained. ORBITS: No abnormality accounting for technique. SINUSES/MASTOIDS: Marked stable mucosal thickening throughout maxillary and ethmoid sinuses, similar to prior. Opacity in the right ethmoidal air cells is more pronounced on the left. Persistent opacification of right frontal sinus with small mucocele without expansion. Scattered fluid/membrane thickening in the left mastoid air cells. No apparent mass in the posterior nasopharynx or skull base.     IMPRESSION: 1.  No evidence of recurrent mass. 2.  No evidence of metastatic disease to the brain.         Signed by: Alan Frias MD        Again, thank you for allowing me to participate in the care of your patient.        Sincerely,        Alan Frias MD

## 2023-06-09 NOTE — PROGRESS NOTES
Infusion Nursing Note:  Kristen Chapman presents today for further labs and vitamin K infusion.    Patient seen by provider today: Yes: Dr Frias   present during visit today: No, Son interpreting, waiver signed    Note: Pt educated on plan of care. Per Dr Frias vitamin K is being given due to frequent nose bleeds. Vitamin K infused over 30 minutes.      Intravenous Access:  Implanted Port.    Treatment Conditions:  Not Applicable.      Post Infusion Assessment:  Patient tolerated infusion without incident.  Blood return noted pre and post infusion.  Site patent and intact, free from redness, edema or discomfort.  No evidence of extravasations.  Access discontinued per protocol.       Discharge Plan:   Patient discharged in stable condition accompanied by: father.  Departure Mode: Ambulatory.      Frances Killian RN

## 2023-06-10 LAB — COPPER SERPL-MCNC: 115.6 UG/DL

## 2023-06-12 ENCOUNTER — APPOINTMENT (OUTPATIENT)
Dept: INTERPRETER SERVICES | Facility: CLINIC | Age: 73
End: 2023-06-12

## 2023-06-12 DIAGNOSIS — D69.6 THROMBOCYTOPENIA (H): Primary | ICD-10-CM

## 2023-06-15 ENCOUNTER — HOSPITAL ENCOUNTER (OUTPATIENT)
Dept: CT IMAGING | Facility: HOSPITAL | Age: 73
Discharge: HOME | End: 2023-06-15
Attending: INTERNAL MEDICINE
Payer: COMMERCIAL

## 2023-06-15 DIAGNOSIS — C11.9 NASOPHARYNGEAL CARCINOMA (H): ICD-10-CM

## 2023-06-15 LAB
CREAT BLD-MCNC: 1.2 MG/DL (ref 0.7–1.3)
GFR SERPL CREATININE-BSD FRML MDRD: >60 ML/MIN/1.73M2
RADIOLOGIST FLAGS: ABNORMAL

## 2023-06-15 PROCEDURE — 250N000011 HC RX IP 250 OP 636: Performed by: INTERNAL MEDICINE

## 2023-06-15 PROCEDURE — 74177 CT ABD & PELVIS W/CONTRAST: CPT

## 2023-06-15 PROCEDURE — 82565 ASSAY OF CREATININE: CPT

## 2023-06-15 PROCEDURE — 70491 CT SOFT TISSUE NECK W/DYE: CPT

## 2023-06-15 RX ORDER — IOPAMIDOL 755 MG/ML
90 INJECTION, SOLUTION INTRAVASCULAR ONCE
Status: COMPLETED | OUTPATIENT
Start: 2023-06-15 | End: 2023-06-15

## 2023-06-15 RX ORDER — HEPARIN SODIUM (PORCINE) LOCK FLUSH IV SOLN 100 UNIT/ML 100 UNIT/ML
500 SOLUTION INTRAVENOUS ONCE
Status: COMPLETED | OUTPATIENT
Start: 2023-06-15 | End: 2023-06-15

## 2023-06-15 RX ADMIN — IOPAMIDOL 90 ML: 755 INJECTION, SOLUTION INTRAVENOUS at 15:23

## 2023-06-15 RX ADMIN — Medication 500 UNITS: at 15:22

## 2023-07-07 ENCOUNTER — LAB (OUTPATIENT)
Dept: INFUSION THERAPY | Facility: HOSPITAL | Age: 73
End: 2023-07-07
Attending: INTERNAL MEDICINE
Payer: COMMERCIAL

## 2023-07-07 ENCOUNTER — INFUSION THERAPY VISIT (OUTPATIENT)
Dept: INFUSION THERAPY | Facility: HOSPITAL | Age: 73
End: 2023-07-07
Payer: COMMERCIAL

## 2023-07-07 VITALS
TEMPERATURE: 97.6 F | RESPIRATION RATE: 16 BRPM | SYSTOLIC BLOOD PRESSURE: 108 MMHG | DIASTOLIC BLOOD PRESSURE: 62 MMHG | HEART RATE: 52 BPM | OXYGEN SATURATION: 98 %

## 2023-07-07 DIAGNOSIS — R79.1 ELEVATED INR: Primary | ICD-10-CM

## 2023-07-07 DIAGNOSIS — D69.6 THROMBOCYTOPENIA (H): ICD-10-CM

## 2023-07-07 DIAGNOSIS — C11.9 NASOPHARYNGEAL CARCINOMA (H): Primary | ICD-10-CM

## 2023-07-07 DIAGNOSIS — C11.9 SQUAMOUS CELL CARCINOMA OF NASOPHARYNX (H): Primary | ICD-10-CM

## 2023-07-07 LAB
BASOPHILS # BLD AUTO: 0 10E3/UL (ref 0–0.2)
BASOPHILS NFR BLD AUTO: 1 %
EOSINOPHIL # BLD AUTO: 0.1 10E3/UL (ref 0–0.7)
EOSINOPHIL NFR BLD AUTO: 2 %
ERYTHROCYTE [DISTWIDTH] IN BLOOD BY AUTOMATED COUNT: 17.2 % (ref 10–15)
HCT VFR BLD AUTO: 27.7 % (ref 40–53)
HGB BLD-MCNC: 8.6 G/DL (ref 13.3–17.7)
IMM GRANULOCYTES # BLD: 0 10E3/UL
IMM GRANULOCYTES NFR BLD: 0 %
INR PPP: 1.25 (ref 0.85–1.15)
LYMPHOCYTES # BLD AUTO: 0.5 10E3/UL (ref 0.8–5.3)
LYMPHOCYTES NFR BLD AUTO: 20 %
MCH RBC QN AUTO: 26.5 PG (ref 26.5–33)
MCHC RBC AUTO-ENTMCNC: 31 G/DL (ref 31.5–36.5)
MCV RBC AUTO: 86 FL (ref 78–100)
MONOCYTES # BLD AUTO: 0.3 10E3/UL (ref 0–1.3)
MONOCYTES NFR BLD AUTO: 12 %
NEUTROPHILS # BLD AUTO: 1.5 10E3/UL (ref 1.6–8.3)
NEUTROPHILS NFR BLD AUTO: 65 %
NRBC # BLD AUTO: 0 10E3/UL
NRBC BLD AUTO-RTO: 0 /100
PLATELET # BLD AUTO: 52 10E3/UL (ref 150–450)
RBC # BLD AUTO: 3.24 10E6/UL (ref 4.4–5.9)
WBC # BLD AUTO: 2.3 10E3/UL (ref 4–11)

## 2023-07-07 PROCEDURE — 258N000003 HC RX IP 258 OP 636: Performed by: NURSE PRACTITIONER

## 2023-07-07 PROCEDURE — 36591 DRAW BLOOD OFF VENOUS DEVICE: CPT

## 2023-07-07 PROCEDURE — 85025 COMPLETE CBC W/AUTO DIFF WBC: CPT

## 2023-07-07 PROCEDURE — 250N000011 HC RX IP 250 OP 636: Mod: JZ | Performed by: INTERNAL MEDICINE

## 2023-07-07 PROCEDURE — 250N000011 HC RX IP 250 OP 636: Mod: JZ | Performed by: NURSE PRACTITIONER

## 2023-07-07 PROCEDURE — 85610 PROTHROMBIN TIME: CPT

## 2023-07-07 PROCEDURE — 96365 THER/PROPH/DIAG IV INF INIT: CPT

## 2023-07-07 RX ORDER — HEPARIN SODIUM (PORCINE) LOCK FLUSH IV SOLN 100 UNIT/ML 100 UNIT/ML
5 SOLUTION INTRAVENOUS
Status: DISCONTINUED | OUTPATIENT
Start: 2023-07-07 | End: 2023-07-07 | Stop reason: HOSPADM

## 2023-07-07 RX ADMIN — Medication 5 ML: at 11:25

## 2023-07-07 RX ADMIN — PHYTONADIONE 5 MG: 10 INJECTION, EMULSION INTRAMUSCULAR; INTRAVENOUS; SUBCUTANEOUS at 10:32

## 2023-07-07 ASSESSMENT — PAIN SCALES - GENERAL: PAINLEVEL: NO PAIN (0)

## 2023-07-07 NOTE — PROGRESS NOTES
Infusion Nursing Note:  Kristen Chapman presents today for labs and Vitamin K infusion.    Patient seen by provider today: No   present during visit today: Not Applicable.    Note: Pt educated on plan of care.  vitamin K given as ordered.  Message sent to to Care coordination for referral to ENT as previously discussed with Joanna Diggs CNP.      Intravenous Access:  Labs drawn without difficulty.  Implanted Port.    Treatment Conditions:  Results reviewed, labs MET treatment parameters, ok to proceed with treatment.      Post Infusion Assessment:  Patient tolerated infusion without incident.  Blood return noted pre and post infusion.  No evidence of extravasations.  Access discontinued per protocol.       Discharge Plan:   Discharge instructions reviewed with: Patient.  Patient and/or family verbalized understanding of discharge instructions and all questions answered.  Patient discharged in stable condition accompanied by: self.  Departure Mode: Ambulatory.      Julienne Robins RN

## 2023-08-18 ENCOUNTER — PATIENT OUTREACH (OUTPATIENT)
Dept: ONCOLOGY | Facility: HOSPITAL | Age: 73
End: 2023-08-18

## 2023-08-18 DIAGNOSIS — D64.9 ANEMIA, UNSPECIFIED TYPE: ICD-10-CM

## 2023-08-18 DIAGNOSIS — E03.9 ACQUIRED HYPOTHYROIDISM: Primary | ICD-10-CM

## 2023-08-18 DIAGNOSIS — C11.9 SQUAMOUS CELL CARCINOMA OF NASOPHARYNX (H): Primary | ICD-10-CM

## 2023-08-18 RX ORDER — LEVOTHYROXINE SODIUM 75 UG/1
75 TABLET ORAL DAILY
Qty: 30 TABLET | Refills: 1 | Status: SHIPPED | OUTPATIENT
Start: 2023-08-18 | End: 2023-08-23 | Stop reason: DRUGHIGH

## 2023-08-18 NOTE — PROGRESS NOTES
[facility]/Voicemail    Clinical Data: Care Coordinator Outreach    Outreach attempted x 1.  Left message on patient's voicemail with call back information and requested return call.    Plan: Care Coordinator will try to reach patient again in 3-5 business days.    Dr. Frias would like to know if the lymph node is still swollen under mandible, if so biopsy will be ordered.    Sent in Rx for Synthroid due to TSH level.    Appointment 9/8.    Donn Akers, RNCC

## 2023-08-19 ENCOUNTER — HEALTH MAINTENANCE LETTER (OUTPATIENT)
Age: 73
End: 2023-08-19

## 2023-08-23 ENCOUNTER — TELEPHONE (OUTPATIENT)
Dept: ONCOLOGY | Facility: HOSPITAL | Age: 73
End: 2023-08-23

## 2023-08-23 ENCOUNTER — APPOINTMENT (OUTPATIENT)
Dept: INTERPRETER SERVICES | Facility: CLINIC | Age: 73
End: 2023-08-23

## 2023-08-23 DIAGNOSIS — C11.9 SQUAMOUS CELL CARCINOMA OF NASOPHARYNX (H): ICD-10-CM

## 2023-08-23 DIAGNOSIS — E03.9 ACQUIRED HYPOTHYROIDISM: Primary | ICD-10-CM

## 2023-08-23 RX ORDER — LEVOTHYROXINE SODIUM 175 UG/1
175 TABLET ORAL DAILY
Qty: 30 TABLET | Refills: 2 | Status: SHIPPED | OUTPATIENT
Start: 2023-08-23 | End: 2024-01-23

## 2023-08-23 NOTE — TELEPHONE ENCOUNTER
Services Used  Name: Dolly  ID# [id number]    New prescription for levothyroxine 175mcg sent to patients pharmacy.  Also, biopsy ordered and IR will schedule this.  It was put in as urgent in hopes of getting results back prior to or for the 9/8 appt.    Call placed to [place] [facility] to have them look at the order and see if it was ready for scheduling.  Since the order still reads IR referral, it is not ready to be scheduled.  Once the order reads ultrasound guided lymph node biopsy, then it will be ready to schedule.  It will be scheduled with the ultrasound department and can be scheduled through the main imaging number at [phone].  I will have the RN care coordinator pool watch for this and asked that our schedulers call to schedule this with a [facility] .\    Patient is aware that someone will be calling him to schedule this.    Isabella Raymond RN

## 2023-08-23 NOTE — TELEPHONE ENCOUNTER
Per Dr. Frias request, call placed to patient to see how the swelling under his right mandible was doing.  If it is the same and has not gotten better, Dr. Frias would like to get a biopsy of this area prior to the patient's follow-up with Dr. Frias on 9/8.  When I called the patient to discuss, he tells me that the swelling is unchanged.  I discussed the need for a biopsy of the area.  He is agreeable.  I also followed up as JACQUES Pop care coordinator left a message for him back on 8/21.  He was telling him about his TSH being elevated at 137.40.  He stated that we were sending in a new prescription for Synthroid.  It appears that Dr. Frias sent in Synthroid 75 mcg daily.  I discussed this with the patient and confirmed what dosing he is currently taking.  He is currently taking 137 mcg.  This was told to me after he went and got his bottle of Synthroid.  I will discuss this with Dr. Frias and get a new prescription sent over to [facility].  Patient verbalized understanding and is aware that our schedulers will call him to set up the biopsy prior to the 9/8 appointment with Dr. Frias.    Isabella Raymond RN

## 2023-08-31 ENCOUNTER — HOSPITAL ENCOUNTER (OUTPATIENT)
Dept: ULTRASOUND IMAGING | Facility: HOSPITAL | Age: 73
Discharge: HOME | End: 2023-08-31
Attending: INTERNAL MEDICINE | Admitting: PATHOLOGY
Payer: COMMERCIAL

## 2023-08-31 DIAGNOSIS — C11.9 SQUAMOUS CELL CARCINOMA OF NASOPHARYNX (H): Primary | ICD-10-CM

## 2023-08-31 DIAGNOSIS — C11.9 NASOPHARYNGEAL CARCINOMA (H): ICD-10-CM

## 2023-08-31 LAB
LAB DIRECTOR COMMENTS: NORMAL
LAB DIRECTOR DISCLAIMER: NORMAL
LAB DIRECTOR INTERPRETATION: NORMAL
LAB DIRECTOR METHODOLOGY: NORMAL
LAB DIRECTOR RESULTS: NORMAL
SPECIMEN DESCRIPTION: NORMAL

## 2023-08-31 PROCEDURE — 88173 CYTOPATH EVAL FNA REPORT: CPT | Mod: TC | Performed by: INTERNAL MEDICINE

## 2023-08-31 PROCEDURE — 88305 TISSUE EXAM BY PATHOLOGIST: CPT | Mod: TC | Performed by: INTERNAL MEDICINE

## 2023-08-31 PROCEDURE — 38505 NEEDLE BIOPSY LYMPH NODES: CPT

## 2023-08-31 PROCEDURE — G0452 MOLECULAR PATHOLOGY INTERPR: HCPCS | Mod: 26 | Performed by: PATHOLOGY

## 2023-08-31 PROCEDURE — 272N000710 US BIOPSY CORE LYMPH NODE

## 2023-08-31 PROCEDURE — 88365 INSITU HYBRIDIZATION (FISH): CPT | Mod: TC | Performed by: INTERNAL MEDICINE

## 2023-08-31 PROCEDURE — 87624 HPV HI-RISK TYP POOLED RSLT: CPT | Performed by: INTERNAL MEDICINE

## 2023-09-01 PROCEDURE — 88365 INSITU HYBRIDIZATION (FISH): CPT | Mod: 26 | Performed by: PATHOLOGY

## 2023-09-01 PROCEDURE — 88341 IMHCHEM/IMCYTCHM EA ADD ANTB: CPT | Mod: 26 | Performed by: PATHOLOGY

## 2023-09-01 PROCEDURE — 88333 PATH CONSLTJ SURG CYTO XM 1: CPT | Mod: 26 | Performed by: PATHOLOGY

## 2023-09-01 PROCEDURE — 88305 TISSUE EXAM BY PATHOLOGIST: CPT | Mod: 26 | Performed by: PATHOLOGY

## 2023-09-01 PROCEDURE — 88360 TUMOR IMMUNOHISTOCHEM/MANUAL: CPT | Mod: 26 | Performed by: PATHOLOGY

## 2023-09-01 PROCEDURE — 88342 IMHCHEM/IMCYTCHM 1ST ANTB: CPT | Mod: 26 | Performed by: PATHOLOGY

## 2023-09-06 ENCOUNTER — TELEPHONE (OUTPATIENT)
Dept: ONCOLOGY | Facility: HOSPITAL | Age: 73
End: 2023-09-06

## 2023-09-06 ENCOUNTER — HOSPITAL ENCOUNTER (OUTPATIENT)
Dept: MRI IMAGING | Facility: HOSPITAL | Age: 73
Discharge: HOME | End: 2023-09-06
Attending: INTERNAL MEDICINE | Admitting: INTERNAL MEDICINE
Payer: COMMERCIAL

## 2023-09-06 DIAGNOSIS — C11.9 NASOPHARYNGEAL CARCINOMA (H): ICD-10-CM

## 2023-09-06 PROCEDURE — 255N000002 HC RX 255 OP 636: Mod: JZ | Performed by: INTERNAL MEDICINE

## 2023-09-06 PROCEDURE — A9585 GADOBUTROL INJECTION: HCPCS | Mod: JZ | Performed by: INTERNAL MEDICINE

## 2023-09-06 PROCEDURE — 70553 MRI BRAIN STEM W/O & W/DYE: CPT

## 2023-09-06 PROCEDURE — 250N000011 HC RX IP 250 OP 636: Mod: JZ | Performed by: INTERNAL MEDICINE

## 2023-09-06 RX ORDER — GADOBUTROL 604.72 MG/ML
6 INJECTION INTRAVENOUS ONCE
Status: COMPLETED | OUTPATIENT
Start: 2023-09-06 | End: 2023-09-06

## 2023-09-06 RX ORDER — HEPARIN SODIUM (PORCINE) LOCK FLUSH IV SOLN 100 UNIT/ML 100 UNIT/ML
5-10 SOLUTION INTRAVENOUS
Status: DISCONTINUED | OUTPATIENT
Start: 2023-09-06 | End: 2023-09-07 | Stop reason: HOSPADM

## 2023-09-06 RX ADMIN — GADOBUTROL 6 ML: 604.72 INJECTION INTRAVENOUS at 14:47

## 2023-09-06 RX ADMIN — Medication 5 ML: at 15:01

## 2023-09-06 NOTE — TELEPHONE ENCOUNTER
Patient's son calls in today wanting to request that Dr. Frias assessed the patient on Friday when he is in for his appointment on 9/8.  He states that for the past few weeks patient's stomach has been more bloated and somewhat distended.  He does not have any gas but is able to pass small amounts of stool.  He just wants to make sure that patient's abdomen is assessed.  I will get this routed over to Dr. Frias as well as the RN care coordinator.    Isabella Raymond RN

## 2023-09-08 ENCOUNTER — LAB (OUTPATIENT)
Dept: INFUSION THERAPY | Facility: HOSPITAL | Age: 73
End: 2023-09-08
Attending: INTERNAL MEDICINE
Payer: COMMERCIAL

## 2023-09-08 ENCOUNTER — ONCOLOGY VISIT (OUTPATIENT)
Dept: ONCOLOGY | Facility: HOSPITAL | Age: 73
End: 2023-09-08
Attending: INTERNAL MEDICINE
Payer: COMMERCIAL

## 2023-09-08 VITALS
TEMPERATURE: 98.8 F | SYSTOLIC BLOOD PRESSURE: 144 MMHG | RESPIRATION RATE: 20 BRPM | HEART RATE: 58 BPM | OXYGEN SATURATION: 98 % | WEIGHT: 144.4 LBS | DIASTOLIC BLOOD PRESSURE: 69 MMHG | HEIGHT: 63 IN | BODY MASS INDEX: 25.59 KG/M2

## 2023-09-08 DIAGNOSIS — C11.9 SQUAMOUS CELL CARCINOMA OF NASOPHARYNX (H): Primary | ICD-10-CM

## 2023-09-08 DIAGNOSIS — D64.9 ANEMIA, UNSPECIFIED TYPE: ICD-10-CM

## 2023-09-08 DIAGNOSIS — C11.3: ICD-10-CM

## 2023-09-08 DIAGNOSIS — D69.6 THROMBOCYTOPENIA (H): ICD-10-CM

## 2023-09-08 DIAGNOSIS — R18.8 OTHER ASCITES: ICD-10-CM

## 2023-09-08 LAB
ALBUMIN SERPL BCG-MCNC: 3.1 G/DL (ref 3.5–5.2)
ALP SERPL-CCNC: 64 U/L (ref 40–129)
ALT SERPL W P-5'-P-CCNC: 27 U/L (ref 0–70)
ANION GAP SERPL CALCULATED.3IONS-SCNC: 9 MMOL/L (ref 7–15)
AST SERPL W P-5'-P-CCNC: 44 U/L (ref 0–45)
BASOPHILS # BLD AUTO: 0 10E3/UL (ref 0–0.2)
BASOPHILS NFR BLD AUTO: 1 %
BILIRUB DIRECT SERPL-MCNC: 0.32 MG/DL (ref 0–0.3)
BILIRUB SERPL-MCNC: 1.1 MG/DL
BUN SERPL-MCNC: 26 MG/DL (ref 8–23)
CALCIUM SERPL-MCNC: 8.2 MG/DL (ref 8.8–10.2)
CHLORIDE SERPL-SCNC: 108 MMOL/L (ref 98–107)
CREAT SERPL-MCNC: 1.14 MG/DL (ref 0.67–1.17)
DEPRECATED HCO3 PLAS-SCNC: 23 MMOL/L (ref 22–29)
EGFRCR SERPLBLD CKD-EPI 2021: 69 ML/MIN/1.73M2
EOSINOPHIL # BLD AUTO: 0.1 10E3/UL (ref 0–0.7)
EOSINOPHIL NFR BLD AUTO: 2 %
ERYTHROCYTE [DISTWIDTH] IN BLOOD BY AUTOMATED COUNT: 21.8 % (ref 10–15)
GLUCOSE SERPL-MCNC: 90 MG/DL (ref 70–99)
HCT VFR BLD AUTO: 26 % (ref 40–53)
HGB BLD-MCNC: 7.9 G/DL (ref 13.3–17.7)
IMM GRANULOCYTES # BLD: 0 10E3/UL
IMM GRANULOCYTES NFR BLD: 1 %
LYMPHOCYTES # BLD AUTO: 0.5 10E3/UL (ref 0.8–5.3)
LYMPHOCYTES NFR BLD AUTO: 24 %
MCH RBC QN AUTO: 27.8 PG (ref 26.5–33)
MCHC RBC AUTO-ENTMCNC: 30.4 G/DL (ref 31.5–36.5)
MCV RBC AUTO: 92 FL (ref 78–100)
MONOCYTES # BLD AUTO: 0.2 10E3/UL (ref 0–1.3)
MONOCYTES NFR BLD AUTO: 10 %
NEUTROPHILS # BLD AUTO: 1.4 10E3/UL (ref 1.6–8.3)
NEUTROPHILS NFR BLD AUTO: 62 %
NRBC # BLD AUTO: 0 10E3/UL
NRBC BLD AUTO-RTO: 0 /100
PLATELET # BLD AUTO: 39 10E3/UL (ref 150–450)
POTASSIUM SERPL-SCNC: 3.7 MMOL/L (ref 3.4–5.3)
PROT SERPL-MCNC: 6.5 G/DL (ref 6.4–8.3)
RBC # BLD AUTO: 2.84 10E6/UL (ref 4.4–5.9)
SODIUM SERPL-SCNC: 140 MMOL/L (ref 136–145)
T4 FREE SERPL-MCNC: 1.86 NG/DL (ref 0.9–1.7)
TSH SERPL DL<=0.005 MIU/L-ACNC: 31.29 UIU/ML (ref 0.3–4.2)
WBC # BLD AUTO: 2.2 10E3/UL (ref 4–11)

## 2023-09-08 PROCEDURE — 82248 BILIRUBIN DIRECT: CPT

## 2023-09-08 PROCEDURE — G0463 HOSPITAL OUTPT CLINIC VISIT: HCPCS | Performed by: INTERNAL MEDICINE

## 2023-09-08 PROCEDURE — 99215 OFFICE O/P EST HI 40 MIN: CPT | Performed by: INTERNAL MEDICINE

## 2023-09-08 PROCEDURE — 84439 ASSAY OF FREE THYROXINE: CPT

## 2023-09-08 PROCEDURE — 80053 COMPREHEN METABOLIC PANEL: CPT

## 2023-09-08 PROCEDURE — 36591 DRAW BLOOD OFF VENOUS DEVICE: CPT

## 2023-09-08 PROCEDURE — 84443 ASSAY THYROID STIM HORMONE: CPT

## 2023-09-08 PROCEDURE — 250N000011 HC RX IP 250 OP 636: Mod: JZ | Performed by: INTERNAL MEDICINE

## 2023-09-08 PROCEDURE — 85025 COMPLETE CBC W/AUTO DIFF WBC: CPT

## 2023-09-08 RX ORDER — CALCIUM CARBONATE 750 MG/1
750 TABLET, CHEWABLE ORAL DAILY
COMMUNITY
End: 2023-12-07

## 2023-09-08 RX ORDER — HEPARIN SODIUM (PORCINE) LOCK FLUSH IV SOLN 100 UNIT/ML 100 UNIT/ML
5 SOLUTION INTRAVENOUS
Status: DISCONTINUED | OUTPATIENT
Start: 2023-09-08 | End: 2023-09-08 | Stop reason: HOSPADM

## 2023-09-08 RX ADMIN — Medication 5 ML: at 09:18

## 2023-09-08 ASSESSMENT — PAIN SCALES - GENERAL: PAINLEVEL: NO PAIN (0)

## 2023-09-08 NOTE — LETTER
9/8/2023         RE: Kristen Chapman  [address] [place] [address]        Dear Colleague,    Thank you for referring your patient, Kristen Chapman, to the [facility] [place]. Please see a copy of my visit note below.    [facility] Hematology and Oncology Progress Note    Patient: Kristen Chapman  MRN: [id number]  Date of Service: Sep 8, 2023        Assessment and Plan:    Cancer Staging  Nasopharyngeal carcinoma (H)  Staging form: Pharynx - Nasopharynx, AJCC 8th Edition  - Clinical stage from 2/18/2020: Stage SAMANTHA (cT4, cN2, cM0) - Signed by Alan Frias MD on 2/18/2020     1.  Nasopharyngeal carcinoma: MRI from September 6 was reviewed.  No evidence of recurrent disease in the brain or ethmoid sinus.  We will continue imaging every 4 months.   He also had a biopsy of a right submandibular node.  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  This was reviewed with Kristen.  Neck step will be a PET scan to complete staging.  If he has disseminated disease we will have to start him on chemotherapy.  Immunotherapy probably would not be a good idea given his reactivated hepatitis B.  If he has a localized disease we could consider radiation.  He has now been on systemic therapy since July 1, 2022.  We will send her recent tumor biopsy for next generation sequencing.    2.  Pancytopenia: He has chronic pancytopenia secondary to chronic hepatitis B infection as well as cirrhosis.  He also has splenomegaly measuring 17 cm on CT from Sophia 15, 2023.  He had a bone marrow biopsy in October 2021.  Next generation sequencing showed no abnormalities.  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     3.  Hypothyroidism: His Synthroid dose was increased a few weeks ago.  TSH today was checked and was 32.  Significantly improved.  We will continue to monitor for now as his T4 is a little high at 1.86.      4.  Hepatitis B infection: Viral DNA in the serum was 54  million on April 4.  He had a second drug added to his regimen which she states he is taking.  He is following with infectious disease.    5.  Ascites: He has new ascites clinically.  His abdomen is quite distended.  No fevers.  Probably from his cirrhosis/hepatitis/potentially heart failure.  Doubt malignant.  We are going to get a therapeutic and diagnostic paracentesis.  He will likely need to be referred to hepatology for management of his liver disease.  LFTs today reviewed and show a total bilirubin of 1.1, alkaline phosphatase 64, AST 44, and ALT 27.  Finally, we can get an echocardiogram with Dopplers at his next visit.    Medical decision Making:  I spent 45 minutes in the care of this patient today, which included time necessary for preparation for the visit, face to face time with the patient, communication of recommendations to the care team, and documentation time.    Today's visit was centered around a cancer diagnosis in the setting of additional medical comorbidities. Complex medical decision making was required. The risk of additional morbidity without further treatment is high.     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  Diagnosed January 2020. Imaging suggested bilateral abnormal lymphadenopathy in the neck.  Also asymmetric soft tissue thickening in the posterior right nasopharynx.  On imaging, tumor also appeared to extend down to the hypopharynx.    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  Right neck lymph node positive for recurrent nasopharyngeal carcinoma.  PET scan at that time showed new hypermetabolic mediastinal lymphadenopathy (chronic, most likely reactive), mass in the right ethmoid sinus, hypermetabolic right level 1B and 2A lymph nodes.    Treatment:    Initially treated with concurrent chemotherapy and radiation.  He had weekly cisplatin starting March 3, 2020.  Fifth and final dose given March 31, 2020.  Subsequent chemotherapy was held due to prolonged thrombocytopenia and renal insufficiency.  Radiation dose was 7000 cGy in 35 fractions given from March 2 through April 17, 2020.     Started cisplatin with infusional 5-FU on June 1, 2020.  Cycle 1 given at a 25% dose reduction.  He still had significant thrombocytopenia and neutropenia on day 28.  Cycle 2 was held.  At the same time his liver function tests elevated secondary to hepatitis B.   PET scan in September 2020 showed no good evidence of residual malignancy.    Recurrence:  Radiosurgery delivered to the right ethmoid tumor delivered November 8 through November 17, 2021.  Received 3500 cGy in 5 fractions.  Pembrolizumab started 12/23/21.  Held after his July 1, 2022 dose.  PET scan in March 2022 showed a near complete response.    Interim History:    Kristen Chapman returns today for follow-up visit. Was last seem 3 months ago.  He recently had a biopsy of his right submandibular lymph node.  Today he is complaining of abdominal distention and bloating with discomfort.  No fevers or chills.  No abdominal pain.  His appetite is decreased and he has had some weight loss.  No lower extremity edema.  Bowel movements are small and soft.    Review of Systems:    As above in the history.     Review of Systems otherwise Negative for:  General: chills, fever or night sweats  Psychological: anxiety or depression  Ophthalmic: blurry vision, double vision or loss of vision, vision change  ENT: oral lesions, hearing changes  Hematological and Lymphatic: bruising, jaundice, swollen lymph nodes  Endocrine: hot flashes  Respiratory: cough, hemoptysis, orthopnea  Cardiovascular: chest pain, palpitations or PND  Gastrointestinal: abdominal pain, blood in stools, change in bowel habits, constipation, diarrhea or nausea/vomiting  Genito-Urinary: change in urinary stream, incontinence, frequency/urgency  Musculoskeletal: joint swelling,  "muscle pain  Neurological: dizziness, headaches, numbness/tingling  Dermatological: lumps and rash    Past History:    Past Medical History:   Diagnosis Date     Anemia      Dysphagia      Hepatitis B chronic      Hypothyroidism      Nasopharyngeal cancer (H)      Severe protein-calorie malnutrition (H)      Thrombocytopenia (H)      Physical Exam:    BP (!) 144/69 (BP Location: Left arm, Patient Position: Sitting, Cuff Size: Adult Regular)   Pulse 58   Temp 98.8  F (37.1  C) (Oral)   Resp 20   Ht 1.6 m (5' 3\")   Wt 65.5 kg (144 lb 6.4 oz)   SpO2 98%   BMI 25.58 kg/m      General: patient appears stated age of 71 year old. Nontoxic and in no distress.   HEENT: Head: atraumatic, normocephalic. Sclerae anicteric.  Chest:  Normal respiratory effort  Cardiac:  No edema.  Abdomen: abdomen is significantly distended with fluid, nontender, no palpable mass.  Extremities: normal tone and muscle bulk.  Skin: no lesions or rash on visible skin. Warm and dry.   CNS: alert and oriented. Grossly non-focal.   Psychiatric: normal mood and affect.     Lab Results:    Recent Results (from the past 168 hour(s))   Basic metabolic panel   Result Value Ref Range    Sodium 140 136 - 145 mmol/L    Potassium 3.7 3.4 - 5.3 mmol/L    Chloride 108 (H) 98 - 107 mmol/L    Carbon Dioxide (CO2) 23 22 - 29 mmol/L    Anion Gap 9 7 - 15 mmol/L    Urea Nitrogen 26.0 (H) 8.0 - 23.0 mg/dL    Creatinine 1.14 0.67 - 1.17 mg/dL    Calcium 8.2 (L) 8.8 - 10.2 mg/dL    Glucose 90 70 - 99 mg/dL    GFR Estimate 69 >60 mL/min/1.73m2   TSH with free T4 reflex   Result Value Ref Range    TSH 31.29 (H) 0.30 - 4.20 uIU/mL   CBC with platelets and differential   Result Value Ref Range    WBC Count 2.2 (L) 4.0 - 11.0 10e3/uL    RBC Count 2.84 (L) 4.40 - 5.90 10e6/uL    Hemoglobin 7.9 (L) 13.3 - 17.7 g/dL    Hematocrit 26.0 (L) 40.0 - 53.0 %    MCV 92 78 - 100 fL    MCH 27.8 26.5 - 33.0 pg    MCHC 30.4 (L) 31.5 - 36.5 g/dL    RDW 21.8 (H) 10.0 - 15.0 %    " Platelet Count 39 (LL) 150 - 450 10e3/uL    % Neutrophils 62 %    % Lymphocytes 24 %    % Monocytes 10 %    % Eosinophils 2 %    % Basophils 1 %    % Immature Granulocytes 1 %    NRBCs per 100 WBC 0 <1 /100    Absolute Neutrophils 1.4 (L) 1.6 - 8.3 10e3/uL    Absolute Lymphocytes 0.5 (L) 0.8 - 5.3 10e3/uL    Absolute Monocytes 0.2 0.0 - 1.3 10e3/uL    Absolute Eosinophils 0.1 0.0 - 0.7 10e3/uL    Absolute Basophils 0.0 0.0 - 0.2 10e3/uL    Absolute Immature Granulocytes 0.0 <=0.4 10e3/uL    Absolute NRBCs 0.0 10e3/uL   T4 free   Result Value Ref Range    Free T4 1.86 (H) 0.90 - 1.70 ng/dL   Hepatic panel (Albumin, ALT, AST, Bili, Alk Phos, TP)   Result Value Ref Range    Protein Total 6.5 6.4 - 8.3 g/dL    Albumin 3.1 (L) 3.5 - 5.2 g/dL    Bilirubin Total 1.1 <=1.2 mg/dL    Alkaline Phosphatase 64 40 - 129 U/L    AST 44 0 - 45 U/L    ALT 27 0 - 70 U/L    Bilirubin Direct 0.32 (H) 0.00 - 0.30 mg/dL      Imaging:    MR Brain w/o & w Contrast    Result Date: 9/6/2023  EXAM: MR BRAIN W/O and W CONTRAST LOCATION: [facility] DATE: 9/6/2023 INDICATION: f u Nasopharyngeal carcinoma COMPARISON: Soft tissue neck CT 06/15/2023 and MRI 06/07/2023. MRI 10/03/2022 CONTRAST: 6ml gadavist TECHNIQUE: Multiplanar multisequence head MRI without and with intravenous contrast including dedicated imaging of the skull base and nasopharynx. FINDINGS: SKULL BASE AND NASOPHARYNX: Dedicated imaging of the skull base extending to involve the adjacent upper aerodigestive tract demonstrates persistent post therapeutic changes involving the nasopharyngeal mucosa without evidence for locally recurrent mucosal mass. There is persistent mucosal thickening involving the paranasal sinuses with complete opacification of the right frontal sinus and anterior right ethmoid air cells, subtotal opacification of the left ethmoid air cells, and moderate circumferential mucosal thickening involving the bilateral maxillary sinuses and  right sphenoid sinus similar to findings on the previous exam. Some dependent secretions persists primarily within the left maxillary sinus as seen previously. These areas mucosal abnormality demonstrated persistent irregular enhancement without evidence for new or progressive masslike abnormality in the sinonasal region. Persistent patchy opacification of left mastoid air cells. INTRACRANIAL CONTENTS: No acute or subacute infarct. No definite intracranial mass or mass effect as visualized. Unchanged brain parenchymal signal on the provided sequences. Mild generalized cerebral atrophy. No hydrocephalus. Normal position of the cerebellar tonsils. No evidence for new or progressive enhancing abnormality intracranially. OTHER: Multilobular heterogeneously T2 hyperintense and heterogeneously enhancing mass in the right submandibular space presumably representing pathologic cervical lymph nodes consistent with biopsy-proven metastatic squamous cell carcinoma. This lesion measures approximately 2.1 x 2.2 x 2.5 cm in greatest transverse, AP, and craniocaudal dimensions respectively on postcontrast images. This is consistent with findings on the recent prior neck CT.     IMPRESSION: 1.  Unchanged post therapeutic appearance of the skull base and upper aerodigestive tract without evidence for new or locally recurrent mucosal neoplasm. 2.  Heterogeneously signaling, heterogeneously enhancing soft tissue mass in the right submandibular space consistent with pathologic cervical lymph nodes and biopsy-proven metastatic squamous cell carcinoma. This is similar to findings on the previous neck CT. 3.  Persistent inflammatory changes of the paranasal sinuses including dependent secretions within the left maxillary sinus. 4.  No evidence for acute intracranial process.     US Biopsy Lymph Node Core    Result Date: 8/31/2023  EXAM: 1. PERCUTANEOUS BIOPSY OF A RIGHT SUBMANDIBULAR MASS 2. ULTRASOUND GUIDANCE LOCATION: [facility]  "[facility] DATE: 8/31/2023 INDICATION: Salivary gland mass, initial workup PROCEDURE: Informed consent obtained. Site marked. Prior images reviewed. Required items made available. Patient identity was confirmed verbally and with arm band. Patient reevaluated immediately before beginning the procedure. Universal protocol was followed. Time out performed. The site was prepped and draped in sterile fashion. 10 mL of 1 percent lidocaine was infused into the local soft tissues. Using standard technique and under direct ultrasound guidance, a total of 5 fine needle aspiration samples and two 18 gauge biopsy samples were obtained. Tissue was submitted to Pathology. The patient tolerated the procedure well. No complications.     IMPRESSION: Status post US-guided biopsy of a right submandibular mass. Reference CPT Code: 54397, 05990       Signed by: Alan Frias MD    Oncology Rooming Note    September 8, 2023 9:57 AM   Kristen Chapman is a 73 year old male who presents for:    Chief Complaint   Patient presents with     Oncology Clinic Visit     4 month return Nasopharyngeal carcinoma (H) +3 more, review labs & MRI     Initial Vitals: BP (!) 144/69 (BP Location: Left arm, Patient Position: Sitting, Cuff Size: Adult Regular)   Pulse 58   Temp 98.8  F (37.1  C) (Oral)   Resp 20   Ht 1.6 m (5' 3\")   Wt 65.5 kg (144 lb 6.4 oz)   SpO2 98%   BMI 25.58 kg/m   Estimated body mass index is 25.58 kg/m  as calculated from the following:    Height as of this encounter: 1.6 m (5' 3\").    Weight as of this encounter: 65.5 kg (144 lb 6.4 oz). Body surface area is 1.71 meters squared.  No Pain (0) Comment: Data Unavailable   No LMP for male patient.  Allergies reviewed: Yes  Medications reviewed: Yes    Medications: Medication refills not needed today.  Pharmacy name entered into [employer]: [facility] - [address]    Clinical concerns:  4 month return Nasopharyngeal carcinoma (H) +3 more, review labs & " JAEL Duncan CMA                Again, thank you for allowing me to participate in the care of your patient.        Sincerely,        Alan Frias MD

## 2023-09-08 NOTE — PROGRESS NOTES
"Oncology Rooming Note    September 8, 2023 9:57 AM   Kristen Chapman is a 73 year old male who presents for:    Chief Complaint   Patient presents with    Oncology Clinic Visit     4 month return Nasopharyngeal carcinoma (H) +3 more, review labs & MRI     Initial Vitals: BP (!) 144/69 (BP Location: Left arm, Patient Position: Sitting, Cuff Size: Adult Regular)   Pulse 58   Temp 98.8  F (37.1  C) (Oral)   Resp 20   Ht 1.6 m (5' 3\")   Wt 65.5 kg (144 lb 6.4 oz)   SpO2 98%   BMI 25.58 kg/m   Estimated body mass index is 25.58 kg/m  as calculated from the following:    Height as of this encounter: 1.6 m (5' 3\").    Weight as of this encounter: 65.5 kg (144 lb 6.4 oz). Body surface area is 1.71 meters squared.  No Pain (0) Comment: Data Unavailable   No LMP for male patient.  Allergies reviewed: Yes  Medications reviewed: Yes    Medications: Medication refills not needed today.  Pharmacy name entered into [employer]: [facility] - [place], [place] - [address] [place] [address]    Clinical concerns:  4 month return Nasopharyngeal carcinoma (H) +3 more, review labs & MRI      Abby Duncan CMA              "

## 2023-09-08 NOTE — PROGRESS NOTES
[facility] Hematology and Oncology Progress Note    Patient: Kristen Chapman  MRN: [id number]  Date of Service: Sep 8, 2023        Assessment and Plan:    Cancer Staging  Nasopharyngeal carcinoma (H)  Staging form: Pharynx - Nasopharynx, AJCC 8th Edition  - Clinical stage from 2/18/2020: Stage SAMANTHA (cT4, cN2, cM0) - Signed by Alan Frias MD on 2/18/2020     1.  Nasopharyngeal carcinoma: MRI from September 6 was reviewed.  No evidence of recurrent disease in the brain or ethmoid sinus.  We will continue imaging every 4 months.   He also had a biopsy of a right submandibular node.  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  This was reviewed with Kristen.  Neck step will be a PET scan to complete staging.  If he has disseminated disease we will have to start him on chemotherapy.  Immunotherapy probably would not be a good idea given his reactivated hepatitis B.  If he has a localized disease we could consider radiation.  He has now been on systemic therapy since July 1, 2022.  We will send her recent tumor biopsy for next generation sequencing.    2.  Pancytopenia: He has chronic pancytopenia secondary to chronic hepatitis B infection as well as cirrhosis.  He also has splenomegaly measuring 17 cm on CT from Sophia 15, 2023.  He had a bone marrow biopsy in October 2021.  Next generation sequencing showed no abnormalities.  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     3.  Hypothyroidism: His Synthroid dose was increased a few weeks ago.  TSH today was checked and was 32.  Significantly improved.  We will continue to monitor for now as his T4 is a little high at 1.86.      4.  Hepatitis B infection: Viral DNA in the serum was 54 million on April 4.  He had a second drug added to his regimen which she states he is taking.  He is following with infectious disease.    5.  Ascites: He has new ascites clinically.  His abdomen is quite distended.  No fevers.  Probably from his  cirrhosis/hepatitis/potentially heart failure.  Doubt malignant.  We are going to get a therapeutic and diagnostic paracentesis.  He will likely need to be referred to hepatology for management of his liver disease.  LFTs today reviewed and show a total bilirubin of 1.1, alkaline phosphatase 64, AST 44, and ALT 27.  Finally, we can get an echocardiogram with Dopplers at his next visit.    Medical decision Making:  I spent 45 minutes in the care of this patient today, which included time necessary for preparation for the visit, face to face time with the patient, communication of recommendations to the care team, and documentation time.    Today's visit was centered around a cancer diagnosis in the setting of additional medical comorbidities. Complex medical decision making was required. The risk of additional morbidity without further treatment is high.     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  Diagnosed January 2020. Imaging suggested bilateral abnormal lymphadenopathy in the neck.  Also asymmetric soft tissue thickening in the posterior right nasopharynx.  On imaging, tumor also appeared to extend down to the hypopharynx.    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  Right neck lymph node positive for recurrent nasopharyngeal carcinoma.  PET scan at that time showed new hypermetabolic mediastinal lymphadenopathy (chronic, most likely reactive), mass in the right ethmoid sinus, hypermetabolic right level 1B and 2A lymph nodes.    Treatment:    Initially treated with concurrent chemotherapy and radiation.  He had weekly cisplatin starting March 3, 2020. Fifth and final dose given March 31, 2020.  Subsequent chemotherapy was held due to prolonged thrombocytopenia and renal insufficiency.  Radiation dose was 7000 cGy in 35 fractions given from March 2 through April 17, 2020.     Started cisplatin with  infusional 5-FU on June 1, 2020.  Cycle 1 given at a 25% dose reduction.  He still had significant thrombocytopenia and neutropenia on day 28.  Cycle 2 was held.  At the same time his liver function tests elevated secondary to hepatitis B.   PET scan in September 2020 showed no good evidence of residual malignancy.    Recurrence:  Radiosurgery delivered to the right ethmoid tumor delivered November 8 through November 17, 2021.  Received 3500 cGy in 5 fractions.  Pembrolizumab started 12/23/21.  Held after his July 1, 2022 dose.  PET scan in March 2022 showed a near complete response.    Interim History:    Kristen Chapman returns today for follow-up visit. Was last seem 3 months ago.  He recently had a biopsy of his right submandibular lymph node.  Today he is complaining of abdominal distention and bloating with discomfort.  No fevers or chills.  No abdominal pain.  His appetite is decreased and he has had some weight loss.  No lower extremity edema.  Bowel movements are small and soft.    Review of Systems:    As above in the history.     Review of Systems otherwise Negative for:  General: chills, fever or night sweats  Psychological: anxiety or depression  Ophthalmic: blurry vision, double vision or loss of vision, vision change  ENT: oral lesions, hearing changes  Hematological and Lymphatic: bruising, jaundice, swollen lymph nodes  Endocrine: hot flashes  Respiratory: cough, hemoptysis, orthopnea  Cardiovascular: chest pain, palpitations or PND  Gastrointestinal: abdominal pain, blood in stools, change in bowel habits, constipation, diarrhea or nausea/vomiting  Genito-Urinary: change in urinary stream, incontinence, frequency/urgency  Musculoskeletal: joint swelling, muscle pain  Neurological: dizziness, headaches, numbness/tingling  Dermatological: lumps and rash    Past History:    Past Medical History:   Diagnosis Date    Anemia     Dysphagia     Hepatitis B chronic     Hypothyroidism     Nasopharyngeal cancer (H)  "    Severe protein-calorie malnutrition (H)     Thrombocytopenia (H)      Physical Exam:    BP (!) 144/69 (BP Location: Left arm, Patient Position: Sitting, Cuff Size: Adult Regular)   Pulse 58   Temp 98.8  F (37.1  C) (Oral)   Resp 20   Ht 1.6 m (5' 3\")   Wt 65.5 kg (144 lb 6.4 oz)   SpO2 98%   BMI 25.58 kg/m      General: patient appears stated age of 71 year old. Nontoxic and in no distress.   HEENT: Head: atraumatic, normocephalic. Sclerae anicteric.  Chest:  Normal respiratory effort  Cardiac:  No edema.  Abdomen: abdomen is significantly distended with fluid, nontender, no palpable mass.  Extremities: normal tone and muscle bulk.  Skin: no lesions or rash on visible skin. Warm and dry.   CNS: alert and oriented. Grossly non-focal.   Psychiatric: normal mood and affect.     Lab Results:    Recent Results (from the past 168 hour(s))   Basic metabolic panel   Result Value Ref Range    Sodium 140 136 - 145 mmol/L    Potassium 3.7 3.4 - 5.3 mmol/L    Chloride 108 (H) 98 - 107 mmol/L    Carbon Dioxide (CO2) 23 22 - 29 mmol/L    Anion Gap 9 7 - 15 mmol/L    Urea Nitrogen 26.0 (H) 8.0 - 23.0 mg/dL    Creatinine 1.14 0.67 - 1.17 mg/dL    Calcium 8.2 (L) 8.8 - 10.2 mg/dL    Glucose 90 70 - 99 mg/dL    GFR Estimate 69 >60 mL/min/1.73m2   TSH with free T4 reflex   Result Value Ref Range    TSH 31.29 (H) 0.30 - 4.20 uIU/mL   CBC with platelets and differential   Result Value Ref Range    WBC Count 2.2 (L) 4.0 - 11.0 10e3/uL    RBC Count 2.84 (L) 4.40 - 5.90 10e6/uL    Hemoglobin 7.9 (L) 13.3 - 17.7 g/dL    Hematocrit 26.0 (L) 40.0 - 53.0 %    MCV 92 78 - 100 fL    MCH 27.8 26.5 - 33.0 pg    MCHC 30.4 (L) 31.5 - 36.5 g/dL    RDW 21.8 (H) 10.0 - 15.0 %    Platelet Count 39 (LL) 150 - 450 10e3/uL    % Neutrophils 62 %    % Lymphocytes 24 %    % Monocytes 10 %    % Eosinophils 2 %    % Basophils 1 %    % Immature Granulocytes 1 %    NRBCs per 100 WBC 0 <1 /100    Absolute Neutrophils 1.4 (L) 1.6 - 8.3 10e3/uL    " Absolute Lymphocytes 0.5 (L) 0.8 - 5.3 10e3/uL    Absolute Monocytes 0.2 0.0 - 1.3 10e3/uL    Absolute Eosinophils 0.1 0.0 - 0.7 10e3/uL    Absolute Basophils 0.0 0.0 - 0.2 10e3/uL    Absolute Immature Granulocytes 0.0 <=0.4 10e3/uL    Absolute NRBCs 0.0 10e3/uL   T4 free   Result Value Ref Range    Free T4 1.86 (H) 0.90 - 1.70 ng/dL   Hepatic panel (Albumin, ALT, AST, Bili, Alk Phos, TP)   Result Value Ref Range    Protein Total 6.5 6.4 - 8.3 g/dL    Albumin 3.1 (L) 3.5 - 5.2 g/dL    Bilirubin Total 1.1 <=1.2 mg/dL    Alkaline Phosphatase 64 40 - 129 U/L    AST 44 0 - 45 U/L    ALT 27 0 - 70 U/L    Bilirubin Direct 0.32 (H) 0.00 - 0.30 mg/dL      Imaging:    MR Brain w/o & w Contrast    Result Date: 9/6/2023  EXAM: MR BRAIN W/O and W CONTRAST LOCATION: [facility] DATE: 9/6/2023 INDICATION: f u Nasopharyngeal carcinoma COMPARISON: Soft tissue neck CT 06/15/2023 and MRI 06/07/2023. MRI 10/03/2022 CONTRAST: 6ml gadavist TECHNIQUE: Multiplanar multisequence head MRI without and with intravenous contrast including dedicated imaging of the skull base and nasopharynx. FINDINGS: SKULL BASE AND NASOPHARYNX: Dedicated imaging of the skull base extending to involve the adjacent upper aerodigestive tract demonstrates persistent post therapeutic changes involving the nasopharyngeal mucosa without evidence for locally recurrent mucosal mass. There is persistent mucosal thickening involving the paranasal sinuses with complete opacification of the right frontal sinus and anterior right ethmoid air cells, subtotal opacification of the left ethmoid air cells, and moderate circumferential mucosal thickening involving the bilateral maxillary sinuses and right sphenoid sinus similar to findings on the previous exam. Some dependent secretions persists primarily within the left maxillary sinus as seen previously. These areas mucosal abnormality demonstrated persistent irregular enhancement without evidence for  new or progressive masslike abnormality in the sinonasal region. Persistent patchy opacification of left mastoid air cells. INTRACRANIAL CONTENTS: No acute or subacute infarct. No definite intracranial mass or mass effect as visualized. Unchanged brain parenchymal signal on the provided sequences. Mild generalized cerebral atrophy. No hydrocephalus. Normal position of the cerebellar tonsils. No evidence for new or progressive enhancing abnormality intracranially. OTHER: Multilobular heterogeneously T2 hyperintense and heterogeneously enhancing mass in the right submandibular space presumably representing pathologic cervical lymph nodes consistent with biopsy-proven metastatic squamous cell carcinoma. This lesion measures approximately 2.1 x 2.2 x 2.5 cm in greatest transverse, AP, and craniocaudal dimensions respectively on postcontrast images. This is consistent with findings on the recent prior neck CT.     IMPRESSION: 1.  Unchanged post therapeutic appearance of the skull base and upper aerodigestive tract without evidence for new or locally recurrent mucosal neoplasm. 2.  Heterogeneously signaling, heterogeneously enhancing soft tissue mass in the right submandibular space consistent with pathologic cervical lymph nodes and biopsy-proven metastatic squamous cell carcinoma. This is similar to findings on the previous neck CT. 3.  Persistent inflammatory changes of the paranasal sinuses including dependent secretions within the left maxillary sinus. 4.  No evidence for acute intracranial process.     US Biopsy Lymph Node Core    Result Date: 8/31/2023  EXAM: 1. PERCUTANEOUS BIOPSY OF A RIGHT SUBMANDIBULAR MASS 2. ULTRASOUND GUIDANCE LOCATION: [facility] DATE: 8/31/2023 INDICATION: Salivary gland mass, initial workup PROCEDURE: Informed consent obtained. Site marked. Prior images reviewed. Required items made available. Patient identity was confirmed verbally and with arm band. Patient  reevaluated immediately before beginning the procedure. Universal protocol was followed. Time out performed. The site was prepped and draped in sterile fashion. 10 mL of 1 percent lidocaine was infused into the local soft tissues. Using standard technique and under direct ultrasound guidance, a total of 5 fine needle aspiration samples and two 18 gauge biopsy samples were obtained. Tissue was submitted to Pathology. The patient tolerated the procedure well. No complications.     IMPRESSION: Status post US-guided biopsy of a right submandibular mass. Reference CPT Code: 55524, 10814       Signed by: Alan Frias MD

## 2023-09-09 NOTE — TELEPHONE ENCOUNTER
Message sent to Joanna Ventura with the critical platelet results results on patient.  Specimen was sent to [facility].  Results were called back here to the [facility] lab.  Message then passed on to ordering Provider to follow up with the results.   You can access the FollowMyHealth Patient Portal offered by [facility] by registering at the following website: [email or web address]. By joining [employer]s FollowMyHealth portal, you will also be able to view your health information using other applications (apps) compatible with our system.

## 2023-09-11 ENCOUNTER — HOSPITAL ENCOUNTER (OUTPATIENT)
Dept: PET IMAGING | Facility: HOSPITAL | Age: 73
Discharge: HOME | End: 2023-09-11
Attending: INTERNAL MEDICINE
Payer: COMMERCIAL

## 2023-09-11 ENCOUNTER — TELEPHONE (OUTPATIENT)
Dept: FAMILY MEDICINE | Facility: CLINIC | Age: 73
End: 2023-09-11

## 2023-09-11 ENCOUNTER — TELEPHONE (OUTPATIENT)
Dept: ONCOLOGY | Facility: HOSPITAL | Age: 73
End: 2023-09-11

## 2023-09-11 DIAGNOSIS — R18.8 OTHER ASCITES: ICD-10-CM

## 2023-09-11 DIAGNOSIS — C11.3: ICD-10-CM

## 2023-09-11 DIAGNOSIS — C11.9 SQUAMOUS CELL CARCINOMA OF NASOPHARYNX (H): ICD-10-CM

## 2023-09-11 DIAGNOSIS — D69.6 THROMBOCYTOPENIA (H): ICD-10-CM

## 2023-09-11 LAB — GLUCOSE BLDC GLUCOMTR-MCNC: 84 MG/DL (ref 70–99)

## 2023-09-11 PROCEDURE — 78816 PET IMAGE W/CT FULL BODY: CPT | Mod: PS

## 2023-09-11 PROCEDURE — 250N000011 HC RX IP 250 OP 636: Mod: JZ | Performed by: INTERNAL MEDICINE

## 2023-09-11 PROCEDURE — A9552 F18 FDG: HCPCS | Performed by: INTERNAL MEDICINE

## 2023-09-11 PROCEDURE — 71260 CT THORAX DX C+: CPT

## 2023-09-11 PROCEDURE — 250N000011 HC RX IP 250 OP 636: Performed by: INTERNAL MEDICINE

## 2023-09-11 PROCEDURE — 82962 GLUCOSE BLOOD TEST: CPT

## 2023-09-11 PROCEDURE — 343N000001 HC RX 343: Performed by: INTERNAL MEDICINE

## 2023-09-11 RX ORDER — IOPAMIDOL 755 MG/ML
70 INJECTION, SOLUTION INTRAVASCULAR ONCE
Status: COMPLETED | OUTPATIENT
Start: 2023-09-11 | End: 2023-09-11

## 2023-09-11 RX ORDER — HEPARIN SODIUM (PORCINE) LOCK FLUSH IV SOLN 100 UNIT/ML 100 UNIT/ML
500 SOLUTION INTRAVENOUS ONCE
Status: COMPLETED | OUTPATIENT
Start: 2023-09-11 | End: 2023-09-11

## 2023-09-11 RX ADMIN — FLUDEOXYGLUCOSE F-18 10.14 MILLICURIE: 500 INJECTION, SOLUTION INTRAVENOUS at 11:17

## 2023-09-11 RX ADMIN — IOPAMIDOL 70 ML: 755 INJECTION, SOLUTION INTRAVENOUS at 12:08

## 2023-09-11 RX ADMIN — HEPARIN 500 UNITS: 100 SYRINGE at 12:39

## 2023-09-11 NOTE — TELEPHONE ENCOUNTER
Spoke with PET/CT tech while pt was receiving PET scan, and tech will inform pt that their appointment on 09/15/23, will be at 3:30 pm with Dr. Frias, instead of at 1:45 pm with Joanna Diggs NP

## 2023-09-11 NOTE — TELEPHONE ENCOUNTER
Oncology Distress Screen    Called Kristen in regards to his positive oncology nutrition distress screen:    1. How concerned are you about your ability to eat? :  9  And  9. If you want to be contacted by one of our professionals, I can send a message to them right now. : ONCOLOGY DIETITIAN    Called and left voicemail with  with call back information.      Milka Sullivan, MS, RD, LD

## 2023-09-12 ENCOUNTER — HOSPITAL ENCOUNTER (OUTPATIENT)
Dept: ULTRASOUND IMAGING | Facility: HOSPITAL | Age: 73
Discharge: HOME | End: 2023-09-12
Attending: INTERNAL MEDICINE | Admitting: RADIOLOGY
Payer: COMMERCIAL

## 2023-09-12 DIAGNOSIS — D69.6 THROMBOCYTOPENIA (H): ICD-10-CM

## 2023-09-12 DIAGNOSIS — C11.3: ICD-10-CM

## 2023-09-12 DIAGNOSIS — C11.9 SQUAMOUS CELL CARCINOMA OF NASOPHARYNX (H): ICD-10-CM

## 2023-09-12 DIAGNOSIS — R18.8 OTHER ASCITES: ICD-10-CM

## 2023-09-12 PROCEDURE — 88305 TISSUE EXAM BY PATHOLOGIST: CPT | Mod: TC

## 2023-09-12 PROCEDURE — 272N000710 US PARACENTESIS WITHOUT ALBUMIN

## 2023-09-15 ENCOUNTER — ONCOLOGY VISIT (OUTPATIENT)
Dept: ONCOLOGY | Facility: HOSPITAL | Age: 73
End: 2023-09-15
Attending: INTERNAL MEDICINE
Payer: COMMERCIAL

## 2023-09-15 VITALS
OXYGEN SATURATION: 98 % | DIASTOLIC BLOOD PRESSURE: 63 MMHG | BODY MASS INDEX: 23.51 KG/M2 | WEIGHT: 132.7 LBS | SYSTOLIC BLOOD PRESSURE: 121 MMHG | HEIGHT: 63 IN | HEART RATE: 62 BPM | TEMPERATURE: 97.9 F | RESPIRATION RATE: 20 BRPM

## 2023-09-15 DIAGNOSIS — D61.818 ACQUIRED PANCYTOPENIA (H): ICD-10-CM

## 2023-09-15 DIAGNOSIS — K72.10 CHRONIC LIVER FAILURE WITHOUT HEPATIC COMA (H): ICD-10-CM

## 2023-09-15 DIAGNOSIS — C11.9 NASOPHARYNGEAL CARCINOMA (H): Primary | ICD-10-CM

## 2023-09-15 DIAGNOSIS — E06.3 HYPOTHYROIDISM DUE TO HASHIMOTO'S THYROIDITIS: ICD-10-CM

## 2023-09-15 LAB
PATH REPORT.COMMENTS IMP SPEC: NORMAL
PATH REPORT.FINAL DX SPEC: NORMAL
PATH REPORT.GROSS SPEC: NORMAL
PATH REPORT.MICROSCOPIC SPEC OTHER STN: NORMAL
PATH REPORT.RELEVANT HX SPEC: NORMAL

## 2023-09-15 PROCEDURE — G0463 HOSPITAL OUTPT CLINIC VISIT: HCPCS | Performed by: INTERNAL MEDICINE

## 2023-09-15 PROCEDURE — 88305 TISSUE EXAM BY PATHOLOGIST: CPT | Mod: 26 | Performed by: PATHOLOGY

## 2023-09-15 PROCEDURE — 99214 OFFICE O/P EST MOD 30 MIN: CPT | Performed by: INTERNAL MEDICINE

## 2023-09-15 ASSESSMENT — PAIN SCALES - GENERAL: PAINLEVEL: NO PAIN (0)

## 2023-09-15 NOTE — LETTER
9/15/2023         RE: Kristen Chapman  [address] [place] [address]        Dear Colleague,    Thank you for referring your patient, Kristen Chapman, to the [facility]. Please see a copy of my visit note below.    [facility] Hematology and Oncology Progress Note    Patient: Kristen Chapman  MRN: [id number]  Date of Service: Sep 15, 2023        Assessment and Plan:    Cancer Staging  Nasopharyngeal carcinoma (H)  Staging form: Pharynx - Nasopharynx, AJCC 8th Edition  - Clinical stage from 2/18/2020: Stage SAMANTHA (cT4, cN2, cM0) - Signed by Alan Frias MD on 2/18/2020     1.  Nasopharyngeal carcinoma: He was recently diagnosed with a recurrence involving a right submandibular gland.  He just had a PET scan and is here to review the results.  This does not show any evidence of active disease outside of this 1 node under the right mandible.  As such, I am going to refer him to radiation oncology for treatment.  His performance status I think would preclude chemotherapy or other systemic therapy at this time given his apparently worsening liver condition.  Would recommend a CT scan about 2 months after his radiation is complete.    2.  Pancytopenia: Overall his counts have been generally stable.  He is not needing transfusions at this point but his platelet count most recently was 39,000.  Continue to monitor.    He has chronic pancytopenia secondary to chronic hepatitis B infection as well as cirrhosis.  He also has splenomegaly measuring 17 cm on CT from Sophia 15, 2023.  He had a bone marrow biopsy in October 2021.  Next generation sequencing showed no abnormalities.  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     3.  Hypothyroidism: Most recent TSH was on September 8 and is improving at 31.  We will continue to monitor.      4.  Hepatitis B infection: Viral DNA in the serum was 54 million on April 4.  He had a second drug added to his regimen which she states he is taking.   He is following with infectious disease.    5.  Ascites: Having relatively rapid reaccumulation of fluid.  He had a paracentesis 3 days ago and 5 L were removed.  Cytology was negative for any malignant cells.  I am going to refer him to hepatology for treatment of his cirrhosis secondary to hepatitis B.    Medical decision Making:  Today's visit was centered around a cancer diagnosis in the setting of additional medical comorbidities. Complex medical decision making was required regarding his relapse/recurrent nasopharyngeal cancer.  He also has worsening liver failure and ascites. The risk of additional morbidity without further treatment is high.     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  Diagnosed January 2020. Imaging suggested bilateral abnormal lymphadenopathy in the neck.  Also asymmetric soft tissue thickening in the posterior right nasopharynx.  On imaging, tumor also appeared to extend down to the hypopharynx.    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  Right neck lymph node positive for recurrent nasopharyngeal carcinoma.  PET scan at that time showed new hypermetabolic mediastinal lymphadenopathy (chronic, most likely reactive), mass in the right ethmoid sinus, hypermetabolic right level 1B and 2A lymph nodes.    Treatment:    Initially treated with concurrent chemotherapy and radiation.  He had weekly cisplatin starting March 3, 2020. Fifth and final dose given March 31, 2020.  Subsequent chemotherapy was held due to prolonged thrombocytopenia and renal insufficiency.  Radiation dose was 7000 cGy in 35 fractions given from March 2 through April 17, 2020.     Started cisplatin with infusional 5-FU on June 1, 2020.  Cycle 1 given at a 25% dose reduction.  He still had significant thrombocytopenia and neutropenia on day 28.  Cycle 2 was held.  At the same time his liver function tests  "elevated secondary to hepatitis B.   PET scan in September 2020 showed no good evidence of residual malignancy.    Recurrence:  Radiosurgery delivered to the right ethmoid tumor delivered November 8 through November 17, 2021.  Received 3500 cGy in 5 fractions.  Pembrolizumab started 12/23/21.  Held after his July 1, 2022 dose.  PET scan in March 2022 showed a near complete response.    Interim History:    Kristen Chapman returns today for follow-up visit.  He recently had a PET scan and is here to review the results.  He notes that he is having reaccumulation of his abdominal fluid.  He is not having any issues with fevers.  No nausea or vomiting.  Denies shortness of breath.    Review of Systems:    As above in the history.     Review of Systems otherwise Negative for:  General: chills, fever or night sweats  Psychological: anxiety or depression  Ophthalmic: blurry vision, double vision or loss of vision, vision change  ENT: oral lesions, hearing changes  Hematological and Lymphatic: bruising, jaundice, swollen lymph nodes  Endocrine: hot flashes  Respiratory: cough, hemoptysis, orthopnea  Cardiovascular: chest pain, palpitations or PND  Gastrointestinal: blood in stools, change in bowel habits, constipation, diarrhea or nausea/vomiting  Genito-Urinary: change in urinary stream, incontinence, frequency/urgency  Musculoskeletal: joint swelling, muscle pain  Neurological: dizziness, headaches, numbness/tingling  Dermatological: lumps and rash    Past History:    Past Medical History:   Diagnosis Date     Anemia      Dysphagia      Hepatitis B chronic      Hypothyroidism      Nasopharyngeal cancer (H)      Severe protein-calorie malnutrition (H)      Thrombocytopenia (H)      Physical Exam:    /63 (BP Location: Left arm, Patient Position: Sitting, Cuff Size: Adult Regular)   Pulse 62   Temp 97.9  F (36.6  C) (Oral)   Resp 20   Ht 1.6 m (5' 2.99\")   Wt 60.2 kg (132 lb 11.2 oz)   SpO2 98%   BMI 23.51 kg/m  "     General: patient appears stated age of 71 year old. Nontoxic and in no distress.   HEENT: Head: atraumatic, normocephalic. Sclerae anicteric.  Chest:  Normal respiratory effort  Cardiac:  No edema.  Abdomen: abdomen is significantly distended with fluid.  Extremities: normal tone and muscle bulk.  Skin: no lesions or rash on visible skin. Warm and dry.   CNS: alert and oriented. Grossly non-focal.   Psychiatric: normal mood and affect.     Lab Results:    Recent Results (from the past 168 hour(s))   Pathology Additional Testing: NeoTYPE head and neck; [facility]   Result Value Ref Range    Additional Test Status       Testing in Process.  Results to follow in approximately 14-21 business days.   Glucose by meter   Result Value Ref Range    GLUCOSE BY METER POCT 84 70 - 99 mg/dL   Cytology non gyn   Result Value Ref Range    Final Diagnosis       Specimen A     Interpretation:      Negative for malignancy    ABDOMINAL FLUID:        -  SMALL TO MODERATE NUMBERS OF CHRONIC INFLAMMATORY CELLS        -  OCCASIONAL REACTIVE MESOTHELIAL CELLS        -  NEGATIVE FOR ATYPICAL OR MALIGNANT CELLS     Other Findings:      Chronic inflammation present.     Adequacy:     Satisfactory for evaluation          Clinical Information       Ascites      Gross Description       A(A). Abdomen, Peritoneal Fluid:  Received 50 ml of clear, yellow fluid, processed as one hematoxylin and eosin stained cell block.       Microscopic Description       Material examined consists of cell block sections stained with H&E.  These demonstrate moderate numbers of lymphocytes, accompanied by macrophages and mesothelial cells in approximately equal numbers.  Rare neutrophils are noted.  Some of the mesothelial cells appear reactive, but there are no atypical or malignant features.  There is no evidence of purulent inflammation.      Performing Labs       The technical component of this testing was completed at [facility]  [facility]        Imaging:    US Paracentesis without Albumin    Result Date: 9/12/2023  EXAM: 1. PARACENTESIS 2. ULTRASOUND GUIDANCE LOCATION: [facility] DATE: 9/12/2023 INDICATION: Ascites. PROCEDURE: Informed consent obtained. Time out performed. The abdomen was prepped and draped in a sterile fashion. 10 mL of 1% lidocaine was infused into local soft tissues. A 5 Faroese catheter system was introduced into the abdominal ascites under ultrasound guidance. 5.0 liters of clear fluid were removed and sent to the lab. Patient tolerated procedure well. Ultrasound imaging was obtained and placed in the patient's permanent medical record.     IMPRESSION: 1.  Status post ultrasound-guided paracentesis. Reference CPT Code: 86573    PET Oncology Whole Body    Result Date: 9/11/2023  EXAM: PET ONCOLOGY WHOLE BODY, CT CHEST/ABDOMEN/PELVIS W CONTRAST LOCATION: [facility] DATE: 9/11/2023 INDICATION: Subsequent treatment planning and restaging for malignant neoplasm of overlapping sites of nasopharynx. Status post concurrent chemoradiation completed in April 2020, additional cycles of chemotherapy, radiosurgery to a right ethmoid tumor completed in November 2021, currently receiving immunotherapy. Monitor treatment response COMPARISON: FDG PET/CT dated 03/17/2022, brain MRI dated 09/06/2023, right submandibular gland biopsy dated 08/31/2023 CONTRAST: 70 mL Isovue-370 TECHNIQUE: Serum glucose level 84 mg/dL. One hour post intravenous administration of 10.1 mCi F-18 FDG, PET imaging was performed from the skull vertex to feet, utilizing attenuation correction with concurrent axial CT and PET/CT image fusion. Separate diagnostic CT of the chest, abdomen, and pelvis was performed. Dose reduction techniques were used. PET/CT FINDINGS: Isolated FDG avid right submandibular lymph node measuring 2.8 x 1.2 x 3.0 cm (max SUV 15.9) suspicious for  progression of disease. Mildly FDG avid moderate paranasal sinus mucosal thickening likely inflammatory in nature. Redemonstrated mildly FDG avid bilateral hilar station 10 lymph nodes (Max SUV 6.0, previously 7.9 on the right) likely representing sequelae of granulomatous/immunotherapy related inflammatory change. Diffuse esophagitis. Shoulder and hip synovitis. CT FINDINGS: Mild to moderate senescent intracranial changes. Moderate paranasal sinus mucosal thickening. Left chest port with tip terminating in the upper right atrium. Mild dilatation of the ascending aorta. Mild coronary artery calcium. Trace pleural  effusions. Cirrhotic liver with sequelae of portal hypertension including a marked volume of intra-abdominal ascites. Non-FDG avid lesion in the splenic parenchyma suggesting benignity. Multilevel degenerative changes of the spine. The lower extremities  are unremarkable.     IMPRESSION: Findings suspicious for isolated right submandibular lymph node recurrence.    CT Chest/Abdomen/Pelvis w Contrast    Result Date: 9/11/2023  EXAM: PET ONCOLOGY WHOLE BODY, CT CHEST/ABDOMEN/PELVIS W CONTRAST LOCATION: [facility] DATE: 9/11/2023 INDICATION: Subsequent treatment planning and restaging for malignant neoplasm of overlapping sites of nasopharynx. Status post concurrent chemoradiation completed in April 2020, additional cycles of chemotherapy, radiosurgery to a right ethmoid tumor completed in November 2021, currently receiving immunotherapy. Monitor treatment response COMPARISON: FDG PET/CT dated 03/17/2022, brain MRI dated 09/06/2023, right submandibular gland biopsy dated 08/31/2023 CONTRAST: 70 mL Isovue-370 TECHNIQUE: Serum glucose level 84 mg/dL. One hour post intravenous administration of 10.1 mCi F-18 FDG, PET imaging was performed from the skull vertex to feet, utilizing attenuation correction with concurrent axial CT and PET/CT image fusion. Separate diagnostic CT of the chest,  abdomen, and pelvis was performed. Dose reduction techniques were used. PET/CT FINDINGS: Isolated FDG avid right submandibular lymph node measuring 2.8 x 1.2 x 3.0 cm (max SUV 15.9) suspicious for progression of disease. Mildly FDG avid moderate paranasal sinus mucosal thickening likely inflammatory in nature. Redemonstrated mildly FDG avid bilateral hilar station 10 lymph nodes (Max SUV 6.0, previously 7.9 on the right) likely representing sequelae of granulomatous/immunotherapy related inflammatory change. Diffuse esophagitis. Shoulder and hip synovitis. CT FINDINGS: Mild to moderate senescent intracranial changes. Moderate paranasal sinus mucosal thickening. Left chest port with tip terminating in the upper right atrium. Mild dilatation of the ascending aorta. Mild coronary artery calcium. Trace pleural  effusions. Cirrhotic liver with sequelae of portal hypertension including a marked volume of intra-abdominal ascites. Non-FDG avid lesion in the splenic parenchyma suggesting benignity. Multilevel degenerative changes of the spine. The lower extremities  are unremarkable.     IMPRESSION: Findings suspicious for isolated right submandibular lymph node recurrence.    MR Brain w/o & w Contrast    Result Date: 9/6/2023  EXAM: MR BRAIN W/O and W CONTRAST LOCATION: [facility] DATE: 9/6/2023 INDICATION: f u Nasopharyngeal carcinoma COMPARISON: Soft tissue neck CT 06/15/2023 and MRI 06/07/2023. MRI 10/03/2022 CONTRAST: 6ml gadavist TECHNIQUE: Multiplanar multisequence head MRI without and with intravenous contrast including dedicated imaging of the skull base and nasopharynx. FINDINGS: SKULL BASE AND NASOPHARYNX: Dedicated imaging of the skull base extending to involve the adjacent upper aerodigestive tract demonstrates persistent post therapeutic changes involving the nasopharyngeal mucosa without evidence for locally recurrent mucosal mass. There is persistent mucosal thickening involving the  paranasal sinuses with complete opacification of the right frontal sinus and anterior right ethmoid air cells, subtotal opacification of the left ethmoid air cells, and moderate circumferential mucosal thickening involving the bilateral maxillary sinuses and right sphenoid sinus similar to findings on the previous exam. Some dependent secretions persists primarily within the left maxillary sinus as seen previously. These areas mucosal abnormality demonstrated persistent irregular enhancement without evidence for new or progressive masslike abnormality in the sinonasal region. Persistent patchy opacification of left mastoid air cells. INTRACRANIAL CONTENTS: No acute or subacute infarct. No definite intracranial mass or mass effect as visualized. Unchanged brain parenchymal signal on the provided sequences. Mild generalized cerebral atrophy. No hydrocephalus. Normal position of the cerebellar tonsils. No evidence for new or progressive enhancing abnormality intracranially. OTHER: Multilobular heterogeneously T2 hyperintense and heterogeneously enhancing mass in the right submandibular space presumably representing pathologic cervical lymph nodes consistent with biopsy-proven metastatic squamous cell carcinoma. This lesion measures approximately 2.1 x 2.2 x 2.5 cm in greatest transverse, AP, and craniocaudal dimensions respectively on postcontrast images. This is consistent with findings on the recent prior neck CT.     IMPRESSION: 1.  Unchanged post therapeutic appearance of the skull base and upper aerodigestive tract without evidence for new or locally recurrent mucosal neoplasm. 2.  Heterogeneously signaling, heterogeneously enhancing soft tissue mass in the right submandibular space consistent with pathologic cervical lymph nodes and biopsy-proven metastatic squamous cell carcinoma. This is similar to findings on the previous neck CT. 3.  Persistent inflammatory changes of the paranasal sinuses including dependent  secretions within the left maxillary sinus. 4.  No evidence for acute intracranial process.     US Biopsy Lymph Node Core    Result Date: 8/31/2023  EXAM: 1. PERCUTANEOUS BIOPSY OF A RIGHT SUBMANDIBULAR MASS 2. ULTRASOUND GUIDANCE LOCATION: [facility] DATE: 8/31/2023 INDICATION: Salivary gland mass, initial workup PROCEDURE: Informed consent obtained. Site marked. Prior images reviewed. Required items made available. Patient identity was confirmed verbally and with arm band. Patient reevaluated immediately before beginning the procedure. Universal protocol was followed. Time out performed. The site was prepped and draped in sterile fashion. 10 mL of 1 percent lidocaine was infused into the local soft tissues. Using standard technique and under direct ultrasound guidance, a total of 5 fine needle aspiration samples and two 18 gauge biopsy samples were obtained. Tissue was submitted to Pathology. The patient tolerated the procedure well. No complications.     IMPRESSION: Status post US-guided biopsy of a right submandibular mass. Reference CPT Code: 55385, 26427       Signed by: Alan Frias MD      Again, thank you for allowing me to participate in the care of your patient.        Sincerely,        Alan Frias MD

## 2023-09-18 LAB
PATH REPORT.COMMENTS IMP SPEC: ABNORMAL
PATH REPORT.COMMENTS IMP SPEC: YES
PATH REPORT.FINAL DX SPEC: ABNORMAL
PATH REPORT.GROSS SPEC: ABNORMAL
PATH REPORT.MICROSCOPIC SPEC OTHER STN: ABNORMAL
PATH REPORT.RELEVANT HX SPEC: ABNORMAL

## 2023-09-18 PROCEDURE — 88172 CYTP DX EVAL FNA 1ST EA SITE: CPT | Mod: 26 | Performed by: PATHOLOGY

## 2023-09-18 PROCEDURE — 88305 TISSUE EXAM BY PATHOLOGIST: CPT | Mod: 26 | Performed by: PATHOLOGY

## 2023-09-18 PROCEDURE — 88173 CYTOPATH EVAL FNA REPORT: CPT | Mod: 26 | Performed by: PATHOLOGY

## 2023-09-22 ENCOUNTER — OFFICE VISIT (OUTPATIENT)
Dept: RADIATION ONCOLOGY | Facility: HOSPITAL | Age: 73
End: 2023-09-22
Attending: RADIOLOGY
Payer: COMMERCIAL

## 2023-09-22 VITALS
RESPIRATION RATE: 20 BRPM | DIASTOLIC BLOOD PRESSURE: 70 MMHG | OXYGEN SATURATION: 97 % | WEIGHT: 135.2 LBS | HEART RATE: 63 BPM | TEMPERATURE: 97.9 F | SYSTOLIC BLOOD PRESSURE: 113 MMHG | BODY MASS INDEX: 23.96 KG/M2

## 2023-09-22 DIAGNOSIS — C11.9 NASOPHARYNGEAL CARCINOMA (H): ICD-10-CM

## 2023-09-22 PROCEDURE — 99215 OFFICE O/P EST HI 40 MIN: CPT | Performed by: RADIOLOGY

## 2023-09-22 PROCEDURE — G0463 HOSPITAL OUTPT CLINIC VISIT: HCPCS | Performed by: RADIOLOGY

## 2023-09-22 ASSESSMENT — PAIN SCALES - GENERAL: PAINLEVEL: NO PAIN (0)

## 2023-09-22 NOTE — PROGRESS NOTES
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  The most recent restaging work-up indicated right ethmoid sinus biopsy-proven recurrence.      TYPE OF RADIATION THERAPY ADMINISTERED:   1. Concurrent chemoradiation therapy for his head neck cancer with weekly cisplatin and had radiation therapy in our clinic with a total dose of 7000 cGy in 35 treatments given from 3/2/2020-4/17/2020.      2. Stereotactic radiosurgery targeted to the recurrence lesion involving right ethmoid sinus with a total dose of 3500 cGy in 5 treatments given from 11/8/2021-11/17/2021.      INTERVAL SINCE COMPLETION OF RADIATION THERAPY:   1. 3 years and 5 months for primary nasal pharyngeal cancer.     2.  1 year and 10 months for right ethmoid sinus recurrence.      SUBJECTIVE:  Mr. Chapman is a 73 year old male who has been in his usual state of good health until recently.  Patient was found to have swelling right neck mass by his lerma a month ago for which he was a seeking further evaluation.  The patient did not notice any significant changes over the past few weeks and he is also asymptomatic.  Initial ENT examination showed suspicious abnormalities in the right nasopharynx worrisome for malignancy.  CT of the neck on 1/24/2020 revealed asymmetric soft tissue thickening in the posterior right nasopharynx as well as abnormal enlarged cervical lymph nodes measuring up to 3.8 cm bilaterally.  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  The patient also had a staging work-up including PET CT scan and MRI brain which showed locally advanced disease with lymph node metastasis.  The PET CT scan also showed abnormal increased activity in the hilar region suspicious for metastasis.  Patient was  then referred to pulmonology for evaluation and possible biopsy.  Patient then underwent bronchoscopy and EBUS on 2/27/2020 with biopsy showed negative for malignancy. He received concurrent chemoradiation therapy for his head neck cancer and had radiation therapy in our clinic with a total dose of 7000 cGy in 35 treatments given from 3/2/2020-4/17/2020.  He also received 3 cycles of cisplatin and 5-FU during the course of radiation therapy. He tolerated radiation therapy reasonably well with expected acute side effect.  The patient insisted not to have PEG tube placement during the therapy.  He however is able to maintain his weight reasonably stable during the treatment.     The patient experienced worsening pain of sore throat and dysphasia since completion of the radiation therapy.  He is not able to get adequate calorie and fluid intake and was admitted to hospital one week after treatment for supportive care and pain management.  A PEG tube was also placed during the hospital stay. The patient felt much better and improved since hospital stay.  The patient also experienced acute onset of left parotitis 4 weeks post cancer therapy and was admitted to the hospital for ongoing treatment including antibiotics.  His condition has been significant improved since then.      He is also receiving adjuvant chemotherapy under supervision of Dr. Frias.       He presented with right-sided eye watering and rhinorrhea from the right nare with MRI imaging on 7/30/2021 concerning for a mass within the right anterior ethmoid air cells.  Patient had a endoscopy and biopsy of right ethmoid sinus on 9/7/2021 with pathology confirmed EBV mediated nasopharyngeal carcinoma. The patient had restaging PET CT scan on 9/16/2021 which showed hypermetabolic mass situated about the right ethmoid sinus with extension into the right orbital extraconal fat, compatible with biopsy-proven nasopharyngeal carcinoma deposit. There are  hypermetabolic right level Ib and 2A lymphadenopathy suggestive of metastases nodes.   There are also new hypermetabolic mediastinal lymphadenopathy, most suspicious for metastatic disease. Reactive nodes are on the differential diagnosis.  His case has been discussed at tumor conference on 9/17/2021 at [facility] and the consensus recommendation is to consider possible chemoradiation therapy.  The patient also had ultrasound-guided needle biopsy for his cervical lymph node 10/19/2021 and pathology confirmed lymph node metastasis. He saw Dr. Frias, medical oncology and patient was determined not a good candidate for chemotherapy given his current medical condition.       I have discussed with the patient extensively about treatment options including surgery, systemic therapy, and salvage radiation therapy. He is not interested in the surgical resection given the potential side effect/deformity involved.  Patient also wished not to travel any other location for radiation therapy i.e. proton therapy due to the family reason.  The patient wished to consider stereotactic radiosurgery for his local recurrence. He therefore received stereotactic radiosurgery targeted to the recurrence lesion involving right sigmoidal sinus with a total dose of 3500 cGy in 5 treatments given from 11/8/2021-11/17/2021.  He tolerated radiation therapy very well with minimal side effect.  Pembrolizumab started 12/23/21.  Held after his July 1, 2022 dose.  PET scan in March 2022 showed a near complete response.     Patient had a restaging CT scan on 6/15/2023 this showed a interval development of a 2.0 x 1.9 cm rounded hyperenhancing area with central hypodensity along the anterior right submandibular gland.  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 MRI brain on 9/6/2023 again showed heterogeneously  enhancing soft tissue mass in the right submandibular space consistent with pathologic cervical lymph nodes and biopsy-proven metastatic squamous cell carcinoma.  PET scan on 9/11/2023 also showed FDG avid right isolated submandibular lymph node recurrence with no other evidence of systemic metastasis.  The patient has moderate pain in the right neck region.  He is referred to radiation oncology for evaluation and consideration of possible treatment options including stereotactic radiotherapy.     Medications were reviewed and are up to date on EPIC.    The following portions of the patient's history were reviewed and updated as appropriate: allergies, current medications, past family history, past medical history, past social history, past surgical history and problem list.    Review of Systems:      General  Constitutional  Constitutional (WDL): Exceptions to WDL  Fatigue: Fatigue relieved by rest  EENT  Eye Disorders  Eye Disorder (WDL): Assessment not pertinent to visit  Ear Disorders  Ear Disorder (WDL): Assessment not pertinent to visit  Respiratory  Respiratory  Respiratory (WDL): All respiratory elements are within defined limits  Cardiovascular  Cardiovascular  Cardiovascular (WDL): All cardiovascular elements are within defined limits  Gastrointestinal  Gastrointestinal  Gastrointestinal (WDL): Exceptions to WDL  Anorexia: Loss of appetite without alteration in eating habits  Dysgeusia: Altered taste but no change in diet  Constipation: Occasional or intermittent symptoms OR occasional use of stool softeners, laxatives, dietary modification, or enema  Dry Mouth: Symptomatic (e.g., dry or thick saliva) without significant dietary alteration OR unstimulated saliva flow greater than 0.2 mL/min  Musculoskeletal  Musculoskeletal and Connective Tissue Disorders  Musculoskeletal & Connective (WDL): All musculoskeletal & connective elements are within defined limits  Integumentary  Integumentary  Integumentary  (WDL): All integumentary elements are within defined limits  Neurological  Neurosensory  Neurosensory (WDL): All neurosensory elements are within defined limits  Genitourinary/Reproductive  Genitourinary  Genitourinary (WDL): All genitourinary elements are within defined limits  Lymphatic  Lymph System Disorders  Lymph (WDL): Assessment not pertinent to visit  Pain  Pain Score: No Pain (0) (right jaw pain comes and goes)  AUA Assessment                                                              Accompanied by  Accompanied By: family    Objective:     PHYSICAL EXAMINATION:    /70 (BP Location: Left arm, Patient Position: Sitting)   Pulse 63   Temp 97.9  F (36.6  C)   Resp 20   Wt 61.3 kg (135 lb 3.2 oz)   SpO2 97%   BMI 23.96 kg/m      Gen: Alert, in NAD  Eyes: PERRL, EOMI, sclera anicteric  Neck: Supple, full ROM, there is a palpable cervical mass on the right side level 2 measuring approximately 2 x 3 cm with mild tenderness.  Pulm: No wheezing, stridor or respiratory distress  CV: Well-perfused, no cyanosis, no pedal edema  Back: No step-offs or pain to palpation along the thoracolumbar spine  Rectal: Deferred  : Deferred  Musculoskeletal: Normal muscle bulk and tone  Skin: Normal color and turgor  Neurologic: A/Ox3, CN II-XII intact, normal gait and station  Psychiatric: Appropriate mood and affect     Imaging: Imaging results 30 days: US Paracentesis without Albumin    Result Date: 9/12/2023  EXAM: 1. PARACENTESIS 2. ULTRASOUND GUIDANCE LOCATION: [facility] DATE: 9/12/2023 INDICATION: Ascites. PROCEDURE: Informed consent obtained. Time out performed. The abdomen was prepped and draped in a sterile fashion. 10 mL of 1% lidocaine was infused into local soft tissues. A 5 Bengali catheter system was introduced into the abdominal ascites under ultrasound guidance. 5.0 liters of clear fluid were removed and sent to the lab. Patient tolerated procedure well. Ultrasound imaging  was obtained and placed in the patient's permanent medical record.     IMPRESSION: 1.  Status post ultrasound-guided paracentesis. Reference CPT Code: 80336    PET Oncology Whole Body    Result Date: 9/11/2023  EXAM: PET ONCOLOGY WHOLE BODY, CT CHEST/ABDOMEN/PELVIS W CONTRAST LOCATION: [facility] DATE: 9/11/2023 INDICATION: Subsequent treatment planning and restaging for malignant neoplasm of overlapping sites of nasopharynx. Status post concurrent chemoradiation completed in April 2020, additional cycles of chemotherapy, radiosurgery to a right ethmoid tumor completed in November 2021, currently receiving immunotherapy. Monitor treatment response COMPARISON: FDG PET/CT dated 03/17/2022, brain MRI dated 09/06/2023, right submandibular gland biopsy dated 08/31/2023 CONTRAST: 70 mL Isovue-370 TECHNIQUE: Serum glucose level 84 mg/dL. One hour post intravenous administration of 10.1 mCi F-18 FDG, PET imaging was performed from the skull vertex to feet, utilizing attenuation correction with concurrent axial CT and PET/CT image fusion. Separate diagnostic CT of the chest, abdomen, and pelvis was performed. Dose reduction techniques were used. PET/CT FINDINGS: Isolated FDG avid right submandibular lymph node measuring 2.8 x 1.2 x 3.0 cm (max SUV 15.9) suspicious for progression of disease. Mildly FDG avid moderate paranasal sinus mucosal thickening likely inflammatory in nature. Redemonstrated mildly FDG avid bilateral hilar station 10 lymph nodes (Max SUV 6.0, previously 7.9 on the right) likely representing sequelae of granulomatous/immunotherapy related inflammatory change. Diffuse esophagitis. Shoulder and hip synovitis. CT FINDINGS: Mild to moderate senescent intracranial changes. Moderate paranasal sinus mucosal thickening. Left chest port with tip terminating in the upper right atrium. Mild dilatation of the ascending aorta. Mild coronary artery calcium. Trace pleural  effusions. Cirrhotic  liver with sequelae of portal hypertension including a marked volume of intra-abdominal ascites. Non-FDG avid lesion in the splenic parenchyma suggesting benignity. Multilevel degenerative changes of the spine. The lower extremities  are unremarkable.     IMPRESSION: Findings suspicious for isolated right submandibular lymph node recurrence.    CT Chest/Abdomen/Pelvis w Contrast    Result Date: 9/11/2023  EXAM: PET ONCOLOGY WHOLE BODY, CT CHEST/ABDOMEN/PELVIS W CONTRAST LOCATION: [facility] DATE: 9/11/2023 INDICATION: Subsequent treatment planning and restaging for malignant neoplasm of overlapping sites of nasopharynx. Status post concurrent chemoradiation completed in April 2020, additional cycles of chemotherapy, radiosurgery to a right ethmoid tumor completed in November 2021, currently receiving immunotherapy. Monitor treatment response COMPARISON: FDG PET/CT dated 03/17/2022, brain MRI dated 09/06/2023, right submandibular gland biopsy dated 08/31/2023 CONTRAST: 70 mL Isovue-370 TECHNIQUE: Serum glucose level 84 mg/dL. One hour post intravenous administration of 10.1 mCi F-18 FDG, PET imaging was performed from the skull vertex to feet, utilizing attenuation correction with concurrent axial CT and PET/CT image fusion. Separate diagnostic CT of the chest, abdomen, and pelvis was performed. Dose reduction techniques were used. PET/CT FINDINGS: Isolated FDG avid right submandibular lymph node measuring 2.8 x 1.2 x 3.0 cm (max SUV 15.9) suspicious for progression of disease. Mildly FDG avid moderate paranasal sinus mucosal thickening likely inflammatory in nature. Redemonstrated mildly FDG avid bilateral hilar station 10 lymph nodes (Max SUV 6.0, previously 7.9 on the right) likely representing sequelae of granulomatous/immunotherapy related inflammatory change. Diffuse esophagitis. Shoulder and hip synovitis. CT FINDINGS: Mild to moderate senescent intracranial changes. Moderate paranasal  sinus mucosal thickening. Left chest port with tip terminating in the upper right atrium. Mild dilatation of the ascending aorta. Mild coronary artery calcium. Trace pleural  effusions. Cirrhotic liver with sequelae of portal hypertension including a marked volume of intra-abdominal ascites. Non-FDG avid lesion in the splenic parenchyma suggesting benignity. Multilevel degenerative changes of the spine. The lower extremities  are unremarkable.     IMPRESSION: Findings suspicious for isolated right submandibular lymph node recurrence.    MR Brain w/o & w Contrast    Result Date: 9/6/2023  EXAM: MR BRAIN W/O and W CONTRAST LOCATION: [facility] DATE: 9/6/2023 INDICATION: f u Nasopharyngeal carcinoma COMPARISON: Soft tissue neck CT 06/15/2023 and MRI 06/07/2023. MRI 10/03/2022 CONTRAST: 6ml gadavist TECHNIQUE: Multiplanar multisequence head MRI without and with intravenous contrast including dedicated imaging of the skull base and nasopharynx. FINDINGS: SKULL BASE AND NASOPHARYNX: Dedicated imaging of the skull base extending to involve the adjacent upper aerodigestive tract demonstrates persistent post therapeutic changes involving the nasopharyngeal mucosa without evidence for locally recurrent mucosal mass. There is persistent mucosal thickening involving the paranasal sinuses with complete opacification of the right frontal sinus and anterior right ethmoid air cells, subtotal opacification of the left ethmoid air cells, and moderate circumferential mucosal thickening involving the bilateral maxillary sinuses and right sphenoid sinus similar to findings on the previous exam. Some dependent secretions persists primarily within the left maxillary sinus as seen previously. These areas mucosal abnormality demonstrated persistent irregular enhancement without evidence for new or progressive masslike abnormality in the sinonasal region. Persistent patchy opacification of left mastoid air cells.  INTRACRANIAL CONTENTS: No acute or subacute infarct. No definite intracranial mass or mass effect as visualized. Unchanged brain parenchymal signal on the provided sequences. Mild generalized cerebral atrophy. No hydrocephalus. Normal position of the cerebellar tonsils. No evidence for new or progressive enhancing abnormality intracranially. OTHER: Multilobular heterogeneously T2 hyperintense and heterogeneously enhancing mass in the right submandibular space presumably representing pathologic cervical lymph nodes consistent with biopsy-proven metastatic squamous cell carcinoma. This lesion measures approximately 2.1 x 2.2 x 2.5 cm in greatest transverse, AP, and craniocaudal dimensions respectively on postcontrast images. This is consistent with findings on the recent prior neck CT.     IMPRESSION: 1.  Unchanged post therapeutic appearance of the skull base and upper aerodigestive tract without evidence for new or locally recurrent mucosal neoplasm. 2.  Heterogeneously signaling, heterogeneously enhancing soft tissue mass in the right submandibular space consistent with pathologic cervical lymph nodes and biopsy-proven metastatic squamous cell carcinoma. This is similar to findings on the previous neck CT. 3.  Persistent inflammatory changes of the paranasal sinuses including dependent secretions within the left maxillary sinus. 4.  No evidence for acute intracranial process.     US Biopsy Lymph Node Core    Result Date: 8/31/2023  EXAM: 1. PERCUTANEOUS BIOPSY OF A RIGHT SUBMANDIBULAR MASS 2. ULTRASOUND GUIDANCE LOCATION: [facility] DATE: 8/31/2023 INDICATION: Salivary gland mass, initial workup PROCEDURE: Informed consent obtained. Site marked. Prior images reviewed. Required items made available. Patient identity was confirmed verbally and with arm band. Patient reevaluated immediately before beginning the procedure. Universal protocol was followed. Time out performed. The site was  prepped and draped in sterile fashion. 10 mL of 1 percent lidocaine was infused into the local soft tissues. Using standard technique and under direct ultrasound guidance, a total of 5 fine needle aspiration samples and two 18 gauge biopsy samples were obtained. Tissue was submitted to Pathology. The patient tolerated the procedure well. No complications.     IMPRESSION: Status post US-guided biopsy of a right submandibular mass. Reference CPT Code: 81855, 45606      Impression     Patient is a 73-year-old gentleman with a diagnosis of nasopharyngeal cancer status post concurrent chemoradiation therapy 3 year and 5 months ago.  Patient was to have right ethmoid sinus biopsy-proven recurrence with extension into the right orbital extraconal fat with possible right level 1B and 2A lymph node metastasis status post salvage SBRT completed 1 year and 10-month ago.  The patient presented with a new finding of isolated biopsy-proven local recurrence in the right submandibular lymph node.    Assessment & Plan:     I have personally reviewed his upcoming medical record today.  I have also reviewed his most recent radiology study including MRI and PET CT scan.  This is a 73-year-old gentleman with a diagnosis of nasopharyngeal cancer status post concurrent chemoradiation therapy 3 year and 5 months ago.  Patient was to have right ethmoid sinus biopsy-proven recurrence with extension into the right orbital extraconal fat with possible right level 1B and 2A lymph node metastasis status post salvage SBRT completed 1 year and 10-month ago.  The patient presented with a new finding of isolated biopsy-proven local recurrence in the right submandibular lymph node.  Staging PET scan and MRI scan showed no evidence of other metastatic disease.  The possible treatment options including surgery, systemic therapy, and radiation therapy has been discussed with the patient in detail and at a great lengths. Patient is informed a potential  increased risk of radiation-induced normal tissue damage given the prior history of radiation therapy to the same area.     The patient indeed has an underlying medical condition including chronic kidney disease and cirrhosis of liver.  He is interested in possible surgical resection.  I therefore will refer patient to ENT for evaluation and discussion of possible surgery.  If patient is not candidate for surgery or decline options of surgery, we will consider another course of local SBRT.    Face to face time  40 minutes with > 80% spent on consultation, education and coordination of care.      Laurita Mendoza MD  Department of Radiation Oncology   [facility]  Tel: [phone]  Page: [phone]    [facility]  [address] [place] [address]     [address] [place] [address]    CC:  Patient Care Team:  Issa Cook MD as PCP - General  Alan Frias MD as MD (Hematology & Oncology)  Laurita Mendoza MD as MD (Hematology & Oncology)  Issa Cook MD as Assigned PCP  Eduardo Grier MD as Assigned Infectious Disease Provider  Ky Centeno MD as MD (Otolaryngology)  Cale Akers, RN as Specialty Care Coordinator (Hematology & Oncology)  Alan Frias MD as Assigned Cancer Care Provider

## 2023-09-22 NOTE — PROGRESS NOTES
"Oncology Rooming Note    September 22, 2023 9:14 AM   Kristen Chapman is a 73 year old male who presents for:    Chief Complaint   Patient presents with    Oncology Clinic Visit     Follow up     Initial Vitals: /70 (BP Location: Left arm, Patient Position: Sitting)   Pulse 63   Temp 97.9  F (36.6  C)   Resp 20   Wt 61.3 kg (135 lb 3.2 oz)   SpO2 97%   BMI 23.96 kg/m   Estimated body mass index is 23.96 kg/m  as calculated from the following:    Height as of 9/15/23: 1.6 m (5' 2.99\").    Weight as of this encounter: 61.3 kg (135 lb 3.2 oz). Body surface area is 1.65 meters squared.  No Pain (0) Comment: right jaw pain comes and goes   No LMP for male patient.  Allergies reviewed: Yes  Medications reviewed: Yes    Medications: Medication refills not needed today.  Pharmacy name entered into [facility]: [facility] - [place], [place] - [address] [place] [address]    Clinical concerns: Follow up, used interpretor, pt having pain right jaw on/off, not eating a lot  Dr. Mendoza was notified.      Flores Mercedes RN              "

## 2023-09-22 NOTE — LETTER
9/22/2023         RE: Kristen Chapman  [address] [place] [address]        Dear Colleague,    Thank you for referring your patient, Kristen Chapman, to the [facility] RADIATION ONCOLOGY [place]. Please see a copy of my visit note below.    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  The most recent restaging work-up indicated right ethmoid sinus biopsy-proven recurrence.      TYPE OF RADIATION THERAPY ADMINISTERED:   1. Concurrent chemoradiation therapy for his head neck cancer with weekly cisplatin and had radiation therapy in our clinic with a total dose of 7000 cGy in 35 treatments given from 3/2/2020-4/17/2020.      2. Stereotactic radiosurgery targeted to the recurrence lesion involving right ethmoid sinus with a total dose of 3500 cGy in 5 treatments given from 11/8/2021-11/17/2021.      INTERVAL SINCE COMPLETION OF RADIATION THERAPY:   1. 3 years and 5 months for primary nasal pharyngeal cancer.     2.  1 year and 10 months for right ethmoid sinus recurrence.      SUBJECTIVE:  Mr. Chapman is a 73 year old male who has been in his usual state of good health until recently.  Patient was found to have swelling right neck mass by his lerma a month ago for which he was a seeking further evaluation.  The patient did not notice any significant changes over the past few weeks and he is also asymptomatic.  Initial ENT examination showed suspicious abnormalities in the right nasopharynx worrisome for malignancy.  CT of the neck on 1/24/2020 revealed asymmetric soft tissue thickening in the posterior right nasopharynx as well as abnormal enlarged cervical lymph nodes measuring up to 3.8 cm bilaterally.  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  The  patient also had a staging work-up including PET CT scan and MRI brain which showed locally advanced disease with lymph node metastasis.  The PET CT scan also showed abnormal increased activity in the hilar region suspicious for metastasis.  Patient was then referred to pulmonology for evaluation and possible biopsy.  Patient then underwent bronchoscopy and EBUS on 2/27/2020 with biopsy showed negative for malignancy. He received concurrent chemoradiation therapy for his head neck cancer and had radiation therapy in our clinic with a total dose of 7000 cGy in 35 treatments given from 3/2/2020-4/17/2020.  He also received 3 cycles of cisplatin and 5-FU during the course of radiation therapy. He tolerated radiation therapy reasonably well with expected acute side effect.  The patient insisted not to have PEG tube placement during the therapy.  He however is able to maintain his weight reasonably stable during the treatment.     The patient experienced worsening pain of sore throat and dysphasia since completion of the radiation therapy.  He is not able to get adequate calorie and fluid intake and was admitted to hospital one week after treatment for supportive care and pain management.  A PEG tube was also placed during the hospital stay. The patient felt much better and improved since hospital stay.  The patient also experienced acute onset of left parotitis 4 weeks post cancer therapy and was admitted to the hospital for ongoing treatment including antibiotics.  His condition has been significant improved since then.      He is also receiving adjuvant chemotherapy under supervision of Dr. Frias.       He presented with right-sided eye watering and rhinorrhea from the right nare with MRI imaging on 7/30/2021 concerning for a mass within the right anterior ethmoid air cells.  Patient had a endoscopy and biopsy of right ethmoid sinus on 9/7/2021 with pathology confirmed EBV mediated nasopharyngeal carcinoma. The  patient had restaging PET CT scan on 9/16/2021 which showed hypermetabolic mass situated about the right ethmoid sinus with extension into the right orbital extraconal fat, compatible with biopsy-proven nasopharyngeal carcinoma deposit. There are hypermetabolic right level Ib and 2A lymphadenopathy suggestive of metastases nodes.   There are also new hypermetabolic mediastinal lymphadenopathy, most suspicious for metastatic disease. Reactive nodes are on the differential diagnosis.  His case has been discussed at tumor conference on 9/17/2021 at [facility] and the consensus recommendation is to consider possible chemoradiation therapy.  The patient also had ultrasound-guided needle biopsy for his cervical lymph node 10/19/2021 and pathology confirmed lymph node metastasis. He saw Dr. Frias, medical oncology and patient was determined not a good candidate for chemotherapy given his current medical condition.       I have discussed with the patient extensively about treatment options including surgery, systemic therapy, and salvage radiation therapy. He is not interested in the surgical resection given the potential side effect/deformity involved.  Patient also wished not to travel any other location for radiation therapy i.e. proton therapy due to the family reason.  The patient wished to consider stereotactic radiosurgery for his local recurrence. He therefore received stereotactic radiosurgery targeted to the recurrence lesion involving right sigmoidal sinus with a total dose of 3500 cGy in 5 treatments given from 11/8/2021-11/17/2021.  He tolerated radiation therapy very well with minimal side effect.  Pembrolizumab started 12/23/21.  Held after his July 1, 2022 dose.  PET scan in March 2022 showed a near complete response.     Patient had a restaging CT scan on 6/15/2023 this showed a interval development of a 2.0 x 1.9 cm rounded hyperenhancing area with central hypodensity along the anterior right  submandibular gland.  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 MRI brain on 9/6/2023 again showed heterogeneously enhancing soft tissue mass in the right submandibular space consistent with pathologic cervical lymph nodes and biopsy-proven metastatic squamous cell carcinoma.  PET scan on 9/11/2023 also showed FDG avid right isolated submandibular lymph node recurrence with no other evidence of systemic metastasis.  The patient has moderate pain in the right neck region.  He is referred to radiation oncology for evaluation and consideration of possible treatment options including stereotactic radiotherapy.     Medications were reviewed and are up to date on EPIC.    The following portions of the patient's history were reviewed and updated as appropriate: allergies, current medications, past family history, past medical history, past social history, past surgical history and problem list.    Review of Systems:      General  Constitutional  Constitutional (WDL): Exceptions to WDL  Fatigue: Fatigue relieved by rest  EENT  Eye Disorders  Eye Disorder (WDL): Assessment not pertinent to visit  Ear Disorders  Ear Disorder (WDL): Assessment not pertinent to visit  Respiratory  Respiratory  Respiratory (WDL): All respiratory elements are within defined limits  Cardiovascular  Cardiovascular  Cardiovascular (WDL): All cardiovascular elements are within defined limits  Gastrointestinal  Gastrointestinal  Gastrointestinal (WDL): Exceptions to WDL  Anorexia: Loss of appetite without alteration in eating habits  Dysgeusia: Altered taste but no change in diet  Constipation: Occasional or intermittent symptoms OR occasional use of stool softeners, laxatives, dietary modification, or enema  Dry Mouth: Symptomatic (e.g., dry or thick saliva) without significant dietary alteration OR unstimulated saliva  flow greater than 0.2 mL/min  Musculoskeletal  Musculoskeletal and Connective Tissue Disorders  Musculoskeletal & Connective (WDL): All musculoskeletal & connective elements are within defined limits  Integumentary  Integumentary  Integumentary (WDL): All integumentary elements are within defined limits  Neurological  Neurosensory  Neurosensory (WDL): All neurosensory elements are within defined limits  Genitourinary/Reproductive  Genitourinary  Genitourinary (WDL): All genitourinary elements are within defined limits  Lymphatic  Lymph System Disorders  Lymph (WDL): Assessment not pertinent to visit  Pain  Pain Score: No Pain (0) (right jaw pain comes and goes)  AUA Assessment                                                              Accompanied by  Accompanied By: family    Objective:     PHYSICAL EXAMINATION:    /70 (BP Location: Left arm, Patient Position: Sitting)   Pulse 63   Temp 97.9  F (36.6  C)   Resp 20   Wt 61.3 kg (135 lb 3.2 oz)   SpO2 97%   BMI 23.96 kg/m      Gen: Alert, in NAD  Eyes: PERRL, EOMI, sclera anicteric  Neck: Supple, full ROM, there is a palpable cervical mass on the right side level 2 measuring approximately 2 x 3 cm with mild tenderness.  Pulm: No wheezing, stridor or respiratory distress  CV: Well-perfused, no cyanosis, no pedal edema  Back: No step-offs or pain to palpation along the thoracolumbar spine  Rectal: Deferred  : Deferred  Musculoskeletal: Normal muscle bulk and tone  Skin: Normal color and turgor  Neurologic: A/Ox3, CN II-XII intact, normal gait and station  Psychiatric: Appropriate mood and affect     Imaging: Imaging results 30 days: US Paracentesis without Albumin    Result Date: 9/12/2023  EXAM: 1. PARACENTESIS 2. ULTRASOUND GUIDANCE LOCATION: [facility] DATE: 9/12/2023 INDICATION: Ascites. PROCEDURE: Informed consent obtained. Time out performed. The abdomen was prepped and draped in a sterile fashion. 10 mL of 1% lidocaine was  infused into local soft tissues. A 5 [place] catheter system was introduced into the abdominal ascites under ultrasound guidance. 5.0 liters of clear fluid were removed and sent to the lab. Patient tolerated procedure well. Ultrasound imaging was obtained and placed in the patient's permanent medical record.     IMPRESSION: 1.  Status post ultrasound-guided paracentesis. Reference CPT Code: 01579    PET Oncology Whole Body    Result Date: 9/11/2023  EXAM: PET ONCOLOGY WHOLE BODY, CT CHEST/ABDOMEN/PELVIS W CONTRAST LOCATION: [facility] DATE: 9/11/2023 INDICATION: Subsequent treatment planning and restaging for malignant neoplasm of overlapping sites of nasopharynx. Status post concurrent chemoradiation completed in April 2020, additional cycles of chemotherapy, radiosurgery to a right ethmoid tumor completed in November 2021, currently receiving immunotherapy. Monitor treatment response COMPARISON: FDG PET/CT dated 03/17/2022, brain MRI dated 09/06/2023, right submandibular gland biopsy dated 08/31/2023 CONTRAST: 70 mL Isovue-370 TECHNIQUE: Serum glucose level 84 mg/dL. One hour post intravenous administration of 10.1 mCi F-18 FDG, PET imaging was performed from the skull vertex to feet, utilizing attenuation correction with concurrent axial CT and PET/CT image fusion. Separate diagnostic CT of the chest, abdomen, and pelvis was performed. Dose reduction techniques were used. PET/CT FINDINGS: Isolated FDG avid right submandibular lymph node measuring 2.8 x 1.2 x 3.0 cm (max SUV 15.9) suspicious for progression of disease. Mildly FDG avid moderate paranasal sinus mucosal thickening likely inflammatory in nature. Redemonstrated mildly FDG avid bilateral hilar station 10 lymph nodes (Max SUV 6.0, previously 7.9 on the right) likely representing sequelae of granulomatous/immunotherapy related inflammatory change. Diffuse esophagitis. Shoulder and hip synovitis. CT FINDINGS: Mild to moderate  senescent intracranial changes. Moderate paranasal sinus mucosal thickening. Left chest port with tip terminating in the upper right atrium. Mild dilatation of the ascending aorta. Mild coronary artery calcium. Trace pleural  effusions. Cirrhotic liver with sequelae of portal hypertension including a marked volume of intra-abdominal ascites. Non-FDG avid lesion in the splenic parenchyma suggesting benignity. Multilevel degenerative changes of the spine. The lower extremities  are unremarkable.     IMPRESSION: Findings suspicious for isolated right submandibular lymph node recurrence.    CT Chest/Abdomen/Pelvis w Contrast    Result Date: 9/11/2023  EXAM: PET ONCOLOGY WHOLE BODY, CT CHEST/ABDOMEN/PELVIS W CONTRAST LOCATION: [facility] DATE: 9/11/2023 INDICATION: Subsequent treatment planning and restaging for malignant neoplasm of overlapping sites of nasopharynx. Status post concurrent chemoradiation completed in April 2020, additional cycles of chemotherapy, radiosurgery to a right ethmoid tumor completed in November 2021, currently receiving immunotherapy. Monitor treatment response COMPARISON: FDG PET/CT dated 03/17/2022, brain MRI dated 09/06/2023, right submandibular gland biopsy dated 08/31/2023 CONTRAST: 70 mL Isovue-370 TECHNIQUE: Serum glucose level 84 mg/dL. One hour post intravenous administration of 10.1 mCi F-18 FDG, PET imaging was performed from the skull vertex to feet, utilizing attenuation correction with concurrent axial CT and PET/CT image fusion. Separate diagnostic CT of the chest, abdomen, and pelvis was performed. Dose reduction techniques were used. PET/CT FINDINGS: Isolated FDG avid right submandibular lymph node measuring 2.8 x 1.2 x 3.0 cm (max SUV 15.9) suspicious for progression of disease. Mildly FDG avid moderate paranasal sinus mucosal thickening likely inflammatory in nature. Redemonstrated mildly FDG avid bilateral hilar station 10 lymph nodes (Max SUV 6.0,  previously 7.9 on the right) likely representing sequelae of granulomatous/immunotherapy related inflammatory change. Diffuse esophagitis. Shoulder and hip synovitis. CT FINDINGS: Mild to moderate senescent intracranial changes. Moderate paranasal sinus mucosal thickening. Left chest port with tip terminating in the upper right atrium. Mild dilatation of the ascending aorta. Mild coronary artery calcium. Trace pleural  effusions. Cirrhotic liver with sequelae of portal hypertension including a marked volume of intra-abdominal ascites. Non-FDG avid lesion in the splenic parenchyma suggesting benignity. Multilevel degenerative changes of the spine. The lower extremities  are unremarkable.     IMPRESSION: Findings suspicious for isolated right submandibular lymph node recurrence.    MR Brain w/o & w Contrast    Result Date: 9/6/2023  EXAM: MR BRAIN W/O and W CONTRAST LOCATION: [facility] DATE: 9/6/2023 INDICATION: f u Nasopharyngeal carcinoma COMPARISON: Soft tissue neck CT 06/15/2023 and MRI 06/07/2023. MRI 10/03/2022 CONTRAST: 6ml gadavist TECHNIQUE: Multiplanar multisequence head MRI without and with intravenous contrast including dedicated imaging of the skull base and nasopharynx. FINDINGS: SKULL BASE AND NASOPHARYNX: Dedicated imaging of the skull base extending to involve the adjacent upper aerodigestive tract demonstrates persistent post therapeutic changes involving the nasopharyngeal mucosa without evidence for locally recurrent mucosal mass. There is persistent mucosal thickening involving the paranasal sinuses with complete opacification of the right frontal sinus and anterior right ethmoid air cells, subtotal opacification of the left ethmoid air cells, and moderate circumferential mucosal thickening involving the bilateral maxillary sinuses and right sphenoid sinus similar to findings on the previous exam. Some dependent secretions persists primarily within the left maxillary  sinus as seen previously. These areas mucosal abnormality demonstrated persistent irregular enhancement without evidence for new or progressive masslike abnormality in the sinonasal region. Persistent patchy opacification of left mastoid air cells. INTRACRANIAL CONTENTS: No acute or subacute infarct. No definite intracranial mass or mass effect as visualized. Unchanged brain parenchymal signal on the provided sequences. Mild generalized cerebral atrophy. No hydrocephalus. Normal position of the cerebellar tonsils. No evidence for new or progressive enhancing abnormality intracranially. OTHER: Multilobular heterogeneously T2 hyperintense and heterogeneously enhancing mass in the right submandibular space presumably representing pathologic cervical lymph nodes consistent with biopsy-proven metastatic squamous cell carcinoma. This lesion measures approximately 2.1 x 2.2 x 2.5 cm in greatest transverse, AP, and craniocaudal dimensions respectively on postcontrast images. This is consistent with findings on the recent prior neck CT.     IMPRESSION: 1.  Unchanged post therapeutic appearance of the skull base and upper aerodigestive tract without evidence for new or locally recurrent mucosal neoplasm. 2.  Heterogeneously signaling, heterogeneously enhancing soft tissue mass in the right submandibular space consistent with pathologic cervical lymph nodes and biopsy-proven metastatic squamous cell carcinoma. This is similar to findings on the previous neck CT. 3.  Persistent inflammatory changes of the paranasal sinuses including dependent secretions within the left maxillary sinus. 4.  No evidence for acute intracranial process.     US Biopsy Lymph Node Core    Result Date: 8/31/2023  EXAM: 1. PERCUTANEOUS BIOPSY OF A RIGHT SUBMANDIBULAR MASS 2. ULTRASOUND GUIDANCE LOCATION: [facility] DATE: 8/31/2023 INDICATION: Salivary gland mass, initial workup PROCEDURE: Informed consent obtained. Site marked.  Prior images reviewed. Required items made available. Patient identity was confirmed verbally and with arm band. Patient reevaluated immediately before beginning the procedure. Universal protocol was followed. Time out performed. The site was prepped and draped in sterile fashion. 10 mL of 1 percent lidocaine was infused into the local soft tissues. Using standard technique and under direct ultrasound guidance, a total of 5 fine needle aspiration samples and two 18 gauge biopsy samples were obtained. Tissue was submitted to Pathology. The patient tolerated the procedure well. No complications.     IMPRESSION: Status post US-guided biopsy of a right submandibular mass. Reference CPT Code: 63822, 20026      Impression     Patient is a 73-year-old gentleman with a diagnosis of nasopharyngeal cancer status post concurrent chemoradiation therapy 3 year and 5 months ago.  Patient was to have right ethmoid sinus biopsy-proven recurrence with extension into the right orbital extraconal fat with possible right level 1B and 2A lymph node metastasis status post salvage SBRT completed 1 year and 10-month ago.  The patient presented with a new finding of isolated biopsy-proven local recurrence in the right submandibular lymph node.    Assessment & Plan:     I have personally reviewed his upcoming medical record today.  I have also reviewed his most recent radiology study including MRI and PET CT scan.  This is a 73-year-old gentleman with a diagnosis of nasopharyngeal cancer status post concurrent chemoradiation therapy 3 year and 5 months ago.  Patient was to have right ethmoid sinus biopsy-proven recurrence with extension into the right orbital extraconal fat with possible right level 1B and 2A lymph node metastasis status post salvage SBRT completed 1 year and 10-month ago.  The patient presented with a new finding of isolated biopsy-proven local recurrence in the right submandibular lymph node.  Staging PET scan and MRI scan  showed no evidence of other metastatic disease.  The possible treatment options including surgery, systemic therapy, and radiation therapy has been discussed with the patient in detail and at a great lengths. Patient is informed a potential increased risk of radiation-induced normal tissue damage given the prior history of radiation therapy to the same area.     The patient indeed has an underlying medical condition including chronic kidney disease and cirrhosis of liver.  He is interested in possible surgical resection.  I therefore will refer patient to ENT for evaluation and discussion of possible surgery.  If patient is not candidate for surgery or decline options of surgery, we will consider another course of local SBRT.    Face to face time  40 minutes with > 80% spent on consultation, education and coordination of care.      Laurita Mendoza MD  Department of Radiation Oncology   [facility]  Tel: [phone]  Page: [phone]    [facility]  [address] [place] [address]     [address] [place] [address]    CC:  Patient Care Team:  Issa Cook MD as PCP - General  Alan Frias MD as MD (Hematology & Oncology)  Laurita Mendoza MD as MD (Hematology & Oncology)  Issa Cook MD as Assigned PCP  Eduardo Grier MD as Assigned Infectious Disease Provider  Ky Centeno MD as MD (Otolaryngology)  Cale Akers, RN as Specialty Care Coordinator (Hematology & Oncology)  Alan Frias MD as Assigned Cancer Care Provider      Oncology Rooming Note    September 22, 2023 9:14 AM   Kristen Chapman is a 73 year old male who presents for:    Chief Complaint   Patient presents with     Oncology Clinic Visit     Follow up     Initial Vitals: /70 (BP Location: Left arm, Patient Position: Sitting)   Pulse 63   Temp 97.9  F (36.6  C)   Resp 20   Wt 61.3 kg (135 lb 3.2 oz)   SpO2 97%   BMI 23.96 kg/m   Estimated body mass index is 23.96 kg/m  as  "calculated from the following:    Height as of 9/15/23: 1.6 m (5' 2.99\").    Weight as of this encounter: 61.3 kg (135 lb 3.2 oz). Body surface area is 1.65 meters squared.  No Pain (0) Comment: right jaw pain comes and goes   No LMP for male patient.  Allergies reviewed: Yes  Medications reviewed: Yes    Medications: Medication refills not needed today.  Pharmacy name entered into [employer]: [facility] - [place], [place] - [address] [place] [address]    Clinical concerns: Follow up, used interpretor, pt having pain right jaw on/off, not eating a lot  Dr. Mendoza was notified.      Flores Mercedes, RN                Again, thank you for allowing me to participate in the care of your patient.        Sincerely,        Laurita Mendoza MD  "

## 2023-09-27 LAB
PATH REPORT.ADDENDUM SPEC: ABNORMAL
PATH REPORT.ADDENDUM SPEC: ABNORMAL
PATH REPORT.COMMENTS IMP SPEC: ABNORMAL
PATH REPORT.COMMENTS IMP SPEC: YES
PATH REPORT.FINAL DX SPEC: ABNORMAL
PATH REPORT.GROSS SPEC: ABNORMAL
PATH REPORT.MICROSCOPIC SPEC OTHER STN: ABNORMAL
PATH REPORT.RELEVANT HX SPEC: ABNORMAL
PHOTO IMAGE: ABNORMAL

## 2023-09-27 NOTE — TELEPHONE ENCOUNTER
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], ref'd by Laurita Mendoza MD, rec's in [facility], [place]'s daughter made [place] [facility] [place]     RECORDS REQUESTED FROM:       Clinic name Comments Records Status Imaging Status   [facility] [place] 9/22/23 OV with Laurita Mendoza MD   9/8/23 OV with Alan Frias MD  [facility] PET 9/11/23  CT chest abd pel 9/11/23  MR brain 9/6/23  US Bx core lymph 8/31/23  CT neck 6/15/23  *additional images in pacs [facility] PACS   [facility]  [address]  [facility] [address]  Phone [phone]  Fax [phone] 8/31/2023 Case: [id number] and Cytology, non-gynecologic Case: [id number]  Final Diagnosis   RIGHT SUBMANDIBULAR REGION MASS, ULTRASOUND-GUIDED NEEDLE CORE BIOPSY:  -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 with Ky Centeno MD     Procedure:  9/7/21 Nasal Endoscopy with Biopsy  [facility]    [facility] 9/7/21 Case: [id number]   Final Diagnosis   Right ethmoid, mass, biopsy:  -EBV-mediated nasopharyngeal carcinoma    [facility]  5/7/2021 note from Mracial Cheng MD  [facility]         October 9, 2023 at 8:53 AM - request for recent 8/31/2023 path slides only  [email or web address]    Pathology Received October 12, 2023 9:51 AM  [id number]     Action Taken 13 slides 8/31/2023 Case: [id number] and Cytology, non-gynecologic Case: [id number] received from [facility] and sent to 5th floor path lab for review with consult form.

## 2023-10-02 ENCOUNTER — OFFICE VISIT (OUTPATIENT)
Dept: INFECTIOUS DISEASES | Facility: CLINIC | Age: 73
End: 2023-10-02
Payer: COMMERCIAL

## 2023-10-02 VITALS
HEART RATE: 57 BPM | OXYGEN SATURATION: 97 % | SYSTOLIC BLOOD PRESSURE: 108 MMHG | WEIGHT: 136.5 LBS | TEMPERATURE: 97.9 F | BODY MASS INDEX: 24.19 KG/M2 | DIASTOLIC BLOOD PRESSURE: 60 MMHG

## 2023-10-02 DIAGNOSIS — B18.1 CHRONIC VIRAL HEPATITIS B WITHOUT DELTA AGENT AND WITHOUT COMA (H): ICD-10-CM

## 2023-10-02 LAB
ALBUMIN SERPL BCG-MCNC: 2.8 G/DL (ref 3.5–5.2)
ALP SERPL-CCNC: 60 U/L (ref 40–129)
ALT SERPL W P-5'-P-CCNC: 12 U/L (ref 0–70)
AST SERPL W P-5'-P-CCNC: 46 U/L (ref 0–45)
BILIRUB DIRECT SERPL-MCNC: 0.36 MG/DL (ref 0–0.3)
BILIRUB SERPL-MCNC: 1 MG/DL
PROT SERPL-MCNC: 6.2 G/DL (ref 6.4–8.3)

## 2023-10-02 PROCEDURE — 36415 COLL VENOUS BLD VENIPUNCTURE: CPT

## 2023-10-02 PROCEDURE — 99214 OFFICE O/P EST MOD 30 MIN: CPT

## 2023-10-02 PROCEDURE — 80076 HEPATIC FUNCTION PANEL: CPT

## 2023-10-02 PROCEDURE — 87517 HEPATITIS B DNA QUANT: CPT

## 2023-10-02 RX ORDER — ENTECAVIR 1 MG/1
1 TABLET, FILM COATED ORAL DAILY
Qty: 90 TABLET | Refills: 3 | Status: SHIPPED | OUTPATIENT
Start: 2023-10-02 | End: 2024-01-15

## 2023-10-02 NOTE — PROGRESS NOTES
Assessment:      Impression: Chronic hepatitis B infection, with compensated cirrhosis responding to lamivudine.  However, for unclear reasons, this had been discontinued in 2021 and then patient had rebound of hepatitis B viral load and LFTs.    HBV viral load is back up to 54 million on his visit 6 months ago.  Entecavir was ordered, but patient ran out 6 weeks ago.    Recurrent nasopharyngeal carcinoma, status post recent radiation treatment       Plan:     Continue lamivudine daily indefinitely.  Resume Entecavir    Orders Placed This Encounter   Procedures    Hepatic panel (Albumin, ALT, AST, Bili, Alk Phos, TP)     Standing Status:   Future     Number of Occurrences:   1     Standing Expiration Date:   10/2/2024    Hep B Virus DNA Quant Real Time PCR     Standing Status:   Future     Number of Occurrences:   1     Standing Expiration Date:   10/2/2024         Follow-up in about 4 weeks    Subjective:      This is an follow-up infectious Disease visit for Kristen Chapman, who is a 70 y.o.  referred for evaluation of hepatitis B.     Is a 70-year-old gentleman of seen only in video visits over the last year for chronic hepatitis B.  He had been on treatment for nasopharyngeal carcinoma when he was noted to have elevated LFTs and a very high hepatitis B viral load.    Patient was started on lamivudine 100 mg a day and he is doing quite well.  He denies any complaints other than some bleeding in his nose and upon further questioning some pain and dryness in his mouth and sticking when he swallows.    He has been taking Magic mouthwash which does not really seem to help his symptoms significantly.    He denies abdominal pain.      The following portions of the patient's history were reviewed and updated as appropriate: allergies, current medications, past family history, past medical history, past social history, past surgical history and problem list.      Follow-up visit on August 23, 2021:    For reasons that are  unclear, the lamivudine was discontinued several months ago.  He is generally feeling well though complains of some nasopharyngeal bleeding.    His thrush is resolved.    Follow-up visit on October 4, 2021:    Patient is back on the meeting.  He denies any specific complaints.  Unfortunately, since her last visit, he did receive a diagnosis that his nasopharyngeal carcinoma has returned.  Chemotherapy and radiation therapy is in the works.    He denies any abdominal pain.    Follow-up visit on January 10, 2022:    Briefly, patient is doing well as far as symptoms go.  He did have recurrence of his nasopharyngeal cancer.  Surgery was recommended and declined.    Patient has been started on pembrolizumab by Dr. Frias.  This may exacerbate his hepatitis.    Patient did have his quarterly hepatitis B virus levels checked last week and they are a little bit lower.  His LFTs did take up, but suspect this may be related to the pembrolizumab.    He denies abdominal pain or discomfort.  He says he is eating well.  Denies vomiting or diarrhea.    Follow-up visit on April 4, 2022:    Patient states he feels about the same.  He is waiting for results of a follow-up MRI regarding his nasopharyngeal cancer.    He did have blood test done last week which showed his LFTs are back to normal and his viral load is less than 20, which is where it was before is limited and had been discontinued.    Follow-up visit on October 10, 2022:    Patient is generally doing well.  He saw Dr. Frias last week and his tumor appears to be stable on the pembrolizumab.  We will be getting another MRI scan in a few months.    He did have his LFTs and viral load checked in July and they were good.  We will check them again today.    Follow-up visit on April 10, 2023:    Patient is doing well.  He offers no complaints.  He says his cancer treatments are done.  He saw Dr. Frias a few months ago who felt that the cancer was in remission.    Has been  taking his medication, reviewed eating, without any difficulties.    Follow-up visit on October 2, 2023:    At his last visit, his HBV viral load had skyrocketed to 54 million.  His family did confirm that he was taking the limit of eating daily.  For that reason, I did add Entecavir 1 mg a day.  However, patient did not follow-up as requested to check on how it was going with Entecavir and he ran out 6 weeks ago.    Complains some abdominal bloating and some constipation.    Review of Systems  Performed and all negative except as mentioned above.      Objective:     /60 (BP Location: Right arm, Patient Position: Sitting)   Pulse 57   Temp 97.9  F (36.6  C)   Wt 61.9 kg (136 lb 8 oz)   SpO2 97%   BMI 24.19 kg/m       General:   alert, appears stated age and cooperative   Oropharynx:  Wearing a mask    Eyes:   Extraocular muscles intact, no icterus.    Ears:   Deferred   Neck:  no adenopathy and supple, symmetrical, trachea midline   Thyroid:   Deferred   Lung:  clear to auscultation bilaterally   Heart:   regular rate and rhythm, S1, S2 normal, no murmur, click, rub or gallop   Abdomen:  Nondistended, soft, no hepatomegaly is appreciated   Extremities:  extremities normal, atraumatic, no cyanosis or edema   Skin:  warm and dry, no hyperpigmentation, vitiligo, or suspicious lesions   CVA:   absent   Genitourinary:  defer exam   Neurological:   Grossly normal   Psychiatric:   normal mood, behavior, speech, dress, and thought processes     Liver Function Studies -   Recent Labs   Lab Test 09/08/23  0914   PROTTOTAL 6.5   ALBUMIN 3.1*   BILITOTAL 1.1   ALKPHOS 64   AST 44   ALT 27          Latest Reference Range & Units 11/05/21 09:00 01/03/22 15:18 03/30/22 14:57 07/01/22 09:07 10/10/22 09:02   SARS CoV2 PCR Negative  Negative       HEP B Virus DNA Quant Not Detected IU/mL   <20 ! <20 ! <20 !   HEP B Virus DNA Quant Log   4.2 <1.3 <1.3 <1.3   HEP B VIRUS DNA QUANT REAL TIME PCR   Rpt ! Rpt ! Rpt ! Rpt !   !:  Data is abnormal  Rpt: View report in Results Review for more information    Eduardo Grier MD

## 2023-10-03 LAB
HBV DNA SERPL NAA+PROBE-ACNC: 888 IU/ML
HBV DNA SERPL NAA+PROBE-LOG IU: 2.9 {LOG_IU}/ML

## 2023-10-04 NOTE — CONFIDENTIAL NOTE
DIAGNOSIS:  Chronic liver failure without hepatic coma. Hepatitis B cirrhosis with ascites.    Appt Date:  12.07.2023    NOTES STATUS DETAILS   OFFICE NOTE from referring provider Internal 09.15.2023 Alan Frias MD [facility]   OFFICE NOTES from other specialists Internal 10.02.2023 Eduardo Grier MD    DISCHARGE SUMMARY from hospital     MEDICATION LIST Internal    LIVER BIOSPY (IF APPLICABLE)      PATHOLOGY REPORTS      IMAGING     ENDOSCOPY (IF AVAILABLE)     COLONOSCOPY (IF AVAILABLE)     ULTRASOUND LIVER Internal 09.12.2023 US Paracentesis   CT OF ABDOMEN     MRI OF LIVER     FIBROSCAN, US ELASTOGRAPHY, FIBROSIS SCAN, MR ELASTOGRAPHY     LABS     HEPATIC PANEL (LIVER PANEL) Internal 10.02.2023    BASIC METABOLIC PANEL Internal 09.08.2023   COMPLETE METABOLIC PANEL Internal 09.08.2023   COMPLETE BLOOD COUNT (CBC) Internal 09.08.2023   INTERNATIONAL NORMALIZED RATIO (INR) Internal 07.07.2023   HEPATITIS C ANTIBODY Internal 06.30.2020   HEPATITIS C VIRAL LOAD/PCR     HEPATITIS C GENOTYPE     HEPATITIS B SURFACE ANTIGEN Internal 06.30.2020   HEPATITIS B SURFACE ANTIBODY Internal 07.14.2020   HEPATITIS B DNA QUANT LEVEL     HEPATITIS B CORE ANTIBODY Internal 06.20.2020

## 2023-10-06 ENCOUNTER — TELEPHONE (OUTPATIENT)
Dept: INFECTIOUS DISEASES | Facility: CLINIC | Age: 73
End: 2023-10-06

## 2023-10-06 NOTE — TELEPHONE ENCOUNTER
[facility] Call Center    Phone Message    May a detailed message be left on voicemail: yes     Reason for Call: Other: Pt's daughter called in regards to the medication that was prescribed on 10/2. They still have not received the medication it looks like it's being flagged at the pharmacy. A PA may be needed. Please call daughter back with any questions or concerns      Action Taken: Other: [facility] Infectious Disease    Travel Screening: Not Applicable

## 2023-10-06 NOTE — TELEPHONE ENCOUNTER
Prior Authorization Not Needed per Insurance    Medication: ENTECAVIR 1 MG PO TABS  Insurance Company: [employer] - Phone [phone] Fax [phone]  Expected CoPay: $    Pharmacy Filling the Rx: [facility] - [place], [place] - [address] [place] [address]  Pharmacy Notified:   Patient Notified:     Doesn't appear to be a PA issue

## 2023-10-06 NOTE — TELEPHONE ENCOUNTER
I called, the pt needs to call to inform [facility] that she wants them to send the RX. I called LM informing the pt's daughter of this.

## 2023-10-10 LAB
BKR LAB AP ADD'L TEST STATUS: NORMAL
BKR LAB LINKED CASE OR SPECIMEN ID: NORMAL
BKR LAB LINKED CASE OR TEST ORDER DATE: NORMAL
BKR PATH ADDL TEST FINAL COMMENTS: NORMAL

## 2023-10-16 ENCOUNTER — OFFICE VISIT (OUTPATIENT)
Dept: OTOLARYNGOLOGY | Facility: CLINIC | Age: 73
End: 2023-10-16
Attending: RADIOLOGY
Payer: COMMERCIAL

## 2023-10-16 ENCOUNTER — THERAPY VISIT (OUTPATIENT)
Dept: SPEECH THERAPY | Facility: CLINIC | Age: 73
End: 2023-10-16
Payer: COMMERCIAL

## 2023-10-16 ENCOUNTER — LAB REQUISITION (OUTPATIENT)
Dept: LAB | Facility: CLINIC | Age: 73
End: 2023-10-16
Payer: COMMERCIAL

## 2023-10-16 ENCOUNTER — PRE VISIT (OUTPATIENT)
Dept: OTOLARYNGOLOGY | Facility: CLINIC | Age: 73
End: 2023-10-16

## 2023-10-16 VITALS
BODY MASS INDEX: 23.73 KG/M2 | WEIGHT: 139 LBS | HEIGHT: 64 IN | HEART RATE: 62 BPM | OXYGEN SATURATION: 96 % | DIASTOLIC BLOOD PRESSURE: 73 MMHG | SYSTOLIC BLOOD PRESSURE: 111 MMHG

## 2023-10-16 DIAGNOSIS — C11.9 NASOPHARYNGEAL CARCINOMA (H): Primary | ICD-10-CM

## 2023-10-16 DIAGNOSIS — E03.4 HYPOTHYROIDISM DUE TO ACQUIRED ATROPHY OF THYROID: ICD-10-CM

## 2023-10-16 DIAGNOSIS — C11.9 SQUAMOUS CELL CARCINOMA OF NASOPHARYNX (H): ICD-10-CM

## 2023-10-16 DIAGNOSIS — C77.0 SECONDARY MALIGNANCY OF LYMPH NODES OF HEAD, FACE AND NECK (H): ICD-10-CM

## 2023-10-16 DIAGNOSIS — R13.12 OROPHARYNGEAL DYSPHAGIA: Primary | ICD-10-CM

## 2023-10-16 LAB
PATH REPORT.COMMENTS IMP SPEC: ABNORMAL
PATH REPORT.COMMENTS IMP SPEC: YES
PATH REPORT.FINAL DX SPEC: ABNORMAL
PATH REPORT.GROSS SPEC: ABNORMAL
PATH REPORT.MICROSCOPIC SPEC OTHER STN: ABNORMAL
PATH REPORT.RELEVANT HX SPEC: ABNORMAL
PATH REPORT.RELEVANT HX SPEC: ABNORMAL
PATH REPORT.SITE OF ORIGIN SPEC: ABNORMAL

## 2023-10-16 PROCEDURE — 99215 OFFICE O/P EST HI 40 MIN: CPT | Mod: 25 | Performed by: OTOLARYNGOLOGY

## 2023-10-16 PROCEDURE — 92610 EVALUATE SWALLOWING FUNCTION: CPT | Mod: GN | Performed by: SPEECH-LANGUAGE PATHOLOGIST

## 2023-10-16 PROCEDURE — 31575 DIAGNOSTIC LARYNGOSCOPY: CPT | Performed by: OTOLARYNGOLOGY

## 2023-10-16 PROCEDURE — 88321 CONSLTJ&REPRT SLD PREP ELSWR: CPT | Mod: GC | Performed by: PATHOLOGY

## 2023-10-16 ASSESSMENT — PAIN SCALES - GENERAL: PAINLEVEL: MODERATE PAIN (4)

## 2023-10-16 NOTE — LETTER
10/16/2023       RE: Kristen Chapman  [address] [place] [address]     Dear Colleague,    Thank you for referring your patient, Kristen Chapman, to the [facility] [place] [facility]. Please see a copy of my visit note below.    Dear Dr. Mendoza:    I had the pleasure of meeting Kristen Chapman in consultation today at the [facility] at your request.     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 He was diagnosed with a T4N2M0 nasopharyngeal cancer in 2020. He was treated with concurrent chemoradiation at [facility] with Dr Frias and Dr Mendoza. He received 7000 cGy from 3/2/2020 to 4/17/2020. He had 3 cycles of cisplatin/5FU. He had no PEG during treatment then had issues upon completion of treatment requiring PEG placement. He had recurrent disease in the right ethmoids, biopsied 9/16/2021. Imaging raised concerns for metastatic disease in the mediastinum and right level IB/IIA. His case was discussed at tumor board and recommended for chemotherapy. He had U/S guided 10/19/2021 showing metastatic disease. Dr Frias determined the patient was not a candidate for chemotherapy. He was treated with SBRT from 11/8/2021 to 11/17/2021 for a total of 3500 cGy. He was started on pembrolizumab 12/23/2021, discontinued after 7/1/2022. He had a CT scan 6/15/2023 which showed a 2.0 x 1.9 cm hyperenhancing area in the right level IB. He had an U/S guided FNA on 8/31/2023 which showed metastatic SCC. He had a MRI brain on 9/6/2023 which showed 2.1 x 2.2 x 2.5 cm mass in right level IB consistent with metastatic SCC. He had a PET scan on 9/11/2023 which showed 2.8 x 1.2 x 3.0 cm FDG avid right level IB without distant disease.    He last saw medical oncology in September 2023.    He last saw radiation oncology 9/22/2023 to discuss SBRT to  the neck.     His last TSH was 31, T4 elevated to 1.86. He is on 175 mcg of synthroid.       The mass has been growing in size. He is not having pain but is having irritation. He has not noticed any drainage from the skin. He started having skin changes in September. He is having difficulty with his swallowing since the radiation. His daughter said that he only went to speech therapy a few times during treatment then stopped because of COVID and wanting to limit appointments. He is doing soft foods. He feels like foods are getting stuck when he swallows. He feels like meat will not go down. He has to bring things up or washes it down with water. He does not think things go down the wrong way. He has no coughing on water. He is losing weight. He has no pain in his ears. He is having ringing in the ears.       He is accompanied by his daughter who supplements history.       Past medical history: recurrent nasopharyngeal carcinoma, chronic hepatitis B, recurrent ascites, cirrhosis    Past surgical history: none    Social history: No smoking. No chewing tobacco or betal nut. He lives with children and wife. He is not working. He is retired.     Family history: his brother had nasopharyngeal cancer    MEDICATIONS:     Current Outpatient Medications   Medication Sig Dispense Refill     acetaminophen (TYLENOL) 325 MG tablet Take 650 mg by mouth every 6 hours as needed        ARTIFICIAL TEARS 1.4 % ophthalmic solution 1-2 drops as needed       artificial tears OINT ophthalmic ointment Place 1 g into the right eye At Bedtime 1-2 drops into eye 7 g 3     entecavir (BARACLUDE) 1 MG tablet Take 1 tablet (1 mg) by mouth daily 90 tablet 3     hypromellose-dextran (HYPROMELLOSE-DEXTRAN 0.3-0.1%) 0.1-0.3 % ophthalmic solution Place 1 drop into the right eye daily as needed for dry eyes 30 mL 3     lamiVUDine (EPIVIR) 100 MG tablet TAKE 1 TABLET (100 MG) BY MOUTH DAILY 90 tablet 0     levothyroxine (SYNTHROID/LEVOTHROID) 175 MCG  tablet Take 1 tablet (175 mcg) by mouth daily 30 tablet 2     Nutritional Supplements (BOOST HIGH PROTEIN PO) Take 1 Bottle by mouth 4 times daily       ondansetron (ZOFRAN) 4 MG tablet Take 1-2 tablets (4-8 mg) by mouth every 6 hours as needed for nausea 30 tablet 3     sodium chloride (OCEAN) 0.65 % nasal spray Spray 2 sprays in nostril 2 times daily (Patient taking differently: Spray 2 sprays in nostril daily as needed) 30 mL 11     calcium carbonate 750 MG CHEW Take 750 mg by mouth daily (Patient not taking: Reported on 10/2/2023)         ALLERGIES:    Allergies   Allergen Reactions     Oxycodone Itching       HABITS/SOCIAL HISTORY:   No smoking. No chewing tobacco or betal nut.   He lives with children and wife.   He is not working.   He is retired.     Social History     Socioeconomic History     Marital status:      Spouse name: Not on file     Number of children: Not on file     Years of education: Not on file     Highest education level: Not on file   Occupational History     Not on file   Tobacco Use     Smoking status: Never     Smokeless tobacco: Never   Substance and Sexual Activity     Alcohol use: Not Currently     Drug use: Not Currently     Sexual activity: Not Currently   Other Topics Concern     Not on file   Social History Narrative     Not on file     Social Determinants of Health     Financial Resource Strain: Not on file   Food Insecurity: Not on file   Transportation Needs: Not on file   Physical Activity: Not on file   Stress: Not on file   Social Connections: Not on file   Interpersonal Safety: Not on file   Housing Stability: Not on file       PAST MEDICAL HISTORY:   Past Medical History:   Diagnosis Date     Anemia      Dysphagia      Hepatitis B chronic      Hypothyroidism      Nasopharyngeal cancer (H)      Severe protein-calorie malnutrition (H)      Thrombocytopenia (H)         PAST SURGICAL HISTORY:   Past Surgical History:   Procedure Laterality Date     ENDOSCOPIC ENDONASAL  "SURGERY Bilateral 9/7/2021    Procedure: Nasal Endoscopy with Biopsy;  Surgeon: Ky Centeno MD;  Location: [facility] OR     IR CHEST PORT PLACEMENT > 5 YRS OF AGE  3/2/2020     IR CHEST PORT PLACEMENT > 5 YRS OF AGE  3/2/2020     IR GASTROSTOMY TUBE INSERTION  4/27/2020     IR GASTROSTOMY TUBE PERCUTANEOUS PLCMNT  4/27/2020     IR PORT PLACEMENT >5 YEARS  3/2/2020     IR PORT PLACEMENT >5 YEARS  3/2/2020     IR T-FASTENER REMOVAL  6/5/2020     IR T-FASTENER REMOVAL  6/5/2020       FAMILY HISTORY:    Family History   Family history unknown: Yes       REVIEW OF SYSTEMS:  12 point ROS was negative other than the symptoms noted above in the HPI.  Patient Supplied Answers to Review of Systems      10/16/2023     7:39 AM    ENT ROS   Constitutional Problems with sleep   Ears, Nose, Throat Hearing loss    Ringing/noise in ears    Trouble swallowing   Gastrointestinal/Genitourinary Heartburn/indigestion   Musculoskeletal Back pain   Endocrine Heat or cold intolerance         PHYSICAL EXAMINATION:   /73 (BP Location: Right arm, Patient Position: Sitting, Cuff Size: Adult Regular)   Pulse 62   Ht 1.626 m (5' 4\")   Wt 63 kg (139 lb)   SpO2 96%   BMI 23.86 kg/m    Appearance:   normal; NAD, age-appropriate appearance, well-developed, normal habitus  Abdominal fullness consistent with ascites   Communication:   normal; communicates verbally, normal voice quality   Head/Face:   inspection -  Normal; no scars or visible lesions   Palpation - no facial numbness   Facial strength -  Normal and symmetric    Skin:  normal, no rash   Ocular Motility:  normal occular movements   Ears:  auricle (AD) -  normal  EAC (AD) -  normal  TM (AD) -  Normal, no effusion  auricle (AS) -  normal  EAC (AS) -  normal  TM (AS) -  Normal, no effusion  Normal clinical speech reception   Nose:  Ext. inspection -  Normal  Internal Inspection -  dry nasal mucosa, no obvious tumor, crusting   Nasopharynx:  Radiation changes to the mucosa, no " masses, crusting present   Oral Cavity:  lips -  Normal mucosa, oral competence, and stoma size  Edentulous  Healthy gingival mucosa  Some thickened secretions   Hard palate, buccal, floor of mouth mucosa normal  Unable to palpate mass in floor of mouth   Tongue - normal movement, no lesions   Oropharynx:  mucosa -  radiation changes  soft palate -  Normal, no lesions, no asymmetry, normal elevation  Base of tongue - normal   Hypopharynx:  Extensive radiation changes to the mucosa  Some thickened secretions   Larynx:  Epiglottis, AE folds, false vocal cords, true vocal cords, arytenoids normal in appearance with exception of radiation changes, very dry mucosa  Mild foreshortening of AE folds  Epiglottis is mildly omega shaped  bilaterally mobile cords    Neck: Visible right neck mass in level IB  Skin involvement of tumor of at least 2.5 cm with obvious tumor involvement in the skin  Palpable mass of about 2.5 cm in size - immobile, inseparable from mandible  Fibrosis of neck skin muscle  Thin skin with some mild laxity   Lymphatic:  no abnormal nodes   Cardiovascular:  warm, pink, well-perfused extremities without swelling, tenderness, or edema   Respiratory:  Normal respiratory effort, no stridor   Neuro/Psych.:  mood/affect -  normal  mental status -  normal       PROCEDURES:   Flexible fiberoptic laryngoscopy: Scope exam was indicated due to nasopharyngeal cancer. Verbal consent was obtained. The nasal cavity was prepped with an aerosolized solution of topical anesthetic and vasoconstrictive agent. The scope was passed through the anterior nasal cavity and advanced. There is dry nasal mucosa with radiation changes, some crusting. No obvious tumor. Inspection of the nasopharynx revealed radiation changes, no tumor, some crusting. The base of tongue and vallecula are normal. The epiglottis is slightly omega shaped with mild foreshortening of the AE folds. The epiglottis, AE folds, false cords, true cords,  arytenoids are normal with the exception of radiation changes to the mucosa with very dry mucosa. Inspection of the larynx revealed bilaterally mobile vocal cords. Pyriform sinuses are symmetric. The airway is patent. Procedure tolerated well with no immediate complications noted.              RESULTS REVIEWED:   I reviewed note from radiation oncology, from ID, note from medical oncology, PET report, MRI brain report, CT report    TSH/T4 reviewed    I independently reviewed CT neck imaging and PET imaging    Care discussed with SLP      IMPRESSION AND PLAN:   Kristen Chapman is a 73 year old man with a history of nasopharyngeal cancer s/p chemoradiation in 2020. He had recurrence locally treated with SBRT in 2021. He now has recurrence in the right neck with chandler skin involvement, potential mandible involvement.    I discussed with him that treatment would be with a right neck dissection. We discussed the risks of the procedure. We discussed the risk to the marginal mandibular nerve. I am concerned that this nerve may not be able to be saved based on his exam and imaging. We discussed the consequences of this today. We discussed the risks to the hypoglossal nerve, spinal accessory nerve, vagus nerve, phrenic nerve. We discussed the risks of bleeding and infection. We discussed the risk of scarring.     I explained to him that I am concerned that at a minimum he will potentially need a marginal mandibulectomy as the tumor is immobile relative to the mandible. We would plan on repeat imaging prior to surgery to help with surgical planning to ensure there is no chandler mandible involvement. We discussed that if he requires a segmental mandibulectomy that this would be a much larger surgery with more complex reconstruction. We would potentially need to plate the mandible.    I explained to him that given the skin involvement with need to resect a large area of skin, that we need to prepare to do reconstruction at the time of  surgery. This would potentially be done with a pectoralis flap if only soft tissue were required. He does not have any significant skin laxity of the neck.    He would need to be admitted to the hospital for several days after surgery. We discussed that with his liver issues it certainly could result in longer hospitalization with potential for complications.    He is at increased wound healing complications especially in light of his poorly controlled hypothyroidism. He is on synthroid but TSH is still 31, with T4 elevated on recent check in August 2023. We will recheck again prior to surgery. We discussed that we would not wait on his TSH to be corrected to do surgery and just accept the risks of wound healing complications given the malignancy.    I explained that normally this type of tumor recurrence should be treated with postoperative chemoradiation. I would be concerned about further radiation to this area especially with the mandible risk of ORN but ultimately this would be up to radiation oncology. It sounds like from the med onc notes that he would not be a candidate for chemotherapy. Without adjuvant therapy he is at very high risk for tumor recurrence.     We discussed that surgery is his only curative option. I discussed with the patient and his family that I strongly recommend against radiation alone to this area. This will likely result in an open wound in the area, potentially communicating to the mandible. Chemotherapy alone would not be curative. He may not be a candidate for chemotherapy regardless per the notes from his previous treatment team.    We discussed that without treatment his tumor would be expected to grow, further involve skin, may drain, may erode the mandible, would be expected to cause increased pain.     He would need to undergo clearance with PAC. He does have recurrent ascites in the setting of hepatitis B. He just had his ascites drained recently and this has already  reaccumulated.     We will review his imaging at tumor board and contact him with the discussion.    I would like to obtain a repeat contrasted neck CT within a few weeks of surgery to ensure this does not change our surgical plans - especially assess for inferior mandible involvement. He has chandler progression on his imaging from June vs September vs physical exam today.    I had him see our SLP team today, as he did not work with SLP during treatment and has dysphagia. A video swallow study was ordered but he would prefer not to schedule yet.    We will work on finding a surgical date if he decides to proceed. Orders were placed today. He was encouraged to take some time to think about the discussion today.      Thank you very much for the opportunity to participate in the care of your patient.      Yuliana Perez MD  Otolaryngology- Head & Neck Surgery      This note was dictated with voice recognition software and then edited. Please excuse any unintentional errors.       More than 60 minutes was spent on patient visit and charting activities on date of service, excluding time spent on procedures.        CC:  Laurita Mendoza MD  [address] [place] [id number]

## 2023-10-16 NOTE — NURSING NOTE
"Chief Complaint   Patient presents with    Consult     Nasopharyngeal carcinoma       Blood pressure 111/73, pulse 62, height 1.626 m (5' 4\"), weight 63 kg (139 lb), SpO2 96%.    Abena Jacques, EMT    "

## 2023-10-16 NOTE — PROGRESS NOTES
SPEECH LANGUAGE PATHOLOGY EVALUATION    Subjective      Presenting condition or subjective complaint: Pt seen today for clinical swallow evaluation in conjunction with ENT clinic visit per MD request.   Date of onset: 03/02/20 (start of initial chemoXRT)    Relevant medical history: T4N2M0 nasopharyngeal cancer s/p chemoXRT finishing on 4/17/20 at [facility], SBRT for recurrent disease in ethoids in 2021, now with recurrent disease in right level IB, chronic hep B, recurrent ascites, cirrhosis    Prior diagnostic imaging/testing results: video swallow study completed on 9/4/20 with mild oral and mild-moderate pharyngeal dysphagia identified.     Prior therapy history for the same diagnosis, illness or injury: Patient completed clinical swallow evaluation on 9/28/20     Living Environment  Social support: Pt's daughter is present today    Patient goals for therapy: not stated    Pain assessment:  denies odynophagia     Objective     SWALLOW EVALUTION  Dysphagia history: Today, pt reports he had a PEG for over a year after his initial chemoXRT, but this has since been removed. He endorses difficulty with meats and other dense solids getting stuck. Sometimes a sip of liquid helps, but reports most of the time he has to bring it back up and try to swallow it again. He endorses occasional coughing with liquid on a weekly basis. Sometimes pills get stuck. He does tend to avoid meats and items that get stuck.     Current Diet/Method of Nutritional Intake: thin liquids (level 0), soft & bite-sized (level 6)        CLINICAL SWALLOW EVALUATION  Oral Motor Function: Dentition: upper and lower dentures  Secretion management: WFL  Mucosal quality: dry  Mandibular function: intact  Oral labial function: WFL  Lingual function: WFL  Velar function: intact   Buccal function: intact  Laryngeal function: cough, throat clear, volitional swallow, voicing WFL     Level of assist required for feeding: no assistance needed   Textures  Trialed:   Clinical Swallow Eval: Thin Liquids  Mode of presentation: cup, self-fed   Volume presented: 3oz  Preparatory Phase: WFL  Oral Phase: WFL  Pharyngeal phase of swallow: intact   Diagnostic statement: No clinical s/sx of penetration/aspiration. Reduced hyolaryngeal excursion felt upon palpation.       ESOPHAGEAL PHASE OF SWALLOW  no observed or reported concerns related to esophageal function     SWALLOW ASSESSMENT CLINICAL IMPRESSIONS AND RATIONALE  Diet Consistency Recommendations: thin liquids (level 0), easy to chew (level 7)    Recommended Feeding/Eating Techniques: small bolus size, alternate food and liquid intake, maintain upright sitting position for eating, minimize distractions during oral intake   Medication Administration Recommendations: as tolerated  Instrumental Assessment Recommendations: VFSS (videofluoroscopic swallowing study)     Assessment & Plan   CLINICAL IMPRESSIONS   Medical Diagnosis: Squamous cell carcinoma of nasopharynx    Treatment Diagnosis: Oropharyngeal dysphagia   Impression/Assessment:  Pt presents today with mild oropharyngeal dysphagia after chemoXRT for nasopharyngeal carcinoma. Discussed recommendation for video swallow study to further characterize dysphagia and evaluate for oropharyngeal exercises. Pt and his daughter report they may be having surgery for his cancer recurrence, and would like to wait until after surgery. Will plan for repeat swallow study after upcoming potential surgery. Pt and daughter agreeable.    PLAN OF CARE  Evaluation only at this time; recommend consideration of video swallow study after potential upcoming surgery    Education Assessment:   Learner/Method: Patient;Family    Risks and benefits of evaluation/treatment have been explained.   Patient/Family/caregiver agrees with Plan of Care.     Evaluation Time:    SLP Eval: oral/pharyngeal swallow function, clinical minutes (72283): 16     Present: Yes: Language: Hmong, ID  Number/Identifier: iPad       Signing Clinician: Julia Solis, SLP

## 2023-10-17 ENCOUNTER — HOSPITAL ENCOUNTER (OUTPATIENT)
Dept: ULTRASOUND IMAGING | Facility: HOSPITAL | Age: 73
Discharge: HOME | End: 2023-10-17
Attending: INTERNAL MEDICINE | Admitting: INTERNAL MEDICINE
Payer: COMMERCIAL

## 2023-10-17 ENCOUNTER — TELEPHONE (OUTPATIENT)
Dept: OTOLARYNGOLOGY | Facility: CLINIC | Age: 73
End: 2023-10-17

## 2023-10-17 DIAGNOSIS — C11.9 NASOPHARYNGEAL CARCINOMA (H): ICD-10-CM

## 2023-10-17 DIAGNOSIS — E03.4 HYPOTHYROIDISM DUE TO ACQUIRED ATROPHY OF THYROID: ICD-10-CM

## 2023-10-17 DIAGNOSIS — E03.4 HYPOTHYROIDISM DUE TO ACQUIRED ATROPHY OF THYROID: Primary | ICD-10-CM

## 2023-10-17 DIAGNOSIS — C77.0 SECONDARY MALIGNANCY OF LYMPH NODES OF HEAD, FACE AND NECK (H): ICD-10-CM

## 2023-10-17 PROCEDURE — 88305 TISSUE EXAM BY PATHOLOGIST: CPT | Mod: TC | Performed by: INTERNAL MEDICINE

## 2023-10-17 PROCEDURE — 272N000710 US PARACENTESIS WITHOUT ALBUMIN

## 2023-10-17 NOTE — TELEPHONE ENCOUNTER
Called patients daughter to schedule surgery with Dr. Perez. Patient said at this time their family is deciding if they would like to go forward with surgery and will follow back up with our office on Monday, 10/23/23. Patient confirmed with writer she was given scheduling number at consult and will call that number once a decision has been made.     Aditi Koo on 10/17/2023 at 11:03 AM

## 2023-10-19 ENCOUNTER — APPOINTMENT (OUTPATIENT)
Dept: INTERPRETER SERVICES | Facility: CLINIC | Age: 73
End: 2023-10-19

## 2023-10-19 PROCEDURE — 88305 TISSUE EXAM BY PATHOLOGIST: CPT | Mod: 26 | Performed by: PATHOLOGY

## 2023-10-20 ENCOUNTER — TUMOR CONFERENCE (OUTPATIENT)
Dept: ONCOLOGY | Facility: CLINIC | Age: 73
End: 2023-10-20

## 2023-10-20 NOTE — TUMOR CONFERENCE
Head & Neck Tumor Conference Note   Status:  Established    Staff: Dr. 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 He was diagnosed with a T4N2M0 nasopharyngeal cancer in 2020. He was treated with concurrent chemoradiation at [facility] with Dr Frias and Dr Mendoza. He received 7000 cGy from 3/2/2020 to 4/17/2020. He had 3 cycles of cisplatin/5FU. He had no PEG during treatment then had issues upon completion of treatment requiring PEG placement. He had recurrent disease in the right ethmoids, biopsied 9/16/2021. Imaging raised concerns for metastatic disease in the mediastinum and right level IB/IIA. His case was discussed at tumor board and recommended for chemotherapy. He had U/S guided 10/19/2021 showing metastatic disease. Dr Frias determined the patient was not a candidate for chemotherapy. He was treated with SBRT from 11/8/2021 to 11/17/2021 for a total of 3500 cGy. He was started on pembrolizumab 12/23/2021, discontinued after 7/1/2022. He had a CT scan 6/15/2023 which showed a 2.0 x 1.9 cm hyperenhancing area in the right level IB. He had an U/S guided FNA on 8/31/2023 which showed metastatic SCC. He had a MRI brain on 9/6/2023 which showed 2.1 x 2.2 x 2.5 cm mass in right level IB consistent with metastatic SCC. He had a PET scan on 9/11/2023 which showed 2.8 x 1.2 x 3.0 cm FDG avid right level IB without distant disease.  Reason for Review: Review imaging, path, and POC  Pertinent PMH:   Past Medical History:   Diagnosis Date     Anemia      Dysphagia      Hepatitis B chronic      Hypothyroidism      Nasopharyngeal cancer (H)      Severe protein-calorie malnutrition (H)      Thrombocytopenia (H)       Smoking Hx:   Social History     Tobacco Use      Smoking status: Never     Smokeless tobacco: Never   Substance Use Topics     Alcohol use: Not Currently     Drug use: Not Currently       Imagin23 -PET-CT  PET/CT FINDINGS: Isolated FDG avid right submandibular lymph node measuring 2.8 x 1.2 x 3.0 cm (max SUV 15.9) suspicious for progression of disease. Mildly FDG avid moderate paranasal sinus mucosal thickening likely inflammatory in nature. Redemonstrated mildly FDG avid bilateral hilar station 10 lymph nodes (Max SUV 6.0, previously 7.9 on the right) likely representing sequelae of granulomatous/immunotherapy related inflammatory change. Diffuse esophagitis. Shoulder and hip synovitis.    CT FINDINGS: Mild to moderate senescent intracranial changes. Moderate paranasal sinus mucosal thickening. Left chest port with tip terminating in the upper right atrium. Mild dilatation of the ascending aorta. Mild coronary artery calcium. Trace pleural effusions. Cirrhotic liver with sequelae of portal hypertension including a marked volume of intra-abdominal ascites. Non-FDG avid lesion in the splenic parenchyma suggesting benignity. Multilevel degenerative changes of the spine. The lower extremities are unremarkable.                               IMPRESSION:   Findings suspicious for isolated right submandibular lymph node recurrence.  Pathology:   CASE FROM [place], [place] ([id number], OBTAINED 2023):     Mass, right submandibular region, fine needle aspiration     - Positive for malignancy (see comment)  - Satisfactory for evaluation but limited by scant cellularity on Pap stained preparations and cell block   Electronically signed by Alfredo Holcomb III, MD on 10/16/2023 at 12:52 PM   Comment    We concur with the referring pathologist's interpretation and appreciate the opportunity to consult on this specimen. The Pap stained slides and cell block are extremely paucicellular and the morphological interpretation is possible only on the  Diff-Quik preparations.  The tumor is present in sheets and clusters of basaloid cells with high N/C ratio.  Due to the paucity of material in the cellblock, ancillary studies are not possible and further characterization of the tumor cannot be performed.  However, given the clinical context, the specimen is compatible with involvement by the patient's proven diagnosis of squamous cell carcinoma.     Tumor Board Recommendation:   Discussion:   On imaging, this mass in the neck appears fixed to the mandible, the deep aspect is extending medially, getting close to hyoid bone. It also appears to be extending to the skin.  He additionally has ascites in this film (known history of chronic hepatitis).   On preview of pathology the mass is concerning for cancer recurrence.   This patient would do poor from a surgical perspective. The disease had involved since the PET-CT was done, it now seems to be involving the mandible.  This is technically resectable. It would be a savlage situation. Given the mutiple risk factors, it would mean poor survival. There may be a role for palliative immunotherapy per the medical oncology team. If we do move forward with surgery, we should get another CT scan a few weeks before surgery, tentatively we'll do a free flap. TSH should also be rechecked, as it was previously 31.   Plan:   - Referral to medical oncology for potential immunotherapy  - Discuss surgical options in clinic, imaging prior to surgery should we pursue this  - Recheck TSH    Jeff Pisano MD  Otolaryngology Head and Neck Surgery Resident, PGY-3    Documentation / Disclaimer Cancer Tumor Board Note: Cancer tumor board recommendations do not override what is determined to be reasonable care and treatment, which is dependent on the circumstances of a patient's case; the patient's medical, social, and personal concerns; and the clinical judgment of the oncologist [physician].

## 2023-10-23 ENCOUNTER — HOSPITAL ENCOUNTER (OUTPATIENT)
Dept: CT IMAGING | Facility: HOSPITAL | Age: 73
Discharge: HOME | End: 2023-10-23
Attending: OTOLARYNGOLOGY | Admitting: OTOLARYNGOLOGY
Payer: COMMERCIAL

## 2023-10-23 DIAGNOSIS — C77.0 SECONDARY MALIGNANCY OF LYMPH NODES OF HEAD, FACE AND NECK (H): ICD-10-CM

## 2023-10-23 DIAGNOSIS — C11.9 NASOPHARYNGEAL CARCINOMA (H): ICD-10-CM

## 2023-10-23 LAB
CREAT BLD-MCNC: 1.8 MG/DL (ref 0.7–1.3)
EGFRCR SERPLBLD CKD-EPI 2021: 40 ML/MIN/1.73M2

## 2023-10-23 PROCEDURE — 70491 CT SOFT TISSUE NECK W/DYE: CPT

## 2023-10-23 PROCEDURE — 82565 ASSAY OF CREATININE: CPT

## 2023-10-23 PROCEDURE — 250N000011 HC RX IP 250 OP 636: Mod: JZ | Performed by: OTOLARYNGOLOGY

## 2023-10-23 RX ORDER — IOPAMIDOL 755 MG/ML
75 INJECTION, SOLUTION INTRAVASCULAR ONCE
Status: COMPLETED | OUTPATIENT
Start: 2023-10-23 | End: 2023-10-23

## 2023-10-23 RX ORDER — HEPARIN SODIUM (PORCINE) LOCK FLUSH IV SOLN 100 UNIT/ML 100 UNIT/ML
500 SOLUTION INTRAVENOUS ONCE
Status: COMPLETED | OUTPATIENT
Start: 2023-10-23 | End: 2023-10-23

## 2023-10-23 RX ADMIN — IOPAMIDOL 75 ML: 755 INJECTION, SOLUTION INTRAVENOUS at 18:59

## 2023-10-23 RX ADMIN — Medication 500 UNITS: at 18:42

## 2023-10-24 ENCOUNTER — PATIENT OUTREACH (OUTPATIENT)
Dept: OTOLARYNGOLOGY | Facility: CLINIC | Age: 73
End: 2023-10-24

## 2023-10-24 NOTE — TELEPHONE ENCOUNTER
LVM for daughter to discuss if patient has made a decision on surgery. Provided direct call back number for daughter to return call.

## 2023-10-27 ENCOUNTER — PATIENT OUTREACH (OUTPATIENT)
Dept: OTOLARYNGOLOGY | Facility: CLINIC | Age: 73
End: 2023-10-27

## 2023-10-27 NOTE — TELEPHONE ENCOUNTER
LVM for patient's daughter to further discuss plan of care. Provided direct call back number for daughter to return call to discuss.    Felisha BAZANN, RN

## 2023-11-02 ENCOUNTER — TELEPHONE (OUTPATIENT)
Dept: OTOLARYNGOLOGY | Facility: CLINIC | Age: 73
End: 2023-11-02
Payer: COMMERCIAL

## 2023-11-02 DIAGNOSIS — C77.0 SECONDARY MALIGNANCY OF LYMPH NODES OF HEAD, FACE AND NECK (H): Primary | ICD-10-CM

## 2023-11-02 DIAGNOSIS — C11.9 NASOPHARYNGEAL CARCINOMA (H): ICD-10-CM

## 2023-11-02 PROCEDURE — 99443 PR PHYSICIAN TELEPHONE EVALUATION 21-30 MIN: CPT | Mod: 95 | Performed by: OTOLARYNGOLOGY

## 2023-11-02 NOTE — TELEPHONE ENCOUNTER
I called and spoke with the patient and his daughter with the assistance of phone  per patient/family preference in lieu of return visit.    We discussed the CT neck results from last week demonstrating progressive disease in the neck compared to scan in June 2023. Tumor is involving neck skin, node, deep floor of mouth. It abuts but does not erode the mandible.     Patient expressed concern that the biopsy caused the mass to grow - explained that this is inherent to the natural progression of disease.     We discussed treatment options which were also reviewed in clinic:    -Would use caution with re-irradiation as it places the patient at high risk for a draining open wound in the neck with communication to the mandible    -Surgery which would include neck dissection with skin resection, likely marginal mandibulectomy, resection of deep floor of mouth. Explained that given the extent of disease would anticipate need for free flap reconstruction rather than pectoralis flap. Discussed that this would be an all day surgery. Explained that it would be a week hospitalization with ICU stay. He would need to be cleared for surgery given his liver issues. We discussed that his marginal mandibular nerve and hypoglossal nerve would likely/potentially be sacrificed. We discussed that surgery would have cosmetic and functional impacts - with impacts on swallowing/talking with sacrifice of hypoglossal nerve and he already has baseline dysphagia from his treatment. We discussed that normally would administer postoperative chemoradiation which may or may not be possible pending input from appropriate teams. We discussed that unfortunately this is a resection that is associated with a high risk of tumor recurrence, with studies quoting salvage success at 2 years as low as 30%.     -We discussed further systemic treatment would be at the discretion of oncology. He could continue care with Dr Frias.      After  discussion of options, patient states that he does not want to proceed with surgery in the context of the extent of disease/resection/impacts and the risks related to recurrence.     He and his daughter requested a second opinion about options for systemic therapy - referral placed for oncology at the [facility] and message sent to .    He and his daughter would like to move forward with working with SLP team at the [facility] after this consult with their team in clinic. Video swallow study already ordered, they had previously requested delay in scheduling until after surgery but given his preferences now he prefers to move forward with therapy. Message sent to SLP team.        Phone visit conducted from 2:40 pm to 3:10 pm.  Patient location: home  Physician location: [facility]        Yuliana Perez MD    Department of Otolaryngology        A total of 33 minutes was spent on patient visit and charting activities on date of service.

## 2023-11-03 ENCOUNTER — PATIENT OUTREACH (OUTPATIENT)
Dept: ONCOLOGY | Facility: CLINIC | Age: 73
End: 2023-11-03

## 2023-11-03 NOTE — PROGRESS NOTES
New Patient Oncology Nurse Navigator Note     Referring provider: Dr. Margie Perez     Referring Clinic/Organization: [facility]     Referred to (specialty): Medical Oncology    Requested provider (if applicable): [facility]     Date Referral Received: 11/2/2023     Evaluation for : 2nd opininon regarding recurrent H&N cancer     Clinical History (per Nurse review of records provided):    **BOOK MARKED**   NOTES:  11/1/2023:  Discussion with Dr. Perez    IMAGING:  ~numerous images within [facility]    PATHOLOGY:  Final Diagnosis   CASE FROM [place], [place] ([id number], OBTAINED 08/31/2023):     Mass, right submandibular region, fine needle aspiration     - Positive for malignancy (see comment)  - Satisfactory for evaluation but limited by scant cellularity on Pap stained preparations and cell block   Electronically signed by Alfredo Holcomb III, MD on 10/16/2023 at 12:52 PM       Clinical Assessment / Barriers to Care (Per Nurse): none noted       Records Location (Care Everywhere, Media, etc.): [facility]     Records Needed: none     Additional testing needed prior to consult: none

## 2023-11-03 NOTE — PROGRESS NOTES
I called and spoke with daughter, Rea.  I have know Rea and her family for quite a few years now.  We discussed options and decided the TH 11/16 date would work best.    She has my number if they need anything prior to his appt on the 16th,

## 2023-11-06 ENCOUNTER — LAB (OUTPATIENT)
Dept: LAB | Facility: CLINIC | Age: 73
End: 2023-11-06
Payer: COMMERCIAL

## 2023-11-06 ENCOUNTER — OFFICE VISIT (OUTPATIENT)
Dept: INFECTIOUS DISEASES | Facility: CLINIC | Age: 73
End: 2023-11-06
Payer: COMMERCIAL

## 2023-11-06 VITALS — TEMPERATURE: 100 F | HEART RATE: 64 BPM | SYSTOLIC BLOOD PRESSURE: 116 MMHG | DIASTOLIC BLOOD PRESSURE: 60 MMHG

## 2023-11-06 DIAGNOSIS — B18.1 CHRONIC VIRAL HEPATITIS B WITHOUT DELTA AGENT AND WITHOUT COMA (H): ICD-10-CM

## 2023-11-06 DIAGNOSIS — E03.4 HYPOTHYROIDISM DUE TO ACQUIRED ATROPHY OF THYROID: ICD-10-CM

## 2023-11-06 DIAGNOSIS — B18.1 CHRONIC VIRAL HEPATITIS B WITHOUT DELTA AGENT AND WITHOUT COMA (H): Primary | ICD-10-CM

## 2023-11-06 DIAGNOSIS — C77.0 SECONDARY MALIGNANCY OF LYMPH NODES OF HEAD, FACE AND NECK (H): ICD-10-CM

## 2023-11-06 LAB
ALBUMIN SERPL BCG-MCNC: 2.8 G/DL (ref 3.5–5.2)
ALP SERPL-CCNC: 82 U/L (ref 40–129)
ALT SERPL W P-5'-P-CCNC: 21 U/L (ref 0–70)
ANION GAP SERPL CALCULATED.3IONS-SCNC: 6 MMOL/L (ref 7–15)
AST SERPL W P-5'-P-CCNC: 46 U/L (ref 0–45)
BASOPHILS # BLD AUTO: 0 10E3/UL (ref 0–0.2)
BASOPHILS NFR BLD AUTO: 0 %
BILIRUB SERPL-MCNC: 1 MG/DL
BUN SERPL-MCNC: 30 MG/DL (ref 8–23)
CALCIUM SERPL-MCNC: 8.5 MG/DL (ref 8.8–10.2)
CHLORIDE SERPL-SCNC: 108 MMOL/L (ref 98–107)
CREAT SERPL-MCNC: 1.54 MG/DL (ref 0.67–1.17)
DEPRECATED HCO3 PLAS-SCNC: 22 MMOL/L (ref 22–29)
EGFRCR SERPLBLD CKD-EPI 2021: 48 ML/MIN/1.73M2
EOSINOPHIL # BLD AUTO: 0.1 10E3/UL (ref 0–0.7)
EOSINOPHIL NFR BLD AUTO: 2 %
ERYTHROCYTE [DISTWIDTH] IN BLOOD BY AUTOMATED COUNT: 17.1 % (ref 10–15)
GLUCOSE SERPL-MCNC: 101 MG/DL (ref 70–99)
HCT VFR BLD AUTO: 24.4 % (ref 40–53)
HGB BLD-MCNC: 7.9 G/DL (ref 13.3–17.7)
IMM GRANULOCYTES # BLD: 0 10E3/UL
IMM GRANULOCYTES NFR BLD: 0 %
LYMPHOCYTES # BLD AUTO: 0.5 10E3/UL (ref 0.8–5.3)
LYMPHOCYTES NFR BLD AUTO: 11 %
MCH RBC QN AUTO: 29.7 PG (ref 26.5–33)
MCHC RBC AUTO-ENTMCNC: 32.4 G/DL (ref 31.5–36.5)
MCV RBC AUTO: 92 FL (ref 78–100)
MONOCYTES # BLD AUTO: 0.4 10E3/UL (ref 0–1.3)
MONOCYTES NFR BLD AUTO: 9 %
NEUTROPHILS # BLD AUTO: 3.6 10E3/UL (ref 1.6–8.3)
NEUTROPHILS NFR BLD AUTO: 78 %
NRBC # BLD AUTO: 0 10E3/UL
NRBC BLD AUTO-RTO: 0 /100
PLATELET # BLD AUTO: 49 10E3/UL (ref 150–450)
POTASSIUM SERPL-SCNC: 5.2 MMOL/L (ref 3.4–5.3)
PROT SERPL-MCNC: 6.6 G/DL (ref 6.4–8.3)
RBC # BLD AUTO: 2.66 10E6/UL (ref 4.4–5.9)
SODIUM SERPL-SCNC: 136 MMOL/L (ref 135–145)
TSH SERPL DL<=0.005 MIU/L-ACNC: 1.07 UIU/ML (ref 0.3–4.2)
WBC # BLD AUTO: 4.6 10E3/UL (ref 4–11)

## 2023-11-06 PROCEDURE — 36415 COLL VENOUS BLD VENIPUNCTURE: CPT

## 2023-11-06 PROCEDURE — 85025 COMPLETE CBC W/AUTO DIFF WBC: CPT

## 2023-11-06 PROCEDURE — 84443 ASSAY THYROID STIM HORMONE: CPT

## 2023-11-06 PROCEDURE — 87517 HEPATITIS B DNA QUANT: CPT

## 2023-11-06 PROCEDURE — 80053 COMPREHEN METABOLIC PANEL: CPT

## 2023-11-06 PROCEDURE — 99213 OFFICE O/P EST LOW 20 MIN: CPT

## 2023-11-06 NOTE — PROGRESS NOTES
Assessment:      Impression: Chronic hepatitis B infection, with compensated cirrhosis responding to lamivudine.  However, for unclear reasons, this had been discontinued in 2021 and then patient had rebound of hepatitis B viral load and LFTs.    Now on both lamivudine and Entecavir.    Recurrent nasopharyngeal carcinoma, status post recent radiation treatment.  Lesion continues to grow and patient has refused surgery.       Plan:     Continue lamivudine daily indefinitely.  Resume Entecavir    Orders Placed This Encounter   Procedures    Comprehensive metabolic panel     Standing Status:   Future     Number of Occurrences:   1     Standing Expiration Date:   11/6/2024    Hep B Virus DNA Quant Real Time PCR     Standing Status:   Future     Number of Occurrences:   1     Standing Expiration Date:   11/6/2024         Follow-up in about 2 to 3 months    Subjective:      This is an follow-up infectious Disease visit for Kristen Chapman, who is a 70 y.o.  referred for evaluation of hepatitis B.     Is a 70-year-old gentleman of seen only in video visits over the last year for chronic hepatitis B.  He had been on treatment for nasopharyngeal carcinoma when he was noted to have elevated LFTs and a very high hepatitis B viral load.    Patient was started on lamivudine 100 mg a day and he is doing quite well.  He denies any complaints other than some bleeding in his nose and upon further questioning some pain and dryness in his mouth and sticking when he swallows.    He has been taking Magic mouthwash which does not really seem to help his symptoms significantly.    He denies abdominal pain.      The following portions of the patient's history were reviewed and updated as appropriate: allergies, current medications, past family history, past medical history, past social history, past surgical history and problem list.      Follow-up visit on August 23, 2021:    For reasons that are unclear, the lamivudine was discontinued several  months ago.  He is generally feeling well though complains of some nasopharyngeal bleeding.    His thrush is resolved.    Follow-up visit on October 4, 2021:    Patient is back on the meeting.  He denies any specific complaints.  Unfortunately, since her last visit, he did receive a diagnosis that his nasopharyngeal carcinoma has returned.  Chemotherapy and radiation therapy is in the works.    He denies any abdominal pain.    Follow-up visit on January 10, 2022:    Briefly, patient is doing well as far as symptoms go.  He did have recurrence of his nasopharyngeal cancer.  Surgery was recommended and declined.    Patient has been started on pembrolizumab by Dr. Frias.  This may exacerbate his hepatitis.    Patient did have his quarterly hepatitis B virus levels checked last week and they are a little bit lower.  His LFTs did take up, but suspect this may be related to the pembrolizumab.    He denies abdominal pain or discomfort.  He says he is eating well.  Denies vomiting or diarrhea.    Follow-up visit on April 4, 2022:    Patient states he feels about the same.  He is waiting for results of a follow-up MRI regarding his nasopharyngeal cancer.    He did have blood test done last week which showed his LFTs are back to normal and his viral load is less than 20, which is where it was before is limited and had been discontinued.    Follow-up visit on October 10, 2022:    Patient is generally doing well.  He saw Dr. Frias last week and his tumor appears to be stable on the pembrolizumab.  We will be getting another MRI scan in a few months.    He did have his LFTs and viral load checked in July and they were good.  We will check them again today.    Follow-up visit on April 10, 2023:    Patient is doing well.  He offers no complaints.  He says his cancer treatments are done.  He saw Dr. Frias a few months ago who felt that the cancer was in remission.    Has been taking his medication, reviewed eating, without any  difficulties.    Follow-up visit on October 2, 2023:    At his last visit, his HBV viral load had skyrocketed to 54 million.  His family did confirm that he was taking the limit of eating daily.  For that reason, I did add Entecavir 1 mg a day.  However, patient did not follow-up as requested to check on how it was going with Entecavir and he ran out 6 weeks ago.    Complains some abdominal bloating and some constipation.    Follow-up visit on November 6, 2023:    Patient states he feels fine, although the lesion on his mandible continues to grow.  It is somewhat painful.    He says he is having decreased appetite.    He has resumed both the Entecavir and the lamivudine for his hepatitis B.  As last visit, his viral load had dropped from 54,000,000 down to 888.    Review of Systems  Performed and all negative except as mentioned above.      Objective:     /60 (BP Location: Right arm, Patient Position: Sitting, Cuff Size: Adult Regular)   Pulse 64   Temp 100  F (37.8  C) (Oral)      General:   alert, appears stated age and cooperative   Oropharynx:  Wearing a mask--lesion on right mandible is swollen no purulent drainage    Eyes:   Extraocular muscles intact, no icterus.    Ears:   Deferred   Neck:  no adenopathy and supple, symmetrical, trachea midline   Thyroid:   Deferred   Lung:  clear to auscultation bilaterally   Heart:   regular rate and rhythm, S1, S2 normal, no murmur, click, rub or gallop   Abdomen:  Nondistended, soft, no hepatomegaly is appreciated   Extremities:  extremities normal, atraumatic, no cyanosis or edema   Skin:  warm and dry, no hyperpigmentation, vitiligo, or suspicious lesions   CVA:   absent   Genitourinary:  defer exam   Neurological:   Grossly normal   Psychiatric:   normal mood, behavior, speech, dress, and thought processes     Liver Function Studies -   Recent Labs   Lab Test 10/02/23  0834   PROTTOTAL 6.2*   ALBUMIN 2.8*   BILITOTAL 1.0   ALKPHOS 60   AST 46*   ALT 12             Eduardo Grier MD

## 2023-11-07 LAB
HBV DNA SERPL NAA+PROBE-ACNC: 41 IU/ML
HBV DNA SERPL NAA+PROBE-LOG IU: 1.6 {LOG_IU}/ML

## 2023-11-08 ENCOUNTER — TELEPHONE (OUTPATIENT)
Dept: FAMILY MEDICINE | Facility: CLINIC | Age: 73
End: 2023-11-08

## 2023-11-08 ENCOUNTER — APPOINTMENT (OUTPATIENT)
Dept: INTERPRETER SERVICES | Facility: CLINIC | Age: 73
End: 2023-11-08

## 2023-11-08 NOTE — TELEPHONE ENCOUNTER
Please call to advise and assist in scheduling a follow up appointment in primary care to be seen ASAP.  Advise to keep hydrated prior to be seen

## 2023-11-08 NOTE — TELEPHONE ENCOUNTER
----- Message from Eduardo Grier MD sent at 11/8/2023  8:21 AM CST -----  Dear colleagues,    Hep B studies are looking good.  However, I note that kidney function has declined. This is not an expected side effect of the medications I am prescribing.    Should nephrology see?    Best regards,    Eduardo Grier MD

## 2023-11-10 ENCOUNTER — OFFICE VISIT (OUTPATIENT)
Dept: FAMILY MEDICINE | Facility: CLINIC | Age: 73
End: 2023-11-10
Payer: COMMERCIAL

## 2023-11-10 VITALS
SYSTOLIC BLOOD PRESSURE: 120 MMHG | HEART RATE: 68 BPM | OXYGEN SATURATION: 99 % | RESPIRATION RATE: 18 BRPM | DIASTOLIC BLOOD PRESSURE: 64 MMHG

## 2023-11-10 DIAGNOSIS — N18.30 STAGE 3 CHRONIC KIDNEY DISEASE, UNSPECIFIED WHETHER STAGE 3A OR 3B CKD (H): ICD-10-CM

## 2023-11-10 DIAGNOSIS — Z23 NEED FOR VACCINATION: ICD-10-CM

## 2023-11-10 DIAGNOSIS — R79.89 ELEVATED SERUM CREATININE: Primary | ICD-10-CM

## 2023-11-10 PROCEDURE — 90662 IIV NO PRSV INCREASED AG IM: CPT | Performed by: FAMILY MEDICINE

## 2023-11-10 PROCEDURE — G0008 ADMIN INFLUENZA VIRUS VAC: HCPCS | Performed by: FAMILY MEDICINE

## 2023-11-10 PROCEDURE — 99214 OFFICE O/P EST MOD 30 MIN: CPT | Mod: 25 | Performed by: FAMILY MEDICINE

## 2023-11-10 RX ORDER — RESPIRATORY SYNCYTIAL VIRUS VACCINE 120MCG/0.5
0.5 KIT INTRAMUSCULAR ONCE
Qty: 1 EACH | Refills: 0 | Status: SHIPPED | OUTPATIENT
Start: 2023-11-10 | End: 2023-11-10

## 2023-11-10 NOTE — TELEPHONE ENCOUNTER
RECORDS STATUS - ALL OTHER DIAGNOSIS      RECORDS RECEIVED FROM: Epic   DATE RECEIVED:    NOTES STATUS DETAILS   OFFICE NOTE from referring provider Epic 10/16/23: Dr. Yuliana Perez   OFFICE NOTE from medical oncologist Epic 9/15/23: Dr. Alan Frias   OPERATIVE REPORT [facility] 10/17/23: US Paracentesis  8/31/23: Submandibular Mass, FNA  10/19/21: US Lymph Node Bx  9/7/21: Nasal Endoscopy  1/29/20: laryngeal endoscopy    MEDICATION LIST [facility]    LABS     PATHOLOGY REPORTS Report in [facility] Path Consult:  10/16/23: [id number] ([id number])  8/30/21: [id number] ([id number])    Cytology:  10/17/23: [id number]  8/31/23: [id number]  10/19/21: [id number]    Surg Path:  8/31/23: [id number]    9/7/21: [id number]   1/29/20: [id number]    ANYTHING RELATED TO DIAGNOSIS Epic 11/6/23   GENONOMIC TESTING     TYPE: Report in Epic 10/13/21:   FLT3/NGS: [id number]   IMAGING (NEED IMAGES & REPORT)     CT SCANS PACS 10/23/23-1/24/20: CT Soft Tissue Neck  9/11/23: CT Chest Abd Pel  10/3/22-5/18/20: CT Chest  5/19/20:    MRI PACS 9/6/23-3/10/21: MR Brain   ULTRASOUND PACS 8/31/23, 10/19/21: US Lymph Node Bx   PET PACS 9/11/23-9/16/21: PET Whole Body  9/2/20, 2/12/20: PET Eyes to thighs

## 2023-11-10 NOTE — ASSESSMENT & PLAN NOTE
Consider dehydration vs worsening CKD    Plan:   Keep hydrated and recheck the creatinine in 1 week  We will follow-up to the results of the study and manage accordingly.

## 2023-11-10 NOTE — PROGRESS NOTES
Assessment & Plan   Problem List Items Addressed This Visit       Elevated serum creatinine - Primary     Consider dehydration vs worsening CKD    Plan:   Keep hydrated and recheck the creatinine in 1 week  We will follow-up to the results of the study and manage accordingly.             Relevant Orders    Basic metabolic panel  (Ca, Cl, CO2, Creat, Gluc, K, Na, BUN)    Chronic kidney disease, stage 3 (H)     Other Visit Diagnoses       Need for vaccination        Relevant Medications    respiratory syncytial virus vaccine, bivalent (ABRYSVO) injection    Other Relevant Orders    INFLUENZA VACCINE 65+ (FLUZONE HD) (Completed)               I spent a total of 30 minutes on the day of the visit.   Time spent by me doing chart review, history and exam, documentation and further activities per the note           Issa Cook MD  [facility]    Remington Peterson is a 73 year old, presenting with his son-in-law to discuss and review recent elevating creatinine from the baseline, noted to have an elevated creatinine of 1.54 with GFR of 48 with 16% decline over the past 5 months.            11/10/2023    10:40 AM   Additional Questions   Roomed by Meg Harris   Accompanied by Son in law       History of Present Illness       Reason for visit:  Follow up    He eats 2-3 servings of fruits and vegetables daily.He consumes 0 sweetened beverage(s) daily.He exercises with enough effort to increase his heart rate 9 or less minutes per day.  He exercises with enough effort to increase his heart rate 3 or less days per week.   He is taking medications regularly.                 Review of Systems         Objective    /64 (BP Location: Right arm, Patient Position: Sitting, Cuff Size: Adult Regular)   Pulse 68   Resp 18   SpO2 99%   There is no height or weight on file to calculate BMI.  Physical Exam

## 2023-11-15 ENCOUNTER — HOSPITAL ENCOUNTER (OUTPATIENT)
Dept: ULTRASOUND IMAGING | Facility: CLINIC | Age: 73
Discharge: HOME | End: 2023-11-15
Attending: INTERNAL MEDICINE | Admitting: RADIOLOGY
Payer: COMMERCIAL

## 2023-11-15 DIAGNOSIS — C11.9 NASOPHARYNGEAL CARCINOMA (H): ICD-10-CM

## 2023-11-15 PROCEDURE — 88344 IMHCHEM/IMCYTCHM EA MLT ANTB: CPT | Mod: 26 | Performed by: PATHOLOGY

## 2023-11-15 PROCEDURE — 88365 INSITU HYBRIDIZATION (FISH): CPT | Mod: TC | Performed by: INTERNAL MEDICINE

## 2023-11-15 PROCEDURE — 88365 INSITU HYBRIDIZATION (FISH): CPT | Mod: 26 | Performed by: PATHOLOGY

## 2023-11-15 PROCEDURE — 88112 CYTOPATH CELL ENHANCE TECH: CPT | Mod: 26 | Performed by: PATHOLOGY

## 2023-11-15 PROCEDURE — 272N000710 US PARACENTESIS WITHOUT ALBUMIN

## 2023-11-15 PROCEDURE — 88342 IMHCHEM/IMCYTCHM 1ST ANTB: CPT | Mod: 26 | Performed by: PATHOLOGY

## 2023-11-15 PROCEDURE — 88305 TISSUE EXAM BY PATHOLOGIST: CPT | Mod: 26 | Performed by: PATHOLOGY

## 2023-11-15 PROCEDURE — 88341 IMHCHEM/IMCYTCHM EA ADD ANTB: CPT | Mod: 26 | Performed by: PATHOLOGY

## 2023-11-16 ENCOUNTER — ONCOLOGY VISIT (OUTPATIENT)
Dept: ONCOLOGY | Facility: CLINIC | Age: 73
End: 2023-11-16
Attending: OTOLARYNGOLOGY
Payer: COMMERCIAL

## 2023-11-16 ENCOUNTER — PRE VISIT (OUTPATIENT)
Dept: ONCOLOGY | Facility: CLINIC | Age: 73
End: 2023-11-16

## 2023-11-16 VITALS
OXYGEN SATURATION: 99 % | HEIGHT: 62 IN | SYSTOLIC BLOOD PRESSURE: 90 MMHG | BODY MASS INDEX: 21.66 KG/M2 | TEMPERATURE: 98.5 F | WEIGHT: 117.7 LBS | DIASTOLIC BLOOD PRESSURE: 58 MMHG | HEART RATE: 63 BPM

## 2023-11-16 DIAGNOSIS — K72.10 CHRONIC LIVER FAILURE WITHOUT HEPATIC COMA (H): Primary | ICD-10-CM

## 2023-11-16 DIAGNOSIS — C77.0 SECONDARY MALIGNANCY OF LYMPH NODES OF HEAD, FACE AND NECK (H): ICD-10-CM

## 2023-11-16 DIAGNOSIS — C11.9 NASOPHARYNGEAL CARCINOMA (H): ICD-10-CM

## 2023-11-16 PROCEDURE — G0463 HOSPITAL OUTPT CLINIC VISIT: HCPCS | Performed by: INTERNAL MEDICINE

## 2023-11-16 PROCEDURE — 99205 OFFICE O/P NEW HI 60 MIN: CPT | Performed by: INTERNAL MEDICINE

## 2023-11-16 PROCEDURE — 99417 PROLNG OP E/M EACH 15 MIN: CPT | Performed by: INTERNAL MEDICINE

## 2023-11-16 ASSESSMENT — PAIN SCALES - GENERAL: PAINLEVEL: NO PAIN (0)

## 2023-11-16 NOTE — LETTER
11/16/2023         RE: Kristen Chapman  [address] [place] [address]        Dear Colleague,    Thank you for referring your patient, Kristen Chapman, to the [facility]. Please see a copy of my visit note below.       [facility]    PATIENT NAME: Kristen Chapman  MRN # [id number]   DATE OF VISIT: November 16, 2023  YOB: 1950     Otolaryngology: Dr. Yuliana Perez  Radiation Oncology: Dr. Rhodes at [facility]  Primary Oncologist: Dr. Frias at [facility]  PCP: Dr. 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 QNS    SUMMARY    2/27/20  Bronch, EBUS. Path: negative for malignancy  3/2~4/17/20  Chemoradiation with weekly cisplatin (5 doses), 7000 cGy, 35 fractions (Dr. Rhodes at [facility]). Required reactive Gtube  6/1/20  Cisplatin 5FU x 2 cycles consolidatino    7/30/21  MRI. R anterior ethmoid sinus mass  9/7/21  Bx. Path: SCC  9/16/21  PET.   10/19/21  US bx R cervical node. Path: SCC  11/8~11/17/21  SBRT R ethmoid sinus recurrence, 3500 cGy, 5 fractions   12/23/21~7/1/22 Pembrolizumab    6/15/23  CT CAP. Small to moderate ascites, increased since Oct 2022. Underlying liver disease with splenomegaly and paraesophageal varices. Stable irregular 6 mm RUL nodule.   8/31/23  US core needle bx R level IB at [facility]. Path: SCC  9/6/23  Brain MRI. 2.1 x 2.2 x 2.5 cm R level IB mass  9/11/23  PET/CT. Isolated 2.8 x 1.2 x 3.0 cm R submandibular node (SUV 15.9(, mildly FDG avid bilateral hilar nodes (SUV 6, previously 7.9) likely representing granulomatous/inflammation related changes, diffuse esophagitis. Cirrhosis with marked ascites.     ASSESSMENT AND PLAN  Nasopharyngeal carcinoma, local recurrence: Surgical resection offers a chance of cure but with likely high morbidity - I showed them the images in response to his son's question about why  he would need at a minimum marginal mandibulectomy with flap reconstruction. He has discussed this with Dr. Perez and has declined. He would rather not live than to have permanent changes as would be involved. Discussed that further radiotherapy is not realistic either and explained why. Unfortunately, cirrhosis is decompensated right now and so right at this moment he is not a good candidate for systemic therapy, but I do have some hope that this can be stabilized to the point he could try some pembrolizumab. He seemed to tolerate it pretty well in 2022 and while he had a CR while on that, this response could very well have been more related to the SBRT than to pembrolizumab, which he stopped in July 2022 for unclear reasons, other than CR seen on imaging. The cancer has not progressed on pembrolizumab, nor has it seen gemcitabine-based therapy, to which it is typically fairly sensitive. I recommended a consult with Hepatology - see below. Discussed that while we're waiting for the liver to settle down, he could develop worsening ulceration, etc., but we'll have to deal with that if it arises. I do not think additional radiation will help and will likely cause worsening tumor ulceration that won't heal.     Cirrhosis, decompensated, hepatitis B: Large volume paracentesis about once monthly, most recently 9 liters yesterday, for the last 3 months. No prior h/o requiring paracentesis. Viral load coming down nicely. I'll message Dr. Grier to see if the anticipation is that he'll clear the virus. Recommended Hepatology referral as well to see what other steps might be needed to optimize the liver, diuretics, varices screening, etc. He's never seen a hepatologist and hasn't had EGDs, for example. Last albumin 11/6 was only 2.8 and last INR was 1.25 on 7/7, pretty stable compared to prior values. Hopefully the albumin is more reflective of malnutrition and not poor synthetic function. He has significant  thrombocytopenia from portal hypertension, chronically 40-50, a little lower lately - I suspect related to portal HTN being worse. Also discussed and recommended proactive scheduling of paracentesis more frequently to avoid the large volume shifts and potential impact on the kidneys, avoid early satiety a little more, etc.     Tumor ulceration: Almost to the point its ulcerating. Wound care referral. Home bound, so I'm hopeful we can find someone experienced in head/neck tumor ulceration to go to his home.     Pancytopenia: Fairly normal counts prior to starting chemo in 2020. BMBX 10/2021 showed slightly hypercellular marrow, o/w fairly unremarkable. Multifactorial.     CKD: 1.1 - 1.2, more in the 1.3-1.4 range this year, with most recent value 1.54 11/6, which might be a reflection of large volume paracentesis 9/12 (5L), 10/17 (7L), 11/15 (9L). Doesn't look like albumin was given. Hasn't had evaluation yet - UA, etc.     MyChart: Explained [employer] system. They would like to open. Message sent to Clinic Coordinator team to help him out.     120 minutes spent by me on the date of the encounter doing chart review, history and exam, documentation and further activities per the note     Margie Jimenez MD  Associate Professor of Medicine  Hematology, Oncology and Transplantation      SUBJECTIVE  Nicole Chapman is a 74 yo male who presents for a second opinion regarding recurrent nasopharyngeal carcinoma. Accompanied by his son today.     Declined surgery, with its associated high level of morbidity, etc.     Dysphagia - liquid diet. Drinking through straw  Weight loss  Ascites - had 9.4 liters drained 11/15/23, yesterday. Requires about monthly alex. New - hasn't ever needed before    PAST MEDICAL HISTORY  Nasopharyngeal carcinoma as above  CKD  Hypothyroidism  Cirrhosis with ascites.   Chronic hepatitis B, diagnosed in 2020, was on lamivudine until 2021, stopped for unclear reasons, viral rebound, then back on lamivudine  "and etecavir. [place] Dr. Eduardo Grier in [place]    COVID    CURRENT OUTPATIENT MEDICATIONS  Reviewed    ALLERGIES  Allergies   Allergen Reactions    Oxycodone Itching      SOCIAL HISTORY:   11 children. Six live in town, the others out of town  . Wife just had surgery too     REVIEW OF SYSTEMS  As above in the HPI, o/w complete 12-point ROS was negative.    PHYSICAL EXAM  BP 90/58 (BP Location: Right arm, Patient Position: Sitting, Cuff Size: Adult Regular)   Pulse 63   Temp 98.5  F (36.9  C) (Oral)   Ht 1.584 m (5' 2.36\")   Wt 53.4 kg (117 lb 11.2 oz)   SpO2 99%   BMI 21.28 kg/m    GEN: NAD  HEENT: EOMI, no icterus, injection or pallor. Oropharynx is clear.  NECK: no cervical or supraclavicular lymphadenopathy  LUNGS: clear bilaterally  CV: regular, no murmurs, rubs, or gallops  ABDOMEN: soft, non-tender, non-distended, normal bowel sounds  EXT: warm, well perfused, no edema  NEURO: alert  SKIN: no rashes    Remainder of physical exam deferred due to public health emergency and limitations of video visit.    LABORATORY AND IMAGING STUDIES    Multiple labs were independently reviewed and interpreted by me  Last INR July 2023   Last hepatic panel 10/2/23   Hepatitis B viral loads over the last 6 months, most recently 11/6. Coming down very nicely  Path results - all reviewed, including cytologies from ascites   [facility] report reviewed     CT neck 10/23/23, PET/CT 9/11/23, MRI brain 9/6/23, CT CAP and CT neck 6/15/23 were personally reviewed and interpreted by me    Multiple notes - Dr. Perez, Dr. Grier, Dr. Frias, ESTRELLITA Jacobsen, Dr. Mendoza were personally reviewed     Sincerely,        Margie Jimenez MD  "

## 2023-11-16 NOTE — NURSING NOTE
"Oncology Rooming Note    November 16, 2023 12:44 PM   Kirsten Chapman is a 73 year old male who presents for:    Chief Complaint   Patient presents with    Oncology Clinic Visit     SY secondary malignancy of lymph nodes of head face and neck     Initial Vitals: BP 90/58 (BP Location: Right arm, Patient Position: Sitting, Cuff Size: Adult Regular)   Pulse 63   Temp 98.5  F (36.9  C) (Oral)   Ht 1.584 m (5' 2.36\")   Wt 53.4 kg (117 lb 11.2 oz)   SpO2 99%   BMI 21.28 kg/m   Estimated body mass index is 21.28 kg/m  as calculated from the following:    Height as of this encounter: 1.584 m (5' 2.36\").    Weight as of this encounter: 53.4 kg (117 lb 11.2 oz). Body surface area is 1.53 meters squared.  No Pain (0) Comment: Data Unavailable   No LMP for male patient.  Allergies reviewed: Yes  Medications reviewed: Yes    Medications: Medication refills not needed today.  Pharmacy name entered into [employer]: [facility] - [place], [place] - [address] [place] [address]    Clinical concerns:  none      Bella Patrick              "

## 2023-11-16 NOTE — PROGRESS NOTES
[facility]    PATIENT NAME: Kristen Chapman  MRN # [id number]   DATE OF VISIT: November 16, 2023  YOB: 1950     Otolaryngology: Dr. Yuliana Perez  Radiation Oncology: Dr. Rhodes at [facility]  Primary Oncologist: Dr. Frias at [facility]  PCP: Dr. 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 QNS    SUMMARY    2/27/20  Bronch, EBUS. Path: negative for malignancy  3/2~4/17/20  Chemoradiation with weekly cisplatin (5 doses), 7000 cGy, 35 fractions (Dr. Rhodes at [facility]). Required reactive Gtube  6/1/20  Cisplatin 5FU x 2 cycles consolidatino    7/30/21  MRI. R anterior ethmoid sinus mass  9/7/21  Bx. Path: SCC  9/16/21  PET.   10/19/21  US bx R cervical node. Path: SCC  11/8~11/17/21  SBRT R ethmoid sinus recurrence, 3500 cGy, 5 fractions   12/23/21~7/1/22 Pembrolizumab    6/15/23  CT CAP. Small to moderate ascites, increased since Oct 2022. Underlying liver disease with splenomegaly and paraesophageal varices. Stable irregular 6 mm RUL nodule.   8/31/23  [facility] core needle bx R level IB at [facility]. Path: SCC  9/6/23  Brain MRI. 2.1 x 2.2 x 2.5 cm R level IB mass  9/11/23  PET/CT. Isolated 2.8 x 1.2 x 3.0 cm R submandibular node (SUV 15.9(, mildly FDG avid bilateral hilar nodes (SUV 6, previously 7.9) likely representing granulomatous/inflammation related changes, diffuse esophagitis. Cirrhosis with marked ascites.     ASSESSMENT AND PLAN  Nasopharyngeal carcinoma, local recurrence: Surgical resection offers a chance of cure but with likely high morbidity - I showed them the images in response to his son's question about why he would need at a minimum marginal mandibulectomy with flap reconstruction. He has discussed this with Dr. Perez and has declined. He would rather not live than to have permanent changes as would be involved. Discussed that further  radiotherapy is not realistic either and explained why. Unfortunately, cirrhosis is decompensated right now and so right at this moment he is not a good candidate for systemic therapy, but I do have some hope that this can be stabilized to the point he could try some pembrolizumab. He seemed to tolerate it pretty well in 2022 and while he had a CR while on that, this response could very well have been more related to the SBRT than to pembrolizumab, which he stopped in July 2022 for unclear reasons, other than CR seen on imaging. The cancer has not progressed on pembrolizumab, nor has it seen gemcitabine-based therapy, to which it is typically fairly sensitive. I recommended a consult with Hepatology - see below. Discussed that while we're waiting for the liver to settle down, he could develop worsening ulceration, etc., but we'll have to deal with that if it arises. I do not think additional radiation will help and will likely cause worsening tumor ulceration that won't heal.     Cirrhosis, decompensated, hepatitis B: Large volume paracentesis about once monthly, most recently 9 liters yesterday, for the last 3 months. No prior h/o requiring paracentesis. Viral load coming down nicely. I'll message Dr. Grier to see if the anticipation is that he'll clear the virus. Recommended Hepatology referral as well to see what other steps might be needed to optimize the liver, diuretics, varices screening, etc. He's never seen a hepatologist and hasn't had EGDs, for example. Last albumin 11/6 was only 2.8 and last INR was 1.25 on 7/7, pretty stable compared to prior values. Hopefully the albumin is more reflective of malnutrition and not poor synthetic function. He has significant thrombocytopenia from portal hypertension, chronically 40-50, a little lower lately - I suspect related to portal HTN being worse. Also discussed and recommended proactive scheduling of paracentesis more frequently to avoid the large volume  shifts and potential impact on the kidneys, avoid early satiety a little more, etc.     Tumor ulceration: Almost to the point its ulcerating. Wound care referral. Home bound, so I'm hopeful we can find someone experienced in head/neck tumor ulceration to go to his home.     Pancytopenia: Fairly normal counts prior to starting chemo in 2020. BMBX 10/2021 showed slightly hypercellular marrow, o/w fairly unremarkable. Multifactorial.     CKD: 1.1 - 1.2, more in the 1.3-1.4 range this year, with most recent value 1.54 11/6, which might be a reflection of large volume paracentesis 9/12 (5L), 10/17 (7L), 11/15 (9L). Doesn't look like albumin was given. Hasn't had evaluation yet - UA, etc.     MyChart: Explained [employer] system. They would like to open. Message sent to Clinic Coordinator team to help him out.     120 minutes spent by me on the date of the encounter doing chart review, history and exam, documentation and further activities per the note     Margie Jimenez MD  Associate Professor of Medicine  Hematology, Oncology and Transplantation      SUBJECTIVE  tMiriam Chapman is a 72 yo male who presents for a second opinion regarding recurrent nasopharyngeal carcinoma. Accompanied by his son today.     Declined surgery, with its associated high level of morbidity, etc.     Dysphagia - liquid diet. Drinking through straw  Weight loss  Ascites - had 9.4 liters drained 11/15/23, yesterday. Requires about monthly alex. New - hasn't ever needed before    PAST MEDICAL HISTORY  Nasopharyngeal carcinoma as above  CKD  Hypothyroidism  Cirrhosis with ascites.   Chronic hepatitis B, diagnosed in 2020, was on lamivudine until 2021, stopped for unclear reasons, viral rebound, then back on lamivudine and etecavir. Se[place] Dr. Eduardo Grier in [place]    COVID    CURRENT OUTPATIENT MEDICATIONS  Reviewed    ALLERGIES  Allergies   Allergen Reactions    Oxycodone Itching      SOCIAL HISTORY:   11 children. Six live in town, the others out of  "town  . Wife just had surgery too     REVIEW OF SYSTEMS  As above in the HPI, o/w complete 12-point ROS was negative.    PHYSICAL EXAM  BP 90/58 (BP Location: Right arm, Patient Position: Sitting, Cuff Size: Adult Regular)   Pulse 63   Temp 98.5  F (36.9  C) (Oral)   Ht 1.584 m (5' 2.36\")   Wt 53.4 kg (117 lb 11.2 oz)   SpO2 99%   BMI 21.28 kg/m    GEN: NAD  HEENT: EOMI, no icterus, injection or pallor. Oropharynx is clear.  NECK: no cervical or supraclavicular lymphadenopathy  LUNGS: clear bilaterally  CV: regular, no murmurs, rubs, or gallops  ABDOMEN: soft, non-tender, non-distended, normal bowel sounds  EXT: warm, well perfused, no edema  NEURO: alert  SKIN: no rashes    Remainder of physical exam deferred due to public health emergency and limitations of video visit.    LABORATORY AND IMAGING STUDIES    Multiple labs were independently reviewed and interpreted by me  Last INR July 2023   Last hepatic panel 10/2/23   Hepatitis B viral loads over the last 6 months, most recently 11/6. Coming down very nicely  Path results - all reviewed, including cytologies from ascites   [facility] report reviewed     CT neck 10/23/23, PET/CT 9/11/23, MRI brain 9/6/23, CT CAP and CT neck 6/15/23 were personally reviewed and interpreted by me    Multiple notes - Dr. Perez, Dr. Grier, Dr. Frias, ESTRELLITA Jacobsen, Dr. Mendoza were personally reviewed       "

## 2023-11-20 ENCOUNTER — TELEPHONE (OUTPATIENT)
Dept: FAMILY MEDICINE | Facility: CLINIC | Age: 73
End: 2023-11-20

## 2023-11-20 DIAGNOSIS — C11.9 NASOPHARYNGEAL CARCINOMA (H): ICD-10-CM

## 2023-11-20 DIAGNOSIS — C77.0 SECONDARY MALIGNANCY OF LYMPH NODES OF HEAD, FACE AND NECK (H): Primary | ICD-10-CM

## 2023-11-20 DIAGNOSIS — B18.1 HEPATITIS B, CHRONIC (H): Primary | ICD-10-CM

## 2023-11-20 NOTE — TELEPHONE ENCOUNTER
General Call      Reason for Call:  Needing pts 11/10 visit note closed and Tube feeding info    What are your questions or concerns:  Isabella from [facility] called and stated that the visit from 11/10 has not been closed and they also need to know what company pt goes through for his Tube Feedings.     Date of last appointment with provider: 11/10/2023    Could we send this information to you in [employer] or would you prefer to receive a phone call?:   No preference   Okay to leave a detailed message?: Yes at Other phone number:  Isabella at [phone], option 1

## 2023-11-21 ENCOUNTER — PATIENT OUTREACH (OUTPATIENT)
Dept: ONCOLOGY | Facility: CLINIC | Age: 73
End: 2023-11-21

## 2023-11-21 NOTE — PROGRESS NOTES
[facility]: Cancer Care                                                                                          Attempted to reach out to Kristen to introduce myself as Dr. Jimenez's RNCC.    No answer, left VM with my contact information.    Cindy Gambino, RN, BSN  RN Care Coordinator  [facility]

## 2023-11-22 ENCOUNTER — THERAPY VISIT (OUTPATIENT)
Dept: SPEECH THERAPY | Facility: CLINIC | Age: 73
End: 2023-11-22
Attending: OTOLARYNGOLOGY
Payer: COMMERCIAL

## 2023-11-22 ENCOUNTER — ANCILLARY PROCEDURE (OUTPATIENT)
Dept: GENERAL RADIOLOGY | Facility: CLINIC | Age: 73
End: 2023-11-22
Attending: OTOLARYNGOLOGY
Payer: COMMERCIAL

## 2023-11-22 DIAGNOSIS — C11.9 NASOPHARYNGEAL CARCINOMA (H): ICD-10-CM

## 2023-11-22 DIAGNOSIS — C11.9 SQUAMOUS CELL CARCINOMA OF NASOPHARYNX (H): ICD-10-CM

## 2023-11-22 DIAGNOSIS — R13.12 OROPHARYNGEAL DYSPHAGIA: ICD-10-CM

## 2023-11-22 PROCEDURE — 74230 X-RAY XM SWLNG FUNCJ C+: CPT | Mod: GC | Performed by: RADIOLOGY

## 2023-11-22 PROCEDURE — 92611 MOTION FLUOROSCOPY/SWALLOW: CPT | Mod: GN | Performed by: SPEECH-LANGUAGE PATHOLOGIST

## 2023-11-22 RX ORDER — BARIUM SULFATE 400 MG/ML
SUSPENSION ORAL ONCE
Status: COMPLETED | OUTPATIENT
Start: 2023-11-22 | End: 2023-11-22

## 2023-11-22 RX ADMIN — BARIUM SULFATE 15 ML: 400 SUSPENSION ORAL at 13:13

## 2023-11-22 NOTE — PROGRESS NOTES
SPEECH LANGUAGE PATHOLOGY EVALUATION    Subjective      Presenting condition or subjective complaint: Pt presents today for video swallow study to further evaluate swallow function.  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 Pt has elected not to proceed with surgery for recurrence d/t co-morbidities.    Prior therapy history for the same diagnosis, illness or injury: Clinical swallow evaluation on 10/16/23 which resulted in recommendation for video swallow study to further evaluate the pharyngeal phase    Living Environment  Social support: Pt presents today with his daughter who is visiting from out of town. They report his other children live locally and are able to help.    Patient goals for therapy: to improve swallowing       Objective     SWALLOW EVALUTION  Dysphagia history: Pt and his daughter report gradual worsening of swallow function since his radiation in 2020. Reports frequent coughing with PO intake both liquids and solids. States this is more prevalent with liquids. Endorses feeling of pharyngeal stasis. Reports eating a variety of regular textures/thin liquids, but with noted difficulty. No reported pneumonias recently. Reports taking pills with thin liquids with intermittent coughing. He is hopeful there is something he can do to improve his swallow function.    Current Diet/Method of Nutritional Intake: thin liquids (level 0), easy to chew (level 7)      Oral Motor Function:   Dentition: upper and lower dentures  Secretion management: WFL  Mucosal quality: dry  Mandibular function: intact  Oral labial function: WFL  Lingual function: WFL  Velar function: intact   Buccal function: intact  Laryngeal function: cough  strength slightly reduced, throat clear, volitional swallow, voicing WFL     VIDEOFLUOROSCOPIC SWALLOW STUDY  Radiologist: Resident  Views Taken: left lateral, A/P   Physical location of procedure: [facility]  Patient sitting upright on chair/stool     VFSS textures trialed:   VFSS Eval: Thin Liquids  Mode of Presentation: cup, straw, self-fed   Order of Presentation: Series 1, 2, 3, 7, 9  Preparatory Phase: WFL  Oral Phase: WFL  Bolus Location When Swallow Initiated: posterior angle of ramus  Pharyngeal Phase: impaired hyolaryngel excursion, impaired epiglottic movement, impaired tongue base retraction, pharyngeal wall coating, residue in valleculae, residue in pyriform sinus  Rosenbeck's Penetration Aspiration Scale: 8 - contrast passes glottis, visible subglottic residue remains, absent patient response (aspiration)  Response to Aspiration: absent response  Strategies and Compensations:  small bolus size, volitional cough   Diagnostic Statement: Penetration during the swallow with residuals in laryngeal vestibule. With larger sips penetration to the cords. Residuals are then silently aspirated. Clinician cued cough does clear subglottic residuals when done immediately after the sip. Residuals in valleculae and piriforms with coating on posterior pharyngeal wall.    VFSS Eval: Mildly Thick Liquids  Mode of Presentation: cup, self-fed   Order of Presentation: Series 4, 12AP  Preparatory Phase: WFL  Oral Phase: premature pharyngeal entry  Bolus Location When Swallow Initiated: pyriforms  Pharyngeal Phase: impaired hyolaryngel excursion, impaired epiglottic movement, impaired tongue base retraction, pharyngeal wall coating, residue in valleculae, residue in pyriform sinus  Rosenbeck's Penetration Aspiration Scale: 3 - contrast remains above the vocal cords, visible residue remains (penetration)  Diagnostic Statement: Premature spillage to piriforms. Penetration with trace residuals remaining in laryngeal  vestibule. Likely aspiration of residuals later on in the study. Residuals in valleculae and piriforms with coating on posterior pharyngeal wall. AP reveals fairly symmetric bolus flow through oropharynx.     VFSS Eval: Purees  Mode of Presentation: spoon, self-fed   Order of Presentation: Series 5, 6  Preparatory Phase: prolonged bolus preparation  Oral Phase: impaired AP movement  Bolus Location When Swallow Initiated: posterior angle of ramus  Pharyngeal Phase: impaired tongue base retraction, pharyngeal wall coating, residue in valleculae, residue in pyriform sinus  Rosenbeck s Penetration Aspiration Scale: 1 - no aspiration, contrast does not enter airway  Strategies and Compensations: liquid wash  Diagnostic Statement: Poor AP movement. No penetration/aspiration. Mild vallecular residual with coating on posterior pharyngeal wall into piriforms, worse with larger bolus. Residuals reduced with liquid wash.     VFSS Eval: Solids  Mode of Presentation: self-fed   Order of Presentation: Series 8  Preparatory Phase: WFL  Oral Phase: WFL  Bolus Location When Swallow Initiated: valleculae  Pharyngeal Phase: impaired tongue base retraction, pharyngeal wall coating, residue in valleculae, residue in pyriform sinus   Rosenbeck s Penetration Aspiration Scale: 1 - no aspiration, contrast does not enter airway  Strategies and Compensations: liquid wash  Diagnostic Statement: Premature spillage to valleculae. No penetration/aspiration. Moderate vallecular residual with mild coating along posterior pharyngeal wall into piriforms. Reduced with liquid wash.    VFSS Eval: Barium Tablet  Mode of Presentation: self-fed   Order of Presentation: Series 10, 11  Preparatory Phase: WFL  Oral Phase:  inability to pass pill through the oral cavity  Diagnostic Statement: Patient unable to clear tablet through oral cavity despite multiple sips of water. Ultimately, pt spit out the tablet.     ESOPHAGEAL PHASE OF SWALLOW  no observed or  reported concerns related to esophageal function     SWALLOW ASSESSMENT CLINICAL IMPRESSIONS AND RATIONALE  Diet Consistency Recommendations: thin liquids (level 0), easy to chew (level 7)    Recommended Feeding/Eating Techniques: small bolus size, slow rate of intake, alternate food and liquid intake, effortful (hard) swallow, discontinue eating activities if fatigue is present, monitor for cough or change in vocal quality with intake, maintain upright sitting position for eating, maintain upright posture during/after eating for 30 minutes, minimize distractions during oral intake, volitional cough during and after meals  Medication Administration Recommendations: consider breaking in smaller pieces and taking with puree  Instrumental Assessment Recommendations: instrumental evaluation not recommended at this time     Assessment & Plan   CLINICAL IMPRESSIONS   Medical Diagnosis: Squamous cell carcinoma of nasopharynx    Treatment Diagnosis: Oropharyngeal dysphagia   Impression/Assessment: Pt is a 73 year old male with swallow complaints. The following significant findings have been identified: impaired swallowing, characterized by impaired airway protection and sensation, as well as reduced strength of the swallow mechanism. 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 Identified deficits interfere with their ability to consume an oral diet and maintain nutrition as compared to previous level of function.    PLAN OF CARE  Evaluation only at this location; recommend swallow therapy at pt preferred [facility] location    Patient would like to complete therapy closer to home in [place]; new order will be requested for patient to establish services there.    Education Assessment:   Learner/Method: Patient;Family;Listening;No Barriers to Learning;Pictures/Video    Risks and benefits of evaluation/treatment have been explained.   Patient/Family/caregiver agrees with Plan of Care.     Evaluation Time:    SLP  Eval: VideoFluoroscopic Swallow function Minutes (28243): 35     Present: Yes: Language: Hmong, ID Number/Identifier: In person  via [place] [employer]      Signing Clinician: Julia Solis, SLP

## 2023-11-29 DIAGNOSIS — R13.10 DYSPHAGIA: ICD-10-CM

## 2023-11-29 DIAGNOSIS — C11.9 NASOPHARYNGEAL CARCINOMA (H): Primary | ICD-10-CM

## 2023-11-30 ENCOUNTER — APPOINTMENT (OUTPATIENT)
Dept: INTERPRETER SERVICES | Facility: CLINIC | Age: 73
End: 2023-11-30

## 2023-12-05 ENCOUNTER — TELEPHONE (OUTPATIENT)
Dept: WOUND CARE | Facility: CLINIC | Age: 73
End: 2023-12-05

## 2023-12-05 DIAGNOSIS — R18.8 OTHER ASCITES: Primary | ICD-10-CM

## 2023-12-05 NOTE — TELEPHONE ENCOUNTER
LVM via , sent mychart to cancel and reschedule the following appointment:      Appt on 12/6/23 with Ethel Cohen due to the provider not being available. Reschedule to the next available appt. Left direct #/K pod #

## 2023-12-06 NOTE — PROGRESS NOTES
[facility] Hepatology    New Patient Visit    Referring provider:  Alan Frias  Chief complaint:  Hepatitis B related cirrhosis    Assessment  73 year old male with past medical history of nasopharyngeal carcinoma, decompensated hepatitis B related cirrhosis complicated by ascites, hypothyroidism, severe protein calorie malnutrition.    #. Decompensated HBV related cirrhosis  #. Ascites  MELD 3.0: 16  Patient with hepatitis B related cirrhosis decompensated based on ascites presence.  It is unclear if she is adherent to a low-sodium diet.  He will plan to start low-dose diuretics given he has some mild acute kidney injury in the setting of poor oral intake; plan for repeat BMP in 1 week.  Plan to set the patient up for a paracentesis every week.    With regards to his hepatitis B he was treated with lamivudine in the past but he had intermittent adherence so there is a risk for resistance; see below for pharmacy fill data.  He was previously on Entecavir and lamivudine at the same time but the patient reports that he was not taking his Entecavir up until this appointment.  Would not recommend transitioning to tenofovir alone given the low risk of resistance.  We stressed during his clinic visit that he must take this daily and not miss any doses.    He is due for variceal screening.    #. Severe protein calorie malnutrition  #. Deconditioning    Plan  -- Follow up HBV DNA and HBV resistance testing  -- Ascites studies: cell count + culture, albumin, protein and cytology  -- Stop lamivudine and entecavir; start tenofovir 150mg daily, renal dosing  -- HCC Screening: Recommend abdominal US + AFP q6 months, next due 3/2024  -- Variceal Screening next due now; ordered   -- Start lasix 20mg daily + spironolactone 50mg daily for fluid control; labs in 1 week  -- Plan for paracentesis with fluid studies; plan for weekly with max 5L removed, 50g albumin on each occasion  -- Recommend low sodium (< 2 grams per day)  + high protein diet; consider 2 protein shake per day  -- Okay for tylenol, max 2 grams per day  -- Occupational therapy referral given deconditioning     Chronic Hepatitis B Education:  - Avoid sharing personal items such as: toothbrushes, nail clippers, razors.  If shared, they should all be cleaned thoroughly.  - Recommend all family members be screened for hepatitis B and vaccinated if they are not infected.     RTC: 3 months    Yumiko Boyd MD (Lizzie)  Advanced [facility] Transplant Hepatology  [facility]    I spent 60 minutes on this encounter performing the following: reviewing the patient's medical record (clinic visits, hospital records, lab results, imaging and procedural documentation), history taking, physical exam and documentation on the date of the encounter. I also spent part of the time in coordination of care and counseling.    HPI:  HBV Cirrhosis  - dx ~   - no hx HE  - hx ascites  - no hx variceal bleed  - last EGD none prior   - HCC screenin2023 - PET/CT    HBV  - Hep B e Ag: negative  - Hep B e Ab: positive  - Last DNA: 41 (2023)  - Hepatitis delta: negative     History:   2020 - diagnosed with HBV in the setting of starting therapy for nasopharngeal carcinoma and developed rising liver studies  2021 - developed a flare in the setting of being off lamivudine (?unclear reason)  2023 - reportedly adherent to lamivudine, but developed increased viral load so entecavir was started in addition  10/2023 - patient reportedly ran out of entecavir 6 weeks prior to his ID appoitnment    FILL INFORMATION FROM PHARMACY  Lamivudine 2020-8/3/2020, 21-23   Info from [facility]:   - [facility] gave a 90 days supply on 20 per care everywhere...   - 21 for a 90 ds   - 22 for a 90 ds   - 22 for a 30 ds   -22 for a 30 ds   - 22 for a 30 ds   - 23 for a 30 ds   - 9/15/23 for a 30 ds - not picked up   - 10/2/23  for a 30 ds   - 11/7/23 for a 30 ds      Entecavir 10/2/23 - 11/16/23   Info from [facility]:   - 4/12/23 for a 30 ds -filled once   - 9/15/23 for a 30 ds  - was not picked up   - 10/10/23 for 90 ds through [facility]     ------------------------------------------------------------------------------    Presents with his son.      The patient was first diagnosed with hepatitis B in 2020 when he was starting therapy for his nasopharyngeal carcinoma.  He denies any known family history of hepatitis B or any known family history of HCC.  He was not previously told that all of his family members should be tested and vaccinated accordingly.    Starting in September 2023 he developed ascites.  He has been undergoing a paracentesis almost weekly with the last fluid removal being almost 10 L.  He has lower extremity edema as well.  He was told to consume a low-sodium diet which she has been trying to do more.  He has not been started on diuretics in the past.    He denies any lethargy or confusion.  He reports some weakness but denies any falls.  He denies any melena, hematochezia or hematemesis.  He has intermittent constipation but denies loose stools.  He has abdominal distention and with associated discomfort.    He reports adherence to his limited feeding but he denies taking Entecavir at this time.    Medical hx Surgical hx   Past Medical History:   Diagnosis Date    Anemia     Dysphagia     Hepatitis B chronic     Hypothyroidism     Nasopharyngeal cancer (H)     Severe protein-calorie malnutrition (H24)     Thrombocytopenia (H24)       Past Surgical History:   Procedure Laterality Date    ENDOSCOPIC ENDONASAL SURGERY Bilateral 9/7/2021    Procedure: Nasal Endoscopy with Biopsy;  Surgeon: Ky Centeno MD;  Location: [facility] OR    IR CHEST PORT PLACEMENT > 5 YRS OF AGE  3/2/2020    IR CHEST PORT PLACEMENT > 5 YRS OF AGE  3/2/2020    IR GASTROSTOMY TUBE INSERTION  4/27/2020    IR GASTROSTOMY TUBE  PERCUTANEOUS PLCMNT  4/27/2020    IR PORT PLACEMENT >5 YEARS  3/2/2020    IR PORT PLACEMENT >5 YEARS  3/2/2020    IR T-FASTENER REMOVAL  6/5/2020    IR T-FASTENER REMOVAL  6/5/2020          Medications  Current Outpatient Medications   Medication Sig Dispense Refill    acetaminophen (TYLENOL) 325 MG tablet Take 650 mg by mouth every 6 hours as needed       furosemide (LASIX) 20 MG tablet Take 1 tablet (20 mg) by mouth daily 90 tablet 3    levothyroxine (SYNTHROID/LEVOTHROID) 175 MCG tablet Take 1 tablet (175 mcg) by mouth daily 30 tablet 2    polyethylene glycol (MIRALAX) 17 GM/Dose powder Take 17 g by mouth daily as needed for constipation 510 g 3    spironolactone (ALDACTONE) 50 MG tablet Take 1 tablet (50 mg) by mouth daily 90 tablet 3    Tenofovir Disoproxil Fumarate 150 MG TABS Take 150 mg by mouth daily 90 tablet 3    entecavir (BARACLUDE) 1 MG tablet Take 1 tablet (1 mg) by mouth daily (Patient not taking: Reported on 11/16/2023) 90 tablet 3       Allergies  Allergies   Allergen Reactions    Oxycodone Itching       Family hx Social hx   Family History   Problem Relation Age of Onset    Liver Cancer No family hx of     Liver Disease No family hx of       Social History     Tobacco Use    Smoking status: Never    Smokeless tobacco: Never   Substance Use Topics    Alcohol use: Not Currently    Drug use: Not Currently     - Wife and two children come check in on him  - 5 girls + 6 boys  - Alcohol: Denies  - Tobacco: Denies  - Drugs: Denies     Review of systems  A 10-point review of systems was negative.    Examination  BP 97/64   Pulse 55   Temp 98.2  F (36.8  C) (Oral)   Wt 61.6 kg (135 lb 12.8 oz)   SpO2 98%   BMI 24.55 kg/m     Body mass index is 24.55 kg/m .    Gen-NAD  Eye- EOMI  ENT- Mask in place, temporal wasting  CVS- RRR, no murmurs  RS- CTA bilaterally  Abd- Distended, soft, non-tender.   Extr- 2+ radial pulses bilaterally, no lower extremity edema bilaterally  MS- hands without clubbing. Walks  with a cane.   Neuro- A+Ox3, no asterixis  Skin- no rash or jaundice  Psych- normal mood    Laboratory  BMP  Recent Labs   Lab Test 12/07/23  1542 11/06/23  0929 10/23/23  1832 09/11/23  1106 09/08/23  0914 06/15/23  1458 06/09/23  0857    136  --   --  140  --  139   POTASSIUM 4.6 5.2  --   --  3.7  --  4.0   CHLORIDE 108* 108*  --   --  108*  --  108*   GUANAKITO 8.2* 8.5*  --   --  8.2*  --  8.4*   CO2 22 22  --   --  23  --  24   BUN 33.7* 30.0*  --   --  26.0*  --  25.0*   CR 1.71* 1.54* 1.8*  --  1.14   < > 1.33*   GLC 91 101*  --  84 90  --  90    < > = values in this interval not displayed.     CBC  Recent Labs   Lab Test 12/07/23  1542 11/06/23  0929 09/08/23  0914 07/07/23  0903   WBC 4.4 4.6 2.2* 2.3*   RBC 3.08* 2.66* 2.84* 3.24*   HGB 8.7* 7.9* 7.9* 8.6*   HCT 27.4* 24.4* 26.0* 27.7*   MCV 89 92 92 86   MCH 28.2 29.7 27.8 26.5   MCHC 31.8 32.4 30.4* 31.0*   RDW 17.3* 17.1* 21.8* 17.2*   PLT 62* 49* 39* 52*     Liver Enzymes   Recent Labs   Lab Test 12/07/23  1542   PROTTOTAL 6.5   ALBUMIN 2.7*   BILITOTAL 0.5   ALKPHOS 84   AST 63*   ALT 31      INR   INR   Date Value Ref Range Status   12/07/2023 1.32 (H) 0.85 - 1.15 Final         Hep B s Ag positive  HBV DNA 41 (11/6/2023)  HBV DNA 54,372,355 (4/2023)  Hep B e Ab positive  Hep B e Ag negative  Hep delta negative  HCV Ab non-reactive    Radiology  PET CT C/A/P 9/2023  PET/CT FINDINGS: Isolated FDG avid right submandibular lymph node measuring 2.8 x 1.2 x 3.0 cm (max SUV 15.9) suspicious for progression of disease.     Mildly FDG avid moderate paranasal sinus mucosal thickening likely inflammatory in nature. Redemonstrated mildly FDG avid bilateral hilar station 10 lymph nodes (Max SUV 6.0, previously 7.9 on the right) likely representing sequelae of   granulomatous/immunotherapy related inflammatory change. Diffuse esophagitis. Shoulder and hip synovitis.      CT FINDINGS: Mild to moderate senescent intracranial changes. Moderate paranasal sinus  mucosal thickening. Left chest port with tip terminating in the upper right atrium. Mild dilatation of the ascending aorta. Mild coronary artery calcium. Trace pleural effusions. Cirrhotic liver with sequelae of portal hypertension including a marked volume of intra-abdominal ascites. Non-FDG avid lesion in the splenic parenchyma suggesting benignity. Multilevel degenerative changes of the spine. The lower extremities are unremarkable.    IMPRESSION:  Findings suspicious for isolated right submandibular lymph node recurrence.

## 2023-12-07 ENCOUNTER — PRE VISIT (OUTPATIENT)
Dept: GASTROENTEROLOGY | Facility: CLINIC | Age: 73
End: 2023-12-07

## 2023-12-07 ENCOUNTER — TELEPHONE (OUTPATIENT)
Dept: GASTROENTEROLOGY | Facility: CLINIC | Age: 73
End: 2023-12-07

## 2023-12-07 ENCOUNTER — OFFICE VISIT (OUTPATIENT)
Dept: GASTROENTEROLOGY | Facility: CLINIC | Age: 73
End: 2023-12-07
Attending: INTERNAL MEDICINE
Payer: COMMERCIAL

## 2023-12-07 ENCOUNTER — LAB (OUTPATIENT)
Dept: LAB | Facility: CLINIC | Age: 73
End: 2023-12-07
Attending: INTERNAL MEDICINE
Payer: COMMERCIAL

## 2023-12-07 VITALS
OXYGEN SATURATION: 98 % | DIASTOLIC BLOOD PRESSURE: 64 MMHG | WEIGHT: 135.8 LBS | HEART RATE: 55 BPM | BODY MASS INDEX: 24.55 KG/M2 | SYSTOLIC BLOOD PRESSURE: 97 MMHG | TEMPERATURE: 98.2 F

## 2023-12-07 DIAGNOSIS — B18.1 CHRONIC VIRAL HEPATITIS B WITHOUT DELTA AGENT AND WITHOUT COMA (H): Primary | ICD-10-CM

## 2023-12-07 DIAGNOSIS — K74.60 CIRRHOSIS OF LIVER WITH ASCITES, UNSPECIFIED HEPATIC CIRRHOSIS TYPE (H): ICD-10-CM

## 2023-12-07 DIAGNOSIS — N17.9 AKI (ACUTE KIDNEY INJURY) (H): ICD-10-CM

## 2023-12-07 DIAGNOSIS — B18.1 HEPATITIS B, CHRONIC (H): ICD-10-CM

## 2023-12-07 DIAGNOSIS — K59.00 CONSTIPATION, UNSPECIFIED CONSTIPATION TYPE: ICD-10-CM

## 2023-12-07 DIAGNOSIS — R53.81 PHYSICAL DECONDITIONING: ICD-10-CM

## 2023-12-07 DIAGNOSIS — K72.10 CHRONIC LIVER FAILURE WITHOUT HEPATIC COMA (H): ICD-10-CM

## 2023-12-07 DIAGNOSIS — E43 SEVERE PROTEIN-CALORIE MALNUTRITION (H): ICD-10-CM

## 2023-12-07 DIAGNOSIS — R18.8 CIRRHOSIS OF LIVER WITH ASCITES, UNSPECIFIED HEPATIC CIRRHOSIS TYPE (H): ICD-10-CM

## 2023-12-07 DIAGNOSIS — B18.1 CHRONIC VIRAL HEPATITIS B WITHOUT DELTA AGENT AND WITHOUT COMA (H): ICD-10-CM

## 2023-12-07 DIAGNOSIS — R18.8 OTHER ASCITES: ICD-10-CM

## 2023-12-07 LAB
ALBUMIN SERPL BCG-MCNC: 2.7 G/DL (ref 3.5–5.2)
ALP SERPL-CCNC: 84 U/L (ref 40–150)
ALT SERPL W P-5'-P-CCNC: 31 U/L (ref 0–70)
ANION GAP SERPL CALCULATED.3IONS-SCNC: 6 MMOL/L (ref 7–15)
AST SERPL W P-5'-P-CCNC: 63 U/L (ref 0–45)
BILIRUB DIRECT SERPL-MCNC: 0.21 MG/DL (ref 0–0.3)
BILIRUB SERPL-MCNC: 0.5 MG/DL
BUN SERPL-MCNC: 33.7 MG/DL (ref 8–23)
CALCIUM SERPL-MCNC: 8.2 MG/DL (ref 8.8–10.2)
CHLORIDE SERPL-SCNC: 108 MMOL/L (ref 98–107)
CREAT SERPL-MCNC: 1.71 MG/DL (ref 0.67–1.17)
DEPRECATED HCO3 PLAS-SCNC: 22 MMOL/L (ref 22–29)
EGFRCR SERPLBLD CKD-EPI 2021: 42 ML/MIN/1.73M2
ERYTHROCYTE [DISTWIDTH] IN BLOOD BY AUTOMATED COUNT: 17.3 % (ref 10–15)
GLUCOSE SERPL-MCNC: 91 MG/DL (ref 70–99)
HCT VFR BLD AUTO: 27.4 % (ref 40–53)
HGB BLD-MCNC: 8.7 G/DL (ref 13.3–17.7)
INR PPP: 1.32 (ref 0.85–1.15)
IRON BINDING CAPACITY (ROCHE): 186 UG/DL (ref 240–430)
IRON SATN MFR SERPL: 22 % (ref 15–46)
IRON SERPL-MCNC: 40 UG/DL (ref 61–157)
MCH RBC QN AUTO: 28.2 PG (ref 26.5–33)
MCHC RBC AUTO-ENTMCNC: 31.8 G/DL (ref 31.5–36.5)
MCV RBC AUTO: 89 FL (ref 78–100)
PLATELET # BLD AUTO: 62 10E3/UL (ref 150–450)
POTASSIUM SERPL-SCNC: 4.6 MMOL/L (ref 3.4–5.3)
PROT SERPL-MCNC: 6.5 G/DL (ref 6.4–8.3)
RBC # BLD AUTO: 3.08 10E6/UL (ref 4.4–5.9)
SODIUM SERPL-SCNC: 136 MMOL/L (ref 135–145)
WBC # BLD AUTO: 4.4 10E3/UL (ref 4–11)

## 2023-12-07 PROCEDURE — G0463 HOSPITAL OUTPT CLINIC VISIT: HCPCS | Performed by: INTERNAL MEDICINE

## 2023-12-07 PROCEDURE — 87517 HEPATITIS B DNA QUANT: CPT | Performed by: INTERNAL MEDICINE

## 2023-12-07 PROCEDURE — 80053 COMPREHEN METABOLIC PANEL: CPT | Performed by: PATHOLOGY

## 2023-12-07 PROCEDURE — 85027 COMPLETE CBC AUTOMATED: CPT | Performed by: PATHOLOGY

## 2023-12-07 PROCEDURE — 85610 PROTHROMBIN TIME: CPT | Performed by: PATHOLOGY

## 2023-12-07 PROCEDURE — 99205 OFFICE O/P NEW HI 60 MIN: CPT | Performed by: INTERNAL MEDICINE

## 2023-12-07 PROCEDURE — 83540 ASSAY OF IRON: CPT | Performed by: PATHOLOGY

## 2023-12-07 PROCEDURE — 83550 IRON BINDING TEST: CPT | Performed by: PATHOLOGY

## 2023-12-07 PROCEDURE — 87912 NFCT AGT GNTYP ALYS HEP B: CPT | Mod: 90 | Performed by: PATHOLOGY

## 2023-12-07 PROCEDURE — 99000 SPECIMEN HANDLING OFFICE-LAB: CPT | Performed by: PATHOLOGY

## 2023-12-07 PROCEDURE — 36415 COLL VENOUS BLD VENIPUNCTURE: CPT | Performed by: PATHOLOGY

## 2023-12-07 PROCEDURE — 82248 BILIRUBIN DIRECT: CPT | Performed by: PATHOLOGY

## 2023-12-07 PROCEDURE — 86707 HEPATITIS BE ANTIBODY: CPT | Mod: 90 | Performed by: PATHOLOGY

## 2023-12-07 PROCEDURE — 87350 HEPATITIS BE AG IA: CPT | Mod: 90 | Performed by: PATHOLOGY

## 2023-12-07 RX ORDER — SPIRONOLACTONE 50 MG/1
50 TABLET, FILM COATED ORAL DAILY
Qty: 90 TABLET | Refills: 3 | Status: SHIPPED | OUTPATIENT
Start: 2023-12-07 | End: 2024-04-18

## 2023-12-07 RX ORDER — FUROSEMIDE 20 MG
20 TABLET ORAL DAILY
Qty: 90 TABLET | Refills: 3 | Status: SHIPPED | OUTPATIENT
Start: 2023-12-07 | End: 2024-04-18

## 2023-12-07 RX ORDER — POLYETHYLENE GLYCOL 3350 17 G/17G
17 POWDER, FOR SOLUTION ORAL DAILY PRN
Qty: 510 G | Refills: 3 | Status: SHIPPED | OUTPATIENT
Start: 2023-12-07 | End: 2024-02-25

## 2023-12-07 NOTE — LETTER
12/7/2023         RE: Kristen Chapman  [address] [place] [address]        Dear Colleague,    Thank you for referring your patient, Kristen Chapman, to the [facility] [place]. Please see a copy of my visit note below.    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    #. Decompensated HBV related cirrhosis  #. Ascites  MELD 3.0: 16  Patient with hepatitis B related cirrhosis decompensated based on ascites presence.  It is unclear if she is adherent to a low-sodium diet.  He will plan to start low-dose diuretics given he has some mild acute kidney injury in the setting of poor oral intake; plan for repeat BMP in 1 week.  Plan to set the patient up for a paracentesis every week.    With regards to his hepatitis B he was treated with lamivudine in the past but he had intermittent adherence so there is a risk for resistance; see below for pharmacy fill data.  He was previously on Entecavir and lamivudine at the same time but the patient reports that he was not taking his Entecavir up until this appointment.  Would not recommend transitioning to tenofovir alone given the low risk of resistance.  We stressed during his clinic visit that he must take this daily and not miss any doses.    He is due for variceal screening.    #. Severe protein calorie malnutrition  #. Deconditioning    Plan  -- Follow up HBV DNA and HBV resistance testing  -- Ascites studies: cell count + culture, albumin, protein and cytology  -- Stop lamivudine and entecavir; start tenofovir 150mg daily, renal dosing  -- HCC Screening: Recommend abdominal US + AFP q6 months, next due 3/2024  -- Variceal Screening next due now;  ordered   -- Start lasix 20mg daily + spironolactone 50mg daily for fluid control; labs in 1 week  -- Plan for paracentesis with fluid studies; plan for weekly with max 5L removed, 50g albumin on each occasion  -- Recommend low sodium (< 2 grams per day) + high protein diet; consider 2 protein shake per day  -- Okay for tylenol, max 2 grams per day  -- Occupational therapy referral given deconditioning     Chronic Hepatitis B Education:  - Avoid sharing personal items such as: toothbrushes, nail clippers, razors.  If shared, they should all be cleaned thoroughly.  - Recommend all family members be screened for hepatitis B and vaccinated if they are not infected.     RTC: 3 months    Yumiko Boyd MD (Lizzie)  Advanced & Transplant Hepatology  [facility]    I spent 60 minutes on this encounter performing the following: reviewing the patient's medical record (clinic visits, hospital records, lab results, imaging and procedural documentation), history taking, physical exam and documentation on the date of the encounter. I also spent part of the time in coordination of care and counseling.    HPI:  HBV Cirrhosis  - dx ~   - no hx HE  - hx ascites  - no hx variceal bleed  - last EGD none prior   - HCC screenin2023 - PET/CT    HBV  - Hep B e Ag: negative  - Hep B e Ab: positive  - Last DNA: 41 (2023)  - Hepatitis delta: negative     History:   2020 - diagnosed with HBV in the setting of starting therapy for nasopharngeal carcinoma and developed rising liver studies  2021 - developed a flare in the setting of being off lamivudine (?unclear reason)  2023 - reportedly adherent to lamivudine, but developed increased viral load so entecavir was started in addition  10/2023 - patient reportedly ran out of entecavir 6 weeks prior to his ID appoitnment    FILL INFORMATION FROM PHARMACY  Lamivudine 2020-8/3/2020, 21-23   Info from [employer]:   - [facility] gave a 90 days supply on 7/16/20 per care everywhere...   - 8/25/21 for a 90 ds   - 2/22/22 for a 90 ds   - 5/13/22 for a 30 ds   -7/7/22 for a 30 ds   - 9/13/22 for a 30 ds   - 4/5/23 for a 30 ds   - 9/15/23 for a 30 ds - not picked up   - 10/2/23 for a 30 ds   - 11/7/23 for a 30 ds      Entecavir 10/2/23 - 11/16/23   Info from [facility]:   - 4/12/23 for a 30 ds -filled once   - 9/15/23 for a 30 ds  - was not picked up   - 10/10/23 for 90 ds through [facility]     ------------------------------------------------------------------------------    Presents with his son.      The patient was first diagnosed with hepatitis B in 2020 when he was starting therapy for his nasopharyngeal carcinoma.  He denies any known family history of hepatitis B or any known family history of HCC.  He was not previously told that all of his family members should be tested and vaccinated accordingly.    Starting in September 2023 he developed ascites.  He has been undergoing a paracentesis almost weekly with the last fluid removal being almost 10 L.  He has lower extremity edema as well.  He was told to consume a low-sodium diet which she has been trying to do more.  He has not been started on diuretics in the past.    He denies any lethargy or confusion.  He reports some weakness but denies any falls.  He denies any melena, hematochezia or hematemesis.  He has intermittent constipation but denies loose stools.  He has abdominal distention and with associated discomfort.    He reports adherence to his limited feeding but he denies taking Entecavir at this time.    Medical hx Surgical hx   Past Medical History:   Diagnosis Date    Anemia     Dysphagia     Hepatitis B chronic     Hypothyroidism     Nasopharyngeal cancer (H)     Severe protein-calorie malnutrition (H24)     Thrombocytopenia (H24)       Past Surgical History:   Procedure Laterality Date    ENDOSCOPIC ENDONASAL SURGERY Bilateral 9/7/2021    Procedure:  Nasal Endoscopy with Biopsy;  Surgeon: Ky Centeno MD;  Location: [facility] OR    IR CHEST PORT PLACEMENT > 5 YRS OF AGE  3/2/2020    IR CHEST PORT PLACEMENT > 5 YRS OF AGE  3/2/2020    IR GASTROSTOMY TUBE INSERTION  4/27/2020    IR GASTROSTOMY TUBE PERCUTANEOUS PLCMNT  4/27/2020    IR PORT PLACEMENT >5 YEARS  3/2/2020    IR PORT PLACEMENT >5 YEARS  3/2/2020    IR T-FASTENER REMOVAL  6/5/2020    IR T-FASTENER REMOVAL  6/5/2020          Medications  Current Outpatient Medications   Medication Sig Dispense Refill    acetaminophen (TYLENOL) 325 MG tablet Take 650 mg by mouth every 6 hours as needed       furosemide (LASIX) 20 MG tablet Take 1 tablet (20 mg) by mouth daily 90 tablet 3    levothyroxine (SYNTHROID/LEVOTHROID) 175 MCG tablet Take 1 tablet (175 mcg) by mouth daily 30 tablet 2    polyethylene glycol (MIRALAX) 17 GM/Dose powder Take 17 g by mouth daily as needed for constipation 510 g 3    spironolactone (ALDACTONE) 50 MG tablet Take 1 tablet (50 mg) by mouth daily 90 tablet 3    Tenofovir Disoproxil Fumarate 150 MG TABS Take 150 mg by mouth daily 90 tablet 3    entecavir (BARACLUDE) 1 MG tablet Take 1 tablet (1 mg) by mouth daily (Patient not taking: Reported on 11/16/2023) 90 tablet 3       Allergies  Allergies   Allergen Reactions    Oxycodone Itching       Family hx Social hx   Family History   Problem Relation Age of Onset    Liver Cancer No family hx of     Liver Disease No family hx of       Social History     Tobacco Use    Smoking status: Never    Smokeless tobacco: Never   Substance Use Topics    Alcohol use: Not Currently    Drug use: Not Currently     - Wife and two children come check in on him  - 5 girls + 6 boys  - Alcohol: Denies  - Tobacco: Denies  - Drugs: Denies     Review of systems  A 10-point review of systems was negative.    Examination  BP 97/64   Pulse 55   Temp 98.2  F (36.8  C) (Oral)   Wt 61.6 kg (135 lb 12.8 oz)   SpO2 98%   BMI 24.55 kg/m     Body mass index is 24.55  kg/m .    Gen-NAD  Eye- EOMI  ENT- Mask in place, temporal wasting  CVS- RRR, no murmurs  RS- CTA bilaterally  Abd- Distended, soft, non-tender.   Extr- 2+ radial pulses bilaterally, no lower extremity edema bilaterally  MS- hands without clubbing. Walks with a cane.   Neuro- A+Ox3, no asterixis  Skin- no rash or jaundice  Psych- normal mood    Laboratory  BMP  Recent Labs   Lab Test 12/07/23  1542 11/06/23  0929 10/23/23  1832 09/11/23  1106 09/08/23  0914 06/15/23  1458 06/09/23  0857    136  --   --  140  --  139   POTASSIUM 4.6 5.2  --   --  3.7  --  4.0   CHLORIDE 108* 108*  --   --  108*  --  108*   GUANAKITO 8.2* 8.5*  --   --  8.2*  --  8.4*   CO2 22 22  --   --  23  --  24   BUN 33.7* 30.0*  --   --  26.0*  --  25.0*   CR 1.71* 1.54* 1.8*  --  1.14   < > 1.33*   GLC 91 101*  --  84 90  --  90    < > = values in this interval not displayed.     CBC  Recent Labs   Lab Test 12/07/23  1542 11/06/23  0929 09/08/23  0914 07/07/23  0903   WBC 4.4 4.6 2.2* 2.3*   RBC 3.08* 2.66* 2.84* 3.24*   HGB 8.7* 7.9* 7.9* 8.6*   HCT 27.4* 24.4* 26.0* 27.7*   MCV 89 92 92 86   MCH 28.2 29.7 27.8 26.5   MCHC 31.8 32.4 30.4* 31.0*   RDW 17.3* 17.1* 21.8* 17.2*   PLT 62* 49* 39* 52*     Liver Enzymes   Recent Labs   Lab Test 12/07/23  1542   PROTTOTAL 6.5   ALBUMIN 2.7*   BILITOTAL 0.5   ALKPHOS 84   AST 63*   ALT 31      INR   INR   Date Value Ref Range Status   12/07/2023 1.32 (H) 0.85 - 1.15 Final         Hep B s Ag positive  HBV DNA 41 (11/6/2023)  HBV DNA 54,372,355 (4/2023)  Hep B e Ab positive  Hep B e Ag negative  Hep delta negative  HCV Ab non-reactive    Radiology  PET CT C/A/P 9/2023  PET/CT FINDINGS: Isolated FDG avid right submandibular lymph node measuring 2.8 x 1.2 x 3.0 cm (max SUV 15.9) suspicious for progression of disease.     Mildly FDG avid moderate paranasal sinus mucosal thickening likely inflammatory in nature. Redemonstrated mildly FDG avid bilateral hilar station 10 lymph nodes (Max SUV 6.0, previously  7.9 on the right) likely representing sequelae of   granulomatous/immunotherapy related inflammatory change. Diffuse esophagitis. Shoulder and hip synovitis.      CT FINDINGS: Mild to moderate senescent intracranial changes. Moderate paranasal sinus mucosal thickening. Left chest port with tip terminating in the upper right atrium. Mild dilatation of the ascending aorta. Mild coronary artery calcium. Trace pleural effusions. Cirrhotic liver with sequelae of portal hypertension including a marked volume of intra-abdominal ascites. Non-FDG avid lesion in the splenic parenchyma suggesting benignity. Multilevel degenerative changes of the spine. The lower extremities are unremarkable.    IMPRESSION:  Findings suspicious for isolated right submandibular lymph node recurrence.        Yumiko Byod MD

## 2023-12-07 NOTE — PATIENT INSTRUCTIONS
It was a pleasure taking care of you today.  I've included a brief summary of our discussion and care plan from today's visit below.  Please review this information with your primary care provider.  _______________________________________________________________________    My recommendations are summarized as follows:    - Stop entecavir and lamivudine -->  recommend starting tenofovir 150mg daily  - Start lasix 20mg daily + spironolactone 50mg daily for fluid control; labs in 1 week  - Recommend upper endoscopy for variceal screening  - Plan for paracentesis with fluid studies; plan for weekly   - Labs and imaging every 6 months  - Recommend low sodium (< 2 grams per day) + high protein diet; consider 2 protein shake per day  - Okay for tylenol, max 2 grams per day  - Occupational therapy referral given deconditioning     Chronic Hepatitis B Education:  - Avoid sharing personal items such as: toothbrushes, nail clippers, razors.  If shared, they should all be cleaned thoroughly.  - Recommend all family members be screened for hepatitis B and vaccinated if they are not infected.     Return to Liver/Hepatology Clinic in 3 months.    _______________________________________________________________________    Cirrhosis Education  Nutrition  - For patients with cirrhosis, it is very important to eat the right types and amounts of foods. We recommend a diet low in carbohydrates/sugars and high in fresh fruits/vegetables, with the right amount of protein. We typically recommend 1.5gm/kg/day of protein, or  grams of protein every day.  - In regard to protein intake, you can focus on: meats (but limit red meat), cooked fish and seafood, vegetable-based protein meat products, tofu, eggs, legumes, dairy products (including milk and yogurt and cheese), unsalted nuts.  - You should eat at least three meals a day and three to four snacks between meals. Bedtime snacks are especially important (preferrably something with some  protein)    - Patients with malnutrition and/or loss of muscular mass can improve their nutrition and muscular mass with protein supplementation, particularly at bedtime. Protein supplements can come in many different forms, including shakes, protein power, bars and pudding. Boost, Ensure, Boswell Instant Milk, etc.)   - Please avoid eating raw seafood, especially shellfish, because of risk of serious illness  - We recommend all patients with cirrhosis limit their daily sodium intake to less than 2,000mg (2 grams)    General Liver Health  - Continue to avoid the use of all non-steroid anti-inflammatory medicines (ibuprofen, Aleve, naproxen, aspirin, etc.) as this can cause serious injury to your kidneys in the setting of liver disease.   - If you have pain, you can safely take up to 2 grams (aka 2,000 mg) of tylenol (aka acetaminophen) per day  - If you see a doctor and they prescribe you a new medication, please contact our clinic to let us know what changes are being made  - If you become acutely ill and present to an ER or are admitted to a hospital, please let us know as soon as you are able.  - We recommend that all patients with chronic liver disease be vaccinated against hepatitis A and B.  Please discuss with your primary provider the need for these vaccines  - We recommend screening for Vitamin D deficiency (at least yearly) and replacement/supplementation as warranted.  - Make sure to bring all of your medications (including over the counter supplements, vitamins, etc) once a year for a review with your liver specialist to ensure appropriate use, dosages, drug interactions, as well as verify ongoing medical necessity. We do not recommend taking herbal supplements, teas, weight loss supplements without consulting your liver specialist first    Sleep Troubles  - Sleep issues are very common in patients with chronic liver disease  - Recommend strict avoidance of medications such as opioids, sedatives and  sleep aids.    - Low dose melatonin can be used for insomnia, if needed    Alcohol  - No amount of alcohol is safe in chronic liver disease. If you are interested in medications to help with alcohol cravings or a support program to help remain abstinence, feel free to reach out to your medical team who can connect you with resources.     Mental Health  - Take care of your mental health by learning about your liver disease and the many things you can do to enhance your health and well-being.  - If interested, attend support groups or visit a health psychologist regarding positive coping with a chronic health condition.  - Cirrhosis can cause cognitive and mood changes, especially confusion. Let us know and we can assess this further and likely offer effective treatment options.  - Regularly assess your driving safety for yourself and others.    Liver Related Screening Tests  - We recommend liver cancer screening twice yearly with either ultrasound, CT scan or MRI   - Esophageal varices screening: initial upper endoscopy (EGD) procedure and then every 1-3 years, depending on initial exam findings.    Health Maintenance   - We recommend that all individuals with chronic liver disease establish care with a primary doctor to ensure all your general medical concerns, age-appropriate cancer screening and vaccinations are up to date  - We recommend dental visits every 6-12 months    For other tips go to the Cirrhosis Care website: https://cirrhosiscare.ca/  - Click on the tab at the top 'patients & families' and then use the tabs at the top to navigate through resources from healthy living tips to symptom management _______________________________________________________________________    Scheduling Procedures or Tests  - To schedule endoscopic procedures call: [phone]  - To schedule radiology tests call: [phone] (for [facility]) or [phone] (for [facility]    _______________________________________________________________________    Who do I call with any questions after my visit?  Please be in touch if there are any further questions that arise following today's visit.  There are multiple ways to contact your gastroenterology care team.      To schedule or reschedule an appointment, please call [phone] and choose option 2 (for hepatology) and then option 1 (for scheduling).    During business hours, you may reach a nurse by calling the appointment line ([phone]) and asking to speak with a nurse    You can send a secure message through Pulse 8 for non-urgent questions. Pulse 8 messages are answered by your nurse or doctor typically within 24 to 48 hours. Please allow extra time on weekends and holidays.      For urgent/emergent questions after business hours, you may reach the on-call GI Fellow by contacting the [facility]  at [phone].     How will I get the results of any tests ordered?    - If you have signed up for MyChart, any tests ordered at your visit will be available to you after your physician reviews them.  Typically this takes 1-2 weeks.    - If you do not have MyChart, your results will either be mailed to you as a letter or via a telephone call.  - If there are urgent results that require a change in your care plan, your physician or nurse will call you to discuss the next steps.     What is Pulse 8?  Pulse 8 is a secure way for you to access all of your healthcare records from the [facility].  It is a web based computer program, so you can sign on to it from any location.  It also allows you to send secure messages to your care team.  I recommend signing up for Pulse 8 access if you have not already done so and are comfortable with using a computer.      How to I schedule a follow-up visit?  If you did not schedule a follow-up visit today, please call [phone] to schedule a follow-up office visit.      Sincerely,    Yumiko Boyd MD (Lizzie)   of Medicine  Advanced & Transplant Hepatology  [facility]

## 2023-12-07 NOTE — TELEPHONE ENCOUNTER
Returned [facility]'s call.  Let them know OK for patient to take 300 mg every other day.      No further questions or concerns.    Chrissie DIAZ LPN  Hepatology Clinic       [facility]    Phone Message    May a detailed message be left on voicemail: yes     Reason for Call: Medication Question or concern regarding medication   Prescription Clarification  Name of Medication: Tenofovir Disoproxil Fumarate 150 MG TABS  Prescribing Provider: Yumiko Boyd MD   Pharmacy: [address]   What on the order needs clarification? They are showing that this medication only seems to be available as a 300 MG tablet that is not scored, and would like to discuss whether Dr. Boyd would want the Pt to try breaking tablets in half to get the 150 MG dose as ordered.      Action Taken: Message routed to:  [facility] ([facility] Hep    Travel Screening: Not Applicable

## 2023-12-08 DIAGNOSIS — R18.8 CIRRHOSIS OF LIVER WITH ASCITES, UNSPECIFIED HEPATIC CIRRHOSIS TYPE (H): Primary | ICD-10-CM

## 2023-12-08 DIAGNOSIS — K74.60 CIRRHOSIS OF LIVER WITH ASCITES, UNSPECIFIED HEPATIC CIRRHOSIS TYPE (H): Primary | ICD-10-CM

## 2023-12-08 LAB
HBV E AB SERPL QL IA: POSITIVE
HBV E AG SERPL QL IA: POSITIVE

## 2023-12-08 RX ORDER — TENOFOVIR DISOPROXIL FUMARATE 300 MG/1
300 TABLET, FILM COATED ORAL EVERY OTHER DAY
Qty: 15 TABLET | Refills: 11 | Status: SHIPPED | OUTPATIENT
Start: 2023-12-08 | End: 2023-12-08

## 2023-12-08 RX ORDER — TENOFOVIR DISOPROXIL FUMARATE 300 MG/1
300 TABLET, FILM COATED ORAL EVERY OTHER DAY
Qty: 45 TABLET | Refills: 3 | Status: SHIPPED | OUTPATIENT
Start: 2023-12-08 | End: 2024-02-19

## 2023-12-09 LAB
HBV DNA SERPL NAA+PROBE-ACNC: 432 IU/ML
HBV DNA SERPL NAA+PROBE-LOG IU: 2.6 {LOG_IU}/ML

## 2023-12-11 ENCOUNTER — TELEPHONE (OUTPATIENT)
Dept: ONCOLOGY | Facility: HOSPITAL | Age: 73
End: 2023-12-11

## 2023-12-11 NOTE — TELEPHONE ENCOUNTER
Can we see him back in clinic in 2 to 3 weeks.  We can assess him at that time and then determine if he is going to be a candidate for further treatment.  He only had labs done.  No imaging.         Called pt with the help of a [facility]  and [facility] to schedule follow up with Dr. Frias per IB message attached above.

## 2023-12-12 ENCOUNTER — TELEPHONE (OUTPATIENT)
Dept: WOUND CARE | Facility: CLINIC | Age: 73
End: 2023-12-12

## 2023-12-12 NOTE — TELEPHONE ENCOUNTER
Consult received via [employer] from Oncology for wound of the head/neck.    Please schedule with Milka Napier PA-C, or Lorraine Comer NP at [facility] [place] for next available appointment.    **For all providers, Milka Shi PA-C, Dr. Murrell, Lorraine Comer NP or Dr. Vázquez, please schedule a follow up 2-3 weeks after initial appointment.**  --If unable to schedule within 2-3 weeks then please place on cancellation list--    Is the patient able to make their own medical decisions? Yes    Can the patient be scheduled on a Thursday? Yes    Is patient a CAREN lift? PLEASE INQUIRE WHEN MAKING THE APPOINTMENT AND PUT IN APPOINTMENT NOTES    Routing to  Wound Healing Scheduling.

## 2023-12-13 ENCOUNTER — APPOINTMENT (OUTPATIENT)
Dept: INTERPRETER SERVICES | Facility: CLINIC | Age: 73
End: 2023-12-13

## 2023-12-13 ENCOUNTER — PATIENT OUTREACH (OUTPATIENT)
Dept: ONCOLOGY | Facility: CLINIC | Age: 73
End: 2023-12-13

## 2023-12-13 LAB
HBV GENTYP SERPL NAA+PROBE: NORMAL
HBV GENTYP SERPL NAA+PROBE: NOT DETECTED
HBV RES PNL ISLT GENOTYP: NOT DETECTED

## 2023-12-13 NOTE — PROGRESS NOTES
[facility]    Clinical Data: Care Coordinator Outreach    Attempted to reach Kristen as the schedulers have been unable to reach him to schedule follow up with Dr. Frias. Left VM utilizing [employer]  requesting that he reach out to the scheduling team. Additionally sent [employer] message.    Addendum 12/18/23 10:10 AM  No word from Kristen and per chart review, Dr. Chavez's team has been unable to reach him to schedule as well. Left VM using [employer]  requesting they call Dr. Chavez's team to schedule follow up.    Cindy Gambino, RN, BSN  RN Care Coordinator  [facility]

## 2023-12-13 NOTE — TELEPHONE ENCOUNTER
Left patient a vm (using  servcies) with clinic info. Encouraged patient to call the clinic for scheduling if interested.

## 2023-12-14 ENCOUNTER — HOSPITAL ENCOUNTER (OUTPATIENT)
Dept: ULTRASOUND IMAGING | Facility: CLINIC | Age: 73
Discharge: HOME | End: 2023-12-14
Attending: INTERNAL MEDICINE | Admitting: RADIOLOGY
Payer: COMMERCIAL

## 2023-12-14 DIAGNOSIS — R18.8 OTHER ASCITES: ICD-10-CM

## 2023-12-14 DIAGNOSIS — K74.60 CIRRHOSIS OF LIVER WITH ASCITES, UNSPECIFIED HEPATIC CIRRHOSIS TYPE (H): ICD-10-CM

## 2023-12-14 DIAGNOSIS — R18.8 CIRRHOSIS OF LIVER WITH ASCITES, UNSPECIFIED HEPATIC CIRRHOSIS TYPE (H): ICD-10-CM

## 2023-12-14 LAB
ALBUMIN BODY FLUID SOURCE: NORMAL
ALBUMIN FLD-MCNC: 0.5 G/DL
PROT FLD-MCNC: 1.5 G/DL
PROTEIN BODY FLUID SOURCE: NORMAL

## 2023-12-14 PROCEDURE — 272N000710 US PARACENTESIS WITH ALBUMIN

## 2023-12-14 PROCEDURE — 88112 CYTOPATH CELL ENHANCE TECH: CPT | Mod: TC

## 2023-12-14 PROCEDURE — 84157 ASSAY OF PROTEIN OTHER: CPT

## 2023-12-14 PROCEDURE — 82042 OTHER SOURCE ALBUMIN QUAN EA: CPT

## 2023-12-14 PROCEDURE — 88305 TISSUE EXAM BY PATHOLOGIST: CPT | Mod: TC

## 2023-12-14 PROCEDURE — P9047 ALBUMIN (HUMAN), 25%, 50ML: HCPCS | Performed by: INTERNAL MEDICINE

## 2023-12-14 PROCEDURE — 250N000011 HC RX IP 250 OP 636: Performed by: INTERNAL MEDICINE

## 2023-12-14 RX ORDER — ALBUMIN (HUMAN) 12.5 G/50ML
12.5-75 SOLUTION INTRAVENOUS ONCE
Status: COMPLETED | OUTPATIENT
Start: 2023-12-14 | End: 2023-12-14

## 2023-12-14 RX ADMIN — ALBUMIN HUMAN 62.5 G: 0.25 SOLUTION INTRAVENOUS at 14:39

## 2023-12-14 NOTE — PROGRESS NOTES
Patient tolerated paracentesis with albumin with no concerns expressed or observed.    5 L removed with 62.5 grams albumin given per order.

## 2023-12-18 LAB
PATH REPORT.COMMENTS IMP SPEC: NORMAL
PATH REPORT.COMMENTS IMP SPEC: NORMAL
PATH REPORT.FINAL DX SPEC: NORMAL
PATH REPORT.GROSS SPEC: NORMAL
PATH REPORT.MICROSCOPIC SPEC OTHER STN: NORMAL
PATH REPORT.RELEVANT HX SPEC: NORMAL

## 2023-12-18 PROCEDURE — 88305 TISSUE EXAM BY PATHOLOGIST: CPT | Mod: 26 | Performed by: PATHOLOGY

## 2023-12-18 PROCEDURE — 88112 CYTOPATH CELL ENHANCE TECH: CPT | Mod: 26 | Performed by: PATHOLOGY

## 2023-12-19 ENCOUNTER — OFFICE VISIT (OUTPATIENT)
Dept: WOUND CARE | Facility: CLINIC | Age: 73
End: 2023-12-19
Payer: COMMERCIAL

## 2023-12-19 DIAGNOSIS — C11.9 NASOPHARYNGEAL CARCINOMA (H): Primary | ICD-10-CM

## 2023-12-19 DIAGNOSIS — C77.0 SECONDARY MALIGNANCY OF LYMPH NODES OF HEAD, FACE AND NECK (H): ICD-10-CM

## 2023-12-19 DIAGNOSIS — S01.80XA: ICD-10-CM

## 2023-12-19 PROCEDURE — 99202 OFFICE O/P NEW SF 15 MIN: CPT

## 2023-12-19 NOTE — PROGRESS NOTES
Patient comes to wound clinic for dressing change  per request of Dr Bird. He has history of a Chronic cancer/radiation wound head and neck tumor.    Subjective: Pt assessed for discomfort which is mild. Dressing removed, wound cleaned with Microklenz, and measured.     Objective:     Secondary malignancy of lymph nodes of head, face and neck (H)  Nasopharyngeal carcinoma (H)  Wound evaluation  Number of of wounds: 1  Wound type: cancer tumor  Size:  5 cm length x 5 cm width x +1 cm depth., There is no tunneling or undermining:  , Thickness:  full, Drainage:  copious    Assessment: Fungating  and draining mass of the right jaw    Treatment:  Dressing change:  Cleanse wound with:  Microklenz.  Primary Dressing: Aquacel Ag  Secondary Dressing: bordered gauze 4x4  Secure with: tape   Frequency of care: once daily      PLAN: Dressing changes daily by Self .  Contact Rea Chapman [phone]      Pt has our number should other issues arise. All questions answered for now.   Patient needs to be seen 1 month, They will call to schedule.  Dr. Dunn was available for supervision of care if needed or if questions should arise and regarding plan of care. Ethel Cohen RN

## 2023-12-19 NOTE — PATIENT INSTRUCTIONS
"[employer] [phone]  I will order :  Bordered gauze 4\"x 4\",  Aquacel Aliginate 4\"x4\", 2\" tape , wound spray, 4x4 non-sterile gauze.  If insurance covers the products you need to come to wound clinic once per month for wound measurements. Please call or send [employer] message for appt [phone]  If insurance doesn't cver the supplies from the medical supply company, it will be cheaper to buy on [employer]   "

## 2023-12-20 ENCOUNTER — APPOINTMENT (OUTPATIENT)
Dept: INTERPRETER SERVICES | Facility: CLINIC | Age: 73
End: 2023-12-20

## 2023-12-26 ENCOUNTER — APPOINTMENT (OUTPATIENT)
Dept: INTERPRETER SERVICES | Facility: CLINIC | Age: 73
End: 2023-12-26

## 2023-12-26 NOTE — TELEPHONE ENCOUNTER
Made second and final attempt to reach the patient. Left a vm (using interpretor services) with clinic information.

## 2024-01-03 ENCOUNTER — HOSPITAL ENCOUNTER (OUTPATIENT)
Dept: ULTRASOUND IMAGING | Facility: CLINIC | Age: 74
Discharge: HOME | End: 2024-01-03
Attending: INTERNAL MEDICINE | Admitting: RADIOLOGY
Payer: COMMERCIAL

## 2024-01-03 DIAGNOSIS — R18.8 CIRRHOSIS OF LIVER WITH ASCITES, UNSPECIFIED HEPATIC CIRRHOSIS TYPE (H): ICD-10-CM

## 2024-01-03 DIAGNOSIS — K74.60 CIRRHOSIS OF LIVER WITH ASCITES, UNSPECIFIED HEPATIC CIRRHOSIS TYPE (H): ICD-10-CM

## 2024-01-03 PROCEDURE — P9047 ALBUMIN (HUMAN), 25%, 50ML: HCPCS | Performed by: INTERNAL MEDICINE

## 2024-01-03 PROCEDURE — 250N000011 HC RX IP 250 OP 636: Performed by: INTERNAL MEDICINE

## 2024-01-03 PROCEDURE — 250N000011 HC RX IP 250 OP 636: Performed by: RADIOLOGY

## 2024-01-03 PROCEDURE — 272N000042 US PARACENTESIS WITH ALBUMIN

## 2024-01-03 RX ORDER — HEPARIN SODIUM (PORCINE) LOCK FLUSH IV SOLN 100 UNIT/ML 100 UNIT/ML
5-10 SOLUTION INTRAVENOUS
Status: DISCONTINUED | OUTPATIENT
Start: 2024-01-03 | End: 2024-01-04 | Stop reason: HOSPADM

## 2024-01-03 RX ORDER — ALBUMIN (HUMAN) 12.5 G/50ML
12.5-75 SOLUTION INTRAVENOUS ONCE
Status: COMPLETED | OUTPATIENT
Start: 2024-01-03 | End: 2024-01-03

## 2024-01-03 RX ADMIN — ALBUMIN HUMAN 50 G: 0.25 SOLUTION INTRAVENOUS at 15:28

## 2024-01-03 RX ADMIN — HEPARIN 5 ML: 100 SYRINGE at 15:44

## 2024-01-03 NOTE — PROGRESS NOTES
Patient tolerated paracentesis with albumin with no concerns expressed or observed.    5 L removed with 50 grams albumin given per order.

## 2024-01-09 ENCOUNTER — TELEPHONE (OUTPATIENT)
Dept: GASTROENTEROLOGY | Facility: CLINIC | Age: 74
End: 2024-01-09
Payer: COMMERCIAL

## 2024-01-09 ENCOUNTER — HOSPITAL ENCOUNTER (OUTPATIENT)
Facility: CLINIC | Age: 74
End: 2024-01-09
Attending: INTERNAL MEDICINE | Admitting: INTERNAL MEDICINE
Payer: COMMERCIAL

## 2024-01-09 ENCOUNTER — APPOINTMENT (OUTPATIENT)
Dept: INTERPRETER SERVICES | Facility: CLINIC | Age: 74
End: 2024-01-09
Payer: COMMERCIAL

## 2024-01-09 NOTE — TELEPHONE ENCOUNTER
"Endoscopy Scheduling Screen    Have you had a positive Covid test in the last 14 days?  No    Are you active on MyChart?   Yes    What insurance is in the chart?  Other:  [facility]/MEDICARE    Ordering/Referring Provider: Yumiko Boyd MD     (If ordering provider performs procedure, schedule with ordering provider unless otherwise instructed. )    BMI: Estimated body mass index is 24.55 kg/m  as calculated from the following:    Height as of 11/16/23: 1.584 m (5' 2.36\").    Weight as of 12/7/23: 61.6 kg (135 lb 12.8 oz).     Sedation Ordered  moderate sedation.   If patient BMI > 50 do not schedule in [facility].    If patient BMI > 45 do not schedule at [facility].    Are you taking methadone or Suboxone?  No    Are you taking any prescription medications for pain 3 or more times per week?   NO - No RN review required.    Do you have a history of malignant hyperthermia or adverse reaction to anesthesia?  No    (Females) Are you currently pregnant?   No     Have you been diagnosed or told you have pulmonary hypertension?   No    Do you have an LVAD?  No    Have you been told you have moderate to severe sleep apnea?  No    Have you been told you have COPD, asthma, or any other lung disease?  No    Do you have any heart conditions?  No     Have you ever had an organ transplant?   No    Have you ever had or are you awaiting a heart or lung transplant?   No    Have you had a stroke or transient ischemic attack (TIA aka \"mini stroke\" in the last 6 months?   No    Have you been diagnosed with or been told you have cirrhosis of the liver?   Yes (RN Review required for scheduling unless scheduling in Hospital.)    Are you currently on dialysis?   No    Do you need assistance transferring?   Yes (Hospital Only)    BMI: Estimated body mass index is 24.55 kg/m  as calculated from the following:    Height as of 11/16/23: 1.584 m (5' 2.36\").    Weight as of 12/7/23: 61.6 kg (135 lb 12.8 oz).     Is patients BMI > 40 and " scheduling location [facility]?  No    Do you take an injectable medication for weight loss or diabetes (excluding insulin)?  No    Do you take the medication Naltrexone?  No    Do you take blood thinners?  No       Prep   Are you currently on dialysis or do you have chronic kidney disease?  No    Do you have a diagnosis of diabetes?  No    Do you have a diagnosis of cystic fibrosis (CF)?  No    On a regular basis do you go 3 -5 days between bowel movements?  Yes (Extended Prep)    BMI > 40?  No    Preferred Pharmacy:    [facility] [address] [address]  Phone: [phone] Fax: [phone]      Final Scheduling Details   Colonoscopy prep sent?  Standard MiraLAX    Procedure scheduled  Upper endoscopy (EGD)    Surgeon:  JOEL     Date of procedure:  4/11     Pre-OP / PAC:   No - Not required for this site.    Location  [facility] - Per exclusion criteria.    Sedation   Moderate Sedation - Per order.      Patient Reminders:   You will receive a call from a Nurse to review instructions and health history.  This assessment must be completed prior to your procedure.  Failure to complete the Nurse assessment may result in the procedure being cancelled.      On the day of your procedure, please designate an adult(s) who can drive you home stay with you for the next 24 hours. The medicines used in the exam will make you sleepy. You will not be able to drive.      You cannot take public transportation, ride share services, or non-medical taxi service without a responsible caregiver.  Medical transport services are allowed with the requirement that a responsible caregiver will receive you at your destination.  We require that drivers and caregivers are confirmed prior to your procedure.

## 2024-01-15 ENCOUNTER — OFFICE VISIT (OUTPATIENT)
Dept: INFECTIOUS DISEASES | Facility: CLINIC | Age: 74
End: 2024-01-15
Payer: COMMERCIAL

## 2024-01-15 VITALS
OXYGEN SATURATION: 97 % | TEMPERATURE: 97.9 F | HEART RATE: 60 BPM | DIASTOLIC BLOOD PRESSURE: 72 MMHG | BODY MASS INDEX: 24.48 KG/M2 | WEIGHT: 135.4 LBS | SYSTOLIC BLOOD PRESSURE: 118 MMHG

## 2024-01-15 DIAGNOSIS — B18.1 HEPATITIS B, CHRONIC (H): Primary | ICD-10-CM

## 2024-01-15 PROCEDURE — 99213 OFFICE O/P EST LOW 20 MIN: CPT

## 2024-01-15 NOTE — PROGRESS NOTES
Assessment:      Impression: Chronic hepatitis B infection, with compensated cirrhosis responding to lamivudine.  However, for unclear reasons, this had been discontinued in 2021 and then patient had rebound of hepatitis B viral load and LFTs.    Now on both lamivudine and Entecavir.    Recurrent nasopharyngeal carcinoma, status post recent radiation treatment.  Lesion continues to grow and patient has refused surgery.    Patient has seen Dr. Boyd at the [facility] gastroenterology section who has changed his treatment to tenofovir 300 mg every other day.    Patient continues to have significant ascites       Plan:   As his hepatitis B is now being managed by Dr. Boyd, and that is his only active infection, I will not see him any further unless some new infectious disease problem arises.    Patient is wondering about having more fluid drained during his periodic paracentesis episodes.      No orders of the defined types were placed in this encounter.        Subjective:      This is an follow-up infectious Disease visit for Kristen Chapman, who is a 70 y.o.  referred for evaluation of hepatitis B.     Is a 70-year-old gentleman of seen only in video visits over the last year for chronic hepatitis B.  He had been on treatment for nasopharyngeal carcinoma when he was noted to have elevated LFTs and a very high hepatitis B viral load.    Patient was started on lamivudine 100 mg a day and he is doing quite well.  He denies any complaints other than some bleeding in his nose and upon further questioning some pain and dryness in his mouth and sticking when he swallows.    He has been taking Magic mouthwash which does not really seem to help his symptoms significantly.    He denies abdominal pain.      The following portions of the patient's history were reviewed and updated as appropriate: allergies, current medications, past family history, past medical history, past social history, past surgical history and problem  list.      Follow-up visit on August 23, 2021:    For reasons that are unclear, the lamivudine was discontinued several months ago.  He is generally feeling well though complains of some nasopharyngeal bleeding.    His thrush is resolved.    Follow-up visit on October 4, 2021:    Patient is back on the meeting.  He denies any specific complaints.  Unfortunately, since her last visit, he did receive a diagnosis that his nasopharyngeal carcinoma has returned.  Chemotherapy and radiation therapy is in the works.    He denies any abdominal pain.    Follow-up visit on January 10, 2022:    Briefly, patient is doing well as far as symptoms go.  He did have recurrence of his nasopharyngeal cancer.  Surgery was recommended and declined.    Patient has been started on pembrolizumab by Dr. Frias.  This may exacerbate his hepatitis.    Patient did have his quarterly hepatitis B virus levels checked last week and they are a little bit lower.  His LFTs did take up, but suspect this may be related to the pembrolizumab.    He denies abdominal pain or discomfort.  He says he is eating well.  Denies vomiting or diarrhea.    Follow-up visit on April 4, 2022:    Patient states he feels about the same.  He is waiting for results of a follow-up MRI regarding his nasopharyngeal cancer.    He did have blood test done last week which showed his LFTs are back to normal and his viral load is less than 20, which is where it was before is limited and had been discontinued.    Follow-up visit on October 10, 2022:    Patient is generally doing well.  He saw Dr. Frias last week and his tumor appears to be stable on the pembrolizumab.  We will be getting another MRI scan in a few months.    He did have his LFTs and viral load checked in July and they were good.  We will check them again today.    Follow-up visit on April 10, 2023:    Patient is doing well.  He offers no complaints.  He says his cancer treatments are done.  He saw Dr. Frias a few  months ago who felt that the cancer was in remission.    Has been taking his medication, reviewed eating, without any difficulties.    Follow-up visit on October 2, 2023:    At his last visit, his HBV viral load had skyrocketed to 54 million.  His family did confirm that he was taking the limit of eating daily.  For that reason, I did add Entecavir 1 mg a day.  However, patient did not follow-up as requested to check on how it was going with Entecavir and he ran out 6 weeks ago.    Complains some abdominal bloating and some constipation.    Follow-up visit on November 6, 2023:    Patient states he feels fine, although the lesion on his mandible continues to grow.  It is somewhat painful.    He says he is having decreased appetite.    He has resumed both the Entecavir and the lamivudine for his hepatitis B.  As last visit, his viral load had dropped from 54,000,000 down to 888.    Follow-up visit on January 15, 2024:    Patient concerned that he still is quite a bit of ascites.    He has been to see Dr. oByd at the [facility] gastroenterology section.  She has changed his antiviral to tenofovir 300 mg every other day.    His main concern in question is to have more fluid removed at each paracentesis event.    Review of Systems  Performed and all negative except as mentioned above.      Objective:     /72 (BP Location: Right arm, Patient Position: Sitting, Cuff Size: Adult Regular)   Pulse 60   Temp 97.9  F (36.6  C) (Oral)   Wt 61.4 kg (135 lb 6.4 oz)   SpO2 97%   BMI 24.48 kg/m       General:   alert, appears stated age and cooperative   Oropharynx:  Wearing a mask--lesion on right mandible is swollen no purulent drainage    Eyes:   Extraocular muscles intact, no icterus.    Ears:   Deferred   Neck:  no adenopathy and supple, symmetrical, trachea midline   Thyroid:   Deferred   Lung:  clear to auscultation bilaterally   Heart:   regular rate and rhythm, S1, S2 normal, no murmur, click, rub or  gallop   Abdomen: Quite significant abdominal distention   Extremities:  extremities normal, atraumatic, no cyanosis or edema   Skin:  warm and dry, no hyperpigmentation, vitiligo, or suspicious lesions   CVA:   absent   Genitourinary:  defer exam   Neurological:   Grossly normal   Psychiatric:   normal mood, behavior, speech, dress, and thought processes     Liver Function Studies -   Recent Labs   Lab Test 12/07/23  1542   PROTTOTAL 6.5   ALBUMIN 2.7*   BILITOTAL 0.5   ALKPHOS 84   AST 63*   ALT 31              Eduardo Grier MD

## 2024-01-15 NOTE — Clinical Note
As Hepatitis B was his only infectious disease concern, and is now managed by Gastroenterology, I will no longer see him unless some new infectious disease issue arises.    Patient would like to have more fluid removed at paracentesis. I did explain to him why fluid removal is limited.   Thank you for the opportunity to see this patient.

## 2024-01-17 ENCOUNTER — HOSPITAL ENCOUNTER (OUTPATIENT)
Dept: ULTRASOUND IMAGING | Facility: CLINIC | Age: 74
Discharge: HOME | End: 2024-01-17
Attending: INTERNAL MEDICINE | Admitting: RADIOLOGY
Payer: COMMERCIAL

## 2024-01-17 DIAGNOSIS — K74.60 CIRRHOSIS OF LIVER WITH ASCITES, UNSPECIFIED HEPATIC CIRRHOSIS TYPE (H): ICD-10-CM

## 2024-01-17 DIAGNOSIS — R18.8 CIRRHOSIS OF LIVER WITH ASCITES, UNSPECIFIED HEPATIC CIRRHOSIS TYPE (H): ICD-10-CM

## 2024-01-17 PROCEDURE — 272N000042 US PARACENTESIS WITH ALBUMIN

## 2024-01-17 PROCEDURE — 250N000011 HC RX IP 250 OP 636: Performed by: INTERNAL MEDICINE

## 2024-01-17 PROCEDURE — P9047 ALBUMIN (HUMAN), 25%, 50ML: HCPCS | Performed by: INTERNAL MEDICINE

## 2024-01-17 RX ORDER — HEPARIN SODIUM (PORCINE) LOCK FLUSH IV SOLN 100 UNIT/ML 100 UNIT/ML
5 SOLUTION INTRAVENOUS ONCE
Status: COMPLETED | OUTPATIENT
Start: 2024-01-17 | End: 2024-01-17

## 2024-01-17 RX ORDER — ALBUMIN (HUMAN) 12.5 G/50ML
12.5-5 SOLUTION INTRAVENOUS ONCE
Status: COMPLETED | OUTPATIENT
Start: 2024-01-17 | End: 2024-01-17

## 2024-01-17 RX ADMIN — HEPARIN 5 ML: 100 SYRINGE at 13:31

## 2024-01-17 RX ADMIN — ALBUMIN HUMAN 50 G: 0.25 SOLUTION INTRAVENOUS at 13:23

## 2024-01-17 NOTE — PROGRESS NOTES
Patient tolerated paracentesis with albumin with no concerns expressed or observed.  Port accessed. 5 L removed with 50 grams albumin given per order.     Isabella Flores RN

## 2024-01-23 ENCOUNTER — ONCOLOGY VISIT (OUTPATIENT)
Dept: ONCOLOGY | Facility: HOSPITAL | Age: 74
End: 2024-01-23
Attending: INTERNAL MEDICINE
Payer: COMMERCIAL

## 2024-01-23 VITALS
WEIGHT: 129.1 LBS | HEIGHT: 62 IN | SYSTOLIC BLOOD PRESSURE: 96 MMHG | HEART RATE: 57 BPM | OXYGEN SATURATION: 100 % | RESPIRATION RATE: 20 BRPM | BODY MASS INDEX: 23.76 KG/M2 | DIASTOLIC BLOOD PRESSURE: 59 MMHG | TEMPERATURE: 95.4 F

## 2024-01-23 DIAGNOSIS — E03.9 ACQUIRED HYPOTHYROIDISM: ICD-10-CM

## 2024-01-23 DIAGNOSIS — N18.2 CHRONIC KIDNEY DISEASE, STAGE 2 (MILD): ICD-10-CM

## 2024-01-23 DIAGNOSIS — C11.8 MALIGNANT NEOPLASM OF OVERLAPPING SITES OF NASOPHARYNX (H): ICD-10-CM

## 2024-01-23 DIAGNOSIS — C11.9 SQUAMOUS CELL CARCINOMA OF NASOPHARYNX (H): Primary | ICD-10-CM

## 2024-01-23 PROCEDURE — 99215 OFFICE O/P EST HI 40 MIN: CPT | Performed by: INTERNAL MEDICINE

## 2024-01-23 PROCEDURE — G0463 HOSPITAL OUTPT CLINIC VISIT: HCPCS | Performed by: INTERNAL MEDICINE

## 2024-01-23 RX ORDER — EPINEPHRINE 1 MG/ML
0.3 INJECTION, SOLUTION INTRAMUSCULAR; SUBCUTANEOUS EVERY 5 MIN PRN
Status: CANCELLED | OUTPATIENT
Start: 2024-02-02

## 2024-01-23 RX ORDER — METHYLPREDNISOLONE SODIUM SUCCINATE 125 MG/2ML
125 INJECTION, POWDER, LYOPHILIZED, FOR SOLUTION INTRAMUSCULAR; INTRAVENOUS
Status: CANCELLED
Start: 2024-02-16

## 2024-01-23 RX ORDER — LEVOTHYROXINE SODIUM 175 UG/1
175 TABLET ORAL DAILY
Qty: 60 TABLET | Refills: 1 | Status: SHIPPED | OUTPATIENT
Start: 2024-01-23 | End: 2024-10-01

## 2024-01-23 RX ORDER — HEPARIN SODIUM,PORCINE 10 UNIT/ML
5-20 VIAL (ML) INTRAVENOUS DAILY PRN
Status: CANCELLED | OUTPATIENT
Start: 2024-02-16

## 2024-01-23 RX ORDER — MEGESTROL ACETATE 40 MG/ML
400 SUSPENSION ORAL DAILY
Qty: 480 ML | Refills: 1 | Status: SHIPPED | OUTPATIENT
Start: 2024-01-23

## 2024-01-23 RX ORDER — HEPARIN SODIUM (PORCINE) LOCK FLUSH IV SOLN 100 UNIT/ML 100 UNIT/ML
5 SOLUTION INTRAVENOUS
Status: CANCELLED | OUTPATIENT
Start: 2024-02-02

## 2024-01-23 RX ORDER — HEPARIN SODIUM (PORCINE) LOCK FLUSH IV SOLN 100 UNIT/ML 100 UNIT/ML
5 SOLUTION INTRAVENOUS
Status: CANCELLED | OUTPATIENT
Start: 2024-02-16

## 2024-01-23 RX ORDER — HEPARIN SODIUM,PORCINE 10 UNIT/ML
5-20 VIAL (ML) INTRAVENOUS DAILY PRN
Status: CANCELLED | OUTPATIENT
Start: 2024-02-02

## 2024-01-23 RX ORDER — LORAZEPAM 2 MG/ML
0.5 INJECTION INTRAMUSCULAR EVERY 4 HOURS PRN
Status: CANCELLED | OUTPATIENT
Start: 2024-02-02

## 2024-01-23 RX ORDER — ONDANSETRON 4 MG/1
4 TABLET, FILM COATED ORAL EVERY 6 HOURS PRN
Qty: 30 TABLET | Refills: 1 | Status: SHIPPED | OUTPATIENT
Start: 2024-01-29 | End: 2024-04-12

## 2024-01-23 RX ORDER — DIPHENHYDRAMINE HYDROCHLORIDE 50 MG/ML
50 INJECTION INTRAMUSCULAR; INTRAVENOUS
Status: CANCELLED
Start: 2024-02-16

## 2024-01-23 RX ORDER — ALBUTEROL SULFATE 0.83 MG/ML
2.5 SOLUTION RESPIRATORY (INHALATION)
Status: CANCELLED | OUTPATIENT
Start: 2024-02-02

## 2024-01-23 RX ORDER — METHYLPREDNISOLONE SODIUM SUCCINATE 125 MG/2ML
125 INJECTION, POWDER, LYOPHILIZED, FOR SOLUTION INTRAMUSCULAR; INTRAVENOUS
Status: CANCELLED
Start: 2024-02-02

## 2024-01-23 RX ORDER — ALBUTEROL SULFATE 0.83 MG/ML
2.5 SOLUTION RESPIRATORY (INHALATION)
Status: CANCELLED | OUTPATIENT
Start: 2024-02-16

## 2024-01-23 RX ORDER — DIPHENHYDRAMINE HYDROCHLORIDE 50 MG/ML
50 INJECTION INTRAMUSCULAR; INTRAVENOUS
Status: CANCELLED
Start: 2024-02-02

## 2024-01-23 RX ORDER — MEPERIDINE HYDROCHLORIDE 25 MG/ML
25 INJECTION INTRAMUSCULAR; INTRAVENOUS; SUBCUTANEOUS EVERY 30 MIN PRN
Status: CANCELLED | OUTPATIENT
Start: 2024-02-16

## 2024-01-23 RX ORDER — MEPERIDINE HYDROCHLORIDE 25 MG/ML
25 INJECTION INTRAMUSCULAR; INTRAVENOUS; SUBCUTANEOUS EVERY 30 MIN PRN
Status: CANCELLED | OUTPATIENT
Start: 2024-02-02

## 2024-01-23 RX ORDER — LEVOTHYROXINE SODIUM 175 UG/1
175 TABLET ORAL DAILY
Qty: 30 TABLET | Refills: 2 | OUTPATIENT
Start: 2024-01-23

## 2024-01-23 RX ORDER — PALONOSETRON 0.05 MG/ML
0.25 INJECTION, SOLUTION INTRAVENOUS ONCE
Status: CANCELLED | OUTPATIENT
Start: 2024-02-02

## 2024-01-23 RX ORDER — ALBUTEROL SULFATE 90 UG/1
1-2 AEROSOL, METERED RESPIRATORY (INHALATION)
Status: CANCELLED
Start: 2024-02-02

## 2024-01-23 RX ORDER — LORAZEPAM 2 MG/ML
0.5 INJECTION INTRAMUSCULAR EVERY 4 HOURS PRN
Status: CANCELLED | OUTPATIENT
Start: 2024-02-16

## 2024-01-23 RX ORDER — ALBUTEROL SULFATE 90 UG/1
1-2 AEROSOL, METERED RESPIRATORY (INHALATION)
Status: CANCELLED
Start: 2024-02-16

## 2024-01-23 RX ORDER — EPINEPHRINE 1 MG/ML
0.3 INJECTION, SOLUTION INTRAMUSCULAR; SUBCUTANEOUS EVERY 5 MIN PRN
Status: CANCELLED | OUTPATIENT
Start: 2024-02-16

## 2024-01-23 ASSESSMENT — PAIN SCALES - GENERAL: PAINLEVEL: NO PAIN (0)

## 2024-01-23 NOTE — PROGRESS NOTES
[facility] Hematology and Oncology Progress Note    Patient: Kristen Chapman  MRN: [id number]  Date of Service: Jan 23, 2024        Assessment and Plan:    Cancer Staging  Nasopharyngeal carcinoma (H)  Staging form: Pharynx - Nasopharynx, AJCC 8th Edition  - Clinical stage from 2/18/2020: Stage SAMANTHA (cT4, cN2, cM0) - Signed by Alan Frias MD on 2/18/2020     1.  Nasopharyngeal carcinoma: Clinically progressive disease at the right submandibular area.  We are going to restage him with a PET CT scan.  I am also going to get a biopsy for molecular studies which we have not been able to do so far.  He desires more treatment.  He has a borderline performance status but I think we can try treating him.  I am going to start with cetuximab every other week and see how he does.  If he does okay after 2 doses and I am going to add carboplatin at an AUC of 2 every other week.  If he does okay here then we could try 3 weeks on 1 week off of the carboplatin.  I reviewed with the patient and his daughter that we have significant comorbidities to deal with including his refractory ascites, hepatitis B, chronic cytopenias and general frailty.  Other treatment options include single agent like weekly Taxol or gemcitabine.  He still has a port in place.  We will try to start treatment in the next week or 2.    2.  Pancytopenia: He has chronic pancytopenia secondary to chronic hepatitis B infection as well as cirrhosis.  He also has splenomegaly measuring 17 cm on CT from Sophia 15, 2023.  He had a bone marrow biopsy in October 2021.  Next generation sequencing showed no abnormalities.  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     3.  Hypothyroidism: He continues on Synthroid.  Will continue to watch his TSH.  He was last checked in November was normal at 1.07.    4.  Hepatitis B infection: This is being treated now by hepatology at the [facility].  His daughter tells me that he is no longer on any hepatitis B  medications but it seems like he is supposed to be on tenofovir.  We will have to clarify this.    5.  Ascites: Refractory.  Cytology has been negative multiple times.  After lengthy discussion, we are going to get him set up every 10 days for paracentesis we talked for a while about abdominal Pleurx catheter but the patient is not interested in that at this time.    6.  Tumor ulceration: His tumor is starting to break through his skin.  He has some shallow areas of ulceration.  Referral to the wound care clinic was placed.    7.  F/E/N: His appetite is decreased and he has been losing weight.  I am going to give him a prescription for Megace to see if we can get some weight gain.  Will also have him meet with one of our nutritionists.    Medical decision Making:  I spent 50 minutes in the care of this patient today, which included time necessary for preparation for the visit, face to face time with the patient, communication of recommendations to the care team, and documentation time.  Today's visit was centered around a cancer diagnosis in the setting of additional medical comorbidities. Complex medical decision making was required. The risk of additional morbidity without further treatment is high.     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  Diagnosed January 2020. Imaging suggested bilateral abnormal lymphadenopathy in the neck.  Also asymmetric soft tissue thickening in the posterior right nasopharynx.  On imaging, tumor also appeared to extend down to the hypopharynx.    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  Right neck lymph node positive for recurrent nasopharyngeal carcinoma.  PET scan at that time showed new hypermetabolic mediastinal lymphadenopathy (chronic, most likely reactive), mass in the right ethmoid sinus, hypermetabolic right level 1B and 2A lymph  nodes.    Treatment:    Initially treated with concurrent chemotherapy and radiation.  He had weekly cisplatin starting March 3, 2020. Fifth and final dose given March 31, 2020.  Subsequent chemotherapy was held due to prolonged thrombocytopenia and renal insufficiency.  Radiation dose was 7000 cGy in 35 fractions given from March 2 through April 17, 2020.     Started cisplatin with infusional 5-FU on June 1, 2020.  Cycle 1 given at a 25% dose reduction.  He still had significant thrombocytopenia and neutropenia on day 28.  Cycle 2 was held.  At the same time his liver function tests elevated secondary to hepatitis B.   PET scan in September 2020 showed no good evidence of residual malignancy.    Recurrence:  Radiosurgery delivered to the right ethmoid tumor delivered November 8 through November 17, 2021.  Received 3500 cGy in 5 fractions.  Pembrolizumab started 12/23/21.  Held after his July 1, 2022 dose.  PET scan in March 2022 showed a near complete response.    Interim History:    Kristen Chapman returns today for follow-up visit.  I have not seen him since September.  Since that time he sought various opinions for treatment of his recurrent tumor at the right submandibular area.  He decided against surgery.  Radiation was not an option.  He then sought medical oncology at the [facility] who recommended chemotherapy or immunotherapy but wanted his liver status optimized.  He subsequently followed up with hepatology at the [facility] and now returns to our clinic to discuss the plan going forward for his cancer.  He is not having much pain.  His biggest problem is refractory ascites requiring paracentesis every 10 to 14 days.  No abdominal pain or fevers.  His appetite is decreased.    Review of Systems:    As above in the history.     Review of Systems otherwise Negative for:  General: chills, fever or night sweats  Psychological: anxiety or depression  Ophthalmic: blurry vision, double vision or loss of vision,  "vision change  ENT: oral lesions, hearing changes  Hematological and Lymphatic: bruising, jaundice, swollen lymph nodes  Endocrine: hot flashes  Respiratory: cough, hemoptysis, orthopnea  Cardiovascular: chest pain, palpitations or PND  Gastrointestinal: blood in stools, change in bowel habits, constipation, diarrhea or nausea/vomiting  Genito-Urinary: change in urinary stream, incontinence, frequency/urgency  Musculoskeletal: joint swelling, muscle pain  Neurological: dizziness, headaches, numbness/tingling  Dermatological: lumps and rash    Past History:    Past Medical History:   Diagnosis Date    Anemia     Dysphagia     Hepatitis B chronic     Hypothyroidism     Nasopharyngeal cancer (H)     Severe protein-calorie malnutrition (H24)     Thrombocytopenia (H24)      Physical Exam:    BP 96/59 (BP Location: Right arm, Patient Position: Sitting, Cuff Size: Adult Regular)   Pulse 57   Temp (!) 95.4  F (35.2  C) (Tympanic)   Resp 20   Ht 1.584 m (5' 2.36\")   Wt 58.6 kg (129 lb 1.6 oz)   SpO2 100%   BMI 23.34 kg/m      General: patient appears stated age of 71 year old. Nontoxic and in no distress.   HEENT: Head: atraumatic, normocephalic. Sclerae anicteric.  Bitemporal wasting.  Chest:  Normal respiratory effort  Cardiac:  No edema.  Abdomen: abdomen is significantly distended with fluid.  Extremities: normal tone and muscle bulk.  Skin: no lesions or rash on visible skin. Warm and dry.   CNS: alert and oriented. Grossly non-focal.   Psychiatric: normal mood and affect.     Lab Results:    No results found for this or any previous visit (from the past 168 hour(s)).     Imaging:    US Paracentesis with Albumin    Result Date: 1/17/2024  EXAM: 1. PARACENTESIS 2. ULTRASOUND GUIDANCE LOCATION: [facility] DATE: 1/17/2024 INDICATION: Ascites. PROCEDURE: Informed consent obtained. Time out performed. The abdomen was prepped and draped in a sterile fashion. 8 mL of 1% lidocaine was infused " into local soft tissues. A 5 Spanish catheter system was introduced into the abdominal ascites under ultrasound guidance. 5 liters of yellow fluid were removed and sent to lab if requested. Patient tolerated procedure well. Ultrasound imaging was obtained and placed in the patient's permanent medical record.     IMPRESSION: 1.  Status post ultrasound-guided paracentesis. Reference CPT Code: 28367    US Paracentesis with Albumin    Result Date: 1/3/2024  EXAM: 1. PARACENTESIS 2. ULTRASOUND GUIDANCE LOCATION: [facility] DATE: 1/3/2024 INDICATION: Ascites. PROCEDURE: Informed consent obtained. Time out performed. The abdomen was prepped and draped in a sterile fashion. 10 mL of 1% lidocaine was infused into local soft tissues. A 5 Spanish catheter system was introduced into the abdominal ascites under ultrasound guidance. 5 liters of clear fluid were removed and sent to lab if requested. Patient tolerated procedure well. Ultrasound imaging was obtained and placed in the patient's permanent medical record.     IMPRESSION: 1.  Status post ultrasound-guided paracentesis. Reference CPT Code: 15164       Signed by: lAan Frias MD

## 2024-01-23 NOTE — LETTER
"    1/23/2024         RE: Kristen Chapman  [address] [place] [address]        Dear Colleague,    Thank you for referring your patient, Kristen Chapman, to the [facility]. Please see a copy of my visit note below.    Oncology Rooming Note    January 23, 2024 3:21 PM   Kristen Chapman is a 73 year old male who presents for:    Chief Complaint   Patient presents with     Oncology Clinic Visit     Secondary malignancy of lymph nodes of head, face and neck, Nasopharyngeal carcinoma     Initial Vitals: BP 96/59 (BP Location: Right arm, Patient Position: Sitting, Cuff Size: Adult Regular)   Pulse 57   Temp (!) 95.4  F (35.2  C) (Tympanic)   Resp 20   Ht 1.584 m (5' 2.36\")   Wt 58.6 kg (129 lb 1.6 oz)   SpO2 100%   BMI 23.34 kg/m   Estimated body mass index is 23.34 kg/m  as calculated from the following:    Height as of this encounter: 1.584 m (5' 2.36\").    Weight as of this encounter: 58.6 kg (129 lb 1.6 oz). Body surface area is 1.61 meters squared.  No Pain (0) Comment: Data Unavailable   No LMP for male patient.  Allergies reviewed: Yes  Medications reviewed: Yes    Medications: Medication refills not needed today.  Pharmacy name entered into [employer]: [facility] - [address]    Frailty Screening:   Is the patient here for a new oncology consult visit in cancer care? 1. Yes. Over the past month, have you experienced difficulty or required a caregiver to assist with:   1. Balance, walking or general mobility (including any falls)? NO  2. Completion of self-care tasks such as bathing, dressing, toileting, grooming/hygiene?  NO  3. Concentration or memory that affects your daily life?  NO       Clinical concerns:  Secondary malignancy of lymph nodes of head, face and neck, Nasopharyngeal carcinoma      Abby Duncan CMA              [facility] Hematology and Oncology Progress Note    Patient: Kristen Chapman  MRN: [id number]  Date of Service: Jan 23, " 2024        Assessment and Plan:    Cancer Staging  Nasopharyngeal carcinoma (H)  Staging form: Pharynx - Nasopharynx, AJCC 8th Edition  - Clinical stage from 2/18/2020: Stage SAMANTHA (cT4, cN2, cM0) - Signed by Alan Frias MD on 2/18/2020     1.  Nasopharyngeal carcinoma: Clinically progressive disease at the right submandibular area.  We are going to restage him with a PET CT scan.  I am also going to get a biopsy for molecular studies which we have not been able to do so far.  He desires more treatment.  He has a borderline performance status but I think we can try treating him.  I am going to start with cetuximab every other week and see how he does.  If he does okay after 2 doses and I am going to add carboplatin at an AUC of 2 every other week.  If he does okay here then we could try 3 weeks on 1 week off of the carboplatin.  I reviewed with the patient and his daughter that we have significant comorbidities to deal with including his refractory ascites, hepatitis B, chronic cytopenias and general frailty.  Other treatment options include single agent like weekly Taxol or gemcitabine.  He still has a port in place.  We will try to start treatment in the next week or 2.    2.  Pancytopenia: He has chronic pancytopenia secondary to chronic hepatitis B infection as well as cirrhosis.  He also has splenomegaly measuring 17 cm on CT from Sophia 15, 2023.  He had a bone marrow biopsy in October 2021.  Next generation sequencing showed no abnormalities.  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     3.  Hypothyroidism: He continues on Synthroid.  Will continue to watch his TSH.  He was last checked in November was normal at 1.07.    4.  Hepatitis B infection: This is being treated now by hepatology at the [facility].  His daughter tells me that he is no longer on any hepatitis B medications but it seems like he is supposed to be on tenofovir.  We will have to clarify this.    5.  Ascites: Refractory.   Cytology has been negative multiple times.  After lengthy discussion, we are going to get him set up every 10 days for paracentesis we talked for a while about abdominal Pleurx catheter but the patient is not interested in that at this time.    6.  Tumor ulceration: His tumor is starting to break through his skin.  He has some shallow areas of ulceration.  Referral to the wound care clinic was placed.    7.  F/E/N: His appetite is decreased and he has been losing weight.  I am going to give him a prescription for Megace to see if we can get some weight gain.  Will also have him meet with one of our nutritionists.    Medical decision Making:  I spent 50 minutes in the care of this patient today, which included time necessary for preparation for the visit, face to face time with the patient, communication of recommendations to the care team, and documentation time.  Today's visit was centered around a cancer diagnosis in the setting of additional medical comorbidities. Complex medical decision making was required. The risk of additional morbidity without further treatment is high.     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  Diagnosed January 2020. Imaging suggested bilateral abnormal lymphadenopathy in the neck.  Also asymmetric soft tissue thickening in the posterior right nasopharynx.  On imaging, tumor also appeared to extend down to the hypopharynx.    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  Right neck lymph node positive for recurrent nasopharyngeal carcinoma.  PET scan at that time showed new hypermetabolic mediastinal lymphadenopathy (chronic, most likely reactive), mass in the right ethmoid sinus, hypermetabolic right level 1B and 2A lymph nodes.    Treatment:    Initially treated with concurrent chemotherapy and radiation.  He had weekly cisplatin starting March 3, 2020. Fifth and final dose  given March 31, 2020.  Subsequent chemotherapy was held due to prolonged thrombocytopenia and renal insufficiency.  Radiation dose was 7000 cGy in 35 fractions given from March 2 through April 17, 2020.     Started cisplatin with infusional 5-FU on June 1, 2020.  Cycle 1 given at a 25% dose reduction.  He still had significant thrombocytopenia and neutropenia on day 28.  Cycle 2 was held.  At the same time his liver function tests elevated secondary to hepatitis B.   PET scan in September 2020 showed no good evidence of residual malignancy.    Recurrence:  Radiosurgery delivered to the right ethmoid tumor delivered November 8 through November 17, 2021.  Received 3500 cGy in 5 fractions.  Pembrolizumab started 12/23/21.  Held after his July 1, 2022 dose.  PET scan in March 2022 showed a near complete response.    Interim History:    Kristen Chapman returns today for follow-up visit.  I have not seen him since September.  Since that time he sought various opinions for treatment of his recurrent tumor at the right submandibular area.  He decided against surgery.  Radiation was not an option.  He then sought medical oncology at the [facility] who recommended chemotherapy or immunotherapy but wanted his liver status optimized.  He subsequently followed up with hepatology at the [facility] and now returns to our clinic to discuss the plan going forward for his cancer.  He is not having much pain.  His biggest problem is refractory ascites requiring paracentesis every 10 to 14 days.  No abdominal pain or fevers.  His appetite is decreased.    Review of Systems:    As above in the history.     Review of Systems otherwise Negative for:  General: chills, fever or night sweats  Psychological: anxiety or depression  Ophthalmic: blurry vision, double vision or loss of vision, vision change  ENT: oral lesions, hearing changes  Hematological and Lymphatic: bruising, jaundice, swollen lymph nodes  Endocrine: hot flashes  Respiratory:  "cough, hemoptysis, orthopnea  Cardiovascular: chest pain, palpitations or PND  Gastrointestinal: blood in stools, change in bowel habits, constipation, diarrhea or nausea/vomiting  Genito-Urinary: change in urinary stream, incontinence, frequency/urgency  Musculoskeletal: joint swelling, muscle pain  Neurological: dizziness, headaches, numbness/tingling  Dermatological: lumps and rash    Past History:    Past Medical History:   Diagnosis Date     Anemia      Dysphagia      Hepatitis B chronic      Hypothyroidism      Nasopharyngeal cancer (H)      Severe protein-calorie malnutrition (H24)      Thrombocytopenia (H24)      Physical Exam:    BP 96/59 (BP Location: Right arm, Patient Position: Sitting, Cuff Size: Adult Regular)   Pulse 57   Temp (!) 95.4  F (35.2  C) (Tympanic)   Resp 20   Ht 1.584 m (5' 2.36\")   Wt 58.6 kg (129 lb 1.6 oz)   SpO2 100%   BMI 23.34 kg/m      General: patient appears stated age of 71 year old. Nontoxic and in no distress.   HEENT: Head: atraumatic, normocephalic. Sclerae anicteric.  Bitemporal wasting.  Chest:  Normal respiratory effort  Cardiac:  No edema.  Abdomen: abdomen is significantly distended with fluid.  Extremities: normal tone and muscle bulk.  Skin: no lesions or rash on visible skin. Warm and dry.   CNS: alert and oriented. Grossly non-focal.   Psychiatric: normal mood and affect.     Lab Results:    No results found for this or any previous visit (from the past 168 hour(s)).     Imaging:    US Paracentesis with Albumin    Result Date: 1/17/2024  EXAM: 1. PARACENTESIS 2. ULTRASOUND GUIDANCE LOCATION: [facility] DATE: 1/17/2024 INDICATION: Ascites. PROCEDURE: Informed consent obtained. Time out performed. The abdomen was prepped and draped in a sterile fashion. 8 mL of 1% lidocaine was infused into local soft tissues. A 5 Greenlandic catheter system was introduced into the abdominal ascites under ultrasound guidance. 5 liters of yellow fluid were " removed and sent to lab if requested. Patient tolerated procedure well. Ultrasound imaging was obtained and placed in the patient's permanent medical record.     IMPRESSION: 1.  Status post ultrasound-guided paracentesis. Reference CPT Code: 36523    US Paracentesis with Albumin    Result Date: 1/3/2024  EXAM: 1. PARACENTESIS 2. ULTRASOUND GUIDANCE LOCATION: [facility] DATE: 1/3/2024 INDICATION: Ascites. PROCEDURE: Informed consent obtained. Time out performed. The abdomen was prepped and draped in a sterile fashion. 10 mL of 1% lidocaine was infused into local soft tissues. A 5 Malian catheter system was introduced into the abdominal ascites under ultrasound guidance. 5 liters of clear fluid were removed and sent to lab if requested. Patient tolerated procedure well. Ultrasound imaging was obtained and placed in the patient's permanent medical record.     IMPRESSION: 1.  Status post ultrasound-guided paracentesis. Reference CPT Code: 50934       Signed by: Alan Frias MD      Again, thank you for allowing me to participate in the care of your patient.        Sincerely,        Alan Frias MD

## 2024-01-23 NOTE — PROGRESS NOTES
"Oncology Rooming Note    January 23, 2024 3:21 PM   Kristen Chapman is a 73 year old male who presents for:    Chief Complaint   Patient presents with    Oncology Clinic Visit     Secondary malignancy of lymph nodes of head, face and neck, Nasopharyngeal carcinoma     Initial Vitals: BP 96/59 (BP Location: Right arm, Patient Position: Sitting, Cuff Size: Adult Regular)   Pulse 57   Temp (!) 95.4  F (35.2  C) (Tympanic)   Resp 20   Ht 1.584 m (5' 2.36\")   Wt 58.6 kg (129 lb 1.6 oz)   SpO2 100%   BMI 23.34 kg/m   Estimated body mass index is 23.34 kg/m  as calculated from the following:    Height as of this encounter: 1.584 m (5' 2.36\").    Weight as of this encounter: 58.6 kg (129 lb 1.6 oz). Body surface area is 1.61 meters squared.  No Pain (0) Comment: Data Unavailable   No LMP for male patient.  Allergies reviewed: Yes  Medications reviewed: Yes    Medications: Medication refills not needed today.  Pharmacy name entered into [employer]: [facility] - [address]    Frailty Screening:   Is the patient here for a new oncology consult visit in cancer care? 1. Yes. Over the past month, have you experienced difficulty or required a caregiver to assist with:   1. Balance, walking or general mobility (including any falls)? NO  2. Completion of self-care tasks such as bathing, dressing, toileting, grooming/hygiene?  NO  3. Concentration or memory that affects your daily life?  NO       Clinical concerns:  Secondary malignancy of lymph nodes of head, face and neck, Nasopharyngeal carcinoma      Abby Duncan, LEONID            "

## 2024-01-25 ENCOUNTER — TELEPHONE (OUTPATIENT)
Dept: ONCOLOGY | Facility: HOSPITAL | Age: 74
End: 2024-01-25
Payer: COMMERCIAL

## 2024-01-26 ENCOUNTER — HOSPITAL ENCOUNTER (OUTPATIENT)
Dept: ULTRASOUND IMAGING | Facility: CLINIC | Age: 74
Discharge: HOME | End: 2024-01-26
Attending: INTERNAL MEDICINE | Admitting: INTERNAL MEDICINE
Payer: COMMERCIAL

## 2024-01-26 ENCOUNTER — TELEPHONE (OUTPATIENT)
Dept: ONCOLOGY | Facility: HOSPITAL | Age: 74
End: 2024-01-26
Payer: COMMERCIAL

## 2024-01-26 DIAGNOSIS — R18.8 CIRRHOSIS OF LIVER WITH ASCITES, UNSPECIFIED HEPATIC CIRRHOSIS TYPE (H): ICD-10-CM

## 2024-01-26 DIAGNOSIS — K74.60 CIRRHOSIS OF LIVER WITH ASCITES, UNSPECIFIED HEPATIC CIRRHOSIS TYPE (H): ICD-10-CM

## 2024-01-26 PROCEDURE — P9047 ALBUMIN (HUMAN), 25%, 50ML: HCPCS | Performed by: INTERNAL MEDICINE

## 2024-01-26 PROCEDURE — 49083 ABD PARACENTESIS W/IMAGING: CPT

## 2024-01-26 PROCEDURE — 250N000011 HC RX IP 250 OP 636: Performed by: INTERNAL MEDICINE

## 2024-01-26 PROCEDURE — 250N000011 HC RX IP 250 OP 636: Performed by: RADIOLOGY

## 2024-01-26 RX ORDER — HEPARIN SODIUM (PORCINE) LOCK FLUSH IV SOLN 100 UNIT/ML 100 UNIT/ML
5-10 SOLUTION INTRAVENOUS
Status: DISCONTINUED | OUTPATIENT
Start: 2024-01-26 | End: 2024-01-27 | Stop reason: HOSPADM

## 2024-01-26 RX ORDER — ALBUMIN (HUMAN) 12.5 G/50ML
12.5-5 SOLUTION INTRAVENOUS ONCE
Status: COMPLETED | OUTPATIENT
Start: 2024-01-26 | End: 2024-01-26

## 2024-01-26 RX ADMIN — HEPARIN 5 ML: 100 SYRINGE at 15:17

## 2024-01-26 RX ADMIN — ALBUMIN HUMAN 50 G: 0.25 SOLUTION INTRAVENOUS at 14:57

## 2024-01-26 NOTE — TELEPHONE ENCOUNTER
PA Initiation    Medication: MEGESTROL ACETATE 40 MG/ML PO SUSP  Insurance Company: [employer] - Phone [phone] Fax [phone]  Pharmacy Filling the Rx: [facility] - [place], [place] - [address] [place] [address]  Start Date: 1/26/2024

## 2024-01-29 NOTE — TELEPHONE ENCOUNTER
Prior Authorization Approval    Medication: MEGESTROL ACETATE 40 MG/ML PO SUSP  Authorization Effective Date: 1/26/2024  Authorization Expiration Date: 1/26/2025  Approved Dose/Quantity: 300/30  Reference #: [id number]   Insurance Company: [employer] - Phone [phone] Fax [phone]  Which Pharmacy is filling the prescription: [place], [place] - [address] [place] [address]  Pharmacy Notified: Yes  Patient Notified: Yes- Pharmacy will notify patient

## 2024-01-31 ENCOUNTER — HOSPITAL ENCOUNTER (OUTPATIENT)
Dept: PET IMAGING | Facility: HOSPITAL | Age: 74
Discharge: HOME | End: 2024-01-31
Attending: INTERNAL MEDICINE
Payer: COMMERCIAL

## 2024-01-31 DIAGNOSIS — C11.8 MALIGNANT NEOPLASM OF OVERLAPPING SITES OF NASOPHARYNX (H): ICD-10-CM

## 2024-01-31 DIAGNOSIS — C11.9 SQUAMOUS CELL CARCINOMA OF NASOPHARYNX (H): ICD-10-CM

## 2024-01-31 LAB
CREAT BLD-MCNC: 1.9 MG/DL (ref 0.7–1.3)
EGFRCR SERPLBLD CKD-EPI 2021: 37 ML/MIN/1.73M2
GLUCOSE BLDC GLUCOMTR-MCNC: 96 MG/DL (ref 70–99)

## 2024-01-31 PROCEDURE — 70491 CT SOFT TISSUE NECK W/DYE: CPT | Mod: 26 | Performed by: STUDENT IN AN ORGANIZED HEALTH CARE EDUCATION/TRAINING PROGRAM

## 2024-01-31 PROCEDURE — 250N000011 HC RX IP 250 OP 636: Performed by: INTERNAL MEDICINE

## 2024-01-31 PROCEDURE — 343N000001 HC RX 343: Performed by: INTERNAL MEDICINE

## 2024-01-31 PROCEDURE — 78816 PET IMAGE W/CT FULL BODY: CPT | Mod: PS

## 2024-01-31 PROCEDURE — 71260 CT THORAX DX C+: CPT | Mod: 26 | Performed by: STUDENT IN AN ORGANIZED HEALTH CARE EDUCATION/TRAINING PROGRAM

## 2024-01-31 PROCEDURE — 70491 CT SOFT TISSUE NECK W/DYE: CPT

## 2024-01-31 PROCEDURE — 71260 CT THORAX DX C+: CPT

## 2024-01-31 PROCEDURE — 82565 ASSAY OF CREATININE: CPT

## 2024-01-31 PROCEDURE — A9552 F18 FDG: HCPCS | Performed by: INTERNAL MEDICINE

## 2024-01-31 PROCEDURE — 250N000011 HC RX IP 250 OP 636: Performed by: RADIOLOGY

## 2024-01-31 PROCEDURE — 82962 GLUCOSE BLOOD TEST: CPT

## 2024-01-31 PROCEDURE — 78816 PET IMAGE W/CT FULL BODY: CPT | Mod: 26 | Performed by: STUDENT IN AN ORGANIZED HEALTH CARE EDUCATION/TRAINING PROGRAM

## 2024-01-31 PROCEDURE — 74177 CT ABD & PELVIS W/CONTRAST: CPT | Mod: 26 | Performed by: STUDENT IN AN ORGANIZED HEALTH CARE EDUCATION/TRAINING PROGRAM

## 2024-01-31 RX ORDER — HEPARIN SODIUM (PORCINE) LOCK FLUSH IV SOLN 100 UNIT/ML 100 UNIT/ML
5-10 SOLUTION INTRAVENOUS
Status: DISCONTINUED | OUTPATIENT
Start: 2024-01-31 | End: 2024-02-01 | Stop reason: HOSPADM

## 2024-01-31 RX ORDER — HEPARIN SODIUM,PORCINE 10 UNIT/ML
5-10 VIAL (ML) INTRAVENOUS EVERY 24 HOURS
Status: DISCONTINUED | OUTPATIENT
Start: 2024-01-31 | End: 2024-02-01 | Stop reason: HOSPADM

## 2024-01-31 RX ORDER — IOPAMIDOL 755 MG/ML
64 INJECTION, SOLUTION INTRAVASCULAR ONCE
Status: COMPLETED | OUTPATIENT
Start: 2024-01-31 | End: 2024-01-31

## 2024-01-31 RX ORDER — HEPARIN SODIUM,PORCINE 10 UNIT/ML
5-10 VIAL (ML) INTRAVENOUS
Status: DISCONTINUED | OUTPATIENT
Start: 2024-01-31 | End: 2024-02-01 | Stop reason: HOSPADM

## 2024-01-31 RX ADMIN — HEPARIN 5 ML: 100 SYRINGE at 13:21

## 2024-01-31 RX ADMIN — FLUDEOXYGLUCOSE F-18 9.81 MILLICURIE: 500 INJECTION, SOLUTION INTRAVENOUS at 12:02

## 2024-01-31 RX ADMIN — IOPAMIDOL 64 ML: 755 INJECTION, SOLUTION INTRAVENOUS at 12:53

## 2024-01-31 NOTE — PATIENT INSTRUCTIONS
Wound care supplies were ordered today through [facility] and if you are not receiving your supplies or have a question on your bill please contact Marbella Beckwith at [phone]. Please allow 2-5 business days for delivery of supplies. You may get a call from a 1-800 # if there are additional information Nati needs. It is important to  or return their call. PLEASE NOTE: If you need to return your supplies, you MUST call customer service within 15 days of delivery date.     You can either come back when more supplies need to be ordered, otherwise your PCP can order the supplies as well.    Wound Care Instructions    DAILY and as needed, Cleanse your right jaw wound(s) with clean tap water.    Pat Dry with non-sterile gauze    Apply Lotion to the intact skin surrounding your wound and other dry skin locations. Some good lotions include: Remedy Skin Repair Cream, Sarna, Vanicream or Cetaphil    Primary Dressing: Apply drawtex into/onto the wounds    Secondary dressing: Cover with island dressing    It is ok to get your wound wet in the bath or shower    High Protein Foods  When you have an open ulcer, your bodies protein needs are much higher, so it is recommended eat good sources of protein or take a protein supplement!    Protein Supplements  -Premier Protein  -Ensure  -Boost  -Glucerna, if diabetic    Chicken  -Chicken breast, 3.5oz.-30 grams protein  -Chicken meat, cooked, 4 oz.    Beef  -Hamburger larry, 4 oz-28 grams protein  -Steak, 6 oz-42 grams  -Most cuts of beef- 7 grams of protein per ounce    Fish  -Most fish fillets or steaks are about 22 grams of protein for 3 1/2 oz(100 grams) of cooked fish, or 6 grams per ounce  -Tuna, 6 oz can-40 grams of protein    Pork  -Pork chop, average-22 grams protein  -Pork loin or tenderloin, 4 oz.-29 grams  -Ham, 3oz serving- 19 grams  -Andres, 1 slice-3 grams    Eggs and Dairy  -Egg, large-7 grams  -Milk, 1 cup-8 grams  -Cottage cheese, 1/2 cup-15 grams  -Greek  yogurt, 1 cup-usually 8-12 grams, check label    Beans  -Soy milk, 1 cup-6-10 grams  -Most beans(black, yadav, lentils, etc.) about 7-10 grams protein per half cup of cooked beans    Nuts and Seeds  -Peanut butter, 2 Tablespoons- 8 grams protein  -Almonds, 1/4 cup- 8 grams  -Peanuts, 1/4 cup-9 grams  -Sunflower seeds, 1/4 cup- 6 grams        If for some reason you are not able to get your dressing(s) changed as outlined above (due to illness, lack of supplies, lack of help) please do the following: remove old, soiled dressings; wash the wounds with saline; pat dry; apply ABD pad or other absorbant pad and secure with rolled gauze; avoid tape directly on your skin; Call the clinic as soon as possible to let us know what the current issues are in receiving wound care [phone].      SEEK MEDICAL CARE IF:  You have an increase in swelling, pain, or redness around the wound.  You have an increase in the amount of pus coming from the wound.  There is a bad smell coming from the wound.  The wound appears to be worsening/enlarging  You have a fever greater than 101.5 F      It is ok to continue current wound care treatment/products for the next 2-3 days until new wound care supplies are ordered and arrive. If longer than this please contact our office at [phone].    If you have a 2 layer or 4 layer compression wrap on these are safe to have on for ONLY 7 days. If for some reason you are not able to get the wrap(s) changed (due to illness; lack of supplies, lack of help, lack of transportation) please do the following: unwrap the old 2 or 4 layer compression wrap; avoid using scissors as you could cut your skin and cause wounds; use tubular compression when available. Call to reschedule your home care or clinic visit appointment as soon as possible.    Please NOTE: if you are 15 minutes late to your clinic appointment you will have to be rescheduled. Please call our clinic as soon as possible if you know you will  not be able to get to your appointment at [phone].    If you fail to show up to 3 scheduled clinic appointments you will be dismissed from our clinic.              We want to hear from you!  In the next few weeks, you should receive a call or email to complete a survey about your visit at [facility]. Please help us improve your appointment experience by letting us know how we did today. We strive to make your experience good and value any ways in which we could do better.      We value your input and suggestions.    Thank you for choosing the [facility]!

## 2024-02-01 ENCOUNTER — PATIENT OUTREACH (OUTPATIENT)
Dept: ONCOLOGY | Facility: HOSPITAL | Age: 74
End: 2024-02-01

## 2024-02-01 ENCOUNTER — OFFICE VISIT (OUTPATIENT)
Dept: VASCULAR SURGERY | Facility: CLINIC | Age: 74
End: 2024-02-01
Attending: INTERNAL MEDICINE
Payer: COMMERCIAL

## 2024-02-01 VITALS
SYSTOLIC BLOOD PRESSURE: 95 MMHG | HEART RATE: 50 BPM | DIASTOLIC BLOOD PRESSURE: 62 MMHG | TEMPERATURE: 97.5 F | OXYGEN SATURATION: 100 %

## 2024-02-01 DIAGNOSIS — C11.9 SQUAMOUS CELL CARCINOMA OF NASOPHARYNX (H): ICD-10-CM

## 2024-02-01 PROCEDURE — G0463 HOSPITAL OUTPT CLINIC VISIT: HCPCS | Performed by: FAMILY MEDICINE

## 2024-02-01 PROCEDURE — 99204 OFFICE O/P NEW MOD 45 MIN: CPT | Performed by: FAMILY MEDICINE

## 2024-02-01 ASSESSMENT — PAIN SCALES - GENERAL: PAINLEVEL: SEVERE PAIN (7)

## 2024-02-01 NOTE — PROGRESS NOTES
[facility]/Voicemail    Clinical Data: Care Coordinator Outreach    Outreach attempted x 2.  Left message on patient's voicemail with call back information and requested return call.    Plan: Care Coordinator will try to reach patient again tomorrow on 2/2.    Attempted to call patient to go over chemotherapy teaching. Was accompanied by the [employer]  via [phone]. [employer]  left a voicemail on my behalf.

## 2024-02-01 NOTE — LETTER
[facility]  [address] [place] [address] [address]  [place], [place] [address]  [phone]      Fax [phone]    [facility]           Fax: [phone]            Customer Service: [phone]        Account #: [id number]    Wound Dressing Rx and Order Form  Order Status: new  Verbal: Karen  Date: 2024     Kristen Chapman  Gender: male  : 1950  [address] [place] [address]  [phone] (home)     Medical Record: [id number]  Primary Care Provider: Issa Cook      ICD-10-CM    1. Squamous cell carcinoma of nasopharynx (H)  C11.9 Wound Care Referral     Wound care            Insurance Info:  INSURER: Payor: [facility] / Plan: ARE MEDICARE / Product Type: HMO /   Policy ID#:  [id number]  SECONDARY INSURANCE:    Secondary Policy ID#:  N/A        Physician Info:   Name:     EVIE SIMON  VIDEO,      Dept Address/Phones:   [address] [place] [address], [address]  [facility] [id number] [phone]  Fax: [phone]    Lymphedema circumferential measurements (in cm):       No data to display                  Wound info:  Port A Cath Single Left Chest wall (Active)   Site Assessment WDL 24 1320   Dressing Status clean;dry;intact 24 1320   Dressing Intervention Other (Comment) 24 1517   Line Status Blood return noted;Heparin locked 24 1320   Access Date 24 1458   Access Attempts 1 24 1320   Needle Gauge Power noncoring 90 degree bend 24 1458   Deaccess Date 24 1320   Line Necessity Yes, meets criteria 24 1320   Extravasation? No 24 1320   Number of days:        VASC Wound right jaw (Active)   Pre Size Length 2.5 24 0900   Pre Size Width 5 24 0900   Pre Size Depth 0.3 24 0900   Pre Total Sq cm 2.5 24 0900   Number of days: 0        Drainage: large  Thickness:  full  Duration of Need: 30 DAYS  Days Supply: 30 DAYS  Start Date: 2024  Starter Kit, Ancillary  "Kit (saline, gloves, gauze): Yes   Qualifying wound/Debridement: Yes     NO SUBSTITUTIONS. Call [phone].      Dressing Type Brand Size Frequency of change  Quantity   Primary drawtex  4\"x4\" DAILY and as needed     Primapore island dressing  4\"x3 1/8\" DAILY and as needed      NO SUBSTITUTIONS. Call [phone] with questions.       OK to forward to covered supplier.    Electronically Signed Physician:     EVIE SIMON            Date: February 1, 2024    "

## 2024-02-01 NOTE — PROGRESS NOTES
[facility]/Voicemail    Clinical Data: Care Coordinator Outreach    Outreach attempted x 1.  Left message (with the help of the [employer]  ([phone]) on patient's voicemail with call back information and requested return call.    Plan: Care Coordinator will try to reach patient again in 1-2 business days.    Attempted to call patient to go over chemotherapy teaching.

## 2024-02-01 NOTE — PROGRESS NOTES
Wound Clinic Note          Visit date: 02/01/2024       Cheif Complaint:     Kristen Chapman is a 73 year old  male had concerns including right jaw wound .  He has a right jaw wound.      HISTORY OF PRESENT ILLNESS:    Kristen Chapman reports the ulcer has been present since August 2023.  The wound began after a recent surgery and the incision did not heal normally.   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  He has previously undergone excision of the neck squamous cell carcinoma, radiation therapy and chemotherapy however the tumor recurred.  He had a biopsy of this area where the wound is performed in August and the biopsy site never healed and resulted in this open wound he has now.  He is not currently receiving chemotherapy because his cirrhosis has gotten worse and they are trying to get his liver function improved in order for him to tolerate further chemotherapy.  There are no plans for him to receive further radiation therapy.    He is currently bandaging the area with alginate and an island dressing change twice a day.  There is heavy serous drainage from the wound.      The pateint denies fevers or chills.  They report the pain from the wound has been 0/10 and has remained about the same recently.      Today the patient reports maintaining a regular diet without special attention to protein.        The patient denies a history of diabetes, smoking or chronic steroid use.         The patient has not had any symptoms of infection relating to the wound recently and is not currently on antibiotics.       Problem List:   Past Medical History:   Diagnosis Date    Anemia     Dysphagia     Hepatitis B chronic     Hypothyroidism     Nasopharyngeal cancer (H)     Severe protein-calorie malnutrition (H24)     Thrombocytopenia (H24)               Family Hx: family history is not on file.       Surgical Hx:   Past Surgical History:   Procedure Laterality Date    ENDOSCOPIC ENDONASAL SURGERY Bilateral  9/7/2021    Procedure: Nasal Endoscopy with Biopsy;  Surgeon: Ky Centeno MD;  Location: [facility] OR    IR CHEST PORT PLACEMENT > 5 YRS OF AGE  3/2/2020    IR CHEST PORT PLACEMENT > 5 YRS OF AGE  3/2/2020    IR GASTROSTOMY TUBE INSERTION  4/27/2020    IR GASTROSTOMY TUBE PERCUTANEOUS PLCMNT  4/27/2020    IR PORT PLACEMENT >5 YEARS  3/2/2020    IR PORT PLACEMENT >5 YEARS  3/2/2020    IR T-FASTENER REMOVAL  6/5/2020    IR T-FASTENER REMOVAL  6/5/2020          Allergies:    Allergies   Allergen Reactions    Oxycodone Itching              Medication History:    Current Outpatient Medications   Medication Sig    acetaminophen (TYLENOL) 325 MG tablet Take 650 mg by mouth every 6 hours as needed     furosemide (LASIX) 20 MG tablet Take 1 tablet (20 mg) by mouth daily    levothyroxine (SYNTHROID/LEVOTHROID) 175 MCG tablet Take 1 tablet (175 mcg) by mouth daily    megestrol (MEGACE) 40 MG/ML suspension Take 10 mLs (400 mg) by mouth daily    ondansetron (ZOFRAN) 4 MG tablet Take 1 tablet (4 mg) by mouth every 6 hours as needed for nausea (vomiting)    polyethylene glycol (MIRALAX) 17 GM/Dose powder Take 17 g by mouth daily as needed for constipation    spironolactone (ALDACTONE) 50 MG tablet Take 1 tablet (50 mg) by mouth daily    tenofovir (VIREAD) 300 MG tablet Take 1 tablet (300 mg) by mouth every other day     Current Facility-Administered Medications   Medication    lidocaine (PF) (XYLOCAINE) 1 % injection 10 mL     Facility-Administered Medications Ordered in Other Visits   Medication    heparin 100 UNIT/ML injection 5 mL    sodium chloride (PF) 0.9% PF flush 3-20 mL         Tobacco History:  reports that he has never smoked. He has never used smokeless tobacco.       REVIEW OF SYMPTOMS:   The review of systems was negative except as noted in the HPI.           PHYSICAL EXAMINATION:     BP 95/62   Pulse 50   Temp 97.5  F (36.4  C)   SpO2 100%            GENERAL: The patient overall appears well and is no acute  "distress.   HEAD: normocephalic   EYES: Sclera and conjunctiva clear   NECK: no obvious masses   LUNGS: breathing is unlabored.   EXTREMITIES: No clubbing, cyanosis or edema   SKIN: No rashes or other abnormalities except as noted under the Wound section below.   NEUROLOGICAL: normal motor and sensory function       WOUND/ulcer: The wound appears healthy with no sign of infection.   Wound bed: granulation tissue  Periwound: healthy intact skin  He has a fairly large mass at the angle of his right jaw consistent with a tumor, there is some fairly small areas of skin breakdown over the mass.      Also see below for wound details:     Circumferential volume measures:             No data to display                Ulceration(s)/Wound(s):   Please see the media tab under the chart review for pictures of the wounds.  Nursing staff removed dressings and cleansed wound.    VASC Wound right jaw (Active)   Pre Size Length 2.5 02/01/24 0900   Pre Size Width 5 02/01/24 0900   Pre Size Depth 0.3 02/01/24 0900   Pre Total Sq cm 2.5 02/01/24 0900           No results for input(s): \"HGBA1C\", \"A1C\", \"EAG\" in the last 47988 hours.    Invalid input(s): \"[id number]\"       Recent Labs   Lab Test 12/07/23  1542 11/06/23  0929 10/02/23  0834   ALBUMIN 2.7* 2.8* 2.8*              No sharp debridement performed today.                  ASSESSMENT:   This is a 73 year old  male with a right jaw wound.          PLAN:   We will bandage the area with a superabsorbent pad and an island dressing change once or twice a day depending on the drainage.  I have explained to the patient and his son that we will be treating this wound palliatively which means we do not expect the wound to heal.  The wound healing will be dependent on treatment of the underlying cancer.  I have explained to him that there is cancer here which was shown with the biopsy.  He is not currently receiving any chemotherapy.  Hopefully if his liver function improves he will be " able to tolerate further chemotherapy and if the cancer response to the chemotherapy then the wound may heal at that time.  Until then will be treating the wound palliatively focusing on controlling the drainage.  I have explained to the patient the importance of protein intake to wound healing.  I have explained that increasing protein intake will speed wound healing.  We discussed several types of food that are high in protein and the wound care nurse gave the patient a handout that summarizes this information.  In addition to further speed wound healing I have encouraged the patient to take a protein supplement.   The patient will return to the wound clinic in 4 weeks to see me again.        45 minutes spent on the date of the encounter doing chart review, history and exam, documentation and further activities per the note, this time excludes any procedure time      Armin Vázquez MD  02/01/2024   9:58 AM   [facility]  [phone]    This note was electronically signed by Armin Vázquez MD

## 2024-02-02 ENCOUNTER — INFUSION THERAPY VISIT (OUTPATIENT)
Dept: INFUSION THERAPY | Facility: HOSPITAL | Age: 74
End: 2024-02-02
Attending: INTERNAL MEDICINE
Payer: COMMERCIAL

## 2024-02-02 ENCOUNTER — ONCOLOGY VISIT (OUTPATIENT)
Dept: ONCOLOGY | Facility: HOSPITAL | Age: 74
End: 2024-02-02
Attending: INTERNAL MEDICINE
Payer: COMMERCIAL

## 2024-02-02 ENCOUNTER — PATIENT OUTREACH (OUTPATIENT)
Dept: ONCOLOGY | Facility: HOSPITAL | Age: 74
End: 2024-02-02

## 2024-02-02 VITALS
DIASTOLIC BLOOD PRESSURE: 48 MMHG | TEMPERATURE: 97.5 F | OXYGEN SATURATION: 100 % | HEART RATE: 49 BPM | SYSTOLIC BLOOD PRESSURE: 92 MMHG | RESPIRATION RATE: 16 BRPM

## 2024-02-02 DIAGNOSIS — Z13.29 SCREENING FOR THYROID DISORDER: ICD-10-CM

## 2024-02-02 DIAGNOSIS — C11.9 SQUAMOUS CELL CARCINOMA OF NASOPHARYNX (H): ICD-10-CM

## 2024-02-02 DIAGNOSIS — D69.6 THROMBOCYTOPENIA (H): ICD-10-CM

## 2024-02-02 DIAGNOSIS — N18.31 STAGE 3A CHRONIC KIDNEY DISEASE (H): ICD-10-CM

## 2024-02-02 DIAGNOSIS — D64.9 ANEMIA, NORMOCYTIC NORMOCHROMIC: ICD-10-CM

## 2024-02-02 DIAGNOSIS — E03.9 HYPOTHYROIDISM: ICD-10-CM

## 2024-02-02 DIAGNOSIS — E07.9 THYROID DYSFUNCTION: ICD-10-CM

## 2024-02-02 DIAGNOSIS — D64.9 ANEMIA, NORMOCYTIC NORMOCHROMIC: Primary | ICD-10-CM

## 2024-02-02 DIAGNOSIS — C11.9 SQUAMOUS CELL CARCINOMA OF NASOPHARYNX (H): Primary | ICD-10-CM

## 2024-02-02 DIAGNOSIS — T38.1X5A THYROID AGENT ADVERSE REACTION, INITIAL ENCOUNTER: ICD-10-CM

## 2024-02-02 LAB
ABO/RH(D): NORMAL
ALBUMIN SERPL BCG-MCNC: 2.9 G/DL (ref 3.5–5.2)
ALP SERPL-CCNC: 97 U/L (ref 40–150)
ALT SERPL W P-5'-P-CCNC: 40 U/L (ref 0–70)
ANION GAP SERPL CALCULATED.3IONS-SCNC: 6 MMOL/L (ref 7–15)
ANTIBODY SCREEN: NEGATIVE
AST SERPL W P-5'-P-CCNC: 61 U/L (ref 0–45)
BASOPHILS # BLD AUTO: 0 10E3/UL (ref 0–0.2)
BASOPHILS NFR BLD AUTO: 0 %
BILIRUB SERPL-MCNC: 0.3 MG/DL
BLD PROD TYP BPU: NORMAL
BLOOD COMPONENT TYPE: NORMAL
BUN SERPL-MCNC: 36.6 MG/DL (ref 8–23)
CALCIUM SERPL-MCNC: 7.9 MG/DL (ref 8.8–10.2)
CHLORIDE SERPL-SCNC: 111 MMOL/L (ref 98–107)
CODING SYSTEM: NORMAL
CREAT SERPL-MCNC: 1.88 MG/DL (ref 0.67–1.17)
CREAT SERPL-MCNC: 1.92 MG/DL (ref 0.67–1.17)
CROSSMATCH: NORMAL
DEPRECATED HCO3 PLAS-SCNC: 24 MMOL/L (ref 22–29)
EGFRCR SERPLBLD CKD-EPI 2021: 37 ML/MIN/1.73M2
EGFRCR SERPLBLD CKD-EPI 2021: 38 ML/MIN/1.73M2
EOSINOPHIL # BLD AUTO: 0.1 10E3/UL (ref 0–0.7)
EOSINOPHIL NFR BLD AUTO: 2 %
ERYTHROCYTE [DISTWIDTH] IN BLOOD BY AUTOMATED COUNT: 20.6 % (ref 10–15)
FERRITIN SERPL-MCNC: 139 NG/ML (ref 31–409)
FOLATE SERPL-MCNC: 12.3 NG/ML (ref 4.6–34.8)
GLUCOSE SERPL-MCNC: 106 MG/DL (ref 70–99)
HCT VFR BLD AUTO: 19.9 % (ref 40–53)
HGB BLD-MCNC: 6.2 G/DL (ref 13.3–17.7)
IMM GRANULOCYTES # BLD: 0 10E3/UL
IMM GRANULOCYTES NFR BLD: 0 %
ISSUE DATE AND TIME: NORMAL
LYMPHOCYTES # BLD AUTO: 0.3 10E3/UL (ref 0.8–5.3)
LYMPHOCYTES NFR BLD AUTO: 11 %
MCH RBC QN AUTO: 28.4 PG (ref 26.5–33)
MCHC RBC AUTO-ENTMCNC: 31.2 G/DL (ref 31.5–36.5)
MCV RBC AUTO: 91 FL (ref 78–100)
MONOCYTES # BLD AUTO: 0.3 10E3/UL (ref 0–1.3)
MONOCYTES NFR BLD AUTO: 10 %
NEUTROPHILS # BLD AUTO: 2.4 10E3/UL (ref 1.6–8.3)
NEUTROPHILS NFR BLD AUTO: 77 %
NRBC # BLD AUTO: 0 10E3/UL
NRBC BLD AUTO-RTO: 0 /100
PLATELET # BLD AUTO: 52 10E3/UL (ref 150–450)
POTASSIUM SERPL-SCNC: 4 MMOL/L (ref 3.4–5.3)
PROT SERPL-MCNC: 6.1 G/DL (ref 6.4–8.3)
RBC # BLD AUTO: 2.18 10E6/UL (ref 4.4–5.9)
RETICS # AUTO: 0.06 10E6/UL (ref 0.01–0.11)
RETICS/RBC NFR AUTO: 3 % (ref 0.8–2.7)
SODIUM SERPL-SCNC: 141 MMOL/L (ref 135–145)
SPECIMEN EXPIRATION DATE: NORMAL
T4 FREE SERPL-MCNC: 0.93 NG/DL (ref 0.9–1.7)
TSH SERPL DL<=0.005 MIU/L-ACNC: 98.84 UIU/ML (ref 0.3–4.2)
UNIT ABO/RH: NORMAL
UNIT NUMBER: NORMAL
UNIT STATUS: NORMAL
UNIT TYPE ISBT: 7300
VIT B12 SERPL-MCNC: 1003 PG/ML (ref 232–1245)
WBC # BLD AUTO: 3.1 10E3/UL (ref 4–11)

## 2024-02-02 PROCEDURE — 99214 OFFICE O/P EST MOD 30 MIN: CPT | Performed by: NURSE PRACTITIONER

## 2024-02-02 PROCEDURE — 82565 ASSAY OF CREATININE: CPT | Performed by: INTERNAL MEDICINE

## 2024-02-02 PROCEDURE — 85004 AUTOMATED DIFF WBC COUNT: CPT | Performed by: INTERNAL MEDICINE

## 2024-02-02 PROCEDURE — 96367 TX/PROPH/DG ADDL SEQ IV INF: CPT

## 2024-02-02 PROCEDURE — 80053 COMPREHEN METABOLIC PANEL: CPT

## 2024-02-02 PROCEDURE — 96415 CHEMO IV INFUSION ADDL HR: CPT

## 2024-02-02 PROCEDURE — 82607 VITAMIN B-12: CPT

## 2024-02-02 PROCEDURE — 36591 DRAW BLOOD OFF VENOUS DEVICE: CPT | Performed by: INTERNAL MEDICINE

## 2024-02-02 PROCEDURE — 96375 TX/PRO/DX INJ NEW DRUG ADDON: CPT

## 2024-02-02 PROCEDURE — 82746 ASSAY OF FOLIC ACID SERUM: CPT

## 2024-02-02 PROCEDURE — 258N000003 HC RX IP 258 OP 636: Performed by: INTERNAL MEDICINE

## 2024-02-02 PROCEDURE — 86923 COMPATIBILITY TEST ELECTRIC: CPT | Performed by: NURSE PRACTITIONER

## 2024-02-02 PROCEDURE — 250N000011 HC RX IP 250 OP 636: Performed by: INTERNAL MEDICINE

## 2024-02-02 PROCEDURE — 36430 TRANSFUSION BLD/BLD COMPNT: CPT

## 2024-02-02 PROCEDURE — 36415 COLL VENOUS BLD VENIPUNCTURE: CPT | Performed by: NURSE PRACTITIONER

## 2024-02-02 PROCEDURE — 84439 ASSAY OF FREE THYROXINE: CPT

## 2024-02-02 PROCEDURE — 82728 ASSAY OF FERRITIN: CPT

## 2024-02-02 PROCEDURE — P9016 RBC LEUKOCYTES REDUCED: HCPCS | Performed by: NURSE PRACTITIONER

## 2024-02-02 PROCEDURE — 36591 DRAW BLOOD OFF VENOUS DEVICE: CPT

## 2024-02-02 PROCEDURE — 86900 BLOOD TYPING SEROLOGIC ABO: CPT | Performed by: NURSE PRACTITIONER

## 2024-02-02 PROCEDURE — 84443 ASSAY THYROID STIM HORMONE: CPT

## 2024-02-02 PROCEDURE — 85045 AUTOMATED RETICULOCYTE COUNT: CPT

## 2024-02-02 PROCEDURE — 96413 CHEMO IV INFUSION 1 HR: CPT

## 2024-02-02 RX ORDER — DIPHENHYDRAMINE HYDROCHLORIDE 50 MG/ML
50 INJECTION INTRAMUSCULAR; INTRAVENOUS
Status: CANCELLED
Start: 2024-02-02

## 2024-02-02 RX ORDER — HEPARIN SODIUM,PORCINE 10 UNIT/ML
5-20 VIAL (ML) INTRAVENOUS DAILY PRN
Status: CANCELLED | OUTPATIENT
Start: 2024-02-02

## 2024-02-02 RX ORDER — EPINEPHRINE 1 MG/ML
0.3 INJECTION, SOLUTION INTRAMUSCULAR; SUBCUTANEOUS EVERY 5 MIN PRN
Status: DISCONTINUED | OUTPATIENT
Start: 2024-02-02 | End: 2024-02-02 | Stop reason: HOSPADM

## 2024-02-02 RX ORDER — ALBUTEROL SULFATE 90 UG/1
1-2 AEROSOL, METERED RESPIRATORY (INHALATION)
Status: DISCONTINUED | OUTPATIENT
Start: 2024-02-02 | End: 2024-02-02 | Stop reason: HOSPADM

## 2024-02-02 RX ORDER — DIPHENHYDRAMINE HYDROCHLORIDE 50 MG/ML
50 INJECTION INTRAMUSCULAR; INTRAVENOUS
Status: DISCONTINUED | OUTPATIENT
Start: 2024-02-02 | End: 2024-02-02 | Stop reason: HOSPADM

## 2024-02-02 RX ORDER — HEPARIN SODIUM (PORCINE) LOCK FLUSH IV SOLN 100 UNIT/ML 100 UNIT/ML
5 SOLUTION INTRAVENOUS
Status: CANCELLED | OUTPATIENT
Start: 2024-02-02

## 2024-02-02 RX ORDER — METHYLPREDNISOLONE SODIUM SUCCINATE 125 MG/2ML
125 INJECTION, POWDER, LYOPHILIZED, FOR SOLUTION INTRAMUSCULAR; INTRAVENOUS
Status: DISCONTINUED | OUTPATIENT
Start: 2024-02-02 | End: 2024-02-02 | Stop reason: HOSPADM

## 2024-02-02 RX ORDER — ALBUTEROL SULFATE 0.83 MG/ML
2.5 SOLUTION RESPIRATORY (INHALATION)
Status: DISCONTINUED | OUTPATIENT
Start: 2024-02-02 | End: 2024-02-02 | Stop reason: HOSPADM

## 2024-02-02 RX ORDER — PALONOSETRON 0.05 MG/ML
0.25 INJECTION, SOLUTION INTRAVENOUS ONCE
Status: COMPLETED | OUTPATIENT
Start: 2024-02-02 | End: 2024-02-02

## 2024-02-02 RX ORDER — HEPARIN SODIUM (PORCINE) LOCK FLUSH IV SOLN 100 UNIT/ML 100 UNIT/ML
5 SOLUTION INTRAVENOUS
Status: DISCONTINUED | OUTPATIENT
Start: 2024-02-02 | End: 2024-02-02 | Stop reason: HOSPADM

## 2024-02-02 RX ORDER — EPINEPHRINE 1 MG/ML
0.3 INJECTION, SOLUTION INTRAMUSCULAR; SUBCUTANEOUS EVERY 5 MIN PRN
Status: CANCELLED | OUTPATIENT
Start: 2024-02-02

## 2024-02-02 RX ORDER — MEPERIDINE HYDROCHLORIDE 25 MG/ML
25 INJECTION INTRAMUSCULAR; INTRAVENOUS; SUBCUTANEOUS EVERY 30 MIN PRN
Status: DISCONTINUED | OUTPATIENT
Start: 2024-02-02 | End: 2024-02-02 | Stop reason: HOSPADM

## 2024-02-02 RX ADMIN — DIPHENHYDRAMINE HYDROCHLORIDE 50 MG: 50 INJECTION, SOLUTION INTRAMUSCULAR; INTRAVENOUS at 09:50

## 2024-02-02 RX ADMIN — CETUXIMAB 400 MG: 2 SOLUTION INTRAVENOUS at 10:31

## 2024-02-02 RX ADMIN — Medication 5 ML: at 14:43

## 2024-02-02 RX ADMIN — SODIUM CHLORIDE 250 ML: 9 INJECTION, SOLUTION INTRAVENOUS at 09:33

## 2024-02-02 RX ADMIN — PALONOSETRON 0.25 MG: 0.05 INJECTION, SOLUTION INTRAVENOUS at 09:33

## 2024-02-02 RX ADMIN — FOSAPREPITANT: 150 INJECTION, POWDER, LYOPHILIZED, FOR SOLUTION INTRAVENOUS at 10:04

## 2024-02-02 NOTE — PROGRESS NOTES
[facility] Hematology and Oncology Progress Note    Patient: Kristen Chapman  MRN: [id number]  Date of Service: 02/02/2024        Reason for Visit    No chief complaint on file.      Assessment and Plan     Cancer Staging   No matching staging information was found for the patient.    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 Getting biopsy next week to get more tissue so we can do more testing. He is here today to start erbitux. This was discussed previously with Dr. Frias. He will start today. He may add in Carboplatin at some point if Dr. Frias thinks he can handle it.     Significant anemia in the setting of chronic pancytopenia, CKD: has liver dysfunction that is contributing. Also cancer contributing. No bleeding. Will get some extra labs today. Will give 1 unit of blood. Pt consented with  on phone. His pancytopenia is overall stable. Will have to monitor this closely with starting treatment.     Renal insufficiency: likely from medications. Avoid nephrotoxic medications.  Stay hydrated.  Keep blood pressure well controlled.    Hepatitis and liver dysfunction/cirrhosis: seeing GI/hepatologist.     ECOG Performance    1 - Can't do physically strenuous work, but fully ambyulatory and can do light sedentary work    Distress Screening (within last 30 days)    1. How concerned are you about your ability to eat? : 0  2. How concerned are you about unintended weight loss or your current weight? : 5  3. How concerned are you about feeling depressed or very sad? : 0  4. How concerned are you about feeling anxious or very scared? : 0  5. Do you struggle with the loss of meaning and terese in your life? : Somewhat  6. How concerned are you about work and home life issues that may be affected by your cancer? : 0  7. How concerned are you about knowing what resources are available to help you? : 0  8. 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  Diagnosed January 2020. Imaging suggested bilateral abnormal lymphadenopathy in the neck.  Also asymmetric soft tissue thickening in the posterior right nasopharynx.  On imaging, tumor also appeared to extend down to the hypopharynx.     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  Right neck lymph node positive for recurrent nasopharyngeal carcinoma.  PET scan at that time showed new hypermetabolic mediastinal lymphadenopathy (chronic, most likely reactive), mass in the right ethmoid sinus, hypermetabolic right level 1B and 2A lymph nodes.    Recurrent disease found in 2023/early 2024: PET scan on 1/31/24 shows: Disease progression with increased size of the enhancing hypermetabolic soft tissue mass about the right hemimandible measuring up to 5.0 cm with invasion into the floor of the right oral cavity and right base of tongue. No evidence of osseous invasion. New focal FDG uptake in/adjacent to the crux of the right and left hemidiaphragm, as well as retrocrural and retrocaval lymph nodes, suspicious for metastatic disease. Cirrhotic liver with sequela of portal hypertension  including large volume ascites and splenogastric varices     Treatment:     Initially treated with concurrent chemotherapy and radiation.  He had weekly cisplatin starting March 3, 2020. Fifth and final dose given March 31, 2020.  Subsequent chemotherapy was held due to prolonged thrombocytopenia and renal insufficiency.  Radiation dose was 7000 cGy in 35 fractions given from March 2 through April 17, 2020.     Started cisplatin with infusional 5-FU on June 1, 2020.  Cycle 1 given at a 25% dose reduction.  He still had significant thrombocytopenia and neutropenia on day 28.  Cycle 2 was held.  At the same time his liver function tests elevated secondary to hepatitis B.   PET scan in September 2020 showed no good evidence of residual malignancy.     Recurrence:  Radiosurgery delivered to the right ethmoid tumor delivered November 8 through November 17, 2021.  Received 3500 cGy in 5 fractions.  Pembrolizumab started 12/23/21.  Held after his July 1, 2022 dose.  PET scan in March 2022 showed a near complete response.    -Pt is here today to start Erbitux therapy.      Interim History:    Patient is here to start his new regimen with a single agent Erbitux.  There is some that we may add in carboplatin at some point but we will start with Erbitux due to his significant medical comorbidities.  Patient was added onto my clinic schedule today for worsening anemia.  Patient has had a history of pancytopenia likely related to his liver dysfunction.  His anemia is worse today.  He denies any bleeding complications.  States he feels tired but that is chronic for him.  No significant breathing issues.  Says he has had a blood transfusion in the past and has had no complications from that.  He feels that he is ready to start treatment and he feels okay to get a blood transfusion.    Review of Systems    Pertinent items are noted in HPI.    Past History    Past Medical History:   Diagnosis Date    Anemia     Dysphagia      Hepatitis B chronic     Hypothyroidism     Nasopharyngeal cancer (H)     Severe protein-calorie malnutrition (H24)     Thrombocytopenia (H24)        PHYSICAL EXAM  There were no vitals taken for this visit.  Patient was seen in the infusion room.  His blood pressure was 101/59, temp was 98.4, he was 100% on room air.    GENERAL: no acute distress. Cooperative in conversation. Alone in clinic.  Patient has a wound on his neck that is covered.  RESP: Regular respiratory rate. No expiratory wheezes   NEURO: non focal. Alert and oriented x3.   PSYCH: within normal limits. No depression or anxiety.  SKIN: exposed skin is dry intact.     Lab Results    Recent Results (from the past 168 hour(s))   Glucose by meter   Result Value Ref Range    GLUCOSE BY METER POCT 96 70 - 99 mg/dL   Creatinine POCT   Result Value Ref Range    Creatinine POCT 1.9 (H) 0.7 - 1.3 mg/dL    GFR, ESTIMATED POCT 37 (L) >60 mL/min/1.73m2   Creatinine   Result Value Ref Range    Creatinine 1.88 (H) 0.67 - 1.17 mg/dL    GFR Estimate 38 (L) >60 mL/min/1.73m2   CBC with platelets and differential   Result Value Ref Range    WBC Count 3.1 (L) 4.0 - 11.0 10e3/uL    RBC Count 2.18 (L) 4.40 - 5.90 10e6/uL    Hemoglobin 6.2 (LL) 13.3 - 17.7 g/dL    Hematocrit 19.9 (L) 40.0 - 53.0 %    MCV 91 78 - 100 fL    MCH 28.4 26.5 - 33.0 pg    MCHC 31.2 (L) 31.5 - 36.5 g/dL    RDW 20.6 (H) 10.0 - 15.0 %    Platelet Count 52 (L) 150 - 450 10e3/uL    % Neutrophils 77 %    % Lymphocytes 11 %    % Monocytes 10 %    % Eosinophils 2 %    % Basophils 0 %    % Immature Granulocytes 0 %    NRBCs per 100 WBC 0 <1 /100    Absolute Neutrophils 2.4 1.6 - 8.3 10e3/uL    Absolute Lymphocytes 0.3 (L) 0.8 - 5.3 10e3/uL    Absolute Monocytes 0.3 0.0 - 1.3 10e3/uL    Absolute Eosinophils 0.1 0.0 - 0.7 10e3/uL    Absolute Basophils 0.0 0.0 - 0.2 10e3/uL    Absolute Immature Granulocytes 0.0 <=0.4 10e3/uL    Absolute NRBCs 0.0 10e3/uL   Reticulocyte count   Result Value Ref Range    %  Reticulocyte 3.0 (H) 0.8 - 2.7 %    Absolute Reticulocyte 0.064 0.010 - 0.110 10e6/uL   TSH with free T4 reflex   Result Value Ref Range    TSH 98.84 (H) 0.30 - 4.20 uIU/mL       Imaging    PET Oncology Whole Body    Result Date: 1/31/2024  Combined Report of: PET and CT on 1/31/2024 1:34 PM: 1. PET of the neck, chest, abdomen, and pelvis. 2. PET CT Fusion for Attenuation Correction and Anatomical Localization. 3. Diagnostic CT of the head and neck, as well as the chest, abdomen and pelvis with intravenous contrast obtained for diagnostic interpretation. 4. 3D MIP and PET-CT fused images were processed on an independent workstation and archived to PACS and reviewed by a radiologist. Technique: 1. PET: The patient received 9.81 mCi of F-18-FDG. The serum glucose was 96 mg/dL prior to administration. Body weight was 58.5 kg. Images were evaluated in the axial, sagittal, and coronal planes as well as the rotational whole body MIP. Images were acquired from the cranial vertex to the feet. UPTAKE WAS MEASURED AT 60 MINUTES. BACKGROUND: Liver SUV max = 3.14, Aorta Blood SUV max = 1.72. 2. CT: Volumetric acquisition for clinical interpretation of the chest, abdomen, and pelvis acquired at 3 mm sections. The chest, abdomen, and pelvis were evaluated at 5 mm sections in bone, soft tissue, and lung windows. High resolution images of the neck were obtained with multiple oblique projection reformats. Contrast and Medications: IV contrast: 64 mL of Isovue 370 intravenously. PO contrast: none. Additional Medications: None. 3. 3D MIP and PET-CT fused images were processed on an independent workstation and archived to PACS and reviewed by a radiologist. 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 ADDITIONAL INFORMATION OBTAINED FROM EMR: 73-year-old male with a history of nasopharyngeal carcinoma diagnosed in January 2020 which  was initially treated with chemotherapy and radiation. Patient then had recurrent disease involving the ethmoid sinus and September 2021 with positive lymph nodes in the neck and PET scan showing a hypermetabolic metastatic lymphadenopathy, as well as hypermetabolic cervical lymph nodes. Patient then had radiosurgery in the right ethmoid tumor from November 8 through November 17 and 20, 2021 with subsequent chemotherapy. PET scan in March 2022 showed a near-complete response. Patient now has clinically progressive disease in the right submandibular area. Patient is going to start on chemotherapy per note from 1/23/2024. Patient did not want surgery and radiation reportedly was not an option. COMPARISON: CT neck 10/23/2023. PET 9/11/2023. FINDINGS: HEAD/NECK: Index tumor: Increased size of the enhancing soft tissue lesion about the right hemimandible now measuring approximately 5.0 x 3.4 cm, previously measured 3.6 cm in largest axial dimension with an SUV max of 10.42. This lesion appears to invade into the floor of the mouth and the base of the right aspect of the tongue with obliteration's of the fat planes in the right oral cavity about the tongue. No significant osseous erosion of the mandible. Pharyngeal mucosal spaces: Nasopharynx and palatine tonsils are within normal limits. Left tongue base is normal. Left oral tongue and buccal mucosa of the oral cavity is normal. Oropharynx, hypopharynx and larynx are within normal limits. Sinonasal region: Mucosal thickening throughout the paranasal sinuses with associated osteitis, likely chronic. Postsurgical changes of tumor resection in the right ethmoid sinus. Mastoid air cells are clear. Lymph nodes: No FDG avid or abnormally enlarged lymph nodes. Salivary glands: The major salivary glands are within normal limits. Thyroid gland: Multiple small hypoattenuating thyroid nodules bilaterally. Vascular structures: The major vasculature of the neck are patent. Left IJ  CVC with tip in the distal SVC. Prominent right external jugular vein. Brain: No abnormal FDG avid lesion or abnormal enhancement. CHEST: Lymph nodes: conglomerate of lymph nodes about the crux of the right hemidiaphragm posterior to the IVC, with multiple additional small retrocrural lymph nodes demonstrating increased FDG uptake with a max SUV of 8.0. There is also increased uptake of the anterior left crux of the diaphragm just anterior to the aorta with an SUV max of 5.04 that appears slightly thickened on CT. Additional hypermetabolic retrocaval nodes more inferiorly, for example a node on series 4 image 278 with SUV max of 6.55. Lungs: The central tracheobronchial tree is clear. No acute consolidation.  No pleural effusion or pneumothorax. Fibroatelectasis in the right lower lobe adjacent to vertebral osteophytes. Heart and great vessels: Heart is enlarged. No pericardial effusion. Ectatic ascending aorta measuring up to 4.3 cm in diameter. Main pulmonary artery is normal in caliber. The esophagus is unremarkable. ABDOMEN AND PELVIS: Liver: No FDG avid lesion. Cirrhotic liver without focal lesion. Gallbladder: Unremarkable. No intrahepatic or extrahepatic biliary dilatation. Pancreas: The pancreas is within normal limits. No pancreatic ductal dilatation. Spleen: No FDG avid lesion. Splenomegaly. Unchanged non-FDG avid hypoattenuating focus in the peripheral spleen, likely benign (series 10 image 62) Adrenal glands: No FDG avid foci. Kidneys: No FDG avid lesion. Atrophic kidneys. No hydronephrosis, mass, or calculi.. The urinary bladder is unremarkable. Reproductive organs are within normal limits. Gastrointestinal system: Normal caliber of the small and large bowel. Diffuse hypoattenuation of the wall of the ascending colon, likely colopathy secondary to patient's hepatic function. Lymph nodes: No FDG avid or abnormally enlarged lymph nodes. Vascular structures: Abdominal aorta is normal in caliber. Major  branches are patent.. The splenic vein, superior mesenteric vein, and main portal vein are patent. Gastrosplenic varices. Peritoneum: Large volume ascites. No free air. EXTREMITIES: No abnormal FDG uptake in the visualized extremities. BONES AND SOFT TISSUES: No abnormal FDG uptake in the axial or appendicular skeleton. No abnormal lytic or blastic osseous lesions. SPINAL CANAL: No evident canal compromise. No abnormal FDG avid lesion.     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 Disease progression with increased size of the enhancing hypermetabolic soft tissue mass about the right hemimandible measuring up to 5.0 cm with invasion into the floor of the right oral cavity and right base of tongue. No evidence of osseous invasion, though involvement of adjacent structures would be better evaluated with MRI. 2. New focal FDG uptake in/adjacent to the crux of the right and left hemidiaphragm, as well as retrocrural and retrocaval lymph nodes, suspicious for metastatic disease. 3. Cirrhotic liver with sequela of portal hypertension including large volume ascites and splenogastric varices. 4. Stable appearance of the hypoattenuating lesion in the spleen that does not demonstrate increased FDG uptake. I have personally reviewed the examination and initial interpretation and I agree with the findings. ELIZABETH ALVAREZ DO   SYSTEM ID:  [id number]    CT Chest/Abdomen/Pelvis w Contrast    Result Date: 1/31/2024  Combined Report of: PET and CT on 1/31/2024 1:34 PM: 1. PET of the neck, chest, abdomen, and pelvis. 2. PET CT Fusion for Attenuation Correction and Anatomical Localization. 3. Diagnostic CT of the head and neck, as well as the chest, abdomen and pelvis with intravenous contrast obtained for diagnostic interpretation. 4. 3D MIP and PET-CT fused images were processed on an  independent workstation and archived to PACS and reviewed by a radiologist. Technique: 1. PET: The patient received 9.81 mCi of F-18-FDG. The serum glucose was 96 mg/dL prior to administration. Body weight was 58.5 kg. Images were evaluated in the axial, sagittal, and coronal planes as well as the rotational whole body MIP. Images were acquired from the cranial vertex to the feet. UPTAKE WAS MEASURED AT 60 MINUTES. BACKGROUND: Liver SUV max = 3.14, Aorta Blood SUV max = 1.72. 2. CT: Volumetric acquisition for clinical interpretation of the chest, abdomen, and pelvis acquired at 3 mm sections. The chest, abdomen, and pelvis were evaluated at 5 mm sections in bone, soft tissue, and lung windows. High resolution images of the neck were obtained with multiple oblique projection reformats. Contrast and Medications: IV contrast: 64 mL of Isovue 370 intravenously. PO contrast: none. Additional Medications: None. 3. 3D MIP and PET-CT fused images were processed on an independent workstation and archived to PACS and reviewed by a radiologist. 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 Patient then had recurrent disease involving the ethmoid sinus and September 2021 with positive lymph nodes in the neck and PET scan showing a hypermetabolic metastatic lymphadenopathy, as well as hypermetabolic cervical lymph nodes. Patient then had radiosurgery in the right ethmoid tumor from November 8 through November 17 and 20, 2021 with subsequent chemotherapy. PET scan in March 2022 showed a near-complete response. Patient now has clinically progressive disease in the right submandibular area. Patient is going to start on chemotherapy per note from  1/23/2024. Patient did not want surgery and radiation reportedly was not an option. COMPARISON: CT neck 10/23/2023. PET 9/11/2023. FINDINGS: HEAD/NECK: Index tumor: Increased size of the enhancing soft tissue lesion about the right hemimandible now measuring approximately 5.0 x 3.4 cm, previously measured 3.6 cm in largest axial dimension with an SUV max of 10.42. This lesion appears to invade into the floor of the mouth and the base of the right aspect of the tongue with obliteration's of the fat planes in the right oral cavity about the tongue. No significant osseous erosion of the mandible. Pharyngeal mucosal spaces: Nasopharynx and palatine tonsils are within normal limits. Left tongue base is normal. Left oral tongue and buccal mucosa of the oral cavity is normal. Oropharynx, hypopharynx and larynx are within normal limits. Sinonasal region: Mucosal thickening throughout the paranasal sinuses with associated osteitis, likely chronic. Postsurgical changes of tumor resection in the right ethmoid sinus. Mastoid air cells are clear. Lymph nodes: No FDG avid or abnormally enlarged lymph nodes. Salivary glands: The major salivary glands are within normal limits. Thyroid gland: Multiple small hypoattenuating thyroid nodules bilaterally. Vascular structures: The major vasculature of the neck are patent. Left IJ CVC with tip in the distal SVC. Prominent right external jugular vein. Brain: No abnormal FDG avid lesion or abnormal enhancement. CHEST: Lymph nodes: conglomerate of lymph nodes about the crux of the right hemidiaphragm posterior to the IVC, with multiple additional small retrocrural lymph nodes demonstrating increased FDG uptake with a max SUV of 8.0. There is also increased uptake of the anterior left crux of the diaphragm just anterior to the aorta with an SUV max of 5.04 that appears slightly thickened on CT. Additional hypermetabolic retrocaval nodes more inferiorly, for example a node on series 4 image  278 with SUV max of 6.55. Lungs: The central tracheobronchial tree is clear. No acute consolidation.  No pleural effusion or pneumothorax. Fibroatelectasis in the right lower lobe adjacent to vertebral osteophytes. Heart and great vessels: Heart is enlarged. No pericardial effusion. Ectatic ascending aorta measuring up to 4.3 cm in diameter. Main pulmonary artery is normal in caliber. The esophagus is unremarkable. ABDOMEN AND PELVIS: Liver: No FDG avid lesion. Cirrhotic liver without focal lesion. Gallbladder: Unremarkable. No intrahepatic or extrahepatic biliary dilatation. Pancreas: The pancreas is within normal limits. No pancreatic ductal dilatation. Spleen: No FDG avid lesion. Splenomegaly. Unchanged non-FDG avid hypoattenuating focus in the peripheral spleen, likely benign (series 10 image 62) Adrenal glands: No FDG avid foci. Kidneys: No FDG avid lesion. Atrophic kidneys. No hydronephrosis, mass, or calculi.. The urinary bladder is unremarkable. Reproductive organs are within normal limits. Gastrointestinal system: Normal caliber of the small and large bowel. Diffuse hypoattenuation of the wall of the ascending colon, likely colopathy secondary to patient's hepatic function. Lymph nodes: No FDG avid or abnormally enlarged lymph nodes. Vascular structures: Abdominal aorta is normal in caliber. Major branches are patent.. The splenic vein, superior mesenteric vein, and main portal vein are patent. Gastrosplenic varices. Peritoneum: Large volume ascites. No free air. EXTREMITIES: No abnormal FDG uptake in the visualized extremities. BONES AND SOFT TISSUES: No abnormal FDG uptake in the axial or appendicular skeleton. No abnormal lytic or blastic osseous lesions. SPINAL CANAL: No evident canal compromise. No abnormal FDG avid lesion.     IMPRESSION: In this patient with a history of nasopharyngeal squamous cell carcinoma status post resection with chemotherapy/radiation with recent local recurrence about the  right hemimandible: 1. Disease progression with increased size of the enhancing hypermetabolic soft tissue mass about the right hemimandible measuring up to 5.0 cm with invasion into the floor of the right oral cavity and right base of tongue. No evidence of osseous invasion, though involvement of adjacent structures would be better evaluated with MRI. 2. New focal FDG uptake in/adjacent to the crux of the right and left hemidiaphragm, as well as retrocrural and retrocaval lymph nodes, suspicious for metastatic disease. 3. Cirrhotic liver with sequela of portal hypertension including large volume ascites and splenogastric varices. 4. Stable appearance of the hypoattenuating lesion in the spleen that does not demonstrate increased FDG uptake. I have personally reviewed the examination and initial interpretation and I agree with the findings. ELIZABETH ALVAREZ DO   SYSTEM ID:  [id number]    CT Soft Tissue Neck w Contrast    Result Date: 1/31/2024  Combined Report of: PET and CT on 1/31/2024 1:34 PM: 1. PET of the neck, chest, abdomen, and pelvis. 2. PET CT Fusion for Attenuation Correction and Anatomical Localization. 3. Diagnostic CT of the head and neck, as well as the chest, abdomen and pelvis with intravenous contrast obtained for diagnostic interpretation. 4. 3D MIP and PET-CT fused images were processed on an independent workstation and archived to PACS and reviewed by a radiologist. Technique: 1. PET: The patient received 9.81 mCi of F-18-FDG. The serum glucose was 96 mg/dL prior to administration. Body weight was 58.5 kg. Images were evaluated in the axial, sagittal, and coronal planes as well as the rotational whole body MIP. Images were acquired from the cranial vertex to the feet. UPTAKE WAS MEASURED AT 60 MINUTES. BACKGROUND: Liver SUV max = 3.14, Aorta Blood SUV max = 1.72. 2. CT: Volumetric acquisition for clinical interpretation of the chest, abdomen, and pelvis acquired at 3 mm sections. The chest, abdomen,  and pelvis were evaluated at 5 mm sections in bone, soft tissue, and lung windows. High resolution images of the neck were obtained with multiple oblique projection reformats. Contrast and Medications: IV contrast: 64 mL of Isovue 370 intravenously. PO contrast: none. Additional Medications: None. 3. 3D MIP and PET-CT fused images were processed on an independent workstation and archived to PACS and reviewed by a radiologist. 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 Patient then had recurrent disease involving the ethmoid sinus and September 2021 with positive lymph nodes in the neck and PET scan showing a hypermetabolic metastatic lymphadenopathy, as well as hypermetabolic cervical lymph nodes. Patient then had radiosurgery in the right ethmoid tumor from November 8 through November 17 and 20, 2021 with subsequent chemotherapy. PET scan in March 2022 showed a near-complete response. Patient now has clinically progressive disease in the right submandibular area. Patient is going to start on chemotherapy per note from 1/23/2024. Patient did not want surgery and radiation reportedly was not an option. COMPARISON: CT neck 10/23/2023. PET 9/11/2023. FINDINGS: HEAD/NECK: Index tumor: Increased size of the enhancing soft tissue lesion about the right hemimandible now measuring approximately 5.0 x 3.4 cm, previously measured 3.6 cm in largest axial dimension with an SUV max of 10.42. This lesion appears to invade into the floor of the mouth and the base of the right aspect of the tongue with obliteration's of the fat planes in the right oral cavity about the tongue. No significant osseous erosion of the mandible. Pharyngeal mucosal  spaces: Nasopharynx and palatine tonsils are within normal limits. Left tongue base is normal. Left oral tongue and buccal mucosa of the oral cavity is normal. Oropharynx, hypopharynx and larynx are within normal limits. Sinonasal region: Mucosal thickening throughout the paranasal sinuses with associated osteitis, likely chronic. Postsurgical changes of tumor resection in the right ethmoid sinus. Mastoid air cells are clear. Lymph nodes: No FDG avid or abnormally enlarged lymph nodes. Salivary glands: The major salivary glands are within normal limits. Thyroid gland: Multiple small hypoattenuating thyroid nodules bilaterally. Vascular structures: The major vasculature of the neck are patent. Left IJ CVC with tip in the distal SVC. Prominent right external jugular vein. Brain: No abnormal FDG avid lesion or abnormal enhancement. CHEST: Lymph nodes: conglomerate of lymph nodes about the crux of the right hemidiaphragm posterior to the IVC, with multiple additional small retrocrural lymph nodes demonstrating increased FDG uptake with a max SUV of 8.0. There is also increased uptake of the anterior left crux of the diaphragm just anterior to the aorta with an SUV max of 5.04 that appears slightly thickened on CT. Additional hypermetabolic retrocaval nodes more inferiorly, for example a node on series 4 image 278 with SUV max of 6.55. Lungs: The central tracheobronchial tree is clear. No acute consolidation.  No pleural effusion or pneumothorax. Fibroatelectasis in the right lower lobe adjacent to vertebral osteophytes. Heart and great vessels: Heart is enlarged. No pericardial effusion. Ectatic ascending aorta measuring up to 4.3 cm in diameter. Main pulmonary artery is normal in caliber. The esophagus is unremarkable. ABDOMEN AND PELVIS: Liver: No FDG avid lesion. Cirrhotic liver without focal lesion. Gallbladder: Unremarkable. No intrahepatic or extrahepatic biliary dilatation. Pancreas: The pancreas is within normal  limits. No pancreatic ductal dilatation. Spleen: No FDG avid lesion. Splenomegaly. Unchanged non-FDG avid hypoattenuating focus in the peripheral spleen, likely benign (series 10 image 62) Adrenal glands: No FDG avid foci. Kidneys: No FDG avid lesion. Atrophic kidneys. No hydronephrosis, mass, or calculi.. The urinary bladder is unremarkable. Reproductive organs are within normal limits. Gastrointestinal system: Normal caliber of the small and large bowel. Diffuse hypoattenuation of the wall of the ascending colon, likely colopathy secondary to patient's hepatic function. Lymph nodes: No FDG avid or abnormally enlarged lymph nodes. Vascular structures: Abdominal aorta is normal in caliber. Major branches are patent.. The splenic vein, superior mesenteric vein, and main portal vein are patent. Gastrosplenic varices. Peritoneum: Large volume ascites. No free air. EXTREMITIES: No abnormal FDG uptake in the visualized extremities. BONES AND SOFT TISSUES: No abnormal FDG uptake in the axial or appendicular skeleton. No abnormal lytic or blastic osseous lesions. SPINAL CANAL: No evident canal compromise. No abnormal FDG avid lesion.     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 Disease progression with increased size of the enhancing hypermetabolic soft tissue mass about the right hemimandible measuring up to 5.0 cm with invasion into the floor of the right oral cavity and right base of tongue. No evidence of osseous invasion, though involvement of adjacent structures would be better evaluated with MRI. 2. New focal FDG uptake in/adjacent to the crux of the right and left hemidiaphragm, as well as retrocrural and retrocaval lymph nodes, suspicious for metastatic disease. 3. Cirrhotic liver with sequela of portal hypertension including large volume ascites and  splenogastric varices. 4. Stable appearance of the hypoattenuating lesion in the spleen that does not demonstrate increased FDG uptake. I have personally reviewed the examination and initial interpretation and I agree with the findings. ELIZABETH ALVAREZ DO   SYSTEM ID:  [id number]    US Paracentesis with Albumin    Result Date: 1/26/2024  EXAM: 1. PARACENTESIS 2. ULTRASOUND GUIDANCE LOCATION: [facility] DATE: 1/26/2024 INDICATION: Ascites. PROCEDURE: Informed consent obtained. Time out performed. The abdomen was prepped and draped in a sterile fashion. 10 mL of 1% lidocaine was infused into local soft tissues. A 5 [place] catheter system was introduced into the abdominal ascites under ultrasound guidance. 5.0 liters of clear fluid were removed and sent to lab if requested. Patient tolerated procedure well. Ultrasound imaging was obtained and placed in the patient's permanent medical record.     IMPRESSION: 1.  Status post ultrasound-guided paracentesis. Reference CPT Code: 88916    US Paracentesis with Albumin    Result Date: 1/17/2024  EXAM: 1. PARACENTESIS 2. ULTRASOUND GUIDANCE LOCATION: [facility] DATE: 1/17/2024 INDICATION: Ascites. PROCEDURE: Informed consent obtained. Time out performed. The abdomen was prepped and draped in a sterile fashion. 8 mL of 1% lidocaine was infused into local soft tissues. A 5 [place] catheter system was introduced into the abdominal ascites under ultrasound guidance. 5 liters of yellow fluid were removed and sent to lab if requested. Patient tolerated procedure well. Ultrasound imaging was obtained and placed in the patient's permanent medical record.     IMPRESSION: 1.  Status post ultrasound-guided paracentesis. Reference CPT Code: 15481    US Paracentesis with Albumin    Result Date: 1/3/2024  EXAM: 1. PARACENTESIS 2. ULTRASOUND GUIDANCE LOCATION: [facility] DATE: 1/3/2024 INDICATION: Ascites. PROCEDURE:  Informed consent obtained. Time out performed. The abdomen was prepped and draped in a sterile fashion. 10 mL of 1% lidocaine was infused into local soft tissues. A 5 [place] catheter system was introduced into the abdominal ascites under ultrasound guidance. 5 liters of clear fluid were removed and sent to lab if requested. Patient tolerated procedure well. Ultrasound imaging was obtained and placed in the patient's permanent medical record.     IMPRESSION: 1.  Status post ultrasound-guided paracentesis. Reference CPT Code: 39071     Discussed with his infusion nurse.  Discussion was done through  on telephone  .Total time spent with patient in face to face time, chart review and documentation was 30 minutes.        Signed by: SPARKLE Guzmán CNP

## 2024-02-02 NOTE — PROGRESS NOTES
[facility]: Cancer Care Plan of Care Education Note                                    Discussion with Patient:                                                      Discussed chemotherapy teaching.     Antiemetics  Medication understanding/side effect  Port concerns     Goals          General     Functional (Cancer Care) (pt-stated)      Notes - Note created  2/2/2024 10:36 AM by Marjorie Briones RN     Goal Statement: I want to maintain or improve my current ability for my activities of daily living.   Date Goal set: 2/2/2024  Barriers: disease burden and language  Strengths: support, coping, and health awareness  Date to Achieve By: ongoing  Patient expressed understanding of goal:  Yes   Action steps to achieve this goal:  I will rest when needed.  I will communicate with my care team if not able to maintain ADLs.                Assessment:                                                      Assessment completed with:: Patient;Other ()    Plan of Care Education   Yearly learning assessment completed?: Yes (see Education tab)  Does patient understand diagnosis?: Yes  Does patient understand treatment plan/regimen?: Yes  Preparing for treatment:: Reviewed treatment preparation information with patient (vascular access, day of chemo, visitor policy, what to bring, etc.)  Vascular access education provided for:: Port  Side effect education:: Diarrhea/Constipation;Fatigue;Immune-mediated effects;Infection;Nausea/Vomiting;Neuropathy;Skin changes;Lab value monitoring (anemia, neutropenia, thrombocytopenia)  Safety/self care at home reviewed with patient:: Yes  Coping - concerns/fears reviewed with patient:: Yes  Plan of Care:: FERNANDO follow-up appointment;MD follow-up appointment;Lab appointment;Treatment schedule  When to call provider:: Bleeding;Increased shortness of breath;New/worsening pain;Shaking chills;Temperature >100.4F;Uncontrolled diarrhea/constipation;Uncontrolled nausea/vomiting  Reasons  for deferring treatment reviewed with patient:: Yes  Additional education provided for: : Bleeding precautions;Neutropenic precautions    Evaluation of Learning  Patient Education Provided: Yes  Readiness:: Acceptance  Method:: Booklet/Handout;Explanation  Response:: Verbalizes understanding        Intervention/Education provided during outreach:                                                         Follow up call in 1-2 weeks  Patient to follow up as scheduled at next appt  Patient to call/[employer] message with updates  Confirmed patient has clinic and triage numbers    Signature:  Marjorie Briones RN   Cheilitis Aggressive Treatment: I recommended application of Vaseline or Aquaphor numerous times a day (as often as every hour) and before going to bed. I also prescribed a topical steroid for twice daily use.

## 2024-02-02 NOTE — PROGRESS NOTES
DATE/TIME OF CALL RECEIVED FROM LAB:  02/02/24 at 08:54 AM   LAB TEST:  Hgb  LAB VALUE:  6.2  PROVIDER NOTIFIED?: Yes  PROVIDER NAME: Yoanna Andre  DATE/TIME LAB VALUE REPORTED TO PROVIDER: [id number]  MECHANISM OF PROVIDER NOTIFICATION: Face-To-Face  PROVIDER RESPONSE: 1 unit of PRBC's ordered.

## 2024-02-02 NOTE — PROGRESS NOTES
Infusion Nursing Note:  Kristen Chapman presents today for D1C1.    Patient seen by provider today: No   present during visit today: Yes, Language: Hmong.     Note: Pt received education on new treatment chairside by RNCC. Pt tolerated ERBITUX well. Monitored for 60 min post infusion.  Per Yoanna, pt given 1 unit of PRBC's. Consent obtained and sent to be scanned.   Pt given verbal and written information on blood transfusion and reasons to call the MD post blood transfusion.   Pt's TSH elevated. Confirmed he is taking Synthroid as ordered and he states he is. Per Yoanna, recheck TSH next week. Order entered. Message sent to pt's RNCC to call pt next week to come in for lab only.    Intravenous Access:  Implanted Port.    Treatment Conditions:  Lab Results   Component Value Date    HGB 6.2 (LL) 02/02/2024    WBC 3.1 (L) 02/02/2024    ANEUTAUTO 2.4 02/02/2024    PLT 52 (L) 02/02/2024        Lab Results   Component Value Date     02/02/2024    POTASSIUM 4.0 02/02/2024    MAG 1.8 07/31/2020    CR 1.88 (H) 02/02/2024    CR 1.92 (H) 02/02/2024    GUANAKITO 7.9 (L) 02/02/2024    BILITOTAL 0.3 02/02/2024    ALBUMIN 2.9 (L) 02/02/2024    ALT 40 02/02/2024    AST 61 (H) 02/02/2024       Results reviewed, labs MET treatment parameters, ok to proceed with treatment.  OK to proceed with PLT count of 52 per Yoanna NP      Post Infusion Assessment:  Patient tolerated infusion without incident.  Site patent and intact, free from redness, edema or discomfort.  No evidence of extravasations.  Access discontinued per protocol.       Discharge Plan:   Patient and/or family verbalized understanding of discharge instructions and all questions answered.  Patient discharged in stable condition accompanied by: son.  Departure Mode: Ambulatory.      Karen Mckinley RN

## 2024-02-02 NOTE — LETTER
2/2/2024         RE: Kristen Chapman  [address] [place] [address]        Dear Colleague,    Thank you for referring your patient, Kristen Chapman, to the [facility] [place]. Please see a copy of my visit note below.    [facility] Hematology and Oncology Progress Note    Patient: Kristen Chapman  MRN: [id number]  Date of Service: 02/02/2024        Reason for Visit    No chief complaint on file.      Assessment and Plan     Cancer Staging   No matching staging information was found for the patient.    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 Getting biopsy next week to get more tissue so we can do more testing. He is here today to start erbitux. This was discussed previously with Dr. Frias. He will start today. He may add in Carboplatin at some point if Dr. Frias thinks he can handle it.     Significant anemia in the setting of chronic pancytopenia, CKD: has liver dysfunction that is contributing. Also cancer contributing. No bleeding. Will get some extra labs today. Will give 1 unit of blood. Pt consented with  on phone. His pancytopenia is overall stable. Will have to monitor this closely with starting treatment.     Renal insufficiency: likely from medications. Avoid nephrotoxic medications.  Stay hydrated.  Keep blood pressure well controlled.    Hepatitis and liver dysfunction/cirrhosis: seeing GI/hepatologist.     ECOG Performance    1 - Can't do physically strenuous work, but fully ambyulatory and can do light sedentary work    Distress Screening (within last 30 days)    1. How concerned are you about your ability to eat? : 0  2. How concerned are you about unintended weight loss or your current weight? : 5  3. How concerned are you about feeling depressed or very sad? : 0  4. How concerned are you about feeling anxious or very scared? : 0  5. Do you struggle with the loss of meaning and terese  in your life? : Somewhat  6. How concerned are you about work and home life issues that may be affected by your cancer? : 0  7. How concerned are you about knowing what resources are available to help you? : 0  8. 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  Diagnosed January 2020. Imaging suggested bilateral abnormal lymphadenopathy in the neck.  Also asymmetric soft tissue thickening in the posterior right nasopharynx.  On imaging, tumor also appeared to extend down to the hypopharynx.     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  Right neck lymph node positive for recurrent nasopharyngeal carcinoma.  PET scan at that time showed new hypermetabolic mediastinal lymphadenopathy (chronic, most likely reactive), mass in the right ethmoid sinus, hypermetabolic right level 1B and 2A lymph nodes.    Recurrent disease found in 2023/early 2024: PET scan on 1/31/24 shows: Disease progression with increased size of the enhancing hypermetabolic soft tissue mass about the right hemimandible measuring up to 5.0 cm with invasion into the floor of the right oral cavity and right base of tongue. No  evidence of osseous invasion. New focal FDG uptake in/adjacent to the crux of the right and left hemidiaphragm, as well as retrocrural and retrocaval lymph nodes, suspicious for metastatic disease. Cirrhotic liver with sequela of portal hypertension including large volume ascites and splenogastric varices     Treatment:     Initially treated with concurrent chemotherapy and radiation.  He had weekly cisplatin starting March 3, 2020. Fifth and final dose given March 31, 2020.  Subsequent chemotherapy was held due to prolonged thrombocytopenia and renal insufficiency.  Radiation dose was 7000 cGy in 35 fractions given from March 2 through April 17, 2020.     Started cisplatin with infusional 5-FU on June 1, 2020.  Cycle 1 given at a 25% dose reduction.  He still had significant thrombocytopenia and neutropenia on day 28.  Cycle 2 was held.  At the same time his liver function tests elevated secondary to hepatitis B.   PET scan in September 2020 showed no good evidence of residual malignancy.     Recurrence:  Radiosurgery delivered to the right ethmoid tumor delivered November 8 through November 17, 2021.  Received 3500 cGy in 5 fractions.  Pembrolizumab started 12/23/21.  Held after his July 1, 2022 dose.  PET scan in March 2022 showed a near complete response.    -Pt is here today to start Erbitux therapy.      Interim History:    Patient is here to start his new regimen with a single agent Erbitux.  There is some that we may add in carboplatin at some point but we will start with Erbitux due to his significant medical comorbidities.  Patient was added onto my clinic schedule today for worsening anemia.  Patient has had a history of pancytopenia likely related to his liver dysfunction.  His anemia is worse today.  He denies any bleeding complications.  States he feels tired but that is chronic for him.  No significant breathing issues.  Says he has had a blood transfusion in the past and has had no complications from  that.  He feels that he is ready to start treatment and he feels okay to get a blood transfusion.    Review of Systems    Pertinent items are noted in HPI.    Past History    Past Medical History:   Diagnosis Date     Anemia      Dysphagia      Hepatitis B chronic      Hypothyroidism      Nasopharyngeal cancer (H)      Severe protein-calorie malnutrition (H24)      Thrombocytopenia (H24)        PHYSICAL EXAM  There were no vitals taken for this visit.  Patient was seen in the infusion room.  His blood pressure was 101/59, temp was 98.4, he was 100% on room air.    GENERAL: no acute distress. Cooperative in conversation. Alone in clinic.  Patient has a wound on his neck that is covered.  RESP: Regular respiratory rate. No expiratory wheezes   NEURO: non focal. Alert and oriented x3.   PSYCH: within normal limits. No depression or anxiety.  SKIN: exposed skin is dry intact.     Lab Results    Recent Results (from the past 168 hour(s))   Glucose by meter   Result Value Ref Range    GLUCOSE BY METER POCT 96 70 - 99 mg/dL   Creatinine POCT   Result Value Ref Range    Creatinine POCT 1.9 (H) 0.7 - 1.3 mg/dL    GFR, ESTIMATED POCT 37 (L) >60 mL/min/1.73m2   Creatinine   Result Value Ref Range    Creatinine 1.88 (H) 0.67 - 1.17 mg/dL    GFR Estimate 38 (L) >60 mL/min/1.73m2   CBC with platelets and differential   Result Value Ref Range    WBC Count 3.1 (L) 4.0 - 11.0 10e3/uL    RBC Count 2.18 (L) 4.40 - 5.90 10e6/uL    Hemoglobin 6.2 (LL) 13.3 - 17.7 g/dL    Hematocrit 19.9 (L) 40.0 - 53.0 %    MCV 91 78 - 100 fL    MCH 28.4 26.5 - 33.0 pg    MCHC 31.2 (L) 31.5 - 36.5 g/dL    RDW 20.6 (H) 10.0 - 15.0 %    Platelet Count 52 (L) 150 - 450 10e3/uL    % Neutrophils 77 %    % Lymphocytes 11 %    % Monocytes 10 %    % Eosinophils 2 %    % Basophils 0 %    % Immature Granulocytes 0 %    NRBCs per 100 WBC 0 <1 /100    Absolute Neutrophils 2.4 1.6 - 8.3 10e3/uL    Absolute Lymphocytes 0.3 (L) 0.8 - 5.3 10e3/uL    Absolute Monocytes  0.3 0.0 - 1.3 10e3/uL    Absolute Eosinophils 0.1 0.0 - 0.7 10e3/uL    Absolute Basophils 0.0 0.0 - 0.2 10e3/uL    Absolute Immature Granulocytes 0.0 <=0.4 10e3/uL    Absolute NRBCs 0.0 10e3/uL   Reticulocyte count   Result Value Ref Range    % Reticulocyte 3.0 (H) 0.8 - 2.7 %    Absolute Reticulocyte 0.064 0.010 - 0.110 10e6/uL   TSH with free T4 reflex   Result Value Ref Range    TSH 98.84 (H) 0.30 - 4.20 uIU/mL       Imaging    PET Oncology Whole Body    Result Date: 1/31/2024  Combined Report of: PET and CT on 1/31/2024 1:34 PM: 1. PET of the neck, chest, abdomen, and pelvis. 2. PET CT Fusion for Attenuation Correction and Anatomical Localization. 3. Diagnostic CT of the head and neck, as well as the chest, abdomen and pelvis with intravenous contrast obtained for diagnostic interpretation. 4. 3D MIP and PET-CT fused images were processed on an independent workstation and archived to PACS and reviewed by a radiologist. Technique: 1. PET: The patient received 9.81 mCi of F-18-FDG. The serum glucose was 96 mg/dL prior to administration. Body weight was 58.5 kg. Images were evaluated in the axial, sagittal, and coronal planes as well as the rotational whole body MIP. Images were acquired from the cranial vertex to the feet. UPTAKE WAS MEASURED AT 60 MINUTES. BACKGROUND: Liver SUV max = 3.14, Aorta Blood SUV max = 1.72. 2. CT: Volumetric acquisition for clinical interpretation of the chest, abdomen, and pelvis acquired at 3 mm sections. The chest, abdomen, and pelvis were evaluated at 5 mm sections in bone, soft tissue, and lung windows. High resolution images of the neck were obtained with multiple oblique projection reformats. Contrast and Medications: IV contrast: 64 mL of Isovue 370 intravenously. PO contrast: none. Additional Medications: None. 3. 3D MIP and PET-CT fused images were processed on an independent workstation and archived to PACS and reviewed by a radiologist. INDICATION: nasopharynx carcinoma  staging; Squamous cell carcinoma of nasopharynx (H); Malignant neoplasm of overlapping sites of nasopharynx (H). 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 Patient then had recurrent disease involving the ethmoid sinus and September 2021 with positive lymph nodes in the neck and PET scan showing a hypermetabolic metastatic lymphadenopathy, as well as hypermetabolic cervical lymph nodes. Patient then had radiosurgery in the right ethmoid tumor from November 8 through November 17 and 20, 2021 with subsequent chemotherapy. PET scan in March 2022 showed a near-complete response. Patient now has clinically progressive disease in the right submandibular area. Patient is going to start on chemotherapy per note from 1/23/2024. Patient did not want surgery and radiation reportedly was not an option. COMPARISON: CT neck 10/23/2023. PET 9/11/2023. FINDINGS: HEAD/NECK: Index tumor: Increased size of the enhancing soft tissue lesion about the right hemimandible now measuring approximately 5.0 x 3.4 cm, previously measured 3.6 cm in largest axial dimension with an SUV max of 10.42. This lesion appears to invade into the floor of the mouth and the base of the right aspect of the tongue with obliteration's of the fat planes in the right oral cavity about the tongue. No significant osseous erosion of the mandible. Pharyngeal mucosal spaces: Nasopharynx and palatine tonsils are within normal limits. Left tongue base is normal. Left oral tongue and buccal mucosa of the oral cavity is normal. Oropharynx, hypopharynx and larynx are within normal limits. Sinonasal region: Mucosal thickening throughout the paranasal sinuses with associated osteitis, likely chronic. Postsurgical changes of tumor resection in the right ethmoid sinus. Mastoid air cells are clear. Lymph nodes: No FDG avid or  abnormally enlarged lymph nodes. Salivary glands: The major salivary glands are within normal limits. Thyroid gland: Multiple small hypoattenuating thyroid nodules bilaterally. Vascular structures: The major vasculature of the neck are patent. Left IJ CVC with tip in the distal SVC. Prominent right external jugular vein. Brain: No abnormal FDG avid lesion or abnormal enhancement. CHEST: Lymph nodes: conglomerate of lymph nodes about the crux of the right hemidiaphragm posterior to the IVC, with multiple additional small retrocrural lymph nodes demonstrating increased FDG uptake with a max SUV of 8.0. There is also increased uptake of the anterior left crux of the diaphragm just anterior to the aorta with an SUV max of 5.04 that appears slightly thickened on CT. Additional hypermetabolic retrocaval nodes more inferiorly, for example a node on series 4 image 278 with SUV max of 6.55. Lungs: The central tracheobronchial tree is clear. No acute consolidation.  No pleural effusion or pneumothorax. Fibroatelectasis in the right lower lobe adjacent to vertebral osteophytes. Heart and great vessels: Heart is enlarged. No pericardial effusion. Ectatic ascending aorta measuring up to 4.3 cm in diameter. Main pulmonary artery is normal in caliber. The esophagus is unremarkable. ABDOMEN AND PELVIS: Liver: No FDG avid lesion. Cirrhotic liver without focal lesion. Gallbladder: Unremarkable. No intrahepatic or extrahepatic biliary dilatation. Pancreas: The pancreas is within normal limits. No pancreatic ductal dilatation. Spleen: No FDG avid lesion. Splenomegaly. Unchanged non-FDG avid hypoattenuating focus in the peripheral spleen, likely benign (series 10 image 62) Adrenal glands: No FDG avid foci. Kidneys: No FDG avid lesion. Atrophic kidneys. No hydronephrosis, mass, or calculi.. The urinary bladder is unremarkable. Reproductive organs are within normal limits. Gastrointestinal system: Normal caliber of the small and large  bowel. Diffuse hypoattenuation of the wall of the ascending colon, likely colopathy secondary to patient's hepatic function. Lymph nodes: No FDG avid or abnormally enlarged lymph nodes. Vascular structures: Abdominal aorta is normal in caliber. Major branches are patent.. The splenic vein, superior mesenteric vein, and main portal vein are patent. Gastrosplenic varices. Peritoneum: Large volume ascites. No free air. EXTREMITIES: No abnormal FDG uptake in the visualized extremities. BONES AND SOFT TISSUES: No abnormal FDG uptake in the axial or appendicular skeleton. No abnormal lytic or blastic osseous lesions. SPINAL CANAL: No evident canal compromise. No abnormal FDG avid lesion.     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 Disease progression with increased size of the enhancing hypermetabolic soft tissue mass about the right hemimandible measuring up to 5.0 cm with invasion into the floor of the right oral cavity and right base of tongue. No evidence of osseous invasion, though involvement of adjacent structures would be better evaluated with MRI. 2. New focal FDG uptake in/adjacent to the crux of the right and left hemidiaphragm, as well as retrocrural and retrocaval lymph nodes, suspicious for metastatic disease. 3. Cirrhotic liver with sequela of portal hypertension including large volume ascites and splenogastric varices. 4. Stable appearance of the hypoattenuating lesion in the spleen that does not demonstrate increased FDG uptake. I have personally reviewed the examination and initial interpretation and I agree with the findings. ELIZABETH ALVAREZ DO   SYSTEM ID:  [id number]    CT Chest/Abdomen/Pelvis w Contrast    Result Date: 1/31/2024  Combined Report of: PET and CT on 1/31/2024 1:34 PM: 1. PET of the neck, chest, abdomen, and pelvis. 2. PET CT Fusion for  Attenuation Correction and Anatomical Localization. 3. Diagnostic CT of the head and neck, as well as the chest, abdomen and pelvis with intravenous contrast obtained for diagnostic interpretation. 4. 3D MIP and PET-CT fused images were processed on an independent workstation and archived to PACS and reviewed by a radiologist. Technique: 1. PET: The patient received 9.81 mCi of F-18-FDG. The serum glucose was 96 mg/dL prior to administration. Body weight was 58.5 kg. Images were evaluated in the axial, sagittal, and coronal planes as well as the rotational whole body MIP. Images were acquired from the cranial vertex to the feet. UPTAKE WAS MEASURED AT 60 MINUTES. BACKGROUND: Liver SUV max = 3.14, Aorta Blood SUV max = 1.72. 2. CT: Volumetric acquisition for clinical interpretation of the chest, abdomen, and pelvis acquired at 3 mm sections. The chest, abdomen, and pelvis were evaluated at 5 mm sections in bone, soft tissue, and lung windows. High resolution images of the neck were obtained with multiple oblique projection reformats. Contrast and Medications: IV contrast: 64 mL of Isovue 370 intravenously. PO contrast: none. Additional Medications: None. 3. 3D MIP and PET-CT fused images were processed on an independent workstation and archived to PACS and reviewed by a radiologist. 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 Patient then had recurrent disease involving the ethmoid sinus and September 2021 with positive lymph nodes in the neck and PET scan showing a hypermetabolic metastatic lymphadenopathy, as well as hypermetabolic cervical lymph nodes. Patient then had radiosurgery in the right ethmoid tumor from November 8  through November 17 and 20, 2021 with subsequent chemotherapy. PET scan in March 2022 showed a near-complete response. Patient now has clinically progressive disease in the right submandibular area. Patient is going to start on chemotherapy per note from 1/23/2024. Patient did not want surgery and radiation reportedly was not an option. COMPARISON: CT neck 10/23/2023. PET 9/11/2023. FINDINGS: HEAD/NECK: Index tumor: Increased size of the enhancing soft tissue lesion about the right hemimandible now measuring approximately 5.0 x 3.4 cm, previously measured 3.6 cm in largest axial dimension with an SUV max of 10.42. This lesion appears to invade into the floor of the mouth and the base of the right aspect of the tongue with obliteration's of the fat planes in the right oral cavity about the tongue. No significant osseous erosion of the mandible. Pharyngeal mucosal spaces: Nasopharynx and palatine tonsils are within normal limits. Left tongue base is normal. Left oral tongue and buccal mucosa of the oral cavity is normal. Oropharynx, hypopharynx and larynx are within normal limits. Sinonasal region: Mucosal thickening throughout the paranasal sinuses with associated osteitis, likely chronic. Postsurgical changes of tumor resection in the right ethmoid sinus. Mastoid air cells are clear. Lymph nodes: No FDG avid or abnormally enlarged lymph nodes. Salivary glands: The major salivary glands are within normal limits. Thyroid gland: Multiple small hypoattenuating thyroid nodules bilaterally. Vascular structures: The major vasculature of the neck are patent. Left IJ CVC with tip in the distal SVC. Prominent right external jugular vein. Brain: No abnormal FDG avid lesion or abnormal enhancement. CHEST: Lymph nodes: conglomerate of lymph nodes about the crux of the right hemidiaphragm posterior to the IVC, with multiple additional small retrocrural lymph nodes demonstrating increased FDG uptake with a max SUV of 8.0. There is  also increased uptake of the anterior left crux of the diaphragm just anterior to the aorta with an SUV max of 5.04 that appears slightly thickened on CT. Additional hypermetabolic retrocaval nodes more inferiorly, for example a node on series 4 image 278 with SUV max of 6.55. Lungs: The central tracheobronchial tree is clear. No acute consolidation.  No pleural effusion or pneumothorax. Fibroatelectasis in the right lower lobe adjacent to vertebral osteophytes. Heart and great vessels: Heart is enlarged. No pericardial effusion. Ectatic ascending aorta measuring up to 4.3 cm in diameter. Main pulmonary artery is normal in caliber. The esophagus is unremarkable. ABDOMEN AND PELVIS: Liver: No FDG avid lesion. Cirrhotic liver without focal lesion. Gallbladder: Unremarkable. No intrahepatic or extrahepatic biliary dilatation. Pancreas: The pancreas is within normal limits. No pancreatic ductal dilatation. Spleen: No FDG avid lesion. Splenomegaly. Unchanged non-FDG avid hypoattenuating focus in the peripheral spleen, likely benign (series 10 image 62) Adrenal glands: No FDG avid foci. Kidneys: No FDG avid lesion. Atrophic kidneys. No hydronephrosis, mass, or calculi.. The urinary bladder is unremarkable. Reproductive organs are within normal limits. Gastrointestinal system: Normal caliber of the small and large bowel. Diffuse hypoattenuation of the wall of the ascending colon, likely colopathy secondary to patient's hepatic function. Lymph nodes: No FDG avid or abnormally enlarged lymph nodes. Vascular structures: Abdominal aorta is normal in caliber. Major branches are patent.. The splenic vein, superior mesenteric vein, and main portal vein are patent. Gastrosplenic varices. Peritoneum: Large volume ascites. No free air. EXTREMITIES: No abnormal FDG uptake in the visualized extremities. BONES AND SOFT TISSUES: No abnormal FDG uptake in the axial or appendicular skeleton. No abnormal lytic or blastic osseous lesions.  SPINAL CANAL: No evident canal compromise. No abnormal FDG avid lesion.     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 Disease progression with increased size of the enhancing hypermetabolic soft tissue mass about the right hemimandible measuring up to 5.0 cm with invasion into the floor of the right oral cavity and right base of tongue. No evidence of osseous invasion, though involvement of adjacent structures would be better evaluated with MRI. 2. New focal FDG uptake in/adjacent to the crux of the right and left hemidiaphragm, as well as retrocrural and retrocaval lymph nodes, suspicious for metastatic disease. 3. Cirrhotic liver with sequela of portal hypertension including large volume ascites and splenogastric varices. 4. Stable appearance of the hypoattenuating lesion in the spleen that does not demonstrate increased FDG uptake. I have personally reviewed the examination and initial interpretation and I agree with the findings. ELIZABETH ALVAREZ DO   SYSTEM ID:  [id number]    CT Soft Tissue Neck w Contrast    Result Date: 1/31/2024  Combined Report of: PET and CT on 1/31/2024 1:34 PM: 1. PET of the neck, chest, abdomen, and pelvis. 2. PET CT Fusion for Attenuation Correction and Anatomical Localization. 3. Diagnostic CT of the head and neck, as well as the chest, abdomen and pelvis with intravenous contrast obtained for diagnostic interpretation. 4. 3D MIP and PET-CT fused images were processed on an independent workstation and archived to PACS and reviewed by a radiologist. Technique: 1. PET: The patient received 9.81 mCi of F-18-FDG. The serum glucose was 96 mg/dL prior to administration. Body weight was 58.5 kg. Images were evaluated in the axial, sagittal, and coronal planes as well as the rotational whole body MIP. Images were acquired from the cranial vertex  to the feet. UPTAKE WAS MEASURED AT 60 MINUTES. BACKGROUND: Liver SUV max = 3.14, Aorta Blood SUV max = 1.72. 2. CT: Volumetric acquisition for clinical interpretation of the chest, abdomen, and pelvis acquired at 3 mm sections. The chest, abdomen, and pelvis were evaluated at 5 mm sections in bone, soft tissue, and lung windows. High resolution images of the neck were obtained with multiple oblique projection reformats. Contrast and Medications: IV contrast: 64 mL of Isovue 370 intravenously. PO contrast: none. Additional Medications: None. 3. 3D MIP and PET-CT fused images were processed on an independent workstation and archived to PACS and reviewed by a radiologist. 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 Patient then had recurrent disease involving the ethmoid sinus and September 2021 with positive lymph nodes in the neck and PET scan showing a hypermetabolic metastatic lymphadenopathy, as well as hypermetabolic cervical lymph nodes. Patient then had radiosurgery in the right ethmoid tumor from November 8 through November 17 and 20, 2021 with subsequent chemotherapy. PET scan in March 2022 showed a near-complete response. Patient now has clinically progressive disease in the right submandibular area. Patient is going to start on chemotherapy per note from 1/23/2024. Patient did not want surgery and radiation reportedly was not an option. COMPARISON: CT neck 10/23/2023. PET 9/11/2023. FINDINGS: HEAD/NECK: Index tumor: Increased size of the enhancing soft tissue lesion about the right hemimandible now measuring approximately 5.0 x 3.4 cm, previously measured 3.6 cm in largest axial dimension with an SUV max of 10.42. This  lesion appears to invade into the floor of the mouth and the base of the right aspect of the tongue with obliteration's of the fat planes in the right oral cavity about the tongue. No significant osseous erosion of the mandible. Pharyngeal mucosal spaces: Nasopharynx and palatine tonsils are within normal limits. Left tongue base is normal. Left oral tongue and buccal mucosa of the oral cavity is normal. Oropharynx, hypopharynx and larynx are within normal limits. Sinonasal region: Mucosal thickening throughout the paranasal sinuses with associated osteitis, likely chronic. Postsurgical changes of tumor resection in the right ethmoid sinus. Mastoid air cells are clear. Lymph nodes: No FDG avid or abnormally enlarged lymph nodes. Salivary glands: The major salivary glands are within normal limits. Thyroid gland: Multiple small hypoattenuating thyroid nodules bilaterally. Vascular structures: The major vasculature of the neck are patent. Left IJ CVC with tip in the distal SVC. Prominent right external jugular vein. Brain: No abnormal FDG avid lesion or abnormal enhancement. CHEST: Lymph nodes: conglomerate of lymph nodes about the crux of the right hemidiaphragm posterior to the IVC, with multiple additional small retrocrural lymph nodes demonstrating increased FDG uptake with a max SUV of 8.0. There is also increased uptake of the anterior left crux of the diaphragm just anterior to the aorta with an SUV max of 5.04 that appears slightly thickened on CT. Additional hypermetabolic retrocaval nodes more inferiorly, for example a node on series 4 image 278 with SUV max of 6.55. Lungs: The central tracheobronchial tree is clear. No acute consolidation.  No pleural effusion or pneumothorax. Fibroatelectasis in the right lower lobe adjacent to vertebral osteophytes. Heart and great vessels: Heart is enlarged. No pericardial effusion. Ectatic ascending aorta measuring up to 4.3 cm in diameter. Main pulmonary artery is  normal in caliber. The esophagus is unremarkable. ABDOMEN AND PELVIS: Liver: No FDG avid lesion. Cirrhotic liver without focal lesion. Gallbladder: Unremarkable. No intrahepatic or extrahepatic biliary dilatation. Pancreas: The pancreas is within normal limits. No pancreatic ductal dilatation. Spleen: No FDG avid lesion. Splenomegaly. Unchanged non-FDG avid hypoattenuating focus in the peripheral spleen, likely benign (series 10 image 62) Adrenal glands: No FDG avid foci. Kidneys: No FDG avid lesion. Atrophic kidneys. No hydronephrosis, mass, or calculi.. The urinary bladder is unremarkable. Reproductive organs are within normal limits. Gastrointestinal system: Normal caliber of the small and large bowel. Diffuse hypoattenuation of the wall of the ascending colon, likely colopathy secondary to patient's hepatic function. Lymph nodes: No FDG avid or abnormally enlarged lymph nodes. Vascular structures: Abdominal aorta is normal in caliber. Major branches are patent.. The splenic vein, superior mesenteric vein, and main portal vein are patent. Gastrosplenic varices. Peritoneum: Large volume ascites. No free air. EXTREMITIES: No abnormal FDG uptake in the visualized extremities. BONES AND SOFT TISSUES: No abnormal FDG uptake in the axial or appendicular skeleton. No abnormal lytic or blastic osseous lesions. SPINAL CANAL: No evident canal compromise. No abnormal FDG avid lesion.     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 Disease progression with increased size of the enhancing hypermetabolic soft tissue mass about the right hemimandible measuring up to 5.0 cm with invasion into the floor of the right oral cavity and right base of tongue. No evidence of osseous invasion, though involvement of adjacent structures would be better evaluated with MRI. 2. New focal FDG  uptake in/adjacent to the crux of the right and left hemidiaphragm, as well as retrocrural and retrocaval lymph nodes, suspicious for metastatic disease. 3. Cirrhotic liver with sequela of portal hypertension including large volume ascites and splenogastric varices. 4. Stable appearance of the hypoattenuating lesion in the spleen that does not demonstrate increased FDG uptake. I have personally reviewed the examination and initial interpretation and I agree with the findings. ELIZABETH ALVAREZ DO   SYSTEM ID:  [id number]    US Paracentesis with Albumin    Result Date: 1/26/2024  EXAM: 1. PARACENTESIS 2. ULTRASOUND GUIDANCE LOCATION: [facility] DATE: 1/26/2024 INDICATION: Ascites. PROCEDURE: Informed consent obtained. Time out performed. The abdomen was prepped and draped in a sterile fashion. 10 mL of 1% lidocaine was infused into local soft tissues. A 5 Palauan catheter system was introduced into the abdominal ascites under ultrasound guidance. 5.0 liters of clear fluid were removed and sent to lab if requested. Patient tolerated procedure well. Ultrasound imaging was obtained and placed in the patient's permanent medical record.     IMPRESSION: 1.  Status post ultrasound-guided paracentesis. Reference CPT Code: 91903    US Paracentesis with Albumin    Result Date: 1/17/2024  EXAM: 1. PARACENTESIS 2. ULTRASOUND GUIDANCE LOCATION: [facility] DATE: 1/17/2024 INDICATION: Ascites. PROCEDURE: Informed consent obtained. Time out performed. The abdomen was prepped and draped in a sterile fashion. 8 mL of 1% lidocaine was infused into local soft tissues. A 5 Palauan catheter system was introduced into the abdominal ascites under ultrasound guidance. 5 liters of yellow fluid were removed and sent to lab if requested. Patient tolerated procedure well. Ultrasound imaging was obtained and placed in the patient's permanent medical record.     IMPRESSION: 1.  Status post  ultrasound-guided paracentesis. Reference CPT Code: 23774    US Paracentesis with Albumin    Result Date: 1/3/2024  EXAM: 1. PARACENTESIS 2. ULTRASOUND GUIDANCE LOCATION: [facility] DATE: 1/3/2024 INDICATION: Ascites. PROCEDURE: Informed consent obtained. Time out performed. The abdomen was prepped and draped in a sterile fashion. 10 mL of 1% lidocaine was infused into local soft tissues. A 5 Greenlandic catheter system was introduced into the abdominal ascites under ultrasound guidance. 5 liters of clear fluid were removed and sent to lab if requested. Patient tolerated procedure well. Ultrasound imaging was obtained and placed in the patient's permanent medical record.     IMPRESSION: 1.  Status post ultrasound-guided paracentesis. Reference CPT Code: 81859     Discussed with his infusion nurse.  Discussion was done through  on telephone  .Total time spent with patient in face to face time, chart review and documentation was 30 minutes.        Signed by: SPARKLE Guzmán CNP      Again, thank you for allowing me to participate in the care of your patient.        Sincerely,        SPARKLE Guzmán CNP

## 2024-02-05 ENCOUNTER — PATIENT OUTREACH (OUTPATIENT)
Dept: ONCOLOGY | Facility: HOSPITAL | Age: 74
End: 2024-02-05
Payer: COMMERCIAL

## 2024-02-05 NOTE — ADDENDUM NOTE
Addended by: ESEQUIEL RIOS on: 2/5/2024 11:50 AM     Modules accepted: Orders    
Purple DH (Discharge Huddle; Vulnerable Patient)

## 2024-02-05 NOTE — PROGRESS NOTES
[facility]/Voicemail    Clinical Data: Care Coordinator Outreach    Outreach attempted x 1.  Left message on patient's daughter's voicemail with call back information and requested return call.    Plan: Care Coordinator will try to reach patient again in 1-2 business days.    Attempted to call patient's daughter to schedule a lab only appt to check TSH and CBC level this week.

## 2024-02-06 ENCOUNTER — PATIENT OUTREACH (OUTPATIENT)
Dept: ONCOLOGY | Facility: HOSPITAL | Age: 74
End: 2024-02-06
Payer: COMMERCIAL

## 2024-02-06 NOTE — PROGRESS NOTES
[facility]: Cancer Care                                                                                          Talk to patient's son Nayla on to see if he would be able to schedule a lab appointment for patient due to the nurse practitioner wanting to recheck a couple labs.  Patient's son Nayla was able to schedule lab only appointment for Thursday, February 8 at 10 AM.  I gave the clinic address to the son and he confirmed that the patient would be there.    Signature:  Marjorie Briones RN

## 2024-02-07 ENCOUNTER — APPOINTMENT (OUTPATIENT)
Dept: INTERPRETER SERVICES | Facility: CLINIC | Age: 74
End: 2024-02-07
Payer: COMMERCIAL

## 2024-02-08 ENCOUNTER — LAB (OUTPATIENT)
Dept: INFUSION THERAPY | Facility: HOSPITAL | Age: 74
End: 2024-02-08
Payer: COMMERCIAL

## 2024-02-08 ENCOUNTER — PATIENT OUTREACH (OUTPATIENT)
Dept: ONCOLOGY | Facility: HOSPITAL | Age: 74
End: 2024-02-08

## 2024-02-08 DIAGNOSIS — R18.8 CIRRHOSIS OF LIVER WITH ASCITES, UNSPECIFIED HEPATIC CIRRHOSIS TYPE (H): ICD-10-CM

## 2024-02-08 DIAGNOSIS — C11.9 SQUAMOUS CELL CARCINOMA OF NASOPHARYNX (H): Primary | ICD-10-CM

## 2024-02-08 DIAGNOSIS — E03.9 HYPOTHYROIDISM: ICD-10-CM

## 2024-02-08 DIAGNOSIS — B18.1 CHRONIC VIRAL HEPATITIS B WITHOUT DELTA AGENT AND WITHOUT COMA (H): ICD-10-CM

## 2024-02-08 DIAGNOSIS — K74.60 CIRRHOSIS OF LIVER WITH ASCITES, UNSPECIFIED HEPATIC CIRRHOSIS TYPE (H): ICD-10-CM

## 2024-02-08 LAB
ANION GAP SERPL CALCULATED.3IONS-SCNC: 9 MMOL/L (ref 7–15)
BASOPHILS # BLD AUTO: 0 10E3/UL (ref 0–0.2)
BASOPHILS NFR BLD AUTO: 0 %
BUN SERPL-MCNC: 46 MG/DL (ref 8–23)
CALCIUM SERPL-MCNC: 8.1 MG/DL (ref 8.8–10.2)
CHLORIDE SERPL-SCNC: 110 MMOL/L (ref 98–107)
CREAT SERPL-MCNC: 1.73 MG/DL (ref 0.67–1.17)
DEPRECATED HCO3 PLAS-SCNC: 24 MMOL/L (ref 22–29)
EGFRCR SERPLBLD CKD-EPI 2021: 42 ML/MIN/1.73M2
EOSINOPHIL # BLD AUTO: 0.1 10E3/UL (ref 0–0.7)
EOSINOPHIL NFR BLD AUTO: 2 %
ERYTHROCYTE [DISTWIDTH] IN BLOOD BY AUTOMATED COUNT: 20.4 % (ref 10–15)
GLUCOSE SERPL-MCNC: 142 MG/DL (ref 70–99)
HCT VFR BLD AUTO: 24.5 % (ref 40–53)
HGB BLD-MCNC: 7.8 G/DL (ref 13.3–17.7)
IMM GRANULOCYTES # BLD: 0 10E3/UL
IMM GRANULOCYTES NFR BLD: 0 %
LYMPHOCYTES # BLD AUTO: 0.3 10E3/UL (ref 0.8–5.3)
LYMPHOCYTES NFR BLD AUTO: 4 %
MCH RBC QN AUTO: 29.2 PG (ref 26.5–33)
MCHC RBC AUTO-ENTMCNC: 31.8 G/DL (ref 31.5–36.5)
MCV RBC AUTO: 92 FL (ref 78–100)
MONOCYTES # BLD AUTO: 0.6 10E3/UL (ref 0–1.3)
MONOCYTES NFR BLD AUTO: 8 %
NEUTROPHILS # BLD AUTO: 6 10E3/UL (ref 1.6–8.3)
NEUTROPHILS NFR BLD AUTO: 86 %
NRBC # BLD AUTO: 0 10E3/UL
NRBC BLD AUTO-RTO: 0 /100
PLATELET # BLD AUTO: 63 10E3/UL (ref 150–450)
POTASSIUM SERPL-SCNC: 3.9 MMOL/L (ref 3.4–5.3)
RBC # BLD AUTO: 2.67 10E6/UL (ref 4.4–5.9)
SODIUM SERPL-SCNC: 143 MMOL/L (ref 135–145)
T4 FREE SERPL-MCNC: 1.46 NG/DL (ref 0.9–1.7)
TSH SERPL DL<=0.005 MIU/L-ACNC: 41.61 UIU/ML (ref 0.3–4.2)
WBC # BLD AUTO: 7 10E3/UL (ref 4–11)

## 2024-02-08 PROCEDURE — 250N000011 HC RX IP 250 OP 636: Performed by: NURSE PRACTITIONER

## 2024-02-08 PROCEDURE — 85025 COMPLETE CBC W/AUTO DIFF WBC: CPT

## 2024-02-08 PROCEDURE — 36591 DRAW BLOOD OFF VENOUS DEVICE: CPT

## 2024-02-08 PROCEDURE — 80048 BASIC METABOLIC PNL TOTAL CA: CPT

## 2024-02-08 PROCEDURE — 84443 ASSAY THYROID STIM HORMONE: CPT

## 2024-02-08 PROCEDURE — 84439 ASSAY OF FREE THYROXINE: CPT

## 2024-02-08 RX ORDER — HEPARIN SODIUM (PORCINE) LOCK FLUSH IV SOLN 100 UNIT/ML 100 UNIT/ML
5 SOLUTION INTRAVENOUS
Status: DISCONTINUED | OUTPATIENT
Start: 2024-02-08 | End: 2024-02-08 | Stop reason: HOSPADM

## 2024-02-08 RX ORDER — HEPARIN SODIUM (PORCINE) LOCK FLUSH IV SOLN 100 UNIT/ML 100 UNIT/ML
5 SOLUTION INTRAVENOUS
Status: CANCELLED | OUTPATIENT
Start: 2024-02-08

## 2024-02-08 RX ADMIN — Medication 5 ML: at 10:24

## 2024-02-08 NOTE — PROGRESS NOTES
[facility]: Cancer Care                                                                                          Received an incoming call from patient's son.  I asked how the patient's nosebleed was.  Patient's son stated that his nosebleed has gotten better.  I stated that per our nurse practitioner his labs looked better today and improved a little bit today.  I then instructed the patient's son to have the patient hold pressure or apply ice to his nose to stop the nosebleed.  Patient's son verbalized understanding.    Signature:  Marjorie Briones RN

## 2024-02-08 NOTE — PROGRESS NOTES
[facility]/Voicemail    Clinical Data: Care Coordinator Outreach    Outreach attempted x 1.  Left message on patient's son's voicemail with call back information and requested return call.    Plan: Care Coordinator will try to reach patient again in 1-2 business days.    Attempted to call patient's son Nayla to let him know that the patients TSH and platelets were a bit better today and that our nurse practitioner recommended putting ice on the patient's nose or putting pressure on the patient's nose to stop the nose bleed. Per patient, the infusion nurse mentioned to me that the patient had an active nose bleed.

## 2024-02-12 ENCOUNTER — HOSPITAL ENCOUNTER (OUTPATIENT)
Dept: ULTRASOUND IMAGING | Facility: HOSPITAL | Age: 74
Discharge: HOME | End: 2024-02-12
Attending: INTERNAL MEDICINE
Payer: COMMERCIAL

## 2024-02-12 ENCOUNTER — TRANSFERRED RECORDS (OUTPATIENT)
Dept: HEALTH INFORMATION MANAGEMENT | Facility: CLINIC | Age: 74
End: 2024-02-12
Payer: COMMERCIAL

## 2024-02-12 VITALS
OXYGEN SATURATION: 100 % | TEMPERATURE: 99.5 F | RESPIRATION RATE: 16 BRPM | DIASTOLIC BLOOD PRESSURE: 59 MMHG | HEART RATE: 60 BPM | SYSTOLIC BLOOD PRESSURE: 118 MMHG

## 2024-02-12 DIAGNOSIS — K74.60 CIRRHOSIS OF LIVER WITH ASCITES, UNSPECIFIED HEPATIC CIRRHOSIS TYPE (H): ICD-10-CM

## 2024-02-12 DIAGNOSIS — C11.9 SQUAMOUS CELL CARCINOMA OF NASOPHARYNX (H): Primary | ICD-10-CM

## 2024-02-12 DIAGNOSIS — R18.8 CIRRHOSIS OF LIVER WITH ASCITES, UNSPECIFIED HEPATIC CIRRHOSIS TYPE (H): ICD-10-CM

## 2024-02-12 PROCEDURE — 250N000011 HC RX IP 250 OP 636: Performed by: FAMILY MEDICINE

## 2024-02-12 PROCEDURE — P9047 ALBUMIN (HUMAN), 25%, 50ML: HCPCS | Performed by: INTERNAL MEDICINE

## 2024-02-12 PROCEDURE — 49083 ABD PARACENTESIS W/IMAGING: CPT

## 2024-02-12 PROCEDURE — 250N000011 HC RX IP 250 OP 636: Performed by: INTERNAL MEDICINE

## 2024-02-12 PROCEDURE — 88333 PATH CONSLTJ SURG CYTO XM 1: CPT | Mod: TC | Performed by: INTERNAL MEDICINE

## 2024-02-12 PROCEDURE — 38505 NEEDLE BIOPSY LYMPH NODES: CPT

## 2024-02-12 RX ORDER — HEPARIN SODIUM (PORCINE) LOCK FLUSH IV SOLN 100 UNIT/ML 100 UNIT/ML
500 SOLUTION INTRAVENOUS ONCE
Status: COMPLETED | OUTPATIENT
Start: 2024-02-12 | End: 2024-02-12

## 2024-02-12 RX ORDER — ALBUMIN (HUMAN) 12.5 G/50ML
50 SOLUTION INTRAVENOUS ONCE
Status: COMPLETED | OUTPATIENT
Start: 2024-02-12 | End: 2024-02-12

## 2024-02-12 RX ADMIN — Medication 500 UNITS: at 15:52

## 2024-02-12 RX ADMIN — ALBUMIN HUMAN 50 G: 0.25 SOLUTION INTRAVENOUS at 15:40

## 2024-02-13 PROCEDURE — 88333 PATH CONSLTJ SURG CYTO XM 1: CPT | Mod: 26 | Performed by: PATHOLOGY

## 2024-02-13 PROCEDURE — 88305 TISSUE EXAM BY PATHOLOGIST: CPT | Mod: 26 | Performed by: PATHOLOGY

## 2024-02-15 ENCOUNTER — ONCOLOGY VISIT (OUTPATIENT)
Dept: ONCOLOGY | Facility: HOSPITAL | Age: 74
End: 2024-02-15
Attending: DIETITIAN, REGISTERED
Payer: COMMERCIAL

## 2024-02-15 DIAGNOSIS — C11.9 SQUAMOUS CELL CARCINOMA OF NASOPHARYNX (H): ICD-10-CM

## 2024-02-15 DIAGNOSIS — N18.2 CHRONIC KIDNEY DISEASE, STAGE 2 (MILD): ICD-10-CM

## 2024-02-15 PROCEDURE — 97802 MEDICAL NUTRITION INDIV IN: CPT | Performed by: DIETITIAN, REGISTERED

## 2024-02-15 NOTE — PROGRESS NOTES
"CLINICAL NUTRITION SERVICES - ASSESSMENT NOTE    Kristen Chapman 73 year old referred for MNT related to nasopharyngeal carcinoma     Time Spent: 30 minutes  Visit Type: initial   Pt accompanied by: son, phone   Referring Physician: Dr. Frias    NUTRITION HISTORY  Factors affecting nutrition intake include:decreased appetite, early satiety, and loss of taste  Current diet/appetite: soft foods, fair appetite  Chemotherapy: ongoing  Radiation: historical    Diet Recall  Kristen reports appetite is a little better over the past couple of days (he started on Megace and also had a paracentesis). He typically eats 2 meals/ day. Morning meal may be pancakes. He also eats rice, veggies, and meat or fish. He drinks water and coffee with cream. He also drinks 1-2 Boost original/ day.     Treatment Plan:  Oncology History   Squamous cell carcinoma of nasopharynx (H)   2/18/2020 Initial Diagnosis    Squamous cell carcinoma of nasopharynx (H)     10/13/2021 - 10/13/2021 Chemotherapy    OP ONC Non-Small Cell Lung Cancer - CARBOplatin / Gemcitabine  Plan Provider: Alan Frias MD  Treatment goal: Palliative  Line of treatment: Second Line     12/23/2021 - 9/2/2022 Chemotherapy    OP ONC Head and Neck Cancer - Pembrolizumab  Plan Provider: Alan Frias MD  Treatment goal: Palliative  Line of treatment: Second Line     2/2/2024 -  Chemotherapy    OP ONC Nasopharyngeal Cancer - Cetuximab / CARBOplatin   Plan Provider: Alan Frias MD  Treatment goal: Palliative  Line of treatment: Second Line       Treatment plan has been reviewed.    ANTHROPOMETRICS  Height:   Ht Readings from Last 1 Encounters:   01/23/24 1.584 m (5' 2.36\")     Weight: Patient also reports weighing 129 lbs this morning on scale at home. He estimates \"dry\" weight to be 120-125 lbs.  Wt Readings from Last 1 Encounters:   01/23/24 58.6 kg (129 lb 1.6 oz)     BMI: 22.6 kg/m2 (using dry weight)  Weight Status:  Normal BMI  IBW: 114 lbs/ 51.8 kg (110%)  Weight " History: Weight appears relatively stable over the past few weeks. Overall, his weight is down ~10 lbs (7%) in the past 3 months, likely at least some true losses.  Wt Readings from Last 10 Encounters:   01/23/24 58.6 kg (129 lb 1.6 oz)   01/15/24 61.4 kg (135 lb 6.4 oz)   12/07/23 61.6 kg (135 lb 12.8 oz)   11/16/23 53.4 kg (117 lb 11.2 oz)   10/16/23 63 kg (139 lb)   10/02/23 61.9 kg (136 lb 8 oz)   09/22/23 61.3 kg (135 lb 3.2 oz)   09/15/23 60.2 kg (132 lb 11.2 oz)   09/08/23 65.5 kg (144 lb 6.4 oz)   06/09/23 64.4 kg (142 lb)       Dosing Weight: 57 kg (estimated dry weight)    Medications/vitamins/minerals/herbals:   Reviewed    Labs:   Labs reviewed    ASSESSED NUTRITION NEEDS:  Estimated Energy Needs: 9190-4773 kcals (25-30 Kcal/Kg)  Justification: maintenance  Estimated Protein Needs: 68-86 grams protein (1.2-1.5 g pro/Kg)  Justification: increased needs  Estimated Fluid Needs: 7300-3358 mL   Justification: maintenance or per provider pending fluid status    MALNUTRITION:  % Weight Loss:  (nearly) 7.5% in 3 months (moderate malnutrition)  % Intake:  Decreased intake does not meet criteria for malnutrition (likely getting at least close to 75% of nutritional needs met)  Subcutaneous Fat Loss:  None observed  Muscle Loss:  mild generalized losses  Fluid Retention:  None noted    Malnutrition Diagnosis: Moderate malnutrition  In Context of:  Chronic illness or disease    NUTRITION DIAGNOSIS:  Predicted inadequate nutrient intake related to upcoming chemo treatments as evidenced by anticipated intake less than estimated needs.    INTERVENTIONS  Provided written & verbal education:     - Reviewed nutrition and hydration needs.   Advised pt to aim for at least 70g protein daily. Encouraged adequate calories and fluid.  - Discussed strategies to help fortify meals and snacks. Encouraged to focus on small, frequent meals.    - Reviewed sources of protein. Encouraged to have a protein source with each meal and  snack.    - Reviewed high calorie/high protein oral nutrition supplement options.    Provided pt with corresponding education materials/handouts on:  Academy of Nutrition and Dietetics Maximizing Nutrition During Cancer Treatment, Academy of Nutrition and Dietetics High protein list, Sources of Protein    Pt verbalize understanding of materials provided during consult.   Patient Understanding: Excellent  Expected patient engagement: Excellent     Implementation  Contact [employer] to see if Boost supplements can be covered by insurance.    Goals  1.  Aim for 5-6 small frequent meals  2.  Aim for 1425 kcal and 68 g protein/day  3. Weight maintenance     Follow-Up Plans: Pt has RD contact information for questions.      MONITORING AND EVALUATION:  -Food/beverage intake  -Weight trends    Milka Sullivan, MS, RD, LD

## 2024-02-15 NOTE — LETTER
"    2/15/2024         RE: Kristen Chapman  [address] [place] [address]        Dear Colleague,    Thank you for referring your patient, Kristen Chapman, to the [facility]. Please see a copy of my visit note below.    CLINICAL NUTRITION SERVICES - ASSESSMENT NOTE    Kristen Chapman 73 year old referred for MNT related to nasopharyngeal carcinoma     Time Spent: 30 minutes  Visit Type: initial   Pt accompanied by: son, phone   Referring Physician: Dr. Frias    NUTRITION HISTORY  Factors affecting nutrition intake include:decreased appetite, early satiety, and loss of taste  Current diet/appetite: soft foods, fair appetite  Chemotherapy: ongoing  Radiation: historical    Diet Recall  Kristen reports appetite is a little better over the past couple of days (he started on Megace and also had a paracentesis). He typically eats 2 meals/ day. Morning meal may be pancakes. He also eats rice, veggies, and meat or fish. He drinks water and coffee with cream. He also drinks 1-2 Boost original/ day.     Treatment Plan:  Oncology History   Squamous cell carcinoma of nasopharynx (H)   2/18/2020 Initial Diagnosis    Squamous cell carcinoma of nasopharynx (H)     10/13/2021 - 10/13/2021 Chemotherapy    OP ONC Non-Small Cell Lung Cancer - CARBOplatin / Gemcitabine  Plan Provider: Alan Frias MD  Treatment goal: Palliative  Line of treatment: Second Line     12/23/2021 - 9/2/2022 Chemotherapy    OP ONC Head and Neck Cancer - Pembrolizumab  Plan Provider: Alan Frias MD  Treatment goal: Palliative  Line of treatment: Second Line     2/2/2024 -  Chemotherapy    OP ONC Nasopharyngeal Cancer - Cetuximab / CARBOplatin   Plan Provider: Alan Frias MD  Treatment goal: Palliative  Line of treatment: Second Line       Treatment plan has been reviewed.    ANTHROPOMETRICS  Height:   Ht Readings from Last 1 Encounters:   01/23/24 1.584 m (5' 2.36\")     Weight: Patient also reports weighing 129 lbs this " "morning on scale at home. He estimates \"dry\" weight to be 120-125 lbs.  Wt Readings from Last 1 Encounters:   01/23/24 58.6 kg (129 lb 1.6 oz)     BMI: 22.6 kg/m2 (using dry weight)  Weight Status:  Normal BMI  IBW: 114 lbs/ 51.8 kg (110%)  Weight History: Weight appears relatively stable over the past few weeks. Overall, his weight is down ~10 lbs (7%) in the past 3 months, likely at least some true losses.  Wt Readings from Last 10 Encounters:   01/23/24 58.6 kg (129 lb 1.6 oz)   01/15/24 61.4 kg (135 lb 6.4 oz)   12/07/23 61.6 kg (135 lb 12.8 oz)   11/16/23 53.4 kg (117 lb 11.2 oz)   10/16/23 63 kg (139 lb)   10/02/23 61.9 kg (136 lb 8 oz)   09/22/23 61.3 kg (135 lb 3.2 oz)   09/15/23 60.2 kg (132 lb 11.2 oz)   09/08/23 65.5 kg (144 lb 6.4 oz)   06/09/23 64.4 kg (142 lb)       Dosing Weight: 57 kg (estimated dry weight)    Medications/vitamins/minerals/herbals:   Reviewed    Labs:   Labs reviewed    ASSESSED NUTRITION NEEDS:  Estimated Energy Needs: 0482-1273 kcals (25-30 Kcal/Kg)  Justification: maintenance  Estimated Protein Needs: 68-86 grams protein (1.2-1.5 g pro/Kg)  Justification: increased needs  Estimated Fluid Needs: 4948-0931 mL   Justification: maintenance or per provider pending fluid status    MALNUTRITION:  % Weight Loss:  (nearly) 7.5% in 3 months (moderate malnutrition)  % Intake:  Decreased intake does not meet criteria for malnutrition (likely getting at least close to 75% of nutritional needs met)  Subcutaneous Fat Loss:  None observed  Muscle Loss:  mild generalized losses  Fluid Retention:  None noted    Malnutrition Diagnosis: Moderate malnutrition  In Context of:  Chronic illness or disease    NUTRITION DIAGNOSIS:  Predicted inadequate nutrient intake related to upcoming chemo treatments as evidenced by anticipated intake less than estimated needs.    INTERVENTIONS  Provided written & verbal education:     - Reviewed nutrition and hydration needs.   Advised pt to aim for at least 70g " protein daily. Encouraged adequate calories and fluid.  - Discussed strategies to help fortify meals and snacks. Encouraged to focus on small, frequent meals.    - Reviewed sources of protein. Encouraged to have a protein source with each meal and snack.    - Reviewed high calorie/high protein oral nutrition supplement options.    Provided pt with corresponding education materials/handouts on:  Academy of Nutrition and Dietetics Maximizing Nutrition During Cancer Treatment, Academy of Nutrition and Dietetics High protein list, Sources of Protein    Pt verbalize understanding of materials provided during consult.   Patient Understanding: Excellent  Expected patient engagement: Excellent     Implementation  Contact [employer] to see if Boost supplements can be covered by insurance.    Goals  1.  Aim for 5-6 small frequent meals  2.  Aim for 1425 kcal and 68 g protein/day  3. Weight maintenance     Follow-Up Plans: Pt has RD contact information for questions.      MONITORING AND EVALUATION:  -Food/beverage intake  -Weight trends    Milka Sullivan, MS, RD, LD      Again, thank you for allowing me to participate in the care of your patient.        Sincerely,        Gricelda Sullivan RD

## 2024-02-16 ENCOUNTER — ONCOLOGY VISIT (OUTPATIENT)
Dept: ONCOLOGY | Facility: HOSPITAL | Age: 74
End: 2024-02-16
Attending: INTERNAL MEDICINE
Payer: COMMERCIAL

## 2024-02-16 ENCOUNTER — INFUSION THERAPY VISIT (OUTPATIENT)
Dept: INFUSION THERAPY | Facility: HOSPITAL | Age: 74
End: 2024-02-16
Attending: INTERNAL MEDICINE
Payer: COMMERCIAL

## 2024-02-16 VITALS
RESPIRATION RATE: 18 BRPM | TEMPERATURE: 97.2 F | DIASTOLIC BLOOD PRESSURE: 56 MMHG | SYSTOLIC BLOOD PRESSURE: 117 MMHG | HEIGHT: 62 IN | BODY MASS INDEX: 24.84 KG/M2 | WEIGHT: 135 LBS | HEART RATE: 51 BPM | OXYGEN SATURATION: 100 %

## 2024-02-16 DIAGNOSIS — C11.9 SQUAMOUS CELL CARCINOMA OF NASOPHARYNX (H): Primary | ICD-10-CM

## 2024-02-16 PROCEDURE — 96413 CHEMO IV INFUSION 1 HR: CPT

## 2024-02-16 PROCEDURE — G2211 COMPLEX E/M VISIT ADD ON: HCPCS | Performed by: INTERNAL MEDICINE

## 2024-02-16 PROCEDURE — 250N000011 HC RX IP 250 OP 636: Performed by: INTERNAL MEDICINE

## 2024-02-16 PROCEDURE — G0463 HOSPITAL OUTPT CLINIC VISIT: HCPCS | Mod: 25 | Performed by: INTERNAL MEDICINE

## 2024-02-16 PROCEDURE — 99215 OFFICE O/P EST HI 40 MIN: CPT | Performed by: INTERNAL MEDICINE

## 2024-02-16 RX ORDER — METHYLPREDNISOLONE SODIUM SUCCINATE 125 MG/2ML
125 INJECTION, POWDER, LYOPHILIZED, FOR SOLUTION INTRAMUSCULAR; INTRAVENOUS
Status: DISCONTINUED | OUTPATIENT
Start: 2024-02-16 | End: 2024-02-16 | Stop reason: HOSPADM

## 2024-02-16 RX ORDER — ALBUTEROL SULFATE 0.83 MG/ML
2.5 SOLUTION RESPIRATORY (INHALATION)
Status: DISCONTINUED | OUTPATIENT
Start: 2024-02-16 | End: 2024-02-16 | Stop reason: HOSPADM

## 2024-02-16 RX ORDER — MEPERIDINE HYDROCHLORIDE 25 MG/ML
25 INJECTION INTRAMUSCULAR; INTRAVENOUS; SUBCUTANEOUS EVERY 30 MIN PRN
Status: DISCONTINUED | OUTPATIENT
Start: 2024-02-16 | End: 2024-02-16 | Stop reason: HOSPADM

## 2024-02-16 RX ORDER — HEPARIN SODIUM (PORCINE) LOCK FLUSH IV SOLN 100 UNIT/ML 100 UNIT/ML
5 SOLUTION INTRAVENOUS
Status: DISCONTINUED | OUTPATIENT
Start: 2024-02-16 | End: 2024-02-16 | Stop reason: HOSPADM

## 2024-02-16 RX ORDER — ALBUTEROL SULFATE 90 UG/1
1-2 AEROSOL, METERED RESPIRATORY (INHALATION)
Status: DISCONTINUED | OUTPATIENT
Start: 2024-02-16 | End: 2024-02-16 | Stop reason: HOSPADM

## 2024-02-16 RX ORDER — EPINEPHRINE 1 MG/ML
0.3 INJECTION, SOLUTION INTRAMUSCULAR; SUBCUTANEOUS EVERY 5 MIN PRN
Status: DISCONTINUED | OUTPATIENT
Start: 2024-02-16 | End: 2024-02-16 | Stop reason: HOSPADM

## 2024-02-16 RX ORDER — DIPHENHYDRAMINE HYDROCHLORIDE 50 MG/ML
50 INJECTION INTRAMUSCULAR; INTRAVENOUS
Status: DISCONTINUED | OUTPATIENT
Start: 2024-02-16 | End: 2024-02-16 | Stop reason: HOSPADM

## 2024-02-16 RX ADMIN — CETUXIMAB 400 MG: 2 SOLUTION INTRAVENOUS at 11:17

## 2024-02-16 RX ADMIN — Medication 5 ML: at 12:43

## 2024-02-16 ASSESSMENT — PAIN SCALES - GENERAL: PAINLEVEL: NO PAIN (0)

## 2024-02-16 NOTE — PROGRESS NOTES
Infusion Nursing Note:  Kristen Chapman presents today for D15C1.    Patient seen by provider today: Yes: Scheduled appointment with Dr. Frias.    present during visit today: Not Applicable.    Note: Kristen comes to infusion after his appointment with Dr. Frias. His port was accessed under sterile technique. Excellent blood return noted. Erbitux administered over 1 hour and then observed for 30 minutes. Tolerated well. Port de accessed per protocol. Site covered with 2x2 gauze and secured in place with paper tape. Left infusion ambulatory and in stable condition. Will return in two weeks.     Intravenous Access:  Implanted Port.    Treatment Conditions:  No labs needed this visit per Dr. Frias.    Post Infusion Assessment:  Patient tolerated infusion without incident.  Patient observed for 30 minutes post Erbitux per protocol.  Site patent and intact, free from redness, edema or discomfort.  No evidence of extravasations.  Access discontinued per protocol.     Discharge Plan:   Discharge instructions reviewed with: Patient.  Patient and/or family verbalized understanding of discharge instructions and all questions answered.  AVS to patient via "[employer]T.  Patient will return on 3/8 for next appointment.   Patient discharged in stable condition accompanied by: son.  Departure Mode: Ambulatory.    Diana Pablo RN

## 2024-02-16 NOTE — PROGRESS NOTES
[facility] Hematology and Oncology Progress Note    Patient: Kristen Chapman  MRN: [id number]  Date of Service: Feb 16, 2024        Assessment and Plan:    Cancer Staging  Nasopharyngeal carcinoma (H)  Staging form: Pharynx - Nasopharynx, AJCC 8th Edition  - Clinical stage from 2/18/2020: Stage SAMANTHA (cT4, cN2, cM0) - Signed by Alan Frias MD on 2/18/2020     1.  Nasopharyngeal carcinoma: He has now started back on treatment with single agent cetuximab.  Today will be his second dose.  He has been tolerating it well.  If he tolerates this cycle okay then I would add and carboplatin, AUC 2, every other week if he does okay with this dose.  If he does okay here then we could try 3 weeks on 1 week off of the carboplatin.  Other treatment options include single agent like weekly Taxol or gemcitabine.   He did not progress on immunotherapy but I be more hesitant to start this with active hepatitis B.  Immunotherapy could be considered if he achieves clearance of the virus.    He also had a PET scan on January 31.  I personally reviewed the images.  These show the large tumor about the right hemimandible invading into the floor of the right oral cavity and right base of tongue.  There are also some retrocrural and retrocaval nodes positive.    He had a repeat biopsy of the right submandibular tumor which shows nonkeratinizing squamous cell carcinoma.  This specimen will be sent for NGS.    2.  Pancytopenia: CBC from February is reviewed and showed a hemoglobin of 7.8 with a mean cell volume 92.  White count was normal, platelets 63.  Will have to closely monitor his numbers now that he is back on treatment.  Red cell transfusions to keep hemoglobin above 7.  He has chronic pancytopenia secondary to chronic hepatitis B infection as well as cirrhosis.  He also has splenomegaly measuring 17 cm on CT from Sophia 15, 2023.  He had a bone marrow biopsy in October 2021.  Next generation sequencing showed no abnormalities.   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     3.  Hypothyroidism: TSH is coming down.  On February 8 it was 41.  Continue his current dose of Synthroid.      4.  Hepatitis B infection: He is following up with hepatology at the [facility].  His son states that he ran out of his tenofovir.  I am not sure if he has been taking it.  I asked him to call his team at the  for refill.      5.  Ascites: Refractory.  We again spent some time discussing an abdominal Pleurx catheter today.  The patient does not want to proceed.  I again reviewed with them how it is inserted and used.  We will make an appointment for placement.  Cytology has been negative multiple times.    6.  Tumor ulceration: He was seen at the wound care clinic on February 1.  A protein rich diet was discussed.    7.  F/E/N: We started him on Megace which seems to be helping his appetite.  Will continue.      Medical decision Making:  I spent 44 minutes in the care of this patient today, which included time necessary for preparation for the visit, face to face time with the patient, communication of recommendations to the care team, and documentation time.  Today's visit was centered around a cancer diagnosis in the setting of additional medical comorbidities. Complex medical decision making was required. The risk of additional morbidity without further treatment is high.     The longitudinal plan of care for the condition(s) below were addressed during this visit. Due to the added complexity in care, I will continue to support Kristen in the subsequent management of this condition(s) and with the ongoing continuity of care of this condition(s).    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  Diagnosed January 2020. Imaging suggested bilateral abnormal lymphadenopathy in the  neck.  Also asymmetric soft tissue thickening in the posterior right nasopharynx.  On imaging, tumor also appeared to extend down to the hypopharynx.    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  Right neck lymph node positive for recurrent nasopharyngeal carcinoma.  PET scan at that time showed new hypermetabolic mediastinal lymphadenopathy (chronic, most likely reactive), mass in the right ethmoid sinus, hypermetabolic right level 1B and 2A lymph nodes.    Treatment:    Initially treated with concurrent chemotherapy and radiation.  He had weekly cisplatin starting March 3, 2020. Fifth and final dose given March 31, 2020.  Subsequent chemotherapy was held due to prolonged thrombocytopenia and renal insufficiency.  Radiation dose was 7000 cGy in 35 fractions given from March 2 through April 17, 2020.     Started cisplatin with infusional 5-FU on June 1, 2020.  Cycle 1 given at a 25% dose reduction.  He still had significant thrombocytopenia and neutropenia on day 28.  Cycle 2 was held.  At the same time his liver function tests elevated secondary to hepatitis B.   PET scan in September 2020 showed no good evidence of residual malignancy.    Recurrence:  Radiosurgery delivered to the right ethmoid tumor delivered November 8 through November 17, 2021.  Received 3500 cGy in 5 fractions.  Pembrolizumab started 12/23/21.  Held after his July 1, 2022 dose.  PET scan in March 2022 showed a near complete response.    Cetuximab initiated 2/2/2024    Interim History:    Kristen Chapman returns today for follow-up visit.  He has had 1 cycle of cetuximab.  He is tolerating it well.  No rash.  No nausea or vomiting.  He is wondering about getting a Pleurx catheter placed.  He has seen the wound care people.  No fevers.  Pain is controlled.    Review of Systems:    As above in the history.     Review of Systems otherwise Negative for:  General: chills, fever or night sweats  Psychological: anxiety or  "depression  Ophthalmic: blurry vision, double vision or loss of vision, vision change  ENT: oral lesions, hearing changes  Hematological and Lymphatic: bruising, jaundice, swollen lymph nodes  Endocrine: hot flashes  Respiratory: cough, hemoptysis, orthopnea  Cardiovascular: chest pain, palpitations or PND  Gastrointestinal: blood in stools, change in bowel habits, constipation, diarrhea or nausea/vomiting  Genito-Urinary: change in urinary stream, incontinence, frequency/urgency  Musculoskeletal: joint swelling, muscle pain  Neurological: dizziness, headaches, numbness/tingling  Dermatological: lumps and rash    Past History:    Past Medical History:   Diagnosis Date    Anemia     Dysphagia     Hepatitis B chronic     Hypothyroidism     Nasopharyngeal cancer (H)     Severe protein-calorie malnutrition (H24)     Thrombocytopenia (H24)      Physical Exam:    /56 (BP Location: Left arm, Patient Position: Sitting, Cuff Size: Adult Regular)   Pulse 51   Temp 97.2  F (36.2  C) (Tympanic)   Resp 18   Ht 1.584 m (5' 2.36\")   Wt 61.2 kg (135 lb)   SpO2 100%   BMI 24.41 kg/m      General: patient appears stated age of 71 year old. Nontoxic and in no distress.   HEENT: Head: atraumatic, normocephalic. Sclerae anicteric.  Large right submandibular mass.  Bandaged.  Chest:  Normal respiratory effort  Cardiac:  No edema.  Abdomen: abdomen remains distended with ascites.  Extremities: normal tone and muscle bulk.  Skin: no lesions or rash on visible skin. Warm and dry.   CNS: alert and oriented. Grossly non-focal.   Psychiatric: normal mood and affect.     Lab Results:    Recent Results (from the past 168 hour(s))   Surgical Pathology Exam   Result Value Ref Range    Case Report       Surgical Pathology Report                         Case: [id number]                                  Authorizing Provider:  Alan Frias MD        Collected:           02/12/2024 02:11 PM          Ordering Location:     [facility]" [facility]      Received:            02/12/2024 02:50 PM                                 [facility] Ultrasound                                                   Pathologist:           Randi Charles MD                                                      Specimen:    Salivary Gland, Submandibular, Right                                                       Final Diagnosis       SALIVARY GLAND, RIGHT SUBMANDIBULAR, MASS, ULTRASOUND-GUIDED NEEDLE CORE BIOPSY:  -COMPATIBLE WITH NON-KERATINIZING SQUAMOUS CELL CARCINOMA  -SEE COMMENT      Comment       The patient's previous right submandibular gland biopsy ([id number], 08/31/2023) is reviewed and the morphologic findings are similar.  Of note, the specimen was reportedly obtained for additional molecular testing which can be sent out upon specific clinical request.      Clinical Information       Clinical history:  Squamous cell carcinoma nasopharynx  Reason for procedure: Rt submandibular mass       Gross Description       A(A). Salivary Gland, Submandibular, Right, :  Biopsy was performed under Ultrasound guidance by Dr. Pawan Jaramillo with 5 pass(es) from which:          5 Air-dried smear(s)  1 vial with 5 core(s) in 10% Formalin   Specimen in 10% Formalin at 14:16 on 02/12/2024  1 core block(s) are prepared at [facility].    Assisted by:  DENZEL Hammond    GROSS DESCRIPTION:  The specimen consists of multiple fragmented tan needle core biopsies ranging in size from 0.1-0.6 cm.  TE-1C      Microscopic Description       Microscopic examination performed, substantiating the above diagnosis.       MCRS Yes (A) N/A    Performing Labs       The technical component of this testing was completed at [facility]      Case Images          Imaging:    US Biopsy Lymph Node Core    Result Date: 2/12/2024  EXAM: 1. PERCUTANEOUS BIOPSY RIGHT SUBMANDIBULAR REGION MASS 2. ULTRASOUND GUIDANCE LOCATION:   [facility] DATE: 2/12/2024 INDICATION: right submandibular mass PROCEDURE: Informed consent obtained. Site marked. Prior images reviewed. Required items made available. Patient identity was confirmed verbally and with arm band. Patient reevaluated immediately before beginning the procedure. Universal protocol was followed. Time out performed. The site was prepped and draped in sterile fashion. 10 mL of 1 percent lidocaine was infused into the local soft tissues. Using standard technique and under direct ultrasound guidance, a 18 gauge biopsy needle was used to make 5 core biopsies. Tissue was submitted to Pathology. The patient tolerated the procedure well. No complications.     IMPRESSION: Status post US-guided biopsy a right submandibular region mass. Core samples were performed due to the need for molecular testing. Reference CPT Code: 14880, 15469    US Paracentesis with Albumin    Result Date: 2/12/2024  EXAM: 1. PARACENTESIS 2. ULTRASOUND GUIDANCE LOCATION: [facility] DATE: 2/12/2024 INDICATION: Ascites. PROCEDURE: Informed consent obtained. Time out performed. The abdomen was prepped and draped in a sterile fashion. 10 mL of 1% lidocaine was infused into local soft tissues. A 5 [place] catheter system was introduced into the abdominal ascites under ultrasound guidance. 5 liters of clear fluid were removed and sent to lab if requested. Patient tolerated procedure well. Ultrasound imaging was obtained and placed in the patient's permanent medical record.     IMPRESSION: 1.  Status post ultrasound-guided paracentesis. Reference CPT Code: 68586    PET Oncology Whole Body    Result Date: 1/31/2024  Combined Report of: PET and CT on 1/31/2024 1:34 PM: 1. PET of the neck, chest, abdomen, and pelvis. 2. PET CT Fusion for Attenuation Correction and Anatomical Localization. 3. Diagnostic CT of the head and neck, as well as the chest, abdomen and pelvis with intravenous  contrast obtained for diagnostic interpretation. 4. 3D MIP and PET-CT fused images were processed on an independent workstation and archived to PACS and reviewed by a radiologist. Technique: 1. PET: The patient received 9.81 mCi of F-18-FDG. The serum glucose was 96 mg/dL prior to administration. Body weight was 58.5 kg. Images were evaluated in the axial, sagittal, and coronal planes as well as the rotational whole body MIP. Images were acquired from the cranial vertex to the feet. UPTAKE WAS MEASURED AT 60 MINUTES. BACKGROUND: Liver SUV max = 3.14, Aorta Blood SUV max = 1.72. 2. CT: Volumetric acquisition for clinical interpretation of the chest, abdomen, and pelvis acquired at 3 mm sections. The chest, abdomen, and pelvis were evaluated at 5 mm sections in bone, soft tissue, and lung windows. High resolution images of the neck were obtained with multiple oblique projection reformats. Contrast and Medications: IV contrast: 64 mL of Isovue 370 intravenously. PO contrast: none. Additional Medications: None. 3. 3D MIP and PET-CT fused images were processed on an independent workstation and archived to PACS and reviewed by a radiologist. 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 Patient then had recurrent disease involving the ethmoid sinus and September 2021 with positive lymph nodes in the neck and PET scan showing a hypermetabolic metastatic lymphadenopathy, as well as hypermetabolic cervical lymph nodes. Patient then had radiosurgery in the right ethmoid tumor from November 8 through November 17 and 20, 2021 with subsequent chemotherapy. PET scan in March 2022 showed a near-complete response. Patient now has clinically  progressive disease in the right submandibular area. Patient is going to start on chemotherapy per note from 1/23/2024. Patient did not want surgery and radiation reportedly was not an option. COMPARISON: CT neck 10/23/2023. PET 9/11/2023. FINDINGS: HEAD/NECK: Index tumor: Increased size of the enhancing soft tissue lesion about the right hemimandible now measuring approximately 5.0 x 3.4 cm, previously measured 3.6 cm in largest axial dimension with an SUV max of 10.42. This lesion appears to invade into the floor of the mouth and the base of the right aspect of the tongue with obliteration's of the fat planes in the right oral cavity about the tongue. No significant osseous erosion of the mandible. Pharyngeal mucosal spaces: Nasopharynx and palatine tonsils are within normal limits. Left tongue base is normal. Left oral tongue and buccal mucosa of the oral cavity is normal. Oropharynx, hypopharynx and larynx are within normal limits. Sinonasal region: Mucosal thickening throughout the paranasal sinuses with associated osteitis, likely chronic. Postsurgical changes of tumor resection in the right ethmoid sinus. Mastoid air cells are clear. Lymph nodes: No FDG avid or abnormally enlarged lymph nodes. Salivary glands: The major salivary glands are within normal limits. Thyroid gland: Multiple small hypoattenuating thyroid nodules bilaterally. Vascular structures: The major vasculature of the neck are patent. Left IJ CVC with tip in the distal SVC. Prominent right external jugular vein. Brain: No abnormal FDG avid lesion or abnormal enhancement. CHEST: Lymph nodes: conglomerate of lymph nodes about the crux of the right hemidiaphragm posterior to the IVC, with multiple additional small retrocrural lymph nodes demonstrating increased FDG uptake with a max SUV of 8.0. There is also increased uptake of the anterior left crux of the diaphragm just anterior to the aorta with an SUV max of 5.04 that appears slightly  thickened on CT. Additional hypermetabolic retrocaval nodes more inferiorly, for example a node on series 4 image 278 with SUV max of 6.55. Lungs: The central tracheobronchial tree is clear. No acute consolidation.  No pleural effusion or pneumothorax. Fibroatelectasis in the right lower lobe adjacent to vertebral osteophytes. Heart and great vessels: Heart is enlarged. No pericardial effusion. Ectatic ascending aorta measuring up to 4.3 cm in diameter. Main pulmonary artery is normal in caliber. The esophagus is unremarkable. ABDOMEN AND PELVIS: Liver: No FDG avid lesion. Cirrhotic liver without focal lesion. Gallbladder: Unremarkable. No intrahepatic or extrahepatic biliary dilatation. Pancreas: The pancreas is within normal limits. No pancreatic ductal dilatation. Spleen: No FDG avid lesion. Splenomegaly. Unchanged non-FDG avid hypoattenuating focus in the peripheral spleen, likely benign (series 10 image 62) Adrenal glands: No FDG avid foci. Kidneys: No FDG avid lesion. Atrophic kidneys. No hydronephrosis, mass, or calculi.. The urinary bladder is unremarkable. Reproductive organs are within normal limits. Gastrointestinal system: Normal caliber of the small and large bowel. Diffuse hypoattenuation of the wall of the ascending colon, likely colopathy secondary to patient's hepatic function. Lymph nodes: No FDG avid or abnormally enlarged lymph nodes. Vascular structures: Abdominal aorta is normal in caliber. Major branches are patent.. The splenic vein, superior mesenteric vein, and main portal vein are patent. Gastrosplenic varices. Peritoneum: Large volume ascites. No free air. EXTREMITIES: No abnormal FDG uptake in the visualized extremities. BONES AND SOFT TISSUES: No abnormal FDG uptake in the axial or appendicular skeleton. No abnormal lytic or blastic osseous lesions. SPINAL CANAL: No evident canal compromise. No abnormal FDG avid lesion.     IMPRESSION: In this patient with a history of nasopharyngeal  squamous cell carcinoma status post resection with chemotherapy/radiation with recent local recurrence about the right hemimandible: 1. Disease progression with increased size of the enhancing hypermetabolic soft tissue mass about the right hemimandible measuring up to 5.0 cm with invasion into the floor of the right oral cavity and right base of tongue. No evidence of osseous invasion, though involvement of adjacent structures would be better evaluated with MRI. 2. New focal FDG uptake in/adjacent to the crux of the right and left hemidiaphragm, as well as retrocrural and retrocaval lymph nodes, suspicious for metastatic disease. 3. Cirrhotic liver with sequela of portal hypertension including large volume ascites and splenogastric varices. 4. Stable appearance of the hypoattenuating lesion in the spleen that does not demonstrate increased FDG uptake. I have personally reviewed the examination and initial interpretation and I agree with the findings. ELIZABETH ALVAREZ DO   SYSTEM ID:  [id number]    CT Chest/Abdomen/Pelvis w Contrast    Result Date: 1/31/2024  Combined Report of: PET and CT on 1/31/2024 1:34 PM: 1. PET of the neck, chest, abdomen, and pelvis. 2. PET CT Fusion for Attenuation Correction and Anatomical Localization. 3. Diagnostic CT of the head and neck, as well as the chest, abdomen and pelvis with intravenous contrast obtained for diagnostic interpretation. 4. 3D MIP and PET-CT fused images were processed on an independent workstation and archived to PACS and reviewed by a radiologist. Technique: 1. PET: The patient received 9.81 mCi of F-18-FDG. The serum glucose was 96 mg/dL prior to administration. Body weight was 58.5 kg. Images were evaluated in the axial, sagittal, and coronal planes as well as the rotational whole body MIP. Images were acquired from the cranial vertex to the feet. UPTAKE WAS MEASURED AT 60 MINUTES. BACKGROUND: Liver SUV max = 3.14, Aorta Blood SUV max = 1.72. 2. CT: Volumetric  acquisition for clinical interpretation of the chest, abdomen, and pelvis acquired at 3 mm sections. The chest, abdomen, and pelvis were evaluated at 5 mm sections in bone, soft tissue, and lung windows. High resolution images of the neck were obtained with multiple oblique projection reformats. Contrast and Medications: IV contrast: 64 mL of Isovue 370 intravenously. PO contrast: none. Additional Medications: None. 3. 3D MIP and PET-CT fused images were processed on an independent workstation and archived to PACS and reviewed by a radiologist. 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 Patient then had recurrent disease involving the ethmoid sinus and September 2021 with positive lymph nodes in the neck and PET scan showing a hypermetabolic metastatic lymphadenopathy, as well as hypermetabolic cervical lymph nodes. Patient then had radiosurgery in the right ethmoid tumor from November 8 through November 17 and 20, 2021 with subsequent chemotherapy. PET scan in March 2022 showed a near-complete response. Patient now has clinically progressive disease in the right submandibular area. Patient is going to start on chemotherapy per note from 1/23/2024. Patient did not want surgery and radiation reportedly was not an option. COMPARISON: CT neck 10/23/2023. PET 9/11/2023. FINDINGS: HEAD/NECK: Index tumor: Increased size of the enhancing soft tissue lesion about the right hemimandible now measuring approximately 5.0 x 3.4 cm, previously measured 3.6 cm in largest axial dimension with an SUV max of 10.42. This lesion appears to invade into the floor of the mouth and the base of the right aspect of the tongue with obliteration's of the  fat planes in the right oral cavity about the tongue. No significant osseous erosion of the mandible. Pharyngeal mucosal spaces: Nasopharynx and palatine tonsils are within normal limits. Left tongue base is normal. Left oral tongue and buccal mucosa of the oral cavity is normal. Oropharynx, hypopharynx and larynx are within normal limits. Sinonasal region: Mucosal thickening throughout the paranasal sinuses with associated osteitis, likely chronic. Postsurgical changes of tumor resection in the right ethmoid sinus. Mastoid air cells are clear. Lymph nodes: No FDG avid or abnormally enlarged lymph nodes. Salivary glands: The major salivary glands are within normal limits. Thyroid gland: Multiple small hypoattenuating thyroid nodules bilaterally. Vascular structures: The major vasculature of the neck are patent. Left IJ CVC with tip in the distal SVC. Prominent right external jugular vein. Brain: No abnormal FDG avid lesion or abnormal enhancement. CHEST: Lymph nodes: conglomerate of lymph nodes about the crux of the right hemidiaphragm posterior to the IVC, with multiple additional small retrocrural lymph nodes demonstrating increased FDG uptake with a max SUV of 8.0. There is also increased uptake of the anterior left crux of the diaphragm just anterior to the aorta with an SUV max of 5.04 that appears slightly thickened on CT. Additional hypermetabolic retrocaval nodes more inferiorly, for example a node on series 4 image 278 with SUV max of 6.55. Lungs: The central tracheobronchial tree is clear. No acute consolidation.  No pleural effusion or pneumothorax. Fibroatelectasis in the right lower lobe adjacent to vertebral osteophytes. Heart and great vessels: Heart is enlarged. No pericardial effusion. Ectatic ascending aorta measuring up to 4.3 cm in diameter. Main pulmonary artery is normal in caliber. The esophagus is unremarkable. ABDOMEN AND PELVIS: Liver: No FDG avid lesion. Cirrhotic liver without focal  lesion. Gallbladder: Unremarkable. No intrahepatic or extrahepatic biliary dilatation. Pancreas: The pancreas is within normal limits. No pancreatic ductal dilatation. Spleen: No FDG avid lesion. Splenomegaly. Unchanged non-FDG avid hypoattenuating focus in the peripheral spleen, likely benign (series 10 image 62) Adrenal glands: No FDG avid foci. Kidneys: No FDG avid lesion. Atrophic kidneys. No hydronephrosis, mass, or calculi.. The urinary bladder is unremarkable. Reproductive organs are within normal limits. Gastrointestinal system: Normal caliber of the small and large bowel. Diffuse hypoattenuation of the wall of the ascending colon, likely colopathy secondary to patient's hepatic function. Lymph nodes: No FDG avid or abnormally enlarged lymph nodes. Vascular structures: Abdominal aorta is normal in caliber. Major branches are patent.. The splenic vein, superior mesenteric vein, and main portal vein are patent. Gastrosplenic varices. Peritoneum: Large volume ascites. No free air. EXTREMITIES: No abnormal FDG uptake in the visualized extremities. BONES AND SOFT TISSUES: No abnormal FDG uptake in the axial or appendicular skeleton. No abnormal lytic or blastic osseous lesions. SPINAL CANAL: No evident canal compromise. No abnormal FDG avid lesion.     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 Disease progression with increased size of the enhancing hypermetabolic soft tissue mass about the right hemimandible measuring up to 5.0 cm with invasion into the floor of the right oral cavity and right base of tongue. No evidence of osseous invasion, though involvement of adjacent structures would be better evaluated with MRI. 2. New focal FDG uptake in/adjacent to the crux of the right and left hemidiaphragm, as well as retrocrural and retrocaval lymph nodes,  suspicious for metastatic disease. 3. Cirrhotic liver with sequela of portal hypertension including large volume ascites and splenogastric varices. 4. Stable appearance of the hypoattenuating lesion in the spleen that does not demonstrate increased FDG uptake. I have personally reviewed the examination and initial interpretation and I agree with the findings. ELIZABETH ALVAREZ DO   SYSTEM ID:  [id number]    CT Soft Tissue Neck w Contrast    Result Date: 1/31/2024  Combined Report of: PET and CT on 1/31/2024 1:34 PM: 1. PET of the neck, chest, abdomen, and pelvis. 2. PET CT Fusion for Attenuation Correction and Anatomical Localization. 3. Diagnostic CT of the head and neck, as well as the chest, abdomen and pelvis with intravenous contrast obtained for diagnostic interpretation. 4. 3D MIP and PET-CT fused images were processed on an independent workstation and archived to PACS and reviewed by a radiologist. Technique: 1. PET: The patient received 9.81 mCi of F-18-FDG. The serum glucose was 96 mg/dL prior to administration. Body weight was 58.5 kg. Images were evaluated in the axial, sagittal, and coronal planes as well as the rotational whole body MIP. Images were acquired from the cranial vertex to the feet. UPTAKE WAS MEASURED AT 60 MINUTES. BACKGROUND: Liver SUV max = 3.14, Aorta Blood SUV max = 1.72. 2. CT: Volumetric acquisition for clinical interpretation of the chest, abdomen, and pelvis acquired at 3 mm sections. The chest, abdomen, and pelvis were evaluated at 5 mm sections in bone, soft tissue, and lung windows. High resolution images of the neck were obtained with multiple oblique projection reformats. Contrast and Medications: IV contrast: 64 mL of Isovue 370 intravenously. PO contrast: none. Additional Medications: None. 3. 3D MIP and PET-CT fused images were processed on an independent workstation and archived to PACS and reviewed by a radiologist. INDICATION: nasopharynx carcinoma staging; Squamous cell  carcinoma of nasopharynx (H); Malignant neoplasm of overlapping sites of nasopharynx (H). 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 Patient then had recurrent disease involving the ethmoid sinus and September 2021 with positive lymph nodes in the neck and PET scan showing a hypermetabolic metastatic lymphadenopathy, as well as hypermetabolic cervical lymph nodes. Patient then had radiosurgery in the right ethmoid tumor from November 8 through November 17 and 20, 2021 with subsequent chemotherapy. PET scan in March 2022 showed a near-complete response. Patient now has clinically progressive disease in the right submandibular area. Patient is going to start on chemotherapy per note from 1/23/2024. Patient did not want surgery and radiation reportedly was not an option. COMPARISON: CT neck 10/23/2023. PET 9/11/2023. FINDINGS: HEAD/NECK: Index tumor: Increased size of the enhancing soft tissue lesion about the right hemimandible now measuring approximately 5.0 x 3.4 cm, previously measured 3.6 cm in largest axial dimension with an SUV max of 10.42. This lesion appears to invade into the floor of the mouth and the base of the right aspect of the tongue with obliteration's of the fat planes in the right oral cavity about the tongue. No significant osseous erosion of the mandible. Pharyngeal mucosal spaces: Nasopharynx and palatine tonsils are within normal limits. Left tongue base is normal. Left oral tongue and buccal mucosa of the oral cavity is normal. Oropharynx, hypopharynx and larynx are within normal limits. Sinonasal region: Mucosal thickening throughout the paranasal sinuses with associated osteitis, likely chronic. Postsurgical changes of tumor resection in the right ethmoid sinus. Mastoid air cells are clear. Lymph nodes: No FDG avid or abnormally enlarged lymph  nodes. Salivary glands: The major salivary glands are within normal limits. Thyroid gland: Multiple small hypoattenuating thyroid nodules bilaterally. Vascular structures: The major vasculature of the neck are patent. Left IJ CVC with tip in the distal SVC. Prominent right external jugular vein. Brain: No abnormal FDG avid lesion or abnormal enhancement. CHEST: Lymph nodes: conglomerate of lymph nodes about the crux of the right hemidiaphragm posterior to the IVC, with multiple additional small retrocrural lymph nodes demonstrating increased FDG uptake with a max SUV of 8.0. There is also increased uptake of the anterior left crux of the diaphragm just anterior to the aorta with an SUV max of 5.04 that appears slightly thickened on CT. Additional hypermetabolic retrocaval nodes more inferiorly, for example a node on series 4 image 278 with SUV max of 6.55. Lungs: The central tracheobronchial tree is clear. No acute consolidation.  No pleural effusion or pneumothorax. Fibroatelectasis in the right lower lobe adjacent to vertebral osteophytes. Heart and great vessels: Heart is enlarged. No pericardial effusion. Ectatic ascending aorta measuring up to 4.3 cm in diameter. Main pulmonary artery is normal in caliber. The esophagus is unremarkable. ABDOMEN AND PELVIS: Liver: No FDG avid lesion. Cirrhotic liver without focal lesion. Gallbladder: Unremarkable. No intrahepatic or extrahepatic biliary dilatation. Pancreas: The pancreas is within normal limits. No pancreatic ductal dilatation. Spleen: No FDG avid lesion. Splenomegaly. Unchanged non-FDG avid hypoattenuating focus in the peripheral spleen, likely benign (series 10 image 62) Adrenal glands: No FDG avid foci. Kidneys: No FDG avid lesion. Atrophic kidneys. No hydronephrosis, mass, or calculi.. The urinary bladder is unremarkable. Reproductive organs are within normal limits. Gastrointestinal system: Normal caliber of the small and large bowel. Diffuse  hypoattenuation of the wall of the ascending colon, likely colopathy secondary to patient's hepatic function. Lymph nodes: No FDG avid or abnormally enlarged lymph nodes. Vascular structures: Abdominal aorta is normal in caliber. Major branches are patent.. The splenic vein, superior mesenteric vein, and main portal vein are patent. Gastrosplenic varices. Peritoneum: Large volume ascites. No free air. EXTREMITIES: No abnormal FDG uptake in the visualized extremities. BONES AND SOFT TISSUES: No abnormal FDG uptake in the axial or appendicular skeleton. No abnormal lytic or blastic osseous lesions. SPINAL CANAL: No evident canal compromise. No abnormal FDG avid lesion.     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 Disease progression with increased size of the enhancing hypermetabolic soft tissue mass about the right hemimandible measuring up to 5.0 cm with invasion into the floor of the right oral cavity and right base of tongue. No evidence of osseous invasion, though involvement of adjacent structures would be better evaluated with MRI. 2. New focal FDG uptake in/adjacent to the crux of the right and left hemidiaphragm, as well as retrocrural and retrocaval lymph nodes, suspicious for metastatic disease. 3. Cirrhotic liver with sequela of portal hypertension including large volume ascites and splenogastric varices. 4. Stable appearance of the hypoattenuating lesion in the spleen that does not demonstrate increased FDG uptake. I have personally reviewed the examination and initial interpretation and I agree with the findings. ELIZABETH ALVAREZ DO   SYSTEM ID:  [id number]    US Paracentesis with Albumin    Result Date: 1/26/2024  EXAM: 1. PARACENTESIS 2. ULTRASOUND GUIDANCE LOCATION: [facility] DATE: 1/26/2024 INDICATION: Ascites. PROCEDURE:  Informed consent obtained. Time out performed. The abdomen was prepped and draped in a sterile fashion. 10 mL of 1% lidocaine was infused into local soft tissues. A 5 [place] catheter system was introduced into the abdominal ascites under ultrasound guidance. 5.0 liters of clear fluid were removed and sent to lab if requested. Patient tolerated procedure well. Ultrasound imaging was obtained and placed in the patient's permanent medical record.     IMPRESSION: 1.  Status post ultrasound-guided paracentesis. Reference CPT Code: 17325    US Paracentesis with Albumin    Result Date: 1/17/2024  EXAM: 1. PARACENTESIS 2. ULTRASOUND GUIDANCE LOCATION: [facility] DATE: 1/17/2024 INDICATION: Ascites. PROCEDURE: Informed consent obtained. Time out performed. The abdomen was prepped and draped in a sterile fashion. 8 mL of 1% lidocaine was infused into local soft tissues. A 5 [place] catheter system was introduced into the abdominal ascites under ultrasound guidance. 5 liters of yellow fluid were removed and sent to lab if requested. Patient tolerated procedure well. Ultrasound imaging was obtained and placed in the patient's permanent medical record.     IMPRESSION: 1.  Status post ultrasound-guided paracentesis. Reference CPT Code: 78549       Signed by: Alan Frias MD

## 2024-02-16 NOTE — PROGRESS NOTES
"Oncology Rooming Note    February 16, 2024 9:41 AM   Kristen Champan is a 73 year old male who presents for:    Chief Complaint   Patient presents with    Oncology Clinic Visit     2 week return Squamous cell carcinoma of nasopharynx, review labs and infusion     Initial Vitals: /56 (BP Location: Left arm, Patient Position: Sitting, Cuff Size: Adult Regular)   Pulse 51   Temp 97.2  F (36.2  C) (Tympanic)   Resp 18   Ht 1.584 m (5' 2.36\")   Wt 61.2 kg (135 lb)   SpO2 100%   BMI 24.41 kg/m   Estimated body mass index is 24.41 kg/m  as calculated from the following:    Height as of this encounter: 1.584 m (5' 2.36\").    Weight as of this encounter: 61.2 kg (135 lb). Body surface area is 1.64 meters squared.  No Pain (0) Comment: Data Unavailable   No LMP for male patient.  Allergies reviewed: Yes  Medications reviewed: Yes    Medications: Medication refills not needed today.  Pharmacy name entered into [facility]: [facility] - [address]    Frailty Screening:   Is the patient here for a new oncology consult visit in cancer care? 1. Yes. Over the past month, have you experienced difficulty or required a caregiver to assist with:   1. Balance, walking or general mobility (including any falls)? NO  2. Completion of self-care tasks such as bathing, dressing, toileting, grooming/hygiene?  NO  3. Concentration or memory that affects your daily life?  NO       Clinical concerns:  2 week return Squamous cell carcinoma of nasopharynx, review labs and infusion      Abby Duncan CMA            "

## 2024-02-16 NOTE — LETTER
"    2/16/2024         RE: Kristen Chapman  [address] [place] [address]        Dear Colleague,    Thank you for referring your patient, Kristen Chapman, to the [facility]. Please see a copy of my visit note below.    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    Weight as of this encounter: 61.2 kg (135 lb). Body surface area is 1.64 meters squared.  No Pain (0) Comment: Data Unavailable   No LMP for male patient.  Allergies reviewed: Yes  Medications reviewed: Yes    Medications: Medication refills not needed today.  Pharmacy name entered into [employer]: [facility] - [address]    Frailty Screening:   Is the patient here for a new oncology consult visit in cancer care? 1. Yes. Over the past month, have you experienced difficulty or required a caregiver to assist with:   1. Balance, walking or general mobility (including any falls)? NO  2. Completion of self-care tasks such as bathing, dressing, toileting, grooming/hygiene?  NO  3. Concentration or memory that affects your daily life?  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  Nasopharyngeal carcinoma: He has now started back on treatment with single agent cetuximab.  Today will be his second dose.  He has been tolerating it well.  If he tolerates this cycle okay then I would add and carboplatin, AUC 2, every other week if he does okay with this dose.  If he does okay here then we could try 3 weeks on 1 week off of the carboplatin.  Other treatment options include single agent like weekly Taxol or gemcitabine.   He did not progress on immunotherapy but I be more hesitant to start this with active hepatitis B.  Immunotherapy could be considered if he achieves clearance of the virus.    He also had a PET scan on January 31.  I personally reviewed the images.  These show the large tumor about the right hemimandible invading into the floor of the right oral cavity and right base of tongue.  There are also some retrocrural and retrocaval nodes positive.    He had a repeat biopsy of the right submandibular tumor which shows nonkeratinizing squamous cell carcinoma.  This specimen will be sent for NGS.    2.  Pancytopenia: CBC from February is reviewed and showed a hemoglobin of 7.8 with a mean cell volume 92.  White count was normal, platelets 63.  Will have to closely monitor his numbers now that he is back on treatment.  Red cell transfusions to keep hemoglobin above 7.  He has chronic pancytopenia secondary to chronic hepatitis B infection as well as cirrhosis.  He also has splenomegaly measuring 17 cm on CT from Sophia 15, 2023.  He had a bone marrow biopsy in October 2021.  Next generation sequencing showed no abnormalities.  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     3.  Hypothyroidism: TSH is coming down.  On February 8 it was 41.  Continue his current dose of Synthroid.       4.  Hepatitis B infection: He is following up with hepatology at the [facility].  His son states that he ran out of his tenofovir.  I am not sure if he has been taking it.  I asked him to call his team at the  for refill.      5.  Ascites: Refractory.  We again spent some time discussing an abdominal Pleurx catheter today.  The patient does not want to proceed.  I again reviewed with them how it is inserted and used.  We will make an appointment for placement.  Cytology has been negative multiple times.    6.  Tumor ulceration: He was seen at the wound care clinic on February 1.  A protein rich diet was discussed.    7.  F/E/N: We started him on Megace which seems to be helping his appetite.  Will continue.      Medical decision Making:  I spent 44 minutes in the care of this patient today, which included time necessary for preparation for the visit, face to face time with the patient, communication of recommendations to the care team, and documentation time.  Today's visit was centered around a cancer diagnosis in the setting of additional medical comorbidities. Complex medical decision making was required. The risk of additional morbidity without further treatment is high.     The longitudinal plan of care for the condition(s) below were addressed during this visit. Due to the added complexity in care, I will continue to support Kristen in the subsequent management of this condition(s) and with the ongoing continuity of care of this condition(s).    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  Diagnosed January 2020. Imaging suggested bilateral abnormal lymphadenopathy in the neck.  Also asymmetric soft tissue thickening in the posterior right nasopharynx.  On imaging, tumor also appeared to extend down to the hypopharynx.    Recurrent  disease involving the ethmoid sinus diagnosed September 2021.  Right neck lymph node positive for recurrent nasopharyngeal carcinoma.  PET scan at that time showed new hypermetabolic mediastinal lymphadenopathy (chronic, most likely reactive), mass in the right ethmoid sinus, hypermetabolic right level 1B and 2A lymph nodes.    Treatment:    Initially treated with concurrent chemotherapy and radiation.  He had weekly cisplatin starting March 3, 2020. Fifth and final dose given March 31, 2020.  Subsequent chemotherapy was held due to prolonged thrombocytopenia and renal insufficiency.  Radiation dose was 7000 cGy in 35 fractions given from March 2 through April 17, 2020.     Started cisplatin with infusional 5-FU on June 1, 2020.  Cycle 1 given at a 25% dose reduction.  He still had significant thrombocytopenia and neutropenia on day 28.  Cycle 2 was held.  At the same time his liver function tests elevated secondary to hepatitis B.   PET scan in September 2020 showed no good evidence of residual malignancy.    Recurrence:  Radiosurgery delivered to the right ethmoid tumor delivered November 8 through November 17, 2021.  Received 3500 cGy in 5 fractions.  Pembrolizumab started 12/23/21.  Held after his July 1, 2022 dose.  PET scan in March 2022 showed a near complete response.    Cetuximab initiated 2/2/2024    Interim History:    Kristen Chapman returns today for follow-up visit.  He has had 1 cycle of cetuximab.  He is tolerating it well.  No rash.  No nausea or vomiting.  He is wondering about getting a Pleurx catheter placed.  He has seen the wound care people.  No fevers.  Pain is controlled.    Review of Systems:    As above in the history.     Review of Systems otherwise Negative for:  General: chills, fever or night sweats  Psychological: anxiety or depression  Ophthalmic: blurry vision, double vision or loss of vision, vision change  ENT: oral lesions, hearing changes  Hematological and Lymphatic: bruising,  "jaundice, swollen lymph nodes  Endocrine: hot flashes  Respiratory: cough, hemoptysis, orthopnea  Cardiovascular: chest pain, palpitations or PND  Gastrointestinal: blood in stools, change in bowel habits, constipation, diarrhea or nausea/vomiting  Genito-Urinary: change in urinary stream, incontinence, frequency/urgency  Musculoskeletal: joint swelling, muscle pain  Neurological: dizziness, headaches, numbness/tingling  Dermatological: lumps and rash    Past History:    Past Medical History:   Diagnosis Date     Anemia      Dysphagia      Hepatitis B chronic      Hypothyroidism      Nasopharyngeal cancer (H)      Severe protein-calorie malnutrition (H24)      Thrombocytopenia (H24)      Physical Exam:    /56 (BP Location: Left arm, Patient Position: Sitting, Cuff Size: Adult Regular)   Pulse 51   Temp 97.2  F (36.2  C) (Tympanic)   Resp 18   Ht 1.584 m (5' 2.36\")   Wt 61.2 kg (135 lb)   SpO2 100%   BMI 24.41 kg/m      General: patient appears stated age of 71 year old. Nontoxic and in no distress.   HEENT: Head: atraumatic, normocephalic. Sclerae anicteric.  Large right submandibular mass.  Bandaged.  Chest:  Normal respiratory effort  Cardiac:  No edema.  Abdomen: abdomen remains distended with ascites.  Extremities: normal tone and muscle bulk.  Skin: no lesions or rash on visible skin. Warm and dry.   CNS: alert and oriented. Grossly non-focal.   Psychiatric: normal mood and affect.     Lab Results:    Recent Results (from the past 168 hour(s))   Surgical Pathology Exam   Result Value Ref Range    Case Report       Surgical Pathology Report                         Case: [id number]                                  Authorizing Provider:  Alan Frias MD        Collected:           02/12/2024 02:11 PM          Ordering Location:     [facility]      Received:            02/12/2024 02:50 PM                                 [facility]                                           "         Pathologist:           Randi Charles MD                                                      Specimen:    Salivary Gland, Submandibular, Right                                                       Final Diagnosis       SALIVARY GLAND, RIGHT SUBMANDIBULAR, MASS, ULTRASOUND-GUIDED NEEDLE CORE BIOPSY:  -COMPATIBLE WITH NON-KERATINIZING SQUAMOUS CELL CARCINOMA  -SEE COMMENT      Comment       The patient's previous right submandibular gland biopsy ([id number], 08/31/2023) is reviewed and the morphologic findings are similar.  Of note, the specimen was reportedly obtained for additional molecular testing which can be sent out upon specific clinical request.      Clinical Information       Clinical history:  Squamous cell carcinoma nasopharynx  Reason for procedure: Rt submandibular mass       Gross Description       A(A). Salivary Gland, Submandibular, Right, :  Biopsy was performed under Ultrasound guidance by Dr. Pawan Jaramillo with 5 pass(es) from which:          5 Air-dried smear(s)  1 vial with 5 core(s) in 10% Formalin   Specimen in 10% Formalin at 14:16 on 02/12/2024  1 core block(s) are prepared at [facility].    Assisted by:  DENZEL Hammond    GROSS DESCRIPTION:  The specimen consists of multiple fragmented tan needle core biopsies ranging in size from 0.1-0.6 cm.  TE-1C      Microscopic Description       Microscopic examination performed, substantiating the above diagnosis.       MCRS Yes (A) N/A    Performing Labs       The technical component of this testing was completed at [facility]      Case Images          Imaging:    US Biopsy Lymph Node Core    Result Date: 2/12/2024  EXAM: 1. PERCUTANEOUS BIOPSY RIGHT SUBMANDIBULAR REGION MASS 2. ULTRASOUND GUIDANCE LOCATION: [facility] DATE: 2/12/2024 INDICATION: right submandibular mass PROCEDURE: Informed consent obtained. Site marked. Prior images reviewed.  Required items made available. Patient identity was confirmed verbally and with arm band. Patient reevaluated immediately before beginning the procedure. Universal protocol was followed. Time out performed. The site was prepped and draped in sterile fashion. 10 mL of 1 percent lidocaine was infused into the local soft tissues. Using standard technique and under direct ultrasound guidance, a 18 gauge biopsy needle was used to make 5 core biopsies. Tissue was submitted to Pathology. The patient tolerated the procedure well. No complications.     IMPRESSION: Status post US-guided biopsy a right submandibular region mass. Core samples were performed due to the need for molecular testing. Reference CPT Code: 05253, 28250    US Paracentesis with Albumin    Result Date: 2/12/2024  EXAM: 1. PARACENTESIS 2. ULTRASOUND GUIDANCE LOCATION: [facility] DATE: 2/12/2024 INDICATION: Ascites. PROCEDURE: Informed consent obtained. Time out performed. The abdomen was prepped and draped in a sterile fashion. 10 mL of 1% lidocaine was infused into local soft tissues. A 5 [place]ian catheter system was introduced into the abdominal ascites under ultrasound guidance. 5 liters of clear fluid were removed and sent to lab if requested. Patient tolerated procedure well. Ultrasound imaging was obtained and placed in the patient's permanent medical record.     IMPRESSION: 1.  Status post ultrasound-guided paracentesis. Reference CPT Code: 82573    PET Oncology Whole Body    Result Date: 1/31/2024  Combined Report of: PET and CT on 1/31/2024 1:34 PM: 1. PET of the neck, chest, abdomen, and pelvis. 2. PET CT Fusion for Attenuation Correction and Anatomical Localization. 3. Diagnostic CT of the head and neck, as well as the chest, abdomen and pelvis with intravenous contrast obtained for diagnostic interpretation. 4. 3D MIP and PET-CT fused images were processed on an independent workstation and archived to PACS and reviewed by  a radiologist. Technique: 1. PET: The patient received 9.81 mCi of F-18-FDG. The serum glucose was 96 mg/dL prior to administration. Body weight was 58.5 kg. Images were evaluated in the axial, sagittal, and coronal planes as well as the rotational whole body MIP. Images were acquired from the cranial vertex to the feet. UPTAKE WAS MEASURED AT 60 MINUTES. BACKGROUND: Liver SUV max = 3.14, Aorta Blood SUV max = 1.72. 2. CT: Volumetric acquisition for clinical interpretation of the chest, abdomen, and pelvis acquired at 3 mm sections. The chest, abdomen, and pelvis were evaluated at 5 mm sections in bone, soft tissue, and lung windows. High resolution images of the neck were obtained with multiple oblique projection reformats. Contrast and Medications: IV contrast: 64 mL of Isovue 370 intravenously. PO contrast: none. Additional Medications: None. 3. 3D MIP and PET-CT fused images were processed on an independent workstation and archived to PACS and reviewed by a radiologist. 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 Patient then had recurrent disease involving the ethmoid sinus and September 2021 with positive lymph nodes in the neck and PET scan showing a hypermetabolic metastatic lymphadenopathy, as well as hypermetabolic cervical lymph nodes. Patient then had radiosurgery in the right ethmoid tumor from November 8 through November 17 and 20, 2021 with subsequent chemotherapy. PET scan in March 2022 showed a near-complete response. Patient now has clinically progressive disease in the right submandibular area. Patient is going to start on chemotherapy per note from 1/23/2024. Patient did not want surgery and radiation  reportedly was not an option. COMPARISON: CT neck 10/23/2023. PET 9/11/2023. FINDINGS: HEAD/NECK: Index tumor: Increased size of the enhancing soft tissue lesion about the right hemimandible now measuring approximately 5.0 x 3.4 cm, previously measured 3.6 cm in largest axial dimension with an SUV max of 10.42. This lesion appears to invade into the floor of the mouth and the base of the right aspect of the tongue with obliteration's of the fat planes in the right oral cavity about the tongue. No significant osseous erosion of the mandible. Pharyngeal mucosal spaces: Nasopharynx and palatine tonsils are within normal limits. Left tongue base is normal. Left oral tongue and buccal mucosa of the oral cavity is normal. Oropharynx, hypopharynx and larynx are within normal limits. Sinonasal region: Mucosal thickening throughout the paranasal sinuses with associated osteitis, likely chronic. Postsurgical changes of tumor resection in the right ethmoid sinus. Mastoid air cells are clear. Lymph nodes: No FDG avid or abnormally enlarged lymph nodes. Salivary glands: The major salivary glands are within normal limits. Thyroid gland: Multiple small hypoattenuating thyroid nodules bilaterally. Vascular structures: The major vasculature of the neck are patent. Left IJ CVC with tip in the distal SVC. Prominent right external jugular vein. Brain: No abnormal FDG avid lesion or abnormal enhancement. CHEST: Lymph nodes: conglomerate of lymph nodes about the crux of the right hemidiaphragm posterior to the IVC, with multiple additional small retrocrural lymph nodes demonstrating increased FDG uptake with a max SUV of 8.0. There is also increased uptake of the anterior left crux of the diaphragm just anterior to the aorta with an SUV max of 5.04 that appears slightly thickened on CT. Additional hypermetabolic retrocaval nodes more inferiorly, for example a node on series 4 image 278 with SUV max of 6.55. Lungs: The central  tracheobronchial tree is clear. No acute consolidation.  No pleural effusion or pneumothorax. Fibroatelectasis in the right lower lobe adjacent to vertebral osteophytes. Heart and great vessels: Heart is enlarged. No pericardial effusion. Ectatic ascending aorta measuring up to 4.3 cm in diameter. Main pulmonary artery is normal in caliber. The esophagus is unremarkable. ABDOMEN AND PELVIS: Liver: No FDG avid lesion. Cirrhotic liver without focal lesion. Gallbladder: Unremarkable. No intrahepatic or extrahepatic biliary dilatation. Pancreas: The pancreas is within normal limits. No pancreatic ductal dilatation. Spleen: No FDG avid lesion. Splenomegaly. Unchanged non-FDG avid hypoattenuating focus in the peripheral spleen, likely benign (series 10 image 62) Adrenal glands: No FDG avid foci. Kidneys: No FDG avid lesion. Atrophic kidneys. No hydronephrosis, mass, or calculi.. The urinary bladder is unremarkable. Reproductive organs are within normal limits. Gastrointestinal system: Normal caliber of the small and large bowel. Diffuse hypoattenuation of the wall of the ascending colon, likely colopathy secondary to patient's hepatic function. Lymph nodes: No FDG avid or abnormally enlarged lymph nodes. Vascular structures: Abdominal aorta is normal in caliber. Major branches are patent.. The splenic vein, superior mesenteric vein, and main portal vein are patent. Gastrosplenic varices. Peritoneum: Large volume ascites. No free air. EXTREMITIES: No abnormal FDG uptake in the visualized extremities. BONES AND SOFT TISSUES: No abnormal FDG uptake in the axial or appendicular skeleton. No abnormal lytic or blastic osseous lesions. SPINAL CANAL: No evident canal compromise. No abnormal FDG avid lesion.     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 Disease progression with  increased size of the enhancing hypermetabolic soft tissue mass about the right hemimandible measuring up to 5.0 cm with invasion into the floor of the right oral cavity and right base of tongue. No evidence of osseous invasion, though involvement of adjacent structures would be better evaluated with MRI. 2. New focal FDG uptake in/adjacent to the crux of the right and left hemidiaphragm, as well as retrocrural and retrocaval lymph nodes, suspicious for metastatic disease. 3. Cirrhotic liver with sequela of portal hypertension including large volume ascites and splenogastric varices. 4. Stable appearance of the hypoattenuating lesion in the spleen that does not demonstrate increased FDG uptake. I have personally reviewed the examination and initial interpretation and I agree with the findings. ELIZABETH ALVAREZ DO   SYSTEM ID:  [id number]    CT Chest/Abdomen/Pelvis w Contrast    Result Date: 1/31/2024  Combined Report of: PET and CT on 1/31/2024 1:34 PM: 1. PET of the neck, chest, abdomen, and pelvis. 2. PET CT Fusion for Attenuation Correction and Anatomical Localization. 3. Diagnostic CT of the head and neck, as well as the chest, abdomen and pelvis with intravenous contrast obtained for diagnostic interpretation. 4. 3D MIP and PET-CT fused images were processed on an independent workstation and archived to PACS and reviewed by a radiologist. Technique: 1. PET: The patient received 9.81 mCi of F-18-FDG. The serum glucose was 96 mg/dL prior to administration. Body weight was 58.5 kg. Images were evaluated in the axial, sagittal, and coronal planes as well as the rotational whole body MIP. Images were acquired from the cranial vertex to the feet. UPTAKE WAS MEASURED AT 60 MINUTES. BACKGROUND: Liver SUV max = 3.14, Aorta Blood SUV max = 1.72. 2. CT: Volumetric acquisition for clinical interpretation of the chest, abdomen, and pelvis acquired at 3 mm sections. The chest, abdomen, and pelvis were evaluated at 5 mm sections in  bone, soft tissue, and lung windows. High resolution images of the neck were obtained with multiple oblique projection reformats. Contrast and Medications: IV contrast: 64 mL of Isovue 370 intravenously. PO contrast: none. Additional Medications: None. 3. 3D MIP and PET-CT fused images were processed on an independent workstation and archived to PACS and reviewed by a radiologist. 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 Patient then had recurrent disease involving the ethmoid sinus and September 2021 with positive lymph nodes in the neck and PET scan showing a hypermetabolic metastatic lymphadenopathy, as well as hypermetabolic cervical lymph nodes. Patient then had radiosurgery in the right ethmoid tumor from November 8 through November 17 and 20, 2021 with subsequent chemotherapy. PET scan in March 2022 showed a near-complete response. Patient now has clinically progressive disease in the right submandibular area. Patient is going to start on chemotherapy per note from 1/23/2024. Patient did not want surgery and radiation reportedly was not an option. COMPARISON: CT neck 10/23/2023. PET 9/11/2023. FINDINGS: HEAD/NECK: Index tumor: Increased size of the enhancing soft tissue lesion about the right hemimandible now measuring approximately 5.0 x 3.4 cm, previously measured 3.6 cm in largest axial dimension with an SUV max of 10.42. This lesion appears to invade into the floor of the mouth and the base of the right aspect of the tongue with obliteration's of the fat planes in the right oral cavity about the tongue. No significant osseous erosion of the mandible. Pharyngeal mucosal spaces: Nasopharynx and palatine tonsils are  within normal limits. Left tongue base is normal. Left oral tongue and buccal mucosa of the oral cavity is normal. Oropharynx, hypopharynx and larynx are within normal limits. Sinonasal region: Mucosal thickening throughout the paranasal sinuses with associated osteitis, likely chronic. Postsurgical changes of tumor resection in the right ethmoid sinus. Mastoid air cells are clear. Lymph nodes: No FDG avid or abnormally enlarged lymph nodes. Salivary glands: The major salivary glands are within normal limits. Thyroid gland: Multiple small hypoattenuating thyroid nodules bilaterally. Vascular structures: The major vasculature of the neck are patent. Left IJ CVC with tip in the distal SVC. Prominent right external jugular vein. Brain: No abnormal FDG avid lesion or abnormal enhancement. CHEST: Lymph nodes: conglomerate of lymph nodes about the crux of the right hemidiaphragm posterior to the IVC, with multiple additional small retrocrural lymph nodes demonstrating increased FDG uptake with a max SUV of 8.0. There is also increased uptake of the anterior left crux of the diaphragm just anterior to the aorta with an SUV max of 5.04 that appears slightly thickened on CT. Additional hypermetabolic retrocaval nodes more inferiorly, for example a node on series 4 image 278 with SUV max of 6.55. Lungs: The central tracheobronchial tree is clear. No acute consolidation.  No pleural effusion or pneumothorax. Fibroatelectasis in the right lower lobe adjacent to vertebral osteophytes. Heart and great vessels: Heart is enlarged. No pericardial effusion. Ectatic ascending aorta measuring up to 4.3 cm in diameter. Main pulmonary artery is normal in caliber. The esophagus is unremarkable. ABDOMEN AND PELVIS: Liver: No FDG avid lesion. Cirrhotic liver without focal lesion. Gallbladder: Unremarkable. No intrahepatic or extrahepatic biliary dilatation. Pancreas: The pancreas is within normal limits. No pancreatic ductal dilatation.  Spleen: No FDG avid lesion. Splenomegaly. Unchanged non-FDG avid hypoattenuating focus in the peripheral spleen, likely benign (series 10 image 62) Adrenal glands: No FDG avid foci. Kidneys: No FDG avid lesion. Atrophic kidneys. No hydronephrosis, mass, or calculi.. The urinary bladder is unremarkable. Reproductive organs are within normal limits. Gastrointestinal system: Normal caliber of the small and large bowel. Diffuse hypoattenuation of the wall of the ascending colon, likely colopathy secondary to patient's hepatic function. Lymph nodes: No FDG avid or abnormally enlarged lymph nodes. Vascular structures: Abdominal aorta is normal in caliber. Major branches are patent.. The splenic vein, superior mesenteric vein, and main portal vein are patent. Gastrosplenic varices. Peritoneum: Large volume ascites. No free air. EXTREMITIES: No abnormal FDG uptake in the visualized extremities. BONES AND SOFT TISSUES: No abnormal FDG uptake in the axial or appendicular skeleton. No abnormal lytic or blastic osseous lesions. SPINAL CANAL: No evident canal compromise. No abnormal FDG avid lesion.     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 Disease progression with increased size of the enhancing hypermetabolic soft tissue mass about the right hemimandible measuring up to 5.0 cm with invasion into the floor of the right oral cavity and right base of tongue. No evidence of osseous invasion, though involvement of adjacent structures would be better evaluated with MRI. 2. New focal FDG uptake in/adjacent to the crux of the right and left hemidiaphragm, as well as retrocrural and retrocaval lymph nodes, suspicious for metastatic disease. 3. Cirrhotic liver with sequela of portal hypertension including large volume ascites and splenogastric varices. 4. Stable appearance of the  hypoattenuating lesion in the spleen that does not demonstrate increased FDG uptake. I have personally reviewed the examination and initial interpretation and I agree with the findings. ELIZABETH ALVAREZ DO   SYSTEM ID:  [id number]    CT Soft Tissue Neck w Contrast    Result Date: 1/31/2024  Combined Report of: PET and CT on 1/31/2024 1:34 PM: 1. PET of the neck, chest, abdomen, and pelvis. 2. PET CT Fusion for Attenuation Correction and Anatomical Localization. 3. Diagnostic CT of the head and neck, as well as the chest, abdomen and pelvis with intravenous contrast obtained for diagnostic interpretation. 4. 3D MIP and PET-CT fused images were processed on an independent workstation and archived to PACS and reviewed by a radiologist. Technique: 1. PET: The patient received 9.81 mCi of F-18-FDG. The serum glucose was 96 mg/dL prior to administration. Body weight was 58.5 kg. Images were evaluated in the axial, sagittal, and coronal planes as well as the rotational whole body MIP. Images were acquired from the cranial vertex to the feet. UPTAKE WAS MEASURED AT 60 MINUTES. BACKGROUND: Liver SUV max = 3.14, Aorta Blood SUV max = 1.72. 2. CT: Volumetric acquisition for clinical interpretation of the chest, abdomen, and pelvis acquired at 3 mm sections. The chest, abdomen, and pelvis were evaluated at 5 mm sections in bone, soft tissue, and lung windows. High resolution images of the neck were obtained with multiple oblique projection reformats. Contrast and Medications: IV contrast: 64 mL of Isovue 370 intravenously. PO contrast: none. Additional Medications: None. 3. 3D MIP and PET-CT fused images were processed on an independent workstation and archived to PACS and reviewed by a radiologist. 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 ADDITIONAL INFORMATION OBTAINED FROM EMR: 73-year-old male with a history of nasopharyngeal  carcinoma diagnosed in January 2020 which was initially treated with chemotherapy and radiation. Patient then had recurrent disease involving the ethmoid sinus and September 2021 with positive lymph nodes in the neck and PET scan showing a hypermetabolic metastatic lymphadenopathy, as well as hypermetabolic cervical lymph nodes. Patient then had radiosurgery in the right ethmoid tumor from November 8 through November 17 and 20, 2021 with subsequent chemotherapy. PET scan in March 2022 showed a near-complete response. Patient now has clinically progressive disease in the right submandibular area. Patient is going to start on chemotherapy per note from 1/23/2024. Patient did not want surgery and radiation reportedly was not an option. COMPARISON: CT neck 10/23/2023. PET 9/11/2023. FINDINGS: HEAD/NECK: Index tumor: Increased size of the enhancing soft tissue lesion about the right hemimandible now measuring approximately 5.0 x 3.4 cm, previously measured 3.6 cm in largest axial dimension with an SUV max of 10.42. This lesion appears to invade into the floor of the mouth and the base of the right aspect of the tongue with obliteration's of the fat planes in the right oral cavity about the tongue. No significant osseous erosion of the mandible. Pharyngeal mucosal spaces: Nasopharynx and palatine tonsils are within normal limits. Left tongue base is normal. Left oral tongue and buccal mucosa of the oral cavity is normal. Oropharynx, hypopharynx and larynx are within normal limits. Sinonasal region: Mucosal thickening throughout the paranasal sinuses with associated osteitis, likely chronic. Postsurgical changes of tumor resection in the right ethmoid sinus. Mastoid air cells are clear. Lymph nodes: No FDG avid or abnormally enlarged lymph nodes. Salivary glands: The major salivary glands are within normal limits. Thyroid gland: Multiple small hypoattenuating thyroid nodules bilaterally. Vascular structures: The major  vasculature of the neck are patent. Left IJ CVC with tip in the distal SVC. Prominent right external jugular vein. Brain: No abnormal FDG avid lesion or abnormal enhancement. CHEST: Lymph nodes: conglomerate of lymph nodes about the crux of the right hemidiaphragm posterior to the IVC, with multiple additional small retrocrural lymph nodes demonstrating increased FDG uptake with a max SUV of 8.0. There is also increased uptake of the anterior left crux of the diaphragm just anterior to the aorta with an SUV max of 5.04 that appears slightly thickened on CT. Additional hypermetabolic retrocaval nodes more inferiorly, for example a node on series 4 image 278 with SUV max of 6.55. Lungs: The central tracheobronchial tree is clear. No acute consolidation.  No pleural effusion or pneumothorax. Fibroatelectasis in the right lower lobe adjacent to vertebral osteophytes. Heart and great vessels: Heart is enlarged. No pericardial effusion. Ectatic ascending aorta measuring up to 4.3 cm in diameter. Main pulmonary artery is normal in caliber. The esophagus is unremarkable. ABDOMEN AND PELVIS: Liver: No FDG avid lesion. Cirrhotic liver without focal lesion. Gallbladder: Unremarkable. No intrahepatic or extrahepatic biliary dilatation. Pancreas: The pancreas is within normal limits. No pancreatic ductal dilatation. Spleen: No FDG avid lesion. Splenomegaly. Unchanged non-FDG avid hypoattenuating focus in the peripheral spleen, likely benign (series 10 image 62) Adrenal glands: No FDG avid foci. Kidneys: No FDG avid lesion. Atrophic kidneys. No hydronephrosis, mass, or calculi.. The urinary bladder is unremarkable. Reproductive organs are within normal limits. Gastrointestinal system: Normal caliber of the small and large bowel. Diffuse hypoattenuation of the wall of the ascending colon, likely colopathy secondary to patient's hepatic function. Lymph nodes: No FDG avid or abnormally enlarged lymph nodes. Vascular structures:  Abdominal aorta is normal in caliber. Major branches are patent.. The splenic vein, superior mesenteric vein, and main portal vein are patent. Gastrosplenic varices. Peritoneum: Large volume ascites. No free air. EXTREMITIES: No abnormal FDG uptake in the visualized extremities. BONES AND SOFT TISSUES: No abnormal FDG uptake in the axial or appendicular skeleton. No abnormal lytic or blastic osseous lesions. SPINAL CANAL: No evident canal compromise. No abnormal FDG avid lesion.     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 Disease progression with increased size of the enhancing hypermetabolic soft tissue mass about the right hemimandible measuring up to 5.0 cm with invasion into the floor of the right oral cavity and right base of tongue. No evidence of osseous invasion, though involvement of adjacent structures would be better evaluated with MRI. 2. New focal FDG uptake in/adjacent to the crux of the right and left hemidiaphragm, as well as retrocrural and retrocaval lymph nodes, suspicious for metastatic disease. 3. Cirrhotic liver with sequela of portal hypertension including large volume ascites and splenogastric varices. 4. Stable appearance of the hypoattenuating lesion in the spleen that does not demonstrate increased FDG uptake. I have personally reviewed the examination and initial interpretation and I agree with the findings. ELIZABETH ALVAREZ DO   SYSTEM ID:  [id number]    US Paracentesis with Albumin    Result Date: 1/26/2024  EXAM: 1. PARACENTESIS 2. ULTRASOUND GUIDANCE LOCATION: [facility] DATE: 1/26/2024 INDICATION: Ascites. PROCEDURE: Informed consent obtained. Time out performed. The abdomen was prepped and draped in a sterile fashion. 10 mL of 1% lidocaine was infused into local soft tissues. A 5 Tamazight catheter system was  introduced into the abdominal ascites under ultrasound guidance. 5.0 liters of clear fluid were removed and sent to lab if requested. Patient tolerated procedure well. Ultrasound imaging was obtained and placed in the patient's permanent medical record.     IMPRESSION: 1.  Status post ultrasound-guided paracentesis. Reference CPT Code: 17891    US Paracentesis with Albumin    Result Date: 1/17/2024  EXAM: 1. PARACENTESIS 2. ULTRASOUND GUIDANCE LOCATION: [facility] DATE: 1/17/2024 INDICATION: Ascites. PROCEDURE: Informed consent obtained. Time out performed. The abdomen was prepped and draped in a sterile fashion. 8 mL of 1% lidocaine was infused into local soft tissues. A 5 [place] catheter system was introduced into the abdominal ascites under ultrasound guidance. 5 liters of yellow fluid were removed and sent to lab if requested. Patient tolerated procedure well. Ultrasound imaging was obtained and placed in the patient's permanent medical record.     IMPRESSION: 1.  Status post ultrasound-guided paracentesis. Reference CPT Code: 08467       Signed by: Alan Frias MD      Again, thank you for allowing me to participate in the care of your patient.        Sincerely,        Alan Frias MD

## 2024-02-19 DIAGNOSIS — K74.60 CIRRHOSIS OF LIVER WITH ASCITES, UNSPECIFIED HEPATIC CIRRHOSIS TYPE (H): ICD-10-CM

## 2024-02-19 DIAGNOSIS — B18.1 CHRONIC VIRAL HEPATITIS B WITHOUT DELTA AGENT AND WITHOUT COMA (H): ICD-10-CM

## 2024-02-19 DIAGNOSIS — R18.8 CIRRHOSIS OF LIVER WITH ASCITES, UNSPECIFIED HEPATIC CIRRHOSIS TYPE (H): ICD-10-CM

## 2024-02-19 RX ORDER — TENOFOVIR DISOPROXIL FUMARATE 300 MG/1
300 TABLET, FILM COATED ORAL EVERY OTHER DAY
Qty: 45 TABLET | Refills: 3 | Status: SHIPPED | OUTPATIENT
Start: 2024-02-19 | End: 2024-04-18

## 2024-02-20 ENCOUNTER — HOSPITAL ENCOUNTER (OUTPATIENT)
Dept: ULTRASOUND IMAGING | Facility: HOSPITAL | Age: 74
Discharge: HOME | End: 2024-02-20
Attending: INTERNAL MEDICINE | Admitting: RADIOLOGY
Payer: COMMERCIAL

## 2024-02-20 VITALS
TEMPERATURE: 98.2 F | OXYGEN SATURATION: 100 % | HEART RATE: 50 BPM | DIASTOLIC BLOOD PRESSURE: 61 MMHG | SYSTOLIC BLOOD PRESSURE: 108 MMHG

## 2024-02-20 DIAGNOSIS — K74.60 CIRRHOSIS OF LIVER WITH ASCITES, UNSPECIFIED HEPATIC CIRRHOSIS TYPE (H): ICD-10-CM

## 2024-02-20 DIAGNOSIS — R18.8 CIRRHOSIS OF LIVER WITH ASCITES, UNSPECIFIED HEPATIC CIRRHOSIS TYPE (H): ICD-10-CM

## 2024-02-20 PROCEDURE — 272N000042 US PARACENTESIS WITH ALBUMIN

## 2024-02-20 PROCEDURE — P9047 ALBUMIN (HUMAN), 25%, 50ML: HCPCS | Performed by: INTERNAL MEDICINE

## 2024-02-20 PROCEDURE — 250N000011 HC RX IP 250 OP 636: Performed by: INTERNAL MEDICINE

## 2024-02-20 PROCEDURE — 250N000011 HC RX IP 250 OP 636: Performed by: FAMILY MEDICINE

## 2024-02-20 RX ORDER — ALBUMIN (HUMAN) 12.5 G/50ML
50 SOLUTION INTRAVENOUS ONCE
Status: COMPLETED | OUTPATIENT
Start: 2024-02-20 | End: 2024-02-20

## 2024-02-20 RX ORDER — HEPARIN SODIUM (PORCINE) LOCK FLUSH IV SOLN 100 UNIT/ML 100 UNIT/ML
500 SOLUTION INTRAVENOUS ONCE
Status: COMPLETED | OUTPATIENT
Start: 2024-02-20 | End: 2024-02-20

## 2024-02-20 RX ADMIN — Medication 500 UNITS: at 16:15

## 2024-02-20 RX ADMIN — ALBUMIN HUMAN 50 G: 0.25 SOLUTION INTRAVENOUS at 15:05

## 2024-02-21 ENCOUNTER — MYC MEDICAL ADVICE (OUTPATIENT)
Dept: INTERVENTIONAL RADIOLOGY/VASCULAR | Facility: CLINIC | Age: 74
End: 2024-02-21
Payer: COMMERCIAL

## 2024-02-21 NOTE — PROGRESS NOTES
Interventional Radiology - Pre-Procedure Note:  Outpatient - [facility]  02/22/2024     Procedure Requested: Tunneled peritoneal drainage catheter placement  Requested by: Dr Frias    History and Physical Reviewed: H&P documented within 30 days (by Alan Frias MD  on 2/16/24).  I have personally reviewed the patient's medical history and have updated the medical record as necessary.    Past Medical History:   Diagnosis Date    Anemia     Dysphagia     Hepatitis B chronic     Hypothyroidism     Nasopharyngeal cancer (H)     Severe protein-calorie malnutrition (H24)     Thrombocytopenia (H24)         Brief HPI: Kristen Chapman is a 73 year old male with Nasopharyngeal carcinoma with refractory ascites. He is followed by Dr. Frias, [facility] oncology. He is has had multiple paracentesis. Last paracentesis 2/20/24 with 5L removal. He presents today placement of tunneled peritoneal catheter placement for ascites.     IMAGING:  EXAM:  1. PARACENTESIS  2. ULTRASOUND GUIDANCE  LOCATION: [facility]  DATE: 2/20/2024     INDICATION: Cirrhosis with ascites.     PROCEDURE: Informed consent obtained. Time out performed. A limited ultrasound abdomen is performed demonstrate large amount of ascites. Largest collection of fluid was localized to the right midabdomen and the overlying skin was prepped and draped in a   sterile fashion. 10 mL of 1% lidocaine was infused into local soft tissues. A 5 [place] catheter system was introduced into the abdominal ascites under ultrasound guidance.     5.0 liters of clear fluid were removed.     Patient tolerated procedure well.     Ultrasound imaging was obtained and placed in the patient's permanent medical record.                                                                      IMPRESSION:  1.  Ultrasound-guided therapeutic paracentesis performed without complication.     Reference CPT Code: 84966      Combined Report of: PET  and CT on 1/31/2024 1:34 PM:     1. PET of the neck, chest, abdomen, and pelvis.  2. PET CT Fusion for Attenuation Correction and Anatomical  Localization.  3. Diagnostic CT of the head and neck, as well as the chest, abdomen  and pelvis with intravenous contrast obtained for diagnostic  interpretation.  4. 3D MIP and PET-CT fused images were processed on an independent  workstation and archived to PACS and reviewed by a radiologist.     Technique:     1. PET: The patient received 9.81 mCi of F-18-FDG. The serum glucose  was 96 mg/dL prior to administration. Body weight was 58.5 kg. Images  were evaluated in the axial, sagittal, and coronal planes as well as  the rotational whole body MIP. Images were acquired from the cranial  vertex to the feet.     UPTAKE WAS MEASURED AT 60 MINUTES.      BACKGROUND: Liver SUV max = 3.14, Aorta Blood SUV max = 1.72.      2. CT: Volumetric acquisition for clinical interpretation of the  chest, abdomen, and pelvis acquired at 3 mm sections. The chest,  abdomen, and pelvis were evaluated at 5 mm sections in bone, soft  tissue, and lung windows. High resolution images of the neck were  obtained with multiple oblique projection reformats.      Contrast and Medications:  IV contrast: 64 mL of Isovue 370 intravenously.  PO contrast: none.  Additional Medications: None.     3. 3D MIP and PET-CT fused images were processed on an independent  workstation and archived to PACS and reviewed by a radiologist.     INDICATION: nasopharynx carcinoma staging; Squamous cell carcinoma of  nasopharynx (H); Malignant neoplasm of overlapping sites of  nasopharynx (H).     ADDITIONAL INFORMATION OBTAINED FROM EMR: 73-year-old male with a  history of nasopharyngeal carcinoma diagnosed in January 2020 which  was initially treated with chemotherapy and radiation. Patient then  had recurrent disease involving the ethmoid sinus and September 2021  with positive lymph nodes in the neck and PET scan showing  a  hypermetabolic metastatic lymphadenopathy, as well as hypermetabolic  cervical lymph nodes. Patient then had radiosurgery in the right  ethmoid tumor from November 8 through November 17 and 20, 2021 with  subsequent chemotherapy. PET scan in March 2022 showed a near-complete  response. Patient now has clinically progressive disease in the right  submandibular area. Patient is going to start on chemotherapy per note  from 1/23/2024. Patient did not want surgery and radiation reportedly  was not an option.     COMPARISON: CT neck 10/23/2023. PET 9/11/2023.        FINDINGS:      HEAD/NECK:     Index tumor: Increased size of the enhancing soft tissue lesion about  the right hemimandible now measuring approximately 5.0 x 3.4 cm,  previously measured 3.6 cm in largest axial dimension with an SUV max  of 10.42. This lesion appears to invade into the floor of the mouth  and the base of the right aspect of the tongue with obliteration's of  the fat planes in the right oral cavity about the tongue. No  significant osseous erosion of the mandible.     Pharyngeal mucosal spaces: Nasopharynx and palatine tonsils are within  normal limits. Left tongue base is normal. Left oral tongue and buccal  mucosa of the oral cavity is normal. Oropharynx, hypopharynx and  larynx are within normal limits.     Sinonasal region: Mucosal thickening throughout the paranasal sinuses  with associated osteitis, likely chronic. Postsurgical changes of  tumor resection in the right ethmoid sinus. Mastoid air cells are  clear.     Lymph nodes: No FDG avid or abnormally enlarged lymph nodes.     Salivary glands: The major salivary glands are within normal limits.      Thyroid gland: Multiple small hypoattenuating thyroid nodules  bilaterally.      Vascular structures: The major vasculature of the neck are patent.  Left IJ CVC with tip in the distal SVC. Prominent right external  jugular vein.     Brain: No abnormal FDG avid lesion or abnormal  enhancement.      CHEST:     Lymph nodes: conglomerate of lymph nodes about the crux of the right  hemidiaphragm posterior to the IVC, with multiple additional small  retrocrural lymph nodes demonstrating increased FDG uptake with a max  SUV of 8.0. There is also increased uptake of the anterior left crux  of the diaphragm just anterior to the aorta with an SUV max of 5.04  that appears slightly thickened on CT. Additional hypermetabolic  retrocaval nodes more inferiorly, for example a node on series 4 image  278 with SUV max of 6.55.     Lungs: The central tracheobronchial tree is clear. No acute  consolidation.  No pleural effusion or pneumothorax. Fibroatelectasis  in the right lower lobe adjacent to vertebral osteophytes.     Heart and great vessels: Heart is enlarged. No pericardial effusion.  Ectatic ascending aorta measuring up to 4.3 cm in diameter. Main  pulmonary artery is normal in caliber. The esophagus is unremarkable.      ABDOMEN AND PELVIS:     Liver: No FDG avid lesion. Cirrhotic liver without focal lesion.     Gallbladder: Unremarkable. No intrahepatic or extrahepatic biliary  dilatation.     Pancreas: The pancreas is within normal limits. No pancreatic ductal  dilatation.      Spleen: No FDG avid lesion. Splenomegaly. Unchanged non-FDG avid  hypoattenuating focus in the peripheral spleen, likely benign (series  10 image 62)     Adrenal glands: No FDG avid foci.     Kidneys: No FDG avid lesion. Atrophic kidneys. No hydronephrosis,  mass, or calculi.. The urinary bladder is unremarkable.      Reproductive organs are within normal limits.     Gastrointestinal system: Normal caliber of the small and large bowel.  Diffuse hypoattenuation of the wall of the ascending colon, likely  colopathy secondary to patient's hepatic function.     Lymph nodes: No FDG avid or abnormally enlarged lymph nodes.     Vascular structures: Abdominal aorta is normal in caliber. Major  branches are patent.. The splenic vein,  superior mesenteric vein, and  main portal vein are patent. Gastrosplenic varices.     Peritoneum: Large volume ascites. No free air.      EXTREMITIES:      No abnormal FDG uptake in the visualized extremities.     BONES AND SOFT TISSUES:      No abnormal FDG uptake in the axial or appendicular skeleton. No  abnormal lytic or blastic osseous lesions.      SPINAL CANAL:      No evident canal compromise. No abnormal FDG avid lesion.                                                                      IMPRESSION: In this patient with a history of nasopharyngeal squamous  cell carcinoma status post resection with chemotherapy/radiation with  recent local recurrence about the right hemimandible:     1. Disease progression with increased size of the enhancing  hypermetabolic soft tissue mass about the right hemimandible measuring  up to 5.0 cm with invasion into the floor of the right oral cavity and  right base of tongue. No evidence of osseous invasion, though  involvement of adjacent structures would be better evaluated with MRI.  2. New focal FDG uptake in/adjacent to the crux of the right and left  hemidiaphragm, as well as retrocrural and retrocaval lymph nodes,  suspicious for metastatic disease.  3. Cirrhotic liver with sequela of portal hypertension including large  volume ascites and splenogastric varices.  4. Stable appearance of the hypoattenuating lesion in the spleen that  does not demonstrate increased FDG uptake.     I have personally reviewed the examination and initial interpretation  and I agree with the findings.     ELIZABETH ALVAREZ DO          NPO: small sip of boost drink this AM - ok'd by performing Radiologist  ANTICOAGULANTS: none  ANTIBIOTICS: Ancef 2g IV pre procedure    ALLERGIES  Allergies   Allergen Reactions    Oxycodone Itching         LABS:  INR   Date Value Ref Range Status   12/07/2023 1.32 (H) 0.85 - 1.15 Final      Hemoglobin   Date Value Ref Range Status   02/08/2024 7.8 (L) 13.3 - 17.7  g/dL Final     Platelet Count   Date Value Ref Range Status   02/08/2024 63 (L) 150 - 450 10e3/uL Final     Creatinine   Date Value Ref Range Status   02/08/2024 1.73 (H) 0.67 - 1.17 mg/dL Final     Potassium   Date Value Ref Range Status   02/08/2024 3.9 3.4 - 5.3 mmol/L Final   07/01/2022 3.8 3.5 - 5.0 mmol/L Final         EXAM:  There were no vitals taken for this visit.  General: Stable. In no acute distress.    Neuro: Alert and oriented x 3. No focal deficits.  Psych: Appropriate mood and affect. Linear/coherent thought process.   Resp: Normal respirations. Lungs clear to auscultation bilaterally.  Cardio: S1S2, regular rate and rhythm, without murmur, clicks or rubs  Abdomen: Firm, distended, non-tender, positive bowel sounds.  Skin: Warm and dry. Without excoriations, ecchymosis, erythema, lesions or open sores on abdomen.        Pre-Sedation Assessment:  Mallampati Airway Classification:  III - Only soft palate is visible. Intubation is predicted to be difficult  Previous reaction to anesthesia/sedation:  No  Sedation plan based on assessment: Moderate (conscious) sedation  ASA Classification: Class 3 - SEVERE SYSTEMIC DISEASE, DEFINITE FUNCTIONAL LIMITATIONS.   Code Status: Full Code intra procedure, per discussion with patient.       ASSESSMENT/PLAN:   Kristen Chapman is a 73 year old male with Nasopharyngeal carcinoma with refractory ascites.    Plan for placement of tunneled peritoneal catheter placement with sedation.    Plan to drain all of patient's ascites/pleural effusion as recommended by Radiologist in procedure room following Aspira placement.       Patient and family were instructed on the care and management of the Aspira drain. All questions answered and patient/family expressed their understanding of Aspira care/management. Staff RN to continue education and ensure patient is comfortable with drain management prior to discharge.     [facility] will fax Aspira gravity bag/dressing kit  prescription order to [employer] for patient to receive product at home. Patient will be provided 5 drainage and dressing kits following aspira catheter placement. Patients should call [phone] to contact [employer] if they have questions or concerns about their Aspira drainage supplies. Number provided to patient in Aspira patient information folder.      Procedural education reviewed with patient/family in detail including, but not limited to risks, benefits and alternatives with understanding verbalized by patient/family.    Entirety of exam with utilization of [employer] phone  services    Total time spent on the date of the encounter: 45 minutes.      Note comprised by:  SPARKLE RODRIGUEZ CNP     Exam and procedure discussion completed by:  SPARKLE Galvan CNP    Interventional Radiology

## 2024-02-22 ENCOUNTER — HOSPITAL ENCOUNTER (OUTPATIENT)
Dept: INTERVENTIONAL RADIOLOGY/VASCULAR | Facility: HOSPITAL | Age: 74
Discharge: HOME | DRG: 432 | End: 2024-02-22
Attending: INTERNAL MEDICINE | Admitting: RADIOLOGY
Payer: COMMERCIAL

## 2024-02-22 VITALS
WEIGHT: 136 LBS | DIASTOLIC BLOOD PRESSURE: 63 MMHG | RESPIRATION RATE: 16 BRPM | TEMPERATURE: 97.9 F | BODY MASS INDEX: 25.03 KG/M2 | HEIGHT: 62 IN | OXYGEN SATURATION: 100 % | HEART RATE: 52 BPM | SYSTOLIC BLOOD PRESSURE: 110 MMHG

## 2024-02-22 DIAGNOSIS — C11.9 SQUAMOUS CELL CARCINOMA OF NASOPHARYNX (H): ICD-10-CM

## 2024-02-22 PROCEDURE — P9047 ALBUMIN (HUMAN), 25%, 50ML: HCPCS | Performed by: RADIOLOGY

## 2024-02-22 PROCEDURE — 250N000011 HC RX IP 250 OP 636: Performed by: NURSE PRACTITIONER

## 2024-02-22 PROCEDURE — C1769 GUIDE WIRE: HCPCS

## 2024-02-22 PROCEDURE — 250N000009 HC RX 250: Performed by: NURSE PRACTITIONER

## 2024-02-22 PROCEDURE — 272N000500 HC NEEDLE CR2

## 2024-02-22 PROCEDURE — 99152 MOD SED SAME PHYS/QHP 5/>YRS: CPT

## 2024-02-22 PROCEDURE — 258N000003 HC RX IP 258 OP 636: Performed by: NURSE PRACTITIONER

## 2024-02-22 PROCEDURE — C1729 CATH, DRAINAGE: HCPCS

## 2024-02-22 PROCEDURE — 250N000011 HC RX IP 250 OP 636: Performed by: RADIOLOGY

## 2024-02-22 PROCEDURE — 0W9G30Z DRAINAGE OF PERITONEAL CAVITY WITH DRAINAGE DEVICE, PERCUTANEOUS APPROACH: ICD-10-PCS | Performed by: RADIOLOGY

## 2024-02-22 RX ORDER — CEFAZOLIN SODIUM/WATER 2 G/20 ML
2 SYRINGE (ML) INTRAVENOUS
Status: COMPLETED | OUTPATIENT
Start: 2024-02-22 | End: 2024-02-22

## 2024-02-22 RX ORDER — NALOXONE HYDROCHLORIDE 0.4 MG/ML
0.4 INJECTION, SOLUTION INTRAMUSCULAR; INTRAVENOUS; SUBCUTANEOUS
Status: DISCONTINUED | OUTPATIENT
Start: 2024-02-22 | End: 2024-02-23 | Stop reason: HOSPADM

## 2024-02-22 RX ORDER — ALBUMIN (HUMAN) 12.5 G/50ML
50 SOLUTION INTRAVENOUS ONCE
Status: COMPLETED | OUTPATIENT
Start: 2024-02-22 | End: 2024-02-22

## 2024-02-22 RX ORDER — FLUMAZENIL 0.1 MG/ML
0.2 INJECTION, SOLUTION INTRAVENOUS
Status: DISCONTINUED | OUTPATIENT
Start: 2024-02-22 | End: 2024-02-23 | Stop reason: HOSPADM

## 2024-02-22 RX ORDER — HEPARIN SODIUM,PORCINE 10 UNIT/ML
5-10 VIAL (ML) INTRAVENOUS EVERY 24 HOURS
Status: DISCONTINUED | OUTPATIENT
Start: 2024-02-22 | End: 2024-02-23 | Stop reason: HOSPADM

## 2024-02-22 RX ORDER — SODIUM CHLORIDE 9 MG/ML
INJECTION, SOLUTION INTRAVENOUS CONTINUOUS
Status: DISCONTINUED | OUTPATIENT
Start: 2024-02-22 | End: 2024-02-23 | Stop reason: HOSPADM

## 2024-02-22 RX ORDER — NALOXONE HYDROCHLORIDE 0.4 MG/ML
0.2 INJECTION, SOLUTION INTRAMUSCULAR; INTRAVENOUS; SUBCUTANEOUS
Status: DISCONTINUED | OUTPATIENT
Start: 2024-02-22 | End: 2024-02-23 | Stop reason: HOSPADM

## 2024-02-22 RX ORDER — LIDOCAINE 40 MG/G
CREAM TOPICAL
Status: DISCONTINUED | OUTPATIENT
Start: 2024-02-22 | End: 2024-02-23 | Stop reason: HOSPADM

## 2024-02-22 RX ORDER — HEPARIN SODIUM,PORCINE 10 UNIT/ML
5-10 VIAL (ML) INTRAVENOUS
Status: DISCONTINUED | OUTPATIENT
Start: 2024-02-22 | End: 2024-02-23 | Stop reason: HOSPADM

## 2024-02-22 RX ORDER — HEPARIN SODIUM (PORCINE) LOCK FLUSH IV SOLN 100 UNIT/ML 100 UNIT/ML
5-10 SOLUTION INTRAVENOUS
Status: DISCONTINUED | OUTPATIENT
Start: 2024-02-22 | End: 2024-02-23 | Stop reason: HOSPADM

## 2024-02-22 RX ORDER — FENTANYL CITRATE 50 UG/ML
25-50 INJECTION, SOLUTION INTRAMUSCULAR; INTRAVENOUS EVERY 5 MIN PRN
Status: DISCONTINUED | OUTPATIENT
Start: 2024-02-22 | End: 2024-02-23 | Stop reason: HOSPADM

## 2024-02-22 RX ORDER — ONDANSETRON 2 MG/ML
4 INJECTION INTRAMUSCULAR; INTRAVENOUS
Status: DISCONTINUED | OUTPATIENT
Start: 2024-02-22 | End: 2024-02-23 | Stop reason: HOSPADM

## 2024-02-22 RX ADMIN — FENTANYL CITRATE 25 MCG: 50 INJECTION, SOLUTION INTRAMUSCULAR; INTRAVENOUS at 11:30

## 2024-02-22 RX ADMIN — MIDAZOLAM HYDROCHLORIDE 0.5 MG: 1 INJECTION, SOLUTION INTRAMUSCULAR; INTRAVENOUS at 11:19

## 2024-02-22 RX ADMIN — ALBUMIN HUMAN 50 G: 0.25 SOLUTION INTRAVENOUS at 11:55

## 2024-02-22 RX ADMIN — Medication 2 G: at 11:14

## 2024-02-22 RX ADMIN — FENTANYL CITRATE 25 MCG: 50 INJECTION, SOLUTION INTRAMUSCULAR; INTRAVENOUS at 11:22

## 2024-02-22 RX ADMIN — MIDAZOLAM HYDROCHLORIDE 0.5 MG: 1 INJECTION, SOLUTION INTRAMUSCULAR; INTRAVENOUS at 11:29

## 2024-02-22 RX ADMIN — SODIUM CHLORIDE 1000 ML: 9 INJECTION, SOLUTION INTRAVENOUS at 11:37

## 2024-02-22 RX ADMIN — SODIUM CHLORIDE: 9 INJECTION, SOLUTION INTRAVENOUS at 10:13

## 2024-02-22 NOTE — PRE-PROCEDURE
GENERAL PRE-PROCEDURE:   Procedure:  Tunneled abdominal drainage cath placement  Date/Time:  2/22/2024 10:02 AM    Written consent obtained?: Yes    Risks and benefits: Risks, benefits and alternatives were discussed    Consent given by:  Patient  Patient states understanding of procedure being performed: Yes    Patient's understanding of procedure matches consent: Yes    Procedure consent matches procedure scheduled: Yes    Expected level of sedation:  Moderate  Appropriately NPO:  Yes  ASA Class:  3  Mallampati  :  Grade 3- soft palate visible, posterior pharyngeal wall not visible  Lungs:  Lungs clear with good breath sounds bilaterally  Heart:  Normal heart sounds and rate  History & Physical reviewed:  History and physical reviewed and no updates needed  Statement of review:  I have reviewed the lab findings, diagnostic data, medications, and the plan for sedation

## 2024-02-22 NOTE — DISCHARGE INSTRUCTIONS
"Tunneled Drainage Catheter Discharge Instructions:  You had a tunneled *** drainage catheter (Aspira catheter) placed. This long term catheter was placed to help you remove/drain accumulated fluid from your *** space while at home to relieve symptoms (such as shortness of breath and fullness). Please follow the below instructions:    Care instructions:  - DO NOT drain more than 1 liter or 1000 mL at one time if you have a CHEST aspira catheter. DO NOT drain more than 2 liter or 2000 mL at one time if you have an ABDOMINAL aspira catheter. ***  - Drain fluid based on when you are having symptoms of pleural effusion/ascites (such as feeling full, tight, distention and/or shortness of breath), typically every one to two days, or as directed by your doctor.  - Change dressing at catheter exit site after removing fluid or as needed to keep dressing clean and dry.  - It is OK for you to shower if you have a drainage catheter (keep site covered with its dressing), but do not submerge site under water like in a bath tub, pool or Jacuzzi.    Seek medical assistance for any of the following:   - Fevers (greater than 101 F (38.3C))  - Redness of the skin and or purulent drainage (yellow/white) at drain exit site  - Severe pain at drain exit site  - Bright red bloody drainage output that does not clear with draining    Call Bryantalfred[facility] Stephens [phone] for any of the following:  - Leaking around drain exit site  - When there is no output into drainage bag after connecting the drainage catheter to the drainage bag  - Concerned drain is malpositioned or \"pulled on\"    For more information on your Aspira drainage system please refer to their website: [email or web address]    If you have specific supply related questions please call [phone] to speak with a [employer] supply representative.     "

## 2024-02-23 ENCOUNTER — TELEPHONE (OUTPATIENT)
Dept: INTERVENTIONAL RADIOLOGY/VASCULAR | Facility: HOSPITAL | Age: 74
End: 2024-02-23
Payer: COMMERCIAL

## 2024-02-23 NOTE — TELEPHONE ENCOUNTER
"Received call from patient's son with concerns with peritoneal tunneled catheter placed 2/22/24 at [facility].    Son states the catheter has been leaking today. Patient has not attempted to drain yet. Denies feeling \"full\" at this time. Recommended they drain today and may need to drain daily moving forward. Since they called late in the day on Friday, they were advised to drain as needed over the weekend and if leakage persists, to contact the IR nurse line again first thing Monday morning. They were agreeable to this.      Edith Lehman RN  Interventional Radiology  [phone]   "

## 2024-02-24 ENCOUNTER — HOSPITAL ENCOUNTER (EMERGENCY)
Facility: HOSPITAL | Age: 74
Discharge: HOME | DRG: 432 | End: 2024-02-24
Attending: EMERGENCY MEDICINE | Admitting: EMERGENCY MEDICINE
Payer: COMMERCIAL

## 2024-02-24 VITALS
RESPIRATION RATE: 16 BRPM | OXYGEN SATURATION: 98 % | WEIGHT: 120 LBS | DIASTOLIC BLOOD PRESSURE: 59 MMHG | TEMPERATURE: 97 F | HEART RATE: 54 BPM | SYSTOLIC BLOOD PRESSURE: 113 MMHG | BODY MASS INDEX: 21.95 KG/M2

## 2024-02-24 DIAGNOSIS — Z93.1 STATUS POST INSERTION OF PERCUTANEOUS ENDOSCOPIC GASTROSTOMY (PEG) TUBE (H): ICD-10-CM

## 2024-02-24 DIAGNOSIS — Z87.19 HISTORY OF CIRRHOSIS OF LIVER: ICD-10-CM

## 2024-02-24 PROCEDURE — 99282 EMERGENCY DEPT VISIT SF MDM: CPT

## 2024-02-24 ASSESSMENT — ENCOUNTER SYMPTOMS
FEVER: 0
CHILLS: 0

## 2024-02-24 ASSESSMENT — ACTIVITIES OF DAILY LIVING (ADL)
ADLS_ACUITY_SCORE: 35
ADLS_ACUITY_SCORE: 35

## 2024-02-24 ASSESSMENT — COLUMBIA-SUICIDE SEVERITY RATING SCALE - C-SSRS
2. HAVE YOU ACTUALLY HAD ANY THOUGHTS OF KILLING YOURSELF IN THE PAST MONTH?: NO
1. IN THE PAST MONTH, HAVE YOU WISHED YOU WERE DEAD OR WISHED YOU COULD GO TO SLEEP AND NOT WAKE UP?: NO
6. HAVE YOU EVER DONE ANYTHING, STARTED TO DO ANYTHING, OR PREPARED TO DO ANYTHING TO END YOUR LIFE?: NO

## 2024-02-24 NOTE — ED PROVIDER NOTES
EMERGENCY DEPARTMENT ENCOUNTER      NAME: Kristen Chapman  AGE: 73 year old male  YOB: 1950  MRN: [id number]  EVALUATION DATE & TIME: No admission date for patient encounter.    PCP: Issa Cook    ED PROVIDER: Tania Gant M.D.      CHIEF COMPLAINT     Chief Complaint   Patient presents with    Catheter Problem         FINAL IMPRESSION:     1. Status post insertion of percutaneous endoscopic gastrostomy (PEG) tube (H)    2. History of cirrhosis of liver          MEDICAL DECISION MAKING:       Pertinent Labs & Imaging studies reviewed. (See chart for details)    73 year old male presents to the Emergency Department for evaluation of leaking from drain    History  Supplemental history from son  External Record(s) review as documented below    Exam  Ill-appearing nontoxic.  Indurated mass at the angle of the jaw on the right with swelling purulent drainage.  No surrounding cellulitis.  Percutaneous paracentesis to with surrounding ascites fluid drainage with pressure.  Carries a site of previous injection located laterally on the right side of the abdomen that is slowly draining.  There is no purulence no cellulitis.    Differential Diagnosis include but not limited to tube malfunction.  Increased ascites.  Among others      Vital Signs: review  EKG: none  Imaging: none  Fluids: none  Labs:  none    Clinical Impression and Decision Making    73-year-old male presents today with his son.  Patient had a percutaneous paracentesis tube placed on Thursday.  On Friday they noticed that there was no leakage at the opening of the tube and not at previous site of previous paracentesis.  They are supposed to remove 2 L of fluid a day yesterday removed 1.7 L.    No Systemic symptoms.  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    Examination he is nontoxic chronically ill-appearing.  Drainage appears in place and there is leaking around the drainage opening there is no cellulitis or purulence and  there is leakage at the site of previous puncture for paracentesis.    I spoke with interventional radiology and they states ideally Ana should have a paracentesis until he is completely dry.  This way there will be ability for the opening to mature.  I informed him that only 2 L was recommended.  He is soft and very suggestion was to put an ostomy bag to help protect the skin.    The area was cleaned by me.  The previous puncture site I applied Dermabond which completely sealed the minor hole in the leakage and then the area was clean at the site of the percutaneous tube cuff 2 x 2 was placed and then a ostomy bag was placed with access to the tube.    Son stated they only have 2 bags for paracentesis therefore I will order supplies.    Patient discharged in stable condition.        In addition to the work out documented, I considered the following work up paracentesis while at the ED.  They are able to do it at home.  They are in agreement with doing it at home.           Medical Decision Making    History:  Supplemental history from: Documented in chart  External Record(s) reviewed: Documented in chart    Work Up:  Chart documentation includes differential considered and any EKGs or imaging independently interpreted by provider, where specified.  In additional to work up documented, I considered the following work up: Documented in chart, if applicable.    External consultation:  Discussion of management with another provider: Documented in chart, if applicable    Complicating factors:  Care impacted by chronic illness: Cancer/Chemotherapy liver cirrhosis as cited by-year-old hepatitis B  Care affected by social determinants of health: N/A    Disposition considerations: Discharge. No recommendations on prescription strength medication(s). I considered admission, but discharged patient after significant clinical improvement.      Review of External Records  Hospital/Clinic: Interventional radiology  intraperitoneal cath for ascites.  Date: 2/22/2024      External Consultation  IR, Dr. Brewster      ED COURSE     9:17 AM I met with patient obtaining history.  9:45 AM I spoke with Dr. Brewster.    At the conclusion of the encounter I discussed the results of all of the tests and the disposition. The questions were answered. The patient and son acknowledged understanding and was agreeable with the care plan.         MEDICATIONS GIVEN IN THE EMERGENCY:   Medications - No data to display    NEW PRESCRIPTIONS STARTED AT TODAY'S ER VISIT     Discharge Medication List as of 2/24/2024 11:06 AM             =================================================================    HPI     Patient information was obtained from: Son    Use of : N/A ,         Kristen Chapman is a 73 year old male who presents by leaking from paracentesis tube    Patient presents with his son.    He had a paracentesis drainage tube placed on Thursday on Friday did notice that there was leakage around the tube at the site of previous paracentesis injection.    No fevers no abdominal pain.    Known neck cancer.  No changes.    They are supposed to drain 2 L every day.  Yesterday they drained 1.7 L.    No other complaints voiced      REVIEW OF SYSTEMS   Review of Systems   Constitutional:  Negative for chills and fever.   All other systems reviewed and are negative.       PAST MEDICAL HISTORY:     Past Medical History:   Diagnosis Date    Anemia     Dysphagia     Hepatitis B chronic     Hypothyroidism     Nasopharyngeal cancer (H)     Severe protein-calorie malnutrition (H24)     Thrombocytopenia (H24)        PAST SURGICAL HISTORY:     Past Surgical History:   Procedure Laterality Date    ENDOSCOPIC ENDONASAL SURGERY Bilateral 9/7/2021    Procedure: Nasal Endoscopy with Biopsy;  Surgeon: Ky Centeno MD;  Location: [facility] OR    IR CHEST PORT PLACEMENT > 5 YRS OF AGE  3/2/2020    IR CHEST PORT PLACEMENT > 5 YRS OF AGE  3/2/2020    IR  GASTROSTOMY TUBE INSERTION  4/27/2020    IR GASTROSTOMY TUBE PERCUTANEOUS PLCMNT  4/27/2020    IR INTRAPERITONEAL CATH TUNNEL ASCITES  2/22/2024    IR PORT PLACEMENT >5 YEARS  3/2/2020    IR PORT PLACEMENT >5 YEARS  3/2/2020    IR T-FASTENER REMOVAL  6/5/2020    IR T-FASTENER REMOVAL  6/5/2020         CURRENT MEDICATIONS:   acetaminophen (TYLENOL) 325 MG tablet  furosemide (LASIX) 20 MG tablet  levothyroxine (SYNTHROID/LEVOTHROID) 175 MCG tablet  megestrol (MEGACE) 40 MG/ML suspension  ondansetron (ZOFRAN) 4 MG tablet  polyethylene glycol (MIRALAX) 17 GM/Dose powder  spironolactone (ALDACTONE) 50 MG tablet  tenofovir (VIREAD) 300 MG tablet         ALLERGIES:     Allergies   Allergen Reactions    Oxycodone Itching       FAMILY HISTORY:     Family History   Problem Relation Age of Onset    Liver Cancer No family hx of     Liver Disease No family hx of        SOCIAL HISTORY:     Social History     Socioeconomic History    Marital status:    Tobacco Use    Smoking status: Never    Smokeless tobacco: Never   Substance and Sexual Activity    Alcohol use: Not Currently    Drug use: Not Currently    Sexual activity: Not Currently       VITALS:   /59   Pulse 54   Temp 97  F (36.1  C) (Temporal)   Resp 16   Wt 54.4 kg (120 lb)   SpO2 98%   BMI 21.95 kg/m      PHYSICAL EXAM     Physical Exam  Vitals and nursing note reviewed. Exam conducted with a chaperone present.   Abdominal:          Comments: Paracentesis tubing.  There is leakage around it with pressure.  There is puncture site with clear ascites drainage.  Both with cellulitis.   Skin:     Coloration: Skin is pale.         Physical Exam   Constitutional: Chronically ill-appearing and pale cooperative and pleasant nontoxic    Eyes: EOM are normal. Pupils are equal, round, and reactive to light.       Neck: mass right submandibular area.    Cardiovascular: Normal rate, regular rhythm and normal heart sounds.      Pulmonary/Chest: Normal effort  and  breath sounds normal.     Abdominal: soft nontender.  Paracentesis tubing with drainage of ascitic fluid only with pressure.    Musculoskeletal: Normal range of motion.     Neurological: Moves upper and lower extremities equally.    Lymphatics: trace ankle edema    Skin: Skin is warm and dry.  Cellulitis.    Psychiatric: Normal mood and affect. Behavior is normal.       LAB:     All pertinent labs reviewed and interpreted.  Labs Ordered and Resulted from Time of ED Arrival to Time of ED Departure - No data to display     RADIOLOGY:     Reviewed all pertinent imaging. Please see official radiology report.  No orders to display        EKG:       I have independently reviewed and interpreted the EKG(s) documented above.      PROCEDURES:     Procedures        Tania Gant M.D.  Emergency Medicine  [facility] EMERGENCY DEPARTMENT  [facility] [facility] [id number] [phone]  Dept: [phone]       Tania Gant MD  02/24/24 [id number]

## 2024-02-24 NOTE — DISCHARGE INSTRUCTIONS
Read and follow the discharge instructions.    The leaking around the catheter is because there is still fluid to be drained.    Sure you follow the recommendation to remove 2 L of fluid every day.    The back is only to capture any drainage.  It Does not have to state if it is making you uncomfortable.    Once the opening matures, form scar tissue around it and you continue doing the paracentesis every day they should not be more just leakage.    Return for fever increased pain redness or any other concerns.

## 2024-02-24 NOTE — ED TRIAGE NOTES
Amb to triage with cane with son.  Pt had paracentesis drain placed Thursday and there is leaking around the tube/drain on R abd.       Triage Assessment (Adult)       Row Name 02/24/24 0916          Triage Assessment    Airway WDL WDL        Respiratory WDL    Respiratory WDL WDL        Skin Circulation/Temperature WDL    Skin Circulation/Temperature WDL WDL        Cardiac WDL    Cardiac WDL WDL        Peripheral/Neurovascular WDL    Peripheral Neurovascular WDL WDL        Cognitive/Neuro/Behavioral WDL    Cognitive/Neuro/Behavioral WDL WDL

## 2024-02-25 ENCOUNTER — HOSPITAL ENCOUNTER (INPATIENT)
Facility: HOSPITAL | Age: 74
LOS: 2 days | Discharge: HOME | DRG: 432 | End: 2024-02-27
Attending: EMERGENCY MEDICINE | Admitting: INTERNAL MEDICINE
Payer: COMMERCIAL

## 2024-02-25 DIAGNOSIS — R18.8 OTHER ASCITES: ICD-10-CM

## 2024-02-25 LAB
APPEARANCE FLD: ABNORMAL
CELL COUNT BODY FLUID SOURCE: ABNORMAL
COLOR FLD: YELLOW
WBC # FLD AUTO: 154 /UL

## 2024-02-25 PROCEDURE — 120N000001 HC R&B MED SURG/OB

## 2024-02-25 PROCEDURE — 99285 EMERGENCY DEPT VISIT HI MDM: CPT | Mod: 25

## 2024-02-25 PROCEDURE — 99223 1ST HOSP IP/OBS HIGH 75: CPT | Performed by: INTERNAL MEDICINE

## 2024-02-25 PROCEDURE — 89050 BODY FLUID CELL COUNT: CPT | Performed by: INTERNAL MEDICINE

## 2024-02-25 PROCEDURE — 99418 PROLNG IP/OBS E/M EA 15 MIN: CPT | Performed by: INTERNAL MEDICINE

## 2024-02-25 PROCEDURE — G0378 HOSPITAL OBSERVATION PER HR: HCPCS

## 2024-02-25 PROCEDURE — 250N000013 HC RX MED GY IP 250 OP 250 PS 637: Performed by: EMERGENCY MEDICINE

## 2024-02-25 RX ORDER — AMOXICILLIN 250 MG
1 CAPSULE ORAL 2 TIMES DAILY PRN
Status: DISCONTINUED | OUTPATIENT
Start: 2024-02-25 | End: 2024-02-26

## 2024-02-25 RX ORDER — AMOXICILLIN 250 MG
2 CAPSULE ORAL 2 TIMES DAILY PRN
Status: DISCONTINUED | OUTPATIENT
Start: 2024-02-25 | End: 2024-02-27 | Stop reason: HOSPADM

## 2024-02-25 RX ORDER — CALCIUM CARBONATE 500 MG/1
1000 TABLET, CHEWABLE ORAL 4 TIMES DAILY PRN
Status: DISCONTINUED | OUTPATIENT
Start: 2024-02-25 | End: 2024-02-27 | Stop reason: HOSPADM

## 2024-02-25 RX ORDER — LIDOCAINE 40 MG/G
CREAM TOPICAL
Status: DISCONTINUED | OUTPATIENT
Start: 2024-02-25 | End: 2024-02-27 | Stop reason: HOSPADM

## 2024-02-25 RX ORDER — ACETAMINOPHEN 325 MG/1
650 TABLET ORAL EVERY 6 HOURS PRN
Status: DISCONTINUED | OUTPATIENT
Start: 2024-02-25 | End: 2024-02-26

## 2024-02-25 RX ADMIN — ACETAMINOPHEN 650 MG: 325 TABLET ORAL at 23:11

## 2024-02-25 ASSESSMENT — COLUMBIA-SUICIDE SEVERITY RATING SCALE - C-SSRS
6. HAVE YOU EVER DONE ANYTHING, STARTED TO DO ANYTHING, OR PREPARED TO DO ANYTHING TO END YOUR LIFE?: NO
1. IN THE PAST MONTH, HAVE YOU WISHED YOU WERE DEAD OR WISHED YOU COULD GO TO SLEEP AND NOT WAKE UP?: NO
2. HAVE YOU ACTUALLY HAD ANY THOUGHTS OF KILLING YOURSELF IN THE PAST MONTH?: NO

## 2024-02-25 ASSESSMENT — ACTIVITIES OF DAILY LIVING (ADL)
ADLS_ACUITY_SCORE: 35

## 2024-02-26 ENCOUNTER — APPOINTMENT (OUTPATIENT)
Dept: CT IMAGING | Facility: HOSPITAL | Age: 74
DRG: 432 | End: 2024-02-26
Attending: NURSE PRACTITIONER
Payer: COMMERCIAL

## 2024-02-26 ENCOUNTER — APPOINTMENT (OUTPATIENT)
Dept: INTERVENTIONAL RADIOLOGY/VASCULAR | Facility: HOSPITAL | Age: 74
DRG: 432 | End: 2024-02-26
Attending: NURSE PRACTITIONER
Payer: COMMERCIAL

## 2024-02-26 PROBLEM — R94.31 ABNORMAL ELECTROCARDIOGRAM: Status: ACTIVE | Noted: 2024-02-26

## 2024-02-26 LAB
% LINING CELLS, BODY FLUID: 9 %
ABSOLUTE NEUTROPHILS, BODY FLUID: 15.4 /UL
ALBUMIN SERPL BCG-MCNC: 2.8 G/DL (ref 3.5–5.2)
ALP SERPL-CCNC: 62 U/L (ref 40–150)
ALT SERPL W P-5'-P-CCNC: 14 U/L (ref 0–70)
ANION GAP SERPL CALCULATED.3IONS-SCNC: 3 MMOL/L (ref 7–15)
AST SERPL W P-5'-P-CCNC: 24 U/L (ref 0–45)
ATRIAL RATE - MUSE: 54 BPM
BASOPHILS # BLD AUTO: 0 10E3/UL (ref 0–0.2)
BASOPHILS NFR BLD AUTO: 0 %
BILIRUB SERPL-MCNC: 0.4 MG/DL
BUN SERPL-MCNC: 49.6 MG/DL (ref 8–23)
CALCIUM SERPL-MCNC: 8 MG/DL (ref 8.8–10.2)
CHLORIDE SERPL-SCNC: 114 MMOL/L (ref 98–107)
CREAT SERPL-MCNC: 1.39 MG/DL (ref 0.67–1.17)
DEPRECATED HCO3 PLAS-SCNC: 27 MMOL/L (ref 22–29)
DIASTOLIC BLOOD PRESSURE - MUSE: NORMAL MMHG
EGFRCR SERPLBLD CKD-EPI 2021: 54 ML/MIN/1.73M2
EOSINOPHIL # BLD AUTO: 0.1 10E3/UL (ref 0–0.7)
EOSINOPHIL NFR BLD AUTO: 2 %
ERYTHROCYTE [DISTWIDTH] IN BLOOD BY AUTOMATED COUNT: 19.2 % (ref 10–15)
GLUCOSE SERPL-MCNC: 95 MG/DL (ref 70–99)
HCT VFR BLD AUTO: 23.3 % (ref 40–53)
HGB BLD-MCNC: 7.1 G/DL (ref 13.3–17.7)
IMM GRANULOCYTES # BLD: 0 10E3/UL
IMM GRANULOCYTES NFR BLD: 1 %
INTERPRETATION ECG - MUSE: NORMAL
LYMPHOCYTES # BLD AUTO: 0.4 10E3/UL (ref 0.8–5.3)
LYMPHOCYTES NFR BLD AUTO: 10 %
LYMPHOCYTES NFR FLD MANUAL: 36 %
MAGNESIUM SERPL-MCNC: 2.1 MG/DL (ref 1.7–2.3)
MCH RBC QN AUTO: 29.5 PG (ref 26.5–33)
MCHC RBC AUTO-ENTMCNC: 30.5 G/DL (ref 31.5–36.5)
MCV RBC AUTO: 97 FL (ref 78–100)
MONOCYTES # BLD AUTO: 0.4 10E3/UL (ref 0–1.3)
MONOCYTES NFR BLD AUTO: 9 %
MONOS+MACROS NFR FLD MANUAL: 45 %
NEUTROPHILS # BLD AUTO: 3.5 10E3/UL (ref 1.6–8.3)
NEUTROPHILS NFR BLD AUTO: 78 %
NEUTS BAND NFR FLD MANUAL: 10 %
NRBC # BLD AUTO: 0 10E3/UL
NRBC BLD AUTO-RTO: 0 /100
P AXIS - MUSE: 22 DEGREES
PLATELET # BLD AUTO: 78 10E3/UL (ref 150–450)
POTASSIUM SERPL-SCNC: 4.8 MMOL/L (ref 3.4–5.3)
PR INTERVAL - MUSE: 186 MS
PROT SERPL-MCNC: 5.5 G/DL (ref 6.4–8.3)
QRS DURATION - MUSE: 82 MS
QT - MUSE: 462 MS
QTC - MUSE: 438 MS
R AXIS - MUSE: 4 DEGREES
RBC # BLD AUTO: 2.41 10E6/UL (ref 4.4–5.9)
SODIUM SERPL-SCNC: 144 MMOL/L (ref 135–145)
SYSTOLIC BLOOD PRESSURE - MUSE: NORMAL MMHG
T AXIS - MUSE: 22 DEGREES
TROPONIN T SERPL HS-MCNC: 38 NG/L
VENTRICULAR RATE- MUSE: 54 BPM
WBC # BLD AUTO: 4.5 10E3/UL (ref 4–11)

## 2024-02-26 PROCEDURE — 83735 ASSAY OF MAGNESIUM: CPT | Performed by: INTERNAL MEDICINE

## 2024-02-26 PROCEDURE — 84484 ASSAY OF TROPONIN QUANT: CPT | Performed by: INTERNAL MEDICINE

## 2024-02-26 PROCEDURE — 85025 COMPLETE CBC W/AUTO DIFF WBC: CPT | Performed by: INTERNAL MEDICINE

## 2024-02-26 PROCEDURE — 272N000500 HC NEEDLE CR2

## 2024-02-26 PROCEDURE — C9113 INJ PANTOPRAZOLE SODIUM, VIA: HCPCS | Performed by: INTERNAL MEDICINE

## 2024-02-26 PROCEDURE — 250N000013 HC RX MED GY IP 250 OP 250 PS 637: Performed by: INTERNAL MEDICINE

## 2024-02-26 PROCEDURE — 99233 SBSQ HOSP IP/OBS HIGH 50: CPT | Performed by: INTERNAL MEDICINE

## 2024-02-26 PROCEDURE — 250N000011 HC RX IP 250 OP 636: Performed by: NURSE PRACTITIONER

## 2024-02-26 PROCEDURE — 272N000116 HC CATH CR1

## 2024-02-26 PROCEDURE — 93005 ELECTROCARDIOGRAM TRACING: CPT | Performed by: INTERNAL MEDICINE

## 2024-02-26 PROCEDURE — 99152 MOD SED SAME PHYS/QHP 5/>YRS: CPT

## 2024-02-26 PROCEDURE — 250N000009 HC RX 250: Performed by: INTERNAL MEDICINE

## 2024-02-26 PROCEDURE — 250N000011 HC RX IP 250 OP 636: Performed by: INTERNAL MEDICINE

## 2024-02-26 PROCEDURE — 120N000001 HC R&B MED SURG/OB

## 2024-02-26 PROCEDURE — 82040 ASSAY OF SERUM ALBUMIN: CPT | Performed by: INTERNAL MEDICINE

## 2024-02-26 PROCEDURE — C1729 CATH, DRAINAGE: HCPCS

## 2024-02-26 PROCEDURE — 74176 CT ABD & PELVIS W/O CONTRAST: CPT

## 2024-02-26 PROCEDURE — 93010 ELECTROCARDIOGRAM REPORT: CPT | Performed by: INTERNAL MEDICINE

## 2024-02-26 PROCEDURE — C1769 GUIDE WIRE: HCPCS

## 2024-02-26 PROCEDURE — 999N000127 HC STATISTIC PERIPHERAL IV START W US GUIDANCE

## 2024-02-26 PROCEDURE — 0W9G30Z DRAINAGE OF PERITONEAL CAVITY WITH DRAINAGE DEVICE, PERCUTANEOUS APPROACH: ICD-10-PCS | Performed by: RADIOLOGY

## 2024-02-26 PROCEDURE — 93005 ELECTROCARDIOGRAM TRACING: CPT

## 2024-02-26 RX ORDER — ONDANSETRON 4 MG/1
4 TABLET, FILM COATED ORAL EVERY 6 HOURS PRN
Status: DISCONTINUED | OUTPATIENT
Start: 2024-02-26 | End: 2024-02-27 | Stop reason: HOSPADM

## 2024-02-26 RX ORDER — MEGESTROL ACETATE 40 MG/ML
400 SUSPENSION ORAL DAILY
Status: DISCONTINUED | OUTPATIENT
Start: 2024-02-26 | End: 2024-02-27 | Stop reason: HOSPADM

## 2024-02-26 RX ORDER — PIPERACILLIN SODIUM, TAZOBACTAM SODIUM 3; .375 G/15ML; G/15ML
3.38 INJECTION, POWDER, LYOPHILIZED, FOR SOLUTION INTRAVENOUS EVERY 8 HOURS
Status: DISCONTINUED | OUTPATIENT
Start: 2024-02-26 | End: 2024-02-27 | Stop reason: ALTCHOICE

## 2024-02-26 RX ORDER — FENTANYL CITRATE 50 UG/ML
25-50 INJECTION, SOLUTION INTRAMUSCULAR; INTRAVENOUS EVERY 5 MIN PRN
Status: DISCONTINUED | OUTPATIENT
Start: 2024-02-26 | End: 2024-02-27 | Stop reason: ALTCHOICE

## 2024-02-26 RX ORDER — FUROSEMIDE 10 MG/ML
20 INJECTION INTRAMUSCULAR; INTRAVENOUS ONCE
Status: COMPLETED | OUTPATIENT
Start: 2024-02-26 | End: 2024-02-26

## 2024-02-26 RX ORDER — HYDROMORPHONE HYDROCHLORIDE 1 MG/ML
0.3 INJECTION, SOLUTION INTRAMUSCULAR; INTRAVENOUS; SUBCUTANEOUS
Status: DISCONTINUED | OUTPATIENT
Start: 2024-02-26 | End: 2024-02-27 | Stop reason: ALTCHOICE

## 2024-02-26 RX ORDER — FLUMAZENIL 0.1 MG/ML
0.2 INJECTION, SOLUTION INTRAVENOUS
Status: DISCONTINUED | OUTPATIENT
Start: 2024-02-26 | End: 2024-02-27 | Stop reason: ALTCHOICE

## 2024-02-26 RX ORDER — NALOXONE HYDROCHLORIDE 0.4 MG/ML
0.2 INJECTION, SOLUTION INTRAMUSCULAR; INTRAVENOUS; SUBCUTANEOUS
Status: DISCONTINUED | OUTPATIENT
Start: 2024-02-26 | End: 2024-02-27 | Stop reason: HOSPADM

## 2024-02-26 RX ORDER — SPIRONOLACTONE 25 MG/1
50 TABLET ORAL DAILY
Status: DISCONTINUED | OUTPATIENT
Start: 2024-02-26 | End: 2024-02-27 | Stop reason: HOSPADM

## 2024-02-26 RX ORDER — NALOXONE HYDROCHLORIDE 0.4 MG/ML
0.4 INJECTION, SOLUTION INTRAMUSCULAR; INTRAVENOUS; SUBCUTANEOUS
Status: DISCONTINUED | OUTPATIENT
Start: 2024-02-26 | End: 2024-02-27 | Stop reason: HOSPADM

## 2024-02-26 RX ORDER — TENOFOVIR DISOPROXIL FUMARATE 300 MG/1
300 TABLET, FILM COATED ORAL EVERY OTHER DAY
Status: DISCONTINUED | OUTPATIENT
Start: 2024-02-26 | End: 2024-02-27 | Stop reason: HOSPADM

## 2024-02-26 RX ORDER — ACETAMINOPHEN 325 MG/1
650 TABLET ORAL EVERY 6 HOURS PRN
Status: DISCONTINUED | OUTPATIENT
Start: 2024-02-26 | End: 2024-02-27 | Stop reason: HOSPADM

## 2024-02-26 RX ORDER — SPIRONOLACTONE 25 MG/1
50 TABLET ORAL DAILY
Status: DISCONTINUED | OUTPATIENT
Start: 2024-02-26 | End: 2024-02-26

## 2024-02-26 RX ORDER — FUROSEMIDE 20 MG
20 TABLET ORAL DAILY
Status: DISCONTINUED | OUTPATIENT
Start: 2024-02-27 | End: 2024-02-27 | Stop reason: HOSPADM

## 2024-02-26 RX ORDER — ONDANSETRON 2 MG/ML
4 INJECTION INTRAMUSCULAR; INTRAVENOUS
Status: DISCONTINUED | OUTPATIENT
Start: 2024-02-26 | End: 2024-02-27 | Stop reason: HOSPADM

## 2024-02-26 RX ORDER — PIPERACILLIN SODIUM, TAZOBACTAM SODIUM 3; .375 G/15ML; G/15ML
3.38 INJECTION, POWDER, LYOPHILIZED, FOR SOLUTION INTRAVENOUS ONCE
Status: COMPLETED | OUTPATIENT
Start: 2024-02-26 | End: 2024-02-26

## 2024-02-26 RX ADMIN — ACETAMINOPHEN 650 MG: 325 TABLET ORAL at 04:40

## 2024-02-26 RX ADMIN — TENOFOVIR DISOPROXIL FUMARATE 300 MG: 300 TABLET, FILM COATED ORAL at 09:47

## 2024-02-26 RX ADMIN — HYDROMORPHONE HYDROCHLORIDE 0.3 MG: 1 INJECTION, SOLUTION INTRAMUSCULAR; INTRAVENOUS; SUBCUTANEOUS at 05:06

## 2024-02-26 RX ADMIN — FENTANYL CITRATE 25 MCG: 50 INJECTION, SOLUTION INTRAMUSCULAR; INTRAVENOUS at 15:29

## 2024-02-26 RX ADMIN — SPIRONOLACTONE 50 MG: 25 TABLET, FILM COATED ORAL at 09:47

## 2024-02-26 RX ADMIN — PIPERACILLIN AND TAZOBACTAM 3.38 G: 3; .375 INJECTION, POWDER, FOR SOLUTION INTRAVENOUS at 05:59

## 2024-02-26 RX ADMIN — PIPERACILLIN AND TAZOBACTAM 3.38 G: 3; .375 INJECTION, POWDER, FOR SOLUTION INTRAVENOUS at 12:21

## 2024-02-26 RX ADMIN — FUROSEMIDE 20 MG: 10 INJECTION, SOLUTION INTRAMUSCULAR; INTRAVENOUS at 05:59

## 2024-02-26 RX ADMIN — LIDOCAINE HYDROCHLORIDE 10 ML: 10 INJECTION, SOLUTION INFILTRATION; PERINEURAL at 15:26

## 2024-02-26 RX ADMIN — MIDAZOLAM HYDROCHLORIDE 0.5 MG: 1 INJECTION, SOLUTION INTRAMUSCULAR; INTRAVENOUS at 15:27

## 2024-02-26 RX ADMIN — PANTOPRAZOLE SODIUM 40 MG: 40 INJECTION, POWDER, FOR SOLUTION INTRAVENOUS at 12:21

## 2024-02-26 RX ADMIN — POTASSIUM CHLORIDE 20 MEQ: 2 INJECTION, SOLUTION, CONCENTRATE INTRAVENOUS at 06:56

## 2024-02-26 RX ADMIN — MIDAZOLAM HYDROCHLORIDE 1 MG: 1 INJECTION, SOLUTION INTRAMUSCULAR; INTRAVENOUS at 15:20

## 2024-02-26 RX ADMIN — FENTANYL CITRATE 25 MCG: 50 INJECTION, SOLUTION INTRAMUSCULAR; INTRAVENOUS at 15:33

## 2024-02-26 RX ADMIN — LEVOTHYROXINE SODIUM 175 MCG: 0.03 TABLET ORAL at 05:58

## 2024-02-26 RX ADMIN — FENTANYL CITRATE 50 MCG: 50 INJECTION, SOLUTION INTRAMUSCULAR; INTRAVENOUS at 15:22

## 2024-02-26 RX ADMIN — PIPERACILLIN AND TAZOBACTAM 3.38 G: 3; .375 INJECTION, POWDER, FOR SOLUTION INTRAVENOUS at 19:44

## 2024-02-26 ASSESSMENT — ACTIVITIES OF DAILY LIVING (ADL)
ADLS_ACUITY_SCORE: 27
ADLS_ACUITY_SCORE: 27
ADLS_ACUITY_SCORE: 26
ADLS_ACUITY_SCORE: 27
ADLS_ACUITY_SCORE: 27
ADLS_ACUITY_SCORE: 35
ADLS_ACUITY_SCORE: 28
ADLS_ACUITY_SCORE: 27
ADLS_ACUITY_SCORE: 27
ADLS_ACUITY_SCORE: 28
ADLS_ACUITY_SCORE: 27
ADLS_ACUITY_SCORE: 35
ADLS_ACUITY_SCORE: 27
ADLS_ACUITY_SCORE: 27
ADLS_ACUITY_SCORE: 35
ADLS_ACUITY_SCORE: 35
ADLS_ACUITY_SCORE: 28
ADLS_ACUITY_SCORE: 27
ADLS_ACUITY_SCORE: 27

## 2024-02-26 NOTE — PHARMACY-ADMISSION MEDICATION HISTORY
Pharmacist Admission Medication History    Admission medication history is complete. The information provided in this note is only as accurate as the sources available at the time of the update.    Information Source(s): Patient and Family member via in-person    Pertinent Information: Patient takes tenofovir every other day. Last dose was on 02/24/24.    Changes made to PTA medication list:  Added: None  Deleted: lidocaine, polyethylene glycol  Changed: None    Allergies reviewed with patient and updates made in EHR: yes    Medication History Completed By: Tung Sanchez [facility] 2/25/2024 9:27 PM    PTA Med List   Medication Sig Last Dose    acetaminophen (TYLENOL) 325 MG tablet Take 650 mg by mouth every 6 hours as needed   at PRN    furosemide (LASIX) 20 MG tablet Take 1 tablet (20 mg) by mouth daily 2/25/2024 at AM    levothyroxine (SYNTHROID/LEVOTHROID) 175 MCG tablet Take 1 tablet (175 mcg) by mouth daily 2/25/2024 at AM    megestrol (MEGACE) 40 MG/ML suspension Take 10 mLs (400 mg) by mouth daily 2/25/2024 at AM    ondansetron (ZOFRAN) 4 MG tablet Take 1 tablet (4 mg) by mouth every 6 hours as needed for nausea (vomiting)  at PRN    spironolactone (ALDACTONE) 50 MG tablet Take 1 tablet (50 mg) by mouth daily 2/25/2024 at AM    tenofovir (VIREAD) 300 MG tablet Take 1 tablet (300 mg) by mouth every other day 2/24/2024 at AM

## 2024-02-26 NOTE — H&P
[facility]    History and Physical - Hospitalist Service       Date of Admission:  2/25/2024    Assessment & Plan   Kristen Chapman is a 73 year old male Kristen Chapman is a 73 year old year old male with a  past history of nasal pharyngeal carcinoma, squamous cell carcinoma of the nasopharynx, and liver cirrhosis with portal hypertension and recurrent ascites/p peritoneal drainage catheter placement  who is being admitted for abd drain malfunction.     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    Thrombocytopenia (H24)    Hepatitis B, chronic (H)    Status post insertion of percutaneous endoscopic gastrostomy (PEG) tube (H)    Chronic kidney disease, stage 3 (H)    Anosmia    Other ascites        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  Patient is being admitted for interventional radiology tube exchange as the drainage tube appears not to be functioning well.  Patient reportedly has had drainage from other areas of her body.  Fluid analysis did not meet diagnosis for SBP.  Patient however has generalized abdominal pain.  I ordered Zosyn to cover empirically.  Gastroenterology has been consulted to assist with follow-up    ?  Chronic  hep B infection-  Resume outpatient tenofovir.    Hypothyroidism-continue Synthroid    Liver cirrhosis-Lasix, spirolactone. .  Follow-up on electrolytes, peritoneal fluid turbid. + cx. Neutrophils -154. Due to increased abd pain and exposure to the exterior, I will start empiric coveric. IV zosyn startrrd.     Dysphagia-consider antibiotics to cover dysphagia and SBP when patient spikes fevers ?  Quinolones    History of pharyngeal cancer-outpatient follow-up    Chronic anemia-close hemoglobin follow-up    CKD stage III-avoid nephrotoxic drugs    Abnormal EKG- r/o ACS Rest of patient's medical problems management remains unchanged     Diet:  N.p.o..  IR consulted for tube exchange    DVT Prophylaxis: Pneumatic Compression Devices    Lee Catheter: Not present  Lines: PRESENT             Cardiac Monitoring: None  Code Status:  Full code    Clinically Significant Risk Factors Present on Admission                                  Disposition Plan      Expected Discharge Date: 02/26/2024                  Floridalma Brannon MD  Hospitalist Service  [facility]  Securely message with Rain (more info)  Text page via [employer] Paging/Directory     ______________________________________________________________________    Chief Complaint   Shortness of breath      History of Present Illness   Kristen Chapman is a 73 year old male Kristen Chapman is a 73 year old year old male with a  past history of nasal pharyngeal carcinoma, squamous cell carcinoma of the nasopharynx, and liver cirrhosis with portal hypertension and recurrent ascites/p peritoneal drainage catheter placement  who is being admitted for abd drain malfunction.     The patient had a peritoneal drainage tube placed on Thursday (2/22/24) and laos had paracentesis at the same time. For the last 3 days the patient has had drainage around the peritoneal drainage tube and through the paracentesis site. He was seen in the ED yesterday (2/24/24) for the  drainage. In the ED they placed Dermabond over the paracentesis site to close the incision and an ostomy bag was placed around the patient's peritoneal drainage tube. This morning the paracentesis site opened back up and has continued to drain prompting his return to the ED.     Friday (2/23/24) after the tube was placed he had about 1.7L of ouput. Saturday (2/24/24) had 300 ml of output. Today he has had no more than 100 ml output. He denies any chest pain, shortness of breath, or abdominal pain, and denies any other complaints at this time.     Preliminary labs done showed a BMP with a BUN of 46 and creatinine of 1.73 which is close to baseline.  Sodium of 143 and potassium of 4.9.        Imaging was not felt to be necessary in the ER.    Imaging done 1/31/2024 showed results below:    MPRESSION: In this patient with a history of nasopharyngeal squamous  cell carcinoma status post resection with chemotherapy/radiation with  recent local recurrence about the right hemimandible:     1. Disease progression with increased size of the enhancing  hypermetabolic soft tissue mass about the right hemimandible measuring  up to 5.0 cm with invasion into the floor of the right oral cavity and  right base of tongue. No evidence of osseous invasion, though  involvement of adjacent structures would be better evaluated with MRI.  2. New focal FDG uptake in/adjacent to the crux of the right and left  hemidiaphragm, as well as retrocrural and retrocaval lymph nodes,  suspicious for metastatic disease.  3. Cirrhotic liver with sequela of portal hypertension including large  volume ascites and splenogastric varices.  4. Stable appearance of the hypoattenuating lesion in the spleen that  does not demonstrate increased FDG uptake.     I have personally reviewed the examination and initial interpretation  and I agree with the findings.     ELIZABETH ALVAREZ DO     On the floor, there were concerns about abnormal rythm on the monitor. EKG dne  soed sinus bradycardia.     Past Medical History    Past Medical History:   Diagnosis Date    Anemia     Dysphagia     Hepatitis B chronic     Hypothyroidism     Nasopharyngeal cancer (H)     Severe protein-calorie malnutrition (H24)     Thrombocytopenia (H24)        Past Surgical History   Past Surgical History:   Procedure Laterality Date    ENDOSCOPIC ENDONASAL SURGERY Bilateral 9/7/2021    Procedure: Nasal Endoscopy with Biopsy;  Surgeon: Ky Centeno MD;  Location: [facility] OR    IR CHEST PORT PLACEMENT > 5 YRS OF AGE  3/2/2020    IR CHEST PORT PLACEMENT > 5 YRS OF AGE  3/2/2020    IR GASTROSTOMY TUBE INSERTION  4/27/2020    IR GASTROSTOMY TUBE PERCUTANEOUS PLCMNT  4/27/2020    IR INTRAPERITONEAL CATH TUNNEL ASCITES  2/22/2024    IR PORT PLACEMENT >5 YEARS  3/2/2020    IR PORT PLACEMENT >5 YEARS  3/2/2020    IR T-FASTENER REMOVAL  6/5/2020    IR T-FASTENER REMOVAL  6/5/2020       Prior to Admission Medications   Prior to Admission Medications   Prescriptions Last Dose Informant Patient Reported? Taking?   acetaminophen (TYLENOL) 325 MG tablet   Yes No   Sig: Take 650 mg by mouth every 6 hours as needed    furosemide (LASIX) 20 MG tablet   No No   Sig: Take 1 tablet (20 mg) by mouth daily   levothyroxine (SYNTHROID/LEVOTHROID) 175 MCG tablet   No No   Sig: Take 1 tablet (175 mcg) by mouth daily   megestrol (MEGACE) 40 MG/ML suspension   No No   Sig: Take 10 mLs (400 mg) by mouth daily   ondansetron (ZOFRAN) 4 MG tablet   No No   Sig: Take 1 tablet (4 mg) by mouth every 6 hours as needed for nausea (vomiting)   polyethylene glycol (MIRALAX) 17 GM/Dose powder   No No   Sig: Take 17 g by mouth daily as needed for constipation   spironolactone (ALDACTONE) 50 MG tablet   No No   Sig: Take 1 tablet (50 mg) by mouth daily   tenofovir (VIREAD) 300 MG tablet   No No   Sig: Take 1 tablet (300 mg) by mouth every other day      Facility-Administered Medications: None        Review of Systems    The 10 point Review of  Systems is negative other than noted in the HPI or here.     Social History   I have reviewed this patient's social history and updated it with pertinent information if needed.  Social History     Tobacco Use    Smoking status: Never    Smokeless tobacco: Never   Substance Use Topics    Alcohol use: Not Currently    Drug use: Not Currently         Family History         Allergies   Allergies   Allergen Reactions    Oxycodone Itching        Physical Exam   Vital Signs: Temp: 97.6  F (36.4  C) Temp src: Temporal BP: 112/64 Pulse: 66   Resp: 20 SpO2: 100 % O2 Device: None (Room air)    Weight: 121 lbs 0 oz      General Aox3, appropriate affect, NAD, on RA  HEENT  MMM, EOMI, PERRL, + nasopharyngeal ca  Chest Adeq E b/l, No wheezing  Heart RRR, No M/R/G  Abd- +distended, + peritoneal drainage tube  Generalized abd pain.   - Deferred,   Extremity- Moving all extremities, No digital clubbing,   No edema  Neuro- moving all extremities  gait not checked  Skin  + erythema at biopsy sites in RUQ qith areas of oozing.     Medical Decision Making       90 MINUTES SPENT BY ME on the date of service doing chart review, history, exam, documentation & further activities per the note.  ------------------ MEDICAL DECISION MAKING ------------------------------------------------------------------------------------------------------  MANAGEMENT DISCUSSED with the following over the past 24 hours:     NOTE(S)/MEDICAL RECORDS REVIEWED over the past 24 hours:         Data         Imaging results reviewed over the past 24 hrs:   No results found for this or any previous visit (from the past 24 hour(s)).

## 2024-02-26 NOTE — CONSULTS
GI CONSULT NOTE      Name: Kristen Chapman    Medical Record #: [id number]    YOB: 1950    Date: 2/26/2024      CC: We were consulted by Floridalma Brannon MD to see Kristen Chapman in regards to cirrhosis with portal HTN.     HPI: This is a 73 year old male with a history of nasal pharyngeal carcinoma, squamous cell carcinoma of the nasopharynx, Hep B, liver cirrhosis with portal hypertension and recurrent ascites/p peritoneal drainage catheter placement who was admitted for abd drain malfunction. Pt follows with [facility] hepatology.  He is seen with his son present in the room -- pt lives with his son. Pt had drainage tube catheter placed 2/22/2024 due to recurrent ascites needing frequent paracenteses.  He had 5L paracentesis x5 since 01/01/24.  Drainage catheter ordered by his oncologist.  Pt had leakage from prior paracentesis site and came to ER on Saturday 2/24/24.  Adhesive was applied over the leakage site.  Pt returned home though the adhesive fell off, leakage recurred from prior para site as well as leakage around the drainage catheter. He returned to the ER. Plans are to see IR today. Fluid studies neg for SBP.  We were asked to see for abd pain as well but pt is not bothered by that at this time.  Empirically covered with Zosyn. Is prescribed lasix and aldactone though reports not taking these recently.     Past medical history  Past Medical History:   Diagnosis Date    Anemia     Dysphagia     Hepatitis B chronic     Hypothyroidism     Nasopharyngeal cancer (H)     Severe protein-calorie malnutrition (H24)     Thrombocytopenia (H24)         Family history  Family History   Problem Relation Age of Onset    Liver Cancer No family hx of     Liver Disease No family hx of         Social history  Social History     Tobacco Use    Smoking status: Never    Smokeless tobacco: Never   Substance Use Topics    Alcohol use: Not Currently    Drug use: Not Currently       Medications:   Lasix 20mg, levothyroxine, megace,  "ondansetron, aldactone 50mg, Viread.    Allergies:    Allergies   Allergen Reactions    Oxycodone Itching      REVIEW OF SYSTEMS (ROS): Review of systems is as per HPI.  Remainder of complete review of systems is negative.      PHYSICAL EXAMINATION:      /61 (BP Location: Right arm, Patient Position: Supine)   Pulse 57   Temp 99.1  F (37.3  C) (Oral)   Resp 16   Ht 1.676 m (5' 6\")   Wt 54.9 kg (121 lb)   SpO2 100%   BMI 19.53 kg/m    GEN: Well developed, thin73 year old male in no acute distress.  HEENT: sclera anicteric  LYMPH: Bandage on left.  CARDIO: Regular rate and rhythm  GI: Dependent ascites on left abd.  Dressings over R abd - wet.   Bowel sounds positive.  Soft.  Non-tender to palpation.  No guarding.  EXT: warm, no lower extremity edema  NEURO: Alert   PSYCH: Mental status appropriate for condition.     LABS and IMAGING  Recent Labs   Lab 02/26/24  0554   WBC 4.5   RBC 2.41*   HGB 7.1*   HCT 23.3*   MCV 97   MCH 29.5   MCHC 30.5*   RDW 19.2*   PLT 78*      Recent Labs   Lab 02/26/24  0554      CO2 27   BUN 49.6*     Recent Labs   Lab 02/26/24  0554   ALKPHOS 62   AST 24   ALT 14     Lab Results   Component Value Date    INR 1.32 (H) 12/07/2023    INR 1.25 (H) 07/07/2023    INR 1.24 (H) 06/09/2023       ASSESSMENT  This is a 73 year old male with a history of nasal pharyngeal carcinoma, squamous cell carcinoma of the nasopharynx, Hep B, liver cirrhosis with portal hypertension and recurrent ascite s/p peritoneal drainage catheter placement who was admitted for abd drain malfunction.   1.  Ascites, recurrent.  Has required frequent paracenteses.  Studies neg for SBP. Peritoneal drainage catheter placed 2/22/2024 as ordered by oncology.  Patient having leakage from prior paracentesis site as well as leakage around the drainage catheter.  Drainage catheter output less than expected; will see IR today. Drainage catheter might not be in a fluid pocket, or potentially blocked off.  Recommend " 5L paracentesis to help with acute fluid management and decrease potential for future leakage.   2. Hep B -previously on limited feeding and Entecavir.  Started tenofovir December 2023.  Follows with Dr Yumiko Wills at [facility].     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 Will see IR today re: drainage catheter.   2. Recommend 5L paracentesis to help with acute fluid management and decrease potential for future leakage.   3. Hep B treatment per [facility] - Tenofovir.     A total of 50 min was spent on today's care.  This included chart review, discussion with 2 RNs, discussion with Dr. Jeyson Redman, formulation of assessment plan.    Thank you for asking to consult on this patient.    Brent Smith PA-C   2/26/2024 7:43 AM  [facility]  [phone]

## 2024-02-26 NOTE — SEDATION DOCUMENTATION
Patient Name: Kristen Chapman  Medical Record Number: [id number]  Today's Date: 2/26/2024    Procedure: abdominal drain replacement (aspira)  Proceduralist: Dr. Brewster    Procedure Start: 15:24  Procedure end: 15:27  Sedation medications administered: 1.5 mg midazolam and 100 mcg fentanyl   Sedation time: 27 minutes    Report given to: Carrington SAINI RN P2  : Int[employer]preter Services    Other Notes: Pt arrived to IR room 1 from IR holding. Consent reviewed. Pt denies any questions or concerns regarding procedure. Pt positioned supine and monitored per protocol. Pt tolerated procedure without any noted complications. VSS. Pt transferred back to P2.

## 2024-02-26 NOTE — ED NOTES
Drainage bag disconnected at this time, as there has been minimal drainage, and it was causing the pt discomfort. Sample of fluid sent to lab. Pt requests Tylenol at this time. MD updated.

## 2024-02-26 NOTE — PLAN OF CARE
Problem: Fluid Volume Excess  Goal: Fluid Balance  Outcome: Unable to Meet   Goal Outcome Evaluation:         Pt is A/Ox4, makes needs known, Hmong speaking, on a room air, admitted from the ED @0400, Paracentesis catheter placed on 2/22. Old paracentesis site leaking and culture was taken from the leaking old paracentesis by the ED nurse. Tele; showed ST elevetion and provider was notified by CN, EKG done; SB, pt remained asymptomatic, c/o pain at the site, PRN pain med administered (please MAR. Has Port on left chest, lab draw allowed. IR following this morning with the pt. NPO. For IR follow up.

## 2024-02-26 NOTE — PROCEDURES
[facility]    Procedure: Aspira drain replacement    Date/Time: 2/26/2024 4:06 PM    Performed by: Alan Brewster MD  Authorized by: Alan Brewster MD      UNIVERSAL PROTOCOL   Site Marked: NA  Prior Images Obtained and Reviewed:  Yes  Required items: Required blood products, implants, devices and special equipment available    Patient identity confirmed:  Verbally with patient, arm band, provided demographic data and hospital-assigned identification number  Patient was reevaluated immediately before administering moderate or deep sedation or anesthesia  Confirmation Checklist:  Patient's identity using two indicators, relevant allergies, procedure was appropriate and matched the consent or emergent situation and correct equipment/implants were available  Time out: Immediately prior to the procedure a time out was called    Universal Protocol: the Joint Commission Universal Protocol was followed    Preparation: Patient was prepped and draped in usual sterile fashion       ANESTHESIA    Anesthesia:  Local infiltration  Local Anesthetic:  Lidocaine 1% without epinephrine      SEDATION  Patient Sedated: Yes    Sedation Type:  Moderate (conscious) sedation  Vital signs: Vital signs monitored during sedation    See dictated procedure note for full details.  Findings: Successful replacement of Aspira drain and repositioning into the pelvis.    Specimens: none    Complications: None    Condition: Stable      PROCEDURE    Patient Tolerance:  Patient tolerated the procedure well with no immediate complications  Length of time physician/provider present for 1:1 monitoring during sedation: 20    Julio C Brewster MD  Interventional Radiology

## 2024-02-26 NOTE — ED NOTES
"Writer has \"pumped\" bulb on drain approx every 5-10 minutes, per IR instruction, to try to initiate the pump to drain- less than 100 ml out over the past hour. Pt continues to \"leak\" fluid from multiple sites in his abdomen. He denies any need for pain medication at this time.   "

## 2024-02-26 NOTE — PLAN OF CARE
Problem: Adult Inpatient Plan of Care  Goal: Optimal Comfort and Wellbeing  Outcome: Not Progressing  Intervention: Monitor Pain and Promote Comfort  Recent Flowsheet Documentation  Taken 2/26/2024 0844 by Flores Berumen RN  Pain Management Interventions:   emotional support   repositioned   heat applied     Problem: Adult Inpatient Plan of Care  Goal: Optimal Comfort and Wellbeing  Intervention: Monitor Pain and Promote Comfort  Recent Flowsheet Documentation  Taken 2/26/2024 0844 by Flores Berumen RN  Pain Management Interventions:   emotional support   repositioned   heat applied   Goal Outcome Evaluation:  IR NP saw patient and stated to try and use Aspira drain in room to removed as much fluid as possible. No fluid was removed this way. IR was notified. Pt going to IR for CT and paracentesis. Other abdominal slit has been leaking serous fluid into ostomy bag. Total bed change earlier. Patient declines needing something for pain. NPO this shift. IVF and IV antibiotic.

## 2024-02-26 NOTE — PROGRESS NOTES
[facility]    PROGRESS NOTE - Hospitalist Service    Assessment and Plan  73 year old male with a history of nasal pharyngeal carcinoma, squamous cell carcinoma of the nasopharynx, Hep B, liver cirrhosis with portal hypertension and recurrent ascites/p peritoneal drainage catheter placement who was admitted for abdominal drain malfunction     Recurrent refractory ascites  - S/P drainage tube placed on Thursday, 2/22/2024.    - Patient is being admitted for interventional radiology tube exchange as the drainage tube appears not to be functioning well.    - Zosyn to cover empirically.    - IR consult to replace drainage tube.  -Keep patient NPO.  For now  - GI consult, appreciate input  - Plan for paracentesis with 5 L of output     History of liver cirrhosis  -Resume Lasix, spirolactone. .    - Follow-up on electrolytes  - peritoneal fluid turbid. + cx. Neutrophils -154.   - start empirically on IV zosyn as patient is immunocompromised  - GI is following    Nasopharyngeal carcinoma   - Started back on treatment with single agent cetuximab.   - Follow-up with oncology    Chronic hep B infection  - Resume outpatient tenofovir.  - GI is following.    Chronic kidney disease  -Stage III  -Stable creatinine around baseline  -Continue to monitor renal function    Pancytopenia  -Secondary to malignancy and chemotherapy  -Continue to monitor CBC     Hypothyroidism  - continue Synthroid     Severe malnutrition  - Protein and calories  - Resume Megace    Weakness and deconditioning  -Secondary to the above  -PT/OT evaluation    CODE STATUS  -Reviewed with patient and his son  -Patient is DNR    50 MINUTES SPENT BY ME on the date of service doing chart review, history, exam, documentation & further activities per the note    Principal Problem:    Other ascites  Active Problems:    Abnormal electrocardiogram      VTE prophylaxis:  Pneumatic Compression Devices  DIET: Orders Placed This Encounter      NPO  for Medical/Clinical Reasons Except for: Meds      Disposition/Barriers to discharge: IR consulted to place drainage tube, IV antibiotic, pending culture and monitor CBC  Code Status: No CPR- Do NOT Intubate    Subjective:  Via , Kristen is feeling about the same today, no acute significant events overnight, still has abdominal distention.  Son at bedside    PHYSICAL EXAM  Vitals:    02/25/24 1829   Weight: 54.9 kg (121 lb)     B/P:113/63 T:98.5 P:62 R:16     Intake/Output Summary (Last 24 hours) at 2/26/2024 1010  Last data filed at 2/26/2024 0900  Gross per 24 hour   Intake 0 ml   Output --   Net 0 ml      Body mass index is 19.53 kg/m .    Constitutional: awake, alert, cooperative, no apparent distress, and appears stated age  Respiratory: No increased work of breathing, good air exchange, clear to auscultation bilaterally, no crackles or wheezing  Cardiovascular: Normal apical impulse, regular rate and rhythm, normal S1 and S2, no S3 or S4, and no murmur noted  GI: Significant abdominal distention with positive ascites.  Normal bowel sounds.  Skin: no bruising or bleeding and normal skin color, texture, turgor  Musculoskeletal: There is no redness, warmth, or swelling of the joints.  Full range of motion noted.  no lower extremity pitting edema present  Neurologic: Awake, alert, oriented to name, place and time.  Cranial nerves II-XII are grossly intact.  Motor is 5 out of 5 bilaterally.   Sensory is intact.    Neuropsychiatric: Appropriate with examiner      PERTINENT LABS/IMAGING:    I have personally reviewed the following data over the past 24 hrs:    4.5  \   7.1 (L)   / 78 (L)     144 114 (H) 49.6 (H) /  95   4.8 27 1.39 (H) \     ALT: 14 AST: 24 AP: 62 TBILI: 0.4   ALB: 2.8 (L) TOT PROTEIN: 5.5 (L) LIPASE: N/A     Trop: 38 (H) BNP: N/A       Imaging results reviewed over the past 24 hrs:   No results found for this or any previous visit (from the past 24 hour(s)).    Discussed with patient, family,  GI, nursing staff and discharge planner    Mike Angeles MD  [facility] Medicine Service  [phone]

## 2024-02-26 NOTE — ED TRIAGE NOTES
Patient had recent paracentesis (2/21), is leaking from incision site.  Pt was here yesterday with same c/o.

## 2024-02-26 NOTE — ED NOTES
Writer called report to JACQUES George in receiving unit. Pt to transport to room P226 at this time.

## 2024-02-26 NOTE — CONSULTS
Interventional Radiology - Consultation Note:  Inpatient - [facility]  02/26/2024     Reason for Consult:  Peritoneal catheter not functioning; leaking around catheter.    Requesting Provider:  MAYELA Smith    HPI:  Kristen Chapman is a 73 year old male with Nasopharyngeal carcinoma with refractory ascites. He is followed by Dr. Frias, [facility] oncology. He is has had multiple paracentesis. Last paracentesis 2/20/24 with 5L removal.  2/22/24 s/p peritoneal tunneled drainage catheter placement (aspira). Patient now with complaints of drainage at insertion site. Patient's son reports that he was able to drain off 2L of ascites fluid the first day. A few days later he was only able to drain off 300ml and the patient started to leak ascites fluid from the insertion site lateral to the aspira drainage catheter. Patient's tubing was flushed without issues and glue was applied to the incision site, which lasted for a day and then the glue fell off and the site started leaking again. Patient reports that his abdomen feels distended and that he has a fluid in his abdomen that needs draining.     IMAGING:    LOCATION: [facility]  DATE: 2/22/2024     PROCEDURE: ULTRASOUND AND FLUOROSCOPIC-GUIDED TUNNELED PERITONEAL DRAINAGE CATHETER PLACEMENT     INTERVENTIONAL RADIOLOGIST: Rocky Haines MD     INDICATION: Recurrent ascites, plan for tunneled peritoneal drain placement.     CONSENT: The risks, benefits and alternatives of the procedure were discussed with the patient or representative in detail. All questions were answered. Informed consent was given to proceed with the procedure.     MODERATE SEDATION: Versed 1 mg IV; Fentanyl 50 mcg IV. During the time out, immediately prior to the administration of medications, the patient was reassessed for adequacy to receive conscious sedation.  Under physician supervision, Versed and fentanyl   were administered for moderate  sedation. Pulse oximetry, heart rate and blood pressure were continuously monitored by an independent trained observer. The physician spent 10 minutes of face-to-face sedation time with the patient.     CONTRAST: None.  ANTIBIOTICS: Ancef 2 gm IV     FLUOROSCOPIC TIME: 0.4 minutes.  RADIATION DOSE: Air Kerma: 6 mGy.     COMPLICATIONS: No immediate complications.     UNIVERSAL PROTOCOL: The operative site was marked and any prior imaging was reviewed. Required items including blood products, implants, devices and special equipment was made available. Patient identity was confirmed either verbally, with demographic   information, hospital assigned identification or other identification markers. A timeout was performed immediately prior to the procedure.     STERILE BARRIER TECHNIQUE: Maximum sterile barrier technique was used. Cutaneous antisepsis was performed at the operative site with application of 2% chlorhexidine and large sterile drape. Prior to the procedure, the  and assistant performed   hand hygiene and wore hat, mask, sterile gown, and sterile gloves during the entire procedure.     PROCEDURE:    Under real-time sonographic guidance, an 18 gauge needle was inserted into the right lower quadrant ascitic fluid. A permanent image was stored to the patient's medical record. A wire was coiled within the peritoneal cavity. A subcutaneous tunnel was   created along the lateral/anterior abdomen requiring a second incision. Using this access, a tunneled drainage catheter was placed under fluoroscopic guidance with its tip in the lower pelvic peritoneal space. The initial dermatotomy was closed with   overlying surgical glue. The catheter was sutured to the skin at the tunnel exit site. The catheter may be used immediately.     FINDINGS:  Ultrasound shows a large amount of intraabdominal ascites. At the completion of the study a spot radiograph was obtained utilizing the in room fluoroscopic unit. Imaging  demonstrates the catheter tubing lying within the lower pelvic peritoneal cavity.                                                                      IMPRESSION:    1. Successful peritoneal tunneled drainage catheter placement, as discussed above.  2. The catheter is ready for use.     NPO Status:  midnight  Anticoagulation/Antiplatelets/Bleeding tendencies:  none  Antibiotics:  IV zosyn; no further antibiotics required in IR.     REVIEW OF SYSTEMS:  A comprehensive 10-point review of systems was performed. All systems were reviewed and negative with exception to those reported in the HPI.     PAST MEDICAL HISTORY:  Past Medical History:   Diagnosis Date    Anemia     Dysphagia     Hepatitis B chronic     Hypothyroidism     Nasopharyngeal cancer (H)     Severe protein-calorie malnutrition (H24)     Thrombocytopenia (H24)        PAST SURGICAL HISTORY:  Past Surgical History:   Procedure Laterality Date    ENDOSCOPIC ENDONASAL SURGERY Bilateral 9/7/2021    Procedure: Nasal Endoscopy with Biopsy;  Surgeon: Ky Centeno MD;  Location: [facility] OR    IR CHEST PORT PLACEMENT > 5 YRS OF AGE  3/2/2020    IR CHEST PORT PLACEMENT > 5 YRS OF AGE  3/2/2020    IR GASTROSTOMY TUBE INSERTION  4/27/2020    IR GASTROSTOMY TUBE PERCUTANEOUS PLCMNT  4/27/2020    IR INTRAPERITONEAL CATH TUNNEL ASCITES  2/22/2024    IR PORT PLACEMENT >5 YEARS  3/2/2020    IR PORT PLACEMENT >5 YEARS  3/2/2020    IR T-FASTENER REMOVAL  6/5/2020    IR T-FASTENER REMOVAL  6/5/2020       ALLERGIES:  Allergies   Allergen Reactions    Oxycodone Itching        MEDICATIONS:  Current Facility-Administered Medications   Medication    acetaminophen (TYLENOL) tablet 650 mg    calcium carbonate (TUMS) chewable tablet 1,000 mg    [START ON 2/27/2024] furosemide (LASIX) tablet 20 mg    HYDROmorphone (PF) (DILAUDID) injection 0.3 mg    levothyroxine (SYNTHROID/LEVOTHROID) tablet 175 mcg    lidocaine (LMX4) cream    lidocaine 1 % 0.1-1 mL    megestrol (MEGACE)  "suspension 400 mg    naloxone (NARCAN) injection 0.2 mg    Or    naloxone (NARCAN) injection 0.4 mg    Or    naloxone (NARCAN) injection 0.2 mg    Or    naloxone (NARCAN) injection 0.4 mg    ondansetron (ZOFRAN) tablet 4 mg    piperacillin-tazobactam (ZOSYN) 3.375 g vial to attach to  mL bag    potassium chloride 20 mEq in dextrose 5% in lactated ringers    senna-docusate (SENOKOT-S/PERICOLACE) 8.6-50 MG per tablet 2 tablet    sodium chloride (PF) 0.9% PF flush 3 mL    sodium chloride (PF) 0.9% PF flush 3 mL    spironolactone (ALDACTONE) tablet 50 mg    tenofovir (VIREAD) tablet 300 mg     Facility-Administered Medications Ordered in Other Encounters   Medication    heparin 100 UNIT/ML injection 5 mL    sodium chloride (PF) 0.9% PF flush 3-20 mL        LABS:  INR   Date Value Ref Range Status   12/07/2023 1.32 (H) 0.85 - 1.15 Final      Hemoglobin   Date Value Ref Range Status   02/26/2024 7.1 (L) 13.3 - 17.7 g/dL Final     Platelet Count   Date Value Ref Range Status   02/26/2024 78 (L) 150 - 450 10e3/uL Final     Creatinine   Date Value Ref Range Status   02/26/2024 1.39 (H) 0.67 - 1.17 mg/dL Final     Potassium   Date Value Ref Range Status   02/26/2024 4.8 3.4 - 5.3 mmol/L Final   07/01/2022 3.8 3.5 - 5.0 mmol/L Final         EXAM:  /63 (BP Location: Right arm)   Pulse 62   Temp 98.5  F (36.9  C) (Oral)   Resp 16   Ht 1.676 m (5' 6\")   Wt 54.9 kg (121 lb)   SpO2 98%   BMI 19.53 kg/m    General:  Stable.  In no acute distress.    Neuro:  A&O x 3. Moves all extremities equally.  Resp:  Lungs clear to auscultation bilaterally.  Cardio:  S1S2 and reg, without murmur, clicks or rubs  Abdomen:  Distended, non-tender, positive bowel sounds. Right side abdominal tunneled Aspira drain intact; capped; no leakage noted around tubing, but profuse leakage noted to be coming from incision site lateral to drain; dressing and bedding saturated with ascites/yellow serous fluid.     Skin:  Without excoriations, " ecchymosis, erythema, lesions or open sores around Aspira abdominal drain insertion site. Puncture site on right side of abdomen leaking ascites fluid.     Pre-Sedation Assessment:  Mallampati Airway Classification:  II - Faucial pillars and soft palate may be seen, but uvula is masked by the base of the tongue  Previous reaction to anesthesia/sedation:  No  Sedation plan based on assessment: Moderate (conscious) sedation  ASA Classification: Class 3 - SEVERE SYSTEMIC DISEASE, DEFINITE FUNCTIONAL LIMITATIONS.   Code Status/Advanced care directive: Full Code intra procedure, per discussion with patient and patient's son.     ASSESSMENT/PLAN:    Case and imaging discussed with IR MD Dr. Brewster, patient's bedside RN Flores, and patient's son.      Recommend proceeding with CT abd/pel without contrast to further assess for ascites fluid location and drain positioning.    Will plan for IR procedure following CT; Tunneled abdominal (aspira) drain check with possible exchange/replacement with sedation.     Recommendations and procedural education reviewed with patient/family in detail including, but not limited to risks, benefits and alternatives with understanding verbalized by patient/family.    Thank you for kindly for this consultation.     Total time spent on the date of the encounter: 35 minutes.      SPARKLE Martin CNP  Interventional Radiology  [phone]

## 2024-02-26 NOTE — ED NOTES
Pt has pink and yellow tinged fluid leaking from several points on the right side of his abdomen. Pt has drainage tube from RUQ that is draining some fluid, as well as fluid from around the tube. Pt has fluid leaking from an old tube site a few inches lower than the tube, as well as three or four other 1-2 mm wounds in this same area that are leaking fluid.

## 2024-02-26 NOTE — PRE-PROCEDURE
GENERAL PRE-PROCEDURE:   Procedure:  Aspira drain check with exchange/replacement as needed.  Date/Time:  2/26/2024 10:50 AM    Written consent obtained?: Yes    Risks and benefits: Risks, benefits and alternatives were discussed    Consent given by:  Patient  Patient states understanding of procedure being performed: Yes    Patient's understanding of procedure matches consent: Yes    Procedure consent matches procedure scheduled: Yes    Expected level of sedation:  Moderate  Appropriately NPO:  Yes  ASA Class:  3  Mallampati  :  Grade 2- soft palate, base of uvula, tonsillar pillars, and portion of posterior pharyngeal wall visible  Lungs:  Lungs clear with good breath sounds bilaterally  Heart:  Normal heart sounds and rate  History & Physical reviewed:  History and physical reviewed and no updates needed  Statement of review:  I have reviewed the lab findings, diagnostic data, medications, and the plan for sedation

## 2024-02-26 NOTE — ED NOTES
Pt's drainage tube flushed with 20 ml sterile saline- no resistance and appears patent. However, writer noted that the bulb will not hold suction after re-attaching, possibly due to there being multiple sites leaking. MD was updated on this.

## 2024-02-27 VITALS
DIASTOLIC BLOOD PRESSURE: 63 MMHG | RESPIRATION RATE: 16 BRPM | SYSTOLIC BLOOD PRESSURE: 89 MMHG | OXYGEN SATURATION: 97 % | HEIGHT: 66 IN | HEART RATE: 58 BPM | BODY MASS INDEX: 19.44 KG/M2 | WEIGHT: 121 LBS | TEMPERATURE: 98.3 F

## 2024-02-27 LAB
ALBUMIN SERPL BCG-MCNC: 2.7 G/DL (ref 3.5–5.2)
ALP SERPL-CCNC: 51 U/L (ref 40–150)
ALT SERPL W P-5'-P-CCNC: 17 U/L (ref 0–70)
ANION GAP SERPL CALCULATED.3IONS-SCNC: 10 MMOL/L (ref 7–15)
AST SERPL W P-5'-P-CCNC: 27 U/L (ref 0–45)
BILIRUB SERPL-MCNC: 0.5 MG/DL
BUN SERPL-MCNC: 46.9 MG/DL (ref 8–23)
CALCIUM SERPL-MCNC: 7.6 MG/DL (ref 8.8–10.2)
CHLORIDE SERPL-SCNC: 109 MMOL/L (ref 98–107)
CREAT SERPL-MCNC: 1.6 MG/DL (ref 0.67–1.17)
DEPRECATED HCO3 PLAS-SCNC: 22 MMOL/L (ref 22–29)
EGFRCR SERPLBLD CKD-EPI 2021: 46 ML/MIN/1.73M2
ERYTHROCYTE [DISTWIDTH] IN BLOOD BY AUTOMATED COUNT: 18.5 % (ref 10–15)
GLUCOSE SERPL-MCNC: 122 MG/DL (ref 70–99)
HCT VFR BLD AUTO: 23.7 % (ref 40–53)
HGB BLD-MCNC: 7.5 G/DL (ref 13.3–17.7)
MCH RBC QN AUTO: 30 PG (ref 26.5–33)
MCHC RBC AUTO-ENTMCNC: 31.6 G/DL (ref 31.5–36.5)
MCV RBC AUTO: 95 FL (ref 78–100)
PLATELET # BLD AUTO: 93 10E3/UL (ref 150–450)
POTASSIUM SERPL-SCNC: 4.4 MMOL/L (ref 3.4–5.3)
PROT SERPL-MCNC: 5.1 G/DL (ref 6.4–8.3)
RBC # BLD AUTO: 2.5 10E6/UL (ref 4.4–5.9)
SODIUM SERPL-SCNC: 141 MMOL/L (ref 135–145)
WBC # BLD AUTO: 5.5 10E3/UL (ref 4–11)

## 2024-02-27 PROCEDURE — 99239 HOSP IP/OBS DSCHRG MGMT >30: CPT | Performed by: INTERNAL MEDICINE

## 2024-02-27 PROCEDURE — 250N000011 HC RX IP 250 OP 636: Performed by: INTERNAL MEDICINE

## 2024-02-27 PROCEDURE — 85014 HEMATOCRIT: CPT | Performed by: INTERNAL MEDICINE

## 2024-02-27 PROCEDURE — C9113 INJ PANTOPRAZOLE SODIUM, VIA: HCPCS | Performed by: INTERNAL MEDICINE

## 2024-02-27 PROCEDURE — 80053 COMPREHEN METABOLIC PANEL: CPT | Performed by: INTERNAL MEDICINE

## 2024-02-27 PROCEDURE — 250N000013 HC RX MED GY IP 250 OP 250 PS 637: Performed by: INTERNAL MEDICINE

## 2024-02-27 RX ORDER — HEPARIN SODIUM,PORCINE 10 UNIT/ML
5-10 VIAL (ML) INTRAVENOUS
Status: DISCONTINUED | OUTPATIENT
Start: 2024-02-27 | End: 2024-02-27 | Stop reason: HOSPADM

## 2024-02-27 RX ORDER — HEPARIN SODIUM (PORCINE) LOCK FLUSH IV SOLN 100 UNIT/ML 100 UNIT/ML
5-10 SOLUTION INTRAVENOUS
Status: DISCONTINUED | OUTPATIENT
Start: 2024-02-27 | End: 2024-02-27 | Stop reason: HOSPADM

## 2024-02-27 RX ORDER — HEPARIN SODIUM,PORCINE 10 UNIT/ML
5-10 VIAL (ML) INTRAVENOUS EVERY 24 HOURS
Status: DISCONTINUED | OUTPATIENT
Start: 2024-02-27 | End: 2024-02-27 | Stop reason: HOSPADM

## 2024-02-27 RX ADMIN — PIPERACILLIN AND TAZOBACTAM 3.38 G: 3; .375 INJECTION, POWDER, FOR SOLUTION INTRAVENOUS at 12:26

## 2024-02-27 RX ADMIN — PANTOPRAZOLE SODIUM 40 MG: 40 INJECTION, POWDER, FOR SOLUTION INTRAVENOUS at 10:04

## 2024-02-27 RX ADMIN — MEGESTROL ACETATE 400 MG: 40 SUSPENSION ORAL at 10:03

## 2024-02-27 RX ADMIN — PIPERACILLIN AND TAZOBACTAM 3.38 G: 3; .375 INJECTION, POWDER, FOR SOLUTION INTRAVENOUS at 04:35

## 2024-02-27 RX ADMIN — Medication 10 ML: at 14:01

## 2024-02-27 RX ADMIN — LEVOTHYROXINE SODIUM 175 MCG: 0.03 TABLET ORAL at 06:01

## 2024-02-27 ASSESSMENT — ACTIVITIES OF DAILY LIVING (ADL)
ADLS_ACUITY_SCORE: 27
ADLS_ACUITY_SCORE: 27
ADLS_ACUITY_SCORE: 28
ADLS_ACUITY_SCORE: 28
ADLS_ACUITY_SCORE: 27
ADLS_ACUITY_SCORE: 28
ADLS_ACUITY_SCORE: 27
ADLS_ACUITY_SCORE: 31
ADLS_ACUITY_SCORE: 28
ADLS_ACUITY_SCORE: 31
ADLS_ACUITY_SCORE: 31
ADLS_ACUITY_SCORE: 27
ADLS_ACUITY_SCORE: 31

## 2024-02-27 NOTE — PLAN OF CARE
Goal Outcome Evaluation:         Problem: Adult Inpatient Plan of Care  Goal: Optimal Comfort and Wellbeing  Outcome: Progressing       Problem: Adult Inpatient Plan of Care  Goal: Absence of Hospital-Acquired Illness or Injury  Outcome: Progressing  Intervention: Identify and Manage Fall Risk  Recent Flowsheet Documentation  Taken 2/26/2024 2357 by Rafa Oneill, RN  Safety Promotion/Fall Prevention:   activity supervised   clutter free environment maintained   nonskid shoes/slippers when out of bed   patient and family education   room near nurse's station  Intervention: Prevent Skin Injury  Recent Flowsheet Documentation  Taken 2/26/2024 2357 by Rafa Oneill, RN  Body Position: position changed independently         Pt AxO x4, VSS on RA, denies pain, SBA in room, slept between cares, no signficant events.

## 2024-02-27 NOTE — PROGRESS NOTES
"GI Progress Note  Kristen Chapman  -60     Subjective:   Doing much better after drain replaced.   Had 1300 cc output yesterday.   Tolerating diet.   Feels OK for discharge.   Did not have paracentesis yesterday; rather, drain replaced and fluid managed with successful drain repositioning.      Objective:   BP (!) 89/63 (BP Location: Left arm)   Pulse 63   Temp 98.3  F (36.8  C) (Oral)   Resp 16   Ht 1.676 m (5' 6\")   Wt 54.9 kg (121 lb)   SpO2 97%   BMI 19.53 kg/m    Body mass index is 19.53 kg/m .   Gen: No acute distress  Cardio: RRR  GI: Clean and dry dressings covering drain catheter.  Less-distended today than yesterday, soft, non-tender. No guarding.    Laboratory  Recent Labs   Lab 02/27/24  0601 02/26/24  0554   WBC 5.5 4.5   RBC 2.50* 2.41*   HGB 7.5* 7.1*   HCT 23.7* 23.3*   MCV 95 97   MCH 30.0 29.5   MCHC 31.6 30.5*   RDW 18.5* 19.2*   PLT 93* 78*      Recent Labs   Lab 02/27/24  0601 02/26/24  0554    144   CO2 22 27   BUN 46.9* 49.6*     Recent Labs   Lab 02/27/24  0601 02/26/24  0554   ALKPHOS 51 62   AST 27 24   ALT 17 14     Lab Results   Component Value Date    INR 1.32 (H) 12/07/2023    INR 1.25 (H) 07/07/2023    INR 1.24 (H) 06/09/2023       IR Intraperitoneal Cath Tunnel Ascites    [facility]: [facility]2024  [facility] DATE: 2/26/2024 PROCEDURE: ULTRASOUND AND FLUOROSCOPIC GUIDED PERITONEAL DRAINAGE CATHETER PLACEMENT INTERVENTIONAL RADIOLOGIST: XANDER Brewster MD. INDICATION: Leakage around the existing peritoneal drainage catheter. CONSENT: The risks, benefits and alternatives of imaging guided pleural drainage catheter placement were discussed with the patient in detail. All questions were answered. Informed consent was given to proceed with the procedure. MODERATE SEDATION: Versed 1.5 mg IV; Fentanyl 100 mcg IV.  During the timeout, immediately prior to the administration of medications, the patient was reassessed for " adequacy to receive conscious sedation. Under physician supervision, Versed and fentanyl  were administered for moderate sedation. Pulse oximetry, heart rate and blood pressure were continuously monitored by an independent trained observer. The physician spent 23 minutes of face-to-face sedation time with the patient. CONTRAST: None. ANTIBIOTICS: Patient on IV antibiotics ADDITIONAL MEDICATIONS: None. FLUOROSCOPIC TIME: 8.2 minutes RADIATION DOSE: Air Kerma: 60 mGy COMPLICATIONS: No immediate complications. STERILE BARRIER TECHNIQUE: Maximum sterile barrier technique was used. Cutaneous antisepsis was performed at the operative site with application of 2% chlorhexidine and large sterile drape. Prior to the procedure, the  and assistant performed hand hygiene and wore hat, mask, sterile gown, and sterile gloves during the entire procedure. PROCEDURE: The entry site of the existing peritoneal drainage catheter was anesthetized using 1% lidocaine. A small transverse incision was made over the previous access site. The subcutaneous soft tissues were dissected down to the peritoneal drainage catheter. The catheter was then partially removed and catheter. The tunneled portion was removed and discarded. Through the remaining catheter within the peritoneal cavity, a [employer] guidewire was advanced into the left lower quadrant. The drain to a guidewire was exchanged for a J-wire. A peel-away sheath was advanced over the wire into the abdomen through which the Aspira drain was placed. There was immediate egress of ascites through the aspira drain. The access site within the right lower quadrant was closed using 4-0 Vicryl and Dermabond. The abdomen was then drained to entirety.     IMPRESSION:  1. Successful right lower quadrant Aspira drain placement. PLAN: 1. Patient to be recovered on the floor. 2. Aspira to be drained dry qdaily x 1 week to allow cuff to adhere abdominal wall. 3. Further Aspira drain management as  per hospice team.    CT Abdomen Pelvis w/o Contrast    Result Date: 2/26/2024  EXAM: CT ABDOMEN PELVIS W/O CONTRAST LOCATION: [facility] DATE: 2/26/2024 INDICATION: tunneled abdominal (aspira) drain not draining and patient has perfuse leakage from incision site used for drain insertion. COMPARISON: None. TECHNIQUE: CT scan of the abdomen and pelvis was performed without IV contrast. Multiplanar reformats were obtained. Dose reduction techniques were used. CONTRAST: None. FINDINGS: Limited evaluation given the lack of IV contrast. LOWER CHEST: Normal. HEPATOBILIARY: Nodular appearance the liver. PANCREAS: Normal. SPLEEN: Normal. ADRENAL GLANDS: Normal. KIDNEYS/BLADDER: Normal. BOWEL: Normal. MESENTERY: Moderate ascites throughout the abdomen, predominantly within the perihepatic region and within the deep pelvis. The existing peritoneal drainage catheter extends through the mesentery from right to left with multiple visible sideholes throughout the peritoneum Diffuse tin mesentery. LYMPH NODES: Normal. VASCULATURE: Limited evaluation given the lack of IV contrast. Mild calcification of the abdominal aorta. PELVIC ORGANS: Normal. MUSCULOSKELETAL: Anasarca. Mild levocurvature of the lumbar spine.     IMPRESSION: 1.  Appropriate positioning of the existing peritoneal drainage catheter, albeit with no ascitic fluid surrounding the catheter. The remainder of the ascites is within the perihepatic region and deep pelvis. Exchange/repositioning and/or new peritoneal drainage catheter placement to address the pelvic fluid is reasonable, however, will likely drain the perihepatic ascites. 2.  Diffuse tin mesentery and anasarca throughout the subcutaneous soft tissues is likely secondary to fluid overload and contributing to patient's known leakage from the incision sites.        Assessment:   This is a 73 year old male with a history of nasal pharyngeal carcinoma, squamous cell carcinoma of the  nasopharynx, Hep B, liver cirrhosis with portal hypertension and recurrent ascite s/p peritoneal drainage catheter placement who was admitted for abd drain malfunction.     1.  Ascites, recurrent. Improved with peritoneal drain replacement bu IR.  Has required frequent paracenteses.  Studies neg for SBP (2/225/24). Peritoneal drainage catheter placed 2/22/2024 as ordered by oncology.     2. Hep B -previously on limited feeding and Entecavir.  Started tenofovir December 2023.  Follows with Dr Yumiko Wills at [facility].     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 Peritoneal drain in place - per IR, should be drained dry daily x 1 week  2. Hep B per [facility] hepatology service.   3. Outpatient follow-up with Oncology.     OK for discharge from GI perspective.   Thank you for the opportunity to be involved in this gentleman's care.    A total of 25 min was spent on today's care.  Brent Smith PA-C  [facility]  [phone]

## 2024-02-27 NOTE — PROGRESS NOTES
Care Management Discharge Note    Discharge Date: 02/27/2024     Discharge Disposition:  Home     Discharge Services:  NA    Discharge DME:  NA    Discharge Transportation:  Family will provide    Private pay costs discussed: Not applicable    Does the patient's insurance plan have a 3 day qualifying hospital stay waiver?  Yes     Which insurance plan 3 day waiver is available? Alternative insurance waiver    Will the waiver be used for post-acute placement? No    PAS Confirmation Code:  NA  Patient/family educated on Medicare website which has current facility and service quality ratings:  NA    Education Provided on the Discharge Plan:  Per team  Persons Notified of Discharge Plans: Nursing;  Patient/Family in Agreement with the Plan:  Yes     Handoff Referral Completed: No    Additional Information:  Discharge order noted and reviewed. No CM needs identified.   BPA noted and completed.     Karen Tompkins RN

## 2024-02-27 NOTE — PLAN OF CARE
Goal Outcome Evaluation:      Plan of Care Reviewed With: patient          Outcome Evaluation: pt discharging this afternoon when daughter arrives. port de accessed & tele dc'd. has had no pain & noted slight tape irritation inner area of drain dressing abdomen.

## 2024-02-27 NOTE — PLAN OF CARE
Alert and oriented. Moderate use of english. Son assisted with interpreting. Site of drain on right side of abdomen is covered and shows no drainage throughout shift. Denies pain. IV fluids running to port. Up to the toilet with 1 assist. Voiding okay. Family brought food and pt. Ate rice in broth. Neuro's intact. Fell asleep early in evening.

## 2024-02-27 NOTE — PROGRESS NOTES
"  Interventional Radiology - Progress Note  Inpatient - [facility]  02/27/2024     S:  Claims to be feeling so much better after his IR procedure with the new Aspira drain placement yesterday. Reports that he was able to sleep through the night and actually feels hungry today. Denies leakage from puncture/incision sites.     O:  BP 90/56 (BP Location: Left arm)   Pulse 67   Temp 98.3  F (36.8  C) (Oral)   Resp 16   Ht 1.676 m (5' 6\")   Wt 54.9 kg (121 lb)   SpO2 97%   BMI 19.53 kg/m    General: Stable. In no acute distress.    Neuro: Alert and oriented x 3. No focal deficits.  Psych: Appropriate mood and affect. Linear/coherent thought process.   Resp: Normal respirations. On RA.   Abdomen: Soft, non-distended, non-tender. Right abdominal Aspira drain intact; no leakage noted from incision/drain insertion sites.    Skin:  Aspira drain covered with gauze dressing; CDI.          IMAGING:  [place] [facility]  LOCATION: [facility]  DATE: 2/26/2024     PROCEDURE: ULTRASOUND AND FLUOROSCOPIC GUIDED PERITONEAL DRAINAGE CATHETER PLACEMENT     INTERVENTIONAL RADIOLOGIST: XANDER Brewster MD.     INDICATION: Leakage around the existing peritoneal drainage catheter.     CONSENT: The risks, benefits and alternatives of imaging guided pleural drainage catheter placement were discussed with the patient in detail. All questions were answered. Informed consent was given to proceed with the procedure.     MODERATE SEDATION: Versed 1.5 mg IV; Fentanyl 100 mcg IV.  During the timeout, immediately prior to the administration of medications, the patient was reassessed for adequacy to receive conscious sedation. Under physician supervision, Versed and fentanyl   were administered for moderate sedation. Pulse oximetry, heart rate and blood pressure were continuously monitored by an independent trained observer. The physician spent 23 minutes of face-to-face sedation time with " the patient.     CONTRAST: None.  ANTIBIOTICS: Patient on IV antibiotics  ADDITIONAL MEDICATIONS: None.     FLUOROSCOPIC TIME: 8.2 minutes  RADIATION DOSE: Air Kerma: 60 mGy     COMPLICATIONS: No immediate complications.     STERILE BARRIER TECHNIQUE: Maximum sterile barrier technique was used. Cutaneous antisepsis was performed at the operative site with application of 2% chlorhexidine and large sterile drape. Prior to the procedure, the  and assistant performed   hand hygiene and wore hat, mask, sterile gown, and sterile gloves during the entire procedure.     PROCEDURE: The entry site of the existing peritoneal drainage catheter was anesthetized using 1% lidocaine. A small transverse incision was made over the previous access site. The subcutaneous soft tissues were dissected down to the peritoneal drainage   catheter. The catheter was then partially removed and catheter. The tunneled portion was removed and discarded.     Through the remaining catheter within the peritoneal cavity, a [employer] guidewire was advanced into the left lower quadrant. The drain to a guidewire was exchanged for a J-wire.     A peel-away sheath was advanced over the wire into the abdomen through which the Aspira drain was placed. There was immediate egress of ascites through the aspira drain. The access site within the right lower quadrant was closed using 4-0 Vicryl and   Dermabond. The abdomen was then drained to entirety.                                                                      IMPRESSION:    1. Successful right lower quadrant Aspira drain placement.     PLAN:  1. Patient to be recovered on the floor.  2. Aspira to be drained dry qdaily x 1 week to allow cuff to adhere abdominal wall.   3. Further Aspira drain management as per hospice team.    LABS:  Recent Labs   Lab 02/27/24  0601 02/26/24  0554   WBC 5.5 4.5   HGB 7.5* 7.1*   PLT 93* 78*   CR 1.60* 1.39*   BILITOTAL 0.5 0.4   ALKPHOS 51 62   AST 27 24   ALT 17  14       Drain Outputs (in mL):  2/26 1300                       A:  73 year old male with Nasopharyngeal carcinoma with refractory ascites. He is followed by [facility] oncology. He is has had multiple paracentesis. Last paracentesis 2/20/24 with 5L removal.  2/22/24 s/p peritoneal tunneled drainage catheter placement (aspira). 2/26/24 IR notified of malfunctioning Aspira drain and leakage from lateral incision site. 2/26/24 s/p successful new right lower quadrant Aspira drain placement.    P:    - Post procedure care education discussed with patient. All questions answered.   - Patient to have Aspira drained/fluid removed daily for 1 week to allow cuff to adhere to the abdominal wall; further Aspira drain management as per hospice team.   - Post procedure discharge instructions entered into D/C navigator.  - Patient to contact [facility] with questions or concerns, number in D/C instructions.      Total time spent on the date of the encounter: 35 minutes.    SPARKLE Martin CNP  Interventional Radiology  [phone]

## 2024-02-27 NOTE — DISCHARGE SUMMARY
[facility]  Hospitalist Discharge Summary      Date of Admission:  2/25/2024  Date of Discharge:  2/28/2024  Discharging Provider: Maria Del Carmen Dias MD  Discharge Service: Hospitalist Service    Discharge Diagnoses   Principal Problem:    Other ascites  Active Problems:    Abnormal electrocardiogram      Clinically Significant Risk Factors          Follow-ups Needed After Discharge   Follow-up Appointments     Follow-up and recommended labs and tests       Follow up with primary care provider, Issa Cook, within 3-5days,   for hospital follow- up. The following labs/tests are recommended: CMP,   CBC.            Unresulted Labs Ordered in the Past 30 Days of this Admission       Date and Time Order Name Status Description    2/19/2024  9:44 AM Pathology Additional Testing: [employer] Head [employer] Neck; [employer] Preliminary         These results will be followed up by GI    Discharge Disposition   Discharged to home  Condition at discharge: Stable    Hospital Course   73 year old male with a history of nasal pharyngeal carcinoma, squamous cell carcinoma of the nasopharynx, Hep B, liver cirrhosis with portal hypertension and recurrent ascites/p peritoneal drainage catheter placement who was admitted for abdominal drain malfunction      Recurrent refractory ascites  - S/P drainage tube placed on Thursday, 2/22/2024. interventional radiology tube exchanged completed functioning and no leakage     - Zosyn to cover empirically ok to stop   - GI consult, appreciate input  - paracentesis with 5 L of output     History of liver cirrhosis  -Resume Lasix, spirolactone. .    - Follow-up on electrolytes  - peritoneal fluid turbid. + cx. Neutrophils -154.      Nasopharyngeal carcinoma   - Started back on treatment with single agent cetuximab.   - Follow-up with oncology     Chronic hep B infection  - Resume outpatient tenofovir.  - GI is following.     Chronic kidney disease  -Stage III  - stable       Pancytopenia  -Secondary to malignancy and chemotherapy       Hypothyroidism  - continue Synthroid     Severe malnutrition  - Protein and calories    Consultations This Hospital Stay   GASTROENTEROLOGY IP CONSULT  INTERVENTIONAL RADIOLOGY ADULT/PEDS IP CONSULT    Code Status   No CPR- Do NOT Intubate    Time Spent on this Encounter   I, Maria Del Carmen Dias MD, personally saw the patient today and spent greater than 30 minutes discharging this patient.       Maria Del Carmen Dias MD  [address] [address]  Phone: [phone]  Fax: [phone]  ______________________________________________________________________    Physical Exam   Vital Signs: Temp: 98.3  F (36.8  C) Temp src: Oral BP: (!) 89/63 Pulse: 63   Resp: 16 SpO2: 97 % O2 Device: None (Room air)    Weight: 121 lbs 0 oz  Physical Exam  Constitutional:       Comments: Chronically ill-appearing, nontoxic and NAD   Cardiovascular:      Rate and Rhythm: Regular rhythm. Bradycardia present.      Pulses: Normal pulses.   Pulmonary:      Effort: Pulmonary effort is normal. No respiratory distress.      Breath sounds: Normal breath sounds. No wheezing or rales.   Abdominal:      General: Bowel sounds are normal. There is no distension.      Palpations: Abdomen is soft.      Tenderness: There is no abdominal tenderness. There is no guarding.      Comments: Catheter in place    Musculoskeletal:         General: Normal range of motion.   Skin:     General: Skin is warm and dry.      Capillary Refill: Capillary refill takes less than 2 seconds.   Neurological:      General: No focal deficit present.      Mental Status: He is alert and oriented to person, place, and time. Mental status is at baseline.              Primary Care Physician   Issa Cook    Discharge Orders      Reason for your hospital stay    Principal Problem:    Other ascites  Active Problems:    Abnormal electrocardiogram     Follow-up  and recommended labs and tests     Follow up with primary care provider, Issa Cook, within 3-5days, for hospital follow- up. The following labs/tests are recommended: CMP, CBC.     Activity    Your activity upon discharge: activity as tolerated     Tubes and drains    You are going home with the following tubes or drains:  Peritoneal catheter replaced, follow instructions per paperwork.     Diet    Follow this diet upon discharge: Orders Placed This Encounter      Regular Diet Adult       Significant Results and Procedures   Most Recent 3 CBC's:  Recent Labs   Lab Test 02/27/24  0601 02/26/24  0554 02/08/24  1034   WBC 5.5 4.5 7.0   HGB 7.5* 7.1* 7.8*   MCV 95 97 92   PLT 93* 78* 63*     Most Recent 3 BMP's:  Recent Labs   Lab Test 02/27/24  0601 02/26/24  0554 02/08/24  1034    144 143   POTASSIUM 4.4 4.8 3.9   CHLORIDE 109* 114* 110*   CO2 22 27 24   BUN 46.9* 49.6* 46.0*   CR 1.60* 1.39* 1.73*   ANIONGAP 10 3* 9   GUANAKITO 7.6* 8.0* 8.1*   * 95 142*     Most Recent 2 LFT's:  Recent Labs   Lab Test 02/27/24  0601 02/26/24  0554   AST 27 24   ALT 17 14   ALKPHOS 51 62   BILITOTAL 0.5 0.4   ,   Results for orders placed or performed during the hospital encounter of 02/25/24   CT Abdomen Pelvis w/o Contrast    Narrative    EXAM: CT ABDOMEN PELVIS W/O CONTRAST  LOCATION: [facility]  DATE: 2/26/2024    INDICATION: tunneled abdominal (aspira) drain not draining and patient has perfuse leakage from incision site used for drain insertion.  COMPARISON: None.  TECHNIQUE: CT scan of the abdomen and pelvis was performed without IV contrast. Multiplanar reformats were obtained. Dose reduction techniques were used.  CONTRAST: None.    FINDINGS: Limited evaluation given the lack of IV contrast.  LOWER CHEST: Normal.    HEPATOBILIARY: Nodular appearance the liver.    PANCREAS: Normal.    SPLEEN: Normal.    ADRENAL GLANDS: Normal.    KIDNEYS/BLADDER: Normal.    BOWEL: Normal.    MESENTERY:  Moderate ascites throughout the abdomen, predominantly within the perihepatic region and within the deep pelvis. The existing peritoneal drainage catheter extends through the mesentery from right to left with multiple visible sideholes   throughout the peritoneum Diffuse tin mesentery.    LYMPH NODES: Normal.    VASCULATURE: Limited evaluation given the lack of IV contrast. Mild calcification of the abdominal aorta.    PELVIC ORGANS: Normal.    MUSCULOSKELETAL: Anasarca. Mild levocurvature of the lumbar spine.      Impression    IMPRESSION:   1.  Appropriate positioning of the existing peritoneal drainage catheter, albeit with no ascitic fluid surrounding the catheter. The remainder of the ascites is within the perihepatic region and deep pelvis. Exchange/repositioning and/or new peritoneal   drainage catheter placement to address the pelvic fluid is reasonable, however, will likely drain the perihepatic ascites.  2.  Diffuse tin mesentery and anasarca throughout the subcutaneous soft tissues is likely secondary to fluid overload and contributing to patient's known leakage from the incision sites.     IR Intraperitoneal Cath Tunnel Ascites    Narrative    [facility]  LOCATION: [facility]  DATE: 2/26/2024    PROCEDURE: ULTRASOUND AND FLUOROSCOPIC GUIDED PERITONEAL DRAINAGE CATHETER PLACEMENT    INTERVENTIONAL RADIOLOGIST: XANDER Brewster MD.    INDICATION: Leakage around the existing peritoneal drainage catheter.    CONSENT: The risks, benefits and alternatives of imaging guided pleural drainage catheter placement were discussed with the patient in detail. All questions were answered. Informed consent was given to proceed with the procedure.    MODERATE SEDATION: Versed 1.5 mg IV; Fentanyl 100 mcg IV.  During the timeout, immediately prior to the administration of medications, the patient was reassessed for adequacy to receive conscious sedation. Under physician supervision,  Versed and fentanyl   were administered for moderate sedation. Pulse oximetry, heart rate and blood pressure were continuously monitored by an independent trained observer. The physician spent 23 minutes of face-to-face sedation time with the patient.    CONTRAST: None.  ANTIBIOTICS: Patient on IV antibiotics  ADDITIONAL MEDICATIONS: None.    FLUOROSCOPIC TIME: 8.2 minutes  RADIATION DOSE: Air Kerma: 60 mGy    COMPLICATIONS: No immediate complications.    STERILE BARRIER TECHNIQUE: Maximum sterile barrier technique was used. Cutaneous antisepsis was performed at the operative site with application of 2% chlorhexidine and large sterile drape. Prior to the procedure, the  and assistant performed   hand hygiene and wore hat, mask, sterile gown, and sterile gloves during the entire procedure.    PROCEDURE: The entry site of the existing peritoneal drainage catheter was anesthetized using 1% lidocaine. A small transverse incision was made over the previous access site. The subcutaneous soft tissues were dissected down to the peritoneal drainage   catheter. The catheter was then partially removed and catheter. The tunneled portion was removed and discarded.    Through the remaining catheter within the peritoneal cavity, a DineGasm guidewire was advanced into the left lower quadrant. The drain to a guidewire was exchanged for a J-wire.    A peel-away sheath was advanced over the wire into the abdomen through which the Aspira drain was placed. There was immediate egress of ascites through the aspira drain. The access site within the right lower quadrant was closed using 4-0 Vicryl and   Dermabond. The abdomen was then drained to entirety.      Impression    IMPRESSION:    1. Successful right lower quadrant Aspira drain placement.    PLAN:  1. Patient to be recovered on the floor.  2. Aspira to be drained dry qdaily x 1 week to allow cuff to adhere abdominal wall.   3. Further Aspira drain management as per hospice  team.       Discharge Medications   Current Discharge Medication List        CONTINUE these medications which have NOT CHANGED    Details   acetaminophen (TYLENOL) 325 MG tablet Take 650 mg by mouth every 6 hours as needed       furosemide (LASIX) 20 MG tablet Take 1 tablet (20 mg) by mouth daily  Qty: 90 tablet, Refills: 3    Associated Diagnoses: Cirrhosis of liver with ascites, unspecified hepatic cirrhosis type (H)      levothyroxine (SYNTHROID/LEVOTHROID) 175 MCG tablet Take 1 tablet (175 mcg) by mouth daily  Qty: 60 tablet, Refills: 1    Associated Diagnoses: Acquired hypothyroidism      megestrol (MEGACE) 40 MG/ML suspension Take 10 mLs (400 mg) by mouth daily  Qty: 480 mL, Refills: 1    Associated Diagnoses: Squamous cell carcinoma of nasopharynx (H)      ondansetron (ZOFRAN) 4 MG tablet Take 1 tablet (4 mg) by mouth every 6 hours as needed for nausea (vomiting)  Qty: 30 tablet, Refills: 1    Associated Diagnoses: Squamous cell carcinoma of nasopharynx (H)      spironolactone (ALDACTONE) 50 MG tablet Take 1 tablet (50 mg) by mouth daily  Qty: 90 tablet, Refills: 3    Associated Diagnoses: Cirrhosis of liver with ascites, unspecified hepatic cirrhosis type (H)      tenofovir (VIREAD) 300 MG tablet Take 1 tablet (300 mg) by mouth every other day  Qty: 45 tablet, Refills: 3    Associated Diagnoses: Chronic viral hepatitis B without delta agent and without coma (H); Cirrhosis of liver with ascites, unspecified hepatic cirrhosis type (H)           Allergies   Allergies   Allergen Reactions    Oxycodone Itching

## 2024-02-27 NOTE — PROGRESS NOTES
Noted 90 systolic b/p earlier, recheck 80's systolic. Pt was due for lasix & aldactone. Voceral message hospitalist with request to hold etc. Pt reports no dizziness, lightheaded or other symptoms just resting in bed. Has not been out of bed yet. Aspira drain site intact.

## 2024-02-27 NOTE — DISCHARGE INSTRUCTIONS
"Tunneled Drainage Catheter Discharge Instructions:  You had a tunneled abdominal drainage catheter (Aspira catheter) placed. This long term catheter was placed to help you remove/drain accumulated fluid from your abdominal space while at home to relieve symptoms (such as shortness of breath and fullness). Please follow the below instructions:    Care instructions:  - DO NOT drain more than 2 liter or 2000 mL at one time if you have an ABDOMINAL aspira catheter.   - Drain fluid based on when you are having symptoms of pleural effusion/ascites (such as feeling full, tight, distention and/or shortness of breath), typically every one to two days, or as directed by your doctor.  - Change dressing at catheter exit site after removing fluid or as needed to keep dressing clean and dry.  - It is OK for you to shower if you have a drainage catheter (keep site covered with its dressing), but do not submerge site under water like in a bath tub, pool or Jacuzzi.    Seek medical assistance for any of the following:   - Fevers (greater than 101 F (38.3C))  - Redness of the skin and or purulent drainage (yellow/white) at drain exit site  - Severe pain at drain exit site  - Bright red bloody drainage output that does not clear with draining    Call [facility] -[phone] for any of the following:  - Leaking around drain exit site  - When there is no output into drainage bag after connecting the drainage catheter to the drainage bag  - Concerned drain is malpositioned or \"pulled on\"    For more information on your Aspira drainage system please refer to their website: [email or web address]    If you have specific supply related questions please call [phone] to speak with a [employer] representative.     "

## 2024-02-28 ENCOUNTER — PATIENT OUTREACH (OUTPATIENT)
Dept: CARE COORDINATION | Facility: CLINIC | Age: 74
End: 2024-02-28
Payer: COMMERCIAL

## 2024-02-28 NOTE — PROGRESS NOTES
Clinic Care Coordination Contact  [facility]/Voicemail    Clinical Data: Care Coordinator Outreach    Outreach Documentation Number of Outreach Attempt   2/28/2024   2:10 PM 1       Left message on patient's voicemail with call back information and requested return call.    Plan: Care Coordinator will try to reach patient again in 1-2 business days.    David Myhre, RN   [facility] RN  [phone]

## 2024-02-29 ENCOUNTER — PATIENT OUTREACH (OUTPATIENT)
Dept: ONCOLOGY | Facility: HOSPITAL | Age: 74
End: 2024-02-29
Payer: COMMERCIAL

## 2024-02-29 ENCOUNTER — PATIENT OUTREACH (OUTPATIENT)
Dept: CARE COORDINATION | Facility: CLINIC | Age: 74
End: 2024-02-29
Payer: COMMERCIAL

## 2024-02-29 NOTE — PROGRESS NOTES
Clinic Care Coordination Contact  [facility]: Post-Discharge Note  SITUATION                                                      Admission:    Admission Date: 02/25/24   Reason for Admission: abdominal drain malfunction  Discharge:   Discharge Date: 02/28/24  Discharge Diagnosis: Recurrent refractory ascities    BACKGROUND                                                      Per hospital discharge summary and inpatient provider notes:  73 year old male with a history of nasal pharyngeal carcinoma, squamous cell carcinoma of the nasopharynx, Hep B, liver cirrhosis with portal hypertension and recurrent ascites/p peritoneal drainage catheter placement who was admitted for abdominal drain malfunction      Recurrent refractory ascites  - S/P drainage tube placed on Thursday, 2/22/2024. interventional radiology tube exchanged completed functioning and no leakage     - Zosyn to cover empirically ok to stop   - GI consult, appreciate input  - paracentesis with 5 L of output     History of liver cirrhosis  -Resume Lasix, spirolactone. .    - Follow-up on electrolytes  - peritoneal fluid turbid. + cx. Neutrophils -154.      Nasopharyngeal carcinoma   - Started back on treatment with single agent cetuximab.   - Follow-up with oncology     Chronic hep B infection  - Resume outpatient tenofovir.  - GI is following.     Chronic kidney disease  -Stage III  - stable      Pancytopenia  -Secondary to malignancy and chemotherapy        Hypothyroidism  - continue Synthroid     Severe malnutrition  - Protein and calories    ASSESSMENT           Discharge Assessment  How are you doing now that you are home?: Patient's daughter stated: He is ok.  How are your symptoms? (Red Flag symptoms escalate to triage hotline per guidelines): Improved  Do you feel your condition is stable enough to be safe at home until your provider visit?: Yes  Does the patient have their discharge instructions? : Yes  Does the patient have questions  "regarding their discharge instructions? : No  Were you started on any new medications or were there changes to any of your previous medications? : No  Does the patient have all of their medications?: Yes  Do you have questions regarding any of your medications? : No  Do you have all of your needed medical supplies or equipment (DME)?  (i.e. oxygen tank, CPAP, cane, etc.): No - What equipment or supplies are needed? (drainage bags, Family will contact GI.)  Discharge follow-up appointment scheduled within 14 calendar days? : Yes  Discharge Follow Up Appointment Date: 03/06/24         Post-op (Clinicians Only)  Did the patient have surgery or a procedure: Yes  Incision: closed;healing  Drainage: No  Fever: No  Chills: No  Redness: No  Warmth: No  Swelling: No  Incision site pain: No  Closure: suture  Eating & Drinking: eating and drinking without complaints/concerns  Bowel Function: normal  Urinary Status: voiding without complaint/concerns    Care Management       Care Mgmt General Assessment      CCC RN spoke with patient's daughter Rea today to follow up after patient's recent hospitalization. Rea said patient was doing \"ok\" today. He is taking his medication as prescribed. She stated patient lives with her brother, and together are able to meet his care needs. Rea did request more catheter drainage bags. She stated she was only supplied with a couple when patient discharged from the hospital. Writer provided her with the phone number for her GI clinic. Rea said she would contact them today. She denied any other needs or concerns. Patient has a follow up with PCP on 3/6/24 and they plan to attend this appointment.                    PLAN                                                      Outpatient Plan:  Home with family support    Future Appointments   Date Time Provider Department Center   3/1/2024 10:00 AM EDMAR INFUSION CHAIR SHREE [facility] EDMAR INF   3/6/2024  4:00 PM Issa Cook MD MDFMOB [facility] MPLW "   3/7/2024  9:00 AM SJN US 1 JNULTS FV N   3/15/2024 10:00 AM SJN INFUSION CHAIR JNINFT FV N INF   3/29/2024 10:00 AM SJN CHEMO LAB DRAW 2 JNCINF FV [facility]   3/29/2024 10:30 AM SJN CHEMO CHAIR JNCINF [facility]   4/11/2024  1:15 PM GENERIC,  URINTER [place]   4/18/2024 12:30 PM  LAB [facility]   4/18/2024  1:30 PM Yumiko Boyd MD [facility]         For any urgent concerns, please contact our 24 hour nurse triage line: [phone] ([phone])         David J Myhre, RN

## 2024-02-29 NOTE — PROGRESS NOTES
[facility]: Post-Discharge Note  SITUATION                                                      Admission:    Admitted for ascites.  Discharge:   Discharged on 2/28 to home    BACKGROUND                                                      Per hospital discharge summary and inpatient provider notes.      ASSESSMENT      Patient was admitted for ascites and abnormal electrocardiogram.     PLAN                                                      Outpatient Plan:  Continue follow up appt schedule as seen in his appt desk.    Future Appointments   Date Time Provider Department Center   3/1/2024 10:00 AM SJN INFUSION CHAIR JNINFT FV N INF   3/4/2024  2:00 PM Yoanna Andre APRN CNP JNMONC FV N   3/6/2024  4:00 PM Issa Cook MD MDFMOB [facility]V MPLW   3/7/2024  9:00 [facility]   3/15/2024 10:00 AM SJN INFUSION CHAIR JNINFT FV SJN INF   3/29/2024 10:00 AM SJN CHEMO LAB DRAW 2 JNCINF FV N   3/29/2024 10:30 AM [facility]   4/11/2024  1:15 PM GENERIC,  DARVIN [place]   4/18/2024 12:30 PM UC LAB [facility] [address]   4/18/2024  1:30 PM Yumiko Boyd MD [facility]         For any urgent concerns, please contact our 24 hour clinic line:   [address]: [phone]       Marjorie Briones RN

## 2024-03-01 ENCOUNTER — INFUSION THERAPY VISIT (OUTPATIENT)
Dept: INFUSION THERAPY | Facility: HOSPITAL | Age: 74
End: 2024-03-01
Attending: INTERNAL MEDICINE
Payer: COMMERCIAL

## 2024-03-01 ENCOUNTER — MEDICAL CORRESPONDENCE (OUTPATIENT)
Dept: HEALTH INFORMATION MANAGEMENT | Facility: CLINIC | Age: 74
End: 2024-03-01

## 2024-03-01 VITALS
DIASTOLIC BLOOD PRESSURE: 63 MMHG | HEART RATE: 64 BPM | SYSTOLIC BLOOD PRESSURE: 100 MMHG | RESPIRATION RATE: 16 BRPM | TEMPERATURE: 98 F | OXYGEN SATURATION: 100 %

## 2024-03-01 DIAGNOSIS — C11.9 SQUAMOUS CELL CARCINOMA OF NASOPHARYNX (H): Primary | ICD-10-CM

## 2024-03-01 DIAGNOSIS — D64.9 ANEMIA, NORMOCYTIC NORMOCHROMIC: Primary | ICD-10-CM

## 2024-03-01 DIAGNOSIS — C11.9 SQUAMOUS CELL CARCINOMA OF NASOPHARYNX (H): ICD-10-CM

## 2024-03-01 LAB
ABO/RH(D): NORMAL
ALBUMIN SERPL BCG-MCNC: 2.9 G/DL (ref 3.5–5.2)
ALP SERPL-CCNC: 74 U/L (ref 40–150)
ALT SERPL W P-5'-P-CCNC: 16 U/L (ref 0–70)
ANION GAP SERPL CALCULATED.3IONS-SCNC: 7 MMOL/L (ref 7–15)
ANTIBODY SCREEN: NEGATIVE
AST SERPL W P-5'-P-CCNC: 22 U/L (ref 0–45)
BASOPHILS # BLD AUTO: 0 10E3/UL (ref 0–0.2)
BASOPHILS NFR BLD AUTO: 0 %
BILIRUB SERPL-MCNC: 0.6 MG/DL
BLD PROD TYP BPU: NORMAL
BLOOD COMPONENT TYPE: NORMAL
BUN SERPL-MCNC: 47.5 MG/DL (ref 8–23)
CALCIUM SERPL-MCNC: 7.8 MG/DL (ref 8.8–10.2)
CHLORIDE SERPL-SCNC: 109 MMOL/L (ref 98–107)
CODING SYSTEM: NORMAL
CREAT SERPL-MCNC: 1.67 MG/DL (ref 0.67–1.17)
CROSSMATCH: NORMAL
DEPRECATED HCO3 PLAS-SCNC: 24 MMOL/L (ref 22–29)
EGFRCR SERPLBLD CKD-EPI 2021: 43 ML/MIN/1.73M2
EOSINOPHIL # BLD AUTO: 0.1 10E3/UL (ref 0–0.7)
EOSINOPHIL NFR BLD AUTO: 1 %
ERYTHROCYTE [DISTWIDTH] IN BLOOD BY AUTOMATED COUNT: 17.4 % (ref 10–15)
GLUCOSE SERPL-MCNC: 93 MG/DL (ref 70–99)
HCT VFR BLD AUTO: 22.6 % (ref 40–53)
HGB BLD-MCNC: 7 G/DL (ref 13.3–17.7)
IMM GRANULOCYTES # BLD: 0 10E3/UL
IMM GRANULOCYTES NFR BLD: 0 %
ISSUE DATE AND TIME: NORMAL
LYMPHOCYTES # BLD AUTO: 0.7 10E3/UL (ref 0.8–5.3)
LYMPHOCYTES NFR BLD AUTO: 11 %
MAGNESIUM SERPL-MCNC: 2.2 MG/DL (ref 1.7–2.3)
MCH RBC QN AUTO: 29.5 PG (ref 26.5–33)
MCHC RBC AUTO-ENTMCNC: 31 G/DL (ref 31.5–36.5)
MCV RBC AUTO: 95 FL (ref 78–100)
MONOCYTES # BLD AUTO: 0.5 10E3/UL (ref 0–1.3)
MONOCYTES NFR BLD AUTO: 9 %
NEUTROPHILS # BLD AUTO: 4.6 10E3/UL (ref 1.6–8.3)
NEUTROPHILS NFR BLD AUTO: 79 %
NRBC # BLD AUTO: 0 10E3/UL
NRBC BLD AUTO-RTO: 0 /100
PATH REPORT.ADDENDUM SPEC: ABNORMAL
PATH REPORT.COMMENTS IMP SPEC: ABNORMAL
PATH REPORT.COMMENTS IMP SPEC: YES
PATH REPORT.FINAL DX SPEC: ABNORMAL
PATH REPORT.GROSS SPEC: ABNORMAL
PATH REPORT.MICROSCOPIC SPEC OTHER STN: ABNORMAL
PATH REPORT.RELEVANT HX SPEC: ABNORMAL
PHOTO IMAGE: ABNORMAL
PLATELET # BLD AUTO: 98 10E3/UL (ref 150–450)
POTASSIUM SERPL-SCNC: 4.1 MMOL/L (ref 3.4–5.3)
PROT SERPL-MCNC: 5.8 G/DL (ref 6.4–8.3)
RBC # BLD AUTO: 2.37 10E6/UL (ref 4.4–5.9)
SODIUM SERPL-SCNC: 140 MMOL/L (ref 135–145)
SPECIMEN EXPIRATION DATE: NORMAL
UNIT ABO/RH: NORMAL
UNIT NUMBER: NORMAL
UNIT STATUS: NORMAL
UNIT TYPE ISBT: 7300
WBC # BLD AUTO: 5.9 10E3/UL (ref 4–11)

## 2024-03-01 PROCEDURE — 36430 TRANSFUSION BLD/BLD COMPNT: CPT

## 2024-03-01 PROCEDURE — P9016 RBC LEUKOCYTES REDUCED: HCPCS | Performed by: NURSE PRACTITIONER

## 2024-03-01 PROCEDURE — 96367 TX/PROPH/DG ADDL SEQ IV INF: CPT

## 2024-03-01 PROCEDURE — 258N000003 HC RX IP 258 OP 636: Performed by: INTERNAL MEDICINE

## 2024-03-01 PROCEDURE — 250N000011 HC RX IP 250 OP 636: Performed by: INTERNAL MEDICINE

## 2024-03-01 PROCEDURE — 83735 ASSAY OF MAGNESIUM: CPT | Performed by: INTERNAL MEDICINE

## 2024-03-01 PROCEDURE — 96417 CHEMO IV INFUS EACH ADDL SEQ: CPT

## 2024-03-01 PROCEDURE — 85025 COMPLETE CBC W/AUTO DIFF WBC: CPT | Performed by: NURSE PRACTITIONER

## 2024-03-01 PROCEDURE — 250N000011 HC RX IP 250 OP 636: Performed by: NURSE PRACTITIONER

## 2024-03-01 PROCEDURE — 86900 BLOOD TYPING SEROLOGIC ABO: CPT | Performed by: NURSE PRACTITIONER

## 2024-03-01 PROCEDURE — 96413 CHEMO IV INFUSION 1 HR: CPT

## 2024-03-01 PROCEDURE — 80053 COMPREHEN METABOLIC PANEL: CPT | Performed by: INTERNAL MEDICINE

## 2024-03-01 PROCEDURE — 86923 COMPATIBILITY TEST ELECTRIC: CPT | Performed by: NURSE PRACTITIONER

## 2024-03-01 PROCEDURE — 36591 DRAW BLOOD OFF VENOUS DEVICE: CPT | Performed by: INTERNAL MEDICINE

## 2024-03-01 PROCEDURE — 96375 TX/PRO/DX INJ NEW DRUG ADDON: CPT

## 2024-03-01 RX ORDER — PALONOSETRON 0.05 MG/ML
0.25 INJECTION, SOLUTION INTRAVENOUS ONCE
Status: CANCELLED | OUTPATIENT
Start: 2024-03-01

## 2024-03-01 RX ORDER — ALBUTEROL SULFATE 0.83 MG/ML
2.5 SOLUTION RESPIRATORY (INHALATION)
Status: DISCONTINUED | OUTPATIENT
Start: 2024-03-01 | End: 2024-03-01 | Stop reason: HOSPADM

## 2024-03-01 RX ORDER — HEPARIN SODIUM (PORCINE) LOCK FLUSH IV SOLN 100 UNIT/ML 100 UNIT/ML
5 SOLUTION INTRAVENOUS
Status: DISCONTINUED | OUTPATIENT
Start: 2024-03-01 | End: 2024-03-01 | Stop reason: HOSPADM

## 2024-03-01 RX ORDER — DIPHENHYDRAMINE HYDROCHLORIDE 50 MG/ML
50 INJECTION INTRAMUSCULAR; INTRAVENOUS
Status: DISCONTINUED | OUTPATIENT
Start: 2024-03-01 | End: 2024-03-01 | Stop reason: HOSPADM

## 2024-03-01 RX ORDER — MEPERIDINE HYDROCHLORIDE 25 MG/ML
25 INJECTION INTRAMUSCULAR; INTRAVENOUS; SUBCUTANEOUS EVERY 30 MIN PRN
Status: DISCONTINUED | OUTPATIENT
Start: 2024-03-01 | End: 2024-03-01 | Stop reason: HOSPADM

## 2024-03-01 RX ORDER — EPINEPHRINE 1 MG/ML
0.3 INJECTION, SOLUTION INTRAMUSCULAR; SUBCUTANEOUS EVERY 5 MIN PRN
Status: DISCONTINUED | OUTPATIENT
Start: 2024-03-01 | End: 2024-03-01 | Stop reason: HOSPADM

## 2024-03-01 RX ORDER — METHYLPREDNISOLONE SODIUM SUCCINATE 125 MG/2ML
125 INJECTION, POWDER, LYOPHILIZED, FOR SOLUTION INTRAMUSCULAR; INTRAVENOUS
Status: CANCELLED
Start: 2024-03-01

## 2024-03-01 RX ORDER — PALONOSETRON 0.05 MG/ML
0.25 INJECTION, SOLUTION INTRAVENOUS ONCE
Status: COMPLETED | OUTPATIENT
Start: 2024-03-01 | End: 2024-03-01

## 2024-03-01 RX ORDER — HEPARIN SODIUM (PORCINE) LOCK FLUSH IV SOLN 100 UNIT/ML 100 UNIT/ML
5 SOLUTION INTRAVENOUS
Status: CANCELLED | OUTPATIENT
Start: 2024-03-01

## 2024-03-01 RX ORDER — LORAZEPAM 2 MG/ML
0.5 INJECTION INTRAMUSCULAR EVERY 4 HOURS PRN
Status: CANCELLED | OUTPATIENT
Start: 2024-03-01

## 2024-03-01 RX ORDER — HEPARIN SODIUM,PORCINE 10 UNIT/ML
5-20 VIAL (ML) INTRAVENOUS DAILY PRN
Status: CANCELLED | OUTPATIENT
Start: 2024-03-01

## 2024-03-01 RX ORDER — MEPERIDINE HYDROCHLORIDE 25 MG/ML
25 INJECTION INTRAMUSCULAR; INTRAVENOUS; SUBCUTANEOUS EVERY 30 MIN PRN
Status: CANCELLED | OUTPATIENT
Start: 2024-03-01

## 2024-03-01 RX ORDER — ALBUTEROL SULFATE 0.83 MG/ML
2.5 SOLUTION RESPIRATORY (INHALATION)
Status: CANCELLED | OUTPATIENT
Start: 2024-03-01

## 2024-03-01 RX ORDER — DIPHENHYDRAMINE HYDROCHLORIDE 50 MG/ML
50 INJECTION INTRAMUSCULAR; INTRAVENOUS
Status: CANCELLED
Start: 2024-03-01

## 2024-03-01 RX ORDER — ALBUTEROL SULFATE 90 UG/1
1-2 AEROSOL, METERED RESPIRATORY (INHALATION)
Status: CANCELLED
Start: 2024-03-01

## 2024-03-01 RX ORDER — DEXAMETHASONE 4 MG/1
4 TABLET ORAL 2 TIMES DAILY WITH MEALS
Qty: 12 TABLET | Refills: 0 | Status: SHIPPED | OUTPATIENT
Start: 2024-03-01 | End: 2024-04-12

## 2024-03-01 RX ORDER — EPINEPHRINE 1 MG/ML
0.3 INJECTION, SOLUTION INTRAMUSCULAR; SUBCUTANEOUS EVERY 5 MIN PRN
Status: CANCELLED | OUTPATIENT
Start: 2024-03-01

## 2024-03-01 RX ORDER — METHYLPREDNISOLONE SODIUM SUCCINATE 125 MG/2ML
125 INJECTION, POWDER, LYOPHILIZED, FOR SOLUTION INTRAMUSCULAR; INTRAVENOUS
Status: DISCONTINUED | OUTPATIENT
Start: 2024-03-01 | End: 2024-03-01 | Stop reason: HOSPADM

## 2024-03-01 RX ORDER — ALBUTEROL SULFATE 90 UG/1
1-2 AEROSOL, METERED RESPIRATORY (INHALATION)
Status: DISCONTINUED | OUTPATIENT
Start: 2024-03-01 | End: 2024-03-01 | Stop reason: HOSPADM

## 2024-03-01 RX ADMIN — DEXAMETHASONE SODIUM PHOSPHATE: 10 INJECTION, SOLUTION INTRAMUSCULAR; INTRAVENOUS at 13:30

## 2024-03-01 RX ADMIN — PALONOSETRON 0.25 MG: 0.05 INJECTION, SOLUTION INTRAVENOUS at 13:52

## 2024-03-01 RX ADMIN — SODIUM CHLORIDE 250 ML: 9 INJECTION, SOLUTION INTRAVENOUS at 13:20

## 2024-03-01 RX ADMIN — Medication 5 ML: at 15:50

## 2024-03-01 RX ADMIN — CETUXIMAB 400 MG: 2 SOLUTION INTRAVENOUS at 14:05

## 2024-03-01 RX ADMIN — CARBOPLATIN 115 MG: 10 INJECTION, SOLUTION INTRAVENOUS at 15:07

## 2024-03-01 NOTE — PROGRESS NOTES
Infusion Nursing Note:  Kristen Chapman presents today for D1C2 including carboplatin.   Patient seen by provider today: No   present during visit today: Yes, Language: Hmong.     Note: Patient arrived via ambulatory accompanied by his son for his Chemotherapy and possible blood transfusion.  Patient is alert and oriented X 4 and VSS.  Patient has visible ascites, Patient plan has newly added Carboplatin, dose adjusted by Dr Frias with AUC < 2 and plan is signed.  Patient prescribed dexamethasone, writer verified this is sent to [facility] pharmacy, pt son will  tonight for use tomorrow.        Intravenous Access:  Implanted Port.    Treatment Conditions:  Lab Results   Component Value Date    HGB 7.0 (L) 03/01/2024    WBC 5.9 03/01/2024    ANEUTAUTO 4.6 03/01/2024    PLT 98 (L) 03/01/2024        Lab Results   Component Value Date     03/01/2024    POTASSIUM 4.1 03/01/2024    MAG 2.2 03/01/2024    CR 1.67 (H) 03/01/2024    GUANAKITO 7.8 (L) 03/01/2024    BILITOTAL 0.6 03/01/2024    ALBUMIN 2.9 (L) 03/01/2024    ALT 16 03/01/2024    AST 22 03/01/2024       Results reviewed, labs MET treatment parameters, ok to proceed with  1 unit PRBC's and treatment.        Post Infusion Assessment:  Patient tolerated infusion without incident.  Site patent and intact, free from redness, edema or discomfort.  No evidence of extravasations.  Access discontinued per protocol.       Discharge Plan:   Patient and/or family verbalized understanding of discharge instructions and all questions answered.  Patient will return to clinic Monday 3-4-24 to see Yoanna Andre CNP.    Patient discharged in stable condition accompanied by: son.  Departure Mode: Ambulatory.      Jeri Sorto RN

## 2024-03-04 ENCOUNTER — ONCOLOGY VISIT (OUTPATIENT)
Dept: ONCOLOGY | Facility: HOSPITAL | Age: 74
End: 2024-03-04
Payer: COMMERCIAL

## 2024-03-04 VITALS
BODY MASS INDEX: 20.63 KG/M2 | OXYGEN SATURATION: 100 % | HEIGHT: 66 IN | RESPIRATION RATE: 16 BRPM | TEMPERATURE: 97.8 F | HEART RATE: 59 BPM | DIASTOLIC BLOOD PRESSURE: 60 MMHG | WEIGHT: 128.4 LBS | SYSTOLIC BLOOD PRESSURE: 123 MMHG

## 2024-03-04 DIAGNOSIS — C11.9 SQUAMOUS CELL CARCINOMA OF NASOPHARYNX (H): Primary | ICD-10-CM

## 2024-03-04 PROCEDURE — G2211 COMPLEX E/M VISIT ADD ON: HCPCS | Performed by: NURSE PRACTITIONER

## 2024-03-04 PROCEDURE — G0463 HOSPITAL OUTPT CLINIC VISIT: HCPCS | Performed by: NURSE PRACTITIONER

## 2024-03-04 PROCEDURE — 99214 OFFICE O/P EST MOD 30 MIN: CPT | Performed by: NURSE PRACTITIONER

## 2024-03-04 RX ORDER — LORAZEPAM 2 MG/ML
0.5 INJECTION INTRAMUSCULAR EVERY 4 HOURS PRN
Status: CANCELLED | OUTPATIENT
Start: 2024-03-15

## 2024-03-04 RX ORDER — METHYLPREDNISOLONE SODIUM SUCCINATE 125 MG/2ML
125 INJECTION, POWDER, LYOPHILIZED, FOR SOLUTION INTRAMUSCULAR; INTRAVENOUS
Status: CANCELLED
Start: 2024-04-19

## 2024-03-04 RX ORDER — HEPARIN SODIUM,PORCINE 10 UNIT/ML
5-20 VIAL (ML) INTRAVENOUS DAILY PRN
Status: CANCELLED | OUTPATIENT
Start: 2024-04-19

## 2024-03-04 RX ORDER — EPINEPHRINE 1 MG/ML
0.3 INJECTION, SOLUTION INTRAMUSCULAR; SUBCUTANEOUS EVERY 5 MIN PRN
Status: CANCELLED | OUTPATIENT
Start: 2024-04-19

## 2024-03-04 RX ORDER — METHYLPREDNISOLONE SODIUM SUCCINATE 125 MG/2ML
125 INJECTION, POWDER, LYOPHILIZED, FOR SOLUTION INTRAMUSCULAR; INTRAVENOUS
Status: CANCELLED
Start: 2024-03-29

## 2024-03-04 RX ORDER — DIPHENHYDRAMINE HYDROCHLORIDE 50 MG/ML
50 INJECTION INTRAMUSCULAR; INTRAVENOUS
Status: CANCELLED
Start: 2024-04-19

## 2024-03-04 RX ORDER — MEPERIDINE HYDROCHLORIDE 25 MG/ML
25 INJECTION INTRAMUSCULAR; INTRAVENOUS; SUBCUTANEOUS EVERY 30 MIN PRN
Status: CANCELLED | OUTPATIENT
Start: 2024-04-19

## 2024-03-04 RX ORDER — PALONOSETRON 0.05 MG/ML
0.25 INJECTION, SOLUTION INTRAVENOUS ONCE
Status: CANCELLED | OUTPATIENT
Start: 2024-03-29

## 2024-03-04 RX ORDER — LORAZEPAM 2 MG/ML
0.5 INJECTION INTRAMUSCULAR EVERY 4 HOURS PRN
Status: CANCELLED | OUTPATIENT
Start: 2024-03-29

## 2024-03-04 RX ORDER — ALBUTEROL SULFATE 90 UG/1
1-2 AEROSOL, METERED RESPIRATORY (INHALATION)
Status: CANCELLED
Start: 2024-04-19

## 2024-03-04 RX ORDER — METHYLPREDNISOLONE SODIUM SUCCINATE 125 MG/2ML
125 INJECTION, POWDER, LYOPHILIZED, FOR SOLUTION INTRAMUSCULAR; INTRAVENOUS
Status: CANCELLED
Start: 2024-03-15

## 2024-03-04 RX ORDER — MEPERIDINE HYDROCHLORIDE 25 MG/ML
25 INJECTION INTRAMUSCULAR; INTRAVENOUS; SUBCUTANEOUS EVERY 30 MIN PRN
Status: CANCELLED | OUTPATIENT
Start: 2024-03-15

## 2024-03-04 RX ORDER — HEPARIN SODIUM (PORCINE) LOCK FLUSH IV SOLN 100 UNIT/ML 100 UNIT/ML
5 SOLUTION INTRAVENOUS
Status: CANCELLED | OUTPATIENT
Start: 2024-03-29

## 2024-03-04 RX ORDER — DIPHENHYDRAMINE HYDROCHLORIDE 50 MG/ML
50 INJECTION INTRAMUSCULAR; INTRAVENOUS
Status: CANCELLED
Start: 2024-03-15

## 2024-03-04 RX ORDER — HEPARIN SODIUM (PORCINE) LOCK FLUSH IV SOLN 100 UNIT/ML 100 UNIT/ML
5 SOLUTION INTRAVENOUS
Status: CANCELLED | OUTPATIENT
Start: 2024-03-15

## 2024-03-04 RX ORDER — HEPARIN SODIUM,PORCINE 10 UNIT/ML
5-20 VIAL (ML) INTRAVENOUS DAILY PRN
Status: CANCELLED | OUTPATIENT
Start: 2024-03-15

## 2024-03-04 RX ORDER — ALBUTEROL SULFATE 0.83 MG/ML
2.5 SOLUTION RESPIRATORY (INHALATION)
Status: CANCELLED | OUTPATIENT
Start: 2024-03-15

## 2024-03-04 RX ORDER — DIPHENHYDRAMINE HYDROCHLORIDE 50 MG/ML
50 INJECTION INTRAMUSCULAR; INTRAVENOUS
Status: CANCELLED
Start: 2024-03-29

## 2024-03-04 RX ORDER — ALBUTEROL SULFATE 0.83 MG/ML
2.5 SOLUTION RESPIRATORY (INHALATION)
Status: CANCELLED | OUTPATIENT
Start: 2024-04-19

## 2024-03-04 RX ORDER — EPINEPHRINE 1 MG/ML
0.3 INJECTION, SOLUTION INTRAMUSCULAR; SUBCUTANEOUS EVERY 5 MIN PRN
Status: CANCELLED | OUTPATIENT
Start: 2024-03-29

## 2024-03-04 RX ORDER — ALBUTEROL SULFATE 90 UG/1
1-2 AEROSOL, METERED RESPIRATORY (INHALATION)
Status: CANCELLED
Start: 2024-03-29

## 2024-03-04 RX ORDER — ALBUTEROL SULFATE 90 UG/1
1-2 AEROSOL, METERED RESPIRATORY (INHALATION)
Status: CANCELLED
Start: 2024-03-15

## 2024-03-04 RX ORDER — HEPARIN SODIUM (PORCINE) LOCK FLUSH IV SOLN 100 UNIT/ML 100 UNIT/ML
5 SOLUTION INTRAVENOUS
Status: CANCELLED | OUTPATIENT
Start: 2024-04-19

## 2024-03-04 RX ORDER — EPINEPHRINE 1 MG/ML
0.3 INJECTION, SOLUTION INTRAMUSCULAR; SUBCUTANEOUS EVERY 5 MIN PRN
Status: CANCELLED | OUTPATIENT
Start: 2024-03-15

## 2024-03-04 RX ORDER — MEPERIDINE HYDROCHLORIDE 25 MG/ML
25 INJECTION INTRAMUSCULAR; INTRAVENOUS; SUBCUTANEOUS EVERY 30 MIN PRN
Status: CANCELLED | OUTPATIENT
Start: 2024-03-29

## 2024-03-04 RX ORDER — HEPARIN SODIUM,PORCINE 10 UNIT/ML
5-20 VIAL (ML) INTRAVENOUS DAILY PRN
Status: CANCELLED | OUTPATIENT
Start: 2024-03-29

## 2024-03-04 RX ORDER — ALBUTEROL SULFATE 0.83 MG/ML
2.5 SOLUTION RESPIRATORY (INHALATION)
Status: CANCELLED | OUTPATIENT
Start: 2024-03-29

## 2024-03-04 RX ORDER — LORAZEPAM 2 MG/ML
0.5 INJECTION INTRAMUSCULAR EVERY 4 HOURS PRN
Status: CANCELLED | OUTPATIENT
Start: 2024-04-19

## 2024-03-04 ASSESSMENT — PAIN SCALES - GENERAL: PAINLEVEL: NO PAIN (0)

## 2024-03-04 NOTE — PROGRESS NOTES
"Oncology Rooming Note    March 4, 2024 1:47 PM   Kristen Chapman is a 73 year old male who presents for:    Chief Complaint   Patient presents with    Oncology Clinic Visit     Return visit- hospital follow up. Squamous cell carcinoma of nasopharynx.     Initial Vitals: /60 (BP Location: Left arm, Patient Position: Sitting, Cuff Size: Adult Regular)   Pulse 59   Temp 97.8  F (36.6  C) (Oral)   Resp 16   Ht 1.676 m (5' 6\")   Wt 58.2 kg (128 lb 6.4 oz)   SpO2 100%   BMI 20.72 kg/m   Estimated body mass index is 20.72 kg/m  as calculated from the following:    Height as of this encounter: 1.676 m (5' 6\").    Weight as of this encounter: 58.2 kg (128 lb 6.4 oz). Body surface area is 1.65 meters squared.  No Pain (0) Comment: Data Unavailable   No LMP for male patient.  Allergies reviewed: Yes  Medications reviewed: Yes    Medications: Medication refills not needed today.  Pharmacy name entered into [employer]:    [facility] - [place], [place] - [address] [facility] [place], [place] - [facility]    Frailty Screening:   Is the patient here for a new oncology consult visit in cancer care? 2. No      Clinical concerns: Return visit- hospital follow up. Squamous cell carcinoma of nasopharynx.      Felisha Henderson CMA            "

## 2024-03-04 NOTE — LETTER
"    3/4/2024         RE: Kristen Chapman  [address] [place] [address]        Dear Colleague,    Thank you for referring your patient, Kristen Chapman, to the [facility]. Please see a copy of my visit note below.    Oncology Rooming Note    March 4, 2024 1:47 PM   Kristen Chapman is a 73 year old male who presents for:    Chief Complaint   Patient presents with     Oncology Clinic Visit     Return visit- hospital follow up. Squamous cell carcinoma of nasopharynx.     Initial Vitals: /60 (BP Location: Left arm, Patient Position: Sitting, Cuff Size: Adult Regular)   Pulse 59   Temp 97.8  F (36.6  C) (Oral)   Resp 16   Ht 1.676 m (5' 6\")   Wt 58.2 kg (128 lb 6.4 oz)   SpO2 100%   BMI 20.72 kg/m   Estimated body mass index is 20.72 kg/m  as calculated from the following:    Height as of this encounter: 1.676 m (5' 6\").    Weight as of this encounter: 58.2 kg (128 lb 6.4 oz). Body surface area is 1.65 meters squared.  No Pain (0) Comment: Data Unavailable   No LMP for male patient.  Allergies reviewed: Yes  Medications reviewed: Yes    Medications: Medication refills not needed today.  Pharmacy name entered into [employer]:    [place], [place] - [address] [facility] [place], [place] - [address] [place] [address]    Frailty Screening:   Is the patient here for a new oncology consult visit in cancer care? 2. No      Clinical concerns: Return visit- hospital follow up. Squamous cell carcinoma of nasopharynx.      Felisha Henderson, [facility] Progress Note    Patient: Kristen Chapman  MRN: [id number]  Date of Service: Mar 4, 2024          Reason for Visit    Chief Complaint   Patient presents with     Oncology Clinic Visit     Return visit- hospital follow up. Squamous cell carcinoma of nasopharynx.       Assessment and Plan     Cancer Staging   No matching staging information was found for the patient.    Nasopharyngeal " cancer, clinically progressive disease in submandibular space: PET scan on 1/31/24 shows progression. Has started erbitux. Did that for one month and now has added in Carboplatin. Had one dose. Will give this every 2 weeks for now along with erbitux every 2 weeks.  We may try to do the carboplatin weekly for 3 weeks in a row and 1 week off eventually but for right now for the next 1 to 2 months we will do it every other week.  We will monitor how he is doing clinically with his wound on his neck and then we will likely do a scan in April or May.    Significant anemia in the setting of chronic pancytopenia, CKD: has liver dysfunction that is contributing. Also cancer contributing. No bleeding.  We will transfuse to keep his hemoglobin above 7.    Renal insufficiency: likely from medications. Avoid nephrotoxic medications.  Stay hydrated.  Keep blood pressure well controlled.    Hepatitis and liver dysfunction/cirrhosis: seeing GI/hepatologist.  Liver function checked on Friday and is normal.  Will continue to monitor    ECOG Performance    1 - Can't do physically strenuous work, but fully ambyulatory and can do light sedentary work    Distress Screening (within last 30 days)    1. How concerned are you about your ability to eat? : 0  2. How concerned are you about unintended weight loss or your current weight? : 5  3. How concerned are you about feeling depressed or very sad? : 0  4. How concerned are you about feeling anxious or very scared? : 0  5. Do you struggle with the loss of meaning and terese in your life? : Somewhat  6. How concerned are you about work and home life issues that may be affected by your cancer? : 0  7. How concerned are you about knowing what resources are available to help you? : 0  8. 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  Diagnosed January 2020. Imaging suggested bilateral abnormal lymphadenopathy in the neck.  Also asymmetric soft tissue thickening in the posterior right nasopharynx.  On imaging, tumor also appeared to extend down to the hypopharynx.     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  Right neck lymph node positive for recurrent nasopharyngeal carcinoma.  PET scan at that time showed new hypermetabolic mediastinal lymphadenopathy (chronic, most likely reactive), mass in the right ethmoid sinus, hypermetabolic right level 1B and 2A lymph nodes.    Recurrent disease found in 2023/early 2024: PET scan on 1/31/24 shows: Disease progression with increased size of the enhancing hypermetabolic soft tissue mass about the right hemimandible measuring up to 5.0 cm with invasion into the floor of the right oral cavity and right base of tongue. No evidence of osseous invasion. New focal FDG uptake in/adjacent to the crux of the right and left hemidiaphragm, as well as retrocrural and retrocaval lymph nodes, suspicious for metastatic disease. Cirrhotic liver with sequela of portal hypertension including large volume ascites and splenogastric varices     Treatment:     Initially treated with  concurrent chemotherapy and radiation.  He had weekly cisplatin starting March 3, 2020. Fifth and final dose given March 31, 2020.  Subsequent chemotherapy was held due to prolonged thrombocytopenia and renal insufficiency.  Radiation dose was 7000 cGy in 35 fractions given from March 2 through April 17, 2020.     Started cisplatin with infusional 5-FU on June 1, 2020.  Cycle 1 given at a 25% dose reduction.  He still had significant thrombocytopenia and neutropenia on day 28.  Cycle 2 was held.  At the same time his liver function tests elevated secondary to hepatitis B.   PET scan in September 2020 showed no good evidence of residual malignancy.     Recurrence:  Radiosurgery delivered to the right ethmoid tumor delivered November 8 through November 17, 2021.  Received 3500 cGy in 5 fractions.  Pembrolizumab started 12/23/21.  Held after his July 1, 2022 dose.  PET scan in March 2022 showed a near complete response.    -2/2/24: Started Erbitux therapy. Getting every 2 weeks.   -3/1/24: added in Carboplatin AUC 2 given every 2 weeks.      Interim History:    Patient is here today for a follow-up visit.  He was in the clinic on Friday and got a blood transfusion and his treatment with chemotherapy for the first time.  He states that it went really well and he really has very minimal complaints.  Overall he is happy with how he is feeling.  He denies any new bone or back pain.  Does not feel overly tired or short of breath.  Both he and his family member have some questions about the plan.  Still has a wound on his right neck and states that it is about the same.    Review of Systems    Pertinent items are noted in HPI.    Past History    Past Medical History:   Diagnosis Date     Anemia      Dysphagia      Hepatitis B chronic      Hypothyroidism      Nasopharyngeal cancer (H)      Severe protein-calorie malnutrition (H24)      Thrombocytopenia (H24)        PHYSICAL EXAM  /60 (BP Location: Left arm, Patient  "Position: Sitting, Cuff Size: Adult Regular)   Pulse 59   Temp 97.8  F (36.6  C) (Oral)   Resp 16   Ht 1.676 m (5' 6\")   Wt 58.2 kg (128 lb 6.4 oz)   SpO2 100%   BMI 20.72 kg/m        GENERAL: no acute distress. Cooperative in conversation.  Patient's family member is present.   on the phone.  Patient has a wound on his neck that is covered.  RESP: Regular respiratory rate. No expiratory wheezes   NEURO: non focal. Alert and oriented x3.   PSYCH: within normal limits. No depression or anxiety.  SKIN: exposed skin is dry intact.     Lab Results    Recent Results (from the past 168 hour(s))   CBC with platelets   Result Value Ref Range    WBC Count 5.5 4.0 - 11.0 10e3/uL    RBC Count 2.50 (L) 4.40 - 5.90 10e6/uL    Hemoglobin 7.5 (L) 13.3 - 17.7 g/dL    Hematocrit 23.7 (L) 40.0 - 53.0 %    MCV 95 78 - 100 fL    MCH 30.0 26.5 - 33.0 pg    MCHC 31.6 31.5 - 36.5 g/dL    RDW 18.5 (H) 10.0 - 15.0 %    Platelet Count 93 (L) 150 - 450 10e3/uL   Comprehensive metabolic panel   Result Value Ref Range    Sodium 141 135 - 145 mmol/L    Potassium 4.4 3.4 - 5.3 mmol/L    Carbon Dioxide (CO2) 22 22 - 29 mmol/L    Anion Gap 10 7 - 15 mmol/L    Urea Nitrogen 46.9 (H) 8.0 - 23.0 mg/dL    Creatinine 1.60 (H) 0.67 - 1.17 mg/dL    GFR Estimate 46 (L) >60 mL/min/1.73m2    Calcium 7.6 (L) 8.8 - 10.2 mg/dL    Chloride 109 (H) 98 - 107 mmol/L    Glucose 122 (H) 70 - 99 mg/dL    Alkaline Phosphatase 51 40 - 150 U/L    AST 27 0 - 45 U/L    ALT 17 0 - 70 U/L    Protein Total 5.1 (L) 6.4 - 8.3 g/dL    Albumin 2.7 (L) 3.5 - 5.2 g/dL    Bilirubin Total 0.5 <=1.2 mg/dL   Comprehensive metabolic panel   Result Value Ref Range    Sodium 140 135 - 145 mmol/L    Potassium 4.1 3.4 - 5.3 mmol/L    Carbon Dioxide (CO2) 24 22 - 29 mmol/L    Anion Gap 7 7 - 15 mmol/L    Urea Nitrogen 47.5 (H) 8.0 - 23.0 mg/dL    Creatinine 1.67 (H) 0.67 - 1.17 mg/dL    GFR Estimate 43 (L) >60 mL/min/1.73m2    Calcium 7.8 (L) 8.8 - 10.2 mg/dL    Chloride " 109 (H) 98 - 107 mmol/L    Glucose 93 70 - 99 mg/dL    Alkaline Phosphatase 74 40 - 150 U/L    AST 22 0 - 45 U/L    ALT 16 0 - 70 U/L    Protein Total 5.8 (L) 6.4 - 8.3 g/dL    Albumin 2.9 (L) 3.5 - 5.2 g/dL    Bilirubin Total 0.6 <=1.2 mg/dL   Magnesium   Result Value Ref Range    Magnesium 2.2 1.7 - 2.3 mg/dL   Adult Type and Screen   Result Value Ref Range    ABO/RH(D) B POS     Antibody Screen Negative Negative    SPECIMEN EXPIRATION DATE [id number]    CBC with platelets and differential   Result Value Ref Range    WBC Count 5.9 4.0 - 11.0 10e3/uL    RBC Count 2.37 (L) 4.40 - 5.90 10e6/uL    Hemoglobin 7.0 (L) 13.3 - 17.7 g/dL    Hematocrit 22.6 (L) 40.0 - 53.0 %    MCV 95 78 - 100 fL    MCH 29.5 26.5 - 33.0 pg    MCHC 31.0 (L) 31.5 - 36.5 g/dL    RDW 17.4 (H) 10.0 - 15.0 %    Platelet Count 98 (L) 150 - 450 10e3/uL    % Neutrophils 79 %    % Lymphocytes 11 %    % Monocytes 9 %    % Eosinophils 1 %    % Basophils 0 %    % Immature Granulocytes 0 %    NRBCs per 100 WBC 0 <1 /100    Absolute Neutrophils 4.6 1.6 - 8.3 10e3/uL    Absolute Lymphocytes 0.7 (L) 0.8 - 5.3 10e3/uL    Absolute Monocytes 0.5 0.0 - 1.3 10e3/uL    Absolute Eosinophils 0.1 0.0 - 0.7 10e3/uL    Absolute Basophils 0.0 0.0 - 0.2 10e3/uL    Absolute Immature Granulocytes 0.0 <=0.4 10e3/uL    Absolute NRBCs 0.0 10e3/uL   Prepare red blood cells (unit)   Result Value Ref Range    Blood Component Type Red Blood Cells     Product Code [id number]     Unit Status Transfused     Unit Number [id number]     CROSSMATCH Compatible     CODING SYSTEM [id number]     ISSUE DATE AND TIME [id number]     UNIT ABO/RH B+     UNIT TYPE ISBT 7300        Imaging    IR Intraperitoneal Cath Tunnel Ascites    Result Date: 2/26/2024  [place] RAD[facility] LOCATION: [facility] DATE: 2/26/2024 PROCEDURE: ULTRASOUND AND FLUOROSCOPIC GUIDED PERITONEAL DRAINAGE CATHETER PLACEMENT INTERVENTIONAL RADIOLOGIST: XANDER Brewster MD. INDICATION: Leakage  around the existing peritoneal drainage catheter. CONSENT: The risks, benefits and alternatives of imaging guided pleural drainage catheter placement were discussed with the patient in detail. All questions were answered. Informed consent was given to proceed with the procedure. MODERATE SEDATION: Versed 1.5 mg IV; Fentanyl 100 mcg IV.  During the timeout, immediately prior to the administration of medications, the patient was reassessed for adequacy to receive conscious sedation. Under physician supervision, Versed and fentanyl  were administered for moderate sedation. Pulse oximetry, heart rate and blood pressure were continuously monitored by an independent trained observer. The physician spent 23 minutes of face-to-face sedation time with the patient. CONTRAST: None. ANTIBIOTICS: Patient on IV antibiotics ADDITIONAL MEDICATIONS: None. FLUOROSCOPIC TIME: 8.2 minutes RADIATION DOSE: Air Kerma: 60 mGy COMPLICATIONS: No immediate complications. STERILE BARRIER TECHNIQUE: Maximum sterile barrier technique was used. Cutaneous antisepsis was performed at the operative site with application of 2% chlorhexidine and large sterile drape. Prior to the procedure, the  and assistant performed hand hygiene and wore hat, mask, sterile gown, and sterile gloves during the entire procedure. PROCEDURE: The entry site of the existing peritoneal drainage catheter was anesthetized using 1% lidocaine. A small transverse incision was made over the previous access site. The subcutaneous soft tissues were dissected down to the peritoneal drainage catheter. The catheter was then partially removed and catheter. The tunneled portion was removed and discarded. Through the remaining catheter within the peritoneal cavity, a [employer] guidewire was advanced into the left lower quadrant. The drain to a guidewire was exchanged for a J-wire. A peel-away sheath was advanced over the wire into the abdomen through which the Aspira drain was  placed. There was immediate egress of ascites through the aspira drain. The access site within the right lower quadrant was closed using 4-0 Vicryl and Dermabond. The abdomen was then drained to entirety.     IMPRESSION:  1. Successful right lower quadrant Aspira drain placement. PLAN: 1. Patient to be recovered on the floor. 2. Aspira to be drained dry qdaily x 1 week to allow cuff to adhere abdominal wall. 3. Further Aspira drain management as per hospice team.    CT Abdomen Pelvis w/o Contrast    Result Date: 2/26/2024  EXAM: CT ABDOMEN PELVIS W/O CONTRAST LOCATION: [facility] DATE: 2/26/2024 INDICATION: tunneled abdominal (aspira) drain not draining and patient has perfuse leakage from incision site used for drain insertion. COMPARISON: None. TECHNIQUE: CT scan of the abdomen and pelvis was performed without IV contrast. Multiplanar reformats were obtained. Dose reduction techniques were used. CONTRAST: None. FINDINGS: Limited evaluation given the lack of IV contrast. LOWER CHEST: Normal. HEPATOBILIARY: Nodular appearance the liver. PANCREAS: Normal. SPLEEN: Normal. ADRENAL GLANDS: Normal. KIDNEYS/BLADDER: Normal. BOWEL: Normal. MESENTERY: Moderate ascites throughout the abdomen, predominantly within the perihepatic region and within the deep pelvis. The existing peritoneal drainage catheter extends through the mesentery from right to left with multiple visible sideholes throughout the peritoneum Diffuse tin mesentery. LYMPH NODES: Normal. VASCULATURE: Limited evaluation given the lack of IV contrast. Mild calcification of the abdominal aorta. PELVIC ORGANS: Normal. MUSCULOSKELETAL: Anasarca. Mild levocurvature of the lumbar spine.     IMPRESSION: 1.  Appropriate positioning of the existing peritoneal drainage catheter, albeit with no ascitic fluid surrounding the catheter. The remainder of the ascites is within the perihepatic region and deep pelvis. Exchange/repositioning and/or new  peritoneal drainage catheter placement to address the pelvic fluid is reasonable, however, will likely drain the perihepatic ascites. 2.  Diffuse tin mesentery and anasarca throughout the subcutaneous soft tissues is likely secondary to fluid overload and contributing to patient's known leakage from the incision sites.     IR Intraperitoneal Cath Tunnel Ascites    Result Date: 2/22/2024  LOCATION: [facility] DATE: 2/22/2024 PROCEDURE: ULTRASOUND AND FLUOROSCOPIC-GUIDED TUNNELED PERITONEAL DRAINAGE CATHETER PLACEMENT INTERVENTIONAL RADIOLOGIST: Rocky Haines MD INDICATION: Recurrent ascites, plan for tunneled peritoneal drain placement. CONSENT: The risks, benefits and alternatives of the procedure were discussed with the patient or representative in detail. All questions were answered. Informed consent was given to proceed with the procedure. MODERATE SEDATION: Versed 1 mg IV; Fentanyl 50 mcg IV. During the time out, immediately prior to the administration of medications, the patient was reassessed for adequacy to receive conscious sedation.  Under physician supervision, Versed and fentanyl were administered for moderate sedation. Pulse oximetry, heart rate and blood pressure were continuously monitored by an independent trained observer. The physician spent 10 minutes of face-to-face sedation time with the patient. CONTRAST: None. ANTIBIOTICS: Ancef 2 gm IV FLUOROSCOPIC TIME: 0.4 minutes. RADIATION DOSE: Air Kerma: 6 mGy. COMPLICATIONS: No immediate complications. UNIVERSAL PROTOCOL: The operative site was marked and any prior imaging was reviewed. Required items including blood products, implants, devices and special equipment was made available. Patient identity was confirmed either verbally, with demographic information, hospital assigned identification or other identification markers. A timeout was performed immediately prior to the procedure. STERILE BARRIER TECHNIQUE: Maximum  sterile barrier technique was used. Cutaneous antisepsis was performed at the operative site with application of 2% chlorhexidine and large sterile drape. Prior to the procedure, the  and assistant performed hand hygiene and wore hat, mask, sterile gown, and sterile gloves during the entire procedure. PROCEDURE:  Under real-time sonographic guidance, an 18 gauge needle was inserted into the right lower quadrant ascitic fluid. A permanent image was stored to the patient's medical record. A wire was coiled within the peritoneal cavity. A subcutaneous tunnel was created along the lateral/anterior abdomen requiring a second incision. Using this access, a tunneled drainage catheter was placed under fluoroscopic guidance with its tip in the lower pelvic peritoneal space. The initial dermatotomy was closed with overlying surgical glue. The catheter was sutured to the skin at the tunnel exit site. The catheter may be used immediately. FINDINGS: Ultrasound shows a large amount of intraabdominal ascites. At the completion of the study a spot radiograph was obtained utilizing the in room fluoroscopic unit. Imaging demonstrates the catheter tubing lying within the lower pelvic peritoneal cavity.     IMPRESSION:  1. Successful peritoneal tunneled drainage catheter placement, as discussed above. 2. The catheter is ready for use.     US Paracentesis with Albumin    Result Date: 2/20/2024  EXAM: 1. PARACENTESIS 2. ULTRASOUND GUIDANCE LOCATION: [facility] DATE: 2/20/2024 INDICATION: Cirrhosis with ascites. PROCEDURE: Informed consent obtained. Time out performed. A limited ultrasound abdomen is performed demonstrate large amount of ascites. Largest collection of fluid was localized to the right midabdomen and the overlying skin was prepped and draped in a sterile fashion. 10 mL of 1% lidocaine was infused into local soft tissues. A 5 Maltese catheter system was introduced into the abdominal ascites  under ultrasound guidance. 5.0 liters of clear fluid were removed. Patient tolerated procedure well. Ultrasound imaging was obtained and placed in the patient's permanent medical record.     IMPRESSION: 1.  Ultrasound-guided therapeutic paracentesis performed without complication. Reference CPT Code: 03406    US Biopsy Lymph Node Core    Result Date: 2/12/2024  EXAM: 1. PERCUTANEOUS BIOPSY RIGHT SUBMANDIBULAR REGION MASS 2. ULTRASOUND GUIDANCE LOCATION: [facility] DATE: 2/12/2024 INDICATION: right submandibular mass PROCEDURE: Informed consent obtained. Site marked. Prior images reviewed. Required items made available. Patient identity was confirmed verbally and with arm band. Patient reevaluated immediately before beginning the procedure. Universal protocol was followed. Time out performed. The site was prepped and draped in sterile fashion. 10 mL of 1 percent lidocaine was infused into the local soft tissues. Using standard technique and under direct ultrasound guidance, a 18 gauge biopsy needle was used to make 5 core biopsies. Tissue was submitted to Pathology. The patient tolerated the procedure well. No complications.     IMPRESSION: Status post US-guided biopsy a right submandibular region mass. Core samples were performed due to the need for molecular testing. Reference CPT Code: 25399, 45553    US Paracentesis with Albumin    Result Date: 2/12/2024  EXAM: 1. PARACENTESIS 2. ULTRASOUND GUIDANCE LOCATION: [facility] DATE: 2/12/2024 INDICATION: Ascites. PROCEDURE: Informed consent obtained. Time out performed. The abdomen was prepped and draped in a sterile fashion. 10 mL of 1% lidocaine was infused into local soft tissues. A 5 Ivorian catheter system was introduced into the abdominal ascites under ultrasound guidance. 5 liters of clear fluid were removed and sent to lab if requested. Patient tolerated procedure well. Ultrasound imaging was obtained and placed  in the patient's permanent medical record.     IMPRESSION: 1.  Status post ultrasound-guided paracentesis. Reference CPT Code: 59900       The longitudinal plan of care for the diagnosis(es)/condition(s) as documented were addressed during this visit. Due to the added complexity in care, I will continue to support Kristen in the subsequent management and with ongoing continuity of care.      Signed by: SPARKLE Guzmán CNP      Again, thank you for allowing me to participate in the care of your patient.        Sincerely,        SPARKLE Guzmán CNP

## 2024-03-04 NOTE — PROGRESS NOTES
[facility] Hematology and Oncology Progress Note    Patient: Kristen Chapman  MRN: [id number]  Date of Service: Mar 4, 2024          Reason for Visit    Chief Complaint   Patient presents with    Oncology Clinic Visit     Return visit- hospital follow up. Squamous cell carcinoma of nasopharynx.       Assessment and Plan     Cancer Staging   No matching staging information was found for the patient.    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 Has started erbitux. Did that for one month and now has added in Carboplatin. Had one dose. Will give this every 2 weeks for now along with erbitux every 2 weeks.  We may try to do the carboplatin weekly for 3 weeks in a row and 1 week off eventually but for right now for the next 1 to 2 months we will do it every other week.  We will monitor how he is doing clinically with his wound on his neck and then we will likely do a scan in April or May.    Significant anemia in the setting of chronic pancytopenia, CKD: has liver dysfunction that is contributing. Also cancer contributing. No bleeding.  We will transfuse to keep his hemoglobin above 7.    Renal insufficiency: likely from medications. Avoid nephrotoxic medications.  Stay hydrated.  Keep blood pressure well controlled.    Hepatitis and liver dysfunction/cirrhosis: seeing GI/hepatologist.  Liver function checked on Friday and is normal.  Will continue to monitor    ECOG Performance    1 - Can't do physically strenuous work, but fully ambyulatory and can do light sedentary work    Distress Screening (within last 30 days)    1. How concerned are you about your ability to eat? : 0  2. How concerned are you about unintended weight loss or your current weight? : 5  3. How concerned are you about feeling depressed or very sad? : 0  4. How concerned are you about feeling anxious or very scared? : 0  5. Do you struggle with the loss of meaning and terese in  your life? : Somewhat  6. How concerned are you about work and home life issues that may be affected by your cancer? : 0  7. How concerned are you about knowing what resources are available to help you? : 0  8. 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  Diagnosed January 2020. Imaging suggested bilateral abnormal lymphadenopathy in the neck.  Also asymmetric soft tissue thickening in the posterior right nasopharynx.  On imaging, tumor also appeared to extend down to the hypopharynx.     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  Right neck lymph node positive for recurrent nasopharyngeal carcinoma.  PET scan at that time showed new hypermetabolic mediastinal lymphadenopathy (chronic, most likely reactive), mass in the right ethmoid sinus, hypermetabolic right level 1B and 2A lymph nodes.    Recurrent disease found in 2023/early 2024: PET scan on 1/31/24 shows: Disease progression with increased size of the enhancing hypermetabolic soft tissue mass about the right hemimandible measuring up to 5.0 cm with invasion into the floor  of the right oral cavity and right base of tongue. No evidence of osseous invasion. New focal FDG uptake in/adjacent to the crux of the right and left hemidiaphragm, as well as retrocrural and retrocaval lymph nodes, suspicious for metastatic disease. Cirrhotic liver with sequela of portal hypertension including large volume ascites and splenogastric varices     Treatment:     Initially treated with concurrent chemotherapy and radiation.  He had weekly cisplatin starting March 3, 2020. Fifth and final dose given March 31, 2020.  Subsequent chemotherapy was held due to prolonged thrombocytopenia and renal insufficiency.  Radiation dose was 7000 cGy in 35 fractions given from March 2 through April 17, 2020.     Started cisplatin with infusional 5-FU on June 1, 2020.  Cycle 1 given at a 25% dose reduction.  He still had significant thrombocytopenia and neutropenia on day 28.  Cycle 2 was held.  At the same time his liver function tests elevated secondary to hepatitis B.   PET scan in September 2020 showed no good evidence of residual malignancy.     Recurrence:  Radiosurgery delivered to the right ethmoid tumor delivered November 8 through November 17, 2021.  Received 3500 cGy in 5 fractions.  Pembrolizumab started 12/23/21.  Held after his July 1, 2022 dose.  PET scan in March 2022 showed a near complete response.    -2/2/24: Started Erbitux therapy. Getting every 2 weeks.   -3/1/24: added in Carboplatin AUC 2 given every 2 weeks.      Interim History:    Patient is here today for a follow-up visit.  He was in the clinic on Friday and got a blood transfusion and his treatment with chemotherapy for the first time.  He states that it went really well and he really has very minimal complaints.  Overall he is happy with how he is feeling.  He denies any new bone or back pain.  Does not feel overly tired or short of breath.  Both he and his family member have some questions about the plan.  Still has a wound on his right  "neck and states that it is about the same.    Review of Systems    Pertinent items are noted in HPI.    Past History    Past Medical History:   Diagnosis Date    Anemia     Dysphagia     Hepatitis B chronic     Hypothyroidism     Nasopharyngeal cancer (H)     Severe protein-calorie malnutrition (H24)     Thrombocytopenia (H24)        PHYSICAL EXAM  /60 (BP Location: Left arm, Patient Position: Sitting, Cuff Size: Adult Regular)   Pulse 59   Temp 97.8  F (36.6  C) (Oral)   Resp 16   Ht 1.676 m (5' 6\")   Wt 58.2 kg (128 lb 6.4 oz)   SpO2 100%   BMI 20.72 kg/m        GENERAL: no acute distress. Cooperative in conversation.  Patient's family member is present.   on the phone.  Patient has a wound on his neck that is covered.  RESP: Regular respiratory rate. No expiratory wheezes   NEURO: non focal. Alert and oriented x3.   PSYCH: within normal limits. No depression or anxiety.  SKIN: exposed skin is dry intact.     Lab Results    Recent Results (from the past 168 hour(s))   CBC with platelets   Result Value Ref Range    WBC Count 5.5 4.0 - 11.0 10e3/uL    RBC Count 2.50 (L) 4.40 - 5.90 10e6/uL    Hemoglobin 7.5 (L) 13.3 - 17.7 g/dL    Hematocrit 23.7 (L) 40.0 - 53.0 %    MCV 95 78 - 100 fL    MCH 30.0 26.5 - 33.0 pg    MCHC 31.6 31.5 - 36.5 g/dL    RDW 18.5 (H) 10.0 - 15.0 %    Platelet Count 93 (L) 150 - 450 10e3/uL   Comprehensive metabolic panel   Result Value Ref Range    Sodium 141 135 - 145 mmol/L    Potassium 4.4 3.4 - 5.3 mmol/L    Carbon Dioxide (CO2) 22 22 - 29 mmol/L    Anion Gap 10 7 - 15 mmol/L    Urea Nitrogen 46.9 (H) 8.0 - 23.0 mg/dL    Creatinine 1.60 (H) 0.67 - 1.17 mg/dL    GFR Estimate 46 (L) >60 mL/min/1.73m2    Calcium 7.6 (L) 8.8 - 10.2 mg/dL    Chloride 109 (H) 98 - 107 mmol/L    Glucose 122 (H) 70 - 99 mg/dL    Alkaline Phosphatase 51 40 - 150 U/L    AST 27 0 - 45 U/L    ALT 17 0 - 70 U/L    Protein Total 5.1 (L) 6.4 - 8.3 g/dL    Albumin 2.7 (L) 3.5 - 5.2 g/dL    " Bilirubin Total 0.5 <=1.2 mg/dL   Comprehensive metabolic panel   Result Value Ref Range    Sodium 140 135 - 145 mmol/L    Potassium 4.1 3.4 - 5.3 mmol/L    Carbon Dioxide (CO2) 24 22 - 29 mmol/L    Anion Gap 7 7 - 15 mmol/L    Urea Nitrogen 47.5 (H) 8.0 - 23.0 mg/dL    Creatinine 1.67 (H) 0.67 - 1.17 mg/dL    GFR Estimate 43 (L) >60 mL/min/1.73m2    Calcium 7.8 (L) 8.8 - 10.2 mg/dL    Chloride 109 (H) 98 - 107 mmol/L    Glucose 93 70 - 99 mg/dL    Alkaline Phosphatase 74 40 - 150 U/L    AST 22 0 - 45 U/L    ALT 16 0 - 70 U/L    Protein Total 5.8 (L) 6.4 - 8.3 g/dL    Albumin 2.9 (L) 3.5 - 5.2 g/dL    Bilirubin Total 0.6 <=1.2 mg/dL   Magnesium   Result Value Ref Range    Magnesium 2.2 1.7 - 2.3 mg/dL   Adult Type and Screen   Result Value Ref Range    ABO/RH(D) B POS     Antibody Screen Negative Negative    SPECIMEN EXPIRATION DATE [id number]    CBC with platelets and differential   Result Value Ref Range    WBC Count 5.9 4.0 - 11.0 10e3/uL    RBC Count 2.37 (L) 4.40 - 5.90 10e6/uL    Hemoglobin 7.0 (L) 13.3 - 17.7 g/dL    Hematocrit 22.6 (L) 40.0 - 53.0 %    MCV 95 78 - 100 fL    MCH 29.5 26.5 - 33.0 pg    MCHC 31.0 (L) 31.5 - 36.5 g/dL    RDW 17.4 (H) 10.0 - 15.0 %    Platelet Count 98 (L) 150 - 450 10e3/uL    % Neutrophils 79 %    % Lymphocytes 11 %    % Monocytes 9 %    % Eosinophils 1 %    % Basophils 0 %    % Immature Granulocytes 0 %    NRBCs per 100 WBC 0 <1 /100    Absolute Neutrophils 4.6 1.6 - 8.3 10e3/uL    Absolute Lymphocytes 0.7 (L) 0.8 - 5.3 10e3/uL    Absolute Monocytes 0.5 0.0 - 1.3 10e3/uL    Absolute Eosinophils 0.1 0.0 - 0.7 10e3/uL    Absolute Basophils 0.0 0.0 - 0.2 10e3/uL    Absolute Immature Granulocytes 0.0 <=0.4 10e3/uL    Absolute NRBCs 0.0 10e3/uL   Prepare red blood cells (unit)   Result Value Ref Range    Blood Component Type Red Blood Cells     Product Code [id number]     Unit Status Transfused     Unit Number [id number]     CROSSMATCH Compatible     CODING SYSTEM [id number]      ISSUE DATE AND TIME [id number]     UNIT ABO/RH B+     UNIT TYPE ISBT 7300        Imaging    IR Intraperitoneal Cath Tunnel Ascites    Result Date: 2/26/2024  [facility] LOCATION: [facility] DATE: 2/26/2024 PROCEDURE: ULTRASOUND AND FLUOROSCOPIC GUIDED PERITONEAL DRAINAGE CATHETER PLACEMENT INTERVENTIONAL RADIOLOGIST: XANDER Brewster MD. INDICATION: Leakage around the existing peritoneal drainage catheter. CONSENT: The risks, benefits and alternatives of imaging guided pleural drainage catheter placement were discussed with the patient in detail. All questions were answered. Informed consent was given to proceed with the procedure. MODERATE SEDATION: Versed 1.5 mg IV; Fentanyl 100 mcg IV.  During the timeout, immediately prior to the administration of medications, the patient was reassessed for adequacy to receive conscious sedation. Under physician supervision, Versed and fentanyl  were administered for moderate sedation. Pulse oximetry, heart rate and blood pressure were continuously monitored by an independent trained observer. The physician spent 23 minutes of face-to-face sedation time with the patient. CONTRAST: None. ANTIBIOTICS: Patient on IV antibiotics ADDITIONAL MEDICATIONS: None. FLUOROSCOPIC TIME: 8.2 minutes RADIATION DOSE: Air Kerma: 60 mGy COMPLICATIONS: No immediate complications. STERILE BARRIER TECHNIQUE: Maximum sterile barrier technique was used. Cutaneous antisepsis was performed at the operative site with application of 2% chlorhexidine and large sterile drape. Prior to the procedure, the  and assistant performed hand hygiene and wore hat, mask, sterile gown, and sterile gloves during the entire procedure. PROCEDURE: The entry site of the existing peritoneal drainage catheter was anesthetized using 1% lidocaine. A small transverse incision was made over the previous access site. The subcutaneous soft tissues were dissected down to the peritoneal  drainage catheter. The catheter was then partially removed and catheter. The tunneled portion was removed and discarded. Through the remaining catheter within the peritoneal cavity, a Vibryntson guidewire was advanced into the left lower quadrant. The drain to a guidewire was exchanged for a J-wire. A peel-away sheath was advanced over the wire into the abdomen through which the Aspira drain was placed. There was immediate egress of ascites through the aspira drain. The access site within the right lower quadrant was closed using 4-0 Vicryl and Dermabond. The abdomen was then drained to entirety.     IMPRESSION:  1. Successful right lower quadrant Aspira drain placement. PLAN: 1. Patient to be recovered on the floor. 2. Aspira to be drained dry qdaily x 1 week to allow cuff to adhere abdominal wall. 3. Further Aspira drain management as per hospice team.    CT Abdomen Pelvis w/o Contrast    Result Date: 2/26/2024  EXAM: CT ABDOMEN PELVIS W/O CONTRAST LOCATION: [facility] DATE: 2/26/2024 INDICATION: tunneled abdominal (aspira) drain not draining and patient has perfuse leakage from incision site used for drain insertion. COMPARISON: None. TECHNIQUE: CT scan of the abdomen and pelvis was performed without IV contrast. Multiplanar reformats were obtained. Dose reduction techniques were used. CONTRAST: None. FINDINGS: Limited evaluation given the lack of IV contrast. LOWER CHEST: Normal. HEPATOBILIARY: Nodular appearance the liver. PANCREAS: Normal. SPLEEN: Normal. ADRENAL GLANDS: Normal. KIDNEYS/BLADDER: Normal. BOWEL: Normal. MESENTERY: Moderate ascites throughout the abdomen, predominantly within the perihepatic region and within the deep pelvis. The existing peritoneal drainage catheter extends through the mesentery from right to left with multiple visible sideholes throughout the peritoneum Diffuse tin mesentery. LYMPH NODES: Normal. VASCULATURE: Limited evaluation given the lack of IV  contrast. Mild calcification of the abdominal aorta. PELVIC ORGANS: Normal. MUSCULOSKELETAL: Anasarca. Mild levocurvature of the lumbar spine.     IMPRESSION: 1.  Appropriate positioning of the existing peritoneal drainage catheter, albeit with no ascitic fluid surrounding the catheter. The remainder of the ascites is within the perihepatic region and deep pelvis. Exchange/repositioning and/or new peritoneal drainage catheter placement to address the pelvic fluid is reasonable, however, will likely drain the perihepatic ascites. 2.  Diffuse tin mesentery and anasarca throughout the subcutaneous soft tissues is likely secondary to fluid overload and contributing to patient's known leakage from the incision sites.     IR Intraperitoneal Cath Tunnel Ascites    Result Date: 2/22/2024  LOCATION: [facility] DATE: 2/22/2024 PROCEDURE: ULTRASOUND AND FLUOROSCOPIC-GUIDED TUNNELED PERITONEAL DRAINAGE CATHETER PLACEMENT INTERVENTIONAL RADIOLOGIST: Rocky Haines MD INDICATION: Recurrent ascites, plan for tunneled peritoneal drain placement. CONSENT: The risks, benefits and alternatives of the procedure were discussed with the patient or representative in detail. All questions were answered. Informed consent was given to proceed with the procedure. MODERATE SEDATION: Versed 1 mg IV; Fentanyl 50 mcg IV. During the time out, immediately prior to the administration of medications, the patient was reassessed for adequacy to receive conscious sedation.  Under physician supervision, Versed and fentanyl were administered for moderate sedation. Pulse oximetry, heart rate and blood pressure were continuously monitored by an independent trained observer. The physician spent 10 minutes of face-to-face sedation time with the patient. CONTRAST: None. ANTIBIOTICS: Ancef 2 gm IV FLUOROSCOPIC TIME: 0.4 minutes. RADIATION DOSE: Air Kerma: 6 mGy. COMPLICATIONS: No immediate complications. UNIVERSAL PROTOCOL: The  operative site was marked and any prior imaging was reviewed. Required items including blood products, implants, devices and special equipment was made available. Patient identity was confirmed either verbally, with demographic information, hospital assigned identification or other identification markers. A timeout was performed immediately prior to the procedure. STERILE BARRIER TECHNIQUE: Maximum sterile barrier technique was used. Cutaneous antisepsis was performed at the operative site with application of 2% chlorhexidine and large sterile drape. Prior to the procedure, the  and assistant performed hand hygiene and wore hat, mask, sterile gown, and sterile gloves during the entire procedure. PROCEDURE:  Under real-time sonographic guidance, an 18 gauge needle was inserted into the right lower quadrant ascitic fluid. A permanent image was stored to the patient's medical record. A wire was coiled within the peritoneal cavity. A subcutaneous tunnel was created along the lateral/anterior abdomen requiring a second incision. Using this access, a tunneled drainage catheter was placed under fluoroscopic guidance with its tip in the lower pelvic peritoneal space. The initial dermatotomy was closed with overlying surgical glue. The catheter was sutured to the skin at the tunnel exit site. The catheter may be used immediately. FINDINGS: Ultrasound shows a large amount of intraabdominal ascites. At the completion of the study a spot radiograph was obtained utilizing the in room fluoroscopic unit. Imaging demonstrates the catheter tubing lying within the lower pelvic peritoneal cavity.     IMPRESSION:  1. Successful peritoneal tunneled drainage catheter placement, as discussed above. 2. The catheter is ready for use.     US Paracentesis with Albumin    Result Date: 2/20/2024  EXAM: 1. PARACENTESIS 2. ULTRASOUND GUIDANCE LOCATION: [facility] DATE: 2/20/2024 INDICATION: Cirrhosis with ascites.  PROCEDURE: Informed consent obtained. Time out performed. A limited ultrasound abdomen is performed demonstrate large amount of ascites. Largest collection of fluid was localized to the right midabdomen and the overlying skin was prepped and draped in a sterile fashion. 10 mL of 1% lidocaine was infused into local soft tissues. A 5 Albanian catheter system was introduced into the abdominal ascites under ultrasound guidance. 5.0 liters of clear fluid were removed. Patient tolerated procedure well. Ultrasound imaging was obtained and placed in the patient's permanent medical record.     IMPRESSION: 1.  Ultrasound-guided therapeutic paracentesis performed without complication. Reference CPT Code: 99704    US Biopsy Lymph Node Core    Result Date: 2/12/2024  EXAM: 1. PERCUTANEOUS BIOPSY RIGHT SUBMANDIBULAR REGION MASS 2. ULTRASOUND GUIDANCE LOCATION: [facility] DATE: 2/12/2024 INDICATION: right submandibular mass PROCEDURE: Informed consent obtained. Site marked. Prior images reviewed. Required items made available. Patient identity was confirmed verbally and with arm band. Patient reevaluated immediately before beginning the procedure. Universal protocol was followed. Time out performed. The site was prepped and draped in sterile fashion. 10 mL of 1 percent lidocaine was infused into the local soft tissues. Using standard technique and under direct ultrasound guidance, a 18 gauge biopsy needle was used to make 5 core biopsies. Tissue was submitted to Pathology. The patient tolerated the procedure well. No complications.     IMPRESSION: Status post US-guided biopsy a right submandibular region mass. Core samples were performed due to the need for molecular testing. Reference CPT Code: 71588, 15797    US Paracentesis with Albumin    Result Date: 2/12/2024  EXAM: 1. PARACENTESIS 2. ULTRASOUND GUIDANCE LOCATION: [facility] DATE: 2/12/2024 INDICATION: Ascites. PROCEDURE:  Informed consent obtained. Time out performed. The abdomen was prepped and draped in a sterile fashion. 10 mL of 1% lidocaine was infused into local soft tissues. A 5 Setswana catheter system was introduced into the abdominal ascites under ultrasound guidance. 5 liters of clear fluid were removed and sent to lab if requested. Patient tolerated procedure well. Ultrasound imaging was obtained and placed in the patient's permanent medical record.     IMPRESSION: 1.  Status post ultrasound-guided paracentesis. Reference CPT Code: 56652       The longitudinal plan of care for the diagnosis(es)/condition(s) as documented were addressed during this visit. Due to the added complexity in care, I will continue to support Kristen in the subsequent management and with ongoing continuity of care.      Signed by: SPARKLE Guzmán CNP

## 2024-03-07 ENCOUNTER — HOSPITAL ENCOUNTER (OUTPATIENT)
Dept: ULTRASOUND IMAGING | Facility: HOSPITAL | Age: 74
Discharge: HOME | End: 2024-03-07
Attending: INTERNAL MEDICINE | Admitting: INTERNAL MEDICINE
Payer: COMMERCIAL

## 2024-03-07 DIAGNOSIS — B18.1 CHRONIC VIRAL HEPATITIS B WITHOUT DELTA AGENT AND WITHOUT COMA (H): ICD-10-CM

## 2024-03-07 DIAGNOSIS — R18.8 CIRRHOSIS OF LIVER WITH ASCITES, UNSPECIFIED HEPATIC CIRRHOSIS TYPE (H): ICD-10-CM

## 2024-03-07 DIAGNOSIS — K74.60 CIRRHOSIS OF LIVER WITH ASCITES, UNSPECIFIED HEPATIC CIRRHOSIS TYPE (H): ICD-10-CM

## 2024-03-07 PROCEDURE — 93976 VASCULAR STUDY: CPT

## 2024-03-12 LAB
BKR LAB AP ADD'L TEST STATUS: NORMAL
BKR PATH ADDL TEST FINAL COMMENTS: NORMAL

## 2024-03-15 ENCOUNTER — INFUSION THERAPY VISIT (OUTPATIENT)
Dept: INFUSION THERAPY | Facility: HOSPITAL | Age: 74
End: 2024-03-15
Attending: INTERNAL MEDICINE
Payer: COMMERCIAL

## 2024-03-15 VITALS
HEIGHT: 65 IN | SYSTOLIC BLOOD PRESSURE: 90 MMHG | OXYGEN SATURATION: 100 % | TEMPERATURE: 97.8 F | DIASTOLIC BLOOD PRESSURE: 55 MMHG | BODY MASS INDEX: 21.33 KG/M2 | HEART RATE: 56 BPM | RESPIRATION RATE: 16 BRPM | WEIGHT: 128 LBS

## 2024-03-15 DIAGNOSIS — C11.9 SQUAMOUS CELL CARCINOMA OF NASOPHARYNX (H): Primary | ICD-10-CM

## 2024-03-15 PROCEDURE — 96413 CHEMO IV INFUSION 1 HR: CPT

## 2024-03-15 PROCEDURE — 250N000011 HC RX IP 250 OP 636: Mod: JZ | Performed by: NURSE PRACTITIONER

## 2024-03-15 PROCEDURE — 258N000003 HC RX IP 258 OP 636: Performed by: NURSE PRACTITIONER

## 2024-03-15 RX ORDER — HEPARIN SODIUM (PORCINE) LOCK FLUSH IV SOLN 100 UNIT/ML 100 UNIT/ML
5 SOLUTION INTRAVENOUS
Status: DISCONTINUED | OUTPATIENT
Start: 2024-03-15 | End: 2024-03-15 | Stop reason: HOSPADM

## 2024-03-15 RX ORDER — EPINEPHRINE 1 MG/ML
0.3 INJECTION, SOLUTION INTRAMUSCULAR; SUBCUTANEOUS EVERY 5 MIN PRN
Status: DISCONTINUED | OUTPATIENT
Start: 2024-03-15 | End: 2024-03-15 | Stop reason: HOSPADM

## 2024-03-15 RX ORDER — ALBUTEROL SULFATE 90 UG/1
1-2 AEROSOL, METERED RESPIRATORY (INHALATION)
Status: DISCONTINUED | OUTPATIENT
Start: 2024-03-15 | End: 2024-03-15 | Stop reason: HOSPADM

## 2024-03-15 RX ORDER — METHYLPREDNISOLONE SODIUM SUCCINATE 125 MG/2ML
125 INJECTION, POWDER, LYOPHILIZED, FOR SOLUTION INTRAMUSCULAR; INTRAVENOUS
Status: DISCONTINUED | OUTPATIENT
Start: 2024-03-15 | End: 2024-03-15 | Stop reason: HOSPADM

## 2024-03-15 RX ORDER — MEPERIDINE HYDROCHLORIDE 25 MG/ML
25 INJECTION INTRAMUSCULAR; INTRAVENOUS; SUBCUTANEOUS EVERY 30 MIN PRN
Status: DISCONTINUED | OUTPATIENT
Start: 2024-03-15 | End: 2024-03-15 | Stop reason: HOSPADM

## 2024-03-15 RX ORDER — DIPHENHYDRAMINE HYDROCHLORIDE 50 MG/ML
50 INJECTION INTRAMUSCULAR; INTRAVENOUS
Status: DISCONTINUED | OUTPATIENT
Start: 2024-03-15 | End: 2024-03-15 | Stop reason: HOSPADM

## 2024-03-15 RX ORDER — ALBUTEROL SULFATE 0.83 MG/ML
2.5 SOLUTION RESPIRATORY (INHALATION)
Status: DISCONTINUED | OUTPATIENT
Start: 2024-03-15 | End: 2024-03-15 | Stop reason: HOSPADM

## 2024-03-15 RX ADMIN — Medication 5 ML: at 11:53

## 2024-03-15 RX ADMIN — CETUXIMAB 400 MG: 2 SOLUTION INTRAVENOUS at 10:39

## 2024-03-15 RX ADMIN — SODIUM CHLORIDE 250 ML: 9 INJECTION, SOLUTION INTRAVENOUS at 10:38

## 2024-03-15 NOTE — PROGRESS NOTES
Infusion Nursing Note:  Kristen Chapman presents today for D15C2 (Erbitux)  Patient seen by provider today: No   present during visit today: Not Applicable.    Note: Kristen arrived into the infusion center accompanied by his son, THIAGO. Plan of care reviewed, they verbalized understanding of his plan of care  and return to clinic. Erbitux was administered over 1 hour which was well tolerated with no ill effect noted..   Intravenous Access:  Implanted Port.    Treatment Conditions:  Lab Results   Component Value Date    HGB 7.0 (L) 03/01/2024    WBC 5.9 03/01/2024    ANEUTAUTO 4.6 03/01/2024    PLT 98 (L) 03/01/2024        Lab Results   Component Value Date     03/01/2024    POTASSIUM 4.1 03/01/2024    MAG 2.2 03/01/2024    CR 1.67 (H) 03/01/2024    GUANAKITO 7.8 (L) 03/01/2024    BILITOTAL 0.6 03/01/2024    ALBUMIN 2.9 (L) 03/01/2024    ALT 16 03/01/2024    AST 22 03/01/2024       Post Infusion Assessment:  Patient tolerated infusion without incident.  Patient observed for 30 minutes post Erbitux per protocol.  Blood return noted pre and post infusion.  Site patent and intact, free from redness, edema or discomfort.  No evidence of extravasations.     Discharge Plan:   Discharge instructions reviewed with: Patient and Family.  Patient and/or family verbalized understanding of discharge instructions and all questions answered.  Patient discharged in stable condition accompanied by: son.  Departure Mode: Ambulatory.    Ronda Lee RN    done

## 2024-03-27 ENCOUNTER — TELEPHONE (OUTPATIENT)
Dept: GASTROENTEROLOGY | Facility: CLINIC | Age: 74
End: 2024-03-27
Payer: COMMERCIAL

## 2024-03-27 NOTE — TELEPHONE ENCOUNTER
Pre visit planning completed.      Procedure details:    Patient scheduled for Upper endoscopy (EGD) on 4.11.24.     Arrival time: 1315. Procedure time 1415    Facility location: [facility]; [address] [place] [address], [address] Floor, [place] [address]. Check in location: Main entrance at registration desk.    Sedation type: Conscious sedation     Pre op exam needed? N/A    Indication for procedure: variceal screening      Chart review:     Electronic implanted devices? No    Recent diagnosis of diverticulitis within the last 6 weeks? N/A    Diabetic? No      Medication review:    Anticoagulants? No    NSAIDS? No    Other medication HOLDING recommendations:  N/A      Prep for procedure:     Prep instructions sent via [employer]         Esther Pires RN  Endoscopy Procedure Pre Assessment RN  [phone] option 4

## 2024-03-27 NOTE — TELEPHONE ENCOUNTER
Pre assessment completed with daughter, Rea (on C2C) for upcoming procedure.      Procedure details:    Patient scheduled for Upper endoscopy (EGD) on 4.11.24.     Arrival time: 1315. Procedure time 1415    Facility location: [facility]; [address], [address], [place] [address]. Check in location: Main entrance at registration desk.    Sedation type: Conscious sedation     Pre op exam needed? N/A    Indication for procedure: variceal screening    Designated  policy reviewed. Instructed to have someone stay 6 hours post procedure.       Chart review:     Electronic implanted devices? No    Recent diagnosis of diverticulitis within the last 6 weeks?  N/A    Diabetic? No      Medication review:    Anticoagulants? No    NSAIDS? No    Other medication HOLDING recommendations:  N/A      Prep for procedure:     Prep instructions sent via [employer]     Reviewed procedure prep instructions.     Rea verbalized understanding and had no questions or concerns at this time.        Esther Pires RN  Endoscopy Procedure Pre Assessment RN  [phone] option 4

## 2024-03-29 ENCOUNTER — INFUSION THERAPY VISIT (OUTPATIENT)
Dept: INFUSION THERAPY | Facility: HOSPITAL | Age: 74
End: 2024-03-29
Attending: INTERNAL MEDICINE
Payer: COMMERCIAL

## 2024-03-29 VITALS
RESPIRATION RATE: 16 BRPM | DIASTOLIC BLOOD PRESSURE: 65 MMHG | OXYGEN SATURATION: 100 % | HEART RATE: 80 BPM | SYSTOLIC BLOOD PRESSURE: 111 MMHG | TEMPERATURE: 98.6 F

## 2024-03-29 DIAGNOSIS — C11.9 SQUAMOUS CELL CARCINOMA OF NASOPHARYNX (H): Primary | ICD-10-CM

## 2024-03-29 DIAGNOSIS — B18.1 CHRONIC VIRAL HEPATITIS B WITHOUT DELTA AGENT AND WITHOUT COMA (H): ICD-10-CM

## 2024-03-29 DIAGNOSIS — K74.60 CIRRHOSIS OF LIVER WITH ASCITES, UNSPECIFIED HEPATIC CIRRHOSIS TYPE (H): ICD-10-CM

## 2024-03-29 DIAGNOSIS — R18.8 CIRRHOSIS OF LIVER WITH ASCITES, UNSPECIFIED HEPATIC CIRRHOSIS TYPE (H): ICD-10-CM

## 2024-03-29 LAB
ALBUMIN SERPL BCG-MCNC: 2.4 G/DL (ref 3.5–5.2)
ALP SERPL-CCNC: 104 U/L (ref 40–150)
ALT SERPL W P-5'-P-CCNC: 42 U/L (ref 0–70)
ANION GAP SERPL CALCULATED.3IONS-SCNC: 10 MMOL/L (ref 7–15)
AST SERPL W P-5'-P-CCNC: 30 U/L (ref 0–45)
BASOPHILS # BLD AUTO: 0 10E3/UL (ref 0–0.2)
BASOPHILS NFR BLD AUTO: 0 %
BILIRUB SERPL-MCNC: 0.5 MG/DL
BUN SERPL-MCNC: 52.3 MG/DL (ref 8–23)
CALCIUM SERPL-MCNC: 8.2 MG/DL (ref 8.8–10.2)
CHLORIDE SERPL-SCNC: 106 MMOL/L (ref 98–107)
CREAT SERPL-MCNC: 1.75 MG/DL (ref 0.67–1.17)
DEPRECATED HCO3 PLAS-SCNC: 21 MMOL/L (ref 22–29)
EGFRCR SERPLBLD CKD-EPI 2021: 41 ML/MIN/1.73M2
EOSINOPHIL # BLD AUTO: 0 10E3/UL (ref 0–0.7)
EOSINOPHIL NFR BLD AUTO: 1 %
ERYTHROCYTE [DISTWIDTH] IN BLOOD BY AUTOMATED COUNT: 16.7 % (ref 10–15)
GLUCOSE SERPL-MCNC: 133 MG/DL (ref 70–99)
HCT VFR BLD AUTO: 26 % (ref 40–53)
HGB BLD-MCNC: 8.1 G/DL (ref 13.3–17.7)
IMM GRANULOCYTES # BLD: 0 10E3/UL
IMM GRANULOCYTES NFR BLD: 1 %
INR PPP: 1.3 (ref 0.85–1.15)
LYMPHOCYTES # BLD AUTO: 0.8 10E3/UL (ref 0.8–5.3)
LYMPHOCYTES NFR BLD AUTO: 9 %
MAGNESIUM SERPL-MCNC: 2.1 MG/DL (ref 1.7–2.3)
MCH RBC QN AUTO: 29.3 PG (ref 26.5–33)
MCHC RBC AUTO-ENTMCNC: 31.2 G/DL (ref 31.5–36.5)
MCV RBC AUTO: 94 FL (ref 78–100)
MONOCYTES # BLD AUTO: 0.6 10E3/UL (ref 0–1.3)
MONOCYTES NFR BLD AUTO: 7 %
NEUTROPHILS # BLD AUTO: 7 10E3/UL (ref 1.6–8.3)
NEUTROPHILS NFR BLD AUTO: 82 %
NRBC # BLD AUTO: 0 10E3/UL
NRBC BLD AUTO-RTO: 0 /100
PLATELET # BLD AUTO: 91 10E3/UL (ref 150–450)
POTASSIUM SERPL-SCNC: 4.6 MMOL/L (ref 3.4–5.3)
PROT SERPL-MCNC: 5.8 G/DL (ref 6.4–8.3)
RBC # BLD AUTO: 2.76 10E6/UL (ref 4.4–5.9)
SODIUM SERPL-SCNC: 137 MMOL/L (ref 135–145)
TSH SERPL DL<=0.005 MIU/L-ACNC: 27.36 UIU/ML (ref 0.3–4.2)
WBC # BLD AUTO: 8.4 10E3/UL (ref 4–11)

## 2024-03-29 PROCEDURE — 96417 CHEMO IV INFUS EACH ADDL SEQ: CPT

## 2024-03-29 PROCEDURE — 36591 DRAW BLOOD OFF VENOUS DEVICE: CPT | Performed by: NURSE PRACTITIONER

## 2024-03-29 PROCEDURE — 83735 ASSAY OF MAGNESIUM: CPT | Performed by: NURSE PRACTITIONER

## 2024-03-29 PROCEDURE — 80053 COMPREHEN METABOLIC PANEL: CPT | Performed by: NURSE PRACTITIONER

## 2024-03-29 PROCEDURE — 258N000003 HC RX IP 258 OP 636: Performed by: INTERNAL MEDICINE

## 2024-03-29 PROCEDURE — 96413 CHEMO IV INFUSION 1 HR: CPT

## 2024-03-29 PROCEDURE — 85610 PROTHROMBIN TIME: CPT

## 2024-03-29 PROCEDURE — 85025 COMPLETE CBC W/AUTO DIFF WBC: CPT | Performed by: NURSE PRACTITIONER

## 2024-03-29 PROCEDURE — 96375 TX/PRO/DX INJ NEW DRUG ADDON: CPT

## 2024-03-29 PROCEDURE — 250N000011 HC RX IP 250 OP 636: Performed by: INTERNAL MEDICINE

## 2024-03-29 PROCEDURE — 258N000003 HC RX IP 258 OP 636: Performed by: NURSE PRACTITIONER

## 2024-03-29 PROCEDURE — 84443 ASSAY THYROID STIM HORMONE: CPT

## 2024-03-29 PROCEDURE — 96367 TX/PROPH/DG ADDL SEQ IV INF: CPT

## 2024-03-29 PROCEDURE — 250N000011 HC RX IP 250 OP 636: Performed by: NURSE PRACTITIONER

## 2024-03-29 RX ORDER — DIPHENHYDRAMINE HYDROCHLORIDE 50 MG/ML
50 INJECTION INTRAMUSCULAR; INTRAVENOUS
Status: DISCONTINUED | OUTPATIENT
Start: 2024-03-29 | End: 2024-03-29 | Stop reason: HOSPADM

## 2024-03-29 RX ORDER — ALBUTEROL SULFATE 90 UG/1
1-2 AEROSOL, METERED RESPIRATORY (INHALATION)
Status: DISCONTINUED | OUTPATIENT
Start: 2024-03-29 | End: 2024-03-29 | Stop reason: HOSPADM

## 2024-03-29 RX ORDER — EPINEPHRINE 1 MG/ML
0.3 INJECTION, SOLUTION INTRAMUSCULAR; SUBCUTANEOUS EVERY 5 MIN PRN
Status: DISCONTINUED | OUTPATIENT
Start: 2024-03-29 | End: 2024-03-29 | Stop reason: HOSPADM

## 2024-03-29 RX ORDER — MEPERIDINE HYDROCHLORIDE 25 MG/ML
25 INJECTION INTRAMUSCULAR; INTRAVENOUS; SUBCUTANEOUS EVERY 30 MIN PRN
Status: DISCONTINUED | OUTPATIENT
Start: 2024-03-29 | End: 2024-03-29 | Stop reason: HOSPADM

## 2024-03-29 RX ORDER — PALONOSETRON 0.05 MG/ML
0.25 INJECTION, SOLUTION INTRAVENOUS ONCE
Status: COMPLETED | OUTPATIENT
Start: 2024-03-29 | End: 2024-03-29

## 2024-03-29 RX ORDER — ALBUTEROL SULFATE 0.83 MG/ML
2.5 SOLUTION RESPIRATORY (INHALATION)
Status: DISCONTINUED | OUTPATIENT
Start: 2024-03-29 | End: 2024-03-29 | Stop reason: HOSPADM

## 2024-03-29 RX ORDER — HEPARIN SODIUM (PORCINE) LOCK FLUSH IV SOLN 100 UNIT/ML 100 UNIT/ML
5 SOLUTION INTRAVENOUS
Status: DISCONTINUED | OUTPATIENT
Start: 2024-03-29 | End: 2024-03-29 | Stop reason: HOSPADM

## 2024-03-29 RX ORDER — METHYLPREDNISOLONE SODIUM SUCCINATE 125 MG/2ML
125 INJECTION, POWDER, LYOPHILIZED, FOR SOLUTION INTRAMUSCULAR; INTRAVENOUS
Status: DISCONTINUED | OUTPATIENT
Start: 2024-03-29 | End: 2024-03-29 | Stop reason: HOSPADM

## 2024-03-29 RX ADMIN — PALONOSETRON 0.25 MG: 0.05 INJECTION, SOLUTION INTRAVENOUS at 10:41

## 2024-03-29 RX ADMIN — CETUXIMAB 400 MG: 2 SOLUTION INTRAVENOUS at 11:18

## 2024-03-29 RX ADMIN — DEXAMETHASONE SODIUM PHOSPHATE: 10 INJECTION, SOLUTION INTRAMUSCULAR; INTRAVENOUS at 10:46

## 2024-03-29 RX ADMIN — CARBOPLATIN 115 MG: 10 INJECTION, SOLUTION INTRAVENOUS at 12:23

## 2024-03-29 RX ADMIN — Medication 5 ML: at 12:59

## 2024-03-29 RX ADMIN — SODIUM CHLORIDE 250 ML: 9 INJECTION, SOLUTION INTRAVENOUS at 10:41

## 2024-03-29 NOTE — PROGRESS NOTES
Infusion Nursing Note:  Kristen Chapman presents today for cycle 3 day 1 erbitux/carboplatin.    Patient seen by provider today: No   present during visit today: Yes, Language: Hmong.    Note: Kristen arrived ambulatory and in stable condition accompanied by his son. Reports pain from his tumor and occasional nosebleeds. He does feel that he is not eating or drinking as much as he should due to pain from the tumor. Port accessed using sterile technique, good blood return noted, and labs collected. He was premedicated and treatment administered per orders. Port de-accessed upon completion and site covered with gauze and secured with tape. Will return on 4/12 for next appointment.      Intravenous Access:  Labs drawn without difficulty.  Implanted Port.    Treatment Conditions:  Lab Results   Component Value Date    HGB 8.1 (L) 03/29/2024    WBC 8.4 03/29/2024    ANEUTAUTO 7.0 03/29/2024    PLT 91 (L) 03/29/2024        Lab Results   Component Value Date     03/29/2024    POTASSIUM 4.6 03/29/2024    MAG 2.1 03/29/2024    CR 1.75 (H) 03/29/2024    GUANAKITO 8.2 (L) 03/29/2024    BILITOTAL 0.5 03/29/2024    ALBUMIN 2.4 (L) 03/29/2024    ALT 42 03/29/2024    AST 30 03/29/2024       Results reviewed, labs MET treatment parameters, ok to proceed with treatment.      Post Infusion Assessment:  Patient tolerated infusion without incident.  Blood return noted pre and post infusion.  Site patent and intact, free from redness, edema or discomfort.  No evidence of extravasations.  Access discontinued per protocol.       Discharge Plan:   Patient and/or family verbalized understanding of discharge instructions and all questions answered.  AVS to patient via [employer].  Patient will return 4/12 for next appointment.   Patient discharged in stable condition accompanied by: self.  Departure Mode: Ambulatory.      Sandy Alcantar RN

## 2024-04-10 ENCOUNTER — TELEPHONE (OUTPATIENT)
Dept: GASTROENTEROLOGY | Facility: CLINIC | Age: 74
End: 2024-04-10
Payer: COMMERCIAL

## 2024-04-10 NOTE — TELEPHONE ENCOUNTER
Caller: Rea Chapman (daughter)    Reason for Reschedule/Cancellation   (please be detailed, any staff messages or encounters to note?): the patient's health is declining      Prior to reschedule please review:  Ordering Provider: EMMANUEL  Sedation Determined: CS  Does patient have any ASC Exclusions, please identify?: CIRRHOSIS      Notes on Cancelled Procedure:  Procedure: Upper Endoscopy [EGD]   Date: 4/11/24  Location: [facility]; [address] [place] [address], 3rd Floor, [place] [address]   Surgeon: JOEL      Rescheduled: No, not at this time d/t decline in health       Did you cancel or rescheduled an EUS procedure? No.

## 2024-04-12 ENCOUNTER — TELEPHONE (OUTPATIENT)
Dept: ONCOLOGY | Facility: HOSPITAL | Age: 74
End: 2024-04-12

## 2024-04-12 ENCOUNTER — INFUSION THERAPY VISIT (OUTPATIENT)
Dept: INFUSION THERAPY | Facility: HOSPITAL | Age: 74
End: 2024-04-12
Attending: INTERNAL MEDICINE
Payer: COMMERCIAL

## 2024-04-12 ENCOUNTER — ONCOLOGY VISIT (OUTPATIENT)
Dept: ONCOLOGY | Facility: HOSPITAL | Age: 74
End: 2024-04-12
Attending: INTERNAL MEDICINE

## 2024-04-12 VITALS
TEMPERATURE: 97.9 F | DIASTOLIC BLOOD PRESSURE: 49 MMHG | WEIGHT: 116.5 LBS | SYSTOLIC BLOOD PRESSURE: 78 MMHG | HEIGHT: 65 IN | BODY MASS INDEX: 19.41 KG/M2 | RESPIRATION RATE: 16 BRPM | OXYGEN SATURATION: 99 % | HEART RATE: 80 BPM

## 2024-04-12 VITALS
TEMPERATURE: 97.7 F | OXYGEN SATURATION: 100 % | DIASTOLIC BLOOD PRESSURE: 49 MMHG | SYSTOLIC BLOOD PRESSURE: 78 MMHG | HEART RATE: 67 BPM | RESPIRATION RATE: 16 BRPM

## 2024-04-12 DIAGNOSIS — B18.1 CHRONIC VIRAL HEPATITIS B WITHOUT DELTA AGENT AND WITHOUT COMA (H): ICD-10-CM

## 2024-04-12 DIAGNOSIS — B18.1 HEPATITIS B, CHRONIC (H): ICD-10-CM

## 2024-04-12 DIAGNOSIS — C11.9 SQUAMOUS CELL CARCINOMA OF NASOPHARYNX (H): ICD-10-CM

## 2024-04-12 DIAGNOSIS — C11.9 SQUAMOUS CELL CARCINOMA OF NASOPHARYNX (H): Primary | ICD-10-CM

## 2024-04-12 DIAGNOSIS — R18.8 CIRRHOSIS OF LIVER WITH ASCITES, UNSPECIFIED HEPATIC CIRRHOSIS TYPE (H): ICD-10-CM

## 2024-04-12 DIAGNOSIS — K74.60 CIRRHOSIS OF LIVER WITH ASCITES, UNSPECIFIED HEPATIC CIRRHOSIS TYPE (H): ICD-10-CM

## 2024-04-12 DIAGNOSIS — D64.9 ANEMIA, NORMOCYTIC NORMOCHROMIC: Primary | ICD-10-CM

## 2024-04-12 LAB
ABO/RH(D): NORMAL
ANION GAP SERPL CALCULATED.3IONS-SCNC: 11 MMOL/L (ref 7–15)
ANTIBODY SCREEN: NEGATIVE
BLD PROD TYP BPU: NORMAL
BLOOD COMPONENT TYPE: NORMAL
BUN SERPL-MCNC: 47.2 MG/DL (ref 8–23)
CALCIUM SERPL-MCNC: 7.4 MG/DL (ref 8.8–10.2)
CHLORIDE SERPL-SCNC: 100 MMOL/L (ref 98–107)
CODING SYSTEM: NORMAL
CREAT SERPL-MCNC: 1.94 MG/DL (ref 0.67–1.17)
CROSSMATCH: NORMAL
DEPRECATED HCO3 PLAS-SCNC: 20 MMOL/L (ref 22–29)
EGFRCR SERPLBLD CKD-EPI 2021: 36 ML/MIN/1.73M2
ERYTHROCYTE [DISTWIDTH] IN BLOOD BY AUTOMATED COUNT: 17.2 % (ref 10–15)
GLUCOSE SERPL-MCNC: 107 MG/DL (ref 70–99)
HCT VFR BLD AUTO: 21.3 % (ref 40–53)
HGB BLD-MCNC: 6.5 G/DL (ref 13.3–17.7)
ISSUE DATE AND TIME: NORMAL
MCH RBC QN AUTO: 29.1 PG (ref 26.5–33)
MCHC RBC AUTO-ENTMCNC: 30.5 G/DL (ref 31.5–36.5)
MCV RBC AUTO: 96 FL (ref 78–100)
PLATELET # BLD AUTO: 60 10E3/UL (ref 150–450)
POTASSIUM SERPL-SCNC: 4.9 MMOL/L (ref 3.4–5.3)
RBC # BLD AUTO: 2.23 10E6/UL (ref 4.4–5.9)
SODIUM SERPL-SCNC: 131 MMOL/L (ref 135–145)
SPECIMEN EXPIRATION DATE: NORMAL
UNIT ABO/RH: NORMAL
UNIT NUMBER: NORMAL
UNIT STATUS: NORMAL
UNIT TYPE ISBT: 7300
WBC # BLD AUTO: 13.3 10E3/UL (ref 4–11)

## 2024-04-12 PROCEDURE — G2211 COMPLEX E/M VISIT ADD ON: HCPCS | Performed by: INTERNAL MEDICINE

## 2024-04-12 PROCEDURE — 86900 BLOOD TYPING SEROLOGIC ABO: CPT | Performed by: NURSE PRACTITIONER

## 2024-04-12 PROCEDURE — G0463 HOSPITAL OUTPT CLINIC VISIT: HCPCS | Mod: 25 | Performed by: INTERNAL MEDICINE

## 2024-04-12 PROCEDURE — 86923 COMPATIBILITY TEST ELECTRIC: CPT | Performed by: NURSE PRACTITIONER

## 2024-04-12 PROCEDURE — 36430 TRANSFUSION BLD/BLD COMPNT: CPT

## 2024-04-12 PROCEDURE — P9016 RBC LEUKOCYTES REDUCED: HCPCS | Performed by: NURSE PRACTITIONER

## 2024-04-12 PROCEDURE — 82374 ASSAY BLOOD CARBON DIOXIDE: CPT

## 2024-04-12 PROCEDURE — 85027 COMPLETE CBC AUTOMATED: CPT

## 2024-04-12 PROCEDURE — 258N000003 HC RX IP 258 OP 636: Performed by: NURSE PRACTITIONER

## 2024-04-12 PROCEDURE — 99215 OFFICE O/P EST HI 40 MIN: CPT | Performed by: INTERNAL MEDICINE

## 2024-04-12 PROCEDURE — 36591 DRAW BLOOD OFF VENOUS DEVICE: CPT

## 2024-04-12 RX ORDER — DIPHENHYDRAMINE HYDROCHLORIDE 50 MG/ML
50 INJECTION INTRAMUSCULAR; INTRAVENOUS
Status: CANCELLED
Start: 2024-04-12

## 2024-04-12 RX ORDER — ONDANSETRON 4 MG/1
4 TABLET, FILM COATED ORAL EVERY 6 HOURS PRN
Qty: 30 TABLET | Refills: 1 | Status: SHIPPED | OUTPATIENT
Start: 2024-04-12 | End: 2024-04-16

## 2024-04-12 RX ORDER — DIPHENHYDRAMINE HYDROCHLORIDE 50 MG/ML
50 INJECTION INTRAMUSCULAR; INTRAVENOUS
Status: DISCONTINUED | OUTPATIENT
Start: 2024-04-12 | End: 2024-04-12 | Stop reason: HOSPADM

## 2024-04-12 RX ORDER — HEPARIN SODIUM (PORCINE) LOCK FLUSH IV SOLN 100 UNIT/ML 100 UNIT/ML
5 SOLUTION INTRAVENOUS
Status: CANCELLED | OUTPATIENT
Start: 2024-04-12

## 2024-04-12 RX ORDER — EPINEPHRINE 1 MG/ML
0.3 INJECTION, SOLUTION INTRAMUSCULAR; SUBCUTANEOUS EVERY 5 MIN PRN
Status: DISCONTINUED | OUTPATIENT
Start: 2024-04-12 | End: 2024-04-12 | Stop reason: HOSPADM

## 2024-04-12 RX ORDER — EPINEPHRINE 1 MG/ML
0.3 INJECTION, SOLUTION INTRAMUSCULAR; SUBCUTANEOUS EVERY 5 MIN PRN
Status: CANCELLED | OUTPATIENT
Start: 2024-04-12

## 2024-04-12 RX ORDER — DEXAMETHASONE 4 MG/1
4 TABLET ORAL
Qty: 10 TABLET | Refills: 0 | Status: SHIPPED | OUTPATIENT
Start: 2024-04-12 | End: 2024-06-11

## 2024-04-12 RX ORDER — HEPARIN SODIUM,PORCINE 10 UNIT/ML
5-20 VIAL (ML) INTRAVENOUS DAILY PRN
Status: CANCELLED | OUTPATIENT
Start: 2024-04-12

## 2024-04-12 RX ADMIN — SODIUM CHLORIDE 500 ML: 9 INJECTION, SOLUTION INTRAVENOUS at 13:19

## 2024-04-12 ASSESSMENT — PAIN SCALES - GENERAL: PAINLEVEL: NO PAIN (0)

## 2024-04-12 NOTE — PROGRESS NOTES
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    Weight as of 3/15/24: 58.1 kg (128 lb). Body surface area is 1.63 meters squared.  Data Unavailable Comment: Data Unavailable   No LMP for male patient.  Allergies reviewed: Yes  Medications reviewed: Yes    Medications: MEDICATION REFILLS NEEDED TODAY. Provider was notified.  Pharmacy name entered into [employer]:    [facility] - [place], [place] - [address] [place] [address] [facility]ARM[place], [place] - [address] [place] [address]    Frailty Screening:   Is the patient here for a new oncology consult visit in cancer care? 1. Yes. Over the past month, have you experienced difficulty or required a caregiver to assist with:   1. Balance, walking or general mobility (including any falls)? YES  2. Completion of self-care tasks such as bathing, dressing, toileting, grooming/hygiene?  YES  3. Concentration or memory that affects your daily life?  NO       Clinical concerns: need refill on antiemetics, very deconditioned with last treatment.      Linda Dubon RN             "

## 2024-04-12 NOTE — PROGRESS NOTES
DATE/TIME OF CALL RECEIVED FROM LAB:  04/12/24 at 9:44 AM   LAB TEST:  CBC/diff  LAB VALUE:  Hemoglobin 6.5  PROVIDER NOTIFIED?: Yes  PROVIDER NAME: Dr. Frias and Marjorie GIVENS   DATE/TIME LAB VALUE REPORTED TO PROVIDER: 4/12/24 at 9:44 AM.   MECHANISM OF PROVIDER NOTIFICATION:  Secure Chat.   PROVIDER RESPONSE: Noted. Dr. Frias will see the patient in the clinic today to review results and discuss a plan.   Che Johnson RN on 4/12/2024 at 9:53 AM

## 2024-04-12 NOTE — PROGRESS NOTES
Infusion Nursing Note:  Kristen Chapman presents today for Blood transfusion.    Patient seen by provider today: Yes, Altagracia   present during visit today: Yes, Language: Hmong.     Note: Patient did not get treatment due to low Hgb. Patient received 1 unit of PRBC for hgb of 6.5.  Patient also received a bolus of 500 mls due to low blood pressures throughout treatment. Patient was approved by Dr. Frias to discharge with a final BP of 78/49      Intravenous Access:  Implanted Port.    Treatment Conditions:  Blood transfusion consent signed 2/2/24.      Post Infusion Assessment:  Patient tolerated infusion without incident.       Discharge Plan:   Patient discharged in stable condition accompanied by: son.      Norman Fuller RN

## 2024-04-12 NOTE — PROGRESS NOTES
[facility] Hematology and Oncology Progress Note    Patient: Kristen Chapman  MRN: [id number]  Date of Service: Apr 12, 2024        Assessment and Plan:    Cancer Staging  Nasopharyngeal carcinoma (H)  Staging form: Pharynx - Nasopharynx, AJCC 8th Edition  - Clinical stage from 2/18/2020: Stage SAMANTHA (cT4, cN2, cM0) - Signed by Alan Frias MD on 2/18/2020     1.  Nasopharyngeal carcinoma: He has been on single agent cetuximab since February 2.  Two weeks ago we added carboplatin at an AUC of 2.  He was having a hard time after the addition of carboplatin.  Much more fatigue and decreased energy.  Significant decline in his performance status.  Clinically, it appears that the tumor is enlarging.  We are going to get a CT scan and follow-up in clinic shortly thereafter.  If he does have evidence of progression then we will have to consider single agent chemotherapy with something like Taxol or gemcitabine.  We could also consider restarting Keytruda as he did not seem to progress on that.  He would have to definitely stay on his antiviral during this treatment.  I think he should also be seen in the palliative care clinic as he is going to start to have significant symptoms from his invasive tumor.    2.  Pancytopenia: Chronic.  Exacerbated by chemotherapy.  CBC from today was reviewed and shows a hemoglobin of 6.5, mean cell volume 96 and platelets of 60.  Will get 2 units of blood.  Will have to monitor closely.  May need to start Retacrit for him.    He has chronic pancytopenia secondary to chronic hepatitis B infection as well as cirrhosis.  He also has splenomegaly measuring 17 cm on CT from Sophia 15, 2023.  He had a bone marrow biopsy in October 2021.  Next generation sequencing showed no abnormalities.  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     3.  Hypothyroidism: TSH was still elevated on March 29.  Trending down well.  Will recheck at his next visit.  No change in Synthroid dose.      4.   Hepatitis B infection: Continues to follow-up with the [place].  I do not see that he had his PCR value checked in some time.  We will check that at his next visit.  He is supposed to be taking tenofovir.  If his PCR level is suppressed then we could consider immunotherapy for his cancer.    5.  Ascites: Refractory.  He now has a Pleurx catheter in.  They are draining 1 L every 2 to 3 days.  Seems to be helping him symptomatically.      6.  Tumor ulceration: He is overdue for follow-up at the wound care clinic.  We will try to help facilitate that.    7.  F/E/N: We started him on Megace which seems to be helping his appetite.  Will continue.      Medical decision Making:  I spent 42 minutes in the care of this patient today, which included time necessary for preparation for the visit, face to face time with the patient, communication of recommendations to the care team, and documentation time.  Today's visit was centered around a cancer diagnosis in the setting of additional medical comorbidities. Complex medical decision making was required. The risk of additional morbidity without further treatment is high.   High-risk medications were managed: Chemotherapy-induced anemia    The longitudinal plan of care for the diagnosis(es)/condition(s) as documented were addressed during this visit. Due to the added complexity in care, I will continue to support Kristen in the subsequent management and with ongoing continuity of care.     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 Diagnosed January 2020. Imaging suggested bilateral abnormal lymphadenopathy in the neck.  Also asymmetric soft tissue thickening in the posterior right nasopharynx.  On imaging, tumor also appeared to extend down to the hypopharynx.    Recurrent 1 disease involving the ethmoid sinus diagnosed September 2021.  Right neck lymph node positive for recurrent nasopharyngeal  carcinoma.  PET scan at that time showed new hypermetabolic mediastinal lymphadenopathy (chronic, most likely reactive), mass in the right ethmoid sinus, hypermetabolic right level 1B and 2A lymph nodes.    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 .   P16 negative, TRK negative, MAIDA  positive. PDL1 TPS 5%,    NGS (from February 12, 2024 biopsy):  TMB low, pMMR, PD-L1 20, TRK negative, MET/RET/BRAF negative.      2  Pancytopenia:  chronic. Multifactorial.     Treatment:    Initially treated with concurrent chemotherapy and radiation.  He had weekly cisplatin starting March 3, 2020. Fifth and final dose given March 31, 2020.  Subsequent chemotherapy was held due to prolonged thrombocytopenia and renal insufficiency.  Radiation dose was 7000 cGy in 35 fractions given from March 2 through April 17, 2020.     Started cisplatin with infusional 5-FU on June 1, 2020.  Cycle 1 given at a 25% dose reduction.  He still had significant thrombocytopenia and neutropenia on day 28.  Cycle 2 was held.  At the same time his liver function tests elevated secondary to hepatitis B.   PET scan in September 2020 showed no good evidence of residual malignancy.    Recurrence 1:  Radiosurgery delivered to the right ethmoid tumor delivered November 8 through November 17, 2021.  Received 3500 cGy in 5 fractions.  Pembrolizumab started 12/23/21.  Held after his July 1, 2022 dose.  PET scan in March 2022 showed a near complete response.    Recurrence 2:  Cetuximab initiated 2/2/2024.  400 mg every 14 days.  Carboplatin added cycle 3-day 1 on March 29, 2024.  AUC 2.    Interim History:    Kristen Chapman returns today for follow-up visit.  Since his most recent treatment he has been more tired.  Less energy.  Doing less.  Spending more time resting.  Less oral intake.  Denies shortness of breath or dizziness.  More tired.  He  "is having more oozing from his wound.    Review of Systems:    As above in the history.     Review of Systems otherwise Negative for:  General: chills, fever or night sweats  Psychological: anxiety or depression  Ophthalmic: blurry vision, double vision or loss of vision, vision change  ENT: oral lesions, hearing changes  Hematological and Lymphatic: bruising, jaundice, swollen lymph nodes  Endocrine: hot flashes  Respiratory: cough, hemoptysis, orthopnea  Cardiovascular: chest pain, palpitations or PND  Gastrointestinal: blood in stools, change in bowel habits, constipation, diarrhea or nausea/vomiting  Genito-Urinary: change in urinary stream, incontinence, frequency/urgency  Musculoskeletal: joint swelling, muscle pain  Neurological: dizziness, headaches, numbness/tingling  Dermatological: lumps and rash    Past History:    Past Medical History:   Diagnosis Date    Anemia     Dysphagia     Hepatitis B chronic     Hypothyroidism     Nasopharyngeal cancer (H)     Severe protein-calorie malnutrition (H24)     Thrombocytopenia (H24)      Physical Exam:    Ht 1.651 m (5' 5\")   BMI 21.30 kg/m      General: patient appears stated age of 71 year old. Nontoxic and in no distress.   HEENT: Head: atraumatic, normocephalic. Sclerae anicteric.  Large right submandibular mass.  Bandaged.  There is a fair amount of serosanguineous fluid on the bandages.  Chest:  Normal respiratory effort  Cardiac:  No edema.  Abdomen: abdomen remains distended with ascites.  Extremities: normal tone and muscle bulk.  Skin: no lesions or rash on visible skin. Warm and dry.   CNS: alert and oriented. Grossly non-focal.   Psychiatric: normal mood and affect.     Lab Results:    No results found for this or any previous visit (from the past 168 hour(s)).       Imaging:    No results found.      Signed by: Alan Frias MD  " Cyclosporine Counseling:  I discussed with the patient the risks of cyclosporine including but not limited to hypertension, gingival hyperplasia,myelosuppression, immunosuppression, liver damage, kidney damage, neurotoxicity, lymphoma, and serious infections. The patient understands that monitoring is required including baseline blood pressure, CBC, CMP, lipid panel and uric acid, and then 1-2 times monthly CMP and blood pressure.

## 2024-04-12 NOTE — TELEPHONE ENCOUNTER
Retail Pharmacy Prior Authorization Team   Phone: [phone]    PA Initiation    Medication: ONDANSETRON HCL 4 MG PO TABS  Insurance Company: [employer] - Phone [phone] Fax [phone]  Pharmacy Filling the Rx: [facility] - [place], [place] - [address] [place] [address]  Filling Pharmacy Phone: [phone]  Filling Pharmacy Fax: [phone]  Start Date: 4/12/2024

## 2024-04-12 NOTE — LETTER
4/12/2024         RE: Kristen Chapman  [address] [place] [address]        Dear Colleague,    Thank you for referring your patient, Kristen Chapman, to the [facility]. Please see a copy of my visit note below.    [facility] Hematology and Oncology Progress Note    Patient: Kristen Chapman  MRN: [id number]  Date of Service: Apr 12, 2024        Assessment and Plan:    Cancer Staging  Nasopharyngeal carcinoma (H)  Staging form: Pharynx - Nasopharynx, AJCC 8th Edition  - Clinical stage from 2/18/2020: Stage SAMANTHA (cT4, cN2, cM0) - Signed by Alan Frias MD on 2/18/2020     1.  Nasopharyngeal carcinoma: He has been on single agent cetuximab since February 2.  Two weeks ago we added carboplatin at an AUC of 2.  He was having a hard time after the addition of carboplatin.  Much more fatigue and decreased energy.  Significant decline in his performance status.  Clinically, it appears that the tumor is enlarging.  We are going to get a CT scan and follow-up in clinic shortly thereafter.  If he does have evidence of progression then we will have to consider single agent chemotherapy with something like Taxol or gemcitabine.  We could also consider restarting Keytruda as he did not seem to progress on that.  He would have to definitely stay on his antiviral during this treatment.  I think he should also be seen in the palliative care clinic as he is going to start to have significant symptoms from his invasive tumor.    2.  Pancytopenia: Chronic.  Exacerbated by chemotherapy.  CBC from today was reviewed and shows a hemoglobin of 6.5, mean cell volume 96 and platelets of 60.  Will get 2 units of blood.  Will have to monitor closely.  May need to start Retacrit for him.    He has chronic pancytopenia secondary to chronic hepatitis B infection as well as cirrhosis.  He also has splenomegaly measuring 17 cm on CT from Sophia 15, 2023.  He had a bone marrow biopsy in October 2021.  Next generation  sequencing showed no abnormalities.  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     3.  Hypothyroidism: TSH was still elevated on March 29.  Trending down well.  Will recheck at his next visit.  No change in Synthroid dose.      4.  Hepatitis B infection: Continues to follow-up with the [place].  I do not see that he had his PCR value checked in some time.  We will check that at his next visit.  He is supposed to be taking tenofovir.  If his PCR level is suppressed then we could consider immunotherapy for his cancer.    5.  Ascites: Refractory.  He now has a Pleurx catheter in.  They are draining 1 L every 2 to 3 days.  Seems to be helping him symptomatically.      6.  Tumor ulceration: He is overdue for follow-up at the wound care clinic.  We will try to help facilitate that.    7.  F/E/N: We started him on Megace which seems to be helping his appetite.  Will continue.      Medical decision Making:  I spent 42 minutes in the care of this patient today, which included time necessary for preparation for the visit, face to face time with the patient, communication of recommendations to the care team, and documentation time.  Today's visit was centered around a cancer diagnosis in the setting of additional medical comorbidities. Complex medical decision making was required. The risk of additional morbidity without further treatment is high.   High-risk medications were managed: Chemotherapy-induced anemia    The longitudinal plan of care for the diagnosis(es)/condition(s) as documented were addressed during this visit. Due to the added complexity in care, I will continue to support Kristen in the subsequent management and with ongoing continuity of care.     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 Diagnosed January 2020. Imaging suggested bilateral abnormal lymphadenopathy in the neck.  Also asymmetric soft tissue  thickening in the posterior right nasopharynx.  On imaging, tumor also appeared to extend down to the hypopharynx.    Recurrent 1 disease involving the ethmoid sinus diagnosed September 2021.  Right neck lymph node positive for recurrent nasopharyngeal carcinoma.  PET scan at that time showed new hypermetabolic mediastinal lymphadenopathy (chronic, most likely reactive), mass in the right ethmoid sinus, hypermetabolic right level 1B and 2A lymph nodes.    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 .   P16 negative, TRK negative, MAIDA  positive. PDL1 TPS 5%,    NGS (from February 12, 2024 biopsy):  TMB low, pMMR, PD-L1 20, TRK negative, MET/RET/BRAF negative.      2  Pancytopenia:  chronic. Multifactorial.     Treatment:    Initially treated with concurrent chemotherapy and radiation.  He had weekly cisplatin starting March 3, 2020. Fifth and final dose given March 31, 2020.  Subsequent chemotherapy was held due to prolonged thrombocytopenia and renal insufficiency.  Radiation dose was 7000 cGy in 35 fractions given from March 2 through April 17, 2020.     Started cisplatin with infusional 5-FU on June 1, 2020.  Cycle 1 given at a 25% dose reduction.  He still had significant thrombocytopenia and neutropenia on day 28.  Cycle 2 was held.  At the same time his liver function tests elevated secondary to hepatitis B.   PET scan in September 2020 showed no good evidence of residual malignancy.    Recurrence 1:  Radiosurgery delivered to the right ethmoid tumor delivered November 8 through November 17, 2021.  Received 3500 cGy in 5 fractions.  Pembrolizumab started 12/23/21.  Held after his July 1, 2022 dose.  PET scan in March 2022 showed a near complete response.    Recurrence 2:  Cetuximab initiated 2/2/2024.  400 mg every 14 days.  Carboplatin added cycle 3-day 1 on March 29, 2024.  AUC  "2.    Interim History:    Kristen Chapman returns today for follow-up visit.  Since his most recent treatment he has been more tired.  Less energy.  Doing less.  Spending more time resting.  Less oral intake.  Denies shortness of breath or dizziness.  More tired.  He is having more oozing from his wound.    Review of Systems:    As above in the history.     Review of Systems otherwise Negative for:  General: chills, fever or night sweats  Psychological: anxiety or depression  Ophthalmic: blurry vision, double vision or loss of vision, vision change  ENT: oral lesions, hearing changes  Hematological and Lymphatic: bruising, jaundice, swollen lymph nodes  Endocrine: hot flashes  Respiratory: cough, hemoptysis, orthopnea  Cardiovascular: chest pain, palpitations or PND  Gastrointestinal: blood in stools, change in bowel habits, constipation, diarrhea or nausea/vomiting  Genito-Urinary: change in urinary stream, incontinence, frequency/urgency  Musculoskeletal: joint swelling, muscle pain  Neurological: dizziness, headaches, numbness/tingling  Dermatological: lumps and rash    Past History:    Past Medical History:   Diagnosis Date     Anemia      Dysphagia      Hepatitis B chronic      Hypothyroidism      Nasopharyngeal cancer (H)      Severe protein-calorie malnutrition (H24)      Thrombocytopenia (H24)      Physical Exam:    Ht 1.651 m (5' 5\")   BMI 21.30 kg/m      General: patient appears stated age of 71 year old. Nontoxic and in no distress.   HEENT: Head: atraumatic, normocephalic. Sclerae anicteric.  Large right submandibular mass.  Bandaged.  There is a fair amount of serosanguineous fluid on the bandages.  Chest:  Normal respiratory effort  Cardiac:  No edema.  Abdomen: abdomen remains distended with ascites.  Extremities: normal tone and muscle bulk.  Skin: no lesions or rash on visible skin. Warm and dry.   CNS: alert and oriented. Grossly non-focal.   Psychiatric: normal mood and affect.     Lab Results:    No " "results found for this or any previous visit (from the past 168 hour(s)).       Imaging:    No results found.      Signed by: Alan Frias MD    Oncology Rooming Note    April 12, 2024 9:46 AM   Kristen Chapman is a 74 year old male who presents for:    Chief Complaint   Patient presents with     Oncology Clinic Visit     Return visit with labs/infusion related to Squamous cell carcinoma of nasopharynx      Initial Vitals: Ht 1.651 m (5' 5\")   BMI 21.30 kg/m   Estimated body mass index is 21.3 kg/m  as calculated from the following:    Height as of this encounter: 1.651 m (5' 5\").    Weight as of 3/15/24: 58.1 kg (128 lb). Body surface area is 1.63 meters squared.  Data Unavailable Comment: Data Unavailable   No LMP for male patient.  Allergies reviewed: Yes  Medications reviewed: Yes    Medications: MEDICATION REFILLS NEEDED TODAY. Provider was notified.  Pharmacy name entered into [employer]:    [place], [place] - [address] [place], [place] - [address] [place] [address]    Frailty Screening:   Is the patient here for a new oncology consult visit in cancer care? 1. Yes. Over the past month, have you experienced difficulty or required a caregiver to assist with:   1. Balance, walking or general mobility (including any falls)? YES  2. Completion of self-care tasks such as bathing, dressing, toileting, grooming/hygiene?  YES  3. Concentration or memory that affects your daily life?  NO       Clinical concerns: need refill on antiemetics, very deconditioned with last treatment.      Linda Dubon RN                 Again, thank you for allowing me to participate in the care of your patient.        Sincerely,        Alan Frias MD  "

## 2024-04-13 ENCOUNTER — HOSPITAL ENCOUNTER (OUTPATIENT)
Dept: CT IMAGING | Facility: HOSPITAL | Age: 74
Discharge: HOME | End: 2024-04-13
Attending: INTERNAL MEDICINE
Payer: COMMERCIAL

## 2024-04-13 DIAGNOSIS — C11.9 SQUAMOUS CELL CARCINOMA OF NASOPHARYNX (H): ICD-10-CM

## 2024-04-13 PROCEDURE — 250N000011 HC RX IP 250 OP 636: Performed by: INTERNAL MEDICINE

## 2024-04-13 PROCEDURE — 70491 CT SOFT TISSUE NECK W/DYE: CPT

## 2024-04-13 PROCEDURE — 71260 CT THORAX DX C+: CPT

## 2024-04-13 RX ORDER — HEPARIN SODIUM (PORCINE) LOCK FLUSH IV SOLN 100 UNIT/ML 100 UNIT/ML
5-10 SOLUTION INTRAVENOUS
Status: DISCONTINUED | OUTPATIENT
Start: 2024-04-13 | End: 2024-04-14 | Stop reason: HOSPADM

## 2024-04-13 RX ORDER — IOPAMIDOL 755 MG/ML
57 INJECTION, SOLUTION INTRAVASCULAR ONCE
Status: COMPLETED | OUTPATIENT
Start: 2024-04-13 | End: 2024-04-13

## 2024-04-13 RX ADMIN — Medication 5 ML: at 10:14

## 2024-04-13 RX ADMIN — IOPAMIDOL 57 ML: 755 INJECTION, SOLUTION INTRAVENOUS at 10:40

## 2024-04-15 ENCOUNTER — PATIENT OUTREACH (OUTPATIENT)
Dept: ONCOLOGY | Facility: HOSPITAL | Age: 74
End: 2024-04-15
Payer: COMMERCIAL

## 2024-04-15 NOTE — PROGRESS NOTES
[facility]/Voicemail    Clinical Data: Care Coordinator Outreach    Outreach attempted x 1.  Left message on patient's daughter voicemail with call back information and requested return call.    Plan: Care Coordinator will do no further outreaches at this time.    Left a message regarding patient's lab appt at 9:00am.

## 2024-04-16 ENCOUNTER — LAB (OUTPATIENT)
Dept: INFUSION THERAPY | Facility: HOSPITAL | Age: 74
End: 2024-04-16
Attending: INTERNAL MEDICINE
Payer: COMMERCIAL

## 2024-04-16 ENCOUNTER — TELEPHONE (OUTPATIENT)
Dept: ONCOLOGY | Facility: HOSPITAL | Age: 74
End: 2024-04-16

## 2024-04-16 ENCOUNTER — ONCOLOGY VISIT (OUTPATIENT)
Dept: ONCOLOGY | Facility: HOSPITAL | Age: 74
End: 2024-04-16
Attending: INTERNAL MEDICINE
Payer: COMMERCIAL

## 2024-04-16 VITALS
DIASTOLIC BLOOD PRESSURE: 53 MMHG | OXYGEN SATURATION: 100 % | SYSTOLIC BLOOD PRESSURE: 81 MMHG | HEART RATE: 79 BPM | WEIGHT: 116.6 LBS | BODY MASS INDEX: 19.43 KG/M2 | TEMPERATURE: 97.6 F | HEIGHT: 65 IN

## 2024-04-16 VITALS
OXYGEN SATURATION: 100 % | SYSTOLIC BLOOD PRESSURE: 84 MMHG | HEART RATE: 72 BPM | DIASTOLIC BLOOD PRESSURE: 53 MMHG | TEMPERATURE: 99 F | RESPIRATION RATE: 18 BRPM

## 2024-04-16 DIAGNOSIS — E03.2 HYPOTHYROIDISM DUE TO DRUGS: ICD-10-CM

## 2024-04-16 DIAGNOSIS — C11.9 NASOPHARYNGEAL CARCINOMA (H): Primary | ICD-10-CM

## 2024-04-16 DIAGNOSIS — D64.81 ANEMIA ASSOCIATED WITH CHEMOTHERAPY: ICD-10-CM

## 2024-04-16 DIAGNOSIS — B18.1 CHRONIC VIRAL HEPATITIS B WITHOUT DELTA AGENT AND WITHOUT COMA (H): Primary | ICD-10-CM

## 2024-04-16 DIAGNOSIS — K55.069 MESENTERIC THROMBOSIS (H): ICD-10-CM

## 2024-04-16 DIAGNOSIS — T45.1X5A ANEMIA ASSOCIATED WITH CHEMOTHERAPY: ICD-10-CM

## 2024-04-16 DIAGNOSIS — C11.9 SQUAMOUS CELL CARCINOMA OF NASOPHARYNX (H): Primary | ICD-10-CM

## 2024-04-16 DIAGNOSIS — D64.9 ANEMIA, NORMOCYTIC NORMOCHROMIC: Primary | ICD-10-CM

## 2024-04-16 LAB
ABO/RH(D): NORMAL
ALBUMIN SERPL BCG-MCNC: 2 G/DL (ref 3.5–5.2)
ALP SERPL-CCNC: 115 U/L (ref 40–150)
ALT SERPL W P-5'-P-CCNC: 33 U/L (ref 0–70)
ANION GAP SERPL CALCULATED.3IONS-SCNC: 11 MMOL/L (ref 7–15)
ANTIBODY SCREEN: NEGATIVE
AST SERPL W P-5'-P-CCNC: 26 U/L (ref 0–45)
BASOPHILS # BLD AUTO: 0 10E3/UL (ref 0–0.2)
BASOPHILS NFR BLD AUTO: 0 %
BILIRUB SERPL-MCNC: 0.5 MG/DL
BLD PROD TYP BPU: NORMAL
BLOOD COMPONENT TYPE: NORMAL
BUN SERPL-MCNC: 42.4 MG/DL (ref 8–23)
CALCIUM SERPL-MCNC: 7.8 MG/DL (ref 8.8–10.2)
CHLORIDE SERPL-SCNC: 99 MMOL/L (ref 98–107)
CODING SYSTEM: NORMAL
CREAT SERPL-MCNC: 1.76 MG/DL (ref 0.67–1.17)
CROSSMATCH: NORMAL
DEPRECATED HCO3 PLAS-SCNC: 19 MMOL/L (ref 22–29)
EGFRCR SERPLBLD CKD-EPI 2021: 41 ML/MIN/1.73M2
EOSINOPHIL # BLD AUTO: 0.1 10E3/UL (ref 0–0.7)
EOSINOPHIL NFR BLD AUTO: 1 %
ERYTHROCYTE [DISTWIDTH] IN BLOOD BY AUTOMATED COUNT: 16.3 % (ref 10–15)
GLUCOSE SERPL-MCNC: 118 MG/DL (ref 70–99)
HCT VFR BLD AUTO: 25.4 % (ref 40–53)
HGB BLD-MCNC: 8.1 G/DL (ref 13.3–17.7)
IMM GRANULOCYTES # BLD: 0 10E3/UL
IMM GRANULOCYTES NFR BLD: 1 %
ISSUE DATE AND TIME: NORMAL
LYMPHOCYTES # BLD AUTO: 0.5 10E3/UL (ref 0.8–5.3)
LYMPHOCYTES NFR BLD AUTO: 8 %
MCH RBC QN AUTO: 29.1 PG (ref 26.5–33)
MCHC RBC AUTO-ENTMCNC: 31.9 G/DL (ref 31.5–36.5)
MCV RBC AUTO: 91 FL (ref 78–100)
MONOCYTES # BLD AUTO: 0.7 10E3/UL (ref 0–1.3)
MONOCYTES NFR BLD AUTO: 12 %
NEUTROPHILS # BLD AUTO: 4.4 10E3/UL (ref 1.6–8.3)
NEUTROPHILS NFR BLD AUTO: 78 %
NRBC # BLD AUTO: 0 10E3/UL
NRBC BLD AUTO-RTO: 0 /100
PLATELET # BLD AUTO: 61 10E3/UL (ref 150–450)
POTASSIUM SERPL-SCNC: 4.8 MMOL/L (ref 3.4–5.3)
PROT SERPL-MCNC: 5.3 G/DL (ref 6.4–8.3)
RBC # BLD AUTO: 2.78 10E6/UL (ref 4.4–5.9)
SODIUM SERPL-SCNC: 129 MMOL/L (ref 135–145)
SPECIMEN EXPIRATION DATE: NORMAL
T4 FREE SERPL-MCNC: 0.62 NG/DL (ref 0.9–1.7)
TSH SERPL DL<=0.005 MIU/L-ACNC: 68.65 UIU/ML (ref 0.3–4.2)
UNIT ABO/RH: NORMAL
UNIT NUMBER: NORMAL
UNIT STATUS: NORMAL
UNIT TYPE ISBT: 7300
WBC # BLD AUTO: 5.6 10E3/UL (ref 4–11)

## 2024-04-16 PROCEDURE — G0463 HOSPITAL OUTPT CLINIC VISIT: HCPCS | Performed by: INTERNAL MEDICINE

## 2024-04-16 PROCEDURE — 85041 AUTOMATED RBC COUNT: CPT | Performed by: INTERNAL MEDICINE

## 2024-04-16 PROCEDURE — P9016 RBC LEUKOCYTES REDUCED: HCPCS | Performed by: NURSE PRACTITIONER

## 2024-04-16 PROCEDURE — 36591 DRAW BLOOD OFF VENOUS DEVICE: CPT | Performed by: INTERNAL MEDICINE

## 2024-04-16 PROCEDURE — 84443 ASSAY THYROID STIM HORMONE: CPT

## 2024-04-16 PROCEDURE — G2211 COMPLEX E/M VISIT ADD ON: HCPCS | Performed by: INTERNAL MEDICINE

## 2024-04-16 PROCEDURE — 82247 BILIRUBIN TOTAL: CPT | Performed by: INTERNAL MEDICINE

## 2024-04-16 PROCEDURE — 250N000011 HC RX IP 250 OP 636: Performed by: NURSE PRACTITIONER

## 2024-04-16 PROCEDURE — 84439 ASSAY OF FREE THYROXINE: CPT

## 2024-04-16 PROCEDURE — 86923 COMPATIBILITY TEST ELECTRIC: CPT | Performed by: NURSE PRACTITIONER

## 2024-04-16 PROCEDURE — 99215 OFFICE O/P EST HI 40 MIN: CPT | Performed by: INTERNAL MEDICINE

## 2024-04-16 PROCEDURE — 86900 BLOOD TYPING SEROLOGIC ABO: CPT | Performed by: NURSE PRACTITIONER

## 2024-04-16 PROCEDURE — 36430 TRANSFUSION BLD/BLD COMPNT: CPT

## 2024-04-16 RX ORDER — HEPARIN SODIUM,PORCINE 10 UNIT/ML
5-20 VIAL (ML) INTRAVENOUS DAILY PRN
Status: CANCELLED | OUTPATIENT
Start: 2024-04-16

## 2024-04-16 RX ORDER — HEPARIN SODIUM (PORCINE) LOCK FLUSH IV SOLN 100 UNIT/ML 100 UNIT/ML
5 SOLUTION INTRAVENOUS
Status: DISCONTINUED | OUTPATIENT
Start: 2024-04-16 | End: 2024-04-16 | Stop reason: HOSPADM

## 2024-04-16 RX ORDER — EPINEPHRINE 1 MG/ML
0.3 INJECTION, SOLUTION INTRAMUSCULAR; SUBCUTANEOUS EVERY 5 MIN PRN
Status: CANCELLED | OUTPATIENT
Start: 2024-04-16

## 2024-04-16 RX ORDER — HEPARIN SODIUM (PORCINE) LOCK FLUSH IV SOLN 100 UNIT/ML 100 UNIT/ML
5 SOLUTION INTRAVENOUS
Status: CANCELLED | OUTPATIENT
Start: 2024-04-16

## 2024-04-16 RX ORDER — DIPHENHYDRAMINE HYDROCHLORIDE 50 MG/ML
50 INJECTION INTRAMUSCULAR; INTRAVENOUS
Status: CANCELLED
Start: 2024-04-16

## 2024-04-16 RX ADMIN — Medication 5 ML: at 15:42

## 2024-04-16 ASSESSMENT — PAIN SCALES - GENERAL: PAINLEVEL: NO PAIN (0)

## 2024-04-16 NOTE — TELEPHONE ENCOUNTER
Received a call from pt's daughter requesting Family FMLA for herself to come from out of state to help care for pt. After reviewing pt's chart, daughter is not listed on consent to communicate for pt. I informed daughter that unfortunately we would not be able to fill out any paperwork or give any pt information due to her not being listed in pt's chart as someone we can communicate with. I instructed daughter to speak with her siblings and pt regarding whether they would be ok with someone being removed from consent list to include her, as pt already has the max amount of contacts listed. Daughter stated she will skip having herself added since she is out of state. I informed daughter if she does speak with her siblings and pt, and they decide to change anything, make sure pt comes to clinic to update consent, as well as fill out an INDIRA for us to complete paperwork. Daughter verbalized understanding of this information.

## 2024-04-16 NOTE — PROGRESS NOTES
"Oncology Rooming Note    April 16, 2024 9:32 AM   Kristen Chapman is a 74 year old male who presents for:    Chief Complaint   Patient presents with    Oncology Clinic Visit     Nasopharyngeal carcinoma     Initial Vitals: There were no vitals taken for this visit. Estimated body mass index is 19.39 kg/m  as calculated from the following:    Height as of 4/12/24: 1.651 m (5' 5\").    Weight as of 4/12/24: 52.8 kg (116 lb 8 oz). There is no height or weight on file to calculate BSA.  Data Unavailable Comment: Data Unavailable   No LMP for male patient.  Allergies reviewed: Yes  Medications reviewed: Yes    Medications: MEDICATION REFILLS NEEDED TODAY. Provider was notified.  Pharmacy name entered into Issue:    [facility] - [place], [place] - [address] [place] [address] PHARMACY [place], [place] - [address] [place] [address]    Frailty Screening:   Is the patient here for a new oncology consult visit in cancer care? 2. No      Clinical concerns: 1. Fatigue for 1-2 weeks    2. Bloody nose, not as bad as it was previously   3. Blood transfusion?   3. CT results       Jo Ann Little MA            "

## 2024-04-16 NOTE — PROGRESS NOTES
[facility]ematology and Oncology Progress Note    Patient: Kristen Chapman  MRN: [id number]  Date of Service: Apr 16, 2024        Assessment and Plan:    Cancer Staging  Nasopharyngeal carcinoma (H)  Staging form: Pharynx - Nasopharynx, AJCC 8th Edition  - Clinical stage from 2/18/2020: Stage SAMANTHA (cT4, cN2, cM0) - Signed by Alan Frias MD on 2/18/2020     1.  Nasopharyngeal carcinoma: He has been on his current treatment for 3 months.  He has not tolerated the addition of carboplatin very well.  He just had a CT scan for restaging.  I personally viewed the images.  Unfortunately these show local progression about the right mandible.  He is also having clinical progression as well.  Starting to affect his ability to eat.  Given the progression we should stop his current treatment.  Situation was reviewed with Y and a family member.ia I told him that her treatment options at this point are limited.  The other ones are available at a low likelihood of benefit.  I reviewed with him that it would be very reasonable at this point to consider changing the goals of care to that of comfort and maximizing quality of life.  He stated on more than 1 occasion that he is not ready to do that and wants to try more treatment.  I told him that more treatment is much more likely to worsen his quality of life without giving him any meaningful survival benefit but he would still like to try.  In that case we are going to switch him to single agent gemcitabine.  We will start at 800 mg/m .  3 weeks on 1 week off.  If this is not tolerated well we will switch to every other week.  Titrate up dose as able.  Reimage after 2 cycles.    2.  Pancytopenia: Chronic.  Exacerbated by chemotherapy.  CBC from today was reviewed and shows a hemoglobin of 8.1, white count 5.6, platelets 61,000.  Will monitor closely while he continues on treatment.    He has chronic pancytopenia secondary to chronic hepatitis B infection as well as cirrhosis.   He also has splenomegaly measuring 17 cm on CT from Sophia 15, 2023.  He had a bone marrow biopsy in October 2021.  Next generation sequencing showed no abnormalities.  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     3.  Hypothyroidism: I reviewed his TSH from today.  It is going back up at 68.  I question if he is taking his Synthroid.  We will reach out to him to check.      4.  Hepatitis B infection: We are going to recheck his hepatitis B virus DNA levels.  If sufficiently suppressed we could consider trying pembrolizumab again.  He is following up with hepatology soon.    5.  Ascites: Refractory.  He now has a Pleurx catheter in.  They are draining 1 L every 2 to 3 days.  Seems to be helping him symptomatically.      6.  Tumor ulceration: We will assist him getting back into the wound care clinic.  He is overdue for a visit.      7.  F/E/N: We started him on Megace which seems to be helping his appetite.  Will continue.      8.  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  He will need anticoagulation given the location and the significant clot burden.  He is not complaining of much abdominal pain today, if at all.  He has developed a number of gastrosplenic varices.  His GFR is reduced.  Will start him on anticoagulation with rivaroxaban.  I think this is probably the safest option.  I would be concerned about reliable dosing of enoxaparin and its bioavailability.  Coumadin would be difficult to manage given his poor nutritional status risking supratherapeutic doses.  Will have to monitor closely for bleeding, especially from his tumor.    Medical decision Making:  Today's visit was centered around a cancer diagnosis in the setting of additional medical comorbidities. Complex medical decision making was required regarding chemotherapy and cancer treatment changes. The risk of additional morbidity without further treatment is high.   High-risk medications were  managed:    The longitudinal plan of care for the diagnosis(es)/condition(s) as documented were addressed during this visit. Due to the added complexity in care, I will continue to support in the subsequent management and with ongoing continuity of care.     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 Diagnosed January 2020. Imaging suggested bilateral abnormal lymphadenopathy in the neck.  Also asymmetric soft tissue thickening in the posterior right nasopharynx.  On imaging, tumor also appeared to extend down to the hypopharynx.    Recurrent 1 disease involving the ethmoid sinus diagnosed September 2021.  Right neck lymph node positive for recurrent nasopharyngeal carcinoma.  PET scan at that time showed new hypermetabolic mediastinal lymphadenopathy (chronic, most likely reactive), mass in the right ethmoid sinus, hypermetabolic right level 1B and 2A lymph nodes.    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 .   P16 negative, TRK negative, MAIDA  positive. PDL1 TPS 5%,    NGS (from February 12, 2024 biopsy):  TMB low, pMMR, PD-L1 20, TRK negative, MET/RET/BRAF negative.      2  Pancytopenia:  chronic. Multifactorial.     Treatment:    Initially treated with concurrent chemotherapy and radiation.  He had weekly cisplatin starting March 3, 2020. Fifth and final dose given March 31, 2020.  Subsequent chemotherapy was held due to prolonged thrombocytopenia and renal insufficiency.  Radiation dose was 7000 cGy in 35 fractions given from March 2 through April 17, 2020.     Started cisplatin with infusional 5-FU on June 1, 2020.  Cycle 1 given at a 25% dose reduction.  He still had significant thrombocytopenia and neutropenia on day 28.  Cycle 2 was held.  At the same time his liver function tests elevated secondary  to hepatitis B.   PET scan in September 2020 showed no good evidence of residual malignancy.    Recurrence 1:  Radiosurgery delivered to the right ethmoid tumor delivered November 8 through November 17, 2021.  Received 3500 cGy in 5 fractions.  Pembrolizumab started 12/23/21.  Held after his July 1, 2022 dose.  PET scan in March 2022 showed a near complete response.    Recurrence 2:  Cetuximab initiated 2/2/2024.  400 mg every 14 days.  Carboplatin added cycle 3-day 1 on March 29, 2024.  AUC 2.    Interim History:    Kristen Chapman returns today for follow-up visit.  Still feeling tired with decreased energy.  Having some difficulty with chewing and eating secondary to his tumor.  Not much pain but having drainage from the tumor.  He is not eating much.    Review of Systems:    As above in the history.     Review of Systems otherwise Negative for:  General: chills, fever or night sweats  Psychological: anxiety or depression  Ophthalmic: blurry vision, double vision or loss of vision, vision change  ENT: oral lesions, hearing changes  Hematological and Lymphatic: bruising, jaundice, swollen lymph nodes  Endocrine: hot flashes  Respiratory: cough, hemoptysis, orthopnea  Cardiovascular: chest pain, palpitations or PND  Gastrointestinal: blood in stools, change in bowel habits, constipation, diarrhea or nausea/vomiting  Genito-Urinary: change in urinary stream, incontinence, frequency/urgency  Musculoskeletal: joint swelling, muscle pain  Neurological: dizziness, headaches, numbness/tingling  Dermatological: lumps and rash    Past History:    Past Medical History:   Diagnosis Date    Anemia     Dysphagia     Hepatitis B chronic     Hypothyroidism     Nasopharyngeal cancer (H)     Severe protein-calorie malnutrition (H24)     Thrombocytopenia (H24)      Physical Exam:    BP (!) 81/53 (BP Location: Left arm, Patient Position: Sitting, Cuff Size: Adult Regular)   Pulse 79   Temp 97.6  F (36.4  C) (Tympanic)   Ht 1.651 m (5'  "5\")   Wt 52.9 kg (116 lb 9.6 oz)   SpO2 100%   BMI 19.40 kg/m      General: patient appears stated age of 71 year old. Nontoxic and in no distress.   HEENT: Head: atraumatic, normocephalic. Sclerae anicteric.  Large right submandibular mass.  Bandaged.  Chest:  Normal respiratory effort  Cardiac:  No edema.  Abdomen: abdomen is distended.  Extremities: normal tone and muscle bulk.  Skin: no lesions or rash on visible skin. Warm and dry.   CNS: alert and oriented. Grossly non-focal.   Psychiatric: normal mood and affect.     Lab Results:    Recent Results (from the past 168 hour(s))   CBC with platelets   Result Value Ref Range    WBC Count 13.3 (H) 4.0 - 11.0 10e3/uL    RBC Count 2.23 (L) 4.40 - 5.90 10e6/uL    Hemoglobin 6.5 (LL) 13.3 - 17.7 g/dL    Hematocrit 21.3 (L) 40.0 - 53.0 %    MCV 96 78 - 100 fL    MCH 29.1 26.5 - 33.0 pg    MCHC 30.5 (L) 31.5 - 36.5 g/dL    RDW 17.2 (H) 10.0 - 15.0 %    Platelet Count 60 (L) 150 - 450 10e3/uL   Basic metabolic panel   Result Value Ref Range    Sodium 131 (L) 135 - 145 mmol/L    Potassium 4.9 3.4 - 5.3 mmol/L    Chloride 100 98 - 107 mmol/L    Carbon Dioxide (CO2) 20 (L) 22 - 29 mmol/L    Anion Gap 11 7 - 15 mmol/L    Urea Nitrogen 47.2 (H) 8.0 - 23.0 mg/dL    Creatinine 1.94 (H) 0.67 - 1.17 mg/dL    GFR Estimate 36 (L) >60 mL/min/1.73m2    Calcium 7.4 (L) 8.8 - 10.2 mg/dL    Glucose 107 (H) 70 - 99 mg/dL   Adult Type and Screen   Result Value Ref Range    ABO/RH(D) B POS     Antibody Screen Negative Negative    SPECIMEN EXPIRATION DATE [id number]    Prepare red blood cells (unit)   Result Value Ref Range    Blood Component Type Red Blood Cells     Product Code [id number]     Unit Status Transfused     Unit Number [id number]     CROSSMATCH Compatible     CODING SYSTEM [id number]     ISSUE DATE AND TIME [id number]     UNIT ABO/RH B+     UNIT TYPE ISBT 7300    Comprehensive metabolic panel   Result Value Ref Range    Sodium 129 (L) 135 - 145 mmol/L    Potassium 4.8 " 3.4 - 5.3 mmol/L    Carbon Dioxide (CO2) 19 (L) 22 - 29 mmol/L    Anion Gap 11 7 - 15 mmol/L    Urea Nitrogen 42.4 (H) 8.0 - 23.0 mg/dL    Creatinine 1.76 (H) 0.67 - 1.17 mg/dL    GFR Estimate 41 (L) >60 mL/min/1.73m2    Calcium 7.8 (L) 8.8 - 10.2 mg/dL    Chloride 99 98 - 107 mmol/L    Glucose 118 (H) 70 - 99 mg/dL    Alkaline Phosphatase 115 40 - 150 U/L    AST 26 0 - 45 U/L    ALT 33 0 - 70 U/L    Protein Total 5.3 (L) 6.4 - 8.3 g/dL    Albumin 2.0 (L) 3.5 - 5.2 g/dL    Bilirubin Total 0.5 <=1.2 mg/dL   CBC with platelets and differential   Result Value Ref Range    WBC Count 5.6 4.0 - 11.0 10e3/uL    RBC Count 2.78 (L) 4.40 - 5.90 10e6/uL    Hemoglobin 8.1 (L) 13.3 - 17.7 g/dL    Hematocrit 25.4 (L) 40.0 - 53.0 %    MCV 91 78 - 100 fL    MCH 29.1 26.5 - 33.0 pg    MCHC 31.9 31.5 - 36.5 g/dL    RDW 16.3 (H) 10.0 - 15.0 %    Platelet Count 61 (L) 150 - 450 10e3/uL    % Neutrophils      % Lymphocytes      % Monocytes      % Eosinophils      % Basophils      % Immature Granulocytes      Absolute Neutrophils      Absolute Lymphocytes      Absolute Monocytes      Absolute Eosinophils      Absolute Basophils      Absolute Immature Granulocytes       Imaging:    CT Chest/Abdomen/Pelvis w Contrast    Result Date: 4/13/2024  EXAM: CT CHEST/ABDOMEN/PELVIS W CONTRAST LOCATION: [facility] DATE: 4/13/2024 INDICATION: Squamous cell carcinoma of the nasopharynx diagnosed January 2020. Two  subsequent recurrences. On maintenance chemotherapy. COMPARISON: Abdominal ultrasound 03/07/2024, CT AP 02/26/2024, PET CT 01/31/2024 and older studies TECHNIQUE: CT scan of the chest, abdomen, and pelvis was performed following injection of IV contrast. Multiplanar reformats were obtained. Dose reduction techniques were used. CONTRAST: isovue 370 57ml FINDINGS: Respiratory motion artifact, greatest at the lung bases. LUNGS AND PLEURA: No pleural effusions or suspicious pulmonary nodules. Paraesophageal varices.  MEDIASTINUM/AXILLAE: Incompletely seen right infiltrative mandibular mass that extends to the skin surface. There is a 1.1 cm right supraclavicular, little changed. Left IJ Port-A-Cath. No mediastinal adenopathy. Ascending aorta is aneurysmal at 4.3 cm, unchanged. No central pulmonary emboli. CORONARY ARTERY CALCIFICATION: None. HEPATOBILIARY: Small nodular liver liver. Tiny low dense lesion in the left lobe is unchanged. PANCREAS: Normal. SPLEEN: There are new hypodense, subcapsular lesions one superiorly has been curvilinear configuration (axial image 111, coronal image 72). Larger one is inferior measuring 2 x 1.5 cm (axial images 159 - 165). Intrasplenic smaller intermediate density lesion measuring 1.3 cm is unchanged. ADRENAL GLANDS: Normal. KIDNEYS/BLADDER: Normal. BOWEL: Right-sided peritoneal drain has been repositioned into the low pelvis, and is amidst the ascites. There is a large amount of ascites. There is marked increase in the stranding of the mesentery in the upper abdomen no measurable tumor nodules. LYMPH NODES: No suspicious adenopathy. There are a few tiny aortocaval nodes that are unchanged. 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 There is a large amount of thrombus within the  4 cm of the draining SMV and splenic veins which remain patent. Thrombus extends into the main portal vein and into the right portal vein with  occlusion of the superior branch at the first branch. Left portal vein is patent. This is new since the study done with contrast in January but otherwise age indeterminant. Extensive gastrosplenic varices. Bowel is of normal caliber. Moderate arterial calcifications. No aneurysm. PELVIC ORGANS: Normal. MUSCULOSKELETAL: No suspicious lesions. Mild S-shaped thoracolumbar scoliosis.     IMPRESSION: 1.  There is a large amount of mesenteric venous thrombus , new since Jan study done with contrast but otherwise age indeterminate. 2.   Thrombus seen within the SMV and splenic veins which remain patent. Thrombus extends into the main portal and right portal vein branch with occlusion of the anterior branch of the right portal vein. 3.  There are 2 new hypodense lesions in the spleen, presumed to reflect subacute, chronic infarcts. The other more rounded hypodense lesion that is not FDG avid is unchanged. 4.  Repositioning of the peritoneal catheter which is now in the pelvis. 5.  Cirrhotic liver with a large amount of ascites and varices as noted above. 6.  Ascites is larger than before. Is catheter functioning or not being used? 7.  Increased mesenteric stranding that appears to be related to the fluid and ascites given its density. No clear measurable tumor nodules. 8.  Stable right supraclavicular enlarged lymph node with incompletely seen large infiltrative tumor about the right mandible extending to the skin. See neck CT report, please. 9.  No new sites of metastatic disease in the chest, abdomen or pelvis. 10.  Stable 4.3 cm ascending aortic aneurysm. 11.  Findings discussed by the undersigned with Dr. Alan Frias at 1138.    CT Soft Tissue Neck w Contrast    Result Date: 4/13/2024  EXAM: CT SOFT TISSUE NECK W CONTRAST LOCATION: [facility] DATE: 4/13/2024 INDICATION: f u submandibular tumor COMPARISON: 01/31/2024. CONTRAST: isovue 370 57ml TECHNIQUE: Routine CT Soft Tissue Neck with IV contrast. Multiplanar reformats. Dose reduction techniques were used. FINDINGS: The submandibular mass lesion which has been previously characterized on head CT from 01/31/2024 has intervally increased in size, previously measuring 4.9 x 4.4 cm and currently measuring 7.0 x 5.0 cm on the current examination is similar location. The right mandible in the area of the mass lesion has no fracture findings or destructive changes in the adjacent mandible. The submandibular mass extends to the floor of the mouth on the right and involves  portions of the tongue on the right. There is extension into the submental space and into the adjacent subcutaneous fat. Portions of the mass lesion extending into the right parotid gland. The right internal jugular vein is patent. The oropharynx and nasopharynx is unremarkable. Epiglottis is unremarkable. Larynx and hypopharynx is unremarkable. Lung apices are unremarkable. LYMPH NODES: No pathologic lymph nodes by size or morphology criteria. THYROID: Normal. VESSELS: Vascular structures of the neck are patent. VISUALIZED INTRACRANIAL/ORBITS/SINUSES: No abnormality of the visualized intracranial compartment or orbits. Visualized paranasal sinuses and mastoid air cells are clear. OTHER: No destructive osseous lesion. The included lung apices are clear.     IMPRESSION: 1.  Soft tissue mass closely associated with the submandibular space extends into the right common and floor of mouth as well as involving portions of the subcutaneous fat and extending to the inferior margin of the parotid gland on the right. The soft tissue masses significantly increased in size now measuring 7 x 5 cm in a similar distribution as compared to the prior examination where it measured 4.9 x 4.4 cm.       Signed by: Alan Frias MD

## 2024-04-16 NOTE — LETTER
"    4/16/2024         RE: Kristen Chapman  [address] [place] [address]        Dear Colleague,    Thank you for referring your patient, Kristen Chapman, to the [facility]. Please see a copy of my visit note below.    Oncology Rooming Note    April 16, 2024 9:32 AM   Krsiten Chapman is a 74 year old male who presents for:    Chief Complaint   Patient presents with     Oncology Clinic Visit     Nasopharyngeal carcinoma     Initial Vitals: There were no vitals taken for this visit. Estimated body mass index is 19.39 kg/m  as calculated from the following:    Height as of 4/12/24: 1.651 m (5' 5\").    Weight as of 4/12/24: 52.8 kg (116 lb 8 oz). There is no height or weight on file to calculate BSA.  Data Unavailable Comment: Data Unavailable   No LMP for male patient.  Allergies reviewed: Yes  Medications reviewed: Yes    Medications: MEDICATION REFILLS NEEDED TODAY. Provider was notified.  Pharmacy name entered into [email or web address]:    [facility] - [place], [place] - [address] [facility] [place], [place] - [address] [place] [address]    Frailty Screening:   Is the patient here for a new oncology consult visit in cancer care? 2. No      Clinical concerns: 1. Fatigue for 1-2 weeks    2. Bloody nose, not as bad as it was previously   3. Blood transfusion?   3. CT results       Jo Ann Little MA              [facility] Progress Note    Patient: Kristen Chapman  MRN: [id number]  Date of Service: Apr 16, 2024        Assessment and Plan:    Cancer Staging  Nasopharyngeal carcinoma (H)  Staging form: Pharynx - Nasopharynx, AJCC 8th Edition  - Clinical stage from 2/18/2020: Stage SAMANTHA (cT4, cN2, cM0) - Signed by Alan Frias MD on 2/18/2020     1.  Nasopharyngeal carcinoma: He has been on his current treatment for 3 months.  He has not tolerated the addition of carboplatin very well.  He just had a CT scan for restaging.  I personally viewed the images.  Unfortunately these " show local progression about the right mandible.  He is also having clinical progression as well.  Starting to affect his ability to eat.  Given the progression we should stop his current treatment.  Situation was reviewed with Y and a family member.ia I told him that her treatment options at this point are limited.  The other ones are available at a low likelihood of benefit.  I reviewed with him that it would be very reasonable at this point to consider changing the goals of care to that of comfort and maximizing quality of life.  He stated on more than 1 occasion that he is not ready to do that and wants to try more treatment.  I told him that more treatment is much more likely to worsen his quality of life without giving him any meaningful survival benefit but he would still like to try.  In that case we are going to switch him to single agent gemcitabine.  We will start at 800 mg/m .  3 weeks on 1 week off.  If this is not tolerated well we will switch to every other week.  Titrate up dose as able.  Reimage after 2 cycles.    2.  Pancytopenia: Chronic.  Exacerbated by chemotherapy.  CBC from today was reviewed and shows a hemoglobin of 8.1, white count 5.6, platelets 61,000.  Will monitor closely while he continues on treatment.    He has chronic pancytopenia secondary to chronic hepatitis B infection as well as cirrhosis.  He also has splenomegaly measuring 17 cm on CT from Sophia 15, 2023.  He had a bone marrow biopsy in October 2021.  Next generation sequencing showed no abnormalities.  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     3.  Hypothyroidism: I reviewed his TSH from today.  It is going back up at 68.  I question if he is taking his Synthroid.  We will reach out to him to check.      4.  Hepatitis B infection: We are going to recheck his hepatitis B virus DNA levels.  If sufficiently suppressed we could consider trying pembrolizumab again.  He is following up with hepatology soon.    5.   Ascites: Refractory.  He now has a Pleurx catheter in.  They are draining 1 L every 2 to 3 days.  Seems to be helping him symptomatically.      6.  Tumor ulceration: We will assist him getting back into the wound care clinic.  He is overdue for a visit.      7.  F/E/N: We started him on Megace which seems to be helping his appetite.  Will continue.      Medical decision Making:  Today's visit was centered around a cancer diagnosis in the setting of additional medical comorbidities. Complex medical decision making was required regarding chemotherapy and cancer treatment changes. The risk of additional morbidity without further treatment is high.   High-risk medications were managed:    The longitudinal plan of care for the diagnosis(es)/condition(s) as documented were addressed during this visit. Due to the added complexity in care, I will continue to support in the subsequent management and with ongoing continuity of care.     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 Diagnosed January 2020. Imaging suggested bilateral abnormal lymphadenopathy in the neck.  Also asymmetric soft tissue thickening in the posterior right nasopharynx.  On imaging, tumor also appeared to extend down to the hypopharynx.    Recurrent 1 disease involving the ethmoid sinus diagnosed September 2021.  Right neck lymph node positive for recurrent nasopharyngeal carcinoma.  PET scan at that time showed new hypermetabolic mediastinal lymphadenopathy (chronic, most likely reactive), mass in the right ethmoid sinus, hypermetabolic right level 1B and 2A lymph nodes.    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 .   P16 negative, TRK negative, MAIDA  positive. PDL1 TPS 5%,    NGS (from February 12, 2024 biopsy):  TMB low, pMMR, PD-L1 20, TRK  negative, MET/RET/BRAF negative.      2  Pancytopenia:  chronic. Multifactorial.     Treatment:    Initially treated with concurrent chemotherapy and radiation.  He had weekly cisplatin starting March 3, 2020. Fifth and final dose given March 31, 2020.  Subsequent chemotherapy was held due to prolonged thrombocytopenia and renal insufficiency.  Radiation dose was 7000 cGy in 35 fractions given from March 2 through April 17, 2020.     Started cisplatin with infusional 5-FU on June 1, 2020.  Cycle 1 given at a 25% dose reduction.  He still had significant thrombocytopenia and neutropenia on day 28.  Cycle 2 was held.  At the same time his liver function tests elevated secondary to hepatitis B.   PET scan in September 2020 showed no good evidence of residual malignancy.    Recurrence 1:  Radiosurgery delivered to the right ethmoid tumor delivered November 8 through November 17, 2021.  Received 3500 cGy in 5 fractions.  Pembrolizumab started 12/23/21.  Held after his July 1, 2022 dose.  PET scan in March 2022 showed a near complete response.    Recurrence 2:  Cetuximab initiated 2/2/2024.  400 mg every 14 days.  Carboplatin added cycle 3-day 1 on March 29, 2024.  AUC 2.    Interim History:    Kristen Chapman returns today for follow-up visit.  Still feeling tired with decreased energy.  Having some difficulty with chewing and eating secondary to his tumor.  Not much pain but having drainage from the tumor.  He is not eating much.    Review of Systems:    As above in the history.     Review of Systems otherwise Negative for:  General: chills, fever or night sweats  Psychological: anxiety or depression  Ophthalmic: blurry vision, double vision or loss of vision, vision change  ENT: oral lesions, hearing changes  Hematological and Lymphatic: bruising, jaundice, swollen lymph nodes  Endocrine: hot flashes  Respiratory: cough, hemoptysis, orthopnea  Cardiovascular: chest pain, palpitations or PND  Gastrointestinal: blood in  "stools, change in bowel habits, constipation, diarrhea or nausea/vomiting  Genito-Urinary: change in urinary stream, incontinence, frequency/urgency  Musculoskeletal: joint swelling, muscle pain  Neurological: dizziness, headaches, numbness/tingling  Dermatological: lumps and rash    Past History:    Past Medical History:   Diagnosis Date     Anemia      Dysphagia      Hepatitis B chronic      Hypothyroidism      Nasopharyngeal cancer (H)      Severe protein-calorie malnutrition (H24)      Thrombocytopenia (H24)      Physical Exam:    BP (!) 81/53 (BP Location: Left arm, Patient Position: Sitting, Cuff Size: Adult Regular)   Pulse 79   Temp 97.6  F (36.4  C) (Tympanic)   Ht 1.651 m (5' 5\")   Wt 52.9 kg (116 lb 9.6 oz)   SpO2 100%   BMI 19.40 kg/m      General: patient appears stated age of 71 year old. Nontoxic and in no distress.   HEENT: Head: atraumatic, normocephalic. Sclerae anicteric.  Large right submandibular mass.  Bandaged.  Chest:  Normal respiratory effort  Cardiac:  No edema.  Abdomen: abdomen is distended.  Extremities: normal tone and muscle bulk.  Skin: no lesions or rash on visible skin. Warm and dry.   CNS: alert and oriented. Grossly non-focal.   Psychiatric: normal mood and affect.     Lab Results:    Recent Results (from the past 168 hour(s))   CBC with platelets   Result Value Ref Range    WBC Count 13.3 (H) 4.0 - 11.0 10e3/uL    RBC Count 2.23 (L) 4.40 - 5.90 10e6/uL    Hemoglobin 6.5 (LL) 13.3 - 17.7 g/dL    Hematocrit 21.3 (L) 40.0 - 53.0 %    MCV 96 78 - 100 fL    MCH 29.1 26.5 - 33.0 pg    MCHC 30.5 (L) 31.5 - 36.5 g/dL    RDW 17.2 (H) 10.0 - 15.0 %    Platelet Count 60 (L) 150 - 450 10e3/uL   Basic metabolic panel   Result Value Ref Range    Sodium 131 (L) 135 - 145 mmol/L    Potassium 4.9 3.4 - 5.3 mmol/L    Chloride 100 98 - 107 mmol/L    Carbon Dioxide (CO2) 20 (L) 22 - 29 mmol/L    Anion Gap 11 7 - 15 mmol/L    Urea Nitrogen 47.2 (H) 8.0 - 23.0 mg/dL    Creatinine 1.94 (H) 0.67 " - 1.17 mg/dL    GFR Estimate 36 (L) >60 mL/min/1.73m2    Calcium 7.4 (L) 8.8 - 10.2 mg/dL    Glucose 107 (H) 70 - 99 mg/dL   Adult Type and Screen   Result Value Ref Range    ABO/RH(D) B POS     Antibody Screen Negative Negative    SPECIMEN EXPIRATION DATE [id number]    Prepare red blood cells (unit)   Result Value Ref Range    Blood Component Type Red Blood Cells     Product Code [id number]     Unit Status Transfused     Unit Number [id number]     CROSSMATCH Compatible     CODING SYSTEM [id number]     ISSUE DATE AND TIME [id number]     UNIT ABO/RH B+     UNIT TYPE ISBT 7300    Comprehensive metabolic panel   Result Value Ref Range    Sodium 129 (L) 135 - 145 mmol/L    Potassium 4.8 3.4 - 5.3 mmol/L    Carbon Dioxide (CO2) 19 (L) 22 - 29 mmol/L    Anion Gap 11 7 - 15 mmol/L    Urea Nitrogen 42.4 (H) 8.0 - 23.0 mg/dL    Creatinine 1.76 (H) 0.67 - 1.17 mg/dL    GFR Estimate 41 (L) >60 mL/min/1.73m2    Calcium 7.8 (L) 8.8 - 10.2 mg/dL    Chloride 99 98 - 107 mmol/L    Glucose 118 (H) 70 - 99 mg/dL    Alkaline Phosphatase 115 40 - 150 U/L    AST 26 0 - 45 U/L    ALT 33 0 - 70 U/L    Protein Total 5.3 (L) 6.4 - 8.3 g/dL    Albumin 2.0 (L) 3.5 - 5.2 g/dL    Bilirubin Total 0.5 <=1.2 mg/dL   CBC with platelets and differential   Result Value Ref Range    WBC Count 5.6 4.0 - 11.0 10e3/uL    RBC Count 2.78 (L) 4.40 - 5.90 10e6/uL    Hemoglobin 8.1 (L) 13.3 - 17.7 g/dL    Hematocrit 25.4 (L) 40.0 - 53.0 %    MCV 91 78 - 100 fL    MCH 29.1 26.5 - 33.0 pg    MCHC 31.9 31.5 - 36.5 g/dL    RDW 16.3 (H) 10.0 - 15.0 %    Platelet Count 61 (L) 150 - 450 10e3/uL    % Neutrophils      % Lymphocytes      % Monocytes      % Eosinophils      % Basophils      % Immature Granulocytes      Absolute Neutrophils      Absolute Lymphocytes      Absolute Monocytes      Absolute Eosinophils      Absolute Basophils      Absolute Immature Granulocytes       Imaging:    CT Chest/Abdomen/Pelvis w Contrast    Result Date: 4/13/2024  EXAM: CT  CHEST/ABDOMEN/PELVIS W CONTRAST LOCATION: [facility] DATE: 4/13/2024 INDICATION: Squamous cell carcinoma of the nasopharynx diagnosed January 2020. Two  subsequent recurrences. On maintenance chemotherapy. COMPARISON: Abdominal ultrasound 03/07/2024, CT AP 02/26/2024, PET CT 01/31/2024 and older studies TECHNIQUE: CT scan of the chest, abdomen, and pelvis was performed following injection of IV contrast. Multiplanar reformats were obtained. Dose reduction techniques were used. CONTRAST: isovue 370 57ml FINDINGS: Respiratory motion artifact, greatest at the lung bases. LUNGS AND PLEURA: No pleural effusions or suspicious pulmonary nodules. Paraesophageal varices. MEDIASTINUM/AXILLAE: Incompletely seen right infiltrative mandibular mass that extends to the skin surface. There is a 1.1 cm right supraclavicular, little changed. Left IJ Port-A-Cath. No mediastinal adenopathy. Ascending aorta is aneurysmal at 4.3 cm, unchanged. No central pulmonary emboli. CORONARY ARTERY CALCIFICATION: None. HEPATOBILIARY: Small nodular liver liver. Tiny low dense lesion in the left lobe is unchanged. PANCREAS: Normal. SPLEEN: There are new hypodense, subcapsular lesions one superiorly has been curvilinear configuration (axial image 111, coronal image 72). Larger one is inferior measuring 2 x 1.5 cm (axial images 159 - 165). Intrasplenic smaller intermediate density lesion measuring 1.3 cm is unchanged. ADRENAL GLANDS: Normal. KIDNEYS/BLADDER: Normal. BOWEL: Right-sided peritoneal drain has been repositioned into the low pelvis, and is amidst the ascites. There is a large amount of ascites. There is marked increase in the stranding of the mesentery in the upper abdomen no measurable tumor nodules. LYMPH NODES: No suspicious adenopathy. There are a few tiny aortocaval nodes that are unchanged. 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 There is a large amount of thrombus  within the  4 cm of the draining SMV and splenic veins which remain patent. Thrombus extends into the main portal vein and into the right portal vein with  occlusion of the superior branch at the first branch. Left portal vein is patent. This is new since the study done with contrast in January but otherwise age indeterminant. Extensive gastrosplenic varices. Bowel is of normal caliber. Moderate arterial calcifications. No aneurysm. PELVIC ORGANS: Normal. MUSCULOSKELETAL: No suspicious lesions. Mild S-shaped thoracolumbar scoliosis.     IMPRESSION: 1.  There is a large amount of mesenteric venous thrombus , new since Sourav study done with contrast but otherwise age indeterminate. 2.  Thrombus seen within the SMV and splenic veins which remain patent. Thrombus extends into the main portal and right portal vein branch with occlusion of the anterior branch of the right portal vein. 3.  There are 2 new hypodense lesions in the spleen, presumed to reflect subacute, chronic infarcts. The other more rounded hypodense lesion that is not FDG avid is unchanged. 4.  Repositioning of the peritoneal catheter which is now in the pelvis. 5.  Cirrhotic liver with a large amount of ascites and varices as noted above. 6.  Ascites is larger than before. Is catheter functioning or not being used? 7.  Increased mesenteric stranding that appears to be related to the fluid and ascites given its density. No clear measurable tumor nodules. 8.  Stable right supraclavicular enlarged lymph node with incompletely seen large infiltrative tumor about the right mandible extending to the skin. See neck CT report, please. 9.  No new sites of metastatic disease in the chest, abdomen or pelvis. 10.  Stable 4.3 cm ascending aortic aneurysm. 11.  Findings discussed by the undersigned with Dr. Alan Frias at 1138.    CT Soft Tissue Neck w Contrast    Result Date: 4/13/2024  EXAM: CT SOFT TISSUE NECK W CONTRAST LOCATION: [facility]  DATE: 4/13/2024 INDICATION: f u submandibular tumor COMPARISON: 01/31/2024. CONTRAST: isovue 370 57ml TECHNIQUE: Routine CT Soft Tissue Neck with IV contrast. Multiplanar reformats. Dose reduction techniques were used. FINDINGS: The submandibular mass lesion which has been previously characterized on head CT from 01/31/2024 has intervally increased in size, previously measuring 4.9 x 4.4 cm and currently measuring 7.0 x 5.0 cm on the current examination is similar location. The right mandible in the area of the mass lesion has no fracture findings or destructive changes in the adjacent mandible. The submandibular mass extends to the floor of the mouth on the right and involves portions of the tongue on the right. There is extension into the submental space and into the adjacent subcutaneous fat. Portions of the mass lesion extending into the right parotid gland. The right internal jugular vein is patent. The oropharynx and nasopharynx is unremarkable. Epiglottis is unremarkable. Larynx and hypopharynx is unremarkable. Lung apices are unremarkable. LYMPH NODES: No pathologic lymph nodes by size or morphology criteria. THYROID: Normal. VESSELS: Vascular structures of the neck are patent. VISUALIZED INTRACRANIAL/ORBITS/SINUSES: No abnormality of the visualized intracranial compartment or orbits. Visualized paranasal sinuses and mastoid air cells are clear. OTHER: No destructive osseous lesion. The included lung apices are clear.     IMPRESSION: 1.  Soft tissue mass closely associated with the submandibular space extends into the right common and floor of mouth as well as involving portions of the subcutaneous fat and extending to the inferior margin of the parotid gland on the right. The soft tissue masses significantly increased in size now measuring 7 x 5 cm in a similar distribution as compared to the prior examination where it measured 4.9 x 4.4 cm.       Signed by: Alan Frias MD      Again, thank you for  allowing me to participate in the care of your patient.        Sincerely,        Alan Frias MD

## 2024-04-16 NOTE — PROGRESS NOTES
Infusion Nursing Note:  Kristen Chapman presents today for 1 Unit PRBC's.    Patient seen by provider today: No   present during visit today: Patient refused  services and a waiver form has been signed.     Note:Patient received 1 unit of PRBC for hgb of 8.1 with symptoms and low BP.  After transfusion was completed administered remainder of 225  ml Normal Saline due to low blood pressures throughout treatment. Pt reports that he feels better after Blood transfusion and fluids.      Intravenous Access:  Implanted Port.  Placement verified with positive Blood return.    Treatment Conditions:  Lab Results   Component Value Date    HGB 8.1 (L) 04/16/2024    WBC 5.6 04/16/2024    ANEUTAUTO 4.4 04/16/2024    PLT 61 (L) 04/16/2024            Post Infusion Assessment:  Patient tolerated infusion without incident.  No evidence of extravasations.  Access discontinued per protocol.       Discharge Plan:   Patient and/or family verbalized understanding of discharge instructions and all questions answered.  Patient discharged in stable condition accompanied by: daughter.  Departure Mode: Ambulatory.      Julienne Robins RN

## 2024-04-17 NOTE — TELEPHONE ENCOUNTER
Prior Authorization Approval    Medication: ONDANSETRON HCL 4 MG PO TABS  Authorization Effective Date: 4/16/2024  Authorization Expiration Date: 4/16/2025  Approved Dose/Quantity:   Reference #:     Insurance Company: [employer] - Phone [phone] Fax [phone]  Expected CoPay: $    CoPay Card Available:      Financial Assistance Needed:   Which Pharmacy is filling the prescription: [facility] [place], [place] - [address] [place] [address]  Pharmacy Notified: Yes  Patient Notified:

## 2024-04-18 ENCOUNTER — LAB (OUTPATIENT)
Dept: LAB | Facility: CLINIC | Age: 74
End: 2024-04-18
Attending: INTERNAL MEDICINE
Payer: COMMERCIAL

## 2024-04-18 ENCOUNTER — OFFICE VISIT (OUTPATIENT)
Dept: GASTROENTEROLOGY | Facility: CLINIC | Age: 74
End: 2024-04-18
Attending: INTERNAL MEDICINE
Payer: COMMERCIAL

## 2024-04-18 VITALS
OXYGEN SATURATION: 98 % | HEIGHT: 65 IN | RESPIRATION RATE: 24 BRPM | BODY MASS INDEX: 19.66 KG/M2 | WEIGHT: 118 LBS | SYSTOLIC BLOOD PRESSURE: 88 MMHG | HEART RATE: 80 BPM | DIASTOLIC BLOOD PRESSURE: 60 MMHG

## 2024-04-18 DIAGNOSIS — R18.8 CIRRHOSIS OF LIVER WITH ASCITES, UNSPECIFIED HEPATIC CIRRHOSIS TYPE (H): Primary | ICD-10-CM

## 2024-04-18 DIAGNOSIS — K74.60 CIRRHOSIS OF LIVER WITH ASCITES, UNSPECIFIED HEPATIC CIRRHOSIS TYPE (H): ICD-10-CM

## 2024-04-18 DIAGNOSIS — B18.1 HEPATITIS B, CHRONIC (H): ICD-10-CM

## 2024-04-18 DIAGNOSIS — K55.069 MESENTERIC THROMBOSIS (H): ICD-10-CM

## 2024-04-18 DIAGNOSIS — E43 SEVERE PROTEIN-CALORIE MALNUTRITION (H): ICD-10-CM

## 2024-04-18 DIAGNOSIS — N18.32 STAGE 3B CHRONIC KIDNEY DISEASE (H): Primary | ICD-10-CM

## 2024-04-18 DIAGNOSIS — R18.8 CIRRHOSIS OF LIVER WITH ASCITES, UNSPECIFIED HEPATIC CIRRHOSIS TYPE (H): ICD-10-CM

## 2024-04-18 DIAGNOSIS — K74.60 CIRRHOSIS OF LIVER WITH ASCITES, UNSPECIFIED HEPATIC CIRRHOSIS TYPE (H): Primary | ICD-10-CM

## 2024-04-18 DIAGNOSIS — B18.1 CHRONIC VIRAL HEPATITIS B WITHOUT DELTA AGENT AND WITHOUT COMA (H): ICD-10-CM

## 2024-04-18 PROCEDURE — 99000 SPECIMEN HANDLING OFFICE-LAB: CPT | Performed by: PATHOLOGY

## 2024-04-18 PROCEDURE — 99215 OFFICE O/P EST HI 40 MIN: CPT | Performed by: INTERNAL MEDICINE

## 2024-04-18 PROCEDURE — G0463 HOSPITAL OUTPT CLINIC VISIT: HCPCS | Performed by: INTERNAL MEDICINE

## 2024-04-18 PROCEDURE — T1013 SIGN LANG/ORAL INTERPRETER: HCPCS | Mod: U3

## 2024-04-18 PROCEDURE — 87517 HEPATITIS B DNA QUANT: CPT

## 2024-04-18 PROCEDURE — 36591 DRAW BLOOD OFF VENOUS DEVICE: CPT

## 2024-04-18 PROCEDURE — 82610 CYSTATIN C: CPT | Performed by: INTERNAL MEDICINE

## 2024-04-18 PROCEDURE — 250N000011 HC RX IP 250 OP 636: Performed by: INTERNAL MEDICINE

## 2024-04-18 PROCEDURE — 82105 ALPHA-FETOPROTEIN SERUM: CPT | Performed by: INTERNAL MEDICINE

## 2024-04-18 RX ORDER — SPIRONOLACTONE 100 MG/1
100 TABLET, FILM COATED ORAL DAILY
Qty: 90 TABLET | Refills: 3 | Status: SHIPPED | OUTPATIENT
Start: 2024-04-18

## 2024-04-18 RX ORDER — TENOFOVIR DISOPROXIL FUMARATE 300 MG/1
300 TABLET, FILM COATED ORAL EVERY OTHER DAY
Qty: 45 TABLET | Refills: 3 | Status: SHIPPED | OUTPATIENT
Start: 2024-04-18 | End: 2024-04-19

## 2024-04-18 RX ORDER — FUROSEMIDE 20 MG
20 TABLET ORAL DAILY
Qty: 90 TABLET | Refills: 3 | Status: SHIPPED | OUTPATIENT
Start: 2024-04-18

## 2024-04-18 RX ORDER — ALBUMIN (HUMAN) 12.5 G/50ML
50 SOLUTION INTRAVENOUS WEEKLY
Status: DISPENSED | OUTPATIENT
Start: 2024-04-18

## 2024-04-18 RX ORDER — HEPARIN SODIUM (PORCINE) LOCK FLUSH IV SOLN 100 UNIT/ML 100 UNIT/ML
5 SOLUTION INTRAVENOUS ONCE
Status: COMPLETED | OUTPATIENT
Start: 2024-04-18 | End: 2024-04-18

## 2024-04-18 RX ADMIN — Medication 5 ML: at 13:22

## 2024-04-18 ASSESSMENT — PAIN SCALES - GENERAL: PAINLEVEL: NO PAIN (0)

## 2024-04-18 NOTE — PATIENT INSTRUCTIONS
It was a pleasure taking care of you today.  I've included a brief summary of our discussion and care plan from today's visit below.  Please review this information with your primary care provider.  _______________________________________________________________________    My recommendations are summarized as follows:    - Continue tenofovir for hepatitis B; you should be taking tenofovir 300mg every other day. I will recheck your kidney function now.   - Increase spironolactone from 50mg daily to 100mg daily (fluid retention)  - Continue lasix 20mg daily (fluid retention)  - Labs in 1 week to check your kidney numbers  - Low sodium diet (as able) + high protein (at least 2 shakes per day)   - I will discuss the clots in your abdomen with your cancer specialist  - I will work to set up albumin infusions weekly close to your home    Return to Liver/Hepatology Clinic in 6 months.    _______________________________________________________________________    Scheduling Procedures or Tests  - To schedule endoscopic procedures call: [phone]  - To schedule radiology tests call: [phone] (for [facility]) or [phone] (for [facility])   _______________________________________________________________________    Who do I call with any questions after my visit?  Please be in touch if there are any further questions that arise following today's visit.  There are multiple ways to contact your gastroenterology care team.      To schedule or reschedule an appointment, please call [phone] and choose option 2 (for hepatology) and then option 1 (for scheduling).    During business hours, you may reach a nurse by calling the appointment line ([phone]) and asking to speak with a nurse    You can send a secure message through [employer] for non-urgent questions. [employer] messages are answered by your nurse or doctor typically within 24 to 48 hours. Please allow extra time on weekends and holidays.      For  urgent/emergent questions after business hours, you may reach the on-call GI Fellow by contacting the [facility]  at [phone].     How will I get the results of any tests ordered?    - If you have signed up for MyChart, any tests ordered at your visit will be available to you after your physician reviews them.  Typically this takes 1-2 weeks.    - If you do not have MyChart, your results will either be mailed to you as a letter or via a telephone call.  - If there are urgent results that require a change in your care plan, your physician or nurse will call you to discuss the next steps.     What is [employer]?  [employer] is a secure way for you to access all of your healthcare records from the [facility].  It is a web based computer program, so you can sign on to it from any location.  It also allows you to send secure messages to your care team.  I recommend signing up for [employer] access if you have not already done so and are comfortable with using a computer.      How to I schedule a follow-up visit?  If you did not schedule a follow-up visit today, please call [phone] to schedule a follow-up office visit.     Sincerely,    Yumiko Boyd MD (Lizzie)   of Medicine  Advanced & Transplant Hepatology  [facility]

## 2024-04-18 NOTE — LETTER
4/18/2024         RE: Kristen Chapman  [address] [place] [address]        Dear Colleague,    Thank you for referring your patient, Kristen Chapman, to the [facility] [place]. Please see a copy of my visit note below.    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    #. Decompensated HBV related cirrhosis  #. Ascites  #. Mesenteric Venous Thrombus   * Thrombus within SMV + splenic veins but remain patent (3/2204)   * Thrombus in the main portal and right portal vein with occlusion of the anterior branch of the right portal vein   #. 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  He has ascites that is currently managed with a Pleurx catheter that was ordered by his oncologist.  He is currently draining only 1 L every other day but has ongoing abdominal distention which interferes with his oral intake.  He also has ongoing lower extremity edema that impairs his mobility.  We discussed increasing how much he takes off daily plus adding albumin infusions weekly.  We will plan to slowly uptitrate his spironolactone to see if we can help with his lower extremity edema.  No history of hepatic encephalopathy or history of variceal bleeding    The patient was recently diagnosed with thrombus in the main portal vein, right portal vein, SMV and splenic veins. Reached out to Dr. Frias as the patient was unclear re: anticoagulation and the note from Dr. Frias is incomplete.     #. Protein calorie malnutrition, severe  #. Deconditioning    Plan  -- Continue tenofovir 300mg every other day. Pending cystatin C to adjust for renal function  -- Increase spironolactone from 50mg daily to 100mg daily (fluid retention)  -- Continue  lasix 20mg daily (fluid retention)  -- Labs in 1 week (BMP  -- Albumin infusions weekly (50g) given 5 to 14 L removed per week  -- Message sent to Dr. Frias regarding mesenteric clots   -- HCC Screening: abdominal US + AFP q6 months, next due 2024 IF benefits outweigh the risks given his malignancy  -- Variceal Screening due   -- Low sodium (< 2 grams per day) + high protein diet; recommend  2 protein shake per day    RTC: 6 months    Yumiko Boyd MD (Lizzie)  Advanced & Transplant Hepatology  [facility]    I spent 60 minutes on this encounter performing the following: reviewing the patient's medical record (clinic visits, hospital records, lab results, imaging and procedural documentation), history taking, physical exam and documentation on the date of the encounter. I also spent part of the time in coordination of care and counseling.     HPI:  HBV Cirrhosis  - dx ~   - no hx HE  - hx ascites  - no hx variceal bleed  - last EGD none prior   - HCC screenin2023 - PET/CT    HBV  - Hep B e Ag: negative  - Hep B e Ab: positive  - Last DNA: 41 (2023)  - Hepatitis delta: negative     History:   2020 - diagnosed with HBV in the setting of starting therapy for nasopharngeal carcinoma and developed rising liver studies  2021 - developed a flare in the setting of being off lamivudine (?unclear reason)  2023 - reportedly adherent to lamivudine, but developed increased viral load so entecavir was started in addition  10/2023 - patient reportedly ran out of entecavir 6 weeks prior to his ID appoitnment    Interval Hx:   Patient presented with his son-in-law.  An  was used    Between the last time I saw him and now he has been using a Pleurx catheter in his abdomen to drain any fluid for comfort.  He was told that he could drain 1 to 2 L/day but he has only been draining 1 L every other day because he was concerned about losing too much fluid.  He continues to have  abdominal distention that limits his ability to eat.  He takes 1 protein shake per day.  He continues to lose muscle mass.  He has very minimal oral intake.  He continues to take his low-dose spironolactone and Lasix.    He has epistaxis intermittently but denies any melena or hematochezia.  He has received 2 units of PRBCs in the last several weeks.  He felt better afterwards.    He reports taking his tenofovir every day -not every other day.    He remembers being told that he had clots in his abdominal veins and he thinks he was told that they might start him on a blood thinner but does not know    He becomes quite tearful when we discuss the plan of care because he does not want to die.  He continues to have facial swelling related to his cancer.    Presented with a walker today.  He denies any falls at home.    Medical hx Surgical hx   Past Medical History:   Diagnosis Date     Anemia      Dysphagia      Hepatitis B chronic      Hypothyroidism      Nasopharyngeal cancer (H)      Severe protein-calorie malnutrition (H24)      Thrombocytopenia (H24)       Past Surgical History:   Procedure Laterality Date     ENDOSCOPIC ENDONASAL SURGERY Bilateral 9/7/2021    Procedure: Nasal Endoscopy with Biopsy;  Surgeon: Ky Centeno MD;  Location: [facility] OR     IR CHEST PORT PLACEMENT > 5 YRS OF AGE  3/2/2020     IR CHEST PORT PLACEMENT > 5 YRS OF AGE  3/2/2020     IR GASTROSTOMY TUBE INSERTION  4/27/2020     IR GASTROSTOMY TUBE PERCUTANEOUS PLCMNT  4/27/2020     IR INTRAPERITONEAL CATH TUNNEL ASCITES  2/22/2024     IR INTRAPERITONEAL CATH TUNNEL ASCITES  2/26/2024     IR PORT PLACEMENT >5 YEARS  3/2/2020     IR PORT PLACEMENT >5 YEARS  3/2/2020     IR T-FASTENER REMOVAL  6/5/2020     IR T-FASTENER REMOVAL  6/5/2020          Medications  Current Outpatient Medications   Medication Sig Dispense Refill     acetaminophen (TYLENOL) 325 MG tablet Take 650 mg by mouth every 6 hours as needed        dexAMETHasone (DECADRON) 4 MG  "tablet Take 1 tablet (4 mg) by mouth daily (with breakfast) Take for 2 days after each chemotherapy dose. 10 tablet 0     furosemide (LASIX) 20 MG tablet Take 1 tablet (20 mg) by mouth daily 90 tablet 3     levothyroxine (SYNTHROID/LEVOTHROID) 175 MCG tablet Take 1 tablet (175 mcg) by mouth daily 60 tablet 1     megestrol (MEGACE) 40 MG/ML suspension Take 10 mLs (400 mg) by mouth daily 480 mL 1     spironolactone (ALDACTONE) 100 MG tablet Take 1 tablet (100 mg) by mouth daily 90 tablet 3     tenofovir (VIREAD) 300 MG tablet Take 1 tablet (300 mg) by mouth every other day 45 tablet 3       Allergies  Allergies   Allergen Reactions     Oxycodone Itching       Family hx Social hx   Family History   Problem Relation Age of Onset     Liver Cancer No family hx of      Liver Disease No family hx of       Social History     Tobacco Use     Smoking status: Never     Smokeless tobacco: Never   Substance Use Topics     Alcohol use: Not Currently     Drug use: Not Currently     - Wife and two children come check in on him  - 5 girls + 6 boys  - Alcohol: Denies  - Tobacco: Denies  - Drugs: Denies     Review of systems  A 10-point review of systems was negative.    Examination  BP (!) 88/60 (BP Location: Right arm, Patient Position: Sitting, Cuff Size: Adult Small)   Pulse 80   Resp 24   Ht 1.651 m (5' 5\")   Wt 53.5 kg (118 lb)   SpO2 98%   BMI 19.64 kg/m     Body mass index is 19.64 kg/m .    Gen-NAD  Eye- EOMI  ENT-temporal muscle wasting.  He has erythema over his right jaw which is covered with gauze.  The overlying gauze is saturated.  CVS- RRR, no murmurs  RS- CTA bilaterally  Abd-distended, soft, nontender.  Pleurx catheter in place  Extr- 2+ radial pulses bilaterally, no lower extremity edema bilaterally  MS- hands without clubbing. Walks with a walker.   Neuro- A+Ox3, no asterixis  Skin-  jaundice  Port in place, left upper chest   Psych- normal mood, tearful at times    Laboratory  BMP  Recent Labs   Lab Test " 04/18/24  1319 04/16/24  0901 04/12/24  0919 03/29/24  0951   * 129* 131* 137   POTASSIUM 4.7 4.8 4.9 4.6   CHLORIDE 103 99 100 106   GUANAKITO 7.9* 7.8* 7.4* 8.2*   CO2 18* 19* 20* 21*   BUN 43.9* 42.4* 47.2* 52.3*   CR 1.96* 1.76* 1.94* 1.75*   * 118* 107* 133*     CBC  Recent Labs   Lab Test 04/16/24  0901 04/12/24  0919 03/29/24  0951 03/01/24  0948   WBC 5.6 13.3* 8.4 5.9   RBC 2.78* 2.23* 2.76* 2.37*   HGB 8.1* 6.5* 8.1* 7.0*   HCT 25.4* 21.3* 26.0* 22.6*   MCV 91 96 94 95   MCH 29.1 29.1 29.3 29.5   MCHC 31.9 30.5* 31.2* 31.0*   RDW 16.3* 17.2* 16.7* 17.4*   PLT 61* 60* 91* 98*     Liver Enzymes   Recent Labs   Lab Test 04/18/24  1319   PROTTOTAL 5.2*   ALBUMIN 2.0*   BILITOTAL 0.5   ALKPHOS 112   AST 26   ALT 25      INR   INR   Date Value Ref Range Status   03/29/2024 1.30 (H) 0.85 - 1.15 Final       Hep B s Ag positive  HBV DNA 41 (11/6/2023)  HBV DNA 54,372,355 (4/2023)  Hep B e Ab positive  Hep B e Ag negative  Hep delta negative  HCV Ab non-reactive    Radiology  CT Chest, Abdomen and Pelvis 4/13/2024  1.  There is a large amount of mesenteric venous thrombus , new since Jan study done with contrast but otherwise age indeterminate.   2.  Thrombus seen within the SMV and splenic veins which remain patent. Thrombus extends into the main portal and right portal vein branch with occlusion of the anterior branch of the right portal vein.  3.  There are 2 new hypodense lesions in the spleen, presumed to reflect subacute, chronic infarcts. The other more rounded hypodense lesion that is not FDG avid is unchanged.  4.  Repositioning of the peritoneal catheter which is now in the pelvis.  5.  Cirrhotic liver with a large amount of ascites and varices as noted above.  6.  Ascites is larger than before. Is catheter functioning or not being used?  7.  Increased mesenteric stranding that appears to be related to the fluid and ascites given its density. No clear measurable tumor nodules.   8.  Stable right  supraclavicular enlarged lymph node with incompletely seen large infiltrative tumor about the right mandible extending to the skin. See neck CT report, please.  9.  No new sites of metastatic disease in the chest, abdomen or pelvis.  10.  Stable 4.3 cm ascending aortic aneurysm.  11.  Findings discussed by the undersigned with Dr. Alan Frias at 1138.          Again, thank you for allowing me to participate in the care of your patient.        Sincerely,        Yumiko Boyd MD

## 2024-04-18 NOTE — NURSING NOTE
"Chief Complaint   Patient presents with    RECHECK     Hep B     Vital signs:      BP: (!) 88/60 Pulse: 80   Resp: 24 SpO2: 98 %     Height: 165.1 cm (5' 5\") Weight: 53.5 kg (118 lb)  Estimated body mass index is 19.64 kg/m  as calculated from the following:    Height as of this encounter: 1.651 m (5' 5\").    Weight as of this encounter: 53.5 kg (118 lb).      Calista Dick, [facility]  4/18/2024 1:40 PM    "

## 2024-04-18 NOTE — NURSING NOTE
"Chief Complaint   Patient presents with    Labs Only    Port Draw     Labs drawn via port by RN.     Port accessed with 20 gauge, 3/4\" power needle by RN, labs collected, line flushed with saline and heparin, then de-accessed.     Ale Herbert RN    "

## 2024-04-18 NOTE — PROGRESS NOTES
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    #. Decompensated HBV related cirrhosis  #. Ascites  #. Mesenteric Venous Thrombus   * Thrombus within SMV + splenic veins but remain patent (3/2204)   * Thrombus in the main portal and right portal vein with occlusion of the anterior branch of the right portal vein   #. 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  He has ascites that is currently managed with a Pleurx catheter that was ordered by his oncologist.  He is currently draining only 1 L every other day but has ongoing abdominal distention which interferes with his oral intake.  He also has ongoing lower extremity edema that impairs his mobility.  We discussed increasing how much he takes off daily plus adding albumin infusions weekly.  We will plan to slowly uptitrate his spironolactone to see if we can help with his lower extremity edema.  No history of hepatic encephalopathy or history of variceal bleeding    The patient was recently diagnosed with thrombus in the main portal vein, right portal vein, SMV and splenic veins. Reached out to Dr. Frias as the patient was unclear re: anticoagulation and the note from Dr. Frias is incomplete.     #. Protein calorie malnutrition, severe  #. Deconditioning    Plan  -- Continue tenofovir 300mg every other day. Pending cystatin C to adjust for renal function  -- Increase spironolactone from 50mg daily to 100mg daily (fluid retention)  -- Continue lasix 20mg daily (fluid retention)  -- Labs in 1 week (BMP  -- Albumin infusions weekly (50g) given 5 to 14 L removed per week  -- Message sent to Dr. Frias regarding mesenteric clots   -- HCC Screening: abdominal US + AFP q6 months, next due 9/2024  IF benefits outweigh the risks given his malignancy  -- Variceal Screening due   -- Low sodium (< 2 grams per day) + high protein diet; recommend  2 protein shake per day    RTC: 6 months    Yumiko Boyd MD (Lizzie)  Advanced & Transplant Hepatology  [facility]    I spent 60 minutes on this encounter performing the following: reviewing the patient's medical record (clinic visits, hospital records, lab results, imaging and procedural documentation), history taking, physical exam and documentation on the date of the encounter. I also spent part of the time in coordination of care and counseling.     HPI:  HBV Cirrhosis  - dx ~   - no hx HE  - hx ascites  - no hx variceal bleed  - last EGD none prior   - HCC screenin2023 - PET/CT    HBV  - Hep B e Ag: negative  - Hep B e Ab: positive  - Last DNA: 41 (2023)  - Hepatitis delta: negative     History:   2020 - diagnosed with HBV in the setting of starting therapy for nasopharngeal carcinoma and developed rising liver studies  2021 - developed a flare in the setting of being off lamivudine (?unclear reason)  2023 - reportedly adherent to lamivudine, but developed increased viral load so entecavir was started in addition  10/2023 - patient reportedly ran out of entecavir 6 weeks prior to his ID appoitnment    Interval Hx:   Patient presented with his son-in-law.  An  was used    Between the last time I saw him and now he has been using a Pleurx catheter in his abdomen to drain any fluid for comfort.  He was told that he could drain 1 to 2 L/day but he has only been draining 1 L every other day because he was concerned about losing too much fluid.  He continues to have abdominal distention that limits his ability to eat.  He takes 1 protein shake per day.  He continues to lose muscle mass.  He has very minimal oral intake.  He continues to take his low-dose spironolactone and Lasix.    He has epistaxis intermittently  but denies any melena or hematochezia.  He has received 2 units of PRBCs in the last several weeks.  He felt better afterwards.    He reports taking his tenofovir every day -not every other day.    He remembers being told that he had clots in his abdominal veins and he thinks he was told that they might start him on a blood thinner but does not know    He becomes quite tearful when we discuss the plan of care because he does not want to die.  He continues to have facial swelling related to his cancer.    Presented with a walker today.  He denies any falls at home.    Medical hx Surgical hx   Past Medical History:   Diagnosis Date    Anemia     Dysphagia     Hepatitis B chronic     Hypothyroidism     Nasopharyngeal cancer (H)     Severe protein-calorie malnutrition (H24)     Thrombocytopenia (H24)       Past Surgical History:   Procedure Laterality Date    ENDOSCOPIC ENDONASAL SURGERY Bilateral 9/7/2021    Procedure: Nasal Endoscopy with Biopsy;  Surgeon: Ky Centeno MD;  Location: [facility] OR    IR CHEST PORT PLACEMENT > 5 YRS OF AGE  3/2/2020    IR CHEST PORT PLACEMENT > 5 YRS OF AGE  3/2/2020    IR GASTROSTOMY TUBE INSERTION  4/27/2020    IR GASTROSTOMY TUBE PERCUTANEOUS PLCMNT  4/27/2020    IR INTRAPERITONEAL CATH TUNNEL ASCITES  2/22/2024    IR INTRAPERITONEAL CATH TUNNEL ASCITES  2/26/2024    IR PORT PLACEMENT >5 YEARS  3/2/2020    IR PORT PLACEMENT >5 YEARS  3/2/2020    IR T-FASTENER REMOVAL  6/5/2020    IR T-FASTENER REMOVAL  6/5/2020          Medications  Current Outpatient Medications   Medication Sig Dispense Refill    acetaminophen (TYLENOL) 325 MG tablet Take 650 mg by mouth every 6 hours as needed       dexAMETHasone (DECADRON) 4 MG tablet Take 1 tablet (4 mg) by mouth daily (with breakfast) Take for 2 days after each chemotherapy dose. 10 tablet 0    furosemide (LASIX) 20 MG tablet Take 1 tablet (20 mg) by mouth daily 90 tablet 3    levothyroxine (SYNTHROID/LEVOTHROID) 175 MCG tablet Take 1 tablet  "(175 mcg) by mouth daily 60 tablet 1    megestrol (MEGACE) 40 MG/ML suspension Take 10 mLs (400 mg) by mouth daily 480 mL 1    spironolactone (ALDACTONE) 100 MG tablet Take 1 tablet (100 mg) by mouth daily 90 tablet 3    tenofovir (VIREAD) 300 MG tablet Take 1 tablet (300 mg) by mouth every other day 45 tablet 3       Allergies  Allergies   Allergen Reactions    Oxycodone Itching       Family hx Social hx   Family History   Problem Relation Age of Onset    Liver Cancer No family hx of     Liver Disease No family hx of       Social History     Tobacco Use    Smoking status: Never    Smokeless tobacco: Never   Substance Use Topics    Alcohol use: Not Currently    Drug use: Not Currently     - Wife and two children come check in on him  - 5 girls + 6 boys  - Alcohol: Denies  - Tobacco: Denies  - Drugs: Denies     Review of systems  A 10-point review of systems was negative.    Examination  BP (!) 88/60 (BP Location: Right arm, Patient Position: Sitting, Cuff Size: Adult Small)   Pulse 80   Resp 24   Ht 1.651 m (5' 5\")   Wt 53.5 kg (118 lb)   SpO2 98%   BMI 19.64 kg/m     Body mass index is 19.64 kg/m .    Gen-NAD  Eye- EOMI  ENT-temporal muscle wasting.  He has erythema over his right jaw which is covered with gauze.  The overlying gauze is saturated.  CVS- RRR, no murmurs  RS- CTA bilaterally  Abd-distended, soft, nontender.  Pleurx catheter in place  Extr- 2+ radial pulses bilaterally, no lower extremity edema bilaterally  MS- hands without clubbing. Walks with a walker.   Neuro- A+Ox3, no asterixis  Skin-  jaundice  Port in place, left upper chest   Psych- normal mood, tearful at times    Laboratory  BMP  Recent Labs   Lab Test 04/18/24  1319 04/16/24  0901 04/12/24  0919 03/29/24  0951   * 129* 131* 137   POTASSIUM 4.7 4.8 4.9 4.6   CHLORIDE 103 99 100 106   GUANAKITO 7.9* 7.8* 7.4* 8.2*   CO2 18* 19* 20* 21*   BUN 43.9* 42.4* 47.2* 52.3*   CR 1.96* 1.76* 1.94* 1.75*   * 118* 107* 133*     CBC  Recent " Labs   Lab Test 04/16/24  0901 04/12/24  0919 03/29/24  0951 03/01/24  0948   WBC 5.6 13.3* 8.4 5.9   RBC 2.78* 2.23* 2.76* 2.37*   HGB 8.1* 6.5* 8.1* 7.0*   HCT 25.4* 21.3* 26.0* 22.6*   MCV 91 96 94 95   MCH 29.1 29.1 29.3 29.5   MCHC 31.9 30.5* 31.2* 31.0*   RDW 16.3* 17.2* 16.7* 17.4*   PLT 61* 60* 91* 98*     Liver Enzymes   Recent Labs   Lab Test 04/18/24  1319   PROTTOTAL 5.2*   ALBUMIN 2.0*   BILITOTAL 0.5   ALKPHOS 112   AST 26   ALT 25      INR   INR   Date Value Ref Range Status   03/29/2024 1.30 (H) 0.85 - 1.15 Final       Hep B s Ag positive  HBV DNA 41 (11/6/2023)  HBV DNA 54,372,355 (4/2023)  Hep B e Ab positive  Hep B e Ag negative  Hep delta negative  HCV Ab non-reactive    Radiology  CT Chest, Abdomen and Pelvis 4/13/2024  1.  There is a large amount of mesenteric venous thrombus , new since Jan study done with contrast but otherwise age indeterminate.   2.  Thrombus seen within the SMV and splenic veins which remain patent. Thrombus extends into the main portal and right portal vein branch with occlusion of the anterior branch of the right portal vein.  3.  There are 2 new hypodense lesions in the spleen, presumed to reflect subacute, chronic infarcts. The other more rounded hypodense lesion that is not FDG avid is unchanged.  4.  Repositioning of the peritoneal catheter which is now in the pelvis.  5.  Cirrhotic liver with a large amount of ascites and varices as noted above.  6.  Ascites is larger than before. Is catheter functioning or not being used?  7.  Increased mesenteric stranding that appears to be related to the fluid and ascites given its density. No clear measurable tumor nodules.   8.  Stable right supraclavicular enlarged lymph node with incompletely seen large infiltrative tumor about the right mandible extending to the skin. See neck CT report, please.  9.  No new sites of metastatic disease in the chest, abdomen or pelvis.  10.  Stable 4.3 cm ascending aortic aneurysm.  11.   Findings discussed by the undersigned with Dr. Alan Frias at 1138.

## 2024-04-19 ENCOUNTER — DOCUMENTATION ONLY (OUTPATIENT)
Dept: ONCOLOGY | Facility: HOSPITAL | Age: 74
End: 2024-04-19
Payer: COMMERCIAL

## 2024-04-19 DIAGNOSIS — B18.1 CHRONIC VIRAL HEPATITIS B WITHOUT DELTA AGENT AND WITHOUT COMA (H): ICD-10-CM

## 2024-04-19 DIAGNOSIS — R18.8 CIRRHOSIS OF LIVER WITH ASCITES, UNSPECIFIED HEPATIC CIRRHOSIS TYPE (H): ICD-10-CM

## 2024-04-19 DIAGNOSIS — K74.60 CIRRHOSIS OF LIVER WITH ASCITES, UNSPECIFIED HEPATIC CIRRHOSIS TYPE (H): ICD-10-CM

## 2024-04-19 LAB
HBV DNA SERPL NAA+PROBE-ACNC: 308 IU/ML
HBV DNA SERPL NAA+PROBE-LOG IU: 2.5 {LOG_IU}/ML

## 2024-04-19 RX ORDER — TENOFOVIR DISOPROXIL FUMARATE 300 MG/1
300 TABLET, FILM COATED ORAL
Qty: 45 TABLET | Refills: 3 | Status: SHIPPED | OUTPATIENT
Start: 2024-04-19

## 2024-04-19 NOTE — PROGRESS NOTES
Patients son Nayla stopped into clinic looking for Dr. Frias to fill out Family FMLA on his behalf to take care of pt. I spoke with Nayla regarding pt needing to fill out an INDIRA for us to complete FMLA paperwork. Jamarcusalesha also inquired about us filling out FMLA for other siblings out of state. I informed Nayla that we can only discuss pt information and fill out forms for people listed on pt's consent to communicate, at this time pt has the max number of 3 people that we allow on consent to communicate. Nayla came to clinic without pt and inquired if he could take home a blank INDIRA for himself and pt to fill out and bring back to clinic on Tuesday, 04/23/24. Nayla was given a blank INDIRA and he also took home his FMLA paperwork to bring back on Tuesday. I instructed Nayla to tell the  when they arrive on Tuesday to let them know he is bringing back paperwork and INDIRA for Dr. Frias to complete on his behalf, then the  will give the paperwork to the appropriate people. Nayla verbalized understanding and exited the clinic.

## 2024-04-22 ENCOUNTER — PATIENT OUTREACH (OUTPATIENT)
Dept: ONCOLOGY | Facility: HOSPITAL | Age: 74
End: 2024-04-22
Payer: COMMERCIAL

## 2024-04-22 NOTE — PROGRESS NOTES
[facility]: Cancer Care                                                                                          Talked to patient's daughter Rea about patient starting xarelto and if she had any questions after speaking with Leonie. Patient's daughter did not have any follow up questions.    Signature:  Marjorie Briones RN

## 2024-04-23 ENCOUNTER — INFUSION THERAPY VISIT (OUTPATIENT)
Dept: INFUSION THERAPY | Facility: HOSPITAL | Age: 74
End: 2024-04-23
Attending: INTERNAL MEDICINE
Payer: COMMERCIAL

## 2024-04-23 VITALS
BODY MASS INDEX: 19.64 KG/M2 | RESPIRATION RATE: 20 BRPM | WEIGHT: 118 LBS | TEMPERATURE: 99.5 F | OXYGEN SATURATION: 100 % | DIASTOLIC BLOOD PRESSURE: 57 MMHG | HEART RATE: 83 BPM | SYSTOLIC BLOOD PRESSURE: 93 MMHG

## 2024-04-23 DIAGNOSIS — C11.9 SQUAMOUS CELL CARCINOMA OF NASOPHARYNX (H): Primary | ICD-10-CM

## 2024-04-23 LAB
ALBUMIN SERPL BCG-MCNC: 2 G/DL (ref 3.5–5.2)
ALP SERPL-CCNC: 115 U/L (ref 40–150)
ALT SERPL W P-5'-P-CCNC: 34 U/L (ref 0–70)
ANION GAP SERPL CALCULATED.3IONS-SCNC: 12 MMOL/L (ref 7–15)
AST SERPL W P-5'-P-CCNC: 48 U/L (ref 0–45)
BASOPHILS # BLD AUTO: 0 10E3/UL (ref 0–0.2)
BASOPHILS NFR BLD AUTO: 0 %
BILIRUB SERPL-MCNC: 0.7 MG/DL
BUN SERPL-MCNC: 38.3 MG/DL (ref 8–23)
CALCIUM SERPL-MCNC: 8 MG/DL (ref 8.8–10.2)
CEA SERPL-MCNC: 4.3 NG/ML
CHLORIDE SERPL-SCNC: 99 MMOL/L (ref 98–107)
CREAT SERPL-MCNC: 2 MG/DL (ref 0.67–1.17)
DEPRECATED HCO3 PLAS-SCNC: 18 MMOL/L (ref 22–29)
EGFRCR SERPLBLD CKD-EPI 2021: 35 ML/MIN/1.73M2
EOSINOPHIL # BLD AUTO: 0 10E3/UL (ref 0–0.7)
EOSINOPHIL NFR BLD AUTO: 0 %
ERYTHROCYTE [DISTWIDTH] IN BLOOD BY AUTOMATED COUNT: 16.7 % (ref 10–15)
GLUCOSE SERPL-MCNC: 145 MG/DL (ref 70–99)
HCT VFR BLD AUTO: 27.5 % (ref 40–53)
HGB BLD-MCNC: 8.8 G/DL (ref 13.3–17.7)
IMM GRANULOCYTES # BLD: 0.1 10E3/UL
IMM GRANULOCYTES NFR BLD: 1 %
LYMPHOCYTES # BLD AUTO: 0.7 10E3/UL (ref 0.8–5.3)
LYMPHOCYTES NFR BLD AUTO: 5 %
MCH RBC QN AUTO: 28.6 PG (ref 26.5–33)
MCHC RBC AUTO-ENTMCNC: 32 G/DL (ref 31.5–36.5)
MCV RBC AUTO: 89 FL (ref 78–100)
MONOCYTES # BLD AUTO: 1.1 10E3/UL (ref 0–1.3)
MONOCYTES NFR BLD AUTO: 8 %
NEUTROPHILS # BLD AUTO: 12.1 10E3/UL (ref 1.6–8.3)
NEUTROPHILS NFR BLD AUTO: 86 %
NRBC # BLD AUTO: 0 10E3/UL
NRBC BLD AUTO-RTO: 0 /100
PLATELET # BLD AUTO: 119 10E3/UL (ref 150–450)
POTASSIUM SERPL-SCNC: 4.7 MMOL/L (ref 3.4–5.3)
PROT SERPL-MCNC: 5.1 G/DL (ref 6.4–8.3)
RBC # BLD AUTO: 3.08 10E6/UL (ref 4.4–5.9)
SODIUM SERPL-SCNC: 129 MMOL/L (ref 135–145)
WBC # BLD AUTO: 14.1 10E3/UL (ref 4–11)

## 2024-04-23 PROCEDURE — 82378 CARCINOEMBRYONIC ANTIGEN: CPT | Performed by: INTERNAL MEDICINE

## 2024-04-23 PROCEDURE — 250N000011 HC RX IP 250 OP 636: Performed by: INTERNAL MEDICINE

## 2024-04-23 PROCEDURE — 36591 DRAW BLOOD OFF VENOUS DEVICE: CPT | Performed by: INTERNAL MEDICINE

## 2024-04-23 PROCEDURE — 86300 IMMUNOASSAY TUMOR CA 15-3: CPT | Performed by: INTERNAL MEDICINE

## 2024-04-23 PROCEDURE — 96375 TX/PRO/DX INJ NEW DRUG ADDON: CPT

## 2024-04-23 PROCEDURE — 85004 AUTOMATED DIFF WBC COUNT: CPT | Performed by: INTERNAL MEDICINE

## 2024-04-23 PROCEDURE — 96413 CHEMO IV INFUSION 1 HR: CPT

## 2024-04-23 PROCEDURE — 258N000003 HC RX IP 258 OP 636: Performed by: INTERNAL MEDICINE

## 2024-04-23 PROCEDURE — 82040 ASSAY OF SERUM ALBUMIN: CPT | Performed by: INTERNAL MEDICINE

## 2024-04-23 RX ORDER — EPINEPHRINE 1 MG/ML
0.3 INJECTION, SOLUTION INTRAMUSCULAR; SUBCUTANEOUS EVERY 5 MIN PRN
Status: CANCELLED | OUTPATIENT
Start: 2024-05-21

## 2024-04-23 RX ORDER — EPINEPHRINE 1 MG/ML
0.3 INJECTION, SOLUTION INTRAMUSCULAR; SUBCUTANEOUS EVERY 5 MIN PRN
Status: CANCELLED | OUTPATIENT
Start: 2024-05-07

## 2024-04-23 RX ORDER — LORAZEPAM 2 MG/ML
0.5 INJECTION INTRAMUSCULAR EVERY 4 HOURS PRN
Status: CANCELLED | OUTPATIENT
Start: 2024-04-30

## 2024-04-23 RX ORDER — DIPHENHYDRAMINE HYDROCHLORIDE 50 MG/ML
50 INJECTION INTRAMUSCULAR; INTRAVENOUS
Status: CANCELLED
Start: 2024-04-30

## 2024-04-23 RX ORDER — HEPARIN SODIUM (PORCINE) LOCK FLUSH IV SOLN 100 UNIT/ML 100 UNIT/ML
5 SOLUTION INTRAVENOUS
Status: CANCELLED | OUTPATIENT
Start: 2024-04-23

## 2024-04-23 RX ORDER — EPINEPHRINE 1 MG/ML
0.3 INJECTION, SOLUTION INTRAMUSCULAR; SUBCUTANEOUS EVERY 5 MIN PRN
Status: CANCELLED | OUTPATIENT
Start: 2024-06-04

## 2024-04-23 RX ORDER — ONDANSETRON 2 MG/ML
8 INJECTION INTRAMUSCULAR; INTRAVENOUS ONCE
Status: CANCELLED | OUTPATIENT
Start: 2024-05-21

## 2024-04-23 RX ORDER — MEPERIDINE HYDROCHLORIDE 25 MG/ML
25 INJECTION INTRAMUSCULAR; INTRAVENOUS; SUBCUTANEOUS EVERY 30 MIN PRN
Status: CANCELLED | OUTPATIENT
Start: 2024-05-28

## 2024-04-23 RX ORDER — METHYLPREDNISOLONE SODIUM SUCCINATE 125 MG/2ML
125 INJECTION, POWDER, LYOPHILIZED, FOR SOLUTION INTRAMUSCULAR; INTRAVENOUS
Status: CANCELLED
Start: 2024-05-07

## 2024-04-23 RX ORDER — ALBUTEROL SULFATE 0.83 MG/ML
2.5 SOLUTION RESPIRATORY (INHALATION)
Status: CANCELLED | OUTPATIENT
Start: 2024-06-04

## 2024-04-23 RX ORDER — LORAZEPAM 2 MG/ML
0.5 INJECTION INTRAMUSCULAR EVERY 4 HOURS PRN
Status: CANCELLED | OUTPATIENT
Start: 2024-06-04

## 2024-04-23 RX ORDER — HEPARIN SODIUM,PORCINE 10 UNIT/ML
5-20 VIAL (ML) INTRAVENOUS DAILY PRN
Status: CANCELLED | OUTPATIENT
Start: 2024-06-04

## 2024-04-23 RX ORDER — METHYLPREDNISOLONE SODIUM SUCCINATE 125 MG/2ML
125 INJECTION, POWDER, LYOPHILIZED, FOR SOLUTION INTRAMUSCULAR; INTRAVENOUS
Status: CANCELLED
Start: 2024-05-21

## 2024-04-23 RX ORDER — MEPERIDINE HYDROCHLORIDE 25 MG/ML
25 INJECTION INTRAMUSCULAR; INTRAVENOUS; SUBCUTANEOUS EVERY 30 MIN PRN
Status: CANCELLED | OUTPATIENT
Start: 2024-05-21

## 2024-04-23 RX ORDER — ONDANSETRON 2 MG/ML
8 INJECTION INTRAMUSCULAR; INTRAVENOUS ONCE
Status: CANCELLED | OUTPATIENT
Start: 2024-05-28

## 2024-04-23 RX ORDER — ONDANSETRON 2 MG/ML
8 INJECTION INTRAMUSCULAR; INTRAVENOUS ONCE
Status: CANCELLED | OUTPATIENT
Start: 2024-04-30

## 2024-04-23 RX ORDER — HEPARIN SODIUM,PORCINE 10 UNIT/ML
5-20 VIAL (ML) INTRAVENOUS DAILY PRN
Status: CANCELLED | OUTPATIENT
Start: 2024-04-30

## 2024-04-23 RX ORDER — HEPARIN SODIUM,PORCINE 10 UNIT/ML
5-20 VIAL (ML) INTRAVENOUS DAILY PRN
Status: CANCELLED | OUTPATIENT
Start: 2024-05-28

## 2024-04-23 RX ORDER — ALBUTEROL SULFATE 90 UG/1
1-2 AEROSOL, METERED RESPIRATORY (INHALATION)
Status: CANCELLED
Start: 2024-05-07

## 2024-04-23 RX ORDER — MEPERIDINE HYDROCHLORIDE 25 MG/ML
25 INJECTION INTRAMUSCULAR; INTRAVENOUS; SUBCUTANEOUS EVERY 30 MIN PRN
Status: CANCELLED | OUTPATIENT
Start: 2024-05-07

## 2024-04-23 RX ORDER — LORAZEPAM 2 MG/ML
0.5 INJECTION INTRAMUSCULAR EVERY 4 HOURS PRN
Status: CANCELLED | OUTPATIENT
Start: 2024-05-28

## 2024-04-23 RX ORDER — EPINEPHRINE 1 MG/ML
0.3 INJECTION, SOLUTION INTRAMUSCULAR; SUBCUTANEOUS EVERY 5 MIN PRN
Status: CANCELLED | OUTPATIENT
Start: 2024-04-23

## 2024-04-23 RX ORDER — EPINEPHRINE 1 MG/ML
0.3 INJECTION, SOLUTION INTRAMUSCULAR; SUBCUTANEOUS EVERY 5 MIN PRN
Status: DISCONTINUED | OUTPATIENT
Start: 2024-04-23 | End: 2024-04-23 | Stop reason: HOSPADM

## 2024-04-23 RX ORDER — ONDANSETRON 2 MG/ML
8 INJECTION INTRAMUSCULAR; INTRAVENOUS ONCE
Status: CANCELLED | OUTPATIENT
Start: 2024-06-04

## 2024-04-23 RX ORDER — ONDANSETRON 2 MG/ML
8 INJECTION INTRAMUSCULAR; INTRAVENOUS ONCE
Status: COMPLETED | OUTPATIENT
Start: 2024-04-23 | End: 2024-04-23

## 2024-04-23 RX ORDER — HEPARIN SODIUM (PORCINE) LOCK FLUSH IV SOLN 100 UNIT/ML 100 UNIT/ML
5 SOLUTION INTRAVENOUS
Status: CANCELLED | OUTPATIENT
Start: 2024-06-04

## 2024-04-23 RX ORDER — ALBUTEROL SULFATE 0.83 MG/ML
2.5 SOLUTION RESPIRATORY (INHALATION)
Status: CANCELLED | OUTPATIENT
Start: 2024-05-21

## 2024-04-23 RX ORDER — LORAZEPAM 2 MG/ML
0.5 INJECTION INTRAMUSCULAR EVERY 4 HOURS PRN
Status: CANCELLED | OUTPATIENT
Start: 2024-05-21

## 2024-04-23 RX ORDER — DIPHENHYDRAMINE HYDROCHLORIDE 50 MG/ML
50 INJECTION INTRAMUSCULAR; INTRAVENOUS
Status: CANCELLED
Start: 2024-05-21

## 2024-04-23 RX ORDER — METHYLPREDNISOLONE SODIUM SUCCINATE 125 MG/2ML
125 INJECTION, POWDER, LYOPHILIZED, FOR SOLUTION INTRAMUSCULAR; INTRAVENOUS
Status: CANCELLED
Start: 2024-04-23

## 2024-04-23 RX ORDER — HEPARIN SODIUM,PORCINE 10 UNIT/ML
5-20 VIAL (ML) INTRAVENOUS DAILY PRN
Status: CANCELLED | OUTPATIENT
Start: 2024-05-21

## 2024-04-23 RX ORDER — ONDANSETRON 2 MG/ML
8 INJECTION INTRAMUSCULAR; INTRAVENOUS ONCE
Status: CANCELLED | OUTPATIENT
Start: 2024-04-23

## 2024-04-23 RX ORDER — METHYLPREDNISOLONE SODIUM SUCCINATE 125 MG/2ML
125 INJECTION, POWDER, LYOPHILIZED, FOR SOLUTION INTRAMUSCULAR; INTRAVENOUS
Status: CANCELLED
Start: 2024-06-04

## 2024-04-23 RX ORDER — DIPHENHYDRAMINE HYDROCHLORIDE 50 MG/ML
50 INJECTION INTRAMUSCULAR; INTRAVENOUS
Status: CANCELLED
Start: 2024-05-07

## 2024-04-23 RX ORDER — MEPERIDINE HYDROCHLORIDE 25 MG/ML
25 INJECTION INTRAMUSCULAR; INTRAVENOUS; SUBCUTANEOUS EVERY 30 MIN PRN
Status: CANCELLED | OUTPATIENT
Start: 2024-06-04

## 2024-04-23 RX ORDER — ALBUTEROL SULFATE 0.83 MG/ML
2.5 SOLUTION RESPIRATORY (INHALATION)
Status: CANCELLED | OUTPATIENT
Start: 2024-05-28

## 2024-04-23 RX ORDER — LORAZEPAM 2 MG/ML
0.5 INJECTION INTRAMUSCULAR EVERY 4 HOURS PRN
Status: CANCELLED | OUTPATIENT
Start: 2024-04-23

## 2024-04-23 RX ORDER — ALBUTEROL SULFATE 0.83 MG/ML
2.5 SOLUTION RESPIRATORY (INHALATION)
Status: CANCELLED | OUTPATIENT
Start: 2024-05-07

## 2024-04-23 RX ORDER — HEPARIN SODIUM (PORCINE) LOCK FLUSH IV SOLN 100 UNIT/ML 100 UNIT/ML
5 SOLUTION INTRAVENOUS
Status: CANCELLED | OUTPATIENT
Start: 2024-04-30

## 2024-04-23 RX ORDER — DIPHENHYDRAMINE HYDROCHLORIDE 50 MG/ML
50 INJECTION INTRAMUSCULAR; INTRAVENOUS
Status: DISCONTINUED | OUTPATIENT
Start: 2024-04-23 | End: 2024-04-23 | Stop reason: HOSPADM

## 2024-04-23 RX ORDER — ALBUTEROL SULFATE 0.83 MG/ML
2.5 SOLUTION RESPIRATORY (INHALATION)
Status: CANCELLED | OUTPATIENT
Start: 2024-04-30

## 2024-04-23 RX ORDER — EPINEPHRINE 1 MG/ML
0.3 INJECTION, SOLUTION INTRAMUSCULAR; SUBCUTANEOUS EVERY 5 MIN PRN
Status: CANCELLED | OUTPATIENT
Start: 2024-04-30

## 2024-04-23 RX ORDER — ALBUTEROL SULFATE 0.83 MG/ML
2.5 SOLUTION RESPIRATORY (INHALATION)
Status: DISCONTINUED | OUTPATIENT
Start: 2024-04-23 | End: 2024-04-23 | Stop reason: HOSPADM

## 2024-04-23 RX ORDER — HEPARIN SODIUM,PORCINE 10 UNIT/ML
5-20 VIAL (ML) INTRAVENOUS DAILY PRN
Status: CANCELLED | OUTPATIENT
Start: 2024-05-07

## 2024-04-23 RX ORDER — PROCHLORPERAZINE MALEATE 10 MG
10 TABLET ORAL EVERY 6 HOURS PRN
Qty: 30 TABLET | Refills: 2 | Status: SHIPPED | OUTPATIENT
Start: 2024-04-23

## 2024-04-23 RX ORDER — DIPHENHYDRAMINE HYDROCHLORIDE 50 MG/ML
50 INJECTION INTRAMUSCULAR; INTRAVENOUS
Status: CANCELLED
Start: 2024-06-04

## 2024-04-23 RX ORDER — ALBUTEROL SULFATE 90 UG/1
1-2 AEROSOL, METERED RESPIRATORY (INHALATION)
Status: CANCELLED
Start: 2024-04-23

## 2024-04-23 RX ORDER — METHYLPREDNISOLONE SODIUM SUCCINATE 125 MG/2ML
125 INJECTION, POWDER, LYOPHILIZED, FOR SOLUTION INTRAMUSCULAR; INTRAVENOUS
Status: DISCONTINUED | OUTPATIENT
Start: 2024-04-23 | End: 2024-04-23 | Stop reason: HOSPADM

## 2024-04-23 RX ORDER — METHYLPREDNISOLONE SODIUM SUCCINATE 125 MG/2ML
125 INJECTION, POWDER, LYOPHILIZED, FOR SOLUTION INTRAMUSCULAR; INTRAVENOUS
Status: CANCELLED
Start: 2024-04-30

## 2024-04-23 RX ORDER — DIPHENHYDRAMINE HYDROCHLORIDE 50 MG/ML
50 INJECTION INTRAMUSCULAR; INTRAVENOUS
Status: CANCELLED
Start: 2024-04-23

## 2024-04-23 RX ORDER — MEPERIDINE HYDROCHLORIDE 25 MG/ML
25 INJECTION INTRAMUSCULAR; INTRAVENOUS; SUBCUTANEOUS EVERY 30 MIN PRN
Status: CANCELLED | OUTPATIENT
Start: 2024-04-23

## 2024-04-23 RX ORDER — LORAZEPAM 2 MG/ML
0.5 INJECTION INTRAMUSCULAR EVERY 4 HOURS PRN
Status: CANCELLED | OUTPATIENT
Start: 2024-05-07

## 2024-04-23 RX ORDER — METHYLPREDNISOLONE SODIUM SUCCINATE 125 MG/2ML
125 INJECTION, POWDER, LYOPHILIZED, FOR SOLUTION INTRAMUSCULAR; INTRAVENOUS
Status: CANCELLED
Start: 2024-05-28

## 2024-04-23 RX ORDER — ALBUTEROL SULFATE 90 UG/1
1-2 AEROSOL, METERED RESPIRATORY (INHALATION)
Status: CANCELLED
Start: 2024-04-30

## 2024-04-23 RX ORDER — HEPARIN SODIUM (PORCINE) LOCK FLUSH IV SOLN 100 UNIT/ML 100 UNIT/ML
5 SOLUTION INTRAVENOUS
Status: CANCELLED | OUTPATIENT
Start: 2024-05-21

## 2024-04-23 RX ORDER — MEPERIDINE HYDROCHLORIDE 25 MG/ML
25 INJECTION INTRAMUSCULAR; INTRAVENOUS; SUBCUTANEOUS EVERY 30 MIN PRN
Status: CANCELLED | OUTPATIENT
Start: 2024-04-30

## 2024-04-23 RX ORDER — ONDANSETRON 2 MG/ML
8 INJECTION INTRAMUSCULAR; INTRAVENOUS ONCE
Status: CANCELLED | OUTPATIENT
Start: 2024-05-07

## 2024-04-23 RX ORDER — EPINEPHRINE 1 MG/ML
0.3 INJECTION, SOLUTION INTRAMUSCULAR; SUBCUTANEOUS EVERY 5 MIN PRN
Status: CANCELLED | OUTPATIENT
Start: 2024-05-28

## 2024-04-23 RX ORDER — ALBUTEROL SULFATE 90 UG/1
1-2 AEROSOL, METERED RESPIRATORY (INHALATION)
Status: CANCELLED
Start: 2024-05-21

## 2024-04-23 RX ORDER — HEPARIN SODIUM (PORCINE) LOCK FLUSH IV SOLN 100 UNIT/ML 100 UNIT/ML
5 SOLUTION INTRAVENOUS
Status: CANCELLED | OUTPATIENT
Start: 2024-05-28

## 2024-04-23 RX ORDER — ALBUTEROL SULFATE 90 UG/1
1-2 AEROSOL, METERED RESPIRATORY (INHALATION)
Status: CANCELLED
Start: 2024-05-28

## 2024-04-23 RX ORDER — HEPARIN SODIUM (PORCINE) LOCK FLUSH IV SOLN 100 UNIT/ML 100 UNIT/ML
5 SOLUTION INTRAVENOUS
Status: CANCELLED | OUTPATIENT
Start: 2024-05-07

## 2024-04-23 RX ORDER — DIPHENHYDRAMINE HYDROCHLORIDE 50 MG/ML
50 INJECTION INTRAMUSCULAR; INTRAVENOUS
Status: CANCELLED
Start: 2024-05-28

## 2024-04-23 RX ORDER — HEPARIN SODIUM,PORCINE 10 UNIT/ML
5-20 VIAL (ML) INTRAVENOUS DAILY PRN
Status: CANCELLED | OUTPATIENT
Start: 2024-04-23

## 2024-04-23 RX ORDER — HEPARIN SODIUM (PORCINE) LOCK FLUSH IV SOLN 100 UNIT/ML 100 UNIT/ML
5 SOLUTION INTRAVENOUS
Status: DISCONTINUED | OUTPATIENT
Start: 2024-04-23 | End: 2024-04-23 | Stop reason: HOSPADM

## 2024-04-23 RX ORDER — ALBUTEROL SULFATE 90 UG/1
1-2 AEROSOL, METERED RESPIRATORY (INHALATION)
Status: DISCONTINUED | OUTPATIENT
Start: 2024-04-23 | End: 2024-04-23 | Stop reason: HOSPADM

## 2024-04-23 RX ORDER — ALBUTEROL SULFATE 90 UG/1
1-2 AEROSOL, METERED RESPIRATORY (INHALATION)
Status: CANCELLED
Start: 2024-06-04

## 2024-04-23 RX ORDER — ALBUTEROL SULFATE 0.83 MG/ML
2.5 SOLUTION RESPIRATORY (INHALATION)
Status: CANCELLED | OUTPATIENT
Start: 2024-04-23

## 2024-04-23 RX ADMIN — GEMCITABINE 1200 MG: 38 INJECTION, SOLUTION INTRAVENOUS at 11:11

## 2024-04-23 RX ADMIN — Medication 5 ML: at 12:02

## 2024-04-23 RX ADMIN — ONDANSETRON 8 MG: 2 INJECTION INTRAMUSCULAR; INTRAVENOUS at 10:34

## 2024-04-23 ASSESSMENT — PAIN SCALES - GENERAL: PAINLEVEL: NO PAIN (0)

## 2024-04-23 NOTE — PROGRESS NOTES
Infusion Nursing Note:  Kristen Chapman presents today for New TX-Labs Por/Infusion Gemzar (D 1 C 1 ).    Patient seen by provider today: No   present during visit today: Not Applicable.    Note: Kristen arrived into the infusion center via wheelchair accompanied by his son for Gemzar today, VSS he runs very low blood pressure but and dehydrated. Treatment plan reviewed and medication with it's side effects explained, they verbalized understanding of his treatment plan and return to clinic. Labs met parameter for treatment and Gemzar was infused over 30 minutes after Premeication    Intravenous Access:  Labs drawn without difficulty.  Implanted Port.    Treatment Conditions:  Lab Results   Component Value Date    HGB 8.8 (L) 04/23/2024    WBC 14.1 (H) 04/23/2024    ANEUTAUTO 12.1 (H) 04/23/2024     (L) 04/23/2024        Lab Results   Component Value Date     (L) 04/23/2024    POTASSIUM 4.7 04/23/2024    MAG 2.1 03/29/2024    CR 2.00 (H) 04/23/2024    GUANAKITO 8.0 (L) 04/23/2024    BILITOTAL 0.7 04/23/2024    ALBUMIN 2.0 (L) 04/23/2024    ALT 34 04/23/2024    AST 48 (H) 04/23/2024       Results reviewed, labs MET treatment parameters, ok to proceed with treatment.    Post Infusion Assessment:  Patient tolerated infusion without incident.  Blood return noted pre and post infusion.  Site patent and intact, free from redness, edema or discomfort.  No evidence of extravasations.     Discharge Plan:   Discharge instructions reviewed with: Patient.  Patient and/or family verbalized understanding of discharge instructions and all questions answered.  Patient discharged in stable condition accompanied by: son.  Departure Mode: Wheelchair.    Ronda Lee RN

## 2024-04-24 LAB — CANCER AG27-29 SERPL-ACNC: 19.2 U/ML

## 2024-04-25 ENCOUNTER — DOCUMENTATION ONLY (OUTPATIENT)
Dept: ONCOLOGY | Facility: HOSPITAL | Age: 74
End: 2024-04-25
Payer: COMMERCIAL

## 2024-04-25 NOTE — PROGRESS NOTES
Faxed over [facility] and Family FMLA to medical records. Faxed over Family FMLA to the [employer].     Shannon Sanchez LPN on 4/25/2024 at 11:13 AM

## 2024-04-30 ENCOUNTER — INFUSION THERAPY VISIT (OUTPATIENT)
Dept: INFUSION THERAPY | Facility: HOSPITAL | Age: 74
End: 2024-04-30
Attending: INTERNAL MEDICINE
Payer: COMMERCIAL

## 2024-04-30 ENCOUNTER — VIRTUAL VISIT (OUTPATIENT)
Dept: ONCOLOGY | Facility: HOSPITAL | Age: 74
End: 2024-04-30
Attending: INTERNAL MEDICINE
Payer: COMMERCIAL

## 2024-04-30 VITALS
RESPIRATION RATE: 18 BRPM | SYSTOLIC BLOOD PRESSURE: 78 MMHG | BODY MASS INDEX: 18.91 KG/M2 | WEIGHT: 113.5 LBS | OXYGEN SATURATION: 98 % | HEIGHT: 65 IN | HEART RATE: 70 BPM | DIASTOLIC BLOOD PRESSURE: 49 MMHG

## 2024-04-30 VITALS
SYSTOLIC BLOOD PRESSURE: 73 MMHG | RESPIRATION RATE: 16 BRPM | TEMPERATURE: 97.6 F | HEART RATE: 58 BPM | OXYGEN SATURATION: 100 % | DIASTOLIC BLOOD PRESSURE: 48 MMHG

## 2024-04-30 DIAGNOSIS — K74.60 CIRRHOSIS OF LIVER WITH ASCITES, UNSPECIFIED HEPATIC CIRRHOSIS TYPE (H): ICD-10-CM

## 2024-04-30 DIAGNOSIS — B18.1 CHRONIC VIRAL HEPATITIS B WITHOUT DELTA AGENT AND WITHOUT COMA (H): ICD-10-CM

## 2024-04-30 DIAGNOSIS — C11.9 NASOPHARYNGEAL CARCINOMA (H): Primary | ICD-10-CM

## 2024-04-30 DIAGNOSIS — D64.9 ANEMIA, NORMOCYTIC NORMOCHROMIC: Primary | ICD-10-CM

## 2024-04-30 DIAGNOSIS — C11.9 SQUAMOUS CELL CARCINOMA OF NASOPHARYNX (H): Primary | ICD-10-CM

## 2024-04-30 DIAGNOSIS — R18.8 CIRRHOSIS OF LIVER WITH ASCITES, UNSPECIFIED HEPATIC CIRRHOSIS TYPE (H): ICD-10-CM

## 2024-04-30 DIAGNOSIS — D64.9 ANEMIA, NORMOCYTIC NORMOCHROMIC: ICD-10-CM

## 2024-04-30 LAB
ABO/RH(D): NORMAL
AFP SERPL-MCNC: 2.5 NG/ML
ALBUMIN SERPL BCG-MCNC: 2.1 G/DL (ref 3.5–5.2)
ALP SERPL-CCNC: 108 U/L (ref 40–150)
ALT SERPL W P-5'-P-CCNC: 35 U/L (ref 0–70)
ANION GAP SERPL CALCULATED.3IONS-SCNC: 10 MMOL/L (ref 7–15)
ANTIBODY SCREEN: NEGATIVE
AST SERPL W P-5'-P-CCNC: 38 U/L (ref 0–45)
BASOPHILS # BLD AUTO: 0 10E3/UL (ref 0–0.2)
BASOPHILS NFR BLD AUTO: 1 %
BILIRUB DIRECT SERPL-MCNC: <0.2 MG/DL (ref 0–0.3)
BILIRUB SERPL-MCNC: 0.4 MG/DL
BLD PROD TYP BPU: NORMAL
BLOOD COMPONENT TYPE: NORMAL
BUN SERPL-MCNC: 37.9 MG/DL (ref 8–23)
CALCIUM SERPL-MCNC: 8.7 MG/DL (ref 8.8–10.2)
CHLORIDE SERPL-SCNC: 98 MMOL/L (ref 98–107)
CODING SYSTEM: NORMAL
CREAT SERPL-MCNC: 1.87 MG/DL (ref 0.67–1.17)
CROSSMATCH: NORMAL
DEPRECATED HCO3 PLAS-SCNC: 20 MMOL/L (ref 22–29)
EGFRCR SERPLBLD CKD-EPI 2021: 38 ML/MIN/1.73M2
EOSINOPHIL # BLD AUTO: 0 10E3/UL (ref 0–0.7)
EOSINOPHIL NFR BLD AUTO: 1 %
ERYTHROCYTE [DISTWIDTH] IN BLOOD BY AUTOMATED COUNT: 16.6 % (ref 10–15)
GLUCOSE SERPL-MCNC: 121 MG/DL (ref 70–99)
HCT VFR BLD AUTO: 23.5 % (ref 40–53)
HGB BLD-MCNC: 7.5 G/DL (ref 13.3–17.7)
IMM GRANULOCYTES # BLD: 0 10E3/UL
IMM GRANULOCYTES NFR BLD: 1 %
ISSUE DATE AND TIME: NORMAL
LYMPHOCYTES # BLD AUTO: 0.6 10E3/UL (ref 0.8–5.3)
LYMPHOCYTES NFR BLD AUTO: 15 %
MCH RBC QN AUTO: 28.5 PG (ref 26.5–33)
MCHC RBC AUTO-ENTMCNC: 31.9 G/DL (ref 31.5–36.5)
MCV RBC AUTO: 89 FL (ref 78–100)
MONOCYTES # BLD AUTO: 0.5 10E3/UL (ref 0–1.3)
MONOCYTES NFR BLD AUTO: 14 %
NEUTROPHILS # BLD AUTO: 2.6 10E3/UL (ref 1.6–8.3)
NEUTROPHILS NFR BLD AUTO: 68 %
NRBC # BLD AUTO: 0 10E3/UL
NRBC BLD AUTO-RTO: 0 /100
PLATELET # BLD AUTO: 84 10E3/UL (ref 150–450)
POTASSIUM SERPL-SCNC: 4.3 MMOL/L (ref 3.4–5.3)
PROT SERPL-MCNC: 5.9 G/DL (ref 6.4–8.3)
RBC # BLD AUTO: 2.63 10E6/UL (ref 4.4–5.9)
SODIUM SERPL-SCNC: 128 MMOL/L (ref 135–145)
SPECIMEN EXPIRATION DATE: NORMAL
UNIT ABO/RH: NORMAL
UNIT NUMBER: NORMAL
UNIT STATUS: NORMAL
UNIT TYPE ISBT: 7300
WBC # BLD AUTO: 3.8 10E3/UL (ref 4–11)

## 2024-04-30 PROCEDURE — 80048 BASIC METABOLIC PNL TOTAL CA: CPT

## 2024-04-30 PROCEDURE — 36591 DRAW BLOOD OFF VENOUS DEVICE: CPT | Performed by: INTERNAL MEDICINE

## 2024-04-30 PROCEDURE — P9016 RBC LEUKOCYTES REDUCED: HCPCS | Performed by: NURSE PRACTITIONER

## 2024-04-30 PROCEDURE — 258N000003 HC RX IP 258 OP 636: Performed by: INTERNAL MEDICINE

## 2024-04-30 PROCEDURE — 96413 CHEMO IV INFUSION 1 HR: CPT

## 2024-04-30 PROCEDURE — 82105 ALPHA-FETOPROTEIN SERUM: CPT

## 2024-04-30 PROCEDURE — 99214 OFFICE O/P EST MOD 30 MIN: CPT | Mod: 95 | Performed by: NURSE PRACTITIONER

## 2024-04-30 PROCEDURE — 87517 HEPATITIS B DNA QUANT: CPT | Performed by: INTERNAL MEDICINE

## 2024-04-30 PROCEDURE — G2211 COMPLEX E/M VISIT ADD ON: HCPCS | Mod: 95 | Performed by: NURSE PRACTITIONER

## 2024-04-30 PROCEDURE — 36430 TRANSFUSION BLD/BLD COMPNT: CPT

## 2024-04-30 PROCEDURE — 96375 TX/PRO/DX INJ NEW DRUG ADDON: CPT

## 2024-04-30 PROCEDURE — 84450 TRANSFERASE (AST) (SGOT): CPT

## 2024-04-30 PROCEDURE — 86923 COMPATIBILITY TEST ELECTRIC: CPT | Performed by: NURSE PRACTITIONER

## 2024-04-30 PROCEDURE — 86900 BLOOD TYPING SEROLOGIC ABO: CPT | Performed by: INTERNAL MEDICINE

## 2024-04-30 PROCEDURE — 85025 COMPLETE CBC W/AUTO DIFF WBC: CPT | Performed by: INTERNAL MEDICINE

## 2024-04-30 PROCEDURE — 250N000011 HC RX IP 250 OP 636: Performed by: INTERNAL MEDICINE

## 2024-04-30 PROCEDURE — 84155 ASSAY OF PROTEIN SERUM: CPT

## 2024-04-30 RX ORDER — HEPARIN SODIUM (PORCINE) LOCK FLUSH IV SOLN 100 UNIT/ML 100 UNIT/ML
5 SOLUTION INTRAVENOUS
Status: CANCELLED | OUTPATIENT
Start: 2024-04-30

## 2024-04-30 RX ORDER — ALBUTEROL SULFATE 0.83 MG/ML
2.5 SOLUTION RESPIRATORY (INHALATION)
Status: DISCONTINUED | OUTPATIENT
Start: 2024-04-30 | End: 2024-04-30 | Stop reason: HOSPADM

## 2024-04-30 RX ORDER — MEPERIDINE HYDROCHLORIDE 25 MG/ML
25 INJECTION INTRAMUSCULAR; INTRAVENOUS; SUBCUTANEOUS EVERY 30 MIN PRN
Status: DISCONTINUED | OUTPATIENT
Start: 2024-04-30 | End: 2024-04-30 | Stop reason: HOSPADM

## 2024-04-30 RX ORDER — EPINEPHRINE 1 MG/ML
0.3 INJECTION, SOLUTION INTRAMUSCULAR; SUBCUTANEOUS EVERY 5 MIN PRN
Status: CANCELLED | OUTPATIENT
Start: 2024-04-30

## 2024-04-30 RX ORDER — DIPHENHYDRAMINE HYDROCHLORIDE 50 MG/ML
50 INJECTION INTRAMUSCULAR; INTRAVENOUS
Status: DISCONTINUED | OUTPATIENT
Start: 2024-04-30 | End: 2024-04-30 | Stop reason: HOSPADM

## 2024-04-30 RX ORDER — ONDANSETRON 2 MG/ML
8 INJECTION INTRAMUSCULAR; INTRAVENOUS ONCE
Status: COMPLETED | OUTPATIENT
Start: 2024-04-30 | End: 2024-04-30

## 2024-04-30 RX ORDER — EPINEPHRINE 1 MG/ML
0.3 INJECTION, SOLUTION INTRAMUSCULAR; SUBCUTANEOUS EVERY 5 MIN PRN
Status: DISCONTINUED | OUTPATIENT
Start: 2024-04-30 | End: 2024-04-30 | Stop reason: HOSPADM

## 2024-04-30 RX ORDER — DIPHENHYDRAMINE HYDROCHLORIDE 50 MG/ML
50 INJECTION INTRAMUSCULAR; INTRAVENOUS
Status: CANCELLED
Start: 2024-04-30

## 2024-04-30 RX ORDER — HEPARIN SODIUM,PORCINE 10 UNIT/ML
5-20 VIAL (ML) INTRAVENOUS DAILY PRN
Status: CANCELLED | OUTPATIENT
Start: 2024-04-30

## 2024-04-30 RX ORDER — METHYLPREDNISOLONE SODIUM SUCCINATE 125 MG/2ML
125 INJECTION, POWDER, LYOPHILIZED, FOR SOLUTION INTRAMUSCULAR; INTRAVENOUS
Status: DISCONTINUED | OUTPATIENT
Start: 2024-04-30 | End: 2024-04-30 | Stop reason: HOSPADM

## 2024-04-30 RX ORDER — HEPARIN SODIUM (PORCINE) LOCK FLUSH IV SOLN 100 UNIT/ML 100 UNIT/ML
5 SOLUTION INTRAVENOUS
Status: DISCONTINUED | OUTPATIENT
Start: 2024-04-30 | End: 2024-04-30 | Stop reason: HOSPADM

## 2024-04-30 RX ORDER — ALBUTEROL SULFATE 90 UG/1
1-2 AEROSOL, METERED RESPIRATORY (INHALATION)
Status: DISCONTINUED | OUTPATIENT
Start: 2024-04-30 | End: 2024-04-30 | Stop reason: HOSPADM

## 2024-04-30 RX ADMIN — ONDANSETRON 8 MG: 2 INJECTION INTRAMUSCULAR; INTRAVENOUS at 12:43

## 2024-04-30 RX ADMIN — Medication 5 ML: at 15:10

## 2024-04-30 RX ADMIN — GEMCITABINE 1200 MG: 38 INJECTION, SOLUTION INTRAVENOUS at 12:51

## 2024-04-30 ASSESSMENT — PAIN SCALES - GENERAL: PAINLEVEL: NO PAIN (0)

## 2024-04-30 NOTE — PROGRESS NOTES
[facility] Hematology and Oncology Progress Note    Patient: Kristen Chapman  MRN: [id number]  Date of Service: Apr 30, 2024          Reason for Visit    Chief Complaint   Patient presents with    Oncology Clinic Visit     Virtual return visit with labs/infusion related to Squamous cell carcinoma of nasopharynx.       Assessment and Plan     Cancer Staging   No matching staging information was found for the patient.    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 Has started erbitux. Did that for one month and now has added in Carboplatin. Had CT in April that showed progression. Wanted to try more treatment so has started on single agent gemzar. Today is cycle 1, day 8. Getting 20% reduction (800mg/m2). Will get dose today and next week. Will  be seen on 5/21 for cycle 2. Will reimage after 2 cycles.     Significant anemia in the setting of chronic pancytopenia, CKD: has liver dysfunction that is contributing. Also cancer contributing. Having some nose bleeds which is contributing.  We will transfuse to keep his hemoglobin above 7 or if symptomatic, less than 8. He is symptomatic today so will get one unit.     Renal insufficiency: likely from medications. Avoid nephrotoxic medications.  Stay hydrated.  Keep blood pressure well controlled.    Hepatitis and liver dysfunction/cirrhosis: seeing GI/hepatologist.  Liver function checked on Friday and is normal.  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 Has had some nose bleeding since rivaroxaban. Currently on 20mg daily. If he continues to have bleeding, or lower platelets, he may have to stop or decrease dose. Encourage pt to call us if he has any bleeding from tumor or other bleeding that does not stop.     ECOG Performance    1 - Can't do physically strenuous work, but fully ambyulatory and can do  light sedentary work    Distress Screening (within last 30 days)    1. How concerned are you about your ability to eat? : 0  2. How concerned are you about unintended weight loss or your current weight? : 5  3. How concerned are you about feeling depressed or very sad? : 0  4. How concerned are you about feeling anxious or very scared? : 0  5. Do you struggle with the loss of meaning and terese in your life? : Somewhat  6. How concerned are you about work and home life issues that may be affected by your cancer? : 0  7. How concerned are you about knowing what resources are available to help you? : 0  8. 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  Diagnosed January 2020. Imaging suggested bilateral abnormal lymphadenopathy in the neck.  Also asymmetric soft tissue thickening in the posterior right nasopharynx.  On imaging, tumor also appeared to extend down to the hypopharynx.     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  Right neck lymph node positive for recurrent nasopharyngeal carcinoma.  PET scan at that time showed new  hypermetabolic mediastinal lymphadenopathy (chronic, most likely reactive), mass in the right ethmoid sinus, hypermetabolic right level 1B and 2A lymph nodes.    Recurrent disease found in 2023/early 2024: PET scan on 1/31/24 shows: Disease progression with increased size of the enhancing hypermetabolic soft tissue mass about the right hemimandible measuring up to 5.0 cm with invasion into the floor of the right oral cavity and right base of tongue. No evidence of osseous invasion. New focal FDG uptake in/adjacent to the crux of the right and left hemidiaphragm, as well as retrocrural and retrocaval lymph nodes, suspicious for metastatic disease. Cirrhotic liver with sequela of portal hypertension including large volume ascites and splenogastric varices     Treatment:     Initially treated with concurrent chemotherapy and radiation.  He had weekly cisplatin starting March 3, 2020. Fifth and final dose given March 31, 2020.  Subsequent chemotherapy was held due to prolonged thrombocytopenia and renal insufficiency.  Radiation dose was 7000 cGy in 35 fractions given from March 2 through April 17, 2020.     Started cisplatin with infusional 5-FU on June 1, 2020.  Cycle 1 given at a 25% dose reduction.  He still had significant thrombocytopenia and neutropenia on day 28.  Cycle 2 was held.  At the same time his liver function tests elevated secondary to hepatitis B.   PET scan in September 2020 showed no good evidence of residual malignancy.     Recurrence:  Radiosurgery delivered to the right ethmoid tumor delivered November 8 through November 17, 2021.  Received 3500 cGy in 5 fractions.  Pembrolizumab started 12/23/21.  Held after his July 1, 2022 dose.  PET scan in March 2022 showed a near complete response.    -2/2/24: Started Erbitux therapy. Getting every 2 weeks.   -3/1/24: added in Carboplatin AUC 2 given every 2 weeks.   Progression in April 2024.     -4/23/24: started single agent Gemzar. Today is cycle 1, day  "8     Interim History:    Patient is here today for a follow-up visit.  Overall he states he started the new chemo last week and then went fine.  His biggest issue is he is been having some intermittent nosebleeds since then.  He says he can get them to stop but when it happens he feels weak and tired.  He denies any nausea or vomiting.  Appetite is okay.  Denies any changes to his tumor wound.  Denies any fevers.  Denies any abdominal pain.    Review of Systems    Pertinent items are noted in HPI.    Past History    Past Medical History:   Diagnosis Date    Anemia     Dysphagia     Hepatitis B chronic     Hypothyroidism     Nasopharyngeal cancer (H)     Severe protein-calorie malnutrition (H24)     Thrombocytopenia (H24)        PHYSICAL EXAM  BP (!) 78/49 (BP Location: Right arm, Patient Position: Sitting, Cuff Size: Adult Regular)   Pulse 70   Resp 18   Ht 1.651 m (5' 5\")   Wt 51.5 kg (113 lb 8 oz)   SpO2 98%   BMI 18.89 kg/m        GENERAL: no acute distress. Cooperative in conversation.  Patient's family member is present.   on the phone.  Patient has a wound on his neck that is covered.  RESP: Regular respiratory rate. No expiratory wheezes   NEURO: non focal. Alert and oriented x3.   PSYCH: within normal limits. No depression or anxiety.  SKIN: exposed skin is dry intact.     Lab Results    Recent Results (from the past 168 hour(s))   Hepatic function panel   Result Value Ref Range    Protein Total 5.9 (L) 6.4 - 8.3 g/dL    Albumin 2.1 (L) 3.5 - 5.2 g/dL    Bilirubin Total 0.4 <=1.2 mg/dL    Alkaline Phosphatase 108 40 - 150 U/L    AST 38 0 - 45 U/L    ALT 35 0 - 70 U/L    Bilirubin Direct <0.20 0.00 - 0.30 mg/dL   Basic metabolic panel  (Ca, Cl, CO2, Creat, Gluc, K, Na, BUN)   Result Value Ref Range    Sodium 128 (L) 135 - 145 mmol/L    Potassium 4.3 3.4 - 5.3 mmol/L    Chloride 98 98 - 107 mmol/L    Carbon Dioxide (CO2) 20 (L) 22 - 29 mmol/L    Anion Gap 10 7 - 15 mmol/L    Urea Nitrogen " 37.9 (H) 8.0 - 23.0 mg/dL    Creatinine 1.87 (H) 0.67 - 1.17 mg/dL    GFR Estimate 38 (L) >60 mL/min/1.73m2    Calcium 8.7 (L) 8.8 - 10.2 mg/dL    Glucose 121 (H) 70 - 99 mg/dL   CBC with platelets and differential   Result Value Ref Range    WBC Count 3.8 (L) 4.0 - 11.0 10e3/uL    RBC Count 2.63 (L) 4.40 - 5.90 10e6/uL    Hemoglobin 7.5 (L) 13.3 - 17.7 g/dL    Hematocrit 23.5 (L) 40.0 - 53.0 %    MCV 89 78 - 100 fL    MCH 28.5 26.5 - 33.0 pg    MCHC 31.9 31.5 - 36.5 g/dL    RDW 16.6 (H) 10.0 - 15.0 %    Platelet Count 84 (L) 150 - 450 10e3/uL    % Neutrophils 68 %    % Lymphocytes 15 %    % Monocytes 14 %    % Eosinophils 1 %    % Basophils 1 %    % Immature Granulocytes 1 %    NRBCs per 100 WBC 0 <1 /100    Absolute Neutrophils 2.6 1.6 - 8.3 10e3/uL    Absolute Lymphocytes 0.6 (L) 0.8 - 5.3 10e3/uL    Absolute Monocytes 0.5 0.0 - 1.3 10e3/uL    Absolute Eosinophils 0.0 0.0 - 0.7 10e3/uL    Absolute Basophils 0.0 0.0 - 0.2 10e3/uL    Absolute Immature Granulocytes 0.0 <=0.4 10e3/uL    Absolute NRBCs 0.0 10e3/uL   Adult Type and Screen   Result Value Ref Range    ABO/RH(D) B POS     Antibody Screen Negative Negative    SPECIMEN EXPIRATION DATE [id number]    Prepare red blood cells (unit)   Result Value Ref Range    Blood Component Type Red Blood Cells     Product Code [id number]     Unit Status Issued     Unit Number [id number]     CROSSMATCH Compatible     CODING SYSTEM [id number]     ISSUE DATE AND TIME [id number]     UNIT ABO/RH B+     UNIT TYPE ISBT 7300        Imaging    CT Chest/Abdomen/Pelvis w Contrast    Result Date: 4/13/2024  EXAM: CT CHEST/ABDOMEN/PELVIS W CONTRAST LOCATION: [facility] DATE: 4/13/2024 INDICATION: Squamous cell carcinoma of the nasopharynx diagnosed January 2020. Two  subsequent recurrences. On maintenance chemotherapy. COMPARISON: Abdominal ultrasound 03/07/2024, CT AP 02/26/2024, PET CT 01/31/2024 and older studies TECHNIQUE: CT scan of the chest, abdomen,  and pelvis was performed following injection of IV contrast. Multiplanar reformats were obtained. Dose reduction techniques were used. CONTRAST: isovue 370 57ml FINDINGS: Respiratory motion artifact, greatest at the lung bases. LUNGS AND PLEURA: No pleural effusions or suspicious pulmonary nodules. Paraesophageal varices. MEDIASTINUM/AXILLAE: Incompletely seen right infiltrative mandibular mass that extends to the skin surface. There is a 1.1 cm right supraclavicular, little changed. Left IJ Port-A-Cath. No mediastinal adenopathy. Ascending aorta is aneurysmal at 4.3 cm, unchanged. No central pulmonary emboli. CORONARY ARTERY CALCIFICATION: None. HEPATOBILIARY: Small nodular liver liver. Tiny low dense lesion in the left lobe is unchanged. PANCREAS: Normal. SPLEEN: There are new hypodense, subcapsular lesions one superiorly has been curvilinear configuration (axial image 111, coronal image 72). Larger one is inferior measuring 2 x 1.5 cm (axial images 159 - 165). Intrasplenic smaller intermediate density lesion measuring 1.3 cm is unchanged. ADRENAL GLANDS: Normal. KIDNEYS/BLADDER: Normal. BOWEL: Right-sided peritoneal drain has been repositioned into the low pelvis, and is amidst the ascites. There is a large amount of ascites. There is marked increase in the stranding of the mesentery in the upper abdomen no measurable tumor nodules. LYMPH NODES: No suspicious adenopathy. There are a few tiny aortocaval nodes that are unchanged. 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 There is a large amount of thrombus within the  4 cm of the draining SMV and splenic veins which remain patent. Thrombus extends into the main portal vein and into the right portal vein with  occlusion of the superior branch at the first branch. Left portal vein is patent. This is new since the study done with contrast in January but otherwise age indeterminant. Extensive gastrosplenic varices. Bowel is of  normal caliber. Moderate arterial calcifications. No aneurysm. PELVIC ORGANS: Normal. MUSCULOSKELETAL: No suspicious lesions. Mild S-shaped thoracolumbar scoliosis.     IMPRESSION: 1.  There is a large amount of mesenteric venous thrombus , new since Sourav study done with contrast but otherwise age indeterminate. 2.  Thrombus seen within the SMV and splenic veins which remain patent. Thrombus extends into the main portal and right portal vein branch with occlusion of the anterior branch of the right portal vein. 3.  There are 2 new hypodense lesions in the spleen, presumed to reflect subacute, chronic infarcts. The other more rounded hypodense lesion that is not FDG avid is unchanged. 4.  Repositioning of the peritoneal catheter which is now in the pelvis. 5.  Cirrhotic liver with a large amount of ascites and varices as noted above. 6.  Ascites is larger than before. Is catheter functioning or not being used? 7.  Increased mesenteric stranding that appears to be related to the fluid and ascites given its density. No clear measurable tumor nodules. 8.  Stable right supraclavicular enlarged lymph node with incompletely seen large infiltrative tumor about the right mandible extending to the skin. See neck CT report, please. 9.  No new sites of metastatic disease in the chest, abdomen or pelvis. 10.  Stable 4.3 cm ascending aortic aneurysm. 11.  Findings discussed by the undersigned with Dr. Alan Frias at 1138.    CT Soft Tissue Neck w Contrast    Result Date: 4/13/2024  EXAM: CT SOFT TISSUE NECK W CONTRAST LOCATION: [facility] DATE: 4/13/2024 INDICATION: f u submandibular tumor COMPARISON: 01/31/2024. CONTRAST: isovue 370 57ml TECHNIQUE: Routine CT Soft Tissue Neck with IV contrast. Multiplanar reformats. Dose reduction techniques were used. FINDINGS: The submandibular mass lesion which has been previously characterized on head CT from 01/31/2024 has intervally increased in size, previously  measuring 4.9 x 4.4 cm and currently measuring 7.0 x 5.0 cm on the current examination is similar location. The right mandible in the area of the mass lesion has no fracture findings or destructive changes in the adjacent mandible. The submandibular mass extends to the floor of the mouth on the right and involves portions of the tongue on the right. There is extension into the submental space and into the adjacent subcutaneous fat. Portions of the mass lesion extending into the right parotid gland. The right internal jugular vein is patent. The oropharynx and nasopharynx is unremarkable. Epiglottis is unremarkable. Larynx and hypopharynx is unremarkable. Lung apices are unremarkable. LYMPH NODES: No pathologic lymph nodes by size or morphology criteria. THYROID: Normal. VESSELS: Vascular structures of the neck are patent. VISUALIZED INTRACRANIAL/ORBITS/SINUSES: No abnormality of the visualized intracranial compartment or orbits. Visualized paranasal sinuses and mastoid air cells are clear. OTHER: No destructive osseous lesion. The included lung apices are clear.     IMPRESSION: 1.  Soft tissue mass closely associated with the submandibular space extends into the right common and floor of mouth as well as involving portions of the subcutaneous fat and extending to the inferior margin of the parotid gland on the right. The soft tissue masses significantly increased in size now measuring 7 x 5 cm in a similar distribution as compared to the prior examination where it measured 4.9 x 4.4 cm.       The longitudinal plan of care for the diagnosis(es)/condition(s) as documented were addressed during this visit. Due to the added complexity in care, I will continue to support Kristen in the subsequent management and with ongoing continuity of care.    Virtual Visit Details    Type of service:  Video Visit   Video Start Time:  11:55 AM  Video End Time: 12:04 PM    Originating Location (pt. Location): [facility]  clinic    Distant Location (provider location):  On-site  Platform used for Video Visit: Chris Raymond infusion nurse    Signed by: SPARKLE Guzmán CNP

## 2024-04-30 NOTE — PROGRESS NOTES
"Oncology Rooming Note    April 30, 2024 11:47 AM   Kristen Chapman is a 74 year old male who presents for:    Chief Complaint   Patient presents with    Oncology Clinic Visit     Virtual return visit with labs/infusion related to Squamous cell carcinoma of nasopharynx.     Initial Vitals: BP (!) 78/49 (BP Location: Right arm, Patient Position: Sitting, Cuff Size: Adult Regular)   Pulse 70   Resp 18   Ht 1.651 m (5' 5\")   Wt 51.5 kg (113 lb 8 oz)   SpO2 98%   BMI 18.89 kg/m   Estimated body mass index is 18.89 kg/m  as calculated from the following:    Height as of this encounter: 1.651 m (5' 5\").    Weight as of this encounter: 51.5 kg (113 lb 8 oz). Body surface area is 1.54 meters squared.  No Pain (0) Comment: Data Unavailable   No LMP for male patient.  Allergies reviewed: Yes  Medications reviewed: Yes    Medications: Medication refills not needed today.  Pharmacy name entered into [email or web address]:    [facility] - [place], [place] - [address] [place] [address] PHARMACY [place], [place] - [address] [place] [address]    Frailty Screening:   Is the patient here for a new oncology consult visit in cancer care? 2. No      Clinical concerns: Discuss periodic bloody nose. Any medication options for bloody nose? Also reports being tired and weak. Denies dizziness. Yoanna was notified.      Gillian Osman CMA              "

## 2024-04-30 NOTE — PROGRESS NOTES
Infusion Nursing Note:2  Kristen Chapman presents today for Gemzar and blood transfusion.    Patient seen by provider today: Yes:    present during visit today: Yes, Language: Hmong.     Note: Patient was given treatment as ordered. MD requested 1 unit of PRBC's for patient due to low Hgb.  1 unit of PRBC's were given and tolerated well.      Intravenous Access:  Implanted Port.    Treatment Conditions:  Results reviewed, labs MET treatment parameters, ok to proceed with treatment.  Blood transfusion consent signed 2/2/24.      Post Infusion Assessment:  Patient tolerated infusion without incident.       Discharge Plan:   Patient discharged in stable condition accompanied by: elenita Fuller RN

## 2024-05-01 LAB
HBV DNA SERPL NAA+PROBE-ACNC: 61 IU/ML
HBV DNA SERPL NAA+PROBE-LOG IU: 1.8 {LOG_IU}/ML

## 2024-05-07 ENCOUNTER — INFUSION THERAPY VISIT (OUTPATIENT)
Dept: INFUSION THERAPY | Facility: HOSPITAL | Age: 74
End: 2024-05-07
Attending: INTERNAL MEDICINE
Payer: COMMERCIAL

## 2024-05-07 VITALS
OXYGEN SATURATION: 99 % | WEIGHT: 108 LBS | RESPIRATION RATE: 16 BRPM | SYSTOLIC BLOOD PRESSURE: 81 MMHG | DIASTOLIC BLOOD PRESSURE: 52 MMHG | HEART RATE: 61 BPM | TEMPERATURE: 97.9 F | HEIGHT: 65 IN | BODY MASS INDEX: 17.99 KG/M2

## 2024-05-07 DIAGNOSIS — C11.9 SQUAMOUS CELL CARCINOMA OF NASOPHARYNX (H): Primary | ICD-10-CM

## 2024-05-07 LAB
BASOPHILS # BLD AUTO: 0 10E3/UL (ref 0–0.2)
BASOPHILS NFR BLD AUTO: 0 %
EOSINOPHIL # BLD AUTO: 0 10E3/UL (ref 0–0.7)
EOSINOPHIL NFR BLD AUTO: 1 %
ERYTHROCYTE [DISTWIDTH] IN BLOOD BY AUTOMATED COUNT: 17.1 % (ref 10–15)
HCT VFR BLD AUTO: 24 % (ref 40–53)
HGB BLD-MCNC: 7.8 G/DL (ref 13.3–17.7)
IMM GRANULOCYTES # BLD: 0 10E3/UL
IMM GRANULOCYTES NFR BLD: 1 %
LYMPHOCYTES # BLD AUTO: 0.4 10E3/UL (ref 0.8–5.3)
LYMPHOCYTES NFR BLD AUTO: 13 %
MCH RBC QN AUTO: 28.6 PG (ref 26.5–33)
MCHC RBC AUTO-ENTMCNC: 32.5 G/DL (ref 31.5–36.5)
MCV RBC AUTO: 88 FL (ref 78–100)
MONOCYTES # BLD AUTO: 0.4 10E3/UL (ref 0–1.3)
MONOCYTES NFR BLD AUTO: 11 %
NEUTROPHILS # BLD AUTO: 2.6 10E3/UL (ref 1.6–8.3)
NEUTROPHILS NFR BLD AUTO: 74 %
NRBC # BLD AUTO: 0 10E3/UL
NRBC BLD AUTO-RTO: 1 /100
PLATELET # BLD AUTO: 35 10E3/UL (ref 150–450)
RBC # BLD AUTO: 2.73 10E6/UL (ref 4.4–5.9)
WBC # BLD AUTO: 3.5 10E3/UL (ref 4–11)

## 2024-05-07 PROCEDURE — 36591 DRAW BLOOD OFF VENOUS DEVICE: CPT | Performed by: INTERNAL MEDICINE

## 2024-05-07 PROCEDURE — 85025 COMPLETE CBC W/AUTO DIFF WBC: CPT | Performed by: INTERNAL MEDICINE

## 2024-05-07 RX ORDER — HEPARIN SODIUM (PORCINE) LOCK FLUSH IV SOLN 100 UNIT/ML 100 UNIT/ML
5 SOLUTION INTRAVENOUS
Status: DISCONTINUED | OUTPATIENT
Start: 2024-05-07 | End: 2024-05-07

## 2024-05-07 ASSESSMENT — PAIN SCALES - GENERAL: PAINLEVEL: NO PAIN (0)

## 2024-05-07 NOTE — PROGRESS NOTES
Infusion Nursing Note:  Kristen Chapman presents today for Labs/port/Gemzar (D15C1).    Patient seen by provider today: No   present during visit today: Yes, Language: Hmong.     Note: Kristen arrived into the infusion with his walker accompanied by his son, THIAGO. Usually runs low BP. Treatment plan reviewed and Labs draw. He does not meet parameter for treatment today because of platelet of 35. NP said to have him come back in two weeks and we start with D1 and D15. He did noted that the tumour is shrinking and he is feeling better.       Intravenous Access:  Labs drawn without difficulty.  Implanted Port.    Treatment Conditions:  Lab Results   Component Value Date    HGB 7.8 (L) 05/07/2024    WBC 3.5 (L) 05/07/2024    ANEUTAUTO 2.6 05/07/2024    PLT 35 (LL) 05/07/2024        Results reviewed, labs did NOT meet treatment parameters: Platelet 3.5.      Post Infusion Assessment:  Blood return noted pre and post infusion.  Site patent and intact, free from redness, edema or discomfort.  No evidence of extravasations.       Discharge Plan:   Discharge instructions reviewed with: Patient and Family.  Copy of AVS reviewed with patient and/or family.  Patient will return 5/21/24 for next appointment.  Patient discharged in stable condition accompanied by: son.  Departure Mode: Ambulatory with walker    Ronda Lee RN

## 2024-05-13 ENCOUNTER — OFFICE VISIT (OUTPATIENT)
Dept: RADIATION ONCOLOGY | Facility: HOSPITAL | Age: 74
End: 2024-05-13
Attending: INTERNAL MEDICINE
Payer: COMMERCIAL

## 2024-05-13 VITALS
WEIGHT: 108.6 LBS | BODY MASS INDEX: 18.07 KG/M2 | HEART RATE: 86 BPM | RESPIRATION RATE: 16 BRPM | DIASTOLIC BLOOD PRESSURE: 60 MMHG | OXYGEN SATURATION: 99 % | SYSTOLIC BLOOD PRESSURE: 87 MMHG

## 2024-05-13 DIAGNOSIS — Z51.5 PALLIATIVE CARE PATIENT: Primary | ICD-10-CM

## 2024-05-13 DIAGNOSIS — E44.0 MODERATE PROTEIN-CALORIE MALNUTRITION (H): ICD-10-CM

## 2024-05-13 DIAGNOSIS — Z71.89 GOALS OF CARE, COUNSELING/DISCUSSION: ICD-10-CM

## 2024-05-13 DIAGNOSIS — C11.9 NASOPHARYNGEAL CARCINOMA (H): ICD-10-CM

## 2024-05-13 PROCEDURE — 99417 PROLNG OP E/M EACH 15 MIN: CPT | Performed by: FAMILY MEDICINE

## 2024-05-13 PROCEDURE — G0463 HOSPITAL OUTPT CLINIC VISIT: HCPCS | Performed by: FAMILY MEDICINE

## 2024-05-13 PROCEDURE — 99205 OFFICE O/P NEW HI 60 MIN: CPT | Performed by: FAMILY MEDICINE

## 2024-05-13 RX ORDER — MIRTAZAPINE 15 MG/1
15 TABLET, ORALLY DISINTEGRATING ORAL AT BEDTIME
Qty: 30 TABLET | Refills: 4 | Status: SHIPPED | OUTPATIENT
Start: 2024-05-13 | End: 2024-07-09

## 2024-05-13 NOTE — PROGRESS NOTES
"Oncology Rooming Note    May 13, 2024 12:54 PM   Kristen Chapman is a 74 year old male who presents for:    Chief Complaint   Patient presents with    Oncology Clinic Visit     New consult palliative care     Initial Vitals: BP (!) 87/60 (BP Location: Left arm, Patient Position: Sitting)   Pulse 86   Resp 16   Wt 49.3 kg (108 lb 9.6 oz)   SpO2 99%   BMI 18.07 kg/m   Estimated body mass index is 18.07 kg/m  as calculated from the following:    Height as of 5/7/24: 1.651 m (5' 5\").    Weight as of this encounter: 49.3 kg (108 lb 9.6 oz). Body surface area is 1.5 meters squared.  No Pain (0) Comment: Data Unavailable   No LMP for male patient.  Allergies reviewed: Yes  Medications reviewed: Yes    Medications: Medication refills not needed today.  Pharmacy name entered into [employer]:    [place], [place] - [address] [place] [address] - [address] [place] [address]    Frailty Screening:   Is the patient here for a new oncology consult visit in cancer care? 2. No      Clinical concerns: New palliative care appointment, denies pain, has weeping wound on neck, used interpretor for assessment  Dr. Arreguin was notified.      Flores Mercedes RN        Palliative Care Outpatient Clinic Consultation Note    Patient:  Kristen Chapman    Chief Complaint:   Kristen Chapman 74 year old male who is presenting to the palliative medicine clinic today at the request of  for a palliative care consultation secondary to recurrent, progressive nasopharyngeal cancer.   The patient's primary care provider is:  Issa Cook. We used a professional  to facilitate the visit.  We were joined by his son, Andrae.        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  Diagnosed January 2020. Imaging suggested bilateral abnormal lymphadenopathy in the neck.  Also asymmetric soft tissue " thickening in the posterior right nasopharynx.  On imaging, tumor also appeared to extend down to the hypopharynx.     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  Right neck lymph node positive for recurrent nasopharyngeal carcinoma.  PET scan at that time showed new hypermetabolic mediastinal lymphadenopathy (chronic, most likely reactive), mass in the right ethmoid sinus, hypermetabolic right level 1B and 2A lymph nodes.     Recurrent disease found in 2023/early 2024: PET scan on 1/31/24 shows: Disease progression with increased size of the enhancing hypermetabolic soft tissue mass about the right hemimandible measuring up to 5.0 cm with invasion into the floor of the right oral cavity and right base of tongue. No evidence of osseous invasion. New focal FDG uptake in/adjacent to the crux of the right and left hemidiaphragm, as well as retrocrural and retrocaval lymph nodes, suspicious for metastatic disease. Cirrhotic liver with sequela of portal hypertension including large volume ascites and splenogastric varices     Treatment:     Initially treated with concurrent chemotherapy and radiation.  He had weekly cisplatin starting March 3, 2020. Fifth and final dose given March 31, 2020.  Subsequent chemotherapy was held due to prolonged thrombocytopenia and renal insufficiency.  Radiation dose was 7000 cGy in 35 fractions given from March 2 through April 17, 2020.     Started cisplatin with infusional 5-FU on June 1, 2020.  Cycle 1 given at a 25% dose reduction.  He still had significant thrombocytopenia and neutropenia on day 28.  Cycle 2 was held.  At the same time his liver function tests elevated secondary to hepatitis B.   PET scan in September 2020 showed no good evidence of residual malignancy.     Recurrence:  Radiosurgery delivered to the right ethmoid tumor delivered November 8 through November 17, 2021.  Received 3500 cGy in 5 fractions.  Pembrolizumab started 12/23/21.  Held  "after his July 1, 2022 dose.  PET scan in March 2022 showed a near complete response.     -2/2/24: Started Erbitux therapy. Getting every 2 weeks.   -3/1/24: added in Carboplatin AUC 2 given every 2 weeks.   Progression in April 2024.      -4/23/24: started single agent Gemzar. Today is cycle 1, day 8\"        Distressing Symptom/s:  1) unintended weight loss; weight has gone from 180-105#; trouble swallowing and only eats soft foods (like porridge) and he mainly drinks milk; frequent choking when swallowing--like almost every meal; saw a SLP--and they recommended he  eat slowly;     2) weakness--needs help getting out of bed and a walker for stability when ambulating; 50 foot walk from waiting room to exam room was hard;     Patient's Disease Understanding: pretty good;     Coping:  overall OK; he wishes he was stronger and didn't need as much help.    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  Spiritual Background: traditional [employer]; he does not have 'traveling clothes,'  Spiritual Concerns/Needs: none    Social History     Tobacco Use    Smoking status: Never    Smokeless tobacco: Never   Substance Use Topics    Alcohol use: Not Currently    Drug use: Not Currently       Family History  Family History "   Problem Relation Age of Onset    Liver Cancer No family hx of     Liver Disease No family hx of          Advance Care Planning:  Advance Directive:    no ACP documents  Where is written copy located: n/a  Health Care Agent Contact Information: family and clan  POLST:   n/a  CODE STATUS: DNAR/DNI    Allergies   Allergen Reactions    Oxycodone Itching     Current Outpatient Medications   Medication Sig Dispense Refill    acetaminophen (TYLENOL) 325 MG tablet Take 650 mg by mouth every 6 hours as needed       dexAMETHasone (DECADRON) 4 MG tablet Take 1 tablet (4 mg) by mouth daily (with breakfast) Take for 2 days after each chemotherapy dose. 10 tablet 0    furosemide (LASIX) 20 MG tablet Take 1 tablet (20 mg) by mouth daily 90 tablet 3    levothyroxine (SYNTHROID/LEVOTHROID) 175 MCG tablet Take 1 tablet (175 mcg) by mouth daily 60 tablet 1    megestrol (MEGACE) 40 MG/ML suspension Take 10 mLs (400 mg) by mouth daily 480 mL 1    prochlorperazine (COMPAZINE) 10 MG tablet Take 1 tablet (10 mg) by mouth every 6 hours as needed for nausea or vomiting 30 tablet 2    rivaroxaban ANTICOAGULANT (XARELTO) 20 MG TABS tablet Take 1 tablet (20 mg) by mouth daily (with dinner) 30 tablet 3    spironolactone (ALDACTONE) 100 MG tablet Take 1 tablet (100 mg) by mouth daily 90 tablet 3    tenofovir (VIREAD) 300 MG tablet Take 1 tablet (300 mg) by mouth every 72 hours 45 tablet 3     Past Medical History:   Diagnosis Date    Anemia     Dysphagia     Hepatitis B chronic     Hypothyroidism     Nasopharyngeal cancer (H)     Severe protein-calorie malnutrition (H24)     Thrombocytopenia (H24)      Past Surgical History:   Procedure Laterality Date    ENDOSCOPIC ENDONASAL SURGERY Bilateral 9/7/2021    Procedure: Nasal Endoscopy with Biopsy;  Surgeon: Ky Centeno MD;  Location: [facility] OR    IR CHEST PORT PLACEMENT > 5 YRS OF AGE  3/2/2020    IR CHEST PORT PLACEMENT > 5 YRS OF AGE  3/2/2020    IR GASTROSTOMY TUBE INSERTION  4/27/2020     IR GASTROSTOMY TUBE PERCUTANEOUS PLCMNT  2020    IR INTRAPERITONEAL CATH TUNNEL ASCITES  2024    IR INTRAPERITONEAL CATH TUNNEL ASCITES  2024    IR PORT PLACEMENT >5 YEARS  3/2/2020    IR PORT PLACEMENT >5 YEARS  3/2/2020    IR T-FASTENER REMOVAL  2020    IR T-FASTENER REMOVAL  2020       REVIEW OF SYSTEMS:   ROS: 10 point ROS neg other than the symptoms noted above in the HPI and here:  Palliative Symptom Review (0=no symptom/no concern, 1=mild, 2=moderate, 3=severe):      Pain: 0      Fatigue: 1      Nausea: 0      Constipation: 0      Diarrhea: 0      Depressive Symptoms: 0      Anxiety: 0      Drowsiness: 0      Poor Appetite: 3      Shortness of Breath: 0      Insomnia: doesn't get a good night's sleep anymore;       Choking spells:  2-3      Overall (0 good/no concerns, 3 very poor):  2-3    GENERAL APPEARANCE: frail; cachectic with bitemporal wasting;  appears older than stated age; alert and no distress; neatly groomed; tearful at times;  EYES: Eyes grossly normal to inspection, PERRLA, conjunctivae and sclerae without injection or discharge, EOM intact   Dry bandage over his anterior neck.  RESP:  no increased work of breathing; speaks in complete sentences;   Protuberant belly  MS: No musculoskeletal defects are noted  SKIN: No suspicious lesions or rashes, hydration status appears adequate with normal skin turgor   PSYCH: Alert and oriented x3; speech- coherent , normal rate and volume; able to articulate logical thoughts, able to abstract reason, no tangential thoughts, no hallucinations or delusions, mentation appears normal, Mood is euthymic. Affect is appropriate for this mood state and bright. Thought content is free of suicidal ideation, hallucinations, and delusions.  Eye contact is good during conversation.       Data Reviewed:  LABS: 2024 Cr 1.87; alb 2.1; Hgb 7.5    IMAGIN2024 CT SOFT TISSUE NECK  IMPRESSION:   1.  Soft tissue mass closely associated with the  submandibular space extends into the right common and floor of mouth as well as involving portions of the subcutaneous fat and extending to the inferior margin of the parotid gland on the right. The soft   tissue masses significantly increased in size now measuring 7 x 5 cm in a similar distribution as compared to the prior examination where it measured 4.9 x 4.4 cm.    Impressions:  ECO  Decision Making Capacity:  present with aid of   PDMP review:  yes, no concerns    Recurrent nasopharyngeal cancer, EBV +with recent PD  Malignant wound  Moderate protein calorie malnutrition  CKD IIIb  Hepatitis B with cirrhosis with refractory ascites and pleurex catheter  Malignancy associated anorexia  hypothyroidism    GOALS OF CARE:  2024  likes [place]' and would like more time to be alive and is willing to undergo more treatments. I explained to him that sometimes due to the toxic nature of treatments he may live longer with less tumor directed therapy as the end of his life approaches. He does not want a feeding tube again or CPR or intubation.  He also declined surgery on the malignant wound on his neck.    Advance Care Planning Discussion 2024. I, Steve Arreguin MD met with Patient and their family today at the clinic to discuss Advance Care Planning. Kristen Chapman does have decisional capacity and was present for this discussion.  Those present were informed of the voluntary nature of this discussion and wished to proceed.  The discussion included:  See paragraph above . This discussion began at 1305 and ended at 1320 for a total of 15 minutes.       Recommendations & Counseling:  Stop Megace  TRIAL of mirtazapine 15 mg ODT at HS    Follow up with me in 4 weeks;  Kristen is asked to consider how he would want to spend his remaining time if the current chemo isn't effective.    Follow up with wound therapy for anterior neck wound      80 minutes spent on the date of the  encounter doing chart review, history and exam, patient education & counseling, documentation and other activities as noted above. 15 minutes of the 80 minute visit were spent in ACP conversation    Steve Arreguin MD MS FAAFP CAQM  [facility]  Office [phone]  Fax [phone]

## 2024-05-13 NOTE — PATIENT INSTRUCTIONS
It was good to see you today, .    Here are the things we talked about:  Please discuss with your family about your wishes on how to spend your time if tie is short.    Try to eat more protein in your diet--Greek yogurt; tofu; beans, beef and fish and shellfish    Stop Megace   Start mirtazapine --I sent a prescription to the [facility].  Stop at the desk to make an appointment with me in 4 weeks.     How to get a hold of us:  For non-urgent matters, MyChart works best.    For more urgent matters, or if you prefer not to use MyChart, call our clinic nurse coordinator Milka Dumont RN at [phone]    We have an on-call number for evenings and weekends. Please call this only if you are having uncontrolled symptoms or serious side effects from your medicines: [phone].     For refills, please give us a week (5 working days) notice. We don't always have providers available everyday to do refills. If you call the day you run out of your medicine, we may not be able to refill it in time, so call 5 days in advance!    Steve Arreguin MD MS FAAFP CAQHPM  [facility]  Office [phone]  Fax [phone]

## 2024-05-13 NOTE — LETTER
"    5/13/2024         RE: Kristen Chapman  [address] [place] [address]        Dear Colleague,    Thank you for referring your patient, Kristen Chapman, to the [facility]. Please see a copy of my visit note below.    Oncology Rooming Note    May 13, 2024 12:54 PM   Kristen Chapman is a 74 year old male who presents for:    Chief Complaint   Patient presents with     Oncology Clinic Visit     New consult palliative care     Initial Vitals: BP (!) 87/60 (BP Location: Left arm, Patient Position: Sitting)   Pulse 86   Resp 16   Wt 49.3 kg (108 lb 9.6 oz)   SpO2 99%   BMI 18.07 kg/m   Estimated body mass index is 18.07 kg/m  as calculated from the following:    Height as of 5/7/24: 1.651 m (5' 5\").    Weight as of this encounter: 49.3 kg (108 lb 9.6 oz). Body surface area is 1.5 meters squared.  No Pain (0) Comment: Data Unavailable   No LMP for male patient.  Allergies reviewed: Yes  Medications reviewed: Yes    Medications: Medication refills not needed today.  Pharmacy name entered into [employer]:    [facility] - [place], [place] - [address] [place] [address] [facility]ARM[facility] [place], [place] - [address] [place] [address]    Frailty Screening:   Is the patient here for a new oncology consult visit in cancer care? 2. No      Clinical concerns: New palliative care appointment, denies pain, has weeping wound on neck, used interpretor for assessment  Dr. Arreguin was notified.      Flores Mercedes RN        Palliative Care Outpatient Clinic Consultation Note    Patient:  Kristen Chapman    Chief Complaint:   Kristen Chapman 74 year old male who is presenting to the palliative medicine clinic today at the request of  for a palliative care consultation secondary to recurrent, progressive nasopharyngeal cancer.   The patient's primary care provider is:  Issa Cook. We used a professional  to facilitate the visit.  We were joined by his son, Andrae.        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  Diagnosed January 2020. Imaging suggested bilateral abnormal lymphadenopathy in the neck.  Also asymmetric soft tissue thickening in the posterior right nasopharynx.  On imaging, tumor also appeared to extend down to the hypopharynx.     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  Right neck lymph node positive for recurrent nasopharyngeal carcinoma.  PET scan at that time showed new hypermetabolic mediastinal lymphadenopathy (chronic, most likely reactive), mass in the right ethmoid sinus, hypermetabolic right level 1B and 2A lymph nodes.     Recurrent disease found in 2023/early 2024: PET scan on 1/31/24 shows: Disease progression with increased size of the enhancing hypermetabolic soft tissue mass about the right hemimandible measuring up to 5.0 cm with invasion into the floor of the right oral cavity and right base of tongue. No evidence of osseous invasion. New focal FDG uptake in/adjacent to the crux of the right and left hemidiaphragm, as well as retrocrural and retrocaval lymph nodes, suspicious for metastatic disease. Cirrhotic liver with sequela of portal hypertension including large volume ascites and splenogastric varices     Treatment:     Initially treated with concurrent chemotherapy and radiation.  He had weekly cisplatin starting March 3, 2020. Fifth and final dose given March 31, 2020.  Subsequent chemotherapy was held due to prolonged thrombocytopenia and renal insufficiency.  Radiation dose was 7000 cGy in 35 fractions given from March 2 through April 17, 2020.     Started cisplatin with infusional 5-FU on June 1, 2020.  Cycle 1 given at a 25% dose reduction.  He still had significant thrombocytopenia and neutropenia on day 28.  Cycle 2 was held.  At the same time his liver function tests elevated secondary to hepatitis B.   PET scan in " "September 2020 showed no good evidence of residual malignancy.     Recurrence:  Radiosurgery delivered to the right ethmoid tumor delivered November 8 through November 17, 2021.  Received 3500 cGy in 5 fractions.  Pembrolizumab started 12/23/21.  Held after his July 1, 2022 dose.  PET scan in March 2022 showed a near complete response.     -2/2/24: Started Erbitux therapy. Getting every 2 weeks.   -3/1/24: added in Carboplatin AUC 2 given every 2 weeks.   Progression in April 2024.      -4/23/24: started single agent Gemzar. Today is cycle 1, day 8\"        Distressing Symptom/s:  1) unintended weight loss; weight has gone from 180-105#; trouble swallowing and only eats soft foods (like porridge) and he mainly drinks milk; frequent choking when swallowing--like almost every meal; saw a SLP--and they recommended he  eat slowly;     2) weakness--needs help getting out of bed and a walker for stability when ambulating; 50 foot walk from waiting room to exam room was hard;     Patient's Disease Understanding: pretty good;     Coping:  overall OK; he wishes he was stronger and didn't need as much help.    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  Spiritual Background: traditional Hmong Mormon; he does not have " 'traveling clothes,'  Spiritual Concerns/Needs: none    Social History     Tobacco Use     Smoking status: Never     Smokeless tobacco: Never   Substance Use Topics     Alcohol use: Not Currently     Drug use: Not Currently       Family History  Family History   Problem Relation Age of Onset     Liver Cancer No family hx of      Liver Disease No family hx of          Advance Care Planning:  Advance Directive:    no ACP documents  Where is written copy located: n/a  Health Care Agent Contact Information: family and clan  POLST:   n/a  CODE STATUS: DNAR/DNI    Allergies   Allergen Reactions     Oxycodone Itching     Current Outpatient Medications   Medication Sig Dispense Refill     acetaminophen (TYLENOL) 325 MG tablet Take 650 mg by mouth every 6 hours as needed        dexAMETHasone (DECADRON) 4 MG tablet Take 1 tablet (4 mg) by mouth daily (with breakfast) Take for 2 days after each chemotherapy dose. 10 tablet 0     furosemide (LASIX) 20 MG tablet Take 1 tablet (20 mg) by mouth daily 90 tablet 3     levothyroxine (SYNTHROID/LEVOTHROID) 175 MCG tablet Take 1 tablet (175 mcg) by mouth daily 60 tablet 1     megestrol (MEGACE) 40 MG/ML suspension Take 10 mLs (400 mg) by mouth daily 480 mL 1     prochlorperazine (COMPAZINE) 10 MG tablet Take 1 tablet (10 mg) by mouth every 6 hours as needed for nausea or vomiting 30 tablet 2     rivaroxaban ANTICOAGULANT (XARELTO) 20 MG TABS tablet Take 1 tablet (20 mg) by mouth daily (with dinner) 30 tablet 3     spironolactone (ALDACTONE) 100 MG tablet Take 1 tablet (100 mg) by mouth daily 90 tablet 3     tenofovir (VIREAD) 300 MG tablet Take 1 tablet (300 mg) by mouth every 72 hours 45 tablet 3     Past Medical History:   Diagnosis Date     Anemia      Dysphagia      Hepatitis B chronic      Hypothyroidism      Nasopharyngeal cancer (H)      Severe protein-calorie malnutrition (H24)      Thrombocytopenia (H24)      Past Surgical History:   Procedure Laterality Date     ENDOSCOPIC  ENDONASAL SURGERY Bilateral 9/7/2021    Procedure: Nasal Endoscopy with Biopsy;  Surgeon: Ky Centeno MD;  Location: [facility] OR     IR CHEST PORT PLACEMENT > 5 YRS OF AGE  3/2/2020     IR CHEST PORT PLACEMENT > 5 YRS OF AGE  3/2/2020     IR GASTROSTOMY TUBE INSERTION  4/27/2020     IR GASTROSTOMY TUBE PERCUTANEOUS PLCMNT  4/27/2020     IR INTRAPERITONEAL CATH TUNNEL ASCITES  2/22/2024     IR INTRAPERITONEAL CATH TUNNEL ASCITES  2/26/2024     IR PORT PLACEMENT >5 YEARS  3/2/2020     IR PORT PLACEMENT >5 YEARS  3/2/2020     IR T-FASTENER REMOVAL  6/5/2020     IR T-FASTENER REMOVAL  6/5/2020       REVIEW OF SYSTEMS:   ROS: 10 point ROS neg other than the symptoms noted above in the HPI and here:  Palliative Symptom Review (0=no symptom/no concern, 1=mild, 2=moderate, 3=severe):      Pain: 0      Fatigue: 1      Nausea: 0      Constipation: 0      Diarrhea: 0      Depressive Symptoms: 0      Anxiety: 0      Drowsiness: 0      Poor Appetite: 3      Shortness of Breath: 0      Insomnia: doesn't get a good night's sleep anymore;       Choking spells:  2-3      Overall (0 good/no concerns, 3 very poor):  2-3    GENERAL APPEARANCE: frail; cachectic with bitemporal wasting;  appears older than stated age; alert and no distress; neatly groomed; tearful at times;  EYES: Eyes grossly normal to inspection, PERRLA, conjunctivae and sclerae without injection or discharge, EOM intact   Dry bandage over his anterior neck.  RESP:  no increased work of breathing; speaks in complete sentences;   Protuberant belly  MS: No musculoskeletal defects are noted  SKIN: No suspicious lesions or rashes, hydration status appears adequate with normal skin turgor   PSYCH: Alert and oriented x3; speech- coherent , normal rate and volume; able to articulate logical thoughts, able to abstract reason, no tangential thoughts, no hallucinations or delusions, mentation appears normal, Mood is euthymic. Affect is appropriate for this mood state and  bright. Thought content is free of suicidal ideation, hallucinations, and delusions.  Eye contact is good during conversation.       Data Reviewed:  LABS: 2024 Cr 1.87; alb 2.1; Hgb 7.5    IMAGIN2024 CT SOFT TISSUE NECK  IMPRESSION:   1.  Soft tissue mass closely associated with the submandibular space extends into the right common and floor of mouth as well as involving portions of the subcutaneous fat and extending to the inferior margin of the parotid gland on the right. The soft   tissue masses significantly increased in size now measuring 7 x 5 cm in a similar distribution as compared to the prior examination where it measured 4.9 x 4.4 cm.    Impressions:  ECO  Decision Making Capacity:  present with aid of   PDMP review:  yes, no concerns    Recurrent nasopharyngeal cancer, EBV +with recent PD  Malignant wound  Moderate protein calorie malnutrition  CKD IIIb  Hepatitis B with cirrhosis with refractory ascites and pleurex catheter  Malignancy associated anorexia  hypothyroidism    GOALS OF CARE:  2024  likes [place]' and would like more time to be alive and is willing to undergo more treatments. I explained to him that sometimes due to the toxic nature of treatments he may live longer with less tumor directed therapy as the end of his life approaches. He does not want a feeding tube again or CPR or intubation.  He also declined surgery on the malignant wound on his neck.    Advance Care Planning Discussion 2024. ISteve MD met with Patient and their family today at the clinic to discuss Advance Care Planning. Kristen Chapman does have decisional capacity and was present for this discussion.  Those present were informed of the voluntary nature of this discussion and wished to proceed.  The discussion included:  See paragraph above . This discussion began at 1305 and ended at 1320 for a total of 15 minutes.       Recommendations &  Counseling:  Stop Megace  TRIAL of mirtazapine 15 mg ODT at HS    Follow up with me in 4 weeks;  Kristen is asked to consider how he would want to spend his remaining time if the current chemo isn't effective.    Follow up with wound therapy for anterior neck wound      80 minutes spent on the date of the encounter doing chart review, history and exam, patient education & counseling, documentation and other activities as noted above. 15 minutes of the 80 minute visit were spent in ACP conversation    Steve Arreguin MD MS FAAFP CAQHPM  [facility]  Office [phone]  Fax [phone]            Again, thank you for allowing me to participate in the care of your patient.        Sincerely,        Steve Arreguin MD

## 2024-05-15 ENCOUNTER — PATIENT OUTREACH (OUTPATIENT)
Dept: ONCOLOGY | Facility: HOSPITAL | Age: 74
End: 2024-05-15
Payer: COMMERCIAL

## 2024-05-15 NOTE — PROGRESS NOTES
[facility]/Voicemail    Clinical Data: Care Coordinator Outreach    Outreach attempted x 1.  Left message on patient's daughter's voicemail regarding Dr. Frias approving for the patient's additional children to get FMLA paperwork signed and for her to come into clinic either tomorrow 5/16 or Friday 5/17 to fill out a release of information form and bring the FMLA paperwork for us to fill out with call back information and requested return call.    Plan: Care Coordinator will try to reach patient again in 3-5 business days.

## 2024-05-20 DIAGNOSIS — C11.9 SQUAMOUS CELL CARCINOMA OF NASOPHARYNX (H): Primary | ICD-10-CM

## 2024-05-21 ENCOUNTER — LAB (OUTPATIENT)
Dept: INFUSION THERAPY | Facility: HOSPITAL | Age: 74
End: 2024-05-21
Attending: INTERNAL MEDICINE
Payer: COMMERCIAL

## 2024-05-21 ENCOUNTER — PATIENT OUTREACH (OUTPATIENT)
Dept: ONCOLOGY | Facility: HOSPITAL | Age: 74
End: 2024-05-21
Payer: COMMERCIAL

## 2024-05-21 ENCOUNTER — ONCOLOGY VISIT (OUTPATIENT)
Dept: ONCOLOGY | Facility: HOSPITAL | Age: 74
End: 2024-05-21
Attending: NURSE PRACTITIONER
Payer: COMMERCIAL

## 2024-05-21 VITALS
DIASTOLIC BLOOD PRESSURE: 56 MMHG | RESPIRATION RATE: 20 BRPM | OXYGEN SATURATION: 99 % | BODY MASS INDEX: 18.64 KG/M2 | HEIGHT: 65 IN | TEMPERATURE: 97.2 F | WEIGHT: 111.9 LBS | SYSTOLIC BLOOD PRESSURE: 91 MMHG | HEART RATE: 66 BPM

## 2024-05-21 DIAGNOSIS — C11.9 SQUAMOUS CELL CARCINOMA OF NASOPHARYNX (H): Primary | ICD-10-CM

## 2024-05-21 LAB
ALBUMIN SERPL BCG-MCNC: 2.3 G/DL (ref 3.5–5.2)
ALP SERPL-CCNC: 98 U/L (ref 40–150)
ALT SERPL W P-5'-P-CCNC: 34 U/L (ref 0–70)
ANION GAP SERPL CALCULATED.3IONS-SCNC: 8 MMOL/L (ref 7–15)
AST SERPL W P-5'-P-CCNC: 36 U/L (ref 0–45)
BASOPHILS # BLD AUTO: 0 10E3/UL (ref 0–0.2)
BASOPHILS NFR BLD AUTO: 0 %
BILIRUB SERPL-MCNC: 0.5 MG/DL
BUN SERPL-MCNC: 32.9 MG/DL (ref 8–23)
CALCIUM SERPL-MCNC: 8.3 MG/DL (ref 8.8–10.2)
CEA SERPL-MCNC: 5.2 NG/ML
CHLORIDE SERPL-SCNC: 103 MMOL/L (ref 98–107)
CREAT SERPL-MCNC: 1.59 MG/DL (ref 0.67–1.17)
DEPRECATED HCO3 PLAS-SCNC: 23 MMOL/L (ref 22–29)
EGFRCR SERPLBLD CKD-EPI 2021: 46 ML/MIN/1.73M2
EOSINOPHIL # BLD AUTO: 0.1 10E3/UL (ref 0–0.7)
EOSINOPHIL NFR BLD AUTO: 1 %
ERYTHROCYTE [DISTWIDTH] IN BLOOD BY AUTOMATED COUNT: 18 % (ref 10–15)
GLUCOSE SERPL-MCNC: 129 MG/DL (ref 70–99)
HCT VFR BLD AUTO: 25 % (ref 40–53)
HGB BLD-MCNC: 7.8 G/DL (ref 13.3–17.7)
IMM GRANULOCYTES # BLD: 0 10E3/UL
IMM GRANULOCYTES NFR BLD: 1 %
LYMPHOCYTES # BLD AUTO: 0.8 10E3/UL (ref 0.8–5.3)
LYMPHOCYTES NFR BLD AUTO: 15 %
MCH RBC QN AUTO: 28 PG (ref 26.5–33)
MCHC RBC AUTO-ENTMCNC: 31.2 G/DL (ref 31.5–36.5)
MCV RBC AUTO: 90 FL (ref 78–100)
MONOCYTES # BLD AUTO: 0.6 10E3/UL (ref 0–1.3)
MONOCYTES NFR BLD AUTO: 11 %
NEUTROPHILS # BLD AUTO: 3.8 10E3/UL (ref 1.6–8.3)
NEUTROPHILS NFR BLD AUTO: 72 %
NRBC # BLD AUTO: 0 10E3/UL
NRBC BLD AUTO-RTO: 0 /100
PLATELET # BLD AUTO: 104 10E3/UL (ref 150–450)
POTASSIUM SERPL-SCNC: 4.1 MMOL/L (ref 3.4–5.3)
PROT SERPL-MCNC: 5.9 G/DL (ref 6.4–8.3)
RBC # BLD AUTO: 2.79 10E6/UL (ref 4.4–5.9)
SODIUM SERPL-SCNC: 134 MMOL/L (ref 135–145)
WBC # BLD AUTO: 5.3 10E3/UL (ref 4–11)

## 2024-05-21 PROCEDURE — 85025 COMPLETE CBC W/AUTO DIFF WBC: CPT | Performed by: NURSE PRACTITIONER

## 2024-05-21 PROCEDURE — G0463 HOSPITAL OUTPT CLINIC VISIT: HCPCS | Performed by: NURSE PRACTITIONER

## 2024-05-21 PROCEDURE — 250N000011 HC RX IP 250 OP 636: Performed by: NURSE PRACTITIONER

## 2024-05-21 PROCEDURE — 96361 HYDRATE IV INFUSION ADD-ON: CPT

## 2024-05-21 PROCEDURE — 96413 CHEMO IV INFUSION 1 HR: CPT

## 2024-05-21 PROCEDURE — 258N000003 HC RX IP 258 OP 636: Performed by: NURSE PRACTITIONER

## 2024-05-21 PROCEDURE — 82378 CARCINOEMBRYONIC ANTIGEN: CPT | Performed by: NURSE PRACTITIONER

## 2024-05-21 PROCEDURE — G2211 COMPLEX E/M VISIT ADD ON: HCPCS | Performed by: NURSE PRACTITIONER

## 2024-05-21 PROCEDURE — 36591 DRAW BLOOD OFF VENOUS DEVICE: CPT | Performed by: NURSE PRACTITIONER

## 2024-05-21 PROCEDURE — T1013 SIGN LANG/ORAL INTERPRETER: HCPCS | Mod: U4 | Performed by: INTERPRETER

## 2024-05-21 PROCEDURE — 96375 TX/PRO/DX INJ NEW DRUG ADDON: CPT

## 2024-05-21 PROCEDURE — 80053 COMPREHEN METABOLIC PANEL: CPT | Performed by: NURSE PRACTITIONER

## 2024-05-21 PROCEDURE — 258N000003 HC RX IP 258 OP 636: Performed by: INTERNAL MEDICINE

## 2024-05-21 PROCEDURE — 99214 OFFICE O/P EST MOD 30 MIN: CPT | Performed by: NURSE PRACTITIONER

## 2024-05-21 PROCEDURE — 86300 IMMUNOASSAY TUMOR CA 15-3: CPT | Performed by: NURSE PRACTITIONER

## 2024-05-21 RX ORDER — LORAZEPAM 2 MG/ML
0.5 INJECTION INTRAMUSCULAR EVERY 4 HOURS PRN
Status: CANCELLED | OUTPATIENT
Start: 2024-05-21

## 2024-05-21 RX ORDER — EPINEPHRINE 1 MG/ML
0.3 INJECTION, SOLUTION INTRAMUSCULAR; SUBCUTANEOUS EVERY 5 MIN PRN
Status: CANCELLED | OUTPATIENT
Start: 2024-05-21

## 2024-05-21 RX ORDER — ALBUTEROL SULFATE 90 UG/1
1-2 AEROSOL, METERED RESPIRATORY (INHALATION)
Status: CANCELLED
Start: 2024-05-21

## 2024-05-21 RX ORDER — EPINEPHRINE 1 MG/ML
0.3 INJECTION, SOLUTION INTRAMUSCULAR; SUBCUTANEOUS EVERY 5 MIN PRN
Status: DISCONTINUED | OUTPATIENT
Start: 2024-05-21 | End: 2024-05-21 | Stop reason: HOSPADM

## 2024-05-21 RX ORDER — HEPARIN SODIUM (PORCINE) LOCK FLUSH IV SOLN 100 UNIT/ML 100 UNIT/ML
5 SOLUTION INTRAVENOUS
Status: DISCONTINUED | OUTPATIENT
Start: 2024-05-21 | End: 2024-05-21 | Stop reason: HOSPADM

## 2024-05-21 RX ORDER — METHYLPREDNISOLONE SODIUM SUCCINATE 125 MG/2ML
125 INJECTION, POWDER, LYOPHILIZED, FOR SOLUTION INTRAMUSCULAR; INTRAVENOUS
Status: DISCONTINUED | OUTPATIENT
Start: 2024-05-21 | End: 2024-05-21 | Stop reason: HOSPADM

## 2024-05-21 RX ORDER — DIPHENHYDRAMINE HYDROCHLORIDE 50 MG/ML
50 INJECTION INTRAMUSCULAR; INTRAVENOUS
Status: CANCELLED
Start: 2024-05-21

## 2024-05-21 RX ORDER — HEPARIN SODIUM,PORCINE 10 UNIT/ML
5-20 VIAL (ML) INTRAVENOUS DAILY PRN
Status: CANCELLED | OUTPATIENT
Start: 2024-05-21

## 2024-05-21 RX ORDER — HEPARIN SODIUM (PORCINE) LOCK FLUSH IV SOLN 100 UNIT/ML 100 UNIT/ML
5 SOLUTION INTRAVENOUS
Status: CANCELLED | OUTPATIENT
Start: 2024-05-21

## 2024-05-21 RX ORDER — ONDANSETRON 2 MG/ML
8 INJECTION INTRAMUSCULAR; INTRAVENOUS ONCE
Status: CANCELLED | OUTPATIENT
Start: 2024-05-21

## 2024-05-21 RX ORDER — ALBUTEROL SULFATE 0.83 MG/ML
2.5 SOLUTION RESPIRATORY (INHALATION)
Status: CANCELLED | OUTPATIENT
Start: 2024-05-21

## 2024-05-21 RX ORDER — MEPERIDINE HYDROCHLORIDE 25 MG/ML
25 INJECTION INTRAMUSCULAR; INTRAVENOUS; SUBCUTANEOUS EVERY 30 MIN PRN
Status: CANCELLED | OUTPATIENT
Start: 2024-05-21

## 2024-05-21 RX ORDER — MEPERIDINE HYDROCHLORIDE 25 MG/ML
25 INJECTION INTRAMUSCULAR; INTRAVENOUS; SUBCUTANEOUS EVERY 30 MIN PRN
Status: DISCONTINUED | OUTPATIENT
Start: 2024-05-21 | End: 2024-05-21 | Stop reason: HOSPADM

## 2024-05-21 RX ORDER — METHYLPREDNISOLONE SODIUM SUCCINATE 125 MG/2ML
125 INJECTION, POWDER, LYOPHILIZED, FOR SOLUTION INTRAMUSCULAR; INTRAVENOUS
Status: CANCELLED
Start: 2024-05-21

## 2024-05-21 RX ORDER — ONDANSETRON 2 MG/ML
8 INJECTION INTRAMUSCULAR; INTRAVENOUS ONCE
Status: COMPLETED | OUTPATIENT
Start: 2024-05-21 | End: 2024-05-21

## 2024-05-21 RX ORDER — ALBUTEROL SULFATE 0.83 MG/ML
2.5 SOLUTION RESPIRATORY (INHALATION)
Status: DISCONTINUED | OUTPATIENT
Start: 2024-05-21 | End: 2024-05-21 | Stop reason: HOSPADM

## 2024-05-21 RX ORDER — DIPHENHYDRAMINE HYDROCHLORIDE 50 MG/ML
50 INJECTION INTRAMUSCULAR; INTRAVENOUS
Status: DISCONTINUED | OUTPATIENT
Start: 2024-05-21 | End: 2024-05-21 | Stop reason: HOSPADM

## 2024-05-21 RX ORDER — ALBUTEROL SULFATE 90 UG/1
1-2 AEROSOL, METERED RESPIRATORY (INHALATION)
Status: DISCONTINUED | OUTPATIENT
Start: 2024-05-21 | End: 2024-05-21 | Stop reason: HOSPADM

## 2024-05-21 RX ADMIN — GEMCITABINE 1200 MG: 38 INJECTION, SOLUTION INTRAVENOUS at 12:27

## 2024-05-21 RX ADMIN — Medication 5 ML: at 12:59

## 2024-05-21 RX ADMIN — SODIUM CHLORIDE 250 ML: 9 INJECTION, SOLUTION INTRAVENOUS at 11:30

## 2024-05-21 RX ADMIN — ONDANSETRON 8 MG: 2 INJECTION INTRAMUSCULAR; INTRAVENOUS at 11:36

## 2024-05-21 ASSESSMENT — PAIN SCALES - GENERAL: PAINLEVEL: NO PAIN (0)

## 2024-05-21 NOTE — PROGRESS NOTES
Infusion Nursing Note:  Kristen Chapman presents today for cycle 2 day 1 treatment with Gemcitabine.    Patient seen by provider today: Yes: Yoanna Andre CNP   present during visit today: Not Applicable.    Note: Kristen was educated on his plan of care and mediation reviewed prior to administration.  He received treatment as ordered.      Intravenous Access:  Implanted Port.    Treatment Conditions:  Lab Results   Component Value Date    HGB 7.8 (L) 05/21/2024    WBC 5.3 05/21/2024    ANEUTAUTO 3.8 05/21/2024     (L) 05/21/2024        Lab Results   Component Value Date     (L) 05/21/2024    POTASSIUM 4.1 05/21/2024    MAG 2.1 03/29/2024    CR 1.59 (H) 05/21/2024    GUANAKITO 8.3 (L) 05/21/2024    BILITOTAL 0.5 05/21/2024    ALBUMIN 2.3 (L) 05/21/2024    ALT 34 05/21/2024    AST 36 05/21/2024       Results reviewed, labs MET treatment parameters, ok to proceed with treatment.      Post Infusion Assessment:  Patient tolerated infusion without incident.  Blood return noted pre and post infusion.  Site patent and intact, free from redness, edema or discomfort.  No evidence of extravasations.       Discharge Plan:   Patient discharged in stable condition accompanied by: son.  Departure Mode: Ambulatory.      Frances Killian RN

## 2024-05-21 NOTE — PROGRESS NOTES
[facility]: Cancer Care                                                                                          Met with patient and patient's son Nayla to sign a release of information form and hand me [facility] paperwork to fill out.     Signature:  Marjorie Briones RN

## 2024-05-21 NOTE — LETTER
"    5/21/2024         RE: Kristen Chapman  [address] [place] [address]        Dear Colleague,    Thank you for referring your patient, Kristen Chapman, to the [facility]. Please see a copy of my visit note below.    Oncology Rooming Note    May 21, 2024 10:49 AM   Kristen Chapman is a 74 year old male who presents for:    Chief Complaint   Patient presents with     Oncology Clinic Visit     3 week return visit with labs/infusion related to Squamous cell carcinoma of nasopharynx.     Initial Vitals: BP 91/56 (BP Location: Left arm, Patient Position: Sitting, Cuff Size: Adult Regular)   Pulse 66   Temp 97.2  F (36.2  C) (Tympanic)   Resp 20   Ht 1.651 m (5' 5\")   Wt 50.8 kg (111 lb 14.4 oz)   SpO2 99%   BMI 18.62 kg/m   Estimated body mass index is 18.62 kg/m  as calculated from the following:    Height as of this encounter: 1.651 m (5' 5\").    Weight as of this encounter: 50.8 kg (111 lb 14.4 oz). Body surface area is 1.53 meters squared.  No Pain (0) Comment: Data Unavailable   No LMP for male patient.  Allergies reviewed: Yes  Medications reviewed: Yes    Medications: Medication refills not needed today.  Pharmacy name entered into [employer]:    [facility] - [place], [place] - [address] [place] [address] PHARMACY [address]    Frailty Screening:   Is the patient here for a new oncology consult visit in cancer care? 2. No      Clinical concerns: Feeling very tired overall.  Yoanna was notified.      Gillian Osman, [facility] Hematology and Oncology Progress Note    Patient: Kristen Chapman  MRN: [id number]  Date of Service: May 21, 2024          Reason for Visit    Chief Complaint   Patient presents with     Oncology Clinic Visit     3 week return visit with labs/infusion related to Squamous cell carcinoma of nasopharynx.       Assessment and Plan     Cancer Staging   No matching staging information was found for the " patient.    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 Has started erbitux. Did that for one month and then added in Carboplatin. Had CT in April that showed progression. Wanted to try more treatment so has started on single agent gemzar. Today is cycle 2. Getting 20% reduction (800mg/m2).  Did have to hold day 15 with the first cycle due to low platelets.  We will switch his cycle to 2 weeks on, 1 week off.  He will be due for cycle 3 on roughly June 11.  He will then see Dr. Frias before his fourth cycle on June 2 with a CT scan.    Significant anemia in the setting of chronic pancytopenia, CKD: has liver dysfunction that is contributing. Also cancer contributing.  We will transfuse to keep his hemoglobin above 7 or if symptomatic, less than 8.     Renal insufficiency: likely from medications. Avoid nephrotoxic medications.  Renal insufficiency is slightly better today.  Stay hydrated.  Keep blood pressure well controlled.    Hepatitis and liver dysfunction/cirrhosis: seeing GI/hepatologist.  Liver function has been looking pretty good.    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 Has had some nose bleeding since rivaroxaban. Currently on 20mg daily. If he has bleeding, or lower platelets, he may have to stop or decrease dose. Encourage pt to call us if he has any bleeding from tumor or other bleeding that does not stop.     ECOG Performance    1 - Can't do physically strenuous work, but fully ambyulatory and can do light sedentary work    Distress Screening (within last 30 days)    1. How concerned are you about your ability to eat? : 0  2. How concerned are you about unintended weight loss or your current weight? : 5  3. How concerned are you about feeling depressed or very sad? : 0  4. How concerned are you about feeling anxious or very scared? : 0  5. Do you struggle  with the loss of meaning and terese in your life? : Somewhat  6. How concerned are you about work and home life issues that may be affected by your cancer? : 0  7. How concerned are you about knowing what resources are available to help you? : 0  8. 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  Diagnosed January 2020. Imaging suggested bilateral abnormal lymphadenopathy in the neck.  Also asymmetric soft tissue thickening in the posterior right nasopharynx.  On imaging, tumor also appeared to extend down to the hypopharynx.     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  Right neck lymph node positive for recurrent nasopharyngeal carcinoma.  PET scan at that time showed new hypermetabolic mediastinal lymphadenopathy (chronic, most likely reactive), mass in the right ethmoid sinus, hypermetabolic right level 1B and 2A lymph nodes.    Recurrent disease found in 2023/early 2024: PET scan on 1/31/24 shows: Disease progression with increased size of the enhancing hypermetabolic soft tissue mass about the right hemimandible  measuring up to 5.0 cm with invasion into the floor of the right oral cavity and right base of tongue. No evidence of osseous invasion. New focal FDG uptake in/adjacent to the crux of the right and left hemidiaphragm, as well as retrocrural and retrocaval lymph nodes, suspicious for metastatic disease. Cirrhotic liver with sequela of portal hypertension including large volume ascites and splenogastric varices     Treatment:     Initially treated with concurrent chemotherapy and radiation.  He had weekly cisplatin starting March 3, 2020. Fifth and final dose given March 31, 2020.  Subsequent chemotherapy was held due to prolonged thrombocytopenia and renal insufficiency.  Radiation dose was 7000 cGy in 35 fractions given from March 2 through April 17, 2020.     Started cisplatin with infusional 5-FU on June 1, 2020.  Cycle 1 given at a 25% dose reduction.  He still had significant thrombocytopenia and neutropenia on day 28.  Cycle 2 was held.  At the same time his liver function tests elevated secondary to hepatitis B.   PET scan in September 2020 showed no good evidence of residual malignancy.     Recurrence:  Radiosurgery delivered to the right ethmoid tumor delivered November 8 through November 17, 2021.  Received 3500 cGy in 5 fractions.  Pembrolizumab started 12/23/21.  Held after his July 1, 2022 dose.  PET scan in March 2022 showed a near complete response.    -2/2/24: Started Erbitux therapy. Getting every 2 weeks.   -3/1/24: added in Carboplatin AUC 2 given every 2 weeks.   Progression in April 2024.     -4/23/24: started single agent Gemzar. Today is cycle 2     Interim History:    Patient is here today for a follow-up visit.  Overall he feels like the chemotherapy is going okay.  He says he feels like his lesion on the right side of his neck is may be smaller.  It definitely is not any bigger.  It is not having any bleeding.  He states that really his only issue is that he is tired all the time.  He does  "not have a lot of energy to do a whole lot but overall is happy with how he is doing and happy with his chemo.  Patient denies any infectious complaints.  Denies any bleeding or bruising complications    Review of Systems    Pertinent items are noted in HPI.    Past History    Past Medical History:   Diagnosis Date     Anemia      Dysphagia      Hepatitis B chronic      Hypothyroidism      Nasopharyngeal cancer (H)      Severe protein-calorie malnutrition (H24)      Thrombocytopenia (H24)        PHYSICAL EXAM  BP 91/56 (BP Location: Left arm, Patient Position: Sitting, Cuff Size: Adult Regular)   Pulse 66   Temp 97.2  F (36.2  C) (Tympanic)   Resp 20   Ht 1.651 m (5' 5\")   Wt 50.8 kg (111 lb 14.4 oz)   SpO2 99%   BMI 18.62 kg/m        GENERAL: no acute distress. Cooperative in conversation.  Patient's family member is present.   on the phone.  Patient has a wound on his neck that is covered.  RESP: Regular respiratory rate. No expiratory wheezes   NEURO: non focal. Alert and oriented x3.   PSYCH: within normal limits. No depression or anxiety.  SKIN: exposed skin is dry intact.     Lab Results    Recent Results (from the past 168 hour(s))   Comprehensive metabolic panel   Result Value Ref Range    Sodium 134 (L) 135 - 145 mmol/L    Potassium 4.1 3.4 - 5.3 mmol/L    Carbon Dioxide (CO2) 23 22 - 29 mmol/L    Anion Gap 8 7 - 15 mmol/L    Urea Nitrogen 32.9 (H) 8.0 - 23.0 mg/dL    Creatinine 1.59 (H) 0.67 - 1.17 mg/dL    GFR Estimate 46 (L) >60 mL/min/1.73m2    Calcium 8.3 (L) 8.8 - 10.2 mg/dL    Chloride 103 98 - 107 mmol/L    Glucose 129 (H) 70 - 99 mg/dL    Alkaline Phosphatase 98 40 - 150 U/L    AST 36 0 - 45 U/L    ALT 34 0 - 70 U/L    Protein Total 5.9 (L) 6.4 - 8.3 g/dL    Albumin 2.3 (L) 3.5 - 5.2 g/dL    Bilirubin Total 0.5 <=1.2 mg/dL   CBC with platelets and differential   Result Value Ref Range    WBC Count 5.3 4.0 - 11.0 10e3/uL    RBC Count 2.79 (L) 4.40 - 5.90 10e6/uL    Hemoglobin 7.8 " (L) 13.3 - 17.7 g/dL    Hematocrit 25.0 (L) 40.0 - 53.0 %    MCV 90 78 - 100 fL    MCH 28.0 26.5 - 33.0 pg    MCHC 31.2 (L) 31.5 - 36.5 g/dL    RDW 18.0 (H) 10.0 - 15.0 %    Platelet Count 104 (L) 150 - 450 10e3/uL    % Neutrophils 72 %    % Lymphocytes 15 %    % Monocytes 11 %    % Eosinophils 1 %    % Basophils 0 %    % Immature Granulocytes 1 %    NRBCs per 100 WBC 0 <1 /100    Absolute Neutrophils 3.8 1.6 - 8.3 10e3/uL    Absolute Lymphocytes 0.8 0.8 - 5.3 10e3/uL    Absolute Monocytes 0.6 0.0 - 1.3 10e3/uL    Absolute Eosinophils 0.1 0.0 - 0.7 10e3/uL    Absolute Basophils 0.0 0.0 - 0.2 10e3/uL    Absolute Immature Granulocytes 0.0 <=0.4 10e3/uL    Absolute NRBCs 0.0 10e3/uL       Imaging    No results found.      The longitudinal plan of care for the diagnosis(es)/condition(s) as documented were addressed during this visit. Due to the added complexity in care, I will continue to support Kristen in the subsequent management and with ongoing continuity of care.        Signed by: SPARKLE Guzmán CNP      Again, thank you for allowing me to participate in the care of your patient.        Sincerely,        SPARKLE Guzmán CNP

## 2024-05-21 NOTE — PROGRESS NOTES
"Oncology Rooming Note    May 21, 2024 10:49 AM   Kristen Chapman is a 74 year old male who presents for:    Chief Complaint   Patient presents with    Oncology Clinic Visit     3 week return visit with labs/infusion related to Squamous cell carcinoma of nasopharynx.     Initial Vitals: BP 91/56 (BP Location: Left arm, Patient Position: Sitting, Cuff Size: Adult Regular)   Pulse 66   Temp 97.2  F (36.2  C) (Tympanic)   Resp 20   Ht 1.651 m (5' 5\")   Wt 50.8 kg (111 lb 14.4 oz)   SpO2 99%   BMI 18.62 kg/m   Estimated body mass index is 18.62 kg/m  as calculated from the following:    Height as of this encounter: 1.651 m (5' 5\").    Weight as of this encounter: 50.8 kg (111 lb 14.4 oz). Body surface area is 1.53 meters squared.  No Pain (0) Comment: Data Unavailable   No LMP for male patient.  Allergies reviewed: Yes  Medications reviewed: Yes    Medications: Medication refills not needed today.  Pharmacy name entered into [employer]:    [facility] - [place], [place] - [address] [place] [address] PHARMACY [place], [place] - [address] [place] [address]    Frailty Screening:   Is the patient here for a new oncology consult visit in cancer care? 2. No      Clinical concerns: Feeling very tired overall.  Yoanna was notified.      Gillian Osman CMA              "

## 2024-05-21 NOTE — PROGRESS NOTES
[facility] Hematology and Oncology Progress Note    Patient: Kristen Chapman  MRN: [id number]  Date of Service: May 21, 2024          Reason for Visit    Chief Complaint   Patient presents with    Oncology Clinic Visit     3 week return visit with labs/infusion related to Squamous cell carcinoma of nasopharynx.       Assessment and Plan     Cancer Staging   No matching staging information was found for the patient.    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 Has started erbitux. Did that for one month and then added in Carboplatin. Had CT in April that showed progression. Wanted to try more treatment so has started on single agent gemzar. Today is cycle 2. Getting 20% reduction (800mg/m2).  Did have to hold day 15 with the first cycle due to low platelets.  We will switch his cycle to 2 weeks on, 1 week off.  He will be due for cycle 3 on roughly June 11.  He will then see Dr. Frias before his fourth cycle on June 2 with a CT scan.    Significant anemia in the setting of chronic pancytopenia, CKD: has liver dysfunction that is contributing. Also cancer contributing.  We will transfuse to keep his hemoglobin above 7 or if symptomatic, less than 8.     Renal insufficiency: likely from medications. Avoid nephrotoxic medications.  Renal insufficiency is slightly better today.  Stay hydrated.  Keep blood pressure well controlled.    Hepatitis and liver dysfunction/cirrhosis: seeing GI/hepatologist.  Liver function has been looking pretty good.    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 Has had some nose bleeding since rivaroxaban. Currently on 20mg daily. If he has bleeding, or lower platelets, he may have to stop or decrease dose. Encourage pt to call us if he has any bleeding from tumor or other bleeding that does not stop.     ECOG Performance    1 - Can't do physically  strenuous work, but fully ambyulatory and can do light sedentary work    Distress Screening (within last 30 days)    1. How concerned are you about your ability to eat? : 0  2. How concerned are you about unintended weight loss or your current weight? : 5  3. How concerned are you about feeling depressed or very sad? : 0  4. How concerned are you about feeling anxious or very scared? : 0  5. Do you struggle with the loss of meaning and terese in your life? : Somewhat  6. How concerned are you about work and home life issues that may be affected by your cancer? : 0  7. How concerned are you about knowing what resources are available to help you? : 0  8. 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  Diagnosed January 2020. Imaging suggested bilateral abnormal lymphadenopathy in the neck.  Also asymmetric soft tissue thickening in the posterior right nasopharynx.  On imaging, tumor also appeared to extend down to the hypopharynx.     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  Right neck lymph node positive for recurrent nasopharyngeal  carcinoma.  PET scan at that time showed new hypermetabolic mediastinal lymphadenopathy (chronic, most likely reactive), mass in the right ethmoid sinus, hypermetabolic right level 1B and 2A lymph nodes.    Recurrent disease found in 2023/early 2024: PET scan on 1/31/24 shows: Disease progression with increased size of the enhancing hypermetabolic soft tissue mass about the right hemimandible measuring up to 5.0 cm with invasion into the floor of the right oral cavity and right base of tongue. No evidence of osseous invasion. New focal FDG uptake in/adjacent to the crux of the right and left hemidiaphragm, as well as retrocrural and retrocaval lymph nodes, suspicious for metastatic disease. Cirrhotic liver with sequela of portal hypertension including large volume ascites and splenogastric varices     Treatment:     Initially treated with concurrent chemotherapy and radiation.  He had weekly cisplatin starting March 3, 2020. Fifth and final dose given March 31, 2020.  Subsequent chemotherapy was held due to prolonged thrombocytopenia and renal insufficiency.  Radiation dose was 7000 cGy in 35 fractions given from March 2 through April 17, 2020.     Started cisplatin with infusional 5-FU on June 1, 2020.  Cycle 1 given at a 25% dose reduction.  He still had significant thrombocytopenia and neutropenia on day 28.  Cycle 2 was held.  At the same time his liver function tests elevated secondary to hepatitis B.   PET scan in September 2020 showed no good evidence of residual malignancy.     Recurrence:  Radiosurgery delivered to the right ethmoid tumor delivered November 8 through November 17, 2021.  Received 3500 cGy in 5 fractions.  Pembrolizumab started 12/23/21.  Held after his July 1, 2022 dose.  PET scan in March 2022 showed a near complete response.    -2/2/24: Started Erbitux therapy. Getting every 2 weeks.   -3/1/24: added in Carboplatin AUC 2 given every 2 weeks.   Progression in April 2024.     -4/23/24:  "started single agent Gemzar. Today is cycle 2     Interim History:    Patient is here today for a follow-up visit.  Overall he feels like the chemotherapy is going okay.  He says he feels like his lesion on the right side of his neck is may be smaller.  It definitely is not any bigger.  It is not having any bleeding.  He states that really his only issue is that he is tired all the time.  He does not have a lot of energy to do a whole lot but overall is happy with how he is doing and happy with his chemo.  Patient denies any infectious complaints.  Denies any bleeding or bruising complications    Review of Systems    Pertinent items are noted in HPI.    Past History    Past Medical History:   Diagnosis Date    Anemia     Dysphagia     Hepatitis B chronic     Hypothyroidism     Nasopharyngeal cancer (H)     Severe protein-calorie malnutrition (H24)     Thrombocytopenia (H24)        PHYSICAL EXAM  BP 91/56 (BP Location: Left arm, Patient Position: Sitting, Cuff Size: Adult Regular)   Pulse 66   Temp 97.2  F (36.2  C) (Tympanic)   Resp 20   Ht 1.651 m (5' 5\")   Wt 50.8 kg (111 lb 14.4 oz)   SpO2 99%   BMI 18.62 kg/m        GENERAL: no acute distress. Cooperative in conversation.  Patient's family member is present.   on the phone.  Patient has a wound on his neck that is covered.  RESP: Regular respiratory rate. No expiratory wheezes   NEURO: non focal. Alert and oriented x3.   PSYCH: within normal limits. No depression or anxiety.  SKIN: exposed skin is dry intact.     Lab Results    Recent Results (from the past 168 hour(s))   Comprehensive metabolic panel   Result Value Ref Range    Sodium 134 (L) 135 - 145 mmol/L    Potassium 4.1 3.4 - 5.3 mmol/L    Carbon Dioxide (CO2) 23 22 - 29 mmol/L    Anion Gap 8 7 - 15 mmol/L    Urea Nitrogen 32.9 (H) 8.0 - 23.0 mg/dL    Creatinine 1.59 (H) 0.67 - 1.17 mg/dL    GFR Estimate 46 (L) >60 mL/min/1.73m2    Calcium 8.3 (L) 8.8 - 10.2 mg/dL    Chloride 103 98 - " 107 mmol/L    Glucose 129 (H) 70 - 99 mg/dL    Alkaline Phosphatase 98 40 - 150 U/L    AST 36 0 - 45 U/L    ALT 34 0 - 70 U/L    Protein Total 5.9 (L) 6.4 - 8.3 g/dL    Albumin 2.3 (L) 3.5 - 5.2 g/dL    Bilirubin Total 0.5 <=1.2 mg/dL   CBC with platelets and differential   Result Value Ref Range    WBC Count 5.3 4.0 - 11.0 10e3/uL    RBC Count 2.79 (L) 4.40 - 5.90 10e6/uL    Hemoglobin 7.8 (L) 13.3 - 17.7 g/dL    Hematocrit 25.0 (L) 40.0 - 53.0 %    MCV 90 78 - 100 fL    MCH 28.0 26.5 - 33.0 pg    MCHC 31.2 (L) 31.5 - 36.5 g/dL    RDW 18.0 (H) 10.0 - 15.0 %    Platelet Count 104 (L) 150 - 450 10e3/uL    % Neutrophils 72 %    % Lymphocytes 15 %    % Monocytes 11 %    % Eosinophils 1 %    % Basophils 0 %    % Immature Granulocytes 1 %    NRBCs per 100 WBC 0 <1 /100    Absolute Neutrophils 3.8 1.6 - 8.3 10e3/uL    Absolute Lymphocytes 0.8 0.8 - 5.3 10e3/uL    Absolute Monocytes 0.6 0.0 - 1.3 10e3/uL    Absolute Eosinophils 0.1 0.0 - 0.7 10e3/uL    Absolute Basophils 0.0 0.0 - 0.2 10e3/uL    Absolute Immature Granulocytes 0.0 <=0.4 10e3/uL    Absolute NRBCs 0.0 10e3/uL       Imaging    No results found.      The longitudinal plan of care for the diagnosis(es)/condition(s) as documented were addressed during this visit. Due to the added complexity in care, I will continue to support Kristen in the subsequent management and with ongoing continuity of care.        Signed by: SPARKLE Guzmán CNP

## 2024-05-22 ENCOUNTER — DOCUMENTATION ONLY (OUTPATIENT)
Dept: ONCOLOGY | Facility: HOSPITAL | Age: 74
End: 2024-05-22
Payer: COMMERCIAL

## 2024-05-22 ENCOUNTER — PATIENT OUTREACH (OUTPATIENT)
Dept: ONCOLOGY | Facility: HOSPITAL | Age: 74
End: 2024-05-22
Payer: COMMERCIAL

## 2024-05-22 LAB — CANCER AG27-29 SERPL-ACNC: 16.8 U/ML

## 2024-05-22 NOTE — PROGRESS NOTES
Form Type: Family FMLA for By [facility] Team Physician: Dr. Alan Frias    Date Faxed: 05/22/2024    Recipient: [facility] & Copy of Forms Sent to MR: YES

## 2024-05-22 NOTE — PROGRESS NOTES
[facility]: Cancer Care                                                                                          Sent an IB message to the vascular surgery scheduling pool to schedule a follow up visit with vascular surgery with either Dr. Vázquez or another provider in that specialty. Wrote that the patient had seen Dr. Vázquez in the past.     Signature:  Marjorie Briones RN

## 2024-06-03 ENCOUNTER — INFUSION THERAPY VISIT (OUTPATIENT)
Dept: INFUSION THERAPY | Facility: HOSPITAL | Age: 74
End: 2024-06-03
Attending: INTERNAL MEDICINE
Payer: COMMERCIAL

## 2024-06-03 DIAGNOSIS — C11.9 SQUAMOUS CELL CARCINOMA OF NASOPHARYNX (H): Primary | ICD-10-CM

## 2024-06-03 LAB
ABO/RH(D): NORMAL
ANTIBODY SCREEN: NEGATIVE
BASOPHILS # BLD AUTO: 0 10E3/UL (ref 0–0.2)
BASOPHILS NFR BLD AUTO: 0 %
EOSINOPHIL # BLD AUTO: 0.1 10E3/UL (ref 0–0.7)
EOSINOPHIL NFR BLD AUTO: 1 %
ERYTHROCYTE [DISTWIDTH] IN BLOOD BY AUTOMATED COUNT: 18.6 % (ref 10–15)
HCT VFR BLD AUTO: 21.8 % (ref 40–53)
HGB BLD-MCNC: 6.9 G/DL (ref 13.3–17.7)
IMM GRANULOCYTES # BLD: 0.1 10E3/UL
IMM GRANULOCYTES NFR BLD: 1 %
LYMPHOCYTES # BLD AUTO: 0.6 10E3/UL (ref 0.8–5.3)
LYMPHOCYTES NFR BLD AUTO: 9 %
MCH RBC QN AUTO: 27.9 PG (ref 26.5–33)
MCHC RBC AUTO-ENTMCNC: 31.7 G/DL (ref 31.5–36.5)
MCV RBC AUTO: 88 FL (ref 78–100)
MONOCYTES # BLD AUTO: 0.7 10E3/UL (ref 0–1.3)
MONOCYTES NFR BLD AUTO: 12 %
NEUTROPHILS # BLD AUTO: 4.6 10E3/UL (ref 1.6–8.3)
NEUTROPHILS NFR BLD AUTO: 77 %
NRBC # BLD AUTO: 0 10E3/UL
NRBC BLD AUTO-RTO: 0 /100
PLATELET # BLD AUTO: 89 10E3/UL (ref 150–450)
RBC # BLD AUTO: 2.47 10E6/UL (ref 4.4–5.9)
SPECIMEN EXPIRATION DATE: NORMAL
WBC # BLD AUTO: 6 10E3/UL (ref 4–11)

## 2024-06-03 PROCEDURE — 86900 BLOOD TYPING SEROLOGIC ABO: CPT | Performed by: NURSE PRACTITIONER

## 2024-06-03 PROCEDURE — 85041 AUTOMATED RBC COUNT: CPT | Performed by: INTERNAL MEDICINE

## 2024-06-03 PROCEDURE — 36591 DRAW BLOOD OFF VENOUS DEVICE: CPT | Performed by: INTERNAL MEDICINE

## 2024-06-03 PROCEDURE — 250N000011 HC RX IP 250 OP 636: Performed by: NURSE PRACTITIONER

## 2024-06-03 RX ORDER — HEPARIN SODIUM (PORCINE) LOCK FLUSH IV SOLN 100 UNIT/ML 100 UNIT/ML
5 SOLUTION INTRAVENOUS
Status: CANCELLED | OUTPATIENT
Start: 2024-06-03

## 2024-06-03 RX ORDER — HEPARIN SODIUM (PORCINE) LOCK FLUSH IV SOLN 100 UNIT/ML 100 UNIT/ML
5 SOLUTION INTRAVENOUS
OUTPATIENT
Start: 2024-06-03

## 2024-06-03 RX ORDER — DIPHENHYDRAMINE HYDROCHLORIDE 50 MG/ML
50 INJECTION INTRAMUSCULAR; INTRAVENOUS
Status: CANCELLED
Start: 2024-06-03

## 2024-06-03 RX ORDER — HEPARIN SODIUM (PORCINE) LOCK FLUSH IV SOLN 100 UNIT/ML 100 UNIT/ML
5 SOLUTION INTRAVENOUS
Status: DISCONTINUED | OUTPATIENT
Start: 2024-06-03 | End: 2024-06-03 | Stop reason: HOSPADM

## 2024-06-03 RX ORDER — HEPARIN SODIUM,PORCINE 10 UNIT/ML
5-20 VIAL (ML) INTRAVENOUS DAILY PRN
Status: CANCELLED | OUTPATIENT
Start: 2024-06-03

## 2024-06-03 RX ORDER — EPINEPHRINE 1 MG/ML
0.3 INJECTION, SOLUTION INTRAMUSCULAR; SUBCUTANEOUS EVERY 5 MIN PRN
Status: CANCELLED | OUTPATIENT
Start: 2024-06-03

## 2024-06-03 RX ADMIN — Medication 5 ML: at 13:09

## 2024-06-03 NOTE — PROGRESS NOTES
Pt here for labs for treatment 6/4, due to planned lab downtime.  Port was accessed and labs were drawn.  Pt desired stay accessed for treatment tomorrow,  Port was flushed per policy.  Will return 6/4 for scheduled treatment.

## 2024-06-04 ENCOUNTER — INFUSION THERAPY VISIT (OUTPATIENT)
Dept: INFUSION THERAPY | Facility: HOSPITAL | Age: 74
End: 2024-06-04
Attending: INTERNAL MEDICINE
Payer: COMMERCIAL

## 2024-06-04 VITALS
TEMPERATURE: 98.5 F | RESPIRATION RATE: 16 BRPM | HEART RATE: 64 BPM | SYSTOLIC BLOOD PRESSURE: 113 MMHG | DIASTOLIC BLOOD PRESSURE: 64 MMHG | OXYGEN SATURATION: 100 %

## 2024-06-04 DIAGNOSIS — D64.9 ANEMIA, NORMOCYTIC NORMOCHROMIC: Primary | ICD-10-CM

## 2024-06-04 DIAGNOSIS — C11.9 SQUAMOUS CELL CARCINOMA OF NASOPHARYNX (H): ICD-10-CM

## 2024-06-04 DIAGNOSIS — C11.9 SQUAMOUS CELL CARCINOMA OF NASOPHARYNX (H): Primary | ICD-10-CM

## 2024-06-04 LAB
BLD PROD TYP BPU: NORMAL
BLOOD COMPONENT TYPE: NORMAL
CODING SYSTEM: NORMAL
CROSSMATCH: NORMAL
ISSUE DATE AND TIME: NORMAL
UNIT ABO/RH: NORMAL
UNIT NUMBER: NORMAL
UNIT STATUS: NORMAL
UNIT TYPE ISBT: 7300

## 2024-06-04 PROCEDURE — 258N000003 HC RX IP 258 OP 636: Performed by: NURSE PRACTITIONER

## 2024-06-04 PROCEDURE — 36430 TRANSFUSION BLD/BLD COMPNT: CPT

## 2024-06-04 PROCEDURE — 86923 COMPATIBILITY TEST ELECTRIC: CPT | Performed by: INTERNAL MEDICINE

## 2024-06-04 PROCEDURE — 96375 TX/PRO/DX INJ NEW DRUG ADDON: CPT

## 2024-06-04 PROCEDURE — 250N000011 HC RX IP 250 OP 636: Performed by: NURSE PRACTITIONER

## 2024-06-04 PROCEDURE — 36591 DRAW BLOOD OFF VENOUS DEVICE: CPT | Performed by: NURSE PRACTITIONER

## 2024-06-04 PROCEDURE — 96413 CHEMO IV INFUSION 1 HR: CPT | Mod: XS

## 2024-06-04 PROCEDURE — P9016 RBC LEUKOCYTES REDUCED: HCPCS | Performed by: INTERNAL MEDICINE

## 2024-06-04 RX ORDER — MEPERIDINE HYDROCHLORIDE 25 MG/ML
25 INJECTION INTRAMUSCULAR; INTRAVENOUS; SUBCUTANEOUS EVERY 30 MIN PRN
Status: CANCELLED | OUTPATIENT
Start: 2024-07-02

## 2024-06-04 RX ORDER — ALBUTEROL SULFATE 0.83 MG/ML
2.5 SOLUTION RESPIRATORY (INHALATION)
Status: CANCELLED | OUTPATIENT
Start: 2024-07-02

## 2024-06-04 RX ORDER — DIPHENHYDRAMINE HYDROCHLORIDE 50 MG/ML
50 INJECTION INTRAMUSCULAR; INTRAVENOUS
Status: CANCELLED
Start: 2024-06-04

## 2024-06-04 RX ORDER — DIPHENHYDRAMINE HYDROCHLORIDE 50 MG/ML
50 INJECTION INTRAMUSCULAR; INTRAVENOUS
Status: CANCELLED
Start: 2024-06-18

## 2024-06-04 RX ORDER — HEPARIN SODIUM (PORCINE) LOCK FLUSH IV SOLN 100 UNIT/ML 100 UNIT/ML
5 SOLUTION INTRAVENOUS
Status: CANCELLED | OUTPATIENT
Start: 2024-07-17

## 2024-06-04 RX ORDER — HEPARIN SODIUM,PORCINE 10 UNIT/ML
5-20 VIAL (ML) INTRAVENOUS DAILY PRN
Status: CANCELLED | OUTPATIENT
Start: 2024-06-04

## 2024-06-04 RX ORDER — ONDANSETRON 2 MG/ML
8 INJECTION INTRAMUSCULAR; INTRAVENOUS ONCE
Status: CANCELLED | OUTPATIENT
Start: 2024-07-02

## 2024-06-04 RX ORDER — EPINEPHRINE 1 MG/ML
0.3 INJECTION, SOLUTION INTRAMUSCULAR; SUBCUTANEOUS EVERY 5 MIN PRN
Status: CANCELLED | OUTPATIENT
Start: 2024-06-04

## 2024-06-04 RX ORDER — LORAZEPAM 2 MG/ML
0.5 INJECTION INTRAMUSCULAR EVERY 4 HOURS PRN
Status: CANCELLED | OUTPATIENT
Start: 2024-07-31

## 2024-06-04 RX ORDER — HEPARIN SODIUM,PORCINE 10 UNIT/ML
5-20 VIAL (ML) INTRAVENOUS DAILY PRN
Status: CANCELLED | OUTPATIENT
Start: 2024-07-31

## 2024-06-04 RX ORDER — HEPARIN SODIUM (PORCINE) LOCK FLUSH IV SOLN 100 UNIT/ML 100 UNIT/ML
5 SOLUTION INTRAVENOUS
Status: CANCELLED | OUTPATIENT
Start: 2024-07-02

## 2024-06-04 RX ORDER — EPINEPHRINE 1 MG/ML
0.3 INJECTION, SOLUTION, CONCENTRATE INTRAVENOUS EVERY 5 MIN PRN
Status: CANCELLED | OUTPATIENT
Start: 2024-07-31

## 2024-06-04 RX ORDER — ALBUTEROL SULFATE 90 UG/1
1-2 AEROSOL, METERED RESPIRATORY (INHALATION)
Status: CANCELLED | OUTPATIENT
Start: 2024-07-31

## 2024-06-04 RX ORDER — ONDANSETRON 2 MG/ML
8 INJECTION INTRAMUSCULAR; INTRAVENOUS ONCE
Status: CANCELLED | OUTPATIENT
Start: 2024-07-31

## 2024-06-04 RX ORDER — LORAZEPAM 2 MG/ML
0.5 INJECTION INTRAMUSCULAR EVERY 4 HOURS PRN
Status: CANCELLED | OUTPATIENT
Start: 2024-07-17

## 2024-06-04 RX ORDER — HEPARIN SODIUM,PORCINE 10 UNIT/ML
5-20 VIAL (ML) INTRAVENOUS DAILY PRN
Status: CANCELLED | OUTPATIENT
Start: 2024-07-17

## 2024-06-04 RX ORDER — EPINEPHRINE 1 MG/ML
0.3 INJECTION, SOLUTION, CONCENTRATE INTRAVENOUS EVERY 5 MIN PRN
Status: CANCELLED | OUTPATIENT
Start: 2024-07-17

## 2024-06-04 RX ORDER — METHYLPREDNISOLONE SODIUM SUCCINATE 125 MG/2ML
125 INJECTION, POWDER, LYOPHILIZED, FOR SOLUTION INTRAMUSCULAR; INTRAVENOUS
Status: CANCELLED | OUTPATIENT
Start: 2024-06-04

## 2024-06-04 RX ORDER — HEPARIN SODIUM (PORCINE) LOCK FLUSH IV SOLN 100 UNIT/ML 100 UNIT/ML
5 SOLUTION INTRAVENOUS
Status: CANCELLED | OUTPATIENT
Start: 2024-06-04

## 2024-06-04 RX ORDER — LORAZEPAM 2 MG/ML
0.5 INJECTION INTRAMUSCULAR EVERY 4 HOURS PRN
Status: CANCELLED | OUTPATIENT
Start: 2024-06-04

## 2024-06-04 RX ORDER — ALBUTEROL SULFATE 90 UG/1
1-2 AEROSOL, METERED RESPIRATORY (INHALATION)
Status: CANCELLED
Start: 2024-06-18

## 2024-06-04 RX ORDER — HEPARIN SODIUM (PORCINE) LOCK FLUSH IV SOLN 100 UNIT/ML 100 UNIT/ML
5 SOLUTION INTRAVENOUS
Status: DISCONTINUED | OUTPATIENT
Start: 2024-06-04 | End: 2024-06-04 | Stop reason: HOSPADM

## 2024-06-04 RX ORDER — ALBUTEROL SULFATE 90 UG/1
1-2 AEROSOL, METERED RESPIRATORY (INHALATION)
Status: CANCELLED | OUTPATIENT
Start: 2024-07-02

## 2024-06-04 RX ORDER — LORAZEPAM 2 MG/ML
0.5 INJECTION INTRAMUSCULAR EVERY 4 HOURS PRN
Status: CANCELLED | OUTPATIENT
Start: 2024-07-02

## 2024-06-04 RX ORDER — DIPHENHYDRAMINE HYDROCHLORIDE 50 MG/ML
50 INJECTION INTRAMUSCULAR; INTRAVENOUS
Status: CANCELLED | OUTPATIENT
Start: 2024-06-04

## 2024-06-04 RX ORDER — LORAZEPAM 2 MG/ML
0.5 INJECTION INTRAMUSCULAR EVERY 4 HOURS PRN
Status: CANCELLED | OUTPATIENT
Start: 2024-06-18

## 2024-06-04 RX ORDER — HEPARIN SODIUM (PORCINE) LOCK FLUSH IV SOLN 100 UNIT/ML 100 UNIT/ML
5 SOLUTION INTRAVENOUS
Status: CANCELLED | OUTPATIENT
Start: 2024-07-31

## 2024-06-04 RX ORDER — ALBUTEROL SULFATE 0.83 MG/ML
2.5 SOLUTION RESPIRATORY (INHALATION)
Status: CANCELLED | OUTPATIENT
Start: 2024-07-31

## 2024-06-04 RX ORDER — ALBUTEROL SULFATE 90 UG/1
1-2 AEROSOL, METERED RESPIRATORY (INHALATION)
Status: CANCELLED
Start: 2024-07-17

## 2024-06-04 RX ORDER — ALBUTEROL SULFATE 90 UG/1
1-2 AEROSOL, METERED RESPIRATORY (INHALATION)
Status: CANCELLED | OUTPATIENT
Start: 2024-06-04

## 2024-06-04 RX ORDER — ONDANSETRON 2 MG/ML
8 INJECTION INTRAMUSCULAR; INTRAVENOUS ONCE
Status: CANCELLED | OUTPATIENT
Start: 2024-07-17

## 2024-06-04 RX ORDER — HEPARIN SODIUM,PORCINE 10 UNIT/ML
5-20 VIAL (ML) INTRAVENOUS DAILY PRN
Status: CANCELLED | OUTPATIENT
Start: 2024-06-18

## 2024-06-04 RX ORDER — MEPERIDINE HYDROCHLORIDE 25 MG/ML
25 INJECTION INTRAMUSCULAR; INTRAVENOUS; SUBCUTANEOUS EVERY 30 MIN PRN
Status: CANCELLED | OUTPATIENT
Start: 2024-06-04

## 2024-06-04 RX ORDER — HEPARIN SODIUM,PORCINE 10 UNIT/ML
5-20 VIAL (ML) INTRAVENOUS DAILY PRN
Status: CANCELLED | OUTPATIENT
Start: 2024-07-02

## 2024-06-04 RX ORDER — DIPHENHYDRAMINE HYDROCHLORIDE 50 MG/ML
50 INJECTION INTRAMUSCULAR; INTRAVENOUS
Status: CANCELLED
Start: 2024-07-17

## 2024-06-04 RX ORDER — ALBUTEROL SULFATE 0.83 MG/ML
2.5 SOLUTION RESPIRATORY (INHALATION)
Status: CANCELLED | OUTPATIENT
Start: 2024-06-18

## 2024-06-04 RX ORDER — MEPERIDINE HYDROCHLORIDE 25 MG/ML
25 INJECTION INTRAMUSCULAR; INTRAVENOUS; SUBCUTANEOUS EVERY 30 MIN PRN
Status: CANCELLED | OUTPATIENT
Start: 2024-06-18

## 2024-06-04 RX ORDER — ALBUTEROL SULFATE 0.83 MG/ML
2.5 SOLUTION RESPIRATORY (INHALATION)
Status: CANCELLED | OUTPATIENT
Start: 2024-06-04

## 2024-06-04 RX ORDER — HEPARIN SODIUM (PORCINE) LOCK FLUSH IV SOLN 100 UNIT/ML 100 UNIT/ML
5 SOLUTION INTRAVENOUS
Status: CANCELLED | OUTPATIENT
Start: 2024-06-18

## 2024-06-04 RX ORDER — DIPHENHYDRAMINE HYDROCHLORIDE 50 MG/ML
50 INJECTION INTRAMUSCULAR; INTRAVENOUS
Status: CANCELLED | OUTPATIENT
Start: 2024-07-31

## 2024-06-04 RX ORDER — EPINEPHRINE 1 MG/ML
0.3 INJECTION, SOLUTION, CONCENTRATE INTRAVENOUS EVERY 5 MIN PRN
Status: CANCELLED | OUTPATIENT
Start: 2024-07-02

## 2024-06-04 RX ORDER — DIPHENHYDRAMINE HYDROCHLORIDE 50 MG/ML
50 INJECTION INTRAMUSCULAR; INTRAVENOUS
Status: CANCELLED | OUTPATIENT
Start: 2024-07-02

## 2024-06-04 RX ORDER — MEPERIDINE HYDROCHLORIDE 25 MG/ML
25 INJECTION INTRAMUSCULAR; INTRAVENOUS; SUBCUTANEOUS EVERY 30 MIN PRN
Status: CANCELLED | OUTPATIENT
Start: 2024-07-31

## 2024-06-04 RX ORDER — ALBUTEROL SULFATE 0.83 MG/ML
2.5 SOLUTION RESPIRATORY (INHALATION)
Status: CANCELLED | OUTPATIENT
Start: 2024-07-17

## 2024-06-04 RX ORDER — ONDANSETRON 2 MG/ML
8 INJECTION INTRAMUSCULAR; INTRAVENOUS ONCE
Status: COMPLETED | OUTPATIENT
Start: 2024-06-04 | End: 2024-06-04

## 2024-06-04 RX ORDER — ONDANSETRON 2 MG/ML
8 INJECTION INTRAMUSCULAR; INTRAVENOUS ONCE
Status: CANCELLED | OUTPATIENT
Start: 2024-06-18

## 2024-06-04 RX ORDER — MEPERIDINE HYDROCHLORIDE 25 MG/ML
25 INJECTION INTRAMUSCULAR; INTRAVENOUS; SUBCUTANEOUS EVERY 30 MIN PRN
Status: CANCELLED | OUTPATIENT
Start: 2024-07-17

## 2024-06-04 RX ORDER — EPINEPHRINE 1 MG/ML
0.3 INJECTION, SOLUTION, CONCENTRATE INTRAVENOUS EVERY 5 MIN PRN
Status: CANCELLED | OUTPATIENT
Start: 2024-06-18

## 2024-06-04 RX ADMIN — Medication 5 ML: at 14:56

## 2024-06-04 RX ADMIN — GEMCITABINE 1200 MG: 38 INJECTION, SOLUTION INTRAVENOUS at 14:28

## 2024-06-04 RX ADMIN — ONDANSETRON 8 MG: 2 INJECTION INTRAMUSCULAR; INTRAVENOUS at 14:22

## 2024-06-04 NOTE — PROGRESS NOTES
Pt here for 1 unit(s) PRBC's and gemzar. Port accessed from yesterday and positive blood return. No problem with blood transfusion or gemzar administration. Port flushed and deaccessed upon completion. Pt carleen ambulatory to lobby with his son.

## 2024-06-10 ENCOUNTER — OFFICE VISIT (OUTPATIENT)
Dept: RADIATION ONCOLOGY | Facility: HOSPITAL | Age: 74
End: 2024-06-10
Attending: FAMILY MEDICINE
Payer: COMMERCIAL

## 2024-06-10 VITALS
DIASTOLIC BLOOD PRESSURE: 68 MMHG | OXYGEN SATURATION: 98 % | BODY MASS INDEX: 18.67 KG/M2 | RESPIRATION RATE: 20 BRPM | WEIGHT: 112.2 LBS | SYSTOLIC BLOOD PRESSURE: 111 MMHG | HEART RATE: 68 BPM

## 2024-06-10 DIAGNOSIS — C11.9 NASOPHARYNGEAL CARCINOMA (H): Primary | ICD-10-CM

## 2024-06-10 DIAGNOSIS — Z51.5 PALLIATIVE CARE PATIENT: ICD-10-CM

## 2024-06-10 DIAGNOSIS — N18.32 CKD STAGE 3B, GFR 30-44 ML/MIN (H): ICD-10-CM

## 2024-06-10 DIAGNOSIS — Z71.89 GOALS OF CARE, COUNSELING/DISCUSSION: ICD-10-CM

## 2024-06-10 DIAGNOSIS — E44.0 MODERATE PROTEIN-CALORIE MALNUTRITION (H): ICD-10-CM

## 2024-06-10 DIAGNOSIS — C11.9 SQUAMOUS CELL CARCINOMA OF NASOPHARYNX (H): ICD-10-CM

## 2024-06-10 PROCEDURE — G0463 HOSPITAL OUTPT CLINIC VISIT: HCPCS | Performed by: FAMILY MEDICINE

## 2024-06-10 PROCEDURE — 99215 OFFICE O/P EST HI 40 MIN: CPT | Performed by: FAMILY MEDICINE

## 2024-06-10 ASSESSMENT — PAIN SCALES - GENERAL: PAINLEVEL: NO PAIN (0)

## 2024-06-10 NOTE — LETTER
"6/10/2024      Kristen Chapman  [address] [place] [address]      Dear Colleague,    Thank you for referring your patient, Kristen Chapman, to the [facility] [place]. Please see a copy of my visit note below.    Oncology Rooming Note    Sophia 10, 2024 3:45 PM   Kristen Chapman is a 74 year old male who presents for:    Chief Complaint   Patient presents with     Oncology Clinic Visit     Palliative     Follow up     Initial Vitals: /68 (BP Location: Left arm, Patient Position: Sitting)   Pulse 68   Resp 20   Wt 50.9 kg (112 lb 3.2 oz)   SpO2 98%   BMI 18.67 kg/m   Estimated body mass index is 18.67 kg/m  as calculated from the following:    Height as of 24: 1.651 m (5' 5\").    Weight as of this encounter: 50.9 kg (112 lb 3.2 oz). Body surface area is 1.53 meters squared.  No Pain (0) Comment: Data Unavailable   No LMP for male patient.  Allergies reviewed: Yes  Medications reviewed: Yes    Medications: Medication refills not needed today.  Pharmacy name entered into [employer]:    [facility] [place], [place] - [address] [place] [address] PHARMACY [place], [place] - [address] [place] [address]    Frailty Screening:   Is the patient here for a new oncology consult visit in cancer care? 2. No      Clinical concerns: Follow up for palliative care, visit per interpretor over the phone, family present also  Dr. Arreguin was notified.      Flores Mercedes RN    Palliative Care Outpatient Clinic Progress Note    Patient Name: Kristen Chapman  Primary Provider: Issa Cook    Impressions:  ECO  Decision Making Capacity:  present with aid of   PDMP review:  yes, no concerns     Recurrent nasopharyngeal cancer, EBV +with recent PD  Malignant wound right submandibular region  Moderate protein calorie malnutrition  CKD IIIb  Hepatitis B with cirrhosis with refractory ascites and pleurex catheter  Malignancy associated anorexia  hypothyroidism     GOALS OF CARE:  2024  likes " this 'beautiful world' and would like more time to be alive and is willing to undergo more treatments. I explained to him that sometimes due to the toxic nature of treatments he may live longer with less tumor directed therapy as the end of his life approaches. He does not want a feeding tube again or CPR or intubation.  He also declined surgery on the malignant wound on his neck.         Recommendations & Counseling:  Stop Megace  TRIAL of mirtazapine 15 mg ODT at HS     Follow up with me in 4 weeks;  Kristen is asked to consider how he would want to spend his remaining time if the current chemo isn't effective.     Follow up with wound therapy for anterior neck wound      Counseling: All of the above was explained to the patient in lay language. The patient has verbalized a clear understanding of the discussion, asked appropriate questions, which have been answered to patient's apparent satisfaction. The patient is in agreement with the above plan.      Chief Complaint/Patient ID: Kristen Chapman 74 year old male with PMHx of recurrent nasopharyngeal cancer with recent PD, a malignant wound in his neck area; CKD 3b; Hep B with cirrhosis and refractory ascites and pleurex catheter placement.    Last Palliative care appointment: 05/13/2024 with me     Reviewed:  Yes:   reviewed - no record of controlled substances prescribed.    Interim History:  Kristen Chapman is  74 year old male who is seen today for follow up with Palliative Care via billable video visit. He is now on single agent gemzar and is scheduled for C3 on June 11th (tomorrow). The cycle will be two weeks on and 1 week off.  Today he is joined by his daughter Ebony and our visit was facilitated by a professional [facility]  by phone. He continues with his son's help to use his abdominal pleurex catheter.     Pain:  none    Appetite/Nausea: improved with mirtazapine.  His daughter, Ebony, who has not seen him for 6 weeks notices a big difference     Bowels: no  concerns     Sleep: good     Mood: good    Fatigue:  pretty severe.  We discussed a trial of Ritalin but Mr. Chapman is worried it might suppress his appetite.  He wants to try regular exercise and now has two young grand children at home that should keep him busy.     Coping:  overall OK; with his daughter Xy visiting for a month with two young children his wife has moved home and that is nice for him.    Family History- Reviewed in Epic.    Allergies   Allergen Reactions     Oxycodone Itching       Social History:  Pertinent changes to social history/social situation since last visit: none  Key support resources: family and members of his community  Advance Directive Status:  no ACP documents; he would look to his children to corporately make decisions for him with input from clan elders.    Social History     Tobacco Use     Smoking status: Never     Smokeless tobacco: Never   Substance Use Topics     Alcohol use: Not Currently     Drug use: Not Currently         Allergies   Allergen Reactions     Oxycodone Itching     Current Outpatient Medications   Medication Sig Dispense Refill     acetaminophen (TYLENOL) 325 MG tablet Take 650 mg by mouth every 6 hours as needed        dexAMETHasone (DECADRON) 4 MG tablet Take 1 tablet (4 mg) by mouth daily (with breakfast) Take for 2 days after each chemotherapy dose. 10 tablet 0     furosemide (LASIX) 20 MG tablet Take 1 tablet (20 mg) by mouth daily 90 tablet 3     levothyroxine (SYNTHROID/LEVOTHROID) 175 MCG tablet Take 1 tablet (175 mcg) by mouth daily 60 tablet 1     megestrol (MEGACE) 40 MG/ML suspension Take 10 mLs (400 mg) by mouth daily (Patient not taking: Reported on 5/21/2024) 480 mL 1     mirtazapine (REMERON SOL-TAB) 15 MG ODT 1 tablet (15 mg) by Orally disintegrating tablet route at bedtime 30 tablet 4     prochlorperazine (COMPAZINE) 10 MG tablet Take 1 tablet (10 mg) by mouth every 6 hours as needed for nausea or vomiting 30 tablet 2     rivaroxaban  ANTICOAGULANT (XARELTO) 20 MG TABS tablet Take 1 tablet (20 mg) by mouth daily (with dinner) 30 tablet 3     spironolactone (ALDACTONE) 100 MG tablet Take 1 tablet (100 mg) by mouth daily 90 tablet 3     tenofovir (VIREAD) 300 MG tablet Take 1 tablet (300 mg) by mouth every 72 hours 45 tablet 3     Past Medical History:   Diagnosis Date     Anemia      Dysphagia      Hepatitis B chronic      Hypothyroidism      Nasopharyngeal cancer (H)      Severe protein-calorie malnutrition (H24)      Thrombocytopenia (H24)      Past Surgical History:   Procedure Laterality Date     ENDOSCOPIC ENDONASAL SURGERY Bilateral 9/7/2021    Procedure: Nasal Endoscopy with Biopsy;  Surgeon: Ky Centeno MD;  Location: [facility] OR     IR CHEST PORT PLACEMENT > 5 YRS OF AGE  3/2/2020     IR CHEST PORT PLACEMENT > 5 YRS OF AGE  3/2/2020     IR GASTROSTOMY TUBE INSERTION  4/27/2020     IR GASTROSTOMY TUBE PERCUTANEOUS PLCMNT  4/27/2020     IR INTRAPERITONEAL CATH TUNNEL ASCITES  2/22/2024     IR INTRAPERITONEAL CATH TUNNEL ASCITES  2/26/2024     IR PORT PLACEMENT >5 YEARS  3/2/2020     IR PORT PLACEMENT >5 YEARS  3/2/2020     IR T-FASTENER REMOVAL  6/5/2020     IR T-FASTENER REMOVAL  6/5/2020       Physical Exam:   GENERAL APPEARANCE: frail Hmong man, alert and no distress; neatly groomed  EYES: Eyes grossly normal to inspection, PERRLA, conjunctivae and sclerae without injection or discharge, EOM intact   RESP:  no increased work of breathing; speaks in complete sentences;   MS: No musculoskeletal defects are noted  SKIN: malignant wound under right mandible appears smaller with less discharge; hydration status appears adequate with normal skin turgor   PSYCH: Alert and oriented x3; speech- coherent , normal rate and volume; able to articulate logical thoughts, able to abstract reason, no tangential thoughts, no hallucinations or delusions, mentation appears normal, Mood is euthymic. Affect is appropriate for this mood state and bright.  Thought content is free of suicidal ideation, hallucinations, and delusions.  Eye contact is good during conversation.       Key Data Reviewed:  LABS: 5/21/2024- Cr 1.59, Albumin 2.3,  Hgb 6.9,      IMAGING: no recent imaging to review    43 minutes spent on the date of the encounter doing chart review, history and exam, patient education & counseling, documentation and other activities as noted above.    Steve Arreguin MD MS FAAFP CAQHPM  [facility]  Office [phone]  Fax [phone]           Again, thank you for allowing me to participate in the care of your patient.        Sincerely,        Steve Arreguin MD

## 2024-06-10 NOTE — PROGRESS NOTES
"Oncology Rooming Note    Sophia 10, 2024 3:45 PM   Kristen Chapman is a 74 year old male who presents for:    Chief Complaint   Patient presents with    Oncology Clinic Visit    Palliative     Follow up     Initial Vitals: /68 (BP Location: Left arm, Patient Position: Sitting)   Pulse 68   Resp 20   Wt 50.9 kg (112 lb 3.2 oz)   SpO2 98%   BMI 18.67 kg/m   Estimated body mass index is 18.67 kg/m  as calculated from the following:    Height as of 24: 1.651 m (5' 5\").    Weight as of this encounter: 50.9 kg (112 lb 3.2 oz). Body surface area is 1.53 meters squared.  No Pain (0) Comment: Data Unavailable   No LMP for male patient.  Allergies reviewed: Yes  Medications reviewed: Yes    Medications: Medication refills not needed today.  Pharmacy name entered into "[employer]:    [place], [place] - [address] [place] [address] PHARMACY [place], [place] - [address] [place] [address]    Frailty Screening:   Is the patient here for a new oncology consult visit in cancer care? 2. No      Clinical concerns: Follow up for palliative care, visit per interpretor over the phone, family present also  Dr. Arreguin was notified.      Flores Mercedes RN    Palliative Care Outpatient Clinic Progress Note    Patient Name: Kristen Chapman  Primary Provider: Issa Cook    Impressions:  ECO  Decision Making Capacity:  present with aid of   PDMP review:  yes, no concerns     Recurrent nasopharyngeal cancer, EBV +with recent PD  Malignant wound right submandibular region  Moderate protein calorie malnutrition  CKD IIIb  Hepatitis B with cirrhosis with refractory ascites and pleurex catheter  Malignancy associated anorexia  hypothyroidism     GOALS OF CARE:  2024  likes this 'beautiful world' and would like more time to be alive and is willing to undergo more treatments. I explained to him that sometimes due to the toxic nature of treatments he may live longer with less tumor directed therapy as the end of " his life approaches. He does not want a feeding tube again or CPR or intubation.  He also declined surgery on the malignant wound on his neck.         Recommendations & Counseling:  Stop Megace  TRIAL of mirtazapine 15 mg ODT at HS     Follow up with me in 4 weeks;  Kristen is asked to consider how he would want to spend his remaining time if the current chemo isn't effective.     Follow up with wound therapy for anterior neck wound      Counseling: All of the above was explained to the patient in lay language. The patient has verbalized a clear understanding of the discussion, asked appropriate questions, which have been answered to patient's apparent satisfaction. The patient is in agreement with the above plan.      Chief Complaint/Patient ID: Kristen Chapman 74 year old male with PMHx of recurrent nasopharyngeal cancer with recent PD, a malignant wound in his neck area; CKD 3b; Hep B with cirrhosis and refractory ascites and pleurex catheter placement.    Last Palliative care appointment: 05/13/2024 with me     Reviewed:  Yes:   reviewed - no record of controlled substances prescribed.    Interim History:  Kristen Chapman is  74 year old male who is seen today for follow up with Palliative Care via billable video visit. He is now on single agent gemzar and is scheduled for C3 on June 11th (tomorrow). The cycle will be two weeks on and 1 week off.  Today he is joined by his daughter Ebony and our visit was facilitated by a professional [facility]  by phone. He continues with his son's help to use his abdominal pleurex catheter.     Pain:  none    Appetite/Nausea: improved with mirtazapine.  His daughter, Ebony, who has not seen him for 6 weeks notices a big difference     Bowels: no concerns     Sleep: good     Mood: good    Fatigue:  pretty severe.  We discussed a trial of Ritalin but Mr. Chapman is worried it might suppress his appetite.  He wants to try regular exercise and now has two young grand children at home that  should keep him busy.     Coping:  overall OK; with his daughter Xy visiting for a month with two young children his wife has moved home and that is nice for him.    Family History- Reviewed in Epic.    Allergies   Allergen Reactions    Oxycodone Itching       Social History:  Pertinent changes to social history/social situation since last visit: none  Key support resources: family and members of his community  Advance Directive Status:  no ACP documents; he would look to his children to corporately make decisions for him with input from clan elders.    Social History     Tobacco Use    Smoking status: Never    Smokeless tobacco: Never   Substance Use Topics    Alcohol use: Not Currently    Drug use: Not Currently         Allergies   Allergen Reactions    Oxycodone Itching     Current Outpatient Medications   Medication Sig Dispense Refill    acetaminophen (TYLENOL) 325 MG tablet Take 650 mg by mouth every 6 hours as needed       dexAMETHasone (DECADRON) 4 MG tablet Take 1 tablet (4 mg) by mouth daily (with breakfast) Take for 2 days after each chemotherapy dose. 10 tablet 0    furosemide (LASIX) 20 MG tablet Take 1 tablet (20 mg) by mouth daily 90 tablet 3    levothyroxine (SYNTHROID/LEVOTHROID) 175 MCG tablet Take 1 tablet (175 mcg) by mouth daily 60 tablet 1    megestrol (MEGACE) 40 MG/ML suspension Take 10 mLs (400 mg) by mouth daily (Patient not taking: Reported on 5/21/2024) 480 mL 1    mirtazapine (REMERON SOL-TAB) 15 MG ODT 1 tablet (15 mg) by Orally disintegrating tablet route at bedtime 30 tablet 4    prochlorperazine (COMPAZINE) 10 MG tablet Take 1 tablet (10 mg) by mouth every 6 hours as needed for nausea or vomiting 30 tablet 2    rivaroxaban ANTICOAGULANT (XARELTO) 20 MG TABS tablet Take 1 tablet (20 mg) by mouth daily (with dinner) 30 tablet 3    spironolactone (ALDACTONE) 100 MG tablet Take 1 tablet (100 mg) by mouth daily 90 tablet 3    tenofovir (VIREAD) 300 MG tablet Take 1 tablet (300 mg) by  mouth every 72 hours 45 tablet 3     Past Medical History:   Diagnosis Date    Anemia     Dysphagia     Hepatitis B chronic     Hypothyroidism     Nasopharyngeal cancer (H)     Severe protein-calorie malnutrition (H24)     Thrombocytopenia (H24)      Past Surgical History:   Procedure Laterality Date    ENDOSCOPIC ENDONASAL SURGERY Bilateral 9/7/2021    Procedure: Nasal Endoscopy with Biopsy;  Surgeon: Ky Centeno MD;  Location: [facility] OR    IR CHEST PORT PLACEMENT > 5 YRS OF AGE  3/2/2020    IR CHEST PORT PLACEMENT > 5 YRS OF AGE  3/2/2020    IR GASTROSTOMY TUBE INSERTION  4/27/2020    IR GASTROSTOMY TUBE PERCUTANEOUS PLCMNT  4/27/2020    IR INTRAPERITONEAL CATH TUNNEL ASCITES  2/22/2024    IR INTRAPERITONEAL CATH TUNNEL ASCITES  2/26/2024    IR PORT PLACEMENT >5 YEARS  3/2/2020    IR PORT PLACEMENT >5 YEARS  3/2/2020    IR T-FASTENER REMOVAL  6/5/2020    IR T-FASTENER REMOVAL  6/5/2020       Physical Exam:   GENERAL APPEARANCE: frail Hmong man, alert and no distress; neatly groomed  EYES: Eyes grossly normal to inspection, PERRLA, conjunctivae and sclerae without injection or discharge, EOM intact   RESP:  no increased work of breathing; speaks in complete sentences;   MS: No musculoskeletal defects are noted  SKIN: malignant wound under right mandible appears smaller with less discharge; hydration status appears adequate with normal skin turgor   PSYCH: Alert and oriented x3; speech- coherent , normal rate and volume; able to articulate logical thoughts, able to abstract reason, no tangential thoughts, no hallucinations or delusions, mentation appears normal, Mood is euthymic. Affect is appropriate for this mood state and bright. Thought content is free of suicidal ideation, hallucinations, and delusions.  Eye contact is good during conversation.       Key Data Reviewed:  LABS: 5/21/2024- Cr 1.59, Albumin 2.3,  Hgb 6.9,      IMAGING: no recent imaging to review    43 minutes spent on the date of the encounter  doing chart review, history and exam, patient education & counseling, documentation and other activities as noted above.    Steve Arreguin MD MS FAAFP CAQHPM  [facility]  Office [phone]  Fax [phone]

## 2024-06-10 NOTE — PATIENT INSTRUCTIONS
It was good to see you today,  and Ebony.    Here are the things we talked about:  Keep using the appetite stimulant (mirtazapine) at bedtime.  Try to exercise at least 20 minutes a day.  Let me know through mychart of by calling Milka if you want to try   Ritalin for your fatigue.    Stop at the desk  to schedule a follow up appointment in 4 weeks.     How to get a hold of us:  For non-urgent matters, MyChart works best.    For more urgent matters, or if you prefer not to use MyChart, call our clinic nurse coordinator Milka Dumont RN at [phone]    We have an on-call number for evenings and weekends. Please call this only if you are having uncontrolled symptoms or serious side effects from your medicines: [phone].     For refills, please give us a week (5 working days) notice. We don't always have providers available everyday to do refills. If you call the day you run out of your medicine, we may not be able to refill it in time, so call 5 days in advance!    Steve Arreguin MD MS FAAFP CAQHPM  [facility]  Office [phone]  Fax [phone]

## 2024-06-11 RX ORDER — DEXAMETHASONE 4 MG/1
4 TABLET ORAL
Qty: 10 TABLET | Refills: 0 | Status: SHIPPED | OUTPATIENT
Start: 2024-06-11 | End: 2024-06-18

## 2024-06-18 ENCOUNTER — INFUSION THERAPY VISIT (OUTPATIENT)
Dept: INFUSION THERAPY | Facility: HOSPITAL | Age: 74
End: 2024-06-18
Attending: INTERNAL MEDICINE
Payer: COMMERCIAL

## 2024-06-18 ENCOUNTER — ONCOLOGY VISIT (OUTPATIENT)
Dept: ONCOLOGY | Facility: HOSPITAL | Age: 74
End: 2024-06-18
Attending: NURSE PRACTITIONER
Payer: COMMERCIAL

## 2024-06-18 VITALS
RESPIRATION RATE: 16 BRPM | HEART RATE: 62 BPM | WEIGHT: 111.3 LBS | BODY MASS INDEX: 18.54 KG/M2 | DIASTOLIC BLOOD PRESSURE: 55 MMHG | TEMPERATURE: 97.7 F | SYSTOLIC BLOOD PRESSURE: 89 MMHG | HEIGHT: 65 IN | OXYGEN SATURATION: 99 %

## 2024-06-18 DIAGNOSIS — I95.9 HYPOTENSION, UNSPECIFIED HYPOTENSION TYPE: Primary | ICD-10-CM

## 2024-06-18 DIAGNOSIS — C11.9 SQUAMOUS CELL CARCINOMA OF NASOPHARYNX (H): ICD-10-CM

## 2024-06-18 DIAGNOSIS — C11.9 SQUAMOUS CELL CARCINOMA OF NASOPHARYNX (H): Primary | ICD-10-CM

## 2024-06-18 DIAGNOSIS — I95.9 HYPOTENSION, UNSPECIFIED HYPOTENSION TYPE: ICD-10-CM

## 2024-06-18 LAB
ALBUMIN SERPL BCG-MCNC: 2.5 G/DL (ref 3.5–5.2)
ALP SERPL-CCNC: 115 U/L (ref 40–150)
ALT SERPL W P-5'-P-CCNC: 28 U/L (ref 0–70)
ANION GAP SERPL CALCULATED.3IONS-SCNC: 8 MMOL/L (ref 7–15)
AST SERPL W P-5'-P-CCNC: 27 U/L (ref 0–45)
BASOPHILS # BLD AUTO: 0 10E3/UL (ref 0–0.2)
BASOPHILS NFR BLD AUTO: 0 %
BILIRUB SERPL-MCNC: 0.4 MG/DL
BUN SERPL-MCNC: 41 MG/DL (ref 8–23)
CALCIUM SERPL-MCNC: 8.8 MG/DL (ref 8.8–10.2)
CHLORIDE SERPL-SCNC: 103 MMOL/L (ref 98–107)
CREAT SERPL-MCNC: 1.44 MG/DL (ref 0.67–1.17)
DEPRECATED HCO3 PLAS-SCNC: 23 MMOL/L (ref 22–29)
EGFRCR SERPLBLD CKD-EPI 2021: 52 ML/MIN/1.73M2
EOSINOPHIL # BLD AUTO: 0.1 10E3/UL (ref 0–0.7)
EOSINOPHIL NFR BLD AUTO: 2 %
ERYTHROCYTE [DISTWIDTH] IN BLOOD BY AUTOMATED COUNT: 18.7 % (ref 10–15)
GLUCOSE SERPL-MCNC: 113 MG/DL (ref 70–99)
HCT VFR BLD AUTO: 24.4 % (ref 40–53)
HGB BLD-MCNC: 7.6 G/DL (ref 13.3–17.7)
IMM GRANULOCYTES # BLD: 0 10E3/UL
IMM GRANULOCYTES NFR BLD: 1 %
LYMPHOCYTES # BLD AUTO: 0.6 10E3/UL (ref 0.8–5.3)
LYMPHOCYTES NFR BLD AUTO: 13 %
MCH RBC QN AUTO: 27.7 PG (ref 26.5–33)
MCHC RBC AUTO-ENTMCNC: 31.1 G/DL (ref 31.5–36.5)
MCV RBC AUTO: 89 FL (ref 78–100)
MONOCYTES # BLD AUTO: 0.7 10E3/UL (ref 0–1.3)
MONOCYTES NFR BLD AUTO: 14 %
NEUTROPHILS # BLD AUTO: 3.4 10E3/UL (ref 1.6–8.3)
NEUTROPHILS NFR BLD AUTO: 71 %
NRBC # BLD AUTO: 0 10E3/UL
NRBC BLD AUTO-RTO: 0 /100
PLATELET # BLD AUTO: 95 10E3/UL (ref 150–450)
POTASSIUM SERPL-SCNC: 4.6 MMOL/L (ref 3.4–5.3)
PROT SERPL-MCNC: 5.8 G/DL (ref 6.4–8.3)
RBC # BLD AUTO: 2.74 10E6/UL (ref 4.4–5.9)
SODIUM SERPL-SCNC: 134 MMOL/L (ref 135–145)
WBC # BLD AUTO: 4.8 10E3/UL (ref 4–11)

## 2024-06-18 PROCEDURE — 96375 TX/PRO/DX INJ NEW DRUG ADDON: CPT

## 2024-06-18 PROCEDURE — 80053 COMPREHEN METABOLIC PANEL: CPT | Performed by: NURSE PRACTITIONER

## 2024-06-18 PROCEDURE — G2211 COMPLEX E/M VISIT ADD ON: HCPCS | Performed by: NURSE PRACTITIONER

## 2024-06-18 PROCEDURE — 36591 DRAW BLOOD OFF VENOUS DEVICE: CPT | Performed by: NURSE PRACTITIONER

## 2024-06-18 PROCEDURE — 258N000003 HC RX IP 258 OP 636: Mod: JZ | Performed by: INTERNAL MEDICINE

## 2024-06-18 PROCEDURE — G0463 HOSPITAL OUTPT CLINIC VISIT: HCPCS | Performed by: NURSE PRACTITIONER

## 2024-06-18 PROCEDURE — 250N000011 HC RX IP 250 OP 636: Mod: JZ | Performed by: INTERNAL MEDICINE

## 2024-06-18 PROCEDURE — 258N000003 HC RX IP 258 OP 636: Mod: JZ | Performed by: NURSE PRACTITIONER

## 2024-06-18 PROCEDURE — 85004 AUTOMATED DIFF WBC COUNT: CPT | Performed by: NURSE PRACTITIONER

## 2024-06-18 PROCEDURE — 99214 OFFICE O/P EST MOD 30 MIN: CPT | Performed by: NURSE PRACTITIONER

## 2024-06-18 PROCEDURE — 96413 CHEMO IV INFUSION 1 HR: CPT

## 2024-06-18 RX ORDER — HEPARIN SODIUM (PORCINE) LOCK FLUSH IV SOLN 100 UNIT/ML 100 UNIT/ML
5 SOLUTION INTRAVENOUS
Status: DISCONTINUED | OUTPATIENT
Start: 2024-06-18 | End: 2024-06-18 | Stop reason: HOSPADM

## 2024-06-18 RX ORDER — HEPARIN SODIUM,PORCINE 10 UNIT/ML
5-20 VIAL (ML) INTRAVENOUS DAILY PRN
Status: CANCELLED | OUTPATIENT
Start: 2024-06-18

## 2024-06-18 RX ORDER — HEPARIN SODIUM (PORCINE) LOCK FLUSH IV SOLN 100 UNIT/ML 100 UNIT/ML
5 SOLUTION INTRAVENOUS
Status: CANCELLED | OUTPATIENT
Start: 2024-06-18

## 2024-06-18 RX ORDER — ONDANSETRON 2 MG/ML
8 INJECTION INTRAMUSCULAR; INTRAVENOUS ONCE
Status: COMPLETED | OUTPATIENT
Start: 2024-06-18 | End: 2024-06-18

## 2024-06-18 RX ORDER — DEXAMETHASONE 4 MG/1
4 TABLET ORAL
Qty: 12 TABLET | Refills: 1 | Status: SHIPPED | OUTPATIENT
Start: 2024-06-18

## 2024-06-18 RX ADMIN — ONDANSETRON 8 MG: 2 INJECTION INTRAMUSCULAR; INTRAVENOUS at 08:53

## 2024-06-18 RX ADMIN — GEMCITABINE 1200 MG: 38 INJECTION, SOLUTION INTRAVENOUS at 09:15

## 2024-06-18 RX ADMIN — Medication 5 ML: at 09:45

## 2024-06-18 RX ADMIN — SODIUM CHLORIDE 500 ML: 9 INJECTION, SOLUTION INTRAVENOUS at 08:54

## 2024-06-18 ASSESSMENT — PAIN SCALES - GENERAL: PAINLEVEL: NO PAIN (0)

## 2024-06-18 NOTE — LETTER
6/18/2024      Kristen Chapman  [address] [place] [address]      Dear Colleague,    Thank you for referring your patient, Kristen Chapman, to the [facility] [place]. Please see a copy of my visit note below.    [facility] Hematology and Oncology Progress Note    Patient: Kristen Chapman  MRN: [id number]  Date of Service: Jun 18, 2024          Reason for Visit    Chief Complaint   Patient presents with     Oncology Clinic Visit     4 week return visit with labs/infusion related to        Assessment and Plan     Cancer Staging   No matching staging information was found for the patient.    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 Has started erbitux. Did that for one month and then added in Carboplatin. Had CT in April that showed progression. Wanted to try more treatment so has started on single agent gemzar. Getting 20% reduction (800mg/m2).  Did have to hold day 15 with the first cycle due to low platelets.  We will switch his cycle to every other week.  He is here today for cycle 3. He will then see Dr. Frias before his fourth cycle with a CT scan. Clinically, he states his neck tumor has improved.     Significant anemia in the setting of chronic pancytopenia, CKD: has liver dysfunction that is contributing. Also cancer contributing.  We will transfuse to keep his hemoglobin above 7 or if symptomatic, less than 8.     Renal insufficiency: likely from medications. Avoid nephrotoxic medications.  Renal insufficiency is slightly better today.  Stay hydrated.  Keep blood pressure well controlled.    Hepatitis and liver dysfunction/cirrhosis: seeing GI/hepatologist.  Liver function has been looking pretty good.    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 Has had some nose bleeding since rivaroxaban. Currently on 20mg daily. If he has bleeding, or lower  platelets, he may have to stop or decrease dose. Encourage pt to call us if he has any bleeding from tumor or other bleeding that does not stop.     Hypotension: Patient is mildly symptomatic especially with position changes.  He likely is somewhat dehydrated.  We will give him a 500 cc normal saline bolus today.  Urged him to increase his fluids.  He does take spironolactone and furosemide due to his liver dysfunction so I did tell the son that they can call the doctor that prescribes him to see if he could potentially do a slightly lower dose.    ECOG Performance    1 - Can't do physically strenuous work, but fully ambyulatory and can do light sedentary work    Distress Screening (within last 30 days)    1. How concerned are you about your ability to eat? : 0  2. How concerned are you about unintended weight loss or your current weight? : 5  3. How concerned are you about feeling depressed or very sad? : 0  4. How concerned are you about feeling anxious or very scared? : 0  5. Do you struggle with the loss of meaning and terese in your life? : Somewhat  6. How concerned are you about work and home life issues that may be affected by your cancer? : 0  7. How concerned are you about knowing what resources are available to help you? : 0  8. 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  Diagnosed January 2020. Imaging suggested bilateral abnormal lymphadenopathy in the neck.  Also asymmetric soft tissue thickening in the posterior right nasopharynx.  On imaging, tumor also appeared to extend down to the hypopharynx.     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  Right neck lymph node positive for recurrent nasopharyngeal carcinoma.  PET scan at that time showed new hypermetabolic mediastinal lymphadenopathy (chronic, most likely reactive), mass in the right ethmoid sinus, hypermetabolic right level 1B and 2A lymph nodes.    Recurrent disease found in 2023/early 2024: PET scan on 1/31/24 shows: Disease progression with increased size of the enhancing hypermetabolic soft tissue mass about the right hemimandible measuring up to 5.0 cm with invasion into the floor of the right oral cavity and right base of tongue. No evidence of osseous invasion. New focal FDG uptake in/adjacent to the crux of the right and left hemidiaphragm, as well as retrocrural and retrocaval lymph nodes, suspicious for metastatic disease. Cirrhotic liver with sequela of portal hypertension including large volume ascites and splenogastric varices     Treatment:     Initially treated with concurrent chemotherapy and radiation.  He had weekly cisplatin starting March 3, 2020. Fifth and final dose given March 31, 2020.  Subsequent chemotherapy was held due to prolonged thrombocytopenia and renal insufficiency.  Radiation dose was 7000 cGy in 35 fractions given from March 2 through April 17, 2020.     Started cisplatin with infusional 5-FU on June 1, 2020.  Cycle 1 given at a 25% dose reduction.  He still had  "significant thrombocytopenia and neutropenia on day 28.  Cycle 2 was held.  At the same time his liver function tests elevated secondary to hepatitis B.   PET scan in September 2020 showed no good evidence of residual malignancy.     Recurrence:  Radiosurgery delivered to the right ethmoid tumor delivered November 8 through November 17, 2021.  Received 3500 cGy in 5 fractions.  Pembrolizumab started 12/23/21.  Held after his July 1, 2022 dose.  PET scan in March 2022 showed a near complete response.    -2/2/24: Started Erbitux therapy. Getting every 2 weeks.   -3/1/24: added in Carboplatin AUC 2 given every 2 weeks.   Progression in April 2024.     -4/23/24: started single agent Gemzar. Today is cycle 3     Interim History:    Patient is here today for a follow-up visit.  Overall he states he is doing okay.  He said he is doing okay with the chemotherapy just feels tired and weak all the time.  Other than that he has a little bit of dizziness when he is standing up.  He is eating and drinking okay but his son said he could do better with that.  He denies any bleeding or bruising complications.  He states that the tumor in the right side of his neck has gotten smaller which he is excited about.  He denies any chest pain or palpitations.  Denies any breathing issues.    Review of Systems    Pertinent items are noted in HPI.    Past History    Past Medical History:   Diagnosis Date     Anemia      Dysphagia      Hepatitis B chronic      Hypothyroidism      Nasopharyngeal cancer (H)      Severe protein-calorie malnutrition (H24)      Thrombocytopenia (H24)        PHYSICAL EXAM  BP (!) 89/55 (BP Location: Left arm, Patient Position: Sitting, Cuff Size: Adult Regular)   Pulse 62   Temp 97.7  F (36.5  C) (Tympanic)   Resp 16   Ht 1.651 m (5' 5\")   Wt 50.5 kg (111 lb 4.8 oz)   SpO2 99%   BMI 18.52 kg/m        GENERAL: no acute distress. Cooperative in conversation.  Patient's son is present.   on the " phone.  Patient has disease on his neck that is covered. Does appear to be flatter, but did not examine  RESP: Regular respiratory rate. No expiratory wheezes   NEURO: non focal. Alert and oriented x3.   PSYCH: within normal limits. No depression or anxiety.  SKIN: exposed skin is dry intact.     Lab Results    Recent Results (from the past 168 hour(s))   Comprehensive metabolic panel   Result Value Ref Range    Sodium 134 (L) 135 - 145 mmol/L    Potassium 4.6 3.4 - 5.3 mmol/L    Carbon Dioxide (CO2) 23 22 - 29 mmol/L    Anion Gap 8 7 - 15 mmol/L    Urea Nitrogen 41.0 (H) 8.0 - 23.0 mg/dL    Creatinine 1.44 (H) 0.67 - 1.17 mg/dL    GFR Estimate 52 (L) >60 mL/min/1.73m2    Calcium 8.8 8.8 - 10.2 mg/dL    Chloride 103 98 - 107 mmol/L    Glucose 113 (H) 70 - 99 mg/dL    Alkaline Phosphatase 115 40 - 150 U/L    AST 27 0 - 45 U/L    ALT 28 0 - 70 U/L    Protein Total 5.8 (L) 6.4 - 8.3 g/dL    Albumin 2.5 (L) 3.5 - 5.2 g/dL    Bilirubin Total 0.4 <=1.2 mg/dL   CBC with platelets and differential   Result Value Ref Range    WBC Count 4.8 4.0 - 11.0 10e3/uL    RBC Count 2.74 (L) 4.40 - 5.90 10e6/uL    Hemoglobin 7.6 (L) 13.3 - 17.7 g/dL    Hematocrit 24.4 (L) 40.0 - 53.0 %    MCV 89 78 - 100 fL    MCH 27.7 26.5 - 33.0 pg    MCHC 31.1 (L) 31.5 - 36.5 g/dL    RDW 18.7 (H) 10.0 - 15.0 %    Platelet Count 95 (L) 150 - 450 10e3/uL    % Neutrophils 71 %    % Lymphocytes 13 %    % Monocytes 14 %    % Eosinophils 2 %    % Basophils 0 %    % Immature Granulocytes 1 %    NRBCs per 100 WBC 0 <1 /100    Absolute Neutrophils 3.4 1.6 - 8.3 10e3/uL    Absolute Lymphocytes 0.6 (L) 0.8 - 5.3 10e3/uL    Absolute Monocytes 0.7 0.0 - 1.3 10e3/uL    Absolute Eosinophils 0.1 0.0 - 0.7 10e3/uL    Absolute Basophils 0.0 0.0 - 0.2 10e3/uL    Absolute Immature Granulocytes 0.0 <=0.4 10e3/uL    Absolute NRBCs 0.0 10e3/uL         Imaging    No results found.      The longitudinal plan of care for the diagnosis(es)/condition(s) as documented were  "addressed during this visit. Due to the added complexity in care, I will continue to support Kristen in the subsequent management and with ongoing continuity of care.        Signed by: SPARKLE Guzmán CNP    Oncology Rooming Note    June 18, 2024 8:18 AM   Kristen Chapman is a 74 year old male who presents for:    Chief Complaint   Patient presents with     Oncology Clinic Visit     4 week return visit with labs/infusion related to      Initial Vitals: BP (!) 89/55 (BP Location: Left arm, Patient Position: Sitting, Cuff Size: Adult Regular)   Pulse 62   Temp 97.7  F (36.5  C) (Tympanic)   Resp 16   Ht 1.651 m (5' 5\")   Wt 50.5 kg (111 lb 4.8 oz)   SpO2 99%   BMI 18.52 kg/m   Estimated body mass index is 18.52 kg/m  as calculated from the following:    Height as of this encounter: 1.651 m (5' 5\").    Weight as of this encounter: 50.5 kg (111 lb 4.8 oz). Body surface area is 1.52 meters squared.  No Pain (0) Comment: Data Unavailable   No LMP for male patient.  Allergies reviewed: Yes  Medications reviewed: Yes    Medications: MEDICATION REFILLS NEEDED TODAY. Provider was notified.  Pharmacy name entered into [facility]:    [facility] - [place], [place] - [address] [place] [address] PHARMACY [place], [place] - [address] [place] [address]    Frailty Screening:   Is the patient here for a new oncology consult visit in cancer care? 2. No      Clinical concerns: Kristen reports feeling very weak and tired lately. He also is requesting a refill of the Dexamethasone.   Yoanna was notified.      Gillian Osman, [facility]                Again, thank you for allowing me to participate in the care of your patient.        Sincerely,        SPARKLE Guzmán CNP  "

## 2024-06-18 NOTE — PROGRESS NOTES
[facility] Hematology and Oncology Progress Note    Patient: Kristen Chapman  MRN: [id number]  Date of Service: Jun 18, 2024          Reason for Visit    Chief Complaint   Patient presents with    Oncology Clinic Visit     4 week return visit with labs/infusion related to        Assessment and Plan     Cancer Staging   No matching staging information was found for the patient.    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 Has started erbitux. Did that for one month and then added in Carboplatin. Had CT in April that showed progression. Wanted to try more treatment so has started on single agent gemzar. Getting 20% reduction (800mg/m2).  Did have to hold day 15 with the first cycle due to low platelets.  We will switch his cycle to every other week.  He is here today for cycle 3. He will then see Dr. Frias before his fourth cycle with a CT scan. Clinically, he states his neck tumor has improved.     Significant anemia in the setting of chronic pancytopenia, CKD: has liver dysfunction that is contributing. Also cancer contributing.  We will transfuse to keep his hemoglobin above 7 or if symptomatic, less than 8.     Renal insufficiency: likely from medications. Avoid nephrotoxic medications.  Renal insufficiency is slightly better today.  Stay hydrated.  Keep blood pressure well controlled.    Hepatitis and liver dysfunction/cirrhosis: seeing GI/hepatologist.  Liver function has been looking pretty good.    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 Has had some nose bleeding since rivaroxaban. Currently on 20mg daily. If he has bleeding, or lower platelets, he may have to stop or decrease dose. Encourage pt to call us if he has any bleeding from tumor or other bleeding that does not stop.     Hypotension: Patient is mildly symptomatic especially with position changes.  He  likely is somewhat dehydrated.  We will give him a 500 cc normal saline bolus today.  Urged him to increase his fluids.  He does take spironolactone and furosemide due to his liver dysfunction so I did tell the son that they can call the doctor that prescribes him to see if he could potentially do a slightly lower dose.    ECOG Performance    1 - Can't do physically strenuous work, but fully ambyulatory and can do light sedentary work    Distress Screening (within last 30 days)    1. How concerned are you about your ability to eat? : 0  2. How concerned are you about unintended weight loss or your current weight? : 5  3. How concerned are you about feeling depressed or very sad? : 0  4. How concerned are you about feeling anxious or very scared? : 0  5. Do you struggle with the loss of meaning and terese in your life? : Somewhat  6. How concerned are you about work and home life issues that may be affected by your cancer? : 0  7. How concerned are you about knowing what resources are available to help you? : 0  8. 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 Frias    Diagnosis:     Nasopharyngeal carcinoma, EBV+: Right-sided  squamous cell carcinoma of the nasopharynx.  Diagnosed January 2020. Imaging suggested bilateral abnormal lymphadenopathy in the neck.  Also asymmetric soft tissue thickening in the posterior right nasopharynx.  On imaging, tumor also appeared to extend down to the hypopharynx.     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  Right neck lymph node positive for recurrent nasopharyngeal carcinoma.  PET scan at that time showed new hypermetabolic mediastinal lymphadenopathy (chronic, most likely reactive), mass in the right ethmoid sinus, hypermetabolic right level 1B and 2A lymph nodes.    Recurrent disease found in 2023/early 2024: PET scan on 1/31/24 shows: Disease progression with increased size of the enhancing hypermetabolic soft tissue mass about the right hemimandible measuring up to 5.0 cm with invasion into the floor of the right oral cavity and right base of tongue. No evidence of osseous invasion. New focal FDG uptake in/adjacent to the crux of the right and left hemidiaphragm, as well as retrocrural and retrocaval lymph nodes, suspicious for metastatic disease. Cirrhotic liver with sequela of portal hypertension including large volume ascites and splenogastric varices     Treatment:     Initially treated with concurrent chemotherapy and radiation.  He had weekly cisplatin starting March 3, 2020. Fifth and final dose given March 31, 2020.  Subsequent chemotherapy was held due to prolonged thrombocytopenia and renal insufficiency.  Radiation dose was 7000 cGy in 35 fractions given from March 2 through April 17, 2020.     Started cisplatin with infusional 5-FU on June 1, 2020.  Cycle 1 given at a 25% dose reduction.  He still had significant thrombocytopenia and neutropenia on day 28.  Cycle 2 was held.  At the same time his liver function tests elevated secondary to hepatitis B.   PET scan in September 2020 showed no good evidence of residual malignancy.    "  Recurrence:  Radiosurgery delivered to the right ethmoid tumor delivered November 8 through November 17, 2021.  Received 3500 cGy in 5 fractions.  Pembrolizumab started 12/23/21.  Held after his July 1, 2022 dose.  PET scan in March 2022 showed a near complete response.    -2/2/24: Started Erbitux therapy. Getting every 2 weeks.   -3/1/24: added in Carboplatin AUC 2 given every 2 weeks.   Progression in April 2024.     -4/23/24: started single agent Gemzar. Today is cycle 3     Interim History:    Patient is here today for a follow-up visit.  Overall he states he is doing okay.  He said he is doing okay with the chemotherapy just feels tired and weak all the time.  Other than that he has a little bit of dizziness when he is standing up.  He is eating and drinking okay but his son said he could do better with that.  He denies any bleeding or bruising complications.  He states that the tumor in the right side of his neck has gotten smaller which he is excited about.  He denies any chest pain or palpitations.  Denies any breathing issues.    Review of Systems    Pertinent items are noted in HPI.    Past History    Past Medical History:   Diagnosis Date    Anemia     Dysphagia     Hepatitis B chronic     Hypothyroidism     Nasopharyngeal cancer (H)     Severe protein-calorie malnutrition (H24)     Thrombocytopenia (H24)        PHYSICAL EXAM  BP (!) 89/55 (BP Location: Left arm, Patient Position: Sitting, Cuff Size: Adult Regular)   Pulse 62   Temp 97.7  F (36.5  C) (Tympanic)   Resp 16   Ht 1.651 m (5' 5\")   Wt 50.5 kg (111 lb 4.8 oz)   SpO2 99%   BMI 18.52 kg/m        GENERAL: no acute distress. Cooperative in conversation.  Patient's son is present.   on the phone.  Patient has disease on his neck that is covered. Does appear to be flatter, but did not examine  RESP: Regular respiratory rate. No expiratory wheezes   NEURO: non focal. Alert and oriented x3.   PSYCH: within normal limits. No " depression or anxiety.  SKIN: exposed skin is dry intact.     Lab Results    Recent Results (from the past 168 hour(s))   Comprehensive metabolic panel   Result Value Ref Range    Sodium 134 (L) 135 - 145 mmol/L    Potassium 4.6 3.4 - 5.3 mmol/L    Carbon Dioxide (CO2) 23 22 - 29 mmol/L    Anion Gap 8 7 - 15 mmol/L    Urea Nitrogen 41.0 (H) 8.0 - 23.0 mg/dL    Creatinine 1.44 (H) 0.67 - 1.17 mg/dL    GFR Estimate 52 (L) >60 mL/min/1.73m2    Calcium 8.8 8.8 - 10.2 mg/dL    Chloride 103 98 - 107 mmol/L    Glucose 113 (H) 70 - 99 mg/dL    Alkaline Phosphatase 115 40 - 150 U/L    AST 27 0 - 45 U/L    ALT 28 0 - 70 U/L    Protein Total 5.8 (L) 6.4 - 8.3 g/dL    Albumin 2.5 (L) 3.5 - 5.2 g/dL    Bilirubin Total 0.4 <=1.2 mg/dL   CBC with platelets and differential   Result Value Ref Range    WBC Count 4.8 4.0 - 11.0 10e3/uL    RBC Count 2.74 (L) 4.40 - 5.90 10e6/uL    Hemoglobin 7.6 (L) 13.3 - 17.7 g/dL    Hematocrit 24.4 (L) 40.0 - 53.0 %    MCV 89 78 - 100 fL    MCH 27.7 26.5 - 33.0 pg    MCHC 31.1 (L) 31.5 - 36.5 g/dL    RDW 18.7 (H) 10.0 - 15.0 %    Platelet Count 95 (L) 150 - 450 10e3/uL    % Neutrophils 71 %    % Lymphocytes 13 %    % Monocytes 14 %    % Eosinophils 2 %    % Basophils 0 %    % Immature Granulocytes 1 %    NRBCs per 100 WBC 0 <1 /100    Absolute Neutrophils 3.4 1.6 - 8.3 10e3/uL    Absolute Lymphocytes 0.6 (L) 0.8 - 5.3 10e3/uL    Absolute Monocytes 0.7 0.0 - 1.3 10e3/uL    Absolute Eosinophils 0.1 0.0 - 0.7 10e3/uL    Absolute Basophils 0.0 0.0 - 0.2 10e3/uL    Absolute Immature Granulocytes 0.0 <=0.4 10e3/uL    Absolute NRBCs 0.0 10e3/uL         Imaging    No results found.      The longitudinal plan of care for the diagnosis(es)/condition(s) as documented were addressed during this visit. Due to the added complexity in care, I will continue to support Nadiaa in the subsequent management and with ongoing continuity of care.        Signed by: SPARKLE Guzmán CNP

## 2024-06-18 NOTE — PROGRESS NOTES
Pt here ambulatory with walker for txt after exam with NP. Lab results approved txt. Txt reviewed with pt. Normal saline 500 ml iv hydration also ordered and infused concurrently with txt. PT tolerated txt without any problems. Txt completed and port flushed/deaccessed with 2x2 to site. Follow up reviewed and pt dc'd steady gait with walker and son.

## 2024-06-18 NOTE — PROGRESS NOTES
"Oncology Rooming Note    June 18, 2024 8:18 AM   Kristen Chapman is a 74 year old male who presents for:    Chief Complaint   Patient presents with    Oncology Clinic Visit     4 week return visit with labs/infusion related to      Initial Vitals: BP (!) 89/55 (BP Location: Left arm, Patient Position: Sitting, Cuff Size: Adult Regular)   Pulse 62   Temp 97.7  F (36.5  C) (Tympanic)   Resp 16   Ht 1.651 m (5' 5\")   Wt 50.5 kg (111 lb 4.8 oz)   SpO2 99%   BMI 18.52 kg/m   Estimated body mass index is 18.52 kg/m  as calculated from the following:    Height as of this encounter: 1.651 m (5' 5\").    Weight as of this encounter: 50.5 kg (111 lb 4.8 oz). Body surface area is 1.52 meters squared.  No Pain (0) Comment: Data Unavailable   No LMP for male patient.  Allergies reviewed: Yes  Medications reviewed: Yes    Medications: MEDICATION REFILLS NEEDED TODAY. Provider was notified.  Pharmacy name entered into [employer]:    [facility] - [place], [place] - [address] [place] [address] PHARMACY [place], [place] - [address] [place] [address]    Frailty Screening:   Is the patient here for a new oncology consult visit in cancer care? 2. No      Clinical concerns: Kristen reports feeling very weak and tired lately. He also is requesting a refill of the Dexamethasone.   Yoanna was notified.      Gillian Osman CMA              "

## 2024-07-02 ENCOUNTER — INFUSION THERAPY VISIT (OUTPATIENT)
Dept: INFUSION THERAPY | Facility: HOSPITAL | Age: 74
End: 2024-07-02
Attending: INTERNAL MEDICINE
Payer: COMMERCIAL

## 2024-07-02 VITALS
DIASTOLIC BLOOD PRESSURE: 51 MMHG | TEMPERATURE: 98 F | RESPIRATION RATE: 16 BRPM | OXYGEN SATURATION: 100 % | SYSTOLIC BLOOD PRESSURE: 89 MMHG | HEART RATE: 51 BPM

## 2024-07-02 DIAGNOSIS — C11.9 SQUAMOUS CELL CARCINOMA OF NASOPHARYNX (H): Primary | ICD-10-CM

## 2024-07-02 DIAGNOSIS — K74.60 CIRRHOSIS OF LIVER WITH ASCITES, UNSPECIFIED HEPATIC CIRRHOSIS TYPE (H): ICD-10-CM

## 2024-07-02 DIAGNOSIS — R18.8 CIRRHOSIS OF LIVER WITH ASCITES, UNSPECIFIED HEPATIC CIRRHOSIS TYPE (H): ICD-10-CM

## 2024-07-02 DIAGNOSIS — B18.1 CHRONIC VIRAL HEPATITIS B WITHOUT DELTA AGENT AND WITHOUT COMA (H): ICD-10-CM

## 2024-07-02 DIAGNOSIS — D64.9 ANEMIA, NORMOCYTIC NORMOCHROMIC: ICD-10-CM

## 2024-07-02 LAB
ABO/RH(D): NORMAL
ANTIBODY SCREEN: NEGATIVE
BASOPHILS # BLD AUTO: 0 10E3/UL (ref 0–0.2)
BASOPHILS NFR BLD AUTO: 0 %
BLD PROD TYP BPU: NORMAL
BLOOD COMPONENT TYPE: NORMAL
CODING SYSTEM: NORMAL
CROSSMATCH: NORMAL
EOSINOPHIL # BLD AUTO: 0.1 10E3/UL (ref 0–0.7)
EOSINOPHIL NFR BLD AUTO: 2 %
ERYTHROCYTE [DISTWIDTH] IN BLOOD BY AUTOMATED COUNT: 18.4 % (ref 10–15)
HCT VFR BLD AUTO: 24.6 % (ref 40–53)
HGB BLD-MCNC: 7.5 G/DL (ref 13.3–17.7)
IMM GRANULOCYTES # BLD: 0.1 10E3/UL
IMM GRANULOCYTES NFR BLD: 2 %
INR PPP: 1.3 (ref 0.85–1.15)
ISSUE DATE AND TIME: NORMAL
LYMPHOCYTES # BLD AUTO: 0.6 10E3/UL (ref 0.8–5.3)
LYMPHOCYTES NFR BLD AUTO: 11 %
MCH RBC QN AUTO: 27.4 PG (ref 26.5–33)
MCHC RBC AUTO-ENTMCNC: 30.5 G/DL (ref 31.5–36.5)
MCV RBC AUTO: 90 FL (ref 78–100)
MONOCYTES # BLD AUTO: 0.7 10E3/UL (ref 0–1.3)
MONOCYTES NFR BLD AUTO: 14 %
NEUTROPHILS # BLD AUTO: 3.9 10E3/UL (ref 1.6–8.3)
NEUTROPHILS NFR BLD AUTO: 72 %
NRBC # BLD AUTO: 0 10E3/UL
NRBC BLD AUTO-RTO: 0 /100
PLATELET # BLD AUTO: 94 10E3/UL (ref 150–450)
RBC # BLD AUTO: 2.74 10E6/UL (ref 4.4–5.9)
SPECIMEN EXPIRATION DATE: NORMAL
UNIT ABO/RH: NORMAL
UNIT NUMBER: NORMAL
UNIT STATUS: NORMAL
UNIT TYPE ISBT: 7300
WBC # BLD AUTO: 5.3 10E3/UL (ref 4–11)

## 2024-07-02 PROCEDURE — 96375 TX/PRO/DX INJ NEW DRUG ADDON: CPT

## 2024-07-02 PROCEDURE — 258N000003 HC RX IP 258 OP 636: Performed by: INTERNAL MEDICINE

## 2024-07-02 PROCEDURE — 96413 CHEMO IV INFUSION 1 HR: CPT

## 2024-07-02 PROCEDURE — 85610 PROTHROMBIN TIME: CPT

## 2024-07-02 PROCEDURE — P9016 RBC LEUKOCYTES REDUCED: HCPCS | Performed by: INTERNAL MEDICINE

## 2024-07-02 PROCEDURE — 250N000011 HC RX IP 250 OP 636: Performed by: INTERNAL MEDICINE

## 2024-07-02 PROCEDURE — 36591 DRAW BLOOD OFF VENOUS DEVICE: CPT

## 2024-07-02 PROCEDURE — 36430 TRANSFUSION BLD/BLD COMPNT: CPT

## 2024-07-02 PROCEDURE — 86923 COMPATIBILITY TEST ELECTRIC: CPT | Performed by: INTERNAL MEDICINE

## 2024-07-02 PROCEDURE — 86900 BLOOD TYPING SEROLOGIC ABO: CPT | Performed by: NURSE PRACTITIONER

## 2024-07-02 PROCEDURE — 85025 COMPLETE CBC W/AUTO DIFF WBC: CPT | Performed by: INTERNAL MEDICINE

## 2024-07-02 RX ORDER — HEPARIN SODIUM,PORCINE 10 UNIT/ML
5-20 VIAL (ML) INTRAVENOUS DAILY PRN
Status: CANCELLED | OUTPATIENT
Start: 2024-07-02

## 2024-07-02 RX ORDER — EPINEPHRINE 1 MG/ML
0.3 INJECTION, SOLUTION INTRAMUSCULAR; SUBCUTANEOUS EVERY 5 MIN PRN
Status: CANCELLED | OUTPATIENT
Start: 2024-07-02

## 2024-07-02 RX ORDER — DIPHENHYDRAMINE HYDROCHLORIDE 50 MG/ML
50 INJECTION INTRAMUSCULAR; INTRAVENOUS
Status: DISCONTINUED | OUTPATIENT
Start: 2024-07-02 | End: 2024-07-02 | Stop reason: HOSPADM

## 2024-07-02 RX ORDER — ALBUTEROL SULFATE 90 UG/1
1-2 AEROSOL, METERED RESPIRATORY (INHALATION)
Status: DISCONTINUED | OUTPATIENT
Start: 2024-07-02 | End: 2024-07-02 | Stop reason: HOSPADM

## 2024-07-02 RX ORDER — MEPERIDINE HYDROCHLORIDE 25 MG/ML
25 INJECTION INTRAMUSCULAR; INTRAVENOUS; SUBCUTANEOUS EVERY 30 MIN PRN
Status: DISCONTINUED | OUTPATIENT
Start: 2024-07-02 | End: 2024-07-02 | Stop reason: HOSPADM

## 2024-07-02 RX ORDER — ONDANSETRON 2 MG/ML
8 INJECTION INTRAMUSCULAR; INTRAVENOUS ONCE
Status: COMPLETED | OUTPATIENT
Start: 2024-07-02 | End: 2024-07-02

## 2024-07-02 RX ORDER — HEPARIN SODIUM (PORCINE) LOCK FLUSH IV SOLN 100 UNIT/ML 100 UNIT/ML
5 SOLUTION INTRAVENOUS
Status: CANCELLED | OUTPATIENT
Start: 2024-07-02

## 2024-07-02 RX ORDER — EPINEPHRINE 1 MG/ML
0.3 INJECTION, SOLUTION INTRAMUSCULAR; SUBCUTANEOUS EVERY 5 MIN PRN
Status: DISCONTINUED | OUTPATIENT
Start: 2024-07-02 | End: 2024-07-02 | Stop reason: HOSPADM

## 2024-07-02 RX ORDER — DIPHENHYDRAMINE HYDROCHLORIDE 50 MG/ML
50 INJECTION INTRAMUSCULAR; INTRAVENOUS
Status: CANCELLED
Start: 2024-07-02

## 2024-07-02 RX ORDER — ALBUTEROL SULFATE 0.83 MG/ML
2.5 SOLUTION RESPIRATORY (INHALATION)
Status: DISCONTINUED | OUTPATIENT
Start: 2024-07-02 | End: 2024-07-02 | Stop reason: HOSPADM

## 2024-07-02 RX ORDER — HEPARIN SODIUM (PORCINE) LOCK FLUSH IV SOLN 100 UNIT/ML 100 UNIT/ML
5 SOLUTION INTRAVENOUS
Status: DISCONTINUED | OUTPATIENT
Start: 2024-07-02 | End: 2024-07-02 | Stop reason: HOSPADM

## 2024-07-02 RX ORDER — METHYLPREDNISOLONE SODIUM SUCCINATE 125 MG/2ML
125 INJECTION, POWDER, LYOPHILIZED, FOR SOLUTION INTRAMUSCULAR; INTRAVENOUS
Status: DISCONTINUED | OUTPATIENT
Start: 2024-07-02 | End: 2024-07-02 | Stop reason: HOSPADM

## 2024-07-02 RX ADMIN — Medication 5 ML: at 13:23

## 2024-07-02 RX ADMIN — ONDANSETRON 8 MG: 2 INJECTION INTRAMUSCULAR; INTRAVENOUS at 10:22

## 2024-07-02 RX ADMIN — GEMCITABINE 1200 MG: 38 INJECTION, SOLUTION INTRAVENOUS at 10:50

## 2024-07-02 NOTE — PROGRESS NOTES
Infusion Nursing Note:  Kristen Chapman presents today for D15C3.    Patient seen by provider today: No   present during visit today: Not Applicable. Declined .     Note: Port accessed and labs drawn. OK for treatment today. Low BP noted. Hgb 7.5, so OK per provider to give 1 unit of pRBC. Pre medication and treatment administered as ordered. 1 unit of pRBC transfused. Once completed, the port was flushed and de accessed per protocol. Site covered with 2x2 gauze and secured in place with paper tape. Left infusion ambulatory with a walker and in stable condition.     BP (!) 89/51   Pulse 51   Temp 98  F (36.7  C)   Resp 16   SpO2 100%     Intravenous Access:  Labs drawn without difficulty.  Implanted Port.    Treatment Conditions:  Lab Results   Component Value Date    HGB 7.5 (L) 07/02/2024    WBC 5.3 07/02/2024    ANEUTAUTO 3.9 07/02/2024    PLT 94 (L) 07/02/2024     Results reviewed, labs MET treatment parameters, ok to proceed with treatment.  Blood transfusion consent signed 2/2/2024.    Post Infusion Assessment:  Patient tolerated infusion without incident.  Blood return noted pre and post infusion.  Site patent and intact, free from redness, edema or discomfort.  No evidence of extravasations.  Access discontinued per protocol.     Discharge Plan:   Discharge instructions reviewed with: Patient.  Patient and/or family verbalized understanding of discharge instructions and all questions answered.  AVS to patient via [employer].  Patient will return on 7/17 for next appointment.   Patient discharged in stable condition accompanied by: son.  Departure Mode: Ambulatory.    Diana Pablo RN

## 2024-07-04 DIAGNOSIS — D64.9 ANEMIA, NORMOCYTIC NORMOCHROMIC: Primary | ICD-10-CM

## 2024-07-09 ENCOUNTER — OFFICE VISIT (OUTPATIENT)
Dept: RADIATION ONCOLOGY | Facility: HOSPITAL | Age: 74
End: 2024-07-09
Attending: FAMILY MEDICINE
Payer: COMMERCIAL

## 2024-07-09 VITALS
SYSTOLIC BLOOD PRESSURE: 96 MMHG | WEIGHT: 115.2 LBS | HEART RATE: 65 BPM | OXYGEN SATURATION: 99 % | BODY MASS INDEX: 19.17 KG/M2 | DIASTOLIC BLOOD PRESSURE: 59 MMHG | RESPIRATION RATE: 16 BRPM

## 2024-07-09 DIAGNOSIS — Z51.5 PALLIATIVE CARE PATIENT: ICD-10-CM

## 2024-07-09 DIAGNOSIS — R18.8 OTHER ASCITES: ICD-10-CM

## 2024-07-09 DIAGNOSIS — E44.0 MODERATE PROTEIN-CALORIE MALNUTRITION (H): ICD-10-CM

## 2024-07-09 DIAGNOSIS — N18.32 CKD STAGE 3B, GFR 30-44 ML/MIN (H): Primary | ICD-10-CM

## 2024-07-09 DIAGNOSIS — C11.9 NASOPHARYNGEAL CARCINOMA (H): ICD-10-CM

## 2024-07-09 PROCEDURE — 99214 OFFICE O/P EST MOD 30 MIN: CPT | Performed by: FAMILY MEDICINE

## 2024-07-09 PROCEDURE — G0463 HOSPITAL OUTPT CLINIC VISIT: HCPCS | Performed by: FAMILY MEDICINE

## 2024-07-09 PROCEDURE — T1013 SIGN LANG/ORAL INTERPRETER: HCPCS | Mod: GT | Performed by: INTERPRETER

## 2024-07-09 RX ORDER — MIRTAZAPINE 30 MG/1
30 TABLET, ORALLY DISINTEGRATING ORAL AT BEDTIME
Qty: 30 TABLET | Refills: 5 | Status: SHIPPED | OUTPATIENT
Start: 2024-07-09

## 2024-07-09 ASSESSMENT — PAIN SCALES - GENERAL: PAINLEVEL: NO PAIN (0)

## 2024-07-09 NOTE — PROGRESS NOTES
Palliative Care Outpatient Clinic Progress Note    Patient Name: Kristen Chapman  Primary Provider: Issa Cook    Impressions:  ECO  Decision Making Capacity:  present with aid of   PDMP review:  yes, no concerns     Recurrent nasopharyngeal cancer, EBV +with recent PD  Malignant wound right submandibular region  Moderate protein calorie malnutrition  CKD IIIb  Hepatitis B with cirrhosis with refractory ascites and pleurex catheter  Malignancy associated anorexia  hypothyroidism     GOALS OF CARE:  2024  likes this 'beautiful world' and would like more time to be alive and is willing to undergo more treatments. I explained to him that sometimes due to the toxic nature of treatments he may live longer with less tumor directed therapy as the end of his life approaches. He does not want a feeding tube again or CPR or intubation.  He also declined surgery on the malignant wound on his neck.         Recommendations & Counseling:  Stop Megace  Increase mirtazapine to 30 mg ODT at HS  Encourage protein in his diet     Follow up with me in 8 weeks;  Kristen is asked to consider how he would want to spend his remaining time if the current chemo isn't effective.     Follow up with wound therapy for anterior neck wound        Counseling: All of the above was explained to the patient in lay language. The patient has verbalized a clear understanding of the discussion, asked appropriate questions, which have been answered to patient's apparent satisfaction. The patient is in agreement with the above plan.        Chief Complaint/Patient ID: Kristen Chapman 74 year old male with PMHx of recurrent nasopharyngeal cancer with recent PD, a malignant wound in his neck area; CKD 3b; Hep B with cirrhosis and refractory ascites and pleurex catheter placement.    Last Palliative care appointment: 06/10/2024 with me     Reviewed:  Yes:   reviewed - no record of controlled substances prescribed.    Interim  History:  Kristen Chapman is a 74 year old male who is seen today for follow up with Palliative Care Clinic.  He is joined by Kristen's son, Nayla.  The visit was facilitated by a professional .    Kristen feels his tumor under his right mandible is shrinking.  Nayla reports they are draining 1-1.5 liters of scites every few days without difficulty.    Kristen is tolerating single agent gemzar and has completed 3 cycles.     Pain:  none    Appetite/Nausea: no nausea; appetite helped minimally by mirtazapine and Kristen is OK with increasing it.     Bowels: none     Sleep: slight help with mirtazapine 15 mg at HS     Mood: denies depression or anxiety     Coping:  no problems    Family History- Reviewed in Epic.    Allergies   Allergen Reactions    Oxycodone Itching       Social History:  Pertinent changes to social history/social situation since last visit: none  Key support resources: family meghan son Nayla who he lives with  Advance Directive Status:  no ACP documents    Social History     Tobacco Use    Smoking status: Never    Smokeless tobacco: Never   Substance Use Topics    Alcohol use: Not Currently    Drug use: Not Currently         Allergies   Allergen Reactions    Oxycodone Itching     Current Outpatient Medications   Medication Sig Dispense Refill    acetaminophen (TYLENOL) 325 MG tablet Take 650 mg by mouth every 6 hours as needed       dexAMETHasone (DECADRON) 4 MG tablet Take 1 tablet (4 mg) by mouth daily (with breakfast) Take for 2 days after each chemotherapy dose. 12 tablet 1    furosemide (LASIX) 20 MG tablet Take 1 tablet (20 mg) by mouth daily 90 tablet 3    levothyroxine (SYNTHROID/LEVOTHROID) 175 MCG tablet Take 1 tablet (175 mcg) by mouth daily 60 tablet 1    megestrol (MEGACE) 40 MG/ML suspension Take 10 mLs (400 mg) by mouth daily (Patient not taking: Reported on 5/21/2024) 480 mL 1    mirtazapine (REMERON SOL-TAB) 15 MG ODT 1 tablet (15 mg) by Orally disintegrating tablet route at bedtime 30  tablet 4    prochlorperazine (COMPAZINE) 10 MG tablet Take 1 tablet (10 mg) by mouth every 6 hours as needed for nausea or vomiting 30 tablet 2    rivaroxaban ANTICOAGULANT (XARELTO) 20 MG TABS tablet Take 1 tablet (20 mg) by mouth daily (with dinner) 30 tablet 3    spironolactone (ALDACTONE) 100 MG tablet Take 1 tablet (100 mg) by mouth daily 90 tablet 3    tenofovir (VIREAD) 300 MG tablet Take 1 tablet (300 mg) by mouth every 72 hours 45 tablet 3     Past Medical History:   Diagnosis Date    Anemia     Dysphagia     Hepatitis B chronic     Hypothyroidism     Nasopharyngeal cancer (H)     Severe protein-calorie malnutrition (H24)     Thrombocytopenia (H24)      Past Surgical History:   Procedure Laterality Date    ENDOSCOPIC ENDONASAL SURGERY Bilateral 9/7/2021    Procedure: Nasal Endoscopy with Biopsy;  Surgeon: Ky Centeno MD;  Location: [facility] OR    IR CHEST PORT PLACEMENT > 5 YRS OF AGE  3/2/2020    IR CHEST PORT PLACEMENT > 5 YRS OF AGE  3/2/2020    IR GASTROSTOMY TUBE INSERTION  4/27/2020    IR GASTROSTOMY TUBE PERCUTANEOUS PLCMNT  4/27/2020    IR INTRAPERITONEAL CATH TUNNEL ASCITES  2/22/2024    IR INTRAPERITONEAL CATH TUNNEL ASCITES  2/26/2024    IR PORT PLACEMENT >5 YEARS  3/2/2020    IR PORT PLACEMENT >5 YEARS  3/2/2020    IR T-FASTENER REMOVAL  6/5/2020    IR T-FASTENER REMOVAL  6/5/2020       Physical Exam:   GENERAL APPEARANCE: cachectic SE  man;  alert and no distress; neatly groomed  EYES: Eyes grossly normal to inspection, PERRLA, conjunctivae and sclerae without injection or discharge, EOM intact   RESP:  no increased work of breathing; speaks in brief Hmong  sentences;   MS: No musculoskeletal defects are noted  SKIN: No suspicious lesions or rashes, hydration status appears adequate with normal skin turgor   PSYCH: Alert and oriented x3; speech- coherent , normal rate and volume; able to articulate logical thoughts, able to abstract reason, no tangential thoughts, no hallucinations or  delusions, mentation appears normal, Mood is euthymic. Affect is appropriate for this mood state and bright. Thought content is free of suicidal ideation, hallucinations, and delusions.  Eye contact is good during conversation.       Key Data Reviewed:  LABS: 6/18/2024- Cr 1.44, Albumin 2.5,  Hgb 7.6,      IMAGING: no recent imaging.    30 minutes spent on the date of the encounter doing chart review, history and exam, patient education & counseling, documentation and other activities as noted above.    Steve Arreguin MD MS FAAFP CAQHPM  [facility]  Office [phone]  Fax [phone]

## 2024-07-09 NOTE — PROGRESS NOTES
"Oncology Rooming Note    July 9, 2024 1:26 PM   Kristen Chapman is a 74 year old male who presents for:    Chief Complaint   Patient presents with    Oncology Clinic Visit    palliative care     Follow up palliative care     Initial Vitals: BP 96/59 (BP Location: Right arm, Patient Position: Sitting)   Pulse 65   Resp 16   Wt 52.3 kg (115 lb 3.2 oz)   SpO2 99%   BMI 19.17 kg/m   Estimated body mass index is 19.17 kg/m  as calculated from the following:    Height as of 6/18/24: 1.651 m (5' 5\").    Weight as of this encounter: 52.3 kg (115 lb 3.2 oz). Body surface area is 1.55 meters squared.  No Pain (0) Comment: Data Unavailable   No LMP for male patient.  Allergies reviewed: Yes  Medications reviewed: Yes    Medications: Medication refills not needed today.  Pharmacy name entered into [employer]:    [place], [place] - [address] [facility] [place], [place] - [address] [place] [address]    Frailty Screening:   Is the patient here for a new oncology consult visit in cancer care? 2. No      Clinical concerns: Follow up palliative care, interpretor used for assessment  Dr. Arreguin was notified.      Florse Mercedes RN              "

## 2024-07-09 NOTE — PATIENT INSTRUCTIONS
It was good to see you today, Kristen and Nayla.    Here are the things we talked about:  Let's increase the mirtazapine (appetite pill) to 30 mg at bedtime.  I sent a new prescription to [facility].    No other changes to medicines today.     Someone from the team will reach out to schedule a follow up appointment in 2 months and sooner, if things change.       How to get a hold of us:  For non-urgent matters, MyChart works best.    For more urgent matters, or if you prefer not to use MyChart, call our clinic nurse coordinator Milka Dumont RN at [phone]    We have an on-call number for evenings and weekends. Please call this only if you are having uncontrolled symptoms or serious side effects from your medicines: [phone].     For refills, please give us a week (5 working days) notice. We don't always have providers available everyday to do refills. If you call the day you run out of your medicine, we may not be able to refill it in time, so call 5 days in advance!    Steve Arreguin MD MS FAAFP CAQHPM  [facility]  Office [phone]  Fax [phone]

## 2024-07-09 NOTE — LETTER
2024      Kristen Chapman  [address] [place] [address]      Dear Colleague,    Thank you for referring your patient, Kristen Chapman, to the [facility]COL[facility] [place]. Please see a copy of my visit note below.    Palliative Care Outpatient Clinic Progress Note    Patient Name: Kristen Chapman  Primary Provider: Issa Cook    Impressions:  ECO  Decision Making Capacity:  present with aid of   PDMP review:  yes, no concerns     Recurrent nasopharyngeal cancer, EBV +with recent PD  Malignant wound right submandibular region  Moderate protein calorie malnutrition  CKD IIIb  Hepatitis B with cirrhosis with refractory ascites and pleurex catheter  Malignancy associated anorexia  hypothyroidism     GOALS OF CARE:  2024  likes this 'beautiful world' and would like more time to be alive and is willing to undergo more treatments. I explained to him that sometimes due to the toxic nature of treatments he may live longer with less tumor directed therapy as the end of his life approaches. He does not want a feeding tube again or CPR or intubation.  He also declined surgery on the malignant wound on his neck.         Recommendations & Counseling:  Stop Megace  Increase mirtazapine to 30 mg ODT at HS  Encourage protein in his diet     Follow up with me in 8 weeks;  Kristen is asked to consider how he would want to spend his remaining time if the current chemo isn't effective.     Follow up with wound therapy for anterior neck wound        Counseling: All of the above was explained to the patient in lay language. The patient has verbalized a clear understanding of the discussion, asked appropriate questions, which have been answered to patient's apparent satisfaction. The patient is in agreement with the above plan.        Chief Complaint/Patient ID: Kristen Chapman 74 year old male with PMHx of recurrent nasopharyngeal cancer with recent PD, a malignant wound in his neck area; CKD 3b; Hep  B with cirrhosis and refractory ascites and pleurex catheter placement.    Last Palliative care appointment: 06/10/2024 with me     Reviewed:  Yes:   reviewed - no record of controlled substances prescribed.    Interim History:  Kristen Chapman is a 74 year old male who is seen today for follow up with Palliative Care Clinic.  He is joined by Kristen's son, Nayla.  The visit was facilitated by a professional .    Kristen feels his tumor under his right mandible is shrinking.  Nayla reports they are draining 1-1.5 liters of scites every few days without difficulty.    Kristen is tolerating single agent gemzar and has completed 3 cycles.     Pain:  none    Appetite/Nausea: no nausea; appetite helped minimally by mirtazapine and Kristen is OK with increasing it.     Bowels: none     Sleep: slight help with mirtazapine 15 mg at HS     Mood: denies depression or anxiety     Coping:  no problems    Family History- Reviewed in Epic.    Allergies   Allergen Reactions     Oxycodone Itching       Social History:  Pertinent changes to social history/social situation since last visit: none  Key support resources: family meghan son Nayla who he lives with  Advance Directive Status:  no ACP documents    Social History     Tobacco Use     Smoking status: Never     Smokeless tobacco: Never   Substance Use Topics     Alcohol use: Not Currently     Drug use: Not Currently         Allergies   Allergen Reactions     Oxycodone Itching     Current Outpatient Medications   Medication Sig Dispense Refill     acetaminophen (TYLENOL) 325 MG tablet Take 650 mg by mouth every 6 hours as needed        dexAMETHasone (DECADRON) 4 MG tablet Take 1 tablet (4 mg) by mouth daily (with breakfast) Take for 2 days after each chemotherapy dose. 12 tablet 1     furosemide (LASIX) 20 MG tablet Take 1 tablet (20 mg) by mouth daily 90 tablet 3     levothyroxine (SYNTHROID/LEVOTHROID) 175 MCG tablet Take 1 tablet (175 mcg) by mouth daily 60 tablet 1      megestrol (MEGACE) 40 MG/ML suspension Take 10 mLs (400 mg) by mouth daily (Patient not taking: Reported on 5/21/2024) 480 mL 1     mirtazapine (REMERON SOL-TAB) 15 MG ODT 1 tablet (15 mg) by Orally disintegrating tablet route at bedtime 30 tablet 4     prochlorperazine (COMPAZINE) 10 MG tablet Take 1 tablet (10 mg) by mouth every 6 hours as needed for nausea or vomiting 30 tablet 2     rivaroxaban ANTICOAGULANT (XARELTO) 20 MG TABS tablet Take 1 tablet (20 mg) by mouth daily (with dinner) 30 tablet 3     spironolactone (ALDACTONE) 100 MG tablet Take 1 tablet (100 mg) by mouth daily 90 tablet 3     tenofovir (VIREAD) 300 MG tablet Take 1 tablet (300 mg) by mouth every 72 hours 45 tablet 3     Past Medical History:   Diagnosis Date     Anemia      Dysphagia      Hepatitis B chronic      Hypothyroidism      Nasopharyngeal cancer (H)      Severe protein-calorie malnutrition (H24)      Thrombocytopenia (H24)      Past Surgical History:   Procedure Laterality Date     ENDOSCOPIC ENDONASAL SURGERY Bilateral 9/7/2021    Procedure: Nasal Endoscopy with Biopsy;  Surgeon: Ky Centeno MD;  Location: [facility] OR     IR CHEST PORT PLACEMENT > 5 YRS OF AGE  3/2/2020     IR CHEST PORT PLACEMENT > 5 YRS OF AGE  3/2/2020     IR GASTROSTOMY TUBE INSERTION  4/27/2020     IR GASTROSTOMY TUBE PERCUTANEOUS PLCMNT  4/27/2020     IR INTRAPERITONEAL CATH TUNNEL ASCITES  2/22/2024     IR INTRAPERITONEAL CATH TUNNEL ASCITES  2/26/2024     IR PORT PLACEMENT >5 YEARS  3/2/2020     IR PORT PLACEMENT >5 YEARS  3/2/2020     IR T-FASTENER REMOVAL  6/5/2020     IR T-FASTENER REMOVAL  6/5/2020       Physical Exam:   GENERAL APPEARANCE: cachectic SE  man;  alert and no distress; neatly groomed  EYES: Eyes grossly normal to inspection, PERRLA, conjunctivae and sclerae without injection or discharge, EOM intact   RESP:  no increased work of breathing; speaks in brief Hmong  sentences;   MS: No musculoskeletal defects are noted  SKIN: No  "suspicious lesions or rashes, hydration status appears adequate with normal skin turgor   PSYCH: Alert and oriented x3; speech- coherent , normal rate and volume; able to articulate logical thoughts, able to abstract reason, no tangential thoughts, no hallucinations or delusions, mentation appears normal, Mood is euthymic. Affect is appropriate for this mood state and bright. Thought content is free of suicidal ideation, hallucinations, and delusions.  Eye contact is good during conversation.       Key Data Reviewed:  LABS: 6/18/2024- Cr 1.44, Albumin 2.5,  Hgb 7.6,      IMAGING: no recent imaging.    30 minutes spent on the date of the encounter doing chart review, history and exam, patient education & counseling, documentation and other activities as noted above.    Steve Arreguin MD MS FAAFP CAQHPM  [facility]  Office [phone]  Fax [phone]     Oncology Rooming Note    July 9, 2024 1:26 PM   Kristen Chapman is a 74 year old male who presents for:    Chief Complaint   Patient presents with     Oncology Clinic Visit     palliative care     Follow up palliative care     Initial Vitals: BP 96/59 (BP Location: Right arm, Patient Position: Sitting)   Pulse 65   Resp 16   Wt 52.3 kg (115 lb 3.2 oz)   SpO2 99%   BMI 19.17 kg/m   Estimated body mass index is 19.17 kg/m  as calculated from the following:    Height as of 6/18/24: 1.651 m (5' 5\").    Weight as of this encounter: 52.3 kg (115 lb 3.2 oz). Body surface area is 1.55 meters squared.  No Pain (0) Comment: Data Unavailable   No LMP for male patient.  Allergies reviewed: Yes  Medications reviewed: Yes    Medications: Medication refills not needed today.  Pharmacy name entered into [employer]:    [facility] - [place], [place] - [address] [place] [address] [facility]ARMACY [place], [place] - [address] [place] [address]    Frailty Screening:   Is the patient here for a new oncology consult visit in cancer care? 2. No      Clinical concerns: " Follow up palliative care, interpretor used for assessment  Dr. Arreguin was notified.      Flores Mercedes RN                Again, thank you for allowing me to participate in the care of your patient.        Sincerely,        Steve Arreguin MD

## 2024-07-15 ENCOUNTER — HOSPITAL ENCOUNTER (OUTPATIENT)
Dept: CT IMAGING | Facility: HOSPITAL | Age: 74
Discharge: HOME | End: 2024-07-15
Attending: NURSE PRACTITIONER | Admitting: NURSE PRACTITIONER
Payer: COMMERCIAL

## 2024-07-15 DIAGNOSIS — C11.9 SQUAMOUS CELL CARCINOMA OF NASOPHARYNX (H): ICD-10-CM

## 2024-07-15 PROCEDURE — 70491 CT SOFT TISSUE NECK W/DYE: CPT

## 2024-07-15 PROCEDURE — 250N000011 HC RX IP 250 OP 636: Performed by: NURSE PRACTITIONER

## 2024-07-15 PROCEDURE — 71260 CT THORAX DX C+: CPT

## 2024-07-15 RX ORDER — IOPAMIDOL 755 MG/ML
56 INJECTION, SOLUTION INTRAVASCULAR ONCE
Status: COMPLETED | OUTPATIENT
Start: 2024-07-15 | End: 2024-07-15

## 2024-07-15 RX ORDER — HEPARIN SODIUM (PORCINE) LOCK FLUSH IV SOLN 100 UNIT/ML 100 UNIT/ML
500 SOLUTION INTRAVENOUS ONCE
Status: COMPLETED | OUTPATIENT
Start: 2024-07-15 | End: 2024-07-15

## 2024-07-15 RX ADMIN — IOPAMIDOL 56 ML: 755 INJECTION, SOLUTION INTRAVENOUS at 12:15

## 2024-07-15 RX ADMIN — Medication 500 UNITS: at 12:15

## 2024-07-17 ENCOUNTER — ONCOLOGY VISIT (OUTPATIENT)
Dept: ONCOLOGY | Facility: HOSPITAL | Age: 74
End: 2024-07-17
Attending: INTERNAL MEDICINE
Payer: COMMERCIAL

## 2024-07-17 ENCOUNTER — INFUSION THERAPY VISIT (OUTPATIENT)
Dept: INFUSION THERAPY | Facility: HOSPITAL | Age: 74
End: 2024-07-17
Attending: INTERNAL MEDICINE
Payer: COMMERCIAL

## 2024-07-17 VITALS
BODY MASS INDEX: 18.29 KG/M2 | OXYGEN SATURATION: 99 % | DIASTOLIC BLOOD PRESSURE: 56 MMHG | RESPIRATION RATE: 16 BRPM | TEMPERATURE: 97.9 F | HEIGHT: 65 IN | HEART RATE: 70 BPM | WEIGHT: 109.8 LBS | SYSTOLIC BLOOD PRESSURE: 101 MMHG

## 2024-07-17 DIAGNOSIS — K55.069 MESENTERIC VEIN THROMBOSIS (H): ICD-10-CM

## 2024-07-17 DIAGNOSIS — C11.9 SQUAMOUS CELL CARCINOMA OF NASOPHARYNX (H): Primary | ICD-10-CM

## 2024-07-17 DIAGNOSIS — D69.6 THROMBOCYTOPENIA (H): ICD-10-CM

## 2024-07-17 DIAGNOSIS — E03.2 HYPOTHYROIDISM DUE TO DRUGS: ICD-10-CM

## 2024-07-17 DIAGNOSIS — D61.818 ACQUIRED PANCYTOPENIA (H): ICD-10-CM

## 2024-07-17 DIAGNOSIS — D64.9 ANEMIA, NORMOCYTIC NORMOCHROMIC: ICD-10-CM

## 2024-07-17 LAB
ALBUMIN SERPL BCG-MCNC: 2.8 G/DL (ref 3.5–5.2)
ALP SERPL-CCNC: 110 U/L (ref 40–150)
ALT SERPL W P-5'-P-CCNC: 31 U/L (ref 0–70)
ANION GAP SERPL CALCULATED.3IONS-SCNC: 9 MMOL/L (ref 7–15)
AST SERPL W P-5'-P-CCNC: 33 U/L (ref 0–45)
BASOPHILS # BLD AUTO: 0 10E3/UL (ref 0–0.2)
BASOPHILS NFR BLD AUTO: 0 %
BILIRUB SERPL-MCNC: 0.4 MG/DL
BUN SERPL-MCNC: 36.9 MG/DL (ref 8–23)
CALCIUM SERPL-MCNC: 8.7 MG/DL (ref 8.8–10.4)
CHLORIDE SERPL-SCNC: 101 MMOL/L (ref 98–107)
CREAT SERPL-MCNC: 1.8 MG/DL (ref 0.67–1.17)
EGFRCR SERPLBLD CKD-EPI 2021: 39 ML/MIN/1.73M2
ELLIPTOCYTES BLD QL SMEAR: SLIGHT
EOSINOPHIL # BLD AUTO: 0.1 10E3/UL (ref 0–0.7)
EOSINOPHIL NFR BLD AUTO: 1 %
ERYTHROCYTE [DISTWIDTH] IN BLOOD BY AUTOMATED COUNT: 17.1 % (ref 10–15)
FERRITIN SERPL-MCNC: 608 NG/ML (ref 31–409)
FOLATE SERPL-MCNC: 11.8 NG/ML (ref 4.6–34.8)
FRAGMENTS BLD QL SMEAR: SLIGHT
GLUCOSE SERPL-MCNC: 164 MG/DL (ref 70–99)
HCO3 SERPL-SCNC: 23 MMOL/L (ref 22–29)
HCT VFR BLD AUTO: 26.9 % (ref 40–53)
HGB BLD-MCNC: 8.5 G/DL (ref 13.3–17.7)
IMM GRANULOCYTES # BLD: 0.1 10E3/UL
IMM GRANULOCYTES NFR BLD: 1 %
LYMPHOCYTES # BLD AUTO: 0.5 10E3/UL (ref 0.8–5.3)
LYMPHOCYTES NFR BLD AUTO: 10 %
MCH RBC QN AUTO: 27.2 PG (ref 26.5–33)
MCHC RBC AUTO-ENTMCNC: 31.6 G/DL (ref 31.5–36.5)
MCV RBC AUTO: 86 FL (ref 78–100)
MONOCYTES # BLD AUTO: 0.6 10E3/UL (ref 0–1.3)
MONOCYTES NFR BLD AUTO: 12 %
NEUTROPHILS # BLD AUTO: 3.9 10E3/UL (ref 1.6–8.3)
NEUTROPHILS NFR BLD AUTO: 76 %
NRBC # BLD AUTO: 0 10E3/UL
NRBC BLD AUTO-RTO: 0 /100
PLAT MORPH BLD: ABNORMAL
PLATELET # BLD AUTO: 107 10E3/UL (ref 150–450)
POTASSIUM SERPL-SCNC: 4.6 MMOL/L (ref 3.4–5.3)
PROT SERPL-MCNC: 6.2 G/DL (ref 6.4–8.3)
RBC # BLD AUTO: 3.12 10E6/UL (ref 4.4–5.9)
RBC MORPH BLD: ABNORMAL
RETICS # AUTO: 0.08 10E6/UL (ref 0.03–0.1)
RETICS/RBC NFR AUTO: 2.6 % (ref 0.5–2)
SODIUM SERPL-SCNC: 133 MMOL/L (ref 135–145)
VIT B12 SERPL-MCNC: 1332 PG/ML (ref 232–1245)
WBC # BLD AUTO: 5.1 10E3/UL (ref 4–11)

## 2024-07-17 PROCEDURE — 258N000003 HC RX IP 258 OP 636: Performed by: INTERNAL MEDICINE

## 2024-07-17 PROCEDURE — 82607 VITAMIN B-12: CPT

## 2024-07-17 PROCEDURE — 82746 ASSAY OF FOLIC ACID SERUM: CPT

## 2024-07-17 PROCEDURE — 84443 ASSAY THYROID STIM HORMONE: CPT

## 2024-07-17 PROCEDURE — 96375 TX/PRO/DX INJ NEW DRUG ADDON: CPT

## 2024-07-17 PROCEDURE — 36591 DRAW BLOOD OFF VENOUS DEVICE: CPT

## 2024-07-17 PROCEDURE — G2211 COMPLEX E/M VISIT ADD ON: HCPCS | Performed by: INTERNAL MEDICINE

## 2024-07-17 PROCEDURE — 85045 AUTOMATED RETICULOCYTE COUNT: CPT

## 2024-07-17 PROCEDURE — G0463 HOSPITAL OUTPT CLINIC VISIT: HCPCS | Mod: 25 | Performed by: INTERNAL MEDICINE

## 2024-07-17 PROCEDURE — 80053 COMPREHEN METABOLIC PANEL: CPT | Performed by: INTERNAL MEDICINE

## 2024-07-17 PROCEDURE — T1013 SIGN LANG/ORAL INTERPRETER: HCPCS | Mod: U4 | Performed by: INTERPRETER

## 2024-07-17 PROCEDURE — 99215 OFFICE O/P EST HI 40 MIN: CPT | Performed by: INTERNAL MEDICINE

## 2024-07-17 PROCEDURE — 96413 CHEMO IV INFUSION 1 HR: CPT

## 2024-07-17 PROCEDURE — 85025 COMPLETE CBC W/AUTO DIFF WBC: CPT | Performed by: INTERNAL MEDICINE

## 2024-07-17 PROCEDURE — 82728 ASSAY OF FERRITIN: CPT

## 2024-07-17 PROCEDURE — 250N000011 HC RX IP 250 OP 636: Performed by: INTERNAL MEDICINE

## 2024-07-17 PROCEDURE — 84439 ASSAY OF FREE THYROXINE: CPT

## 2024-07-17 RX ORDER — MEPERIDINE HYDROCHLORIDE 25 MG/ML
25 INJECTION INTRAMUSCULAR; INTRAVENOUS; SUBCUTANEOUS EVERY 30 MIN PRN
Status: DISCONTINUED | OUTPATIENT
Start: 2024-07-17 | End: 2024-07-17 | Stop reason: HOSPADM

## 2024-07-17 RX ORDER — ALBUTEROL SULFATE 90 UG/1
1-2 AEROSOL, METERED RESPIRATORY (INHALATION)
Status: DISCONTINUED | OUTPATIENT
Start: 2024-07-17 | End: 2024-07-17 | Stop reason: HOSPADM

## 2024-07-17 RX ORDER — ONDANSETRON 2 MG/ML
8 INJECTION INTRAMUSCULAR; INTRAVENOUS ONCE
Status: COMPLETED | OUTPATIENT
Start: 2024-07-17 | End: 2024-07-17

## 2024-07-17 RX ORDER — HEPARIN SODIUM (PORCINE) LOCK FLUSH IV SOLN 100 UNIT/ML 100 UNIT/ML
5 SOLUTION INTRAVENOUS
Status: DISCONTINUED | OUTPATIENT
Start: 2024-07-17 | End: 2024-07-17 | Stop reason: HOSPADM

## 2024-07-17 RX ORDER — ALBUTEROL SULFATE 0.83 MG/ML
2.5 SOLUTION RESPIRATORY (INHALATION)
Status: DISCONTINUED | OUTPATIENT
Start: 2024-07-17 | End: 2024-07-17 | Stop reason: HOSPADM

## 2024-07-17 RX ORDER — DIPHENHYDRAMINE HYDROCHLORIDE 50 MG/ML
50 INJECTION INTRAMUSCULAR; INTRAVENOUS
Status: DISCONTINUED | OUTPATIENT
Start: 2024-07-17 | End: 2024-07-17 | Stop reason: HOSPADM

## 2024-07-17 RX ORDER — METHYLPREDNISOLONE SODIUM SUCCINATE 125 MG/2ML
125 INJECTION, POWDER, LYOPHILIZED, FOR SOLUTION INTRAMUSCULAR; INTRAVENOUS
Status: DISCONTINUED | OUTPATIENT
Start: 2024-07-17 | End: 2024-07-17 | Stop reason: HOSPADM

## 2024-07-17 RX ORDER — EPINEPHRINE 1 MG/ML
0.3 INJECTION, SOLUTION INTRAMUSCULAR; SUBCUTANEOUS EVERY 5 MIN PRN
Status: DISCONTINUED | OUTPATIENT
Start: 2024-07-17 | End: 2024-07-17 | Stop reason: HOSPADM

## 2024-07-17 RX ADMIN — SODIUM CHLORIDE 250 ML: 9 INJECTION, SOLUTION INTRAVENOUS at 11:05

## 2024-07-17 RX ADMIN — GEMCITABINE 1200 MG: 38 INJECTION, SOLUTION INTRAVENOUS at 11:39

## 2024-07-17 RX ADMIN — Medication 5 ML: at 12:11

## 2024-07-17 RX ADMIN — ONDANSETRON 8 MG: 2 INJECTION INTRAMUSCULAR; INTRAVENOUS at 11:05

## 2024-07-17 ASSESSMENT — PAIN SCALES - GENERAL: PAINLEVEL: NO PAIN (0)

## 2024-07-17 NOTE — PROGRESS NOTES
"Oncology Rooming Note    July 17, 2024 9:30 AM   Kristen Chapman is a 74 year old male who presents for:    Chief Complaint   Patient presents with    Oncology Clinic Visit     Return visit 4 weeks with lab and infusion. CT 07/15/24. Squamous cell carcinoma of nasopharynx     Initial Vitals: /56 (BP Location: Left arm, Patient Position: Sitting, Cuff Size: Adult Regular)   Pulse 70   Temp 97.9  F (36.6  C) (Oral)   Resp 16   Ht 1.651 m (5' 5\")   Wt 49.8 kg (109 lb 12.8 oz)   SpO2 99%   BMI 18.27 kg/m   Estimated body mass index is 18.27 kg/m  as calculated from the following:    Height as of this encounter: 1.651 m (5' 5\").    Weight as of this encounter: 49.8 kg (109 lb 12.8 oz). Body surface area is 1.51 meters squared.  No Pain (0) Comment: Data Unavailable   No LMP for male patient.  Allergies reviewed: Yes  Medications reviewed: Yes    Medications: Medication refills not needed today.  Pharmacy name entered into [facility]:    [facility] - [place], [place] - [address] [place] [address] PHARMACY [place], [place] - [address] [place] [address]    Frailty Screening:   Is the patient here for a new oncology consult visit in cancer care? 2. No      Clinical concerns: none       Felisha Henderson CMA            "

## 2024-07-17 NOTE — LETTER
"7/17/2024      Kristen Chapman  [address] [place] [address]      Dear Colleague,    Thank you for referring your patient, Kristen Chapman, to the [facility]. Please see a copy of my visit note below.    Oncology Rooming Note    July 17, 2024 9:30 AM   Kristen Chapman is a 74 year old male who presents for:    Chief Complaint   Patient presents with     Oncology Clinic Visit     Return visit 4 weeks with lab and infusion. CT 07/15/24. Squamous cell carcinoma of nasopharynx     Initial Vitals: /56 (BP Location: Left arm, Patient Position: Sitting, Cuff Size: Adult Regular)   Pulse 70   Temp 97.9  F (36.6  C) (Oral)   Resp 16   Ht 1.651 m (5' 5\")   Wt 49.8 kg (109 lb 12.8 oz)   SpO2 99%   BMI 18.27 kg/m   Estimated body mass index is 18.27 kg/m  as calculated from the following:    Height as of this encounter: 1.651 m (5' 5\").    Weight as of this encounter: 49.8 kg (109 lb 12.8 oz). Body surface area is 1.51 meters squared.  No Pain (0) Comment: Data Unavailable   No LMP for male patient.  Allergies reviewed: Yes  Medications reviewed: Yes    Medications: Medication refills not needed today.  Pharmacy name entered into [employer]:    [facility] - [place], [place] - [address] [place] [address] PHARMACY [place], [place] - [address] [place] [address]    Frailty Screening:   Is the patient here for a new oncology consult visit in cancer care? 2. No      Clinical concerns: none       [facility] Progress Note    Patient: Kristen Chapman  MRN: [id number]  Date of Service: Jul 17, 2024        Assessment and Plan:    Cancer Staging  Nasopharyngeal carcinoma (H)  Staging form: Pharynx - Nasopharynx, AJCC 8th Edition  - Clinical stage from 2/18/2020: Stage SAMANTHA (cT4, cN2, cM0) - Signed by Alan Frias MD on 2/18/2020     1.  Nasopharyngeal carcinoma: He had restaging scans on July 15.  I personally reviewed the images.  There is " been significant reduction in the size of the tumor mass.  He had an excellent clinical response as well.  He is on single agent gemcitabine.  Tolerating it well.  We will continue this indefinitely as long as he continues to response and is tolerating it.  Per his request I will also reach out to surgery and radiation to let them know about his response to see if this would allow any local treatments.  Continue treatments every other week.  Repeat imaging in 3 months, end of October.    2.  Pancytopenia: Chronic.  Multifactorial.  Exacerbated by chemotherapy.  CBC from today was reviewed and shows a hemoglobin of 5.1, hemoglobin 8.5 and platelets 107,000.  All generally stable.    Also from today: Folate 11.8, B12 1332, ferritin 608  He has chronic pancytopenia secondary to chronic hepatitis B infection as well as cirrhosis.  He also has splenomegaly measuring 17 cm on CT from Sophia 15, 2023.  He had a bone marrow biopsy in October 2021.  Next generation sequencing showed no abnormalities.  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     3.  Hypothyroidism: Will recheck his TSH today as it has been sometime.  Adjust his levothyroxine as needed.    4.  Hepatitis B infection: We are going to recheck his hepatitis B virus DNA levels.  If sufficiently suppressed we could consider trying pembrolizumab again.  He is following up with hepatology soon.    5.  Ascites: Refractory.  He now has a Pleurx catheter in.  They are draining 1 L every 5 days or so.    6.  F/E/N: No longer taking Megace    7.  Mesenteric thrombosis: Recent CT reviewed.  There is persistent but significantly diminished volume of nonocclusive mesenteric venous thrombus.  Interval resolution of previously seen portal and splenic vein thrombus.  He is no longer taking his anticoagulation as it was giving him nosebleeds.    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 Diagnosed January 2020. Imaging suggested bilateral abnormal lymphadenopathy in the neck.  Also asymmetric soft tissue thickening in the posterior right nasopharynx.  On imaging, tumor also appeared to extend down to the hypopharynx.    Recurrent 1 disease involving the ethmoid sinus diagnosed September 2021.  Right neck lymph node positive for recurrent nasopharyngeal carcinoma.  PET scan at that time showed new hypermetabolic mediastinal lymphadenopathy (chronic, most likely reactive), mass in the right ethmoid sinus, hypermetabolic right level 1B and 2A lymph nodes.    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 .   P16 negative, TRK negative, MAIDA  positive. PDL1 TPS 5%,    NGS (from February 12, 2024 biopsy):  TMB low, pMMR, PD-L1 20, TRK negative, MET/RET/BRAF negative.      2  Pancytopenia:  chronic. Multifactorial.     Treatment:    Initially treated with concurrent chemotherapy and radiation.  He had weekly cisplatin starting March 3, 2020. Fifth and final dose given March 31, 2020.  Subsequent chemotherapy was held due to prolonged thrombocytopenia and renal insufficiency.  Radiation dose was 7000 cGy in 35 fractions given from March 2 through April 17, 2020.     Started cisplatin with infusional 5-FU on June 1, 2020.  Cycle 1 given at a 25% dose reduction.  He still had significant thrombocytopenia and neutropenia on day 28.  Cycle 2 was held.  At the same time his liver function tests elevated secondary to hepatitis B.   PET scan in September 2020 showed no good evidence of residual malignancy.    Recurrence 1:  Radiosurgery delivered to the right ethmoid tumor delivered November 8 through November 17, 2021.  Received 3500 cGy in 5 fractions.  Pembrolizumab started 12/23/21.  Held after his July 1, 2022 dose.  PET scan in March 2022 showed a near complete  "response.    Recurrence 2:  Cetuximab initiated 2/2/2024.  400 mg every 14 days.  Carboplatin added cycle 3-day 1 on March 29, 2024.  AUC 2.  Progression after 3 cycles.    Gemcitabine initiated April 23, 2024.  Switch to days 1 and 15 with cycle 2.    Interim History:    Kristen Chapman returns today for follow-up visit.  He is now on treatment with gemcitabine he has completed 3 cycles.  Here to start cycle 4-day 1.  He is also here to review CT imaging.  Clinically he notes significant reduction of tumor size.  He is not having any pain.  Energy is a little better compared to April.  No nausea or vomiting or bowel habit changes    Review of Systems:    As above in the history.     Review of Systems otherwise Negative for:  General: chills, fever or night sweats  Psychological: anxiety or depression  Ophthalmic: blurry vision, double vision or loss of vision, vision change  ENT: oral lesions, hearing changes  Hematological and Lymphatic: bruising, jaundice, swollen lymph nodes  Endocrine: hot flashes  Respiratory: cough, hemoptysis, orthopnea  Cardiovascular: chest pain, palpitations or PND  Gastrointestinal: blood in stools, change in bowel habits, constipation, diarrhea or nausea/vomiting  Genito-Urinary: change in urinary stream, incontinence, frequency/urgency  Musculoskeletal: joint swelling, muscle pain  Neurological: dizziness, headaches, numbness/tingling  Dermatological: lumps and rash    Past History:    Past Medical History:   Diagnosis Date     Anemia      Dysphagia      Hepatitis B chronic      Hypothyroidism      Nasopharyngeal cancer (H)      Severe protein-calorie malnutrition (H24)      Thrombocytopenia (H24)      Physical Exam:    /56 (BP Location: Left arm, Patient Position: Sitting, Cuff Size: Adult Regular)   Pulse 70   Temp 97.9  F (36.6  C) (Oral)   Resp 16   Ht 1.651 m (5' 5\")   Wt 49.8 kg (109 lb 12.8 oz)   SpO2 99%   BMI 18.27 kg/m      General: patient appears stated age of 71 " year old. Nontoxic and in no distress.   HEENT: Head: atraumatic, normocephalic. Sclerae anicteric.  Large right submandibular mass.  Bandaged.  Chest:  Normal respiratory effort  Cardiac:  No edema.  Abdomen: abdomen is distended.  Extremities: normal tone and muscle bulk.  Skin: no lesions or rash on visible skin. Warm and dry.   CNS: alert and oriented. Grossly non-focal.   Psychiatric: normal mood and affect.     Lab Results:    Recent Results (from the past 168 hour(s))   Comprehensive metabolic panel   Result Value Ref Range    Sodium 133 (L) 135 - 145 mmol/L    Potassium 4.6 3.4 - 5.3 mmol/L    Carbon Dioxide (CO2) 23 22 - 29 mmol/L    Anion Gap 9 7 - 15 mmol/L    Urea Nitrogen 36.9 (H) 8.0 - 23.0 mg/dL    Creatinine 1.80 (H) 0.67 - 1.17 mg/dL    GFR Estimate 39 (L) >60 mL/min/1.73m2    Calcium 8.7 (L) 8.8 - 10.4 mg/dL    Chloride 101 98 - 107 mmol/L    Glucose 164 (H) 70 - 99 mg/dL    Alkaline Phosphatase 110 40 - 150 U/L    AST 33 0 - 45 U/L    ALT 31 0 - 70 U/L    Protein Total 6.2 (L) 6.4 - 8.3 g/dL    Albumin 2.8 (L) 3.5 - 5.2 g/dL    Bilirubin Total 0.4 <=1.2 mg/dL   Reticulocyte count   Result Value Ref Range    % Reticulocyte 2.6 (H) 0.5 - 2.0 %    Absolute Reticulocyte 0.081 0.025 - 0.095 10e6/uL   CBC with platelets and differential   Result Value Ref Range    WBC Count 5.1 4.0 - 11.0 10e3/uL    RBC Count 3.12 (L) 4.40 - 5.90 10e6/uL    Hemoglobin 8.5 (L) 13.3 - 17.7 g/dL    Hematocrit 26.9 (L) 40.0 - 53.0 %    MCV 86 78 - 100 fL    MCH 27.2 26.5 - 33.0 pg    MCHC 31.6 31.5 - 36.5 g/dL    RDW 17.1 (H) 10.0 - 15.0 %    Platelet Count 107 (L) 150 - 450 10e3/uL    % Neutrophils 76 %    % Lymphocytes 10 %    % Monocytes 12 %    % Eosinophils 1 %    % Basophils 0 %    % Immature Granulocytes 1 %    NRBCs per 100 WBC 0 <1 /100    Absolute Neutrophils 3.9 1.6 - 8.3 10e3/uL    Absolute Lymphocytes 0.5 (L) 0.8 - 5.3 10e3/uL    Absolute Monocytes 0.6 0.0 - 1.3 10e3/uL    Absolute Eosinophils 0.1 0.0 - 0.7  10e3/uL    Absolute Basophils 0.0 0.0 - 0.2 10e3/uL    Absolute Immature Granulocytes 0.1 <=0.4 10e3/uL    Absolute NRBCs 0.0 10e3/uL   RBC and Platelet Morphology   Result Value Ref Range    RBC Morphology Confirmed RBC Indices     Platelet Assessment  Automated Count Confirmed. Platelet morphology is normal.     Automated Count Confirmed. Platelet morphology is normal.    Elliptocytes Slight (A) None Seen    RBC Fragments Slight (A) None Seen     Imaging:    CT Soft Tissue Neck w Contrast    Result Date: 7/15/2024  EXAM: CT SOFT TISSUE NECK W CONTRAST LOCATION: [facility] DATE: 7/15/2024 INDICATION:  Squamous cell carcinoma of nasopharynx (H) COMPARISON: 4/13/2024. CONTRAST: isovue 30 56 ml TECHNIQUE: Routine CT Soft Tissue Neck with IV contrast. Multiplanar reformats. Dose reduction techniques were used. FINDINGS: As compared to the prior examination the avidly enhancing recurrent mass in the right submandibular space extending along the angle of the mandible into the right parotid distribution has markedly decreased in size with only a small amount of residual component identified extending to the skin surface with ulceration along the right neck skin margin. Measurement of the lesion at similar locations previously measured up to 8 cm and now measures 3.7 cm. There is thickening of the epiglottis related to post therapeutic changes. There are some post therapeutic treatment related changes involving the mucosal spaces of the neck. No evidence of abnormal pathologic adenopathy clearly identified. Thyroid gland is diminutive. The mandible and mandibular condyles appear intact. Mucosal sinus disease involves the paranasal sinuses. Left mastoid effusion with no discrete lesion in the left nasopharynx.     IMPRESSION: 1.  Marked interval reduction of the recurrent neoplasm findings noted on the 4/13/2024 exam. Post therapeutic changes in the mucosal spaces of the neck. No adenopathy findings  identified.    CT Chest/Abdomen/Pelvis w Contrast    Result Date: 7/15/2024  EXAM: CT CHEST/ABDOMEN/PELVIS W CONTRAST LOCATION: [facility] DATE: 7/15/2024 INDICATION:  Squamous cell carcinoma of nasopharynx (H) COMPARISON: Multiple prior exams, most recently April 13, 2024. TECHNIQUE: CT scan of the chest, abdomen, and pelvis was performed following injection of IV contrast. Multiplanar reformats were obtained. Dose reduction techniques were used. CONTRAST: isovue 370 56 ml FINDINGS: LUNGS AND PLEURA: There is parenchymal volume loss in the medial lower lobes which appears similar as on April 13 exam. No pleural effusion. 6 mm spiculated nodule in the posterolateral right upper lobe on image 5:274 is not significant changed to June 2023. No new pulmonary lesion. MEDIASTINUM/AXILLAE: No thoracic lymphadenopathy. Ascending aorta is enlarged to 43 mm x 44 mm on coronal and sagittal reformatted images respectively at the mid ascending aortic level. This is unchanged. Descending aorta is normal in caliber. No dissection. Central pulmonary arteries are normal in caliber and enhance as expected. CORONARY ARTERY CALCIFICATION: Mild. HEPATOBILIARY: Advanced cirrhotic morphology. No focal hepatic lesion. PANCREAS: Normal. SPLEEN: Splenomegaly is unchanged. Several low-density foci within the spleen appears similar as on the previous exam. ADRENAL GLANDS: Normal. KIDNEYS/BLADDER: Normal. BOWEL: Normal. LYMPH NODES: Normal. VASCULATURE: Vascular collaterals in the upper abdomen with prominent collateral veins around the distal esophagus are again observed. There is persistent but decreased nonocclusive thrombus and the superior mesenteric veins. The portal and splenic veins  are patent. Atheromatous calcification along the normal caliber abdominal aorta. Normal enhancement of the mesenteric arteries. PELVIC ORGANS: Prior hysterectomy. MUSCULOSKELETAL: Degenerative changes. No compression deformity or  osseous metastasis. ADDITIONAL FINDINGS: Peritoneal drain is again observed and there is a large volume of nonloculated peritoneal fluid. No peritoneal enhancement or nodular mass lesion.     IMPRESSION: 1.  Right upper lobe pulmonary nodule is unchanged compared with prior exams at least as remote as Sophia 15, 2023. No evidence of progressive or new metastatic disease in the chest, abdomen, or pelvis. 2.  Advanced cirrhotic morphology of the liver with splenomegaly and upper abdominal vascular collaterals. No focal hepatic mass lesion. 3.  Compared with CT from April 2024, there is persistent but significantly diminished volume of nonocclusive mesenteric venous thrombus. Interval resolution of the previously seen portal and splenic venous thrombus.       Signed by: Alan Frias MD      Again, thank you for allowing me to participate in the care of your patient.        Sincerely,        Alan Frias MD

## 2024-07-17 NOTE — PROGRESS NOTES
[facility] Hematology and Oncology Progress Note    Patient: Kristen Chapman  MRN: [id number]  Date of Service: Jul 17, 2024        Assessment and Plan:    Cancer Staging  Nasopharyngeal carcinoma (H)  Staging form: Pharynx - Nasopharynx, AJCC 8th Edition  - Clinical stage from 2/18/2020: Stage SAMANTHA (cT4, cN2, cM0) - Signed by Alan Frias MD on 2/18/2020     1.  Nasopharyngeal carcinoma: He had restaging scans on July 15.  I personally reviewed the images.  There is been significant reduction in the size of the tumor mass.  He had an excellent clinical response as well.  He is on single agent gemcitabine.  Tolerating it well.  We will continue this indefinitely as long as he continues to response and is tolerating it.  Per his request I will also reach out to surgery and radiation to let them know about his response to see if this would allow any local treatments.  Continue treatments every other week.  Repeat imaging in 3 months, end of October.    2.  Pancytopenia: Chronic.  Multifactorial.  Exacerbated by chemotherapy.  CBC from today was reviewed and shows a hemoglobin of 5.1, hemoglobin 8.5 and platelets 107,000.  All generally stable.    Also from today: Folate 11.8, B12 1332, ferritin 608  He has chronic pancytopenia secondary to chronic hepatitis B infection as well as cirrhosis.  He also has splenomegaly measuring 17 cm on CT from Sophia 15, 2023.  He had a bone marrow biopsy in October 2021.  Next generation sequencing showed no abnormalities.  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     3.  Hypothyroidism: Will recheck his TSH today as it has been sometime.  Adjust his levothyroxine as needed.    4.  Hepatitis B infection: We are going to recheck his hepatitis B virus DNA levels.  If sufficiently suppressed we could consider trying pembrolizumab again.  He is following up with hepatology soon.    5.  Ascites: Refractory.  He now has a Pleurx catheter in.  They are draining 1 L every 5 days  or so.    6.  F/E/N: No longer taking Megace    7.  Mesenteric thrombosis: Recent CT reviewed.  There is persistent but significantly diminished volume of nonocclusive mesenteric venous thrombus.  Interval resolution of previously seen portal and splenic vein thrombus.  He is no longer taking his anticoagulation as it was giving him nosebleeds.    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 Diagnosed January 2020. Imaging suggested bilateral abnormal lymphadenopathy in the neck.  Also asymmetric soft tissue thickening in the posterior right nasopharynx.  On imaging, tumor also appeared to extend down to the hypopharynx.    Recurrent 1 disease involving the ethmoid sinus diagnosed September 2021.  Right neck lymph node positive for recurrent nasopharyngeal carcinoma.  PET scan at that time showed new hypermetabolic mediastinal lymphadenopathy (chronic, most likely reactive), mass in the right ethmoid sinus, hypermetabolic right level 1B and 2A lymph nodes.    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 .   P16 negative, TRK negative, MAIDA  positive. PDL1 TPS 5%,    NGS (from February 12, 2024 biopsy):  TMB low, pMMR, PD-L1 20, TRK negative, MET/RET/BRAF negative.      2  Pancytopenia:  chronic. Multifactorial.     Treatment:    Initially treated with concurrent chemotherapy and radiation.  He had weekly cisplatin starting March 3, 2020. Fifth and final dose given March 31, 2020.  Subsequent chemotherapy was held due to prolonged thrombocytopenia and renal insufficiency.  Radiation dose was 7000 cGy in 35 fractions given from March 2 through April 17, 2020.     Started cisplatin with infusional 5-FU on June 1, 2020.  Cycle 1 given at a 25% dose reduction.  He still had significant thrombocytopenia and neutropenia  on day 28.  Cycle 2 was held.  At the same time his liver function tests elevated secondary to hepatitis B.   PET scan in September 2020 showed no good evidence of residual malignancy.    Recurrence 1:  Radiosurgery delivered to the right ethmoid tumor delivered November 8 through November 17, 2021.  Received 3500 cGy in 5 fractions.  Pembrolizumab started 12/23/21.  Held after his July 1, 2022 dose.  PET scan in March 2022 showed a near complete response.    Recurrence 2:  Cetuximab initiated 2/2/2024.  400 mg every 14 days.  Carboplatin added cycle 3-day 1 on March 29, 2024.  AUC 2.  Progression after 3 cycles.    Gemcitabine initiated April 23, 2024.  Switch to days 1 and 15 with cycle 2.    Interim History:    Kristen Chapman returns today for follow-up visit.  He is now on treatment with gemcitabine he has completed 3 cycles.  Here to start cycle 4-day 1.  He is also here to review CT imaging.  Clinically he notes significant reduction of tumor size.  He is not having any pain.  Energy is a little better compared to April.  No nausea or vomiting or bowel habit changes    Review of Systems:    As above in the history.     Review of Systems otherwise Negative for:  General: chills, fever or night sweats  Psychological: anxiety or depression  Ophthalmic: blurry vision, double vision or loss of vision, vision change  ENT: oral lesions, hearing changes  Hematological and Lymphatic: bruising, jaundice, swollen lymph nodes  Endocrine: hot flashes  Respiratory: cough, hemoptysis, orthopnea  Cardiovascular: chest pain, palpitations or PND  Gastrointestinal: blood in stools, change in bowel habits, constipation, diarrhea or nausea/vomiting  Genito-Urinary: change in urinary stream, incontinence, frequency/urgency  Musculoskeletal: joint swelling, muscle pain  Neurological: dizziness, headaches, numbness/tingling  Dermatological: lumps and rash    Past History:    Past Medical History:   Diagnosis Date    Anemia     Dysphagia   "   Hepatitis B chronic     Hypothyroidism     Nasopharyngeal cancer (H)     Severe protein-calorie malnutrition (H24)     Thrombocytopenia (H24)      Physical Exam:    /56 (BP Location: Left arm, Patient Position: Sitting, Cuff Size: Adult Regular)   Pulse 70   Temp 97.9  F (36.6  C) (Oral)   Resp 16   Ht 1.651 m (5' 5\")   Wt 49.8 kg (109 lb 12.8 oz)   SpO2 99%   BMI 18.27 kg/m      General: patient appears stated age of 71 year old. Nontoxic and in no distress.   HEENT: Head: atraumatic, normocephalic. Sclerae anicteric.  Large right submandibular mass.  Bandaged.  Chest:  Normal respiratory effort  Cardiac:  No edema.  Abdomen: abdomen is distended.  Extremities: normal tone and muscle bulk.  Skin: no lesions or rash on visible skin. Warm and dry.   CNS: alert and oriented. Grossly non-focal.   Psychiatric: normal mood and affect.     Lab Results:    Recent Results (from the past 168 hour(s))   Comprehensive metabolic panel   Result Value Ref Range    Sodium 133 (L) 135 - 145 mmol/L    Potassium 4.6 3.4 - 5.3 mmol/L    Carbon Dioxide (CO2) 23 22 - 29 mmol/L    Anion Gap 9 7 - 15 mmol/L    Urea Nitrogen 36.9 (H) 8.0 - 23.0 mg/dL    Creatinine 1.80 (H) 0.67 - 1.17 mg/dL    GFR Estimate 39 (L) >60 mL/min/1.73m2    Calcium 8.7 (L) 8.8 - 10.4 mg/dL    Chloride 101 98 - 107 mmol/L    Glucose 164 (H) 70 - 99 mg/dL    Alkaline Phosphatase 110 40 - 150 U/L    AST 33 0 - 45 U/L    ALT 31 0 - 70 U/L    Protein Total 6.2 (L) 6.4 - 8.3 g/dL    Albumin 2.8 (L) 3.5 - 5.2 g/dL    Bilirubin Total 0.4 <=1.2 mg/dL   Reticulocyte count   Result Value Ref Range    % Reticulocyte 2.6 (H) 0.5 - 2.0 %    Absolute Reticulocyte 0.081 0.025 - 0.095 10e6/uL   CBC with platelets and differential   Result Value Ref Range    WBC Count 5.1 4.0 - 11.0 10e3/uL    RBC Count 3.12 (L) 4.40 - 5.90 10e6/uL    Hemoglobin 8.5 (L) 13.3 - 17.7 g/dL    Hematocrit 26.9 (L) 40.0 - 53.0 %    MCV 86 78 - 100 fL    MCH 27.2 26.5 - 33.0 pg    MCHC " 31.6 31.5 - 36.5 g/dL    RDW 17.1 (H) 10.0 - 15.0 %    Platelet Count 107 (L) 150 - 450 10e3/uL    % Neutrophils 76 %    % Lymphocytes 10 %    % Monocytes 12 %    % Eosinophils 1 %    % Basophils 0 %    % Immature Granulocytes 1 %    NRBCs per 100 WBC 0 <1 /100    Absolute Neutrophils 3.9 1.6 - 8.3 10e3/uL    Absolute Lymphocytes 0.5 (L) 0.8 - 5.3 10e3/uL    Absolute Monocytes 0.6 0.0 - 1.3 10e3/uL    Absolute Eosinophils 0.1 0.0 - 0.7 10e3/uL    Absolute Basophils 0.0 0.0 - 0.2 10e3/uL    Absolute Immature Granulocytes 0.1 <=0.4 10e3/uL    Absolute NRBCs 0.0 10e3/uL   RBC and Platelet Morphology   Result Value Ref Range    RBC Morphology Confirmed RBC Indices     Platelet Assessment  Automated Count Confirmed. Platelet morphology is normal.     Automated Count Confirmed. Platelet morphology is normal.    Elliptocytes Slight (A) None Seen    RBC Fragments Slight (A) None Seen     Imaging:    CT Soft Tissue Neck w Contrast    Result Date: 7/15/2024  EXAM: CT SOFT TISSUE NECK W CONTRAST LOCATION: [facility] DATE: 7/15/2024 INDICATION:  Squamous cell carcinoma of nasopharynx (H) COMPARISON: 4/13/2024. CONTRAST: isovue 30 56 ml TECHNIQUE: Routine CT Soft Tissue Neck with IV contrast. Multiplanar reformats. Dose reduction techniques were used. FINDINGS: As compared to the prior examination the avidly enhancing recurrent mass in the right submandibular space extending along the angle of the mandible into the right parotid distribution has markedly decreased in size with only a small amount of residual component identified extending to the skin surface with ulceration along the right neck skin margin. Measurement of the lesion at similar locations previously measured up to 8 cm and now measures 3.7 cm. There is thickening of the epiglottis related to post therapeutic changes. There are some post therapeutic treatment related changes involving the mucosal spaces of the neck. No evidence of abnormal  pathologic adenopathy clearly identified. Thyroid gland is diminutive. The mandible and mandibular condyles appear intact. Mucosal sinus disease involves the paranasal sinuses. Left mastoid effusion with no discrete lesion in the left nasopharynx.     IMPRESSION: 1.  Marked interval reduction of the recurrent neoplasm findings noted on the 4/13/2024 exam. Post therapeutic changes in the mucosal spaces of the neck. No adenopathy findings identified.    CT Chest/Abdomen/Pelvis w Contrast    Result Date: 7/15/2024  EXAM: CT CHEST/ABDOMEN/PELVIS W CONTRAST LOCATION: [facility] DATE: 7/15/2024 INDICATION:  Squamous cell carcinoma of nasopharynx (H) COMPARISON: Multiple prior exams, most recently April 13, 2024. TECHNIQUE: CT scan of the chest, abdomen, and pelvis was performed following injection of IV contrast. Multiplanar reformats were obtained. Dose reduction techniques were used. CONTRAST: isovue 370 56 ml FINDINGS: LUNGS AND PLEURA: There is parenchymal volume loss in the medial lower lobes which appears similar as on April 13 exam. No pleural effusion. 6 mm spiculated nodule in the posterolateral right upper lobe on image 5:274 is not significant changed to June 2023. No new pulmonary lesion. MEDIASTINUM/AXILLAE: No thoracic lymphadenopathy. Ascending aorta is enlarged to 43 mm x 44 mm on coronal and sagittal reformatted images respectively at the mid ascending aortic level. This is unchanged. Descending aorta is normal in caliber. No dissection. Central pulmonary arteries are normal in caliber and enhance as expected. CORONARY ARTERY CALCIFICATION: Mild. HEPATOBILIARY: Advanced cirrhotic morphology. No focal hepatic lesion. PANCREAS: Normal. SPLEEN: Splenomegaly is unchanged. Several low-density foci within the spleen appears similar as on the previous exam. ADRENAL GLANDS: Normal. KIDNEYS/BLADDER: Normal. BOWEL: Normal. LYMPH NODES: Normal. VASCULATURE: Vascular collaterals in the upper  abdomen with prominent collateral veins around the distal esophagus are again observed. There is persistent but decreased nonocclusive thrombus and the superior mesenteric veins. The portal and splenic veins  are patent. Atheromatous calcification along the normal caliber abdominal aorta. Normal enhancement of the mesenteric arteries. PELVIC ORGANS: Prior hysterectomy. MUSCULOSKELETAL: Degenerative changes. No compression deformity or osseous metastasis. ADDITIONAL FINDINGS: Peritoneal drain is again observed and there is a large volume of nonloculated peritoneal fluid. No peritoneal enhancement or nodular mass lesion.     IMPRESSION: 1.  Right upper lobe pulmonary nodule is unchanged compared with prior exams at least as remote as Sophia 15, 2023. No evidence of progressive or new metastatic disease in the chest, abdomen, or pelvis. 2.  Advanced cirrhotic morphology of the liver with splenomegaly and upper abdominal vascular collaterals. No focal hepatic mass lesion. 3.  Compared with CT from April 2024, there is persistent but significantly diminished volume of nonocclusive mesenteric venous thrombus. Interval resolution of the previously seen portal and splenic venous thrombus.       Signed by: Alan Frias MD

## 2024-07-17 NOTE — PROGRESS NOTES
Pt here for treatment after seeing MD. Johnson accessed easily and labs met treatment parameters. No problem with infusion as directed and port flushed and deaccessed upon completion and pt d.c ambulatory with cane to lobby with his son. Pt aware of treatment plan.

## 2024-07-18 LAB
T4 FREE SERPL-MCNC: 1.73 NG/DL (ref 0.9–1.7)
TSH SERPL DL<=0.005 MIU/L-ACNC: 7.37 UIU/ML (ref 0.3–4.2)

## 2024-07-18 RX ORDER — HEPARIN SODIUM (PORCINE) LOCK FLUSH IV SOLN 100 UNIT/ML 100 UNIT/ML
5 SOLUTION INTRAVENOUS
OUTPATIENT
Start: 2024-10-23

## 2024-07-18 RX ORDER — HEPARIN SODIUM (PORCINE) LOCK FLUSH IV SOLN 100 UNIT/ML 100 UNIT/ML
5 SOLUTION INTRAVENOUS
OUTPATIENT
Start: 2024-10-09

## 2024-07-18 RX ORDER — EPINEPHRINE 1 MG/ML
0.3 INJECTION, SOLUTION INTRAMUSCULAR; SUBCUTANEOUS EVERY 5 MIN PRN
Status: CANCELLED | OUTPATIENT
Start: 2024-08-28

## 2024-07-18 RX ORDER — DIPHENHYDRAMINE HYDROCHLORIDE 50 MG/ML
50 INJECTION INTRAMUSCULAR; INTRAVENOUS
Start: 2024-10-09

## 2024-07-18 RX ORDER — ALBUTEROL SULFATE 90 UG/1
1-2 AEROSOL, METERED RESPIRATORY (INHALATION)
Status: CANCELLED
Start: 2024-09-11

## 2024-07-18 RX ORDER — MEPERIDINE HYDROCHLORIDE 25 MG/ML
25 INJECTION INTRAMUSCULAR; INTRAVENOUS; SUBCUTANEOUS EVERY 30 MIN PRN
Status: CANCELLED | OUTPATIENT
Start: 2024-09-25

## 2024-07-18 RX ORDER — MEPERIDINE HYDROCHLORIDE 25 MG/ML
25 INJECTION INTRAMUSCULAR; INTRAVENOUS; SUBCUTANEOUS EVERY 30 MIN PRN
Status: CANCELLED | OUTPATIENT
Start: 2024-08-14

## 2024-07-18 RX ORDER — ALBUTEROL SULFATE 90 UG/1
1-2 AEROSOL, METERED RESPIRATORY (INHALATION)
Status: CANCELLED | OUTPATIENT
Start: 2024-08-28

## 2024-07-18 RX ORDER — MEPERIDINE HYDROCHLORIDE 25 MG/ML
25 INJECTION INTRAMUSCULAR; INTRAVENOUS; SUBCUTANEOUS EVERY 30 MIN PRN
Status: CANCELLED | OUTPATIENT
Start: 2024-08-28

## 2024-07-18 RX ORDER — ALBUTEROL SULFATE 90 UG/1
1-2 AEROSOL, METERED RESPIRATORY (INHALATION)
Status: CANCELLED
Start: 2024-08-14

## 2024-07-18 RX ORDER — DIPHENHYDRAMINE HYDROCHLORIDE 50 MG/ML
50 INJECTION INTRAMUSCULAR; INTRAVENOUS
Status: CANCELLED | OUTPATIENT
Start: 2024-08-28

## 2024-07-18 RX ORDER — ONDANSETRON 2 MG/ML
8 INJECTION INTRAMUSCULAR; INTRAVENOUS ONCE
Status: CANCELLED | OUTPATIENT
Start: 2024-09-25

## 2024-07-18 RX ORDER — LORAZEPAM 2 MG/ML
0.5 INJECTION INTRAMUSCULAR EVERY 4 HOURS PRN
OUTPATIENT
Start: 2024-10-23

## 2024-07-18 RX ORDER — MEPERIDINE HYDROCHLORIDE 25 MG/ML
25 INJECTION INTRAMUSCULAR; INTRAVENOUS; SUBCUTANEOUS EVERY 30 MIN PRN
OUTPATIENT
Start: 2024-10-09

## 2024-07-18 RX ORDER — HEPARIN SODIUM,PORCINE 10 UNIT/ML
5-20 VIAL (ML) INTRAVENOUS DAILY PRN
Status: CANCELLED | OUTPATIENT
Start: 2024-09-25

## 2024-07-18 RX ORDER — METHYLPREDNISOLONE SODIUM SUCCINATE 125 MG/2ML
125 INJECTION, POWDER, LYOPHILIZED, FOR SOLUTION INTRAMUSCULAR; INTRAVENOUS
Status: CANCELLED | OUTPATIENT
Start: 2024-09-25

## 2024-07-18 RX ORDER — DIPHENHYDRAMINE HYDROCHLORIDE 50 MG/ML
50 INJECTION INTRAMUSCULAR; INTRAVENOUS
OUTPATIENT
Start: 2024-10-23

## 2024-07-18 RX ORDER — ONDANSETRON 2 MG/ML
8 INJECTION INTRAMUSCULAR; INTRAVENOUS ONCE
Status: CANCELLED | OUTPATIENT
Start: 2024-09-11

## 2024-07-18 RX ORDER — MEPERIDINE HYDROCHLORIDE 25 MG/ML
25 INJECTION INTRAMUSCULAR; INTRAVENOUS; SUBCUTANEOUS EVERY 30 MIN PRN
OUTPATIENT
Start: 2024-10-23

## 2024-07-18 RX ORDER — DIPHENHYDRAMINE HYDROCHLORIDE 50 MG/ML
50 INJECTION INTRAMUSCULAR; INTRAVENOUS
Status: CANCELLED | OUTPATIENT
Start: 2024-09-25

## 2024-07-18 RX ORDER — ALBUTEROL SULFATE 0.83 MG/ML
2.5 SOLUTION RESPIRATORY (INHALATION)
Status: CANCELLED | OUTPATIENT
Start: 2024-08-14

## 2024-07-18 RX ORDER — ONDANSETRON 2 MG/ML
8 INJECTION INTRAMUSCULAR; INTRAVENOUS ONCE
OUTPATIENT
Start: 2024-10-09

## 2024-07-18 RX ORDER — HEPARIN SODIUM,PORCINE 10 UNIT/ML
5-20 VIAL (ML) INTRAVENOUS DAILY PRN
Status: CANCELLED | OUTPATIENT
Start: 2024-08-14

## 2024-07-18 RX ORDER — EPINEPHRINE 1 MG/ML
0.3 INJECTION, SOLUTION INTRAMUSCULAR; SUBCUTANEOUS EVERY 5 MIN PRN
Status: CANCELLED | OUTPATIENT
Start: 2024-09-11

## 2024-07-18 RX ORDER — HEPARIN SODIUM (PORCINE) LOCK FLUSH IV SOLN 100 UNIT/ML 100 UNIT/ML
5 SOLUTION INTRAVENOUS
Status: CANCELLED | OUTPATIENT
Start: 2024-09-25

## 2024-07-18 RX ORDER — HEPARIN SODIUM,PORCINE 10 UNIT/ML
5-20 VIAL (ML) INTRAVENOUS DAILY PRN
Status: CANCELLED | OUTPATIENT
Start: 2024-08-28

## 2024-07-18 RX ORDER — HEPARIN SODIUM,PORCINE 10 UNIT/ML
5-20 VIAL (ML) INTRAVENOUS DAILY PRN
Status: CANCELLED | OUTPATIENT
Start: 2024-09-11

## 2024-07-18 RX ORDER — METHYLPREDNISOLONE SODIUM SUCCINATE 125 MG/2ML
125 INJECTION, POWDER, LYOPHILIZED, FOR SOLUTION INTRAMUSCULAR; INTRAVENOUS
Status: CANCELLED | OUTPATIENT
Start: 2024-08-28

## 2024-07-18 RX ORDER — ALBUTEROL SULFATE 90 UG/1
1-2 AEROSOL, METERED RESPIRATORY (INHALATION)
OUTPATIENT
Start: 2024-10-23

## 2024-07-18 RX ORDER — EPINEPHRINE 1 MG/ML
0.3 INJECTION, SOLUTION INTRAMUSCULAR; SUBCUTANEOUS EVERY 5 MIN PRN
OUTPATIENT
Start: 2024-10-23

## 2024-07-18 RX ORDER — MEPERIDINE HYDROCHLORIDE 25 MG/ML
25 INJECTION INTRAMUSCULAR; INTRAVENOUS; SUBCUTANEOUS EVERY 30 MIN PRN
Status: CANCELLED | OUTPATIENT
Start: 2024-09-11

## 2024-07-18 RX ORDER — METHYLPREDNISOLONE SODIUM SUCCINATE 125 MG/2ML
125 INJECTION, POWDER, LYOPHILIZED, FOR SOLUTION INTRAMUSCULAR; INTRAVENOUS
OUTPATIENT
Start: 2024-10-23

## 2024-07-18 RX ORDER — METHYLPREDNISOLONE SODIUM SUCCINATE 125 MG/2ML
125 INJECTION, POWDER, LYOPHILIZED, FOR SOLUTION INTRAMUSCULAR; INTRAVENOUS
Start: 2024-10-09

## 2024-07-18 RX ORDER — HEPARIN SODIUM (PORCINE) LOCK FLUSH IV SOLN 100 UNIT/ML 100 UNIT/ML
5 SOLUTION INTRAVENOUS
Status: CANCELLED | OUTPATIENT
Start: 2024-09-11

## 2024-07-18 RX ORDER — METHYLPREDNISOLONE SODIUM SUCCINATE 125 MG/2ML
125 INJECTION, POWDER, LYOPHILIZED, FOR SOLUTION INTRAMUSCULAR; INTRAVENOUS
Status: CANCELLED
Start: 2024-08-14

## 2024-07-18 RX ORDER — EPINEPHRINE 1 MG/ML
0.3 INJECTION, SOLUTION INTRAMUSCULAR; SUBCUTANEOUS EVERY 5 MIN PRN
OUTPATIENT
Start: 2024-10-09

## 2024-07-18 RX ORDER — HEPARIN SODIUM,PORCINE 10 UNIT/ML
5-20 VIAL (ML) INTRAVENOUS DAILY PRN
OUTPATIENT
Start: 2024-10-23

## 2024-07-18 RX ORDER — METHYLPREDNISOLONE SODIUM SUCCINATE 125 MG/2ML
125 INJECTION, POWDER, LYOPHILIZED, FOR SOLUTION INTRAMUSCULAR; INTRAVENOUS
Status: CANCELLED
Start: 2024-09-11

## 2024-07-18 RX ORDER — ALBUTEROL SULFATE 0.83 MG/ML
2.5 SOLUTION RESPIRATORY (INHALATION)
OUTPATIENT
Start: 2024-10-09

## 2024-07-18 RX ORDER — HEPARIN SODIUM (PORCINE) LOCK FLUSH IV SOLN 100 UNIT/ML 100 UNIT/ML
5 SOLUTION INTRAVENOUS
Status: CANCELLED | OUTPATIENT
Start: 2024-08-14

## 2024-07-18 RX ORDER — LORAZEPAM 2 MG/ML
0.5 INJECTION INTRAMUSCULAR EVERY 4 HOURS PRN
Status: CANCELLED | OUTPATIENT
Start: 2024-09-25

## 2024-07-18 RX ORDER — LORAZEPAM 2 MG/ML
0.5 INJECTION INTRAMUSCULAR EVERY 4 HOURS PRN
OUTPATIENT
Start: 2024-10-09

## 2024-07-18 RX ORDER — ALBUTEROL SULFATE 0.83 MG/ML
2.5 SOLUTION RESPIRATORY (INHALATION)
Status: CANCELLED | OUTPATIENT
Start: 2024-09-11

## 2024-07-18 RX ORDER — ONDANSETRON 2 MG/ML
8 INJECTION INTRAMUSCULAR; INTRAVENOUS ONCE
Status: CANCELLED | OUTPATIENT
Start: 2024-08-28

## 2024-07-18 RX ORDER — DIPHENHYDRAMINE HYDROCHLORIDE 50 MG/ML
50 INJECTION INTRAMUSCULAR; INTRAVENOUS
Status: CANCELLED
Start: 2024-08-14

## 2024-07-18 RX ORDER — ONDANSETRON 2 MG/ML
8 INJECTION INTRAMUSCULAR; INTRAVENOUS ONCE
OUTPATIENT
Start: 2024-10-23

## 2024-07-18 RX ORDER — HEPARIN SODIUM (PORCINE) LOCK FLUSH IV SOLN 100 UNIT/ML 100 UNIT/ML
5 SOLUTION INTRAVENOUS
Status: CANCELLED | OUTPATIENT
Start: 2024-08-28

## 2024-07-18 RX ORDER — EPINEPHRINE 1 MG/ML
0.3 INJECTION, SOLUTION INTRAMUSCULAR; SUBCUTANEOUS EVERY 5 MIN PRN
Status: CANCELLED | OUTPATIENT
Start: 2024-09-25

## 2024-07-18 RX ORDER — ALBUTEROL SULFATE 0.83 MG/ML
2.5 SOLUTION RESPIRATORY (INHALATION)
OUTPATIENT
Start: 2024-10-23

## 2024-07-18 RX ORDER — HEPARIN SODIUM,PORCINE 10 UNIT/ML
5-20 VIAL (ML) INTRAVENOUS DAILY PRN
OUTPATIENT
Start: 2024-10-09

## 2024-07-18 RX ORDER — ALBUTEROL SULFATE 90 UG/1
1-2 AEROSOL, METERED RESPIRATORY (INHALATION)
Start: 2024-10-09

## 2024-07-18 RX ORDER — ALBUTEROL SULFATE 0.83 MG/ML
2.5 SOLUTION RESPIRATORY (INHALATION)
Status: CANCELLED | OUTPATIENT
Start: 2024-08-28

## 2024-07-18 RX ORDER — ALBUTEROL SULFATE 90 UG/1
1-2 AEROSOL, METERED RESPIRATORY (INHALATION)
Status: CANCELLED | OUTPATIENT
Start: 2024-09-25

## 2024-07-18 RX ORDER — LORAZEPAM 2 MG/ML
0.5 INJECTION INTRAMUSCULAR EVERY 4 HOURS PRN
Status: CANCELLED | OUTPATIENT
Start: 2024-08-14

## 2024-07-18 RX ORDER — ALBUTEROL SULFATE 0.83 MG/ML
2.5 SOLUTION RESPIRATORY (INHALATION)
Status: CANCELLED | OUTPATIENT
Start: 2024-09-25

## 2024-07-18 RX ORDER — DIPHENHYDRAMINE HYDROCHLORIDE 50 MG/ML
50 INJECTION INTRAMUSCULAR; INTRAVENOUS
Status: CANCELLED
Start: 2024-09-11

## 2024-07-18 RX ORDER — LORAZEPAM 2 MG/ML
0.5 INJECTION INTRAMUSCULAR EVERY 4 HOURS PRN
Status: CANCELLED | OUTPATIENT
Start: 2024-08-28

## 2024-07-18 RX ORDER — ONDANSETRON 2 MG/ML
8 INJECTION INTRAMUSCULAR; INTRAVENOUS ONCE
Status: CANCELLED | OUTPATIENT
Start: 2024-08-14

## 2024-07-18 RX ORDER — EPINEPHRINE 1 MG/ML
0.3 INJECTION, SOLUTION INTRAMUSCULAR; SUBCUTANEOUS EVERY 5 MIN PRN
Status: CANCELLED | OUTPATIENT
Start: 2024-08-14

## 2024-07-18 RX ORDER — LORAZEPAM 2 MG/ML
0.5 INJECTION INTRAMUSCULAR EVERY 4 HOURS PRN
Status: CANCELLED | OUTPATIENT
Start: 2024-09-11

## 2024-07-23 ENCOUNTER — PATIENT OUTREACH (OUTPATIENT)
Dept: ONCOLOGY | Facility: HOSPITAL | Age: 74
End: 2024-07-23
Payer: COMMERCIAL

## 2024-07-24 NOTE — TUMOR CONFERENCE
Head & Neck Tumor Conference Note   Status:  Established    Staff: Dr. Perez    Tumor Site: Nasopharynx, now recurrent in ethmoid  Tumor Pathology: EBV+ Nasopharyngeal carcinoma  Tumor Stage: [id number]  Tumor Treatment: XRT    Reason for Review: Review imaging and POC    Brief History: Kristen Chapman is a 73 year old with a history of recurrent metastatic nasopharyngeal cancer. He was diagnosed with a T4N2M0 nasopharyngeal cancer in 2020. He was treated with concurrent chemoradiation at [facility] with Dr Frias and Dr Mendoza. He received 7000 cGy from 3/2/2020 to 4/17/2020. He had 3 cycles of cisplatin/5FU. He had no PEG during treatment then had issues upon completion of treatment requiring PEG placement. He had recurrent disease in the right ethmoids, biopsied 9/16/2021. Imaging raised concerns for metastatic disease in the mediastinum and right level IB/IIA. His case was discussed at tumor board and recommended for chemotherapy. He had U/S guided 10/19/2021 showing metastatic disease. Dr Frias determined the patient was not a candidate for chemotherapy. He was treated with SBRT from 11/8/2021 to 11/17/2021 for a total of 3500 cGy. He was started on pembrolizumab 12/23/2021, discontinued after 7/1/2022. He had a CT scan 6/15/2023 which showed a 2.0 x 1.9 cm hyperenhancing area in the right level IB. He had an U/S guided FNA on 8/31/2023 which showed metastatic SCC. He had a MRI brain on 9/6/2023 which showed 2.1 x 2.2 x 2.5 cm mass in right level IB consistent with metastatic SCC. He had a PET scan on 9/11/2023 which showed 2.8 x 1.2 x 3.0 cm FDG avid right level IB without distant disease. He recently underwent re-staging imaging which showed reduction in disease. We are here today to review updated imaging and discuss plan of care.     Pertinent PMH:   Past Medical History:   Diagnosis Date    Anemia     Dysphagia     Hepatitis B chronic     Hypothyroidism     Nasopharyngeal cancer (H)     Severe protein-calorie  malnutrition (H24)     Thrombocytopenia (H24)       Smoking Hx:   Social History     Tobacco Use    Smoking status: Never    Smokeless tobacco: Never   Substance Use Topics    Alcohol use: Not Currently    Drug use: Not Currently       Imaging:     CT Soft Tissue Neck w Contrast    Narrative  EXAM: CT SOFT TISSUE NECK W CONTRAST  LOCATION: [facility]  DATE: 7/15/2024    INDICATION:  Squamous cell carcinoma of nasopharynx (H)  COMPARISON: 4/13/2024.  CONTRAST: isovue 30 56 ml  TECHNIQUE: Routine CT Soft Tissue Neck with IV contrast. Multiplanar reformats. Dose reduction techniques were used.    FINDINGS:    As compared to the prior examination the avidly enhancing recurrent mass in the right submandibular space extending along the angle of the mandible into the right parotid distribution has markedly decreased in size with only a small amount of residual component identified extending to the skin surface with ulceration along the right neck skin margin. Measurement of the lesion at similar locations previously measured up to 8 cm and now measures 3.7 cm. There is thickening of the epiglottis related to post therapeutic changes. There are some post therapeutic treatment related changes involving the mucosal spaces of the neck. No evidence of abnormal pathologic adenopathy clearly identified. Thyroid gland is diminutive. The mandible and mandibular condyles appear intact. Mucosal sinus disease involves the paranasal sinuses. Left mastoid effusion with no discrete lesion in the left nasopharynx.    Impression  IMPRESSION:  1.  Marked interval reduction of the recurrent neoplasm findings noted on the 4/13/2024 exam. Post therapeutic changes in the mucosal spaces of the neck. No adenopathy findings identified.    Narrative   EXAM: CT SOFT TISSUE NECK W CONTRAST  LOCATION: [facility]  DATE: 7/15/2024    INDICATION:  Squamous cell carcinoma of nasopharynx (H)  COMPARISON:  4/13/2024.  CONTRAST: isovue 30 56 ml  TECHNIQUE: Routine CT Soft Tissue Neck with IV contrast. Multiplanar reformats. Dose reduction techniques were used.    FINDINGS:  ...   Impression   IMPRESSION:  1.  Marked interval reduction of the recurrent neoplasm findings noted on the 4/13/2024 exam. Post therapeutic changes in the mucosal spaces of the neck. No adenopathy findings identified.       Pathology:   No new pathology     Tumor Board Recommendation:   Discussion: Imaging reviewed today. While there is a demonstrated response to treatment, there still remain many suspicious areas of concern, notably the posterolateral pharynx. There additionally appears to be a capri mass of the right parotid gland with extension to skin. Surgery would still remain quite involved despite these changes.     Plan:   - recommend PET/CT for further evaluation     Mini Mares MD  Otolaryngology- Head and Neck Surgery, PGY-3    Documentation / Disclaimer Cancer Tumor Board Note: Cancer tumor board recommendations do not override what is determined to be reasonable care and treatment, which is dependent on the circumstances of a patient's case; the patient's medical, social, and personal concerns; and the clinical judgment of the oncologist [physician].

## 2024-07-26 ENCOUNTER — TUMOR CONFERENCE (OUTPATIENT)
Dept: ONCOLOGY | Facility: CLINIC | Age: 74
End: 2024-07-26
Payer: COMMERCIAL

## 2024-07-26 ENCOUNTER — PATIENT OUTREACH (OUTPATIENT)
Dept: ONCOLOGY | Facility: HOSPITAL | Age: 74
End: 2024-07-26

## 2024-07-26 DIAGNOSIS — C11.9 SQUAMOUS CELL CARCINOMA OF NASOPHARYNX (H): Primary | ICD-10-CM

## 2024-07-26 DIAGNOSIS — C11.1 MALIGNANT NEOPLASM OF POSTERIOR WALL OF NASOPHARYNX (H): ICD-10-CM

## 2024-07-26 NOTE — PROGRESS NOTES
[facility]: Cancer Care                                                                                          Left a detailed voice message on the patient's son Tian's line per Dr. Frias:    Case was discussed again at the ENT tumor conference this morning.  They would like him to get a PET scan.  I put the order in.     If the PET scan shows limited disease surgery may be an option but it would still be relatively extensive.     I talked to Dr. Mendoza from radiation oncology the area of the tumor has received full dose previously.  More radiation would be very risky in terms of a high likelihood of causing tissue breakdown.  He is only recommending radiation if chemotherapy stops working.     Provided a call back number.       Signature:  Marjorie Briones RN

## 2024-07-26 NOTE — TUMOR CONFERENCE
Tumor conference recommendations communicated with Dr. Frias from medical oncology. Plan for PET scan for review.    Felisha Turcios BSN, RN

## 2024-07-31 ENCOUNTER — PATIENT OUTREACH (OUTPATIENT)
Dept: ONCOLOGY | Facility: HOSPITAL | Age: 74
End: 2024-07-31

## 2024-07-31 ENCOUNTER — INFUSION THERAPY VISIT (OUTPATIENT)
Dept: INFUSION THERAPY | Facility: HOSPITAL | Age: 74
End: 2024-07-31
Attending: INTERNAL MEDICINE
Payer: COMMERCIAL

## 2024-07-31 VITALS
RESPIRATION RATE: 16 BRPM | HEART RATE: 54 BPM | TEMPERATURE: 97.9 F | OXYGEN SATURATION: 100 % | DIASTOLIC BLOOD PRESSURE: 58 MMHG | SYSTOLIC BLOOD PRESSURE: 85 MMHG

## 2024-07-31 DIAGNOSIS — D64.9 ANEMIA, NORMOCYTIC NORMOCHROMIC: ICD-10-CM

## 2024-07-31 DIAGNOSIS — I95.9 HYPOTENSION, UNSPECIFIED HYPOTENSION TYPE: ICD-10-CM

## 2024-07-31 DIAGNOSIS — C11.9 SQUAMOUS CELL CARCINOMA OF NASOPHARYNX (H): Primary | ICD-10-CM

## 2024-07-31 LAB
ABO/RH(D): NORMAL
ANTIBODY SCREEN: NEGATIVE
BASOPHILS # BLD AUTO: 0 10E3/UL (ref 0–0.2)
BASOPHILS NFR BLD AUTO: 0 %
BLD PROD TYP BPU: NORMAL
BLOOD COMPONENT TYPE: NORMAL
CODING SYSTEM: NORMAL
CROSSMATCH: NORMAL
ELLIPTOCYTES BLD QL SMEAR: SLIGHT
EOSINOPHIL # BLD AUTO: 0 10E3/UL (ref 0–0.7)
EOSINOPHIL NFR BLD AUTO: 1 %
ERYTHROCYTE [DISTWIDTH] IN BLOOD BY AUTOMATED COUNT: 16.9 % (ref 10–15)
HCT VFR BLD AUTO: 24.2 % (ref 40–53)
HGB BLD-MCNC: 7.7 G/DL (ref 13.3–17.7)
IMM GRANULOCYTES # BLD: 0.1 10E3/UL
IMM GRANULOCYTES NFR BLD: 2 %
ISSUE DATE AND TIME: NORMAL
LYMPHOCYTES # BLD AUTO: 0.5 10E3/UL (ref 0.8–5.3)
LYMPHOCYTES NFR BLD AUTO: 9 %
MCH RBC QN AUTO: 27.1 PG (ref 26.5–33)
MCHC RBC AUTO-ENTMCNC: 31.8 G/DL (ref 31.5–36.5)
MCV RBC AUTO: 85 FL (ref 78–100)
MONOCYTES # BLD AUTO: 0.8 10E3/UL (ref 0–1.3)
MONOCYTES NFR BLD AUTO: 13 %
NEUTROPHILS # BLD AUTO: 4.8 10E3/UL (ref 1.6–8.3)
NEUTROPHILS NFR BLD AUTO: 76 %
NRBC # BLD AUTO: 0 10E3/UL
NRBC BLD AUTO-RTO: 0 /100
PLAT MORPH BLD: ABNORMAL
PLATELET # BLD AUTO: 91 10E3/UL (ref 150–450)
RBC # BLD AUTO: 2.84 10E6/UL (ref 4.4–5.9)
RBC MORPH BLD: ABNORMAL
SPECIMEN EXPIRATION DATE: NORMAL
UNIT ABO/RH: NORMAL
UNIT NUMBER: NORMAL
UNIT STATUS: NORMAL
UNIT TYPE ISBT: 7300
WBC # BLD AUTO: 6.3 10E3/UL (ref 4–11)

## 2024-07-31 PROCEDURE — 36430 TRANSFUSION BLD/BLD COMPNT: CPT

## 2024-07-31 PROCEDURE — P9016 RBC LEUKOCYTES REDUCED: HCPCS | Performed by: INTERNAL MEDICINE

## 2024-07-31 PROCEDURE — 96375 TX/PRO/DX INJ NEW DRUG ADDON: CPT

## 2024-07-31 PROCEDURE — 86923 COMPATIBILITY TEST ELECTRIC: CPT | Performed by: INTERNAL MEDICINE

## 2024-07-31 PROCEDURE — 85025 COMPLETE CBC W/AUTO DIFF WBC: CPT | Performed by: INTERNAL MEDICINE

## 2024-07-31 PROCEDURE — 96413 CHEMO IV INFUSION 1 HR: CPT

## 2024-07-31 PROCEDURE — 250N000011 HC RX IP 250 OP 636: Performed by: INTERNAL MEDICINE

## 2024-07-31 PROCEDURE — 36591 DRAW BLOOD OFF VENOUS DEVICE: CPT | Performed by: INTERNAL MEDICINE

## 2024-07-31 PROCEDURE — 86900 BLOOD TYPING SEROLOGIC ABO: CPT | Performed by: NURSE PRACTITIONER

## 2024-07-31 PROCEDURE — 258N000003 HC RX IP 258 OP 636: Performed by: NURSE PRACTITIONER

## 2024-07-31 PROCEDURE — 258N000003 HC RX IP 258 OP 636: Performed by: INTERNAL MEDICINE

## 2024-07-31 RX ORDER — EPINEPHRINE 1 MG/ML
0.3 INJECTION, SOLUTION INTRAMUSCULAR; SUBCUTANEOUS EVERY 5 MIN PRN
Status: CANCELLED | OUTPATIENT
Start: 2024-07-31

## 2024-07-31 RX ORDER — HEPARIN SODIUM (PORCINE) LOCK FLUSH IV SOLN 100 UNIT/ML 100 UNIT/ML
5 SOLUTION INTRAVENOUS
OUTPATIENT
Start: 2024-07-31

## 2024-07-31 RX ORDER — HEPARIN SODIUM,PORCINE 10 UNIT/ML
5-20 VIAL (ML) INTRAVENOUS DAILY PRN
Status: CANCELLED | OUTPATIENT
Start: 2024-07-31

## 2024-07-31 RX ORDER — HEPARIN SODIUM (PORCINE) LOCK FLUSH IV SOLN 100 UNIT/ML 100 UNIT/ML
5 SOLUTION INTRAVENOUS
Status: CANCELLED | OUTPATIENT
Start: 2024-07-31

## 2024-07-31 RX ORDER — ONDANSETRON 2 MG/ML
8 INJECTION INTRAMUSCULAR; INTRAVENOUS ONCE
Status: COMPLETED | OUTPATIENT
Start: 2024-07-31 | End: 2024-07-31

## 2024-07-31 RX ORDER — DIPHENHYDRAMINE HYDROCHLORIDE 50 MG/ML
50 INJECTION INTRAMUSCULAR; INTRAVENOUS
Status: CANCELLED
Start: 2024-07-31

## 2024-07-31 RX ORDER — HEPARIN SODIUM (PORCINE) LOCK FLUSH IV SOLN 100 UNIT/ML 100 UNIT/ML
5 SOLUTION INTRAVENOUS
Status: DISCONTINUED | OUTPATIENT
Start: 2024-07-31 | End: 2024-07-31 | Stop reason: HOSPADM

## 2024-07-31 RX ADMIN — SODIUM CHLORIDE 500 ML: 9 INJECTION, SOLUTION INTRAVENOUS at 08:34

## 2024-07-31 RX ADMIN — Medication 5 ML: at 14:15

## 2024-07-31 RX ADMIN — GEMCITABINE 1200 MG: 38 INJECTION, SOLUTION INTRAVENOUS at 10:51

## 2024-07-31 RX ADMIN — ONDANSETRON 8 MG: 2 INJECTION INTRAMUSCULAR; INTRAVENOUS at 09:59

## 2024-07-31 ASSESSMENT — PAIN SCALES - GENERAL: PAINLEVEL: NO PAIN (0)

## 2024-07-31 NOTE — PROGRESS NOTES
Pt arrived ambulatory to clinic for Cycle # 4 Day # 15 of his chemotherapy regimen.  Port was accessed using aseptic technique without difficulties with excellent.  Pt's BP was low this morning, but pt was asymptomatic with this.  BP was 77/55, so bolus was given to hydrate pt.  Labs were reviewed, pt met parameters for treatment.  HGB today was 7.7, per pt's transfusion parameters, 1 unit of PRBCs were ordered.  Administered premedications and chemotherapy per MD order.  Pt tolerated infusion well, no s/s of hypersensitivity reaction.  Transfused 1 unit of PRBC's per MD orders.  Reported off to Julienne to finish out transfusion.

## 2024-07-31 NOTE — PROGRESS NOTES
Kristen tolerated blood transfusion w/o sxs transfusion reaction, line flushed with NS until clear. VSS. Port flushed/heparinized before needle removed.  AVS given to elenita Peterson dc'd A&Ox4 ambulatory and stable, aware of next appt  Evan

## 2024-07-31 NOTE — PROGRESS NOTES
[facility]: Cancer Care                                                                                            Situation: Patient chart reviewed by care coordinator.    Plan/Recommendations:     Per Dr. Frias, case was discussed again at the ENT tumor conference this morning.  They would like him to get a PET scan.  I put the order in.     If the PET scan shows limited disease surgery may be an option but it would still be relatively extensive.     Dr. Frias talked to Dr. Mendoza from radiation oncology the area of the tumor has received full dose previously.  More radiation would be very risky in terms of a high likelihood of causing tissue breakdown.  He is only recommending radiation if chemotherapy stops working.     Signature:  Marjorie Briones RN

## 2024-08-12 ENCOUNTER — HOSPITAL ENCOUNTER (OUTPATIENT)
Dept: PET IMAGING | Facility: HOSPITAL | Age: 74
Discharge: HOME | End: 2024-08-12
Attending: INTERNAL MEDICINE | Admitting: INTERNAL MEDICINE
Payer: COMMERCIAL

## 2024-08-12 DIAGNOSIS — C11.1 MALIGNANT NEOPLASM OF POSTERIOR WALL OF NASOPHARYNX (H): ICD-10-CM

## 2024-08-12 DIAGNOSIS — C11.9 SQUAMOUS CELL CARCINOMA OF NASOPHARYNX (H): ICD-10-CM

## 2024-08-12 LAB — GLUCOSE BLDC GLUCOMTR-MCNC: 105 MG/DL (ref 70–99)

## 2024-08-12 PROCEDURE — 250N000011 HC RX IP 250 OP 636: Performed by: INTERNAL MEDICINE

## 2024-08-12 PROCEDURE — 343N000001 HC RX 343: Performed by: INTERNAL MEDICINE

## 2024-08-12 PROCEDURE — 78816 PET IMAGE W/CT FULL BODY: CPT | Mod: PS

## 2024-08-12 PROCEDURE — 82962 GLUCOSE BLOOD TEST: CPT

## 2024-08-12 PROCEDURE — A9552 F18 FDG: HCPCS | Performed by: INTERNAL MEDICINE

## 2024-08-12 RX ORDER — FLUDEOXYGLUCOSE F 18 200 MCI/ML
7-18 INJECTION, SOLUTION INTRAVENOUS ONCE
Status: COMPLETED | OUTPATIENT
Start: 2024-08-12 | End: 2024-08-12

## 2024-08-12 RX ORDER — HEPARIN SODIUM (PORCINE) LOCK FLUSH IV SOLN 100 UNIT/ML 100 UNIT/ML
5 SOLUTION INTRAVENOUS ONCE
Status: COMPLETED | OUTPATIENT
Start: 2024-08-12 | End: 2024-08-12

## 2024-08-12 RX ADMIN — Medication 5 ML: at 13:35

## 2024-08-12 RX ADMIN — FLUDEOXYGLUCOSE F 18 10.2 MILLICURIE: 200 INJECTION, SOLUTION INTRAVENOUS at 13:34

## 2024-08-14 ENCOUNTER — INFUSION THERAPY VISIT (OUTPATIENT)
Dept: INFUSION THERAPY | Facility: HOSPITAL | Age: 74
End: 2024-08-14
Attending: INTERNAL MEDICINE
Payer: COMMERCIAL

## 2024-08-14 VITALS
SYSTOLIC BLOOD PRESSURE: 86 MMHG | RESPIRATION RATE: 16 BRPM | HEART RATE: 72 BPM | TEMPERATURE: 99.2 F | DIASTOLIC BLOOD PRESSURE: 53 MMHG | OXYGEN SATURATION: 98 %

## 2024-08-14 VITALS
RESPIRATION RATE: 18 BRPM | HEART RATE: 64 BPM | OXYGEN SATURATION: 98 % | SYSTOLIC BLOOD PRESSURE: 87 MMHG | DIASTOLIC BLOOD PRESSURE: 52 MMHG | TEMPERATURE: 98.8 F

## 2024-08-14 DIAGNOSIS — I95.9 HYPOTENSION, UNSPECIFIED HYPOTENSION TYPE: Primary | ICD-10-CM

## 2024-08-14 DIAGNOSIS — C11.9 SQUAMOUS CELL CARCINOMA OF NASOPHARYNX (H): Primary | ICD-10-CM

## 2024-08-14 DIAGNOSIS — E03.9 HYPOTHYROIDISM: ICD-10-CM

## 2024-08-14 DIAGNOSIS — C11.9 SQUAMOUS CELL CARCINOMA OF NASOPHARYNX (H): ICD-10-CM

## 2024-08-14 DIAGNOSIS — D64.9 ANEMIA, NORMOCYTIC NORMOCHROMIC: ICD-10-CM

## 2024-08-14 LAB
ABO/RH(D): NORMAL
ALBUMIN SERPL BCG-MCNC: 2.8 G/DL (ref 3.5–5.2)
ALP SERPL-CCNC: 117 U/L (ref 40–150)
ALT SERPL W P-5'-P-CCNC: 31 U/L (ref 0–70)
ANION GAP SERPL CALCULATED.3IONS-SCNC: 10 MMOL/L (ref 7–15)
ANTIBODY SCREEN: NEGATIVE
AST SERPL W P-5'-P-CCNC: 27 U/L (ref 0–45)
BASOPHILS # BLD AUTO: 0 10E3/UL (ref 0–0.2)
BASOPHILS NFR BLD AUTO: 0 %
BILIRUB SERPL-MCNC: 0.6 MG/DL
BLD PROD TYP BPU: NORMAL
BLOOD COMPONENT TYPE: NORMAL
BUN SERPL-MCNC: 34 MG/DL (ref 8–23)
CALCIUM SERPL-MCNC: 8.8 MG/DL (ref 8.8–10.4)
CHLORIDE SERPL-SCNC: 101 MMOL/L (ref 98–107)
CODING SYSTEM: NORMAL
CREAT SERPL-MCNC: 1.94 MG/DL (ref 0.67–1.17)
CROSSMATCH: NORMAL
EGFRCR SERPLBLD CKD-EPI 2021: 36 ML/MIN/1.73M2
EOSINOPHIL # BLD AUTO: 0.1 10E3/UL (ref 0–0.7)
EOSINOPHIL NFR BLD AUTO: 1 %
ERYTHROCYTE [DISTWIDTH] IN BLOOD BY AUTOMATED COUNT: 17 % (ref 10–15)
GLUCOSE SERPL-MCNC: 102 MG/DL (ref 70–99)
HCO3 SERPL-SCNC: 21 MMOL/L (ref 22–29)
HCT VFR BLD AUTO: 25.5 % (ref 40–53)
HGB BLD-MCNC: 7.9 G/DL (ref 13.3–17.7)
IMM GRANULOCYTES # BLD: 0.1 10E3/UL
IMM GRANULOCYTES NFR BLD: 1 %
ISSUE DATE AND TIME: NORMAL
LYMPHOCYTES # BLD AUTO: 0.3 10E3/UL (ref 0.8–5.3)
LYMPHOCYTES NFR BLD AUTO: 6 %
MCH RBC QN AUTO: 26.5 PG (ref 26.5–33)
MCHC RBC AUTO-ENTMCNC: 31 G/DL (ref 31.5–36.5)
MCV RBC AUTO: 86 FL (ref 78–100)
MONOCYTES # BLD AUTO: 0.8 10E3/UL (ref 0–1.3)
MONOCYTES NFR BLD AUTO: 14 %
NEUTROPHILS # BLD AUTO: 4.2 10E3/UL (ref 1.6–8.3)
NEUTROPHILS NFR BLD AUTO: 77 %
NRBC # BLD AUTO: 0 10E3/UL
NRBC BLD AUTO-RTO: 0 /100
PLATELET # BLD AUTO: 100 10E3/UL (ref 150–450)
POTASSIUM SERPL-SCNC: 4.8 MMOL/L (ref 3.4–5.3)
PROT SERPL-MCNC: 5.8 G/DL (ref 6.4–8.3)
RBC # BLD AUTO: 2.98 10E6/UL (ref 4.4–5.9)
SODIUM SERPL-SCNC: 132 MMOL/L (ref 135–145)
SPECIMEN EXPIRATION DATE: NORMAL
TSH SERPL DL<=0.005 MIU/L-ACNC: 0.74 UIU/ML (ref 0.3–4.2)
UNIT ABO/RH: NORMAL
UNIT NUMBER: NORMAL
UNIT STATUS: NORMAL
UNIT TYPE ISBT: 7300
WBC # BLD AUTO: 5.4 10E3/UL (ref 4–11)

## 2024-08-14 PROCEDURE — 258N000003 HC RX IP 258 OP 636: Performed by: NURSE PRACTITIONER

## 2024-08-14 PROCEDURE — 96413 CHEMO IV INFUSION 1 HR: CPT

## 2024-08-14 PROCEDURE — 258N000003 HC RX IP 258 OP 636: Performed by: INTERNAL MEDICINE

## 2024-08-14 PROCEDURE — 36591 DRAW BLOOD OFF VENOUS DEVICE: CPT | Performed by: INTERNAL MEDICINE

## 2024-08-14 PROCEDURE — 80053 COMPREHEN METABOLIC PANEL: CPT | Performed by: INTERNAL MEDICINE

## 2024-08-14 PROCEDURE — 250N000011 HC RX IP 250 OP 636: Performed by: INTERNAL MEDICINE

## 2024-08-14 PROCEDURE — 86923 COMPATIBILITY TEST ELECTRIC: CPT | Performed by: INTERNAL MEDICINE

## 2024-08-14 PROCEDURE — 86900 BLOOD TYPING SEROLOGIC ABO: CPT | Performed by: NURSE PRACTITIONER

## 2024-08-14 PROCEDURE — 36430 TRANSFUSION BLD/BLD COMPNT: CPT

## 2024-08-14 PROCEDURE — 84443 ASSAY THYROID STIM HORMONE: CPT | Performed by: INTERNAL MEDICINE

## 2024-08-14 PROCEDURE — 85025 COMPLETE CBC W/AUTO DIFF WBC: CPT | Performed by: INTERNAL MEDICINE

## 2024-08-14 PROCEDURE — 96375 TX/PRO/DX INJ NEW DRUG ADDON: CPT

## 2024-08-14 PROCEDURE — P9016 RBC LEUKOCYTES REDUCED: HCPCS | Performed by: INTERNAL MEDICINE

## 2024-08-14 RX ORDER — ONDANSETRON 2 MG/ML
8 INJECTION INTRAMUSCULAR; INTRAVENOUS ONCE
Status: COMPLETED | OUTPATIENT
Start: 2024-08-14 | End: 2024-08-14

## 2024-08-14 RX ORDER — HEPARIN SODIUM,PORCINE 10 UNIT/ML
5-20 VIAL (ML) INTRAVENOUS DAILY PRN
Status: CANCELLED | OUTPATIENT
Start: 2024-08-14

## 2024-08-14 RX ORDER — HEPARIN SODIUM (PORCINE) LOCK FLUSH IV SOLN 100 UNIT/ML 100 UNIT/ML
5 SOLUTION INTRAVENOUS
OUTPATIENT
Start: 2024-08-14

## 2024-08-14 RX ORDER — HEPARIN SODIUM (PORCINE) LOCK FLUSH IV SOLN 100 UNIT/ML 100 UNIT/ML
5 SOLUTION INTRAVENOUS
Status: DISCONTINUED | OUTPATIENT
Start: 2024-08-14 | End: 2024-08-14 | Stop reason: HOSPADM

## 2024-08-14 RX ORDER — EPINEPHRINE 1 MG/ML
0.3 INJECTION, SOLUTION INTRAMUSCULAR; SUBCUTANEOUS EVERY 5 MIN PRN
Status: CANCELLED | OUTPATIENT
Start: 2024-08-14

## 2024-08-14 RX ORDER — HEPARIN SODIUM,PORCINE 10 UNIT/ML
5-20 VIAL (ML) INTRAVENOUS DAILY PRN
OUTPATIENT
Start: 2024-08-14

## 2024-08-14 RX ORDER — HEPARIN SODIUM (PORCINE) LOCK FLUSH IV SOLN 100 UNIT/ML 100 UNIT/ML
5 SOLUTION INTRAVENOUS
Status: CANCELLED | OUTPATIENT
Start: 2024-08-14

## 2024-08-14 RX ORDER — DIPHENHYDRAMINE HYDROCHLORIDE 50 MG/ML
50 INJECTION INTRAMUSCULAR; INTRAVENOUS
Status: CANCELLED
Start: 2024-08-14

## 2024-08-14 RX ADMIN — ONDANSETRON 8 MG: 2 INJECTION INTRAMUSCULAR; INTRAVENOUS at 09:12

## 2024-08-14 RX ADMIN — SODIUM CHLORIDE 500 ML: 9 INJECTION, SOLUTION INTRAVENOUS at 09:12

## 2024-08-14 RX ADMIN — GEMCITABINE 1200 MG: 38 INJECTION, SOLUTION INTRAVENOUS at 09:27

## 2024-08-14 RX ADMIN — Medication 5 ML: at 12:06

## 2024-08-14 NOTE — PROGRESS NOTES
Infusion Nursing Note:  Kristen Chapman presents today for cycle 5 day 1 gemcitabine and blood transfusion.    Patient seen by provider today: No   present during visit today: Not applicable.    Note: Kristen arrived ambulatory and in stable condition. Reports some fatigue. His BP is noted to be hypotensive. He denies any dizziness. Port accessed using sterile technique, good blood return noted, and labs collected. He was premedicated and treatment administered per orders. His Hgb was 7.9 today, so he qualifies for 1 unit PRBCs. 1 unit transfused without issue.      Intravenous Access:  Labs drawn without difficulty.  Implanted Port.    Treatment Conditions:  Lab Results   Component Value Date    HGB 7.9 (L) 08/14/2024    WBC 5.4 08/14/2024    ANEUTAUTO 4.2 08/14/2024     (L) 08/14/2024        Lab Results   Component Value Date     (L) 08/14/2024    POTASSIUM 4.8 08/14/2024    MAG 2.1 03/29/2024    CR 1.94 (H) 08/14/2024    GUANAKITO 8.8 08/14/2024    BILITOTAL 0.6 08/14/2024    ALBUMIN 2.8 (L) 08/14/2024    ALT 31 08/14/2024    AST 27 08/14/2024       Results reviewed, labs MET treatment parameters, ok to proceed with treatment.      Post Infusion Assessment:  Patient tolerated infusion without incident.  Blood return noted pre and post infusion.  Site patent and intact, free from redness, edema or discomfort.  No evidence of extravasations.  Access discontinued per protocol.       Discharge Plan:   Patient and/or family verbalized understanding of discharge instructions and all questions answered.  AVS to patient via [employer].  Patient will return 8/28 for next appointment.   Patient discharged in stable condition accompanied by: son.  Departure Mode: Ambulatory.      Sandy Alcantar RN

## 2024-08-15 NOTE — TUMOR CONFERENCE
Head & Neck Tumor Conference Note   Status: Established    Staff: Dr. Perez    Tumor Site: Nasopharynx, now recurrent in ethmoid  Tumor Pathology: EBV+ Nasopharyngeal carcinoma  Tumor Stage: [id number]  Tumor Treatment:   - Concurrent CXRT: weekly cisplatin 3/3/2020-3/31/2020, 7/1/2020 (held due to thrombocytopenia and renal insufficiency). Radiation: 7000 cGy in 35 fractions given from 3/2- 4/17/2020  - recurrence: 3500 cGy in 5 fractions given to right ethmoid tumor 11/8-11/17/2021. Pembrolizumab 12/23/21 (held after 7/1/2022 dose)    - recurrence 2: cetuximab initiated 2/2/24. Added carboplatin 3/29/24. Single agent gemcitabine initiated 4/23/24      Reason for Review: Review imaging and POC    Brief History: Kristen Chapman is a 73 year old with a history of recurrent metastatic nasopharyngeal cancer. He was diagnosed with a T4N2M0 nasopharyngeal cancer in 2020. He was treated with concurrent chemoradiation at [facility] with Dr Frias and Dr Mendoza. He received 7000 cGy from 3/2/2020 to 4/17/2020. He had 3 cycles of cisplatin/5FU. He had no PEG during treatment then had issues upon completion of treatment requiring PEG placement. He had recurrent disease in the right ethmoids, biopsied 9/16/2021. Imaging raised concerns for metastatic disease in the mediastinum and right level IB/IIA. His case was discussed at tumor board and recommended for chemotherapy. He had U/S guided 10/19/2021 showing metastatic disease. Dr Frias determined the patient was not a candidate for chemotherapy. He was treated with SBRT from 11/8/2021 to 11/17/2021 for a total of 3500 cGy. He was started on pembrolizumab 12/23/2021, discontinued after 7/1/2022. He had a CT scan 6/15/2023 which showed a 2.0 x 1.9 cm hyperenhancing area in the right level IB. He had an U/S guided FNA on 8/31/2023 which showed metastatic SCC. He had a MRI brain on 9/6/2023 which showed 2.1 x 2.2 x 2.5 cm mass in right level IB consistent with metastatic SCC. He had a PET  scan on 9/11/2023 which showed 2.8 x 1.2 x 3.0 cm FDG avid right level IB without distant disease. He recently underwent re-staging imaging with CT neck on 7/15/24  which showed response to treatment with reduction in disease, however there were still many suspicious areas of concern. It was recommended he undergo PET/CT for further evaluation; this was completed on 8/12/24 . We are here today to review updated imaging and discuss plan of care.       Pertinent PMH:   Past Medical History:   Diagnosis Date    Anemia     Dysphagia     Hepatitis B chronic     Hypothyroidism     Nasopharyngeal cancer (H)     Severe protein-calorie malnutrition (H24)     Thrombocytopenia (H24)       Smoking Hx:   Social History     Tobacco Use    Smoking status: Never    Smokeless tobacco: Never   Substance Use Topics    Alcohol use: Not Currently    Drug use: Not Currently       Imaging:     PET Oncology Whole Body    Narrative  EXAM: PET ONCOLOGY WHOLE BODY  LOCATION: [facility]  DATE: 8/12/2024    INDICATION: Subsequent treatment strategy for malignant neoplasm of posterior wall of nasopharynx. Prior radiation to the head and neck. Interval chemotherapy.  COMPARISON: FDG PET/CT 1/31/2024, CT 7/15/2024, CT 4/13/2024  CONTRAST: None  TECHNIQUE: Serum glucose level 105 mg/dL. One hour post intravenous administration of 10.20 mCi F-18 FDG, PET imaging was performed from the skull vertex to feet, utilizing attenuation correction with concurrent axial CT and PET/CT image fusion. Dose  reduction techniques were used.    PET/CT FINDINGS: Technical note is made of prior altered biodistribution typical of a nonfasting state which limits accuracy of SUV max comparison between the current and prior exams.    Interval decreased size and FDG uptake within centered on the right submandibular space extending along the of the angle of the mandible into the right buccal space (axial fused image 300), right submental space  (axial fused image 291) and superficial  and deep lobes of the right parotid gland axial fused images 295-301). Currently the largest portion of this mass measures 2.1 x 1.9 cm with SUV max 11.6, previously 4.5 x 3.3 cm with SUV max 13.1. Similar erosive changes involving the undersurface of  the mandible however without FDG uptake.    New asymmetric focal FDG uptake involving the right aspect of the vallecula (SUV max 5.3) which warrants direct inspection.    New moderate FDG uptake subcentimeter right hilar lymph nodes (SUV max 4.3), possibly inflammatory but warranting attention on follow-up.    FDG avid mucosal thickening throughout the paranasal sinuses with osteitis, likely chronic. Postsurgical changes involving the right ethmoid sinus. Inflammatory pattern of FDG uptake associated with fibroatelectasis in the lung bases. Diffuse  esophagitis. Prior FDG uptake associated with the harry of the diaphragm has resolved.    CT FINDINGS: Mild senescent intracranial changes. Posttreatment changes in the neck with associated matted soft tissue thickening. Left chest wall port catheter with tip near the SVC/RA junction. Enlargement of the ascending aorta up to 4.5 cm.  Hypoattenuation of the cardiac blood pool which can be seen with anemia. Stable size of spiculated pulmonary nodule in the right upper lobe posteriorly measuring 6 mm tethering the visceral pleura (series 4, image 162). Calcified granuloma left lower  lobe. Cirrhotic liver morphology. Splenomegaly. Upper abdominal varices. Small volume abdominopelvic ascites. Peritoneal drain with tip in the left lower quadrant. Hysterectomy. Multilevel degenerative changes in the spine. The lower extremities are  unremarkable.    Impression  IMPRESSION:  1.  Interval decreased size and FDG uptake within the mass centered on the right submandibular space extending into the right buccal space, right submental space and superficial and deep lobes of the right parotid  gland.  2.  New asymmetric focal FDG uptake involving the right aspect of the vallecula warranting direct inspection.  3.  Similar erosive changes involving the undersurface of the mandible however without FDG uptake.  4.  Stable size of a 6 mm spiculated nodule in the right upper lobe without FDG uptake but below PET resolution.  5.  New moderate FDG uptake subcentimeter right hilar lymph nodes, possibly inflammatory but warranting attention on follow-up.        Pathology:   No new pathology    Tumor Board Recommendation:   Discussion: Imaging reviewed today. Updated PET showing FDG avid mass of mandible and within right parotid. CT shows parotid mass with extension into deep lobe. Second mass underneath mandible, appears infiltrative and poorly defined. Inflammatory nodes in mediastinum but no distant mets. In agreement that surgery would involve a large resection, favor non-surgical management at this time.      Plan:   - continue with systemic therapy     Mini Mares MD  Otolaryngology- Head and Neck Surgery, PGY-3    Documentation / Disclaimer Cancer Tumor Board Note: Cancer tumor board recommendations do not override what is determined to be reasonable care and treatment, which is dependent on the circumstances of a patient's case; the patient's medical, social, and personal concerns; and the clinical judgment of the oncologist [physician].

## 2024-08-16 ENCOUNTER — TUMOR CONFERENCE (OUTPATIENT)
Dept: ONCOLOGY | Facility: CLINIC | Age: 74
End: 2024-08-16
Payer: COMMERCIAL

## 2024-08-16 NOTE — TUMOR CONFERENCE
Tumor conference recommendations will be reviewed with medical oncology team. Surgery is not recommended at this time.    Felisha BAZANN, RN

## 2024-08-28 ENCOUNTER — INFUSION THERAPY VISIT (OUTPATIENT)
Dept: INFUSION THERAPY | Facility: HOSPITAL | Age: 74
End: 2024-08-28
Attending: INTERNAL MEDICINE
Payer: COMMERCIAL

## 2024-08-28 VITALS
OXYGEN SATURATION: 100 % | DIASTOLIC BLOOD PRESSURE: 51 MMHG | SYSTOLIC BLOOD PRESSURE: 80 MMHG | TEMPERATURE: 98.1 F | RESPIRATION RATE: 16 BRPM | HEART RATE: 56 BPM

## 2024-08-28 DIAGNOSIS — C11.9 SQUAMOUS CELL CARCINOMA OF NASOPHARYNX (H): Primary | ICD-10-CM

## 2024-08-28 LAB
BASOPHILS # BLD AUTO: 0 10E3/UL (ref 0–0.2)
BASOPHILS NFR BLD AUTO: 1 %
EOSINOPHIL # BLD AUTO: 0.1 10E3/UL (ref 0–0.7)
EOSINOPHIL NFR BLD AUTO: 2 %
ERYTHROCYTE [DISTWIDTH] IN BLOOD BY AUTOMATED COUNT: 16.8 % (ref 10–15)
HCT VFR BLD AUTO: 29 % (ref 40–53)
HGB BLD-MCNC: 9.4 G/DL (ref 13.3–17.7)
IMM GRANULOCYTES # BLD: 0.1 10E3/UL
IMM GRANULOCYTES NFR BLD: 1 %
LYMPHOCYTES # BLD AUTO: 0.5 10E3/UL (ref 0.8–5.3)
LYMPHOCYTES NFR BLD AUTO: 12 %
MCH RBC QN AUTO: 27 PG (ref 26.5–33)
MCHC RBC AUTO-ENTMCNC: 32.4 G/DL (ref 31.5–36.5)
MCV RBC AUTO: 83 FL (ref 78–100)
MONOCYTES # BLD AUTO: 0.8 10E3/UL (ref 0–1.3)
MONOCYTES NFR BLD AUTO: 19 %
NEUTROPHILS # BLD AUTO: 2.7 10E3/UL (ref 1.6–8.3)
NEUTROPHILS NFR BLD AUTO: 65 %
NRBC # BLD AUTO: 0 10E3/UL
NRBC BLD AUTO-RTO: 1 /100
PLATELET # BLD AUTO: 70 10E3/UL (ref 150–450)
RBC # BLD AUTO: 3.48 10E6/UL (ref 4.4–5.9)
WBC # BLD AUTO: 4.2 10E3/UL (ref 4–11)

## 2024-08-28 PROCEDURE — 96375 TX/PRO/DX INJ NEW DRUG ADDON: CPT

## 2024-08-28 PROCEDURE — 258N000003 HC RX IP 258 OP 636: Performed by: INTERNAL MEDICINE

## 2024-08-28 PROCEDURE — 36591 DRAW BLOOD OFF VENOUS DEVICE: CPT | Performed by: INTERNAL MEDICINE

## 2024-08-28 PROCEDURE — 96361 HYDRATE IV INFUSION ADD-ON: CPT

## 2024-08-28 PROCEDURE — 250N000011 HC RX IP 250 OP 636: Performed by: INTERNAL MEDICINE

## 2024-08-28 PROCEDURE — 96413 CHEMO IV INFUSION 1 HR: CPT

## 2024-08-28 PROCEDURE — 85025 COMPLETE CBC W/AUTO DIFF WBC: CPT | Performed by: INTERNAL MEDICINE

## 2024-08-28 RX ORDER — DIPHENHYDRAMINE HYDROCHLORIDE 50 MG/ML
50 INJECTION INTRAMUSCULAR; INTRAVENOUS
Status: DISCONTINUED | OUTPATIENT
Start: 2024-08-28 | End: 2024-08-28 | Stop reason: HOSPADM

## 2024-08-28 RX ORDER — ALBUTEROL SULFATE 90 UG/1
1-2 AEROSOL, METERED RESPIRATORY (INHALATION)
Status: DISCONTINUED | OUTPATIENT
Start: 2024-08-28 | End: 2024-08-28 | Stop reason: HOSPADM

## 2024-08-28 RX ORDER — MEPERIDINE HYDROCHLORIDE 25 MG/ML
25 INJECTION INTRAMUSCULAR; INTRAVENOUS; SUBCUTANEOUS EVERY 30 MIN PRN
Status: DISCONTINUED | OUTPATIENT
Start: 2024-08-28 | End: 2024-08-28 | Stop reason: HOSPADM

## 2024-08-28 RX ORDER — EPINEPHRINE 1 MG/ML
0.3 INJECTION, SOLUTION INTRAMUSCULAR; SUBCUTANEOUS EVERY 5 MIN PRN
Status: DISCONTINUED | OUTPATIENT
Start: 2024-08-28 | End: 2024-08-28 | Stop reason: HOSPADM

## 2024-08-28 RX ORDER — ALBUTEROL SULFATE 0.83 MG/ML
2.5 SOLUTION RESPIRATORY (INHALATION)
Status: DISCONTINUED | OUTPATIENT
Start: 2024-08-28 | End: 2024-08-28 | Stop reason: HOSPADM

## 2024-08-28 RX ORDER — HEPARIN SODIUM (PORCINE) LOCK FLUSH IV SOLN 100 UNIT/ML 100 UNIT/ML
5 SOLUTION INTRAVENOUS
Status: DISCONTINUED | OUTPATIENT
Start: 2024-08-28 | End: 2024-08-28 | Stop reason: HOSPADM

## 2024-08-28 RX ORDER — METHYLPREDNISOLONE SODIUM SUCCINATE 125 MG/2ML
125 INJECTION, POWDER, LYOPHILIZED, FOR SOLUTION INTRAMUSCULAR; INTRAVENOUS
Status: DISCONTINUED | OUTPATIENT
Start: 2024-08-28 | End: 2024-08-28 | Stop reason: HOSPADM

## 2024-08-28 RX ORDER — ONDANSETRON 2 MG/ML
8 INJECTION INTRAMUSCULAR; INTRAVENOUS ONCE
Status: COMPLETED | OUTPATIENT
Start: 2024-08-28 | End: 2024-08-28

## 2024-08-28 RX ADMIN — ONDANSETRON 8 MG: 2 INJECTION INTRAMUSCULAR; INTRAVENOUS at 09:27

## 2024-08-28 RX ADMIN — SODIUM CHLORIDE 250 ML: 9 INJECTION, SOLUTION INTRAVENOUS at 09:30

## 2024-08-28 RX ADMIN — GEMCITABINE 1200 MG: 38 INJECTION, SOLUTION INTRAVENOUS at 10:21

## 2024-08-28 RX ADMIN — Medication 5 ML: at 10:51

## 2024-08-28 ASSESSMENT — PAIN SCALES - GENERAL: PAINLEVEL: NO PAIN (0)

## 2024-08-28 NOTE — PROGRESS NOTES
Infusion Nursing Note:  Kristen Chapman presents today for C5D15.    Patient seen by provider today: No   present during visit today: Not Applicable.    Note: Kristen arrived ambulatory and in stable condition. Reports some fatigue. His BP is noted to be hypotensive. He reports that this is his normal. He denies any dizziness. Port accessed using sterile technique, good blood return noted, and labs collected. He was premedicated and treatment administered per orders. Will return 9/11.    Access:  Implanted port    Treatment Conditions:  Lab Results   Component Value Date    HGB 9.4 (L) 08/28/2024    WBC 4.2 08/28/2024    ANEUTAUTO 2.7 08/28/2024    PLT 70 (L) 08/28/2024        Results reviewed, labs did NOT meet treatment parameters: Dr. Frias contacted and ok'd pt for treatment.      Post Infusion Assessment:  Patient tolerated infusion without incident.  Blood return noted pre and post infusion.  No evidence of extravasations.  Access discontinued per protocol.       Discharge Plan:   Discharge instructions reviewed with: Patient.  Patient and/or family verbalized understanding of discharge instructions and all questions answered.  Patient discharged in stable condition accompanied by: son.  Departure Mode: Ambulatory.      Julienne Robins RN

## 2024-09-10 ENCOUNTER — OFFICE VISIT (OUTPATIENT)
Dept: RADIATION ONCOLOGY | Facility: HOSPITAL | Age: 74
End: 2024-09-10
Attending: FAMILY MEDICINE
Payer: COMMERCIAL

## 2024-09-10 VITALS
DIASTOLIC BLOOD PRESSURE: 55 MMHG | HEART RATE: 67 BPM | SYSTOLIC BLOOD PRESSURE: 83 MMHG | TEMPERATURE: 98.6 F | OXYGEN SATURATION: 99 % | RESPIRATION RATE: 16 BRPM

## 2024-09-10 DIAGNOSIS — E44.0 MODERATE PROTEIN-CALORIE MALNUTRITION (H): ICD-10-CM

## 2024-09-10 DIAGNOSIS — N18.32 CKD STAGE 3B, GFR 30-44 ML/MIN (H): ICD-10-CM

## 2024-09-10 DIAGNOSIS — H65.193 ACUTE MEE (MIDDLE EAR EFFUSION), BILATERAL: ICD-10-CM

## 2024-09-10 DIAGNOSIS — C11.9 NASOPHARYNGEAL CARCINOMA (H): ICD-10-CM

## 2024-09-10 DIAGNOSIS — Z51.5 PALLIATIVE CARE PATIENT: Primary | ICD-10-CM

## 2024-09-10 PROCEDURE — G0463 HOSPITAL OUTPT CLINIC VISIT: HCPCS | Performed by: FAMILY MEDICINE

## 2024-09-10 PROCEDURE — G2211 COMPLEX E/M VISIT ADD ON: HCPCS | Performed by: FAMILY MEDICINE

## 2024-09-10 PROCEDURE — 99215 OFFICE O/P EST HI 40 MIN: CPT | Performed by: FAMILY MEDICINE

## 2024-09-10 ASSESSMENT — PAIN SCALES - GENERAL: PAINLEVEL: MILD PAIN (3)

## 2024-09-10 NOTE — LETTER
"9/10/2024      Kristen Chapman  [address] [place] [address]      Dear Colleague,    Thank you for referring your patient, Kristen Chapman, to the [facility] RADIATION ONCOLOGY [place]. Please see a copy of my visit note below.    Oncology Rooming Note    September 10, 2024 4:11 PM   Kristen Chapman is a 74 year old male who presents for:    Chief Complaint   Patient presents with     Oncology Clinic Visit     Palliative Care follow up     Initial Vitals: BP (!) 83/55   Pulse 67   Temp 98.6  F (37  C)   Resp 16   SpO2 99%  Estimated body mass index is 18.27 kg/m  as calculated from the following:    Height as of 24: 1.651 m (5' 5\").    Weight as of 24: 49.8 kg (109 lb 12.8 oz). There is no height or weight on file to calculate BSA.  Mild Pain (3) Comment: Data Unavailable   No LMP for male patient.  Allergies reviewed: No  Medications reviewed: No, Pt nor his daughter knew his medication list, they did not bring his meds or a list of his meds. He was confident that the only thing he takes for pain is Tylenol.     Medications: Medication refills not needed today.  Pharmacy name entered into [facility]:    [place], [place] - [address] [place] [address] PHARMACY [place], [place] - [address] [place] [address]    Frailty Screening:   Is the patient here for a new oncology consult visit in cancer care? 2. No      Clinical concerns: Low BP which seems to be usual for him. Using a walker, has not fallen within the last year. R ear pain for which he takes tylenol. States he only sips water and eats rice porridge.   Dr. Arreguin was notified.      Karen De Los Santos RN    Palliative Care Outpatient Clinic Progress Note    Patient Name: Kristen Chapman  Primary Provider: Issa Cook    Impressions:  ECO  Decision Making Capacity:  present with aid of   PDMP review:  yes, no concerns     Recurrent nasopharyngeal cancer, EBV +with recent PD into parotid gland  Malignant wound right submandibular " region healing  Moderate protein calorie malnutrition  CKD IIIb  Hepatitis B with cirrhosis with refractory ascites and pleurex catheter  Malignancy associated anorexia  Hypothyroidism  Bilateral Middle Ear Effusion R>>L     GOALS OF CARE:  09/10/2024   No change to GOC. 05/13/2024  [place] and would like more time to be alive and is willing to undergo more treatments. I explained to him that sometimes due to the toxic nature of treatments he may live longer with less tumor directed therapy as the end of his life approaches. He does not want a feeding tube again or CPR or intubation.  He also declined surgery on the malignant wound on his neck.         Recommendations & Counseling:  Stop Megace  Increase mirtazapine to 30 mg ODT at HS  Encourage protein in his diet   START OTC decongestant as needed and re-eval if no improvement in hearing in 4 days or acute worsening in pain  Follow up with me in 8 weeks;  Kristen is asked to consider how he would want to spend his remaining time if the current chemo isn't effective and he expresses understanding that at some point he might become too debilitated to be able to receive chemo.  He says we'll figure out what to do next at that time.     Follow up with wound therapy for anterior neck wound        Counseling: All of the above was explained to the patient in lay language. The patient has verbalized a clear understanding of the discussion, asked appropriate questions, which have been answered to patient's apparent satisfaction. The patient is in agreement with the above plan.        Chief Complaint/Patient ID: Kristen Chapman 74 year old male with PMHx of recurrent nasopharyngeal cancer with recent PD, a malignant wound in his neck area; CKD 3b; Hep B with cirrhosis and refractory ascites and pleurex catheter placement.    He has had a sharp decrease in hearing over the past two days bilaterally associated with cold symptoms    Last Palliative care  appointment: 07/09/2024 with me     Reviewed:  Yes:   reviewed - no record of controlled substances prescribed.    Interim History:  Kristen Chapman is a 74 year old male who is seen today for follow up with Palliative Care clinic.  He was joined by his daughter and the visit was facilitated by a professional .    He recently underwent re-staging imaging with CT neck on 7/15/24  which showed response to treatment with reduction in disease, however there were still many suspicious areas of concern. It was recommended he undergo PET/CT for further evaluation; this was completed on 8/12/24 . Updated PET  from  8/12/2024 showing FDG avid mass of mandible and within right parotid. CT shows parotid mass with extension into deep lobe. Second mass underneath mandible, appears infiltrative and poorly defined. Inflammatory nodes in mediastinum but no distant mets. In agreement that surgery would involve a large resection, favor non-surgical management at this time.         There is a URI going around his house and he has had some chills and progressive hearing loss over the past two days.  No popping sensation when he chews or swallows.  His pain in his ears is mild and the right ear feels more affected than the left.    Pain:  none    Appetite/Nausea: remains poor     Bowels: no concerns     Sleep: good for the most part     Mood: upbeat as always    Continues to get weaker and he now uses a walker   no falls    Coping:  Mr Chapman practices extreme gratitude for his adopted [place] and the life he and his family have here.    Family History- Reviewed in Epic.    Allergies   Allergen Reactions     Oxycodone Itching       Social History:  Pertinent changes to social history/social situation since last visit: none  Key support resources: extended family and meghan his children  Advance Directive Status:  no ACP documents.    Social History     Tobacco Use     Smoking status: Never     Smokeless tobacco: Never   Substance  Use Topics     Alcohol use: Not Currently     Drug use: Not Currently         Allergies   Allergen Reactions     Oxycodone Itching     Current Outpatient Medications   Medication Sig Dispense Refill     acetaminophen (TYLENOL) 325 MG tablet Take 650 mg by mouth every 6 hours as needed        dexAMETHasone (DECADRON) 4 MG tablet Take 1 tablet (4 mg) by mouth daily (with breakfast) Take for 2 days after each chemotherapy dose. 12 tablet 1     furosemide (LASIX) 20 MG tablet Take 1 tablet (20 mg) by mouth daily 90 tablet 3     levothyroxine (SYNTHROID/LEVOTHROID) 175 MCG tablet Take 1 tablet (175 mcg) by mouth daily 60 tablet 1     megestrol (MEGACE) 40 MG/ML suspension Take 10 mLs (400 mg) by mouth daily (Patient not taking: Reported on 5/21/2024) 480 mL 1     mirtazapine (REMERON SOL-TAB) 30 MG ODT 1 tablet (30 mg) by Orally disintegrating tablet route at bedtime 30 tablet 5     prochlorperazine (COMPAZINE) 10 MG tablet Take 1 tablet (10 mg) by mouth every 6 hours as needed for nausea or vomiting 30 tablet 2     rivaroxaban ANTICOAGULANT (XARELTO) 20 MG TABS tablet Take 1 tablet (20 mg) by mouth daily (with dinner) 30 tablet 3     spironolactone (ALDACTONE) 100 MG tablet Take 1 tablet (100 mg) by mouth daily 90 tablet 3     tenofovir (VIREAD) 300 MG tablet Take 1 tablet (300 mg) by mouth every 72 hours 45 tablet 3     Past Medical History:   Diagnosis Date     Anemia      Dysphagia      Hepatitis B chronic      Hypothyroidism      Nasopharyngeal cancer (H)      Severe protein-calorie malnutrition (H24)      Thrombocytopenia (H24)      Past Surgical History:   Procedure Laterality Date     ENDOSCOPIC ENDONASAL SURGERY Bilateral 9/7/2021    Procedure: Nasal Endoscopy with Biopsy;  Surgeon: Ky Centeno MD;  Location: [facility] OR     IR CHEST PORT PLACEMENT > 5 YRS OF AGE  3/2/2020     IR CHEST PORT PLACEMENT > 5 YRS OF AGE  3/2/2020     IR GASTROSTOMY TUBE INSERTION  4/27/2020     IR GASTROSTOMY TUBE PERCUTANEOUS  PLCMNT  2020     IR INTRAPERITONEAL CATH TUNNEL ASCITES  2024     IR INTRAPERITONEAL CATH TUNNEL ASCITES  2024     IR PORT PLACEMENT >5 YEARS  3/2/2020     IR PORT PLACEMENT >5 YEARS  3/2/2020     IR T-FASTENER REMOVAL  2020     IR T-FASTENER REMOVAL  2020       Physical Exam:   GENERAL APPEARANCE: frail older SE  man, uses walker to ambulate; alert and no distress; neatly groomed  EYES: Eyes grossly normal to inspection, PERRLA, conjunctivae and sclerae without injection or discharge, EOM intact   EARS:  no discharge, no pain to manipulation of tragus or auricle; Both TM's are slightly bulging with some distortion of landmarks along the umbo;no erythema, and pneumo-otoscopy could not be performed due to equipment limitation  RESP:  no increased work of breathing; speaks in complete sentences;   MS:diffuse sarcopenia  SKIN: No suspicious lesions or rashes, hydration status appears adequate with normal skin turgor   PSYCH: Alert and oriented x3; speech- coherent , normal rate and volume; able to articulate logical thoughts, able to abstract reason, no tangential thoughts, no hallucinations or delusions, mentation appears normal, Mood is euthymic. Affect is appropriate for this mood state and bright. Thought content is free of suicidal ideation, hallucinations, and delusions.  Eye contact is good during conversation.       Key Data Reviewed:  LABS: 2024- Cr 1.94, Albumin 2.8,  Hgb 7.9,      IMAGIN2024 PET ONCOLOGY WHOLE BODY  IMPRESSION:  1.  Interval decreased size and FDG uptake within the mass centered on the right submandibular space extending into the right buccal space, right submental space and superficial and deep lobes of the right parotid gland.  2.  New asymmetric focal FDG uptake involving the right aspect of the vallecula warranting direct inspection.  3.  Similar erosive changes involving the undersurface of the mandible however without FDG uptake.  4.  Stable  size of a 6 mm spiculated nodule in the right upper lobe without FDG uptake but below PET resolution.  5.  New moderate FDG uptake subcentimeter right hilar lymph nodes, possibly inflammatory but warranting attention on follow-up.    45 minutes spent on the date of the encounter doing chart review, history and exam, patient education & counseling, documentation and other activities as noted above.    The longitudinal plan of care for the diagnosis(es)/condition(s) as documented were addressed during this visit. Due to the added complexity in care, I will continue to support Kristen in the subsequent management and with ongoing continuity of care.    Steve Arreguin MD MS FAAFP CAQHPM  [facility]  Office [phone]  Fax [phone]             Again, thank you for allowing me to participate in the care of your patient.        Sincerely,        Steve Arreguin MD

## 2024-09-10 NOTE — PROGRESS NOTES
"Oncology Rooming Note    September 10, 2024 4:11 PM   Kristen Chapman is a 74 year old male who presents for:    Chief Complaint   Patient presents with    Oncology Clinic Visit     Palliative Care follow up     Initial Vitals: BP (!) 83/55   Pulse 67   Temp 98.6  F (37  C)   Resp 16   SpO2 99%  Estimated body mass index is 18.27 kg/m  as calculated from the following:    Height as of 24: 1.651 m (5' 5\").    Weight as of 24: 49.8 kg (109 lb 12.8 oz). There is no height or weight on file to calculate BSA.  Mild Pain (3) Comment: Data Unavailable   No LMP for male patient.  Allergies reviewed: No  Medications reviewed: No, Pt nor his daughter knew his medication list, they did not bring his meds or a list of his meds. He was confident that the only thing he takes for pain is Tylenol.     Medications: Medication refills not needed today.  Pharmacy name entered into [employer]:    [place], [place] - [address] [place] [address] [facility] [place] [place] - [address] [place] [address]    Frailty Screening:   Is the patient here for a new oncology consult visit in cancer care? 2. No      Clinical concerns: Low BP which seems to be usual for him. Using a walker, has not fallen within the last year. R ear pain for which he takes tylenol. States he only sips water and eats rice porridge.   Dr. Arreguin was notified.      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 05/13/2024  [place] and would like more time to be alive and is willing to undergo more treatments. I explained to him that sometimes due to the toxic nature of treatments he may live longer with less tumor directed therapy as the end of his life approaches. He does not want a feeding tube again or CPR or intubation.  He also declined surgery on the malignant wound on his neck.         Recommendations & Counseling:  Stop Megace  Increase mirtazapine to 30 mg ODT at HS  Encourage protein in his diet   START OTC decongestant as needed and re-eval if no improvement in hearing in 4 days or acute worsening in pain  Follow up with me in 8 weeks;  Kristen is asked to consider how he would want to spend his remaining time if the current chemo isn't effective and he expresses understanding that at some point he might become too debilitated to be able to receive chemo.  He says we'll figure out what to do next at that time.     Follow up with wound therapy for anterior neck wound        Counseling: All of the above was explained to the patient in lay language. The patient has verbalized a clear understanding of the discussion, asked appropriate questions, which have been answered to patient's apparent satisfaction. The patient is in agreement with the above plan.        Chief Complaint/Patient ID: Kristen Chapman 74 year old male with PMHx of recurrent nasopharyngeal cancer with recent PD, a malignant wound in his neck area; CKD 3b; Hep B with cirrhosis and refractory ascites and pleurex catheter placement.    He has had a sharp decrease in hearing over the past two days bilaterally associated with cold symptoms    Last Palliative care appointment: 07/09/2024 with me     Reviewed:  Yes:   reviewed - no record of controlled substances prescribed.    Interim History:  Kristen Chapman is a 74 year old male who is seen today for follow up with Palliative Care clinic.  He was  joined by his daughter and the visit was facilitated by a professional .    He recently underwent re-staging imaging with CT neck on 7/15/24  which showed response to treatment with reduction in disease, however there were still many suspicious areas of concern. It was recommended he undergo PET/CT for further evaluation; this was completed on 8/12/24 . Updated PET  from  8/12/2024 showing FDG avid mass of mandible and within right parotid. CT shows parotid mass with extension into deep lobe. Second mass underneath mandible, appears infiltrative and poorly defined. Inflammatory nodes in mediastinum but no distant mets. In agreement that surgery would involve a large resection, favor non-surgical management at this time.         There is a URI going around his house and he has had some chills and progressive hearing loss over the past two days.  No popping sensation when he chews or swallows.  His pain in his ears is mild and the right ear feels more affected than the left.    Pain:  none    Appetite/Nausea: remains poor     Bowels: no concerns     Sleep: good for the most part     Mood: upbeat as always    Continues to get weaker and he now uses a walker   no falls    Coping:  Mr Chapman practices extreme gratitude for his [place] and the life he and his family have here.    Family History- Reviewed in Epic.    Allergies   Allergen Reactions    Oxycodone Itching       Social History:  Pertinent changes to social history/social situation since last visit: none  Key support resources: extended family and meghan his children  Advance Directive Status:  no ACP documents.    Social History     Tobacco Use    Smoking status: Never    Smokeless tobacco: Never   Substance Use Topics    Alcohol use: Not Currently    Drug use: Not Currently         Allergies   Allergen Reactions    Oxycodone Itching     Current Outpatient Medications   Medication Sig Dispense Refill    acetaminophen (TYLENOL) 325 MG tablet Take  650 mg by mouth every 6 hours as needed       dexAMETHasone (DECADRON) 4 MG tablet Take 1 tablet (4 mg) by mouth daily (with breakfast) Take for 2 days after each chemotherapy dose. 12 tablet 1    furosemide (LASIX) 20 MG tablet Take 1 tablet (20 mg) by mouth daily 90 tablet 3    levothyroxine (SYNTHROID/LEVOTHROID) 175 MCG tablet Take 1 tablet (175 mcg) by mouth daily 60 tablet 1    megestrol (MEGACE) 40 MG/ML suspension Take 10 mLs (400 mg) by mouth daily (Patient not taking: Reported on 5/21/2024) 480 mL 1    mirtazapine (REMERON SOL-TAB) 30 MG ODT 1 tablet (30 mg) by Orally disintegrating tablet route at bedtime 30 tablet 5    prochlorperazine (COMPAZINE) 10 MG tablet Take 1 tablet (10 mg) by mouth every 6 hours as needed for nausea or vomiting 30 tablet 2    rivaroxaban ANTICOAGULANT (XARELTO) 20 MG TABS tablet Take 1 tablet (20 mg) by mouth daily (with dinner) 30 tablet 3    spironolactone (ALDACTONE) 100 MG tablet Take 1 tablet (100 mg) by mouth daily 90 tablet 3    tenofovir (VIREAD) 300 MG tablet Take 1 tablet (300 mg) by mouth every 72 hours 45 tablet 3     Past Medical History:   Diagnosis Date    Anemia     Dysphagia     Hepatitis B chronic     Hypothyroidism     Nasopharyngeal cancer (H)     Severe protein-calorie malnutrition (H24)     Thrombocytopenia (H24)      Past Surgical History:   Procedure Laterality Date    ENDOSCOPIC ENDONASAL SURGERY Bilateral 9/7/2021    Procedure: Nasal Endoscopy with Biopsy;  Surgeon: Ky Centeno MD;  Location: [facility] OR    IR CHEST PORT PLACEMENT > 5 YRS OF AGE  3/2/2020    IR CHEST PORT PLACEMENT > 5 YRS OF AGE  3/2/2020    IR GASTROSTOMY TUBE INSERTION  4/27/2020    IR GASTROSTOMY TUBE PERCUTANEOUS PLCMNT  4/27/2020    IR INTRAPERITONEAL CATH TUNNEL ASCITES  2/22/2024    IR INTRAPERITONEAL CATH TUNNEL ASCITES  2/26/2024    IR PORT PLACEMENT >5 YEARS  3/2/2020    IR PORT PLACEMENT >5 YEARS  3/2/2020    IR T-FASTENER REMOVAL  6/5/2020    IR T-FASTENER REMOVAL   2020       Physical Exam:   GENERAL APPEARANCE: frail older SE  man, uses walker to ambulate; alert and no distress; neatly groomed  EYES: Eyes grossly normal to inspection, PERRLA, conjunctivae and sclerae without injection or discharge, EOM intact   EARS:  no discharge, no pain to manipulation of tragus or auricle; Both TM's are slightly bulging with some distortion of landmarks along the umbo;no erythema, and pneumo-otoscopy could not be performed due to equipment limitation  RESP:  no increased work of breathing; speaks in complete sentences;   MS:diffuse sarcopenia  SKIN: No suspicious lesions or rashes, hydration status appears adequate with normal skin turgor   PSYCH: Alert and oriented x3; speech- coherent , normal rate and volume; able to articulate logical thoughts, able to abstract reason, no tangential thoughts, no hallucinations or delusions, mentation appears normal, Mood is euthymic. Affect is appropriate for this mood state and bright. Thought content is free of suicidal ideation, hallucinations, and delusions.  Eye contact is good during conversation.       Key Data Reviewed:  LABS: 2024- Cr 1.94, Albumin 2.8,  Hgb 7.9,      IMAGIN2024 PET ONCOLOGY WHOLE BODY  IMPRESSION:  1.  Interval decreased size and FDG uptake within the mass centered on the right submandibular space extending into the right buccal space, right submental space and superficial and deep lobes of the right parotid gland.  2.  New asymmetric focal FDG uptake involving the right aspect of the vallecula warranting direct inspection.  3.  Similar erosive changes involving the undersurface of the mandible however without FDG uptake.  4.  Stable size of a 6 mm spiculated nodule in the right upper lobe without FDG uptake but below PET resolution.  5.  New moderate FDG uptake subcentimeter right hilar lymph nodes, possibly inflammatory but warranting attention on follow-up.    45 minutes spent on the date of the  encounter doing chart review, history and exam, patient education & counseling, documentation and other activities as noted above.    The longitudinal plan of care for the diagnosis(es)/condition(s) as documented were addressed during this visit. Due to the added complexity in care, I will continue to support Kristen in the subsequent management and with ongoing continuity of care.    Steve Arreguin MD MS FAAFP CAQHPM  [facility]  Office [phone]  Fax [phone]

## 2024-09-10 NOTE — PATIENT INSTRUCTIONS
It was good to see you today, Kristen and Cj.    Here are the things we talked about:  I don't think you need an antibiotic for your er. An over the counter decongestant will be best.  Robitussin with decongestant will be fine.    If your ear gets more pain go to urgent care or if it does seem to be any better by Saturday go to urgent care    Stop to schedule a follow up appointment in 2 months.     How to get a hold of us:  For non-urgent matters, MyChart works best.    For more urgent matters, or if you prefer not to use MyChart, call our clinic nurse coordinator Milka Dumont RN at [phone]    We have an on-call number for evenings and weekends. Please call this only if you are having uncontrolled symptoms or serious side effects from your medicines: [phone].     For refills, please give us a week (5 working days) notice. We don't always have providers available everyday to do refills. If you call the day you run out of your medicine, we may not be able to refill it in time, so call 5 days in advance!    Steve Arreguin MD MS FAAFP CAQHPM  [facility]  Office [phone]  Fax [phone]

## 2024-09-11 ENCOUNTER — ONCOLOGY VISIT (OUTPATIENT)
Dept: ONCOLOGY | Facility: HOSPITAL | Age: 74
End: 2024-09-11
Attending: INTERNAL MEDICINE
Payer: COMMERCIAL

## 2024-09-11 ENCOUNTER — INFUSION THERAPY VISIT (OUTPATIENT)
Dept: INFUSION THERAPY | Facility: HOSPITAL | Age: 74
End: 2024-09-11
Attending: INTERNAL MEDICINE
Payer: COMMERCIAL

## 2024-09-11 VITALS
TEMPERATURE: 98.3 F | SYSTOLIC BLOOD PRESSURE: 95 MMHG | RESPIRATION RATE: 16 BRPM | OXYGEN SATURATION: 98 % | DIASTOLIC BLOOD PRESSURE: 57 MMHG | HEART RATE: 65 BPM

## 2024-09-11 VITALS
WEIGHT: 107 LBS | HEART RATE: 62 BPM | HEIGHT: 65 IN | RESPIRATION RATE: 14 BRPM | DIASTOLIC BLOOD PRESSURE: 60 MMHG | OXYGEN SATURATION: 100 % | BODY MASS INDEX: 17.83 KG/M2 | SYSTOLIC BLOOD PRESSURE: 108 MMHG | TEMPERATURE: 97.4 F

## 2024-09-11 DIAGNOSIS — D64.9 ANEMIA, NORMOCYTIC NORMOCHROMIC: ICD-10-CM

## 2024-09-11 DIAGNOSIS — N18.31 ANEMIA OF CHRONIC RENAL FAILURE, STAGE 3A (H): ICD-10-CM

## 2024-09-11 DIAGNOSIS — C11.9 SQUAMOUS CELL CARCINOMA OF NASOPHARYNX (H): Primary | ICD-10-CM

## 2024-09-11 DIAGNOSIS — T45.1X5A ANTINEOPLASTIC CHEMOTHERAPY INDUCED ANEMIA: ICD-10-CM

## 2024-09-11 DIAGNOSIS — D64.81 ANTINEOPLASTIC CHEMOTHERAPY INDUCED ANEMIA: ICD-10-CM

## 2024-09-11 DIAGNOSIS — N18.31 STAGE 3A CHRONIC KIDNEY DISEASE (H): ICD-10-CM

## 2024-09-11 DIAGNOSIS — D63.1 ANEMIA OF CHRONIC RENAL FAILURE, STAGE 3A (H): ICD-10-CM

## 2024-09-11 LAB
ABO/RH(D): NORMAL
ALBUMIN SERPL BCG-MCNC: 2.6 G/DL (ref 3.5–5.2)
ALP SERPL-CCNC: 121 U/L (ref 40–150)
ALT SERPL W P-5'-P-CCNC: 53 U/L (ref 0–70)
ANION GAP SERPL CALCULATED.3IONS-SCNC: 8 MMOL/L (ref 7–15)
ANTIBODY SCREEN: NEGATIVE
AST SERPL W P-5'-P-CCNC: 42 U/L (ref 0–45)
BASOPHILS # BLD AUTO: 0 10E3/UL (ref 0–0.2)
BASOPHILS NFR BLD AUTO: 0 %
BILIRUB SERPL-MCNC: 0.6 MG/DL
BLD PROD TYP BPU: NORMAL
BLOOD COMPONENT TYPE: NORMAL
BUN SERPL-MCNC: 38.6 MG/DL (ref 8–23)
CALCIUM SERPL-MCNC: 7.9 MG/DL (ref 8.8–10.4)
CHLORIDE SERPL-SCNC: 99 MMOL/L (ref 98–107)
CODING SYSTEM: NORMAL
CREAT SERPL-MCNC: 1.63 MG/DL (ref 0.67–1.17)
CROSSMATCH: NORMAL
EGFRCR SERPLBLD CKD-EPI 2021: 44 ML/MIN/1.73M2
EOSINOPHIL # BLD AUTO: 0.1 10E3/UL (ref 0–0.7)
EOSINOPHIL NFR BLD AUTO: 2 %
ERYTHROCYTE [DISTWIDTH] IN BLOOD BY AUTOMATED COUNT: 17.4 % (ref 10–15)
FERRITIN SERPL-MCNC: 907 NG/ML (ref 31–409)
FOLATE SERPL-MCNC: 11.7 NG/ML (ref 4.6–34.8)
GLUCOSE SERPL-MCNC: 143 MG/DL (ref 70–99)
HCO3 SERPL-SCNC: 21 MMOL/L (ref 22–29)
HCT VFR BLD AUTO: 23.4 % (ref 40–53)
HGB BLD-MCNC: 7.4 G/DL (ref 13.3–17.7)
IMM GRANULOCYTES # BLD: 0.2 10E3/UL
IMM GRANULOCYTES NFR BLD: 2 %
IRON BINDING CAPACITY (ROCHE): 148 UG/DL (ref 240–430)
IRON SATN MFR SERPL: 22 % (ref 15–46)
IRON SERPL-MCNC: 32 UG/DL (ref 61–157)
ISSUE DATE AND TIME: NORMAL
LYMPHOCYTES # BLD AUTO: 0.4 10E3/UL (ref 0.8–5.3)
LYMPHOCYTES NFR BLD AUTO: 5 %
MCH RBC QN AUTO: 26.6 PG (ref 26.5–33)
MCHC RBC AUTO-ENTMCNC: 31.6 G/DL (ref 31.5–36.5)
MCV RBC AUTO: 84 FL (ref 78–100)
MONOCYTES # BLD AUTO: 0.7 10E3/UL (ref 0–1.3)
MONOCYTES NFR BLD AUTO: 11 %
NEUTROPHILS # BLD AUTO: 5.6 10E3/UL (ref 1.6–8.3)
NEUTROPHILS NFR BLD AUTO: 80 %
NRBC # BLD AUTO: 0 10E3/UL
NRBC BLD AUTO-RTO: 0 /100
PLATELET # BLD AUTO: 72 10E3/UL (ref 150–450)
POTASSIUM SERPL-SCNC: 4.9 MMOL/L (ref 3.4–5.3)
PROT SERPL-MCNC: 5.7 G/DL (ref 6.4–8.3)
RBC # BLD AUTO: 2.78 10E6/UL (ref 4.4–5.9)
SODIUM SERPL-SCNC: 128 MMOL/L (ref 135–145)
SPECIMEN EXPIRATION DATE: NORMAL
T4 FREE SERPL-MCNC: 1.05 NG/DL (ref 0.9–1.7)
TSH SERPL DL<=0.005 MIU/L-ACNC: 23.15 UIU/ML (ref 0.3–4.2)
UNIT ABO/RH: NORMAL
UNIT NUMBER: NORMAL
UNIT STATUS: NORMAL
UNIT TYPE ISBT: 7300
VIT B12 SERPL-MCNC: 1261 PG/ML (ref 232–1245)
WBC # BLD AUTO: 7 10E3/UL (ref 4–11)

## 2024-09-11 PROCEDURE — 250N000011 HC RX IP 250 OP 636: Performed by: INTERNAL MEDICINE

## 2024-09-11 PROCEDURE — 36591 DRAW BLOOD OFF VENOUS DEVICE: CPT

## 2024-09-11 PROCEDURE — 84439 ASSAY OF FREE THYROXINE: CPT

## 2024-09-11 PROCEDURE — 250N000013 HC RX MED GY IP 250 OP 250 PS 637: Performed by: NURSE PRACTITIONER

## 2024-09-11 PROCEDURE — 99214 OFFICE O/P EST MOD 30 MIN: CPT | Performed by: NURSE PRACTITIONER

## 2024-09-11 PROCEDURE — 258N000003 HC RX IP 258 OP 636: Performed by: INTERNAL MEDICINE

## 2024-09-11 PROCEDURE — 85025 COMPLETE CBC W/AUTO DIFF WBC: CPT | Performed by: INTERNAL MEDICINE

## 2024-09-11 PROCEDURE — 84443 ASSAY THYROID STIM HORMONE: CPT

## 2024-09-11 PROCEDURE — T1013 SIGN LANG/ORAL INTERPRETER: HCPCS | Mod: U4

## 2024-09-11 PROCEDURE — 96375 TX/PRO/DX INJ NEW DRUG ADDON: CPT

## 2024-09-11 PROCEDURE — 36591 DRAW BLOOD OFF VENOUS DEVICE: CPT | Performed by: INTERNAL MEDICINE

## 2024-09-11 PROCEDURE — 82607 VITAMIN B-12: CPT

## 2024-09-11 PROCEDURE — 96413 CHEMO IV INFUSION 1 HR: CPT

## 2024-09-11 PROCEDURE — P9016 RBC LEUKOCYTES REDUCED: HCPCS | Performed by: INTERNAL MEDICINE

## 2024-09-11 PROCEDURE — 86923 COMPATIBILITY TEST ELECTRIC: CPT | Performed by: INTERNAL MEDICINE

## 2024-09-11 PROCEDURE — G0463 HOSPITAL OUTPT CLINIC VISIT: HCPCS | Mod: 25 | Performed by: NURSE PRACTITIONER

## 2024-09-11 PROCEDURE — 83550 IRON BINDING TEST: CPT

## 2024-09-11 PROCEDURE — 82728 ASSAY OF FERRITIN: CPT

## 2024-09-11 PROCEDURE — 36430 TRANSFUSION BLD/BLD COMPNT: CPT

## 2024-09-11 PROCEDURE — 86900 BLOOD TYPING SEROLOGIC ABO: CPT | Performed by: NURSE PRACTITIONER

## 2024-09-11 PROCEDURE — 82746 ASSAY OF FOLIC ACID SERUM: CPT

## 2024-09-11 PROCEDURE — G2211 COMPLEX E/M VISIT ADD ON: HCPCS | Performed by: NURSE PRACTITIONER

## 2024-09-11 PROCEDURE — 80053 COMPREHEN METABOLIC PANEL: CPT | Performed by: INTERNAL MEDICINE

## 2024-09-11 RX ORDER — METHYLPREDNISOLONE SODIUM SUCCINATE 125 MG/2ML
125 INJECTION, POWDER, LYOPHILIZED, FOR SOLUTION INTRAMUSCULAR; INTRAVENOUS
Status: DISCONTINUED | OUTPATIENT
Start: 2024-09-11 | End: 2024-09-11 | Stop reason: HOSPADM

## 2024-09-11 RX ORDER — ALBUTEROL SULFATE 0.83 MG/ML
2.5 SOLUTION RESPIRATORY (INHALATION)
OUTPATIENT
Start: 2024-11-06

## 2024-09-11 RX ORDER — EPINEPHRINE 1 MG/ML
0.3 INJECTION, SOLUTION INTRAMUSCULAR; SUBCUTANEOUS EVERY 5 MIN PRN
OUTPATIENT
Start: 2024-11-20

## 2024-09-11 RX ORDER — HEPARIN SODIUM (PORCINE) LOCK FLUSH IV SOLN 100 UNIT/ML 100 UNIT/ML
5 SOLUTION INTRAVENOUS
Status: DISCONTINUED | OUTPATIENT
Start: 2024-09-11 | End: 2024-09-11 | Stop reason: HOSPADM

## 2024-09-11 RX ORDER — ALBUTEROL SULFATE 90 UG/1
1-2 AEROSOL, METERED RESPIRATORY (INHALATION)
OUTPATIENT
Start: 2024-11-20

## 2024-09-11 RX ORDER — ONDANSETRON 2 MG/ML
8 INJECTION INTRAMUSCULAR; INTRAVENOUS ONCE
OUTPATIENT
Start: 2024-11-06

## 2024-09-11 RX ORDER — ALBUTEROL SULFATE 0.83 MG/ML
2.5 SOLUTION RESPIRATORY (INHALATION)
OUTPATIENT
Start: 2024-11-20

## 2024-09-11 RX ORDER — DIPHENHYDRAMINE HYDROCHLORIDE 50 MG/ML
50 INJECTION INTRAMUSCULAR; INTRAVENOUS
OUTPATIENT
Start: 2024-11-20

## 2024-09-11 RX ORDER — LORAZEPAM 2 MG/ML
0.5 INJECTION INTRAMUSCULAR EVERY 4 HOURS PRN
OUTPATIENT
Start: 2024-11-20

## 2024-09-11 RX ORDER — EPINEPHRINE 1 MG/ML
0.3 INJECTION, SOLUTION INTRAMUSCULAR; SUBCUTANEOUS EVERY 5 MIN PRN
OUTPATIENT
Start: 2024-09-11

## 2024-09-11 RX ORDER — HEPARIN SODIUM (PORCINE) LOCK FLUSH IV SOLN 100 UNIT/ML 100 UNIT/ML
5 SOLUTION INTRAVENOUS
OUTPATIENT
Start: 2024-11-20

## 2024-09-11 RX ORDER — HEPARIN SODIUM,PORCINE 10 UNIT/ML
5-20 VIAL (ML) INTRAVENOUS DAILY PRN
OUTPATIENT
Start: 2024-11-20

## 2024-09-11 RX ORDER — HEPARIN SODIUM (PORCINE) LOCK FLUSH IV SOLN 100 UNIT/ML 100 UNIT/ML
5 SOLUTION INTRAVENOUS
OUTPATIENT
Start: 2024-11-06

## 2024-09-11 RX ORDER — MEPERIDINE HYDROCHLORIDE 25 MG/ML
25 INJECTION INTRAMUSCULAR; INTRAVENOUS; SUBCUTANEOUS EVERY 30 MIN PRN
OUTPATIENT
Start: 2024-11-20

## 2024-09-11 RX ORDER — MEPERIDINE HYDROCHLORIDE 25 MG/ML
25 INJECTION INTRAMUSCULAR; INTRAVENOUS; SUBCUTANEOUS EVERY 30 MIN PRN
OUTPATIENT
Start: 2024-11-06

## 2024-09-11 RX ORDER — HEPARIN SODIUM (PORCINE) LOCK FLUSH IV SOLN 100 UNIT/ML 100 UNIT/ML
5 SOLUTION INTRAVENOUS
OUTPATIENT
Start: 2024-09-11

## 2024-09-11 RX ORDER — LORAZEPAM 2 MG/ML
0.5 INJECTION INTRAMUSCULAR EVERY 4 HOURS PRN
OUTPATIENT
Start: 2024-11-06

## 2024-09-11 RX ORDER — METHYLPREDNISOLONE SODIUM SUCCINATE 125 MG/2ML
125 INJECTION, POWDER, LYOPHILIZED, FOR SOLUTION INTRAMUSCULAR; INTRAVENOUS
OUTPATIENT
Start: 2024-11-20

## 2024-09-11 RX ORDER — LORATADINE 10 MG/1
10 TABLET ORAL ONCE
Status: COMPLETED | OUTPATIENT
Start: 2024-09-11 | End: 2024-09-11

## 2024-09-11 RX ORDER — ONDANSETRON 2 MG/ML
8 INJECTION INTRAMUSCULAR; INTRAVENOUS ONCE
Status: COMPLETED | OUTPATIENT
Start: 2024-09-11 | End: 2024-09-11

## 2024-09-11 RX ORDER — EPINEPHRINE 1 MG/ML
0.3 INJECTION, SOLUTION INTRAMUSCULAR; SUBCUTANEOUS EVERY 5 MIN PRN
Status: DISCONTINUED | OUTPATIENT
Start: 2024-09-11 | End: 2024-09-11 | Stop reason: HOSPADM

## 2024-09-11 RX ORDER — ONDANSETRON 2 MG/ML
8 INJECTION INTRAMUSCULAR; INTRAVENOUS ONCE
OUTPATIENT
Start: 2024-11-20

## 2024-09-11 RX ORDER — DIPHENHYDRAMINE HYDROCHLORIDE 50 MG/ML
50 INJECTION INTRAMUSCULAR; INTRAVENOUS
Status: DISCONTINUED | OUTPATIENT
Start: 2024-09-11 | End: 2024-09-11 | Stop reason: HOSPADM

## 2024-09-11 RX ORDER — METHYLPREDNISOLONE SODIUM SUCCINATE 125 MG/2ML
125 INJECTION, POWDER, LYOPHILIZED, FOR SOLUTION INTRAMUSCULAR; INTRAVENOUS
Start: 2024-11-06

## 2024-09-11 RX ORDER — DIPHENHYDRAMINE HYDROCHLORIDE 50 MG/ML
50 INJECTION INTRAMUSCULAR; INTRAVENOUS
Start: 2024-09-11

## 2024-09-11 RX ORDER — PSEUDOEPHEDRINE HCL 120 MG/1
120 TABLET, FILM COATED, EXTENDED RELEASE ORAL ONCE
Status: COMPLETED | OUTPATIENT
Start: 2024-09-11 | End: 2024-09-11

## 2024-09-11 RX ORDER — HEPARIN SODIUM,PORCINE 10 UNIT/ML
5-20 VIAL (ML) INTRAVENOUS DAILY PRN
OUTPATIENT
Start: 2024-11-06

## 2024-09-11 RX ORDER — ALBUTEROL SULFATE 90 UG/1
1-2 AEROSOL, METERED RESPIRATORY (INHALATION)
Start: 2024-11-06

## 2024-09-11 RX ORDER — DIPHENHYDRAMINE HYDROCHLORIDE 50 MG/ML
50 INJECTION INTRAMUSCULAR; INTRAVENOUS
Start: 2024-11-06

## 2024-09-11 RX ORDER — HEPARIN SODIUM,PORCINE 10 UNIT/ML
5-20 VIAL (ML) INTRAVENOUS DAILY PRN
OUTPATIENT
Start: 2024-09-11

## 2024-09-11 RX ORDER — EPINEPHRINE 1 MG/ML
0.3 INJECTION, SOLUTION INTRAMUSCULAR; SUBCUTANEOUS EVERY 5 MIN PRN
OUTPATIENT
Start: 2024-11-06

## 2024-09-11 RX ADMIN — Medication 5 ML: at 14:10

## 2024-09-11 RX ADMIN — PSEUDOEPHEDRINE HYDROCHLORIDE 120 MG: 120 TABLET, FILM COATED ORAL at 10:06

## 2024-09-11 RX ADMIN — GEMCITABINE 1200 MG: 38 INJECTION, SOLUTION INTRAVENOUS at 10:37

## 2024-09-11 RX ADMIN — ONDANSETRON 8 MG: 2 INJECTION INTRAMUSCULAR; INTRAVENOUS at 09:57

## 2024-09-11 RX ADMIN — LORATADINE 10 MG: 10 TABLET ORAL at 10:06

## 2024-09-11 ASSESSMENT — PAIN SCALES - GENERAL: PAINLEVEL: NO PAIN (0)

## 2024-09-11 NOTE — PROGRESS NOTES
Infusion Nursing Note:  Kristen Chapman presents today for cycle 6 day 1 gemcitabine and blood transfusion.    Patient seen by provider today: Yes, Yoanna Andre CNP   present during visit today: Not applicable.    Note: Kristen arrived ambulatory and in stable condition. Reports some fatigue. His BP is noted to be hypotensive. He denies any dizziness.  He was premedicated and treatment administered per orders. His Hgb was 7.4 today, so he qualifies for 1 unit PRBCs. 1 unit transfused without issue.      Intravenous Access:  Labs drawn without difficulty.  Implanted Port.    Treatment Conditions:  Lab Results   Component Value Date    HGB 7.4 (L) 09/11/2024    WBC 7.0 09/11/2024    ANEUTAUTO 5.6 09/11/2024    PLT 72 (L) 09/11/2024        Lab Results   Component Value Date     (L) 09/11/2024    POTASSIUM 4.9 09/11/2024    MAG 2.1 03/29/2024    CR 1.63 (H) 09/11/2024    GUANAKITO 7.9 (L) 09/11/2024    BILITOTAL 0.6 09/11/2024    ALBUMIN 2.6 (L) 09/11/2024    ALT 53 09/11/2024    AST 42 09/11/2024       Results reviewed, labs MET treatment parameters, ok to proceed with treatment.      Post Infusion Assessment:  Patient tolerated infusion without incident.  Blood return noted pre and post infusion.  Site patent and intact, free from redness, edema or discomfort.  No evidence of extravasations.  Access discontinued per protocol.       Discharge Plan:   Patient and/or family verbalized understanding of discharge instructions and all questions answered.  AVS to patient via UXFLIPT.  Patient will return 9/25 for next appointment.   Patient discharged in stable condition accompanied by: son.  Departure Mode: Ambulatory.    Jeri Sorto RN

## 2024-09-11 NOTE — PROGRESS NOTES
[facility] Hematology and Oncology Progress Note    Patient: Kristne Chapman  MRN: [id number]  Date of Service: Sep 11, 2024          Reason for Visit    Chief Complaint   Patient presents with    Oncology Clinic Visit     Return visit with labs and infusion       Assessment and Plan     Cancer Staging   No matching staging information was found for the patient.    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 Has started erbitux. Did that for one month and then added in Carboplatin. Had CT in April that showed progression. Wanted to try more treatment so has started on single agent gemzar. Getting 20% reduction (800mg/m2).  Did have to hold day 15 with the first cycle due to low platelets.  We have changed his cycle to every other week.  He is doing well with this. His recent CT scan showed significant improvement. He will continue his current regimen. His case was presented at the [facility] tumor board and it was felt that surgery or radiation would not be safe and likely not able to remove all cancer. Would likely cause significant damage. Continue gemzar. Repeat imaging roughly every 3 months. He will see Dr. Frias with CT in October.     Significant anemia in the setting of chronic pancytopenia, CKD: has liver dysfunction that is contributing. Also cancer contributing.  We will transfuse to keep his hemoglobin above 7 or if symptomatic, less than 8. Since he also continues to have stage III kidney dysfunction, we will add in Retacrit to assist.     Renal insufficiency: likely from medications. Avoid nephrotoxic medications.  Renal insufficiency is stable.  Stay hydrated.  Keep blood pressure well controlled.    Hepatitis and liver dysfunction/cirrhosis: seeing GI/hepatologist.  Liver function has been looking pretty good.    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 Has had  some nose bleeding since rivaroxaban. Currently on 20mg daily. If he has bleeding, or lower platelets, he may have to stop or decrease dose. Encourage pt to call us if he has any bleeding from tumor or other bleeding that does not stop.     Congestion: mainly in ear. Likely fluid. Will try claritin D in clinic to see if that improves. Likely needs decongestant. Continue robitussin BID at home. If not improvement or worsening symptoms, may need to try antibiotic for AOM.     Elevated TSH: pt's son states he hasn't been taking his synthroid. I encouraged him to take that every day because it will help him feel better. Will not change the dose for now and will recheck at next visit.         ECOG Performance    1 - Can't do physically strenuous work, but fully ambyulatory and can do light sedentary work    Distress Screening (within last 30 days)    1. How concerned are you about your ability to eat? : 0  2. How concerned are you about unintended weight loss or your current weight? : 5  3. How concerned are you about feeling depressed or very sad? : 0  4. How concerned are you about feeling anxious or very scared? : 0  5. Do you struggle with the loss of meaning and terese in your life? : Somewhat  6. How concerned are you about work and home life issues that may be affected by your cancer? : 0  7. How concerned are you about knowing what resources are available to help you? : 0  8. 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  Diagnosed January 2020. Imaging suggested bilateral abnormal lymphadenopathy in the neck.  Also asymmetric soft tissue thickening in the posterior right nasopharynx.  On imaging, tumor also appeared to extend down to the hypopharynx.     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  Right neck lymph node positive for recurrent nasopharyngeal carcinoma.  PET scan at that time showed new hypermetabolic mediastinal lymphadenopathy (chronic, most likely reactive), mass in the right ethmoid sinus, hypermetabolic right level 1B and 2A lymph nodes.    Recurrent disease found in 2023/early 2024: PET scan on 1/31/24 shows: Disease progression with increased size of the enhancing hypermetabolic soft tissue mass about the right hemimandible measuring up to 5.0 cm with invasion into the floor of the right oral cavity and right base of tongue. No evidence of osseous invasion. New focal FDG uptake in/adjacent to the crux of the right and left hemidiaphragm, as well as retrocrural and retrocaval lymph nodes, suspicious for metastatic disease. Cirrhotic liver with sequela of portal hypertension including large volume ascites and splenogastric varices     Treatment:     Initially treated with concurrent chemotherapy and radiation.  He had weekly cisplatin starting March 3, 2020. Fifth and final dose given March 31, 2020.  Subsequent chemotherapy was held due to prolonged thrombocytopenia and renal  "insufficiency.  Radiation dose was 7000 cGy in 35 fractions given from March 2 through April 17, 2020.     Started cisplatin with infusional 5-FU on June 1, 2020.  Cycle 1 given at a 25% dose reduction.  He still had significant thrombocytopenia and neutropenia on day 28.  Cycle 2 was held.  At the same time his liver function tests elevated secondary to hepatitis B.   PET scan in September 2020 showed no good evidence of residual malignancy.     Recurrence:  Radiosurgery delivered to the right ethmoid tumor delivered November 8 through November 17, 2021.  Received 3500 cGy in 5 fractions.  Pembrolizumab started 12/23/21.  Held after his July 1, 2022 dose.  PET scan in March 2022 showed a near complete response.    -2/2/24: Started Erbitux therapy. Getting every 2 weeks.   -3/1/24: added in Carboplatin AUC 2 given every 2 weeks.   Progression in April 2024.     -4/23/24: started single agent Gemzar. Today is cycle 6     Interim History:    Patient is here today for a follow-up visit.  Overall he states he is doing okay.  He is chronically fatigued and tired. No new issues. Denies any new pain. Neck feels fine. Denies infectious complaints. Denies chest pain or palpiations. Some AYALA. Weight is stable. Does have some ear discomfort, muffled hearing for the last couple of days. Started robitussen last night. Didn't notice much improvement.     Review of Systems    Pertinent items are noted in HPI.    Past History    Past Medical History:   Diagnosis Date    Anemia     Dysphagia     Hepatitis B chronic     Hypothyroidism     Nasopharyngeal cancer (H)     Severe protein-calorie malnutrition (H24)     Thrombocytopenia (H24)        PHYSICAL EXAM  /60 (BP Location: Left arm, Patient Position: Sitting, Cuff Size: Adult Small)   Pulse 62   Temp 97.4  F (36.3  C) (Tympanic)   Resp 14   Ht 1.651 m (5' 5\")   Wt 48.5 kg (107 lb)   SpO2 100%   BMI 17.81 kg/m        GENERAL: no acute distress. Cooperative in " conversation.  Patient's son is present.   on the phone.    RESP: Regular respiratory rate. No expiratory wheezes   NEURO: non focal. Alert and oriented x3.   PSYCH: within normal limits. No depression or anxiety.  SKIN: exposed skin is dry intact.     Lab Results    Recent Results (from the past 168 hour(s))   Comprehensive metabolic panel   Result Value Ref Range    Sodium 128 (L) 135 - 145 mmol/L    Potassium 4.9 3.4 - 5.3 mmol/L    Carbon Dioxide (CO2) 21 (L) 22 - 29 mmol/L    Anion Gap 8 7 - 15 mmol/L    Urea Nitrogen 38.6 (H) 8.0 - 23.0 mg/dL    Creatinine 1.63 (H) 0.67 - 1.17 mg/dL    GFR Estimate 44 (L) >60 mL/min/1.73m2    Calcium 7.9 (L) 8.8 - 10.4 mg/dL    Chloride 99 98 - 107 mmol/L    Glucose 143 (H) 70 - 99 mg/dL    Alkaline Phosphatase 121 40 - 150 U/L    AST 42 0 - 45 U/L    ALT 53 0 - 70 U/L    Protein Total 5.7 (L) 6.4 - 8.3 g/dL    Albumin 2.6 (L) 3.5 - 5.2 g/dL    Bilirubin Total 0.6 <=1.2 mg/dL   TSH with free T4 reflex   Result Value Ref Range    TSH 23.15 (H) 0.30 - 4.20 uIU/mL   CBC with platelets and differential   Result Value Ref Range    WBC Count 7.0 4.0 - 11.0 10e3/uL    RBC Count 2.78 (L) 4.40 - 5.90 10e6/uL    Hemoglobin 7.4 (L) 13.3 - 17.7 g/dL    Hematocrit 23.4 (L) 40.0 - 53.0 %    MCV 84 78 - 100 fL    MCH 26.6 26.5 - 33.0 pg    MCHC 31.6 31.5 - 36.5 g/dL    RDW 17.4 (H) 10.0 - 15.0 %    Platelet Count 72 (L) 150 - 450 10e3/uL    % Neutrophils 80 %    % Lymphocytes 5 %    % Monocytes 11 %    % Eosinophils 2 %    % Basophils 0 %    % Immature Granulocytes 2 %    NRBCs per 100 WBC 0 <1 /100    Absolute Neutrophils 5.6 1.6 - 8.3 10e3/uL    Absolute Lymphocytes 0.4 (L) 0.8 - 5.3 10e3/uL    Absolute Monocytes 0.7 0.0 - 1.3 10e3/uL    Absolute Eosinophils 0.1 0.0 - 0.7 10e3/uL    Absolute Basophils 0.0 0.0 - 0.2 10e3/uL    Absolute Immature Granulocytes 0.2 <=0.4 10e3/uL    Absolute NRBCs 0.0 10e3/uL   T4 free   Result Value Ref Range    Free T4 1.05 0.90 - 1.70 ng/dL   Adult  Type and Screen   Result Value Ref Range    SPECIMEN EXPIRATION DATE [id number]            Imaging    PET Oncology Whole Body    Result Date: 8/13/2024  EXAM: PET ONCOLOGY WHOLE BODY LOCATION: [facility] DATE: 8/12/2024 INDICATION: Subsequent treatment strategy for malignant neoplasm of posterior wall of nasopharynx. Prior radiation to the head and neck. Interval chemotherapy. COMPARISON: FDG PET/CT 1/31/2024, CT 7/15/2024, CT 4/13/2024 CONTRAST: None TECHNIQUE: Serum glucose level 105 mg/dL. One hour post intravenous administration of 10.20 mCi F-18 FDG, PET imaging was performed from the skull vertex to feet, utilizing attenuation correction with concurrent axial CT and PET/CT image fusion. Dose reduction techniques were used. PET/CT FINDINGS: Technical note is made of prior altered biodistribution typical of a nonfasting state which limits accuracy of SUV max comparison between the current and prior exams. Interval decreased size and FDG uptake within centered on the right submandibular space extending along the of the angle of the mandible into the right buccal space (axial fused image 300), right submental space (axial fused image 291) and superficial and deep lobes of the right parotid gland axial fused images [id number]). Currently the largest portion of this mass measures 2.1 x 1.9 cm with SUV max 11.6, previously 4.5 x 3.3 cm with SUV max 13.1. Similar erosive changes involving the undersurface of the mandible however without FDG uptake. New asymmetric focal FDG uptake involving the right aspect of the vallecula (SUV max 5.3) which warrants direct inspection. New moderate FDG uptake subcentimeter right hilar lymph nodes (SUV max 4.3), possibly inflammatory but warranting attention on follow-up. FDG avid mucosal thickening throughout the paranasal sinuses with osteitis, likely chronic. Postsurgical changes involving the right ethmoid sinus. Inflammatory pattern of FDG uptake  associated with fibroatelectasis in the lung bases. Diffuse esophagitis. Prior FDG uptake associated with the harry of the diaphragm has resolved. CT FINDINGS: Mild senescent intracranial changes. Posttreatment changes in the neck with associated matted soft tissue thickening. Left chest wall port catheter with tip near the SVC/RA junction. Enlargement of the ascending aorta up to 4.5 cm. Hypoattenuation of the cardiac blood pool which can be seen with anemia. Stable size of spiculated pulmonary nodule in the right upper lobe posteriorly measuring 6 mm tethering the visceral pleura (series 4, image 162). Calcified granuloma left lower lobe. Cirrhotic liver morphology. Splenomegaly. Upper abdominal varices. Small volume abdominopelvic ascites. Peritoneal drain with tip in the left lower quadrant. Hysterectomy. Multilevel degenerative changes in the spine. The lower extremities are unremarkable.     IMPRESSION: 1.  Interval decreased size and FDG uptake within the mass centered on the right submandibular space extending into the right buccal space, right submental space and superficial and deep lobes of the right parotid gland. 2.  New asymmetric focal FDG uptake involving the right aspect of the vallecula warranting direct inspection. 3.  Similar erosive changes involving the undersurface of the mandible however without FDG uptake. 4.  Stable size of a 6 mm spiculated nodule in the right upper lobe without FDG uptake but below PET resolution. 5.  New moderate FDG uptake subcentimeter right hilar lymph nodes, possibly inflammatory but warranting attention on follow-up.       The longitudinal plan of care for the diagnosis(es)/condition(s) as documented were addressed during this visit. Due to the added complexity in care, I will continue to support N[place]amarlo in the subsequent management and with ongoing continuity of care.        Signed by: SPARKLE Guzmán CNP

## 2024-09-11 NOTE — PROGRESS NOTES
"Oncology Rooming Note    September 11, 2024 9:07 AM   Kristen Chapman is a 74 year old male who presents for:    Chief Complaint   Patient presents with    Oncology Clinic Visit     Return visit with labs and infusion     Initial Vitals: /60 (BP Location: Left arm, Patient Position: Sitting, Cuff Size: Adult Small)   Pulse 62   Temp 97.4  F (36.3  C) (Tympanic)   Resp 14   Ht 1.651 m (5' 5\")   Wt 48.5 kg (107 lb)   SpO2 100%   BMI 17.81 kg/m   Estimated body mass index is 17.81 kg/m  as calculated from the following:    Height as of this encounter: 1.651 m (5' 5\").    Weight as of this encounter: 48.5 kg (107 lb). Body surface area is 1.49 meters squared.  No Pain (0) Comment: Data Unavailable   No LMP for male patient.  Allergies reviewed: Yes  Medications reviewed: Yes    Medications: Medication refills not needed today.  Pharmacy name entered into [facility]:    [place], [place] - [address] [place] [address] PHARMACY [place], [place] - [address] [place] [address]    Frailty Screening:   Is the patient here for a new oncology consult visit in cancer care? 2. No      Clinical concerns: ear pain      PAIGE GALLEGO CMA              "

## 2024-09-11 NOTE — LETTER
9/11/2024      Kristen Chapman  [address] [place] [address]      Dear Colleague,    Thank you for referring your patient, Kristen Chapman, to the [facility] [place]. Please see a copy of my visit note below.    [facility] Hematology and Oncology Progress Note    Patient: Kristen Chapman  MRN: [id number]  Date of Service: Sep 11, 2024          Reason for Visit    Chief Complaint   Patient presents with     Oncology Clinic Visit     Return visit with labs and infusion       Assessment and Plan     Cancer Staging   No matching staging information was found for the patient.    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 Has started erbitux. Did that for one month and then added in Carboplatin. Had CT in April that showed progression. Wanted to try more treatment so has started on single agent gemzar. Getting 20% reduction (800mg/m2).  Did have to hold day 15 with the first cycle due to low platelets.  We have changed his cycle to every other week.  He is doing well with this. His recent CT scan showed significant improvement. He will continue his current regimen. His case was presented at the [facility] tumor board and it was felt that surgery or radiation would not be safe and likely not able to remove all cancer. Would likely cause significant damage. Continue gemzar. Repeat imaging roughly every 3 months. He will see Dr. Frias with CT in October.     Significant anemia in the setting of chronic pancytopenia, CKD: has liver dysfunction that is contributing. Also cancer contributing.  We will transfuse to keep his hemoglobin above 7 or if symptomatic, less than 8. Since he also continues to have stage III kidney dysfunction, we will add in Retacrit to assist.     Renal insufficiency: likely from medications. Avoid nephrotoxic medications.  Renal insufficiency is stable.  Stay hydrated.  Keep blood pressure well  controlled.    Hepatitis and liver dysfunction/cirrhosis: seeing GI/hepatologist.  Liver function has been looking pretty good.    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 Has had some nose bleeding since rivaroxaban. Currently on 20mg daily. If he has bleeding, or lower platelets, he may have to stop or decrease dose. Encourage pt to call us if he has any bleeding from tumor or other bleeding that does not stop.   Congestion: mainly in ear. Likely fluid. Will try claritin D in clinic to see if that improves. Likely needs decongestant. Continue robitussin BID at home. If not improvement or worsening symptoms, may need to try antibiotic for AOM.       ECOG Performance    1 - Can't do physically strenuous work, but fully ambyulatory and can do light sedentary work    Distress Screening (within last 30 days)    1. How concerned are you about your ability to eat? : 0  2. How concerned are you about unintended weight loss or your current weight? : 5  3. How concerned are you about feeling depressed or very sad? : 0  4. How concerned are you about feeling anxious or very scared? : 0  5. Do you struggle with the loss of meaning and terese in your life? : Somewhat  6. How concerned are you about work and home life issues that may be affected by your cancer? : 0  7. How concerned are you about knowing what resources are available to help you? : 0  8. 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  Diagnosed January 2020. Imaging suggested bilateral abnormal lymphadenopathy in the neck.  Also asymmetric soft tissue thickening in the posterior right nasopharynx.  On imaging, tumor also appeared to extend down to the hypopharynx.     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  Right neck lymph node positive for recurrent nasopharyngeal carcinoma.  PET scan at that time showed new hypermetabolic mediastinal lymphadenopathy (chronic, most likely reactive), mass in the right ethmoid sinus, hypermetabolic right level 1B and 2A lymph nodes.    Recurrent disease found in 2023/early 2024: PET scan on 1/31/24 shows: Disease progression with increased size of the enhancing hypermetabolic soft tissue mass about the right hemimandible measuring up to 5.0 cm with invasion into the floor of the right oral cavity and right base of tongue. No evidence of osseous invasion. New focal FDG uptake in/adjacent to the crux of the right and left hemidiaphragm, as well as retrocrural and retrocaval lymph nodes, suspicious for metastatic disease. Cirrhotic liver with sequela of portal hypertension including large volume ascites and splenogastric varices     Treatment:     Initially treated with concurrent chemotherapy and radiation.  He had weekly cisplatin starting March 3, 2020. Fifth and final dose given March 31, 2020.  Subsequent chemotherapy was held  "due to prolonged thrombocytopenia and renal insufficiency.  Radiation dose was 7000 cGy in 35 fractions given from March 2 through April 17, 2020.     Started cisplatin with infusional 5-FU on June 1, 2020.  Cycle 1 given at a 25% dose reduction.  He still had significant thrombocytopenia and neutropenia on day 28.  Cycle 2 was held.  At the same time his liver function tests elevated secondary to hepatitis B.   PET scan in September 2020 showed no good evidence of residual malignancy.     Recurrence:  Radiosurgery delivered to the right ethmoid tumor delivered November 8 through November 17, 2021.  Received 3500 cGy in 5 fractions.  Pembrolizumab started 12/23/21.  Held after his July 1, 2022 dose.  PET scan in March 2022 showed a near complete response.    -2/2/24: Started Erbitux therapy. Getting every 2 weeks.   -3/1/24: added in Carboplatin AUC 2 given every 2 weeks.   Progression in April 2024.     -4/23/24: started single agent Gemzar. Today is cycle 6     Interim History:    Patient is here today for a follow-up visit.  Overall he states he is doing okay.  He is chronically fatigued and tired. No new issues. Denies any new pain. Neck feels fine. Denies infectious complaints. Denies chest pain or palpiations. Some AYALA. Weight is stable. Does have some ear discomfort, muffled hearing for the last couple of days. Started robitussen last night. Didn't notice much improvement.     Review of Systems    Pertinent items are noted in HPI.    Past History    Past Medical History:   Diagnosis Date     Anemia      Dysphagia      Hepatitis B chronic      Hypothyroidism      Nasopharyngeal cancer (H)      Severe protein-calorie malnutrition (H24)      Thrombocytopenia (H24)        PHYSICAL EXAM  /60 (BP Location: Left arm, Patient Position: Sitting, Cuff Size: Adult Small)   Pulse 62   Temp 97.4  F (36.3  C) (Tympanic)   Resp 14   Ht 1.651 m (5' 5\")   Wt 48.5 kg (107 lb)   SpO2 100%   BMI 17.81 kg/m  "       GENERAL: no acute distress. Cooperative in conversation.  Patient's son is present.   on the phone.    RESP: Regular respiratory rate. No expiratory wheezes   NEURO: non focal. Alert and oriented x3.   PSYCH: within normal limits. No depression or anxiety.  SKIN: exposed skin is dry intact.     Lab Results    Recent Results (from the past 168 hour(s))   Comprehensive metabolic panel   Result Value Ref Range    Sodium 128 (L) 135 - 145 mmol/L    Potassium 4.9 3.4 - 5.3 mmol/L    Carbon Dioxide (CO2) 21 (L) 22 - 29 mmol/L    Anion Gap 8 7 - 15 mmol/L    Urea Nitrogen 38.6 (H) 8.0 - 23.0 mg/dL    Creatinine 1.63 (H) 0.67 - 1.17 mg/dL    GFR Estimate 44 (L) >60 mL/min/1.73m2    Calcium 7.9 (L) 8.8 - 10.4 mg/dL    Chloride 99 98 - 107 mmol/L    Glucose 143 (H) 70 - 99 mg/dL    Alkaline Phosphatase 121 40 - 150 U/L    AST 42 0 - 45 U/L    ALT 53 0 - 70 U/L    Protein Total 5.7 (L) 6.4 - 8.3 g/dL    Albumin 2.6 (L) 3.5 - 5.2 g/dL    Bilirubin Total 0.6 <=1.2 mg/dL   TSH with free T4 reflex   Result Value Ref Range    TSH 23.15 (H) 0.30 - 4.20 uIU/mL   CBC with platelets and differential   Result Value Ref Range    WBC Count 7.0 4.0 - 11.0 10e3/uL    RBC Count 2.78 (L) 4.40 - 5.90 10e6/uL    Hemoglobin 7.4 (L) 13.3 - 17.7 g/dL    Hematocrit 23.4 (L) 40.0 - 53.0 %    MCV 84 78 - 100 fL    MCH 26.6 26.5 - 33.0 pg    MCHC 31.6 31.5 - 36.5 g/dL    RDW 17.4 (H) 10.0 - 15.0 %    Platelet Count 72 (L) 150 - 450 10e3/uL    % Neutrophils 80 %    % Lymphocytes 5 %    % Monocytes 11 %    % Eosinophils 2 %    % Basophils 0 %    % Immature Granulocytes 2 %    NRBCs per 100 WBC 0 <1 /100    Absolute Neutrophils 5.6 1.6 - 8.3 10e3/uL    Absolute Lymphocytes 0.4 (L) 0.8 - 5.3 10e3/uL    Absolute Monocytes 0.7 0.0 - 1.3 10e3/uL    Absolute Eosinophils 0.1 0.0 - 0.7 10e3/uL    Absolute Basophils 0.0 0.0 - 0.2 10e3/uL    Absolute Immature Granulocytes 0.2 <=0.4 10e3/uL    Absolute NRBCs 0.0 10e3/uL   T4 free   Result Value  Ref Range    Free T4 1.05 0.90 - 1.70 ng/dL   Adult Type and Screen   Result Value Ref Range    SPECIMEN EXPIRATION DATE [id number]            Imaging    PET Oncology Whole Body    Result Date: 8/13/2024  EXAM: PET ONCOLOGY WHOLE BODY LOCATION: [facility] DATE: 8/12/2024 INDICATION: Subsequent treatment strategy for malignant neoplasm of posterior wall of nasopharynx. Prior radiation to the head and neck. Interval chemotherapy. COMPARISON: FDG PET/CT 1/31/2024, CT 7/15/2024, CT 4/13/2024 CONTRAST: None TECHNIQUE: Serum glucose level 105 mg/dL. One hour post intravenous administration of 10.20 mCi F-18 FDG, PET imaging was performed from the skull vertex to feet, utilizing attenuation correction with concurrent axial CT and PET/CT image fusion. Dose reduction techniques were used. PET/CT FINDINGS: Technical note is made of prior altered biodistribution typical of a nonfasting state which limits accuracy of SUV max comparison between the current and prior exams. Interval decreased size and FDG uptake within centered on the right submandibular space extending along the of the angle of the mandible into the right buccal space (axial fused image 300), right submental space (axial fused image 291) and superficial and deep lobes of the right parotid gland axial fused images [id number]). Currently the largest portion of this mass measures 2.1 x 1.9 cm with SUV max 11.6, previously 4.5 x 3.3 cm with SUV max 13.1. Similar erosive changes involving the undersurface of the mandible however without FDG uptake. New asymmetric focal FDG uptake involving the right aspect of the vallecula (SUV max 5.3) which warrants direct inspection. New moderate FDG uptake subcentimeter right hilar lymph nodes (SUV max 4.3), possibly inflammatory but warranting attention on follow-up. FDG avid mucosal thickening throughout the paranasal sinuses with osteitis, likely chronic. Postsurgical changes involving the right  ethmoid sinus. Inflammatory pattern of FDG uptake associated with fibroatelectasis in the lung bases. Diffuse esophagitis. Prior FDG uptake associated with the harry of the diaphragm has resolved. CT FINDINGS: Mild senescent intracranial changes. Posttreatment changes in the neck with associated matted soft tissue thickening. Left chest wall port catheter with tip near the SVC/RA junction. Enlargement of the ascending aorta up to 4.5 cm. Hypoattenuation of the cardiac blood pool which can be seen with anemia. Stable size of spiculated pulmonary nodule in the right upper lobe posteriorly measuring 6 mm tethering the visceral pleura (series 4, image 162). Calcified granuloma left lower lobe. Cirrhotic liver morphology. Splenomegaly. Upper abdominal varices. Small volume abdominopelvic ascites. Peritoneal drain with tip in the left lower quadrant. Hysterectomy. Multilevel degenerative changes in the spine. The lower extremities are unremarkable.     IMPRESSION: 1.  Interval decreased size and FDG uptake within the mass centered on the right submandibular space extending into the right buccal space, right submental space and superficial and deep lobes of the right parotid gland. 2.  New asymmetric focal FDG uptake involving the right aspect of the vallecula warranting direct inspection. 3.  Similar erosive changes involving the undersurface of the mandible however without FDG uptake. 4.  Stable size of a 6 mm spiculated nodule in the right upper lobe without FDG uptake but below PET resolution. 5.  New moderate FDG uptake subcentimeter right hilar lymph nodes, possibly inflammatory but warranting attention on follow-up.       The longitudinal plan of care for the diagnosis(es)/condition(s) as documented were addressed during this visit. Due to the added complexity in care, I will continue to support Kristen in the subsequent management and with ongoing continuity of care.        Signed by: SPARKLE Guzmán  "[facility]    Oncology Rooming Note    September 11, 2024 9:07 AM   Kristen Chapman is a 74 year old male who presents for:    Chief Complaint   Patient presents with     Oncology Clinic Visit     Return visit with labs and infusion     Initial Vitals: /60 (BP Location: Left arm, Patient Position: Sitting, Cuff Size: Adult Small)   Pulse 62   Temp 97.4  F (36.3  C) (Tympanic)   Resp 14   Ht 1.651 m (5' 5\")   Wt 48.5 kg (107 lb)   SpO2 100%   BMI 17.81 kg/m   Estimated body mass index is 17.81 kg/m  as calculated from the following:    Height as of this encounter: 1.651 m (5' 5\").    Weight as of this encounter: 48.5 kg (107 lb). Body surface area is 1.49 meters squared.  No Pain (0) Comment: Data Unavailable   No LMP for male patient.  Allergies reviewed: Yes  Medications reviewed: Yes    Medications: Medication refills not needed today.  Pharmacy name entered into [employer]:    [place], [place] - [address] [place] [address] PHARMACY [place], [place] - [address] [place] [address]    Frailty Screening:   Is the patient here for a new oncology consult visit in cancer care? 2. No      Clinical concerns: ear pain      PAIGE GALLEGO CMA                Again, thank you for allowing me to participate in the care of your patient.        Sincerely,        SPARKLE Guzmán CNP  "

## 2024-09-25 ENCOUNTER — INFUSION THERAPY VISIT (OUTPATIENT)
Dept: INFUSION THERAPY | Facility: HOSPITAL | Age: 74
End: 2024-09-25
Attending: INTERNAL MEDICINE
Payer: COMMERCIAL

## 2024-09-25 VITALS
TEMPERATURE: 97.7 F | SYSTOLIC BLOOD PRESSURE: 90 MMHG | DIASTOLIC BLOOD PRESSURE: 57 MMHG | OXYGEN SATURATION: 97 % | HEART RATE: 68 BPM | RESPIRATION RATE: 16 BRPM

## 2024-09-25 DIAGNOSIS — T45.1X5A ANTINEOPLASTIC CHEMOTHERAPY INDUCED ANEMIA: ICD-10-CM

## 2024-09-25 DIAGNOSIS — N18.31 STAGE 3A CHRONIC KIDNEY DISEASE (H): ICD-10-CM

## 2024-09-25 DIAGNOSIS — D64.81 ANTINEOPLASTIC CHEMOTHERAPY INDUCED ANEMIA: ICD-10-CM

## 2024-09-25 DIAGNOSIS — N18.31 ANEMIA OF CHRONIC RENAL FAILURE, STAGE 3A (H): ICD-10-CM

## 2024-09-25 DIAGNOSIS — D63.1 ANEMIA OF CHRONIC RENAL FAILURE, STAGE 3A (H): ICD-10-CM

## 2024-09-25 DIAGNOSIS — C11.9 SQUAMOUS CELL CARCINOMA OF NASOPHARYNX (H): Primary | ICD-10-CM

## 2024-09-25 LAB
BASOPHILS # BLD AUTO: 0 10E3/UL (ref 0–0.2)
BASOPHILS NFR BLD AUTO: 0 %
EOSINOPHIL # BLD AUTO: 0.1 10E3/UL (ref 0–0.7)
EOSINOPHIL NFR BLD AUTO: 1 %
ERYTHROCYTE [DISTWIDTH] IN BLOOD BY AUTOMATED COUNT: 19.6 % (ref 10–15)
HCT VFR BLD AUTO: 27.1 % (ref 40–53)
HGB BLD-MCNC: 8.7 G/DL (ref 13.3–17.7)
IMM GRANULOCYTES # BLD: 0.2 10E3/UL
IMM GRANULOCYTES NFR BLD: 1 %
LYMPHOCYTES # BLD AUTO: 0.7 10E3/UL (ref 0.8–5.3)
LYMPHOCYTES NFR BLD AUTO: 5 %
MCH RBC QN AUTO: 27.4 PG (ref 26.5–33)
MCHC RBC AUTO-ENTMCNC: 32.1 G/DL (ref 31.5–36.5)
MCV RBC AUTO: 85 FL (ref 78–100)
MONOCYTES # BLD AUTO: 0.9 10E3/UL (ref 0–1.3)
MONOCYTES NFR BLD AUTO: 6 %
NEUTROPHILS # BLD AUTO: 12.5 10E3/UL (ref 1.6–8.3)
NEUTROPHILS NFR BLD AUTO: 87 %
NRBC # BLD AUTO: 0 10E3/UL
NRBC BLD AUTO-RTO: 0 /100
PLATELET # BLD AUTO: 146 10E3/UL (ref 150–450)
RBC # BLD AUTO: 3.18 10E6/UL (ref 4.4–5.9)
WBC # BLD AUTO: 14.3 10E3/UL (ref 4–11)

## 2024-09-25 PROCEDURE — 36591 DRAW BLOOD OFF VENOUS DEVICE: CPT | Performed by: INTERNAL MEDICINE

## 2024-09-25 PROCEDURE — 85025 COMPLETE CBC W/AUTO DIFF WBC: CPT | Performed by: INTERNAL MEDICINE

## 2024-09-25 PROCEDURE — 258N000003 HC RX IP 258 OP 636: Performed by: INTERNAL MEDICINE

## 2024-09-25 PROCEDURE — 96413 CHEMO IV INFUSION 1 HR: CPT

## 2024-09-25 PROCEDURE — 96375 TX/PRO/DX INJ NEW DRUG ADDON: CPT

## 2024-09-25 PROCEDURE — 250N000011 HC RX IP 250 OP 636: Performed by: NURSE PRACTITIONER

## 2024-09-25 PROCEDURE — 96372 THER/PROPH/DIAG INJ SC/IM: CPT | Mod: XS | Performed by: NURSE PRACTITIONER

## 2024-09-25 PROCEDURE — 250N000011 HC RX IP 250 OP 636: Performed by: INTERNAL MEDICINE

## 2024-09-25 RX ORDER — HEPARIN SODIUM (PORCINE) LOCK FLUSH IV SOLN 100 UNIT/ML 100 UNIT/ML
5 SOLUTION INTRAVENOUS
Status: DISCONTINUED | OUTPATIENT
Start: 2024-09-25 | End: 2024-09-25 | Stop reason: HOSPADM

## 2024-09-25 RX ORDER — ONDANSETRON 2 MG/ML
8 INJECTION INTRAMUSCULAR; INTRAVENOUS ONCE
Status: COMPLETED | OUTPATIENT
Start: 2024-09-25 | End: 2024-09-25

## 2024-09-25 RX ORDER — METHYLPREDNISOLONE SODIUM SUCCINATE 125 MG/2ML
125 INJECTION, POWDER, LYOPHILIZED, FOR SOLUTION INTRAMUSCULAR; INTRAVENOUS
Status: DISCONTINUED | OUTPATIENT
Start: 2024-09-25 | End: 2024-09-25 | Stop reason: HOSPADM

## 2024-09-25 RX ORDER — ALBUTEROL SULFATE 90 UG/1
1-2 AEROSOL, METERED RESPIRATORY (INHALATION)
Status: DISCONTINUED | OUTPATIENT
Start: 2024-09-25 | End: 2024-09-25 | Stop reason: HOSPADM

## 2024-09-25 RX ORDER — DIPHENHYDRAMINE HYDROCHLORIDE 50 MG/ML
50 INJECTION INTRAMUSCULAR; INTRAVENOUS
Status: DISCONTINUED | OUTPATIENT
Start: 2024-09-25 | End: 2024-09-25 | Stop reason: HOSPADM

## 2024-09-25 RX ORDER — ALBUTEROL SULFATE 0.83 MG/ML
2.5 SOLUTION RESPIRATORY (INHALATION)
Status: DISCONTINUED | OUTPATIENT
Start: 2024-09-25 | End: 2024-09-25 | Stop reason: HOSPADM

## 2024-09-25 RX ORDER — MEPERIDINE HYDROCHLORIDE 25 MG/ML
25 INJECTION INTRAMUSCULAR; INTRAVENOUS; SUBCUTANEOUS EVERY 30 MIN PRN
Status: DISCONTINUED | OUTPATIENT
Start: 2024-09-25 | End: 2024-09-25 | Stop reason: HOSPADM

## 2024-09-25 RX ORDER — EPINEPHRINE 1 MG/ML
0.3 INJECTION, SOLUTION INTRAMUSCULAR; SUBCUTANEOUS EVERY 5 MIN PRN
Status: DISCONTINUED | OUTPATIENT
Start: 2024-09-25 | End: 2024-09-25 | Stop reason: HOSPADM

## 2024-09-25 RX ADMIN — ONDANSETRON 8 MG: 2 INJECTION INTRAMUSCULAR; INTRAVENOUS at 10:17

## 2024-09-25 RX ADMIN — EPOETIN ALFA-EPBX 10000 UNITS: 10000 INJECTION, SOLUTION INTRAVENOUS; SUBCUTANEOUS at 11:01

## 2024-09-25 RX ADMIN — Medication 5 ML: at 11:23

## 2024-09-25 RX ADMIN — GEMCITABINE 1200 MG: 38 INJECTION, SOLUTION INTRAVENOUS at 10:47

## 2024-09-25 NOTE — PROGRESS NOTES
Infusion Nursing Note:  Kristen Chapman presents today for D15C6 Gemzar and Retacrit.    Patient seen by provider today: No   present during visit today: Not Applicable.    Note: Kristen comes in today for his D15C6 Gemzar and his first dose of retacrit. I went over the plan of care and information about the medication with the patient and the son. They verbalized understanding. Port was accessed per protocol and labs were drawnand reviewed. Kristen did not meet requirements for blood today but did meet requirements for treatment and retacrit. Pre-med given and then gemzar hung. Retacrit given in left arm. Kristen tolerated the infusion and the injection without any issues. Port was flushed, heparinized, and then covered with gauze and tape. Kristen left ambulatory to the [facility] with the son.      Intravenous Access:  Labs drawn without difficulty.  Implanted Port.    Treatment Conditions:  Lab Results   Component Value Date    HGB 8.7 (L) 09/25/2024    WBC 14.3 (H) 09/25/2024    ANEUTAUTO 12.5 (H) 09/25/2024     (L) 09/25/2024        Results reviewed, labs MET treatment parameters, ok to proceed with treatment and Retacrit  Results reviewed, labs did NOT meet treatment parameters: hgb 8.7- does not meet requirements for blood today.      Post Infusion Assessment:  Patient tolerated infusion without incident.  Patient tolerated injection without incident.  Blood return noted pre and post infusion.  Site patent and intact, free from redness, edema or discomfort.  No evidence of extravasations.  Access discontinued per protocol.       Discharge Plan:   [facility]  Patient will return 10/09/24 for next appointment.   Patient discharged in stable condition accompanied by: son.  Departure Mode: Ambulatory.      DARLING DUEÑAS RN

## 2024-09-30 ENCOUNTER — DOCUMENTATION ONLY (OUTPATIENT)
Dept: ONCOLOGY | Facility: HOSPITAL | Age: 74
End: 2024-09-30
Payer: COMMERCIAL

## 2024-09-30 DIAGNOSIS — E03.9 ACQUIRED HYPOTHYROIDISM: ICD-10-CM

## 2024-09-30 NOTE — PROGRESS NOTES
Form Type: Family FMLA    Care Team Physician: Dr. Alan Frias    Date Faxed: 09/27/2024 at 4:22 pm    Recipient: [employer] at [phone]    INDIRA & Copy of Forms Sent to MR: YES

## 2024-10-01 RX ORDER — LEVOTHYROXINE SODIUM 175 UG/1
175 TABLET ORAL DAILY
Qty: 60 TABLET | Refills: 1 | Status: SHIPPED | OUTPATIENT
Start: 2024-10-01

## 2024-10-07 ENCOUNTER — HOSPITAL ENCOUNTER (OUTPATIENT)
Dept: CT IMAGING | Facility: HOSPITAL | Age: 74
Discharge: HOME | End: 2024-10-07
Attending: INTERNAL MEDICINE | Admitting: INTERNAL MEDICINE
Payer: COMMERCIAL

## 2024-10-07 DIAGNOSIS — E03.2 HYPOTHYROIDISM DUE TO DRUGS: ICD-10-CM

## 2024-10-07 DIAGNOSIS — K55.069 MESENTERIC VEIN THROMBOSIS (H): ICD-10-CM

## 2024-10-07 DIAGNOSIS — C11.9 SQUAMOUS CELL CARCINOMA OF NASOPHARYNX (H): ICD-10-CM

## 2024-10-07 PROCEDURE — 71260 CT THORAX DX C+: CPT

## 2024-10-07 PROCEDURE — 70491 CT SOFT TISSUE NECK W/DYE: CPT

## 2024-10-07 PROCEDURE — 250N000011 HC RX IP 250 OP 636: Performed by: INTERNAL MEDICINE

## 2024-10-07 RX ORDER — HEPARIN SODIUM,PORCINE 10 UNIT/ML
5-10 VIAL (ML) INTRAVENOUS EVERY 24 HOURS
Status: DISCONTINUED | OUTPATIENT
Start: 2024-10-07 | End: 2024-10-08 | Stop reason: HOSPADM

## 2024-10-07 RX ORDER — HEPARIN SODIUM,PORCINE 10 UNIT/ML
5-10 VIAL (ML) INTRAVENOUS
Status: DISCONTINUED | OUTPATIENT
Start: 2024-10-07 | End: 2024-10-08 | Stop reason: HOSPADM

## 2024-10-07 RX ORDER — HEPARIN SODIUM (PORCINE) LOCK FLUSH IV SOLN 100 UNIT/ML 100 UNIT/ML
5-10 SOLUTION INTRAVENOUS
Status: DISCONTINUED | OUTPATIENT
Start: 2024-10-07 | End: 2024-10-08 | Stop reason: HOSPADM

## 2024-10-07 RX ORDER — IOPAMIDOL 755 MG/ML
75 INJECTION, SOLUTION INTRAVASCULAR ONCE
Status: COMPLETED | OUTPATIENT
Start: 2024-10-07 | End: 2024-10-07

## 2024-10-07 RX ADMIN — Medication 5 ML: at 14:00

## 2024-10-07 RX ADMIN — IOPAMIDOL 75 ML: 755 INJECTION, SOLUTION INTRAVENOUS at 14:08

## 2024-10-09 ENCOUNTER — INFUSION THERAPY VISIT (OUTPATIENT)
Dept: INFUSION THERAPY | Facility: HOSPITAL | Age: 74
End: 2024-10-09
Attending: INTERNAL MEDICINE
Payer: COMMERCIAL

## 2024-10-09 ENCOUNTER — ONCOLOGY VISIT (OUTPATIENT)
Dept: ONCOLOGY | Facility: HOSPITAL | Age: 74
End: 2024-10-09
Attending: INTERNAL MEDICINE
Payer: COMMERCIAL

## 2024-10-09 ENCOUNTER — LAB (OUTPATIENT)
Dept: INFUSION THERAPY | Facility: HOSPITAL | Age: 74
End: 2024-10-09
Attending: RADIOLOGY
Payer: COMMERCIAL

## 2024-10-09 VITALS
DIASTOLIC BLOOD PRESSURE: 58 MMHG | OXYGEN SATURATION: 98 % | HEART RATE: 74 BPM | BODY MASS INDEX: 16.03 KG/M2 | TEMPERATURE: 98.5 F | RESPIRATION RATE: 20 BRPM | HEIGHT: 65 IN | WEIGHT: 96.2 LBS | SYSTOLIC BLOOD PRESSURE: 93 MMHG

## 2024-10-09 DIAGNOSIS — D63.1 ANEMIA OF CHRONIC RENAL FAILURE, STAGE 3A (H): ICD-10-CM

## 2024-10-09 DIAGNOSIS — C11.9 SQUAMOUS CELL CARCINOMA OF NASOPHARYNX (H): Primary | ICD-10-CM

## 2024-10-09 DIAGNOSIS — N18.31 ANEMIA OF CHRONIC RENAL FAILURE, STAGE 3A (H): ICD-10-CM

## 2024-10-09 DIAGNOSIS — N18.31 STAGE 3A CHRONIC KIDNEY DISEASE (H): ICD-10-CM

## 2024-10-09 DIAGNOSIS — E03.2 HYPOTHYROIDISM DUE TO MEDICATION: ICD-10-CM

## 2024-10-09 DIAGNOSIS — D64.81 ANTINEOPLASTIC CHEMOTHERAPY INDUCED ANEMIA: ICD-10-CM

## 2024-10-09 DIAGNOSIS — T45.1X5A ANTINEOPLASTIC CHEMOTHERAPY INDUCED ANEMIA: ICD-10-CM

## 2024-10-09 LAB
ALBUMIN SERPL BCG-MCNC: 2.9 G/DL (ref 3.5–5.2)
ALP SERPL-CCNC: 114 U/L (ref 40–150)
ALT SERPL W P-5'-P-CCNC: 26 U/L (ref 0–70)
ANION GAP SERPL CALCULATED.3IONS-SCNC: 10 MMOL/L (ref 7–15)
AST SERPL W P-5'-P-CCNC: 24 U/L (ref 0–45)
BASOPHILS # BLD AUTO: 0 10E3/UL (ref 0–0.2)
BASOPHILS NFR BLD AUTO: 0 %
BILIRUB SERPL-MCNC: 0.7 MG/DL
BUN SERPL-MCNC: 37.5 MG/DL (ref 8–23)
CALCIUM SERPL-MCNC: 10 MG/DL (ref 8.8–10.4)
CHLORIDE SERPL-SCNC: 101 MMOL/L (ref 98–107)
CREAT SERPL-MCNC: 1.98 MG/DL (ref 0.67–1.17)
EGFRCR SERPLBLD CKD-EPI 2021: 35 ML/MIN/1.73M2
EOSINOPHIL # BLD AUTO: 0.1 10E3/UL (ref 0–0.7)
EOSINOPHIL NFR BLD AUTO: 1 %
ERYTHROCYTE [DISTWIDTH] IN BLOOD BY AUTOMATED COUNT: 21.4 % (ref 10–15)
GLUCOSE SERPL-MCNC: 136 MG/DL (ref 70–99)
HCO3 SERPL-SCNC: 21 MMOL/L (ref 22–29)
HCT VFR BLD AUTO: 25.7 % (ref 40–53)
HGB BLD-MCNC: 8.1 G/DL (ref 13.3–17.7)
IMM GRANULOCYTES # BLD: 0.1 10E3/UL
IMM GRANULOCYTES NFR BLD: 1 %
LYMPHOCYTES # BLD AUTO: 0.7 10E3/UL (ref 0.8–5.3)
LYMPHOCYTES NFR BLD AUTO: 10 %
MCH RBC QN AUTO: 27.6 PG (ref 26.5–33)
MCHC RBC AUTO-ENTMCNC: 31.5 G/DL (ref 31.5–36.5)
MCV RBC AUTO: 87 FL (ref 78–100)
MONOCYTES # BLD AUTO: 0.9 10E3/UL (ref 0–1.3)
MONOCYTES NFR BLD AUTO: 11 %
NEUTROPHILS # BLD AUTO: 5.9 10E3/UL (ref 1.6–8.3)
NEUTROPHILS NFR BLD AUTO: 77 %
NRBC # BLD AUTO: 0 10E3/UL
NRBC BLD AUTO-RTO: 0 /100
PLATELET # BLD AUTO: 96 10E3/UL (ref 150–450)
POTASSIUM SERPL-SCNC: 5.1 MMOL/L (ref 3.4–5.3)
PROT SERPL-MCNC: 6.7 G/DL (ref 6.4–8.3)
RBC # BLD AUTO: 2.94 10E6/UL (ref 4.4–5.9)
SODIUM SERPL-SCNC: 132 MMOL/L (ref 135–145)
T4 FREE SERPL-MCNC: 1.56 NG/DL (ref 0.9–1.7)
TSH SERPL DL<=0.005 MIU/L-ACNC: 5.2 UIU/ML (ref 0.3–4.2)
WBC # BLD AUTO: 7.7 10E3/UL (ref 4–11)

## 2024-10-09 PROCEDURE — G0463 HOSPITAL OUTPT CLINIC VISIT: HCPCS | Performed by: INTERNAL MEDICINE

## 2024-10-09 PROCEDURE — 96372 THER/PROPH/DIAG INJ SC/IM: CPT | Performed by: NURSE PRACTITIONER

## 2024-10-09 PROCEDURE — 84439 ASSAY OF FREE THYROXINE: CPT

## 2024-10-09 PROCEDURE — 84443 ASSAY THYROID STIM HORMONE: CPT

## 2024-10-09 PROCEDURE — 96413 CHEMO IV INFUSION 1 HR: CPT

## 2024-10-09 PROCEDURE — 258N000003 HC RX IP 258 OP 636: Performed by: INTERNAL MEDICINE

## 2024-10-09 PROCEDURE — 250N000011 HC RX IP 250 OP 636: Performed by: INTERNAL MEDICINE

## 2024-10-09 PROCEDURE — 80053 COMPREHEN METABOLIC PANEL: CPT | Performed by: INTERNAL MEDICINE

## 2024-10-09 PROCEDURE — 85004 AUTOMATED DIFF WBC COUNT: CPT | Performed by: INTERNAL MEDICINE

## 2024-10-09 PROCEDURE — 250N000011 HC RX IP 250 OP 636: Performed by: NURSE PRACTITIONER

## 2024-10-09 PROCEDURE — 96375 TX/PRO/DX INJ NEW DRUG ADDON: CPT

## 2024-10-09 PROCEDURE — G2211 COMPLEX E/M VISIT ADD ON: HCPCS | Performed by: INTERNAL MEDICINE

## 2024-10-09 PROCEDURE — 99215 OFFICE O/P EST HI 40 MIN: CPT | Performed by: INTERNAL MEDICINE

## 2024-10-09 PROCEDURE — 36591 DRAW BLOOD OFF VENOUS DEVICE: CPT | Performed by: INTERNAL MEDICINE

## 2024-10-09 RX ORDER — DIPHENHYDRAMINE HYDROCHLORIDE 50 MG/ML
50 INJECTION INTRAMUSCULAR; INTRAVENOUS
Status: DISCONTINUED | OUTPATIENT
Start: 2024-10-09 | End: 2024-10-09 | Stop reason: HOSPADM

## 2024-10-09 RX ORDER — LORAZEPAM 2 MG/ML
0.5 INJECTION INTRAMUSCULAR EVERY 4 HOURS PRN
OUTPATIENT
Start: 2024-12-18

## 2024-10-09 RX ORDER — ONDANSETRON 2 MG/ML
8 INJECTION INTRAMUSCULAR; INTRAVENOUS ONCE
OUTPATIENT
Start: 2024-12-18

## 2024-10-09 RX ORDER — MEPERIDINE HYDROCHLORIDE 25 MG/ML
25 INJECTION INTRAMUSCULAR; INTRAVENOUS; SUBCUTANEOUS EVERY 30 MIN PRN
Status: CANCELLED | OUTPATIENT
Start: 2024-12-18

## 2024-10-09 RX ORDER — HEPARIN SODIUM (PORCINE) LOCK FLUSH IV SOLN 100 UNIT/ML 100 UNIT/ML
5 SOLUTION INTRAVENOUS
OUTPATIENT
Start: 2024-12-18

## 2024-10-09 RX ORDER — ALBUTEROL SULFATE 0.83 MG/ML
2.5 SOLUTION RESPIRATORY (INHALATION)
Status: CANCELLED | OUTPATIENT
Start: 2024-12-18

## 2024-10-09 RX ORDER — ALBUTEROL SULFATE 90 UG/1
1-2 INHALANT RESPIRATORY (INHALATION)
Status: CANCELLED
Start: 2024-12-04

## 2024-10-09 RX ORDER — HEPARIN SODIUM,PORCINE 10 UNIT/ML
5-20 VIAL (ML) INTRAVENOUS DAILY PRN
OUTPATIENT
Start: 2024-12-18

## 2024-10-09 RX ORDER — METHYLPREDNISOLONE SODIUM SUCCINATE 125 MG/2ML
125 INJECTION INTRAMUSCULAR; INTRAVENOUS
Status: CANCELLED
Start: 2024-12-04

## 2024-10-09 RX ORDER — MEPERIDINE HYDROCHLORIDE 25 MG/ML
25 INJECTION INTRAMUSCULAR; INTRAVENOUS; SUBCUTANEOUS EVERY 30 MIN PRN
Status: DISCONTINUED | OUTPATIENT
Start: 2024-10-09 | End: 2024-10-09 | Stop reason: HOSPADM

## 2024-10-09 RX ORDER — LORAZEPAM 2 MG/ML
0.5 INJECTION INTRAMUSCULAR EVERY 4 HOURS PRN
OUTPATIENT
Start: 2024-12-04

## 2024-10-09 RX ORDER — EPINEPHRINE 1 MG/ML
0.3 INJECTION, SOLUTION INTRAMUSCULAR; SUBCUTANEOUS EVERY 5 MIN PRN
Status: CANCELLED | OUTPATIENT
Start: 2024-12-04

## 2024-10-09 RX ORDER — MEPERIDINE HYDROCHLORIDE 25 MG/ML
25 INJECTION INTRAMUSCULAR; INTRAVENOUS; SUBCUTANEOUS EVERY 30 MIN PRN
Status: CANCELLED | OUTPATIENT
Start: 2024-12-04

## 2024-10-09 RX ORDER — ALBUTEROL SULFATE 0.83 MG/ML
2.5 SOLUTION RESPIRATORY (INHALATION)
Status: DISCONTINUED | OUTPATIENT
Start: 2024-10-09 | End: 2024-10-09 | Stop reason: HOSPADM

## 2024-10-09 RX ORDER — ONDANSETRON 2 MG/ML
8 INJECTION INTRAMUSCULAR; INTRAVENOUS ONCE
OUTPATIENT
Start: 2024-12-04

## 2024-10-09 RX ORDER — DIPHENHYDRAMINE HYDROCHLORIDE 50 MG/ML
50 INJECTION INTRAMUSCULAR; INTRAVENOUS
Status: CANCELLED | OUTPATIENT
Start: 2024-12-18

## 2024-10-09 RX ORDER — ONDANSETRON 2 MG/ML
8 INJECTION INTRAMUSCULAR; INTRAVENOUS ONCE
Status: COMPLETED | OUTPATIENT
Start: 2024-10-09 | End: 2024-10-09

## 2024-10-09 RX ORDER — EPINEPHRINE 1 MG/ML
0.3 INJECTION, SOLUTION INTRAMUSCULAR; SUBCUTANEOUS EVERY 5 MIN PRN
Status: CANCELLED | OUTPATIENT
Start: 2024-12-18

## 2024-10-09 RX ORDER — METHYLPREDNISOLONE SODIUM SUCCINATE 125 MG/2ML
125 INJECTION INTRAMUSCULAR; INTRAVENOUS
Status: CANCELLED | OUTPATIENT
Start: 2024-12-18

## 2024-10-09 RX ORDER — ALBUTEROL SULFATE 90 UG/1
1-2 INHALANT RESPIRATORY (INHALATION)
Status: CANCELLED | OUTPATIENT
Start: 2024-12-18

## 2024-10-09 RX ORDER — METHYLPREDNISOLONE SODIUM SUCCINATE 125 MG/2ML
125 INJECTION INTRAMUSCULAR; INTRAVENOUS
Status: DISCONTINUED | OUTPATIENT
Start: 2024-10-09 | End: 2024-10-09 | Stop reason: HOSPADM

## 2024-10-09 RX ORDER — HEPARIN SODIUM (PORCINE) LOCK FLUSH IV SOLN 100 UNIT/ML 100 UNIT/ML
5 SOLUTION INTRAVENOUS
OUTPATIENT
Start: 2024-12-04

## 2024-10-09 RX ORDER — ALBUTEROL SULFATE 90 UG/1
1-2 INHALANT RESPIRATORY (INHALATION)
Status: DISCONTINUED | OUTPATIENT
Start: 2024-10-09 | End: 2024-10-09 | Stop reason: HOSPADM

## 2024-10-09 RX ORDER — ALBUTEROL SULFATE 0.83 MG/ML
2.5 SOLUTION RESPIRATORY (INHALATION)
Status: CANCELLED | OUTPATIENT
Start: 2024-12-04

## 2024-10-09 RX ORDER — HEPARIN SODIUM,PORCINE 10 UNIT/ML
5-20 VIAL (ML) INTRAVENOUS DAILY PRN
OUTPATIENT
Start: 2024-12-04

## 2024-10-09 RX ORDER — EPINEPHRINE 1 MG/ML
0.3 INJECTION, SOLUTION INTRAMUSCULAR; SUBCUTANEOUS EVERY 5 MIN PRN
Status: DISCONTINUED | OUTPATIENT
Start: 2024-10-09 | End: 2024-10-09 | Stop reason: HOSPADM

## 2024-10-09 RX ORDER — HEPARIN SODIUM (PORCINE) LOCK FLUSH IV SOLN 100 UNIT/ML 100 UNIT/ML
5 SOLUTION INTRAVENOUS
Status: DISCONTINUED | OUTPATIENT
Start: 2024-10-09 | End: 2024-10-09 | Stop reason: HOSPADM

## 2024-10-09 RX ORDER — DIPHENHYDRAMINE HYDROCHLORIDE 50 MG/ML
50 INJECTION INTRAMUSCULAR; INTRAVENOUS
Status: CANCELLED
Start: 2024-12-04

## 2024-10-09 RX ADMIN — GEMCITABINE 1200 MG: 38 INJECTION, SOLUTION INTRAVENOUS at 11:05

## 2024-10-09 RX ADMIN — ONDANSETRON 8 MG: 2 INJECTION INTRAMUSCULAR; INTRAVENOUS at 10:47

## 2024-10-09 RX ADMIN — EPOETIN ALFA-EPBX 10000 UNITS: 20000 INJECTION, SOLUTION INTRAVENOUS; SUBCUTANEOUS at 11:40

## 2024-10-09 RX ADMIN — Medication 5 ML: at 11:37

## 2024-10-09 ASSESSMENT — PAIN SCALES - GENERAL: PAINLEVEL: NO PAIN (0)

## 2024-10-09 NOTE — PROGRESS NOTES
[facility] Hematology and Oncology Progress Note    Patient: Kristen Chapman  MRN: [id number]  Date of Service: Oct 9, 2024        Assessment and Plan:    Cancer Staging  Nasopharyngeal carcinoma (H)  Staging form: Pharynx - Nasopharynx, AJCC 8th Edition  - Clinical stage from 2/18/2020: Stage SAMANTHA (cT4, cN2, cM0) - Signed by Alan Frias MD on 2/18/2020     1.  Nasopharyngeal carcinoma: He remains on single agent Keytruda.  He recently had follow-up CT imaging.  I personally reviewed the images and shared them with Kristen.  I also personally reviewed the images with the reading radiologist.  Overall the tumor appears to be generally stable.  There might be some enlargement but if anything it is mild and in my opinion not enough to change treatment.  The patient himself is not noting any enlargement clinically.  We decided to keep him on Keytruda as he is tolerating it well.  Will repeat imaging in 3 months and see how things look at that point.  At this point there is no plan for further resection or radiation.    2.  Pancytopenia: Chronic.  Multifactorial.  Exacerbated by chemotherapy.  CBC from today was reviewed and shows a white blood cell count of 7.7, hemoglobin 8.1, platelets 96,000. All generally stable.  If his hemoglobin trends down then we could consider adding Retacrit.    He has chronic pancytopenia secondary to chronic hepatitis B infection as well as cirrhosis. Anemia of CKD as well.  He also has splenomegaly measuring 17 cm on CT from Sophia 15, 2023.  He had a bone marrow biopsy in October 2021.  Next generation sequencing showed no abnormalities.  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     3.  Hypothyroidism: His TSH today was 5.2 which is appropriate.  Free T4 level is okay at 1.56.  Within normal limits.  He will continue his levothyroxine.  No dose changes.      4.  Hepatitis B infection: He should still be on tenofovir.  I believe his last follow-up with hepatology was in  April. 5.  Ascites: His ascitic fluid production is decreased significantly.  He is only needing a drain now once a week.  It was previously a liter every 4 to 5 days.  Continue spironolactone and Lasix.    Medical decision Making:  I spent 41 minutes in the care of this patient today, which included time necessary for preparation for the visit, face to face time with the patient, communication of recommendations to the care team, and documentation time.  Today's visit was centered around a cancer diagnosis in the setting of additional medical comorbidities. Complex medical decision making was required. The risk of additional morbidity without further treatment is high.     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 Diagnosed January 2020. Imaging suggested bilateral abnormal lymphadenopathy in the neck.  Also asymmetric soft tissue thickening in the posterior right nasopharynx.  On imaging, tumor also appeared to extend down to the hypopharynx.    Recurrent 1 disease involving the ethmoid sinus diagnosed September 2021.  Right neck lymph node positive for recurrent nasopharyngeal carcinoma.  PET scan at that time showed new hypermetabolic mediastinal lymphadenopathy (chronic, most likely reactive), mass in the right ethmoid sinus, hypermetabolic right level 1B and 2A lymph nodes.    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 .   P16 negative, TRK negative, MAIDA  positive. PDL1 TPS 5%,    NGS (from February 12, 2024 biopsy):  TMB low, pMMR, PD-L1 20, TRK negative, MET/RET/BRAF negative.      2  Pancytopenia:  chronic. Multifactorial.     Treatment:    Initially treated with concurrent chemotherapy and radiation.  He had weekly cisplatin starting March 3, 2020. Fifth and final dose given March 31, 2020.   Subsequent chemotherapy was held due to prolonged thrombocytopenia and renal insufficiency.  Radiation dose was 7000 cGy in 35 fractions given from March 2 through April 17, 2020.     Started cisplatin with infusional 5-FU on June 1, 2020.  Cycle 1 given at a 25% dose reduction.  He still had significant thrombocytopenia and neutropenia on day 28.  Cycle 2 was held.  At the same time his liver function tests elevated secondary to hepatitis B.   PET scan in September 2020 showed no good evidence of residual malignancy.    Recurrence 1:  Radiosurgery delivered to the right ethmoid tumor delivered November 8 through November 17, 2021.  Received 3500 cGy in 5 fractions.  Pembrolizumab started 12/23/21.  Held after his July 1, 2022 dose.  PET scan in March 2022 showed a near complete response.    Recurrence 2:  Cetuximab initiated 2/2/2024.  400 mg every 14 days.  Carboplatin added cycle 3-day 1 on March 29, 2024.  AUC 2.  Progression after 3 cycles.    Gemcitabine initiated April 23, 2024.  Switch to days 1 and 15 with cycle 2.    Interim History:    Kristen Chapman returns today for follow-up visit.  His son states that he had a significant flulike illness recently.  Symptoms lasted for 3 to 4 weeks.  He is slowly recovering.  He had lost some weight during that episode.  He is only needing his abdominal fluid drained every week to 2 weeks.  No acute complaints today.    Review of Systems:    As above in the history.     Review of Systems otherwise Negative for:  General: chills, fever or night sweats  Psychological: anxiety or depression  Ophthalmic: blurry vision, double vision or loss of vision, vision change  ENT: oral lesions, hearing changes  Hematological and Lymphatic: bruising, jaundice, swollen lymph nodes  Endocrine: hot flashes  Respiratory: cough, hemoptysis, orthopnea  Cardiovascular: chest pain, palpitations or PND  Gastrointestinal: blood in stools, change in bowel habits, constipation, diarrhea or  "nausea/vomiting  Genito-Urinary: change in urinary stream, incontinence, frequency/urgency  Musculoskeletal: joint swelling, muscle pain  Neurological: dizziness, headaches, numbness/tingling  Dermatological: lumps and rash    Past History:    Past Medical History:   Diagnosis Date    Anemia     Dysphagia     Hepatitis B chronic     Hypothyroidism     Nasopharyngeal cancer (H)     Severe protein-calorie malnutrition (H)     Thrombocytopenia (H)      Physical Exam:    BP 93/58   Pulse 74   Temp 98.5  F (36.9  C)   Resp 20   Ht 1.651 m (5' 5\")   Wt 43.6 kg (96 lb 3.2 oz)   SpO2 98%   BMI 16.01 kg/m      General: patient appears stated age of 71 year old. Nontoxic and in no distress.   HEENT: Head: atraumatic, normocephalic. Sclerae anicteric.  Chest:  Normal respiratory effort  Cardiac:  No edema.  Abdomen: abdomen is distended.  Extremities: normal tone and muscle bulk.  Skin: no lesions or rash on visible skin. Warm and dry.   CNS: alert and oriented. Grossly non-focal.   Psychiatric: normal mood and affect.     Lab Results:    Recent Results (from the past 168 hour(s))   CBC with platelets and differential   Result Value Ref Range    WBC Count 7.7 4.0 - 11.0 10e3/uL    RBC Count 2.94 (L) 4.40 - 5.90 10e6/uL    Hemoglobin 8.1 (L) 13.3 - 17.7 g/dL    Hematocrit 25.7 (L) 40.0 - 53.0 %    MCV 87 78 - 100 fL    MCH 27.6 26.5 - 33.0 pg    MCHC 31.5 31.5 - 36.5 g/dL    RDW 21.4 (H) 10.0 - 15.0 %    Platelet Count 96 (L) 150 - 450 10e3/uL    % Neutrophils 77 %    % Lymphocytes 10 %    % Monocytes 11 %    % Eosinophils 1 %    % Basophils 0 %    % Immature Granulocytes 1 %    NRBCs per 100 WBC 0 <1 /100    Absolute Neutrophils 5.9 1.6 - 8.3 10e3/uL    Absolute Lymphocytes 0.7 (L) 0.8 - 5.3 10e3/uL    Absolute Monocytes 0.9 0.0 - 1.3 10e3/uL    Absolute Eosinophils 0.1 0.0 - 0.7 10e3/uL    Absolute Basophils 0.0 0.0 - 0.2 10e3/uL    Absolute Immature Granulocytes 0.1 <=0.4 10e3/uL    Absolute NRBCs 0.0 10e3/uL "     Imaging:    CT Soft Tissue Neck w Contrast    Result Date: 10/8/2024  EXAM: CT SOFT TISSUE NECK W CONTRAST LOCATION: [facility] DATE: 10/7/2024 INDICATION: Follow-up metastatic nasopharyngeal carcinoma. COMPARISON: None. CONTRAST: NA TECHNIQUE: Routine CT Soft Tissue Neck with IV contrast. Multiplanar reformats. Dose reduction techniques were used. FINDINGS: Confluent mass extending from the right lateral and right posterolateral floor of mouth, through the right submandibular space and up into the right parotid space (both superficial and deep) again noted. The superficial aspect of the mass ends at an area of skin ulceration in the right submandibular space. There has been interval increase in size of the soft tissue component just beneath the posterior body/angle of the right side of the mandible measuring 2.3 x 1.5 cm in the AP and transverse dimensions respectively (series 3, image 52). This is worrisome for progression of this presumed metastatic lesion. MUCOSAL SPACES/SOFT TISSUES: Normal mucosal spaces of the upper aerodigestive tract. No mucosal mass or inflammation identified. Normal vocal cords and infraglottic trachea. Normal parapharyngeal space. LYMPH NODES: No pathologic lymph nodes by size or morphology criteria. SALIVARY GLANDS: The right parotid and right submandibular glands are involved by the presumed metastatic disease in the right neck. The left submandibular gland is atrophied. The left parotid gland is unremarkable. THYROID: There is a tiny hypodense nodule in the right lobe of the thyroid gland measuring roughly 3 mm long axis. Previously visualized tiny hypodense nodules in both lobes of the thyroid gland, visualized on the comparison study, are not definitely visualized on the current exam possibly due to differences in contrast enhancement phase. VESSELS: Vascular structures of the neck are patent. VISUALIZED INTRACRANIAL/ORBITS/SINUSES: No abnormality of the  visualized intracranial compartment or orbits. Scattered mucosal thickening throughout the paranasal sinuses again noted. The right frontal and bilateral ethmoid air cells are incompletely visualized on the current study but there is persistent presumed soft tissue opacification within the anterior ethmoid air cells on the right and in the right frontal sinus that could represent residual or recurrent malignancy. Chronic sinus disease is also possible. OTHER: No destructive osseous lesion. The included lung apices are clear.     IMPRESSION: 1.  Interval increase in size of an area of abnormal enhancing soft tissue along the undersurface of the posterior body and angle of the right side of the mandible worrisome for progression of metastatic disease. This area of abnormal enhancement is within a larger area of abnormal enhancement that extends from the right lateral floor of mouth, down into the right submandibular space and up into the right parotid space. 2.  Persistent sinus disease. Malignancy is not excluded. 3.  Tiny thyroid nodule in the right. 4.  Otherwise, normal soft tissue neck CT.    CT Chest w Contrast    Result Date: 10/8/2024  EXAM: CT CHEST W CONTRAST LOCATION: [facility] DATE: 10/7/2024 INDICATION: Follow up head neck cancer COMPARISON: [facility] PET/CTs 8/12/2024 and CTs 7/15/2024 and priors to 6/15/2023 TECHNIQUE: CT chest with IV contrast. Multiplanar reformats were obtained. Dose reduction techniques were used. CONTRAST: 75ml [id number] FINDINGS: LUNGS AND PLEURA: The 6 mm subpleural spiculated nodule in the right upper lobe posteriorly is unchanged (image 69, series 4). Small area of airspace opacity in the posterior segment right upper lobe consistent with atelectasis and/or resolving pneumonia  is new. Several small foci of chronic consolidation and volume loss in the periphery of the paradiaphragmatic basilar lung parenchyma unchanged. No new pulmonary nodules. Lungs are  otherwise clear. No pleural effusion.  MEDIASTINUM/AXILLAE: No lymphadenopathy. Port anterior left chest wall with left IJ central venous catheter tip near the SVC/RA junction. Stable enlargement of the ascending aorta up to 4.5 cm. CORONARY ARTERY CALCIFICATION: No visible coronary artery calcification. UPPER ABDOMEN: Hepatic cirrhosis, splenomegaly, upper abdominal varices and small volume ascites unchanged. MUSCULOSKELETAL: No bone lesions. Bones are demineralized.     IMPRESSION: 1.  The 6 mm subpleural spiculated nodule in the right upper lobe posteriorly is unchanged. 2.  A small area of airspace opacity in the posterior segment right upper lobe consistent with atelectasis and/or resolving pneumonia is new. 3.  No thoracic lymphadenopathy.       Signed by: Alan Frias MD

## 2024-10-09 NOTE — LETTER
"10/9/2024      Kristen Chapman  [address] [place] [address]      Dear Colleague,    Thank you for referring your patient, Kristen Chapman, to the [facility]. Please see a copy of my visit note below.    Oncology Rooming Note    October 9, 2024 8:32 AM   Kristen Chapman is a 74 year old male who presents for:    Chief Complaint   Patient presents with     Oncology Clinic Visit     Nasopharyngeal carcinoma (H)  Squamous cell carcinoma of nasopharynx (H)       Initial Vitals: BP 93/58   Pulse 74   Temp 98.5  F (36.9  C)   Resp 20   Ht 1.651 m (5' 5\")   Wt 43.6 kg (96 lb 3.2 oz)   SpO2 98%   BMI 16.01 kg/m   Estimated body mass index is 16.01 kg/m  as calculated from the following:    Height as of this encounter: 1.651 m (5' 5\").    Weight as of this encounter: 43.6 kg (96 lb 3.2 oz). Body surface area is 1.41 meters squared.  No Pain (0) Comment: Data Unavailable   No LMP for male patient.  Allergies reviewed: Yes  Medications reviewed: Yes    Medications: Medication refills not needed today.  Pharmacy name entered into [employer]:    [facility] - [place], [place] - [address] [address] PHARMACY [place], [place] - [address] [place] [address]    Frailty Screening:   Is the patient here for a new oncology consult visit in cancer care? 2. No      Clinical concerns: [place]- gotten worse over the last month, and feeling tired.       Shannon Sanchez LPN               [facility] Progress Note    Patient: Kristen Chapman  MRN: [id number]  Date of Service: Oct 9, 2024        Assessment and Plan:    Cancer Staging  Nasopharyngeal carcinoma (H)  Staging form: Pharynx - Nasopharynx, AJCC 8th Edition  - Clinical stage from 2/18/2020: Stage SAMANTHA (cT4, cN2, cM0) - Signed by Alan Frias MD on 2/18/2020     1.  Nasopharyngeal carcinoma: He remains on single agent Keytruda.  He recently had follow-up CT imaging.  I personally reviewed the images and shared them with Kristen.  ANALIA " also personally reviewed the images with the reading radiologist.  Overall the tumor appears to be generally stable.  There might be some enlargement but if anything it is mild and in my opinion not enough to change treatment.  The patient himself is not noting any enlargement clinically.  We decided to keep him on Keytruda as he is tolerating it well.  Will repeat imaging in 3 months and see how things look at that point.  At this point there is no plan for further resection or radiation.    2.  Pancytopenia: Chronic.  Multifactorial.  Exacerbated by chemotherapy.  CBC from today was reviewed and shows a white blood cell count of 7.7, hemoglobin 8.1, platelets 96,000. All generally stable.  If his hemoglobin trends down then we could consider adding Retacrit.    He has chronic pancytopenia secondary to chronic hepatitis B infection as well as cirrhosis. Anemia of CKD as well.  He also has splenomegaly measuring 17 cm on CT from Sophia 15, 2023.  He had a bone marrow biopsy in October 2021.  Next generation sequencing showed no abnormalities.  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     3.  Hypothyroidism: His TSH today was 5.2 which is appropriate.  Free T4 level is okay at 1.56.  Within normal limits.  He will continue his levothyroxine.  No dose changes.      4.  Hepatitis B infection: He should still be on tenofovir.  I believe his last follow-up with hepatology was in April.    5.  Ascites: His ascitic fluid production is decreased significantly.  He is only needing a drain now once a week.  It was previously a liter every 4 to 5 days.  Continue spironolactone and Lasix.    Medical decision Making:  I spent 41 minutes in the care of this patient today, which included time necessary for preparation for the visit, face to face time with the patient, communication of recommendations to the care team, and documentation time.  Today's visit was centered around a cancer diagnosis in the setting of  additional medical comorbidities. Complex medical decision making was required. The risk of additional morbidity without further treatment is high.     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 Diagnosed January 2020. Imaging suggested bilateral abnormal lymphadenopathy in the neck.  Also asymmetric soft tissue thickening in the posterior right nasopharynx.  On imaging, tumor also appeared to extend down to the hypopharynx.    Recurrent 1 disease involving the ethmoid sinus diagnosed September 2021.  Right neck lymph node positive for recurrent nasopharyngeal carcinoma.  PET scan at that time showed new hypermetabolic mediastinal lymphadenopathy (chronic, most likely reactive), mass in the right ethmoid sinus, hypermetabolic right level 1B and 2A lymph nodes.    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 .   P16 negative, TRK negative, MAIDA  positive. PDL1 TPS 5%,    NGS (from February 12, 2024 biopsy):  TMB low, pMMR, PD-L1 20, TRK negative, MET/RET/BRAF negative.      2  Pancytopenia:  chronic. Multifactorial.     Treatment:    Initially treated with concurrent chemotherapy and radiation.  He had weekly cisplatin starting March 3, 2020. Fifth and final dose given March 31, 2020.  Subsequent chemotherapy was held due to prolonged thrombocytopenia and renal insufficiency.  Radiation dose was 7000 cGy in 35 fractions given from March 2 through April 17, 2020.     Started cisplatin with infusional 5-FU on June 1, 2020.  Cycle 1 given at a 25% dose reduction.  He still had significant thrombocytopenia and neutropenia on day 28.  Cycle 2 was held.  At the same time his liver function tests elevated secondary to hepatitis B.   PET scan in September 2020 showed no good evidence of residual malignancy.    Recurrence  1:  Radiosurgery delivered to the right ethmoid tumor delivered November 8 through November 17, 2021.  Received 3500 cGy in 5 fractions.  Pembrolizumab started 12/23/21.  Held after his July 1, 2022 dose.  PET scan in March 2022 showed a near complete response.    Recurrence 2:  Cetuximab initiated 2/2/2024.  400 mg every 14 days.  Carboplatin added cycle 3-day 1 on March 29, 2024.  AUC 2.  Progression after 3 cycles.    Gemcitabine initiated April 23, 2024.  Switch to days 1 and 15 with cycle 2.    Interim History:    Kristen Chapman returns today for follow-up visit.  His son states that he had a significant flulike illness recently.  Symptoms lasted for 3 to 4 weeks.  He is slowly recovering.  He had lost some weight during that episode.  He is only needing his abdominal fluid drained every week to 2 weeks.  No acute complaints today.    Review of Systems:    As above in the history.     Review of Systems otherwise Negative for:  General: chills, fever or night sweats  Psychological: anxiety or depression  Ophthalmic: blurry vision, double vision or loss of vision, vision change  ENT: oral lesions, hearing changes  Hematological and Lymphatic: bruising, jaundice, swollen lymph nodes  Endocrine: hot flashes  Respiratory: cough, hemoptysis, orthopnea  Cardiovascular: chest pain, palpitations or PND  Gastrointestinal: blood in stools, change in bowel habits, constipation, diarrhea or nausea/vomiting  Genito-Urinary: change in urinary stream, incontinence, frequency/urgency  Musculoskeletal: joint swelling, muscle pain  Neurological: dizziness, headaches, numbness/tingling  Dermatological: lumps and rash    Past History:    Past Medical History:   Diagnosis Date     Anemia      Dysphagia      Hepatitis B chronic      Hypothyroidism      Nasopharyngeal cancer (H)      Severe protein-calorie malnutrition (H)      Thrombocytopenia (H)      Physical Exam:    BP 93/58   Pulse 74   Temp 98.5  F (36.9  C)   Resp 20   Ht 1.651  "liseth (5' 5\")   Wt 43.6 kg (96 lb 3.2 oz)   SpO2 98%   BMI 16.01 kg/m      General: patient appears stated age of 71 year old. Nontoxic and in no distress.   HEENT: Head: atraumatic, normocephalic. Sclerae anicteric.  Chest:  Normal respiratory effort  Cardiac:  No edema.  Abdomen: abdomen is distended.  Extremities: normal tone and muscle bulk.  Skin: no lesions or rash on visible skin. Warm and dry.   CNS: alert and oriented. Grossly non-focal.   Psychiatric: normal mood and affect.     Lab Results:    Recent Results (from the past 168 hour(s))   CBC with platelets and differential   Result Value Ref Range    WBC Count 7.7 4.0 - 11.0 10e3/uL    RBC Count 2.94 (L) 4.40 - 5.90 10e6/uL    Hemoglobin 8.1 (L) 13.3 - 17.7 g/dL    Hematocrit 25.7 (L) 40.0 - 53.0 %    MCV 87 78 - 100 fL    MCH 27.6 26.5 - 33.0 pg    MCHC 31.5 31.5 - 36.5 g/dL    RDW 21.4 (H) 10.0 - 15.0 %    Platelet Count 96 (L) 150 - 450 10e3/uL    % Neutrophils 77 %    % Lymphocytes 10 %    % Monocytes 11 %    % Eosinophils 1 %    % Basophils 0 %    % Immature Granulocytes 1 %    NRBCs per 100 WBC 0 <1 /100    Absolute Neutrophils 5.9 1.6 - 8.3 10e3/uL    Absolute Lymphocytes 0.7 (L) 0.8 - 5.3 10e3/uL    Absolute Monocytes 0.9 0.0 - 1.3 10e3/uL    Absolute Eosinophils 0.1 0.0 - 0.7 10e3/uL    Absolute Basophils 0.0 0.0 - 0.2 10e3/uL    Absolute Immature Granulocytes 0.1 <=0.4 10e3/uL    Absolute NRBCs 0.0 10e3/uL     Imaging:    CT Soft Tissue Neck w Contrast    Result Date: 10/8/2024  EXAM: CT SOFT TISSUE NECK W CONTRAST LOCATION: [facility] DATE: 10/7/2024 INDICATION: Follow-up metastatic nasopharyngeal carcinoma. COMPARISON: None. CONTRAST: NA TECHNIQUE: Routine CT Soft Tissue Neck with IV contrast. Multiplanar reformats. Dose reduction techniques were used. FINDINGS: Confluent mass extending from the right lateral and right posterolateral floor of mouth, through the right submandibular space and up into the right parotid " space (both superficial and deep) again noted. The superficial aspect of the mass ends at an area of skin ulceration in the right submandibular space. There has been interval increase in size of the soft tissue component just beneath the posterior body/angle of the right side of the mandible measuring 2.3 x 1.5 cm in the AP and transverse dimensions respectively (series 3, image 52). This is worrisome for progression of this presumed metastatic lesion. MUCOSAL SPACES/SOFT TISSUES: Normal mucosal spaces of the upper aerodigestive tract. No mucosal mass or inflammation identified. Normal vocal cords and infraglottic trachea. Normal parapharyngeal space. LYMPH NODES: No pathologic lymph nodes by size or morphology criteria. SALIVARY GLANDS: The right parotid and right submandibular glands are involved by the presumed metastatic disease in the right neck. The left submandibular gland is atrophied. The left parotid gland is unremarkable. THYROID: There is a tiny hypodense nodule in the right lobe of the thyroid gland measuring roughly 3 mm long axis. Previously visualized tiny hypodense nodules in both lobes of the thyroid gland, visualized on the comparison study, are not definitely visualized on the current exam possibly due to differences in contrast enhancement phase. VESSELS: Vascular structures of the neck are patent. VISUALIZED INTRACRANIAL/ORBITS/SINUSES: No abnormality of the visualized intracranial compartment or orbits. Scattered mucosal thickening throughout the paranasal sinuses again noted. The right frontal and bilateral ethmoid air cells are incompletely visualized on the current study but there is persistent presumed soft tissue opacification within the anterior ethmoid air cells on the right and in the right frontal sinus that could represent residual or recurrent malignancy. Chronic sinus disease is also possible. OTHER: No destructive osseous lesion. The included lung apices are clear.     IMPRESSION:  1.  Interval increase in size of an area of abnormal enhancing soft tissue along the undersurface of the posterior body and angle of the right side of the mandible worrisome for progression of metastatic disease. This area of abnormal enhancement is within a larger area of abnormal enhancement that extends from the right lateral floor of mouth, down into the right submandibular space and up into the right parotid space. 2.  Persistent sinus disease. Malignancy is not excluded. 3.  Tiny thyroid nodule in the right. 4.  Otherwise, normal soft tissue neck CT.    CT Chest w Contrast    Result Date: 10/8/2024  EXAM: CT CHEST W CONTRAST LOCATION: [facility] DATE: 10/7/2024 INDICATION: Follow up head neck cancer COMPARISON: FDG PET/CTs 8/12/2024 and CTs 7/15/2024 and priors to 6/15/2023 TECHNIQUE: CT chest with IV contrast. Multiplanar reformats were obtained. Dose reduction techniques were used. CONTRAST: 75ml [id number] FINDINGS: LUNGS AND PLEURA: The 6 mm subpleural spiculated nodule in the right upper lobe posteriorly is unchanged (image 69, series 4). Small area of airspace opacity in the posterior segment right upper lobe consistent with atelectasis and/or resolving pneumonia  is new. Several small foci of chronic consolidation and volume loss in the periphery of the paradiaphragmatic basilar lung parenchyma unchanged. No new pulmonary nodules. Lungs are otherwise clear. No pleural effusion.  MEDIASTINUM/AXILLAE: No lymphadenopathy. Port anterior left chest wall with left IJ central venous catheter tip near the SVC/RA junction. Stable enlargement of the ascending aorta up to 4.5 cm. CORONARY ARTERY CALCIFICATION: No visible coronary artery calcification. UPPER ABDOMEN: Hepatic cirrhosis, splenomegaly, upper abdominal varices and small volume ascites unchanged. MUSCULOSKELETAL: No bone lesions. Bones are demineralized.     IMPRESSION: 1.  The 6 mm subpleural spiculated nodule in the right upper  lobe posteriorly is unchanged. 2.  A small area of airspace opacity in the posterior segment right upper lobe consistent with atelectasis and/or resolving pneumonia is new. 3.  No thoracic lymphadenopathy.       Signed by: Alan Frias MD      Again, thank you for allowing me to participate in the care of your patient.        Sincerely,        Alan Frias MD   complains of pain/discomfort

## 2024-10-09 NOTE — PROGRESS NOTES
Infusion Nursing Note:  Kristen Chapman presents today for D1C7/Retacrit   Patient seen by provider today: Yes:    present during visit today: Yes, Language: Hmong as needed.     Note: N/A.      Intravenous Access:  Implanted Port.    Treatment Conditions:  Lab Results   Component Value Date    HGB 8.1 (L) 10/09/2024    WBC 7.7 10/09/2024    ANEUTAUTO 5.9 10/09/2024    PLT 96 (L) 10/09/2024        Lab Results   Component Value Date     (L) 10/09/2024    POTASSIUM 5.1 10/09/2024    MAG 2.1 03/29/2024    CR 1.98 (H) 10/09/2024    GUANAKITO 10.0 10/09/2024    BILITOTAL 0.7 10/09/2024    ALBUMIN 2.9 (L) 10/09/2024    ALT 26 10/09/2024    AST 24 10/09/2024       Results reviewed, labs MET treatment parameters, ok to proceed with treatment.      Post Infusion Assessment:  Patient tolerated infusion without incident.  Blood return noted pre and post infusion.  Site patent and intact, free from redness, edema or discomfort.  No evidence of extravasations.  Access discontinued per protocol.       Discharge Plan:   Patient discharged in stable condition accompanied by: son.  Departure Mode: Ambulatory.      Karen Mckinley RN

## 2024-10-09 NOTE — PROGRESS NOTES
"Oncology Rooming Note    October 9, 2024 8:32 AM   Kristen Chapman is a 74 year old male who presents for:    Chief Complaint   Patient presents with    Oncology Clinic Visit     Nasopharyngeal carcinoma (H)  Squamous cell carcinoma of nasopharynx (H)       Initial Vitals: BP 93/58   Pulse 74   Temp 98.5  F (36.9  C)   Resp 20   Ht 1.651 m (5' 5\")   Wt 43.6 kg (96 lb 3.2 oz)   SpO2 98%   BMI 16.01 kg/m   Estimated body mass index is 16.01 kg/m  as calculated from the following:    Height as of this encounter: 1.651 m (5' 5\").    Weight as of this encounter: 43.6 kg (96 lb 3.2 oz). Body surface area is 1.41 meters squared.  No Pain (0) Comment: Data Unavailable   No LMP for male patient.  Allergies reviewed: Yes  Medications reviewed: Yes    Medications: Medication refills not needed today.  Pharmacy name entered into [employer]:    [place], [place] - [address] [facility] [place], [place] - [address] [place] [address]    Frailty Screening:   Is the patient here for a new oncology consult visit in cancer care? 2. No      Clinical concerns: [place]- gotten worse over the last month, and feeling tired.       Shannon Sanchez LPN             "

## 2024-10-10 DIAGNOSIS — B18.1 HEPATITIS B, CHRONIC (H): Primary | ICD-10-CM

## 2024-10-12 ENCOUNTER — HEALTH MAINTENANCE LETTER (OUTPATIENT)
Age: 74
End: 2024-10-12

## 2024-10-17 NOTE — PROGRESS NOTES
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    #. Decompensated HBV related cirrhosis  #. Ascites  #. Mesenteric Venous Thrombus   * Thrombus within SMV + splenic veins but remain patent (3/2024)   * Thrombus in the main portal and right portal vein with occlusion of the anterior branch of the right portal vein   #. 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  His ascites is currently managed with a Pleurx catheter -however he has not used it for abdominal drainage recently.  His lower extremity edema has significantly improved; he is on Lasix and spironolactone.  I suspect that he is adherent to his tenofovir and with improved hepatitis B control that his fluid status has improved. He has severe protein calorie malnutrition and is quite frail.  We discussed trying to increase his protein intake but he only takes 1-2 bowls of rice like pudding per day.  No history of hepatic encephalopathy or variceal bleeding.  He is adherent to his tenofovir.    #. Protein calorie malnutrition, severe  #. Deconditioning    Plan  -- Continue tenofovir 300mg [id number]  -- Spironolactone 100mg daily + lasix 20mg daily  -- Continue rivaroxaban for mesenteric venous thrombosis  -- HCC Screening: Given his severe protein calorie malnutrition, frailty and advanced SCC -there is no indication for further screening  -- Variceal Screening due   -- Low sodium (< 2 grams per day) + high protein diet; recommend 1 to 2 protein shake per day    RTC: 6 months    Yumiko Boyd MD (Lizzie)  Advanced & Transplant Hepatology  [facility]    I spent 40 minutes on this encounter performing the following: reviewing the patient's medical record (clinic  visits, hospital records, lab results, imaging and procedural documentation), history taking, physical exam and documentation on the date of the encounter. I also spent part of the time in coordination of care and counseling.    HPI:  HBV Cirrhosis  - dx ~   - no hx HE  - hx ascites  - no hx variceal bleed  - last EGD none prior   - HCC screenin2023 - PET/CT    HBV  - Hep B e Ag: negative  - Hep B e Ab: positive  - Last DNA: 61 (2024)  - Hepatitis delta: negative     History:   2020 - diagnosed with HBV in the setting of starting therapy for nasopharngeal carcinoma and developed rising liver studies  2021 - developed a flare in the setting of being off lamivudine (?unclear reason)  2023 - reportedly adherent to lamivudine, but developed increased viral load so entecavir was started in addition  10/2023 - patient reportedly ran out of entecavir 6 weeks prior to his ID appoitnment    Interval Hx:   Patient presented with his daughter.  A iPad  was used    He remains on single agent Keytruda for nasopharyngeal carcinoma management.  Per imaging tumor appears generally stable.    The patient was previously requiring a paracentesis weekly but then he had an abdominal drain placed.  At the beginning he was draining 1 L of fluid per day but then the amount of fluid that was removed while he declined.  His daughter reports that it has been several weeks since he had its drained any fluid off of his abdomen.  He reports adherence to his Lasix and spironolactone.  He has minimal to no lower extremity edema.    He continues to take his tenofovir every 3 days.  On his medication list it says he is no longer taking a blood thinner but he reports that he continues to take this medication.    He reports that he continues to lose weight and thinks that he has lost about 30 pounds recently.    He walks with a cane/walker.  He has difficulty with balance and getting out of his chair.  He follows with  palliative care specialist.  His wife and son take care of him at home      Medical hx Surgical hx   Past Medical History:   Diagnosis Date    Anemia     Dysphagia     Hepatitis B chronic     Hypothyroidism     Nasopharyngeal cancer (H)     Severe protein-calorie malnutrition (H)     Thrombocytopenia (H)       Past Surgical History:   Procedure Laterality Date    ENDOSCOPIC ENDONASAL SURGERY Bilateral 9/7/2021    Procedure: Nasal Endoscopy with Biopsy;  Surgeon: Ky Centeno MD;  Location: [facility] OR    IR CHEST PORT PLACEMENT > 5 YRS OF AGE  3/2/2020    IR CHEST PORT PLACEMENT > 5 YRS OF AGE  3/2/2020    IR GASTROSTOMY TUBE INSERTION  4/27/2020    IR GASTROSTOMY TUBE PERCUTANEOUS PLCMNT  4/27/2020    IR INTRAPERITONEAL CATH TUNNEL ASCITES  2/22/2024    IR INTRAPERITONEAL CATH TUNNEL ASCITES  2/26/2024    IR PORT PLACEMENT >5 YEARS  3/2/2020    IR PORT PLACEMENT >5 YEARS  3/2/2020    IR T-FASTENER REMOVAL  6/5/2020    IR T-FASTENER REMOVAL  6/5/2020          Medications  Current Outpatient Medications   Medication Sig Dispense Refill    acetaminophen (TYLENOL) 325 MG tablet Take 650 mg by mouth every 6 hours as needed       furosemide (LASIX) 20 MG tablet Take 1 tablet (20 mg) by mouth daily. 90 tablet 3    levothyroxine (SYNTHROID/LEVOTHROID) 175 MCG tablet TAKE 1 TABLET (175 MCG) BY MOUTH DAILY 60 tablet 1    megestrol (MEGACE) 40 MG/ML suspension Take 10 mLs (400 mg) by mouth daily 480 mL 1    mirtazapine (REMERON SOL-TAB) 30 MG ODT 1 tablet (30 mg) by Orally disintegrating tablet route at bedtime 30 tablet 5    prochlorperazine (COMPAZINE) 10 MG tablet Take 1 tablet (10 mg) by mouth every 6 hours as needed for nausea or vomiting 30 tablet 2    spironolactone (ALDACTONE) 100 MG tablet Take 1 tablet (100 mg) by mouth daily. 90 tablet 3    tenofovir (VIREAD) 300 MG tablet Take 1 tablet (300 mg) by mouth every 72 hours. 45 tablet 3    dexAMETHasone (DECADRON) 4 MG tablet Take 1 tablet (4 mg) by mouth daily (with  "breakfast) Take for 2 days after each chemotherapy dose. 12 tablet 1    rivaroxaban ANTICOAGULANT (XARELTO) 20 MG TABS tablet Take 1 tablet (20 mg) by mouth daily (with dinner) (Patient not taking: Reported on 10/24/2024) 30 tablet 3       Allergies  Allergies   Allergen Reactions    Oxycodone Itching       Family hx Social hx   Family History   Problem Relation Age of Onset    Liver Cancer No family hx of     Liver Disease No family hx of       Social History     Tobacco Use    Smoking status: Never    Smokeless tobacco: Never   Substance Use Topics    Alcohol use: Not Currently    Drug use: Not Currently     - Lives with wife and son  - 5 girls + 6 boys  - Alcohol: Denies  - Tobacco: Denies  - Drugs: Denies     Review of systems  A 10-point review of systems was negative.    Examination  /66 (BP Location: Right arm, Patient Position: Sitting, Cuff Size: Adult Small)   Pulse 62   Resp 18   Ht 1.651 m (5' 5\")   Wt 44.1 kg (97 lb 3.2 oz)   SpO2 100%   BMI 16.17 kg/m     Body mass index is 16.17 kg/m .    Gen-NAD  Eye- EOMI  ENT-temporal muscle wasting.  Mask over mouth and chin  CVS- RRR, no murmurs  RS- CTA bilaterally  Abd-distended, soft, nontender.  Pleurx catheter in place  Extr- 2+ radial pulses bilaterally, no lower extremity edema bilaterally  MS- hands without clubbing. Walks with a cane.   Skin-  no jaundice  Port in place, left upper chest   Psych- normal mood    Laboratory  BMP  Recent Labs   Lab Test 10/23/24  0824 10/09/24  0806 09/11/24  0828 08/14/24  0828   * 132* 128* 132*   POTASSIUM 4.6 5.1 4.9 4.8   CHLORIDE 100 101 99 101   GUANAKITO 9.5 10.0 7.9* 8.8   CO2 20* 21* 21* 21*   BUN 42.9* 37.5* 38.6* 34.0*   CR 1.89* 1.98* 1.63* 1.94*   * 136* 143* 102*     CBC  Recent Labs   Lab Test 10/24/24  1343 10/23/24  0825 10/09/24  0806 09/25/24  0916   WBC 5.3 8.6 7.7 14.3*   RBC 3.65* 2.42* 2.94* 3.18*   HGB 10.8* 6.8* 8.1* 8.7*   HCT 32.5* 22.1* 25.7* 27.1*   MCV 89 91 87 85   MCH 29.6 " 28.1 27.6 27.4   MCHC 33.2 30.8* 31.5 32.1   RDW 19.7* 22.7* 21.4* 19.6*   PLT 98* 79* 96* 146*     Liver Enzymes   Recent Labs   Lab Test 10/23/24  0824   PROTTOTAL 5.9*   ALBUMIN 2.8*   BILITOTAL 0.6   ALKPHOS 96   AST 21   ALT 20      INR   INR   Date Value Ref Range Status   10/23/2024 1.30 (H) 0.85 - 1.15 Final          Hep B s Ag positive  HBV DNA 41 (11/6/2023)  HBV DNA 54,372,355 (4/2023)  Hep B e Ab positive  Hep B e Ag negative  Hep delta negative  HCV Ab non-reactive    Radiology  CT Chest, Abdomen and Pelvis 4/13/2024  1.  There is a large amount of mesenteric venous thrombus , new since Jan study done with contrast but otherwise age indeterminate.   2.  Thrombus seen within the SMV and splenic veins which remain patent. Thrombus extends into the main portal and right portal vein branch with occlusion of the anterior branch of the right portal vein.  3.  There are 2 new hypodense lesions in the spleen, presumed to reflect subacute, chronic infarcts. The other more rounded hypodense lesion that is not FDG avid is unchanged.  4.  Repositioning of the peritoneal catheter which is now in the pelvis.  5.  Cirrhotic liver with a large amount of ascites and varices as noted above.  6.  Ascites is larger than before. Is catheter functioning or not being used?  7.  Increased mesenteric stranding that appears to be related to the fluid and ascites given its density. No clear measurable tumor nodules.   8.  Stable right supraclavicular enlarged lymph node with incompletely seen large infiltrative tumor about the right mandible extending to the skin. See neck CT report, please.  9.  No new sites of metastatic disease in the chest, abdomen or pelvis.  10.  Stable 4.3 cm ascending aortic aneurysm.  11.  Findings discussed by the undersigned with Dr. Alan Frias at 1138.

## 2024-10-23 ENCOUNTER — LAB (OUTPATIENT)
Dept: INFUSION THERAPY | Facility: HOSPITAL | Age: 74
End: 2024-10-23
Attending: INTERNAL MEDICINE
Payer: COMMERCIAL

## 2024-10-23 VITALS
RESPIRATION RATE: 18 BRPM | HEART RATE: 53 BPM | SYSTOLIC BLOOD PRESSURE: 105 MMHG | DIASTOLIC BLOOD PRESSURE: 60 MMHG | OXYGEN SATURATION: 100 % | TEMPERATURE: 97.6 F

## 2024-10-23 DIAGNOSIS — B18.1 HEPATITIS B, CHRONIC (H): ICD-10-CM

## 2024-10-23 DIAGNOSIS — C11.9 SQUAMOUS CELL CARCINOMA OF NASOPHARYNX (H): ICD-10-CM

## 2024-10-23 DIAGNOSIS — D63.1 ANEMIA OF CHRONIC RENAL FAILURE, STAGE 3A (H): ICD-10-CM

## 2024-10-23 DIAGNOSIS — N18.31 ANEMIA OF CHRONIC RENAL FAILURE, STAGE 3A (H): ICD-10-CM

## 2024-10-23 DIAGNOSIS — C11.9 SQUAMOUS CELL CARCINOMA OF NASOPHARYNX (H): Primary | ICD-10-CM

## 2024-10-23 DIAGNOSIS — T45.1X5A ANTINEOPLASTIC CHEMOTHERAPY INDUCED ANEMIA: ICD-10-CM

## 2024-10-23 DIAGNOSIS — D64.81 ANTINEOPLASTIC CHEMOTHERAPY INDUCED ANEMIA: ICD-10-CM

## 2024-10-23 DIAGNOSIS — D64.9 ANEMIA, NORMOCYTIC NORMOCHROMIC: Primary | ICD-10-CM

## 2024-10-23 DIAGNOSIS — N18.31 STAGE 3A CHRONIC KIDNEY DISEASE (H): ICD-10-CM

## 2024-10-23 LAB
ABO/RH(D): NORMAL
AFP SERPL-MCNC: 5.3 NG/ML
ALBUMIN SERPL BCG-MCNC: 2.8 G/DL (ref 3.5–5.2)
ALP SERPL-CCNC: 96 U/L (ref 40–150)
ALT SERPL W P-5'-P-CCNC: 20 U/L (ref 0–70)
ANION GAP SERPL CALCULATED.3IONS-SCNC: 10 MMOL/L (ref 7–15)
ANTIBODY SCREEN: NEGATIVE
AST SERPL W P-5'-P-CCNC: 21 U/L (ref 0–45)
BASOPHILS # BLD AUTO: 0 10E3/UL (ref 0–0.2)
BASOPHILS NFR BLD AUTO: 0 %
BILIRUB DIRECT SERPL-MCNC: 0.26 MG/DL (ref 0–0.3)
BILIRUB SERPL-MCNC: 0.6 MG/DL
BLD PROD TYP BPU: NORMAL
BLD PROD TYP BPU: NORMAL
BLOOD COMPONENT TYPE: NORMAL
BLOOD COMPONENT TYPE: NORMAL
BUN SERPL-MCNC: 42.9 MG/DL (ref 8–23)
CALCIUM SERPL-MCNC: 9.5 MG/DL (ref 8.8–10.4)
CHLORIDE SERPL-SCNC: 100 MMOL/L (ref 98–107)
CODING SYSTEM: NORMAL
CODING SYSTEM: NORMAL
CREAT SERPL-MCNC: 1.89 MG/DL (ref 0.67–1.17)
CROSSMATCH: NORMAL
CROSSMATCH: NORMAL
EGFRCR SERPLBLD CKD-EPI 2021: 37 ML/MIN/1.73M2
EOSINOPHIL # BLD AUTO: 0.1 10E3/UL (ref 0–0.7)
EOSINOPHIL NFR BLD AUTO: 1 %
ERYTHROCYTE [DISTWIDTH] IN BLOOD BY AUTOMATED COUNT: 22.7 % (ref 10–15)
GLUCOSE SERPL-MCNC: 124 MG/DL (ref 70–99)
HCO3 SERPL-SCNC: 20 MMOL/L (ref 22–29)
HCT VFR BLD AUTO: 22.1 % (ref 40–53)
HGB BLD-MCNC: 6.8 G/DL (ref 13.3–17.7)
IMM GRANULOCYTES # BLD: 0.1 10E3/UL
IMM GRANULOCYTES NFR BLD: 1 %
INR PPP: 1.3 (ref 0.85–1.15)
ISSUE DATE AND TIME: NORMAL
ISSUE DATE AND TIME: NORMAL
LYMPHOCYTES # BLD AUTO: 1 10E3/UL (ref 0.8–5.3)
LYMPHOCYTES NFR BLD AUTO: 11 %
MCH RBC QN AUTO: 28.1 PG (ref 26.5–33)
MCHC RBC AUTO-ENTMCNC: 30.8 G/DL (ref 31.5–36.5)
MCV RBC AUTO: 91 FL (ref 78–100)
MONOCYTES # BLD AUTO: 0.9 10E3/UL (ref 0–1.3)
MONOCYTES NFR BLD AUTO: 11 %
NEUTROPHILS # BLD AUTO: 6.5 10E3/UL (ref 1.6–8.3)
NEUTROPHILS NFR BLD AUTO: 76 %
NRBC # BLD AUTO: 0 10E3/UL
NRBC BLD AUTO-RTO: 0 /100
PLATELET # BLD AUTO: 79 10E3/UL (ref 150–450)
POTASSIUM SERPL-SCNC: 4.6 MMOL/L (ref 3.4–5.3)
PROT SERPL-MCNC: 5.9 G/DL (ref 6.4–8.3)
RBC # BLD AUTO: 2.42 10E6/UL (ref 4.4–5.9)
SODIUM SERPL-SCNC: 130 MMOL/L (ref 135–145)
SPECIMEN EXPIRATION DATE: NORMAL
UNIT ABO/RH: NORMAL
UNIT ABO/RH: NORMAL
UNIT NUMBER: NORMAL
UNIT NUMBER: NORMAL
UNIT STATUS: NORMAL
UNIT STATUS: NORMAL
UNIT TYPE ISBT: 7300
UNIT TYPE ISBT: 7300
WBC # BLD AUTO: 8.6 10E3/UL (ref 4–11)

## 2024-10-23 PROCEDURE — 96375 TX/PRO/DX INJ NEW DRUG ADDON: CPT

## 2024-10-23 PROCEDURE — 86900 BLOOD TYPING SEROLOGIC ABO: CPT | Performed by: NURSE PRACTITIONER

## 2024-10-23 PROCEDURE — 87517 HEPATITIS B DNA QUANT: CPT

## 2024-10-23 PROCEDURE — 82105 ALPHA-FETOPROTEIN SERUM: CPT

## 2024-10-23 PROCEDURE — 258N000003 HC RX IP 258 OP 636: Performed by: NURSE PRACTITIONER

## 2024-10-23 PROCEDURE — 85025 COMPLETE CBC W/AUTO DIFF WBC: CPT | Performed by: INTERNAL MEDICINE

## 2024-10-23 PROCEDURE — 86850 RBC ANTIBODY SCREEN: CPT | Performed by: NURSE PRACTITIONER

## 2024-10-23 PROCEDURE — 250N000011 HC RX IP 250 OP 636: Performed by: INTERNAL MEDICINE

## 2024-10-23 PROCEDURE — 86923 COMPATIBILITY TEST ELECTRIC: CPT | Performed by: INTERNAL MEDICINE

## 2024-10-23 PROCEDURE — P9016 RBC LEUKOCYTES REDUCED: HCPCS | Performed by: INTERNAL MEDICINE

## 2024-10-23 PROCEDURE — 258N000003 HC RX IP 258 OP 636: Performed by: INTERNAL MEDICINE

## 2024-10-23 PROCEDURE — 36415 COLL VENOUS BLD VENIPUNCTURE: CPT | Performed by: NURSE PRACTITIONER

## 2024-10-23 PROCEDURE — 96413 CHEMO IV INFUSION 1 HR: CPT

## 2024-10-23 PROCEDURE — 36430 TRANSFUSION BLD/BLD COMPNT: CPT

## 2024-10-23 PROCEDURE — 85610 PROTHROMBIN TIME: CPT

## 2024-10-23 PROCEDURE — 36591 DRAW BLOOD OFF VENOUS DEVICE: CPT

## 2024-10-23 PROCEDURE — 82248 BILIRUBIN DIRECT: CPT

## 2024-10-23 PROCEDURE — 250N000011 HC RX IP 250 OP 636: Mod: JZ | Performed by: NURSE PRACTITIONER

## 2024-10-23 RX ORDER — HEPARIN SODIUM (PORCINE) LOCK FLUSH IV SOLN 100 UNIT/ML 100 UNIT/ML
5 SOLUTION INTRAVENOUS
OUTPATIENT
Start: 2024-10-23

## 2024-10-23 RX ORDER — HEPARIN SODIUM (PORCINE) LOCK FLUSH IV SOLN 100 UNIT/ML 100 UNIT/ML
5 SOLUTION INTRAVENOUS
Status: DISCONTINUED | OUTPATIENT
Start: 2024-10-23 | End: 2024-10-23 | Stop reason: HOSPADM

## 2024-10-23 RX ORDER — ONDANSETRON 2 MG/ML
8 INJECTION INTRAMUSCULAR; INTRAVENOUS ONCE
Status: COMPLETED | OUTPATIENT
Start: 2024-10-23 | End: 2024-10-23

## 2024-10-23 RX ORDER — DIPHENHYDRAMINE HYDROCHLORIDE 50 MG/ML
50 INJECTION INTRAMUSCULAR; INTRAVENOUS
Start: 2024-10-23

## 2024-10-23 RX ORDER — EPINEPHRINE 1 MG/ML
0.3 INJECTION, SOLUTION INTRAMUSCULAR; SUBCUTANEOUS EVERY 5 MIN PRN
OUTPATIENT
Start: 2024-10-23

## 2024-10-23 RX ORDER — HEPARIN SODIUM,PORCINE 10 UNIT/ML
5-20 VIAL (ML) INTRAVENOUS DAILY PRN
OUTPATIENT
Start: 2024-10-23

## 2024-10-23 RX ADMIN — GEMCITABINE 1200 MG: 38 INJECTION, SOLUTION INTRAVENOUS at 10:19

## 2024-10-23 RX ADMIN — Medication 5 ML: at 14:08

## 2024-10-23 RX ADMIN — ONDANSETRON 8 MG: 2 INJECTION INTRAMUSCULAR; INTRAVENOUS at 09:58

## 2024-10-23 RX ADMIN — SODIUM CHLORIDE 250 ML: 9 INJECTION, SOLUTION INTRAVENOUS at 09:00

## 2024-10-23 NOTE — PROGRESS NOTES
Infusion Nursing Note:Kristen Chapman presents today for Cycle 7 day 15.    Patient seen by provider today: No   present during visit today: Yes, Language: I pad.      Note: PT here ambulatory for txt. PT states fatigue and weakness and appetite low. Port accessed and labs drawn. Hgb today is 6.8 and will transfuse 2 units prbc. Treatment approved per Dr Frias with labwork. Treatment reviewed and administered as ordered. Two units of prbc transfused and pt tolerated infusions today without any problems. Port flushed/deaccessed with 2x2 to site. PT dc'd steady gait  with cane with son who was with pt during treatment..        Intravenous Access:  Implanted Port.     Treatment Conditions:  Results reviewed, labs MET treatment parameters, ok to proceed with treatment.        Post Infusion Assessment:  Patient tolerated infusion without incident.         Discharge Plan:   Follow up reviewed.        Julienne Batista RN

## 2024-10-24 ENCOUNTER — LAB (OUTPATIENT)
Dept: LAB | Facility: CLINIC | Age: 74
End: 2024-10-24
Attending: INTERNAL MEDICINE
Payer: COMMERCIAL

## 2024-10-24 ENCOUNTER — OFFICE VISIT (OUTPATIENT)
Dept: GASTROENTEROLOGY | Facility: CLINIC | Age: 74
End: 2024-10-24
Attending: INTERNAL MEDICINE
Payer: COMMERCIAL

## 2024-10-24 VITALS
OXYGEN SATURATION: 100 % | DIASTOLIC BLOOD PRESSURE: 66 MMHG | SYSTOLIC BLOOD PRESSURE: 100 MMHG | HEART RATE: 62 BPM | WEIGHT: 97.2 LBS | RESPIRATION RATE: 18 BRPM | HEIGHT: 65 IN | BODY MASS INDEX: 16.19 KG/M2

## 2024-10-24 DIAGNOSIS — E43 SEVERE PROTEIN-CALORIE MALNUTRITION (H): ICD-10-CM

## 2024-10-24 DIAGNOSIS — N18.31 ANEMIA OF CHRONIC RENAL FAILURE, STAGE 3A (H): Primary | ICD-10-CM

## 2024-10-24 DIAGNOSIS — D63.1 ANEMIA OF CHRONIC RENAL FAILURE, STAGE 3A (H): Primary | ICD-10-CM

## 2024-10-24 DIAGNOSIS — R18.8 CIRRHOSIS OF LIVER WITH ASCITES, UNSPECIFIED HEPATIC CIRRHOSIS TYPE (H): ICD-10-CM

## 2024-10-24 DIAGNOSIS — C44.320 SCC (SQUAMOUS CELL CARCINOMA), FACE: Primary | ICD-10-CM

## 2024-10-24 DIAGNOSIS — B18.1 HEPATITIS B, CHRONIC (H): ICD-10-CM

## 2024-10-24 DIAGNOSIS — K74.60 CIRRHOSIS OF LIVER WITH ASCITES, UNSPECIFIED HEPATIC CIRRHOSIS TYPE (H): ICD-10-CM

## 2024-10-24 DIAGNOSIS — B18.1 CHRONIC VIRAL HEPATITIS B WITHOUT DELTA AGENT AND WITHOUT COMA (H): ICD-10-CM

## 2024-10-24 LAB
CHOLEST SERPL-MCNC: 66 MG/DL
CREAT UR-MCNC: 93.2 MG/DL
ERYTHROCYTE [DISTWIDTH] IN BLOOD BY AUTOMATED COUNT: 19.7 % (ref 10–15)
FASTING STATUS PATIENT QL REPORTED: NO
HCT VFR BLD AUTO: 32.5 % (ref 40–53)
HDLC SERPL-MCNC: 38 MG/DL
HGB BLD-MCNC: 10.8 G/DL (ref 13.3–17.7)
LDLC SERPL CALC-MCNC: 15 MG/DL
MCH RBC QN AUTO: 29.6 PG (ref 26.5–33)
MCHC RBC AUTO-ENTMCNC: 33.2 G/DL (ref 31.5–36.5)
MCV RBC AUTO: 89 FL (ref 78–100)
MICROALBUMIN UR-MCNC: 28.5 MG/L
MICROALBUMIN/CREAT UR: 30.58 MG/G CR (ref 0–17)
NONHDLC SERPL-MCNC: 28 MG/DL
PLATELET # BLD AUTO: 98 10E3/UL (ref 150–450)
RBC # BLD AUTO: 3.65 10E6/UL (ref 4.4–5.9)
TRIGL SERPL-MCNC: 63 MG/DL
WBC # BLD AUTO: 5.3 10E3/UL (ref 4–11)

## 2024-10-24 PROCEDURE — 99000 SPECIMEN HANDLING OFFICE-LAB: CPT | Performed by: PATHOLOGY

## 2024-10-24 PROCEDURE — 85027 COMPLETE CBC AUTOMATED: CPT | Performed by: PATHOLOGY

## 2024-10-24 PROCEDURE — 80061 LIPID PANEL: CPT | Performed by: PATHOLOGY

## 2024-10-24 PROCEDURE — 82043 UR ALBUMIN QUANTITATIVE: CPT | Performed by: FAMILY MEDICINE

## 2024-10-24 PROCEDURE — T1013 SIGN LANG/ORAL INTERPRETER: HCPCS | Mod: U4 | Performed by: INTERPRETER

## 2024-10-24 PROCEDURE — G0463 HOSPITAL OUTPT CLINIC VISIT: HCPCS | Performed by: INTERNAL MEDICINE

## 2024-10-24 PROCEDURE — 36415 COLL VENOUS BLD VENIPUNCTURE: CPT | Performed by: PATHOLOGY

## 2024-10-24 PROCEDURE — 99215 OFFICE O/P EST HI 40 MIN: CPT | Performed by: INTERNAL MEDICINE

## 2024-10-24 RX ORDER — FUROSEMIDE 20 MG/1
20 TABLET ORAL DAILY
Qty: 90 TABLET | Refills: 3 | Status: SHIPPED | OUTPATIENT
Start: 2024-10-24

## 2024-10-24 RX ORDER — TENOFOVIR DISOPROXIL FUMARATE 300 MG/1
300 TABLET, FILM COATED ORAL
Qty: 45 TABLET | Refills: 3 | Status: SHIPPED | OUTPATIENT
Start: 2024-10-24

## 2024-10-24 RX ORDER — SPIRONOLACTONE 100 MG/1
100 TABLET, FILM COATED ORAL DAILY
Qty: 90 TABLET | Refills: 3 | Status: SHIPPED | OUTPATIENT
Start: 2024-10-24

## 2024-10-24 ASSESSMENT — PAIN SCALES - GENERAL: PAINLEVEL_OUTOF10: NO PAIN (0)

## 2024-10-24 NOTE — LETTER
10/24/2024      Kristen Chapman  [address] [place] [address]      Dear Colleague,    Thank you for referring your patient, Kristen Chapman, to the [facility]PAT[facility] [place]. Please see a copy of my visit note below.    [facility] Hepatology    Assessment  74 year old male with past medical history of nasopharyngeal carcinoma, decompensated hepatitis B related cirrhosis complicated by ascites, hypothyroidism, severe protein calorie malnutrition.    #. Decompensated HBV related cirrhosis  #. Ascites  #. Mesenteric Venous Thrombus   * Thrombus within SMV + splenic veins but remain patent (3/2024)   * Thrombus in the main portal and right portal vein with occlusion of the anterior branch of the right portal vein   #. 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  His ascites is currently managed with a Pleurx catheter -however he has not used it for abdominal drainage recently.  His lower extremity edema has significantly improved; he is on Lasix and spironolactone.  I suspect that he is adherent to his tenofovir and with improved hepatitis B control that his fluid status has improved. He has severe protein calorie malnutrition and is quite frail.  We discussed trying to increase his protein intake but he only takes 1-2 bowls of rice like pudding per day.  No history of hepatic encephalopathy or variceal bleeding.  He is adherent to his tenofovir.    #. Protein calorie malnutrition, severe  #. Deconditioning    Plan  -- Continue tenofovir 300mg [id number]  -- Spironolactone 100mg daily + lasix 20mg daily  -- Continue rivaroxaban for mesenteric venous thrombosis  -- HCC Screening: Given his severe protein calorie malnutrition, frailty and advanced SCC -there is no indication for further screening  -- Variceal Screening due   -- Low sodium (< 2 grams per day) + high protein diet; recommend 1 to 2 protein shake per  day    RTC: 6 months    Yumiko Boyd MD (Lizzie)  Advanced & Transplant Hepatology  [facility]    I spent 40 minutes on this encounter performing the following: reviewing the patient's medical record (clinic visits, hospital records, lab results, imaging and procedural documentation), history taking, physical exam and documentation on the date of the encounter. I also spent part of the time in coordination of care and counseling.    HPI:  HBV Cirrhosis  - dx ~   - no hx HE  - hx ascites  - no hx variceal bleed  - last EGD none prior   - HCC screenin2023 - PET/CT    HBV  - Hep B e Ag: negative  - Hep B e Ab: positive  - Last DNA: 61 (2024)  - Hepatitis delta: negative     History:   2020 - diagnosed with HBV in the setting of starting therapy for nasopharngeal carcinoma and developed rising liver studies  2021 - developed a flare in the setting of being off lamivudine (?unclear reason)  2023 - reportedly adherent to lamivudine, but developed increased viral load so entecavir was started in addition  10/2023 - patient reportedly ran out of entecavir 6 weeks prior to his ID appoitnment    Interval Hx:   Patient presented with his daughter.  A iPad  was used    He remains on single agent Keytruda for nasopharyngeal carcinoma management.  Per imaging tumor appears generally stable.    The patient was previously requiring a paracentesis weekly but then he had an abdominal drain placed.  At the beginning he was draining 1 L of fluid per day but then the amount of fluid that was removed while he declined.  His daughter reports that it has been several weeks since he had its drained any fluid off of his abdomen.  He reports adherence to his Lasix and spironolactone.  He has minimal to no lower extremity edema.    He continues to take his tenofovir every 3 days.  On his medication list it says he is no longer taking a blood thinner but he reports that he continues to  take this medication.    He reports that he continues to lose weight and thinks that he has lost about 30 pounds recently.    He walks with a cane/walker.  He has difficulty with balance and getting out of his chair.  He follows with palliative care specialist.  His wife and son take care of him at home      Medical hx Surgical hx   Past Medical History:   Diagnosis Date     Anemia      Dysphagia      Hepatitis B chronic      Hypothyroidism      Nasopharyngeal cancer (H)      Severe protein-calorie malnutrition (H)      Thrombocytopenia (H)       Past Surgical History:   Procedure Laterality Date     ENDOSCOPIC ENDONASAL SURGERY Bilateral 9/7/2021    Procedure: Nasal Endoscopy with Biopsy;  Surgeon: Ky Centeno MD;  Location: [facility] OR     IR CHEST PORT PLACEMENT > 5 YRS OF AGE  3/2/2020     IR CHEST PORT PLACEMENT > 5 YRS OF AGE  3/2/2020     IR GASTROSTOMY TUBE INSERTION  4/27/2020     IR GASTROSTOMY TUBE PERCUTANEOUS PLCMNT  4/27/2020     IR INTRAPERITONEAL CATH TUNNEL ASCITES  2/22/2024     IR INTRAPERITONEAL CATH TUNNEL ASCITES  2/26/2024     IR PORT PLACEMENT >5 YEARS  3/2/2020     IR PORT PLACEMENT >5 YEARS  3/2/2020     IR T-FASTENER REMOVAL  6/5/2020     IR T-FASTENER REMOVAL  6/5/2020          Medications  Current Outpatient Medications   Medication Sig Dispense Refill     acetaminophen (TYLENOL) 325 MG tablet Take 650 mg by mouth every 6 hours as needed        furosemide (LASIX) 20 MG tablet Take 1 tablet (20 mg) by mouth daily. 90 tablet 3     levothyroxine (SYNTHROID/LEVOTHROID) 175 MCG tablet TAKE 1 TABLET (175 MCG) BY MOUTH DAILY 60 tablet 1     megestrol (MEGACE) 40 MG/ML suspension Take 10 mLs (400 mg) by mouth daily 480 mL 1     mirtazapine (REMERON SOL-TAB) 30 MG ODT 1 tablet (30 mg) by Orally disintegrating tablet route at bedtime 30 tablet 5     prochlorperazine (COMPAZINE) 10 MG tablet Take 1 tablet (10 mg) by mouth every 6 hours as needed for nausea or vomiting 30 tablet 2      "spironolactone (ALDACTONE) 100 MG tablet Take 1 tablet (100 mg) by mouth daily. 90 tablet 3     tenofovir (VIREAD) 300 MG tablet Take 1 tablet (300 mg) by mouth every 72 hours. 45 tablet 3     dexAMETHasone (DECADRON) 4 MG tablet Take 1 tablet (4 mg) by mouth daily (with breakfast) Take for 2 days after each chemotherapy dose. 12 tablet 1     rivaroxaban ANTICOAGULANT (XARELTO) 20 MG TABS tablet Take 1 tablet (20 mg) by mouth daily (with dinner) (Patient not taking: Reported on 10/24/2024) 30 tablet 3       Allergies  Allergies   Allergen Reactions     Oxycodone Itching       Family hx Social hx   Family History   Problem Relation Age of Onset     Liver Cancer No family hx of      Liver Disease No family hx of       Social History     Tobacco Use     Smoking status: Never     Smokeless tobacco: Never   Substance Use Topics     Alcohol use: Not Currently     Drug use: Not Currently     - Lives with wife and son  - 5 girls + 6 boys  - Alcohol: Denies  - Tobacco: Denies  - Drugs: Denies     Review of systems  A 10-point review of systems was negative.    Examination  /66 (BP Location: Right arm, Patient Position: Sitting, Cuff Size: Adult Small)   Pulse 62   Resp 18   Ht 1.651 m (5' 5\")   Wt 44.1 kg (97 lb 3.2 oz)   SpO2 100%   BMI 16.17 kg/m     Body mass index is 16.17 kg/m .    Gen-NAD  Eye- EOMI  ENT-temporal muscle wasting.  Mask over mouth and chin  CVS- RRR, no murmurs  RS- CTA bilaterally  Abd-distended, soft, nontender.  Pleurx catheter in place  Extr- 2+ radial pulses bilaterally, no lower extremity edema bilaterally  MS- hands without clubbing. Walks with a cane.   Skin-  no jaundice  Port in place, left upper chest   Psych- normal mood    Laboratory  BMP  Recent Labs   Lab Test 10/23/24  0824 10/09/24  0806 09/11/24  0828 08/14/24  0828   * 132* 128* 132*   POTASSIUM 4.6 5.1 4.9 4.8   CHLORIDE 100 101 99 101   GUANAKITO 9.5 10.0 7.9* 8.8   CO2 20* 21* 21* 21*   BUN 42.9* 37.5* 38.6* 34.0*   CR " 1.89* 1.98* 1.63* 1.94*   * 136* 143* 102*     CBC  Recent Labs   Lab Test 10/24/24  1343 10/23/24  0825 10/09/24  0806 09/25/24  0916   WBC 5.3 8.6 7.7 14.3*   RBC 3.65* 2.42* 2.94* 3.18*   HGB 10.8* 6.8* 8.1* 8.7*   HCT 32.5* 22.1* 25.7* 27.1*   MCV 89 91 87 85   MCH 29.6 28.1 27.6 27.4   MCHC 33.2 30.8* 31.5 32.1   RDW 19.7* 22.7* 21.4* 19.6*   PLT 98* 79* 96* 146*     Liver Enzymes   Recent Labs   Lab Test 10/23/24  0824   PROTTOTAL 5.9*   ALBUMIN 2.8*   BILITOTAL 0.6   ALKPHOS 96   AST 21   ALT 20      INR   INR   Date Value Ref Range Status   10/23/2024 1.30 (H) 0.85 - 1.15 Final          Hep B s Ag positive  HBV DNA 41 (11/6/2023)  HBV DNA 54,372,355 (4/2023)  Hep B e Ab positive  Hep B e Ag negative  Hep delta negative  HCV Ab non-reactive    Radiology  CT Chest, Abdomen and Pelvis 4/13/2024  1.  There is a large amount of mesenteric venous thrombus , new since Jan study done with contrast but otherwise age indeterminate.   2.  Thrombus seen within the SMV and splenic veins which remain patent. Thrombus extends into the main portal and right portal vein branch with occlusion of the anterior branch of the right portal vein.  3.  There are 2 new hypodense lesions in the spleen, presumed to reflect subacute, chronic infarcts. The other more rounded hypodense lesion that is not FDG avid is unchanged.  4.  Repositioning of the peritoneal catheter which is now in the pelvis.  5.  Cirrhotic liver with a large amount of ascites and varices as noted above.  6.  Ascites is larger than before. Is catheter functioning or not being used?  7.  Increased mesenteric stranding that appears to be related to the fluid and ascites given its density. No clear measurable tumor nodules.   8.  Stable right supraclavicular enlarged lymph node with incompletely seen large infiltrative tumor about the right mandible extending to the skin. See neck CT report, please.  9.  No new sites of metastatic disease in the chest, abdomen  or pelvis.  10.  Stable 4.3 cm ascending aortic aneurysm.  11.  Findings discussed by the undersigned with Dr. Alan Frias at 1138.          Again, thank you for allowing me to participate in the care of your patient.        Sincerely,        Yumiko Boyd MD

## 2024-10-24 NOTE — NURSING NOTE
"Chief Complaint   Patient presents with    RECHECK     Hep B     Vital signs:      BP: 100/66 Pulse: 62   Resp: 18 SpO2: 100 %     Height: 165.1 cm (5' 5\") Weight: 44.1 kg (97 lb 3.2 oz)  Estimated body mass index is 16.17 kg/m  as calculated from the following:    Height as of this encounter: 1.651 m (5' 5\").    Weight as of this encounter: 44.1 kg (97 lb 3.2 oz).      Calista Dick, [facility]  10/24/2024 3:23 PM    "

## 2024-10-24 NOTE — PATIENT INSTRUCTIONS
- Continue tenofovir for hepatitis B every 72 hours  - Continue Spironolactone 100mg daily (fluid retention)  - Continue Lasix 20mg daily (fluid retention)  - Low sodium diet (as able) + high protein (at least 2 shakes per day)

## 2024-10-25 LAB
HBV DNA SERPL NAA+PROBE-ACNC: 110 IU/ML
HBV DNA SERPL NAA+PROBE-LOG IU: 2 {LOG_IU}/ML

## 2024-10-29 RX ORDER — DIPHENHYDRAMINE HYDROCHLORIDE 50 MG/ML
25 INJECTION INTRAMUSCULAR; INTRAVENOUS
Start: 2024-11-20

## 2024-10-29 RX ORDER — EPINEPHRINE 1 MG/ML
0.3 INJECTION, SOLUTION, CONCENTRATE INTRAVENOUS EVERY 5 MIN PRN
OUTPATIENT
Start: 2024-11-20

## 2024-10-29 RX ORDER — METHYLPREDNISOLONE SODIUM SUCCINATE 40 MG/ML
40 INJECTION INTRAMUSCULAR; INTRAVENOUS
Status: CANCELLED
Start: 2024-11-06

## 2024-10-29 RX ORDER — ALBUTEROL SULFATE 0.83 MG/ML
2.5 SOLUTION RESPIRATORY (INHALATION)
OUTPATIENT
Start: 2024-12-04

## 2024-10-29 RX ORDER — METHYLPREDNISOLONE SODIUM SUCCINATE 40 MG/ML
40 INJECTION INTRAMUSCULAR; INTRAVENOUS
Start: 2024-12-18

## 2024-10-29 RX ORDER — EPINEPHRINE 1 MG/ML
0.3 INJECTION, SOLUTION, CONCENTRATE INTRAVENOUS EVERY 5 MIN PRN
OUTPATIENT
Start: 2024-12-18

## 2024-10-29 RX ORDER — MEPERIDINE HYDROCHLORIDE 25 MG/ML
25 INJECTION INTRAMUSCULAR; INTRAVENOUS; SUBCUTANEOUS
OUTPATIENT
Start: 2024-12-04

## 2024-10-29 RX ORDER — DIPHENHYDRAMINE HYDROCHLORIDE 50 MG/ML
50 INJECTION INTRAMUSCULAR; INTRAVENOUS
Start: 2024-12-18

## 2024-10-29 RX ORDER — DIPHENHYDRAMINE HYDROCHLORIDE 50 MG/ML
50 INJECTION INTRAMUSCULAR; INTRAVENOUS
Start: 2024-12-04

## 2024-10-29 RX ORDER — METHYLPREDNISOLONE SODIUM SUCCINATE 40 MG/ML
40 INJECTION INTRAMUSCULAR; INTRAVENOUS
Start: 2024-11-20

## 2024-10-29 RX ORDER — MEPERIDINE HYDROCHLORIDE 25 MG/ML
25 INJECTION INTRAMUSCULAR; INTRAVENOUS; SUBCUTANEOUS
OUTPATIENT
Start: 2024-12-18

## 2024-10-29 RX ORDER — ALBUTEROL SULFATE 0.83 MG/ML
2.5 SOLUTION RESPIRATORY (INHALATION)
Status: CANCELLED | OUTPATIENT
Start: 2024-11-06

## 2024-10-29 RX ORDER — MEPERIDINE HYDROCHLORIDE 25 MG/ML
25 INJECTION INTRAMUSCULAR; INTRAVENOUS; SUBCUTANEOUS
OUTPATIENT
Start: 2024-11-20

## 2024-10-29 RX ORDER — DIPHENHYDRAMINE HYDROCHLORIDE 50 MG/ML
50 INJECTION INTRAMUSCULAR; INTRAVENOUS
Status: CANCELLED
Start: 2024-11-06

## 2024-10-29 RX ORDER — EPINEPHRINE 1 MG/ML
0.3 INJECTION, SOLUTION, CONCENTRATE INTRAVENOUS EVERY 5 MIN PRN
Status: CANCELLED | OUTPATIENT
Start: 2024-11-06

## 2024-10-29 RX ORDER — METHYLPREDNISOLONE SODIUM SUCCINATE 40 MG/ML
40 INJECTION INTRAMUSCULAR; INTRAVENOUS
Start: 2024-12-04

## 2024-10-29 RX ORDER — DIPHENHYDRAMINE HYDROCHLORIDE 50 MG/ML
25 INJECTION INTRAMUSCULAR; INTRAVENOUS
Status: CANCELLED
Start: 2024-11-06

## 2024-10-29 RX ORDER — ALBUTEROL SULFATE 0.83 MG/ML
2.5 SOLUTION RESPIRATORY (INHALATION)
OUTPATIENT
Start: 2024-12-18

## 2024-10-29 RX ORDER — ALBUTEROL SULFATE 90 UG/1
1-2 INHALANT RESPIRATORY (INHALATION)
Start: 2024-11-20

## 2024-10-29 RX ORDER — EPINEPHRINE 1 MG/ML
0.3 INJECTION, SOLUTION, CONCENTRATE INTRAVENOUS EVERY 5 MIN PRN
OUTPATIENT
Start: 2024-12-04

## 2024-10-29 RX ORDER — MEPERIDINE HYDROCHLORIDE 25 MG/ML
25 INJECTION INTRAMUSCULAR; INTRAVENOUS; SUBCUTANEOUS
Status: CANCELLED | OUTPATIENT
Start: 2024-11-06

## 2024-10-29 RX ORDER — ALBUTEROL SULFATE 0.83 MG/ML
2.5 SOLUTION RESPIRATORY (INHALATION)
OUTPATIENT
Start: 2024-11-20

## 2024-10-29 RX ORDER — ALBUTEROL SULFATE 90 UG/1
1-2 INHALANT RESPIRATORY (INHALATION)
Status: CANCELLED
Start: 2024-11-06

## 2024-10-29 RX ORDER — DIPHENHYDRAMINE HYDROCHLORIDE 50 MG/ML
25 INJECTION INTRAMUSCULAR; INTRAVENOUS
Start: 2024-12-18

## 2024-10-29 RX ORDER — DIPHENHYDRAMINE HYDROCHLORIDE 50 MG/ML
50 INJECTION INTRAMUSCULAR; INTRAVENOUS
Start: 2024-11-20

## 2024-10-29 RX ORDER — DIPHENHYDRAMINE HYDROCHLORIDE 50 MG/ML
25 INJECTION INTRAMUSCULAR; INTRAVENOUS
Start: 2024-12-04

## 2024-10-29 RX ORDER — ALBUTEROL SULFATE 90 UG/1
1-2 INHALANT RESPIRATORY (INHALATION)
Start: 2024-12-04

## 2024-10-29 RX ORDER — ALBUTEROL SULFATE 90 UG/1
1-2 INHALANT RESPIRATORY (INHALATION)
Start: 2024-12-18

## 2024-11-06 ENCOUNTER — INFUSION THERAPY VISIT (OUTPATIENT)
Dept: INFUSION THERAPY | Facility: HOSPITAL | Age: 74
End: 2024-11-06
Attending: NURSE PRACTITIONER
Payer: COMMERCIAL

## 2024-11-06 VITALS
OXYGEN SATURATION: 100 % | DIASTOLIC BLOOD PRESSURE: 55 MMHG | RESPIRATION RATE: 12 BRPM | SYSTOLIC BLOOD PRESSURE: 90 MMHG | HEART RATE: 56 BPM | TEMPERATURE: 98.1 F

## 2024-11-06 DIAGNOSIS — D63.1 ANEMIA OF CHRONIC RENAL FAILURE, STAGE 3A (H): ICD-10-CM

## 2024-11-06 DIAGNOSIS — N18.31 ANEMIA OF CHRONIC RENAL FAILURE, STAGE 3A (H): ICD-10-CM

## 2024-11-06 DIAGNOSIS — D64.81 ANTINEOPLASTIC CHEMOTHERAPY INDUCED ANEMIA: ICD-10-CM

## 2024-11-06 DIAGNOSIS — C11.9 SQUAMOUS CELL CARCINOMA OF NASOPHARYNX (H): ICD-10-CM

## 2024-11-06 DIAGNOSIS — T45.1X5A ANTINEOPLASTIC CHEMOTHERAPY INDUCED ANEMIA: ICD-10-CM

## 2024-11-06 DIAGNOSIS — N18.31 STAGE 3A CHRONIC KIDNEY DISEASE (H): Primary | ICD-10-CM

## 2024-11-06 LAB
ALBUMIN SERPL BCG-MCNC: 2.7 G/DL (ref 3.5–5.2)
ALP SERPL-CCNC: 109 U/L (ref 40–150)
ALT SERPL W P-5'-P-CCNC: 23 U/L (ref 0–70)
ANION GAP SERPL CALCULATED.3IONS-SCNC: 9 MMOL/L (ref 7–15)
AST SERPL W P-5'-P-CCNC: 22 U/L (ref 0–45)
BASOPHILS # BLD AUTO: 0 10E3/UL (ref 0–0.2)
BASOPHILS NFR BLD AUTO: 1 %
BILIRUB SERPL-MCNC: 0.9 MG/DL
BUN SERPL-MCNC: 35.2 MG/DL (ref 8–23)
CALCIUM SERPL-MCNC: 9.3 MG/DL (ref 8.8–10.4)
CHLORIDE SERPL-SCNC: 98 MMOL/L (ref 98–107)
CREAT SERPL-MCNC: 1.96 MG/DL (ref 0.67–1.17)
EGFRCR SERPLBLD CKD-EPI 2021: 36 ML/MIN/1.73M2
EOSINOPHIL # BLD AUTO: 0.1 10E3/UL (ref 0–0.7)
EOSINOPHIL NFR BLD AUTO: 1 %
ERYTHROCYTE [DISTWIDTH] IN BLOOD BY AUTOMATED COUNT: 18.4 % (ref 10–15)
GLUCOSE SERPL-MCNC: 95 MG/DL (ref 70–99)
HCO3 SERPL-SCNC: 21 MMOL/L (ref 22–29)
HCT VFR BLD AUTO: 27.2 % (ref 40–53)
HGB BLD-MCNC: 9 G/DL (ref 13.3–17.7)
IMM GRANULOCYTES # BLD: 0.2 10E3/UL
IMM GRANULOCYTES NFR BLD: 3 %
LYMPHOCYTES # BLD AUTO: 1.2 10E3/UL (ref 0.8–5.3)
LYMPHOCYTES NFR BLD AUTO: 20 %
MCH RBC QN AUTO: 29.8 PG (ref 26.5–33)
MCHC RBC AUTO-ENTMCNC: 33.1 G/DL (ref 31.5–36.5)
MCV RBC AUTO: 90 FL (ref 78–100)
MONOCYTES # BLD AUTO: 1 10E3/UL (ref 0–1.3)
MONOCYTES NFR BLD AUTO: 17 %
NEUTROPHILS # BLD AUTO: 3.6 10E3/UL (ref 1.6–8.3)
NEUTROPHILS NFR BLD AUTO: 59 %
NRBC # BLD AUTO: 0 10E3/UL
NRBC BLD AUTO-RTO: 0 /100
PLATELET # BLD AUTO: 78 10E3/UL (ref 150–450)
POTASSIUM SERPL-SCNC: 4.4 MMOL/L (ref 3.4–5.3)
PROT SERPL-MCNC: 6 G/DL (ref 6.4–8.3)
RBC # BLD AUTO: 3.02 10E6/UL (ref 4.4–5.9)
SODIUM SERPL-SCNC: 128 MMOL/L (ref 135–145)
T4 FREE SERPL-MCNC: 1.8 NG/DL (ref 0.9–1.7)
TSH SERPL DL<=0.005 MIU/L-ACNC: 4.63 UIU/ML (ref 0.3–4.2)
WBC # BLD AUTO: 6.1 10E3/UL (ref 4–11)

## 2024-11-06 PROCEDURE — 36591 DRAW BLOOD OFF VENOUS DEVICE: CPT | Performed by: NURSE PRACTITIONER

## 2024-11-06 PROCEDURE — 80053 COMPREHEN METABOLIC PANEL: CPT | Performed by: NURSE PRACTITIONER

## 2024-11-06 PROCEDURE — 84443 ASSAY THYROID STIM HORMONE: CPT

## 2024-11-06 PROCEDURE — 96372 THER/PROPH/DIAG INJ SC/IM: CPT | Performed by: NURSE PRACTITIONER

## 2024-11-06 PROCEDURE — 96413 CHEMO IV INFUSION 1 HR: CPT

## 2024-11-06 PROCEDURE — 258N000003 HC RX IP 258 OP 636: Performed by: NURSE PRACTITIONER

## 2024-11-06 PROCEDURE — 96375 TX/PRO/DX INJ NEW DRUG ADDON: CPT

## 2024-11-06 PROCEDURE — 250N000011 HC RX IP 250 OP 636: Mod: JZ | Performed by: NURSE PRACTITIONER

## 2024-11-06 PROCEDURE — 85004 AUTOMATED DIFF WBC COUNT: CPT | Performed by: NURSE PRACTITIONER

## 2024-11-06 PROCEDURE — 84439 ASSAY OF FREE THYROXINE: CPT

## 2024-11-06 RX ORDER — HEPARIN SODIUM (PORCINE) LOCK FLUSH IV SOLN 100 UNIT/ML 100 UNIT/ML
5 SOLUTION INTRAVENOUS
Status: DISCONTINUED | OUTPATIENT
Start: 2024-11-06 | End: 2024-11-06 | Stop reason: HOSPADM

## 2024-11-06 RX ORDER — ONDANSETRON 2 MG/ML
INJECTION INTRAMUSCULAR; INTRAVENOUS
Status: DISCONTINUED
Start: 2024-11-06 | End: 2024-11-06 | Stop reason: HOSPADM

## 2024-11-06 RX ORDER — ONDANSETRON 2 MG/ML
8 INJECTION INTRAMUSCULAR; INTRAVENOUS ONCE
Status: COMPLETED | OUTPATIENT
Start: 2024-11-06 | End: 2024-11-06

## 2024-11-06 RX ORDER — EPINEPHRINE 1 MG/ML
0.3 INJECTION, SOLUTION INTRAMUSCULAR; SUBCUTANEOUS EVERY 5 MIN PRN
Status: DISCONTINUED | OUTPATIENT
Start: 2024-11-06 | End: 2024-11-06 | Stop reason: HOSPADM

## 2024-11-06 RX ORDER — DIPHENHYDRAMINE HYDROCHLORIDE 50 MG/ML
25 INJECTION INTRAMUSCULAR; INTRAVENOUS
Status: DISCONTINUED | OUTPATIENT
Start: 2024-11-06 | End: 2024-11-06 | Stop reason: HOSPADM

## 2024-11-06 RX ORDER — DIPHENHYDRAMINE HYDROCHLORIDE 50 MG/ML
50 INJECTION INTRAMUSCULAR; INTRAVENOUS
Status: DISCONTINUED | OUTPATIENT
Start: 2024-11-06 | End: 2024-11-06 | Stop reason: HOSPADM

## 2024-11-06 RX ADMIN — ONDANSETRON 8 MG: 2 INJECTION INTRAMUSCULAR; INTRAVENOUS at 08:47

## 2024-11-06 RX ADMIN — EPOETIN ALFA-EPBX 10000 UNITS: 10000 INJECTION, SOLUTION INTRAVENOUS; SUBCUTANEOUS at 09:46

## 2024-11-06 RX ADMIN — GEMCITABINE 1200 MG: 38 INJECTION, SOLUTION INTRAVENOUS at 09:12

## 2024-11-06 RX ADMIN — Medication 5 ML: at 09:48

## 2024-11-06 NOTE — PROGRESS NOTES
Infusion Nursing Note:  Kristen Chapman presents today for gemzar infusion and retacrit injection.    Patient seen by provider today: No   present during visit today: Not Applicable.    Note: N/A.      Intravenous Access:  Implanted Port.    Treatment Conditions:  Lab Results   Component Value Date    HGB 9.0 (L) 11/06/2024    WBC 6.1 11/06/2024    ANEUTAUTO 3.6 11/06/2024    PLT 78 (L) 11/06/2024        Results reviewed, labs MET treatment parameters, ok to proceed with treatment.      Post Infusion Assessment:  Patient tolerated infusion without incident.  Patient tolerated injection without incident.  Blood return noted pre and post infusion.  Site patent and intact, free from redness, edema or discomfort.  No evidence of extravasations.  Access discontinued per protocol.       Discharge Plan:   Discharge instructions reviewed with: Patient and Family.  Patient and/or family verbalized understanding of discharge instructions and all questions answered.  Patient discharged in stable condition accompanied by: self and son.  Departure Mode: Ambulatory.      Maria Eugenia Hodges RN

## 2024-11-20 ENCOUNTER — INFUSION THERAPY VISIT (OUTPATIENT)
Dept: INFUSION THERAPY | Facility: HOSPITAL | Age: 74
End: 2024-11-20
Attending: INTERNAL MEDICINE
Payer: COMMERCIAL

## 2024-11-20 ENCOUNTER — INFUSION THERAPY VISIT (OUTPATIENT)
Dept: INFUSION THERAPY | Facility: HOSPITAL | Age: 74
End: 2024-11-20
Attending: NURSE PRACTITIONER
Payer: COMMERCIAL

## 2024-11-20 VITALS
RESPIRATION RATE: 16 BRPM | HEART RATE: 70 BPM | TEMPERATURE: 98 F | DIASTOLIC BLOOD PRESSURE: 55 MMHG | SYSTOLIC BLOOD PRESSURE: 78 MMHG | OXYGEN SATURATION: 100 %

## 2024-11-20 DIAGNOSIS — N18.31 ANEMIA OF CHRONIC RENAL FAILURE, STAGE 3A (H): ICD-10-CM

## 2024-11-20 DIAGNOSIS — D63.1 ANEMIA OF CHRONIC RENAL FAILURE, STAGE 3A (H): ICD-10-CM

## 2024-11-20 DIAGNOSIS — I95.9 HYPOTENSION, UNSPECIFIED HYPOTENSION TYPE: ICD-10-CM

## 2024-11-20 DIAGNOSIS — D64.81 ANTINEOPLASTIC CHEMOTHERAPY INDUCED ANEMIA: ICD-10-CM

## 2024-11-20 DIAGNOSIS — C11.9 SQUAMOUS CELL CARCINOMA OF NASOPHARYNX (H): Primary | ICD-10-CM

## 2024-11-20 DIAGNOSIS — N18.31 STAGE 3A CHRONIC KIDNEY DISEASE (H): ICD-10-CM

## 2024-11-20 DIAGNOSIS — T45.1X5A ANTINEOPLASTIC CHEMOTHERAPY INDUCED ANEMIA: ICD-10-CM

## 2024-11-20 LAB
BASOPHILS # BLD AUTO: 0 10E3/UL (ref 0–0.2)
BASOPHILS NFR BLD AUTO: 0 %
EOSINOPHIL # BLD AUTO: 0.1 10E3/UL (ref 0–0.7)
EOSINOPHIL NFR BLD AUTO: 1 %
ERYTHROCYTE [DISTWIDTH] IN BLOOD BY AUTOMATED COUNT: 17.6 % (ref 10–15)
HCT VFR BLD AUTO: 27.9 % (ref 40–53)
HGB BLD-MCNC: 8.9 G/DL (ref 13.3–17.7)
IMM GRANULOCYTES # BLD: 0.2 10E3/UL
IMM GRANULOCYTES NFR BLD: 2 %
LYMPHOCYTES # BLD AUTO: 1 10E3/UL (ref 0.8–5.3)
LYMPHOCYTES NFR BLD AUTO: 13 %
MCH RBC QN AUTO: 29.2 PG (ref 26.5–33)
MCHC RBC AUTO-ENTMCNC: 31.9 G/DL (ref 31.5–36.5)
MCV RBC AUTO: 92 FL (ref 78–100)
MONOCYTES # BLD AUTO: 1.2 10E3/UL (ref 0–1.3)
MONOCYTES NFR BLD AUTO: 15 %
NEUTROPHILS # BLD AUTO: 5.3 10E3/UL (ref 1.6–8.3)
NEUTROPHILS NFR BLD AUTO: 68 %
NRBC # BLD AUTO: 0 10E3/UL
NRBC BLD AUTO-RTO: 0 /100
PLATELET # BLD AUTO: 103 10E3/UL (ref 150–450)
RBC # BLD AUTO: 3.05 10E6/UL (ref 4.4–5.9)
WBC # BLD AUTO: 7.8 10E3/UL (ref 4–11)

## 2024-11-20 PROCEDURE — 85004 AUTOMATED DIFF WBC COUNT: CPT | Performed by: NURSE PRACTITIONER

## 2024-11-20 PROCEDURE — 36591 DRAW BLOOD OFF VENOUS DEVICE: CPT | Performed by: NURSE PRACTITIONER

## 2024-11-20 PROCEDURE — 96413 CHEMO IV INFUSION 1 HR: CPT

## 2024-11-20 PROCEDURE — 96375 TX/PRO/DX INJ NEW DRUG ADDON: CPT

## 2024-11-20 PROCEDURE — 85014 HEMATOCRIT: CPT | Performed by: NURSE PRACTITIONER

## 2024-11-20 PROCEDURE — 250N000011 HC RX IP 250 OP 636: Performed by: NURSE PRACTITIONER

## 2024-11-20 PROCEDURE — 258N000003 HC RX IP 258 OP 636: Performed by: NURSE PRACTITIONER

## 2024-11-20 PROCEDURE — 96372 THER/PROPH/DIAG INJ SC/IM: CPT | Performed by: NURSE PRACTITIONER

## 2024-11-20 RX ORDER — HEPARIN SODIUM (PORCINE) LOCK FLUSH IV SOLN 100 UNIT/ML 100 UNIT/ML
5 SOLUTION INTRAVENOUS
Status: DISCONTINUED | OUTPATIENT
Start: 2024-11-20 | End: 2024-11-20 | Stop reason: HOSPADM

## 2024-11-20 RX ORDER — HEPARIN SODIUM,PORCINE 10 UNIT/ML
5-20 VIAL (ML) INTRAVENOUS DAILY PRN
OUTPATIENT
Start: 2024-11-20

## 2024-11-20 RX ORDER — ONDANSETRON 2 MG/ML
8 INJECTION INTRAMUSCULAR; INTRAVENOUS ONCE
Status: COMPLETED | OUTPATIENT
Start: 2024-11-20 | End: 2024-11-20

## 2024-11-20 RX ORDER — HEPARIN SODIUM (PORCINE) LOCK FLUSH IV SOLN 100 UNIT/ML 100 UNIT/ML
5 SOLUTION INTRAVENOUS
OUTPATIENT
Start: 2024-11-20

## 2024-11-20 RX ADMIN — Medication 5 ML: at 11:53

## 2024-11-20 RX ADMIN — SODIUM CHLORIDE 1000 ML: 9 INJECTION, SOLUTION INTRAVENOUS at 10:33

## 2024-11-20 RX ADMIN — GEMCITABINE 1200 MG: 38 INJECTION, SOLUTION INTRAVENOUS at 10:57

## 2024-11-20 RX ADMIN — EPOETIN ALFA-EPBX 10000 UNITS: 10000 INJECTION, SOLUTION INTRAVENOUS; SUBCUTANEOUS at 10:55

## 2024-11-20 RX ADMIN — ONDANSETRON 8 MG: 2 INJECTION INTRAMUSCULAR; INTRAVENOUS at 10:53

## 2024-11-20 NOTE — PROGRESS NOTES
Infusion Nursing Note:  Kristen Chapman presents today for [id number].    Patient seen by provider today: No   present during visit today: Yes, Language: Hmong.     Note: pt here for treatment, BP 78/55 pt denies dizziness. I l NS given along with treatment today and pt instructed to try to drink more fluids. Port flushed and deaccessed upon completion and pt d.c ambulatory to lobby with his son. Pt received retacrit injection in left abdomen.    Intravenous Access:  Implanted Port.    Treatment Conditions:  Results reviewed, labs MET treatment parameters, ok to proceed with treatment.    Post Infusion Assessment:  Patient tolerated infusion without incident.     Discharge Plan:   Patient discharged in stable condition accompanied by: son.  Pt and his son deny complaint.      Alka Gonzalez RN

## 2024-11-25 ENCOUNTER — OFFICE VISIT (OUTPATIENT)
Dept: RADIATION ONCOLOGY | Facility: HOSPITAL | Age: 74
End: 2024-11-25
Attending: FAMILY MEDICINE
Payer: COMMERCIAL

## 2024-11-25 VITALS
OXYGEN SATURATION: 100 % | RESPIRATION RATE: 20 BRPM | HEART RATE: 66 BPM | DIASTOLIC BLOOD PRESSURE: 59 MMHG | SYSTOLIC BLOOD PRESSURE: 90 MMHG

## 2024-11-25 DIAGNOSIS — N18.32 CKD STAGE 3B, GFR 30-44 ML/MIN (H): ICD-10-CM

## 2024-11-25 DIAGNOSIS — T14.8XXA WOUND DUE TO MALIGNANT NEOPLASTIC DISEASE (H): ICD-10-CM

## 2024-11-25 DIAGNOSIS — C80.1 WOUND DUE TO MALIGNANT NEOPLASTIC DISEASE (H): ICD-10-CM

## 2024-11-25 DIAGNOSIS — E44.0 MODERATE PROTEIN-CALORIE MALNUTRITION (H): ICD-10-CM

## 2024-11-25 DIAGNOSIS — Z51.5 PALLIATIVE CARE PATIENT: Primary | ICD-10-CM

## 2024-11-25 DIAGNOSIS — C11.9 NASOPHARYNGEAL CARCINOMA (H): ICD-10-CM

## 2024-11-25 PROCEDURE — 99215 OFFICE O/P EST HI 40 MIN: CPT | Performed by: FAMILY MEDICINE

## 2024-11-25 PROCEDURE — G2211 COMPLEX E/M VISIT ADD ON: HCPCS | Performed by: FAMILY MEDICINE

## 2024-11-25 PROCEDURE — G0463 HOSPITAL OUTPT CLINIC VISIT: HCPCS | Performed by: FAMILY MEDICINE

## 2024-11-25 RX ORDER — METRONIDAZOLE 250 MG/1
250 TABLET ORAL DAILY
Qty: 30 TABLET | Refills: 2 | Status: SHIPPED | OUTPATIENT
Start: 2024-11-25

## 2024-11-25 RX ORDER — METRONIDAZOLE 375 MG/1
375 CAPSULE ORAL DAILY
Qty: 30 CAPSULE | Refills: 2 | Status: SHIPPED | OUTPATIENT
Start: 2024-11-25 | End: 2024-11-25

## 2024-11-25 ASSESSMENT — PAIN SCALES - GENERAL: PAINLEVEL_OUTOF10: NO PAIN (0)

## 2024-11-25 NOTE — LETTER
"2024      Kristen Chapman  [address] [place] [address]      Dear Colleague,    Thank you for referring your patient, Kristen Chapman, to the [facility] [place]. Please see a copy of my visit note below.    Oncology Rooming Note    2024 4:05 PM   Kristen Champan is a 74 year old male who presents for:    Chief Complaint   Patient presents with     Oncology Clinic Visit     palliative care     Follow up     Initial Vitals: BP 90/59 (BP Location: Right arm, Patient Position: Sitting)   Pulse 66   Resp 20   SpO2 100%  Estimated body mass index is 16.17 kg/m  as calculated from the following:    Height as of 10/24/24: 1.651 m (5' 5\").    Weight as of 10/24/24: 44.1 kg (97 lb 3.2 oz). There is no height or weight on file to calculate BSA.  No Pain (0) Comment: Data Unavailable   No LMP for male patient.  Allergies reviewed: Yes  Medications reviewed: Yes    Medications: Medication refills not needed today.  Pharmacy name entered into TUNJI:    [facility] [place], [place] - [address] [place] [address] PHARMACY [place], [place] - [address] [place] [address]    Frailty Screening:   Is the patient here for a new oncology consult visit in cancer care? 2. No      Clinical concerns: Follow up, accompanied by son   Dr. Arreguin was notified.      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  09/10/2024   No change to [facility]. 05/13/2024  [place] and would like more time to be alive and is willing to undergo more treatments. I explained to him that sometimes due to the toxic nature of treatments he may live longer with less tumor directed therapy as the end of his life approaches. He does not want a feeding tube again or CPR or intubation.  He also declined surgery on the malignant wound on his neck.         Recommendations & Counseling:  Stop Megace  CONTINUE mirtazapine to 30 mg ODT at HS  Encourage protein in his diet    Follow up with me in 8 weeks;    START flagyl 250 mg tab; crushed and sprinkled onto wound under right mandible daily    Kristen is asked to consider how he would want to spend his remaining time if the current chemo isn't effective and he expresses understanding that at some point he might become too debilitated to be able to receive chemo.  He says we'll figure out what to do next at that time.     Follow up with wound therapy for anterior neck wound        Counseling: All of the above was explained to the patient in lay language. The patient has verbalized a clear understanding of the discussion, asked appropriate questions, which have been answered to patient's apparent satisfaction. The patient is in agreement with the above plan.        Chief Complaint/Patient ID: Kristen Chapman 74 year old male with PMHx of recurrent nasopharyngeal cancer with recent PD, a malignant wound in his neck area; CKD 3b; Hep B with cirrhosis and refractory ascites and pleurex catheter placement.    Last Palliative care appointment: 9/10/2024 with me     Reviewed:  Yes:   reviewed - no record of controlled substances prescribed.    Interim History:  Kristen Chapman is a 74 year old male who is seen today for follow up with Palliative Care clinic  visit. We were joined by his son (whom he lives with, Pool)  They both declined an  for today's visit.     Pain:   minimal    Appetite/Nausea: maybe a little better with mirtazapine; he is focusing on protein     Bowels: no concerns     Sleep: OK     Mood: no depression or anxiety    Malignant wound under right mandible--serosanguinous discharge present with a slight foul odor     Coping:  overall well    Family History- Reviewed in Epic.    Allergies   Allergen Reactions     Oxycodone Itching       Social History:  Pertinent changes to social history/social situation since last visit: none  Key support resources: family meghan Pool and his family  Advance Directive Status:  no ACP documents    Social History     Tobacco Use     Smoking status: Never     Smokeless tobacco: Never   Substance Use Topics     Alcohol use: Not Currently     Drug use: Not Currently         Allergies   Allergen Reactions     Oxycodone Itching     Current Outpatient Medications   Medication Sig Dispense Refill     metroNIDAZOLE (FLAGYL) 250 MG tablet Take 1 tablet (250 mg) by mouth daily. 30 tablet 2     acetaminophen (TYLENOL) 325 MG tablet Take 650 mg by mouth every 6 hours as needed        dexAMETHasone (DECADRON) 4 MG tablet Take 1 tablet (4 mg) by mouth daily (with breakfast) Take for 2 days after each chemotherapy dose. 12 tablet 1     furosemide (LASIX) 20 MG tablet Take 1 tablet (20 mg) by mouth daily. 90 tablet 3     levothyroxine (SYNTHROID/LEVOTHROID) 175 MCG tablet TAKE 1 TABLET (175 MCG) BY MOUTH DAILY 60 tablet 1     megestrol (MEGACE) 40 MG/ML suspension Take 10 mLs (400 mg) by mouth daily 480 mL 1     mirtazapine (REMERON SOL-TAB) 30 MG ODT 1 tablet (30 mg) by Orally disintegrating tablet route at bedtime 30 tablet 5     prochlorperazine (COMPAZINE) 10 MG tablet Take 1 tablet (10 mg) by mouth every 6 hours as needed for nausea or vomiting 30 tablet 2     rivaroxaban ANTICOAGULANT (XARELTO) 20 MG TABS tablet Take 1 tablet (20 mg) by mouth daily (with dinner) (Patient not taking: Reported on 10/24/2024) 30 tablet 3     spironolactone  (ALDACTONE) 100 MG tablet Take 1 tablet (100 mg) by mouth daily. 90 tablet 3     tenofovir (VIREAD) 300 MG tablet Take 1 tablet (300 mg) by mouth every 72 hours. 45 tablet 3     Past Medical History:   Diagnosis Date     Anemia      Dysphagia      Hepatitis B chronic      Hypothyroidism      Nasopharyngeal cancer (H)      Severe protein-calorie malnutrition (H)      Thrombocytopenia (H)      Past Surgical History:   Procedure Laterality Date     ENDOSCOPIC ENDONASAL SURGERY Bilateral 9/7/2021    Procedure: Nasal Endoscopy with Biopsy;  Surgeon: Ky Centeno MD;  Location: [facility] OR     IR CHEST PORT PLACEMENT > 5 YRS OF AGE  3/2/2020     IR CHEST PORT PLACEMENT > 5 YRS OF AGE  3/2/2020     IR GASTROSTOMY TUBE INSERTION  4/27/2020     IR GASTROSTOMY TUBE PERCUTANEOUS PLCMNT  4/27/2020     IR INTRAPERITONEAL CATH TUNNEL ASCITES  2/22/2024     IR INTRAPERITONEAL CATH TUNNEL ASCITES  2/26/2024     IR PORT PLACEMENT >5 YEARS  3/2/2020     IR PORT PLACEMENT >5 YEARS  3/2/2020     IR T-FASTENER REMOVAL  6/5/2020     IR T-FASTENER REMOVAL  6/5/2020       Physical Exam:   GENERAL APPEARANCE: frail, alert and no distress; neatly groomed  EYES: Eyes grossly normal to inspection, PERRLA, conjunctivae and sclerae without injection or discharge, EOM intact   RESP:  no increased work of breathing; speaks in brief complete sentences  in combination of Hmong and English sentences;   MS: No musculoskeletal defects are noted  SKIN: chronic skin changes under right mandible from radiation dermatitis, also a maignant wound that was not probled with serosanguinous dischage and slight foul odor.  PSYCH: Alert and oriented x3; speech- coherent , normal rate and volume; able to articulate logical thoughts, able to abstract reason, no tangential thoughts, no hallucinations or delusions, mentation appears normal, Mood is euthymic. Affect is appropriate for this mood state and bright. Thought content is free of suicidal ideation,  hallucinations, and delusions.  Eye contact is good during conversation.       Key Data Reviewed:  LABS: 11/6/2024- Cr 1.96, Albumin 2.7,  Hgb 9.0,      IMAGING: 10/7/2024 CT SOFT TISSUE NECK WITH CONTRAST  IMPRESSION:   1.  Interval increase in size of an area of abnormal enhancing soft tissue along the undersurface of the posterior body and angle of the right side of the mandible worrisome for progression of metastatic disease. This area of abnormal enhancement is   within a larger area of abnormal enhancement that extends from the right lateral floor of mouth, down into the right submandibular space and up into the right parotid space.  2.  Persistent sinus disease. Malignancy is not excluded.  3.  Tiny thyroid nodule in the right.  4.  Otherwise, normal soft tissue neck CT    10/7/2024 ct chest w/contrast  IMPRESSION:   1.  The 6 mm subpleural spiculated nodule in the right upper lobe posteriorly is unchanged.  2.  A small area of airspace opacity in the posterior segment right upper lobe consistent with atelectasis and/or resolving pneumonia is new.  3.  No thoracic lymphadenopathy.      40 minutes spent on the date of the encounter doing chart review, history and exam, patient education & counseling, documentation and other activities as noted above.    The longitudinal plan of care for the diagnosis(es)/condition(s) as documented were addressed during this visit. Due to the added complexity in care, I will continue to support Kristen in the subsequent management and with ongoing continuity of care.    Steve Arreguin MD MS FAAFP CAQHPM  [facility]  Office [phone]  Fax [phone]         Again, thank you for allowing me to participate in the care of your patient.        Sincerely,        Steve Arreguin MD

## 2024-11-25 NOTE — PROGRESS NOTES
"Oncology Rooming Note    2024 4:05 PM   Kristen Chapman is a 74 year old male who presents for:    Chief Complaint   Patient presents with    Oncology Clinic Visit    palliative care     Follow up     Initial Vitals: BP 90/59 (BP Location: Right arm, Patient Position: Sitting)   Pulse 66   Resp 20   SpO2 100%  Estimated body mass index is 16.17 kg/m  as calculated from the following:    Height as of 10/24/24: 1.651 m (5' 5\").    Weight as of 10/24/24: 44.1 kg (97 lb 3.2 oz). There is no height or weight on file to calculate BSA.  No Pain (0) Comment: Data Unavailable   No LMP for male patient.  Allergies reviewed: Yes  Medications reviewed: Yes    Medications: Medication refills not needed today.  Pharmacy name entered into Audacious:    [facility] [place], [place] - [address] [place] [address] [address]ARM[address]    Frailty Screening:   Is the patient here for a new oncology consult visit in cancer care? 2. No      Clinical concerns: Follow up, accompanied by son   Dr. Arreguin was notified.      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  09/10/2024   No change to [facility]. 2024  [place]es [place] and would like more time to be alive and is willing to undergo more treatments. I explained to him that sometimes due to the toxic nature of treatments he " may live longer with less tumor directed therapy as the end of his life approaches. He does not want a feeding tube again or CPR or intubation.  He also declined surgery on the malignant wound on his neck.         Recommendations & Counseling:  Stop Megace  CONTINUE mirtazapine to 30 mg ODT at HS  Encourage protein in his diet    Follow up with me in 8 weeks;    START flagyl 250 mg tab; crushed and sprinkled onto wound under right mandible daily    Kristen is asked to consider how he would want to spend his remaining time if the current chemo isn't effective and he expresses understanding that at some point he might become too debilitated to be able to receive chemo.  He says we'll figure out what to do next at that time.     Follow up with wound therapy for anterior neck wound        Counseling: All of the above was explained to the patient in lay language. The patient has verbalized a clear understanding of the discussion, asked appropriate questions, which have been answered to patient's apparent satisfaction. The patient is in agreement with the above plan.        Chief Complaint/Patient ID: Kristen Chapman 74 year old male with PMHx of recurrent nasopharyngeal cancer with recent PD, a malignant wound in his neck area; CKD 3b; Hep B with cirrhosis and refractory ascites and pleurex catheter placement.    Last Palliative care appointment: 9/10/2024 with me     Reviewed:  Yes:   reviewed - no record of controlled substances prescribed.    Interim History:  Kristen Chapman is a 74 year old male who is seen today for follow up with Palliative Care clinic  visit. We were joined by his son (whom he lives with, Pool)  They both declined an  for today's visit.     Pain:  minimal    Appetite/Nausea: maybe a little better with mirtazapine; he is focusing on protein     Bowels: no concerns     Sleep: OK     Mood: no depression or anxiety    Malignant wound under right mandible--serosanguinous discharge present with a  slight foul odor     Coping:  overall well    Family History- Reviewed in Epic.    Allergies   Allergen Reactions    Oxycodone Itching       Social History:  Pertinent changes to social history/social situation since last visit: none  Key support resources: family meghan Lanza and his family  Advance Directive Status:  no ACP documents    Social History     Tobacco Use    Smoking status: Never    Smokeless tobacco: Never   Substance Use Topics    Alcohol use: Not Currently    Drug use: Not Currently         Allergies   Allergen Reactions    Oxycodone Itching     Current Outpatient Medications   Medication Sig Dispense Refill    metroNIDAZOLE (FLAGYL) 250 MG tablet Take 1 tablet (250 mg) by mouth daily. 30 tablet 2    acetaminophen (TYLENOL) 325 MG tablet Take 650 mg by mouth every 6 hours as needed       dexAMETHasone (DECADRON) 4 MG tablet Take 1 tablet (4 mg) by mouth daily (with breakfast) Take for 2 days after each chemotherapy dose. 12 tablet 1    furosemide (LASIX) 20 MG tablet Take 1 tablet (20 mg) by mouth daily. 90 tablet 3    levothyroxine (SYNTHROID/LEVOTHROID) 175 MCG tablet TAKE 1 TABLET (175 MCG) BY MOUTH DAILY 60 tablet 1    megestrol (MEGACE) 40 MG/ML suspension Take 10 mLs (400 mg) by mouth daily 480 mL 1    mirtazapine (REMERON SOL-TAB) 30 MG ODT 1 tablet (30 mg) by Orally disintegrating tablet route at bedtime 30 tablet 5    prochlorperazine (COMPAZINE) 10 MG tablet Take 1 tablet (10 mg) by mouth every 6 hours as needed for nausea or vomiting 30 tablet 2    rivaroxaban ANTICOAGULANT (XARELTO) 20 MG TABS tablet Take 1 tablet (20 mg) by mouth daily (with dinner) (Patient not taking: Reported on 10/24/2024) 30 tablet 3    spironolactone (ALDACTONE) 100 MG tablet Take 1 tablet (100 mg) by mouth daily. 90 tablet 3    tenofovir (VIREAD) 300 MG tablet Take 1 tablet (300 mg) by mouth every 72 hours. 45 tablet 3     Past Medical History:   Diagnosis Date    Anemia     Dysphagia     Hepatitis B chronic      Hypothyroidism     Nasopharyngeal cancer (H)     Severe protein-calorie malnutrition (H)     Thrombocytopenia (H)      Past Surgical History:   Procedure Laterality Date    ENDOSCOPIC ENDONASAL SURGERY Bilateral 9/7/2021    Procedure: Nasal Endoscopy with Biopsy;  Surgeon: Ky Centeno MD;  Location: [facility] OR    IR CHEST PORT PLACEMENT > 5 YRS OF AGE  3/2/2020    IR CHEST PORT PLACEMENT > 5 YRS OF AGE  3/2/2020    IR GASTROSTOMY TUBE INSERTION  4/27/2020    IR GASTROSTOMY TUBE PERCUTANEOUS PLCMNT  4/27/2020    IR INTRAPERITONEAL CATH TUNNEL ASCITES  2/22/2024    IR INTRAPERITONEAL CATH TUNNEL ASCITES  2/26/2024    IR PORT PLACEMENT >5 YEARS  3/2/2020    IR PORT PLACEMENT >5 YEARS  3/2/2020    IR T-FASTENER REMOVAL  6/5/2020    IR T-FASTENER REMOVAL  6/5/2020       Physical Exam:   GENERAL APPEARANCE: frail, alert and no distress; neatly groomed  EYES: Eyes grossly normal to inspection, PERRLA, conjunctivae and sclerae without injection or discharge, EOM intact   RESP:  no increased work of breathing; speaks in brief complete sentences  in combination of Hmong and English sentences;   MS: No musculoskeletal defects are noted  SKIN: chronic skin changes under right mandible from radiation dermatitis, also a maignant wound that was not probled with serosanguinous dischage and slight foul odor.  PSYCH: Alert and oriented x3; speech- coherent , normal rate and volume; able to articulate logical thoughts, able to abstract reason, no tangential thoughts, no hallucinations or delusions, mentation appears normal, Mood is euthymic. Affect is appropriate for this mood state and bright. Thought content is free of suicidal ideation, hallucinations, and delusions.  Eye contact is good during conversation.       Key Data Reviewed:  LABS: 11/6/2024- Cr 1.96, Albumin 2.7,  Hgb 9.0,      IMAGING: 10/7/2024 CT SOFT TISSUE NECK WITH CONTRAST  IMPRESSION:   1.  Interval increase in size of an area of abnormal enhancing soft tissue  along the undersurface of the posterior body and angle of the right side of the mandible worrisome for progression of metastatic disease. This area of abnormal enhancement is   within a larger area of abnormal enhancement that extends from the right lateral floor of mouth, down into the right submandibular space and up into the right parotid space.  2.  Persistent sinus disease. Malignancy is not excluded.  3.  Tiny thyroid nodule in the right.  4.  Otherwise, normal soft tissue neck CT    10/7/2024 ct chest w/contrast  IMPRESSION:   1.  The 6 mm subpleural spiculated nodule in the right upper lobe posteriorly is unchanged.  2.  A small area of airspace opacity in the posterior segment right upper lobe consistent with atelectasis and/or resolving pneumonia is new.  3.  No thoracic lymphadenopathy.      40 minutes spent on the date of the encounter doing chart review, history and exam, patient education & counseling, documentation and other activities as noted above.    The longitudinal plan of care for the diagnosis(es)/condition(s) as documented were addressed during this visit. Due to the added complexity in care, I will continue to support Kristen in the subsequent management and with ongoing continuity of care.    Steve Arreguin MD MS FAAFP CAQHPM  [facility]  Office [phone]  Fax [phone]

## 2024-11-25 NOTE — PATIENT INSTRUCTIONS
It was good to see you today Kristen and Nayla.  Keep enjoying this beautiful.    Here are the things we talked about:  Use 1 capsule of Flagyl --open it up and sprinkle the contents onto the wound every day--this should help the wound heal and not be so itchy.    Stop to schedule a follow up appointment in 2 months or so.     How to get a hold of us:  For non-urgent matters, MyChart works best.    For more urgent matters, or if you prefer not to use MyChart, call our clinic nurse coordinator Milka Dumont RN at [phone]    We have an on-call number for evenings and weekends. Please call this only if you are having uncontrolled symptoms or serious side effects from your medicines: [phone].     For refills, please give us a week (5 working days) notice. We don't always have providers available everyday to do refills. If you call the day you run out of your medicine, we may not be able to refill it in time, so call 5 days in advance!    Steve Arreguin MD MS FAAFP CAQHPM  [facility]  Office [phone]  Fax [phone]

## 2024-12-04 ENCOUNTER — INFUSION THERAPY VISIT (OUTPATIENT)
Dept: INFUSION THERAPY | Facility: HOSPITAL | Age: 74
End: 2024-12-04
Attending: INTERNAL MEDICINE
Payer: COMMERCIAL

## 2024-12-04 VITALS
TEMPERATURE: 97.6 F | DIASTOLIC BLOOD PRESSURE: 50 MMHG | RESPIRATION RATE: 16 BRPM | SYSTOLIC BLOOD PRESSURE: 74 MMHG | HEART RATE: 55 BPM | OXYGEN SATURATION: 98 %

## 2024-12-04 DIAGNOSIS — D64.81 ANTINEOPLASTIC CHEMOTHERAPY INDUCED ANEMIA: ICD-10-CM

## 2024-12-04 DIAGNOSIS — N18.31 ANEMIA OF CHRONIC RENAL FAILURE, STAGE 3A (H): ICD-10-CM

## 2024-12-04 DIAGNOSIS — D64.9 ANEMIA, NORMOCYTIC NORMOCHROMIC: Primary | ICD-10-CM

## 2024-12-04 DIAGNOSIS — D63.1 ANEMIA OF CHRONIC RENAL FAILURE, STAGE 3A (H): ICD-10-CM

## 2024-12-04 DIAGNOSIS — T45.1X5A ANTINEOPLASTIC CHEMOTHERAPY INDUCED ANEMIA: ICD-10-CM

## 2024-12-04 DIAGNOSIS — C11.9 SQUAMOUS CELL CARCINOMA OF NASOPHARYNX (H): ICD-10-CM

## 2024-12-04 DIAGNOSIS — C11.9 SQUAMOUS CELL CARCINOMA OF NASOPHARYNX (H): Primary | ICD-10-CM

## 2024-12-04 DIAGNOSIS — N18.31 STAGE 3A CHRONIC KIDNEY DISEASE (H): ICD-10-CM

## 2024-12-04 LAB
ABO/RH(D): NORMAL
ALBUMIN SERPL BCG-MCNC: 2.9 G/DL (ref 3.5–5.2)
ALP SERPL-CCNC: 86 U/L (ref 40–150)
ALT SERPL W P-5'-P-CCNC: 17 U/L (ref 0–70)
ANION GAP SERPL CALCULATED.3IONS-SCNC: 10 MMOL/L (ref 7–15)
ANTIBODY SCREEN: NEGATIVE
AST SERPL W P-5'-P-CCNC: 19 U/L (ref 0–45)
BASOPHILS # BLD AUTO: 0 10E3/UL (ref 0–0.2)
BASOPHILS NFR BLD AUTO: 0 %
BILIRUB SERPL-MCNC: 1.1 MG/DL
BLD PROD TYP BPU: NORMAL
BLOOD COMPONENT TYPE: NORMAL
BUN SERPL-MCNC: 38.1 MG/DL (ref 8–23)
CALCIUM SERPL-MCNC: 10.8 MG/DL (ref 8.8–10.4)
CHLORIDE SERPL-SCNC: 107 MMOL/L (ref 98–107)
CODING SYSTEM: NORMAL
CREAT SERPL-MCNC: 2.54 MG/DL (ref 0.67–1.17)
CROSSMATCH: NORMAL
EGFRCR SERPLBLD CKD-EPI 2021: 26 ML/MIN/1.73M2
EOSINOPHIL # BLD AUTO: 0.1 10E3/UL (ref 0–0.7)
EOSINOPHIL NFR BLD AUTO: 1 %
ERYTHROCYTE [DISTWIDTH] IN BLOOD BY AUTOMATED COUNT: 17.6 % (ref 10–15)
GLUCOSE SERPL-MCNC: 113 MG/DL (ref 70–99)
HCO3 SERPL-SCNC: 20 MMOL/L (ref 22–29)
HCT VFR BLD AUTO: 25.1 % (ref 40–53)
HGB BLD-MCNC: 7.9 G/DL (ref 13.3–17.7)
IMM GRANULOCYTES # BLD: 0.2 10E3/UL
IMM GRANULOCYTES NFR BLD: 2 %
ISSUE DATE AND TIME: NORMAL
LYMPHOCYTES # BLD AUTO: 1.5 10E3/UL (ref 0.8–5.3)
LYMPHOCYTES NFR BLD AUTO: 14 %
MCH RBC QN AUTO: 29.6 PG (ref 26.5–33)
MCHC RBC AUTO-ENTMCNC: 31.5 G/DL (ref 31.5–36.5)
MCV RBC AUTO: 94 FL (ref 78–100)
MONOCYTES # BLD AUTO: 1.1 10E3/UL (ref 0–1.3)
MONOCYTES NFR BLD AUTO: 10 %
NEUTROPHILS # BLD AUTO: 8.4 10E3/UL (ref 1.6–8.3)
NEUTROPHILS NFR BLD AUTO: 74 %
NRBC # BLD AUTO: 0 10E3/UL
NRBC BLD AUTO-RTO: 0 /100
PLATELET # BLD AUTO: 101 10E3/UL (ref 150–450)
POTASSIUM SERPL-SCNC: 4.9 MMOL/L (ref 3.4–5.3)
PROT SERPL-MCNC: 6.1 G/DL (ref 6.4–8.3)
RBC # BLD AUTO: 2.67 10E6/UL (ref 4.4–5.9)
SODIUM SERPL-SCNC: 137 MMOL/L (ref 135–145)
SPECIMEN EXPIRATION DATE: NORMAL
TSH SERPL DL<=0.005 MIU/L-ACNC: 0.85 UIU/ML (ref 0.3–4.2)
UNIT ABO/RH: NORMAL
UNIT NUMBER: NORMAL
UNIT STATUS: NORMAL
UNIT TYPE ISBT: 7300
WBC # BLD AUTO: 11.3 10E3/UL (ref 4–11)

## 2024-12-04 PROCEDURE — 80053 COMPREHEN METABOLIC PANEL: CPT | Performed by: INTERNAL MEDICINE

## 2024-12-04 PROCEDURE — 36430 TRANSFUSION BLD/BLD COMPNT: CPT

## 2024-12-04 PROCEDURE — 96375 TX/PRO/DX INJ NEW DRUG ADDON: CPT

## 2024-12-04 PROCEDURE — 258N000003 HC RX IP 258 OP 636: Performed by: INTERNAL MEDICINE

## 2024-12-04 PROCEDURE — 96372 THER/PROPH/DIAG INJ SC/IM: CPT | Performed by: INTERNAL MEDICINE

## 2024-12-04 PROCEDURE — 85048 AUTOMATED LEUKOCYTE COUNT: CPT | Performed by: INTERNAL MEDICINE

## 2024-12-04 PROCEDURE — 84443 ASSAY THYROID STIM HORMONE: CPT

## 2024-12-04 PROCEDURE — 36591 DRAW BLOOD OFF VENOUS DEVICE: CPT | Performed by: INTERNAL MEDICINE

## 2024-12-04 PROCEDURE — 85004 AUTOMATED DIFF WBC COUNT: CPT | Performed by: INTERNAL MEDICINE

## 2024-12-04 PROCEDURE — 86900 BLOOD TYPING SEROLOGIC ABO: CPT | Performed by: INTERNAL MEDICINE

## 2024-12-04 PROCEDURE — P9016 RBC LEUKOCYTES REDUCED: HCPCS | Performed by: INTERNAL MEDICINE

## 2024-12-04 PROCEDURE — 86923 COMPATIBILITY TEST ELECTRIC: CPT | Performed by: INTERNAL MEDICINE

## 2024-12-04 PROCEDURE — 85014 HEMATOCRIT: CPT | Performed by: INTERNAL MEDICINE

## 2024-12-04 PROCEDURE — 250N000011 HC RX IP 250 OP 636: Performed by: INTERNAL MEDICINE

## 2024-12-04 PROCEDURE — 96413 CHEMO IV INFUSION 1 HR: CPT

## 2024-12-04 RX ORDER — DIPHENHYDRAMINE HYDROCHLORIDE 50 MG/ML
50 INJECTION INTRAMUSCULAR; INTRAVENOUS
Start: 2024-12-04

## 2024-12-04 RX ORDER — HEPARIN SODIUM (PORCINE) LOCK FLUSH IV SOLN 100 UNIT/ML 100 UNIT/ML
5 SOLUTION INTRAVENOUS
Status: DISCONTINUED | OUTPATIENT
Start: 2024-12-04 | End: 2024-12-04 | Stop reason: HOSPADM

## 2024-12-04 RX ORDER — HEPARIN SODIUM (PORCINE) LOCK FLUSH IV SOLN 100 UNIT/ML 100 UNIT/ML
5 SOLUTION INTRAVENOUS
OUTPATIENT
Start: 2024-12-04

## 2024-12-04 RX ORDER — HEPARIN SODIUM,PORCINE 10 UNIT/ML
5-20 VIAL (ML) INTRAVENOUS DAILY PRN
OUTPATIENT
Start: 2024-12-04

## 2024-12-04 RX ORDER — EPINEPHRINE 1 MG/ML
0.3 INJECTION, SOLUTION INTRAMUSCULAR; SUBCUTANEOUS EVERY 5 MIN PRN
OUTPATIENT
Start: 2024-12-04

## 2024-12-04 RX ORDER — ONDANSETRON 2 MG/ML
8 INJECTION INTRAMUSCULAR; INTRAVENOUS ONCE
Status: COMPLETED | OUTPATIENT
Start: 2024-12-04 | End: 2024-12-04

## 2024-12-04 RX ADMIN — EPOETIN ALFA-EPBX 10000 UNITS: 10000 INJECTION, SOLUTION INTRAVENOUS; SUBCUTANEOUS at 12:26

## 2024-12-04 RX ADMIN — ONDANSETRON 8 MG: 2 INJECTION INTRAMUSCULAR; INTRAVENOUS at 09:17

## 2024-12-04 RX ADMIN — SODIUM CHLORIDE 500 ML: 9 INJECTION, SOLUTION INTRAVENOUS at 08:50

## 2024-12-04 RX ADMIN — HEPARIN SODIUM (PORCINE) LOCK FLUSH IV SOLN 100 UNIT/ML 5 ML: 100 SOLUTION at 12:08

## 2024-12-04 RX ADMIN — GEMCITABINE 1200 MG: 38 INJECTION, SOLUTION INTRAVENOUS at 09:41

## 2024-12-04 NOTE — PROGRESS NOTES
Infusion Nursing Note:  Kristen Chapman presents today for cycle 9 day 1.    Patient seen by provider today: No   present during visit today: Not Applicable.    Note: PT here ambulatory with cane for assist and with son for treatment. PT reports fatigue and decreased appetite. Pallor noted. Port accessed and labs drawn. Results approved for txt and pt hgb is 7.9. PT will have one unit prbc today too. Txt reviewed with pt and administered as ordered. One unit prbc transfused. Pt tolerated txt and transfusion without any problems. Txt completed and port flushed/deaccessed with 2x2 to site.retacrit inj administered in left abdomen/bandaid to site. Reviewed low blood pressure with Dr Frias, He wants pt to come in next week for labs and iv hydration. Pt dc'd steady gait with son.      Intravenous Access:  Implanted Port.    Treatment Conditions:  Results reviewed, labs MET treatment parameters, ok to proceed with treatment.      Post Infusion Assessment:  Tolerated without any problems.       Discharge Plan:   Follow up reviewed.      Julienne Batista RN

## 2024-12-10 ENCOUNTER — TELEPHONE (OUTPATIENT)
Dept: ANTICOAGULATION | Facility: CLINIC | Age: 74
End: 2024-12-10
Payer: COMMERCIAL

## 2024-12-10 NOTE — TELEPHONE ENCOUNTER
ANTICOAGULATION DIRECT ORAL ANTICOAGULANT MONITORING    SUBJECTIVE     The [facility] is evaluating Kristen Chapman's Rivaroxaban (Xarelto) as part of its Anticoagulation Monitoring Program.    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 Has had some nose bleeding since rivaroxaban. Currently on 20mg daily. If he has bleeding, or lower platelets, he may have to stop or decrease dose. Encourage pt to call us if he has any bleeding from tumor or other bleeding that does not stop.     OBJECTIVE     Age: 74 year old    Wt Readings from Last 2 Encounters:   10/24/24 44.1 kg (97 lb 3.2 oz)   10/09/24 43.6 kg (96 lb 3.2 oz)      Lab Results   Component Value Date    CR 2.54 (H) 12/04/2024    CR 1.96 (H) 11/06/2024    CR 1.89 (H) 10/23/2024     Creatinine Clearance (using actual bodyweight, mL/min): 16    Lab Results   Component Value Date    HGB 7.9 12/04/2024     12/04/2024     ASSESSMENT/PLAN     A chart review for Direct Oral Anticoagulant (DOAC) Stewardship has been completed for:     Lab monitoring: serum creatinine check recommended- planned 12/10/24      Elaine Baron RPH  Anticoagulation Clinic

## 2024-12-11 ENCOUNTER — MYC MEDICAL ADVICE (OUTPATIENT)
Dept: RADIATION ONCOLOGY | Facility: HOSPITAL | Age: 74
End: 2024-12-11
Payer: COMMERCIAL

## 2024-12-16 DIAGNOSIS — D63.1 ANEMIA OF CHRONIC RENAL FAILURE, STAGE 3A (H): ICD-10-CM

## 2024-12-16 DIAGNOSIS — T45.1X5A ANTINEOPLASTIC CHEMOTHERAPY INDUCED ANEMIA: Primary | ICD-10-CM

## 2024-12-16 DIAGNOSIS — N18.31 STAGE 3A CHRONIC KIDNEY DISEASE (H): ICD-10-CM

## 2024-12-16 DIAGNOSIS — D64.81 ANTINEOPLASTIC CHEMOTHERAPY INDUCED ANEMIA: Primary | ICD-10-CM

## 2024-12-16 DIAGNOSIS — N18.31 ANEMIA OF CHRONIC RENAL FAILURE, STAGE 3A (H): ICD-10-CM

## 2024-12-18 ENCOUNTER — INFUSION THERAPY VISIT (OUTPATIENT)
Dept: INFUSION THERAPY | Facility: HOSPITAL | Age: 74
End: 2024-12-18
Attending: INTERNAL MEDICINE
Payer: COMMERCIAL

## 2024-12-18 ENCOUNTER — ENROLLMENT (OUTPATIENT)
Dept: HOME HEALTH SERVICES | Facility: HOME HEALTH | Age: 74
End: 2024-12-18
Payer: COMMERCIAL

## 2024-12-18 VITALS
DIASTOLIC BLOOD PRESSURE: 50 MMHG | TEMPERATURE: 98.5 F | OXYGEN SATURATION: 100 % | BODY MASS INDEX: 14.13 KG/M2 | WEIGHT: 84.9 LBS | RESPIRATION RATE: 18 BRPM | HEART RATE: 78 BPM | SYSTOLIC BLOOD PRESSURE: 76 MMHG

## 2024-12-18 DIAGNOSIS — D64.81 ANTINEOPLASTIC CHEMOTHERAPY INDUCED ANEMIA: ICD-10-CM

## 2024-12-18 DIAGNOSIS — T45.1X5A ANTINEOPLASTIC CHEMOTHERAPY INDUCED ANEMIA: ICD-10-CM

## 2024-12-18 DIAGNOSIS — I95.9 HYPOTENSION, UNSPECIFIED HYPOTENSION TYPE: Primary | ICD-10-CM

## 2024-12-18 DIAGNOSIS — C11.9 SQUAMOUS CELL CARCINOMA OF NASOPHARYNX (H): ICD-10-CM

## 2024-12-18 DIAGNOSIS — N18.31 ANEMIA OF CHRONIC RENAL FAILURE, STAGE 3A (H): ICD-10-CM

## 2024-12-18 DIAGNOSIS — D63.1 ANEMIA OF CHRONIC RENAL FAILURE, STAGE 3A (H): ICD-10-CM

## 2024-12-18 DIAGNOSIS — C11.9 SQUAMOUS CELL CARCINOMA OF NASOPHARYNX (H): Primary | ICD-10-CM

## 2024-12-18 DIAGNOSIS — N18.31 STAGE 3A CHRONIC KIDNEY DISEASE (H): ICD-10-CM

## 2024-12-18 LAB
BASOPHILS # BLD AUTO: 0 10E3/UL (ref 0–0.2)
BASOPHILS NFR BLD AUTO: 0 %
EOSINOPHIL # BLD AUTO: 0.1 10E3/UL (ref 0–0.7)
EOSINOPHIL NFR BLD AUTO: 2 %
ERYTHROCYTE [DISTWIDTH] IN BLOOD BY AUTOMATED COUNT: 17.1 % (ref 10–15)
HCT VFR BLD AUTO: 27 % (ref 40–53)
HGB BLD-MCNC: 8.4 G/DL (ref 13.3–17.7)
IMM GRANULOCYTES # BLD: 0.1 10E3/UL
IMM GRANULOCYTES NFR BLD: 2 %
LYMPHOCYTES # BLD AUTO: 1 10E3/UL (ref 0.8–5.3)
LYMPHOCYTES NFR BLD AUTO: 15 %
MCH RBC QN AUTO: 29.1 PG (ref 26.5–33)
MCHC RBC AUTO-ENTMCNC: 31.1 G/DL (ref 31.5–36.5)
MCV RBC AUTO: 93 FL (ref 78–100)
MONOCYTES # BLD AUTO: 0.9 10E3/UL (ref 0–1.3)
MONOCYTES NFR BLD AUTO: 14 %
NEUTROPHILS # BLD AUTO: 4.7 10E3/UL (ref 1.6–8.3)
NEUTROPHILS NFR BLD AUTO: 68 %
NRBC # BLD AUTO: 0 10E3/UL
NRBC BLD AUTO-RTO: 0 /100
PLATELET # BLD AUTO: 141 10E3/UL (ref 150–450)
RBC # BLD AUTO: 2.89 10E6/UL (ref 4.4–5.9)
WBC # BLD AUTO: 6.9 10E3/UL (ref 4–11)

## 2024-12-18 PROCEDURE — 250N000011 HC RX IP 250 OP 636: Performed by: INTERNAL MEDICINE

## 2024-12-18 PROCEDURE — 85004 AUTOMATED DIFF WBC COUNT: CPT | Performed by: INTERNAL MEDICINE

## 2024-12-18 PROCEDURE — 96361 HYDRATE IV INFUSION ADD-ON: CPT

## 2024-12-18 PROCEDURE — 96413 CHEMO IV INFUSION 1 HR: CPT

## 2024-12-18 PROCEDURE — 258N000003 HC RX IP 258 OP 636: Mod: JZ | Performed by: INTERNAL MEDICINE

## 2024-12-18 PROCEDURE — 258N000003 HC RX IP 258 OP 636: Performed by: NURSE PRACTITIONER

## 2024-12-18 PROCEDURE — 96375 TX/PRO/DX INJ NEW DRUG ADDON: CPT

## 2024-12-18 PROCEDURE — 85041 AUTOMATED RBC COUNT: CPT | Performed by: INTERNAL MEDICINE

## 2024-12-18 PROCEDURE — 36591 DRAW BLOOD OFF VENOUS DEVICE: CPT | Performed by: INTERNAL MEDICINE

## 2024-12-18 RX ORDER — HEPARIN SODIUM (PORCINE) LOCK FLUSH IV SOLN 100 UNIT/ML 100 UNIT/ML
5 SOLUTION INTRAVENOUS
Status: DISCONTINUED | OUTPATIENT
Start: 2024-12-18 | End: 2024-12-18 | Stop reason: HOSPADM

## 2024-12-18 RX ORDER — HEPARIN SODIUM (PORCINE) LOCK FLUSH IV SOLN 100 UNIT/ML 100 UNIT/ML
5 SOLUTION INTRAVENOUS
Status: CANCELLED | OUTPATIENT
Start: 2024-12-18

## 2024-12-18 RX ORDER — EPINEPHRINE 1 MG/ML
0.3 INJECTION, SOLUTION INTRAMUSCULAR; SUBCUTANEOUS EVERY 5 MIN PRN
Status: DISCONTINUED | OUTPATIENT
Start: 2024-12-18 | End: 2024-12-18 | Stop reason: HOSPADM

## 2024-12-18 RX ORDER — ALBUTEROL SULFATE 90 UG/1
1-2 INHALANT RESPIRATORY (INHALATION)
Status: DISCONTINUED | OUTPATIENT
Start: 2024-12-18 | End: 2024-12-18 | Stop reason: HOSPADM

## 2024-12-18 RX ORDER — METHYLPREDNISOLONE SODIUM SUCCINATE 40 MG/ML
40 INJECTION INTRAMUSCULAR; INTRAVENOUS
Status: DISCONTINUED | OUTPATIENT
Start: 2024-12-18 | End: 2024-12-18 | Stop reason: HOSPADM

## 2024-12-18 RX ORDER — HEPARIN SODIUM (PORCINE) LOCK FLUSH IV SOLN 100 UNIT/ML 100 UNIT/ML
5 SOLUTION INTRAVENOUS
OUTPATIENT
Start: 2024-12-18

## 2024-12-18 RX ORDER — MEPERIDINE HYDROCHLORIDE 25 MG/ML
25 INJECTION INTRAMUSCULAR; INTRAVENOUS; SUBCUTANEOUS
Status: DISCONTINUED | OUTPATIENT
Start: 2024-12-18 | End: 2024-12-18 | Stop reason: HOSPADM

## 2024-12-18 RX ORDER — HEPARIN SODIUM,PORCINE 10 UNIT/ML
5-20 VIAL (ML) INTRAVENOUS DAILY PRN
Status: CANCELLED | OUTPATIENT
Start: 2024-12-18

## 2024-12-18 RX ORDER — DIPHENHYDRAMINE HYDROCHLORIDE 50 MG/ML
25 INJECTION INTRAMUSCULAR; INTRAVENOUS
Status: DISCONTINUED | OUTPATIENT
Start: 2024-12-18 | End: 2024-12-18 | Stop reason: HOSPADM

## 2024-12-18 RX ORDER — ALBUTEROL SULFATE 0.83 MG/ML
2.5 SOLUTION RESPIRATORY (INHALATION)
Status: DISCONTINUED | OUTPATIENT
Start: 2024-12-18 | End: 2024-12-18 | Stop reason: HOSPADM

## 2024-12-18 RX ORDER — DIPHENHYDRAMINE HYDROCHLORIDE 50 MG/ML
50 INJECTION INTRAMUSCULAR; INTRAVENOUS
Status: DISCONTINUED | OUTPATIENT
Start: 2024-12-18 | End: 2024-12-18 | Stop reason: HOSPADM

## 2024-12-18 RX ORDER — HEPARIN SODIUM,PORCINE 10 UNIT/ML
5-20 VIAL (ML) INTRAVENOUS DAILY PRN
OUTPATIENT
Start: 2024-12-18

## 2024-12-18 RX ORDER — ONDANSETRON 2 MG/ML
8 INJECTION INTRAMUSCULAR; INTRAVENOUS ONCE
Status: COMPLETED | OUTPATIENT
Start: 2024-12-18 | End: 2024-12-18

## 2024-12-18 RX ADMIN — SODIUM CHLORIDE 500 ML: 9 INJECTION, SOLUTION INTRAVENOUS at 09:14

## 2024-12-18 RX ADMIN — ONDANSETRON 8 MG: 2 INJECTION INTRAMUSCULAR; INTRAVENOUS at 08:54

## 2024-12-18 RX ADMIN — GEMCITABINE 1200 MG: 38 INJECTION, SOLUTION INTRAVENOUS at 09:15

## 2024-12-18 RX ADMIN — SODIUM CHLORIDE 500 ML: 9 INJECTION, SOLUTION INTRAVENOUS at 08:38

## 2024-12-18 RX ADMIN — HEPARIN SODIUM (PORCINE) LOCK FLUSH IV SOLN 100 UNIT/ML 5 ML: 100 SOLUTION at 10:18

## 2024-12-18 NOTE — PROGRESS NOTES
Infusion Nursing Note:  Kristen Chapman presents today for cycle 9 day 1 treatment with Gemcitabine.  He also requested IV fluids.    Patient seen by provider today: No   present during visit today: No, pt wanted son to interpret.  Son speaks english very well.  Waiver signed.    Note: VSS  Pt is hypotensive but often runs hypotensive so not that off from his norm.  He was assessed with use of his son to interpret and to answer some questions as the pt has a difficult time hearing but also speaking due to his cancer.  He was educated on his plan of care and each drug given was reviewed prior to administration.  Per Dr minaya I could repeat therapy plan for fluids so pt received a full 1L NS today as he is having a lot of trouble drinking/eating enough.      Intravenous Access:  Implanted Port.    Treatment Conditions:  Lab Results   Component Value Date    HGB 8.4 (L) 12/18/2024    WBC 6.9 12/18/2024    ANEUTAUTO 4.7 12/18/2024     (L) 12/18/2024        Results reviewed, labs MET treatment parameters, ok to proceed with treatment.      Post Infusion Assessment:  Patient tolerated infusion without incident.  Blood return noted pre and post infusion.  Site patent and intact, free from redness, edema or discomfort.  No evidence of extravasations.  Access discontinued per protocol.       Discharge Plan:   Patient discharged in stable condition accompanied by: son.  Departure Mode: Ambulatory.      Frances Killian RN

## 2024-12-19 ENCOUNTER — HOME INFUSION (OUTPATIENT)
Dept: HOME HEALTH SERVICES | Facility: HOME HEALTH | Age: 74
End: 2024-12-19
Payer: COMMERCIAL

## 2024-12-19 DIAGNOSIS — C11.9 NASOPHARYNGEAL CARCINOMA (H): Primary | ICD-10-CM

## 2024-12-20 ENCOUNTER — MYC MEDICAL ADVICE (OUTPATIENT)
Dept: ONCOLOGY | Facility: HOSPITAL | Age: 74
End: 2024-12-20
Payer: COMMERCIAL

## 2024-12-23 ENCOUNTER — ENROLLMENT (OUTPATIENT)
Dept: HOME HEALTH SERVICES | Facility: HOME HEALTH | Age: 74
End: 2024-12-23
Payer: COMMERCIAL

## 2024-12-23 ENCOUNTER — PATIENT OUTREACH (OUTPATIENT)
Dept: ONCOLOGY | Facility: HOSPITAL | Age: 74
End: 2024-12-23
Payer: COMMERCIAL

## 2024-12-23 DIAGNOSIS — C11.9 NASOPHARYNGEAL CARCINOMA (H): ICD-10-CM

## 2024-12-23 DIAGNOSIS — C11.9 SQUAMOUS CELL CARCINOMA OF NASOPHARYNX (H): Primary | ICD-10-CM

## 2024-12-23 NOTE — PROGRESS NOTES
[facility]: Cancer Care                                                                                          Sent patient mychart message:    Hello,     [facility] will be contacting you [facility] to set up a date and time to come out for the first home infusion. Let me know if you have any questions.       Signature:  Marjorie Briones RN

## 2024-12-26 ENCOUNTER — HOME INFUSION BILLING (OUTPATIENT)
Dept: HOME HEALTH SERVICES | Facility: HOME HEALTH | Age: 74
End: 2024-12-26
Payer: COMMERCIAL

## 2024-12-27 ENCOUNTER — MEDICAL CORRESPONDENCE (OUTPATIENT)
Dept: HEALTH INFORMATION MANAGEMENT | Facility: CLINIC | Age: 74
End: 2024-12-27
Payer: COMMERCIAL

## 2024-12-27 ENCOUNTER — TELEPHONE (OUTPATIENT)
Dept: FAMILY MEDICINE | Facility: CLINIC | Age: 74
End: 2024-12-27
Payer: COMMERCIAL

## 2024-12-27 NOTE — TELEPHONE ENCOUNTER
RN from [facility] requesting orders.     Verbal orders for port assess for IV fluid ordered.     JACQUES Medina.

## 2024-12-30 ENCOUNTER — TELEPHONE (OUTPATIENT)
Dept: FAMILY MEDICINE | Facility: CLINIC | Age: 74
End: 2024-12-30
Payer: COMMERCIAL

## 2024-12-30 NOTE — TELEPHONE ENCOUNTER
Home Care is calling regarding an established patient with [facility].       Requesting orders from: Issa Cook  Provider is following patient: Yes  Is this a 60-day recertification request?  No    Orders Requested    Skilled Nursing  Request for initial certification (first set of orders)   Frequency:  1x/wk for 1 wks  then 2x/wk for 6 wks  Plus 3 PRN visits for wound care, IV management, and disease management    Wound Care   To right jaw: cleanse with wound cleanser, pat dry, apply skin prep to aleah wound, apply calcium alginate to wound bed, then oil emulsion gauze and cover with bordered dressing twice weekly and as neeeded.    Approval for second provider orders   Yoanna Andre CNP  PCP is still the primary for doctor orders but this order says the other provider listed (who ordered IV management initially) can place orders too.    Information was gathered and will be sent to provider for review.  RN will contact Home Care with information after provider review.    Tonia Sparks RN

## 2024-12-31 ENCOUNTER — HOME INFUSION BILLING (OUTPATIENT)
Dept: HOME HEALTH SERVICES | Facility: HOME HEALTH | Age: 74
End: 2024-12-31
Payer: COMMERCIAL

## 2024-12-31 ENCOUNTER — NURSE TRIAGE (OUTPATIENT)
Dept: NURSING | Facility: CLINIC | Age: 74
End: 2024-12-31
Payer: COMMERCIAL

## 2025-01-01 NOTE — TELEPHONE ENCOUNTER
Provider Response to 2nd Level Triage Request    I have reviewed the RN documentation. My recommendation is:  Refer to ED for further evaluation and management of failure to thrive.

## 2025-01-01 NOTE — TELEPHONE ENCOUNTER
RN calling after home care visit. She is not with the client at this time. He was just open to them on Friday for home care. 500 ml NS bolus given. Since Saturday, he's hardly able to swallow. Can hardly eat anything. Fluid intake is minimal. Can no longer swallow his pills. Daughter said she spoke with MD. He missed his chemo today because he is so weak. Vitals 98/58, otherwise normal, alert and oriented to person and place which is his baseline. Please notify PCP. RN said to call client or family directly if any new orders or what you want client to do? She is unsure if family called and spoke with Oncology at [facility], or PCP.  Radha Barrera RN  [facility]    Reason for Disposition   [1] Caller requesting NON-URGENT health information AND [2] PCP's office is the best resource    Additional Information   Negative: [1] Caller is not with the adult (patient) AND [2] reporting urgent symptoms   Negative: Lab result questions   Negative: Medication questions   Negative: Caller can't be reached by phone   Negative: Caller has already spoken to PCP or another triager   Negative: RN needs further essential information from caller in order to complete triage   Negative: Requesting regular office appointment    Protocols used: Information Only Call - No Triage-A-

## 2025-01-02 ENCOUNTER — APPOINTMENT (OUTPATIENT)
Dept: CT IMAGING | Facility: HOSPITAL | Age: 75
DRG: 871 | End: 2025-01-02
Attending: EMERGENCY MEDICINE
Payer: COMMERCIAL

## 2025-01-02 ENCOUNTER — PATIENT OUTREACH (OUTPATIENT)
Dept: ONCOLOGY | Facility: HOSPITAL | Age: 75
End: 2025-01-02
Payer: COMMERCIAL

## 2025-01-02 ENCOUNTER — HOSPITAL ENCOUNTER (INPATIENT)
Facility: HOSPITAL | Age: 75
DRG: 871 | End: 2025-01-02
Attending: EMERGENCY MEDICINE
Payer: COMMERCIAL

## 2025-01-02 ENCOUNTER — TELEPHONE (OUTPATIENT)
Dept: FAMILY MEDICINE | Facility: CLINIC | Age: 75
End: 2025-01-02
Payer: COMMERCIAL

## 2025-01-02 VITALS
TEMPERATURE: 97.7 F | SYSTOLIC BLOOD PRESSURE: 105 MMHG | HEART RATE: 61 BPM | DIASTOLIC BLOOD PRESSURE: 59 MMHG | HEIGHT: 65 IN | BODY MASS INDEX: 13.99 KG/M2 | WEIGHT: 84 LBS | RESPIRATION RATE: 24 BRPM | OXYGEN SATURATION: 100 %

## 2025-01-02 DIAGNOSIS — I95.9 HYPOTENSION, UNSPECIFIED HYPOTENSION TYPE: ICD-10-CM

## 2025-01-02 DIAGNOSIS — R51.9 ACUTE NONINTRACTABLE HEADACHE, UNSPECIFIED HEADACHE TYPE: ICD-10-CM

## 2025-01-02 DIAGNOSIS — R53.1 GENERAL WEAKNESS: ICD-10-CM

## 2025-01-02 DIAGNOSIS — E86.0 ACUTE DEHYDRATION: ICD-10-CM

## 2025-01-02 DIAGNOSIS — E87.0 HYPERNATREMIA: ICD-10-CM

## 2025-01-02 DIAGNOSIS — R10.84 GENERALIZED ABDOMINAL PAIN: ICD-10-CM

## 2025-01-02 DIAGNOSIS — R63.8 DECREASED ORAL INTAKE: ICD-10-CM

## 2025-01-02 DIAGNOSIS — E83.52 HYPERCALCEMIA: ICD-10-CM

## 2025-01-02 DIAGNOSIS — D64.9 CHRONIC ANEMIA: ICD-10-CM

## 2025-01-02 DIAGNOSIS — Z51.5 COMFORT MEASURES ONLY STATUS: Primary | ICD-10-CM

## 2025-01-02 LAB
ABO + RH BLD: NORMAL
ABSOLUTE NEUTROPHILS, BODY FLUID: 21 /UL
ALBUMIN SERPL BCG-MCNC: 2.2 G/DL (ref 3.5–5.2)
ALBUMIN SERPL BCG-MCNC: 2.7 G/DL (ref 3.5–5.2)
ALP SERPL-CCNC: 74 U/L (ref 40–150)
ALP SERPL-CCNC: 97 U/L (ref 40–150)
ALT SERPL W P-5'-P-CCNC: 11 U/L (ref 0–70)
ALT SERPL W P-5'-P-CCNC: 15 U/L (ref 0–70)
ANION GAP SERPL CALCULATED.3IONS-SCNC: 10 MMOL/L (ref 7–15)
ANION GAP SERPL CALCULATED.3IONS-SCNC: 9 MMOL/L (ref 7–15)
APPEARANCE FLD: ABNORMAL
APTT PPP: 40 SECONDS (ref 22–38)
AST SERPL W P-5'-P-CCNC: 11 U/L (ref 0–45)
AST SERPL W P-5'-P-CCNC: 16 U/L (ref 0–45)
BASOPHILS # BLD AUTO: 0 10E3/UL (ref 0–0.2)
BASOPHILS NFR BLD AUTO: 0 %
BILIRUB DIRECT SERPL-MCNC: 0.62 MG/DL (ref 0–0.3)
BILIRUB SERPL-MCNC: 1.2 MG/DL
BILIRUB SERPL-MCNC: 1.3 MG/DL
BLD GP AB SCN SERPL QL: NEGATIVE
BUN SERPL-MCNC: 46.5 MG/DL (ref 8–23)
BUN SERPL-MCNC: 56.2 MG/DL (ref 8–23)
CA-I BLD-MCNC: 6.2 MG/DL (ref 4.4–5.2)
CA-I BLD-MCNC: 6.5 MG/DL (ref 4.4–5.2)
CA-I BLD-MCNC: 7.1 MG/DL (ref 4.4–5.2)
CALCIUM SERPL-MCNC: 10.1 MG/DL (ref 8.8–10.4)
CALCIUM SERPL-MCNC: 12.6 MG/DL (ref 8.8–10.4)
CELL COUNT BODY FLUID SOURCE: ABNORMAL
CHLORIDE SERPL-SCNC: 122 MMOL/L (ref 98–107)
CHLORIDE SERPL-SCNC: 126 MMOL/L (ref 98–107)
COLOR FLD: YELLOW
CREAT SERPL-MCNC: 2.49 MG/DL (ref 0.67–1.17)
CREAT SERPL-MCNC: 2.96 MG/DL (ref 0.67–1.17)
CRP SERPL-MCNC: 97.2 MG/L
EGFRCR SERPLBLD CKD-EPI 2021: 22 ML/MIN/1.73M2
EGFRCR SERPLBLD CKD-EPI 2021: 27 ML/MIN/1.73M2
EOSINOPHIL # BLD AUTO: 0.1 10E3/UL (ref 0–0.7)
EOSINOPHIL NFR BLD AUTO: 1 %
EOSINOPHIL NFR FLD MANUAL: 1 %
ERYTHROCYTE [DISTWIDTH] IN BLOOD BY AUTOMATED COUNT: 20.1 % (ref 10–15)
FIBRINOGEN PPP-MCNC: 191 MG/DL (ref 170–510)
FLUAV RNA SPEC QL NAA+PROBE: NEGATIVE
FLUBV RNA RESP QL NAA+PROBE: NEGATIVE
GLUCOSE BLDC GLUCOMTR-MCNC: 90 MG/DL (ref 70–99)
GLUCOSE SERPL-MCNC: 118 MG/DL (ref 70–99)
GLUCOSE SERPL-MCNC: 88 MG/DL (ref 70–99)
HCO3 SERPL-SCNC: 17 MMOL/L (ref 22–29)
HCO3 SERPL-SCNC: 19 MMOL/L (ref 22–29)
HCT VFR BLD AUTO: 26.5 % (ref 40–53)
HGB BLD-MCNC: 8.2 G/DL (ref 13.3–17.7)
HOLD SPECIMEN: NORMAL
HOLD SPECIMEN: NORMAL
IMM GRANULOCYTES # BLD: 0.1 10E3/UL
IMM GRANULOCYTES NFR BLD: 1 %
INR PPP: 1.63 (ref 0.85–1.15)
LACTATE SERPL-SCNC: 2 MMOL/L (ref 0.7–2)
LIPASE SERPL-CCNC: 12 U/L (ref 13–60)
LYMPHOCYTES # BLD AUTO: 1 10E3/UL (ref 0.8–5.3)
LYMPHOCYTES NFR BLD AUTO: 13 %
LYMPHOCYTES NFR FLD MANUAL: 64 %
MCH RBC QN AUTO: 29.7 PG (ref 26.5–33)
MCHC RBC AUTO-ENTMCNC: 30.9 G/DL (ref 31.5–36.5)
MCV RBC AUTO: 96 FL (ref 78–100)
MONOCYTES # BLD AUTO: 0.7 10E3/UL (ref 0–1.3)
MONOCYTES NFR BLD AUTO: 9 %
MONOS+MACROS NFR FLD MANUAL: 21 %
NEUTROPHILS # BLD AUTO: 5.9 10E3/UL (ref 1.6–8.3)
NEUTROPHILS NFR BLD AUTO: 76 %
NEUTS BAND NFR FLD MANUAL: 12 %
NRBC # BLD AUTO: 0 10E3/UL
NRBC BLD AUTO-RTO: 0 /100
OTHER CELLS FLD MANUAL: 2 %
PLATELET # BLD AUTO: 102 10E3/UL (ref 150–450)
POTASSIUM SERPL-SCNC: 3.9 MMOL/L (ref 3.4–5.3)
POTASSIUM SERPL-SCNC: 4 MMOL/L (ref 3.4–5.3)
PROCALCITONIN SERPL IA-MCNC: 0.99 NG/ML
PROT SERPL-MCNC: 4.7 G/DL (ref 6.4–8.3)
PROT SERPL-MCNC: 6 G/DL (ref 6.4–8.3)
RBC # BLD AUTO: 2.76 10E6/UL (ref 4.4–5.9)
RBC # FLD: 1000 /UL
RSV RNA SPEC NAA+PROBE: NEGATIVE
SARS-COV-2 RNA RESP QL NAA+PROBE: NEGATIVE
SODIUM SERPL-SCNC: 150 MMOL/L (ref 135–145)
SODIUM SERPL-SCNC: 153 MMOL/L (ref 135–145)
SPECIMEN EXP DATE BLD: NORMAL
TROPONIN T SERPL HS-MCNC: 29 NG/L
TROPONIN T SERPL HS-MCNC: 34 NG/L
TSH SERPL DL<=0.005 MIU/L-ACNC: 11.3 UIU/ML (ref 0.3–4.2)
WBC # BLD AUTO: 7.8 10E3/UL (ref 4–11)
WBC # FLD AUTO: 175 /UL

## 2025-01-02 PROCEDURE — 87581 M.PNEUMON DNA AMP PROBE: CPT | Performed by: STUDENT IN AN ORGANIZED HEALTH CARE EDUCATION/TRAINING PROGRAM

## 2025-01-02 PROCEDURE — 87206 SMEAR FLUORESCENT/ACID STAI: CPT | Performed by: STUDENT IN AN ORGANIZED HEALTH CARE EDUCATION/TRAINING PROGRAM

## 2025-01-02 PROCEDURE — 36569 INSJ PICC 5 YR+ W/O IMAGING: CPT

## 2025-01-02 PROCEDURE — 36415 COLL VENOUS BLD VENIPUNCTURE: CPT | Performed by: EMERGENCY MEDICINE

## 2025-01-02 PROCEDURE — 86140 C-REACTIVE PROTEIN: CPT | Performed by: STUDENT IN AN ORGANIZED HEALTH CARE EDUCATION/TRAINING PROGRAM

## 2025-01-02 PROCEDURE — 83605 ASSAY OF LACTIC ACID: CPT | Performed by: EMERGENCY MEDICINE

## 2025-01-02 PROCEDURE — 85025 COMPLETE CBC W/AUTO DIFF WBC: CPT | Performed by: EMERGENCY MEDICINE

## 2025-01-02 PROCEDURE — 96361 HYDRATE IV INFUSION ADD-ON: CPT

## 2025-01-02 PROCEDURE — 200N000001 HC R&B ICU

## 2025-01-02 PROCEDURE — 83615 LACTATE (LD) (LDH) ENZYME: CPT | Performed by: STUDENT IN AN ORGANIZED HEALTH CARE EDUCATION/TRAINING PROGRAM

## 2025-01-02 PROCEDURE — 70450 CT HEAD/BRAIN W/O DYE: CPT

## 2025-01-02 PROCEDURE — 258N000003 HC RX IP 258 OP 636: Performed by: EMERGENCY MEDICINE

## 2025-01-02 PROCEDURE — 96365 THER/PROPH/DIAG IV INF INIT: CPT | Mod: 59

## 2025-01-02 PROCEDURE — 250N000011 HC RX IP 250 OP 636: Performed by: STUDENT IN AN ORGANIZED HEALTH CARE EDUCATION/TRAINING PROGRAM

## 2025-01-02 PROCEDURE — 84100 ASSAY OF PHOSPHORUS: CPT | Performed by: STUDENT IN AN ORGANIZED HEALTH CARE EDUCATION/TRAINING PROGRAM

## 2025-01-02 PROCEDURE — 82330 ASSAY OF CALCIUM: CPT | Performed by: STUDENT IN AN ORGANIZED HEALTH CARE EDUCATION/TRAINING PROGRAM

## 2025-01-02 PROCEDURE — 93005 ELECTROCARDIOGRAM TRACING: CPT | Performed by: EMERGENCY MEDICINE

## 2025-01-02 PROCEDURE — 250N000009 HC RX 250: Performed by: EMERGENCY MEDICINE

## 2025-01-02 PROCEDURE — 83690 ASSAY OF LIPASE: CPT | Performed by: EMERGENCY MEDICINE

## 2025-01-02 PROCEDURE — 71250 CT THORAX DX C-: CPT

## 2025-01-02 PROCEDURE — 87449 NOS EACH ORGANISM AG IA: CPT | Performed by: STUDENT IN AN ORGANIZED HEALTH CARE EDUCATION/TRAINING PROGRAM

## 2025-01-02 PROCEDURE — 84443 ASSAY THYROID STIM HORMONE: CPT | Performed by: STUDENT IN AN ORGANIZED HEALTH CARE EDUCATION/TRAINING PROGRAM

## 2025-01-02 PROCEDURE — 89051 BODY FLUID CELL COUNT: CPT | Performed by: STUDENT IN AN ORGANIZED HEALTH CARE EDUCATION/TRAINING PROGRAM

## 2025-01-02 PROCEDURE — 87040 BLOOD CULTURE FOR BACTERIA: CPT | Performed by: EMERGENCY MEDICINE

## 2025-01-02 PROCEDURE — 80053 COMPREHEN METABOLIC PANEL: CPT | Performed by: EMERGENCY MEDICINE

## 2025-01-02 PROCEDURE — 82248 BILIRUBIN DIRECT: CPT | Performed by: EMERGENCY MEDICINE

## 2025-01-02 PROCEDURE — 96366 THER/PROPH/DIAG IV INF ADDON: CPT

## 2025-01-02 PROCEDURE — 86850 RBC ANTIBODY SCREEN: CPT | Performed by: EMERGENCY MEDICINE

## 2025-01-02 PROCEDURE — 87486 CHLMYD PNEUM DNA AMP PROBE: CPT | Performed by: STUDENT IN AN ORGANIZED HEALTH CARE EDUCATION/TRAINING PROGRAM

## 2025-01-02 PROCEDURE — 87075 CULTR BACTERIA EXCEPT BLOOD: CPT | Performed by: STUDENT IN AN ORGANIZED HEALTH CARE EDUCATION/TRAINING PROGRAM

## 2025-01-02 PROCEDURE — 85384 FIBRINOGEN ACTIVITY: CPT | Performed by: STUDENT IN AN ORGANIZED HEALTH CARE EDUCATION/TRAINING PROGRAM

## 2025-01-02 PROCEDURE — 99292 CRITICAL CARE ADDL 30 MIN: CPT

## 2025-01-02 PROCEDURE — 87641 MR-STAPH DNA AMP PROBE: CPT | Performed by: STUDENT IN AN ORGANIZED HEALTH CARE EDUCATION/TRAINING PROGRAM

## 2025-01-02 PROCEDURE — 250N000013 HC RX MED GY IP 250 OP 250 PS 637: Performed by: STUDENT IN AN ORGANIZED HEALTH CARE EDUCATION/TRAINING PROGRAM

## 2025-01-02 PROCEDURE — 272N000452 HC KIT SHRLOCK 5FR POWER PICC TRIPLE LUMEN

## 2025-01-02 PROCEDURE — 82330 ASSAY OF CALCIUM: CPT | Performed by: EMERGENCY MEDICINE

## 2025-01-02 PROCEDURE — 83735 ASSAY OF MAGNESIUM: CPT | Performed by: STUDENT IN AN ORGANIZED HEALTH CARE EDUCATION/TRAINING PROGRAM

## 2025-01-02 PROCEDURE — 258N000003 HC RX IP 258 OP 636: Performed by: STUDENT IN AN ORGANIZED HEALTH CARE EDUCATION/TRAINING PROGRAM

## 2025-01-02 PROCEDURE — 86923 COMPATIBILITY TEST ELECTRIC: CPT | Performed by: INTERNAL MEDICINE

## 2025-01-02 PROCEDURE — 85610 PROTHROMBIN TIME: CPT | Performed by: STUDENT IN AN ORGANIZED HEALTH CARE EDUCATION/TRAINING PROGRAM

## 2025-01-02 PROCEDURE — 82397 CHEMILUMINESCENT ASSAY: CPT | Performed by: STUDENT IN AN ORGANIZED HEALTH CARE EDUCATION/TRAINING PROGRAM

## 2025-01-02 PROCEDURE — 84145 PROCALCITONIN (PCT): CPT | Performed by: EMERGENCY MEDICINE

## 2025-01-02 PROCEDURE — 83970 ASSAY OF PARATHORMONE: CPT | Performed by: STUDENT IN AN ORGANIZED HEALTH CARE EDUCATION/TRAINING PROGRAM

## 2025-01-02 PROCEDURE — 250N000009 HC RX 250: Performed by: STUDENT IN AN ORGANIZED HEALTH CARE EDUCATION/TRAINING PROGRAM

## 2025-01-02 PROCEDURE — 84484 ASSAY OF TROPONIN QUANT: CPT | Performed by: EMERGENCY MEDICINE

## 2025-01-02 PROCEDURE — 94640 AIRWAY INHALATION TREATMENT: CPT

## 2025-01-02 PROCEDURE — 87637 SARSCOV2&INF A&B&RSV AMP PRB: CPT | Performed by: EMERGENCY MEDICINE

## 2025-01-02 PROCEDURE — 99291 CRITICAL CARE FIRST HOUR: CPT | Mod: 25

## 2025-01-02 PROCEDURE — 99291 CRITICAL CARE FIRST HOUR: CPT | Performed by: STUDENT IN AN ORGANIZED HEALTH CARE EDUCATION/TRAINING PROGRAM

## 2025-01-02 PROCEDURE — 84157 ASSAY OF PROTEIN OTHER: CPT | Performed by: STUDENT IN AN ORGANIZED HEALTH CARE EDUCATION/TRAINING PROGRAM

## 2025-01-02 PROCEDURE — 84425 ASSAY OF VITAMIN B-1: CPT | Performed by: STUDENT IN AN ORGANIZED HEALTH CARE EDUCATION/TRAINING PROGRAM

## 2025-01-02 PROCEDURE — 80048 BASIC METABOLIC PNL TOTAL CA: CPT | Performed by: STUDENT IN AN ORGANIZED HEALTH CARE EDUCATION/TRAINING PROGRAM

## 2025-01-02 PROCEDURE — 85730 THROMBOPLASTIN TIME PARTIAL: CPT | Performed by: STUDENT IN AN ORGANIZED HEALTH CARE EDUCATION/TRAINING PROGRAM

## 2025-01-02 PROCEDURE — 80076 HEPATIC FUNCTION PANEL: CPT | Performed by: EMERGENCY MEDICINE

## 2025-01-02 PROCEDURE — 87070 CULTURE OTHR SPECIMN AEROBIC: CPT | Performed by: STUDENT IN AN ORGANIZED HEALTH CARE EDUCATION/TRAINING PROGRAM

## 2025-01-02 PROCEDURE — 84439 ASSAY OF FREE THYROXINE: CPT | Performed by: STUDENT IN AN ORGANIZED HEALTH CARE EDUCATION/TRAINING PROGRAM

## 2025-01-02 PROCEDURE — 3E043XZ INTRODUCTION OF VASOPRESSOR INTO CENTRAL VEIN, PERCUTANEOUS APPROACH: ICD-10-PCS | Performed by: STUDENT IN AN ORGANIZED HEALTH CARE EDUCATION/TRAINING PROGRAM

## 2025-01-02 PROCEDURE — 272N000278 HC DEVICE 5FR SECURACATH

## 2025-01-02 PROCEDURE — 82310 ASSAY OF CALCIUM: CPT | Performed by: EMERGENCY MEDICINE

## 2025-01-02 PROCEDURE — 87640 STAPH A DNA AMP PROBE: CPT | Performed by: STUDENT IN AN ORGANIZED HEALTH CARE EDUCATION/TRAINING PROGRAM

## 2025-01-02 PROCEDURE — 86900 BLOOD TYPING SEROLOGIC ABO: CPT | Performed by: EMERGENCY MEDICINE

## 2025-01-02 PROCEDURE — 250N000011 HC RX IP 250 OP 636: Performed by: EMERGENCY MEDICINE

## 2025-01-02 PROCEDURE — 87305 ASPERGILLUS AG IA: CPT | Performed by: STUDENT IN AN ORGANIZED HEALTH CARE EDUCATION/TRAINING PROGRAM

## 2025-01-02 PROCEDURE — 94640 AIRWAY INHALATION TREATMENT: CPT | Mod: 76

## 2025-01-02 RX ORDER — ROPIVACAINE IN 0.9% SOD CHL/PF 0.1 %
.01-.125 PLASTIC BAG, INJECTION (ML) EPIDURAL CONTINUOUS
Status: DISCONTINUED | OUTPATIENT
Start: 2025-01-02 | End: 2025-01-03

## 2025-01-02 RX ORDER — PIPERACILLIN SODIUM, TAZOBACTAM SODIUM 3; .375 G/15ML; G/15ML
3.38 INJECTION, POWDER, LYOPHILIZED, FOR SOLUTION INTRAVENOUS EVERY 12 HOURS
Status: DISCONTINUED | OUTPATIENT
Start: 2025-01-02 | End: 2025-01-04

## 2025-01-02 RX ORDER — ALBUTEROL SULFATE 0.83 MG/ML
2.5 SOLUTION RESPIRATORY (INHALATION) DAILY
Status: DISCONTINUED | OUTPATIENT
Start: 2025-01-03 | End: 2025-01-02

## 2025-01-02 RX ORDER — SODIUM CHLORIDE, SODIUM LACTATE, POTASSIUM CHLORIDE, CALCIUM CHLORIDE 600; 310; 30; 20 MG/100ML; MG/100ML; MG/100ML; MG/100ML
INJECTION, SOLUTION INTRAVENOUS CONTINUOUS
Status: DISCONTINUED | OUTPATIENT
Start: 2025-01-02 | End: 2025-01-03

## 2025-01-02 RX ORDER — POLYETHYLENE GLYCOL 3350 17 G/17G
17 POWDER, FOR SOLUTION ORAL DAILY PRN
Status: DISCONTINUED | OUTPATIENT
Start: 2025-01-02 | End: 2025-01-04

## 2025-01-02 RX ORDER — NALOXONE HYDROCHLORIDE 0.4 MG/ML
0.2 INJECTION, SOLUTION INTRAMUSCULAR; INTRAVENOUS; SUBCUTANEOUS
Status: DISCONTINUED | OUTPATIENT
Start: 2025-01-02 | End: 2025-01-04

## 2025-01-02 RX ORDER — METRONIDAZOLE 250 MG/1
250 TABLET ORAL DAILY
Status: DISCONTINUED | OUTPATIENT
Start: 2025-01-02 | End: 2025-01-04

## 2025-01-02 RX ORDER — SODIUM CHLORIDE FOR INHALATION 10 %
5 VIAL, NEBULIZER (ML) INHALATION EVERY MORNING
Status: DISCONTINUED | OUTPATIENT
Start: 2025-01-02 | End: 2025-01-04

## 2025-01-02 RX ORDER — HEPARIN SODIUM 5000 [USP'U]/.5ML
5000 INJECTION, SOLUTION INTRAVENOUS; SUBCUTANEOUS EVERY 8 HOURS
Status: DISCONTINUED | OUTPATIENT
Start: 2025-01-02 | End: 2025-01-04

## 2025-01-02 RX ORDER — NALOXONE HYDROCHLORIDE 0.4 MG/ML
0.4 INJECTION, SOLUTION INTRAMUSCULAR; INTRAVENOUS; SUBCUTANEOUS
Status: DISCONTINUED | OUTPATIENT
Start: 2025-01-02 | End: 2025-01-04

## 2025-01-02 RX ORDER — CEFEPIME HYDROCHLORIDE 1 G/1
1 INJECTION, POWDER, FOR SOLUTION INTRAMUSCULAR; INTRAVENOUS EVERY 24 HOURS
Status: DISCONTINUED | OUTPATIENT
Start: 2025-01-02 | End: 2025-01-02

## 2025-01-02 RX ORDER — SODIUM CHLORIDE FOR INHALATION 3 %
3 VIAL, NEBULIZER (ML) INHALATION EVERY MORNING
Status: DISCONTINUED | OUTPATIENT
Start: 2025-01-02 | End: 2025-01-02

## 2025-01-02 RX ORDER — TENOFOVIR DISOPROXIL FUMARATE 300 MG/1
300 TABLET, FILM COATED ORAL
Status: DISCONTINUED | OUTPATIENT
Start: 2025-01-02 | End: 2025-01-04

## 2025-01-02 RX ORDER — DEXTROSE MONOHYDRATE 25 G/50ML
25-50 INJECTION, SOLUTION INTRAVENOUS
Status: DISCONTINUED | OUTPATIENT
Start: 2025-01-02 | End: 2025-01-06

## 2025-01-02 RX ORDER — METRONIDAZOLE 250 MG/1
250 TABLET ORAL DAILY
Status: ON HOLD | COMMUNITY
End: 2025-01-06

## 2025-01-02 RX ORDER — PIPERACILLIN SODIUM, TAZOBACTAM SODIUM 3; .375 G/15ML; G/15ML
3.38 INJECTION, POWDER, LYOPHILIZED, FOR SOLUTION INTRAVENOUS ONCE
Status: COMPLETED | OUTPATIENT
Start: 2025-01-02 | End: 2025-01-02

## 2025-01-02 RX ORDER — NICOTINE POLACRILEX 4 MG
15-30 LOZENGE BUCCAL
Status: DISCONTINUED | OUTPATIENT
Start: 2025-01-02 | End: 2025-01-06

## 2025-01-02 RX ORDER — LIDOCAINE 40 MG/G
CREAM TOPICAL
Status: ACTIVE | OUTPATIENT
Start: 2025-01-02 | End: 2025-01-05

## 2025-01-02 RX ORDER — HYDROMORPHONE HCL IN WATER/PF 6 MG/30 ML
0.2 PATIENT CONTROLLED ANALGESIA SYRINGE INTRAVENOUS EVERY 4 HOURS PRN
Status: DISCONTINUED | OUTPATIENT
Start: 2025-01-02 | End: 2025-01-07 | Stop reason: HOSPADM

## 2025-01-02 RX ORDER — FUROSEMIDE 20 MG/1
20 TABLET ORAL DAILY PRN
Status: ON HOLD | COMMUNITY
End: 2025-01-06

## 2025-01-02 RX ORDER — ALBUTEROL SULFATE 0.83 MG/ML
2.5 SOLUTION RESPIRATORY (INHALATION) DAILY
Status: DISCONTINUED | OUTPATIENT
Start: 2025-01-02 | End: 2025-01-04

## 2025-01-02 RX ADMIN — SODIUM CHLORIDE 1000 ML: 9 INJECTION, SOLUTION INTRAVENOUS at 14:22

## 2025-01-02 RX ADMIN — PIPERACILLIN AND TAZOBACTAM 3.38 G: 3; .375 INJECTION, POWDER, FOR SOLUTION INTRAVENOUS at 20:56

## 2025-01-02 RX ADMIN — NOREPINEPHRINE BITARTRATE 0.01 MCG/KG/MIN: 0.02 INJECTION, SOLUTION INTRAVENOUS at 17:18

## 2025-01-02 RX ADMIN — ALBUTEROL SULFATE 2.5 MG: 2.5 SOLUTION RESPIRATORY (INHALATION) at 23:37

## 2025-01-02 RX ADMIN — SODIUM CHLORIDE 1000 ML: 9 INJECTION, SOLUTION INTRAVENOUS at 12:36

## 2025-01-02 RX ADMIN — VANCOMYCIN HYDROCHLORIDE 850 MG: 1 INJECTION, POWDER, LYOPHILIZED, FOR SOLUTION INTRAVENOUS at 15:28

## 2025-01-02 RX ADMIN — Medication 5 ML: at 23:30

## 2025-01-02 RX ADMIN — PIPERACILLIN AND TAZOBACTAM 3.38 G: 3; .375 INJECTION, POWDER, FOR SOLUTION INTRAVENOUS at 14:27

## 2025-01-02 RX ADMIN — SODIUM CHLORIDE 500 ML: 9 INJECTION, SOLUTION INTRAVENOUS at 16:20

## 2025-01-02 RX ADMIN — AZITHROMYCIN MONOHYDRATE 500 MG: 500 INJECTION, POWDER, LYOPHILIZED, FOR SOLUTION INTRAVENOUS at 20:56

## 2025-01-02 RX ADMIN — HYDROMORPHONE HYDROCHLORIDE 0.2 MG: 0.2 INJECTION, SOLUTION INTRAMUSCULAR; INTRAVENOUS; SUBCUTANEOUS at 23:45

## 2025-01-02 RX ADMIN — SODIUM BICARBONATE: 84 INJECTION, SOLUTION INTRAVENOUS at 22:17

## 2025-01-02 RX ADMIN — LIDOCAINE HYDROCHLORIDE 2 ML: 10 INJECTION, SOLUTION EPIDURAL; INFILTRATION; INTRACAUDAL; PERINEURAL at 16:40

## 2025-01-02 RX ADMIN — HEPARIN SODIUM 5000 UNITS: 5000 INJECTION, SOLUTION INTRAVENOUS; SUBCUTANEOUS at 22:39

## 2025-01-02 ASSESSMENT — ENCOUNTER SYMPTOMS
DIARRHEA: 0
FATIGUE: 1
CONFUSION: 1
HEADACHES: 1
ABDOMINAL PAIN: 1
NAUSEA: 0
SHORTNESS OF BREATH: 0
DYSURIA: 0
COUGH: 0
FEVER: 0
VOMITING: 0

## 2025-01-02 ASSESSMENT — ACTIVITIES OF DAILY LIVING (ADL)
ADLS_ACUITY_SCORE: 47
ADLS_ACUITY_SCORE: 66
ADLS_ACUITY_SCORE: 47
ADLS_ACUITY_SCORE: 66
ADLS_ACUITY_SCORE: 66
ADLS_ACUITY_SCORE: 47

## 2025-01-02 ASSESSMENT — COLUMBIA-SUICIDE SEVERITY RATING SCALE - C-SSRS
6. HAVE YOU EVER DONE ANYTHING, STARTED TO DO ANYTHING, OR PREPARED TO DO ANYTHING TO END YOUR LIFE?: NO
2. HAVE YOU ACTUALLY HAD ANY THOUGHTS OF KILLING YOURSELF IN THE PAST MONTH?: NO
1. IN THE PAST MONTH, HAVE YOU WISHED YOU WERE DEAD OR WISHED YOU COULD GO TO SLEEP AND NOT WAKE UP?: NO

## 2025-01-02 NOTE — ED PROVIDER NOTES
EMERGENCY DEPARTMENT ENCOUNTER      NAME: Kristen Chapman  AGE: 74 year old male  YOB: 1950  MRN: [id number]  EVALUATION DATE & TIME: No admission date for patient encounter.    PCP: Issa Cook    ED PROVIDER: Julienne Martini M.D.        Chief Complaint   Patient presents with    Generalized Weakness         FINAL IMPRESSION:    1. Hypotension, unspecified hypotension type    2. Decreased oral intake    3. General weakness    4. Acute nonintractable headache, unspecified headache type    5. Chronic anemia    6. Generalized abdominal pain    7. Hypernatremia    8. Hypercalcemia    9. Acute dehydration            MEDICAL DECISION MAKING:    Kristen Chapman is a 74 year old male with history of nasopharyngeal cancer getting chemotherapy (last chemo ~3 weeks ago), CKD, elevated LFTs, hypomagnesemia, thrombocytopenia, hypotension, chronic anemia who presents to the ER with complaints of not feeling well.    About 4 days ago patient started to not feel well and has been lying in bed ever since.  He has not had anything to eat or drink since.  He had no complaints to family until today when he started complaining of a headache and then to me complaints of some abdominal discomfort.    Patient hypotensive initially and responding to IV fluids.  Laboratories consistent with significant dehydration, possible sepsis, hypernatremia, hyperkalemia, etc.  Patient signed out at change of shift awaiting CT scan of the head and chest abdomen pelvis without contrast due to chronic kidney disease and decreased GFR.  Patient is on chemotherapy the last dose about 3 weeks ago and no findings for neutropenia at this time based on his labs today.    Antibiotics initiated earlier though just in general due to his immunosuppression and sepsis-like picture.  He will require admission to the hospital once we get imaging back into see whether or not the patient still requires ICU level care whether or not he will be able to be  appropriate for the floor.      Pt signed out at change of shift to [facility]      ED COURSE:  12:36 PM  I met with the patient to gather history and perform my exam. ED course and treatment discussed.  Took family aside privately to discuss the patient's condition, but did do a brief exam of the patient in the waiting room as there were no beds immediately available in the main OR or doing private.  I did not want to delay his evaluation given his hypotension and triage.  Family gave permission for this type of examination.      3:16 PM  Pt signed out at change of shift to [facility] awaiting the rest of his laboratory results, CT imaging, and admission to the hospital.  He will need admission for his hypotension, sepsis, dehydration, hypernatremia, hyperkalemia, etc.  I think a lot of this is related to dehydration but likely infectious or if it caused him to be so fatigued in the first place that led to his decreased oral intake and dehydration.  Antibiotics were initiated earlier due to his hypotension and mildly elevated lactic acid and the fact that he is on chemotherapy which does put him at risk for immunosuppression.  He does not appear to be neutropenic today.    Patient does complain of a mild headache but this has been after several days of symptoms.  I think that acute stroke or an acute head bleed as the initial cause of all of symptoms is less likely.  More likely headache from his hypotension and dehydration.  IV fluids have been initiated.  CT head still pending.  If this was a stroke he has many days from the onset and would not be a TNK or intervention candidate.    He is borderline so far in his illness to know whether or not he will need ICU level care whether he will be appropriate for the floor.  More IV fluids have been ordered.  Initial blood pressures have improved somewhat with 1 L of IV fluids.  Certainly seems consistent with some dehydration and patient appears clinically dry.    At  this time I do not think that this represents CVA, TIA, SAH, glaucoma, temporal arteritis, sinus thrombosis, vascular dissection, pseudotumor cerebri, meningitis, enchephalitis, hypertensive urgency or emergency,  rib fractures, myocarditis, pericarditis, endocarditis, ACS, PE, ruptured AAA, pneumothorax, aortic dissection, bowel obstruction, bowel ischemia, cholecystitis, kidney stone, pyelonephritis, or other such etiologies at this time.    CONSULTANTS:  none      MEDICATIONS GIVEN IN THE EMERGENCY:  Medications   piperacillin-tazobactam (ZOSYN) 3.375 g vial to attach to  mL bag (3.375 g Intravenous $New Bag 1/2/25 1427)   vancomycin (VANCOCIN) 850 mg in sodium chloride 0.9 % 283.5 mL intermittent infusion (has no administration in time range)   sodium chloride 0.9% BOLUS 1,000 mL (1,000 mLs Intravenous $New Bag 1/2/25 1422)   sodium chloride 0.9% BOLUS 1,000 mL (0 mLs Intravenous Stopped 1/2/25 1344)           NEW PRESCRIPTIONS STARTED AT TODAY'S ER VISIT  none      CONDITION:  guarded        DISPOSITION:  Pending admission     =================================================================  =================================================================  TRIAGE ASSESSMENT:  Since Saturday patient has been increasingly week. Patient has not been eating nor drinking for past few days. Last time patient urinated was at 0230 this morning. Patient is ill looking in triage and is unable to stand for weight. Patient is also pale in color.     Triage Assessment (Adult)       Row Name 01/02/25 1223          Triage Assessment    Airway WDL WDL        Respiratory WDL    Respiratory WDL WDL        Skin Circulation/Temperature WDL    Skin Circulation/Temperature WDL WDL        Cardiac WDL    Cardiac WDL WDL        Peripheral/Neurovascular WDL    Peripheral Neurovascular WDL WDL        Cognitive/Neuro/Behavioral WDL    Cognitive/Neuro/Behavioral WDL WDL                          ED Triage Vitals [01/02/25 1218]  "  Encounter Vitals Group      BP (!) 83/60      Systolic BP Percentile       Diastolic BP Percentile       Pulse 91      Resp 20      Temp 97.9  F (36.6  C)      Temp src Oral      SpO2 96 %      Weight       Height 1.651 m (5' 5\")          ================================================================  ================================================================    HPI    Patient information was obtained from: family and patient    Use of Intrepreter: Yes (Family) - Language Preeti Chapman is a 74 year old male with history of nasopharyngeal cancer getting chemotherapy, CKD, elevated LFTs, hypomagnesemia, thrombocytopenia, hypotension, chronic anemia who presents to the ER with complaints of not feeling well.    About 4 days ago patient started to not feel well and has been lying in bed ever since.  He has not had anything to eat or drink since.  He initially denied any symptoms to family other than not feeling well.  He has not had any fevers, cough, chest pain, shortness of breath, vomiting or diarrhea.  Denied any urinary symptoms.    Today patient does complain of some abdominal pain a little of headache.  Only states that he seems to be more confused and his usual baseline.  At baseline he is fully functional and independent, at this time he is too weak to even feel the care for himself.    His last chemotherapy was about 2-3 weeks ago.  He was supposed to have it on Monday this week, but was not feeling well enough to even go to chemotherapy.    Family denies any falls.      CHART REVIEW:  RN triage line from 12/31/24:  Radha Barrera RN  Registered Nurse     Telephone Encounter  Signed     Encounter Date: 12/31/2024       RN calling after home care visit. She is not with the client at this time. He was just open to them on Friday for home care. 500 ml NS bolus given. Since Saturday, he's hardly able to swallow. Can hardly eat anything. Fluid intake is minimal. Can no longer swallow his pills. " Daughter said she spoke with MD. He missed his chemo today because he is so weak. Vitals 98/58, otherwise normal, alert and oriented to person and place which is his baseline. Please notify PCP. RN said to call client or family directly if any new orders or what you want client to do? She is unsure if family called and spoke with Oncology at [facility], or PCP.  Radha Barrera RN  [facility]          REVIEW OF SYSTEMS  Review of Systems   Constitutional:  Positive for fatigue. Negative for fever.   Respiratory:  Negative for cough and shortness of breath.    Cardiovascular:  Negative for chest pain.   Gastrointestinal:  Positive for abdominal pain. Negative for diarrhea, nausea and vomiting.   Genitourinary:  Negative for dysuria.   Neurological:  Positive for headaches.   Psychiatric/Behavioral:  Positive for confusion (per family).    All other systems reviewed and are negative.        PAST MEDICAL HISTORY:  Past Medical History:   Diagnosis Date    Anemia     Dysphagia     Hepatitis B chronic     Hypothyroidism     Nasopharyngeal cancer (H)     Severe protein-calorie malnutrition (H)     Thrombocytopenia (H)          PAST SURGICAL HISTORY:  Past Surgical History:   Procedure Laterality Date    ENDOSCOPIC ENDONASAL SURGERY Bilateral 9/7/2021    Procedure: Nasal Endoscopy with Biopsy;  Surgeon: Ky Centeno MD;  Location: [facility] OR    IR CHEST PORT PLACEMENT > 5 YRS OF AGE  3/2/2020    IR CHEST PORT PLACEMENT > 5 YRS OF AGE  3/2/2020    IR GASTROSTOMY TUBE INSERTION  4/27/2020    IR GASTROSTOMY TUBE PERCUTANEOUS PLCMNT  4/27/2020    IR INTRAPERITONEAL CATH TUNNEL ASCITES  2/22/2024    IR INTRAPERITONEAL CATH TUNNEL ASCITES  2/26/2024    IR PORT PLACEMENT >5 YEARS  3/2/2020    IR PORT PLACEMENT >5 YEARS  3/2/2020    IR T-FASTENER REMOVAL  6/5/2020    IR T-FASTENER REMOVAL  6/5/2020         CURRENT MEDICATIONS:    Prior to Admission medications    Medication Sig Start Date End Date Taking?  "Authorizing Provider   acetaminophen (TYLENOL) 325 MG tablet Take 650 mg by mouth every 6 hours as needed  3/1/21   Provider, Historical   dexAMETHasone (DECADRON) 4 MG tablet Take 1 tablet (4 mg) by mouth daily (with breakfast) Take for 2 days after each chemotherapy dose. 6/18/24   Yoanna Andre APRN CNP   Emergency Supply Kit, Central, Patient use for emergency only. Contents: 3 sodium chloride 0.9% flushes, 1 dressing kit, 1 microclave ext set 14\", 4 nitrile gloves (med), 6 alcohol prep pads, 1 bacitracin, 1 syringe (10 cc 20 G 1\"). Call [phone] to reorder. 12/24/24 12/23/25  Yoanna Andre APRN CNP   furosemide (LASIX) 20 MG tablet Take 1 tablet (20 mg) by mouth daily. 10/24/24   Yumiko Boyd MD   levothyroxine (SYNTHROID/LEVOTHROID) 175 MCG tablet TAKE 1 TABLET (175 MCG) BY MOUTH DAILY 10/1/24   Yoanna Andre APRN CNP   megestrol (MEGACE) 40 MG/ML suspension Take 10 mLs (400 mg) by mouth daily 1/23/24   Alan Frias MD   metroNIDAZOLE (FLAGYL) 250 MG tablet Take 1 tablet (250 mg) by mouth daily. 11/25/24   Steve Arreguin MD   mirtazapine (REMERON SOL-TAB) 30 MG ODT 1 tablet (30 mg) by Orally disintegrating tablet route at bedtime 7/9/24   Steve Arreguin MD   Port Access Kit For nurse use only.  Do not remove items from bag.  Use for port access.  Do not place syringe on sterile field. 12/24/24 12/23/25  Yoanna Andre APRN CNP   prochlorperazine (COMPAZINE) 10 MG tablet Take 1 tablet (10 mg) by mouth every 6 hours as needed for nausea or vomiting 4/23/24   Alan Frias MD   rivaroxaban ANTICOAGULANT (XARELTO) 20 MG TABS tablet Take 1 tablet (20 mg) by mouth daily (with dinner)  Patient not taking: Reported on 10/24/2024 4/21/24   Alan Frias MD   sodium chloride 0.9 % in 500 mL via gravity infusion Infuse into the vein once a week. Infuse 1 bag(s) (500 mL). Each bag to infuse over 2-4 hours. 12/23/24 12/23/25  Yoanna Andre, APRN CNP " "  sodium chloride, PF, 0.9% PF flush Inject 10 mLs into the vein as needed for line flush. Flush IV before and after med administration as directed and/or at least every 24 hours, or prior to deaccessing for no further use and/or at least every 4 weeks when not accessed. 12/24/24 12/23/25  Yoanna Andre APRN CNP   spironolactone (ALDACTONE) 100 MG tablet Take 1 tablet (100 mg) by mouth daily. 10/24/24   Yumiko Boyd MD   tenofovir (VIREAD) 300 MG tablet Take 1 tablet (300 mg) by mouth every 72 hours. 10/24/24   Yumiko Boyd MD         ALLERGIES:  Allergies   Allergen Reactions    Oxycodone Itching         FAMILY HISTORY:  Family History   Problem Relation Age of Onset    Liver Cancer No family hx of     Liver Disease No family hx of          SOCIAL HISTORY:  Social History     Socioeconomic History    Marital status:    Tobacco Use    Smoking status: Never    Smokeless tobacco: Never   Substance and Sexual Activity    Alcohol use: Not Currently    Drug use: Not Currently    Sexual activity: Not Currently         VITALS:  Patient Vitals for the past 24 hrs:   BP Temp Temp src Pulse Resp SpO2 Height Weight   01/02/25 1420 -- (!) 96.2  F (35.7  C) Rectal -- -- -- -- --   01/02/25 1415 -- -- -- -- -- -- 1.651 m (5' 5\") 38.1 kg (84 lb)   01/02/25 1400 99/61 -- -- 66 -- 100 % -- --   01/02/25 1358 99/59 -- -- 69 -- 99 % -- --   01/02/25 1348 (!) 85/60 (!) 92.8  F (33.8  C) Oral 70 -- 100 % -- --   01/02/25 1218 (!) 83/60 97.9  F (36.6  C) Oral 91 20 96 % 1.651 m (5' 5\") --       Wt Readings from Last 3 Encounters:   01/02/25 38.1 kg (84 lb)   12/18/24 38.5 kg (84 lb 14.4 oz)   10/24/24 44.1 kg (97 lb 3.2 oz)       Estimated Creatinine Clearance: 12.2 mL/min (A) (based on SCr of 2.96 mg/dL (H)).    PHYSICAL EXAM    Constitutional:  Well developed, Well nourished, appears weak and fatigued  HENT:  Normocephalic, Atraumatic, Bilateral external ears normal, Nose normal. Neck- " Supple, No stridor.   Eyes:  PERRL, EOMI, Conjunctiva normal, No discharge.  Respiratory:  Normal breath sounds, No respiratory distress, No wheezing, Speaks full sentences easily. No cough.   Cardiovascular:  Normal heart rate, Regular rhythm, No murmurs, No rubs, No gallops. Chest wall nontender.   GI:  No excessive obesity.  Bowel sounds normal, Soft, +mild diffuse tenderness, No masses, No flank tenderness. No rebound or guarding.   : deferred  Musculoskeletal: No cyanosis, No clubbing. Good range of motion in all major joints. No major deformities noted.   Integument:  Warm, Dry, No erythema, No rash.  No petechiae.   Neurologic:  Alert & appropriate  Psychiatric:  Affect normal, Cooperative       LAB:  All pertinent labs reviewed and interpreted.  Recent Results (from the past 24 hours)   Lactic Acid Whole Blood with 1X Repeat in 2 HR when >2    Collection Time: 01/02/25 12:36 PM   Result Value Ref Range    Lactic Acid, Initial 2.0 0.7 - 2.0 mmol/L    Calcium Ionized Whole Blood 7.1 (HH) 4.4 - 5.2 mg/dL   Procalcitonin    Collection Time: 01/02/25 12:36 PM   Result Value Ref Range    Procalcitonin 0.99 (H) <0.50 ng/mL   Hepatic function panel    Collection Time: 01/02/25 12:36 PM   Result Value Ref Range    Protein Total 6.0 (L) 6.4 - 8.3 g/dL    Albumin 2.7 (L) 3.5 - 5.2 g/dL    Bilirubin Total 1.3 (H) <=1.2 mg/dL    Alkaline Phosphatase 97 40 - 150 U/L    AST 11 0 - 45 U/L    ALT 15 0 - 70 U/L    Bilirubin Direct 0.62 (H) 0.00 - 0.30 mg/dL   Lipase    Collection Time: 01/02/25 12:36 PM   Result Value Ref Range    Lipase 12 (L) 13 - 60 U/L   Troponin T, High Sensitivity    Collection Time: 01/02/25 12:36 PM   Result Value Ref Range    Troponin T, High Sensitivity 34 (H) <=22 ng/L   Extra Blue Top Tube    Collection Time: 01/02/25 12:36 PM   Result Value Ref Range    Hold Specimen JIC    Extra Red Top Tube    Collection Time: 01/02/25 12:36 PM   Result Value Ref Range    Hold Specimen JIC    CBC with  platelets and differential    Collection Time: 01/02/25 12:36 PM   Result Value Ref Range    WBC Count 7.8 4.0 - 11.0 10e3/uL    RBC Count 2.76 (L) 4.40 - 5.90 10e6/uL    Hemoglobin 8.2 (L) 13.3 - 17.7 g/dL    Hematocrit 26.5 (L) 40.0 - 53.0 %    MCV 96 78 - 100 fL    MCH 29.7 26.5 - 33.0 pg    MCHC 30.9 (L) 31.5 - 36.5 g/dL    RDW 20.1 (H) 10.0 - 15.0 %    Platelet Count 102 (L) 150 - 450 10e3/uL    % Neutrophils 76 %    % Lymphocytes 13 %    % Monocytes 9 %    % Eosinophils 1 %    % Basophils 0 %    % Immature Granulocytes 1 %    NRBCs per 100 WBC 0 <1 /100    Absolute Neutrophils 5.9 1.6 - 8.3 10e3/uL    Absolute Lymphocytes 1.0 0.8 - 5.3 10e3/uL    Absolute Monocytes 0.7 0.0 - 1.3 10e3/uL    Absolute Eosinophils 0.1 0.0 - 0.7 10e3/uL    Absolute Basophils 0.0 0.0 - 0.2 10e3/uL    Absolute Immature Granulocytes 0.1 <=0.4 10e3/uL    Absolute NRBCs 0.0 10e3/uL   Adult Type and Screen    Collection Time: 01/02/25 12:36 PM   Result Value Ref Range    ABO/RH(D) B POS     Antibody Screen Negative Negative    SPECIMEN EXPIRATION DATE [id number]    Basic metabolic panel    Collection Time: 01/02/25 12:36 PM   Result Value Ref Range    Sodium 150 (H) 135 - 145 mmol/L    Potassium 4.0 3.4 - 5.3 mmol/L    Chloride 122 (H) 98 - 107 mmol/L    Carbon Dioxide (CO2) 19 (L) 22 - 29 mmol/L    Anion Gap 9 7 - 15 mmol/L    Urea Nitrogen 56.2 (H) 8.0 - 23.0 mg/dL    Creatinine 2.96 (H) 0.67 - 1.17 mg/dL    GFR Estimate 22 (L) >60 mL/min/1.73m2    Calcium 12.6 (H) 8.8 - 10.4 mg/dL    Glucose 118 (H) 70 - 99 mg/dL   Troponin T, High Sensitivity    Collection Time: 01/02/25  2:12 PM   Result Value Ref Range    Troponin T, High Sensitivity 29 (H) <=22 ng/L   Ionized Calcium    Collection Time: 01/02/25  2:14 PM   Result Value Ref Range    Calcium Ionized Whole Blood 6.5 (H) 4.4 - 5.2 mg/dL       Lab Results   Component Value Date    ABORH B POS 01/02/2025           RADIOLOGY:  Reviewed all pertinent imaging. Please see official  radiology report.    Head CT w/o contrast    (Results Pending)   CT Chest Abdomen Pelvis w/o Contrast    (Results Pending)         EKG:    Indication: feeling unwell    Performed at: 12:50p  Impression: Sinus rhythm at 78 bpm.  No ST elevation appreciated.  Flipped T waves in lead V3 and V4.  MN interval 160 ms, QRS 82 ms, QTc 405 ms.  Overall nonspecific EKG.  Nonspecific changes compared to April 21, 2024.      I have independently reviewed and interpreted the EKG(s) documented above.        PROCEDURES:  none    Medical Decision Making  Obtained supplemental history:Supplemental history obtained?: Documented in chart and Family Member/Significant Other  Reviewed external records: External records reviewed?: Documented in chart and Outpatient Record: see HPI  Care impacted by chronic illness:Cancer/Chemotherapy  Did you consider but not order tests?: Work up considered but not performed and documented in chart, if applicable  Did you interpret images independently?: Independent interpretation of ECG and images noted in documentation, when applicable.  Consultation discussion with other provider:Did you involve another provider (consultant, , pharmacy, etc.)?: No  Admission considered. Patient was signed out to the oncoming physician, disposition pending.    MIPS: Not Applicable      Julienne Martini M.D. [facility]  Emergency Medicine and Medical Toxicology  [facility] EMERGENCY DEPARTMENT  [facility] [facility] [id number] [phone]  Dept: [phone]           Julienne Martini MD  01/02/25 9917       Julienne Martini MD  01/02/25 2320

## 2025-01-02 NOTE — PROCEDURES
"PICC Line Insertion Procedure Note  Pt. Name: Kristen Chapman  MRN:        [id number]    Procedure: Insertion of a  triple Lumen  5 fr  Bard SOLO (valved) Power PICC, Lot number [id number]    Indications: Vasopressor    Contraindications : no    Procedure Details     Patient identified with 2 identifiers and \"Time Out\" conducted.  .     Central line insertion bundle followed: hand hygiene performed prior to procedure, site cleansed with cholraprep, hat, mask, sterile gloves, sterile gown worn, patient draped with maximum barrier head to toe drape, sterile field maintained.    The vein was assessed and found to be compressible and of adequate size.     Lidocaine 1% 2 ml administered sq to the insertion site. A 5 Fr PICC was inserted into the brachial vein of the right arm with ultrasound guidance. 1 attempt(s) required to access vein.   Catheter threaded without difficulty. Good blood return noted.    Modified Seldinger Technique used for insertion.    The 8 sharps that are included in the PICC insertion kit were accounted for and disposed of in the sharps container prior to breakdown of the sterile field.    Catheter secured with Statlock, biopatch and Tegaderm dressing applied.    Findings:    Total catheter length  40 cm, with 3 cm exposed. Mid upper arm circumference is 18 cm. Catheter was flushed with 30 cc NS. Patient  tolerated procedure well.    Tip placement verified by 3CG technology . Tip placement in the SVC/RA junction.    CLABSI prevention brochure left at bedside.    Patient's primary RN notified PICC is ready for use.      Comments:        Gavi Cabrera PICC RN  [facility]      "

## 2025-01-02 NOTE — ED TRIAGE NOTES
Since Saturday patient has been increasingly week. Patient has not been eating nor drinking for past few days. Last time patient urinated was at 0230 this morning. Patient is ill looking in triage and is unable to stand for weight. Patient is also pale in color.     Triage Assessment (Adult)       Row Name 01/02/25 1223          Triage Assessment    Airway WDL WDL        Respiratory WDL    Respiratory WDL WDL        Skin Circulation/Temperature WDL    Skin Circulation/Temperature WDL WDL        Cardiac WDL    Cardiac WDL WDL        Peripheral/Neurovascular WDL    Peripheral Neurovascular WDL WDL        Cognitive/Neuro/Behavioral WDL    Cognitive/Neuro/Behavioral WDL WDL

## 2025-01-02 NOTE — TELEPHONE ENCOUNTER
Pt's daughter calls in trying to get in contact with Care coordinator RN. Would like a call back to discuss home infusion.

## 2025-01-02 NOTE — PROGRESS NOTES
[facility]: Cancer Care                                                                                          Spoke to patient's daughter and stated that the patient requested to receive IV hydration three times a week instead of just once a week due to his limited fluid intake. Writer stated that she would reach out to the care team to see if they can update the therapy plan to reflect IV hydration three times a week.Patient's daughter verbalized understanding. Patient's daughter informed writer that her brother was currently en route to take the patient to the emergency department due to his low fluid intake and low food intake.     Signature:  Marjorie Briones RN

## 2025-01-02 NOTE — TELEPHONE ENCOUNTER
Contacted daughter Rea and reviewed message from home care nurse and response from PCP to go to ER for evaluation.  Nhia explained patient is doing better, has been taking in fluids, meds, and eating small amounts.  Not as weak and improved overall, advised to update care team as needed and any return to previous condition warrants an ER evaluation right away.  Nhia verbalized understanding and agreed.

## 2025-01-02 NOTE — PHARMACY-VANCOMYCIN DOSING SERVICE
Pharmacy Vancomycin Initial Note  Date of Service 2025  Patient's  1950  74 year old, male    Indication: Sepsis    Current estimated CrCl = Estimated Creatinine Clearance: 14.3 mL/min (A) (based on SCr of 2.54 mg/dL (H)).    Creatinine for last 3 days  No results found for requested labs within last 3 days.    Recent Vancomycin Level(s) for last 3 days  No results found for requested labs within last 3 days.      Vancomycin IV Administrations (past 72 hours)        No vancomycin orders with administrations in past 72 hours.                    Nephrotoxins and other renal medications (From now, onward)      Start     Dose/Rate Route Frequency Ordered Stop    25 1400  piperacillin-tazobactam (ZOSYN) 3.375 g vial to attach to  mL bag         3.375 g  over 30 Minutes Intravenous ONCE 25 1339      25 1400  vancomycin (VANCOCIN) 850 mg in sodium chloride 0.9 % 283.5 mL intermittent infusion         850 mg  over 90 Minutes Intravenous ONCE 25 1342              Contrast Orders - past 72 hours (72h ago, onward)      None                  Plan:  Start vancomycin 850 mg IV once. Please re consult pharmacy if vancomycin is to continue inpatient.     MICHELE CASTILLO, [facility]

## 2025-01-02 NOTE — PHARMACY-ADMISSION MEDICATION HISTORY
Pharmacist Admission Medication History    Admission medication history is complete. The information provided in this note is only as accurate as the sources available at the time of the update.    Information Source(s): Family member and CareEverywhere/SureScripts via in-person    Pertinent Information: Hasn't taken any medications since last Friday due to progressive weakness & poor intake.    Changes made to PTA medication list:  Added: None  Deleted: albumin, megestrol, mirtazapine, rivaroxaban  Changed: furosemide, metronidazole    Allergies reviewed with patient and updates made in EHR: yes    Medication History Completed By: Julienne Palacios [facility] 1/2/2025 4:04 PM    PTA Med List   Medication Sig Note Last Dose/Taking    acetaminophen (TYLENOL) 325 MG tablet Take 650 mg by mouth every 6 hours as needed   Taking As Needed    dexAMETHasone (DECADRON) 4 MG tablet Take 1 tablet (4 mg) by mouth daily (with breakfast) Take for 2 days after each chemotherapy dose. 1/2/2025: Last chemo ~2 weeks ago Past Month    furosemide (LASIX) 20 MG tablet Take 20 mg by mouth daily as needed (edema).  More than a month    levothyroxine (SYNTHROID/LEVOTHROID) 175 MCG tablet TAKE 1 TABLET (175 MCG) BY MOUTH DAILY  12/27/2024    metroNIDAZOLE (FLAGYL) 250 MG tablet Apply 250 mg topically daily. Grind up the tablet and then apply it daily to Yia's malignant wound to suppress anaerobic growth.  Past Week    prochlorperazine (COMPAZINE) 10 MG tablet Take 1 tablet (10 mg) by mouth every 6 hours as needed for nausea or vomiting  Taking As Needed    spironolactone (ALDACTONE) 100 MG tablet Take 1 tablet (100 mg) by mouth daily.  12/27/2024    tenofovir (VIREAD) 300 MG tablet Take 1 tablet (300 mg) by mouth every 72 hours.  Past Week

## 2025-01-03 ENCOUNTER — APPOINTMENT (OUTPATIENT)
Dept: SPEECH THERAPY | Facility: HOSPITAL | Age: 75
DRG: 871 | End: 2025-01-03
Attending: STUDENT IN AN ORGANIZED HEALTH CARE EDUCATION/TRAINING PROGRAM
Payer: COMMERCIAL

## 2025-01-03 LAB
ALBUMIN SERPL BCG-MCNC: 2 G/DL (ref 3.5–5.2)
ALBUMIN UR-MCNC: 70 MG/DL
ALP SERPL-CCNC: 75 U/L (ref 40–150)
ALT SERPL W P-5'-P-CCNC: 12 U/L (ref 0–70)
AMORPH CRY #/AREA URNS HPF: ABNORMAL /HPF
ANION GAP SERPL CALCULATED.3IONS-SCNC: 8 MMOL/L (ref 7–15)
APPEARANCE UR: ABNORMAL
AST SERPL W P-5'-P-CCNC: 17 U/L (ref 0–45)
BILIRUB SERPL-MCNC: 1.1 MG/DL
BILIRUB UR QL STRIP: NEGATIVE
BLD PROD TYP BPU: NORMAL
BLOOD COMPONENT TYPE: NORMAL
BUN SERPL-MCNC: 44.1 MG/DL (ref 8–23)
C PNEUM DNA SPEC QL NAA+PROBE: NOT DETECTED
CALCIUM SERPL-MCNC: 9.6 MG/DL (ref 8.8–10.4)
CHLORIDE SERPL-SCNC: 118 MMOL/L (ref 98–107)
CODING SYSTEM: NORMAL
COLOR UR AUTO: YELLOW
CREAT SERPL-MCNC: 2.34 MG/DL (ref 0.67–1.17)
CROSSMATCH: NORMAL
EGFRCR SERPLBLD CKD-EPI 2021: 29 ML/MIN/1.73M2
ERYTHROCYTE [DISTWIDTH] IN BLOOD BY AUTOMATED COUNT: 20.5 % (ref 10–15)
FLUAV H1 2009 PAND RNA SPEC QL NAA+PROBE: NOT DETECTED
FLUAV H1 RNA SPEC QL NAA+PROBE: NOT DETECTED
FLUAV H3 RNA SPEC QL NAA+PROBE: NOT DETECTED
FLUAV RNA SPEC QL NAA+PROBE: NOT DETECTED
FLUBV RNA SPEC QL NAA+PROBE: NOT DETECTED
GLUCOSE BLDC GLUCOMTR-MCNC: 116 MG/DL (ref 70–99)
GLUCOSE BLDC GLUCOMTR-MCNC: 131 MG/DL (ref 70–99)
GLUCOSE BLDC GLUCOMTR-MCNC: 146 MG/DL (ref 70–99)
GLUCOSE BLDC GLUCOMTR-MCNC: 155 MG/DL (ref 70–99)
GLUCOSE BLDC GLUCOMTR-MCNC: 89 MG/DL (ref 70–99)
GLUCOSE SERPL-MCNC: 115 MG/DL (ref 70–99)
GLUCOSE UR STRIP-MCNC: NEGATIVE MG/DL
GRANULAR CAST: 3 /LPF
HADV DNA SPEC QL NAA+PROBE: NOT DETECTED
HCO3 SERPL-SCNC: 22 MMOL/L (ref 22–29)
HCOV PNL SPEC NAA+PROBE: NOT DETECTED
HCT VFR BLD AUTO: 19.5 % (ref 40–53)
HGB BLD-MCNC: 6.1 G/DL (ref 13.3–17.7)
HGB BLD-MCNC: 6.6 G/DL (ref 13.3–17.7)
HGB BLD-MCNC: 9 G/DL (ref 13.3–17.7)
HGB BLD-MCNC: 9.3 G/DL (ref 13.3–17.7)
HGB UR QL STRIP: NEGATIVE
HMPV RNA SPEC QL NAA+PROBE: NOT DETECTED
HPIV1 RNA SPEC QL NAA+PROBE: NOT DETECTED
HPIV2 RNA SPEC QL NAA+PROBE: NOT DETECTED
HPIV3 RNA SPEC QL NAA+PROBE: NOT DETECTED
HPIV4 RNA SPEC QL NAA+PROBE: NOT DETECTED
HYALINE CASTS: 1 /LPF
ISSUE DATE AND TIME: NORMAL
KETONES UR STRIP-MCNC: ABNORMAL MG/DL
L PNEUMO1 AG UR QL IA: NEGATIVE
LACTATE SERPL-SCNC: 2.6 MMOL/L (ref 0.7–2)
LD BODY BODY FLUID SOURCE: NORMAL
LDH FLD L TO P-CCNC: 57 U/L
LEUKOCYTE ESTERASE UR QL STRIP: NEGATIVE
M PNEUMO DNA SPEC QL NAA+PROBE: NOT DETECTED
MAGNESIUM SERPL-MCNC: 2.1 MG/DL (ref 1.7–2.3)
MCH RBC QN AUTO: 29.9 PG (ref 26.5–33)
MCHC RBC AUTO-ENTMCNC: 31.3 G/DL (ref 31.5–36.5)
MCV RBC AUTO: 96 FL (ref 78–100)
MRSA DNA SPEC QL NAA+PROBE: NEGATIVE
MUCOUS THREADS #/AREA URNS LPF: PRESENT /LPF
NITRATE UR QL: NEGATIVE
PH UR STRIP: 5.5 [PH] (ref 5–7)
PHOSPHATE SERPL-MCNC: 2.7 MG/DL (ref 2.5–4.5)
PLATELET # BLD AUTO: 75 10E3/UL (ref 150–450)
POTASSIUM SERPL-SCNC: 3.4 MMOL/L (ref 3.4–5.3)
POTASSIUM SERPL-SCNC: 3.7 MMOL/L (ref 3.4–5.3)
PROT FLD-MCNC: 1.3 G/DL
PROT SERPL-MCNC: 4.4 G/DL (ref 6.4–8.3)
PROTEIN BODY FLUID SOURCE: NORMAL
PTH-INTACT SERPL-MCNC: 7 PG/ML (ref 15–65)
RBC # BLD AUTO: 2.04 10E6/UL (ref 4.4–5.9)
RBC URINE: 0 /HPF
RSV RNA SPEC QL NAA+PROBE: NOT DETECTED
RSV RNA SPEC QL NAA+PROBE: NOT DETECTED
RV+EV RNA SPEC QL NAA+PROBE: NOT DETECTED
S PNEUM AG SPEC QL: POSITIVE
SA TARGET DNA: POSITIVE
SODIUM SERPL-SCNC: 148 MMOL/L (ref 135–145)
SP GR UR STRIP: 1.02 (ref 1–1.03)
SPECIMEN TYPE: ABNORMAL
SQUAMOUS EPITHELIAL: <1 /HPF
T4 FREE SERPL-MCNC: 0.52 NG/DL (ref 0.9–1.7)
UNIT ABO/RH: NORMAL
UNIT NUMBER: NORMAL
UNIT STATUS: NORMAL
UNIT TYPE ISBT: 7300
UROBILINOGEN UR STRIP-MCNC: <2 MG/DL
WBC # BLD AUTO: 7.4 10E3/UL (ref 4–11)
WBC URINE: 4 /HPF

## 2025-01-03 PROCEDURE — 84132 ASSAY OF SERUM POTASSIUM: CPT | Performed by: STUDENT IN AN ORGANIZED HEALTH CARE EDUCATION/TRAINING PROGRAM

## 2025-01-03 PROCEDURE — 85014 HEMATOCRIT: CPT | Performed by: STUDENT IN AN ORGANIZED HEALTH CARE EDUCATION/TRAINING PROGRAM

## 2025-01-03 PROCEDURE — 250N000009 HC RX 250: Performed by: STUDENT IN AN ORGANIZED HEALTH CARE EDUCATION/TRAINING PROGRAM

## 2025-01-03 PROCEDURE — 250N000009 HC RX 250: Performed by: INTERNAL MEDICINE

## 2025-01-03 PROCEDURE — 85018 HEMOGLOBIN: CPT | Performed by: INTERNAL MEDICINE

## 2025-01-03 PROCEDURE — 250N000013 HC RX MED GY IP 250 OP 250 PS 637: Performed by: STUDENT IN AN ORGANIZED HEALTH CARE EDUCATION/TRAINING PROGRAM

## 2025-01-03 PROCEDURE — 81001 URINALYSIS AUTO W/SCOPE: CPT | Performed by: STUDENT IN AN ORGANIZED HEALTH CARE EDUCATION/TRAINING PROGRAM

## 2025-01-03 PROCEDURE — 85018 HEMOGLOBIN: CPT | Performed by: STUDENT IN AN ORGANIZED HEALTH CARE EDUCATION/TRAINING PROGRAM

## 2025-01-03 PROCEDURE — 80048 BASIC METABOLIC PNL TOTAL CA: CPT | Performed by: STUDENT IN AN ORGANIZED HEALTH CARE EDUCATION/TRAINING PROGRAM

## 2025-01-03 PROCEDURE — 83605 ASSAY OF LACTIC ACID: CPT | Performed by: INTERNAL MEDICINE

## 2025-01-03 PROCEDURE — 250N000009 HC RX 250: Performed by: EMERGENCY MEDICINE

## 2025-01-03 PROCEDURE — 99223 1ST HOSP IP/OBS HIGH 75: CPT | Performed by: FAMILY MEDICINE

## 2025-01-03 PROCEDURE — 94640 AIRWAY INHALATION TREATMENT: CPT

## 2025-01-03 PROCEDURE — 87899 AGENT NOS ASSAY W/OPTIC: CPT | Performed by: STUDENT IN AN ORGANIZED HEALTH CARE EDUCATION/TRAINING PROGRAM

## 2025-01-03 PROCEDURE — 92610 EVALUATE SWALLOWING FUNCTION: CPT | Mod: GN

## 2025-01-03 PROCEDURE — 200N000001 HC R&B ICU

## 2025-01-03 PROCEDURE — 258N000003 HC RX IP 258 OP 636: Performed by: STUDENT IN AN ORGANIZED HEALTH CARE EDUCATION/TRAINING PROGRAM

## 2025-01-03 PROCEDURE — 250N000013 HC RX MED GY IP 250 OP 250 PS 637: Performed by: FAMILY MEDICINE

## 2025-01-03 PROCEDURE — 99291 CRITICAL CARE FIRST HOUR: CPT | Performed by: INTERNAL MEDICINE

## 2025-01-03 PROCEDURE — 99222 1ST HOSP IP/OBS MODERATE 55: CPT | Performed by: INTERNAL MEDICINE

## 2025-01-03 PROCEDURE — P9016 RBC LEUKOCYTES REDUCED: HCPCS | Performed by: INTERNAL MEDICINE

## 2025-01-03 PROCEDURE — 250N000011 HC RX IP 250 OP 636: Performed by: STUDENT IN AN ORGANIZED HEALTH CARE EDUCATION/TRAINING PROGRAM

## 2025-01-03 RX ORDER — HYDROMORPHONE HYDROCHLORIDE 1 MG/ML
1 SOLUTION ORAL
Status: DISCONTINUED | OUTPATIENT
Start: 2025-01-03 | End: 2025-01-07 | Stop reason: HOSPADM

## 2025-01-03 RX ORDER — DEXTROSE MONOHYDRATE 50 MG/ML
INJECTION, SOLUTION INTRAVENOUS CONTINUOUS
Status: DISCONTINUED | OUTPATIENT
Start: 2025-01-03 | End: 2025-01-04

## 2025-01-03 RX ORDER — NOREPINEPHRINE BITARTRATE 0.02 MG/ML
.01-.6 INJECTION, SOLUTION INTRAVENOUS CONTINUOUS
Status: DISCONTINUED | OUTPATIENT
Start: 2025-01-03 | End: 2025-01-04

## 2025-01-03 RX ORDER — POTASSIUM CHLORIDE 7.45 MG/ML
10 INJECTION INTRAVENOUS
Status: COMPLETED | OUTPATIENT
Start: 2025-01-03 | End: 2025-01-03

## 2025-01-03 RX ADMIN — POTASSIUM CHLORIDE 10 MEQ: 7.46 INJECTION, SOLUTION INTRAVENOUS at 05:59

## 2025-01-03 RX ADMIN — HYDROMORPHONE HYDROCHLORIDE 1 MG: 1 SOLUTION ORAL at 13:31

## 2025-01-03 RX ADMIN — PIPERACILLIN AND TAZOBACTAM 3.38 G: 3; .375 INJECTION, POWDER, FOR SOLUTION INTRAVENOUS at 09:12

## 2025-01-03 RX ADMIN — NOREPINEPHRINE BITARTRATE 0.1 MCG/KG/MIN: 0.02 INJECTION, SOLUTION INTRAVENOUS at 11:36

## 2025-01-03 RX ADMIN — HEPARIN SODIUM 5000 UNITS: 5000 INJECTION, SOLUTION INTRAVENOUS; SUBCUTANEOUS at 05:55

## 2025-01-03 RX ADMIN — HEPARIN SODIUM 5000 UNITS: 5000 INJECTION, SOLUTION INTRAVENOUS; SUBCUTANEOUS at 23:21

## 2025-01-03 RX ADMIN — Medication 5 ML: at 07:50

## 2025-01-03 RX ADMIN — HEPARIN SODIUM 5000 UNITS: 5000 INJECTION, SOLUTION INTRAVENOUS; SUBCUTANEOUS at 16:30

## 2025-01-03 RX ADMIN — SODIUM CHLORIDE, POTASSIUM CHLORIDE, SODIUM LACTATE AND CALCIUM CHLORIDE 1000 ML: 600; 310; 30; 20 INJECTION, SOLUTION INTRAVENOUS at 01:11

## 2025-01-03 RX ADMIN — NOREPINEPHRINE BITARTRATE 0.12 MCG/KG/MIN: 0.02 INJECTION, SOLUTION INTRAVENOUS at 20:38

## 2025-01-03 RX ADMIN — PIPERACILLIN AND TAZOBACTAM 3.38 G: 3; .375 INJECTION, POWDER, FOR SOLUTION INTRAVENOUS at 20:26

## 2025-01-03 RX ADMIN — AZITHROMYCIN MONOHYDRATE 500 MG: 500 INJECTION, POWDER, LYOPHILIZED, FOR SOLUTION INTRAVENOUS at 20:16

## 2025-01-03 RX ADMIN — DEXTROSE MONOHYDRATE: 50 INJECTION, SOLUTION INTRAVENOUS at 02:08

## 2025-01-03 RX ADMIN — ALBUTEROL SULFATE 2.5 MG: 2.5 SOLUTION RESPIRATORY (INHALATION) at 07:49

## 2025-01-03 RX ADMIN — HYDROMORPHONE HYDROCHLORIDE 1 MG: 1 SOLUTION ORAL at 16:45

## 2025-01-03 RX ADMIN — SODIUM CHLORIDE, POTASSIUM CHLORIDE, SODIUM LACTATE AND CALCIUM CHLORIDE: 600; 310; 30; 20 INJECTION, SOLUTION INTRAVENOUS at 00:32

## 2025-01-03 RX ADMIN — POTASSIUM CHLORIDE 10 MEQ: 7.46 INJECTION, SOLUTION INTRAVENOUS at 07:04

## 2025-01-03 RX ADMIN — METRONIDAZOLE 250 MG: 250 TABLET ORAL at 12:18

## 2025-01-03 ASSESSMENT — ACTIVITIES OF DAILY LIVING (ADL)
ADLS_ACUITY_SCORE: 66
ADLS_ACUITY_SCORE: 66
ADLS_ACUITY_SCORE: 74
ADLS_ACUITY_SCORE: 66
ADLS_ACUITY_SCORE: 67
ADLS_ACUITY_SCORE: 74
ADLS_ACUITY_SCORE: 66
ADLS_ACUITY_SCORE: 74
ADLS_ACUITY_SCORE: 67
ADLS_ACUITY_SCORE: 74
ADLS_ACUITY_SCORE: 67
ADLS_ACUITY_SCORE: 74
DEPENDENT_IADLS:: CLEANING;COOKING;LAUNDRY;SHOPPING;MEAL PREPARATION;TRANSPORTATION
ADLS_ACUITY_SCORE: 66
ADLS_ACUITY_SCORE: 67
ADLS_ACUITY_SCORE: 69
ADLS_ACUITY_SCORE: 74
ADLS_ACUITY_SCORE: 67
ADLS_ACUITY_SCORE: 69
ADLS_ACUITY_SCORE: 66
ADLS_ACUITY_SCORE: 69
ADLS_ACUITY_SCORE: 69

## 2025-01-03 NOTE — PROGRESS NOTES
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, ethmoid sinus, submandibular region, & lymph nodes of the neck & mediastinum (treated with radiation therapy, multiple cycles of chemotherapy followed by single agent therapy with pemprolizumab), 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 Found to have toxic-metabolic encephalopathy, distributive & hypovolemic shock, systemic inflammation vs sepsis, new right upper lobe cavitary lesion & diffuse patchy ground glass / nodular lesions, mild hypercalcemia & non-oliguric ETHAN \".   General Observations Awake      Language Patient is Hmong speaking and Jamul. His son who he lives with prefers to interpret.   Type of Evaluation   Type of Evaluation Swallow Evaluation   Oral Motor   Oral Musculature generally intact   Mucosal Quality dry   Facial Symmetry (Oral Motor)   Facial Symmetry (Oral Motor) left side impairment   Lip Function (Oral Motor)   Lip Range of Motion (Oral Motor) unable/difficult to assess   Lip Strength (Oral Motor) WFL   Tongue Function (Oral Motor)   Tongue ROM (Oral Motor) unable/difficult to assess " "  Cough/Swallow/Gag Reflex (Oral Motor)   Volitional Throat Clear/Cough (Oral Motor) unable/difficult to assess   Volitional Swallow (Oral Motor) unable to initiate   Vocal Quality/Secretion Management (Oral Motor)   Vocal Quality (Oral Motor) unable/difficult to assess   Secretion Management (Oral Motor) WNL   General Swallowing Observations   Past History of Dysphagia Pt had VFSS in 2020 and 2023. Results from VFSS on 11/22/23 as follows \"Pt is a 73 year old male with swallow complaints. The following significant findings have been identified: impaired swallowing, characterized by impaired airway protection and sensation, as well as reduced strength of the swallow mechanism. 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 Identified deficits interfere with their ability to consume an oral diet and maintain nutrition as compared to previous level of function.\". He had silent aspiration of thin and suspected with stasis from mildly thick as well. A diet of ETC and thin with strategies and ongoing SLP was recommended. Family did not follow up with SLP. His son reports that he has been eating primarily congee and water mixed with condensed milk in attempt to thicken it. His son reports that he has been refusing oral intake since Tuesday.   Current Diet/Method of Nutritional Intake (General Swallowing Observations, NIS) NPO   Swallowing Evaluation Clinical swallow evaluation   Clinical Swallow Evaluation   Clinical Swallow Evaluation Textures Trialed thin liquids;pureed;mildly thick liquids   Clinical Swallow Eval: Thin Liquid Texture Trial   Mode of Presentation, Thin Liquids cup;spoon   Volume of Liquid or Food Presented x3 sips   Oral Phase of Swallow delayed AP movement;effortful AP movement   Pharyngeal Phase of Swallow coughing/choking   Diagnostic Statement Immediate cough 2/3 trials   Clinical Swallow Eval: Mildly Thick Liquids   Mode of Presentation cup;spoon   Volume Presented x3 sips   Oral " Phase delayed AP movement;effortful AP movement   Pharyngeal Phase coughing/choking   Diagnostic Statement Immediate cough 2/3 trials   Clinical Swallow Evaluation: Puree Solid Texture Trial   Mode of Presentation, Puree spoon   Volume of Puree Presented x4 small bites   Oral Phase, Puree delayed AP movement;effortful AP movement   Pharyngeal Phase, Puree intact   Diagnostic Statement No overt s/s aspiration.   Esophageal Phase of Swallow   Patient reports or presents with symptoms of esophageal dysphagia No   Swallowing Recommendations   Diet Consistency Recommendations NPO  (Okay for medication crushed in puree)   Recommended Feeding/Eating Techniques (Swallow Eval) maintain upright sitting position for eating   Instrumental Assessment Recommendations VFSS (videofluoroscopic swallowing study)   General Therapy Interventions   Planned Therapy Interventions Dysphagia Treatment   Dysphagia treatment Modified diet education;Instruction of safe swallow strategies;Compensatory strategies for swallowing   Clinical Impression   Criteria for Skilled Therapeutic Interventions Met (SLP Eval) Yes, treatment indicated   SLP Diagnosis dysphagia   Risks & Benefits of therapy have been explained evaluation/treatment results reviewed;care plan/treatment goals reviewed;current/potential barriers reviewed;participants voiced agreement with care plan;participants included;risks/benefits reviewed;patient;son   Clinical Impression Comments Clinical Swallow Evaluation completed. Patient had immediate s/s aspiration with thin and mildly thick liquids. Oral motor function was difficult to assess d/t poor participation. Pt was initially resistive to intake, holding mouth shut and turning head away. After cup was place in his hand and he took a sip of liquid with assist he had much better participation with other trials. Mastication was not assessed d/t significant oral holding and effortful A-P transit. Hyolaryngeal elevation appears  present upon visualization and palpation. Recommend diet of NPO except meds crushed in puree if they cannot be given IV. SLP to follow for VFSS and ongoing dysphagia management.   SLP Total Evaluation Time   Eval: oral/pharyngeal swallow function, clinical swallow Minutes (12088) 25   SLP Goals   Therapy Frequency (SLP Eval) 5 times/week   SLP Predicted Duration/Target Date for Goal Attainment 01/10/25   SLP Discharge Planning   SLP Brief overview of current status  Recommend diet of NPO except meds crushed in puree if they cannot be given IV. SLP to follow for VFSS and ongoing dysphagia management.

## 2025-01-03 NOTE — ED NOTES
EMERGENCY DEPARTMENT SIGN OUT NOTE        ED COURSE AND MEDICAL DECISION MAKING  Patient was signed out to me     Treated for suspected severe sepsis    Blood cultures and broad-spectrum antibiotics ordered    CT shows evidence of pneumonia    Labs remarkable for acute kidney injury and elevated sodium    Patient's blood pressure trended downward.  She was given additional fluids.  PICC line was placed for pressor administration.  Blood pressure has responded well    I spoke to the intensivist for admission    Critical care time 100 minutes excluding procedures      FINAL IMPRESSION    1. Hypotension, unspecified hypotension type    2. Decreased oral intake    3. General weakness    4. Acute nonintractable headache, unspecified headache type    5. Chronic anemia    6. Generalized abdominal pain    7. Hypernatremia    8. Hypercalcemia    9. Acute dehydration        ED MEDS  Medications   lidocaine 1 % 0.1-5 mL (2 mLs Other $Given 1/2/25 1640)   lidocaine (LMX4) cream (has no administration in time range)   sodium chloride (PF) 0.9% PF flush 10-40 mL (has no administration in time range)   norepinephrine (LEVOPHED) 4 mg in  mL PERIPHERAL infusion (0.1 mcg/kg/min × 38.1 kg Intravenous Rate/Dose Change 1/2/25 1732)   sodium chloride 0.9% BOLUS 1,000 mL (0 mLs Intravenous Stopped 1/2/25 1344)   piperacillin-tazobactam (ZOSYN) 3.375 g vial to attach to  mL bag (0 g Intravenous Stopped 1/2/25 1500)   vancomycin (VANCOCIN) 850 mg in sodium chloride 0.9 % 283.5 mL intermittent infusion (0 mg Intravenous Stopped 1/2/25 1633)   sodium chloride 0.9% BOLUS 1,000 mL (0 mLs Intravenous Stopped 1/2/25 1528)   sodium chloride 0.9% BOLUS 500 mL (0 mLs Intravenous Stopped 1/2/25 1725)       LAB  Labs Ordered and Resulted from Time of ED Arrival to Time of ED Departure   LACTIC ACID WHOLE BLOOD WITH 1X REPEAT IN 2 HR WHEN >2 - Abnormal       Result Value    Lactic Acid, Initial 2.0      Calcium Ionized Whole Blood 7.1 (*)     PROCALCITONIN - Abnormal    Procalcitonin 0.99 (*)    HEPATIC FUNCTION PANEL - Abnormal    Protein Total 6.0 (*)     Albumin 2.7 (*)     Bilirubin Total 1.3 (*)     Alkaline Phosphatase 97      AST 11      ALT 15      Bilirubin Direct 0.62 (*)    LIPASE - Abnormal    Lipase 12 (*)    TROPONIN T, HIGH SENSITIVITY - Abnormal    Troponin T, High Sensitivity 34 (*)    CBC WITH PLATELETS AND DIFFERENTIAL - Abnormal    WBC Count 7.8      RBC Count 2.76 (*)     Hemoglobin 8.2 (*)     Hematocrit 26.5 (*)     MCV 96      MCH 29.7      MCHC 30.9 (*)     RDW 20.1 (*)     Platelet Count 102 (*)     % Neutrophils 76      % Lymphocytes 13      % Monocytes 9      % Eosinophils 1      % Basophils 0      % Immature Granulocytes 1      NRBCs per 100 WBC 0      Absolute Neutrophils 5.9      Absolute Lymphocytes 1.0      Absolute Monocytes 0.7      Absolute Eosinophils 0.1      Absolute Basophils 0.0      Absolute Immature Granulocytes 0.1      Absolute NRBCs 0.0     IONIZED CALCIUM - Abnormal    Calcium Ionized Whole Blood 6.5 (*)    TROPONIN T, HIGH SENSITIVITY - Abnormal    Troponin T, High Sensitivity 29 (*)    BASIC METABOLIC PANEL - Abnormal    Sodium 150 (*)     Potassium 4.0      Chloride 122 (*)     Carbon Dioxide (CO2) 19 (*)     Anion Gap 9      Urea Nitrogen 56.2 (*)     Creatinine 2.96 (*)     GFR Estimate 22 (*)     Calcium 12.6 (*)     Glucose 118 (*)    INFLUENZA A/B, RSV AND SARS-COV2 PCR - Normal    Influenza A PCR Negative      Influenza B PCR Negative      RSV PCR Negative      SARS CoV2 PCR Negative     ROUTINE UA WITH MICROSCOPIC REFLEX TO CULTURE   TYPE AND SCREEN, ADULT    ABO/RH(D) B POS      Antibody Screen Negative      SPECIMEN EXPIRATION DATE [id number]     BLOOD CULTURE   BLOOD CULTURE   ABO/RH TYPE AND SCREEN     RADIOLOGY    CT Chest Abdomen Pelvis w/o Contrast   Final Result   IMPRESSION:   1.  Numerous new nodular and groundglass opacities in both lungs, as well as a large cavitary lesion in the  right upper lobe anteriorly. Findings favor atypical infection, although metastatic disease is also a possibility.   2.  Morphologic changes of cirrhosis with small volume ascites and gastroesophageal varices.   3.  Unchanged dilated ascending aorta measuring 4.5 cm.         Head CT w/o contrast   Final Result   IMPRESSION:   1.  No CT evidence for acute intracranial process.   2.  Brain atrophy and presumed chronic microvascular ischemic changes as above.   3.  Subtotal opacification of the paranasal sinuses and mastoid air cells. Correlate for acute paranasal sinusitis with multiple air-fluid levels. In addition, opacification of the right and left middle ear regions may indicate bilateral otomastoiditis.          DISCHARGE MEDS  New Prescriptions    No medications on file       Roge Skinner MD  [facility] EMERGENCY DEPARTMENT  [facility] [facility] [id number] [phone]         Roge Skinner MD  01/02/25 7467

## 2025-01-03 NOTE — CONSULTS
Palliative Care Consultation Note  [facility]      Patient: Kristen Chapman  Date of Admission:  1/2/2025    Requesting Clinician / Team: Dr Vidal Ricci/ICU team  Reason for consult: Goals of care  Patient and family support       Recommendations & Counseling     GOALS OF CARE:   Life-prolonging with limits (DNR/DNI) pivoting to comfort focused goals at discharge (hoping to discharge home with hospice).    Continue present treatments until hospice at home is arranged with hope to discharge home w family support.  If clinical decline occurs sooner, and not stable for transfer to home, I did discuss GIP hospice with family and they would be open to considering GIP hospice support in hospital.  Given that  Kristen crow has requested going home, they wish to attempt discharge w home hospice first.    ADVANCE CARE PLANNING:  Patient has an advance directive dated 12/31/2024.  Primary Health Care Agent Rea Chapman (daughter),.  Alternate(s) first alternate son aNyla Chapman, and second alternate son Andrae Chapman..   There is no POLST form on file, recommend to complete prior to DC.  Code status: No CPR- Do NOT Intubate    MEDICAL MANAGEMENT:   #Pain,acute on chronic, suspect cancer related pain; pain from prolonged immobility and also pneumonai  Opioids: hydromorphone (Dilaudid) 0.2mg IV q2H prn severe pain; I also added SL/oral hydromorphone 1mg PO q3H prn pain or dyspnea.  Consider PA tylenol (oral route difficult presently).  Reposition as able.      #Protein calorie malnutrition in the context of chronic illness as evidenced by unintentional weight loss, poor nutrient intake, subcutaneous fat loss, and muscle loss   For now, oral cares as able.  Diet for comfort recommended.     Comfort Care : Please note= not presently on comfort measures only/comfort care.  If clincial decline occurs and clearly not stable for transfer to home, would recommend pivot to comfort care and implement the  following:  Below are common symptoms routinely seen in patients with comfort focused goals and at the end of life. This patient may not currently exhibit all of these symptoms; however, if these were to occur our recommendations are as follows:    #Pain  #Air hunger/Dyspnea   No morphine given ETHAN/CKD and creatinine >2  1st choice: hydromorphone PO 1-2 mg q 2 hour as needed.   2nd choice: hydromorphone IV 0.3-0.5 mg q 15 minutes as needed     If unable to take PO and morphine, consider oxycodone SL 5-10 mg q 1 hour as needed     #Anxiety    1st choice: Lorazepam PO/SL 1 mg  q 3 hour as needed.   2nd choice: Lorazepam IV 1 mg q 3 hour as needed    #Secretion burden   Robinul 0.1 mg (PO/IV) q 4 hours as needed. If ineffective, increase up to 0.3 mg   Consider atropine if Robinul ineffective after dose increase      #Nausea   Zofran 4 mg q 6 hours as needed. Can increase to 8 mg   Zyprexa 5 mg q 6 hours as needed     #Agitation  Aggressive control of other symptoms first, then  1st choice: Zyprexa 5 mg ODT or IM   2nd choice: Increase dose of Zyprexa to 10 mg or consider switch to Haldol   3rd choice: Lorazepam as above     PSYCHOSOCIAL/SPIRITUAL SUPPORT:  Family 11 children, wife Roque Galvez, Kristen's siblings and extended family.  Friends    [place] community: [place]    Palliative Care will continue to follow with next visit on 1/6/25. Thank you for the consult and allowing us to aid in the care of Kristen Chapman.    These recommendations have been discussed with Dr Ricci.    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  Health care agents and children Rea and Nayla note Kristen had not been eating for about two weeks prior to admission, and had difficulty swallowing liquids in the days prior to admission and frequent coughing.   Found to have cavitary pneumonia,toxic-metabolic encephalopathy, distributive and hypovolemic shock, concern for sepsis, nonoliguric ETHAN.       Today, the patient was seen for:  Goals of care, support to patient and family    History of Present Illness   Met with Kristen and his HCA Rea Chapman and first alternate HCA Nayla Chapman.     I introduced our role as an extra layer of support and how we help patients and families dealing with serious, potentially life-limiting illnesses. I explained the composition of the palliative care team.  Palliative care helps patients and families navigate their care while focusing on the whole person; providing emotional, social and spiritual support  Palliative care often assists with symptom management, information sharing about what to expect from the illness, available treatment options and what effect those options may have on the disease course, and provide effective communication and caring support.  Health care agents are familiar w palliative care as Kristen had worked with Steve Arreguin MD through [facility] (Dr Frias) for several months.    The two weeks prior to admission, Kristen had been struggling with oral intake; had prior PEG tube in past and Kristen wanted it out.  Subsequently developed gradual and progressive protein calorie malnutrition.  His goals of treatment had remained life prolonging with limits for many months.    Due to hearing impairment of patient,  family declined [facility]  and children interpreted for him at bedside.   Kristen denied pain.  He repeatedly voiced desire to return home.    Rea and Nayla note family are aware EOL is approaching, they would want to get support in place for Kristen to be able to have end of life at home if he would be stable enough.  Kristen had been bedbound x 2 weeks prior to admission and family had been caring for him at home.  We reviewed present therapies (IV antibiotics, pressors which are in process of weaning off, blood transfusions, IV fluids); family would want to continue these until discharge, we discussed oral antibiotics may be option with hospice agency.    Met subsequently with multiple extended family members in family lounge  and reviewed with telephone-based  present findings that ETHAN, respiratory failure, pneumonia, dysphagia, advanced protein calorie malnutrition and multiple other issues combined with advanced cancer, no longer candidate for further cancer treatment with ongoing infection.  Reviewed Kristen's repeated request to go home; they are aware he would prefer EOL at home if stable and has hospice support arranged and all family support getting hospice consult.  All family voiced awareness that if clinical decline occurs and Kristen is not hemodynamically stable for transfer, that EOL would then happen here, I noted GIP hospice program is available at [facility] and family could opt for that as plan B.      Prognosis, Goals, & Planning:   Functional Status just prior to this current hospitalization:  Outpatient Palliative Performance Score (PPS) 20%  Extensive disease. Bedbound & requires total care, minimal intake or just sips. Normal LOC or drowsy or confused..    Prognosis, Goals, and/or Advance Care Planning:  Education provided on transition to comfort-focused goals of care would be including discontinuation of IV fluids, cardiac monitoring, labs, tube feeding, TPN and other interventions that  do not directly promote comfort.  Anticipatory guidance was given regarding feeding, hunger, fluids at end of life. We discussed utilization of medications to ease air hunger, agitation and restlessness at the time that ventilator is compassionately withdrawn.  Discussed that this process is very purposeful in terms of ensuring patient is as comfortable as possible and that family wishes are honored.  Education provided regarding hospice philosophy, prognostic,and eligibility criteria. Discussed what services are provided and those that are not,  Discussed common misconceptions. We explored the various disposition options where they can receive hospice care (home, residential hospice homes, LTC with hospice) including subsequent financial and familial implications. Discussed typical anticipated timing of discharge.    Code Status was addressed today:   Yes, We discussed potential risks and rationale of attempting cardiac resuscitation, intubation, and mechanical ventilation.  We also discussed probability of survival as well as quality of life implications.  Based on this discussion, patient or surrogate response/decision: remain DNR/DNI      Patient's decision making preferences: unable to assess        Patient has decision-making capacity today for complex decisions: unreliable; able to voice preferences but not able to express coherent understanding of present situation and treatment options.          Coping, Meaning, & Spirituality:   Mood, coping, and/or meaning in the context of serious illness were addressed today: Yes    Social:   Living situation:lives with family  Important relationships/caregivers:wife Roque Galvez, multiple adult children and extended family  Areas of fulfillment/terese: familyl    Medications:  Reviewed this patient's medication profile and medications from this hospitalization. Notable medications: was not on opioids prior to admission.     [place] Board of Pharmacy Data Base Reviewed: Yes:   " reviewed - no record of controlled substances prescribed.    ROS:  Comprehensive ROS is reviewed and is negative except as here & per HPI:     Physical Exam   Vital Signs with Ranges  Temp:  [97.4  F (36.3  C)-99.1  F (37.3  C)] 97.4  F (36.3  C)  Pulse:  [54-75] 56  Resp:  [10-39] 16  BP: ()/(45-72) 102/61  SpO2:  [95 %-100 %] 100 %  Wt Readings from Last 10 Encounters:   01/03/25 41.2 kg (90 lb 12.8 oz)   12/18/24 38.5 kg (84 lb 14.4 oz)   10/24/24 44.1 kg (97 lb 3.2 oz)   10/09/24 43.6 kg (96 lb 3.2 oz)   09/11/24 48.5 kg (107 lb)   07/17/24 49.8 kg (109 lb 12.8 oz)   07/09/24 52.3 kg (115 lb 3.2 oz)   06/18/24 50.5 kg (111 lb 4.8 oz)   06/10/24 50.9 kg (112 lb 3.2 oz)   05/21/24 50.8 kg (111 lb 14.4 oz)     90 lbs 12.8 oz    Body mass index is 15.11 kg/m .    PHYSICAL EXAM:  Cachectic 75 yo male, R lateral decubitus position.  Speech is dysarthric, intelligible to adult children.  Breathing mildly labored.  Abdomen scaphoid.  Heart S1S2 no murmur  Lungs CTA with diminished breath sounds in bases, wet sounding cough, scattered chronchi.  Extremities with sarcopenia.  No myoclonus.   Responds to verbal stim, opens eyes.    Data reviewed:  Last Comprehensive Metabolic Panel:  Lab Results   Component Value Date     (H) 01/03/2025    POTASSIUM 3.7 01/03/2025    CHLORIDE 118 (H) 01/03/2025    CO2 22 01/03/2025    ANIONGAP 8 01/03/2025     (H) 01/03/2025    BUN 44.1 (H) 01/03/2025    CR 2.34 (H) 01/03/2025    GFRESTIMATED 29 (L) 01/03/2025    GUANAKITO 9.6 01/03/2025       CBC RESULTS:   Recent Labs   Lab Test 01/03/25  0708 01/03/25  0433   WBC  --  7.4   RBC  --  2.04*   HGB 6.6* 6.1*   HCT  --  19.5*   MCV  --  96   MCH  --  29.9   MCHC  --  31.3*   RDW  --  20.5*   PLT  --  75*     Lab Results   Component Value Date    AST 17 01/03/2025     Lab Results   Component Value Date    ALT 12 01/03/2025     No results found for: \"BILICONJ\"   Lab Results   Component Value Date    BILITOTAL 1.1 01/03/2025 "     Lab Results   Component Value Date    ALBUMIN 2.0 01/03/2025    ALBUMIN 3.2 07/01/2022     Lab Results   Component Value Date    PROTTOTAL 4.4 01/03/2025      Lab Results   Component Value Date    ALKPHOS 75 01/03/2025     CT Chest abdomen pelvis 1/2/25  FINDINGS:  LUNGS AND PLEURA: Spiculated nodule in the right upper lobe laterally has increased in size and become partially cavitary, now measuring 1.0 x 0.7 cm (5/104), previously 0.6 x 0.4 cm. Additionally, there are numerous new nodular and groundglass opacities   bilaterally, as well as a large cavitary lesion in the right upper lobe anteriorly measuring 3.6 x 2.9 cm (5/88). Unchanged bronchiectasis in the middle lobe. No pleural effusion or pneumothorax. Debris in the trachea.    MEDIASTINUM/AXILLAE: Unchanged dilated ascending aorta measuring 4.5 cm. Left neck central venous catheter tip in the right atrium. No enlarged thoracic lymph nodes.    CORONARY ARTERY CALCIFICATION: Mild.    HEPATOBILIARY: Morphologic changes of cirrhosis. Gallbladder and bile ducts are unremarkable.    PANCREAS: Normal.    SPLEEN: Normal.    ADRENAL GLANDS: Normal.    KIDNEYS/BLADDER: Normal.    BOWEL: Normal.    LYMPH NODES: Normal.    VASCULATURE: No abdominal aortic aneurysm. Gastroesophageal varices.    PERITONEUM: Small volume ascites. Peritoneal drainage catheter in the right upper abdomen.    PELVIC ORGANS: Normal.    MUSCULOSKELETAL: Degenerative changes in the spine.   Impression:     IMPRESSION:  1.  Numerous new nodular and groundglass opacities in both lungs, as well as a large cavitary lesion in the right upper lobe anteriorly. Findings favor atypical infection, although metastatic disease is also a possibility.  2.  Morphologic changes of cirrhosis with small volume ascites and gastroesophageal varices.  3.  Unchanged dilated ascending aorta measuring 4.5 cm.     CT head wo contrast 1/2/25  IMPRESSION:   1. No CT evidence for acute intracranial process.   2.  Brain  atrophy and presumed chronic microvascular ischemic changes as above.   3.  Subtotal opacification of the paranasal sinuses and mastoid air cells. Correlate for acute paranasal sinusitis with multiple air-fluid levels. In addition, opacification of the right and left middle ear regions may indicate bilateral otomastoiditis.     82 minutes spent on the date of the encounter doing chart review, history and exam, documentation and further activities per the note.    Tanya Jimenez MD  [facility], Palliative Care  Securely message with the [employer] Web Console (learn more here) or  Text page via [facility] Paging/Directory

## 2025-01-03 NOTE — CONSULTS
Infectious Disease Consultation:  Requesting MD:  Reason for consult:      HISTORY:  Asked to see pt for cavitary mass:      Presentation is for pt with hx NP cancer on chemo (last, 3 wks ago) to ED for unable to eat, malaise.  Low BP in ED.  Sepsis felt to be present, dehydration.  Was due for chemo, unable to attend appt due to above issue.  Pt has a big family whole room full when I first came, but then just me and daughter talked and I showed her the scan.  Pt super emaciated also.          Pertinent past history, past infectious disease history:       PAST MEDICAL HISTORY:  Past Medical History        Past Medical History:   Diagnosis Date    Anemia      Dysphagia      Hepatitis B chronic      Hypothyroidism      Nasopharyngeal cancer (H)      Severe protein-calorie malnutrition (H)      Thrombocytopenia (H)                 PAST SURGICAL HISTORY:  Past Surgical History         Past Surgical History:   Procedure Laterality Date    ENDOSCOPIC ENDONASAL SURGERY Bilateral 9/7/2021     Procedure: Nasal Endoscopy with Biopsy;  Surgeon: Ky Centeno MD;  Location: [facility] OR    IR CHEST PORT PLACEMENT > 5 YRS OF AGE   3/2/2020    IR CHEST PORT PLACEMENT > 5 YRS OF AGE   3/2/2020    IR GASTROSTOMY TUBE INSERTION   4/27/2020    IR GASTROSTOMY TUBE PERCUTANEOUS PLCMNT   4/27/2020    IR INTRAPERITONEAL CATH TUNNEL ASCITES   2/22/2024    IR INTRAPERITONEAL CATH TUNNEL ASCITES   2/26/2024    IR PORT PLACEMENT >5 YEARS   3/2/2020    IR PORT PLACEMENT >5 YEARS   3/2/2020    IR T-FASTENER REMOVAL   6/5/2020    IR T-FASTENER REMOVAL   6/5/2020           Medications:  Reviewed prior to admission meds as applicable in chart review.  Current meds are reviewed in the EMR listed MAR.     ANTIBIOTICS:    Current:V/pip/flagyl/macrolide and viread for hep B   Prior:   Allergy to:    SH/FH and  travel history(if applicable to consult):       FAMILY HISTORY:  Family History         Family History   Problem Relation Age of Onset     "Liver Cancer No family hx of      Liver Disease No family hx of                 SOCIAL HISTORY:  Social History           Socioeconomic History    Marital status:    Tobacco Use    Smoking status: Never    Smokeless tobacco: Never   Substance and Sexual Activity    Alcohol use: Not Currently    Drug use: Not Currently    Sexual activity: Not Currently            REVIEW OF SYSTEMS:  All other systems negative    EXAMINATION:  /62   Pulse 60   Temp 97.7  F (36.5  C) (Oral)   Resp 20   Ht 1.651 m (5' 5\")   Wt 41.2 kg (90 lb 12.8 oz)   SpO2 100%   BMI 15.11 kg/m    Alert, awake  Vitals tabulated above, reviewed  HEENT:nl  Neck supple without lymphadenopathy  Sclera clear  CARDIOVASCULAR regular rate and rhythm, no murmur  Lungs CLEAR TO AUSCULTATION   Abdomen soft, NT/ND, absent HEPATOSPLENOMEGALY  Skin normal  Joints normal  Neurologic exam non focal  Wound:  NA        CLINICAL DATABASE FOR---LAB/MICRO/CULTURES/IMAGING STUDIES:  Lab Results   Component Value Date    WBC 7.4 01/03/2025     Lab Results   Component Value Date    RBC 2.04 01/03/2025     Lab Results   Component Value Date    HGB 6.6 01/03/2025     Lab Results   Component Value Date    HCT 19.5 01/03/2025     No components found for: \"MCT\"  Lab Results   Component Value Date    MCV 96 01/03/2025     Lab Results   Component Value Date    MCH 29.9 01/03/2025     Lab Results   Component Value Date    MCHC 31.3 01/03/2025     Lab Results   Component Value Date    RDW 20.5 01/03/2025     Lab Results   Component Value Date    PLT 75 01/03/2025       Last Comprehensive Metabolic Panel:  Sodium   Date Value Ref Range Status   01/03/2025 148 (H) 135 - 145 mmol/L Final     Potassium   Date Value Ref Range Status   01/03/2025 3.4 3.4 - 5.3 mmol/L Final   07/01/2022 3.8 3.5 - 5.0 mmol/L Final     Chloride   Date Value Ref Range Status   01/03/2025 118 (H) 98 - 107 mmol/L Final   07/01/2022 106 98 - 107 mmol/L Final     Carbon Dioxide (CO2)   Date " "Value Ref Range Status   01/03/2025 22 22 - 29 mmol/L Final   07/01/2022 28 22 - 31 mmol/L Final     Anion Gap   Date Value Ref Range Status   01/03/2025 8 7 - 15 mmol/L Final   07/01/2022 3 (L) 5 - 18 mmol/L Final     Glucose   Date Value Ref Range Status   01/03/2025 115 (H) 70 - 99 mg/dL Final   07/01/2022 116 70 - 125 mg/dL Final     GLUCOSE BY METER POCT   Date Value Ref Range Status   01/03/2025 89 70 - 99 mg/dL Final     Urea Nitrogen   Date Value Ref Range Status   01/03/2025 44.1 (H) 8.0 - 23.0 mg/dL Final   07/01/2022 20 8 - 28 mg/dL Final     Creatinine   Date Value Ref Range Status   01/03/2025 2.34 (H) 0.67 - 1.17 mg/dL Final     GFR Estimate   Date Value Ref Range Status   01/03/2025 29 (L) >60 mL/min/1.73m2 Final     Comment:     eGFR calculated using 2021 CKD-EPI equation.   12/14/2020 >60 >60 mL/min/1.73m2 Final     GFR, ESTIMATED POCT   Date Value Ref Range Status   01/31/2024 37 (L) >60 mL/min/1.73m2 Final     Calcium   Date Value Ref Range Status   01/03/2025 9.6 8.8 - 10.4 mg/dL Final     Comment:     Reference intervals for this test were updated on 7/16/2024 to reflect our healthy population more accurately. There may be differences in the flagging of prior results with similar values performed with this method. Those prior results can be interpreted in the context of the updated reference intervals.       Liver Function Studies -   Recent Labs   Lab Test 01/03/25  0433   PROTTOTAL 4.4*   ALBUMIN 2.0*   BILITOTAL 1.1   ALKPHOS 75   AST 17   ALT 12       No results found for: \"SED\"      Lower lung fields:            IMPRESSION:  IS host due to chemo, cancer  Cavitary mass, R upper lung see CT  GGO lower lung  Sepsis  Resp panel neg for viruses    PLAN:  On aggressive abx  Degree to which a huge TB work up is undertaken depends a bit on goals of care as rx is months and complicated.   Could at least envision lung abscess and another process like CMV being present, again direction of work up " depends some on goals.           XANDER COOK MD  [facility]  Office [phone]

## 2025-01-03 NOTE — PROGRESS NOTES
[facility] - ICU    RN Progress Note:            Pertinent Assessments:      Please refer to flowsheet rows for full assessment     Neuro; urology            Key Events - This Shift:       Pt mentation labile; at best was drowsy and oriented. Pt unable to use  d/t [place] and lethargy per daughter at bedside. Pt up 5L IVF since admission; lungs negative for crackles; not pitting edema noted; continuing to bladder scan as pt denies need to void and has had a max of 250 ml on scan. Primofit in place. Titrating levo to map greater than 65.                 Barriers to Discharge / Downgrade:     Vasoactive medication; lethargy

## 2025-01-03 NOTE — CONSULTS
Care Management Initial Consult    General Information  Assessment completed with: Children, dtr Nhia  Type of CM/SW Visit: Initial Assessment    Primary Care Provider verified and updated as needed: Yes   Readmission within the last 30 days: no previous admission in last 30 days         Advance Care Planning:            Communication Assessment  Patient's communication style: spoken language (English or Bilingual)    Hearing Difficulty or Deaf: yes   Wear Glasses or Blind: no    Cognitive  Cognitive/Neuro/Behavioral: WDL                      Living Environment:   People in home: child(gary), adult     Current living Arrangements: house      Able to return to prior arrangements: yes       Family/Social Support:  Care provided by: child(gary)  Provides care for: no one     Support system: Children          Description of Support System: Supportive, Involved         Current Resources:   Patient receiving home care services: No        Community Resources: None  Equipment currently used at home: cane, straight, walker, rolling  Supplies currently used at home: None    Employment/Financial:  Employment Status:          Financial Concerns:             Does the patient's insurance plan have a 3 day qualifying hospital stay waiver?  Yes     Which insurance plan 3 day waiver is available? Alternative insurance waiver    Will the waiver be used for post-acute placement? Undetermined at this time    Lifestyle & Psychosocial Needs:  Social Drivers of Health     Food Insecurity: Unknown (1/2/2025)    Food Insecurity     Within the past 12 months, did you worry that your food would run out before you got money to buy more?: Patient unable to answer     Within the past 12 months, did the food you bought just not last and you didn t have money to get more?: Patient unable to answer   Depression: Not at risk (10/16/2023)    PHQ-2     PHQ-2 Score: 2   Housing Stability: Unknown (1/2/2025)    Housing Stability     Do you have housing? :  Patient calling has a cough, phlegm is clear cough is irritating no other symptoms. This started 1 1/2 weeks ago. Patient states it's from when she had Covid. Pancho Mendosa     Please advise Patient unable to answer     Are you worried about losing your housing?: Patient unable to answer   Tobacco Use: Low Risk  (10/24/2024)    Patient History     Smoking Tobacco Use: Never     Smokeless Tobacco Use: Never     Passive Exposure: Not on file   Financial Resource Strain: Unknown (1/2/2025)    Financial Resource Strain     Within the past 12 months, have you or your family members you live with been unable to get utilities (heat, electricity) when it was really needed?: Patient unable to answer   Alcohol Use: Not on file   Transportation Needs: Unknown (1/2/2025)    Transportation Needs     Within the past 12 months, has lack of transportation kept you from medical appointments, getting your medicines, non-medical meetings or appointments, work, or from getting things that you need?: Patient unable to answer   Physical Activity: Not on file   Interpersonal Safety: Low Risk  (1/3/2025)    Interpersonal Safety     Do you feel physically and emotionally safe where you currently live?: Yes     Within the past 12 months, have you been hit, slapped, kicked or otherwise physically hurt by someone?: No     Within the past 12 months, have you been humiliated or emotionally abused in other ways by your partner or ex-partner?: No   Stress: Not on file   Social Connections: Not on file   Health Literacy: Not on file       Functional Status:  Prior to admission patient needed assistance:   Dependent ADLs:: Ambulation-cane, Ambulation-walker  Dependent IADLs:: Cleaning, Cooking, Laundry, Shopping, Meal Preparation, Transportation         Discussed  Partnership in Safe Discharge Planning  document with patient/family: No      Additional Information:    Assessment completed over the phone with patient's daughter Rea. Patient lives in his son Nayla's house with another son Bill.  He is independent with ADLs, assisted with some IADLs, has cane/walker for ambulating and has home RN with [facility]. Rea is primary  family contact. Family willing to transport at discharge.    1:03 PM  Consult placed to discuss hospice. Spoke with daughter Rea who states family would like to hear more about how hospice could support patient.  She states they have no preference for agency.  Since patient currently on home care services with [facility], referral has been called and sent to [facility].          Debra David RN

## 2025-01-03 NOTE — TELEPHONE ENCOUNTER
ANTICOAGULATION DIRECT ORAL ANTICOAGULANT MONITORING    Currently admitted at [facility]. Admission medication history reviewed - Xarelto removed from list as patient was no longer taking. Repeat serum creatinine unchanged from early December.    Lab Results   Component Value Date    CR 2.34 (H) 01/03/2025    CR 2.49 (H) 01/02/2025    CR 2.96 (H) 01/02/2025       ASSESSMENT/PLAN     A chart review for Direct Oral Anticoagulant (DOAC) Stewardship has been completed for:     If anticoagulation resumed, recommend changing therapy to apixaban 5mg TWICE daily as rivaorxaban use is not advised in patient with CrCl less than 15 ml/min.     Elaine Baron, PharmD, [facility]  Anticoagulation Clinic

## 2025-01-03 NOTE — PROGRESS NOTES
"Critical Care Progress Note      01/03/2025    Name: Kristen Chapman MRN#: [id number]   Age: 74 year old YOB: 1950     Hsptl Day# 1  ICU DAY #    MV DAY #             Problem List:   Active Problems:    Hypotension, unspecified hypotension type    Hypercalcemia    Hypernatremia    Generalized abdominal pain    General weakness    Chronic anemia    Decreased oral intake    Acute nonintractable headache, unspecified headache type    Acute dehydration    Clinically Significant Risk Factors Present on Admission         # Hypernatremia: Highest Na = 153 mmol/L in last 2 days, will monitor as appropriate  # Hyperchloremia: Highest Cl = 126 mmol/L in last 2 days, will monitor as appropriate       # Hypercalcemia: Highest Ca = 12.6 mg/dL in last 2 days, will monitor as appropriate    # Hypoalbuminemia: Lowest albumin = 2 g/dL at 1/3/2025  4:33 AM, will monitor as appropriate  # Coagulation Defect: INR = 1.63 (Ref range: 0.85 - 1.15) and/or PTT = 40 Seconds (Ref range: 22 - 38 Seconds), will monitor for bleeding  # Thrombocytopenia: Lowest platelets = 75 in last 2 days, will monitor for bleeding     # Circulatory Shock: required vasopressors within past 24 hours       # Anemia: based on hgb <11       # Cachexia: Estimated body mass index is 15.11 kg/m  as calculated from the following:    Height as of this encounter: 1.651 m (5' 5\").    Weight as of this encounter: 41.2 kg (90 lb 12.8 oz).    # Severe Malnutrition: based on nutrition assessment         # Anemia: based on hgb <11   Code Status: No CPR- Do NOT Intubate                    Summary/Hospital Course:     From Dr. 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 Found to have toxic-metabolic encephalopathy, distributive & hypovolemic shock, systemic inflammation vs sepsis, new right upper lobe cavitary lesion & diffuse patchy ground glass / nodular lesions, mild hypercalcemia & non-oliguric ETHAN        Assessment and plan :       I have personally reviewed the daily labs, imaging studies, cultures and discussed the case with referring physician and consulting physicians.     My assessment and plan by system for this patient is as follows:    Family at bedside.  I was told that more children are arriving from out of town and after that the family is going to have a meeting regarding hospice care.    Neurology/Psychiatry:   Encephalopathic most likely toxic metabolic      Cardiovascular:   Septic shock on norepinephrine  With normal biventricular function bedside ultrasound last evening        Pulmonary/Ventilator Management:   On room air  Cavitary lesion in the right upper lobe  Presumed bacterial pneumonia versus TB  Remains in isolation    Continue broad-spectrum antibiotics  Attempt to collect sputum to check for AFB      GI and Nutrition :   Compensated liver cirrhosis due to hepatitis B  On tenofovir  Refractory ascites.  Patient has a peritoneal drain in place  Ascitic fluid negative for infection      Renal/Fluids/Electrolytes:   Acute kidney injury  Hyponatremia  Fluid resuscitated.  Now on D5W to help with hyponatremia    Failed swallow evaluation    - monitor function and electrolytes as  needed with replacement per ICU protocols.  - generally avoid nephrotoxic agents such as NSAID, IV contrast unless specifically required  - adjust medications as needed for renal clearance  - follow I/O's as appropriate.    Infectious Disease:   Bacterial pneumonia, urine strep antigen is positive  Does have a cavitary lesion in the right upper lobe    Sputum for AFB pending.  Trying to induce sputum to collect  ID following        Endocrine:   Thyroid is on levothyroxine  Plan  - ICU insulin protocol, goal sugar <180      Hematology/Oncology:   Recurrent esophageal cancer  Last chemo 3 weeks ago    Overall doing well.  Family interested in home hospice      ICU Prophylaxis:   1. DVT: Hep Subq         Key Medications:     Current Facility-Administered Medications   Medication Dose Route Frequency Provider Last Rate Last Admin    sodium chloride inhalant 10 % neb solution 5 mL  5 mL Nebulization Jian Michael MD   5 mL at 01/03/25 0750    And    albuterol (PROVENTIL) neb solution 2.5 mg  2.5 mg Nebulization Daily Jian Hernandez MD   2.5 mg at 01/03/25 0749    azithromycin (ZITHROMAX) 500 mg in sodium chloride 0.9 % 250 mL intermittent infusion  500 mg Intravenous Q24H Jian Hernandez MD   500 mg at 01/02/25 2056    heparin ANTICOAGULANT injection 5,000 Units  5,000 Units Subcutaneous Q8H Jian Hernandez MD   5,000 Units at 01/03/25 0555    levothyroxine (SYNTHROID/LEVOTHROID) tablet 175 mcg  175 mcg Oral Jian Michael MD        metroNIDAZOLE (FLAGYL) tablet 250 mg  250 mg Topical Daily Jian Hernandez MD   250 mg at 01/03/25 1218    piperacillin-tazobactam (ZOSYN) 3.375 g vial to attach to  mL bag  3.375 g Intravenous Q12H Jian Hernandez MD   3.375 g at 01/03/25 0912    tenofovir (VIREAD) tablet 300 mg  300 mg Oral Q72H Jian Hernandez MD        vancomycin place palacios - receiving intermittent dosing  1 each Intravenous See Admin Instructions Jian Hernandez MD         Current  Facility-Administered Medications   Medication Dose Route Frequency Provider Last Rate Last Admin    dextrose 5% infusion   Intravenous Continuous Jian Hernandez  mL/hr at 01/03/25 0208 New Bag at 01/03/25 0208    norepinephrine (LEVOPHED) 4 mg in  mL PERIPHERAL infusion  0.01-0.125 mcg/kg/min Intravenous Continuous Roge Skinner MD 14.3 mL/hr at 01/03/25 1136 0.1 mcg/kg/min at 01/03/25 1136               Physical Examination:   Temp:  [97.4  F (36.3  C)-99.1  F (37.3  C)] 97.4  F (36.3  C)  Pulse:  [54-75] 56  Resp:  [10-39] 16  BP: ()/(45-72) 102/61  SpO2:  [95 %-100 %] 100 %    Intake/Output Summary (Last 24 hours) at 1/3/2025 1701  Last data filed at 1/3/2025 1420  Gross per 24 hour   Intake 4155.41 ml   Output 270 ml   Net 3885.41 ml     Wt Readings from Last 4 Encounters:   01/03/25 41.2 kg (90 lb 12.8 oz)   12/18/24 38.5 kg (84 lb 14.4 oz)   10/24/24 44.1 kg (97 lb 3.2 oz)   10/09/24 43.6 kg (96 lb 3.2 oz)     BP - Mean:  [57-85] 76  Resp: 16  No lab results found in last 7 days.    GEN: no acute distress   HEENT: head ncat, sclera anicteric, OP patent, trachea midline   PULM: clear anteriorly    CV/COR: RRR S1S2 no gallop,  No rub, no murmur  ABD: soft nontender, hypoactive bowel sounds, no mass  EXT: No significant edema  NEURO: Sleeping.   SKIN: no obvious rash  LINES: clean, dry intact         Data:   All data and imaging reviewed     ROUTINE ICU LABS (Last four results)  CMP  Recent Labs   Lab 01/03/25  1654 01/03/25  1212 01/03/25  1204 01/03/25  0803 01/03/25  0433 01/02/25  2246 01/02/25  1236 01/02/25  1236   NA  --   --   --   --  148* 153*  --  150*   POTASSIUM  --  3.7  --   --  3.4 3.9  --  4.0   CHLORIDE  --   --   --   --  118* 126*  --  122*   CO2  --   --   --   --  22 17*  --  19*   ANIONGAP  --   --   --   --  8 10  --  9   *  --  131* 89 116*  115* 90  88   < > 118*   BUN  --   --   --   --  44.1* 46.5*  --  56.2*   CR  --   --   --   --  2.34* 2.49*  --  2.96*  "  GFRESTIMATED  --   --   --   --  29* 27*  --  22*   GUANAKITO  --   --   --   --  9.6 10.1  --  12.6*   MAG  --   --   --   --   --  2.1  --   --    PHOS  --   --   --   --   --  2.7  --   --    PROTTOTAL  --   --   --   --  4.4* 4.7*  --  6.0*   ALBUMIN  --   --   --   --  2.0* 2.2*  --  2.7*   BILITOTAL  --   --   --   --  1.1 1.2  --  1.3*   ALKPHOS  --   --   --   --  75 74  --  97   AST  --   --   --   --  17 16  --  11   ALT  --   --   --   --  12 11  --  15    < > = values in this interval not displayed.     CBC  Recent Labs   Lab 01/03/25  0708 01/03/25  0433 01/02/25  1236   WBC  --  7.4 7.8   RBC  --  2.04* 2.76*   HGB 6.6* 6.1* 8.2*   HCT  --  19.5* 26.5*   MCV  --  96 96   MCH  --  29.9 29.7   MCHC  --  31.3* 30.9*   RDW  --  20.5* 20.1*   PLT  --  75* 102*     INR  Recent Labs   Lab 01/02/25  2216   INR 1.63*     Arterial Blood GasNo lab results found in last 7 days.    All cultures:  No results for input(s): \"CULT\" in the last 168 hours.  No results found for this or any previous visit (from the past 24 hours).      Billing: This patient is critically ill: Yes. Total critical care time today 33 min exclusive of procedures or teaching.          "

## 2025-01-03 NOTE — PROGRESS NOTES
"CLINICAL NUTRITION SERVICES - ASSESSMENT NOTE     Nutrition Prescription    RECOMMENDATIONS FOR MDs/PROVIDERS TO ORDER:      Malnutrition Status:    Severe in chronic illness.    Recommendations already ordered by Registered Dietitian (RD):  None  Pt is NPO.    Future/Additional Recommendations:       REASON FOR ASSESSMENT  Kristen Chapman is a/an 74 year old male assessed by the dietitian for Admission Nutrition Risk Screen for wt loss    Patient admitted with hypotension, abdominal complaints, poor appetite.  History of nasopharyngeal cancer getting chemotherapy, CKD, chronic anemia, severe malnutrition, dysphagia.      NUTRITION HISTORY  Per H&P patient with poor oral intake for several months and not eating anything for the last 3 days.  Left voice message to speak with patient's daughter. Patient in isolation precautions r/o TB.      CURRENT NUTRITION ORDERS  Diet: NPO  Speech therapist saw patient today and recommends NPO except meds crushed in puree.    5% dextrose infusion at 100 ml/hr     LABS  Sodium 148 (H)  Creatinine 2.34 (H)  Hgb 6.6 (L)  Labs reviewed    MEDICATIONS  Medications reviewed    ANTHROPOMETRICS  Height: 165.1 cm (5' 5\")  Most Recent Weight: 41.2 kg (90 lb 12.8 oz)    BMI: Underweight BMI <18.5  Weight History:   01/03/25 : 41.2 kg (90 lb 12.8 oz)   12/18/24 : 38.5 kg (84 lb 14.4 oz)   10/24/24 : 44.1 kg (97 lb 3.2 oz)   10/09/24 : 43.6 kg (96 lb 3.2 oz)   09/11/24 : 48.5 kg (107 lb)   07/17/24 : 49.8 kg (109 lb 12.8 oz)   07/09/24 : 52.3 kg (115 lb 3.2 oz) -21% wt loss x 6 months  06/18/24 : 50.5 kg (111 lb 4.8 oz)   06/10/24 : 50.9 kg (112 lb 3.2 oz)   05/21/24 : 50.8 kg (111 lb 14.4 oz)     Dosing Weight: 41.2 kg    ASSESSED NUTRITION NEEDS  Estimated Energy Needs: 1440+ kcals/day (35+ kcals/kg)  Justification: Repletion and Underweight  Estimated Protein Needs: 41-49 grams protein/day (1 - 1.2 grams of pro/kg)  Justification: CKD and Increased needs  Estimated Fluid Needs: 1440+ mL/day (1 " mL/kcal)   Justification: Maintenance    PHYSICAL FINDINGS  See malnutrition section below.  Per chart:  Small volume ascites  Hypo bowel sounds  Concave abdomen   Jaw abscess  Fragile skin  Last BM preadmit      MALNUTRITION:  % Weight Loss:  > 10% in 6 months (severe malnutrition)  % Intake:  </= 75% for >/= 1 month (severe malnutrition)  Subcutaneous Fat Loss:  unable due to isolation precautions  Muscle Loss:  unable due to isolation precautions  Fluid Retention:  small volume ascites per CT 1/2/25.    Malnutrition Diagnosis: Severe malnutrition  In Context of:  Chronic illness or disease    NUTRITION DIAGNOSIS  Malnutrition related to chronic illness as evidenced by significant wt loss, decreased po intake      INTERVENTIONS  Implementation  Nutrition Education: No education needs assessed at this time   -Await goals of care discussion for feeding plan.    -Patient is at risk for refeeding syndrome, continue to monitor electrolytes and replace if low as patient is on dextrose infusion.       Goals  Diet advancement vs nutrition support within 2-3 days vs comfort focus care.     Monitoring/Evaluation  Progress toward goals will be monitored and evaluated per protocol.

## 2025-01-03 NOTE — PHARMACY-VANCOMYCIN DOSING SERVICE
"Pharmacy Vancomycin Initial Note  Date of Service 2025  Patient's  1950  74 year old, male    Indication: Aspiration Pneumonia, Community Acquired Pneumonia, and Healthcare-Associated Pneumonia    Current estimated CrCl = Estimated Creatinine Clearance: 12.2 mL/min (A) (based on SCr of 2.96 mg/dL (H)).    Creatinine for last 3 days  2025: 12:36 PM Creatinine 2.96 mg/dL    Recent Vancomycin Level(s) for last 3 days  No results found for requested labs within last 3 days.      Vancomycin IV Administrations (past 72 hours)                     vancomycin (VANCOCIN) 850 mg in sodium chloride 0.9 % 283.5 mL intermittent infusion (mg) 850 mg New Bag 25 1528                    Nephrotoxins and other renal medications (From now, onward)      Start     Dose/Rate Route Frequency Ordered Stop    25 2300  vancomycin place palacios - receiving intermittent dosing         1 each Intravenous SEE ADMIN INSTRUCTIONS 25 2100  piperacillin-tazobactam (ZOSYN) 3.375 g vial to attach to  mL bag        Note to Pharmacy: For [facility], [facility] and [facility]: For Zosyn-naive patients, use the \"Zosyn initial dose + extended infusion\" order panel.    3.375 g  over 240 Minutes Intravenous EVERY 12 HOURS 25 1730  norepinephrine (LEVOPHED) 4 mg in  mL PERIPHERAL infusion         0.01-0.125 mcg/kg/min × 38.1 kg  1.4-17.9 mL/hr  Intravenous CONTINUOUS 25 1704              Contrast Orders - past 72 hours (72h ago, onward)      None                Plan:  Start vancomycin intermittent dosing.  Vancomycin monitoring method: Trough (Method 2 = manual dose calculation)  Vancomycin therapeutic monitoring goal: 10-15 mg/L  Pharmacy will check vancomycin levels as appropriate in 1-3 Days.    Serum creatinine levels will be ordered daily for the first week of therapy and at least twice weekly for subsequent weeks.      Nicollette McMann, [facility]    "

## 2025-01-03 NOTE — H&P
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 Found to have toxic-metabolic encephalopathy, distributive & hypovolemic shock, systemic inflammation vs sepsis, new right upper lobe cavitary lesion & diffuse patchy ground glass / nodular lesions, mild hypercalcemia & non-oliguric ETHAN      Assessment & Plan      Goals of Care:  Life prolonging with - DNR, DNI    12/2/25 discussion with daughter Rea at bedside, son Nayla with whom Kristen lives, among other of Kristen's children. Family recognize that Kristen has lived a poor quality of life for several months duration. They believe he is at the end of his life & are in agreement regarding a limited care plan. At this time, family assert preference to continue with vasopressors & life prolonging therapy with the aim of improving Kristen's condition in order to return home - possibly home hospice. Should his condition worsen then re-address goals with family & consider transition to comfort focused care    Neurology, Psychiatry, Sedation, Analgesia:  Acute toxic metabolic encephalopathy   Presumed due  to infection & multiple metabolic aberrations including ETHAN on CKD, hyper-Ca, hyper-Na, shock, at least. 1/2/25 CTH no acute process  - check TSH, cortisol, B1    Analgesia  - hydromorphone 0.2 mg q2h prn     Cardiovascular:  Distributive & hypovolemic shock   Warm extremities. 1/2/25 cardiac POCUS grossly preserved biventricular systolic function, IVC small & collapsing, no large effusion   - NE goal MAP>65  - 1 LR sodium bicarb, 100 ml/h LR for 10 hours     Respiratory, Airway:  Breathing comfortably on room air     New right upper lobe cavitary lesion, diffuse patchy ground glass & nodular lesions   Presumed infectious. At risk of pulmonary MTB & oppurtunistic infection in addition to aspiration. Cavitary lesions are not a common features of immune checkpoint inhibitor pneumonitis. Cannot rule out malignancy; the cavitary lesion was absent 10/7/24   - infectious evaluation & antimicrobials as below     Gastrointestinal:  Decompensated cirrhosis due to HBV   - pta tenofovir 300 mg q72 hours      Refractory ascites s/p peritoneal drainage catheter  - hold pta spironolactone & furosemide   - check ascitic fluid studies, as below     Severe protein calorie malnutrition  - depending on trajectory, consider nutrition engagement & discussion regarding FT    Dysphagia  - consulted SLP    Renal, Acid-base, Electrolytes, Volume:  Non-oliguric ETHAN on CKD  At risk of pre-renal injury & ATN, at least   - monitor     Hypovolemic hyper-Na  - IVF resuscitation, consider D5 infusion     Hyper-Ca  Mild severity. Concern for hyper-Ca of malignancy or granulomatous disease, at least   - check PTH, PTHrp; consider 1,40-xmznicnds-J & 25-hydroxy-D  - 1 L sodium bicarb,  ml/h for 10 hours    NAGMA  - 1 L sodium bicarb  - monitor      Infectious Disease:  Systemic inflammation vs sepsis, pneumonia/pneumonitis    Hypothermia & hypotension. Reported cough & systemic symptoms. Denied fevers, night sweats. 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 Tender to palpation. 1/2/25 CTH does not appear image this region   - pta topical metronidazole every day     Hematology, Oncology:  Chronic anemia & thrombocytopenia  Etiology most likely multifactorial, previously attributed to chemotherapy in conjunction with CKD & decompensated cirrhosis with sequelae of portal hypertension   - check INR, fibrinogen   - monitor     Recurrent nasopharyngeal squamous cell carcinoma   Extension into the R nasopharynx, ethmoid sinus, submandibular region, & lymph nodes of the neck & mediastinum. Treated with radiation therapy, multiple cycles of chemotherapy & pembrolizumab.1/2/25 CTH demonstrated \"subtotal opacification of the paranasal sinuses and mastoid air cells [...] multiple air-fluid levels [...] opacification of the right and left middle ear regions may indicate bilateral otomastoiditis\"    Multiple mesenteric venous thrombi, splenic infarcts   The patient is prescribed rivaroxaban though reported not taking 10/2024. Family cannot indicate if he is taking or not    - hold rivaroxaban at this time      Endocrine:  Hyperglycemia of critical illness     - monitor     Hypothyroidism  - pta levothyroxine 175 mcg every day     Checklist:  FEN: NPO  VTE ppx: UF SQH  GI ppx: not indicated   Bowel regimen: prn PEG  Lines/tube-size: RUE PICC, ordered bunch placement "   Skin: no lesions    PT/OT/SLP, early mobility: consulted SLP  Code Status: DNR, DNI      History of the Present Illness      Presented 1/2/24 for diminished level of consciousness & cough for the previous one to two weeks duration in the setting of malaise, generalized weakness, & poor oral intake for several months duration.     Afebrile T low 35.7, hypotension requiring NE 0.1, HR 50s  Na 150, bicarb 19, AG 9, Cr up 2.96, iCa 7.1  Normal WBC, stable anemia & thrombocytopenia     Procal 0.99  CRP 97.2     Negative flu, covid, rsv     - pending - 1/2/25 blood cutlure    1/2/25 CT chest demonstrated new right upper lobe cavitary lesion, diffuse patchy ground glass & nodular lesions, known posterior peripheral spiculated lesion       Past Medical History      Recurrent nasopharyngeal squamous cell carcinoma with extension into the posterior right nasopharynx, ethmoid sinus, submandibular region, & lymph nodes of the neck & mediastinum (treated with radiation therapy, multiple cycles of chemotherapy followed by single agent therapy with pemprolizumab), 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      Medications      Prior to Admission Medications  Facility-Administered Medications Prior to Admission   Medication Dose Route Frequency Provider Last Rate Last Admin    [DISCONTINUED] albumin human 25 % injection 50 g  50 g Intravenous Weekly Yumiko Boyd MD         Medications Prior to  "Admission   Medication Sig Dispense Refill Last Dose/Taking    acetaminophen (TYLENOL) 325 MG tablet Take 650 mg by mouth every 6 hours as needed    Taking As Needed    dexAMETHasone (DECADRON) 4 MG tablet Take 1 tablet (4 mg) by mouth daily (with breakfast) Take for 2 days after each chemotherapy dose. 12 tablet 1 Past Month    furosemide (LASIX) 20 MG tablet Take 20 mg by mouth daily as needed (edema).   More than a month    levothyroxine (SYNTHROID/LEVOTHROID) 175 MCG tablet TAKE 1 TABLET (175 MCG) BY MOUTH DAILY 60 tablet 1 12/27/2024    metroNIDAZOLE (FLAGYL) 250 MG tablet Apply 250 mg topically daily. Grind up the tablet and then apply it daily to Yia's malignant wound to suppress anaerobic growth.   Past Week    prochlorperazine (COMPAZINE) 10 MG tablet Take 1 tablet (10 mg) by mouth every 6 hours as needed for nausea or vomiting 30 tablet 2 Taking As Needed    spironolactone (ALDACTONE) 100 MG tablet Take 1 tablet (100 mg) by mouth daily. 90 tablet 3 12/27/2024    tenofovir (VIREAD) 300 MG tablet Take 1 tablet (300 mg) by mouth every 72 hours. 45 tablet 3 Past Week    Emergency Supply Kit, Central, Patient use for emergency only. Contents: 3 sodium chloride 0.9% flushes, 1 dressing kit, 1 microclave ext set 14\", 4 nitrile gloves (med), 6 alcohol prep pads, 1 bacitracin, 1 syringe (10 cc 20 G 1\"). Call [phone] to reorder. [id number] kit 0     Port Access Kit For nurse use only.  Do not remove items from bag.  Use for port access.  Do not place syringe on sterile field. [id number] kit 0     sodium chloride 0.9 % in 500 mL via gravity infusion Infuse into the vein three times a week as needed (dehydration). Infuse 1 bag(s) (500 mL). Each bag to infuse over 2-4 hours. 205824 mL 0     sodium chloride, PF, 0.9% PF flush Inject 10 mLs into the vein as needed for line flush. Flush IV before and after med administration as directed and/or at least every 24 hours, or prior to deaccessing for no further use and/or at " least every 4 weeks when not accessed. [id number] mL 0          Physical Exam      Neurologic: Off sedation. Opens eyes, tracks, & inconsistently follows commands in all extremities. Does not engage in conversation with bedside interpretor - daughter   HEENT: Head and face normal. No nasal discharge. Oropharynx normal. Eyelids, conjunctiva, & sclera normal.   Neck: Neck appearance normal. No neck masses. Thyroid not enlarged.  Respiratory: Diminished breath sounds. No wheezing, rhonchi, crackles   Cardiovascular: Regular rate & rhythm  Normal S1 & S2. No murmurs   Gastrointestinal: Scaphoid abdomen.   Musculoskeletal: Cachexia   Skin, Hair, & Nails: Skin color normal. No skin lesions.  Hair & nails normal.  Extremities: No peripheral edema. Warm       All pertinent vital signs, ventilator settings, I&Os, laboratory, microbiology, ECGs, & imaging data has been personally reviewed. Total Critical Care time, excluding procedures, was 50 minutes     JAREN Hernandez MD  Critical Care Medicine

## 2025-01-04 ENCOUNTER — DOCUMENTATION ONLY (OUTPATIENT)
Dept: CARE COORDINATION | Facility: CLINIC | Age: 75
End: 2025-01-04
Payer: COMMERCIAL

## 2025-01-04 LAB
1,3 BETA GLUCAN SER-MCNC: 83 PG/ML
ALBUMIN SERPL BCG-MCNC: 2 G/DL (ref 3.5–5.2)
ALP SERPL-CCNC: 78 U/L (ref 40–150)
ALT SERPL W P-5'-P-CCNC: 11 U/L (ref 0–70)
ANION GAP SERPL CALCULATED.3IONS-SCNC: 9 MMOL/L (ref 7–15)
AST SERPL W P-5'-P-CCNC: 16 U/L (ref 0–45)
BILIRUB SERPL-MCNC: 1.5 MG/DL
BUN SERPL-MCNC: 32.6 MG/DL (ref 8–23)
CALCIUM SERPL-MCNC: 9.1 MG/DL (ref 8.8–10.4)
CHLORIDE SERPL-SCNC: 111 MMOL/L (ref 98–107)
CREAT SERPL-MCNC: 2.12 MG/DL (ref 0.67–1.17)
EGFRCR SERPLBLD CKD-EPI 2021: 32 ML/MIN/1.73M2
ERYTHROCYTE [DISTWIDTH] IN BLOOD BY AUTOMATED COUNT: 18.9 % (ref 10–15)
GALACTOMANNAN AG SERPL QL IA: NEGATIVE
GALACTOMANNAN AG SPEC IA-ACNC: 0.04
GLUCOSE BLDC GLUCOMTR-MCNC: 154 MG/DL (ref 70–99)
GLUCOSE SERPL-MCNC: 155 MG/DL (ref 70–99)
HCO3 SERPL-SCNC: 19 MMOL/L (ref 22–29)
HCT VFR BLD AUTO: 28.6 % (ref 40–53)
HGB BLD-MCNC: 9.3 G/DL (ref 13.3–17.7)
MAGNESIUM SERPL-MCNC: 1.7 MG/DL (ref 1.7–2.3)
MCH RBC QN AUTO: 29.8 PG (ref 26.5–33)
MCHC RBC AUTO-ENTMCNC: 32.5 G/DL (ref 31.5–36.5)
MCV RBC AUTO: 92 FL (ref 78–100)
OBSERVATION IMP: POSITIVE
PHOSPHATE SERPL-MCNC: 1.7 MG/DL (ref 2.5–4.5)
PLATELET # BLD AUTO: 90 10E3/UL (ref 150–450)
POTASSIUM SERPL-SCNC: 3.3 MMOL/L (ref 3.4–5.3)
PROT SERPL-MCNC: 4.6 G/DL (ref 6.4–8.3)
RBC # BLD AUTO: 3.12 10E6/UL (ref 4.4–5.9)
SODIUM SERPL-SCNC: 139 MMOL/L (ref 135–145)
VANCOMYCIN SERPL-MCNC: 7.5 UG/ML
WBC # BLD AUTO: 12.3 10E3/UL (ref 4–11)

## 2025-01-04 PROCEDURE — 250N000009 HC RX 250: Performed by: INTERNAL MEDICINE

## 2025-01-04 PROCEDURE — 120N000001 HC R&B MED SURG/OB

## 2025-01-04 PROCEDURE — 82947 ASSAY GLUCOSE BLOOD QUANT: CPT | Performed by: STUDENT IN AN ORGANIZED HEALTH CARE EDUCATION/TRAINING PROGRAM

## 2025-01-04 PROCEDURE — 258N000003 HC RX IP 258 OP 636: Performed by: INTERNAL MEDICINE

## 2025-01-04 PROCEDURE — 258N000003 HC RX IP 258 OP 636: Performed by: STUDENT IN AN ORGANIZED HEALTH CARE EDUCATION/TRAINING PROGRAM

## 2025-01-04 PROCEDURE — 84100 ASSAY OF PHOSPHORUS: CPT | Performed by: STUDENT IN AN ORGANIZED HEALTH CARE EDUCATION/TRAINING PROGRAM

## 2025-01-04 PROCEDURE — 999N000226 HC STATISTIC SLP IP EVAL DEFER

## 2025-01-04 PROCEDURE — 99222 1ST HOSP IP/OBS MODERATE 55: CPT | Mod: AI | Performed by: STUDENT IN AN ORGANIZED HEALTH CARE EDUCATION/TRAINING PROGRAM

## 2025-01-04 PROCEDURE — 80202 ASSAY OF VANCOMYCIN: CPT | Performed by: INTERNAL MEDICINE

## 2025-01-04 PROCEDURE — 250N000011 HC RX IP 250 OP 636: Performed by: INTERNAL MEDICINE

## 2025-01-04 PROCEDURE — 82040 ASSAY OF SERUM ALBUMIN: CPT | Performed by: STUDENT IN AN ORGANIZED HEALTH CARE EDUCATION/TRAINING PROGRAM

## 2025-01-04 PROCEDURE — 83735 ASSAY OF MAGNESIUM: CPT | Performed by: STUDENT IN AN ORGANIZED HEALTH CARE EDUCATION/TRAINING PROGRAM

## 2025-01-04 PROCEDURE — 85027 COMPLETE CBC AUTOMATED: CPT | Performed by: STUDENT IN AN ORGANIZED HEALTH CARE EDUCATION/TRAINING PROGRAM

## 2025-01-04 PROCEDURE — 80051 ELECTROLYTE PANEL: CPT | Performed by: STUDENT IN AN ORGANIZED HEALTH CARE EDUCATION/TRAINING PROGRAM

## 2025-01-04 PROCEDURE — 999N000287 HC ICU ADULT ROUNDING, EACH 10 MINS

## 2025-01-04 PROCEDURE — 99233 SBSQ HOSP IP/OBS HIGH 50: CPT | Performed by: INTERNAL MEDICINE

## 2025-01-04 PROCEDURE — 250N000011 HC RX IP 250 OP 636: Performed by: STUDENT IN AN ORGANIZED HEALTH CARE EDUCATION/TRAINING PROGRAM

## 2025-01-04 PROCEDURE — 99207 PR NO CHARGE LOS: CPT | Performed by: INTERNAL MEDICINE

## 2025-01-04 RX ORDER — MORPHINE SULFATE 20 MG/ML
10 SOLUTION ORAL
Status: DISCONTINUED | OUTPATIENT
Start: 2025-01-04 | End: 2025-01-06

## 2025-01-04 RX ORDER — POTASSIUM CHLORIDE 7.45 MG/ML
10 INJECTION INTRAVENOUS
Status: COMPLETED | OUTPATIENT
Start: 2025-01-04 | End: 2025-01-04

## 2025-01-04 RX ORDER — MAGNESIUM SULFATE HEPTAHYDRATE 40 MG/ML
2 INJECTION, SOLUTION INTRAVENOUS ONCE
Status: COMPLETED | OUTPATIENT
Start: 2025-01-04 | End: 2025-01-04

## 2025-01-04 RX ORDER — ONDANSETRON 2 MG/ML
4 INJECTION INTRAMUSCULAR; INTRAVENOUS EVERY 6 HOURS PRN
Status: DISCONTINUED | OUTPATIENT
Start: 2025-01-04 | End: 2025-01-07 | Stop reason: HOSPADM

## 2025-01-04 RX ORDER — MINERAL OIL/HYDROPHIL PETROLAT
OINTMENT (GRAM) TOPICAL
Status: DISCONTINUED | OUTPATIENT
Start: 2025-01-04 | End: 2025-01-07 | Stop reason: HOSPADM

## 2025-01-04 RX ORDER — NALOXONE HYDROCHLORIDE 0.4 MG/ML
0.2 INJECTION, SOLUTION INTRAMUSCULAR; INTRAVENOUS; SUBCUTANEOUS
Status: DISCONTINUED | OUTPATIENT
Start: 2025-01-04 | End: 2025-01-06

## 2025-01-04 RX ORDER — NALOXONE HYDROCHLORIDE 0.4 MG/ML
0.1 INJECTION, SOLUTION INTRAMUSCULAR; INTRAVENOUS; SUBCUTANEOUS
Status: DISCONTINUED | OUTPATIENT
Start: 2025-01-04 | End: 2025-01-06

## 2025-01-04 RX ORDER — ACETAMINOPHEN 650 MG/1
650 SUPPOSITORY RECTAL EVERY 6 HOURS PRN
Status: DISCONTINUED | OUTPATIENT
Start: 2025-01-04 | End: 2025-01-07 | Stop reason: HOSPADM

## 2025-01-04 RX ORDER — MORPHINE SULFATE 20 MG/ML
5 SOLUTION ORAL
Status: DISCONTINUED | OUTPATIENT
Start: 2025-01-04 | End: 2025-01-06

## 2025-01-04 RX ORDER — BISACODYL 10 MG
10 SUPPOSITORY, RECTAL RECTAL
Status: DISCONTINUED | OUTPATIENT
Start: 2025-01-07 | End: 2025-01-07 | Stop reason: HOSPADM

## 2025-01-04 RX ORDER — GLYCOPYRROLATE 0.2 MG/ML
0.2 INJECTION, SOLUTION INTRAMUSCULAR; INTRAVENOUS EVERY 4 HOURS PRN
Status: DISCONTINUED | OUTPATIENT
Start: 2025-01-04 | End: 2025-01-07 | Stop reason: HOSPADM

## 2025-01-04 RX ORDER — LORAZEPAM 2 MG/ML
1 INJECTION INTRAMUSCULAR
Status: DISCONTINUED | OUTPATIENT
Start: 2025-01-04 | End: 2025-01-07 | Stop reason: HOSPADM

## 2025-01-04 RX ORDER — CARBOXYMETHYLCELLULOSE SODIUM 5 MG/ML
1-2 SOLUTION/ DROPS OPHTHALMIC
Status: DISCONTINUED | OUTPATIENT
Start: 2025-01-04 | End: 2025-01-07 | Stop reason: HOSPADM

## 2025-01-04 RX ORDER — ONDANSETRON 4 MG/1
4 TABLET, ORALLY DISINTEGRATING ORAL EVERY 6 HOURS PRN
Status: DISCONTINUED | OUTPATIENT
Start: 2025-01-04 | End: 2025-01-07 | Stop reason: HOSPADM

## 2025-01-04 RX ORDER — OLANZAPINE 5 MG/1
5 TABLET, ORALLY DISINTEGRATING ORAL EVERY 6 HOURS PRN
Status: DISCONTINUED | OUTPATIENT
Start: 2025-01-04 | End: 2025-01-07 | Stop reason: HOSPADM

## 2025-01-04 RX ORDER — SENNOSIDES 8.6 MG
1 TABLET ORAL 2 TIMES DAILY PRN
Status: DISCONTINUED | OUTPATIENT
Start: 2025-01-04 | End: 2025-01-07 | Stop reason: HOSPADM

## 2025-01-04 RX ORDER — LORAZEPAM 2 MG/ML
1 CONCENTRATE ORAL
Status: DISCONTINUED | OUTPATIENT
Start: 2025-01-04 | End: 2025-01-07 | Stop reason: HOSPADM

## 2025-01-04 RX ADMIN — MAGNESIUM SULFATE HEPTAHYDRATE 2 G: 40 INJECTION, SOLUTION INTRAVENOUS at 06:25

## 2025-01-04 RX ADMIN — SODIUM PHOSPHATE, MONOBASIC, MONOHYDRATE AND SODIUM PHOSPHATE, DIBASIC, ANHYDROUS 9 MMOL: 142; 276 INJECTION, SOLUTION INTRAVENOUS at 09:27

## 2025-01-04 RX ADMIN — GLYCOPYRROLATE 0.2 MG: 0.2 INJECTION, SOLUTION INTRAMUSCULAR; INTRAVENOUS at 12:22

## 2025-01-04 RX ADMIN — HEPARIN SODIUM 5000 UNITS: 5000 INJECTION, SOLUTION INTRAVENOUS; SUBCUTANEOUS at 06:28

## 2025-01-04 RX ADMIN — NOREPINEPHRINE BITARTRATE 0.13 MCG/KG/MIN: 0.02 INJECTION, SOLUTION INTRAVENOUS at 01:54

## 2025-01-04 RX ADMIN — POTASSIUM CHLORIDE 10 MEQ: 7.46 INJECTION, SOLUTION INTRAVENOUS at 10:29

## 2025-01-04 RX ADMIN — HYDROMORPHONE HYDROCHLORIDE 0.2 MG: 0.2 INJECTION, SOLUTION INTRAMUSCULAR; INTRAVENOUS; SUBCUTANEOUS at 23:12

## 2025-01-04 RX ADMIN — PIPERACILLIN AND TAZOBACTAM 3.38 G: 3; .375 INJECTION, POWDER, FOR SOLUTION INTRAVENOUS at 09:31

## 2025-01-04 RX ADMIN — POTASSIUM CHLORIDE 10 MEQ: 7.46 INJECTION, SOLUTION INTRAVENOUS at 09:24

## 2025-01-04 RX ADMIN — DEXTROSE MONOHYDRATE: 50 INJECTION, SOLUTION INTRAVENOUS at 00:21

## 2025-01-04 ASSESSMENT — ACTIVITIES OF DAILY LIVING (ADL)
ADLS_ACUITY_SCORE: 76
ADLS_ACUITY_SCORE: 80
ADLS_ACUITY_SCORE: 80
ADLS_ACUITY_SCORE: 76
ADLS_ACUITY_SCORE: 76
ADLS_ACUITY_SCORE: 80
ADLS_ACUITY_SCORE: 76
ADLS_ACUITY_SCORE: 76
ADLS_ACUITY_SCORE: 74
ADLS_ACUITY_SCORE: 74
ADLS_ACUITY_SCORE: 76

## 2025-01-04 NOTE — PROGRESS NOTES
[facility] 24/7 Contact Number: [phone]   - Providers: Please contact [facility] with changes in orders or clinical plan of care  - Nursing: Please contact [facility] with significant changes in patient condition     Hospice will notify the care team (including the hospitalist) to confirm date of inpatient hospice (GIP) admission.   New Epic encounter will not be created until hospice completes admission.      RECEIVED, THANK YOU FOR THE REFERRAL. WE WILL WORK ON SENDING SOMEONE TO MEET WITH PT/FAMILY

## 2025-01-04 NOTE — PROGRESS NOTES
Called report to [facility] on P4.  Bed not ready yet.  Will call prior to transferring patient.  Have notified family that patient will be moving to room 406.

## 2025-01-04 NOTE — PLAN OF CARE
Goal Outcome Evaluation:      Plan of Care Reviewed With: patient    Overall Patient Progress: no changeOverall Patient Progress: no change    [facility] - ICU    RN Progress Note:            Pertinent Assessments:      Please refer to flowsheet rows for full assessment     Pt has no speech this shift, will open eyes to voice and nods occasionally to questions.  Family members at bedside.           Key Events - This Shift:       Pt was made comfort care at noon.  All IV drips including norepinephrine were discontinued. Have been unable to obtain a blood pressure since then.  Pt is 97% on RA and HR via the pulse ox is in the 70s.  Patient appears comfortable at this time and no SOB or increased work of breathing noted. Have given robinul x1 this shift for secretions.                 Barriers to Discharge / Downgrade:     Palliative and Hospice consulted.  Hospice coordinator unable to see today but will reach out tomorrow.           Point of Contact Update: YES-OR-NO: Yes  Updated all family at bedside- Rea daughter has been my main contact.  Angie Gant RN

## 2025-01-04 NOTE — PROGRESS NOTES
Critical Care Progress Note    [facility]  Date of Admission:  1/2/2025  LOS: 2      A/P: Kristen Chapman is a 74 year old male who was admitted for diminished level of consciousness & cough for the previous one to two weeks duration in the setting of malaise, generalized weakness, & poor oral intake for several months duration. 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 (treated with tenofovir), refractory ascites s/p peritoneal drainage catheter, 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, splenic infarcts, chronic pancytopenia, CKD, hypothyroidism, severe protein calorie malnutrition, dysphagia       01/04/2025 Rounding review   Family members visiting today, decided to transition to comfort measures  Levo 0.11  PICC, port  D5 @100  Lee, UO monitoring with ETHAN  Alert, non verbal  No oral intake  Dilaudid effective for pain control      Neuro:   --Encephalopathic most likely toxic metabolic     Cardiac:   -- Septic shock on norepinephrine, With normal biventricular function bedside ultrasound on admission     Pulmonary:   --On room air  Cavitary lesion in the right upper lobe, Presumed bacterial pneumonia versus TB  Remains in isolation    Renal:   --Acute kidney injury, Hyponatremia    Infectious disease:   --Bacterial pneumonia, urine strep antigen is positive  Does have a cavitary lesion in the right upper lobe  Sputum for AFB pending.   "Trying to induce sputum to collect  ID following    Gastrointestinal:  --Compensated liver cirrhosis due to hepatitis B  On tenofovir  Refractory ascites.  Patient has a peritoneal drain in place  Ascitic fluid negative for infection    Endocrine:   --hypohyroid is on replacement  SSI  Cachexia: Estimated body mass index is 15.11 kg/m  as calculated from the following:    Height as of this encounter: 1.651 m (5' 5\").    Weight as of this encounter: 41.2 kg (90 lb 12.8 oz).    # Severe Malnutrition: based on nutrition assessment       Hematology/Oncology:   Recurrent esophageal cancer  Last chemo 3 weeks ago      Lines:  Diet: Failed swallow evaluation   Lee: yes  Prophylaxis : lovenox/PPI  Code Status: DNR/DNI  Social: updated daughter at bedside. They are ready for transition of care to comfort based approach today at noon. Needs are to high to go home on hospice over the weekend and the family is not interested in that today.     - will order comfort care orders, transfer out of ICU. Expect slow decline over hours-days      This note was completed using voice recognition software. Dictation errors may have occurred.       ---------------------    Alejandrina Becker MD  01/04/2025 11:41 AM  Pulmonary & Critical Care    ---------------------------------------------------------------------------------      Subjective:  Non verbal, unable to obtain report from him. Nods to all questions      ROS  Reviewed pertinent systems, summarized above    Temp: 97.5  F (36.4  C) Temp src: Oral Temp  Min: 97.2  F (36.2  C)  Max: 100.6  F (38.1  C) BP: 102/68 Pulse: 56   Resp: 26 SpO2: 99 % O2 Device: None (Room air) Oxygen Delivery: 2 LPM  Vitals:    01/02/25 1415 01/03/25 0400 01/04/25 0415   Weight: 38.1 kg (84 lb) 41.2 kg (90 lb 12.8 oz) 43.3 kg (95 lb 8 oz)     I/O last 3 completed shifts:  In: 4040.01 [I.V.:3690.01]  Out: 484 [Urine:484]      Physical Exam  GEN: no acute distress , cachcetic  HEENT: head ncat, sclera " anicteric, OP patent, trachea midline   PULM: clear anteriorly    CV/COR: RRR S1S2 no gallop,  No rub, no murmur  ABD: soft nontender, hypoactive bowel sounds, no mass  EXT: No significant edema  NEURO: awake, nods to all questions, grossly weak, does not follow/understand commands  SKIN: no obvious rash  LINES: clean, dry intact        Current Facility-Administered Medications   Medication Dose Route Frequency Provider Last Rate Last Admin    sodium chloride inhalant 10 % neb solution 5 mL  5 mL Nebulization QAM Jian Hernandez MD   5 mL at 01/03/25 0750    And    albuterol (PROVENTIL) neb solution 2.5 mg  2.5 mg Nebulization Daily Jian Hernandez MD   2.5 mg at 01/03/25 0749    azithromycin (ZITHROMAX) 500 mg in sodium chloride 0.9 % 250 mL intermittent infusion  500 mg Intravenous Q24H Jian Hernandez MD   500 mg at 01/03/25 2016    heparin ANTICOAGULANT injection 5,000 Units  5,000 Units Subcutaneous Q8H Jian Hernandez MD   5,000 Units at 01/04/25 0628    levothyroxine (SYNTHROID/LEVOTHROID) tablet 175 mcg  175 mcg Oral QAM Jian Hernandez MD        metroNIDAZOLE (FLAGYL) tablet 250 mg  250 mg Topical Daily Jian Hernandez MD   250 mg at 01/03/25 1218    piperacillin-tazobactam (ZOSYN) 3.375 g vial to attach to  mL bag  3.375 g Intravenous Q12H Jian Hernandez MD   3.375 g at 01/04/25 0931    sodium phosphate 9 mmol in sodium chloride 0.9 % 250 mL intermittent infusion  9 mmol Intravenous Once Abdirahman Ricci MD   9 mmol at 01/04/25 0927    tenofovir (VIREAD) tablet 300 mg  300 mg Oral Q72H Jian Hernandez MD        vancomycin place palacios - receiving intermittent dosing  1 each Intravenous See Admin Instructions Jian Hernandez MD         Current Facility-Administered Medications   Medication Dose Route Frequency Provider Last Rate Last Admin    dextrose 5% infusion   Intravenous Continuous Jian Hernandez  mL/hr at 01/04/25 0600 Rate Verify at 01/04/25 0600    norepinephrine (LEVOPHED) 4 mg  in  mL infusion PREMIX  0.01-0.6 mcg/kg/min Intravenous Continuous Uvaldo Catherine MD 15.5 mL/hr at 01/04/25 0925 0.1 mcg/kg/min at 01/04/25 0925    vasopressin (VASOSTRICT) 20 Units in sodium chloride 0.9 % 100 mL standard conc infusion  2.4 Units/hr Intravenous Continuous Uvaldo Catherine MD         Current Facility-Administered Medications   Medication Dose Route Frequency Provider Last Rate Last Admin    glucose gel 15-30 g  15-30 g Oral Q15 Min PRN Jian Hernandez MD        Or    dextrose 50 % injection 25-50 mL  25-50 mL Intravenous Q15 Min PRN Jian Hernandez MD        Or    glucagon injection 1 mg  1 mg Subcutaneous Q15 Min PRN Jian Hernandez MD        HYDROmorphone (DILAUDID) injection 0.2 mg  0.2 mg Intravenous Q4H PRN Jian Hernandez MD   0.2 mg at 01/02/25 2345    HYDROmorphone (STANDARD CONC) (DILAUDID) oral solution 1 mg  1 mg Oral Q2H PRN Tanya Jimenez MD   1 mg at 01/03/25 1645    lidocaine (LMX4) cream   Topical Q1H PRN Roge Skinner MD        lidocaine 1 % 0.1-5 mL  0.1-5 mL Other Q1H PRN Roge Skinner MD   2 mL at 01/02/25 1640    naloxone (NARCAN) injection 0.2 mg  0.2 mg Intravenous Q2 Min PRN Jian Hernandez MD        Or    naloxone (NARCAN) injection 0.4 mg  0.4 mg Intravenous Q2 Min PRN Jian Hernandez MD        Or    naloxone (NARCAN) injection 0.2 mg  0.2 mg Intramuscular Q2 Min PRN Jian Hernandez MD        Or    naloxone (NARCAN) injection 0.4 mg  0.4 mg Intramuscular Q2 Min PRN Jian Hernandez MD        polyethylene glycol (MIRALAX) Packet 17 g  17 g Oral Daily PRN Jian Hernandez MD        sodium chloride (PF) 0.9% PF flush 10-40 mL  10-40 mL Intracatheter Once PRN Roge Skinner MD              Data   Recent Labs   Lab 01/04/25  0419 01/04/25  0414 01/03/25  2315 01/03/25  2013 01/03/25  1944 01/03/25  1654 01/03/25  1212 01/03/25  0708 01/03/25  0433 01/02/25  2246 01/02/25  2216 01/02/25  1236 01/02/25  1236   WBC  --  12.3*  --   --   --    --   --   --  7.4  --   --   --  7.8   HGB  --  9.3* 9.3*  --  9.0*  --   --    < > 6.1*  --   --   --  8.2*   MCV  --  92  --   --   --   --   --   --  96  --   --   --  96   PLT  --  90*  --   --   --   --   --   --  75*  --   --   --  102*   INR  --   --   --   --   --   --   --   --   --   --  1.63*  --   --    NA  --  139  --   --   --   --   --   --  148* 153*  --   --  150*   POTASSIUM  --  3.3*  --   --   --   --  3.7  --  3.4 3.9  --   --  4.0   CHLORIDE  --  111*  --   --   --   --   --   --  118* 126*  --   --  122*   CO2  --  19*  --   --   --   --   --   --  22 17*  --   --  19*   BUN  --  32.6*  --   --   --   --   --   --  44.1* 46.5*  --   --  56.2*   CR  --  2.12*  --   --   --   --   --   --  2.34* 2.49*  --   --  2.96*   ANIONGAP  --  9  --   --   --   --   --   --  8 10  --   --  9   GUANAKITO  --  9.1  --   --   --   --   --   --  9.6 10.1  --   --  12.6*   * 155*  --  155*  --    < >  --    < > 116*  115* 90  88  --    < > 118*   ALBUMIN  --  2.0*  --   --   --   --   --   --  2.0* 2.2*  --   --  2.7*   PROTTOTAL  --  4.6*  --   --   --   --   --   --  4.4* 4.7*  --   --  6.0*   BILITOTAL  --  1.5*  --   --   --   --   --   --  1.1 1.2  --   --  1.3*   ALKPHOS  --  78  --   --   --   --   --   --  75 74  --   --  97   ALT  --  11  --   --   --   --   --   --  12 11  --   --  15   AST  --  16  --   --   --   --   --   --  17 16  --   --  11    < > = values in this interval not displayed.     No results found for this or any previous visit (from the past 24 hours).           Alejandrina Becker MD  01/04/2025 11:41 AM  Pulmonary & Critical Care

## 2025-01-04 NOTE — PROGRESS NOTES
[facility] Infectious Disease  Pt now comfort care    ID signing off  Thank you    Lamonte Preston M.D.

## 2025-01-04 NOTE — CONSULTS
[facility]  Consult Note - Hospitalist Service  Date of Admission:  1/2/2025  Consult Requested by: Alejandrina Becker MD  Reason for Consult: To assume care    Assessment & Plan   Kristen Chapman is a 74 year old male admitted on 1/2/2025. 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 He presented with altered mental status, generalized weakness & poor oral intake for several months. He was found to have cavitary mass of right lung. Admitted for septic shock. He was started on broad spectrum antibiotics and pressors and admitted to ICU. GOC discussions has been ongoing and palliative care team was involved. On 01/04/25 patient is transitioned to comfort care and Hospitalist service was consulted to assume care    Problem list  Septic shock  Bacterial pneumonia   R/o TB  Cavitary lesion, right lung  ETHAN  Hyponatremia  Liver cirrhosis  Refractory ascites  Recurrent nasopharyngeal squamous cell carcinoma     Plan:  -- Symptomatic management  -- Comfort order set in place  -- Consult GIP- discussed with family.         Clinically Significant Risk Factors        # Hypokalemia: Lowest K = 3.3 mmol/L in last 2 days, will replace as needed  # Hypernatremia: Highest Na = 153 mmol/L in last 2 days, will monitor as appropriate  # Hyperchloremia: Highest Cl = 126 mmol/L in last 2 days, will monitor as appropriate       # Hypercalcemia: Highest iCa = 6.5 mg/dL in last 2 days, will monitor as appropriate    # Hypoalbuminemia: Lowest albumin = 2 g/dL at 1/4/2025  4:14 AM, will monitor as appropriate  # Coagulation Defect: INR = 1.63 (Ref range: 0.85 - 1.15) and/or PTT = 40 Seconds (Ref range: 22 - 38 Seconds), will monitor for bleeding  # Thrombocytopenia: Lowest platelets = 75 in last 2 days, will monitor for bleeding              # Cachexia: Estimated body mass index is  "15.89 kg/m  as calculated from the following:    Height as of this encounter: 1.651 m (5' 5\").    Weight as of this encounter: 43.3 kg (95 lb 8 oz)., PRESENT ON ADMISSION  # Severe Malnutrition: based on nutrition assessment, PRESENT ON ADMISSION          Natalie Crawford MD  Hospitalist Service  Securely message with [employer] (more info)  Text page via [facility] Paging/Directory   ______________________________________________________________________    Chief Complaint       History is obtained from the Intensivist, chart review, granddaughter.    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 He presented with altered mental status, generalized weakness & poor oral intake for several months. He was found to have cavitary mass of right lung. Admitted for septic shock. He was started on broad spectrum antibiotics and pressors and admitted to ICU. GOC discussions has been ongoing and palliative care team was involved. On 01/04/25 patient is transitioned to comfort care and Hospitalist service was consulted to assume care      Past Medical History    Past Medical History:   Diagnosis Date    Anemia     Dysphagia     Hepatitis B chronic     Hypothyroidism     Nasopharyngeal cancer (H)     Severe protein-calorie malnutrition (H)     Thrombocytopenia (H)        Past Surgical History   Past Surgical History:   Procedure Laterality Date    ENDOSCOPIC ENDONASAL SURGERY Bilateral 9/7/2021    Procedure: Nasal Endoscopy with Biopsy;  Surgeon: Ky Centeno MD;  Location: [facility] OR    IR CHEST PORT PLACEMENT > 5 YRS OF AGE  3/2/2020    IR CHEST PORT PLACEMENT > 5 YRS OF AGE  3/2/2020    IR GASTROSTOMY TUBE INSERTION  4/27/2020    IR GASTROSTOMY TUBE PERCUTANEOUS PLCMNT  4/27/2020    IR INTRAPERITONEAL CATH TUNNEL ASCITES  " 2/22/2024    IR INTRAPERITONEAL CATH TUNNEL ASCITES  2/26/2024    IR PORT PLACEMENT >5 YEARS  3/2/2020    IR PORT PLACEMENT >5 YEARS  3/2/2020    IR T-FASTENER REMOVAL  6/5/2020    IR T-FASTENER REMOVAL  6/5/2020    PICC TRIPLE LUMEN PLACEMENT  1/2/2025       Medications   I have reviewed this patient's current medications       Review of Systems    Review of systems not obtained due to patient factors - mental status     Physical Exam   Vital Signs: Temp: 97.5  F (36.4  C) Temp src: Oral BP: 93/61 Pulse: 79   Resp: (!) 31 SpO2: 97 % O2 Device: None (Room air) Oxygen Delivery: 2 LPM  Weight: 95 lbs 8 oz      Medical Decision Making       58 MINUTES SPENT BY ME on the date of service doing chart review, history, exam, documentation & further activities per the note.      Data

## 2025-01-04 NOTE — PROGRESS NOTES
Speech Language Therapy Discharge Summary    Reason for therapy discharge:    Patient/family request discontinuation of services.    Progress towards therapy goal(s). See goals on Care Plan in [facility] electronic health record for goal details.  Pt will transition to comfort focused care.  Therapy recommendation(s):    No further therapy is recommended.Spoke to RN, VFSS is not warranted at this time and order will be completed/defer. Advanced diet per comfort protocol.

## 2025-01-04 NOTE — PROGRESS NOTES
[facility] - ICU    RN Progress Note:            Pertinent Assessments:      Please refer to flowsheet rows for full assessment     Pt is confused and does not answer most questions to participate in much of assessment. Afebrile. Denies pain. VSS on levophed gtt. LS diminished.            Key Events - This Shift:       Pt slept comfortably between cares and assessments.     Vaso gtt ordered but not started this shift.     Family requested to speak with MD about going comfort in the morning.                 Barriers to Discharge / Downgrade:     Vasopressor         Point of Contact Update: YES-OR-NO: Yes, family at bedside

## 2025-01-05 LAB
ACID FAST STAIN (ARUP): NORMAL
ACID FAST STAIN (ARUP): NORMAL

## 2025-01-05 PROCEDURE — 250N000013 HC RX MED GY IP 250 OP 250 PS 637: Performed by: INTERNAL MEDICINE

## 2025-01-05 PROCEDURE — 99232 SBSQ HOSP IP/OBS MODERATE 35: CPT | Performed by: INTERNAL MEDICINE

## 2025-01-05 PROCEDURE — 120N000001 HC R&B MED SURG/OB

## 2025-01-05 PROCEDURE — 250N000011 HC RX IP 250 OP 636: Performed by: STUDENT IN AN ORGANIZED HEALTH CARE EDUCATION/TRAINING PROGRAM

## 2025-01-05 RX ADMIN — HYDROMORPHONE HYDROCHLORIDE 0.2 MG: 0.2 INJECTION, SOLUTION INTRAMUSCULAR; INTRAVENOUS; SUBCUTANEOUS at 22:07

## 2025-01-05 RX ADMIN — MORPHINE SULFATE 5 MG: 20 SOLUTION ORAL at 01:40

## 2025-01-05 ASSESSMENT — ACTIVITIES OF DAILY LIVING (ADL)
ADLS_ACUITY_SCORE: 72
ADLS_ACUITY_SCORE: 74
ADLS_ACUITY_SCORE: 72
ADLS_ACUITY_SCORE: 72
ADLS_ACUITY_SCORE: 74
ADLS_ACUITY_SCORE: 72
ADLS_ACUITY_SCORE: 72
ADLS_ACUITY_SCORE: 74
ADLS_ACUITY_SCORE: 72
ADLS_ACUITY_SCORE: 74
ADLS_ACUITY_SCORE: 72
ADLS_ACUITY_SCORE: 74
ADLS_ACUITY_SCORE: 72
ADLS_ACUITY_SCORE: 74

## 2025-01-05 NOTE — PLAN OF CARE
"  Problem: Adult Inpatient Plan of Care  Goal: Plan of Care Review  Description: The Plan of Care Review/Shift note should be completed every shift.  The Outcome Evaluation is a brief statement about your assessment that the patient is improving, declining, or no change.  This information will be displayed automatically on your shift  note.  Outcome: Progressing  Goal: Patient-Specific Goal (Individualized)  Description: You can add care plan individualizations to a care plan. Examples of Individualization might be:  \"Parent requests to be called daily at 9am for status\", \"I have a hard time hearing out of my right ear\", or \"Do not touch me to wake me up as it startles  me\".  Outcome: Progressing  Goal: Absence of Hospital-Acquired Illness or Injury  Outcome: Progressing  Intervention: Identify and Manage Fall Risk  Recent Flowsheet Documentation  Taken 1/5/2025 0020 by Candis Alfaro RN  Safety Promotion/Fall Prevention:   patient and family education   increased rounding and observation   lighting adjusted  Intervention: Prevent Skin Injury  Recent Flowsheet Documentation  Taken 1/5/2025 0140 by Candis Alfaro RN  Body Position:   turned   right  Taken 1/5/2025 0020 by Candis Alfaro RN  Body Position: left  Intervention: Prevent Infection  Recent Flowsheet Documentation  Taken 1/5/2025 0020 by Candis Alfaro RN  Infection Prevention:   personal protective equipment utilized   rest/sleep promoted   cohorting utilized  Goal: Optimal Comfort and Wellbeing  Outcome: Progressing  Intervention: Monitor Pain and Promote Comfort  Recent Flowsheet Documentation  Taken 1/5/2025 0140 by Candis Alfaro RN  Pain Management Interventions: medication (see MAR)  Intervention: Provide Person-Centered Care  Recent Flowsheet Documentation  Taken 1/5/2025 0020 by Candis Alfaro RN  Trust Relationship/Rapport: (talked to daughters)   emotional support provided   questions answered   questions encouraged  Goal: " Readiness for Transition of Care  Outcome: Progressing   Goal Outcome Evaluation:         Pt on comfort cares. 3 daughters at bedside, Supportive. Pt given prn morphine x1. Will monitor.

## 2025-01-05 NOTE — PROGRESS NOTES
[facility]    Consult Note - [facility]  _________________________________________________________________    [facility] 24/7 Contact Number: [phone]    - Providers: Please contact [facility] with changes in orders or clinical plan of care   - Nursing: Please contact [facility] with significant changes in patient condition    Hospice will notify the care team (including the hospitalist) to confirm date of inpatient hospice (GIP) admission.    New Epic encounter will not be created until hospice completes admission.   ______________________________________________________________________        Summary of Informational Hospice Visit:    This writer Briefly met with family today and discussed [facility] care.  At the time of the visit, patient was resting comfortably in bed.  According to family, patient has been doing good today and is not wanting to take any medications for comfort.  Patient is ready to go peacefully.     Patient's family would like to pursue Hospice care either in a facility or at home if they are able to take patient home.      At this current moment, patient appears comfortable and managed and is in need of routine hospice verses GIP level of care.  Family is agreeable with plan and this writer will connect patient's Daughter, Rea, with our , Karen, this afternoon to further discuss hospice options.      Please feel free to reach out to [facility]'s 24 hour Call line at [phone] if patient condition changes. Our GIP team is happy to return if further needs arise.         Thank you for taking such great care of patient and family!          Jenn Drake, MSN, RN

## 2025-01-05 NOTE — PROGRESS NOTES
Care Management Follow Up    Length of Stay (days): 3    Expected Discharge Date: 01/07/2025    Anticipated Discharge Plan:   comfort cares     Transportation: TBD    PT Recommendations:    OT Recommendations:        Barriers to Discharge:  comfort cares     Prior Living Situation: house with child(gary), adult    Discussed  Partnership in Safe Discharge Planning  document with patient/family: No     Handoff Completed: No, handoff not indicated or clinically appropriate    Patient/Spokesperson Updated: No    Additional Information:  Chart reviewed. Patient on comfort care and actively dying per hospitalist note. GIP consult pending. CM will follow     12:04 PM  Updated by Jenn with [facility] hospice that patient doesn't qualify. She has reached out to [facility]  Karen and requested she connect with family about possibly home with hospice.     Next Steps: continue to follow     Karen Glover RN

## 2025-01-05 NOTE — PLAN OF CARE
Problem: Adult Inpatient Plan of Care  Goal: Absence of Hospital-Acquired Illness or Injury  Intervention: Identify and Manage Fall Risk  Recent Flowsheet Documentation  Taken 1/4/2025 1900 by Leah Reed RN  Safety Promotion/Fall Prevention:   activity supervised   patient and family education  Intervention: Prevent and Manage VTE (Venous Thromboembolism) Risk  Recent Flowsheet Documentation  Taken 1/4/2025 1900 by Leah Reed RN  VTE Prevention/Management: SCDs off (sequential compression devices)  Intervention: Prevent Infection  Recent Flowsheet Documentation  Taken 1/4/2025 1900 by Leah Reed RN  Infection Prevention:   hand hygiene promoted   personal protective equipment utilized   rest/sleep promoted  Goal: Optimal Comfort and Wellbeing  Intervention: Provide Person-Centered Care  Recent Flowsheet Documentation  Taken 1/4/2025 1900 by Leah Reed RN  Trust Relationship/Rapport:   care explained   choices provided   questions answered   reassurance provided   thoughts/feelings acknowledged     Problem: Skin Injury Risk Increased  Goal: Skin Health and Integrity  Intervention: Plan: Nurse Driven Intervention: Positioning  Recent Flowsheet Documentation  Taken 1/4/2025 1900 by Leah Reed RN  Plan: Positioning Interventions:   REPOSITION Left/Right (No supine) q2h   HOB 30 degrees or less  Intervention: Plan: Nurse Driven Intervention: Friction and Shear  Recent Flowsheet Documentation  Taken 1/4/2025 1900 by Leah Reed RN  Friction/Shear Interventions: HOB 30 degrees or less     Problem: Risk for Delirium  Goal: Optimal Coping  Intervention: Optimize Psychosocial Adjustment to Delirium  Recent Flowsheet Documentation  Taken 1/4/2025 1900 by Leah Reed RN  Supportive Measures: positive reinforcement provided  Family/Support System Care:   caregiver stress acknowledged   involvement promoted   presence promoted   support provided  Goal: Improved Behavioral Control  Intervention: Minimize Safety  Risk  Recent Flowsheet Documentation  Taken 1/4/2025 1900 by Leah Reed, RN  Enhanced Safety Measures:   family to remain at bedside   room near unit station  Trust Relationship/Rapport:   care explained   choices provided   questions answered   reassurance provided   thoughts/feelings acknowledged  Goal: Improved Attention and Thought Clarity  Intervention: Maximize Cognitive Function  Recent Flowsheet Documentation  Taken 1/4/2025 1900 by Leah Reed RN  Reorientation Measures: reorientation provided  Goal: Improved Sleep  Intervention: Promote Sleep  Recent Flowsheet Documentation  Taken 1/4/2025 1900 by Leah Reed RN  Sleep/Rest Enhancement:   awakenings minimized   family presence promoted   noise level reduced   room darkened   Goal Outcome Evaluation:  Pt has a loose cough that is non-productive.  His family is present as he reportedly feels he is dying. Pt has traveling clothing on and is refusing to have an incontinent brief put on.  Continue comfort care as pt and family allow.

## 2025-01-05 NOTE — PROGRESS NOTES
[facility]    Medicine Progress Note - Hospitalist Service    Date of Admission:  1/2/2025    Assessment & Plan   Kristen Chapman is a 74 year old male admitted on 1/2/2025. 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 He presented with altered mental status, generalized weakness & poor oral intake for several months. He was found to have cavitary mass of right lung. Admitted for septic shock. He was started on broad spectrum antibiotics and pressors and admitted to ICU. GOC discussions has been ongoing and palliative care team was involved. On 01/04/25 patient was transitioned to comfort care and Hospitalist service was consulted to assume care    Problem list  Septic shock  Bacterial pneumonia   R/o TB, on airborne isolation  Cavitary lesion, right lung  ETHAN  Liver cirrhosis  Refractory ascites  Recurrent nasopharyngeal squamous cell carcinoma     Plan:  -- Symptomatic management  -- Comfort order set in place  -- Consult GIP- pending  -stopped any orders that are not for comfort  -Advanced diet per comfort protocol.    -as per family, pt hasn't ate for more than 1 week  -1/5: pt is in pain as per family, but pt refused medications.            Diet: Advance Diet as Tolerated: Clear Liquid Diet; Mechanical/Dental Soft Diet    DVT Prophylaxis: none  Lee Catheter: PRESENT, indication: End of life  Lines: PRESENT      PICC 01/02/25 Triple Lumen Right Brachial vein medial Multiple Infusions-Site Assessment: WDL  Port a Cath 01/02/25 Single Lumen Left Chest wall-Site Assessment: WDL      Cardiac Monitoring: None  Code Status: No CPR- Do NOT Intubate      Clinically Significant Risk Factors        # Hypokalemia: Lowest K = 3.3 mmol/L in last 2 days, will replace as needed   # Hyperchloremia: Highest Cl = 111 mmol/L in last 2 days, will monitor as  "appropriate       # Hypercalcemia: corrected calcium is >10.1, will monitor as appropriate    # Hypoalbuminemia: Lowest albumin = 2 g/dL at 1/4/2025  4:14 AM, will monitor as appropriate   # Thrombocytopenia: Lowest platelets = 90 in last 2 days, will monitor for bleeding              # Cachexia: Estimated body mass index is 15.89 kg/m  as calculated from the following:    Height as of this encounter: 1.651 m (5' 5\").    Weight as of this encounter: 43.3 kg (95 lb 8 oz)., PRESENT ON ADMISSION  # Severe Malnutrition: based on nutrition assessment, PRESENT ON ADMISSION          Social Drivers of Health    Food Insecurity: Unknown (1/2/2025)    Food Insecurity     Within the past 12 months, did you worry that your food would run out before you got money to buy more?: Patient unable to answer     Within the past 12 months, did the food you bought just not last and you didn t have money to get more?: Patient unable to answer   Housing Stability: Unknown (1/2/2025)    Housing Stability     Do you have housing? : Patient unable to answer     Are you worried about losing your housing?: Patient unable to answer   Financial Resource Strain: Unknown (1/2/2025)    Financial Resource Strain     Within the past 12 months, have you or your family members you live with been unable to get utilities (heat, electricity) when it was really needed?: Patient unable to answer   Transportation Needs: Unknown (1/2/2025)    Transportation Needs     Within the past 12 months, has lack of transportation kept you from medical appointments, getting your medicines, non-medical meetings or appointments, work, or from getting things that you need?: Patient unable to answer          Disposition Plan     On comfort care, actively dying    Expected 2-3 days before death         Holly Gould MD  Hospitalist Service  [facility]  Securely message with [employer] (more info)  Text page via [facility] Paging/Directory "   ______________________________________________________________________    Interval History   No acute event. Family are in room. As per family, pt is more awake today and expressed in pain, but refused medications. Not eating.     Physical Exam   Vital Signs: Temp: 97.5  F (36.4  C) Temp src: Oral BP: 93/61 Pulse: 78   Resp: (!) 31 SpO2: 97 % O2 Device: None (Room air)    Weight: 95 lbs 8 oz    Not in distress    Medical Decision Making       36 MINUTES SPENT BY ME on the date of service doing chart review, history, exam, documentation & further activities per the note.      Data         Imaging results reviewed over the past 24 hrs:   No results found for this or any previous visit (from the past 24 hours).

## 2025-01-05 NOTE — PLAN OF CARE
Problem: Adult Inpatient Plan of Care  Goal: Optimal Comfort and Wellbeing  Outcome: Progressing  Intervention: Provide Person-Centered Care  Recent Flowsheet Documentation  Taken 1/5/2025 0910 by Mariela Steele RN  Trust Relationship/Rapport:   care explained   emotional support provided   empathic listening provided   thoughts/feelings acknowledged     Problem: Risk for Delirium  Goal: Optimal Coping  Outcome: Progressing  Intervention: Optimize Psychosocial Adjustment to Delirium  Recent Flowsheet Documentation  Taken 1/5/2025 0910 by Mariela Steele RN  Supportive Measures: positive reinforcement provided  Family/Support System Care:   involvement promoted   presence promoted   self-care encouraged   support provided     Problem: Risk for Delirium  Goal: Improved Sleep  Intervention: Promote Sleep  Recent Flowsheet Documentation  Taken 1/5/2025 0910 by Mariela Steele RN  Sleep/Rest Enhancement:   family presence promoted   regular sleep/rest pattern promoted     Goal Outcome Evaluation:       Pt is alert to self and confused. Family at bedside. Pt is on comfort cares. Family and pt refused cares. Pt has on travel clothes.     Mariela Steele RN

## 2025-01-06 ENCOUNTER — TELEPHONE (OUTPATIENT)
Dept: PHARMACY | Facility: HOSPITAL | Age: 75
End: 2025-01-06
Payer: COMMERCIAL

## 2025-01-06 LAB
ATRIAL RATE - MUSE: 78 BPM
BACTERIA SPT CULT: ABNORMAL
DIASTOLIC BLOOD PRESSURE - MUSE: NORMAL MMHG
GRAM STAIN RESULT: ABNORMAL
INTERPRETATION ECG - MUSE: NORMAL
P AXIS - MUSE: 73 DEGREES
PR INTERVAL - MUSE: 160 MS
QRS DURATION - MUSE: 82 MS
QT - MUSE: 356 MS
QTC - MUSE: 405 MS
R AXIS - MUSE: 60 DEGREES
SYSTOLIC BLOOD PRESSURE - MUSE: NORMAL MMHG
T AXIS - MUSE: 18 DEGREES
VENTRICULAR RATE- MUSE: 78 BPM

## 2025-01-06 PROCEDURE — 250N000013 HC RX MED GY IP 250 OP 250 PS 637: Performed by: FAMILY MEDICINE

## 2025-01-06 PROCEDURE — 120N000001 HC R&B MED SURG/OB

## 2025-01-06 PROCEDURE — 99231 SBSQ HOSP IP/OBS SF/LOW 25: CPT | Performed by: INTERNAL MEDICINE

## 2025-01-06 PROCEDURE — 250N000013 HC RX MED GY IP 250 OP 250 PS 637: Performed by: INTERNAL MEDICINE

## 2025-01-06 PROCEDURE — 99233 SBSQ HOSP IP/OBS HIGH 50: CPT | Performed by: FAMILY MEDICINE

## 2025-01-06 RX ORDER — LORAZEPAM 2 MG/ML
1 CONCENTRATE ORAL EVERY 8 HOURS PRN
Qty: 30 ML | Refills: 0 | Status: SHIPPED | OUTPATIENT
Start: 2025-01-06

## 2025-01-06 RX ORDER — HYDROMORPHONE HYDROCHLORIDE 1 MG/ML
1 SOLUTION ORAL EVERY 6 HOURS
Status: DISCONTINUED | OUTPATIENT
Start: 2025-01-06 | End: 2025-01-07 | Stop reason: HOSPADM

## 2025-01-06 RX ORDER — ACETAMINOPHEN 650 MG/1
650 SUPPOSITORY RECTAL EVERY 6 HOURS PRN
COMMUNITY
Start: 2025-01-06

## 2025-01-06 RX ORDER — MORPHINE SULFATE 100 MG/5ML
2.5 SOLUTION ORAL
Qty: 15 ML | Refills: 0 | Status: SHIPPED | OUTPATIENT
Start: 2025-01-06

## 2025-01-06 RX ADMIN — HYDROMORPHONE HYDROCHLORIDE 1 MG: 1 SOLUTION ORAL at 20:21

## 2025-01-06 RX ADMIN — HYDROMORPHONE HYDROCHLORIDE 1 MG: 1 SOLUTION ORAL at 10:53

## 2025-01-06 RX ADMIN — HYDROMORPHONE HYDROCHLORIDE 1 MG: 1 SOLUTION ORAL at 13:33

## 2025-01-06 RX ADMIN — MORPHINE SULFATE 5 MG: 20 SOLUTION ORAL at 02:53

## 2025-01-06 RX ADMIN — HYDROMORPHONE HYDROCHLORIDE 1 MG: 1 SOLUTION ORAL at 09:36

## 2025-01-06 RX ADMIN — MORPHINE SULFATE 5 MG: 20 SOLUTION ORAL at 06:21

## 2025-01-06 ASSESSMENT — ACTIVITIES OF DAILY LIVING (ADL)
ADLS_ACUITY_SCORE: 74
ADLS_ACUITY_SCORE: 72
ADLS_ACUITY_SCORE: 74
ADLS_ACUITY_SCORE: 74
ADLS_ACUITY_SCORE: 72
ADLS_ACUITY_SCORE: 74
ADLS_ACUITY_SCORE: 74
ADLS_ACUITY_SCORE: 72
ADLS_ACUITY_SCORE: 72
ADLS_ACUITY_SCORE: 74
ADLS_ACUITY_SCORE: 72
ADLS_ACUITY_SCORE: 72
ADLS_ACUITY_SCORE: 76
ADLS_ACUITY_SCORE: 72
ADLS_ACUITY_SCORE: 74
ADLS_ACUITY_SCORE: 72
ADLS_ACUITY_SCORE: 74
ADLS_ACUITY_SCORE: 74
ADLS_ACUITY_SCORE: 72

## 2025-01-06 NOTE — TELEPHONE ENCOUNTER
PA Initiation    Medication: LORAZEPAM 2 MG/ML PO [facility]  Insurance Company: [employer] - Phone [phone] Fax [phone]  Pharmacy Filling the Rx: [place], [place] - [address] [place] [address]  Filling Pharmacy Phone: [phone]  Filling Pharmacy Fax: [phone]  Start Date: 1/6/2025

## 2025-01-06 NOTE — PROGRESS NOTES
SPIRITUAL HEALTH SERVICES NOTE  [facility]; P4    Reason for Visit: Spiritual Care Consult    Patient/Family Understanding of Illness and Goals of Care - Met with Kristen's family members while he was sleeping. They note that he was active overnight and that he has had more pain in his head this shift. They are hoping to take him home so that he can die at home. They feel that staff have provided good care and deny any concerns or questions at this time.     Distress and Loss - Family is mentally and emotionally preparing for Kristen's death.     Strengths, Coping, and Resources - Family is supporting Kristen and one another at this time.     Meaning, Beliefs, and Spirituality - Kristen holds traditional Hmong/Shamanistic beliefs. His daughter shares that family members have been supporting him spiritually. Kristen has his travelling clothes on and wants to leave them on. No other needs noted at this time. I encouraged them to let nursing staff know if they desire further support from Spiritual Care.     Plan of Care: No further plans for follow-up at this time, but will remain available for further support as patient/family needs/desires.    Leah Valdivia M.Div.    Office: [phone] (for non-urgent requests)  Please Vocera or page through [facility] for time-sensitive requests

## 2025-01-06 NOTE — PLAN OF CARE
Goal Outcome Evaluation:      Plan of Care Reviewed With: family    Overall Patient Progress: no changeOverall Patient Progress: no change    Outcome Evaluation: Pt on Comfort Cares. Family involved in all cares. Family wanting to turn and reposition pt themselves. Pt in traveling clothes. Medicated for pain with sublingual dilaudid.

## 2025-01-06 NOTE — PROGRESS NOTES
Message left for Karen Bee with [facility] to see if she plans to meet with patient and family today. Awaiting call back     1:48 PM  Notified by Karen with [facility] that she met with family and they want patient home if possible. She is having DME delivered today. Hope to discharge home tomorrow morning. Nurse is available to be at patients home at 0900. Need 3 days worth of comfort meds sent with the patient.     Message left for daughter Rea requesting call back     3:24 PM  Hospice nurse Karen says patient family is agreeable to stretcher transport tomorrow. There are 4 steps into the house.     Stretcher transport arranged for tomorrow [id number]. PCS completed

## 2025-01-06 NOTE — PROGRESS NOTES
"  PALLIATIVE CARE PROGRESS NOTE  [facility]     Patient Name: Kristen Chapman  Date of Admission: 1/2/2025   Today the patient was seen for: follow up symptom management at end of life, support to family.     Recommendations & Counseling       GOALS OF CARE:   Comfort focused goals of care, life expectancy appears hours-short days sand is appropriately on comfort care/comfort measures only. Previously family was hoping to get Kristen home with hospice support.  Continue present treatments until hospice at home is arranged with hope to discharge home w family support.  If clinical decline occurs sooner, and not stable for transfer to home, GIP hospice with family--pt presently does not meet criteria for GIP hospice (symptoms managed presently).       ADVANCE CARE PLANNING:  Patient has an advance directive dated 12/31/2024.  Primary Health Care Agent Rea Chapman (daughter),.  Alternate(s) first alternate son Nayla Chapman, and second alternate son Andrae Chapman..   There is no POLST form on file, recommend to complete prior to DC.  Code status: No CPR- Do NOT Intubate    MEDICAL MANAGEMENT:       #Pain,acute on chronic,   cancer related pain; pain from prolonged immobility and also pneumonia  24-hr MME: 10mg oral morphine + 0.2mg IV hydromorphone (2.5MME) + 1mg oral hydromorphone (5MME)=17.5 MME  Opioids: hydromorphone (Dilaudid) 0.2mg IV q2H prn severe pain; I also added SL/oral hydromorphone 1mg PO q3H prn pain or dyspnea.  If that volume is too high, could consider using oxycodone intensol (hx itching with oxycodone)  DON\"T USE MORPHINE IN A PATIENT WITH Creatinine >2.0 please (high risk for accumulation of toxic metabolites and neurotoxicity).  Consider UT tylenol (oral route difficult presently).  Reposition as able.      #Protein calorie malnutrition in the context of chronic illness as evidenced by unintentional weight loss, poor nutrient intake, subcutaneous fat loss, and muscle loss   For now, " oral cares as able.  Diet for comfort    Received aggressive rehydration while in ICU; hasn't been able to eat for >1 week.    PSYCHOSOCIAL/SPIRITUAL:  Family 11 children, wife Roque Galvez, Kristen's siblings and extended family.  Friends    [place] community: [employer]    Palliative Care will continue to follow. Thank you for the consult and allowing us to aid in the care of Kristen Chapman.    These recommendations have been discussed with Dr Gould, with Karen Glover RNCM, also contacted [facility] based hospice.    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  Health care agents and children Nhia and Nayla note Kristen had not been eating for about two weeks prior to admission, and had difficulty swallowing liquids in the days prior to admission and frequent coughing.   Found to have cavitary pneumonia,toxic-metabolic encephalopathy, distributive and hypovolemic shock, concern for sepsis, nonoliguric ETHAN.   Comfort care status implemented on 1.4.24 and [facility] service assumed care for Mr. Chapman.         Interval History:     Multidisciplinary collaboration:  Events over weekend noted.  Pivot to comfort measures only 1/4/24  Noted GIP consult and pt symptom burden not severe enough to merit GIP.    Called [facility]  hospice as family had wanted outpatient hospice if able from the get-go and now time appears to be maura significantly.    Patient/family narrative  Met with Kristen, daughter Nelly and son Andrae and a granddaughter at bedside.  Kristen is nonverbal.  Nelly and Andrae not Kristen was declining pain medications at times overnight.  He was restless with  more labored breathing earlier this morning, better at time of my visit per son and daughter.    Called daughter and [facility] Rea Ari; noted with urine output of 50ml in 12 hours, thready radial pulses, we are entering short days, potentially hours-short days.  Family now wishing to get pt home (wife thinks Kristen is waiting to die until he can be at home with family).    Physical Exam:      95 lbs 8 oz      Intake/Output Summary (Last 24 hours) at 1/6/2025 0943  Last data filed at 1/6/2025 0700  Gross per 24 hour   Intake --   Output 400 ml   Net -400 ml   50ml urine output in past 12 hours..    Physical Exam  Cachectic 73 yo male, dressed in traveling clothes.  Breathing nonlabored, no rhonchi.  Radial pulses thready.  No mandibular movement with respiration.  Face is relaxed.  No mottling of hands.          Data Reviewed:     Last Comprehensive Metabolic Panel:  Lab Results   Component Value Date     01/04/2025    POTASSIUM 3.3 (L) 01/04/2025    CHLORIDE 111 (H) 01/04/2025    CO2 19 (L) 01/04/2025    ANIONGAP 9 01/04/2025     (H) 01/04/2025    BUN 32.6 (H) 01/04/2025    CR 2.12 (H) 01/04/2025    GFRESTIMATED 32 (L) 01/04/2025    GUANAKITO 9.1 01/04/2025     50 minutes spent on the date of the encounter doing chart review, history and exam, documentation and further activities per the note.    Tanya Jimenez MD  [facility], Palliative Care  Securely message with the Vocera Web Console (learn more here) or  Text page via [facility] Paging/Directory

## 2025-01-06 NOTE — PLAN OF CARE
"Goal Outcome Evaluation:                        Problem: Adult Inpatient Plan of Care  Goal: Plan of Care Review  Description: The Plan of Care Review/Shift note should be completed every shift.  The Outcome Evaluation is a brief statement about your assessment that the patient is improving, declining, or no change.  This information will be displayed automatically on your shift  note.  Outcome: Progressing  Goal: Patient-Specific Goal (Individualized)  Description: You can add care plan individualizations to a care plan. Examples of Individualization might be:  \"Parent requests to be called daily at 9am for status\", \"I have a hard time hearing out of my right ear\", or \"Do not touch me to wake me up as it startles  me\".  Outcome: Progressing  Goal: Absence of Hospital-Acquired Illness or Injury  Outcome: Progressing  Intervention: Identify and Manage Fall Risk  Recent Flowsheet Documentation  Taken 1/6/2025 0010 by Candis Alfaro RN  Safety Promotion/Fall Prevention:   assistive device/personal items within reach   nonskid shoes/slippers when out of bed   patient and family education  Intervention: Prevent Skin Injury  Recent Flowsheet Documentation  Taken 1/6/2025 0253 by Candis Alfaro RN  Body Position: (family repositioning) other (see comments)  Taken 1/6/2025 0010 by Candis Alfaro RN  Body Position: (family states that they are turning pt.) --  Intervention: Prevent Infection  Recent Flowsheet Documentation  Taken 1/6/2025 0010 by Candis Alfaro RN  Infection Prevention: personal protective equipment utilized  Goal: Optimal Comfort and Wellbeing  Outcome: Progressing  Intervention: Provide Person-Centered Care  Recent Flowsheet Documentation  Taken 1/6/2025 0010 by Candis Alfaro RN  Trust Relationship/Rapport:   care explained   emotional support provided   empathic listening provided   thoughts/feelings acknowledged  Goal: Readiness for Transition of Care  Outcome: Progressing   Pt " continues to open eyes and somewhat interact with family at bedside. Pt given prn morphine for pain. Family assisting pt to turn and reposition. Family very supportive at bedside. Encouraged to call with needs.

## 2025-01-06 NOTE — PROGRESS NOTES
[facility]    Medicine Progress Note - Hospitalist Service    Date of Admission:  1/2/2025    Assessment & Plan   Kristen Chapman is a 74 year old male admitted on 1/2/2025. 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 He presented with altered mental status, generalized weakness & poor oral intake for several months. He was found to have cavitary mass of right lung. Admitted for septic shock. He was started on broad spectrum antibiotics and pressors and admitted to ICU. GOC discussions has been ongoing and palliative care team was involved. On 01/04/25 patient was transitioned to comfort care and Hospitalist service was consulted to assume care    Problem list  Septic shock  Bacterial pneumonia   R/o TB, on airborne isolation  Cavitary lesion, right lung  ETHAN  Liver cirrhosis  Refractory ascites  Recurrent nasopharyngeal squamous cell carcinoma     Plan:  -- Symptomatic management  -- Comfort order set in place  -- Consult GIP- pending  -stopped any orders that are not for comfort  -Advanced diet per comfort protocol.    -as per family, pt hasn't ate for more than 1 week  -pain control             Diet: Advance Diet as Tolerated: Clear Liquid Diet; Mechanical/Dental Soft Diet    DVT Prophylaxis: none  Lee Catheter: PRESENT, indication: End of life  Lines: PRESENT      PICC 01/02/25 Triple Lumen Right Brachial vein medial Multiple Infusions-Site Assessment: WDL  Port a Cath 01/02/25 Single Lumen Left Chest wall-Site Assessment: WDL      Cardiac Monitoring: None  Code Status: No CPR- Do NOT Intubate      Clinically Significant Risk Factors               # Hypoalbuminemia: Lowest albumin = 2 g/dL at 1/4/2025  4:14 AM, will monitor as appropriate                # Cachexia: Estimated body mass index is 15.89 kg/m  as calculated from the following:    Height as  "of this encounter: 1.651 m (5' 5\").    Weight as of this encounter: 43.3 kg (95 lb 8 oz)., PRESENT ON ADMISSION  # Severe Malnutrition: based on nutrition assessment, PRESENT ON ADMISSION          Social Drivers of Health    Food Insecurity: Unknown (1/2/2025)    Food Insecurity     Within the past 12 months, did you worry that your food would run out before you got money to buy more?: Patient unable to answer     Within the past 12 months, did the food you bought just not last and you didn t have money to get more?: Patient unable to answer   Housing Stability: Unknown (1/2/2025)    Housing Stability     Do you have housing? : Patient unable to answer     Are you worried about losing your housing?: Patient unable to answer   Financial Resource Strain: Unknown (1/2/2025)    Financial Resource Strain     Within the past 12 months, have you or your family members you live with been unable to get utilities (heat, electricity) when it was really needed?: Patient unable to answer   Transportation Needs: Unknown (1/2/2025)    Transportation Needs     Within the past 12 months, has lack of transportation kept you from medical appointments, getting your medicines, non-medical meetings or appointments, work, or from getting things that you need?: Patient unable to answer          Disposition Plan       actively dying             Holly Gould MD  Hospitalist Service  [facility]  Securely message with [employer] (more info)  Text page via [facility] Paging/Directory   ______________________________________________________________________    Interval History   Received pain meds last night  Family in room supportive    Physical Exam   Vital Signs:                    Weight: 95 lbs 8 oz    sleeping    Medical Decision Making       25 MINUTES SPENT BY ME on the date of service doing chart review, history, exam, documentation & further activities per the note.      Data         Imaging results reviewed over the " past 24 hrs:   No results found for this or any previous visit (from the past 24 hours).

## 2025-01-06 NOTE — PLAN OF CARE
Problem: Palliative Care  Goal: Enhanced Quality of Life  Outcome: Progressing  Intervention: Optimize Function  Recent Flowsheet Documentation  Taken 1/5/2025 1630 by Leah Reed RN  Sensory Stimulation Regulation: care clustered  Sleep/Rest Enhancement:   family presence promoted   regular sleep/rest pattern promoted  Intervention: Optimize Psychosocial Wellbeing  Recent Flowsheet Documentation  Taken 1/5/2025 1630 by Leah Reed RN  Supportive Measures: positive reinforcement provided  Family/Support System Care:   involvement promoted   presence promoted   self-care encouraged   support provided     Problem: Risk for Delirium  Goal: Optimal Coping  Intervention: Optimize Psychosocial Adjustment to Delirium  Recent Flowsheet Documentation  Taken 1/5/2025 1630 by Leah Reed RN  Supportive Measures: positive reinforcement provided  Family/Support System Care:   involvement promoted   presence promoted   self-care encouraged   support provided  Goal: Improved Behavioral Control  Intervention: Minimize Safety Risk  Recent Flowsheet Documentation  Taken 1/5/2025 1630 by Leah Reed RN  Enhanced Safety Measures:   family to remain at bedside   room near unit station  Trust Relationship/Rapport:   care explained   emotional support provided   empathic listening provided   thoughts/feelings acknowledged  Goal: Improved Attention and Thought Clarity  Intervention: Maximize Cognitive Function  Recent Flowsheet Documentation  Taken 1/5/2025 1630 by Leah Reed RN  Sensory Stimulation Regulation: care clustered  Reorientation Measures: familiar social contact encouraged  Goal: Improved Sleep  Intervention: Promote Sleep  Recent Flowsheet Documentation  Taken 1/5/2025 1630 by Leah Reed RN  Sleep/Rest Enhancement:   family presence promoted   regular sleep/rest pattern promoted     Problem: Skin Injury Risk Increased  Goal: Skin Health and Integrity  Intervention: Plan: Nurse Driven Intervention:  Positioning  Recent Flowsheet Documentation  Taken 1/5/2025 1630 by Leah Reed RN  Plan: Positioning Interventions: HOB 30 degrees or less  Intervention: Plan: Nurse Driven Intervention: Moisture Management  Recent Flowsheet Documentation  Taken 1/5/2025 2000 by Leah Reed RN  Moisture Interventions: (family refuses to have clothing removed) Other (comment)  Bathing/Skin Care: family refused  Intervention: Plan: Nurse Driven Intervention: Friction and Shear  Recent Flowsheet Documentation  Taken 1/5/2025 1630 by Leah Reed RN  Friction/Shear Interventions: HOB 30 degrees or less  Intervention: Optimize Skin Protection  Recent Flowsheet Documentation  Taken 1/5/2025 1927 by Leah Reed RN  Head of Bed (HOB) Positioning: HOB at 15 degrees  Taken 1/5/2025 1721 by Leah Reed RN  Head of Bed (HOB) Positioning: HOB at 15 degrees  Taken 1/5/2025 1630 by Leah Reed RN  Pressure Reduction Devices: positioning supports utilized  Taken 1/5/2025 1527 by Leah Reed RN  Head of Bed (HOB) Positioning: HOB at 15 degrees  Goal Outcome Evaluation:  Pt continues decline - family surrounding pt all shift and guiding the nursing care.  Pt and family continue to refuse skin care or removal of traveling suit.  Lee intact draining dark tea colored urine.  Family declined any pain med for pt until HS as he wanted to be alert for family visits.  Hydromorphone 0.2 mg given at 2200 to promote rest.  Reminded family to use the call light for needs.

## 2025-01-06 NOTE — PROGRESS NOTES
CLINICAL NUTRITION SERVICES      Patient transitioned to comfort cares 1/4/25. RD will sign off at this time.

## 2025-01-07 VITALS
RESPIRATION RATE: 20 BRPM | OXYGEN SATURATION: 94 % | BODY MASS INDEX: 15.91 KG/M2 | SYSTOLIC BLOOD PRESSURE: 93 MMHG | HEIGHT: 65 IN | WEIGHT: 95.5 LBS | HEART RATE: 78 BPM | DIASTOLIC BLOOD PRESSURE: 61 MMHG | TEMPERATURE: 97.5 F

## 2025-01-07 LAB
BACTERIA BLD CULT: NO GROWTH
BACTERIA BLD CULT: NO GROWTH
PTH RELATED PROT SERPL-SCNC: 1.6 PMOL/L

## 2025-01-07 PROCEDURE — 250N000013 HC RX MED GY IP 250 OP 250 PS 637: Performed by: FAMILY MEDICINE

## 2025-01-07 PROCEDURE — 99238 HOSP IP/OBS DSCHRG MGMT 30/<: CPT | Performed by: INTERNAL MEDICINE

## 2025-01-07 RX ADMIN — HYDROMORPHONE HYDROCHLORIDE 1 MG: 1 SOLUTION ORAL at 08:31

## 2025-01-07 ASSESSMENT — ACTIVITIES OF DAILY LIVING (ADL)
ADLS_ACUITY_SCORE: 76
ADLS_ACUITY_SCORE: 74
ADLS_ACUITY_SCORE: 76
ADLS_ACUITY_SCORE: 76

## 2025-01-07 NOTE — PROGRESS NOTES
Care Management Discharge Note    Discharge Date: 01/07/2025       Discharge Disposition: Hospice, Home    Discharge Services:      Discharge DME:      Discharge Transportation:  stretcher     Private pay costs discussed: transportation costs    Does the patient's insurance plan have a 3 day qualifying hospital stay waiver?  Yes     Which insurance plan 3 day waiver is available? Alternative insurance waiver    Will the waiver be used for post-acute placement? No    PAS Confirmation Code:    Patient/family educated on Medicare website which has current facility and service quality ratings:      Education Provided on the Discharge Plan:    Persons Notified of Discharge Plans: vivek Winslow  Patient/Family in Agreement with the Plan:      Handoff Referral Completed: No, handoff not indicated or clinically appropriate    Additional Information:  See below     Karen Glover RN        Spoke to vivek Winslow via telephone. He is agreeable to plan for patient to return home with hospice this morning. Aware of ride time. Says equipment was delivered last night. Address verified. Updated nurse Jane     Confirmed plan with Karen Cuellar from [facility]

## 2025-01-07 NOTE — DISCHARGE SUMMARY
[facility]    Discharge Summary  Hospitalist    Date of Admission:  1/2/2025  Date of Discharge:  1/7/2025  Discharging Provider: Holly Gould MD  Date of Service (when I saw the patient): 01/07/25    Discharge Diagnoses   Bacterial pneumonia   Sepsis  Right lung cavity  Recurrent nasopharyngeal squamous cell carcinoma   Cirrhosis with ascites      History of Present Illness   Kristen Chapman is an 74 year old male who presented with altered mental status, not eating    Hospital Course   Kristen Chapman is a 74 year old male admitted on 1/2/2025. 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 He presented with altered mental status, generalized weakness & poor oral intake for several months. He was found to have cavitary mass of right lung. Admitted for septic shock. He was started on broad spectrum antibiotics and pressors and admitted to ICU. GOC discussions has been ongoing and palliative care team was involved. On 01/04/25 patient was transitioned to comfort care and Hospitalist service was consulted to assume care     Problem list  Septic shock  Bacterial pneumonia   R/o TB, on airborne isolation  Cavitary lesion, right lung  ETHAN  Liver cirrhosis  Refractory ascites  Recurrent nasopharyngeal squamous cell carcinoma      Plan:  -- Symptomatic management  -- Comfort order set in place  -- Consult GIP- not meeting inpatient hospice criteria   -stopped any orders that are not for comfort  -Advanced diet per comfort protocol.    -as per family, pt hasn't ate for more than 1 week  -pain control   -plan for home on hospice on 1/7    Holly Gould MD    Significant Results and Procedures   See below     Pending Results     Unresulted Labs Ordered in the Past 30 Days of this Admission       Date and Time Order Name Status Description    1/2/2025 10:40 PM PTH  Related Peptide Test In process     1/2/2025  9:45 PM Acid-Fast Bacilli Culture and Stain In process     1/2/2025  9:45 PM Anaerobic bacterial culture Preliminary     1/2/2025  9:45 PM Ascites Fluid Aerobic Bacterial Culture Routine With Gram Stain Preliminary     1/2/2025 12:34 PM Blood Culture Peripheral Blood Preliminary     1/2/2025 12:34 PM Blood Culture Peripheral Blood Preliminary             Code Status   DNR / DNI       Primary Care Physician   Issa oCok        Discharge Disposition   Discharged to home  Condition at discharge: Terminal    Consultations This Hospital Stay   PHARMACY TO DOSE VANCO  VASCULAR ACCESS ADULT IP CONSULT  PHARMACY TO DOSE VANCO  INFECTIOUS DISEASES IP CONSULT  SPEECH LANGUAGE PATH ADULT IP CONSULT  CARE MANAGEMENT / SOCIAL WORK IP CONSULT  PALLIATIVE CARE ADULT IP CONSULT  CARE MANAGEMENT / SOCIAL WORK IP CONSULT  SPIRITUAL HEALTH SERVICES IP CONSULT  HOSPITALIST IP CONSULT  GIP INPATIENT HOSPICE ADULT CONSULT  PHYSICAL THERAPY ADULT IP CONSULT  OCCUPATIONAL THERAPY ADULT IP CONSULT    Time Spent on this Encounter   I, Holly Gould MD, personally saw the patient today and spent less than or equal to 30 minutes discharging this patient.    Discharge Orders      Reason for your hospital stay    Pneumonia  Sepsis  Recurrent nasopharyngeal squamous cell carcinoma   Liver cirrhosis     Activity    Your activity upon discharge: activity as tolerated     Follow Up    Follow up with Hospice care as scheduled     When to contact your care team    Call hospice care if you have any concerns     Discharge Instructions    Hospice care at home     Diet    Follow this diet upon discharge: Current Diet:Orders Placed This Encounter      Advance Diet as Tolerated: Clear Liquid Diet; Mechanical/Dental Soft Diet     Hospital Follow-up with Existing Primary Care Provider (PCP)    Please see details below          Discharge Medications   Current Discharge Medication List        START taking  "these medications    Details   acetaminophen (TYLENOL) 650 MG suppository Place 1 suppository (650 mg) rectally every 6 hours as needed for mild pain or fever (temperature over 100  F ).    Associated Diagnoses: Comfort measures only status      LORazepam (ATIVAN) 2 MG/ML (HIGH CONC) oral solution Take 0.5 mLs (1 mg) by mouth every 8 hours as needed for anxiety, agitation, pain or nausea.  Qty: 30 mL, Refills: 0    Associated Diagnoses: Comfort measures only status      morphine sulfate, high concentrate, (ROXANOL-CONCENTRATED) 20 MG/ML concentrated solution Take 0.125 mLs (2.5 mg) by mouth every 2 hours as needed for shortness of breath or breakthrough pain.  Qty: 15 mL, Refills: 0    Associated Diagnoses: Comfort measures only status           CONTINUE these medications which have NOT CHANGED    Details   acetaminophen (TYLENOL) 325 MG tablet Take 650 mg by mouth every 6 hours as needed       prochlorperazine (COMPAZINE) 10 MG tablet Take 1 tablet (10 mg) by mouth every 6 hours as needed for nausea or vomiting  Qty: 30 tablet, Refills: 2    Associated Diagnoses: Squamous cell carcinoma of nasopharynx (H)      Emergency Supply Kit, Central, Patient use for emergency only. Contents: 3 sodium chloride 0.9% flushes, 1 dressing kit, 1 microclave ext set 14\", 4 nitrile gloves (med), 6 alcohol prep pads, 1 bacitracin, 1 syringe (10 cc 20 G 1\"). Call [phone] to reorder.  Qty: [id number] kit, Refills: 0    Associated Diagnoses: Nasopharyngeal carcinoma (H)      Port Access Kit For nurse use only.  Do not remove items from bag.  Use for port access.  Do not place syringe on sterile field.  Qty: [id number] kit, Refills: 0    Comments: For port access for IV fluids.  Associated Diagnoses: Nasopharyngeal carcinoma (H)      sodium chloride 0.9 % in 500 mL via gravity infusion Infuse into the vein three times a week as needed (dehydration). Infuse 1 bag(s) (500 mL). Each bag to infuse over 2-4 hours.  Qty: [id number] mL, Refills: 0 "    Comments: Per inbasket message from Marjorie DREW RN/Therapy plan orders from Yoanna Andre, APRN 1/2/25, increase frequency of NS 500ml IV to three times weekly as needed.  Erin Craig PharmD, [facility]   Home Infusion Pharmacist  Associated Diagnoses: Nasopharyngeal carcinoma (H)      sodium chloride, PF, 0.9% PF flush Inject 10 mLs into the vein as needed for line flush. Flush IV before and after med administration as directed and/or at least every 24 hours, or prior to deaccessing for no further use and/or at least every 4 weeks when not accessed.  Qty: [id number] mL, Refills: 0    Comments: Port care for NS gravity orders.  Associated Diagnoses: Nasopharyngeal carcinoma (H)           STOP taking these medications       dexAMETHasone (DECADRON) 4 MG tablet Comments:   Reason for Stopping:         furosemide (LASIX) 20 MG tablet Comments:   Reason for Stopping:         levothyroxine (SYNTHROID/LEVOTHROID) 175 MCG tablet Comments:   Reason for Stopping:         metroNIDAZOLE (FLAGYL) 250 MG tablet Comments:   Reason for Stopping:         spironolactone (ALDACTONE) 100 MG tablet Comments:   Reason for Stopping:         tenofovir (VIREAD) 300 MG tablet Comments:   Reason for Stopping:             Allergies   Allergies   Allergen Reactions    Oxycodone Itching     Data   Most Recent 3 CBC's:  Recent Labs   Lab Test 01/04/25  0414 01/03/25  2315 01/03/25  1944 01/03/25  0708 01/03/25  0433 01/02/25  1236   WBC 12.3*  --   --   --  7.4 7.8   HGB 9.3* 9.3* 9.0*   < > 6.1* 8.2*   MCV 92  --   --   --  96 96   PLT 90*  --   --   --  75* 102*    < > = values in this interval not displayed.      Most Recent 3 BMP's:  Recent Labs   Lab Test 01/04/25  0419 01/04/25  0414 01/03/25  2013 01/03/25  1654 01/03/25  1212 01/03/25  0803 01/03/25  0433 01/02/25  2246   NA  --  139  --   --   --   --  148* 153*   POTASSIUM  --  3.3*  --   --  3.7  --  3.4 3.9   CHLORIDE  --  111*  --   --   --   --  118* 126*   CO2  --  19*  --   --   --    --  22 17*   BUN  --  32.6*  --   --   --   --  44.1* 46.5*   CR  --  2.12*  --   --   --   --  2.34* 2.49*   ANIONGAP  --  9  --   --   --   --  8 10   GUANAKITO  --  9.1  --   --   --   --  9.6 10.1   * 155* 155*   < >  --    < > 116*  115* 90  88    < > = values in this interval not displayed.     Most Recent 2 LFT's:  Recent Labs   Lab Test 01/04/25  0414 01/03/25  0433   AST 16 17   ALT 11 12   ALKPHOS 78 75   BILITOTAL 1.5* 1.1     Most Recent INR's and Anticoagulation Dosing History:  Anticoagulation Dose History  More data exists         Latest Ref Rng & Units 6/9/2023 7/7/2023 12/7/2023 3/29/2024 7/2/2024 10/23/2024 1/2/2025   Recent Dosing and Labs   INR 0.85 - 1.15 1.24  1.25  1.32  1.30  1.30  1.30  1.63      Most Recent 3 Troponin's:No lab results found.  Most Recent Cholesterol Panel:  Recent Labs   Lab Test 10/24/24  1343   CHOL 66   LDL 15   HDL 38*   TRIG 63     Most Recent 6 Bacteria Isolates From Any Culture (See EPIC Reports for Culture Details):No lab results found.  Most Recent TSH, T4 and A1c Labs:  Recent Labs   Lab Test 01/02/25  2246   TSH 11.30*   T4 0.52*     Results for orders placed or performed during the hospital encounter of 01/02/25   Head CT w/o contrast    Narrative    EXAM: CT HEAD W/O CONTRAST  LOCATION: [facility]  DATE: 1/2/2025    INDICATION: Headache, confusion.  COMPARISON: None.  TECHNIQUE: Routine CT Head without IV contrast. Multiplanar reformats. Dose reduction techniques were used.    FINDINGS:  INTRACRANIAL CONTENTS: No intracranial hemorrhage, extraaxial collection, or mass effect.  No CT evidence of acute infarct. Mild presumed chronic small vessel ischemic changes. Mild to moderate generalized volume loss. No hydrocephalus. Physiologic   mineralization right and left basal ganglia. Corpus callosum is intact. Cerebellar tonsillar position is normal. No sella or suprasellar mass/hemorrhage. Nothing for subarachnoid, subdural or epidural  blood products.     VISUALIZED ORBITS/SINUSES/MASTOIDS: No intraorbital abnormality. Subtotal opacification of the right frontal sinus, bilateral maxillary sinuses and sphenoid sinuses with air-fluid levels may indicate inflammatory paranasal sinusitis. Complete/near   complete opacification of the mastoid air cells bilaterally. No apparent mass in the posterior nasopharynx or skull base. Right and left middle ear effusions. Correlate for bilateral otomastoiditis series 5 image 32.    BONES/SOFT TISSUES: No scalp hematoma. No skull fracture. Nasopharynx is patent.      Impression    IMPRESSION:  1.  No CT evidence for acute intracranial process.  2.  Brain atrophy and presumed chronic microvascular ischemic changes as above.  3.  Subtotal opacification of the paranasal sinuses and mastoid air cells. Correlate for acute paranasal sinusitis with multiple air-fluid levels. In addition, opacification of the right and left middle ear regions may indicate bilateral otomastoiditis.   CT Chest Abdomen Pelvis w/o Contrast    Narrative    EXAM: CT CHEST ABDOMEN PELVIS W/O CONTRAST  LOCATION: [facility]  DATE: 1/2/2025    INDICATION: h o cancer, hypotensive, abd pain, sepsis  COMPARISON: CT chest 10/7/2024, CT chest abdomen pelvis 7/15/2024  TECHNIQUE: CT scan of the chest, abdomen, and pelvis was performed without IV contrast. Multiplanar reformats were obtained. Dose reduction techniques were used.   CONTRAST: None.    FINDINGS:   LUNGS AND PLEURA: Spiculated nodule in the right upper lobe laterally has increased in size and become partially cavitary, now measuring 1.0 x 0.7 cm (5/104), previously 0.6 x 0.4 cm. Additionally, there are numerous new nodular and groundglass opacities   bilaterally, as well as a large cavitary lesion in the right upper lobe anteriorly measuring 3.6 x 2.9 cm (5/88). Unchanged bronchiectasis in the middle lobe. No pleural effusion or pneumothorax. Debris in the  trachea.    MEDIASTINUM/AXILLAE: Unchanged dilated ascending aorta measuring 4.5 cm. Left neck central venous catheter tip in the right atrium. No enlarged thoracic lymph nodes.    CORONARY ARTERY CALCIFICATION: Mild.    HEPATOBILIARY: Morphologic changes of cirrhosis. Gallbladder and bile ducts are unremarkable.    PANCREAS: Normal.    SPLEEN: Normal.    ADRENAL GLANDS: Normal.    KIDNEYS/BLADDER: Normal.    BOWEL: Normal.    LYMPH NODES: Normal.    VASCULATURE: No abdominal aortic aneurysm. Gastroesophageal varices.    PERITONEUM: Small volume ascites. Peritoneal drainage catheter in the right upper abdomen.    PELVIC ORGANS: Normal.    MUSCULOSKELETAL: Degenerative changes in the spine.      Impression    IMPRESSION:  1.  Numerous new nodular and groundglass opacities in both lungs, as well as a large cavitary lesion in the right upper lobe anteriorly. Findings favor atypical infection, although metastatic disease is also a possibility.  2.  Morphologic changes of cirrhosis with small volume ascites and gastroesophageal varices.  3.  Unchanged dilated ascending aorta measuring 4.5 cm.

## 2025-01-07 NOTE — PROGRESS NOTES
PALLIATIVE CARE PROGRESS NOTE  [facility]     Patient Name: Kristen Chapman  Date of Admission: 1/2/2025   Today the patient was seen for: follow up symptom management at end of life, support to family.     Recommendations & Counseling       GOALS OF CARE:   Comfort focused goals of care, life expectancy appears hours-short days sand is appropriately on comfort care/comfort measures only.  Plan for discharge to home: appears stable for discharge this morning with hospice support at home.  Appreciate [facility] hospice support of this patient and family.    ADVANCE CARE PLANNING:  Patient has an advance directive dated 12/31/2024.  Primary Health Care Agent Rea Chapman (daughter),.  Alternate(s) first alternate son Nayla Chapman, and second alternate son Andrae Chapman..   new POLST completed (DNR/comfort-focused goals) and given to family.  Code status: No CPR- Do NOT Intubate    MEDICAL MANAGEMENT:     For discharge:  Recommend hydromorphone liquid 1mg PO q2H prn pain.      #Pain,acute on chronic,   cancer related pain; pain from prolonged immobility and also pneumonia  24-hr MME: 5mg oral hydromorphone= 25 MME  Opioids: scheduled hydromorphone 1mg PO q6H to ensure basal amount of pain medication given (hold if RR<11)  Continue hydromorphone (Dilaudid) 0.2mg IV q2H prn severe pain; I also added SL/oral hydromorphone 1mg PO q3H prn pain or dyspnea.  If that volume is too high, could consider using oxycodone intensol (hx itching with oxycodone)  DON'T USE MORPHINE IN A PATIENT WITH Creatinine >2.0 please (high risk for accumulation of toxic metabolites and neurotoxicity).  Consider WI tylenol (oral route difficult presently).  Reposition as able.      #Protein calorie malnutrition in the context of chronic illness as evidenced by unintentional weight loss, poor nutrient intake, subcutaneous fat loss, and muscle loss   For now, oral cares as able.  Diet for comfort    Received aggressive rehydration  while in ICU; hasn't been able to eat for >1 week.    PSYCHOSOCIAL/SPIRITUAL:  Family 11 children, wife Roque Galvez, Kristen's siblings and extended family.  Friends    [place] community: [employer]    Palliative Care will sign off. Thank you for the consult and allowing us to aid in the care of Kristen Chapman.       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  Health care agents and children Nhia and Nayla note Kristen had not been eating for about two weeks prior to admission, and had difficulty swallowing liquids in the days prior to admission and frequent coughing.   Found to have cavitary pneumonia,toxic-metabolic encephalopathy, distributive and hypovolemic shock, concern for sepsis, nonoliguric ETHAN.   Comfort care status implemented on 1.4.24 and [facility] service assumed care for Mr. Chapman.         Interval History:     Multidisciplinary collaboration:  Events overnight noted.  Urine output 15ml in past 12 hours, and 10ml the 12 hours before that.    Patient/family narrative  Met with Kristen, daughter and son. He declined medications overnight.  He rested well per family and didn't appear uncomfortable.  Family aware of discharge plan today, have equipment from hospice.  Counselled on pain medications.   Discussed importance of  POLST for transport; daughter present signed .    Physical Exam:   Resp:  [20] 20  SpO2:  [94 %] 94 %  95 lbs 8 oz      Intake/Output Summary (Last 24 hours) at 1/7/2025 1647  Last data filed at 1/7/2025 0913  Gross per 24 hour   Intake --   Output 15 ml   Net -15 ml       Physical Exam  Cachectic 75 yo male, dressed in traveling clothes.  Alert, face relaxed  Breathing nonlabored, no rhonchi.  Radial pulses thready but palpable  No mandibular movement with respiration.  No mottling of hands.  1+ edema legs.          Data Reviewed:     Last Comprehensive Metabolic Panel:  Lab Results   Component Value Date     01/04/2025    POTASSIUM 3.3 (L) 01/04/2025    CHLORIDE 111 (H) 01/04/2025    CO2 19 (L) 01/04/2025    ANIONGAP 9 01/04/2025     (H) 01/04/2025    BUN 32.6 (H) 01/04/2025    CR 2.12 (H) 01/04/2025    GFRESTIMATED 32 (L) 01/04/2025    GUANAKITO 9.1 01/04/2025     30 minutes spent on the date of the encounter doing chart review, history and exam, documentation and further activities per the note.    Tanya Jimenez MD  [facility], Palliative Care  Securely message with the [employer]oc[employer] Web Console (learn more here) or  Text page via [facility] Paging/Directory

## 2025-01-07 NOTE — PLAN OF CARE
Goal Outcome Evaluation:         Problem: Adult Inpatient Plan of Care  Goal: Optimal Comfort and Wellbeing  Outcome: Progressing     Family (daughter & son-in-law) present in room overnight; sitting at patient's bedside. Patient calm, quiet; appears comfortable. Family declined scheduled and prn pain medication. Lee in place; no intake. Scheduled to discharge to home with hospice today between [id number]. Medications in med room locked box to be sent with patient.

## 2025-01-07 NOTE — PLAN OF CARE
"  Problem: Adult Inpatient Plan of Care  Goal: Plan of Care Review  Description: The Plan of Care Review/Shift note should be completed every shift.  The Outcome Evaluation is a brief statement about your assessment that the patient is improving, declining, or no change.  This information will be displayed automatically on your shift  note.  Outcome: Progressing  Goal: Patient-Specific Goal (Individualized)  Description: You can add care plan individualizations to a care plan. Examples of Individualization might be:  \"Parent requests to be called daily at 9am for status\", \"I have a hard time hearing out of my right ear\", or \"Do not touch me to wake me up as it startles  me\".  Outcome: Progressing  Goal: Absence of Hospital-Acquired Illness or Injury  Outcome: Progressing  Intervention: Identify and Manage Fall Risk  Recent Flowsheet Documentation  Taken 1/6/2025 1610 by Fabiano Wesley RN  Safety Promotion/Fall Prevention:   assistive device/personal items within reach   nonskid shoes/slippers when out of bed   patient and family education  Intervention: Prevent Skin Injury  Recent Flowsheet Documentation  Taken 1/6/2025 1927 by Fabiano Wesley RN  Body Position: turned  Taken 1/6/2025 1727 by Fabiano Wesley RN  Body Position: turned  Taken 1/6/2025 1527 by Fabiano Wesley RN  Body Position: refuses positioning  Intervention: Prevent and Manage VTE (Venous Thromboembolism) Risk  Recent Flowsheet Documentation  Taken 1/6/2025 1610 by Fabiano Wesley RN  VTE Prevention/Management: SCDs off (sequential compression devices)  Intervention: Prevent Infection  Recent Flowsheet Documentation  Taken 1/6/2025 1610 by Fabiano Wesley RN  Infection Prevention: personal protective equipment utilized  Goal: Optimal Comfort and Wellbeing  Outcome: Progressing  Intervention: Provide Person-Centered Care  Recent Flowsheet Documentation  Taken 1/6/2025 1610 by Fabiano Wesley RN  Trust " Relationship/Rapport:   care explained   emotional support provided   empathic listening provided   thoughts/feelings acknowledged  Goal: Readiness for Transition of Care  Outcome: Progressing     Problem: Skin Injury Risk Increased  Goal: Skin Health and Integrity  Outcome: Progressing  Intervention: Plan: Nurse Driven Intervention: Moisture Management  Recent Flowsheet Documentation  Taken 1/6/2025 2200 by Fabiano Wesley RN  Bathing/Skin Care: wipes, CHG  Taken 1/6/2025 2100 by Fabiano Wesley RN  Bathing/Skin Care:   patient refused   family refused  Taken 1/6/2025 2000 by Fabiano Wesley RN  Moisture Interventions: Urinary collection device  Bathing/Skin Care: family refused  Taken 1/6/2025 1610 by Fabiano Wesley RN  Bathing/Skin Care: family refused  Intervention: Optimize Skin Protection  Recent Flowsheet Documentation  Taken 1/6/2025 2200 by Fabiano Wesley RN  Activity Management: bedrest  Taken 1/6/2025 1927 by Fabiano Wesley RN  Head of Bed (HOB) Positioning: HOB at 20-30 degrees  Taken 1/6/2025 1727 by Fabiano Wesley RN  Head of Bed (HOB) Positioning: HOB at 20-30 degrees  Taken 1/6/2025 1610 by Fabiano Wesley RN  Pressure Reduction Devices: positioning supports utilized     Problem: Risk for Delirium  Goal: Optimal Coping  Outcome: Progressing  Intervention: Optimize Psychosocial Adjustment to Delirium  Recent Flowsheet Documentation  Taken 1/6/2025 1610 by Fabiano Wesley RN  Supportive Measures: positive reinforcement provided  Family/Support System Care:   involvement promoted   presence promoted   self-care encouraged   support provided  Goal: Improved Behavioral Control  Outcome: Progressing  Intervention: Minimize Safety Risk  Recent Flowsheet Documentation  Taken 1/6/2025 1610 by Fabiano Wesley RN  Enhanced Safety Measures:   family to remain at bedside   room near unit station  Trust Relationship/Rapport:   care explained   emotional support  provided   empathic listening provided   thoughts/feelings acknowledged  Goal: Improved Attention and Thought Clarity  Outcome: Progressing  Intervention: Maximize Cognitive Function  Recent Flowsheet Documentation  Taken 1/6/2025 1610 by Fabiano Wesley RN  Sensory Stimulation Regulation: care clustered  Reorientation Measures: familiar social contact encouraged  Goal: Improved Sleep  Outcome: Progressing  Intervention: Promote Sleep  Recent Flowsheet Documentation  Taken 1/6/2025 1610 by Fabiano Wesley RN  Sleep/Rest Enhancement:   family presence promoted   regular sleep/rest pattern promoted     Problem: Palliative Care  Goal: Enhanced Quality of Life  Outcome: Progressing  Intervention: Optimize Function  Recent Flowsheet Documentation  Taken 1/6/2025 1610 by Fabiano Wesley RN  Sensory Stimulation Regulation: care clustered  Sleep/Rest Enhancement:   family presence promoted   regular sleep/rest pattern promoted  Intervention: Optimize Psychosocial Wellbeing  Recent Flowsheet Documentation  Taken 1/6/2025 1610 by Fabiano Wesley RN  Supportive Measures: positive reinforcement provided  Family/Support System Care:   involvement promoted   presence promoted   self-care encouraged   support provided   Goal Outcome Evaluation:       Pt was comfortable during the shift, PRN dilaudid was given for pain per family request. Family repositioned frequently.

## 2025-01-08 LAB
BACTERIA FLD CULT: NO GROWTH
GRAM STAIN RESULT: NORMAL
GRAM STAIN RESULT: NORMAL

## 2025-01-09 ENCOUNTER — PATIENT OUTREACH (OUTPATIENT)
Dept: ONCOLOGY | Facility: HOSPITAL | Age: 75
End: 2025-01-09
Payer: COMMERCIAL

## 2025-01-09 LAB — BACTERIA FLD CULT: NORMAL

## 2025-01-09 NOTE — PROGRESS NOTES
[facility]: Cancer Care                                                                                          Writer called the patient's daughter to extend condolences to her and the family. Writer expressed that the patient's daughter or family can reach out to the clinic if they need anything. Patient's daughter verbalized understanding and expressed gratitude for the phone call.     Signature:  Marjorie Briones RN

## 2025-01-10 LAB — BACTERIA FLD CULT: NORMAL

## 2025-01-15 ENCOUNTER — MEDICAL CORRESPONDENCE (OUTPATIENT)
Dept: HEALTH INFORMATION MANAGEMENT | Facility: CLINIC | Age: 75
End: 2025-01-15
Payer: COMMERCIAL

## 2025-01-15 DIAGNOSIS — Z53.9 DIAGNOSIS NOT YET DEFINED: Primary | ICD-10-CM

## 2025-01-15 PROCEDURE — G0180 MD CERTIFICATION HHA PATIENT: HCPCS | Performed by: FAMILY MEDICINE

## (undated) DEVICE — SOL NACL 0.9% IRRIG 500ML BOTTLE 2F7123

## (undated) DEVICE — SOL NACL 0.9% INJ 1000ML BAG 2B1324X

## (undated) DEVICE — EYE STRIP FLUORESCEIN TEST 1MG BIO-GLO HUO900-11

## (undated) DEVICE — DRSG NASOPORE FIRM 4CM 5400-020-004

## (undated) DEVICE — DRAPE WARMER 66X44" ORS-300

## (undated) DEVICE — DRSG TELFA 3X8" 1238

## (undated) DEVICE — GLOVE PROTEXIS MICRO 7.5  2D73PM75

## (undated) DEVICE — ESU GROUND PAD ADULT W/CORD E7507

## (undated) DEVICE — NDL 25GA 2"  8881200441

## (undated) DEVICE — PACK ENT ENDOSCOPY CUSTOM ASC

## (undated) DEVICE — ENDO SHEATH STORZ SHARPSITE ENDOSCRUB 30DEG 4MM 1912010

## (undated) DEVICE — TUBING SUCTION 10'X3/16" N510

## (undated) DEVICE — ENDO SHEATH STORZ SHARPSITE ENDOSCRUB 0DEG 4MM 1912000

## (undated) DEVICE — SYR 03ML LL W/O NDL 309657

## (undated) DEVICE — DRSG HOLDER NASAL DALE 600

## (undated) DEVICE — SPONGE COTTONOID 2X1/2" 80-1406

## (undated) DEVICE — SUCTION MANIFOLD NEPTUNE 2 SYS 1 PORT 702-025-000

## (undated) DEVICE — SYR 30ML LL W/O NDL 302832

## (undated) DEVICE — LINEN TOWEL PACK X5 5464

## (undated) RX ORDER — HEPARIN SODIUM (PORCINE) LOCK FLUSH IV SOLN 100 UNIT/ML 100 UNIT/ML
SOLUTION INTRAVENOUS
Status: DISPENSED
Start: 2023-09-06

## (undated) RX ORDER — ONDANSETRON 2 MG/ML
INJECTION INTRAMUSCULAR; INTRAVENOUS
Status: DISPENSED
Start: 2021-09-07

## (undated) RX ORDER — HEPARIN SODIUM (PORCINE) LOCK FLUSH IV SOLN 100 UNIT/ML 100 UNIT/ML
SOLUTION INTRAVENOUS
Status: DISPENSED
Start: 2024-01-03

## (undated) RX ORDER — FENTANYL CITRATE 50 UG/ML
INJECTION, SOLUTION INTRAMUSCULAR; INTRAVENOUS
Status: DISPENSED
Start: 2024-02-26

## (undated) RX ORDER — ALBUMIN (HUMAN) 12.5 G/50ML
SOLUTION INTRAVENOUS
Status: DISPENSED
Start: 2024-01-17

## (undated) RX ORDER — FENTANYL CITRATE 50 UG/ML
INJECTION, SOLUTION INTRAMUSCULAR; INTRAVENOUS
Status: DISPENSED
Start: 2024-02-22

## (undated) RX ORDER — HEPARIN SODIUM (PORCINE) LOCK FLUSH IV SOLN 100 UNIT/ML 100 UNIT/ML
SOLUTION INTRAVENOUS
Status: DISPENSED
Start: 2024-02-22

## (undated) RX ORDER — EPINEPHRINE NASAL SOLUTION 1 MG/ML
SOLUTION NASAL
Status: DISPENSED
Start: 2021-09-07

## (undated) RX ORDER — FENTANYL CITRATE 50 UG/ML
INJECTION, SOLUTION INTRAMUSCULAR; INTRAVENOUS
Status: DISPENSED
Start: 2021-09-07

## (undated) RX ORDER — ALBUMIN (HUMAN) 12.5 G/50ML
SOLUTION INTRAVENOUS
Status: DISPENSED
Start: 2023-12-14

## (undated) RX ORDER — DEXAMETHASONE SODIUM PHOSPHATE 4 MG/ML
INJECTION, SOLUTION INTRA-ARTICULAR; INTRALESIONAL; INTRAMUSCULAR; INTRAVENOUS; SOFT TISSUE
Status: DISPENSED
Start: 2021-09-07

## (undated) RX ORDER — HEPARIN SODIUM (PORCINE) LOCK FLUSH IV SOLN 100 UNIT/ML 100 UNIT/ML
SOLUTION INTRAVENOUS
Status: DISPENSED
Start: 2022-10-03

## (undated) RX ORDER — HEPARIN SODIUM (PORCINE) LOCK FLUSH IV SOLN 100 UNIT/ML 100 UNIT/ML
SOLUTION INTRAVENOUS
Status: DISPENSED
Start: 2023-02-08

## (undated) RX ORDER — EPHEDRINE SULFATE 50 MG/ML
INJECTION, SOLUTION INTRAMUSCULAR; INTRAVENOUS; SUBCUTANEOUS
Status: DISPENSED
Start: 2021-09-07

## (undated) RX ORDER — HEPARIN SODIUM (PORCINE) LOCK FLUSH IV SOLN 100 UNIT/ML 100 UNIT/ML
SOLUTION INTRAVENOUS
Status: DISPENSED
Start: 2023-09-11

## (undated) RX ORDER — OXYMETAZOLINE HYDROCHLORIDE 0.05 G/100ML
SPRAY NASAL
Status: DISPENSED
Start: 2021-09-07

## (undated) RX ORDER — CEFAZOLIN SODIUM/WATER 2 G/20 ML
SYRINGE (ML) INTRAVENOUS
Status: DISPENSED
Start: 2024-02-22

## (undated) RX ORDER — ALBUMIN (HUMAN) 12.5 G/50ML
SOLUTION INTRAVENOUS
Status: DISPENSED
Start: 2024-01-26

## (undated) RX ORDER — HEPARIN SODIUM (PORCINE) LOCK FLUSH IV SOLN 100 UNIT/ML 100 UNIT/ML
SOLUTION INTRAVENOUS
Status: DISPENSED
Start: 2024-02-12

## (undated) RX ORDER — HEPARIN SODIUM (PORCINE) LOCK FLUSH IV SOLN 100 UNIT/ML 100 UNIT/ML
SOLUTION INTRAVENOUS
Status: DISPENSED
Start: 2024-01-17

## (undated) RX ORDER — ALBUMIN (HUMAN) 12.5 G/50ML
SOLUTION INTRAVENOUS
Status: DISPENSED
Start: 2024-02-22

## (undated) RX ORDER — HEPARIN SODIUM (PORCINE) LOCK FLUSH IV SOLN 100 UNIT/ML 100 UNIT/ML
SOLUTION INTRAVENOUS
Status: DISPENSED
Start: 2024-07-15

## (undated) RX ORDER — HEPARIN SODIUM (PORCINE) LOCK FLUSH IV SOLN 100 UNIT/ML 100 UNIT/ML
SOLUTION INTRAVENOUS
Status: DISPENSED
Start: 2022-06-27

## (undated) RX ORDER — OXYCODONE HYDROCHLORIDE 5 MG/1
TABLET ORAL
Status: DISPENSED
Start: 2021-09-07

## (undated) RX ORDER — ALBUMIN (HUMAN) 12.5 G/50ML
SOLUTION INTRAVENOUS
Status: DISPENSED
Start: 2024-01-03

## (undated) RX ORDER — LIDOCAINE HYDROCHLORIDE AND EPINEPHRINE 10; 10 MG/ML; UG/ML
INJECTION, SOLUTION INFILTRATION; PERINEURAL
Status: DISPENSED
Start: 2021-08-27

## (undated) RX ORDER — HEPARIN SODIUM (PORCINE) LOCK FLUSH IV SOLN 100 UNIT/ML 100 UNIT/ML
SOLUTION INTRAVENOUS
Status: DISPENSED
Start: 2024-10-07

## (undated) RX ORDER — ALBUMIN (HUMAN) 12.5 G/50ML
SOLUTION INTRAVENOUS
Status: DISPENSED
Start: 2024-02-12

## (undated) RX ORDER — CEFAZOLIN SODIUM 1 G/3ML
INJECTION, POWDER, FOR SOLUTION INTRAMUSCULAR; INTRAVENOUS
Status: DISPENSED
Start: 2021-09-07

## (undated) RX ORDER — HEPARIN SODIUM (PORCINE) LOCK FLUSH IV SOLN 100 UNIT/ML 100 UNIT/ML
SOLUTION INTRAVENOUS
Status: DISPENSED
Start: 2023-06-15

## (undated) RX ORDER — HEPARIN SODIUM (PORCINE) LOCK FLUSH IV SOLN 100 UNIT/ML 100 UNIT/ML
SOLUTION INTRAVENOUS
Status: DISPENSED
Start: 2024-08-12

## (undated) RX ORDER — HEPARIN SODIUM (PORCINE) LOCK FLUSH IV SOLN 100 UNIT/ML 100 UNIT/ML
SOLUTION INTRAVENOUS
Status: DISPENSED
Start: 2024-01-31

## (undated) RX ORDER — HEPARIN SODIUM (PORCINE) LOCK FLUSH IV SOLN 100 UNIT/ML 100 UNIT/ML
SOLUTION INTRAVENOUS
Status: DISPENSED
Start: 2024-01-26

## (undated) RX ORDER — GLYCOPYRROLATE 0.2 MG/ML
INJECTION INTRAMUSCULAR; INTRAVENOUS
Status: DISPENSED
Start: 2021-09-07

## (undated) RX ORDER — LIDOCAINE HYDROCHLORIDE 20 MG/ML
INJECTION, SOLUTION EPIDURAL; INFILTRATION; INTRACAUDAL; PERINEURAL
Status: DISPENSED
Start: 2021-09-07

## (undated) RX ORDER — ALBUMIN (HUMAN) 12.5 G/50ML
SOLUTION INTRAVENOUS
Status: DISPENSED
Start: 2024-02-20

## (undated) RX ORDER — HEPARIN SODIUM (PORCINE) LOCK FLUSH IV SOLN 100 UNIT/ML 100 UNIT/ML
SOLUTION INTRAVENOUS
Status: DISPENSED
Start: 2024-04-13

## (undated) RX ORDER — LIDOCAINE HYDROCHLORIDE 10 MG/ML
INJECTION, SOLUTION INFILTRATION; PERINEURAL
Status: DISPENSED
Start: 2024-02-22

## (undated) RX ORDER — HEPARIN SODIUM (PORCINE) LOCK FLUSH IV SOLN 100 UNIT/ML 100 UNIT/ML
SOLUTION INTRAVENOUS
Status: DISPENSED
Start: 2024-02-20

## (undated) RX ORDER — LIDOCAINE HYDROCHLORIDE 10 MG/ML
INJECTION, SOLUTION INFILTRATION; PERINEURAL
Status: DISPENSED
Start: 2024-02-26

## (undated) RX ORDER — PROPOFOL 10 MG/ML
INJECTION, EMULSION INTRAVENOUS
Status: DISPENSED
Start: 2021-09-07

## (undated) RX ORDER — ACETAMINOPHEN 325 MG/1
TABLET ORAL
Status: DISPENSED
Start: 2021-09-07

## (undated) RX ORDER — LIDOCAINE HYDROCHLORIDE AND EPINEPHRINE 10; 10 MG/ML; UG/ML
INJECTION, SOLUTION INFILTRATION; PERINEURAL
Status: DISPENSED
Start: 2021-09-07

## (undated) RX ORDER — HEPARIN SODIUM (PORCINE) LOCK FLUSH IV SOLN 100 UNIT/ML 100 UNIT/ML
SOLUTION INTRAVENOUS
Status: DISPENSED
Start: 2023-10-23